# Patient Record
Sex: MALE | Race: WHITE | NOT HISPANIC OR LATINO | Employment: FULL TIME | URBAN - METROPOLITAN AREA
[De-identification: names, ages, dates, MRNs, and addresses within clinical notes are randomized per-mention and may not be internally consistent; named-entity substitution may affect disease eponyms.]

---

## 2017-03-30 ENCOUNTER — HOSPITAL ENCOUNTER (OUTPATIENT)
Dept: RADIOLOGY | Facility: HOSPITAL | Age: 67
Discharge: HOME | End: 2017-03-30
Attending: UROLOGY
Payer: MEDICARE

## 2017-03-30 ENCOUNTER — HOSPITAL ENCOUNTER (OUTPATIENT)
Dept: CARDIOLOGY | Facility: HOSPITAL | Age: 67
Discharge: HOME | End: 2017-03-30
Attending: UROLOGY
Payer: MEDICARE

## 2017-03-30 ENCOUNTER — OFFICE VISIT (OUTPATIENT)
Dept: UROLOGY | Facility: CLINIC | Age: 67
End: 2017-03-30
Payer: MEDICARE

## 2017-03-30 VITALS
HEIGHT: 75 IN | WEIGHT: 174 LBS | SYSTOLIC BLOOD PRESSURE: 131 MMHG | DIASTOLIC BLOOD PRESSURE: 86 MMHG | HEART RATE: 88 BPM | BODY MASS INDEX: 21.64 KG/M2

## 2017-03-30 DIAGNOSIS — Z85.51 HISTORY OF BLADDER CANCER: Primary | ICD-10-CM

## 2017-03-30 DIAGNOSIS — Z85.51 HISTORY OF BLADDER CANCER: ICD-10-CM

## 2017-03-30 DIAGNOSIS — N18.3 CKD (CHRONIC KIDNEY DISEASE), STAGE 3 (MODERATE): ICD-10-CM

## 2017-03-30 DIAGNOSIS — R35.1 NOCTURIA: ICD-10-CM

## 2017-03-30 DIAGNOSIS — R97.20 ELEVATED PSA: Primary | ICD-10-CM

## 2017-03-30 LAB
BILIRUB UR QL STRIP: NEGATIVE
CLARITY UR REFRACT.AUTO: CLEAR
COLOR UR AUTO: NORMAL
GLUCOSE UR QL STRIP: NEGATIVE
HGB UR QL STRIP: NEGATIVE
KETONES UR QL STRIP: NEGATIVE
LEUKOCYTE ESTERASE UR QL STRIP: NEGATIVE
MICROSCOPIC COMMENT: NORMAL
NITRITE UR QL STRIP: NEGATIVE
PH UR STRIP: 5 [PH] (ref 5–8)
PROT UR QL STRIP: NEGATIVE
SP GR UR STRIP: 1 (ref 1–1.03)
URN SPEC COLLECT METH UR: NORMAL
UROBILINOGEN UR STRIP-ACNC: NEGATIVE EU/DL

## 2017-03-30 PROCEDURE — 1159F MED LIST DOCD IN RCRD: CPT | Mod: S$GLB,,, | Performed by: UROLOGY

## 2017-03-30 PROCEDURE — 99204 OFFICE O/P NEW MOD 45 MIN: CPT | Mod: 25,S$GLB,, | Performed by: UROLOGY

## 2017-03-30 PROCEDURE — 1126F AMNT PAIN NOTED NONE PRSNT: CPT | Mod: S$GLB,,, | Performed by: UROLOGY

## 2017-03-30 PROCEDURE — 88112 CYTOPATH CELL ENHANCE TECH: CPT | Performed by: PATHOLOGY

## 2017-03-30 PROCEDURE — 93010 ELECTROCARDIOGRAM REPORT: CPT | Mod: ,,, | Performed by: INTERNAL MEDICINE

## 2017-03-30 PROCEDURE — 1157F ADVNC CARE PLAN IN RCRD: CPT | Mod: S$GLB,,, | Performed by: UROLOGY

## 2017-03-30 PROCEDURE — 81001 URINALYSIS AUTO W/SCOPE: CPT

## 2017-03-30 PROCEDURE — 81001 URINALYSIS AUTO W/SCOPE: CPT | Mod: S$GLB,,, | Performed by: UROLOGY

## 2017-03-30 PROCEDURE — 88112 CYTOPATH CELL ENHANCE TECH: CPT | Mod: 26,,, | Performed by: PATHOLOGY

## 2017-03-30 PROCEDURE — 93005 ELECTROCARDIOGRAM TRACING: CPT

## 2017-03-30 PROCEDURE — 99999 PR PBB SHADOW E&M-EST. PATIENT-LVL IV: CPT | Mod: PBBFAC,,, | Performed by: UROLOGY

## 2017-03-30 PROCEDURE — 71020 XR CHEST PA AND LATERAL: CPT | Mod: 26,,, | Performed by: RADIOLOGY

## 2017-03-30 PROCEDURE — 1160F RVW MEDS BY RX/DR IN RCRD: CPT | Mod: S$GLB,,, | Performed by: UROLOGY

## 2017-03-30 PROCEDURE — 51798 US URINE CAPACITY MEASURE: CPT | Mod: S$GLB,,, | Performed by: UROLOGY

## 2017-03-30 PROCEDURE — 71020 XR CHEST PA AND LATERAL: CPT | Mod: TC

## 2017-03-30 RX ORDER — SULFAMETHOXAZOLE AND TRIMETHOPRIM 800; 160 MG/1; MG/1
1 TABLET ORAL 2 TIMES DAILY
Refills: 0 | Status: ON HOLD | COMMUNITY
Start: 2017-03-20 | End: 2017-04-05 | Stop reason: HOSPADM

## 2017-03-30 RX ORDER — PANTOPRAZOLE SODIUM 40 MG/1
TABLET, DELAYED RELEASE ORAL
Refills: 1 | COMMUNITY
Start: 2017-03-21 | End: 2017-05-05

## 2017-03-30 RX ORDER — CLOBETASOL PROPIONATE 0.46 MG/ML
1 SOLUTION TOPICAL DAILY
Refills: 3 | Status: ON HOLD | COMMUNITY
Start: 2017-03-13 | End: 2017-04-05 | Stop reason: CLARIF

## 2017-03-30 RX ORDER — KETOCONAZOLE 20 MG/ML
SHAMPOO, SUSPENSION TOPICAL
Refills: 3 | COMMUNITY
Start: 2017-03-13 | End: 2017-07-12

## 2017-03-30 NOTE — MR AVS SNAPSHOT
[facility]  [address] [place] [address]  Phone: [phone]                  Juan Manuel Koehler   3/30/2017 7:15 AM   Office Visit    Description:  Male : 1950   Provider:  James Boucher MD   Department:  [place] [facility]           Diagnoses this Visit        Comments    History of bladder cancer    -  Primary     CKD (chronic kidney disease), stage 3 (moderate)         Nocturia                To Do List           Future Appointments        Provider Department Dept Phone    4/3/2017 9:45 AM [facility] CT1 LIMIT 400 LBS [facility] [phone]      Your Future Surgeries/Procedures     2017   Surgery with James Boucher MD   [facility] ([facility])    [address] [place] [address] [phone]              Goals (5 Years of Data)     None      Follow-Up and Disposition     Return in about 6 days (around 2017) for surgery.      [facility]     [facility] Nurse Care Line -  Assistance  Unless otherwise directed by your provider, please contact [facility], our nurse care line that is available for  assistance.     Registered nurses in the [facility] provide: appointment scheduling, clinical advisement, health education, and other advisory services.  Call: [phone] (toll free)               Medications           Message regarding Medications     Verify the changes and/or additions to your medication regime listed below are the same as discussed with your clinician today.  If any of these changes or additions are incorrect, please notify your healthcare provider.             Verify that the below list of medications is an accurate representation of the medications you are currently taking.  If none reported, the list may be blank. If incorrect, please contact your healthcare provider. Carry this list with you in case of emergency.           Current Medications      "clobetasol (TEMOVATE) 0.05 % external solution 1 application once daily. Apply to affected area    esomeprazole (NEXIUM) 20 MG capsule Take 20 mg by mouth before breakfast.    ketoconazole (NIZORAL) 2 % shampoo USE TO WASH AFFECTED AREA ONCE DAILY    pantoprazole (PROTONIX) 40 MG tablet TAKE 1 BY MOUTH ONCE A DAY FOR 60 DAYS    sulfamethoxazole-trimethoprim 800-160mg (BACTRIM DS) 800-160 mg Tab Take 1 tablet by mouth 2 (two) times daily.           Clinical Reference Information           Your Vitals Were     BP Pulse Height Weight BMI    131/86 88 6' 3" (1.905 m) 78.9 kg (174 lb) 21.75 kg/m2      Blood Pressure          Most Recent Value    BP  131/86      Allergies as of 3/30/2017     No Known Allergies      Immunizations Administered on Date of Encounter - 3/30/2017     None      Orders Placed During Today's Visit      Normal Orders This Visit    Bladder scan     Case Request Operating Room: CYSTOSCOPY WITH RETROGRADE PYELOGRAM, EXCISION-BLADDER TUMOR-TRANSURETHRAL (TURBT), BIOPSY-URETER     Cytology, urine     POCT urinalysis, dipstick or tablet reag     Urinalysis Microscopic     Urinalysis     Future Labs/Procedures Expected by Expires    CT Renal Stone Study ABD Pelvis WO  3/30/2017 3/30/2018    Prostate Specific Antigen, Diagnostic  3/30/2017 3/30/2018    X-Ray Chest PA And Lateral  3/30/2017 3/30/2018    SCHEDULED EKG 12-LEAD (to Muse)  As directed 3/30/2018      [facility] Sign-Up     Activating your [facility] account is as easy as 1-2-3!     1) Visit [email or web address], select Sign Up Now, enter this activation code and your date of birth, then select Next.  [email or web address]  Expires: 5/14/2017 12:48 PM      2) Create a username and password to use when you visit [facility] in the future and select a security question in case you lose your password and select Next.    3) Enter your e-mail address and click Sign Up!    Additional Information  If you have questions, please e-mail [email or web address] or call " [phone] to talk to our [facility] staff. Remember, [facility] is NOT to be used for urgent needs. For medical emergencies, dial 911.         Language Assistance Services     ATTENTION: Language assistance services are available, free of charge. Please call [phone].      ATENCIÓN: Si habla rahel, tiene a felton disposición servicios gratuitos de asistencia lingüística. Llame al [phone].     CHÚ Ý: N?u b?n nói Ti?ng Vi?t, có các d?ch v? h? tr? ngôn ng? mi?n phí dành cho b?n. G?i s? [phone].         [facility] complies with applicable Federal civil rights laws and does not discriminate on the basis of race, color, national origin, age, disability, or sex.

## 2017-03-30 NOTE — PROGRESS NOTES
Subjective:       Patient ID: Juan Manuel Koehler is a 67 y.o. male.    Chief Complaint: Bladder cancer.    HPI   Patient is a 67 y.o. male who is new to our clinic and referred by their PCP, Dr. Sweeney for evaluation of bladder cancer and CKD.    The patient reports some bladder pain with bladder filling there is been ongoing for the past month.  He has a history of bladder cancer diagnosed in 1990s.  He had 5 recurrences between 1990 and 1997.  These were treated at an outside institution.  His last cystoscopy was approximately 3-4 years ago.  He reports a good urinary stream.  He feels he empties his bladder completely.  He has nocturia 2 times per night.  He denies significant daytime urinary frequency or urgency.  His AUA symptom score today is 11.  He did not fill out a bother score.  He denies dysuria or gross hematuria.  No bone pain or unexpected weight loss.  He also reports an abnormality of his lab.  On review and request of his quest labs, his creatinine is 1.95 with an estimated GFR of 35.  He also has an abnormal parathyroid hormone level at 66 which is barely above the reference range normal which was 64.  A urine culture was done that showed no growth.  CBC revealed white blood cell count 6.9, hemoglobin of 10.1, platelets of 239,000.      Urine dipstick today is reviewed and reveals pH of 5, nitrite negative, leukocytes negative, 1+ protein, 1+ blood.    A post void residual bladder scan was performed immediately after voiding. The patient's PVR was noted to be 34mL.         Review of Systems    All other systems reviewed and negative except pertinent positives noted in HPI.    Objective:      Physical Exam   Nursing note and vitals reviewed.  Constitutional: He is oriented to person, place, and time. Vital signs are normal. He appears well-developed and well-nourished. He is cooperative.   HENT:   Head: Normocephalic.   Neck: No tracheal deviation present.   Cardiovascular: Normal rate and intact distal  pulses.    Pulmonary/Chest: Effort normal. No accessory muscle usage. No tachypnea. No respiratory distress.   Abdominal: Soft. Normal appearance. He exhibits no distension, no fluid wave, no ascites and no mass. There is no tenderness. There is no rebound and no CVA tenderness. No hernia. Hernia confirmed negative in the right inguinal area and confirmed negative in the left inguinal area.   Liver/spleen non-palpable.   Genitourinary: Prostate normal, testes normal and penis normal. Rectal exam shows no external hemorrhoid, no mass, no tenderness and anal tone normal. Prostate is not tender. Right testis shows no mass and no tenderness. Right testis is descended. Left testis shows no mass and no tenderness. Left testis is descended. Circumcised.   Genitourinary Comments: Scrotum showed no rashes or lesions.  Anus/perineum without lesion.  Epididymis showed no masses or tenderness. No meatal stenosis, meatus in normal location. No penile discharge/plaques. Prostate smooth, no nodules, 40 grams.  Seminal vesicles non-palpable.  Normal sphincter tone.        Musculoskeletal: Normal range of motion.   Lymphadenopathy: No inguinal adenopathy noted on the right or left side.        Right: No inguinal adenopathy present.        Left: No inguinal adenopathy present.   Neurological: He is alert and oriented to person, place, and time. He has normal strength.   Skin: No bruising and no rash noted.     Psychiatric: He has a normal mood and affect. His speech is normal and behavior is normal. Thought content normal.       Assessment:       1. History of bladder cancer    2. CKD (chronic kidney disease), stage 3 (moderate)    3. Nocturia        Plan:     1. Bladder CA:  - patient is due for a cystoscopy for surveillance of his bladder cancer.  We do not have the original pathology results at this time.  -Because of his elevation of his serum creatinine, we will get upper tract imaging by way of a CT scan without contrast of the  abdomen and pelvis.  He will need a cystoscopy which we will do in the operating room due to his anxiety as well as the need for bilateral retrograde pyelograms again because his renal function will not allow for intravenous contrast administration for a CT urogram.  -I have explained the indication, risks, benefits, and alternatives of the procedure in detail.  The patient voices understanding and all questions have been answered.  The patient agrees to proceed as planned with cystoscopy, bilateral retrograde pyelograms, possible bladder biopsy or transurethral resection of a bladder tumor.  Consent obtained today.  All questions answered.  -Urine cytology sent    2. CKD:  -No medical comorbidities to explain his chronic kidney disease.  We will obtain a CT scan to evaluate for any obstructive uropathy.    3. Nocturia:  -Limit fluids 2 hours before bedtime.  Elevated legs 2 hours before bedtime.  No caffeine, cokes, teas after 3 pm in the afternoon.

## 2017-03-31 NOTE — PLAN OF CARE
Scheduled for surgery on 4/5/17.  Attempted to call patient on both phone numbers on file to give preop instructions- no answer.

## 2017-04-03 ENCOUNTER — HOSPITAL ENCOUNTER (OUTPATIENT)
Dept: RADIOLOGY | Facility: HOSPITAL | Age: 67
Discharge: HOME | DRG: 669 | End: 2017-04-03
Attending: UROLOGY
Payer: MEDICARE

## 2017-04-03 ENCOUNTER — TELEPHONE (OUTPATIENT)
Dept: UROLOGY | Facility: CLINIC | Age: 67
End: 2017-04-03

## 2017-04-03 DIAGNOSIS — Z85.51 HISTORY OF BLADDER CANCER: ICD-10-CM

## 2017-04-03 PROCEDURE — 74176 CT ABD & PELVIS W/O CONTRAST: CPT | Mod: 26,,, | Performed by: RADIOLOGY

## 2017-04-03 NOTE — TELEPHONE ENCOUNTER
----- Message from Alina Prater sent at 4/3/2017  2:39 PM CDT -----  Contact: Self/[phone]  Patient would like results from CTscan and bloodwork today, if possible.  Please advise.

## 2017-04-04 NOTE — TELEPHONE ENCOUNTER
Called patient and notified of psa result and plan for repeat in 3 months.    Also discussed finding of bilateral hydronephrosis and thickening of the bladder wall.  Will need to investigate further in the OR.  Discussed likely need for ureteral stents if possible.  Also discussed urinary cytology result.

## 2017-04-05 ENCOUNTER — ANESTHESIA EVENT (OUTPATIENT)
Dept: SURGERY | Facility: HOSPITAL | Age: 67
DRG: 669 | End: 2017-04-05
Payer: MEDICARE

## 2017-04-05 ENCOUNTER — ANESTHESIA (OUTPATIENT)
Dept: SURGERY | Facility: HOSPITAL | Age: 67
DRG: 669 | End: 2017-04-05
Payer: MEDICARE

## 2017-04-05 ENCOUNTER — HOSPITAL ENCOUNTER (INPATIENT)
Facility: HOSPITAL | Age: 67
LOS: 2 days | Discharge: HOME | DRG: 669 | End: 2017-04-08
Attending: UROLOGY | Admitting: UROLOGY
Payer: MEDICARE

## 2017-04-05 DIAGNOSIS — E87.5 HYPERKALEMIA, DIMINISHED RENAL EXCRETION: ICD-10-CM

## 2017-04-05 DIAGNOSIS — D63.1 ANEMIA OF CHRONIC RENAL FAILURE, STAGE 3 (MODERATE): ICD-10-CM

## 2017-04-05 DIAGNOSIS — E87.5 HYPERKALEMIA: ICD-10-CM

## 2017-04-05 DIAGNOSIS — N18.9 CKD (CHRONIC KIDNEY DISEASE), UNSPECIFIED STAGE: ICD-10-CM

## 2017-04-05 DIAGNOSIS — N17.9 ACUTE RENAL FAILURE, UNSPECIFIED ACUTE RENAL FAILURE TYPE: ICD-10-CM

## 2017-04-05 DIAGNOSIS — N18.30 ANEMIA OF CHRONIC RENAL FAILURE, STAGE 3 (MODERATE): ICD-10-CM

## 2017-04-05 DIAGNOSIS — D49.4 BLADDER TUMOR: Primary | ICD-10-CM

## 2017-04-05 DIAGNOSIS — N17.9 AKI (ACUTE KIDNEY INJURY): ICD-10-CM

## 2017-04-05 LAB
ANION GAP SERPL CALC-SCNC: 8 MMOL/L
ANION GAP SERPL CALC-SCNC: 9 MMOL/L
BASOPHILS # BLD AUTO: 0.04 K/UL
BASOPHILS NFR BLD: 0.5 %
BUN SERPL-MCNC: 25 MG/DL
BUN SERPL-MCNC: 26 MG/DL
CALCIUM SERPL-MCNC: 8.7 MG/DL
CALCIUM SERPL-MCNC: 9.4 MG/DL
CHLORIDE SERPL-SCNC: 111 MMOL/L
CHLORIDE SERPL-SCNC: 111 MMOL/L
CO2 SERPL-SCNC: 20 MMOL/L
CO2 SERPL-SCNC: 21 MMOL/L
CREAT SERPL-MCNC: 2.2 MG/DL
CREAT SERPL-MCNC: 2.3 MG/DL
DIFFERENTIAL METHOD: ABNORMAL
EOSINOPHIL # BLD AUTO: 0.3 K/UL
EOSINOPHIL NFR BLD: 3.5 %
ERYTHROCYTE [DISTWIDTH] IN BLOOD BY AUTOMATED COUNT: 13.7 %
EST. GFR  (AFRICAN AMERICAN): 33 ML/MIN/1.73 M^2
EST. GFR  (AFRICAN AMERICAN): 34 ML/MIN/1.73 M^2
EST. GFR  (NON AFRICAN AMERICAN): 28 ML/MIN/1.73 M^2
EST. GFR  (NON AFRICAN AMERICAN): 30 ML/MIN/1.73 M^2
GLUCOSE SERPL-MCNC: 152 MG/DL
GLUCOSE SERPL-MCNC: 95 MG/DL
HCT VFR BLD AUTO: 29.4 %
HGB BLD-MCNC: 9.9 G/DL
LYMPHOCYTES # BLD AUTO: 1.4 K/UL
LYMPHOCYTES NFR BLD: 18.9 %
MCH RBC QN AUTO: 29.6 PG
MCHC RBC AUTO-ENTMCNC: 33.7 %
MCV RBC AUTO: 88 FL
MONOCYTES # BLD AUTO: 0.5 K/UL
MONOCYTES NFR BLD: 7.1 %
NEUTROPHILS # BLD AUTO: 5.1 K/UL
NEUTROPHILS NFR BLD: 69.7 %
PLATELET # BLD AUTO: 241 K/UL
PMV BLD AUTO: 9.1 FL
POTASSIUM SERPL-SCNC: 4.6 MMOL/L
POTASSIUM SERPL-SCNC: 5.1 MMOL/L
RBC # BLD AUTO: 3.35 M/UL
SODIUM SERPL-SCNC: 139 MMOL/L
SODIUM SERPL-SCNC: 141 MMOL/L
WBC # BLD AUTO: 7.35 K/UL

## 2017-04-05 PROCEDURE — 88112 CYTOPATH CELL ENHANCE TECH: CPT | Mod: 26,,, | Performed by: PATHOLOGY

## 2017-04-05 PROCEDURE — 88307 TISSUE EXAM BY PATHOLOGIST: CPT | Performed by: PATHOLOGY

## 2017-04-05 PROCEDURE — 27201423 OPTIME MED/SURG SUP & DEVICES STERILE SUPPLY: Performed by: UROLOGY

## 2017-04-05 PROCEDURE — 25000003 PHARM REV CODE 250: Performed by: NURSE ANESTHETIST, CERTIFIED REGISTERED

## 2017-04-05 PROCEDURE — 0TBB8ZZ EXCISION OF BLADDER, VIA NATURAL OR ARTIFICIAL OPENING ENDOSCOPIC: ICD-10-PCS | Performed by: UROLOGY

## 2017-04-05 PROCEDURE — 88112 CYTOPATH CELL ENHANCE TECH: CPT | Performed by: PATHOLOGY

## 2017-04-05 PROCEDURE — 63600175 PHARM REV CODE 636 W HCPCS: Performed by: ANESTHESIOLOGY

## 2017-04-05 PROCEDURE — 36415 COLL VENOUS BLD VENIPUNCTURE: CPT

## 2017-04-05 PROCEDURE — 88307 TISSUE EXAM BY PATHOLOGIST: CPT | Mod: 26,,, | Performed by: PATHOLOGY

## 2017-04-05 PROCEDURE — 36000706: Performed by: UROLOGY

## 2017-04-05 PROCEDURE — 37000009 HC ANESTHESIA EA ADD 15 MINS: Performed by: UROLOGY

## 2017-04-05 PROCEDURE — 71000039 HC RECOVERY, EACH ADD'L HOUR: Performed by: UROLOGY

## 2017-04-05 PROCEDURE — 52240 CYSTOSCOPY AND TREATMENT: CPT | Mod: ,,, | Performed by: UROLOGY

## 2017-04-05 PROCEDURE — 25000003 PHARM REV CODE 250: Performed by: UROLOGY

## 2017-04-05 PROCEDURE — 80048 BASIC METABOLIC PNL TOTAL CA: CPT | Mod: 91

## 2017-04-05 PROCEDURE — 80048 BASIC METABOLIC PNL TOTAL CA: CPT

## 2017-04-05 PROCEDURE — 63600175 PHARM REV CODE 636 W HCPCS

## 2017-04-05 PROCEDURE — 25500020 PHARM REV CODE 255: Performed by: UROLOGY

## 2017-04-05 PROCEDURE — 63600175 PHARM REV CODE 636 W HCPCS: Performed by: UROLOGY

## 2017-04-05 PROCEDURE — 71000033 HC RECOVERY, INTIAL HOUR: Performed by: UROLOGY

## 2017-04-05 PROCEDURE — 63600175 PHARM REV CODE 636 W HCPCS: Performed by: NURSE ANESTHETIST, CERTIFIED REGISTERED

## 2017-04-05 PROCEDURE — 37000008 HC ANESTHESIA 1ST 15 MINUTES: Performed by: UROLOGY

## 2017-04-05 PROCEDURE — 36000707: Performed by: UROLOGY

## 2017-04-05 PROCEDURE — 85025 COMPLETE CBC W/AUTO DIFF WBC: CPT

## 2017-04-05 PROCEDURE — C1758 CATHETER, URETERAL: HCPCS | Performed by: UROLOGY

## 2017-04-05 RX ORDER — SODIUM CHLORIDE 9 MG/ML
INJECTION, SOLUTION INTRAVENOUS CONTINUOUS
Status: DISCONTINUED | OUTPATIENT
Start: 2017-04-05 | End: 2017-04-08

## 2017-04-05 RX ORDER — PANTOPRAZOLE SODIUM 40 MG/1
40 TABLET, DELAYED RELEASE ORAL DAILY
Status: DISCONTINUED | OUTPATIENT
Start: 2017-04-06 | End: 2017-04-08 | Stop reason: HOSPADM

## 2017-04-05 RX ORDER — OXYCODONE AND ACETAMINOPHEN 5; 325 MG/1; MG/1
1 TABLET ORAL EVERY 4 HOURS PRN
Qty: 30 TABLET | Refills: 0 | Status: SHIPPED | OUTPATIENT
Start: 2017-04-05 | End: 2017-04-11

## 2017-04-05 RX ORDER — CEFAZOLIN SODIUM 1 G/3ML
INJECTION, POWDER, FOR SOLUTION INTRAMUSCULAR; INTRAVENOUS
Status: DISCONTINUED | OUTPATIENT
Start: 2017-04-05 | End: 2017-04-05

## 2017-04-05 RX ORDER — CIPROFLOXACIN 2 MG/ML
400 INJECTION, SOLUTION INTRAVENOUS
Status: DISCONTINUED | OUTPATIENT
Start: 2017-04-05 | End: 2017-04-05

## 2017-04-05 RX ORDER — SODIUM CHLORIDE 0.9 % (FLUSH) 0.9 %
3 SYRINGE (ML) INJECTION
Status: DISCONTINUED | OUTPATIENT
Start: 2017-04-05 | End: 2017-04-05

## 2017-04-05 RX ORDER — OXYCODONE AND ACETAMINOPHEN 5; 325 MG/1; MG/1
1 TABLET ORAL EVERY 4 HOURS PRN
Status: DISCONTINUED | OUTPATIENT
Start: 2017-04-05 | End: 2017-04-08 | Stop reason: HOSPADM

## 2017-04-05 RX ORDER — LORAZEPAM 2 MG/ML
2 INJECTION INTRAMUSCULAR ONCE
Status: COMPLETED | OUTPATIENT
Start: 2017-04-05 | End: 2017-04-05

## 2017-04-05 RX ORDER — OXYBUTYNIN CHLORIDE 5 MG/1
5 TABLET ORAL
Status: DISCONTINUED | OUTPATIENT
Start: 2017-04-05 | End: 2017-04-08 | Stop reason: HOSPADM

## 2017-04-05 RX ORDER — OXYCODONE AND ACETAMINOPHEN 10; 325 MG/1; MG/1
1 TABLET ORAL EVERY 4 HOURS PRN
Status: DISCONTINUED | OUTPATIENT
Start: 2017-04-05 | End: 2017-04-08 | Stop reason: HOSPADM

## 2017-04-05 RX ORDER — HYDROCODONE BITARTRATE AND ACETAMINOPHEN 5; 325 MG/1; MG/1
1 TABLET ORAL EVERY 4 HOURS PRN
Status: DISCONTINUED | OUTPATIENT
Start: 2017-04-05 | End: 2017-04-08 | Stop reason: HOSPADM

## 2017-04-05 RX ORDER — MIDAZOLAM HYDROCHLORIDE 1 MG/ML
INJECTION INTRAMUSCULAR; INTRAVENOUS
Status: DISCONTINUED | OUTPATIENT
Start: 2017-04-05 | End: 2017-04-05

## 2017-04-05 RX ORDER — LORAZEPAM 2 MG/ML
INJECTION INTRAMUSCULAR
Status: COMPLETED
Start: 2017-04-05 | End: 2017-04-05

## 2017-04-05 RX ORDER — DIPHENHYDRAMINE HCL 25 MG
25 CAPSULE ORAL EVERY 12 HOURS PRN
Status: DISCONTINUED | OUTPATIENT
Start: 2017-04-05 | End: 2017-04-08 | Stop reason: HOSPADM

## 2017-04-05 RX ORDER — HYDROMORPHONE HYDROCHLORIDE 2 MG/ML
0.5 INJECTION, SOLUTION INTRAMUSCULAR; INTRAVENOUS; SUBCUTANEOUS EVERY 5 MIN PRN
Status: DISCONTINUED | OUTPATIENT
Start: 2017-04-05 | End: 2017-04-05

## 2017-04-05 RX ORDER — ROCURONIUM BROMIDE 10 MG/ML
INJECTION, SOLUTION INTRAVENOUS
Status: DISCONTINUED | OUTPATIENT
Start: 2017-04-05 | End: 2017-04-05

## 2017-04-05 RX ORDER — ONDANSETRON HYDROCHLORIDE 2 MG/ML
INJECTION, SOLUTION INTRAMUSCULAR; INTRAVENOUS
Status: DISCONTINUED | OUTPATIENT
Start: 2017-04-05 | End: 2017-04-05

## 2017-04-05 RX ORDER — SODIUM CHLORIDE, SODIUM LACTATE, POTASSIUM CHLORIDE, CALCIUM CHLORIDE 600; 310; 30; 20 MG/100ML; MG/100ML; MG/100ML; MG/100ML
INJECTION, SOLUTION INTRAVENOUS CONTINUOUS PRN
Status: DISCONTINUED | OUTPATIENT
Start: 2017-04-05 | End: 2017-04-05

## 2017-04-05 RX ORDER — SODIUM CHLORIDE 0.9 % (FLUSH) 0.9 %
3 SYRINGE (ML) INJECTION EVERY 8 HOURS
Status: DISCONTINUED | OUTPATIENT
Start: 2017-04-05 | End: 2017-04-08 | Stop reason: HOSPADM

## 2017-04-05 RX ORDER — ATROPA BELLADONNA AND OPIUM 16.2; 3 MG/1; MG/1
30 SUPPOSITORY RECTAL EVERY 8 HOURS PRN
Status: DISCONTINUED | OUTPATIENT
Start: 2017-04-05 | End: 2017-04-08 | Stop reason: HOSPADM

## 2017-04-05 RX ORDER — PROPOFOL 10 MG/ML
VIAL (ML) INTRAVENOUS
Status: DISCONTINUED | OUTPATIENT
Start: 2017-04-05 | End: 2017-04-05

## 2017-04-05 RX ORDER — OXYBUTYNIN CHLORIDE 5 MG/1
5 TABLET ORAL 3 TIMES DAILY
Qty: 90 TABLET | Refills: 0 | Status: SHIPPED | OUTPATIENT
Start: 2017-04-05 | End: 2017-04-20 | Stop reason: HOSPADM

## 2017-04-05 RX ORDER — ONDANSETRON 2 MG/ML
4 INJECTION INTRAMUSCULAR; INTRAVENOUS EVERY 8 HOURS PRN
Status: DISCONTINUED | OUTPATIENT
Start: 2017-04-05 | End: 2017-04-08 | Stop reason: HOSPADM

## 2017-04-05 RX ORDER — HYDROMORPHONE HYDROCHLORIDE 1 MG/ML
0.5 INJECTION, SOLUTION INTRAMUSCULAR; INTRAVENOUS; SUBCUTANEOUS
Status: DISCONTINUED | OUTPATIENT
Start: 2017-04-05 | End: 2017-04-08 | Stop reason: HOSPADM

## 2017-04-05 RX ORDER — MIDAZOLAM HYDROCHLORIDE 1 MG/ML
INJECTION INTRAMUSCULAR; INTRAVENOUS
Status: DISPENSED
Start: 2017-04-05 | End: 2017-04-06

## 2017-04-05 RX ORDER — SODIUM CHLORIDE 9 MG/ML
INJECTION, SOLUTION INTRAVENOUS CONTINUOUS
Status: DISCONTINUED | OUTPATIENT
Start: 2017-04-05 | End: 2017-04-05

## 2017-04-05 RX ORDER — HYDROMORPHONE HYDROCHLORIDE 2 MG/ML
INJECTION, SOLUTION INTRAMUSCULAR; INTRAVENOUS; SUBCUTANEOUS
Status: COMPLETED
Start: 2017-04-05 | End: 2017-04-05

## 2017-04-05 RX ORDER — LIDOCAINE HCL/PF 100 MG/5ML
SYRINGE (ML) INTRAVENOUS
Status: DISCONTINUED | OUTPATIENT
Start: 2017-04-05 | End: 2017-04-05

## 2017-04-05 RX ORDER — SUCCINYLCHOLINE CHLORIDE 20 MG/ML
INJECTION INTRAMUSCULAR; INTRAVENOUS
Status: DISCONTINUED | OUTPATIENT
Start: 2017-04-05 | End: 2017-04-05

## 2017-04-05 RX ORDER — ONDANSETRON 2 MG/ML
4 INJECTION INTRAMUSCULAR; INTRAVENOUS ONCE AS NEEDED
Status: DISCONTINUED | OUTPATIENT
Start: 2017-04-05 | End: 2017-04-05

## 2017-04-05 RX ORDER — LABETALOL HYDROCHLORIDE 5 MG/ML
INJECTION, SOLUTION INTRAVENOUS
Status: DISCONTINUED | OUTPATIENT
Start: 2017-04-05 | End: 2017-04-05

## 2017-04-05 RX ORDER — FENTANYL CITRATE 50 UG/ML
INJECTION, SOLUTION INTRAMUSCULAR; INTRAVENOUS
Status: DISCONTINUED | OUTPATIENT
Start: 2017-04-05 | End: 2017-04-05

## 2017-04-05 RX ADMIN — MIDAZOLAM HYDROCHLORIDE 2 MG: 1 INJECTION, SOLUTION INTRAMUSCULAR; INTRAVENOUS at 12:04

## 2017-04-05 RX ADMIN — LORAZEPAM 2 MG: 2 INJECTION INTRAMUSCULAR at 01:04

## 2017-04-05 RX ADMIN — ONDANSETRON 4 MG: 2 INJECTION, SOLUTION INTRAMUSCULAR; INTRAVENOUS at 12:04

## 2017-04-05 RX ADMIN — FENTANYL CITRATE 100 MCG: 50 INJECTION, SOLUTION INTRAMUSCULAR; INTRAVENOUS at 11:04

## 2017-04-05 RX ADMIN — FENTANYL CITRATE 50 MCG: 50 INJECTION, SOLUTION INTRAMUSCULAR; INTRAVENOUS at 12:04

## 2017-04-05 RX ADMIN — HYDROMORPHONE HYDROCHLORIDE 0.5 MG: 2 INJECTION INTRAMUSCULAR; INTRAVENOUS; SUBCUTANEOUS at 03:04

## 2017-04-05 RX ADMIN — SODIUM CHLORIDE, SODIUM LACTATE, POTASSIUM CHLORIDE, AND CALCIUM CHLORIDE: .6; .31; .03; .02 INJECTION, SOLUTION INTRAVENOUS at 11:04

## 2017-04-05 RX ADMIN — LORAZEPAM 2 MG: 2 INJECTION, SOLUTION INTRAMUSCULAR; INTRAVENOUS at 01:04

## 2017-04-05 RX ADMIN — HYDROMORPHONE HYDROCHLORIDE 0.5 MG: 2 INJECTION INTRAMUSCULAR; INTRAVENOUS; SUBCUTANEOUS at 01:04

## 2017-04-05 RX ADMIN — LIDOCAINE HYDROCHLORIDE 50 MG: 20 INJECTION, SOLUTION INTRAVENOUS at 11:04

## 2017-04-05 RX ADMIN — SODIUM CHLORIDE 10 ML/HR: 0.9 INJECTION, SOLUTION INTRAVENOUS at 10:04

## 2017-04-05 RX ADMIN — SODIUM CHLORIDE: 0.9 INJECTION, SOLUTION INTRAVENOUS at 05:04

## 2017-04-05 RX ADMIN — HYDROMORPHONE HYDROCHLORIDE 0.5 MG: 2 INJECTION, SOLUTION INTRAMUSCULAR; INTRAVENOUS; SUBCUTANEOUS at 01:04

## 2017-04-05 RX ADMIN — ROCURONIUM BROMIDE 5 MG: 10 INJECTION, SOLUTION INTRAVENOUS at 11:04

## 2017-04-05 RX ADMIN — ONDANSETRON 4 MG: 2 INJECTION INTRAMUSCULAR; INTRAVENOUS at 07:04

## 2017-04-05 RX ADMIN — PROPOFOL 120 MG: 10 INJECTION, EMULSION INTRAVENOUS at 11:04

## 2017-04-05 RX ADMIN — ATROPA BELLADONNA AND OPIUM 30 MG: 16.2; 3 SUPPOSITORY RECTAL at 01:04

## 2017-04-05 RX ADMIN — PROPOFOL 80 MG: 10 INJECTION, EMULSION INTRAVENOUS at 11:04

## 2017-04-05 RX ADMIN — HYDROCODONE BITARTRATE AND ACETAMINOPHEN 1 TABLET: 5; 325 TABLET ORAL at 07:04

## 2017-04-05 RX ADMIN — CEFAZOLIN 2 G: 330 INJECTION, POWDER, FOR SOLUTION INTRAMUSCULAR; INTRAVENOUS at 12:04

## 2017-04-05 RX ADMIN — SUCCINYLCHOLINE CHLORIDE 100 MG: 20 INJECTION, SOLUTION INTRAMUSCULAR; INTRAVENOUS at 11:04

## 2017-04-05 RX ADMIN — MIDAZOLAM HYDROCHLORIDE 2 MG: 1 INJECTION, SOLUTION INTRAMUSCULAR; INTRAVENOUS at 11:04

## 2017-04-05 NOTE — PLAN OF CARE
Dr. Boucher at bedside to flush bolanos catheter.  Instructed that the pt will be admitted.   notified.

## 2017-04-05 NOTE — IP AVS SNAPSHOT
[facility]  [address]  [place] [address]  Phone: [phone]           Patient Discharge Instructions   Our goal is to set you up for success. This packet includes information on your condition, medications, and your home care.  It will help you care for yourself to prevent having to return to the hospital.     Please ask your nurse if you have any questions.      There are many details to remember when preparing to leave the hospital. Here is what you will need to do:    1. Take your medicine. If you are prescribed medications, review your Medication List on the following pages. You may have new medications to  at the pharmacy and others that you'll need to stop taking. Review the instructions for how and when to take your medications. Talk with your doctor or nurses if you are unsure of what to do.     2. Go to your follow-up appointments. Specific follow-up information is listed in the following pages. Your may be contacted by a nurse or clinical provider about future appointments. Be sure we have all of the phone numbers to reach you. Please contact your provider's office if you are unable to make an appointment.     3. Watch for warning signs. Your doctor or nurse will give you detailed warning signs to watch for and when to call for assistance. These instructions may also include educational information about your condition. If you experience any of warning signs to your health, call your doctor.               ** Verify the list of medication(s) below is accurate and up to date. Carry this with you in case of emergency. If your medications have changed, please notify your healthcare provider.             Medication List      START taking these medications        Additional Info                      docusate sodium 100 MG capsule   Commonly known as:  COLACE   Refills:  0   Dose:  100 mg    Last time this was given:  100 mg on 4/8/2017 10:01 AM   Instructions:  Take 1 capsule  (100 mg total) by mouth 2 (two) times daily.     Begin Date    AM    Noon    PM    Bedtime       ergocalciferol 50,000 unit Cap   Commonly known as:  ERGOCALCIFEROL   Quantity:  7 capsule   Refills:  0   Dose:  43806 Units    Last time this was given:  50,000 Units on 4/8/2017 10:02 AM   Instructions:  Take 1 capsule (50,000 Units total) by mouth every 7 days.     Begin Date    AM    Noon    PM    Bedtime       * oxybutynin 5 MG Tab   Commonly known as:  DITROPAN   Quantity:  90 tablet   Refills:  0   Dose:  5 mg    Last time this was given:  5 mg on 4/6/2017  3:30 AM   Instructions:  Take 1 tablet (5 mg total) by mouth 3 (three) times daily.     Begin Date    AM    Noon    PM    Bedtime       * oxybutynin 5 MG Tab   Commonly known as:  DITROPAN   Quantity:  90 tablet   Refills:  1   Dose:  5 mg    Last time this was given:  5 mg on 4/6/2017  3:30 AM   Instructions:  Take 1 tablet (5 mg total) by mouth after meals as needed (bladder spasm).     Begin Date    AM    Noon    PM    Bedtime       * oxycodone-acetaminophen 5-325 mg per tablet   Commonly known as:  PERCOCET   Quantity:  30 tablet   Refills:  0   Dose:  1 tablet    Instructions:  Take 1 tablet by mouth every 4 (four) hours as needed for Pain.     Begin Date    AM    Noon    PM    Bedtime       * oxycodone-acetaminophen 5-325 mg per tablet   Commonly known as:  PERCOCET   Quantity:  40 tablet   Refills:  0   Dose:  1 tablet    Instructions:  Take 1 tablet by mouth every 4 (four) hours as needed.     Begin Date    AM    Noon    PM    Bedtime       * Notice:  This list has 4 medication(s) that are the same as other medications prescribed for you. Read the directions carefully, and ask your doctor or other care provider to review them with you.      CONTINUE taking these medications        Additional Info                      ketoconazole 2 % shampoo   Commonly known as:  NIZORAL   Refills:  3    Instructions:  USE TO WASH AFFECTED AREA ONCE DAILY     Begin  Date    AM    Noon    PM    Bedtime       pantoprazole 40 MG tablet   Commonly known as:  PROTONIX   Refills:  1    Last time this was given:  40 mg on 4/8/2017 10:01 AM   Instructions:  TAKE 1 BY MOUTH ONCE A DAY FOR 60 DAYS     Begin Date    AM    Noon    PM    Bedtime         STOP taking these medications     sulfamethoxazole-trimethoprim 800-160mg 800-160 mg Tab   Commonly known as:  BACTRIM DS            Where to Get Your Medications      These medications were sent to [facility]/pharmacy #[id number] - [place] - [address]  [address] [place] [address]     Phone:  [phone]     oxybutynin 5 MG Tab    oxycodone-acetaminophen 5-325 mg per tablet         These medications were sent to [facility] [place] - [address]  [address], [place] [address]     Phone:  [phone]     ergocalciferol 50,000 unit Cap    oxybutynin 5 MG Tab    oxycodone-acetaminophen 5-325 mg per tablet         You can get these medications from any pharmacy     You don't need a prescription for these medications     docusate sodium 100 MG capsule                  Please bring to all follow up appointments:    1. A copy of your discharge instructions.  2. All medicines you are currently taking in their original bottles.  3. Identification and insurance card.    Please arrive 15 minutes ahead of scheduled appointment time.    Please call 24 hours in advance if you must reschedule your appointment and/or time.        Your Scheduled Appointments     Apr 10, 2017  9:45 AM CDT   Injection with [facility] ([facility])    [address]  [place] [address] [phone]              Follow-up Information     Follow up with James Boucher MD On 4/11/2017.    Specialty:  Urology    Why:  bolanos removal    Contact information:    [address]  [address]  [place] [id number] [phone]           Discharge Instructions     Future Orders    Activity as tolerated     Call MD for:  persistent nausea and vomiting or diarrhea     Call MD for:  persistent nausea and vomiting     Call MD for:  severe uncontrolled pain     Call MD for:  severe uncontrolled pain     Call MD for:  temperature >100.4     Call MD for:  temperature >100.4     Diet general     Questions:    Total calories:      Fat restriction, if any:      Protein restriction, if any:      Na restriction, if any:      Fluid restriction:      Additional restrictions:      Diet general     Questions:    Total calories:      Fat restriction, if any:      Protein restriction, if any:      Na restriction, if any:      Fluid restriction:      Additional restrictions:      Lifting restrictions     Scheduling Instructions:    No lifting >20lbs, no strenuous activyt    No dressing needed     Other restrictions (specify):     Comments:    Bilateral nephrostomy tubes to gravity and bolanos to gravity.      Discharge References/Attachments     ANEMIA (ENGLISH)    CHRONIC KIDNEY DISEASE (CKD) (ENGLISH)    KIDNEY DISEASE, DISCHARGE INSTRUCTIONS FOR CHRONIC  (ENGLISH)    KIDNEY DISEASE: EATING A SAFE AMOUNT OF POTASSIUM (ENGLISH)    KIDNEY DISEASE: CHOOSING THE RIGHT PROTEIN FOR YOUR BODY (ENGLISH)    KIDNEY DISEASE: EATING LESS SODIUM (ENGLISH)        Primary Diagnosis     Your primary diagnosis was:  Bladder Tumor      Admission Information     Date & Time Provider Department CSN    4/5/2017  8:56 AM James Boucher MD [facility] [id number]      Care Providers     Provider Role Specialty Primary office phone    James Boucher MD Attending Provider Urology [phone]    James Boucher MD Surgeon  Urology [phone]    Lenard Gaviria MD Consulting Physician  Radiology [phone]    Neva Chavez MD Consulting Physician  Nephrology [phone]    Erika Duron MD Consulting Physician  Nephrology  [phone]    [facility]  "Diagnostic Provider Surgeon  -- Number not on file      Your Vitals Were     BP Pulse Temp Resp Height Weight    173/84 83 97.8 °F (36.6 °C) (Oral) 19 6' 3" (1.905 m) 78.9 kg (174 lb)    SpO2 BMI             98% 21.75 kg/m2         Recent Lab Values     No lab values to display.      Pending Labs     Order Current Status    Cytology, urine In process    Specimen to Pathology - Surgery In process      Allergies as of 4/8/2017        Reactions    Pneumococcal 23-margarito Ps Vaccine       [facility]     [facility] Care [facility] - 24/7 Assistance  Unless otherwise directed by your provider, please contact [facility], our nurse care line that is available for 24/7 assistance.     Registered nurses in the [facility] provide clinical advisement, health education, appointment booking, and other advisory services.  Call for this free service at [phone].        Advance Directives     An advance directive is a document which, in the event you are no longer able to make decisions for yourself, tells your healthcare team what kind of treatment you do or do not want to receive, or who you would like to make those decisions for you.  If you do not currently have an advance directive, [facility] encourages you to create one.  For more information call:  [phone]), [phone] ([phone]),  or log on to [email or web address].        Language Assistance Services     ATTENTION: Language assistance services are available, free of charge. Please call [phone].      ATENCIÓN: Si habla español, tiene a felton disposición servicios gratuitos de asistencia lingüística. Llame al [phone].     [facility] Ý: N?u b?n nói Ti?ng Vi?t, có các d?ch v? h? tr? ngôn ng? mi?n phí dành cho b?n. G?i s? [phone].        Chronic Kindey Disease Education             [facility] Sign-Up     Activating your [facility] account is as easy as 1-2-3!     1) Visit [email or web address], select Sign Up Now, enter " this activation code and your date of birth, then select Next.  [email or web address]  Expires: 5/14/2017 12:48 PM      2) Create a username and password to use when you visit [facility] in the future and select a security question in case you lose your password and select Next.    3) Enter your e-mail address and click Sign Up!    Additional Information  If you have questions, please e-mail [email or web address] or call [phone] to talk to our [facility] staff. Remember, [facility] is NOT to be used for urgent needs. For medical emergencies, dial 911.          [facility] complies with applicable Federal civil rights laws and does not discriminate on the basis of race, color, national origin, age, disability, or sex.

## 2017-04-05 NOTE — INTERVAL H&P NOTE
The patient has been examined and the H&P has been reviewed:    I concur with the findings and changes have been noted since the H&P was written: CT scan reviewed with patient.     Anesthesia/Surgery risks, benefits and alternative options discussed and understood by patient/family.          Active Hospital Problems    Diagnosis  POA    Bladder tumor [D49.4]  Yes      Resolved Hospital Problems    Diagnosis Date Resolved POA   No resolved problems to display.

## 2017-04-05 NOTE — NURSING
Pt arrived to unit from recover. VSS. Pt sleepy and tired but arousable. Wife at bedside. Fall precautions initiated. Call light within reach, bed in lowest position, wheels locked, bed alarm set, side rails up x's 2. Patient aware. Nurse instructed patient to call if needs assistance. Patient verbalized complete understanding.    3-way bolanos catheter in place with bright red blood noted. Dr. Boucher aware. Pt on 2L NC. NS infusing at 75 mL/hr. Pt to be NPO after midnight for nephrostomy tube placement. Will continue to monitor and continue plan of care.

## 2017-04-05 NOTE — TRANSFER OF CARE
"Anesthesia Transfer of Care Note    Patient: Juan Manuel Koehler    Procedure(s) Performed: Procedure(s) (LRB):  CYSTOSCOPY WITH RETROGRADE PYELOGRAM (Bilateral)  EXCISION-BLADDER TUMOR-TRANSURETHRAL (TURBT) (N/A)  BIOPSY-URETER (Bilateral)    Patient location: PACU    Anesthesia Type: general    Transport from OR: Transported from OR on 6-10 L/min O2 by face mask with adequate spontaneous ventilation    Post pain: adequate analgesia    Post assessment: no apparent anesthetic complications    Post vital signs: stable    Level of consciousness: sedated    Nausea/Vomiting: no nausea/vomiting    Complications: none          Last vitals:   Visit Vitals    BP (!) 140/71    Pulse 82    Temp 36.6 °C (97.9 °F) (Oral)    Resp 20    Ht 6' 3" (1.905 m)    Wt 78.9 kg (174 lb)    SpO2 98%    BMI 21.75 kg/m2     "

## 2017-04-05 NOTE — OP NOTE
[facility]  Operative Note    Date: 04/05/2017    Pre-Op Diagnosis: bladder tumor    Post-Op Diagnosis: same    Procedure(s) Performed:   1.  TURBT of >5cm sized bladder tumor      Specimen(s): bladder tumor    Staff Surgeon: James Boucher MD    Assistant Surgeon: None    Anesthesia: General endotracheal anesthesia    Indications: Juan Manuel Koehler is a 67 y.o. male with a bladder tumor.  He presents today for surgical resection.      Findings:   1.  Tumor involving entire trigone with no ability to discern ureteral orifices. Also small amount of tumor along right lateral wall.  2.  MARICEL post-TURBT showed induration of the posterior bladder neck beyond the prostate.  Prostate normal feeling.    Estimated Blood Loss: min    Drains: 24 Fr 3-way bolanos catheter    Procedure in detail:  After the risks, benefits and possible complications of the procedure were explained, consents were obtained. The patient was taken to the operating room and placed under anesthesia. Pre-operative antibiotics were administered 30 minutes prior to expected start time. The patient was placed in the dorsal lithotomy position and prepped and draped in the normal and sterile fashion. Time out was performed.      A rigid cystoscope in a 22 Fr sheath was introduced into the bladder per urethra. This passed easily.  The entire urethra was visualized which showed no masses or strictures.  The right and left ureteral orifices were identified in the normal anatomic position and were seen effluxing clear urine.  Formal cystoscopy was performed which revealed essentially complete envelopment of the trigone with tumor.        The resectoscope was then assembled with the visual obturator. This was placed into the bladder via the urethra and the visual obturator was exchanged for the resecting mechanism.  The tumor was then resected, superficially until the base was identified.  Specimens were then removed and passed off the field for  pathologic analysis.      Neither ureteral orifices were visualized and were thus likely resected with the tumor.     The bladder was drained and hemostasis was achieved.  The resectoscope was removed.  A 24 Fr 3-way bolanos catheter was placed with 30 cc in the balloon.  The bladder was then irrigated.    Post-resection bimanual exam revealed induration in the midline proximal to the prostate consistent with induration of the posterior bladder/bladder neck.      The patient tolerated the procedure well and was transferred to recovery in stable condition.    Disposition:  The patient will follow up with me in 2 weeks for pathology review.  He will be monitored for urine output in the recovery room.  If this is normal, he will have labs drawn tomorrow to ensure his serum Cr is not significantly increased.  He may ultimately require nephrostomy tube placement.  Prescriptions were given for percocet.      James Boucher MD

## 2017-04-05 NOTE — ANESTHESIA POSTPROCEDURE EVALUATION
"Anesthesia Post Evaluation    Patient: Juan Manuel Koehler    Procedure(s) Performed: Procedure(s) (LRB):  CYSTOSCOPY WITH RETROGRADE PYELOGRAM (Bilateral)  EXCISION-BLADDER TUMOR-TRANSURETHRAL (TURBT) (N/A)  BIOPSY-URETER (Bilateral)    Final Anesthesia Type: general  Patient location during evaluation: PACU  Patient participation: Yes- Able to Participate  Level of consciousness: awake and alert  Post-procedure vital signs: reviewed and stable  Pain management: adequate  Airway patency: patent  PONV status at discharge: No PONV  Anesthetic complications: no      Cardiovascular status: hemodynamically stable  Respiratory status: unassisted  Hydration status: euvolemic  Follow-up not needed.        Visit Vitals    BP (!) 172/80    Pulse 84    Temp 36.6 °C (97.9 °F) (Oral)    Resp (!) 63    Ht 6' 3" (1.905 m)    Wt 78.9 kg (174 lb)    SpO2 98%    BMI 21.75 kg/m2       Pain/Deepika Score: Pain Assessment Performed: Yes (4/5/2017  9:24 AM)  Presence of Pain: complains of pain/discomfort (4/5/2017  9:24 AM)  Pain Rating Prior to Med Admin: 4 (4/5/2017  1:50 PM)      "

## 2017-04-05 NOTE — ANESTHESIA PREPROCEDURE EVALUATION
04/05/2017  Juan Manuel Koehler is a 67 y.o., male.    [facility] Anesthesia Evaluation      I have reviewed the Medications.     Review of Systems  Anesthesia Hx:  No problems with previous Anesthesia Denies Hx of Anesthetic complications History of prior surgery of interest to airway management or planning: Previous anesthesia: General Denies Family Hx of Anesthesia complications.   Denies Personal Hx of Anesthesia complications.   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 Also had bladder CA     EENT/Dental:EENT/Dental Normal   Cardiovascular:  Cardiovascular Normal Exercise tolerance: good     Pulmonary:  Pulmonary Normal    Renal/:   renal calculi    Musculoskeletal:  Musculoskeletal Normal    Neurological:  Neurology Normal    Endocrine:  Endocrine Normal    Dermatological:  Skin Normal    Psych:  Psychiatric Normal           Physical Exam  General:  Well nourished    Airway/Jaw/Neck:  Airway Findings: Mouth Opening: Normal Tongue: Normal  General Airway Assessment: Adult  Mallampati: II      Dental:  Dental Findings: In tact   Chest/Lungs:  Chest/Lungs Findings: Clear to auscultation, Normal Respiratory Rate     Heart/Vascular:  Heart Findings: Rate: Normal  Rhythm: Regular Rhythm        Mental Status:  Mental Status Findings:  Cooperative, Alert and Oriented         Anesthesia Plan  Type of Anesthesia, risks & benefits discussed:  Anesthesia Type:  general  Patient's Preference: general  Intra-op Monitoring Plan:   Intra-op Monitoring Plan Comments:   Post Op Pain Control Plan:   Post Op Pain Control Plan Comments:   Induction:   IV  Beta Blocker:         Informed Consent: Patient understands risks and agrees with Anesthesia plan.  Questions answered.  Anesthesia consent signed with patient.  ASA Score: 2     Day of Surgery Review of History & Physical:            Ready For Surgery From Anesthesia Perspective.

## 2017-04-06 ENCOUNTER — ANESTHESIA (OUTPATIENT)
Dept: SURGERY | Facility: HOSPITAL | Age: 67
DRG: 669 | End: 2017-04-06
Payer: MEDICARE

## 2017-04-06 PROBLEM — T88.4XXA DIFFICULT INTUBATION: Status: ACTIVE | Noted: 2017-04-06

## 2017-04-06 LAB
ALBUMIN SERPL BCP-MCNC: 3.6 G/DL
ALBUMIN SERPL BCP-MCNC: 3.8 G/DL
ALP SERPL-CCNC: 49 U/L
ALP SERPL-CCNC: 53 U/L
ALT SERPL W/O P-5'-P-CCNC: 12 U/L
ALT SERPL W/O P-5'-P-CCNC: 9 U/L
ANION GAP SERPL CALC-SCNC: 7 MMOL/L
ANION GAP SERPL CALC-SCNC: 7 MMOL/L
ANION GAP SERPL CALC-SCNC: 8 MMOL/L
APTT BLDCRRT: 26.8 SEC
AST SERPL-CCNC: 21 U/L
AST SERPL-CCNC: 22 U/L
BASOPHILS # BLD AUTO: 0.01 K/UL
BASOPHILS NFR BLD: 0.1 %
BILIRUB SERPL-MCNC: 0.5 MG/DL
BILIRUB SERPL-MCNC: 0.7 MG/DL
BUN SERPL-MCNC: 40 MG/DL
BUN SERPL-MCNC: 40 MG/DL
BUN SERPL-MCNC: 42 MG/DL
CALCIUM SERPL-MCNC: 8.5 MG/DL
CALCIUM SERPL-MCNC: 8.8 MG/DL
CALCIUM SERPL-MCNC: 9 MG/DL
CHLORIDE SERPL-SCNC: 111 MMOL/L
CHLORIDE SERPL-SCNC: 112 MMOL/L
CHLORIDE SERPL-SCNC: 113 MMOL/L
CO2 SERPL-SCNC: 19 MMOL/L
CO2 SERPL-SCNC: 19 MMOL/L
CO2 SERPL-SCNC: 20 MMOL/L
CO2 SERPL-SCNC: 20 MMOL/L
CO2 SERPL-SCNC: 22 MMOL/L
CREAT SERPL-MCNC: 4 MG/DL
CREAT SERPL-MCNC: 4.3 MG/DL
CREAT SERPL-MCNC: 4.6 MG/DL
CREAT SERPL-MCNC: 4.7 MG/DL
CREAT SERPL-MCNC: 4.7 MG/DL
DELSYS: ABNORMAL
DIFFERENTIAL METHOD: ABNORMAL
EOSINOPHIL # BLD AUTO: 0 K/UL
EOSINOPHIL NFR BLD: 0 %
ERYTHROCYTE [DISTWIDTH] IN BLOOD BY AUTOMATED COUNT: 13.7 %
EST. GFR  (AFRICAN AMERICAN): 14 ML/MIN/1.73 M^2
EST. GFR  (AFRICAN AMERICAN): 15 ML/MIN/1.73 M^2
EST. GFR  (AFRICAN AMERICAN): 17 ML/MIN/1.73 M^2
EST. GFR  (NON AFRICAN AMERICAN): 12 ML/MIN/1.73 M^2
EST. GFR  (NON AFRICAN AMERICAN): 13 ML/MIN/1.73 M^2
EST. GFR  (NON AFRICAN AMERICAN): 14 ML/MIN/1.73 M^2
GLUCOSE SERPL-MCNC: 118 MG/DL
GLUCOSE SERPL-MCNC: 123 MG/DL
GLUCOSE SERPL-MCNC: 129 MG/DL (ref 70–110)
GLUCOSE SERPL-MCNC: 138 MG/DL
GLUCOSE SERPL-MCNC: 144 MG/DL
GLUCOSE SERPL-MCNC: 144 MG/DL
HCO3 UR-SCNC: 18.3 MMOL/L (ref 24–28)
HCO3 UR-SCNC: 19.8 MMOL/L (ref 24–28)
HCT VFR BLD AUTO: 27.8 %
HCT VFR BLD CALC: 26 %PCV (ref 36–54)
HCT VFR BLD CALC: 31 %PCV (ref 36–54)
HGB BLD-MCNC: 11 G/DL
HGB BLD-MCNC: 9 G/DL
HGB BLD-MCNC: 9.4 G/DL
INR PPP: 1
INR PPP: 1
LYMPHOCYTES # BLD AUTO: 0.8 K/UL
LYMPHOCYTES NFR BLD: 4.6 %
MCH RBC QN AUTO: 29.7 PG
MCHC RBC AUTO-ENTMCNC: 33.8 %
MCV RBC AUTO: 88 FL
MONOCYTES # BLD AUTO: 1.1 K/UL
MONOCYTES NFR BLD: 6 %
NEUTROPHILS # BLD AUTO: 15.5 K/UL
NEUTROPHILS NFR BLD: 89 %
PCO2 BLDA: 39.7 MMHG (ref 35–45)
PCO2 BLDA: 40.2 MMHG (ref 35–45)
PH SMN: 7.27 [PH] (ref 7.35–7.45)
PH SMN: 7.3 [PH] (ref 7.35–7.45)
PLATELET # BLD AUTO: 223 K/UL
PMV BLD AUTO: 9.1 FL
PO2 BLDA: 27 MMHG (ref 40–60)
PO2 BLDA: 82 MMHG (ref 80–100)
POC BE: -7 MMOL/L
POC BE: -9 MMOL/L
POC IONIZED CALCIUM: 1.14 MMOL/L (ref 1.06–1.42)
POC IONIZED CALCIUM: 1.18 MMOL/L (ref 1.06–1.42)
POC SATURATED O2: 43 % (ref 95–100)
POC SATURATED O2: 95 % (ref 95–100)
POC TCO2: 19 MMOL/L (ref 24–29)
POC TCO2: 21 MMOL/L (ref 23–27)
POTASSIUM BLD-SCNC: 4.6 MMOL/L (ref 3.5–5.1)
POTASSIUM BLD-SCNC: 4.9 MMOL/L (ref 3.5–5.1)
POTASSIUM SERPL-SCNC: 4.6 MMOL/L
POTASSIUM SERPL-SCNC: 5.6 MMOL/L
POTASSIUM SERPL-SCNC: 5.7 MMOL/L
POTASSIUM SERPL-SCNC: 6.4 MMOL/L
POTASSIUM SERPL-SCNC: 6.7 MMOL/L
POTASSIUM SERPL-SCNC: 6.7 MMOL/L
PROT SERPL-MCNC: 6.5 G/DL
PROT SERPL-MCNC: 6.6 G/DL
PROTHROMBIN TIME: 10.5 SEC
PROTHROMBIN TIME: 10.6 SEC
RBC # BLD AUTO: 3.16 M/UL
SAMPLE: ABNORMAL
SAMPLE: ABNORMAL
SITE: ABNORMAL
SODIUM BLD-SCNC: 136 MMOL/L (ref 136–145)
SODIUM BLD-SCNC: 141 MMOL/L (ref 136–145)
SODIUM SERPL-SCNC: 138 MMOL/L
SODIUM SERPL-SCNC: 138 MMOL/L
SODIUM SERPL-SCNC: 139 MMOL/L
SODIUM SERPL-SCNC: 141 MMOL/L
SODIUM SERPL-SCNC: 142 MMOL/L
WBC # BLD AUTO: 17.37 K/UL

## 2017-04-06 PROCEDURE — 0T9330Z DRAINAGE OF RIGHT KIDNEY PELVIS WITH DRAINAGE DEVICE, PERCUTANEOUS APPROACH: ICD-10-PCS | Performed by: RADIOLOGY

## 2017-04-06 PROCEDURE — 37000008 HC ANESTHESIA 1ST 15 MINUTES

## 2017-04-06 PROCEDURE — 27000221 HC OXYGEN, UP TO 24 HOURS

## 2017-04-06 PROCEDURE — BT14YZZ FLUOROSCOPY OF KIDNEYS, URETERS AND BLADDER USING OTHER CONTRAST: ICD-10-PCS | Performed by: RADIOLOGY

## 2017-04-06 PROCEDURE — 99233 SBSQ HOSP IP/OBS HIGH 50: CPT | Mod: ,,, | Performed by: UROLOGY

## 2017-04-06 PROCEDURE — 11000001 HC ACUTE MED/SURG PRIVATE ROOM

## 2017-04-06 PROCEDURE — 25000003 PHARM REV CODE 250: Performed by: NURSE ANESTHETIST, CERTIFIED REGISTERED

## 2017-04-06 PROCEDURE — 71000033 HC RECOVERY, INTIAL HOUR

## 2017-04-06 PROCEDURE — 63600175 PHARM REV CODE 636 W HCPCS: Performed by: INTERNAL MEDICINE

## 2017-04-06 PROCEDURE — 85025 COMPLETE CBC W/AUTO DIFF WBC: CPT

## 2017-04-06 PROCEDURE — 71000039 HC RECOVERY, EACH ADD'L HOUR

## 2017-04-06 PROCEDURE — 36600 WITHDRAWAL OF ARTERIAL BLOOD: CPT

## 2017-04-06 PROCEDURE — 25000003 PHARM REV CODE 250: Performed by: RADIOLOGY

## 2017-04-06 PROCEDURE — 25000242 PHARM REV CODE 250 ALT 637 W/ HCPCS: Performed by: INTERNAL MEDICINE

## 2017-04-06 PROCEDURE — 85610 PROTHROMBIN TIME: CPT | Mod: 91

## 2017-04-06 PROCEDURE — 25000003 PHARM REV CODE 250: Performed by: ANESTHESIOLOGY

## 2017-04-06 PROCEDURE — 25000003 PHARM REV CODE 250: Performed by: INTERNAL MEDICINE

## 2017-04-06 PROCEDURE — 94761 N-INVAS EAR/PLS OXIMETRY MLT: CPT

## 2017-04-06 PROCEDURE — 36415 COLL VENOUS BLD VENIPUNCTURE: CPT

## 2017-04-06 PROCEDURE — 82803 BLOOD GASES ANY COMBINATION: CPT

## 2017-04-06 PROCEDURE — 80048 BASIC METABOLIC PNL TOTAL CA: CPT

## 2017-04-06 PROCEDURE — 25000003 PHARM REV CODE 250: Performed by: UROLOGY

## 2017-04-06 PROCEDURE — 84132 ASSAY OF SERUM POTASSIUM: CPT

## 2017-04-06 PROCEDURE — 63600175 PHARM REV CODE 636 W HCPCS: Performed by: NURSE ANESTHETIST, CERTIFIED REGISTERED

## 2017-04-06 PROCEDURE — 85610 PROTHROMBIN TIME: CPT

## 2017-04-06 PROCEDURE — 93005 ELECTROCARDIOGRAM TRACING: CPT

## 2017-04-06 PROCEDURE — 63600175 PHARM REV CODE 636 W HCPCS: Performed by: ANESTHESIOLOGY

## 2017-04-06 PROCEDURE — 80053 COMPREHEN METABOLIC PANEL: CPT

## 2017-04-06 PROCEDURE — 37000009 HC ANESTHESIA EA ADD 15 MINS

## 2017-04-06 PROCEDURE — 94640 AIRWAY INHALATION TREATMENT: CPT

## 2017-04-06 PROCEDURE — 85730 THROMBOPLASTIN TIME PARTIAL: CPT

## 2017-04-06 PROCEDURE — 0T783DZ DILATION OF BILATERAL URETERS WITH INTRALUMINAL DEVICE, PERCUTANEOUS APPROACH: ICD-10-PCS | Performed by: RADIOLOGY

## 2017-04-06 PROCEDURE — 63600175 PHARM REV CODE 636 W HCPCS: Performed by: UROLOGY

## 2017-04-06 RX ORDER — FENTANYL CITRATE 50 UG/ML
INJECTION, SOLUTION INTRAMUSCULAR; INTRAVENOUS
Status: DISCONTINUED | OUTPATIENT
Start: 2017-04-06 | End: 2017-04-06

## 2017-04-06 RX ORDER — PHENYLEPHRINE HYDROCHLORIDE 10 MG/ML
INJECTION INTRAVENOUS
Status: DISCONTINUED | OUTPATIENT
Start: 2017-04-06 | End: 2017-04-06

## 2017-04-06 RX ORDER — SODIUM BICARBONATE 1 MEQ/ML
50 SYRINGE (ML) INTRAVENOUS ONCE
Status: COMPLETED | OUTPATIENT
Start: 2017-04-06 | End: 2017-04-06

## 2017-04-06 RX ORDER — ONDANSETRON 2 MG/ML
4 INJECTION INTRAMUSCULAR; INTRAVENOUS DAILY PRN
Status: DISCONTINUED | OUTPATIENT
Start: 2017-04-06 | End: 2017-04-08 | Stop reason: HOSPADM

## 2017-04-06 RX ORDER — ALBUTEROL SULFATE 0.83 MG/ML
10 SOLUTION RESPIRATORY (INHALATION) ONCE
Status: COMPLETED | OUTPATIENT
Start: 2017-04-06 | End: 2017-04-06

## 2017-04-06 RX ORDER — ROCURONIUM BROMIDE 10 MG/ML
INJECTION, SOLUTION INTRAVENOUS
Status: DISCONTINUED | OUTPATIENT
Start: 2017-04-06 | End: 2017-04-06

## 2017-04-06 RX ORDER — MIDAZOLAM HYDROCHLORIDE 1 MG/ML
INJECTION, SOLUTION INTRAMUSCULAR; INTRAVENOUS
Status: DISCONTINUED | OUTPATIENT
Start: 2017-04-06 | End: 2017-04-06

## 2017-04-06 RX ORDER — SODIUM CHLORIDE, SODIUM LACTATE, POTASSIUM CHLORIDE, CALCIUM CHLORIDE 600; 310; 30; 20 MG/100ML; MG/100ML; MG/100ML; MG/100ML
125 INJECTION, SOLUTION INTRAVENOUS CONTINUOUS
Status: DISCONTINUED | OUTPATIENT
Start: 2017-04-06 | End: 2017-04-06

## 2017-04-06 RX ORDER — CIPROFLOXACIN 2 MG/ML
400 INJECTION, SOLUTION INTRAVENOUS ONCE
Status: DISCONTINUED | OUTPATIENT
Start: 2017-04-06 | End: 2017-04-06 | Stop reason: SDUPTHER

## 2017-04-06 RX ORDER — HYDROMORPHONE HYDROCHLORIDE 2 MG/ML
0.4 INJECTION, SOLUTION INTRAMUSCULAR; INTRAVENOUS; SUBCUTANEOUS EVERY 5 MIN PRN
Status: DISCONTINUED | OUTPATIENT
Start: 2017-04-06 | End: 2017-04-08 | Stop reason: HOSPADM

## 2017-04-06 RX ORDER — NEOSTIGMINE METHYLSULFATE 1 MG/ML
INJECTION, SOLUTION INTRAVENOUS
Status: DISCONTINUED | OUTPATIENT
Start: 2017-04-06 | End: 2017-04-06

## 2017-04-06 RX ORDER — HYDROMORPHONE HYDROCHLORIDE 2 MG/ML
0.2 INJECTION, SOLUTION INTRAMUSCULAR; INTRAVENOUS; SUBCUTANEOUS EVERY 5 MIN PRN
Status: DISCONTINUED | OUTPATIENT
Start: 2017-04-06 | End: 2017-04-08 | Stop reason: HOSPADM

## 2017-04-06 RX ORDER — SODIUM CHLORIDE 9 MG/ML
INJECTION, SOLUTION INTRAVENOUS CONTINUOUS
Status: DISCONTINUED | OUTPATIENT
Start: 2017-04-06 | End: 2017-04-07

## 2017-04-06 RX ORDER — METRONIDAZOLE 500 MG/100ML
500 INJECTION, SOLUTION INTRAVENOUS ONCE
Status: DISCONTINUED | OUTPATIENT
Start: 2017-04-06 | End: 2017-04-07

## 2017-04-06 RX ORDER — PROPOFOL 10 MG/ML
VIAL (ML) INTRAVENOUS
Status: DISCONTINUED | OUTPATIENT
Start: 2017-04-06 | End: 2017-04-06

## 2017-04-06 RX ORDER — CIPROFLOXACIN 2 MG/ML
INJECTION, SOLUTION INTRAVENOUS
Status: DISCONTINUED | OUTPATIENT
Start: 2017-04-06 | End: 2017-04-06

## 2017-04-06 RX ORDER — SODIUM BICARBONATE 650 MG/1
650 TABLET ORAL 2 TIMES DAILY
Status: DISCONTINUED | OUTPATIENT
Start: 2017-04-06 | End: 2017-04-08 | Stop reason: HOSPADM

## 2017-04-06 RX ORDER — SODIUM CHLORIDE 0.9 % (FLUSH) 0.9 %
3 SYRINGE (ML) INJECTION
Status: DISCONTINUED | OUTPATIENT
Start: 2017-04-06 | End: 2017-04-08 | Stop reason: HOSPADM

## 2017-04-06 RX ORDER — LIDOCAINE HCL/PF 100 MG/5ML
SYRINGE (ML) INTRAVENOUS
Status: DISCONTINUED | OUTPATIENT
Start: 2017-04-06 | End: 2017-04-06

## 2017-04-06 RX ORDER — GLYCOPYRROLATE 0.2 MG/ML
INJECTION INTRAMUSCULAR; INTRAVENOUS
Status: DISCONTINUED | OUTPATIENT
Start: 2017-04-06 | End: 2017-04-06

## 2017-04-06 RX ADMIN — OXYBUTYNIN CHLORIDE 5 MG: 5 TABLET ORAL at 03:04

## 2017-04-06 RX ADMIN — PROMETHAZINE HYDROCHLORIDE 6.25 MG: 25 INJECTION INTRAMUSCULAR; INTRAVENOUS at 01:04

## 2017-04-06 RX ADMIN — MIDAZOLAM 2 MG: 1 INJECTION INTRAMUSCULAR; INTRAVENOUS at 10:04

## 2017-04-06 RX ADMIN — PROPOFOL 120 MG: 10 INJECTION, EMULSION INTRAVENOUS at 10:04

## 2017-04-06 RX ADMIN — SODIUM CHLORIDE: 0.9 INJECTION, SOLUTION INTRAVENOUS at 03:04

## 2017-04-06 RX ADMIN — HYDROMORPHONE HYDROCHLORIDE 0.4 MG: 2 INJECTION INTRAMUSCULAR; INTRAVENOUS; SUBCUTANEOUS at 01:04

## 2017-04-06 RX ADMIN — ONDANSETRON 4 MG: 2 INJECTION INTRAMUSCULAR; INTRAVENOUS at 07:04

## 2017-04-06 RX ADMIN — DEXTROSE MONOHYDRATE 25 G: 500 INJECTION PARENTERAL at 08:04

## 2017-04-06 RX ADMIN — SODIUM CHLORIDE, PRESERVATIVE FREE 3 ML: 5 INJECTION INTRAVENOUS at 02:04

## 2017-04-06 RX ADMIN — SODIUM BICARBONATE 50 MEQ: 84 INJECTION, SOLUTION INTRAVENOUS at 05:04

## 2017-04-06 RX ADMIN — ALBUTEROL SULFATE 10 MG: 2.5 SOLUTION RESPIRATORY (INHALATION) at 08:04

## 2017-04-06 RX ADMIN — GLYCOPYRROLATE 0.4 MG: 0.2 INJECTION, SOLUTION INTRAMUSCULAR; INTRAVENOUS at 12:04

## 2017-04-06 RX ADMIN — HYDROCODONE BITARTRATE AND ACETAMINOPHEN 1 TABLET: 5; 325 TABLET ORAL at 08:04

## 2017-04-06 RX ADMIN — ALBUTEROL SULFATE 10 MG: 2.5 SOLUTION RESPIRATORY (INHALATION) at 06:04

## 2017-04-06 RX ADMIN — NEOSTIGMINE METHYLSULFATE 3 MG: 1 INJECTION INTRAVENOUS at 12:04

## 2017-04-06 RX ADMIN — DIPHENHYDRAMINE HYDROCHLORIDE 25 MG: 25 CAPSULE ORAL at 03:04

## 2017-04-06 RX ADMIN — HYDROCODONE BITARTRATE AND ACETAMINOPHEN 1 TABLET: 5; 325 TABLET ORAL at 03:04

## 2017-04-06 RX ADMIN — PHENYLEPHRINE HYDROCHLORIDE 200 MCG: 10 INJECTION INTRAVENOUS at 11:04

## 2017-04-06 RX ADMIN — FENTANYL CITRATE 150 MCG: 50 INJECTION, SOLUTION INTRAMUSCULAR; INTRAVENOUS at 10:04

## 2017-04-06 RX ADMIN — SODIUM CHLORIDE, PRESERVATIVE FREE 3 ML: 5 INJECTION INTRAVENOUS at 07:04

## 2017-04-06 RX ADMIN — SODIUM CHLORIDE: 0.9 INJECTION, SOLUTION INTRAVENOUS at 12:04

## 2017-04-06 RX ADMIN — SODIUM BICARBONATE 650 MG TABLET 650 MG: at 09:04

## 2017-04-06 RX ADMIN — ONDANSETRON 4 MG: 2 INJECTION INTRAMUSCULAR; INTRAVENOUS at 12:04

## 2017-04-06 RX ADMIN — LIDOCAINE HYDROCHLORIDE 100 MG: 20 INJECTION, SOLUTION INTRAVENOUS at 10:04

## 2017-04-06 RX ADMIN — FENTANYL CITRATE 100 MCG: 50 INJECTION, SOLUTION INTRAMUSCULAR; INTRAVENOUS at 11:04

## 2017-04-06 RX ADMIN — SODIUM POLYSTYRENE SULFONATE 45 G: 15 SUSPENSION ORAL; RECTAL at 05:04

## 2017-04-06 RX ADMIN — FENTANYL CITRATE 100 MCG: 50 INJECTION, SOLUTION INTRAMUSCULAR; INTRAVENOUS at 10:04

## 2017-04-06 RX ADMIN — CIPROFLOXACIN 400 MG: 2 INJECTION, SOLUTION INTRAVENOUS at 11:04

## 2017-04-06 RX ADMIN — INSULIN HUMAN 10 UNITS: 100 INJECTION, SOLUTION PARENTERAL at 08:04

## 2017-04-06 RX ADMIN — ROCURONIUM BROMIDE 30 MG: 10 INJECTION, SOLUTION INTRAVENOUS at 10:04

## 2017-04-06 NOTE — ANESTHESIA RELEASE NOTE
"Anesthesia Release from PACU Note    Patient: Juan Manuel Koehler    Procedure(s) Performed: Procedure(s) (LRB):  [id number] TUBE (N/A)    Anesthesia type: general    Post pain: Adequate analgesia    Post assessment: no apparent anesthetic complications, tolerated procedure well and no evidence of recall    Last Vitals:   Visit Vitals    /62    Pulse 98    Temp 36.6 °C (97.8 °F) (Oral)    Resp (!) 97    Ht 6' 3" (1.905 m)    Wt 78.9 kg (174 lb)    SpO2 (!) 92%    BMI 21.75 kg/m2       Post vital signs: stable    Level of consciousness: awake and oriented    Nausea/Vomiting: no nausea/no vomiting    Complications: none    Airway Patency: patent    Respiratory: unassisted, spontaneous ventilation, room air    Cardiovascular: stable    Hydration: euvolemic  "

## 2017-04-06 NOTE — CONSULTS
Nephrology Consult    DATE OF ADMISSION:  4/5/2017  DATE OF CONSULT: 4/6/2017  REFERRING PHYSICIAN:  James Boucher MD  REASON FOR CONSULTATION:  Hyperkalemia    CC: admitted for TURBT    HPI:  67 y.o. with PMH bladder ca diagnosed in 1990s and has not seen nephrology in the past presented to Dr Boucher with elevated Cr in clinic and was noted to have bilateral hydronephrosis on imaging. He was admitted and labs drawn revealed Cr further elevated to 4.6 higher than Cr yesterday 2.2.  He was medically managed this morning for hyperkalemia 6.4, K improved to wnl, and then he underwent TURBT of >5cm bladder mass and bolanos catheter placement.  After his procedure, UOP has significantly increased with UOP documented 775cc.  He has gross hematuria in bolanos bag.  Noted that Cr was stable post-procedure, but K had worsened to 6.7.    He states that nausea and vomiting began last night, vomited once, bilious.  Nausea persists today but a little better post-procedure, no vomiting since surgery.  He had very poor appetite for the last 2 days, this remains unchanged.  No metallic taste but has hiccups and belching that began last night, intermittently, slightly improved after surgery.  He denies any shortness of breath or edema.  He denies known history of kidney disease and has not been following nephrology.    PMH:   Past Medical History:   Diagnosis Date    Cancer     bladder and throat    Urinary tract infection        Allergies:   Review of patient's allergies indicates:   Allergen Reactions    Pneumococcal 23-margarito ps vaccine        Home Meds:   No current facility-administered medications on file prior to encounter.      Current Outpatient Prescriptions on File Prior to Encounter   Medication Sig Dispense Refill    pantoprazole (PROTONIX) 40 MG tablet TAKE 1 BY MOUTH ONCE A DAY FOR 60 DAYS  1    ketoconazole (NIZORAL) 2 % shampoo USE TO WASH AFFECTED AREA ONCE DAILY  3       Inpatient Meds: Scheduled Meds:    albuterol sulfate  10 mg Nebulization Once    metronidazole  500 mg Intravenous Once    pantoprazole  40 mg Oral Daily    sodium bicarbonate  50 mEq Intravenous Once    sodium chloride 0.9%  3 mL Intravenous Q8H    sodium polystyrene  30 g Oral Q6H    sodium polystyrene  45 g Oral Once     Continuous Infusions:   sodium chloride 0.9% 75 mL/hr at 04/05/17 1740    sodium chloride 0.9% 75 mL/hr at 04/06/17 1548     PRN Meds:.belladonna alkaloids-opium 16.2-30 mg, diphenhydrAMINE, hydrocodone-acetaminophen 5-325mg, HYDROmorphone, HYDROmorphone, HYDROmorphone, ondansetron, ondansetron, oxybutynin, oxycodone-acetaminophen, oxycodone-acetaminophen, promethazine (PHENERGAN) IVPB, sodium chloride 0.9%    FH:   Family History   Problem Relation Age of Onset    Prostate cancer Neg Hx     Kidney disease Neg Hx        SH:   Social History     Social History    Marital status:      Spouse name: N/A    Number of children: N/A    Years of education: N/A     Occupational History    Not on file.     Social History Main Topics    Smoking status: Never Smoker    Smokeless tobacco: Never Used    Alcohol use No    Drug use: No    Sexual activity: Yes     Partners: Female     Birth control/ protection: None     Other Topics Concern    Not on file     Social History Narrative       PSxHx:   Past Surgical History:   Procedure Laterality Date    BLADDER SURGERY      HERNIA REPAIR      THROAT SURGERY         ROS:   Constitutional - no fever, chills  HEENT - no vision changes, metallic taste  CVS - no chest pain, edema, palpitations  Resp - no shortness of breath  GI - see hpi    - see hpi  Neuro - no headaches   Skin - no pruritus or rash  Vascular - no edema  Heme - no chills or fever  10 point review of systems negative except as above.    Physical Exam:   Temp:  [96.6 °F (35.9 °C)-98.5 °F (36.9 °C)] 98.1 °F (36.7 °C)  Pulse:  [] 101  Resp:  [16-97] 18  SpO2:  [92 %-100 %] 100 %  BP: (115-190)/(55-89)  131/60  I/O last 3 completed shifts:  In: 1798.8 [I.V.:1798.8]  Out: -   I/O this shift:  In: 700 [I.V.:700]  Out: 775 [Urine:775]    Intake/Output Summary (Last 24 hours) at 04/06/17 1728  Last data filed at 04/06/17 1720   Gross per 24 hour   Intake          1698.75 ml   Output              775 ml   Net           923.75 ml     Wt Readings from Last 1 Encounters:   04/05/17 1000 78.9 kg (174 lb)   Lines - bolanos with gross hematuria  Vital signs reviewed.  Gen - no acute distress, alert and oriented  HENT - normocephalic, atraumatic  Eyes - extraocular motions intact  CVS - tachycardic, no murmurs or rubs  Resp - non-labored, clear to auscultation, no wheezes, rales, or rhonchi  Abd - soft, non-tender, non-distended, no rebound or guarding  Ext/Vascular - no cyanosis, no dependent or BLE edema  Skin - no rash or lesions  Neuro - alert and oriented, normal speech, moves all four extremities, no asterixis  Psych - normal mood and affect, normal thought process    Labs:   Recent Results (from the past 24 hour(s))   CBC auto differential    Collection Time: 04/06/17  7:02 AM   Result Value Ref Range    WBC 17.37 (H) 3.90 - 12.70 K/uL    RBC 3.16 (L) 4.60 - 6.20 M/uL    Hemoglobin 9.4 (L) 14.0 - 18.0 g/dL    Hematocrit 27.8 (L) 40.0 - 54.0 %    MCV 88 82 - 98 fL    MCH 29.7 27.0 - 31.0 pg    MCHC 33.8 32.0 - 36.0 %    RDW 13.7 11.5 - 14.5 %    Platelets 223 150 - 350 K/uL    MPV 9.1 (L) 9.2 - 12.9 fL    Gran # 15.5 (H) 1.8 - 7.7 K/uL    Lymph # 0.8 (L) 1.0 - 4.8 K/uL    Mono # 1.1 (H) 0.3 - 1.0 K/uL    Eos # 0.0 0.0 - 0.5 K/uL    Baso # 0.01 0.00 - 0.20 K/uL    Gran% 89.0 (H) 38.0 - 73.0 %    Lymph% 4.6 (L) 18.0 - 48.0 %    Mono% 6.0 4.0 - 15.0 %    Eosinophil% 0.0 0.0 - 8.0 %    Basophil% 0.1 0.0 - 1.9 %    Differential Method Automated    Protime-INR    Collection Time: 04/06/17  7:02 AM   Result Value Ref Range    Prothrombin Time 10.5 9.0 - 12.5 sec    INR 1.0 0.8 - 1.2   Comprehensive metabolic panel    Collection  Time: 04/06/17  7:02 AM   Result Value Ref Range    Sodium 141 136 - 145 mmol/L    Potassium 6.4 (HH) 3.5 - 5.1 mmol/L    Chloride 113 (H) 95 - 110 mmol/L    CO2 20 (L) 23 - 29 mmol/L    Glucose 118 (H) 70 - 110 mg/dL    BUN, Bld 40 (H) 8 - 23 mg/dL    Creatinine 4.6 (H) 0.5 - 1.4 mg/dL    Calcium 9.0 8.7 - 10.5 mg/dL    Total Protein 6.6 6.0 - 8.4 g/dL    Albumin 3.8 3.5 - 5.2 g/dL    Total Bilirubin 0.7 0.1 - 1.0 mg/dL    Alkaline Phosphatase 53 (L) 55 - 135 U/L    AST 22 10 - 40 U/L    ALT 12 10 - 44 U/L    Anion Gap 8 8 - 16 mmol/L    eGFR if African American 14 (A) >60 mL/min/1.73 m^2    eGFR if non African American 12 (A) >60 mL/min/1.73 m^2   Potassium    Collection Time: 04/06/17  9:46 AM   Result Value Ref Range    Potassium 4.6 3.5 - 5.1 mmol/L   ISTAT PROCEDURE    Collection Time: 04/06/17  9:59 AM   Result Value Ref Range    POC PH 7.271 (L) 7.35 - 7.45    POC PCO2 39.7 35 - 45 mmHg    POC PO2 27 (LL) 40 - 60 mmHg    POC HCO3 18.3 (L) 24 - 28 mmol/L    POC BE -9 -2 to 2 mmol/L    POC SATURATED O2 43 (L) 95 - 100 %    POC Glucose 129 (H) 70 - 110 mg/dL    POC Sodium 141 136 - 145 mmol/L    POC Potassium 4.6 3.5 - 5.1 mmol/L    POC TCO2 19 (L) 24 - 29 mmol/L    POC Ionized Calcium 1.18 1.06 - 1.42 mmol/L    POC Hematocrit 26 (L) 36 - 54 %PCV    POC HEMOGLOBIN 9 g/dL    Sample YADIRA    Protime-INR    Collection Time: 04/06/17  4:14 PM   Result Value Ref Range    Prothrombin Time 10.6 9.0 - 12.5 sec    INR 1.0 0.8 - 1.2   APTT    Collection Time: 04/06/17  4:14 PM   Result Value Ref Range    aPTT 26.8 21.0 - 32.0 sec   Basic metabolic panel    Collection Time: 04/06/17  4:14 PM   Result Value Ref Range    Sodium 138 136 - 145 mmol/L    Potassium 6.7 (HH) 3.5 - 5.1 mmol/L    Chloride 112 (H) 95 - 110 mmol/L    CO2 19 (L) 23 - 29 mmol/L    Glucose 144 (H) 70 - 110 mg/dL    BUN, Bld 42 (H) 8 - 23 mg/dL    Creatinine 4.7 (H) 0.5 - 1.4 mg/dL    Calcium 8.5 (L) 8.7 - 10.5 mg/dL    Anion Gap 7 (L) 8 - 16 mmol/L     eGFR if African American 14 (A) >60 mL/min/1.73 m^2    eGFR if non African American 12 (A) >60 mL/min/1.73 m^2   Comprehensive metabolic panel    Collection Time: 04/06/17  4:14 PM   Result Value Ref Range    Sodium 138 136 - 145 mmol/L    Potassium 6.7 (HH) 3.5 - 5.1 mmol/L    Chloride 112 (H) 95 - 110 mmol/L    CO2 19 (L) 23 - 29 mmol/L    Glucose 144 (H) 70 - 110 mg/dL    BUN, Bld 42 (H) 8 - 23 mg/dL    Creatinine 4.7 (H) 0.5 - 1.4 mg/dL    Calcium 8.5 (L) 8.7 - 10.5 mg/dL    Total Protein 6.5 6.0 - 8.4 g/dL    Albumin 3.6 3.5 - 5.2 g/dL    Total Bilirubin 0.5 0.1 - 1.0 mg/dL    Alkaline Phosphatase 49 (L) 55 - 135 U/L    AST 21 10 - 40 U/L    ALT 9 (L) 10 - 44 U/L    Anion Gap 7 (L) 8 - 16 mmol/L    eGFR if African American 14 (A) >60 mL/min/1.73 m^2    eGFR if non African American 12 (A) >60 mL/min/1.73 m^2     Imaging Results         IR Ureteral Stent Placement with Nephrostomy Cath_New Access (In process) Result time:  04/06/17 12:42:35    Procedure changed from IR Nephrostomy Tube Placement            IR Nephrostomy Tube Placement (In process)         IR Antegrade Pyelogram (In process)         IR Ultrasound Guidance (In process)         IR Nephrostogram through New Access (In process)             A/P:   ARF on CKD3  - no baseline Cr in our system, but Dr Boucher documented Cr 1.95, GFR 35 on [facility] labs previously.  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 bladder tumor with bilateral hydronephrosis  - he underwent TURBT of bladder tumor 4/6/17 and bolanos placement  - post-procedure Cr is not showing much improvement and persistent hyperkalemia, UOP has increased significantly  - will medically manage electrolyte derangements tonight and monitor serial BMP   - continue low rate of IVF, monitor for signs of volume overload, recommend serial lung exams and continuous pulse ox while on IVF  - uremic symptoms are improving, will hold dialysis for now and anticipate some improvement in renal fxn will  develop post-procedure  - no urgent indication for dialysis, reassess daily for dialysis needs   - monitor daily weights, strict I&Os.  Check UA  - avoid nephrotoxic agents including NSAIDs, renally dose medications    Hyperkalemia  - s/p medical mgmt this morning with some improvement pre-procedure, now recurrence post-procedure  - will continue to medically manage tonight with potential improvement in renal fxn overnight, consideration for dialysis if electrolytes are refractory to medical mgmt  - serial BMP overnight  - recommend avoidance of LR     Anemia of chronic renal failure  - also likely a component of AOCD also now with gross hematuria causing acute blood loss anemia  - check Iron studies, consider Epo if iron stores are adequate  - transfuse if Hgb <7    Acidosis, due to hyperchloremia and renal failure  - start PO bicarb and will consider conversion of IVF to 1/2NS with 75mEq bicarb if there is persistence on labs overnight    MBD/Secondary Hyperparathyroidism  - check PTH and Vitamin D    Bilateral hydronephrosis, thickened bladder wall, enlarged prostate.  S/p TURBT of >5cm bladder tumor on 4/6/17  Bladder ca dx 1990s    Thank you for allowing me to participate in the care of your patient.  Please call with any questions.    Erika Duron MD   Nephrology  [facility]  Office [phone]    Cross covering for:  [facility]  QAMAR Rausch MD, SAGAR MALAGON MD,   MD AIYANA Espino, NP  [address]  [place], [address] [phone]  After hours answering service: [phone]

## 2017-04-06 NOTE — SUBJECTIVE & OBJECTIVE
Interval History: Patient with low urine output overnight approximately 100 cc over 13 hours.  Was not notified of any low urine output.  Also with hematuria which is expected.  The patient complains of nausea and dry heaving overnight.  He also has soreness in his lower abdomen which he had and complained of preoperatively.  He also complains of some left flank pain.    Denies any chest pain or shortness of breath.    Review of Systems   Constitutional: Negative for fever.   Gastrointestinal: Positive for abdominal pain and nausea.   Genitourinary: Positive for decreased urine volume and hematuria.   All other systems reviewed and are negative.     As above otherwise negative  Objective:     Temp:  [96.6 °F (35.9 °C)-98.1 °F (36.7 °C)] 97.8 °F (36.6 °C)  Pulse:  [] 114  Resp:  [14-76] 16  SpO2:  [95 %-100 %] 96 %  BP: (116-193)/(60-96) 133/62     Body mass index is 21.75 kg/(m^2).            Drains     Drain                 Nephrostomy -- days         Urethral Catheter 04/05/17 Triple-lumen 1 day                Physical Exam   Nursing note and vitals reviewed.  Constitutional: He is oriented to person, place, and time. Vital signs are normal. He appears well-developed and well-nourished. He is cooperative.   HENT:   Head: Normocephalic.   Neck: No tracheal deviation present.   Cardiovascular: Normal rate and intact distal pulses.    Pulmonary/Chest: Effort normal. No accessory muscle usage. No tachypnea. No respiratory distress.   Abdominal: Soft. Normal appearance. He exhibits no distension, no fluid wave, no ascites and no mass. There is no tenderness. There is CVA tenderness (mild on the left). There is no rebound. No hernia. Hernia confirmed negative in the right inguinal area and confirmed negative in the left inguinal area.   Liver/spleen non-palpable.   Genitourinary: Prostate normal, testes normal and penis normal. Rectal exam shows no external hemorrhoid, no mass, no tenderness and anal tone normal.  Prostate is not tender. Right testis shows no mass and no tenderness. Right testis is descended. Left testis shows no mass and no tenderness. Left testis is descended. Circumcised.   Genitourinary Comments: Scrotum showed no rashes or lesions.  Anus/perineum without lesion.  Epididymis showed no masses or tenderness. No meatal stenosis, meatus in normal location. No penile discharge/plaques, Henry catheter in place with red urine.  Irrigated without significant clot retention and irrigates to clear very easily.  MARICEL deferred       Musculoskeletal: Normal range of motion.   Lymphadenopathy: No inguinal adenopathy noted on the right or left side.        Right: No inguinal adenopathy present.        Left: No inguinal adenopathy present.   Neurological: He is alert and oriented to person, place, and time. He has normal strength.   Skin: No bruising and no rash noted.     Psychiatric: He has a normal mood and affect. His speech is normal and behavior is normal. Thought content normal.       Significant Labs:    BMP:    Recent Labs  Lab 04/05/17  1012 04/05/17  1356 04/06/17  0702 04/06/17  0946    139 141  --    K 5.1 4.6 6.4* 4.6   * 111* 113*  --    CO2 21* 20* 20*  --    BUN 26* 25* 40*  --    CREATININE 2.2* 2.3* 4.6*  --    CALCIUM 9.4 8.7 9.0  --        CBC:     Recent Labs  Lab 04/05/17  1012 04/06/17  0702 04/06/17  0959   WBC 7.35 17.37*  --    HGB 9.9* 9.4*  --    HCT 29.4* 27.8* 26*    223  --        Urine Culture: No results for input(s): LABURIN in the last 168 hours.  Urine Studies: No results for input(s): COLORU, APPEARANCEUA, PHUR, SPECGRAV, PROTEINUA, GLUCUA, KETONESU, BILIRUBINUA, OCCULTUA, NITRITE, UROBILINOGEN, LEUKOCYTESUR, RBCUA, WBCUA, BACTERIA, SQUAMEPITHEL, HYALINECASTS in the last 168 hours.    Invalid input(s): WRIGHTSUR    Significant Imaging:  CT: I have reviewed all results within the past 24 hours and my personal findings are:  No new CT scan  All pertinent imaging  results/findings from the past 24 hours have been reviewed.

## 2017-04-06 NOTE — PLAN OF CARE
Problem: Patient Care Overview  Goal: Plan of Care Review  Outcome: Ongoing (interventions implemented as appropriate)  Pt is unavailable. RT will return to perform EKG, ABG, and aerosol tx.

## 2017-04-06 NOTE — ANESTHESIA PREPROCEDURE EVALUATION
04/05/2017  Juan Manuel Koehler is a 67 y.o., male w hydronephrosis  & hyperkalemia for emergent bilateral nephrostomy tubes.    PRIOR ANES  2017-04-06 Cysto_Bx GA   [mid 2, fent 100, prop 200, sux 100; VSS] [sevo, fent 100; VSS ] NAAC      [Easy mask vent; ETT 7.5 to 22cm, Mac#4, Grde IV 2 attempts, atraumatic]    ANES-RELATED MED/SURG  Bladder CA w obstruction   - severe hyperkalemia  Difficult Intubation (Mac#4: Grade IV, 2 attempts 2017-04-05)     Past Surgical History:   Procedure Laterality Date    BLADDER SURGERY      HERNIA REPAIR      THROAT SURGERY       Review of patient's allergies indicates:   Allergen Reactions    Pneumococcal 23-margarito ps vaccine      ANES-RELATED MAR  Sodium polystyrene-PO  Insulin  Dextrose 50%    [facility] Pre-op Assessment      I have reviewed the Medications.     Review of Systems  Anesthesia Hx:  No problems with previous Anesthesia Denies Hx of Anesthetic complications  History of prior surgery of interest to airway management or planning: Previous anesthesia: General Denies Family Hx of Anesthesia complications.   Denies Personal Hx of Anesthesia complications.   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 Also had bladder CA     EENT/Dental:EENT/Dental Normal   Cardiovascular:  Cardiovascular Normal Exercise tolerance: good     Pulmonary:  Pulmonary Normal    Renal/:   renal calculi    Musculoskeletal:  Musculoskeletal Normal    Neurological:  Neurology Normal    Endocrine:  Endocrine Normal    Dermatological:  Skin Normal    Psych:  Psychiatric Normal         Wt Readings from Last 3 Encounters:   04/05/17 78.9 kg (174 lb)   03/30/17 78.9 kg (174 lb)   03/21/16 79.4 kg (175 lb)     Temp Readings from Last 3 Encounters:    04/06/17 36.6 °C (97.8 °F) (Oral)   02/25/16 36.5 °C (97.7 °F) (Oral)     BP Readings from Last 3 Encounters:   04/06/17 116/67   03/30/17 131/86   03/21/16 (!) 142/75     Pulse Readings from Last 3 Encounters:   04/06/17 75   03/30/17 88   03/21/16 64       Physical Exam  General:  Well nourished    Airway/Jaw/Neck:  Airway Findings: Mouth Opening: Normal Tongue: Normal  General Airway Assessment: Adult  Mallampati: II      Dental:  Dental Findings: In tact   Chest/Lungs:  Chest/Lungs Findings: Clear to auscultation, Normal Respiratory Rate     Heart/Vascular:  Heart Findings: Rate: Normal  Rhythm: Regular Rhythm        Mental Status:  Mental Status Findings:  Cooperative, Alert and Oriented       Lab Results   Component Value Date    WBC 7.35 04/05/2017    HGB 9.9 (L) 04/05/2017    HCT 29.4 (L) 04/05/2017    MCV 88 04/05/2017     04/05/2017       Chemistry        Component Value Date/Time     04/06/2017 0702    K 6.4 (HH) 04/06/2017 0702     (H) 04/06/2017 0702    CO2 20 (L) 04/06/2017 0702    BUN 40 (H) 04/06/2017 0702    CREATININE 4.6 (H) 04/06/2017 0702     (H) 04/06/2017 0702        Component Value Date/Time    CALCIUM 9.0 04/06/2017 0702    ALKPHOS 53 (L) 04/06/2017 0702    AST 22 04/06/2017 0702    ALT 12 04/06/2017 0702    BILITOT 0.7 04/06/2017 0702        Lab Results   Component Value Date    ALBUMIN 3.8 04/06/2017     CXR 2017-03-30  No acute abnormality seen.    EKG 2017-03-30  Normal sinus rhythm  Minimal voltage criteria for LVH, may be normal variant  Borderline Abnormal ECG  No previous ECGs available  Confirmed by Brice BUSTILLOS    Anesthesia Plan  Type of Anesthesia, risks & benefits discussed:  Anesthesia Type:  general  Patient's Preference: general  Intra-op Monitoring Plan:   Intra-op Monitoring Plan Comments:   Post Op Pain Control Plan:   Post Op Pain Control Plan Comments:   Induction:   IV  Beta Blocker:         Informed Consent: Patient understands risks and agrees  with Anesthesia plan.  Questions answered. Anesthesia consent signed with patient.  ASA Score: 2     Day of Surgery Review of History & Physical:            Ready For Surgery From Anesthesia Perspective.

## 2017-04-06 NOTE — PROGRESS NOTES
Patient arrived to IR pre/post for hyperkalemia treatment. Connected to EKG monitoring to monitor for arrhythmia's.

## 2017-04-06 NOTE — PLAN OF CARE
Problem: Patient Care Overview  Goal: Plan of Care Review  Outcome: Ongoing (interventions implemented as appropriate)  Pt. In bed, call light within reach, fall precaution plan in place. Upon initial assessment/frequent checks patient's complaint of bladder spasm assessed and treated. 3 way foly cath draining bright red blood, Dr. Boucher aware.  Plan of care reviewed with patient.  Will continue to monitor and report to oncoming nurse.

## 2017-04-06 NOTE — NURSING
(9921) Nurse notified by lab that pt's K+ level is 6.4.  (9393)Dr. Boucher notified of pt's high K+ of 6.4. Dr. Boucher will contact surgery and medicine team. Awaiting further orders.  (0732) Surgery nurses at bedside taking pt to surgery. Pt being transported with O2 on stretcher.

## 2017-04-06 NOTE — TRANSFER OF CARE
"Anesthesia Transfer of Care Note    Patient: Juan Manuel Koehler    Procedure(s) Performed: Procedure(s) (LRB):  [id number] TUBE (N/A)    Patient location: PACU    Anesthesia Type: general    Transport from OR: Transported from OR on room air with adequate spontaneous ventilation    Post pain: adequate analgesia    Post assessment: no apparent anesthetic complications    Post vital signs: stable    Level of consciousness: awake, alert and oriented    Nausea/Vomiting: no nausea/vomiting    Complications: none          Last vitals:   Visit Vitals    /62    Pulse (!) 114    Temp 36.6 °C (97.8 °F) (Oral)    Resp 16    Ht 6' 3" (1.905 m)    Wt 78.9 kg (174 lb)    SpO2 96%    BMI 21.75 kg/m2     "

## 2017-04-06 NOTE — PLAN OF CARE
Problem: Patient Care Overview  Goal: Plan of Care Review  Outcome: Ongoing (interventions implemented as appropriate)  Plan of care reviewed with patient. Wife at bedside. Call light within reach, fall precautions maintained, bed alarm set. Patient aware. Nurse instructed patient to call if needs assistance. Patient verbalized complete understanding.     Pt has bilateral nephrostomy tubes draining. 3 way catheter remains in place with bright red blood. NS infusing at 75 mL/hr. Pt remains on 2L NC. Prn pain med administered for pain control. NAD noted. Will continue to monitor and continue plan of care.

## 2017-04-06 NOTE — NURSING
Pt back from surgery. VSS. Bilateral nephrostomy tubes in place with mild drainage. See flowsheet. Wife at bedside. NAD noted. Will continue to monitor.

## 2017-04-06 NOTE — ANESTHESIA POSTPROCEDURE EVALUATION
"Anesthesia Post Evaluation    Patient: Juan Manuel Koehler    Procedure(s) Performed: Procedure(s) (LRB):  [id number] TUBE (N/A)    Final Anesthesia Type: general  Patient location during evaluation: PACU  Patient participation: Yes- Able to Participate  Level of consciousness: awake and alert  Post-procedure vital signs: reviewed and stable  Pain management: adequate  Airway patency: patent  PONV status at discharge: No PONV  Anesthetic complications: no      Cardiovascular status: hemodynamically stable  Respiratory status: unassisted  Hydration status: euvolemic  Follow-up not needed.  Comments: d/w PACU RN        Visit Vitals    /62    Pulse 98    Temp 36.6 °C (97.8 °F) (Oral)    Resp (!) 97    Ht 6' 3" (1.905 m)    Wt 78.9 kg (174 lb)    SpO2 (!) 92%    BMI 21.75 kg/m2       Pain/Deepika Score: Pain Assessment Performed: Yes (4/6/2017 12:55 PM)  Presence of Pain: denies (4/6/2017  2:15 PM)  Pain Rating Prior to Med Admin: 10 (4/6/2017  1:53 PM)  Pain Rating Post Med Admin: 3 (4/5/2017  4:00 PM)  Deepika Score: 8 (4/6/2017  2:15 PM)      "

## 2017-04-06 NOTE — NURSING
"Order parameters for I/O not in orders.  If upon assessment of drainage bag indicated to empty/I would have emptied and recorded output.  Patient had no complaints of nausea during shift.  Patient noted to have hiccups of which upon assessment patient stated"they went away".  Patient did have complaint of pain/fullness to bladder region ,patient was given oxybutin.  Patient was also admin benadryl for sleep. This was verbalized to  upon his questioning of reasoning of no urinary output recorded during the night and his statement towards me that patient was compalining of nausea the whole night.  "

## 2017-04-06 NOTE — PROCEDURES
Radiology Post-Procedure Note    Pre Op Diagnosis: Obstructive hydronephrosis    Post Op Diagnosis: Same    Procedure: Bilateral neph tubes and antegrade left JJ stent.    Procedure performed by: Lenard Gaviria MD    Written Informed Consent Obtained: Yes  Specimen Removed: NO  Estimated Blood Loss: less than 100     Findings:   Using US guidance the right collecting system was accessed. After a series of exchanges and dilations an 8 Fr x 26 cm JJ stent and 8 Fr neph tube were placed. On the left, the renal pelvis was accessed and an upper pole calyx was accessed using Fluoroscopy. Distal ureteral obstruction could not be traversed and therefore only an 8 Fr neph tube was placed. Severe bilateral hydroureteronephrosis was noted on nephrostograms. See dictated report. No complications. Discussed with Dr. Boucher.    Patient tolerated procedure well.    Lenard Gaviria M.D.  Diagnostic and Interventional Radiologist  Department of Radiology  Pager: [phone]

## 2017-04-06 NOTE — PLAN OF CARE
Future Appointments  Date Time Provider Department Center   4/10/2017 9:45 AM RYANPAULINA [facility]        04/06/17 1517   Discharge Assessment   Assessment Type Discharge Planning Assessment   Confirmed/corrected address and phone number on facesheet? Yes   Assessment information obtained from? Caregiver   If Healthcare Directive is received, is it scanned into Epic? no (comment)   Prior to hospitilization cognitive status: Alert/Oriented   Prior to hospitalization functional status: Independent   Current cognitive status: Alert/Oriented   Current Functional Status: Independent   Arrived From admitted as an inpatient   Lives With spouse   Able to Return to Prior Arrangements yes   Is patient able to care for self after discharge? No   Patient's perception of discharge disposition home or selfcare   Readmission Within The Last 30 Days no previous admission in last 30 days   Patient currently being followed by outpatient case management? No   Patient currently receives home health services? No   Does the patient currently use HME? No   Patient currently receives private duty nursing? No   Equipment Currently Used at Home none   Do you have any problems affording any of your prescribed medications? No   Is the patient taking medications as prescribed? no   Do you have any financial concerns preventing you from receiving the healthcare you need? No   Does the patient have transportation to healthcare appointments? Yes   Transportation Available family or friend will provide   On Dialysis? No   Does the patient receive services at the [facility]mad[facility] Clinic? No   Discharge Plan A Home;Home with family   Discharge Plan B Home;Home with family;Home Health   Patient/Family In Agreement With Plan yes     Melanie Wilson RN, CCM, CMSRN  RN Transition Navigator  [phone]

## 2017-04-07 PROBLEM — N17.9 AKI (ACUTE KIDNEY INJURY): Status: ACTIVE | Noted: 2017-04-07

## 2017-04-07 PROBLEM — E87.5 HYPERKALEMIA: Status: ACTIVE | Noted: 2017-04-07

## 2017-04-07 LAB
25(OH)D3+25(OH)D2 SERPL-MCNC: 28 NG/ML
ALBUMIN SERPL BCP-MCNC: 3.3 G/DL
ALBUMIN SERPL BCP-MCNC: 3.5 G/DL
ALP SERPL-CCNC: 50 U/L
ALT SERPL W/O P-5'-P-CCNC: 8 U/L
ANION GAP SERPL CALC-SCNC: 7 MMOL/L
ANION GAP SERPL CALC-SCNC: 8 MMOL/L
AST SERPL-CCNC: 19 U/L
BACTERIA #/AREA URNS HPF: ABNORMAL /HPF
BILIRUB SERPL-MCNC: 0.5 MG/DL
BILIRUB UR QL STRIP: NEGATIVE
BUN SERPL-MCNC: 30 MG/DL
BUN SERPL-MCNC: 30 MG/DL
BUN SERPL-MCNC: 35 MG/DL
BUN SERPL-MCNC: 37 MG/DL
CALCIUM SERPL-MCNC: 8.5 MG/DL
CALCIUM SERPL-MCNC: 8.5 MG/DL
CALCIUM SERPL-MCNC: 8.7 MG/DL
CALCIUM SERPL-MCNC: 8.8 MG/DL
CHLORIDE SERPL-SCNC: 111 MMOL/L
CHLORIDE SERPL-SCNC: 112 MMOL/L
CLARITY UR: ABNORMAL
CO2 SERPL-SCNC: 19 MMOL/L
CO2 SERPL-SCNC: 21 MMOL/L
CO2 SERPL-SCNC: 23 MMOL/L
CO2 SERPL-SCNC: 23 MMOL/L
COLOR UR: YELLOW
CREAT SERPL-MCNC: 2.3 MG/DL
CREAT SERPL-MCNC: 2.3 MG/DL
CREAT SERPL-MCNC: 3.1 MG/DL
CREAT SERPL-MCNC: 3.4 MG/DL
EST. GFR  (AFRICAN AMERICAN): 20 ML/MIN/1.73 M^2
EST. GFR  (AFRICAN AMERICAN): 23 ML/MIN/1.73 M^2
EST. GFR  (AFRICAN AMERICAN): 33 ML/MIN/1.73 M^2
EST. GFR  (AFRICAN AMERICAN): 33 ML/MIN/1.73 M^2
EST. GFR  (NON AFRICAN AMERICAN): 18 ML/MIN/1.73 M^2
EST. GFR  (NON AFRICAN AMERICAN): 20 ML/MIN/1.73 M^2
EST. GFR  (NON AFRICAN AMERICAN): 28 ML/MIN/1.73 M^2
EST. GFR  (NON AFRICAN AMERICAN): 28 ML/MIN/1.73 M^2
FERRITIN SERPL-MCNC: 891 NG/ML
GLUCOSE SERPL-MCNC: 101 MG/DL
GLUCOSE SERPL-MCNC: 106 MG/DL
GLUCOSE SERPL-MCNC: 107 MG/DL
GLUCOSE SERPL-MCNC: 107 MG/DL
GLUCOSE UR QL STRIP: NEGATIVE
HGB UR QL STRIP: ABNORMAL
HYALINE CASTS #/AREA URNS LPF: 0 /LPF
IRON SERPL-MCNC: 24 UG/DL
KETONES UR QL STRIP: NEGATIVE
LEUKOCYTE ESTERASE UR QL STRIP: ABNORMAL
MICROSCOPIC COMMENT: ABNORMAL
NITRITE UR QL STRIP: NEGATIVE
PH UR STRIP: 6 [PH] (ref 5–8)
PHOSPHATE SERPL-MCNC: 3.5 MG/DL
POTASSIUM SERPL-SCNC: 4.1 MMOL/L
POTASSIUM SERPL-SCNC: 4.1 MMOL/L
POTASSIUM SERPL-SCNC: 4.9 MMOL/L
POTASSIUM SERPL-SCNC: 5.4 MMOL/L
PROT SERPL-MCNC: 6.5 G/DL
PROT UR QL STRIP: ABNORMAL
PTH-INTACT SERPL-MCNC: 144 PG/ML
RBC #/AREA URNS HPF: 40 /HPF (ref 0–4)
SATURATED IRON: 11 %
SODIUM SERPL-SCNC: 138 MMOL/L
SODIUM SERPL-SCNC: 139 MMOL/L
SODIUM SERPL-SCNC: 141 MMOL/L
SODIUM SERPL-SCNC: 142 MMOL/L
SP GR UR STRIP: 1.01 (ref 1–1.03)
SQUAMOUS #/AREA URNS HPF: 1 /HPF
TOTAL IRON BINDING CAPACITY: 228 UG/DL
TRANSFERRIN SERPL-MCNC: 154 MG/DL
URN SPEC COLLECT METH UR: ABNORMAL
UROBILINOGEN UR STRIP-ACNC: NEGATIVE EU/DL
WBC #/AREA URNS HPF: 2 /HPF (ref 0–5)

## 2017-04-07 PROCEDURE — 36415 COLL VENOUS BLD VENIPUNCTURE: CPT

## 2017-04-07 PROCEDURE — 83970 ASSAY OF PARATHORMONE: CPT

## 2017-04-07 PROCEDURE — 94644 CONT INHLJ TX 1ST HOUR: CPT

## 2017-04-07 PROCEDURE — 25000003 PHARM REV CODE 250: Performed by: INTERNAL MEDICINE

## 2017-04-07 PROCEDURE — 25000003 PHARM REV CODE 250: Performed by: UROLOGY

## 2017-04-07 PROCEDURE — 25000242 PHARM REV CODE 250 ALT 637 W/ HCPCS: Performed by: INTERNAL MEDICINE

## 2017-04-07 PROCEDURE — 63600175 PHARM REV CODE 636 W HCPCS: Performed by: UROLOGY

## 2017-04-07 PROCEDURE — 82728 ASSAY OF FERRITIN: CPT

## 2017-04-07 PROCEDURE — 11000001 HC ACUTE MED/SURG PRIVATE ROOM

## 2017-04-07 PROCEDURE — 80048 BASIC METABOLIC PNL TOTAL CA: CPT | Mod: 91

## 2017-04-07 PROCEDURE — 81000 URINALYSIS NONAUTO W/SCOPE: CPT

## 2017-04-07 PROCEDURE — 99232 SBSQ HOSP IP/OBS MODERATE 35: CPT | Mod: ,,, | Performed by: UROLOGY

## 2017-04-07 PROCEDURE — 82306 VITAMIN D 25 HYDROXY: CPT

## 2017-04-07 PROCEDURE — 80069 RENAL FUNCTION PANEL: CPT

## 2017-04-07 PROCEDURE — 27000221 HC OXYGEN, UP TO 24 HOURS

## 2017-04-07 PROCEDURE — 80053 COMPREHEN METABOLIC PANEL: CPT

## 2017-04-07 PROCEDURE — 94761 N-INVAS EAR/PLS OXIMETRY MLT: CPT

## 2017-04-07 PROCEDURE — 63600175 PHARM REV CODE 636 W HCPCS: Performed by: INTERNAL MEDICINE

## 2017-04-07 PROCEDURE — 83540 ASSAY OF IRON: CPT

## 2017-04-07 RX ORDER — ERGOCALCIFEROL 1.25 MG/1
50000 CAPSULE ORAL
Status: DISCONTINUED | OUTPATIENT
Start: 2017-04-08 | End: 2017-04-08 | Stop reason: HOSPADM

## 2017-04-07 RX ORDER — DOCUSATE SODIUM 100 MG/1
100 CAPSULE, LIQUID FILLED ORAL 2 TIMES DAILY
Status: DISCONTINUED | OUTPATIENT
Start: 2017-04-07 | End: 2017-04-08 | Stop reason: HOSPADM

## 2017-04-07 RX ORDER — ALBUTEROL SULFATE 0.83 MG/ML
2.5 SOLUTION RESPIRATORY (INHALATION) EVERY 4 HOURS PRN
Status: DISCONTINUED | OUTPATIENT
Start: 2017-04-07 | End: 2017-04-07

## 2017-04-07 RX ORDER — POLYETHYLENE GLYCOL 3350 17 G/17G
17 POWDER, FOR SOLUTION ORAL DAILY
Status: DISCONTINUED | OUTPATIENT
Start: 2017-04-07 | End: 2017-04-08 | Stop reason: HOSPADM

## 2017-04-07 RX ORDER — ALBUTEROL SULFATE 0.83 MG/ML
10 SOLUTION RESPIRATORY (INHALATION) ONCE
Status: COMPLETED | OUTPATIENT
Start: 2017-04-07 | End: 2017-04-07

## 2017-04-07 RX ORDER — BISACODYL 10 MG
10 SUPPOSITORY, RECTAL RECTAL DAILY PRN
Status: DISCONTINUED | OUTPATIENT
Start: 2017-04-07 | End: 2017-04-08 | Stop reason: HOSPADM

## 2017-04-07 RX ADMIN — SODIUM BICARBONATE 650 MG TABLET 650 MG: at 08:04

## 2017-04-07 RX ADMIN — HYDROCODONE BITARTRATE AND ACETAMINOPHEN 1 TABLET: 5; 325 TABLET ORAL at 07:04

## 2017-04-07 RX ADMIN — HYDROCODONE BITARTRATE AND ACETAMINOPHEN 1 TABLET: 5; 325 TABLET ORAL at 12:04

## 2017-04-07 RX ADMIN — HYDROCODONE BITARTRATE AND ACETAMINOPHEN 1 TABLET: 5; 325 TABLET ORAL at 05:04

## 2017-04-07 RX ADMIN — DEXTROSE MONOHYDRATE 25 G: 25 INJECTION, SOLUTION INTRAVENOUS at 12:04

## 2017-04-07 RX ADMIN — SODIUM POLYSTYRENE SULFONATE 45 G: 15 SUSPENSION ORAL; RECTAL at 12:04

## 2017-04-07 RX ADMIN — DIPHENHYDRAMINE HYDROCHLORIDE 25 MG: 25 CAPSULE ORAL at 08:04

## 2017-04-07 RX ADMIN — INSULIN HUMAN 10 UNITS: 100 INJECTION, SOLUTION PARENTERAL at 12:04

## 2017-04-07 RX ADMIN — DOCUSATE SODIUM 100 MG: 100 CAPSULE, LIQUID FILLED ORAL at 09:04

## 2017-04-07 RX ADMIN — ALBUTEROL SULFATE 10 MG: 2.5 SOLUTION RESPIRATORY (INHALATION) at 12:04

## 2017-04-07 RX ADMIN — ONDANSETRON 4 MG: 2 INJECTION INTRAMUSCULAR; INTRAVENOUS at 01:04

## 2017-04-07 RX ADMIN — SODIUM BICARBONATE 650 MG TABLET 650 MG: at 09:04

## 2017-04-07 RX ADMIN — PANTOPRAZOLE SODIUM 40 MG: 40 TABLET, DELAYED RELEASE ORAL at 09:04

## 2017-04-07 RX ADMIN — POLYETHYLENE GLYCOL 3350 17 G: 17 POWDER, FOR SOLUTION ORAL at 09:04

## 2017-04-07 NOTE — PLAN OF CARE
Problem: Patient Care Overview  Goal: Plan of Care Review  Outcome: Ongoing (interventions implemented as appropriate)  Plan of care discussed with patient. Safety measures maintained. Call bell in reach. Bilateral nephrostomy tube dressing c/d/i with good urinary output. Right nephrostomy tube urine now clear yellow. Left nephrostomy tube urine remains pink tinged. Henry catheter in place with small amount of blood output.  Normal saline infusing at 75cc/hr. Medicated with prn hydrocodone for pain. Dr. Calderon notified overnight of potassium 5.6. Patient given kayexelate, albuterol treatment, and insulin per physician orders.

## 2017-04-07 NOTE — PROGRESS NOTES
Nephrology Progress Note    DATE OF ADMISSION:  4/5/2017  DATE OF CONSULT: 4/6/2017  REFERRING PHYSICIAN:  James Boucher MD  REASON FOR CONSULTATION:  Hyperkalemia    He still has persistence of hematuria, UOP has picked up, nausea improving but persists, no vomiting, hiccups have resolved, continues to have poor appetite.  Had multiple rounds of medical mgmt for hyperkalemia overnight.    Physical Exam:  Temp:  [97.9 °F (36.6 °C)-98.7 °F (37.1 °C)] 98.7 °F (37.1 °C)  Pulse:  [] 94  Resp:  [18-19] 19  SpO2:  [94 %-99 %] 97 %  BP: (119-149)/(58-73) 149/73  Vitals:    04/07/17 0740 04/07/17 0853 04/07/17 0900 04/07/17 1247   BP:  136/63  (!) 149/73   BP Location:       Patient Position:       BP Method:       Pulse:  103  94   Resp:  19  19   Temp:  98.5 °F (36.9 °C)  98.7 °F (37.1 °C)   TempSrc:  Oral  Oral   SpO2: 99%  97%    Weight:       Height:       I/O last 3 completed shifts:  In: 3133.8 [P.O.:500; I.V.:2633.8]  Out: 2100 [Urine:2100]  I/O this shift:  In: -   Out: 500 [Urine:500]    Intake/Output Summary (Last 24 hours) at 04/07/17 1638  Last data filed at 04/07/17 0900   Gross per 24 hour   Intake             1510 ml   Output             2150 ml   Net             -640 ml     Wt Readings from Last 1 Encounters:   04/05/17 1000 78.9 kg (174 lb)   Lines - bolanos with gross hematuria  Vital signs reviewed.  Gen - no acute distress, alert and oriented  HENT - normocephalic, atraumatic  Eyes - extraocular motions intact  CVS - tachycardic, no murmurs or rubs  Resp - non-labored, clear to auscultation, no wheezes, rales, or rhonchi  Abd - soft, non-tender, non-distended, no rebound or guarding  Ext/Vascular - no cyanosis, no dependent or BLE edema  Skin - no rash or lesions  Neuro - alert and oriented, normal speech, moves all four extremities, no asterixis  Psych - normal mood and affect, normal thought process    Meds - Scheduled Meds:   docusate sodium  100 mg Oral BID    pantoprazole  40 mg Oral  Daily    polyethylene glycol  17 g Oral Daily    sodium bicarbonate  650 mg Oral BID    sodium chloride 0.9%  3 mL Intravenous Q8H     Continuous Infusions:   sodium chloride 0.9% 50 mL/hr at 04/07/17 0709     PRN Meds:.belladonna alkaloids-opium 16.2-30 mg, bisacodyl, dextrose 50%, diphenhydrAMINE, hydrocodone-acetaminophen 5-325mg, HYDROmorphone, HYDROmorphone, HYDROmorphone, ondansetron, ondansetron, oxybutynin, oxycodone-acetaminophen, oxycodone-acetaminophen, promethazine (PHENERGAN) IVPB, sodium chloride 0.9%    Labs -   Recent Labs  Lab 04/05/17  1012 04/06/17  0702 04/06/17  0959 04/06/17  1846   WBC 7.35 17.37*  --   --    HGB 9.9* 9.4*  --   --    HCT 29.4* 27.8* 26* 31*    223  --   --    MONO 7.1  0.5 6.0  1.1*  --   --        Recent Labs  Lab 04/07/17  0504 04/07/17  0812 04/07/17  1605    141 138   K 5.4* 4.9 4.1   * 111* 111*   CO2 23 23 19*   BUN 37* 35* 30*   CREATININE 3.4* 3.1* 2.3*   CALCIUM 8.8 8.7 8.5*       A/P   ARF on CKD3  - no baseline Cr in our system, but Dr Boucher documented Cr 1.95, GFR 35 on [facility] labs previously. 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 bladder tumor with bilateral hydronephrosis  - he underwent TURBT of bladder tumor 4/6/17 and bolanos placement  - Cr trending down, adequate UOP, electrolytes stabilizing s/p medical mgmt  - continue low rate of IVF, monitor for signs of volume overload, serial lung exams and continuous pulse ox while on IVF  - uremic symptoms are improving, hold dialysis for now and anticipate continued improvement   - no urgent indication for dialysis, reassess daily for dialysis needs   - monitor daily weights, strict I&Os. Check UA  - avoid nephrotoxic agents including NSAIDs, renally dose medications for GFR <10     Hyperkalemia  - s/p multiple rounds of medical mgmt, K currently wnl  - will continue to medically manage tonight as needed, consideration for dialysis if electrolytes are refractory to medical  mgmt  - serial BMP overnight  - recommend avoidance of LR      Anemia of chronic renal failure  - also a component of AOCD and gross hematuria causing acute blood loss anemia  - hold Epo in the setting of malignancy  - transfuse if Hgb <7     Acidosis, due to hyperchloremia and renal failure  - continue PO bicarb     MBD/Secondary Hyperparathyroidism  -  and Vitamin D 28, start Ergo protocol     Bilateral hydronephrosis, thickened bladder wall, enlarged prostate. S/p TURBT of >5cm bladder tumor on 4/6/17  Bladder ca dx 1990s     Thank you for allowing me to participate in the care of your patient. Please call with any questions.     Erika Duron MD   Nephrology  [facility]  Office [phone]     Cross covering for:  [facility]  QAMAR Rausch MD, MANAS,   SAGAR Nation MD,   MD AIYANA Espino, NP  [address]  [place], [address]  [phone]  After hours answering service: [phone]

## 2017-04-07 NOTE — PLAN OF CARE
Assessment complete. Patient with bilateral nephrostomy tubes patent with pink tinged urine in both collection bags. Bilateral nephrostomy tube dressing c/d/i.  Henry catheter in placed without any urine output in collection bag.  Normal saline infusing at 75cc/hr without difficulty. Patient states that he is only in pain when he moves.

## 2017-04-07 NOTE — SUBJECTIVE & OBJECTIVE
Interval History: patient underwent bilateral nephrostomy tube placement yesterday.  Improved urine output.  Multiple episodes of hyperkalemia yesterday managed with medications by nephrology.  Patient with soreness and constipation.  No fevers.  Persistent hematuria but improved after nephrostomy tubes placed.  No nausea/vomiting.     No chest pain.  Has ambulated minimally.     Review of Systems   All other systems reviewed and negative except pertinent positives noted in HPI.    Objective:     Temp:  [97.8 °F (36.6 °C)-98.5 °F (36.9 °C)] 98.5 °F (36.9 °C)  Pulse:  [] 102  Resp:  [16-97] 18  SpO2:  [92 %-100 %] 95 %  BP: (115-190)/(55-89) 146/67     Body mass index is 21.75 kg/(m^2).            Drains     Drain                 Urethral Catheter 04/05/17 Triple-lumen 2 days         Nephrostomy 04/06/17 1400 Left less than 1 day         Nephrostomy 04/06/17 1400 Right less than 1 day                Physical Exam   Nursing note and vitals reviewed.  Constitutional: He is oriented to person, place, and time. Vital signs are normal. He appears well-developed and well-nourished. He is cooperative.   HENT:   Head: Normocephalic.   Neck: No tracheal deviation present.   Cardiovascular: Normal rate and intact distal pulses.    Pulmonary/Chest: Effort normal. No accessory muscle usage. No tachypnea. No respiratory distress.   Abdominal: Soft. Normal appearance. He exhibits no distension, no fluid wave, no ascites and no mass. There is tenderness in the suprapubic area. There is no rebound and no CVA tenderness. No hernia. Hernia confirmed negative in the right inguinal area and confirmed negative in the left inguinal area.   Liver/spleen non-palpable.   Genitourinary: Prostate normal, testes normal and penis normal. Rectal exam shows no external hemorrhoid, no mass, no tenderness and anal tone normal. Prostate is not tender. Right testis shows no mass and no tenderness. Right testis is descended. Left testis shows no  mass and no tenderness. Left testis is descended. Circumcised.   Genitourinary Comments: Scrotum showed no rashes or lesions.  Anus/perineum without lesion.  Epididymis showed no masses or tenderness. No meatal stenosis, meatus in normal location. No penile discharge/plaques. Henry with scant bloody urine in bag. will deferred.       Musculoskeletal: Normal range of motion.   Lymphadenopathy: No inguinal adenopathy noted on the right or left side.        Right: No inguinal adenopathy present.        Left: No inguinal adenopathy present.   Neurological: He is alert and oriented to person, place, and time. He has normal strength.   Skin: No bruising and no rash noted.          Psychiatric: He has a normal mood and affect. His speech is normal and behavior is normal. Thought content normal.       Significant Labs:    BMP:    Recent Labs  Lab 04/06/17  1614 04/06/17  1910 04/06/17  2326     138 139 142   K 6.7*  6.7* 5.7* 5.6*   *  112* 111* 112*   CO2 19*  19* 20* 22*   BUN 42*  42* 42* 40*   CREATININE 4.7*  4.7* 4.3* 4.0*   CALCIUM 8.5*  8.5* 8.5* 8.8       CBC:     Recent Labs  Lab 04/05/17  1012 04/06/17  0702 04/06/17  0959 04/06/17  1846   WBC 7.35 17.37*  --   --    HGB 9.9* 9.4*  --   --    HCT 29.4* 27.8* 26* 31*    223  --   --        Urine Culture: No results for input(s): LABURIN in the last 168 hours.  Urine Studies:   Recent Labs  Lab 04/07/17  0500   COLORU Yellow   APPEARANCEUA Cloudy*   PHUR 6.0   SPECGRAV 1.015   PROTEINUA 2+*   GLUCUA Negative   KETONESU Negative   BILIRUBINUA Negative   OCCULTUA 3+*   NITRITE Negative   UROBILINOGEN Negative   LEUKOCYTESUR Trace*   RBCUA 40*   WBCUA 2   BACTERIA Rare   SQUAMEPITHEL 1   HYALINECASTS 0       Significant Imaging:  All pertinent imaging results/findings from the past 24 hours have been reviewed.

## 2017-04-07 NOTE — ASSESSMENT & PLAN NOTE
-critical lab on am labs  -nephrology consulted  -orders placed for lowering K  -plan for bilateral nephrostomy tubes with IR

## 2017-04-07 NOTE — PROGRESS NOTES
[facility]  Urology  Progress Note    Patient Name: Juan Manuel Koehler  MRN: [id number]  Admission Date: 4/5/2017  Hospital Length of Stay: 1 days  Code Status: Full Code   Attending Provider: James Boucher MD   Primary Care Physician: Hemant Sweeney MD    Subjective:     HPI:       Interval History: patient underwent bilateral nephrostomy tube placement yesterday.  Improved urine output.  Multiple episodes of hyperkalemia yesterday managed with medications by nephrology.  Patient with soreness and constipation.  No fevers.  Persistent hematuria but improved after nephrostomy tubes placed.  No nausea/vomiting.     No chest pain.  Has ambulated minimally.     Review of Systems   All other systems reviewed and negative except pertinent positives noted in HPI.    Objective:     Temp:  [97.8 °F (36.6 °C)-98.5 °F (36.9 °C)] 98.5 °F (36.9 °C)  Pulse:  [] 102  Resp:  [16-97] 18  SpO2:  [92 %-100 %] 95 %  BP: (115-190)/(55-89) 146/67     Body mass index is 21.75 kg/(m^2).            Drains     Drain                 Urethral Catheter 04/05/17 Triple-lumen 2 days         Nephrostomy 04/06/17 1400 Left less than 1 day         Nephrostomy 04/06/17 1400 Right less than 1 day                Physical Exam   Nursing note and vitals reviewed.  Constitutional: He is oriented to person, place, and time. Vital signs are normal. He appears well-developed and well-nourished. He is cooperative.   HENT:   Head: Normocephalic.   Neck: No tracheal deviation present.   Cardiovascular: Normal rate and intact distal pulses.    Pulmonary/Chest: Effort normal. No accessory muscle usage. No tachypnea. No respiratory distress.   Abdominal: Soft. Normal appearance. He exhibits no distension, no fluid wave, no ascites and no mass. There is tenderness in the suprapubic area. There is no rebound and no CVA tenderness. No hernia. Hernia confirmed negative in the right inguinal area and confirmed negative in the left inguinal  area.   Liver/spleen non-palpable.   Genitourinary: Prostate normal, testes normal and penis normal. Rectal exam shows no external hemorrhoid, no mass, no tenderness and anal tone normal. Prostate is not tender. Right testis shows no mass and no tenderness. Right testis is descended. Left testis shows no mass and no tenderness. Left testis is descended. Circumcised.   Genitourinary Comments: Scrotum showed no rashes or lesions.  Anus/perineum without lesion.  Epididymis showed no masses or tenderness. No meatal stenosis, meatus in normal location. No penile discharge/plaques. Henry with scant bloody urine in bag. will deferred.       Musculoskeletal: Normal range of motion.   Lymphadenopathy: No inguinal adenopathy noted on the right or left side.        Right: No inguinal adenopathy present.        Left: No inguinal adenopathy present.   Neurological: He is alert and oriented to person, place, and time. He has normal strength.   Skin: No bruising and no rash noted.          Psychiatric: He has a normal mood and affect. His speech is normal and behavior is normal. Thought content normal.       Significant Labs:    BMP:    Recent Labs  Lab 04/06/17  1614 04/06/17  1910 04/06/17  2326     138 139 142   K 6.7*  6.7* 5.7* 5.6*   *  112* 111* 112*   CO2 19*  19* 20* 22*   BUN 42*  42* 42* 40*   CREATININE 4.7*  4.7* 4.3* 4.0*   CALCIUM 8.5*  8.5* 8.5* 8.8       CBC:     Recent Labs  Lab 04/05/17  1012 04/06/17  0702 04/06/17  0959 04/06/17  1846   WBC 7.35 17.37*  --   --    HGB 9.9* 9.4*  --   --    HCT 29.4* 27.8* 26* 31*    223  --   --        Urine Culture: No results for input(s): LABURIN in the last 168 hours.  Urine Studies:   Recent Labs  Lab 04/07/17  0500   COLORU Yellow   APPEARANCEUA Cloudy*   PHUR 6.0   SPECGRAV 1.015   PROTEINUA 2+*   GLUCUA Negative   KETONESU Negative   BILIRUBINUA Negative   OCCULTUA 3+*   NITRITE Negative   UROBILINOGEN Negative   LEUKOCYTESUR Trace*   RBCUA 40*    WBCUA 2   BACTERIA Rare   SQUAMEPITHEL 1   HYALINECASTS 0       Significant Imaging:  All pertinent imaging results/findings from the past 24 hours have been reviewed.                  Assessment/Plan:     * Bladder tumor  -plan pending pathology  -pt to f/u in 2 weeks for review after acute issues stabilize    CKD (chronic kidney disease)  -unknown baseline  -continue trend cr to holli after nephrostomy tubes  -nephrology following    Hyperkalemia  -am labs pending  -overnight serum Cr decreased from 4.7 to 4  -K wnl        VTE Risk Mitigation         Ordered     Medium Risk of VTE  Once      04/05/17 6407          James Boucher MD  Urology  [facility]

## 2017-04-07 NOTE — PROGRESS NOTES
.Pharmacy New Medication Education    Patient accepted medication education.    Pharmacy educated patient on the following medications, using the teach-back method.   [place]  Bisacodyl  Benadryl  Docusate  Norco  Dilaudid  Flagyl  Zofran    Learners of pharmacy medication education included:  patient    Patient +/- learner response:  verbalize understanding

## 2017-04-07 NOTE — PROGRESS NOTES
[facility]  Urology  Progress Note    Patient Name: Juan Manuel Koehler  MRN: [id number]  Admission Date: 4/5/2017  Hospital Length of Stay: 1 days  Code Status: Full Code   Attending Provider: James Boucher MD   Primary Care Physician: Hemant Sweeney MD    Subjective:     HPI:       Interval History: Patient with low urine output overnight approximately 100 cc over 13 hours.  Was not notified of any low urine output.  Also with hematuria which is expected.  The patient complains of nausea and dry heaving overnight.  He also has soreness in his lower abdomen which he had and complained of preoperatively.  He also complains of some left flank pain.    Denies any chest pain or shortness of breath.    Review of Systems   Constitutional: Negative for fever.   Gastrointestinal: Positive for abdominal pain and nausea.   Genitourinary: Positive for decreased urine volume and hematuria.   All other systems reviewed and are negative.     As above otherwise negative  Objective:     Temp:  [96.6 °F (35.9 °C)-98.1 °F (36.7 °C)] 97.8 °F (36.6 °C)  Pulse:  [] 114  Resp:  [14-76] 16  SpO2:  [95 %-100 %] 96 %  BP: (116-193)/(60-96) 133/62     Body mass index is 21.75 kg/(m^2).            Drains     Drain                 Nephrostomy -- days         Urethral Catheter 04/05/17 Triple-lumen 1 day                Physical Exam   Nursing note and vitals reviewed.  Constitutional: He is oriented to person, place, and time. Vital signs are normal. He appears well-developed and well-nourished. He is cooperative.   HENT:   Head: Normocephalic.   Neck: No tracheal deviation present.   Cardiovascular: Normal rate and intact distal pulses.    Pulmonary/Chest: Effort normal. No accessory muscle usage. No tachypnea. No respiratory distress.   Abdominal: Soft. Normal appearance. He exhibits no distension, no fluid wave, no ascites and no mass. There is no tenderness. There is CVA tenderness (mild on the left). There is no  rebound. No hernia. Hernia confirmed negative in the right inguinal area and confirmed negative in the left inguinal area.   Liver/spleen non-palpable.   Genitourinary: Prostate normal, testes normal and penis normal. Rectal exam shows no external hemorrhoid, no mass, no tenderness and anal tone normal. Prostate is not tender. Right testis shows no mass and no tenderness. Right testis is descended. Left testis shows no mass and no tenderness. Left testis is descended. Circumcised.   Genitourinary Comments: Scrotum showed no rashes or lesions.  Anus/perineum without lesion.  Epididymis showed no masses or tenderness. No meatal stenosis, meatus in normal location. No penile discharge/plaques, Henry catheter in place with red urine.  Irrigated without significant clot retention and irrigates to clear very easily.  MARICEL deferred       Musculoskeletal: Normal range of motion.   Lymphadenopathy: No inguinal adenopathy noted on the right or left side.        Right: No inguinal adenopathy present.        Left: No inguinal adenopathy present.   Neurological: He is alert and oriented to person, place, and time. He has normal strength.   Skin: No bruising and no rash noted.     Psychiatric: He has a normal mood and affect. His speech is normal and behavior is normal. Thought content normal.       Significant Labs:    BMP:    Recent Labs  Lab 04/05/17  1012 04/05/17  1356 04/06/17  0702 04/06/17  0946    139 141  --    K 5.1 4.6 6.4* 4.6   * 111* 113*  --    CO2 21* 20* 20*  --    BUN 26* 25* 40*  --    CREATININE 2.2* 2.3* 4.6*  --    CALCIUM 9.4 8.7 9.0  --        CBC:     Recent Labs  Lab 04/05/17  1012 04/06/17  0702 04/06/17  0959   WBC 7.35 17.37*  --    HGB 9.9* 9.4*  --    HCT 29.4* 27.8* 26*    223  --        Urine Culture: No results for input(s): LABURIN in the last 168 hours.  Urine Studies: No results for input(s): COLORU, APPEARANCEUA, PHUR, SPECGRAV, PROTEINUA, GLUCUA, KETONESU, BILIRUBINUA,  OCCULTUA, NITRITE, UROBILINOGEN, LEUKOCYTESUR, RBCUA, WBCUA, BACTERIA, SQUAMEPITHEL, HYALINECASTS in the last 168 hours.    Invalid input(s): MELISSA    Significant Imaging:  CT: I have reviewed all results within the past 24 hours and my personal findings are:  No new CT scan  All pertinent imaging results/findings from the past 24 hours have been reviewed.                  Assessment/Plan:     * Bladder tumor  -plan pending pathology  -pt to f/u in 2 weeks for review after acute issues stabilize    CKD (chronic kidney disease)  -unknown baseline  -trend cr to holli after nephrostomy tubes    ELDER (acute kidney injury)  -unknown baseline, creatinine worse overnight  -To IR for bilateral nephrostomy tube placement    Hyperkalemia  -critical lab on am labs  -nephrology consulted  -orders placed for lowering K  -plan for bilateral nephrostomy tubes with IR      VTE Risk Mitigation         Ordered     Medium Risk of VTE  Once      04/05/17 5575          James Boucher MD  Urology  [facility]

## 2017-04-07 NOTE — PLAN OF CARE
Dr. Chavez was notified of potassium level 5.6. Orders received for regular insulin, kayexelate, and albuterol breathing treatment now and to repeat potassium level at 4am.

## 2017-04-07 NOTE — PT/OT/SLP PROGRESS
Physical Therapy      Juan Manuel Koehler  MRN: [id number]  1515  Patient not seen today secondary to nurse reports that pt walked a little, was in the chair and then just got back into bed; now visiting with family. Will follow-up tomorrow.    Hyacinth Tian, PT   4/7/2017

## 2017-04-07 NOTE — PLAN OF CARE
Dr. Boucher at bedside to assess nephrostomy tubes and bloody output from urethral catheter. No new orders received at this time.

## 2017-04-08 VITALS
BODY MASS INDEX: 21.64 KG/M2 | RESPIRATION RATE: 19 BRPM | TEMPERATURE: 98 F | WEIGHT: 174 LBS | SYSTOLIC BLOOD PRESSURE: 173 MMHG | HEIGHT: 75 IN | OXYGEN SATURATION: 98 % | HEART RATE: 76 BPM | DIASTOLIC BLOOD PRESSURE: 84 MMHG

## 2017-04-08 LAB
ANION GAP SERPL CALC-SCNC: 8 MMOL/L
BASOPHILS # BLD AUTO: 0.03 K/UL
BASOPHILS NFR BLD: 0.3 %
BUN SERPL-MCNC: 25 MG/DL
CALCIUM SERPL-MCNC: 8.6 MG/DL
CHLORIDE SERPL-SCNC: 109 MMOL/L
CO2 SERPL-SCNC: 23 MMOL/L
CREAT SERPL-MCNC: 1.8 MG/DL
DIFFERENTIAL METHOD: ABNORMAL
EOSINOPHIL # BLD AUTO: 0.3 K/UL
EOSINOPHIL NFR BLD: 2.7 %
ERYTHROCYTE [DISTWIDTH] IN BLOOD BY AUTOMATED COUNT: 13.8 %
EST. GFR  (AFRICAN AMERICAN): 44 ML/MIN/1.73 M^2
EST. GFR  (NON AFRICAN AMERICAN): 38 ML/MIN/1.73 M^2
GLUCOSE SERPL-MCNC: 95 MG/DL
HCT VFR BLD AUTO: 23.5 %
HCT VFR BLD AUTO: 25.8 %
HGB BLD-MCNC: 8.1 G/DL
HGB BLD-MCNC: 8.8 G/DL
LYMPHOCYTES # BLD AUTO: 1 K/UL
LYMPHOCYTES NFR BLD: 9.9 %
MAGNESIUM SERPL-MCNC: 1.5 MG/DL
MCH RBC QN AUTO: 29.8 PG
MCHC RBC AUTO-ENTMCNC: 34.5 %
MCV RBC AUTO: 86 FL
MONOCYTES # BLD AUTO: 0.8 K/UL
MONOCYTES NFR BLD: 8 %
NEUTROPHILS # BLD AUTO: 7.6 K/UL
NEUTROPHILS NFR BLD: 79 %
PHOSPHATE SERPL-MCNC: 3.7 MG/DL
PLATELET # BLD AUTO: 198 K/UL
PMV BLD AUTO: 9 FL
POTASSIUM SERPL-SCNC: 4.1 MMOL/L
RBC # BLD AUTO: 2.72 M/UL
SODIUM SERPL-SCNC: 140 MMOL/L
WBC # BLD AUTO: 9.57 K/UL

## 2017-04-08 PROCEDURE — 25000003 PHARM REV CODE 250: Performed by: UROLOGY

## 2017-04-08 PROCEDURE — G8979 MOBILITY GOAL STATUS: HCPCS | Mod: CK

## 2017-04-08 PROCEDURE — 85018 HEMOGLOBIN: CPT

## 2017-04-08 PROCEDURE — 97116 GAIT TRAINING THERAPY: CPT

## 2017-04-08 PROCEDURE — 80048 BASIC METABOLIC PNL TOTAL CA: CPT

## 2017-04-08 PROCEDURE — 85014 HEMATOCRIT: CPT

## 2017-04-08 PROCEDURE — 63600175 PHARM REV CODE 636 W HCPCS: Performed by: INTERNAL MEDICINE

## 2017-04-08 PROCEDURE — 36415 COLL VENOUS BLD VENIPUNCTURE: CPT

## 2017-04-08 PROCEDURE — 25000003 PHARM REV CODE 250: Performed by: INTERNAL MEDICINE

## 2017-04-08 PROCEDURE — 99232 SBSQ HOSP IP/OBS MODERATE 35: CPT | Mod: ,,, | Performed by: UROLOGY

## 2017-04-08 PROCEDURE — 83735 ASSAY OF MAGNESIUM: CPT

## 2017-04-08 PROCEDURE — 94760 N-INVAS EAR/PLS OXIMETRY 1: CPT

## 2017-04-08 PROCEDURE — G8978 MOBILITY CURRENT STATUS: HCPCS | Mod: CK

## 2017-04-08 PROCEDURE — 97161 PT EVAL LOW COMPLEX 20 MIN: CPT

## 2017-04-08 PROCEDURE — 84100 ASSAY OF PHOSPHORUS: CPT

## 2017-04-08 PROCEDURE — 85025 COMPLETE CBC W/AUTO DIFF WBC: CPT

## 2017-04-08 RX ORDER — DOCUSATE SODIUM 100 MG/1
100 CAPSULE, LIQUID FILLED ORAL 2 TIMES DAILY
Refills: 0 | Status: ON HOLD | COMMUNITY
Start: 2017-04-08 | End: 2017-05-05

## 2017-04-08 RX ORDER — SODIUM CHLORIDE 9 MG/ML
INJECTION, SOLUTION INTRAVENOUS CONTINUOUS
Status: DISCONTINUED | OUTPATIENT
Start: 2017-04-08 | End: 2017-04-08 | Stop reason: HOSPADM

## 2017-04-08 RX ORDER — ERGOCALCIFEROL 1.25 MG/1
50000 CAPSULE ORAL
Qty: 7 CAPSULE | Refills: 0 | Status: ON HOLD | OUTPATIENT
Start: 2017-04-08 | End: 2017-05-05

## 2017-04-08 RX ORDER — OXYCODONE AND ACETAMINOPHEN 5; 325 MG/1; MG/1
1 TABLET ORAL EVERY 4 HOURS PRN
Qty: 40 TABLET | Refills: 0 | Status: SHIPPED | OUTPATIENT
Start: 2017-04-08 | End: 2017-05-05

## 2017-04-08 RX ORDER — OXYBUTYNIN CHLORIDE 5 MG/1
5 TABLET ORAL
Qty: 90 TABLET | Refills: 1 | Status: SHIPPED | OUTPATIENT
Start: 2017-04-08 | End: 2017-04-11

## 2017-04-08 RX ADMIN — ERGOCALCIFEROL 50000 UNITS: 1.25 CAPSULE ORAL at 10:04

## 2017-04-08 RX ADMIN — SODIUM BICARBONATE 650 MG TABLET 650 MG: at 10:04

## 2017-04-08 RX ADMIN — PANTOPRAZOLE SODIUM 40 MG: 40 TABLET, DELAYED RELEASE ORAL at 10:04

## 2017-04-08 RX ADMIN — HYDROCODONE BITARTRATE AND ACETAMINOPHEN 1 TABLET: 5; 325 TABLET ORAL at 10:04

## 2017-04-08 RX ADMIN — SODIUM CHLORIDE: 0.9 INJECTION, SOLUTION INTRAVENOUS at 06:04

## 2017-04-08 RX ADMIN — HYDROCODONE BITARTRATE AND ACETAMINOPHEN 1 TABLET: 5; 325 TABLET ORAL at 04:04

## 2017-04-08 RX ADMIN — MAGNESIUM SULFATE HEPTAHYDRATE 1 G: 500 INJECTION, SOLUTION INTRAMUSCULAR; INTRAVENOUS at 12:04

## 2017-04-08 RX ADMIN — DOCUSATE SODIUM 100 MG: 100 CAPSULE, LIQUID FILLED ORAL at 10:04

## 2017-04-08 RX ADMIN — POLYETHYLENE GLYCOL 3350 17 G: 17 POWDER, FOR SOLUTION ORAL at 10:04

## 2017-04-08 NOTE — PLAN OF CARE
Problem: Physical Therapy Goal  Goal: Physical Therapy Goal  Goals to be met by: 4/30/17     Patient will increase functional independence with mobility by performing:    Sup<>sit mod Ind  Sit <> stand mod Ind  Ambulate in halls mod Ind  Perform LE standing toe / heel raises, Seated LE Te x 15 reps  Ascend / descend 1 flight steps with 1 HR support SPVN  Outcome: Ongoing (interventions implemented as appropriate)  Initial evaluation completed. Anticipate patient to meet goals quickly based on prior level of funciton.

## 2017-04-08 NOTE — SUBJECTIVE & OBJECTIVE
Interval History: still somewhat sore.  Otherwise no complaints.     Review of Systems  Objective:     Temp:  [97.8 °F (36.6 °C)-98.7 °F (37.1 °C)] 98.2 °F (36.8 °C)  Pulse:  [] 82  Resp:  [19-20] 20  SpO2:  [94 %-99 %] 94 %  BP: (136-174)/(63-80) 174/80     Body mass index is 21.75 kg/(m^2).            Drains     Drain                 Urethral Catheter 04/05/17 Triple-lumen 3 days         Nephrostomy 04/06/17 1400 Left 1 day         Nephrostomy 04/06/17 1400 Right 1 day                Physical Exam   Nursing note and vitals reviewed.  Constitutional: He is oriented to person, place, and time. Vital signs are normal. He appears well-developed and well-nourished. He is cooperative.   HENT:   Head: Normocephalic.   Neck: No tracheal deviation present.   Cardiovascular: Normal rate and intact distal pulses.    Pulmonary/Chest: Effort normal. No accessory muscle usage. No tachypnea. No respiratory distress.   Abdominal: Soft. Normal appearance. He exhibits no distension, no fluid wave, no ascites and no mass. There is tenderness in the suprapubic area. There is no rebound and no CVA tenderness. No hernia. Hernia confirmed negative in the right inguinal area and confirmed negative in the left inguinal area.   Liver/spleen non-palpable.   Genitourinary: Prostate normal, testes normal and penis normal. Rectal exam shows no external hemorrhoid, no mass, no tenderness and anal tone normal. Prostate is not tender. Right testis shows no mass and no tenderness. Right testis is descended. Left testis shows no mass and no tenderness. Left testis is descended. Circumcised.   Genitourinary Comments: Scrotum showed no rashes or lesions.  Anus/perineum without lesion.  Epididymis showed no masses or tenderness. No meatal stenosis, meatus in normal location. No penile discharge/plaques. Henry with scant bloody urine in bag. will deferred.       Musculoskeletal: Normal range of motion.   Lymphadenopathy: No inguinal adenopathy  noted on the right or left side.        Right: No inguinal adenopathy present.        Left: No inguinal adenopathy present.   Neurological: He is alert and oriented to person, place, and time. He has normal strength.   Skin: No bruising and no rash noted.          Psychiatric: He has a normal mood and affect. His speech is normal and behavior is normal. Thought content normal.       Significant Labs:    BMP:    Recent Labs  Lab 04/07/17  0504 04/07/17  0812 04/07/17  1605    141 139  138   K 5.4* 4.9 4.1  4.1   * 111* 111*  111*   CO2 23 23 21*  19*   BUN 37* 35* 30*  30*   CREATININE 3.4* 3.1* 2.3*  2.3*   CALCIUM 8.8 8.7 8.5*  8.5*       CBC:     Recent Labs  Lab 04/05/17  1012 04/06/17  0702 04/06/17  0959 04/06/17  1846   WBC 7.35 17.37*  --   --    HGB 9.9* 9.4*  --   --    HCT 29.4* 27.8* 26* 31*    223  --   --        Urine Culture: No results for input(s): LABURIN in the last 168 hours.  Urine Studies:   Recent Labs  Lab 04/07/17  0500   COLORU Yellow   APPEARANCEUA Cloudy*   PHUR 6.0   SPECGRAV 1.015   PROTEINUA 2+*   GLUCUA Negative   KETONESU Negative   BILIRUBINUA Negative   OCCULTUA 3+*   NITRITE Negative   UROBILINOGEN Negative   LEUKOCYTESUR Trace*   RBCUA 40*   WBCUA 2   BACTERIA Rare   SQUAMEPITHEL 1   HYALINECASTS 0       Significant Imaging:  All pertinent imaging results/findings from the past 24 hours have been reviewed.

## 2017-04-08 NOTE — PLAN OF CARE
Problem: Renal Failure/Kidney Injury, Acute (Adult)  Goal: Signs and Symptoms of Listed Potential Problems Will be Absent, Minimized or Managed (Renal Failure/Kidney Injury, Acute)  Signs and symptoms of listed potential problems will be absent, minimized or managed by discharge/transition of care (reference Renal Failure/Kidney Injury, Acute (Adult) CPG).  Outcome: Ongoing (interventions implemented as appropriate)  Bedside shift report completed with day shift Rn. Nurse assisted pt to ambulate from bathroom to the chair. Pt found comfortable, sitting up in chair. Pt reports feeling moderate relief from prn pain medications, now down to a 4/10. Pt Bilateral drain dressings remain c/d/i, with serosanguinous drainage free of clots. Pt bolanos draining sanguinous drainage. Pt continues to receive IVF @ 50. Nurse relayed message to day shift RN, from primary team to ensure pt ambulates in the smith today. Pt expresses desire to ambulate in smith today. Plan of care reviewed with patient. Patient verbalized complete understanding. Fall precautions maintained. Bed in lowest position, locked, call light within reach, and bed alarm on. Side rails up x's 2 with slip resistant socks on. Nurse instructed patient to notify staff for any assistance and pt verbalized complete understanding.      Tele Recap:  NSR - 80's - low 100's,  No ectopy noted      Nurse handed off care of the patient.

## 2017-04-08 NOTE — PROGRESS NOTES
[facility]  Urology  Progress Note    Patient Name: Juan Manuel Koehler  MRN: [id number]  Admission Date: 4/5/2017  Hospital Length of Stay: 2 days  Code Status: Full Code   Attending Provider: James Boucher MD  Primary Care Physician: Heamnt Sweeney MD    Subjective:     HPI:       Interval History: still somewhat sore.  Otherwise no complaints.     Review of Systems  Objective:     Temp:  [97.8 °F (36.6 °C)-98.7 °F (37.1 °C)] 98.2 °F (36.8 °C)  Pulse:  [] 82  Resp:  [19-20] 20  SpO2:  [94 %-99 %] 94 %  BP: (136-174)/(63-80) 174/80     Body mass index is 21.75 kg/(m^2).            Drains     Drain                 Urethral Catheter 04/05/17 Triple-lumen 3 days         Nephrostomy 04/06/17 1400 Left 1 day         Nephrostomy 04/06/17 1400 Right 1 day                Physical Exam   Nursing note and vitals reviewed.  Constitutional: He is oriented to person, place, and time. Vital signs are normal. He appears well-developed and well-nourished. He is cooperative.   HENT:   Head: Normocephalic.   Neck: No tracheal deviation present.   Cardiovascular: Normal rate and intact distal pulses.    Pulmonary/Chest: Effort normal. No accessory muscle usage. No tachypnea. No respiratory distress.   Abdominal: Soft. Normal appearance. He exhibits no distension, no fluid wave, no ascites and no mass. There is tenderness in the suprapubic area. There is no rebound and no CVA tenderness. No hernia. Hernia confirmed negative in the right inguinal area and confirmed negative in the left inguinal area.   Liver/spleen non-palpable.   Genitourinary: Prostate normal, testes normal and penis normal. Rectal exam shows no external hemorrhoid, no mass, no tenderness and anal tone normal. Prostate is not tender. Right testis shows no mass and no tenderness. Right testis is descended. Left testis shows no mass and no tenderness. Left testis is descended. Circumcised.   Genitourinary Comments: Scrotum showed no rashes or  lesions.  Anus/perineum without lesion.  Epididymis showed no masses or tenderness. No meatal stenosis, meatus in normal location. No penile discharge/plaques. Henry with scant bloody urine in bag. will deferred.       Musculoskeletal: Normal range of motion.   Lymphadenopathy: No inguinal adenopathy noted on the right or left side.        Right: No inguinal adenopathy present.        Left: No inguinal adenopathy present.   Neurological: He is alert and oriented to person, place, and time. He has normal strength.   Skin: No bruising and no rash noted.          Psychiatric: He has a normal mood and affect. His speech is normal and behavior is normal. Thought content normal.       Significant Labs:    BMP:    Recent Labs  Lab 04/07/17  0504 04/07/17  0812 04/07/17  1605    141 139  138   K 5.4* 4.9 4.1  4.1   * 111* 111*  111*   CO2 23 23 21*  19*   BUN 37* 35* 30*  30*   CREATININE 3.4* 3.1* 2.3*  2.3*   CALCIUM 8.8 8.7 8.5*  8.5*       CBC:     Recent Labs  Lab 04/05/17  1012 04/06/17  0702 04/06/17  0959 04/06/17  1846   WBC 7.35 17.37*  --   --    HGB 9.9* 9.4*  --   --    HCT 29.4* 27.8* 26* 31*    223  --   --        Urine Culture: No results for input(s): LABURIN in the last 168 hours.  Urine Studies:   Recent Labs  Lab 04/07/17  0500   COLORU Yellow   APPEARANCEUA Cloudy*   PHUR 6.0   SPECGRAV 1.015   PROTEINUA 2+*   GLUCUA Negative   KETONESU Negative   BILIRUBINUA Negative   OCCULTUA 3+*   NITRITE Negative   UROBILINOGEN Negative   LEUKOCYTESUR Trace*   RBCUA 40*   WBCUA 2   BACTERIA Rare   SQUAMEPITHEL 1   HYALINECASTS 0       Significant Imaging:  All pertinent imaging results/findings from the past 24 hours have been reviewed.                  Assessment/Plan:     * Bladder tumor  -plan pending pathology  -pt to f/u in 2 weeks for review after acute issues stabilize    ELDER (acute kidney injury)  -improving significantly  -check today--if continues to normalize, likely d/c to home  today  -plan for close outpatient lab f/u  -f/u in office on tuesday      VTE Risk Mitigation         Ordered     Medium Risk of VTE  Once      04/05/17 0210          James Boucher MD  Urology  [facility]

## 2017-04-08 NOTE — PLAN OF CARE
Problem: Patient Care Overview  Goal: Plan of Care Review  Outcome: Ongoing (interventions implemented as appropriate)  Pt on RA with sats of 943. Will continue to monitor.

## 2017-04-08 NOTE — PLAN OF CARE
Discharge orders noted, no HH or HME ordered.    Future Appointments  Date Time Provider Department Center   4/10/2017 9:45 AM RYAN, PAULINAJOSE ANTONIO [facility]CHELL [facility]          04/08/17 1326   Final Note   Assessment Type Final Discharge Note   Discharge Disposition Home   What phone number can be called within the next 1-3 days to see how you are doing after discharge? [phone]   Hospital Follow Up  Appt(s) scheduled? No  (offices closed, [place] to follow up )   Offered [facility] -- Bedside Delivery? n/a   Discharge/Hospital Encounter Summary to (non[facility]) PCP Yes   Referral to Outpatient Case Management complete? n/a   Referral to / orders for Home Health Complete? n/a   30 day supply of medicines given at discharge, if documented non-compliance / non-adherence? n/a   Any social issues identified prior to discharge? n/a   Did you assess the readiness or willingness of the family or caregiver to support self management of care? n/a   Right [facility] Referral Info   Post Acute Recommendation No Care     Renetta Blanco RN Transitional Navigator  [phone]

## 2017-04-08 NOTE — ASSESSMENT & PLAN NOTE
-improving significantly  -check today--if continues to normalize, likely d/c to home today  -plan for close outpatient lab f/u  -f/u in office on tuesday

## 2017-04-08 NOTE — PT/OT/SLP EVAL
"Physical Therapy  Evaluation    Juan Manuel Koehler   MRN: [id number]   Admitting Diagnosis: Bladder tumor    PT Received On: 17  PT Start Time: 955     PT Stop Time: 1    PT Total Time (min): 26 min       Billable Minutes:  Evaluation 13 and Therapeutic Exercise 13    Diagnosis: Bladder tumor  S/p placement of percutaneous nephrostomy tube    Past Medical History:   Diagnosis Date    Cancer     bladder and throat    Urinary tract infection       Past Surgical History:   Procedure Laterality Date    BLADDER SURGERY      HERNIA REPAIR      THROAT SURGERY         Referring physician: Sander   Date referred to PT: 17    General Precautions: Standard, fall  Orthopedic Precautions: N/A   Braces: N/A       Do you have any cultural, spiritual, Latter day conflicts, given your current situation?: none    Patient History:  Living Environment Comment: lives with wife in a 2 story home, rails on steps. Bed and bath on 2nd floor, has tub/shower, typically stands to shower at home. Has his wife's DME from her [employer]. Has RW, straight cane, shower chair.  Ind and active prior to admit, drives 4-6 hours a day for work.  Equipment Currently Used at Home: none    Previous Level of Function:  Ambulation Skills: independent  Transfer Skills: independent  ADL Skills: independent  Work/Leisure Activity: independent    Subjective:  Communicated with RN prior to session.  Pt reports history of throat CA makes swallowing difficulty, reports he feels that one of his pills was still stuck - gave patient applesauce and cues patient to try and clear throat. Pt reports that his trembling is from anxiety "Kidney problems are serious" Pt indicates that he drives 4-6 hours / day for his work, but stops frequently along the way. Indicates that his wife will need to be educated on how to deal with the drains  Chief Complaint: pain in his penis   Patient goals: home, decreased pain    Pain Ratin/10         Location: penis  Pain Addressed: " Cessation of Activity, Nurse notified, Reposition (nurse provided medication just prior to ambulation trail)  Pain Rating Post-Intervention: 5/10    Objective:   Patient found with:  (multiple drains  / telemetry, periph IV)     Cognitive Exam:  Oriented to: Person, Place, Time and Situation    Follows Commands/attention: Follows multistep  commands  Communication: clear/fluent  Safety awareness/insight to disability: intact    Physical Exam:  Postural examination/scapula alignment: Rounded shoulder and Head forward    Skin integrity: multiple drains, surgical incisions  Edema: R hand from IV's yesterday      Sensation:   Intact    Upper Extremity Range of Motion:  Right Upper Extremity: WFL  Left Upper Extremity: WFL    Upper Extremity Strength:  Right Upper Extremity: WFL  Left Upper Extremity: WFL    Lower Extremity Range of Motion:  Right Lower Extremity: WFL  Left Lower Extremity: WFL    Lower Extremity Strength:  Right Lower Extremity: WFL  Left Lower Extremity: WFL     Fine motor coordination:  Intact    Gross motor coordination: WFL    Functional Mobility:  Bed Mobility:  Rolling/Turning to Left:  (pt already up in chair per nursing)    Transfers:  Sit <> Stand Assistance: Supervision  Sit <> Stand Assistive Device: No Assistive Device    Gait:   Gait Distance: ambulated approx 400x with wide base of support, slight staggering - pt with drains coming from penis and anxious about pulling. Pt trembling and reporting that he is nervous  Assistance 1: Stand by Assistance, Contact Guard Assistance  Gait Assistive Device: No device        Balance:   Static Sit: GOOD+: Takes MAXIMAL challenges from all directions.    Dynamic Sit: GOOD+: Maintains balance through MAXIMAL excursions of active trunk motion  Static Stand: GOOD-: Takes MODERATE challenges from all directions inconsistently  Dynamic stand: GOOD-: Needs SUPERVISION only during gait and able to self right with moderate     Therapeutic Activities and  Exercises:  Pt stood at window terrence during rest breaks during walk, performed 10 reps of heel / toe raises supporting self at sill. Attempted hip flexion but reported that movement of legs against drains caused discomfort    AM-PAC 6 CLICK MOBILITY  How much help from another person does this patient currently need?   1 = Unable, Total/Dependent Assistance  2 = A lot, Maximum/Moderate Assistance  3 = A little, Minimum/Contact Guard/Supervision  4 = None, Modified [place]/Independent    Turning over in bed (including adjusting bedclothes, sheets and blankets)?: 3  Sitting down on and standing up from a chair with arms (e.g., wheelchair, bedside commode, etc.): 3  Moving from lying on back to sitting on the side of the bed?: 3  Moving to and from a bed to a chair (including a wheelchair)?: 3  Need to walk in hospital room?: 3  Climbing 3-5 steps with a railing?: 3  Total Score: 18     AM-PAC Raw Score CMS G-Code Modifier Level of Impairment Assistance   6 % Total / Unable   7 - 9 CM 80 - 100% Maximal Assist   10 - 14 CL 60 - 80% Moderate Assist   15 - 19 CK 40 - 60% Moderate Assist   20 - 22 CJ 20 - 40% Minimal Assist   23 CI 1-20% SBA / CGA   24 CH 0% Independent/ Mod I     Patient left on toilet with call string in reach. Nurse notified with all lines intact and call button in reach.    Assessment:   Juan Manuel Koehler is a 67 y.o. male with a medical diagnosis of Bladder tumor  S/p placement of percutaneous nephrostomy tube with expected pain and discomfort. Pt with Independent prior level of function, now with need for supervision required for functional mobility due to addition of multiple drains.  Pt walks with wide base of support, trembles due to anxiety.  Pt very thin, reports has issues with swallowing due to hx of throat cancer    Rehab identified problem list/impairments: Rehab identified problem list/impairments: weakness, impaired self care skills, gait instability, impaired functional mobilty,  pain    Rehab potential is excellent.    Activity tolerance: Good    Discharge recommendations: Discharge Facility/Level Of Care Needs: home     Barriers to discharge: Barriers to Discharge: None    Equipment recommendations: Equipment Needed After Discharge: none     GOALS:   Physical Therapy Goals        Problem: Physical Therapy Goal    Goal Priority Disciplines Outcome Goal Variances Interventions   Physical Therapy Goal     PT/OT, PT Ongoing (interventions implemented as appropriate)     Description:  Goals to be met by: 4/30/17     Patient will increase functional independence with mobility by performing:    Sup<>sit mod Ind  Sit <> stand mod Ind  Ambulate in halls mod Ind  Perform LE standing toe / heel raises, Seated LE Te x 15 reps  Ascend / descend 1 flight steps with 1 HR support SPVN                PLAN:    Patient to be seen 5 x/week to address the above listed problems via gait training, therapeutic activities, therapeutic exercises  Plan of Care expires: 05/08/17  Plan of Care reviewed with: patient    Functional Assessment Tool Used: ampac  Score: 18  Functional Limitation: Mobility: Walking and moving around  Mobility: Walking and Moving Around Current Status (): JESUS  Mobility: Walking and Moving Around Goal Status (): JESUS Suarez, PT  04/08/2017

## 2017-04-08 NOTE — PLAN OF CARE
Patient d/c home d/c instructions given to the patient and and his wife, both verbalized understanding. Education given, refer to clinical reference for education. IV removed tip intact. Telemetry d/c. Waiting for transport

## 2017-04-10 ENCOUNTER — TELEPHONE (OUTPATIENT)
Dept: UROLOGY | Facility: CLINIC | Age: 67
End: 2017-04-10

## 2017-04-10 NOTE — TELEPHONE ENCOUNTER
----- Message from Sharyn Lopez sent at 4/10/2017  1:31 PM CDT -----  Contact: [phone]/[facility]   Pharmacy called stating rx oxybutynin (DITROPAN)  Its not covered by pt's insurance bu tge extrended release is covered . Please advise

## 2017-04-10 NOTE — TELEPHONE ENCOUNTER
----- Message from Misty Chance sent at 4/10/2017  8:19 AM CDT -----  Contact: [phone]/ self   Patient would like to know if it is necessary for him to fill oxybutynin (DITROPAN) 5 MG Tab. Please advise.

## 2017-04-11 ENCOUNTER — TELEPHONE (OUTPATIENT)
Dept: UROLOGY | Facility: CLINIC | Age: 67
End: 2017-04-11

## 2017-04-11 ENCOUNTER — LAB VISIT (OUTPATIENT)
Dept: LAB | Facility: HOSPITAL | Age: 67
End: 2017-04-11
Attending: UROLOGY
Payer: MEDICARE

## 2017-04-11 ENCOUNTER — OFFICE VISIT (OUTPATIENT)
Dept: UROLOGY | Facility: CLINIC | Age: 67
End: 2017-04-11
Payer: MEDICARE

## 2017-04-11 VITALS
WEIGHT: 174 LBS | DIASTOLIC BLOOD PRESSURE: 77 MMHG | HEART RATE: 94 BPM | BODY MASS INDEX: 21.64 KG/M2 | SYSTOLIC BLOOD PRESSURE: 178 MMHG | HEIGHT: 75 IN

## 2017-04-11 DIAGNOSIS — D49.4 BLADDER TUMOR: ICD-10-CM

## 2017-04-11 DIAGNOSIS — N18.3 CKD (CHRONIC KIDNEY DISEASE), STAGE 3 (MODERATE): ICD-10-CM

## 2017-04-11 DIAGNOSIS — N17.9 AKI (ACUTE KIDNEY INJURY): ICD-10-CM

## 2017-04-11 DIAGNOSIS — N17.9 AKI (ACUTE KIDNEY INJURY): Primary | ICD-10-CM

## 2017-04-11 DIAGNOSIS — R97.20 ELEVATED PSA: ICD-10-CM

## 2017-04-11 LAB
ANION GAP SERPL CALC-SCNC: 8 MMOL/L
BUN SERPL-MCNC: 25 MG/DL
CALCIUM SERPL-MCNC: 9.3 MG/DL
CHLORIDE SERPL-SCNC: 109 MMOL/L
CO2 SERPL-SCNC: 26 MMOL/L
COMPLEXED PSA SERPL-MCNC: 12.2 NG/ML
CREAT SERPL-MCNC: 1.6 MG/DL
EST. GFR  (AFRICAN AMERICAN): 50 ML/MIN/1.73 M^2
EST. GFR  (NON AFRICAN AMERICAN): 44 ML/MIN/1.73 M^2
GLUCOSE SERPL-MCNC: 104 MG/DL
POTASSIUM SERPL-SCNC: 4.6 MMOL/L
SODIUM SERPL-SCNC: 143 MMOL/L

## 2017-04-11 PROCEDURE — 84153 ASSAY OF PSA TOTAL: CPT

## 2017-04-11 PROCEDURE — 1160F RVW MEDS BY RX/DR IN RCRD: CPT | Mod: S$GLB,,, | Performed by: UROLOGY

## 2017-04-11 PROCEDURE — 99999 PR PBB SHADOW E&M-EST. PATIENT-LVL III: CPT | Mod: PBBFAC,,, | Performed by: UROLOGY

## 2017-04-11 PROCEDURE — 99214 OFFICE O/P EST MOD 30 MIN: CPT | Mod: S$GLB,,, | Performed by: UROLOGY

## 2017-04-11 PROCEDURE — 1157F ADVNC CARE PLAN IN RCRD: CPT | Mod: S$GLB,,, | Performed by: UROLOGY

## 2017-04-11 PROCEDURE — 1125F AMNT PAIN NOTED PAIN PRSNT: CPT | Mod: S$GLB,,, | Performed by: UROLOGY

## 2017-04-11 PROCEDURE — 36415 COLL VENOUS BLD VENIPUNCTURE: CPT

## 2017-04-11 PROCEDURE — 80048 BASIC METABOLIC PNL TOTAL CA: CPT

## 2017-04-11 PROCEDURE — 1159F MED LIST DOCD IN RCRD: CPT | Mod: S$GLB,,, | Performed by: UROLOGY

## 2017-04-11 RX ORDER — OXYBUTYNIN CHLORIDE 5 MG/1
TABLET, EXTENDED RELEASE ORAL
COMMUNITY
Start: 2017-04-10 | End: 2017-05-05

## 2017-04-11 NOTE — PROGRESS NOTES
Subjective:       Patient ID: Juan Manuel Koehler is a 67 y.o. male.    Chief Complaint: Results    HPI   Patient underwent transurethral resection of bladder tumor on April 5, 2017.  His postoperative course was eventful for acute kidney injury.  He has bilateral hydronephrosis presumably from trigonal infiltration of his cancer.    He underwent bilateral nephrostomy tube placement with a right internal double-J stent as well.  He was discharged from the hospital on Saturday.  He is doing quite well.  His hematuria is improving.  He continues to have soreness in his lower abdomen.    Review of Systems    All other systems reviewed and negative except pertinent positives noted in HPI.    Objective:      Physical Exam   Constitutional: He is oriented to person, place, and time. He appears well-developed and well-nourished. No distress.   HENT:   Head: Normocephalic and atraumatic.   Eyes: No scleral icterus.   Neck: No tracheal deviation present.   Pulmonary/Chest: Effort normal. No respiratory distress.   Abdominal: There is tenderness in the suprapubic area.   Genitourinary:   Genitourinary Comments: Henry catheter in place with clear balloon deflated and Henry catheter removed.   Neurological: He is alert and oriented to person, place, and time.   Skin:          Psychiatric: He has a normal mood and affect. His behavior is normal. Judgment and thought content normal.       Assessment:       1. ELDER (acute kidney injury)    2. Bladder tumor    3. CKD (chronic kidney disease), stage 3 (moderate)        Plan:       -BMP today reveals a serum creatinine of 1.6  -Pathology pending, follow-up next Thursday to review  -At the patient's request, discussed hypothetical situations regarding future management of his bladder cancer.  We discussed this depends on pathologic grade and stage of his bladder cancer.  We did discuss that given his finding of bilateral hydronephrosis, this portends a worse prognosis for bladder  preservation.  Discuss possible referral in the near future to Dr. Robles if muscle invasive cancer is confirmed on pathology.  -Interventional radiology to attempt internalization of the left nephrostomy tube next week.  They will arrange for this.  Discussed with Dr. Gaviria  -Continue to follow BMP.  Plan will be to check a BMP on the day of interventional radiology procedure.  Per their request, he will His right nephrostomy tube 48 hours prior to his next procedure.

## 2017-04-11 NOTE — TELEPHONE ENCOUNTER
----- Message from Arun Wyatt sent at 4/11/2017 10:41 AM CDT -----  Contact: [facility] [phone]  Pharmacy would like to speak with you about prescription oxybutynin (DITROPAN) 5 MG Tab  Please advise

## 2017-04-11 NOTE — TELEPHONE ENCOUNTER
----- Message from James Boucher MD sent at 4/11/2017 11:51 AM CDT -----  Needs an appointment next week for attempt at left internalization of double-J.    The patient will expect a phone call from interventional radiology.  Thank you  James

## 2017-04-12 ENCOUNTER — TELEPHONE (OUTPATIENT)
Dept: UROLOGY | Facility: CLINIC | Age: 67
End: 2017-04-12

## 2017-04-12 ENCOUNTER — HOSPITAL ENCOUNTER (OUTPATIENT)
Facility: HOSPITAL | Age: 67
Discharge: HOME | End: 2017-04-12
Attending: RADIOLOGY | Admitting: RADIOLOGY
Payer: MEDICARE

## 2017-04-12 VITALS
RESPIRATION RATE: 16 BRPM | HEART RATE: 78 BPM | TEMPERATURE: 98 F | SYSTOLIC BLOOD PRESSURE: 171 MMHG | DIASTOLIC BLOOD PRESSURE: 91 MMHG

## 2017-04-12 DIAGNOSIS — N18.3 CKD (CHRONIC KIDNEY DISEASE), STAGE 3 (MODERATE): ICD-10-CM

## 2017-04-12 DIAGNOSIS — D49.4 BLADDER TUMOR: ICD-10-CM

## 2017-04-12 DIAGNOSIS — N17.9 AKI (ACUTE KIDNEY INJURY): ICD-10-CM

## 2017-04-12 DIAGNOSIS — N13.30 HYDRONEPHROSIS, UNSPECIFIED HYDRONEPHROSIS TYPE: Primary | ICD-10-CM

## 2017-04-12 NOTE — PHYSICIAN QUERY
PT Name: Juan Manuel Koehler  MR #: [id number]    Physician Query Form - Pathology Findings Clarification     CDS/: Mayi Arguello               Contact information:  This form is a permanent document in the medical record.     Query Date: April 12, 2017      By submitting this query, we are merely seeking further clarification of documentation.  Please utilize your independent clinical judgment when addressing the question(s) below.      The medical record contains the following:     Findings Supporting Clinical Information Location in Medical Record   Labeled bladder tumor, transurethral resection of bladder tumor:  -High-grade invasive urothelial carcinoma, nested variant  -Muscularis propria involved  Note: Reviewed Ca bladder        Hematuria and HX of bladder CA Pathology report        HP, progress notes     Please document the clinical significance of the Pathologists findings of _______________________.          [ x ] I agree with the Pathology Findings        [  ] I do not agree with the Pathology Findings        [  ] Clinically Insignificant        [  ] Clinically Undetermined        [  ] Other/Clarification of Findings: ______________________________________________    Please document in your progress notes daily for the duration of treatment until resolved and include in your discharge summary.

## 2017-04-12 NOTE — TELEPHONE ENCOUNTER
----- Message from Misty Chance sent at 4/11/2017  4:02 PM CDT -----  Contact: [phone]  Patient would like a call back with his test results. Please advise.

## 2017-04-12 NOTE — PROCEDURES
Radiology Post-Procedure Note    Pre Op Diagnosis: Dislodged Neph tubes    Post Op Diagnosis: Tubes in good position    Procedure: Antegrade nephrostogram.    Procedure performed by: Lenard Gaviria MD    Written Informed Consent Obtained: Yes  Specimen Removed: NO  Estimated Blood Loss: Minimal    Findings:   Bilateral antegrade nephrostogram shows tubes within collecting system and improved hydronephrosis. There is drainage to the bladder on the right. No complications.    Patient tolerated procedure well.    Lenard Gaviria M.D.  Diagnostic and Interventional Radiologist  Department of Radiology  Pager: [phone]

## 2017-04-12 NOTE — DISCHARGE SUMMARY
Radiology Discharge Summary      Hospital Course: No complications    Admit Date: 4/12/2017  Discharge Date: 04/12/2017     Instructions Given to Patient: Yes  Diet: Resume prior diet  Activity: activity as tolerated    Description of Condition on Discharge: Stable  Vital Signs (Most Recent): Temp: 98.2 °F (36.8 °C) (04/12/17 1227)  Pulse: 78 (04/12/17 1227)  Resp: 16 (04/12/17 1227)  BP: (!) 171/91 (04/12/17 1227)    Discharge Disposition: Home    Discharge Diagnosis: Bladder cancer.    Lenard Gaviria M.D.  Diagnostic and Interventional Radiologist  Department of Radiology  Pager: [phone]

## 2017-04-12 NOTE — H&P
Radiology History & Physical      SUBJECTIVE:     Chief Complaint: Dislodged Neph tubes    History of Present Illness:  Juan Manuel Koehler is a 67 y.o. male who presents for antegrade nephrostograms  Past Medical History:   Diagnosis Date    Cancer     bladder and throat    Urinary tract infection      Past Surgical History:   Procedure Laterality Date    BLADDER SURGERY      HERNIA REPAIR      THROAT SURGERY         Home Meds:   Prior to Admission medications    Medication Sig Start Date End Date Taking? Authorizing Provider   docusate sodium (COLACE) 100 MG capsule Take 1 capsule (100 mg total) by mouth 2 (two) times daily. 4/8/17   James Boucher MD   ergocalciferol (ERGOCALCIFEROL) 50,000 unit Cap Take 1 capsule (50,000 Units total) by mouth every 7 days. 4/8/17   James Boucher MD   ketoconazole (NIZORAL) 2 % shampoo USE TO WASH AFFECTED AREA ONCE DAILY 3/13/17   Historical Provider, MD   oxybutynin (DITROPAN) 5 MG Tab Take 1 tablet (5 mg total) by mouth 3 (three) times daily. 4/5/17 5/5/17  James Boucher MD   oxybutynin (DITROPAN-XL) 5 MG TR24  4/10/17   Historical Provider, MD   oxycodone-acetaminophen (PERCOCET) 5-325 mg per tablet Take 1 tablet by mouth every 4 (four) hours as needed. 4/8/17   James Boucher MD   pantoprazole (PROTONIX) 40 MG tablet TAKE 1 BY MOUTH ONCE A DAY FOR 60 DAYS 3/21/17   Historical Provider, MD     Anticoagulants/Antiplatelets: no anticoagulation    Allergies:   Review of patient's allergies indicates:   Allergen Reactions    Pneumococcal 23-margarito ps vaccine      Sedation History:  no adverse reactions    Review of Systems:   Hematological: no known coagulopathies  Respiratory: no shortness of breath  Cardiovascular: no chest pain  Gastrointestinal: no abdominal pain  Genito-Urinary: no dysuria  Musculoskeletal: negative  Neurological: no TIA or stroke symptoms         OBJECTIVE:     Vital Signs (Most Recent)  Temp: 98.2 °F (36.8 °C) (04/12/17 1227)  Pulse: 78  (04/12/17 1227)  Resp: 16 (04/12/17 1227)  BP: (!) 171/91 (04/12/17 1227)    Physical Exam:  ASA: 2  Mallampati: 2    General: no acute distress  Mental Status: alert and oriented to person, place and time  HEENT: normocephalic, atraumatic  Chest: unlabored breathing  Heart: regular heart rate  Abdomen: nondistended  Extremity: moves all extremities    Laboratory  Lab Results   Component Value Date    INR 1.0 04/06/2017       Lab Results   Component Value Date    WBC 9.57 04/08/2017    HGB 8.8 (L) 04/08/2017    HCT 25.8 (L) 04/08/2017    MCV 86 04/08/2017     04/08/2017      Lab Results   Component Value Date     04/11/2017     04/11/2017    K 4.6 04/11/2017     04/11/2017    CO2 26 04/11/2017    BUN 25 (H) 04/11/2017    CREATININE 1.6 (H) 04/11/2017    CALCIUM 9.3 04/11/2017    MG 1.5 (L) 04/08/2017    ALT 8 (L) 04/07/2017    AST 19 04/07/2017    ALBUMIN 3.3 (L) 04/07/2017    BILITOT 0.5 04/07/2017       ASSESSMENT/PLAN:     Sedation Plan: None  Patient will undergo antegrade nephrostograms.    Lenard Gaviria M.D.  Diagnostic and Interventional Radiologist  Department of Radiology  Pager: [phone]

## 2017-04-12 NOTE — TELEPHONE ENCOUNTER
----- Message from Jeana August sent at 4/12/2017  9:26 AM CDT -----  Contact: pt#[phone]  Pt is returning call. Please call back

## 2017-04-18 ENCOUNTER — TELEPHONE (OUTPATIENT)
Dept: UROLOGY | Facility: CLINIC | Age: 67
End: 2017-04-18

## 2017-04-18 NOTE — TELEPHONE ENCOUNTER
----- Message from Misty Chance sent at 4/18/2017  1:30 PM CDT -----  Contact: [phone]/ self   Patient wants to confirm that one of the valves in his back should be shut off before his procedure. Please advise.

## 2017-04-20 ENCOUNTER — HOSPITAL ENCOUNTER (OUTPATIENT)
Facility: HOSPITAL | Age: 67
Discharge: HOME | End: 2017-04-20
Attending: RADIOLOGY | Admitting: RADIOLOGY
Payer: MEDICARE

## 2017-04-20 ENCOUNTER — OFFICE VISIT (OUTPATIENT)
Dept: UROLOGY | Facility: CLINIC | Age: 67
End: 2017-04-20
Payer: MEDICARE

## 2017-04-20 VITALS
DIASTOLIC BLOOD PRESSURE: 66 MMHG | HEART RATE: 111 BPM | BODY MASS INDEX: 21.17 KG/M2 | SYSTOLIC BLOOD PRESSURE: 137 MMHG | TEMPERATURE: 99 F | WEIGHT: 165 LBS | OXYGEN SATURATION: 96 % | HEIGHT: 74 IN | RESPIRATION RATE: 16 BRPM

## 2017-04-20 VITALS
WEIGHT: 174 LBS | HEIGHT: 75 IN | HEART RATE: 114 BPM | SYSTOLIC BLOOD PRESSURE: 157 MMHG | DIASTOLIC BLOOD PRESSURE: 84 MMHG | BODY MASS INDEX: 21.64 KG/M2

## 2017-04-20 DIAGNOSIS — N18.3 CKD (CHRONIC KIDNEY DISEASE), STAGE 3 (MODERATE): ICD-10-CM

## 2017-04-20 DIAGNOSIS — N13.30 HYDRONEPHROSIS, UNSPECIFIED HYDRONEPHROSIS TYPE: ICD-10-CM

## 2017-04-20 DIAGNOSIS — D49.4 BLADDER TUMOR: Primary | ICD-10-CM

## 2017-04-20 LAB
ALBUMIN SERPL BCP-MCNC: 3.9 G/DL
ALP SERPL-CCNC: 58 U/L
ALT SERPL W/O P-5'-P-CCNC: 11 U/L
ANION GAP SERPL CALC-SCNC: 8 MMOL/L
AST SERPL-CCNC: 11 U/L
BILIRUB SERPL-MCNC: 1.2 MG/DL
BUN SERPL-MCNC: 24 MG/DL
CALCIUM SERPL-MCNC: 9.7 MG/DL
CHLORIDE SERPL-SCNC: 106 MMOL/L
CO2 SERPL-SCNC: 25 MMOL/L
CREAT SERPL-MCNC: 1.6 MG/DL
EST. GFR  (AFRICAN AMERICAN): 50 ML/MIN/1.73 M^2
EST. GFR  (NON AFRICAN AMERICAN): 44 ML/MIN/1.73 M^2
GLUCOSE SERPL-MCNC: 129 MG/DL
POTASSIUM SERPL-SCNC: 4.9 MMOL/L
PROT SERPL-MCNC: 7.6 G/DL
SODIUM SERPL-SCNC: 139 MMOL/L

## 2017-04-20 PROCEDURE — 1160F RVW MEDS BY RX/DR IN RCRD: CPT | Mod: S$GLB,,, | Performed by: UROLOGY

## 2017-04-20 PROCEDURE — 1159F MED LIST DOCD IN RCRD: CPT | Mod: S$GLB,,, | Performed by: UROLOGY

## 2017-04-20 PROCEDURE — 25000003 PHARM REV CODE 250: Performed by: RADIOLOGY

## 2017-04-20 PROCEDURE — 36415 COLL VENOUS BLD VENIPUNCTURE: CPT

## 2017-04-20 PROCEDURE — 25500020 PHARM REV CODE 255

## 2017-04-20 PROCEDURE — 63600175 PHARM REV CODE 636 W HCPCS: Performed by: RADIOLOGY

## 2017-04-20 PROCEDURE — 99999 PR PBB SHADOW E&M-EST. PATIENT-LVL III: CPT | Mod: PBBFAC,,, | Performed by: UROLOGY

## 2017-04-20 PROCEDURE — 80053 COMPREHEN METABOLIC PANEL: CPT

## 2017-04-20 PROCEDURE — 99214 OFFICE O/P EST MOD 30 MIN: CPT | Mod: S$GLB,,, | Performed by: UROLOGY

## 2017-04-20 PROCEDURE — 1125F AMNT PAIN NOTED PAIN PRSNT: CPT | Mod: S$GLB,,, | Performed by: UROLOGY

## 2017-04-20 RX ORDER — HYDROCODONE BITARTRATE AND ACETAMINOPHEN 5; 325 MG/1; MG/1
1 TABLET ORAL EVERY 4 HOURS PRN
Status: DISCONTINUED | OUTPATIENT
Start: 2017-04-20 | End: 2017-04-20 | Stop reason: HOSPADM

## 2017-04-20 RX ORDER — MIDAZOLAM HYDROCHLORIDE 1 MG/ML
INJECTION, SOLUTION INTRAMUSCULAR; INTRAVENOUS CODE/TRAUMA/SEDATION MEDICATION
Status: COMPLETED | OUTPATIENT
Start: 2017-04-20 | End: 2017-04-20

## 2017-04-20 RX ORDER — HYDROMORPHONE HYDROCHLORIDE 1 MG/ML
INJECTION, SOLUTION INTRAMUSCULAR; INTRAVENOUS; SUBCUTANEOUS CODE/TRAUMA/SEDATION MEDICATION
Status: COMPLETED | OUTPATIENT
Start: 2017-04-20 | End: 2017-04-20

## 2017-04-20 RX ORDER — CIPROFLOXACIN 2 MG/ML
400 INJECTION, SOLUTION INTRAVENOUS ONCE
Status: COMPLETED | OUTPATIENT
Start: 2017-04-20 | End: 2017-04-20

## 2017-04-20 RX ORDER — ONDANSETRON 2 MG/ML
4 INJECTION INTRAMUSCULAR; INTRAVENOUS ONCE
Status: COMPLETED | OUTPATIENT
Start: 2017-04-20 | End: 2017-04-20

## 2017-04-20 RX ORDER — ACETAMINOPHEN 500 MG
1000 TABLET ORAL EVERY 6 HOURS PRN
Status: ON HOLD | COMMUNITY
End: 2017-07-06 | Stop reason: CLARIF

## 2017-04-20 RX ORDER — OXYCODONE HYDROCHLORIDE 5 MG/1
5 CAPSULE ORAL EVERY 4 HOURS PRN
COMMUNITY
End: 2017-05-25 | Stop reason: SDUPTHER

## 2017-04-20 RX ORDER — SODIUM CHLORIDE 9 MG/ML
INJECTION, SOLUTION INTRAVENOUS CONTINUOUS
Status: DISCONTINUED | OUTPATIENT
Start: 2017-04-20 | End: 2017-04-20 | Stop reason: HOSPADM

## 2017-04-20 RX ORDER — LIDOCAINE HYDROCHLORIDE 10 MG/ML
INJECTION INFILTRATION; PERINEURAL CODE/TRAUMA/SEDATION MEDICATION
Status: COMPLETED | OUTPATIENT
Start: 2017-04-20 | End: 2017-04-20

## 2017-04-20 RX ORDER — FENTANYL CITRATE 50 UG/ML
INJECTION, SOLUTION INTRAMUSCULAR; INTRAVENOUS CODE/TRAUMA/SEDATION MEDICATION
Status: COMPLETED | OUTPATIENT
Start: 2017-04-20 | End: 2017-04-20

## 2017-04-20 RX ADMIN — ONDANSETRON HYDROCHLORIDE 4 MG: 2 SOLUTION INTRAMUSCULAR; INTRAVENOUS at 01:04

## 2017-04-20 RX ADMIN — LIDOCAINE HYDROCHLORIDE 5 ML: 10 INJECTION, SOLUTION INFILTRATION; PERINEURAL at 10:04

## 2017-04-20 RX ADMIN — FENTANYL CITRATE 50 MCG: 50 INJECTION, SOLUTION INTRAMUSCULAR; INTRAVENOUS at 10:04

## 2017-04-20 RX ADMIN — SODIUM CHLORIDE: 0.9 INJECTION, SOLUTION INTRAVENOUS at 09:04

## 2017-04-20 RX ADMIN — MIDAZOLAM 1 MG: 1 INJECTION INTRAMUSCULAR; INTRAVENOUS at 10:04

## 2017-04-20 RX ADMIN — CIPROFLOXACIN 400 MG: 2 INJECTION, SOLUTION INTRAVENOUS at 10:04

## 2017-04-20 RX ADMIN — Medication 0.5 MG: at 10:04

## 2017-04-20 NOTE — IP AVS SNAPSHOT
[facility]  [address]  [place] [address]  Phone: [phone]           Patient Discharge Instructions   Our goal is to set you up for success. This packet includes information on your condition, medications, and your home care.  It will help you care for yourself to prevent having to return to the hospital.     Please ask your nurse if you have any questions.      There are many details to remember when preparing to leave the hospital. Here is what you will need to do:    1. Take your medicine. If you are prescribed medications, review your Medication List on the following pages. You may have new medications to  at the pharmacy and others that you'll need to stop taking. Review the instructions for how and when to take your medications. Talk with your doctor or nurses if you are unsure of what to do.     2. Go to your follow-up appointments. Specific follow-up information is listed in the following pages. Your may be contacted by a nurse or clinical provider about future appointments. Be sure we have all of the phone numbers to reach you. Please contact your provider's office if you are unable to make an appointment.     3. Watch for warning signs. Your doctor or nurse will give you detailed warning signs to watch for and when to call for assistance. These instructions may also include educational information about your condition. If you experience any of warning signs to your health, call your doctor.           [facility]  Unless otherwise directed by your provider, please   contact [facility], our nurse care line   that is available for 24/7 assistance.     [phone] (toll-free)     Registered nurses in the [facility]   provide: appointment scheduling, clinical advisement, health education, and other advisory services.                  ** Verify the list of medication(s) below is accurate and up to date. Carry this with you in case of emergency. If your  medications have changed, please notify your healthcare provider.             Medication List      ASK your doctor about these medications        Additional Info                      acetaminophen 500 MG tablet   Commonly known as:  TYLENOL   Refills:  0   Dose:  1000 mg   Indications:  Pain    Instructions:  Take 1,000 mg by mouth every 6 (six) hours as needed for Pain.     Begin Date    AM    Noon    PM    Bedtime       docusate sodium 100 MG capsule   Commonly known as:  COLACE   Refills:  0   Dose:  100 mg    Instructions:  Take 1 capsule (100 mg total) by mouth 2 (two) times daily.     Begin Date    AM    Noon    PM    Bedtime       ergocalciferol 50,000 unit Cap   Commonly known as:  ERGOCALCIFEROL   Quantity:  7 capsule   Refills:  0   Dose:  51845 Units    Instructions:  Take 1 capsule (50,000 Units total) by mouth every 7 days.     Begin Date    AM    Noon    PM    Bedtime       ketoconazole 2 % shampoo   Commonly known as:  NIZORAL   Refills:  3    Instructions:  USE TO WASH AFFECTED AREA ONCE DAILY     Begin Date    AM    Noon    PM    Bedtime       oxybutynin 5 MG Tr24   Commonly known as:  DITROPAN-XL   Refills:  0      Begin Date    AM    Noon    PM    Bedtime       oxycodone 5 mg Cap   Commonly known as:  OXY-IR   Refills:  0   Dose:  5 mg    Instructions:  Take 5 mg by mouth every 4 (four) hours as needed for Pain.     Begin Date    AM    Noon    PM    Bedtime       oxycodone-acetaminophen 5-325 mg per tablet   Commonly known as:  PERCOCET   Quantity:  40 tablet   Refills:  0   Dose:  1 tablet    Instructions:  Take 1 tablet by mouth every 4 (four) hours as needed.     Begin Date    AM    Noon    PM    Bedtime       pantoprazole 40 MG tablet   Commonly known as:  PROTONIX   Refills:  1    Instructions:  TAKE 1 BY MOUTH ONCE A DAY FOR 60 DAYS     Begin Date    AM    Noon    PM    Bedtime                  Please bring to all follow up appointments:    1. A copy of your discharge instructions.  2. All  "medicines you are currently taking in their original bottles.  3. Identification and insurance card.    Please arrive 15 minutes ahead of scheduled appointment time.    Please call 24 hours in advance if you must reschedule your appointment and/or time.        Your Scheduled Appointments     May 01, 2017  9:15 AM CDT   Established Patient Visit with MD Damon Garcia [facility] ([facility] )    [address]  [facility] [address] [phone]                Discharge References/Attachments     NEPHROSTOMY, PERCUTANEOUS, DISCHARGE INSTRUCTIONS (ENGLISH)        Admission Information     Date & Time Provider Department CSN    4/20/2017  8:16 AM Lenard Gaviria MD [facility] [id number]      Care Providers     Provider Role Specialty Primary office phone    Lenard Gaviria MD Attending Provider Radiology [phone]      Your Vitals Were     BP Pulse Temp Resp Height Weight    149/77 (BP Location: Left arm, Patient Position: Lying, BP Method: Automatic) 117 98.3 °F (36.8 °C) (Oral) 16 6' 2" (1.88 m) 74.8 kg (165 lb)    SpO2 BMI             95% 21.18 kg/m2         Recent Lab Values     No lab values to display.      Allergies as of 4/20/2017        Reactions    Pneumococcal 23-margarito Ps Vaccine       Advance Directives     An advance directive is a document which, in the event you are no longer able to make decisions for yourself, tells your healthcare team what kind of treatment you do or do not want to receive, or who you would like to make those decisions for you.  If you do not currently have an advance directive, [facility] encourages you to create one.  For more information call:  [phone]), [phone] ([phone]),  or log on to [email or web address].        Language Assistance Services     ATTENTION: Language assistance services are available, free of charge. Please call [phone].      ATENCIÓN: Si shraddha boyer " disposición servicios gratuitos de asistencia lingüística. Peña al [phone].     [facility] Ý: N?u b?n nói Ti?ng Vi?t, có các d?ch v? h? tr? ngôn ng? mi?n phí dành cho b?n. G?i s? [phone].        Chronic Kindey Disease Education             [facility] Sign[facility]     Activating your [facility] account is as easy as 1-2-3!     1) Visit [email or web address], select Sign Up Now, enter this activation code and your date of birth, then select Next.  [email or web address]  Expires: 5/14/2017 12:48 PM      2) Create a username and password to use when you visit [facility] in the future and select a security question in case you lose your password and select Next.    3) Enter your e-mail address and click Sign Up!    Additional Information  If you have questions, please e-mail [email or web address] or call [phone] to talk to our [facility] staff. Remember, [facility] is NOT to be used for urgent needs. For medical emergencies, dial 911.          [facility] complies with applicable Federal civil rights laws and does not discriminate on the basis of race, color, national origin, age, disability, or sex.

## 2017-04-20 NOTE — PROGRESS NOTES
Subjective:       Patient ID: Juan Manuel Koehler is a 67 y.o. male.    Chief Complaint: Post-op Evaluation (discuss removing tubes) and Results (path)    HPI   Patient is a 67 y.o. male who is established to our clinic and was initially referred by their PCP, Dr. Sweeney for evaluation of hydronephrosis and hematuria.    The patient had a long history of bladder cancer managed outside of [place].  He recently was found to have a large recurrence of his bladder cancer involving the majority of his trigone including bilateral ureteral orifices.  Preoperative CT scan showed bilateral hydronephrosis.  He also had renal insufficiency likely as a result of this.    He underwent transurethral resection of bladder tumor on April 5, 2017.  He was admitted due to concern for possible obstruction of his kidneys do the need for resection of his ureteral orifices.  He did in fact have acute kidney injury and significant hyperkalemia requiring nephrology involvement and management.  He underwent urgent bilateral nephrostomy tube placements and had internalization of a double-J stent on the right side.  Double-J stent placement on the left side was unsuccessful.    He returns today to discuss pathology results.  He is doing well.  Voiding clear urine.  He capped his right nephrostomy tube as instructed 2 days ago.  His left nephrostomy tube is open and draining clear urine.  He reports soreness at bilateral nephrostomy tube sites.  In addition he has suprapubic and penile soreness.    He denies fevers or chills.  No nausea or vomiting.  He is very anxious about the results.    Pathology reviewed today and shared with the patient.  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    Review of Systems    All other systems reviewed and negative except pertinent positives noted in HPI.    Objective:      Physical Exam   Constitutional: He is oriented to person, place, and time. He appears well-developed  and well-nourished. No distress.   HENT:   Head: Normocephalic and atraumatic.   Eyes: No scleral icterus.   Neck: No tracheal deviation present.   Pulmonary/Chest: Effort normal. No respiratory distress.   Neurological: He is alert and oriented to person, place, and time.   Psychiatric: He has a normal mood and affect. His behavior is normal. Judgment and thought content normal.       Assessment:       1. Bladder tumor    2. CKD (chronic kidney disease), stage 3 (moderate)        Plan:       -CMP today--we will monitor his serum creatinine is us may have bearings on the type of chemotherapy may get as well as the ability to give IV contrast for staging CT scans.  -Plan to go to IR today for attempt at internalization of the left nephrostomy tube.  Possible removal of right nephrostomy tube after antegrade nephrostogram.  -CT scan was ordered for staging purposes.  Patient should be able to get contrast given his significant improvement in renal function.  -We discussed the grade and stage of his bladder cancer and the likely need for more aggressive treatment including chemotherapy and possible radical cystectomy.  -We discussed briefly options for urinary diversion.  -Patient will follow-up with Dr. Robles and likely Dr. Calero to discuss both chemotherapy and surgery moving forward

## 2017-04-20 NOTE — MR AVS SNAPSHOT
[facility]  [address] [place] [address]  Phone: [phone]                  Juan Manuel Koehler   2017 8:00 AM   Office Visit    Description:  Male : 1950   Provider:  James Boucher MD   Department:  [facility]           Reason for Visit     Post-op Evaluation     Results           Diagnoses this Visit        Comments    Bladder tumor    -  Primary     CKD (chronic kidney disease), stage 3 (moderate)                To Do List           Future Appointments        Provider Department Dept Phone    2017 9:00 AM [facility] IR1 [facility] [phone]    2017 9:15 AM Villa Robles MD [facility] [place] [phone]      Your Future Surgeries/Procedures     2017   Surgery with Lenard Gaviria MD   [facility] ([facility])    [address] [place] [address] [phone]              Goals (5 Years of Data)     None      Follow-Up and Disposition     Return if symptoms worsen or fail to improve.      [facility]     [facility] Nurse Care Line -  Assistance  Unless otherwise directed by your provider, please contact [facility], our nurse care line that is available for  assistance.     Registered nurses in the [facility] provide: appointment scheduling, clinical advisement, health education, and other advisory services.  Call: [phone] (toll free)               Medications           Message regarding Medications     Verify the changes and/or additions to your medication regime listed below are the same as discussed with your clinician today.  If any of these changes or additions are incorrect, please notify your healthcare provider.        STOP taking these medications     oxybutynin (DITROPAN) 5 MG Tab Take 1 tablet (5 mg total) by mouth 3 (three) times daily.           Verify that the below list of medications is an accurate representation of the  "medications you are currently taking.  If none reported, the list may be blank. If incorrect, please contact your healthcare provider. Carry this list with you in case of emergency.           Current Medications     docusate sodium (COLACE) 100 MG capsule Take 1 capsule (100 mg total) by mouth 2 (two) times daily.    ergocalciferol (ERGOCALCIFEROL) 50,000 unit Cap Take 1 capsule (50,000 Units total) by mouth every 7 days.    ketoconazole (NIZORAL) 2 % shampoo USE TO WASH AFFECTED AREA ONCE DAILY    oxybutynin (DITROPAN-XL) 5 MG TR24     oxycodone (OXY-IR) 5 mg Cap Take 5 mg by mouth every 4 (four) hours as needed for Pain.    oxycodone-acetaminophen (PERCOCET) 5-325 mg per tablet Take 1 tablet by mouth every 4 (four) hours as needed.    pantoprazole (PROTONIX) 40 MG tablet TAKE 1 BY MOUTH ONCE A DAY FOR 60 DAYS           Clinical Reference Information           Your Vitals Were     BP Pulse Height Weight BMI    157/84 114 6' 3" (1.905 m) 78.9 kg (174 lb) 21.75 kg/m2      Blood Pressure          Most Recent Value    BP  (!)  157/84      Allergies as of 4/20/2017     Pneumococcal 23-margarito Ps Vaccine      Immunizations Administered on Date of Encounter - 4/20/2017     None      Orders Placed During Today's Visit     Future Labs/Procedures Expected by Expires    Comprehensive metabolic panel  4/20/2017 4/20/2018    CT Chest With Contrast  4/20/2017 4/20/2018    CT Urogram Abd Pelvis W WO  4/20/2017 4/20/2018      [facility] Sign-Up     Activating your [facility] account is as easy as 1-2-3!     1) Visit [email or web address], select Sign Up Now, enter this activation code and your date of birth, then select Next.  [email or web address]  Expires: 5/14/2017 12:48 PM      2) Create a username and password to use when you visit [facility] in the future and select a security question in case you lose your password and select Next.    3) Enter your e-mail address and click Sign Up!    Additional Information  If you have questions, please " e-mail [email or web address] or call [phone] to talk to our [facility] staff. Remember, [facility] is NOT to be used for urgent needs. For medical emergencies, dial 911.         Language Assistance Services     ATTENTION: Language assistance services are available, free of charge. Please call [phone].      ATENCIÓN: Si habla español, tiene a felton disposición servicios gratuitos de asistencia lingüística. Llame al [phone].     CHÚ Ý: N?u b?n nói Ti?ng Vi?t, có các d?ch v? h? tr? ngôn ng? mi?n phí dành cho b?n. G?i s? [phone].         [facility] complies with applicable Federal civil rights laws and does not discriminate on the basis of race, color, national origin, age, disability, or sex.

## 2017-04-20 NOTE — PROCEDURES
Radiology Post-Procedure Note    Pre Op Diagnosis: Bladder cancer and hydronephrosis 2/2 obstruction.    Post Op Diagnosis: Same    Procedure: Bilateral antegrade nephrostograms, exchange of right nephrostomy for same size catheter, placement of 8 Fr x 26 cm double J stent placement, exchange of left nephrostomy for same size catheter.    Procedure performed by: Lenard Gaviria MD    Written Informed Consent Obtained: Yes  Specimen Removed: NO  Estimated Blood Loss: Minimal    Findings:   Right antegrade demonstrates no drainage to the bladder and severe hydronephrosis. Case d/w Dr. Boucher and decided to leave neph tube in. However, due to its peripheral position within a calyx it was decided to exchange. A new 8 Fr tube was placed over the wire. Final nephrostogram shows tube in good position still without drainage to the bladder.    Left antegrade nephrostogram shows persistent distal left ureteric obstruction but improved hydronephrosis. Catheter and wire were used to gain access to the bladder and an 8 Fr x 26 cm double J stent was placed with loops in good position. A new 8 Fr. Nephrostomy was placed as well. There were filling defects in the collecting system likely related to bleeding. No drainage to bladder. Pt to leave catheters open to bag.     See final report for details. No complications. D/W Dr. Boucher.    Patient tolerated procedure well.    Lenard Gaviria M.D.  Diagnostic and Interventional Radiologist  Department of Radiology  Pager: [phone]

## 2017-04-20 NOTE — H&P
Radiology History & Physical      SUBJECTIVE:     Chief Complaint: Bladder cancer with distal ureteral obstruction.    History of Present Illness:  Juan Manuel Koehler is a 67 y.o. male who presents for internalization on the left and possible removal of right sided nephrostomy tube.  Past Medical History:   Diagnosis Date    Cancer     bladder and throat    Urinary tract infection      Past Surgical History:   Procedure Laterality Date    BLADDER SURGERY      HERNIA REPAIR      THROAT SURGERY         Home Meds:   Prior to Admission medications    Medication Sig Start Date End Date Taking? Authorizing Provider   acetaminophen (TYLENOL) 500 MG tablet Take 1,000 mg by mouth every 6 (six) hours as needed for Pain.   Yes Historical Provider, MD   docusate sodium (COLACE) 100 MG capsule Take 1 capsule (100 mg total) by mouth 2 (two) times daily. 4/8/17  Yes James Boucher MD   ergocalciferol (ERGOCALCIFEROL) 50,000 unit Cap Take 1 capsule (50,000 Units total) by mouth every 7 days. 4/8/17  Yes James Boucher MD   ketoconazole (NIZORAL) 2 % shampoo USE TO WASH AFFECTED AREA ONCE DAILY 3/13/17  Yes Historical Provider, MD   oxybutynin (DITROPAN-XL) 5 MG TR24  4/10/17  Yes Historical Provider, MD   oxycodone (OXY-IR) 5 mg Cap Take 5 mg by mouth every 4 (four) hours as needed for Pain.   Yes Historical Provider, MD   oxycodone-acetaminophen (PERCOCET) 5-325 mg per tablet Take 1 tablet by mouth every 4 (four) hours as needed. 4/8/17  Yes James Boucher MD   pantoprazole (PROTONIX) 40 MG tablet TAKE 1 BY MOUTH ONCE A DAY FOR 60 DAYS 3/21/17  Yes Historical Provider, MD   oxybutynin (DITROPAN) 5 MG Tab Take 1 tablet (5 mg total) by mouth 3 (three) times daily. 4/5/17 4/20/17  James Boucher MD     Anticoagulants/Antiplatelets: no anticoagulation    Allergies:   Review of patient's allergies indicates:   Allergen Reactions    Pneumococcal 23-margarito ps vaccine      Sedation History:  no adverse reactions    Review of  Systems:   Hematological: no known coagulopathies  Respiratory: no shortness of breath  Cardiovascular: no chest pain  Gastrointestinal: no abdominal pain  Genito-Urinary: no dysuria  Musculoskeletal: negative  Neurological: no TIA or stroke symptoms         OBJECTIVE:     Vital Signs (Most Recent)  Temp: 99.5 °F (37.5 °C) (04/20/17 0851)  Pulse: 101 (04/20/17 0851)  Resp: 16 (04/20/17 0851)  BP: (!) 151/74 (04/20/17 0851)  SpO2: 98 % (04/20/17 0851)    Physical Exam:  ASA: 2  Mallampati: 2    General: no acute distress  Mental Status: alert and oriented to person, place and time  HEENT: normocephalic, atraumatic  Chest: unlabored breathing  Heart: regular heart rate  Abdomen: nondistended  Extremity: moves all extremities    Laboratory  Lab Results   Component Value Date    INR 1.0 04/06/2017       Lab Results   Component Value Date    WBC 9.57 04/08/2017    HGB 8.8 (L) 04/08/2017    HCT 25.8 (L) 04/08/2017    MCV 86 04/08/2017     04/08/2017      Lab Results   Component Value Date     04/11/2017     04/11/2017    K 4.6 04/11/2017     04/11/2017    CO2 26 04/11/2017    BUN 25 (H) 04/11/2017    CREATININE 1.6 (H) 04/11/2017    CALCIUM 9.3 04/11/2017    MG 1.5 (L) 04/08/2017    ALT 8 (L) 04/07/2017    AST 19 04/07/2017    ALBUMIN 3.3 (L) 04/07/2017    BILITOT 0.5 04/07/2017       ASSESSMENT/PLAN:     Sedation Plan: Moderate.  Patient will undergo possible internalization on the left and possible removal of neph tube on the right.    Lenard Gaviria M.D.  Diagnostic and Interventional Radiologist  Department of Radiology  Pager: [phone]

## 2017-04-20 NOTE — NURSING
Pt. Tolerated procedure well. Bilateral nephrostomy tube draining well. Pt. Stable. Pt. Afebrile. D/c instructions given to the pt and wife. Pt. Verb. Understanding. Pt. Transported to private vehicle w/c by tech.

## 2017-04-20 NOTE — DISCHARGE SUMMARY
Radiology Discharge Summary      Hospital Course: No complications    Admit Date: 4/20/2017  Discharge Date: 04/20/2017     Instructions Given to Patient: Yes  Diet: Resume prior diet  Activity: activity as tolerated and no driving for today    Description of Condition on Discharge: Stable  Vital Signs (Most Recent): Temp: 99.2 °F (37.3 °C) (04/20/17 1102)  Pulse: (!) 111 (04/20/17 1102)  Resp: 18 (04/20/17 1102)  BP: (!) 147/86 (04/20/17 1102)  SpO2: 99 % (04/20/17 1102)    Discharge Disposition: Home    Discharge Diagnosis: Bladder cancer    Lenard Gaviria M.D.  Diagnostic and Interventional Radiologist  Department of Radiology  Pager: [phone]

## 2017-05-01 ENCOUNTER — OFFICE VISIT (OUTPATIENT)
Dept: UROLOGY | Facility: CLINIC | Age: 67
End: 2017-05-01
Payer: MEDICARE

## 2017-05-01 ENCOUNTER — TELEPHONE (OUTPATIENT)
Dept: HEMATOLOGY/ONCOLOGY | Facility: CLINIC | Age: 67
End: 2017-05-01

## 2017-05-01 VITALS
DIASTOLIC BLOOD PRESSURE: 78 MMHG | HEIGHT: 74 IN | RESPIRATION RATE: 15 BRPM | SYSTOLIC BLOOD PRESSURE: 148 MMHG | BODY MASS INDEX: 18.61 KG/M2 | WEIGHT: 145 LBS

## 2017-05-01 DIAGNOSIS — N13.5 BILATERAL URETERAL OBSTRUCTION: ICD-10-CM

## 2017-05-01 DIAGNOSIS — N17.9 AKI (ACUTE KIDNEY INJURY): Primary | ICD-10-CM

## 2017-05-01 DIAGNOSIS — C67.0 MALIGNANT NEOPLASM OF TRIGONE OF URINARY BLADDER: ICD-10-CM

## 2017-05-01 DIAGNOSIS — N18.3 CKD (CHRONIC KIDNEY DISEASE), STAGE 3 (MODERATE): Primary | ICD-10-CM

## 2017-05-01 PROCEDURE — 99215 OFFICE O/P EST HI 40 MIN: CPT | Mod: S$GLB,,, | Performed by: UROLOGY

## 2017-05-01 PROCEDURE — 1160F RVW MEDS BY RX/DR IN RCRD: CPT | Mod: S$GLB,,, | Performed by: UROLOGY

## 2017-05-01 PROCEDURE — 1125F AMNT PAIN NOTED PAIN PRSNT: CPT | Mod: S$GLB,,, | Performed by: UROLOGY

## 2017-05-01 PROCEDURE — 99999 PR PBB SHADOW E&M-EST. PATIENT-LVL III: CPT | Mod: PBBFAC,,, | Performed by: UROLOGY

## 2017-05-01 PROCEDURE — 1159F MED LIST DOCD IN RCRD: CPT | Mod: S$GLB,,, | Performed by: UROLOGY

## 2017-05-01 NOTE — PROGRESS NOTES
Subjective:       Patient ID: Juan Manuel Koehler is a 67 y.o. male.    Chief Complaint: Bladder Cancer (ref per dr Boucher)      HPI: Juan Manuel Koehler is a 67 y.o. White male who presents today for evaluation and management of invasive bladder cancer.    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    He recently presented to Dr. Boucher for hematuria and was found to have a large bladder tumor on his trigone. The tumor was also causing bilateral ureteral obstruction with resulting ELDER.    The patient underwent tumor resection and attempted JJ stent placement on 4/5/17 which demonstrated cT2 urothelial carcinoma (nested variant). Bilateral nephrostomy tubes were placed with improvement in the patient's renal function. IR was able to internalize the patient's left nephrostomy tube. The patient still has a right nephrostomy tube in place draining well.    The patient has not had any cross-sectional imaging save a non-contrast CT scan.    He presents today with his wife to discuss management of his muscle invasive bladder cancer.    Review of patient's allergies indicates:   Allergen Reactions    Pneumococcal 23-margarito ps vaccine        Current Outpatient Prescriptions   Medication Sig Dispense Refill    acetaminophen (TYLENOL) 500 MG tablet Take 1,000 mg by mouth every 6 (six) hours as needed for Pain.      ketoconazole (NIZORAL) 2 % shampoo USE TO WASH AFFECTED AREA ONCE DAILY  3    oxycodone-acetaminophen (PERCOCET) 5-325 mg per tablet Take 1 tablet by mouth every 4 (four) hours as needed. 40 tablet 0    docusate sodium (COLACE) 100 MG capsule Take 1 capsule (100 mg total) by mouth 2 (two) times daily.  0    ergocalciferol (ERGOCALCIFEROL) 50,000 unit Cap Take 1 capsule (50,000 Units total) by mouth every 7 days. 7 capsule 0    oxybutynin (DITROPAN-XL) 5 MG TR24       oxycodone (OXY-IR) 5 mg Cap Take 5 mg by mouth every 4 (four) hours as needed for Pain.       pantoprazole (PROTONIX) 40 MG tablet TAKE 1 BY MOUTH ONCE A DAY FOR 60 DAYS  1     No current facility-administered medications for this visit.        Past Medical History:   Diagnosis Date    Cancer     bladder and throat    Urinary tract infection        Past Surgical History:   Procedure Laterality Date    BLADDER SURGERY      HERNIA REPAIR      THROAT SURGERY         Family History   Problem Relation Age of Onset    No Known Problems Father     Kidney disease Mother     Prostate cancer Neg Hx        Review of Systems    Review of Systems   Constitutional: Negative for fever, chills, activity change, appetite change and unexpected weight change.   HENT: Negative for congestion, nosebleeds, sneezing, sore throat and trouble swallowing.    Eyes: Negative for pain, discharge, redness and visual disturbance.   Respiratory: Negative for cough, choking, chest tightness and shortness of breath.    Cardiovascular: Negative for chest pain, palpitations and leg swelling.   Gastrointestinal: Negative for nausea, vomiting, abdominal pain, diarrhea, blood in stool, abdominal distention and anal bleeding.  Genitourinary: As documented per HPI   Endocrine: Negative for cold intolerance, heat intolerance, polydipsia, polyphagia and polyuria.   Musculoskeletal: Negative for myalgias, gait problem, neck pain and neck stiffness.   Skin: Negative for color change, pallor, rash and wound.   Allergic/Immunologic: Negative for immunocompromised state.   Neurological: Negative for seizures, facial asymmetry, speech difficulty, weakness and light-headedness.   Hematological: Negative for adenopathy. Does not bruise/bleed easily.   Psychiatric/Behavioral: Negative for hallucinations, behavioral problems, self-injury and agitation. The patient is not hyperactive.    All other systems were reviewed and were negative.      Objective:     Vitals:    05/01/17 0914   BP: (!) 148/78   Resp: 15     Physical Exam   Vitals  reviewed.  Constitutional: He is oriented to person, place, and time. He appears well-developed and well-nourished. No distress.   HENT:   Head: Normocephalic and atraumatic.   Right Ear: External ear normal.   Left Ear: External ear normal.   Nose: Nose normal.   Eyes: EOM are normal. Pupils are equal, round, and reactive to light. Right eye exhibits no discharge. Left eye exhibits no discharge.   Neck: Normal range of motion. Neck supple. No tracheal deviation present. No thyroid enlargement or tenderness.   Cardiovascular: Regular rhythm and intact distal pulses. No signs of peripheral edema.    Pulmonary/Chest: Effort normal and breath sounds normal. No stridor. No respiratory distress. He has no wheezes.   Abdominal: Soft. Bowel sounds are normal. He exhibits no distension. There is no tenderness. Hernia confirmed negative in the right inguinal area and confirmed negative in the left inguinal area. No hepatic or splenic enlargement or tenderness. Right nephrostomy tube draining well.  Genitourinary: Penis normal. Right testis shows no mass, no swelling and no tenderness. Right testis is descended. Left testis shows no mass, no swelling and no tenderness. Left testis is descended. Circumcised. No phimosis or hypospadias.   MARICEL: Deferred  Musculoskeletal: Normal range of motion. He exhibits no edema.   Neurological: He is alert and oriented to person, place, and time. He exhibits normal muscle tone. Coordination normal.   Skin: Skin is warm. No rash noted.   Lymphatic: No palpable lymph nodes.  Psychiatric: He has a normal mood and affect. His behavior is normal. Judgment and thought content normal.     No results found for: PSA  Lab Results   Component Value Date    CREATININE 1.6 (H) 04/20/2017     Lab Results   Component Value Date    EGFRNONAA 44 (A) 04/20/2017     Lab Results   Component Value Date    ESTGFRAFRICA 50 (A) 04/20/2017     I personally reviewed all the patient's imaging studies.    CT scan  non-contrast abdomen/pelvis (4/03/17): Thickened posterior bladder wall with marked bilateral hydroureteronephrosis to the level of the UVJ.  No metastatic disease identified, noting limited evaluation without IV contrast.    Assessment:       1. CKD (chronic kidney disease), stage 3 (moderate)    2. Malignant neoplasm of trigone of urinary bladder    3. Bilateral ureteral obstruction        Plan:     Juan Manuel was seen today for bladder cancer.    Diagnoses and all orders for this visit:    CKD (chronic kidney disease), stage 3 (moderate)    Malignant neoplasm of trigone of urinary bladder  -     Ambulatory referral to Hematology / Oncology    Bilateral ureteral obstruction    The patient has cT2-cT3 bladder cancer.    I had a lengthy discussion with the patient regarding implications of a diagnosis of urothelial carcinoma of the bladder, i.e, bladder cancer.    We talked about the various therapeutic options including endoscopic management, a combined chemotherapy and radiotherapy regimen (the Tia protocol), primary chemotherapy alone and radical surgery. Regarding endoscopic management, the patient is aware that although the tumor may have been fully resected, microscopic disease can still persist.  Regarding chemoradiation protocols, the patient is aware that a minority of patients will preserve their bladder long-term and the function of a radiated bladder may result in significant morbidity.  The patient was offered to speak to both the medical oncology and the radiation oncology services for a consultation regarding bladder sparing techniques.    With regard to surgery, I explained that there are 3 basic categories of modern urinary diversions: (1) incontinent bowel conduit (e.g. ileal conduit) (2) continent cutaneous stomal reservoire (e.g. Indiana pouch) (3) continent orthotopic urethral diversion (ileal neopbladder).      I explained that an ileal conduit is the simplest, most time-tested urinary diversion  that requires the least operative time and arguably is associated with the fewest complications. I described that a short piece of ileum is anastamosed to the ureters and brought onto the skin.  A life-long urostomy appliance to collect urine is required.   Although time-tested, short-term and long-term issues with ileal conduit diversions are not uncommon.  I quoted the best data available, which in my view is the 2003 [facility] series that included long-term bladder cancer survivors who had at least 5 years of follow-up.  In this series, issues that were seen with ileal conduits were as follows: ureteral stricture/obstruction (~15%), recurrent pyelonephritis (~ 25%), urinary calculi (this complication often occurred beyond 5 to 10 years after surgery, ~10% ), impaired kidney function due to obstruction or ureteral reflux (~27%, only 2% required dialysis and all of whom had compromised kidney function prior to surgery), and stomal complications (~25%) such as parastomal hernia, stensosis, and bleeding/skin irritation.  Furthermore up to 25% of patients had bowel complications, which included small bowel obstruction, fistula formation, and diarrhea.  I explained that although many issues that arise can be handled conservatively, some do require re-operation.      Then we discussed the ileal neobladder.  I explained that the neobladder is constructed from detubularized bowel, which is then sewn into a spherical reservoir for urine storage.  The neobladder is anastamosed to the ureters and to the urethra, to mimic functions of the native bladder. The goal is for the patient to void spontaneously using the Valsalva menuver.  Choice of proceeding with the neobladder vs. ileal conduit must be balanced against additional issues and risks.  Along with risks listed for ileal conduit diversion, I explained the additional potential problems that can arise with the ileal neobladder.   I described risks of urinary  leaks from a number of suture lines and explained that in general these can be managed conservatively.  I stressed that a life-long risk of pouch perforation exists.  We discussed risks of incontinence and hypercontinence.  I explained that it is critical to integrate these risks into the decision to proceed with the neobladder.  I quoted the risks of day-time incontinence to be on the order of 10%, while the risk of nighttime incontinence to be approximately 20%.  With regard to hypercontinence and poor pouch emptying, I explained that approximately 5% of male patients will require life-long intermittent catheterization.  Such risks can reach 30% in female patients with neobladders.  Furthemore, neobladder-vaginal fistula formation is a great concern when a neobladder is constructed in women (~5%).  I explained that continent urinary diversions are contra-indicated in patients with renal insufficiency, but did explain that metabolic disturbances are possible even in patients with intact renal function.    With regard to Indiana pouch, we discussed that this diversion consists of a pouch constructed from detubularized right colon.  The pouch is catheterized through the native ileocecal valve via a segment of the ileum that is brought onto the skin.  I explained that all risks that pertain to ileal conduits and neobladders generally apply to patients who undergo a cutaneous continent diversion.  These include risks of stomal stenosis, pouch stomal formation, incontinence, and metabolic disturbances. Furthermore, because, the ileocecal valve is resected, some post-operative diarrhea can be expected.  Such symptoms usually can be controlled with over-the-counter medications.  We reviewed the life-long catheterization and irrigation regimen that is required with this diversion.  I explained that patients who chose to undergo continent diversions  such as an Indiana pouch or neobladder, need to be prepared to commit to  the following in the post-operative period:   Keep drains as long as needed (for neobladder for example, the catheter and suprapubic tube stay on the order of 2-3 weeks, but possibly longer)   Return for appointments as often as needed.   Empty neobladder/Indiana pouch every 2 hours for 1 month   Empty neobladder/Indiana pouch every 3 hours for 1 month   Empty neobladder/Indiana pouch every 4 hour for the rest of my life    Risks of sexual dysfunction:  We discussed that in males rates of erectile dysfunction are substantial, since removal of the prostate - an integral part of radical cystectomy - results in compromise of the neurovascular bundles that innervate the penile corpora.  I explained that I perform nerve-sparing cystoprostatectomy whenever possible.    Other risks of the surgery were discussed in detail including medical and anesthetic complications (e.g. heart attack, stroke, deep vein thrombosis, pulmonary embolism, infection (pneumonia, etc), bleeding with possible need for transfusion, adjacent organ involvement or injury (including possible need for permanent or temporary colostomy), wound complications, reaction to medications).  We reviewed what to expect with regard to incisions, post-operative pain, and risks of subsequent hernias.     Neoadjuvant chemotherapy:  We discussed advantages of neoadjuvant chemotherapy.  I explained that such strategy is only indicated for patients with muscle-invasive disease.  Advantages of neoadjuvant chemotherapy include potential treatment of microscopic metastases, downstaging of the primary tumor, and improved patient tolerance when compared to adjuvant chemotherapy.  The SWOG 8710 trial randomized 307 patients to immediate cystectomy vs cystectomy preceded by chemotherapy (MVAC = methotrexate, vinblastine, adriamycin (aka doxorubicin), and cisplatin).  Patients in the chemotherapy arm demonstrated improvement in both disease-specific and overall survival).   Patients with pathologic T3 and T4 disease demonstrated the greatest benefit, in contrast to those with T2 disease.  I explained that in large metanalyses of over 3,000 patients from 11 randomized trials advantages of neoadjuvant chemotherapy are on the order of 5% in overall 5-year survival.  I invited the patients to discussed risks and benefits of neoadjuvant therapy with our medical oncology team.    At the conclusion of our discussion, the patient requires extent of disease evaluation. After that, he will be seen by Dr. Calero to discuss/begin neoadjuvant chemotherapy followed by consolidative surgery.    I answered all the patient's and his wife's questions.     I encouraged him or any of his family members to call or email me with questions and/or concerns.    I spent 50 minutes with the patient of which more than half was spent in coordinating the patient's care as well as in direct consultation with the patient in regards to our treatment and plan.

## 2017-05-01 NOTE — PATIENT INSTRUCTIONS
Understanding Bladder Cancer    The urinary system includes the kidneys, ureters, bladder, and urethra. It makes, stores, and gets rid of liquid waste called urine. The two kidneys filter blood to collect waste and make urine. The ureters are small tubes that carry urine from each kidney to the bladder. The bladder is where urine is stored before it leaves the body. The urethra is the tube urine goes through to leave the body.   Bladder cancer means that certain cells in the bladder have changed in ways that aren't normal.  When bladder cancer forms  Cancer is a disease that causes cells to change and multiply out of control. The multiplying cells may form a lump of tissue (tumor). With time, the cancer cells destroy healthy tissue. They may spread to other parts of the body. Why some cells become cancerous is not always clear. But bladder cancer is strongly linked to cigarette smoking. The longer a person smokes and the more a person smokes, the greater that persons chances of developing bladder cancer.  Types of cancer that may form  Bladder cancer may grow in different ways:  · Papillary tumors stick out from the bladder lining on a stalk. They tend to grow into the bladder cavity, away from the bladder wall, instead of deeper into the layers of the bladder wall.  · Flat tumors do not stick out from the bladder lining. These tumors are much more likely than papillary tumors to grow deeper into the layers of the bladder wall.  · Carcinoma in situ (CIS) is a cancerous patch of cells that is only in the inner layer of the bladder lining and has not spread to deeper tissue. The patch may look almost normal or may look inflamed.    Each type of tumor can be present in one or more areas of the bladder, and more than one type can be present at the same time.  Date Last Reviewed: 2/17/2016  © 0251-6684 [employer]. [address] [place] [address], [place] [address]. All rights reserved. This information is not  intended as a substitute for professional medical care. Always follow your healthcare professional's instructions.

## 2017-05-01 NOTE — TELEPHONE ENCOUNTER
----- Message from John Coppola MA sent at 5/1/2017 10:05 AM CDT -----  Radha Rodriguez can you please schedule the pt Mr. Koehler with Dr. Calero for Dr. Robles please.  Thanks

## 2017-05-02 ENCOUNTER — OFFICE VISIT (OUTPATIENT)
Dept: UROLOGY | Facility: CLINIC | Age: 67
End: 2017-05-02
Payer: MEDICARE

## 2017-05-02 ENCOUNTER — LAB VISIT (OUTPATIENT)
Dept: LAB | Facility: HOSPITAL | Age: 67
End: 2017-05-02
Attending: UROLOGY
Payer: MEDICARE

## 2017-05-02 ENCOUNTER — TELEPHONE (OUTPATIENT)
Dept: UROLOGY | Facility: CLINIC | Age: 67
End: 2017-05-02

## 2017-05-02 DIAGNOSIS — N18.3 CKD (CHRONIC KIDNEY DISEASE), STAGE 3 (MODERATE): ICD-10-CM

## 2017-05-02 DIAGNOSIS — E87.5 HYPERKALEMIA: ICD-10-CM

## 2017-05-02 DIAGNOSIS — D49.4 BLADDER TUMOR: Primary | ICD-10-CM

## 2017-05-02 DIAGNOSIS — N17.9 AKI (ACUTE KIDNEY INJURY): ICD-10-CM

## 2017-05-02 LAB
ANION GAP SERPL CALC-SCNC: 10 MMOL/L
BUN SERPL-MCNC: 26 MG/DL
CALCIUM SERPL-MCNC: 9.8 MG/DL
CHLORIDE SERPL-SCNC: 107 MMOL/L
CO2 SERPL-SCNC: 25 MMOL/L
CREAT SERPL-MCNC: 1.7 MG/DL
EST. GFR  (AFRICAN AMERICAN): 47 ML/MIN/1.73 M^2
EST. GFR  (NON AFRICAN AMERICAN): 41 ML/MIN/1.73 M^2
GLUCOSE SERPL-MCNC: 100 MG/DL
POTASSIUM SERPL-SCNC: 5.6 MMOL/L
SODIUM SERPL-SCNC: 142 MMOL/L

## 2017-05-02 PROCEDURE — 1160F RVW MEDS BY RX/DR IN RCRD: CPT | Mod: S$GLB,,, | Performed by: UROLOGY

## 2017-05-02 PROCEDURE — 36415 COLL VENOUS BLD VENIPUNCTURE: CPT

## 2017-05-02 PROCEDURE — 1159F MED LIST DOCD IN RCRD: CPT | Mod: S$GLB,,, | Performed by: UROLOGY

## 2017-05-02 PROCEDURE — 80048 BASIC METABOLIC PNL TOTAL CA: CPT

## 2017-05-02 PROCEDURE — 99999 PR PBB SHADOW E&M-EST. PATIENT-LVL I: CPT | Mod: PBBFAC,,, | Performed by: UROLOGY

## 2017-05-02 PROCEDURE — 99214 OFFICE O/P EST MOD 30 MIN: CPT | Mod: S$GLB,,, | Performed by: UROLOGY

## 2017-05-02 NOTE — MR AVS SNAPSHOT
[facility]  [address] [place] [address]  Phone: [phone]                  Juan Manuel Koehler   2017 3:30 PM   Office Visit    Description:  Male : 1950   Provider:  James Boucher MD   Department:  [facility]           Diagnoses this Visit        Comments    Bladder tumor    -  Primary     CKD (chronic kidney disease), stage 3 (moderate)                To Do List           Future Appointments        Provider Department Dept Phone    2017 3:30 PM James Boucher MD [facility] [phone]    2017 10:45 AM [facility] CT2 LIMIT [address] [facility] [phone]    2017 12:30 PM [facility] CT2 LIMIT [address] [facility] [phone]    2017 3:00 PM Mingo Calero MD [facility] Oncology [phone]      Goals (5 Years of Data)     None      Follow-Up and Disposition     Return if symptoms worsen or fail to improve.      [facility]     [facility] Care Line -  Assistance  Unless otherwise directed by your provider, please contact [facility], our nurse care line that is available for  assistance.     Registered nurses in the [facility] provide: appointment scheduling, clinical advisement, health education, and other advisory services.  Call: [phone] (toll free)               Medications           Message regarding Medications     Verify the changes and/or additions to your medication regime listed below are the same as discussed with your clinician today.  If any of these changes or additions are incorrect, please notify your healthcare provider.             Verify that the below list of medications is an accurate representation of the medications you are currently taking.  If none reported, the list may be blank. If incorrect, please contact your healthcare provider. Carry this list with you in case of emergency.           Current Medications     acetaminophen (TYLENOL) 500  MG tablet Take 1,000 mg by mouth every 6 (six) hours as needed for Pain.    docusate sodium (COLACE) 100 MG capsule Take 1 capsule (100 mg total) by mouth 2 (two) times daily.    ergocalciferol (ERGOCALCIFEROL) 50,000 unit Cap Take 1 capsule (50,000 Units total) by mouth every 7 days.    ketoconazole (NIZORAL) 2 % shampoo USE TO WASH AFFECTED AREA ONCE DAILY    oxybutynin (DITROPAN-XL) 5 MG TR24     oxycodone (OXY-IR) 5 mg Cap Take 5 mg by mouth every 4 (four) hours as needed for Pain.    oxycodone-acetaminophen (PERCOCET) 5-325 mg per tablet Take 1 tablet by mouth every 4 (four) hours as needed.    pantoprazole (PROTONIX) 40 MG tablet TAKE 1 BY MOUTH ONCE A DAY FOR 60 DAYS           Clinical Reference Information           Allergies as of 5/2/2017     Pneumococcal 23-margarito Ps Vaccine      Immunizations Administered on Date of Encounter - 5/2/2017     None      [facility] Sign-Up     Activating your [facility] account is as easy as 1-2-3!     1) Visit [email or web address], select Sign Up Now, enter this activation code and your date of birth, then select Next.  [email or web address]  Expires: 5/14/2017 12:48 PM      2) Create a username and password to use when you visit [facility] in the future and select a security question in case you lose your password and select Next.    3) Enter your e-mail address and click Sign Up!    Additional Information  If you have questions, please e-mail [email or web address] or call [phone] to talk to our [facility] staff. Remember, [facility] is NOT to be used for urgent needs. For medical emergencies, dial 911.         Language Assistance Services     ATTENTION: Language assistance services are available, free of charge. Please call [phone].      ATENCIÓN: Si habla español, tiene a felton disposición servicios gratuitos de asistencia lingüística. Llame al [phone].     CHÚ Ý: N?u b?n nói Ti?ng Vi?t, có các d?ch v? h? tr? ngôn ng? mi?n phí dành cho b?n. G?i s? [phone].          [facility] complies with applicable Federal civil rights laws and does not discriminate on the basis of race, color, national origin, age, disability, or sex.

## 2017-05-02 NOTE — TELEPHONE ENCOUNTER
----- Message from James Boucher MD sent at 5/1/2017  4:50 PM CDT -----  Patient pulled nephrostomy tube out.  Needs a bmp in the am.  Will be NPO after MN tonight for possible IR replacement if abnormal.  Please make appt for 9am.  Mmm

## 2017-05-02 NOTE — PROGRESS NOTES
Subjective:       Patient ID: Juan Manuel Koehler is a 67 y.o. male.    Chief Complaint:  nephrostomy tube fell out      History of Present Illness  HPI   Patient is a 67-year-old male established our clinic and was recently seen by Dr. Robles yesterday.  He has muscle invasive bladder cancer and is being set up for chemotherapy followed by radical cystectomy.  However, the patient was at home yesterday and has bilateral nephrostomy tubes.  His right-sided nephrostomy tube got stuck on a drawer and came completely out.  He was evaluated by interventional radiology yesterday afternoon showing up without an appointment.    He is here to today to review results of a BMP done this morning.  He has voided somewhat since his nephrostomy tube came out yesterday.  He states he has dysuria with voiding.  No fevers or chills.  He has lost some weight.      Review of Systems  Review of Systems  All other systems reviewed and negative except pertinent positives noted in HPI.       Objective:     Physical Exam   Constitutional: He is oriented to person, place, and time. He appears well-developed and well-nourished. No distress.   HENT:   Head: Normocephalic and atraumatic.   Eyes: No scleral icterus.   Neck: No tracheal deviation present.   Pulmonary/Chest: Effort normal. No respiratory distress.   Musculoskeletal:        Arms:  Neurological: He is alert and oriented to person, place, and time.   Psychiatric: He has a normal mood and affect. His behavior is normal. Judgment and thought content normal.       Lab Review  Lab Results   Component Value Date    COLORU Yellow 04/07/2017    SPECGRAV 1.015 04/07/2017    PHUR 6.0 04/07/2017    NITRITE Negative 04/07/2017    KETONESU Negative 04/07/2017    UROBILINOGEN Negative 04/07/2017         Assessment:        1. Bladder tumor    2. CKD (chronic kidney disease), stage 3 (moderate)    3. Hyperkalemia          Plan:     Bladder tumor  -Plan for chemotherapy followed by radical  cystectomy  CKD (chronic kidney disease), stage 3 (moderate)  -discussed with nephrology (Dr. Chavez) re: Cr of 1.7  -Of note, his potassium was 5.7 on the BMP today.  After discussion with nephrology, our plan will be to prescribe Kayexalate 30 g twice a day  by 6 hours between doses.  We will recheck a BMP tomorrow morning.  If the potassium worsens with her renal function worsens, the patient will likely be admitted for an urgent interventional radiology procedure.  -I would recommend nephrostomy tube replacement regardless of BMP results in preparation for potentially nephrotoxic chemotherapy prior to cystectomy.  Dr. Gaviria is aware of this recommendation and this will be done either electively or urgently depending on lab results tomorrow.    -I spent 25 minutes with the patient of which more than half was spent in direct consultation with the patient in regards to our treatment and plan.

## 2017-05-03 ENCOUNTER — LAB VISIT (OUTPATIENT)
Dept: LAB | Facility: HOSPITAL | Age: 67
End: 2017-05-03
Attending: UROLOGY
Payer: MEDICARE

## 2017-05-03 DIAGNOSIS — E87.5 HYPERKALEMIA: ICD-10-CM

## 2017-05-03 LAB
ANION GAP SERPL CALC-SCNC: 10 MMOL/L
BUN SERPL-MCNC: 22 MG/DL
CALCIUM SERPL-MCNC: 9.4 MG/DL
CHLORIDE SERPL-SCNC: 105 MMOL/L
CO2 SERPL-SCNC: 26 MMOL/L
CREAT SERPL-MCNC: 1.6 MG/DL
EST. GFR  (AFRICAN AMERICAN): 50 ML/MIN/1.73 M^2
EST. GFR  (NON AFRICAN AMERICAN): 44 ML/MIN/1.73 M^2
GLUCOSE SERPL-MCNC: 113 MG/DL
POTASSIUM SERPL-SCNC: 4.9 MMOL/L
SODIUM SERPL-SCNC: 141 MMOL/L

## 2017-05-03 PROCEDURE — 36415 COLL VENOUS BLD VENIPUNCTURE: CPT

## 2017-05-03 PROCEDURE — 80048 BASIC METABOLIC PNL TOTAL CA: CPT

## 2017-05-05 ENCOUNTER — ANESTHESIA EVENT (OUTPATIENT)
Dept: SURGERY | Facility: HOSPITAL | Age: 67
DRG: 683 | End: 2017-05-05
Payer: MEDICARE

## 2017-05-05 ENCOUNTER — LAB VISIT (OUTPATIENT)
Dept: LAB | Facility: HOSPITAL | Age: 67
End: 2017-05-05
Attending: UROLOGY
Payer: MEDICARE

## 2017-05-05 ENCOUNTER — HOSPITAL ENCOUNTER (OUTPATIENT)
Dept: RADIOLOGY | Facility: HOSPITAL | Age: 67
Discharge: HOME | DRG: 683 | End: 2017-05-05
Attending: UROLOGY
Payer: MEDICARE

## 2017-05-05 ENCOUNTER — HOSPITAL ENCOUNTER (INPATIENT)
Facility: HOSPITAL | Age: 67
LOS: 2 days | Discharge: HOME | DRG: 683 | End: 2017-05-09
Attending: EMERGENCY MEDICINE | Admitting: INTERNAL MEDICINE
Payer: MEDICARE

## 2017-05-05 ENCOUNTER — ANESTHESIA (OUTPATIENT)
Dept: SURGERY | Facility: HOSPITAL | Age: 67
DRG: 683 | End: 2017-05-05
Payer: MEDICARE

## 2017-05-05 DIAGNOSIS — R53.81 PHYSICAL DECONDITIONING: ICD-10-CM

## 2017-05-05 DIAGNOSIS — N13.30 HYDRONEPHROSIS, UNSPECIFIED HYDRONEPHROSIS TYPE: ICD-10-CM

## 2017-05-05 DIAGNOSIS — D49.4 BLADDER TUMOR: ICD-10-CM

## 2017-05-05 DIAGNOSIS — E87.5 HYPERKALEMIA: ICD-10-CM

## 2017-05-05 DIAGNOSIS — C67.9 MALIGNANT NEOPLASM OF URINARY BLADDER, UNSPECIFIED SITE: ICD-10-CM

## 2017-05-05 DIAGNOSIS — N18.3 CKD (CHRONIC KIDNEY DISEASE), STAGE 3 (MODERATE): ICD-10-CM

## 2017-05-05 DIAGNOSIS — D72.829 LEUKOCYTOSIS, UNSPECIFIED TYPE: ICD-10-CM

## 2017-05-05 DIAGNOSIS — N17.9 AKI (ACUTE KIDNEY INJURY): Primary | ICD-10-CM

## 2017-05-05 LAB
ANION GAP SERPL CALC-SCNC: 10 MMOL/L
ANION GAP SERPL CALC-SCNC: 12 MMOL/L
BACTERIA #/AREA URNS HPF: ABNORMAL /HPF
BASOPHILS # BLD AUTO: 0.02 K/UL
BASOPHILS NFR BLD: 0.1 %
BILIRUB UR QL STRIP: NEGATIVE
BUN SERPL-MCNC: 38 MG/DL
BUN SERPL-MCNC: 38 MG/DL
CALCIUM SERPL-MCNC: 8.6 MG/DL
CALCIUM SERPL-MCNC: 9.5 MG/DL
CHLORIDE SERPL-SCNC: 98 MMOL/L
CHLORIDE SERPL-SCNC: 98 MMOL/L
CLARITY UR: ABNORMAL
CO2 SERPL-SCNC: 27 MMOL/L
CO2 SERPL-SCNC: 29 MMOL/L
COLOR UR: YELLOW
CREAT SERPL-MCNC: 2.2 MG/DL
CREAT SERPL-MCNC: 2.3 MG/DL
CREAT UR-MCNC: 63.8 MG/DL
DIFFERENTIAL METHOD: ABNORMAL
EOSINOPHIL # BLD AUTO: 0 K/UL
EOSINOPHIL NFR BLD: 0 %
ERYTHROCYTE [DISTWIDTH] IN BLOOD BY AUTOMATED COUNT: 14.4 %
EST. GFR  (AFRICAN AMERICAN): 32.5 ML/MIN/1.73 M^2
EST. GFR  (AFRICAN AMERICAN): 34 ML/MIN/1.73 M^2
EST. GFR  (NON AFRICAN AMERICAN): 28.1 ML/MIN/1.73 M^2
EST. GFR  (NON AFRICAN AMERICAN): 30 ML/MIN/1.73 M^2
GLUCOSE SERPL-MCNC: 120 MG/DL
GLUCOSE SERPL-MCNC: 127 MG/DL
GLUCOSE UR QL STRIP: NEGATIVE
HCT VFR BLD AUTO: 23.9 %
HGB BLD-MCNC: 8.1 G/DL
HGB UR QL STRIP: ABNORMAL
HYALINE CASTS #/AREA URNS LPF: 0 /LPF
KETONES UR QL STRIP: NEGATIVE
LACTATE SERPL-SCNC: 0.6 MMOL/L
LEUKOCYTE ESTERASE UR QL STRIP: ABNORMAL
LYMPHOCYTES # BLD AUTO: 1.1 K/UL
LYMPHOCYTES NFR BLD: 3.9 %
MCH RBC QN AUTO: 29.1 PG
MCHC RBC AUTO-ENTMCNC: 33.9 %
MCV RBC AUTO: 86 FL
MICROSCOPIC COMMENT: ABNORMAL
MONOCYTES # BLD AUTO: 1 K/UL
MONOCYTES NFR BLD: 3.6 %
NEUTROPHILS # BLD AUTO: 26.4 K/UL
NEUTROPHILS NFR BLD: 92.4 %
NITRITE UR QL STRIP: POSITIVE
PH UR STRIP: 6 [PH] (ref 5–8)
PHOSPHATE SERPL-MCNC: 4 MG/DL
PLATELET # BLD AUTO: 373 K/UL
PMV BLD AUTO: 8.7 FL
POCT GLUCOSE: 149 MG/DL (ref 70–110)
POTASSIUM SERPL-SCNC: 3.2 MMOL/L
POTASSIUM SERPL-SCNC: 3.9 MMOL/L
PROT UR QL STRIP: ABNORMAL
RBC # BLD AUTO: 2.78 M/UL
RBC #/AREA URNS HPF: 3 /HPF (ref 0–4)
SODIUM SERPL-SCNC: 135 MMOL/L
SODIUM SERPL-SCNC: 139 MMOL/L
SODIUM UR-SCNC: 37 MMOL/L
SP GR UR STRIP: <=1.005 (ref 1–1.03)
SQUAMOUS #/AREA URNS HPF: 2 /HPF
URN SPEC COLLECT METH UR: ABNORMAL
UROBILINOGEN UR STRIP-ACNC: NEGATIVE EU/DL
WBC # BLD AUTO: 28.72 K/UL
WBC #/AREA URNS HPF: 62 /HPF (ref 0–5)
WBC CLUMPS URNS QL MICRO: ABNORMAL

## 2017-05-05 PROCEDURE — 37000008 HC ANESTHESIA 1ST 15 MINUTES: Performed by: RADIOLOGY

## 2017-05-05 PROCEDURE — 81000 URINALYSIS NONAUTO W/SCOPE: CPT

## 2017-05-05 PROCEDURE — 0T25X0Z CHANGE DRAINAGE DEVICE IN KIDNEY, EXTERNAL APPROACH: ICD-10-PCS | Performed by: RADIOLOGY

## 2017-05-05 PROCEDURE — 74178 CT ABD&PLV WO CNTR FLWD CNTR: CPT | Mod: 26,,, | Performed by: RADIOLOGY

## 2017-05-05 PROCEDURE — 25000003 PHARM REV CODE 250: Performed by: NURSE ANESTHETIST, CERTIFIED REGISTERED

## 2017-05-05 PROCEDURE — 87186 SC STD MICRODIL/AGAR DIL: CPT

## 2017-05-05 PROCEDURE — 87088 URINE BACTERIA CULTURE: CPT

## 2017-05-05 PROCEDURE — 84100 ASSAY OF PHOSPHORUS: CPT

## 2017-05-05 PROCEDURE — 25000003 PHARM REV CODE 250: Performed by: HOSPITALIST

## 2017-05-05 PROCEDURE — 84300 ASSAY OF URINE SODIUM: CPT

## 2017-05-05 PROCEDURE — 71000039 HC RECOVERY, EACH ADD'L HOUR: Performed by: RADIOLOGY

## 2017-05-05 PROCEDURE — 63600175 PHARM REV CODE 636 W HCPCS: Performed by: ANESTHESIOLOGY

## 2017-05-05 PROCEDURE — 63600175 PHARM REV CODE 636 W HCPCS: Performed by: STUDENT IN AN ORGANIZED HEALTH CARE EDUCATION/TRAINING PROGRAM

## 2017-05-05 PROCEDURE — 25000003 PHARM REV CODE 250: Performed by: STUDENT IN AN ORGANIZED HEALTH CARE EDUCATION/TRAINING PROGRAM

## 2017-05-05 PROCEDURE — 71260 CT THORAX DX C+: CPT | Mod: 26,,, | Performed by: RADIOLOGY

## 2017-05-05 PROCEDURE — G0378 HOSPITAL OBSERVATION PER HR: HCPCS

## 2017-05-05 PROCEDURE — 80048 BASIC METABOLIC PNL TOTAL CA: CPT | Mod: 91

## 2017-05-05 PROCEDURE — 36415 COLL VENOUS BLD VENIPUNCTURE: CPT

## 2017-05-05 PROCEDURE — 87086 URINE CULTURE/COLONY COUNT: CPT

## 2017-05-05 PROCEDURE — 82570 ASSAY OF URINE CREATININE: CPT

## 2017-05-05 PROCEDURE — 25000003 PHARM REV CODE 250: Performed by: INTERNAL MEDICINE

## 2017-05-05 PROCEDURE — 80048 BASIC METABOLIC PNL TOTAL CA: CPT

## 2017-05-05 PROCEDURE — 85025 COMPLETE CBC W/AUTO DIFF WBC: CPT

## 2017-05-05 PROCEDURE — 25000003 PHARM REV CODE 250: Performed by: RADIOLOGY

## 2017-05-05 PROCEDURE — 71000033 HC RECOVERY, INTIAL HOUR: Performed by: RADIOLOGY

## 2017-05-05 PROCEDURE — 37000009 HC ANESTHESIA EA ADD 15 MINS: Performed by: RADIOLOGY

## 2017-05-05 PROCEDURE — 63600175 PHARM REV CODE 636 W HCPCS: Performed by: NURSE ANESTHETIST, CERTIFIED REGISTERED

## 2017-05-05 PROCEDURE — 87077 CULTURE AEROBIC IDENTIFY: CPT

## 2017-05-05 PROCEDURE — 63600175 PHARM REV CODE 636 W HCPCS: Performed by: HOSPITALIST

## 2017-05-05 PROCEDURE — 0T9330Z DRAINAGE OF RIGHT KIDNEY PELVIS WITH DRAINAGE DEVICE, PERCUTANEOUS APPROACH: ICD-10-PCS | Performed by: RADIOLOGY

## 2017-05-05 PROCEDURE — 83605 ASSAY OF LACTIC ACID: CPT

## 2017-05-05 PROCEDURE — 99285 EMERGENCY DEPT VISIT HI MDM: CPT

## 2017-05-05 RX ORDER — RAMELTEON 8 MG/1
8 TABLET ORAL NIGHTLY PRN
Status: DISCONTINUED | OUTPATIENT
Start: 2017-05-05 | End: 2017-05-09 | Stop reason: HOSPADM

## 2017-05-05 RX ORDER — HYDROMORPHONE HYDROCHLORIDE 2 MG/ML
0.5 INJECTION, SOLUTION INTRAMUSCULAR; INTRAVENOUS; SUBCUTANEOUS EVERY 5 MIN PRN
Status: DISCONTINUED | OUTPATIENT
Start: 2017-05-05 | End: 2017-05-05

## 2017-05-05 RX ORDER — METRONIDAZOLE 500 MG/100ML
INJECTION, SOLUTION INTRAVENOUS
Status: DISCONTINUED | OUTPATIENT
Start: 2017-05-05 | End: 2017-05-05

## 2017-05-05 RX ORDER — GLUCAGON 1 MG
1 KIT INJECTION
Status: DISCONTINUED | OUTPATIENT
Start: 2017-05-05 | End: 2017-05-09 | Stop reason: HOSPADM

## 2017-05-05 RX ORDER — HYDROCODONE BITARTRATE AND ACETAMINOPHEN 5; 325 MG/1; MG/1
1 TABLET ORAL EVERY 4 HOURS PRN
Status: DISCONTINUED | OUTPATIENT
Start: 2017-05-05 | End: 2017-05-05

## 2017-05-05 RX ORDER — MIDAZOLAM HYDROCHLORIDE 1 MG/ML
INJECTION INTRAMUSCULAR; INTRAVENOUS
Status: DISCONTINUED | OUTPATIENT
Start: 2017-05-05 | End: 2017-05-05

## 2017-05-05 RX ORDER — LIDOCAINE HYDROCHLORIDE 10 MG/ML
INJECTION INFILTRATION; PERINEURAL CODE/TRAUMA/SEDATION MEDICATION
Status: COMPLETED | OUTPATIENT
Start: 2017-05-05 | End: 2017-05-05

## 2017-05-05 RX ORDER — PROPOFOL 10 MG/ML
VIAL (ML) INTRAVENOUS
Status: DISCONTINUED | OUTPATIENT
Start: 2017-05-05 | End: 2017-05-05

## 2017-05-05 RX ORDER — IBUPROFEN 200 MG
24 TABLET ORAL
Status: DISCONTINUED | OUTPATIENT
Start: 2017-05-05 | End: 2017-05-09 | Stop reason: HOSPADM

## 2017-05-05 RX ORDER — SODIUM CHLORIDE 9 MG/ML
INJECTION, SOLUTION INTRAVENOUS CONTINUOUS
Status: ACTIVE | OUTPATIENT
Start: 2017-05-05 | End: 2017-05-05

## 2017-05-05 RX ORDER — CIPROFLOXACIN 2 MG/ML
INJECTION, SOLUTION INTRAVENOUS
Status: DISCONTINUED | OUTPATIENT
Start: 2017-05-05 | End: 2017-05-05

## 2017-05-05 RX ORDER — IBUPROFEN 200 MG
16 TABLET ORAL
Status: DISCONTINUED | OUTPATIENT
Start: 2017-05-05 | End: 2017-05-09 | Stop reason: HOSPADM

## 2017-05-05 RX ORDER — OXYCODONE HYDROCHLORIDE 5 MG/1
5 TABLET ORAL EVERY 4 HOURS PRN
Status: DISCONTINUED | OUTPATIENT
Start: 2017-05-05 | End: 2017-05-09 | Stop reason: HOSPADM

## 2017-05-05 RX ORDER — ONDANSETRON HYDROCHLORIDE 2 MG/ML
INJECTION, SOLUTION INTRAMUSCULAR; INTRAVENOUS
Status: DISCONTINUED | OUTPATIENT
Start: 2017-05-05 | End: 2017-05-05

## 2017-05-05 RX ORDER — SODIUM CHLORIDE 0.9 % (FLUSH) 0.9 %
3 SYRINGE (ML) INJECTION
Status: DISCONTINUED | OUTPATIENT
Start: 2017-05-05 | End: 2017-05-09 | Stop reason: HOSPADM

## 2017-05-05 RX ORDER — ONDANSETRON 2 MG/ML
4 INJECTION INTRAMUSCULAR; INTRAVENOUS EVERY 4 HOURS PRN
Status: DISCONTINUED | OUTPATIENT
Start: 2017-05-05 | End: 2017-05-08

## 2017-05-05 RX ORDER — FENTANYL CITRATE 50 UG/ML
INJECTION, SOLUTION INTRAMUSCULAR; INTRAVENOUS
Status: DISCONTINUED | OUTPATIENT
Start: 2017-05-05 | End: 2017-05-05

## 2017-05-05 RX ORDER — MORPHINE SULFATE 2 MG/ML
2 INJECTION, SOLUTION INTRAMUSCULAR; INTRAVENOUS EVERY 4 HOURS PRN
Status: DISCONTINUED | OUTPATIENT
Start: 2017-05-05 | End: 2017-05-09 | Stop reason: HOSPADM

## 2017-05-05 RX ORDER — SUCCINYLCHOLINE CHLORIDE 20 MG/ML
INJECTION INTRAMUSCULAR; INTRAVENOUS
Status: DISCONTINUED | OUTPATIENT
Start: 2017-05-05 | End: 2017-05-05

## 2017-05-05 RX ORDER — ONDANSETRON 2 MG/ML
4 INJECTION INTRAMUSCULAR; INTRAVENOUS ONCE AS NEEDED
Status: DISCONTINUED | OUTPATIENT
Start: 2017-05-05 | End: 2017-05-05

## 2017-05-05 RX ORDER — ROCURONIUM BROMIDE 10 MG/ML
INJECTION, SOLUTION INTRAVENOUS
Status: DISCONTINUED | OUTPATIENT
Start: 2017-05-05 | End: 2017-05-05

## 2017-05-05 RX ORDER — SODIUM CHLORIDE 9 MG/ML
INJECTION, SOLUTION INTRAVENOUS CONTINUOUS
Status: DISCONTINUED | OUTPATIENT
Start: 2017-05-05 | End: 2017-05-05

## 2017-05-05 RX ORDER — PHENYLEPHRINE HYDROCHLORIDE 10 MG/ML
INJECTION INTRAVENOUS
Status: DISCONTINUED | OUTPATIENT
Start: 2017-05-05 | End: 2017-05-05

## 2017-05-05 RX ORDER — POTASSIUM CHLORIDE 20 MEQ/1
20 TABLET, EXTENDED RELEASE ORAL ONCE
Status: COMPLETED | OUTPATIENT
Start: 2017-05-05 | End: 2017-05-05

## 2017-05-05 RX ORDER — SODIUM CHLORIDE 0.9 % (FLUSH) 0.9 %
3 SYRINGE (ML) INJECTION EVERY 8 HOURS
Status: DISCONTINUED | OUTPATIENT
Start: 2017-05-05 | End: 2017-05-09 | Stop reason: HOSPADM

## 2017-05-05 RX ADMIN — OXYCODONE HYDROCHLORIDE 5 MG: 5 TABLET ORAL at 06:05

## 2017-05-05 RX ADMIN — CIPROFLOXACIN 400 MG: 2 INJECTION, SOLUTION INTRAVENOUS at 02:05

## 2017-05-05 RX ADMIN — ONDANSETRON 8 MG: 2 INJECTION, SOLUTION INTRAMUSCULAR; INTRAVENOUS at 02:05

## 2017-05-05 RX ADMIN — CEFTRIAXONE 1 G: 1 INJECTION, SOLUTION INTRAVENOUS at 06:05

## 2017-05-05 RX ADMIN — ROCURONIUM BROMIDE 5 MG: 10 INJECTION, SOLUTION INTRAVENOUS at 01:05

## 2017-05-05 RX ADMIN — SODIUM CHLORIDE: 0.9 INJECTION, SOLUTION INTRAVENOUS at 06:05

## 2017-05-05 RX ADMIN — SUCCINYLCHOLINE CHLORIDE 100 MG: 20 INJECTION, SOLUTION INTRAMUSCULAR; INTRAVENOUS at 01:05

## 2017-05-05 RX ADMIN — LIDOCAINE HYDROCHLORIDE 5 ML: 10 INJECTION, SOLUTION INFILTRATION; PERINEURAL at 02:05

## 2017-05-05 RX ADMIN — HYDROMORPHONE HYDROCHLORIDE 0.5 MG: 2 INJECTION INTRAMUSCULAR; INTRAVENOUS; SUBCUTANEOUS at 03:05

## 2017-05-05 RX ADMIN — PHENYLEPHRINE HYDROCHLORIDE 200 MCG: 10 INJECTION INTRAVENOUS at 02:05

## 2017-05-05 RX ADMIN — PROPOFOL 100 MG: 10 INJECTION, EMULSION INTRAVENOUS at 01:05

## 2017-05-05 RX ADMIN — SODIUM CHLORIDE: 0.9 INJECTION, SOLUTION INTRAVENOUS at 01:05

## 2017-05-05 RX ADMIN — HYDROMORPHONE HYDROCHLORIDE 0.5 MG: 2 INJECTION INTRAMUSCULAR; INTRAVENOUS; SUBCUTANEOUS at 04:05

## 2017-05-05 RX ADMIN — IOHEXOL 125 ML: 350 INJECTION, SOLUTION INTRAVENOUS at 10:05

## 2017-05-05 RX ADMIN — MIDAZOLAM HYDROCHLORIDE 2 MG: 1 INJECTION, SOLUTION INTRAMUSCULAR; INTRAVENOUS at 01:05

## 2017-05-05 RX ADMIN — METRONIDAZOLE 500 MG: 500 SOLUTION INTRAVENOUS at 02:05

## 2017-05-05 RX ADMIN — MORPHINE SULFATE 2 MG: 2 INJECTION, SOLUTION INTRAMUSCULAR; INTRAVENOUS at 09:05

## 2017-05-05 RX ADMIN — EPHEDRINE SULFATE 10 MG: 50 INJECTION, SOLUTION INTRAMUSCULAR; INTRAVENOUS; SUBCUTANEOUS at 02:05

## 2017-05-05 RX ADMIN — RAMELTEON 8 MG: 8 TABLET, FILM COATED ORAL at 10:05

## 2017-05-05 RX ADMIN — FENTANYL CITRATE 100 MCG: 50 INJECTION, SOLUTION INTRAMUSCULAR; INTRAVENOUS at 01:05

## 2017-05-05 RX ADMIN — POTASSIUM CHLORIDE 20 MEQ: 1500 TABLET, EXTENDED RELEASE ORAL at 11:05

## 2017-05-05 NOTE — PROCEDURES
Radiology Post-Procedure Note    Pre Op Diagnosis: Worsening renal failure    Post Op Diagnosis: Same    Procedure: Right neph tube placement and left neph exchange    Procedure performed by: Lenard Gaviria MD    Written Informed Consent Obtained: Yes  Specimen Removed: NO  Estimated Blood Loss: Minimal    Findings:   Under fluoro guidance a right neph tube (8 Fr) was placed. No complications. Good decompression. Left neph tube almost pulled so exchanged for ne 8 Fr. neph tube. No complications. Keep open to bag.    Patient tolerated procedure well.    Lenard Gaviria M.D.  Diagnostic and Interventional Radiologist  Department of Radiology  Pager: [phone]

## 2017-05-05 NOTE — H&P
"[facility] Internal Medicine History and Physical - Resident Note    Admitting Team: U IM Team B  Attending Physician: Megan  Resident: Debbie  Interns: Atwi and Guccione    Date of Admit: 5/5/2017    Chief Complaint     Bladder Pain (states sent by Dr. Meadows and Dr. Boucher for possible admission; states having kidney problems and need "tube placed in right side"; pain to left flank area and bladder; denies fever)   for 1 week.    Subjective:      History of Present Illness:  This is a 67 year old male with bladder cancer (original dx 1995, recurrence 3/2017) and throat cancer (2005) s/p radiation and surgery presenting for doctor recommended return and kidney function worsening.    His recurrence was diagnosed late 3/2017, bolanos and nephrostomy tubes were placed early April 2017, and bolanos was removed 4/11.  Since then most of his urine output goes into his urostomy bags, not through his penis.  The patient was in his usual state of health (all ADLs, some restrictions lifting) until 5/1 when his R sided urostomy tube got caught on some furniture and pulled out.  He said the event was painful, and he notified his urologist Dr. Boucher.  He was seen on 5/2, they ordered labs, and gave him kayexelate for K 5.7.  At that time his Cr was 1.6.  They began following serial BMPs 2/2 the hyperkalemia, and on the morning of admission 5/5 they noted an improvement in potassium but a Cr bump to 2.3.    At this point they consulted his nephrologist Dr. Chavez and instructed him to present to the ED for emergent nephrostomy tube re-insertion.    The patient himself states that his R flank has been much less painful since accidentally pulling the nephrostomy tube.  He complains of L flank pain, especially at the nephrostomy tube site.  He complains of suprapubic pain intermittently without radiation.    Patient denies fever, endorses chills. 1 episode of nausea/vomiting on Wednesday.  Denies night sweats.  Endorses poor " appetite since ~1 month and 20 lb weight loss.    Past Medical History:  Past Medical History:   Diagnosis Date    Cancer     bladder and throat    Urinary tract infection        Past Surgical History:  Past Surgical History:   Procedure Laterality Date    BLADDER SURGERY      HERNIA REPAIR      THROAT SURGERY     Bladder surgery 1997  Hernia repair 2006  Throat surgery and radiation 2005  Kidney stent 4/62017    Allergies:  Review of patient's allergies indicates:  No Known Allergies    Home Medications:  Prior to Admission medications    Medication Sig Start Date End Date Taking? Authorizing Provider   acetaminophen (TYLENOL) 500 MG tablet Take 1,000 mg by mouth every 6 (six) hours as needed for Pain.    Historical Provider, MD   docusate sodium (COLACE) 100 MG capsule Take 1 capsule (100 mg total) by mouth 2 (two) times daily. 4/8/17   James Boucher MD   ergocalciferol (ERGOCALCIFEROL) 50,000 unit Cap Take 1 capsule (50,000 Units total) by mouth every 7 days. 4/8/17   James Boucher MD   ketoconazole (NIZORAL) 2 % shampoo USE TO WASH AFFECTED AREA ONCE DAILY 3/13/17   Historical Provider, MD   oxycodone (OXY-IR) 5 mg Cap Take 5 mg by mouth every 4 (four) hours as needed for Pain.    Historical Provider, MD   sodium polystyrene (KAYEXALATE) 15 gram/60 mL Susp Take 120 mLs (30 g total) by mouth 2 (two) times daily. 5/2/17   James Boucher MD   oxybutynin (DITROPAN-XL) 5 MG TR24  4/10/17 5/5/17  Historical Provider, MD   oxycodone-acetaminophen (PERCOCET) 5-325 mg per tablet Take 1 tablet by mouth every 4 (four) hours as needed. 4/8/17 5/5/17  James Boucher MD   pantoprazole (PROTONIX) 40 MG tablet TAKE 1 BY MOUTH ONCE A DAY FOR 60 DAYS 3/21/17 5/5/17  Historical Provider, MD       Family History:  Family History   Problem Relation Age of Onset    No Known Problems Father     Kidney disease Mother     Prostate cancer Neg Hx        Social History:  Social History   Substance Use Topics     "Smoking status: Never Smoker    Smokeless tobacco: Never Used    Alcohol use No       Review of Systems:  Pertinent items are noted in HPI. All other systems are reviewed and are negative.    Health Maintaince :   Primary Care Physician: Patel  Immunizations:   TDap is not up to date.  Influenza is not up to date.  Pneumovax is not up to date, patient refuses all vaccines.  Cancer Screening:  Colonoscopy: is up to date 2017.     Objective:   Last 24 Hour Vital Signs:  BP  Min: 121/58  Max: 121/58  Temp  Av.8 °F (36.6 °C)  Min: 97.8 °F (36.6 °C)  Max: 97.8 °F (36.6 °C)  Pulse  Av  Min: 100  Max: 100  Resp  Av  Min: 18  Max: 18  SpO2  Av %  Min: 97 %  Max: 97 %  Height  Av' 2" (188 cm)  Min: 6' 2" (188 cm)  Max: 6' 2" (188 cm)  Weight  Av.5 kg (140 lb)  Min: 63.5 kg (140 lb)  Max: 63.5 kg (140 lb)  Body mass index is 17.97 kg/(m^2).       Physical Examination:  Gen: NAD  Head: Normocephalic, atraumatic, no temporal wasting  Eyes: EOMI, PERRLA  Mouth: MMM, fair dentition, no erythema or exudates  Neck: no lymphadenopathy, JVP ~6cm  CV: RRR, no murmurs, rubs or additional heart sounds  Pulm: CTAB, no wheezes or crackles.  Abd: normoactive bowel sounds, nondistended, nontender.  L flank pain especially around L nephrostomy site. L nephrostomy site intact with nephrostomy tube protruding c/d/i with clean dressing, R flank with former nephrostomy site c/d/i with clean dressing.  Extremities: no c/c/e. 2+ pulses in all extremities  Skin: no rash or lesion  Neuro: moving all extremities well, nonfocal  Psych: AAOx4, calm, cooperative, conversational        Laboratory:  Most Recent Data:  CBC: Lab Results   Component Value Date    WBC 28.72 (H) 2017    HGB 8.1 (L) 2017    HCT 23.9 (L) 2017     (H) 2017    MCV 86 2017    RDW 14.4 2017     Lab Results   Component Value Date    GRAN 26.4 (H) 2017    GRAN 92.4 (H) 2017    LYMPH 1.1 " 05/05/2017    LYMPH 3.9 (L) 05/05/2017    MONO 1.0 05/05/2017    MONO 3.6 (L) 05/05/2017    EOS 0.0 05/05/2017    BASO 0.02 05/05/2017    EOSINOPHIL 0.0 05/05/2017    BASOPHIL 0.1 05/05/2017     BMP: Lab Results   Component Value Date     05/05/2017    K 3.9 05/05/2017    CL 98 05/05/2017    CO2 29 05/05/2017    BUN 38 (H) 05/05/2017    CREATININE 2.3 (H) 05/05/2017     (H) 05/05/2017    CALCIUM 9.5 05/05/2017    MG 1.5 (L) 04/08/2017    PHOS 3.7 04/08/2017     LFTs: Lab Results   Component Value Date    PROT 7.6 04/20/2017    ALBUMIN 3.9 04/20/2017    BILITOT 1.2 (H) 04/20/2017    AST 11 04/20/2017    ALKPHOS 58 04/20/2017    ALT 11 04/20/2017     Coags:   Lab Results   Component Value Date    INR 1.0 04/06/2017     FLP: No results found for: CHOL, HDL, LDLCALC, TRIG, CHOLHDL  DM: Lab Results   Component Value Date    CREATININE 2.3 (H) 05/05/2017     Thyroid: No results found for: TSH, FREET4, W3LVVOS, F2EPRZH, THYROIDAB  Anemia: Lab Results   Component Value Date    IRON 24 (L) 04/07/2017    TIBC 228 (L) 04/07/2017    FERRITIN 891 (H) 04/07/2017     Cardiac: No results found for: TROPONINI, CKTOTAL, CKMB, BNP  Urinalysis: Lab Results   Component Value Date    COLORU Yellow 04/07/2017    SPECGRAV 1.015 04/07/2017    NITRITE Negative 04/07/2017    KETONESU Negative 04/07/2017    UROBILINOGEN Negative 04/07/2017    WBCUA 2 04/07/2017       Trended Lab Data:    Recent Labs  Lab 05/02/17  0840 05/03/17  0757 05/05/17  0903 05/05/17  1137   WBC  --   --   --  28.72*   HGB  --   --   --  8.1*   HCT  --   --   --  23.9*   PLT  --   --   --  373*   MCV  --   --   --  86   RDW  --   --   --  14.4    141 139  --    K 5.6* 4.9 3.9  --     105 98  --    CO2 25 26 29  --    BUN 26* 22 38*  --    CREATININE 1.7* 1.6* 2.3*  --     113* 120*  --        Trended Cardiac Data:  No results for input(s): TROPONINI, CKTOTAL, CKMB, BNP in the last 168 hours.    Microbiology Data:    Other Laboratory  Data:    Other Results:  EKG (my interpretation): none.    Radiology (independent review by provider):  Imaging Results     None      5/5 CT urogram showing nondistneded bladder, improvement in hydronephrosis.  5/5 CT chest pending    Assessment:     Juan Manuel Koehler is a 67 y.o. male with:  Patient Active Problem List    Diagnosis Date Noted    Hydronephrosis 04/20/2017    ELDER (acute kidney injury) 04/07/2017    Hyperkalemia 04/07/2017    Difficult intubation 04/06/2017    Bladder tumor 04/05/2017    CKD (chronic kidney disease) 04/05/2017        Plan:     Bladder cancer with urinary outlet obstruction  - CT urogram shows resolved hydronephrosis on left and improved on right  - Consulted IR for emergent nephrostomy placement  - No bolanos in place, removed recently.   - Consider inpatient urology consultation     ELDER on CKD  Baseline Cr 1.6-1.7, on admit 2.3. K WNL.  - NS infusion @ 75ml/hr x 10 hours  - UA  - FENa   - UCx  - Consulted nephrology  - Trend BMP q8H     Leukocytosis  WBC 28.7 from normal baseline. 92% PMNs. Afebrile, no systemic signs of infection.  - UA  - CXR  - Lactate  - Prolactin  - No clinical suspicion for sepsis, no empiric abx at this time, follow above studies and clinical course     Inflammatory Anemia  Hb 8.1, c/w baseline. MCV 86. Recent iron studies show ferritin 891, low Fe, Tf, TIBC.  - Monitor CBC     Thrombocytosis  Platelets 373 from nml baseline. Likely reactive.  - Monitor     Fluids: NS 75ml/hr  Diet: NPO except meds for nephrostomy placement  Code status: Full  Dispo: admit to observation for IR procedure and trending kidney function    Neo Bueno MD HO-I  U Internal Medicine Team B    [facility] Medicine Hospitalist Pager numbers:   [facility] Hospitalist Medicine Team A (Filemon/Vicente): [phone]  [facility] Hospitalist Medicine Team B (Rick/Megan):  [phone]

## 2017-05-05 NOTE — ADDENDUM NOTE
Addendum  created 05/05/17 1600 by Kait Lopez CRNA    Anesthesia Event edited, Sign clinical note

## 2017-05-05 NOTE — IP AVS SNAPSHOT
[facility]  [address]  [place] [address]  Phone: [phone]           Patient Discharge Instructions   Our goal is to set you up for success. This packet includes information on your condition, medications, and your home care.  It will help you care for yourself to prevent having to return to the hospital.     Please ask your nurse if you have any questions.      There are many details to remember when preparing to leave the hospital. Here is what you will need to do:    1. Take your medicine. If you are prescribed medications, review your Medication List on the following pages. You may have new medications to  at the pharmacy and others that you'll need to stop taking. Review the instructions for how and when to take your medications. Talk with your doctor or nurses if you are unsure of what to do.     2. Go to your follow-up appointments. Specific follow-up information is listed in the following pages. Your may be contacted by a nurse or clinical provider about future appointments. Be sure we have all of the phone numbers to reach you. Please contact your provider's office if you are unable to make an appointment.     3. Watch for warning signs. Your doctor or nurse will give you detailed warning signs to watch for and when to call for assistance. These instructions may also include educational information about your condition. If you experience any of warning signs to your health, call your doctor.               ** Verify the list of medication(s) below is accurate and up to date. Carry this with you in case of emergency. If your medications have changed, please notify your healthcare provider.             Medication List      START taking these medications        Additional Info                      cholecalciferol (vitamin D3) 400 unit Cap   Quantity:  90 capsule   Refills:  0   Dose:  1200 Units    Last time this was given:  1,200 Units on 5/9/2017  9:53 AM   Instructions:   Take 3 capsules (1,200 Units total) by mouth once daily.     Begin Date    AM    Noon    PM    Bedtime       ciprofloxacin HCl 500 MG tablet   Commonly known as:  CIPRO   Quantity:  10 tablet   Refills:  0   Dose:  500 mg   Indications:  Urinary Tract Infection    Last time this was given:  500 mg on 5/9/2017  9:53 AM   Instructions:  Take 1 tablet (500 mg total) by mouth every 24 hours as needed.     Begin Date    AM    Noon    PM    Bedtime       docusate sodium 100 MG capsule   Commonly known as:  COLACE   Refills:  0   Dose:  100 mg    Last time this was given:  100 mg on 5/8/2017 10:07 AM   Instructions:  Take 1 capsule (100 mg total) by mouth 2 (two) times daily.     Begin Date    AM    Noon    PM    Bedtime       ondansetron 4 MG Tbdl   Commonly known as:  ZOFRAN-ODT   Quantity:  20 tablet   Refills:  1   Dose:  4 mg    Last time this was given:  4 mg on 5/9/2017  8:03 AM   Instructions:  Take 1 tablet (4 mg total) by mouth every 8 (eight) hours as needed.     Begin Date    AM    Noon    PM    Bedtime       polyethylene glycol 17 gram Pwpk   Commonly known as:  GLYCOLAX   Quantity:  10 packet   Refills:  2   Dose:  17 g    Last time this was given:  17 g on 5/8/2017 10:07 AM   Instructions:  Take 17 g by mouth daily as needed (for constipation).     Begin Date    AM    Noon    PM    Bedtime         CONTINUE taking these medications        Additional Info                      acetaminophen 500 MG tablet   Commonly known as:  TYLENOL   Refills:  0   Dose:  1000 mg   Indications:  Pain    Instructions:  Take 1,000 mg by mouth every 6 (six) hours as needed for Pain.     Begin Date    AM    Noon    PM    Bedtime       ketoconazole 2 % shampoo   Commonly known as:  NIZORAL   Refills:  3    Instructions:  USE TO WASH AFFECTED AREA ONCE DAILY     Begin Date    AM    Noon    PM    Bedtime       oxycodone 5 mg Cap   Commonly known as:  OXY-IR   Refills:  0   Dose:  5 mg    Instructions:  Take 5 mg by mouth every 4  (four) hours as needed for Pain.     Begin Date    AM    Noon    PM    Bedtime         STOP taking these medications     oxycodone-acetaminophen 5-325 mg per tablet   Commonly known as:  PERCOCET       pantoprazole 40 MG tablet   Commonly known as:  PROTONIX            Where to Get Your Medications      You can get these medications from any pharmacy     Bring a paper prescription for each of these medications     ciprofloxacin HCl 500 MG tablet    ondansetron 4 MG Tbdl    polyethylene glycol 17 gram Pwpk       You don't need a prescription for these medications     cholecalciferol (vitamin D3) 400 unit Cap    docusate sodium 100 MG capsule                  Please bring to all follow up appointments:    1. A copy of your discharge instructions.  2. All medicines you are currently taking in their original bottles.  3. Identification and insurance card.    Please arrive 15 minutes ahead of scheduled appointment time.    Please call 24 hours in advance if you must reschedule your appointment and/or time.        Your Scheduled Appointments     May 09, 2017  3:00 PM CDT   Consult with Mingo Calero MD   [place] [facility] H[facility]ology Oncology ([facility])    [address] [place] [id number]   [phone]            May 11, 2017  3:30 PM CDT   Established Patient Visit with MD [place] [facility] - Urology ([facility])    [address] [facility]  [place] [address] [phone]              Follow-up Information     Follow up with James Boucher MD On 5/11/2017.    Specialty:  Urology    Why:  at 3:30 pm    Contact information:    [address]  [address]  [place] [address]  [phone]          Follow up with Neva Chavez MD On 5/15/2017.    Specialty:  Nephrology    Why:  9:30 am     Contact information:    [address]  [address]  [place] [address]  [phone]          Follow up with LABS.    Why:  will call to notify of date    Contact information:    [address]  [address]  [place] [address]  1ST FLOOR OF MEDICAL OFFICE BUILDING        Discharge Instructions     Future Orders    Activity as tolerated     BATH/SHOWER CHAIR FOR HOME USE     Questions:    Height:  6' 2" (1.88 m)    Weight:  63.5 kg (140 lb)    Does patient have medical equipment at home?:  none    Length of need (1-99 months):  99    Type:  With back    Diet renal         Discharge Instructions       BLADDER CANCER, UNDERSTANDING (ENGLISH) View Edit Remove   KIDNEY INJURY, ACUTE, DISCHARGE INSTRUCTIONS FOR (ENGLISH) View Edit Remove   NEPHROSTOMY, PERCUTANEOUS, DISCHARGE INSTRUCTIONS (ENGLISH) View Edit Remove   BLOOD TRANSFUSION (ADULT), WHEN YOU NEED A (ENGLISH) View Edit Remove   CIPROFLOXACIN TABLETS (ENGLISH) View Edit Remove   ONDANSETRON TABLETS (ENGLISH) View Edit Remove         Discharge References/Attachments     BLADDER CANCER, UNDERSTANDING (ENGLISH)    KIDNEY INJURY, ACUTE, DISCHARGE INSTRUCTIONS FOR (ENGLISH)    NEPHROSTOMY, PERCUTANEOUS, DISCHARGE INSTRUCTIONS (ENGLISH)    BLOOD TRANSFUSION (ADULT), WHEN YOU NEED A (ENGLISH)    CIPROFLOXACIN TABLETS (ENGLISH)    ONDANSETRON TABLETS (ENGLISH)        Primary Diagnosis     Your primary diagnosis was:  Acute Nontraumatic Kidney Injury      Admission Information     Date & Time Provider Department [facility]    5/5/2017 11:19 AM Cody Guzman MD [facility] [phone]      Care Providers     Provider Role Specialty Primary office phone    Cody Guzman MD Attending Provider Internal Medicine [phone]    Lenard Gaviria MD Consulting Physician  Radiology [phone]    Tanner Rausch MD Consulting Physician  Nephrology [phone]    Lenard Gaviria MD Surgeon  Radiology [phone]      Your Vitals Were     BP Pulse Temp Resp Height Weight    144/70 (BP Location: Right arm, Patient Position: Lying, BP Method: Automatic) 85 98.2 °F (36.8 °C) (Oral) 18 6' 2" (1.88 m) 63.5 kg (140 lb)    SpO2 BMI             96% " 17.97 kg/m2         Recent Lab Values     No lab values to display.      Pending Labs     Order Current Status    Prepare RBC 1 Unit Preliminary result      Allergies as of 5/9/2017     No Known Allergies      [facility]     [facility] Nurse Care Line - 24/7 Assistance  Unless otherwise directed by your provider, please contact [facility], our nurse care line that is available for 24/7 assistance.     Registered nurses in the [facility] provide clinical advisement, health education, appointment booking, and other advisory services.  Call for this free service at [phone].        Advance Directives     An advance directive is a document which, in the event you are no longer able to make decisions for yourself, tells your healthcare team what kind of treatment you do or do not want to receive, or who you would like to make those decisions for you.  If you do not currently have an advance directive, [facility] encourages you to create one.  For more information call:  [phone]), [phone] ([phone]),  or log on to [email or web address].        Language Assistance Services     ATTENTION: Language assistance services are available, free of charge. Please call [phone].      ATENCIÓN: Si habla español, tiene a felton disposición servicios gratuitos de asistencia lingüística. Llame al [phone].     CHÚ Ý: N?u b?n nói Ti?ng Vi?t, có các d?ch v? h? tr? ngôn ng? mi?n phí dành cho b?n. G?i s? [phone].        Blood Transfusion Reaction Signs and Symptoms     The blood you have received has been matched for you as carefully as possible. Most patients who receive a blood transfusion do not experience problems. However, there can be a delayed reaction that happens a few weeks after your blood transfusion. Contact your physician immediately if you experience any NEW SYMPTOMS listed below:     Fever greater than 100.4 degrees    Chills   Yellow color to your  skin or eyes(Jaundice)   Back pain, chest pain, or pain at the infusion site   Weakness (more than usual)   Discomfort or uneasiness more than usual (Malaise)   Nausea or vomiting   Shortness of breath, wheezing, or coughing   Higher or lower blood pressure than normal   Skin rash, itching, skin redness, or localized swelling (example: hands or feet)   Urinating less than normal   Urine appears reddish or orange and is darker than normal      Remember that some these signs may already exist for you--such as having chronic back pain or high blood pressure. You only need to look for and report to your doctor any new occurrences since your blood transfusion that are of concern.        Chronic Kindey Disease Education             [facility] Sign-Up     Activating your [facility] account is as easy as 1-2-3!     1) Visit [email or web address], select Sign Up Now, enter this activation code and your date of birth, then select Next.  [email or web address]  Expires: 5/14/2017 12:48 PM      2) Create a username and password to use when you visit [facility] in the future and select a security question in case you lose your password and select Next.    3) Enter your e-mail address and click Sign Up!    Additional Information  If you have questions, please e-mail [email or web address] or call [phone] to talk to our [facility] staff. Remember, [facility] is NOT to be used for urgent needs. For medical emergencies, dial 911.          [facility] complies with applicable Federal civil rights laws and does not discriminate on the basis of race, color, national origin, age, disability, or sex.

## 2017-05-05 NOTE — ED PROVIDER NOTES
"Encounter Date: 5/5/2017       History     Chief Complaint   Patient presents with    Bladder Pain     states sent by Dr. Meadows and Dr. Shields for possible admission; states having kidney problems and need "tube placed in right side"; pain to left flank area and bladder; denies fever     Review of patient's allergies indicates:  No Known Allergies  HPI Comments: 66 Y/O  WM WITH PMHX OF INVASIVE BLADDER CANCER PRESENTS TO ED FOR ADMISSION FOR EMERGENT NEPHROSTOMY PLACEMENT.  THE PATIENT IS BEING FOLLOWED BY DR. SHIELDS AND DR. MEADOWS FOR THIS ISSUE. PT HAD BILATERAL NEPHROSTOMY TUBES PLACED.  HE WAS SEEN BY BOTH THIS WEEK BECAUSE HE ACCIDENTALLY PULLED OUT HIS RIGHT NEPHROSTOMY TUBE AT HOME.  THE PATIENT HAS BEEN VOIDING SINCE THE TUBE CAME OUT.  + YELLOW URINE OUTPUT VIA THE LEFT NEPHROSTOMY AND SMALL AMOUNT OF YELLOW URINE VOIDED.  NO HEMATURIA NOTED.  NO FEVER/CHILLS.  PT DOES REPORTS LEFT LATERAL BACK PAIN IN THE REGION OF HIS LEFT SIDED NEPHROSTOMY TUBE AND STATES THAT HE PICKED UP SOMETHING TOO HEAVY YESTERDAY CAUSING HIS PAIN.  PT HAD LABS DONE TODAY AND WAS NOTED TO HAVE AN ACUTE CHANGE OF HIS BUN/CR COMPARED TO 2 DAYS AGO.  HE WAS SENT TO ED FOR ADMISSION.        The history is provided by the patient and the spouse. No  was used.     Past Medical History:   Diagnosis Date    Cancer     bladder and throat    Urinary tract infection      Past Surgical History:   Procedure Laterality Date    BLADDER SURGERY      HERNIA REPAIR      THROAT SURGERY       Family History   Problem Relation Age of Onset    No Known Problems Father     Kidney disease Mother     Prostate cancer Neg Hx      Social History   Substance Use Topics    Smoking status: Never Smoker    Smokeless tobacco: Never Used    Alcohol use No     Review of Systems   Constitutional: Negative for chills and fever.   HENT: Negative.    Eyes: Negative.    Respiratory: Negative for cough and shortness of breath.  "   Cardiovascular: Negative for chest pain and palpitations.   Gastrointestinal: Negative for abdominal pain, diarrhea and vomiting.   Endocrine: Negative for polydipsia and polyphagia.   Genitourinary: Positive for dysuria. Negative for decreased urine volume.   Musculoskeletal: Positive for back pain. Negative for neck pain.   Skin: Negative for pallor and rash.   Allergic/Immunologic: Negative for immunocompromised state.   Neurological: Negative for dizziness and headaches.   Hematological: Negative.    Psychiatric/Behavioral: Negative for agitation and confusion.   All other systems reviewed and are negative.      Physical Exam   Initial Vitals   BP Pulse Resp Temp SpO2   05/05/17 1113 05/05/17 1113 05/05/17 1113 05/05/17 1113 05/05/17 1113   121/58 100 18 97.8 °F (36.6 °C) 97 %     Physical Exam    Nursing note and vitals reviewed.  Constitutional: He appears well-developed and well-nourished. He is not diaphoretic. No distress.   HENT:   Head: Normocephalic and atraumatic.   Eyes: Conjunctivae are normal. No scleral icterus.   Neck: Normal range of motion. Neck supple.   Cardiovascular: Normal rate, regular rhythm and normal heart sounds. Exam reveals no gallop and no friction rub.    No murmur heard.  Pulmonary/Chest: Breath sounds normal. No respiratory distress. He has no wheezes. He has no rhonchi. He has no rales. He exhibits no tenderness.   Abdominal: Soft. Bowel sounds are normal. He exhibits no distension. There is no tenderness.   Musculoskeletal: Normal range of motion.        Thoracic back: He exhibits tenderness and pain. He exhibits normal range of motion, no bony tenderness, no swelling and no spasm.        Back:    Neurological: He is alert and oriented to person, place, and time.   Skin: Skin is warm and dry.   Psychiatric: He has a normal mood and affect. His behavior is normal. Judgment and thought content normal.         ED Course   Procedures  Labs Reviewed   CBC W/ AUTO DIFFERENTIAL    COMPREHENSIVE METABOLIC PANEL   URINALYSIS             Medical Decision Making:   History:   Old Medical Records: I decided to obtain old medical records.  Old Records Summarized: other records.       <> Summary of Records: BUN/CR:  5/3/17:  22/1.6  5/5/17:  38/2.2  Initial Assessment:   66 Y/O MALE WITH PMHX OF BLADDER CANCER AND RECENT DX OF ELDER.    Differential Diagnosis:   DDX: ELDER, BLADDER CANCER  ED Management:  I HAVE SPOKEN WITH DR. SHIELDS WHO REPORTS THAT DR. ALLISON IS ON CALL FOR UROLOGY AT [facility] BECAUSE HE IS AT MAIN CAMP[facility] TODAY.  HE REPORTS THAT [facility] WILL NOT ADMIT SO MEDICINE IS CONSULTED FOR ADMIT.  DR. CAMARA AGREES TO ADMIT THE PATIENT.  PT IS AGREEABLE WITH PLAN.   Other:   I have discussed this case with another health care provider.       <> Summary of the Discussion: DR. SHIELDS AND DR. CAMARA                   ED Course     Clinical Impression:   The primary encounter diagnosis was ELDER (acute kidney injury). A diagnosis of Malignant neoplasm of urinary bladder, unspecified site was also pertinent to this visit.    Disposition:   Disposition: Admitted  Condition: Stable       Lindsey Gutiérrez MD  05/05/17 1206       Lindsey Gutiérrez MD  05/05/17 1206

## 2017-05-05 NOTE — ED NOTES
"Pt report he had labwork done and  MD called him and told him that he needs to have "tube replaced in rt side", pt awake alert in no acute distress, denies chest pain or sob, family at bedside, pt denies dysuria pt reports only small amount of urine output from penis due to nephrostomy  "

## 2017-05-05 NOTE — PLAN OF CARE
kreceived from IR per stretcher Monitors nd O2 pplied. C/o 5/10 pain. See flow sheets and MAr. Family notified of pt arrival and status.

## 2017-05-05 NOTE — ANESTHESIA POSTPROCEDURE EVALUATION
"Anesthesia Post Evaluation    Patient: Juan Manuel Koehler    Procedure(s) Performed: Procedure(s) (LRB):  [id number] TUBE (Right)    Final Anesthesia Type: general  Patient location during evaluation: PACU  Patient participation: Yes- Able to Participate  Level of consciousness: awake and alert  Post-procedure vital signs: reviewed and stable  Pain management: adequate  Airway patency: patent  PONV status at discharge: No PONV  Anesthetic complications: no      Cardiovascular status: hemodynamically stable  Respiratory status: unassisted  Hydration status: euvolemic  Follow-up not needed.        Visit Vitals    /63    Pulse 104    Temp 36.6 °C (97.9 °F)    Resp (!) 53    Ht 6' 2" (1.88 m)    Wt 63.5 kg (140 lb)    SpO2 99%    BMI 17.97 kg/m2       Pain/Deepika Score: Pain Rating Prior to Med Admin: 5 (5/5/2017  3:45 PM)      "

## 2017-05-05 NOTE — TRANSFER OF CARE
"Anesthesia Transfer of Care Note    Patient: Juan Manuel Koehler    Procedure(s) Performed: Procedure(s) (LRB):  [id number] TUBE (Right)    Patient location: PACU    Anesthesia Type: general    Transport from OR: Transported from OR on 6-10 L/min O2 by face mask with adequate spontaneous ventilation    Post pain: adequate analgesia    Post assessment: no apparent anesthetic complications    Post vital signs: stable    Level of consciousness: sedated and responds to stimulation    Nausea/Vomiting: no nausea/vomiting    Complications: none    Transfer of care protocol was followed      Last vitals:   Visit Vitals    BP (!) 117/58    Pulse 104    Temp 36.6 °C (97.9 °F)    Resp (!) 83    Ht 6' 2" (1.88 m)    Wt 63.5 kg (140 lb)    SpO2 95%    BMI 17.97 kg/m2     "

## 2017-05-05 NOTE — ANESTHESIA PREPROCEDURE EVALUATION
05/05/2017  Juan Manuel Koehler is a 67 y.o., male  for emergent right nephrostomy tube.     PRIOR ANES  Intubation: Placement Date: 04/06/17; Placement Time: 1034; Method of Intubation: Direct laryngoscopy; Inserted by: CRNA; Airway Device: Endotracheal Tube; Mask Ventilation: Easy; Intubated: Postinduction; Blade: Hui #2; Airway Device Size: 7.0; Style: Cuffed; Cuff Inflation: Minimal occlusive pressure; Placement Verified By: Auscultation, Capnometry; Grade: Grade I; Complicating Factors: None    ANES-RELATED MED/SURG  Bladder CA w obstruction   - severe hyperkalemia  Difficult Intubation (Mac#4: Grade IV, 2 attempts 2017-04-05)     Past Surgical History:   Procedure Laterality Date    BLADDER SURGERY      HERNIA REPAIR      THROAT SURGERY       Review of patient's allergies indicates:   Allergen Reactions    Pneumococcal 23-margarito ps vaccine      Past Medical History:   Diagnosis Date    Cancer     bladder and throat    Urinary tract infection          Anesthesia Evaluation      I have reviewed the Medications.     Review of Systems  Anesthesia Hx:  No problems with previous Anesthesia Denies Hx of Anesthetic complications History of prior surgery of interest to airway management or planning: Previous anesthesia: General Denies Family Hx of Anesthesia complications.   Denies Personal Hx of Anesthesia complications.   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 Also had bladder CA     EENT/Dental:EENT/Dental Normal   Cardiovascular:  Cardiovascular Normal Exercise tolerance: good     Pulmonary:  Pulmonary Normal    Renal/:   renal calculi    Musculoskeletal:  Musculoskeletal Normal    Neurological:  Neurology Normal    Endocrine:  Endocrine Normal     Dermatological:  Skin Normal    Psych:  Psychiatric Normal         Wt Readings from Last 3 Encounters:   05/05/17 63.5 kg (140 lb)   05/01/17 65.8 kg (145 lb)   04/20/17 74.8 kg (165 lb)     Temp Readings from Last 3 Encounters:   05/05/17 36.6 °C (97.8 °F) (Oral)   04/20/17 37 °C (98.6 °F) (Oral)   04/12/17 36.8 °C (98.2 °F) (Oral)     BP Readings from Last 3 Encounters:   05/05/17 (!) 121/58   05/01/17 (!) 148/78   04/20/17 137/66     Pulse Readings from Last 3 Encounters:   05/05/17 100   04/20/17 (!) 111   04/20/17 (!) 114       Physical Exam  General:  Well nourished    Airway/Jaw/Neck:  Airway Findings: Mouth Opening: Normal Tongue: Normal  General Airway Assessment: Adult  Mallampati: II      Dental:  Dental Findings: In tact   Chest/Lungs:  Chest/Lungs Findings: Clear to auscultation, Normal Respiratory Rate     Heart/Vascular:  Heart Findings: Rate: Normal  Rhythm: Regular Rhythm        Mental Status:  Mental Status Findings:  Cooperative, Alert and Oriented       Lab Results   Component Value Date    WBC 28.72 (H) 05/05/2017    HGB 8.1 (L) 05/05/2017    HCT 23.9 (L) 05/05/2017    MCV 86 05/05/2017     (H) 05/05/2017       Chemistry        Component Value Date/Time     05/05/2017 0903    K 3.9 05/05/2017 0903    CL 98 05/05/2017 0903    CO2 29 05/05/2017 0903    BUN 38 (H) 05/05/2017 0903    CREATININE 2.3 (H) 05/05/2017 0903     (H) 05/05/2017 0903        Component Value Date/Time    CALCIUM 9.5 05/05/2017 0903    ALKPHOS 58 04/20/2017 0900    AST 11 04/20/2017 0900    ALT 11 04/20/2017 0900    BILITOT 1.2 (H) 04/20/2017 0900        Lab Results   Component Value Date    ALBUMIN 3.9 04/20/2017     CXR 2017-03-30  No acute abnormality seen.    EKG 2017-05-05  Normal sinus rhythm  Minimal voltage criteria for LVH, may be normal variant  Borderline Abnormal ECG  When compared with ECG of 30-MAR-2017 07:38,  Questionable change in The axis  Confirmed by Estiven BUSTILLOS,  [place]    Anesthesia Plan  Type of Anesthesia, risks & benefits discussed:  Anesthesia Type:  general, MAC  Patient's Preference: general  Intra-op Monitoring Plan:   Intra-op Monitoring Plan Comments:   Post Op Pain Control Plan:   Post Op Pain Control Plan Comments:   Induction:   IV  Beta Blocker:         Informed Consent: Patient understands risks and agrees with Anesthesia plan.  Questions answered. Anesthesia consent signed with patient.  ASA Score: 2     Day of Surgery Review of History & Physical:            Ready For Surgery From Anesthesia Perspective.

## 2017-05-05 NOTE — CONSULTS
Inpatient Radiology Pre-procedure Note    History of Present Illness:  Juan Manuel Koehler is a 67 y.o. male who presents for right nephrostomy replacement due to worsening renal function.  Admission H&P reviewed.  Past Medical History:   Diagnosis Date    Cancer     bladder and throat    Urinary tract infection      Past Surgical History:   Procedure Laterality Date    BLADDER SURGERY      HERNIA REPAIR      THROAT SURGERY         Review of Systems:   As documented in primary team H&P    Home Meds:   Prior to Admission medications    Medication Sig Start Date End Date Taking? Authorizing Provider   acetaminophen (TYLENOL) 500 MG tablet Take 1,000 mg by mouth every 6 (six) hours as needed for Pain.    Historical Provider, MD   docusate sodium (COLACE) 100 MG capsule Take 1 capsule (100 mg total) by mouth 2 (two) times daily. 4/8/17   James Boucher MD   ergocalciferol (ERGOCALCIFEROL) 50,000 unit Cap Take 1 capsule (50,000 Units total) by mouth every 7 days. 4/8/17   James Boucher MD   ketoconazole (NIZORAL) 2 % shampoo USE TO WASH AFFECTED AREA ONCE DAILY 3/13/17   Historical Provider, MD   oxycodone (OXY-IR) 5 mg Cap Take 5 mg by mouth every 4 (four) hours as needed for Pain.    Historical Provider, MD   sodium polystyrene (KAYEXALATE) 15 gram/60 mL Susp Take 120 mLs (30 g total) by mouth 2 (two) times daily. 5/2/17   James Boucher MD   oxybutynin (DITROPAN-XL) 5 MG TR24  4/10/17 5/5/17  Historical Provider, MD   oxycodone-acetaminophen (PERCOCET) 5-325 mg per tablet Take 1 tablet by mouth every 4 (four) hours as needed. 4/8/17 5/5/17  James Boucher MD   pantoprazole (PROTONIX) 40 MG tablet TAKE 1 BY MOUTH ONCE A DAY FOR 60 DAYS 3/21/17 5/5/17  Historical Provider, MD     Scheduled Meds:    Continuous Infusions:    sodium chloride 0.9%       PRN Meds:dextrose 50%, dextrose 50%, glucagon (human recombinant), glucose, glucose, oxycodone  Anticoagulants/Antiplatelets: no anticoagulation    Allergies:  Review of patient's allergies indicates:  No Known Allergies  Sedation Hx: have not been any systemic reactions    Labs:  No results for input(s): INR in the last 168 hours.    Invalid input(s):  PT,  PTT    Recent Labs  Lab 05/05/17  1137   WBC 28.72*   HGB 8.1*   HCT 23.9*   MCV 86   *      Recent Labs  Lab 05/05/17  0903   *      K 3.9   CL 98   CO2 29   BUN 38*   CREATININE 2.3*   CALCIUM 9.5         Vitals:  Temp: 97.8 °F (36.6 °C) (05/05/17 1113)  Pulse: 100 (05/05/17 1113)  Resp: 18 (05/05/17 1113)  BP: (!) 121/58 (05/05/17 1113)  SpO2: 97 % (05/05/17 1113)     Physical Exam:  ASA: Per anesthesia  Mallampati: Per anesthesia    General: no acute distress  Mental Status: alert and oriented to person, place and time  HEENT: normocephalic, atraumatic  Chest: unlabored breathing  Heart: regular heart rate  Abdomen: nondistended  Extremity: moves all extremities    Plan: Fluoro guided right nephrostomy tube placement. Pt is decompressed on most recent imaging which will make neph tube placement harder. Will target Double J stent to access renal pelvis. Dr. Chavez and Dr. Boucher discussed the case and feel this is an emergency, therefore procedure needs to happen ASAP. Will d/w Anesthesia to aid with sedation. Pt agrees and understands.  Sedation Plan: Per anesthesia.    Lenard Gaviria M.D.  Diagnostic and Interventional Radiologist  Department of Radiology  Pager: [phone]

## 2017-05-06 PROBLEM — R11.0 NAUSEA: Status: ACTIVE | Noted: 2017-05-06

## 2017-05-06 PROBLEM — C67.9 MALIGNANT NEOPLASM OF URINARY BLADDER: Status: ACTIVE | Noted: 2017-04-05

## 2017-05-06 LAB
ABO + RH BLD: NORMAL
ALBUMIN SERPL BCP-MCNC: 2.5 G/DL
ALP SERPL-CCNC: 59 U/L
ALT SERPL W/O P-5'-P-CCNC: 14 U/L
ANION GAP SERPL CALC-SCNC: 10 MMOL/L
ANION GAP SERPL CALC-SCNC: 11 MMOL/L
ANION GAP SERPL CALC-SCNC: 12 MMOL/L
ANISOCYTOSIS BLD QL SMEAR: SLIGHT
AST SERPL-CCNC: 8 U/L
BASOPHILS # BLD AUTO: 0.01 K/UL
BASOPHILS # BLD AUTO: 0.02 K/UL
BASOPHILS NFR BLD: 0 %
BASOPHILS NFR BLD: 0.1 %
BILIRUB SERPL-MCNC: 0.2 MG/DL
BLD GP AB SCN CELLS X3 SERPL QL: NORMAL
BLD PROD TYP BPU: NORMAL
BLOOD UNIT EXPIRATION DATE: NORMAL
BLOOD UNIT TYPE CODE: 7300
BLOOD UNIT TYPE: NORMAL
BUN SERPL-MCNC: 37 MG/DL
BUN SERPL-MCNC: 38 MG/DL
BUN SERPL-MCNC: 39 MG/DL
CALCIUM SERPL-MCNC: 8.6 MG/DL
CALCIUM SERPL-MCNC: 8.7 MG/DL
CALCIUM SERPL-MCNC: 8.9 MG/DL
CHLORIDE SERPL-SCNC: 97 MMOL/L
CHLORIDE SERPL-SCNC: 97 MMOL/L
CHLORIDE SERPL-SCNC: 98 MMOL/L
CO2 SERPL-SCNC: 26 MMOL/L
CO2 SERPL-SCNC: 26 MMOL/L
CO2 SERPL-SCNC: 27 MMOL/L
CODING SYSTEM: NORMAL
CREAT SERPL-MCNC: 2.3 MG/DL
CREAT SERPL-MCNC: 2.4 MG/DL
CREAT SERPL-MCNC: 2.6 MG/DL
DIFFERENTIAL METHOD: ABNORMAL
DIFFERENTIAL METHOD: ABNORMAL
DISPENSE STATUS: NORMAL
EOSINOPHIL # BLD AUTO: 0.1 K/UL
EOSINOPHIL # BLD AUTO: 0.1 K/UL
EOSINOPHIL NFR BLD: 0.3 %
EOSINOPHIL NFR BLD: 0.3 %
ERYTHROCYTE [DISTWIDTH] IN BLOOD BY AUTOMATED COUNT: 14.2 %
ERYTHROCYTE [DISTWIDTH] IN BLOOD BY AUTOMATED COUNT: 14.6 %
EST. GFR  (AFRICAN AMERICAN): 28 ML/MIN/1.73 M^2
EST. GFR  (AFRICAN AMERICAN): 31 ML/MIN/1.73 M^2
EST. GFR  (AFRICAN AMERICAN): 33 ML/MIN/1.73 M^2
EST. GFR  (NON AFRICAN AMERICAN): 24 ML/MIN/1.73 M^2
EST. GFR  (NON AFRICAN AMERICAN): 27 ML/MIN/1.73 M^2
EST. GFR  (NON AFRICAN AMERICAN): 28 ML/MIN/1.73 M^2
GLUCOSE SERPL-MCNC: 138 MG/DL
GLUCOSE SERPL-MCNC: 146 MG/DL
GLUCOSE SERPL-MCNC: 177 MG/DL
HCT VFR BLD AUTO: 20.5 %
HCT VFR BLD AUTO: 24.5 %
HGB BLD-MCNC: 6.9 G/DL
HGB BLD-MCNC: 8.3 G/DL
LYMPHOCYTES # BLD AUTO: 0.8 K/UL
LYMPHOCYTES # BLD AUTO: 1 K/UL
LYMPHOCYTES NFR BLD: 4 %
LYMPHOCYTES NFR BLD: 4.3 %
MAGNESIUM SERPL-MCNC: 1.8 MG/DL
MCH RBC QN AUTO: 29.1 PG
MCH RBC QN AUTO: 29.2 PG
MCHC RBC AUTO-ENTMCNC: 33.7 %
MCHC RBC AUTO-ENTMCNC: 33.9 %
MCV RBC AUTO: 86 FL
MCV RBC AUTO: 87 FL
MONOCYTES # BLD AUTO: 0.9 K/UL
MONOCYTES # BLD AUTO: 1.1 K/UL
MONOCYTES NFR BLD: 3.9 %
MONOCYTES NFR BLD: 5.6 %
NEUTROPHILS # BLD AUTO: 18 K/UL
NEUTROPHILS # BLD AUTO: 20.3 K/UL
NEUTROPHILS NFR BLD: 89.7 %
NEUTROPHILS NFR BLD: 91.5 %
PHOSPHATE SERPL-MCNC: 4.9 MG/DL
PLATELET # BLD AUTO: 328 K/UL
PLATELET # BLD AUTO: 334 K/UL
PLATELET BLD QL SMEAR: ABNORMAL
PMV BLD AUTO: 8.7 FL
PMV BLD AUTO: 8.9 FL
POCT GLUCOSE: 136 MG/DL (ref 70–110)
POCT GLUCOSE: 142 MG/DL (ref 70–110)
POTASSIUM SERPL-SCNC: 3.7 MMOL/L
POTASSIUM SERPL-SCNC: 3.9 MMOL/L
POTASSIUM SERPL-SCNC: 4.2 MMOL/L
PROT SERPL-MCNC: 6.2 G/DL
RBC # BLD AUTO: 2.36 M/UL
RBC # BLD AUTO: 2.85 M/UL
SODIUM SERPL-SCNC: 134 MMOL/L
SODIUM SERPL-SCNC: 135 MMOL/L
SODIUM SERPL-SCNC: 135 MMOL/L
TRANS ERYTHROCYTES VOL PATIENT: NORMAL ML
WBC # BLD AUTO: 19.99 K/UL
WBC # BLD AUTO: 22.29 K/UL

## 2017-05-06 PROCEDURE — 86920 COMPATIBILITY TEST SPIN: CPT

## 2017-05-06 PROCEDURE — 99223 1ST HOSP IP/OBS HIGH 75: CPT | Mod: ,,, | Performed by: UROLOGY

## 2017-05-06 PROCEDURE — 80048 BASIC METABOLIC PNL TOTAL CA: CPT

## 2017-05-06 PROCEDURE — 94761 N-INVAS EAR/PLS OXIMETRY MLT: CPT

## 2017-05-06 PROCEDURE — 25000003 PHARM REV CODE 250: Performed by: HOSPITALIST

## 2017-05-06 PROCEDURE — 36430 TRANSFUSION BLD/BLD COMPNT: CPT

## 2017-05-06 PROCEDURE — 36415 COLL VENOUS BLD VENIPUNCTURE: CPT

## 2017-05-06 PROCEDURE — 84100 ASSAY OF PHOSPHORUS: CPT

## 2017-05-06 PROCEDURE — 25000003 PHARM REV CODE 250: Performed by: INTERNAL MEDICINE

## 2017-05-06 PROCEDURE — 83735 ASSAY OF MAGNESIUM: CPT

## 2017-05-06 PROCEDURE — P9021 RED BLOOD CELLS UNIT: HCPCS

## 2017-05-06 PROCEDURE — 86850 RBC ANTIBODY SCREEN: CPT

## 2017-05-06 PROCEDURE — G0378 HOSPITAL OBSERVATION PER HR: HCPCS

## 2017-05-06 PROCEDURE — 85025 COMPLETE CBC W/AUTO DIFF WBC: CPT

## 2017-05-06 PROCEDURE — 80053 COMPREHEN METABOLIC PANEL: CPT

## 2017-05-06 PROCEDURE — 25000003 PHARM REV CODE 250: Performed by: STUDENT IN AN ORGANIZED HEALTH CARE EDUCATION/TRAINING PROGRAM

## 2017-05-06 PROCEDURE — 63600175 PHARM REV CODE 636 W HCPCS: Performed by: STUDENT IN AN ORGANIZED HEALTH CARE EDUCATION/TRAINING PROGRAM

## 2017-05-06 PROCEDURE — 25000003 PHARM REV CODE 250: Performed by: ANESTHESIOLOGY

## 2017-05-06 PROCEDURE — 86900 BLOOD TYPING SEROLOGIC ABO: CPT

## 2017-05-06 PROCEDURE — 63600175 PHARM REV CODE 636 W HCPCS: Performed by: HOSPITALIST

## 2017-05-06 RX ORDER — SODIUM CHLORIDE, SODIUM LACTATE, POTASSIUM CHLORIDE, CALCIUM CHLORIDE 600; 310; 30; 20 MG/100ML; MG/100ML; MG/100ML; MG/100ML
INJECTION, SOLUTION INTRAVENOUS CONTINUOUS
Status: DISCONTINUED | OUTPATIENT
Start: 2017-05-06 | End: 2017-05-06

## 2017-05-06 RX ORDER — HYDROCODONE BITARTRATE AND ACETAMINOPHEN 500; 5 MG/1; MG/1
TABLET ORAL
Status: DISCONTINUED | OUTPATIENT
Start: 2017-05-06 | End: 2017-05-09 | Stop reason: HOSPADM

## 2017-05-06 RX ORDER — CHOLECALCIFEROL (VITAMIN D3) 10 MCG
1200 TABLET ORAL DAILY
Status: DISCONTINUED | OUTPATIENT
Start: 2017-05-06 | End: 2017-05-09 | Stop reason: HOSPADM

## 2017-05-06 RX ADMIN — CHOLECALCIFEROL TAB 10 MCG (400 UNIT) 1200 UNITS: 10 TAB at 01:05

## 2017-05-06 RX ADMIN — OXYCODONE HYDROCHLORIDE 5 MG: 5 TABLET ORAL at 08:05

## 2017-05-06 RX ADMIN — SODIUM CHLORIDE, PRESERVATIVE FREE 3 ML: 5 INJECTION INTRAVENOUS at 09:05

## 2017-05-06 RX ADMIN — OXYCODONE HYDROCHLORIDE 5 MG: 5 TABLET ORAL at 03:05

## 2017-05-06 RX ADMIN — OXYCODONE HYDROCHLORIDE 5 MG: 5 TABLET ORAL at 10:05

## 2017-05-06 RX ADMIN — CEFTRIAXONE 1 G: 1 INJECTION, SOLUTION INTRAVENOUS at 04:05

## 2017-05-06 RX ADMIN — ONDANSETRON 4 MG: 2 INJECTION INTRAMUSCULAR; INTRAVENOUS at 10:05

## 2017-05-06 RX ADMIN — TRAZODONE HYDROCHLORIDE 25 MG: 50 TABLET ORAL at 09:05

## 2017-05-06 RX ADMIN — SODIUM CHLORIDE, SODIUM LACTATE, POTASSIUM CHLORIDE, AND CALCIUM CHLORIDE: .6; .31; .03; .02 INJECTION, SOLUTION INTRAVENOUS at 03:05

## 2017-05-06 RX ADMIN — ONDANSETRON 4 MG: 2 INJECTION INTRAMUSCULAR; INTRAVENOUS at 11:05

## 2017-05-06 RX ADMIN — OXYCODONE HYDROCHLORIDE 5 MG: 5 TABLET ORAL at 06:05

## 2017-05-06 NOTE — PLAN OF CARE
Hx of bladder cancer (original dx 1995, recurrence 3/2017) and throat cancer (2005) s/p radiation and surgery presenting for doctor recommended return and kidney function worsening.     His recurrence was diagnosed late 3/2017, bolanos and nephrostomy tubes were placed early April 2017, and bolanos was removed 4/11. Since then most of his urine output goes into his urostomy bags, not through his penis. The patient was in his usual state of health (all ADLs, some restrictions lifting) until 5/1 when his R sided urostomy tube got caught on some furniture and pulled out. He said the event was painful, and he notified his urologist Dr. Boucher. He was seen on 5/2, they ordered labs, and gave him kayexelate for K 5.7. At that time his Cr was 1.6. They began following serial BMPs 2/2 the hyperkalemia, and on the morning of admission 5/5 they noted an improvement in potassium but a Cr bump to 2.3.     At this point they consulted his nephrologist Dr. Chavez and instructed him to present to the ED for emergent nephrostomy tube re-insertion.     The patient himself states that his R flank has been much less painful since accidentally pulling the nephrostomy tube. He complains of L flank pain, especially at the nephrostomy tube site. He complains of suprapubic pain intermittently without radiation.        05/06/17 1820   Discharge Assessment   Assessment Type Discharge Planning Assessment   Confirmed/corrected address and phone number on facesheet? Yes   Assessment information obtained from? Patient   Expected Length of Stay (days) 2   Communicated expected length of stay with patient/caregiver yes   Prior to hospitilization cognitive status: Alert/Oriented   Prior to hospitalization functional status: Independent   Current cognitive status: Alert/Oriented   Current Functional Status: Needs Assistance   Arrived From home or self-care   Lives With spouse   Able to Return to Prior Arrangements yes   Is patient able to care  for self after discharge? Yes   How many people do you have in your home that can help with your care after discharge? 1   Who are your caregiver(s) and their phone number(s)? wife- Darlene Koehler [phone]   Patient's perception of discharge disposition home or selfcare   Readmission Within The Last 30 Days previous discharge plan unsuccessful  (both admit for Hydronephrosis)   Patient currently being followed by outpatient case management? No   Patient currently receives home health services? No   Does the patient currently use HME? No   Patient currently receives private duty nursing? No   Patient currently receives any other outside agency services? No   Equipment Currently Used at Home none   Do you have any problems affording any of your prescribed medications? No   Is the patient taking medications as prescribed? yes   Do you have any financial concerns preventing you from receiving the healthcare you need? No   Does the patient have transportation to healthcare appointments? Yes   Transportation Available family or friend will provide;car   On Dialysis? No   Does the patient receive services at the Coumadin Clinic? No   Are there any open cases? No   Discharge Plan A Home   Discharge Plan B Home with family   Patient/Family In Agreement With Plan yes     Renetta Blanco RN Transitional Navigator  [phone]

## 2017-05-06 NOTE — PLAN OF CARE
Problem: Patient Care Overview  Goal: Plan of Care Review  Outcome: Ongoing (interventions implemented as appropriate)  Pt on room air with SpO2  93%. No respiratory distress noted. Will continue to monitor.

## 2017-05-06 NOTE — CONSULTS
[facility]  Adult Nutrition  Consult Note    SUMMARY     Recommendations    Recommendation/Intervention:   1. If phos continues to trend high- add Renal restriction to diet order   2. Rec boost plus tid   3. Monitor weight weekly   4. Will f/u with interview and education needs    Goals: Patient to meet 85% EEN  Nutrition Goal Status: new  Communication of RD Recs: other (comment) (sticky note)    Continuum of Care Plan    Referral to Outpatient Services:  (D/C planning: Reg diet with supplements)    Reason for Assessment    Reason for Assessment: nurse/nurse practitioner consult  Diagnosis:  (ELDER due to obstruction)  Relevent Medical History: bladder Ca   Interdisciplinary Rounds: did not attend     General Information Comments: Patient with decreased appetite/po intake PTA. Reports weight loss. Attempted to speak with patient but was unable at this time. Will f/u with interview.       Nutrition Prescription Ordered    Current Diet Order: Reg     Evaluation of Received Nutrients/Fluid Intake        % Intake of Estimated Needs: UMER at this time   % Intake of Meals: UMER at this time    I/O: 997/550       Nutrition Risk Screen     Nutrition Risk Screen: no indicators present    Nutrition/Diet History     Food Preferences: UMER  Factors Affecting Nutritional Intake: decreased appetite     Labs/Tests/Procedures/Meds     Pertinent Labs Reviewed: reviewed  Pertinent Medications Reviewed: reviewed       Physical Findings    Overall Physical Appearance: underweight  Oral/Mouth Cavity: WDL  Skin: intact (jabari 19)    Anthropometrics     Height (inches): 74.02 in  Weight Method: Bed Scale  Weight (kg): 63.5 kg  Ideal Body Weight (IBW), Male: 190.12 lb     % Ideal Body Weight, Male (lb): 73.63 lb     BMI (kg/m2): 17.97  BMI Grade: 17 - 18.4 protein-energy malnutrition grade I      Weight Loss: unintentional     Estimated/Assessed Needs    Weight Used For Calorie Calculations: 63.5 kg (139 lb 15.9 oz)    Height (cm): 188 cm     Energy Need Method: Kcal/kg (7318-0727 kcal (>for weight gain))     RMR ([place]-St. Jeor Equation): 1479.8      Weight Used For Protein Calculations: 63.5 kg (139 lb 15.9 oz)  Protein Requirements: 76-90 g    Fluid Need Method: RDA Method         Assessment and Plan    Nutrition Diagnosis    Problem: Inadequate Energy Intake  Etiology: Decreased appetite  As Evidenced by: weight loss PTA  Nutrition Diagnosis Status: New      Monitor and Evaluation    Food and Nutrient Intake: energy intake, food and beverage intake  Food and Nutrient Adminstration: diet order  Anthropometric Measurements: weight, weight change  Biochemical Data, Medical Tests and Procedures: electrolyte and renal panel, glucose/endocrine profile  Nutrition-Focused Physical Findings: overall appearance    Nutrition Risk    Level of Risk:  (2 x week)    Nutrition Follow-Up    RD Follow-up?: Yes

## 2017-05-06 NOTE — PLAN OF CARE
Patient with noted decrease in urinary output from left nephrostomy tube throughout shift. Order to flush nephrostomy tube daily. Left nephrostomy tube flushed at this time to ensure no blockage of tube. 10ml sterile saline flushed through left nephrostomy tube without difficutly, no resistence met and patient did not have any discomfort. Left nephrostomy tube opened back up to drain with no increase in urinary output.  Lactated ringers initiated per order.    0335. Dr. Koehler notified of decrease in left nephrostomy urinary output through throughout shift and no increase in output after flushing tube. Physician stated he will review patient's note and output trends.

## 2017-05-06 NOTE — PLAN OF CARE
Patient resting quietly in bed. Bilateral nephrostomy tubes in placed with dressings c/d/i. No drainage or swelling noted to nephrostomy sites.  Bilateral nephrostomy bags with clear yellow urine. Patient with complaints of tenderness to nephrostomy sites, prn oxycodone given. Call bell in reach and instructed to call for assistance.

## 2017-05-06 NOTE — CONSULTS
"Nephrology Consult    DATE OF ADMISSION:  5/5/2017  DATE OF CONSULT: 5/6/2017  REFERRING PHYSICIAN:  Cody Guzman MD  REASON FOR CONSULTATION: ELDER       CC:   Chief Complaint   Patient presents with    Bladder Pain     states sent by Dr. Meadows and Dr. Boucher for possible admission; states having kidney problems and need "tube placed in right side"; pain to left flank area and bladder; denies fever     HPI:  67 y.o. with PMH bladder ca diagnosed in 1990s, recurrent bladder ca with obstructive nephropathy s/p nephrostomy tubes previously presents with worsening renal function and decreased UOP from L tube.  He underwent nephrostomy tube exchange yesterday. He noticed excellent flow from L tube last night after exchange, then slowed overnight, then improved this morning, but sluggish again.  He c/o poor appetite and decreased PO intake. +nausea, no vomiting, relieved by zofran.  No CP, SOB, diarrhea, hematuria, hematochezia, melana.    He has appt on Monday with Dr Chavez and appt Tuesday to begin chemotherapy, otherwise has not started any medical treatments for cancer yet.      PMH:   Past Medical History:   Diagnosis Date    Cancer     bladder and throat    Urinary tract infection        Allergies: Review of patient's allergies indicates:  No Known Allergies    Home Meds:   No current facility-administered medications on file prior to encounter.      Current Outpatient Prescriptions on File Prior to Encounter   Medication Sig Dispense Refill    acetaminophen (TYLENOL) 500 MG tablet Take 1,000 mg by mouth every 6 (six) hours as needed for Pain.      ketoconazole (NIZORAL) 2 % shampoo USE TO WASH AFFECTED AREA ONCE DAILY  3    oxycodone (OXY-IR) 5 mg Cap Take 5 mg by mouth every 4 (four) hours as needed for Pain.         Inpatient Meds: Scheduled Meds:   cefTRIAXone (ROCEPHIN) IVPB  1 g Intravenous Q24H    sodium chloride 0.9%  3 mL Intravenous Q8H     Continuous Infusions:   PRN Meds:.sodium " chloride, dextrose 50%, dextrose 50%, glucagon (human recombinant), glucose, glucose, morphine, ondansetron, oxycodone, pneumoc 13-margarito conj-dip cr(PF), promethazine (PHENERGAN) IVPB, ramelteon, sodium chloride 0.9%    FH:   Family History   Problem Relation Age of Onset    No Known Problems Father     Kidney disease Mother     Prostate cancer Neg Hx        SH:   Social History     Social History    Marital status:      Spouse name: N/A    Number of children: N/A    Years of education: N/A     Occupational History    Not on file.     Social History Main Topics    Smoking status: Never Smoker    Smokeless tobacco: Never Used    Alcohol use No    Drug use: No    Sexual activity: Yes     Partners: Female     Birth control/ protection: None     Other Topics Concern    Not on file     Social History Narrative       PSxHx:   Past Surgical History:   Procedure Laterality Date    BLADDER SURGERY      HERNIA REPAIR      THROAT SURGERY         ROS:   Constitutional - no fever, chills  HEENT - no vision changes, no metallic taste  CVS - no chest pain, edema  Resp - no cough, shortness of breath  GI - see hpi   - see hpi  Neuro - no headaches  Skin - no rash  Vascular - no edema  Heme - no chills or fever  10 point review of systems negative except as above.    Physical Exam:   Temp:  [97.3 °F (36.3 °C)-98.4 °F (36.9 °C)] 98.4 °F (36.9 °C)  Pulse:  [] 90  Resp:  [16-94] 17  SpO2:  [92 %-100 %] 94 %  BP: ()/(51-66) 114/66  I/O last 3 completed shifts:  In: 400 [I.V.:400]  Out: 940 [Urine:940]  I/O this shift:  In: 697.9 [I.V.:697.9]  Out: 300 [Urine:300]    Intake/Output Summary (Last 24 hours) at 05/06/17 1154  Last data filed at 05/06/17 1100   Gross per 24 hour   Intake          1097.92 ml   Output             1240 ml   Net          -142.08 ml     Wt Readings from Last 1 Encounters:   05/05/17 1700 63.5 kg (140 lb)   05/05/17 1113 63.5 kg (140 lb)     Lines - bilateral nephrostomy tubes  with clear yellow urine in the bags  Vital signs reviewed.  Gen - no acute distress, alert and oriented  HENT - normocephalic, atraumatic  Eyes - extraocular motions intact  CVS - RRR, no murmurs or rubs  Resp - non-labored, clear to auscultation, no wheezes, rales, or rhonchi  Abd - soft, non-tender, non-distended, no rebound or guarding  Ext/Vascular - no cyanosis or edema   Skin - no rash or lesions  Neuro - alert and oriented, normal speech, moves all four extremities, no asterixis  Psych - normal mood and affect, normal thought process    Labs:   Recent Results (from the past 24 hour(s))   Urinalysis    Collection Time: 05/05/17  1:40 PM   Result Value Ref Range    Specimen UA Urine, Clean Catch     Color, UA Yellow Yellow, Straw, Zohreh    Appearance, UA Hazy (A) Clear    pH, UA 6.0 5.0 - 8.0    Specific Gravity, UA <=1.005 (A) 1.005 - 1.030    Protein, UA 2+ (A) Negative    Glucose, UA Negative Negative    Ketones, UA Negative Negative    Bilirubin (UA) Negative Negative    Occult Blood UA 3+ (A) Negative    Nitrite, UA Positive (A) Negative    Urobilinogen, UA Negative <2.0 EU/dL    Leukocytes, UA 2+ (A) Negative   Creatinine, urine, random    Collection Time: 05/05/17  1:40 PM   Result Value Ref Range    Creatinine, Random Ur 63.8 23.0 - 375.0 mg/dL   Sodium, urine, random    Collection Time: 05/05/17  1:40 PM   Result Value Ref Range    Sodium Urine Random 37 20 - 250 mmol/L   Urinalysis Microscopic    Collection Time: 05/05/17  1:40 PM   Result Value Ref Range    RBC, UA 3 0 - 4 /hpf    WBC, UA 62 (H) 0 - 5 /hpf    WBC Clumps, UA Moderate (A) None-Rare    Bacteria, UA Moderate (A) None-Occ /hpf    Squam Epithel, UA 2 /hpf    Hyaline Casts, UA 0 0-1/lpf /lpf    Microscopic Comment SEE COMMENT    Phosphorus    Collection Time: 05/05/17  6:03 PM   Result Value Ref Range    Phosphorus 4.0 2.7 - 4.5 mg/dL   Basic metabolic panel    Collection Time: 05/05/17  6:03 PM   Result Value Ref Range    Sodium 135 (L)  136 - 145 mmol/L    Potassium 3.2 (L) 3.5 - 5.1 mmol/L    Chloride 98 95 - 110 mmol/L    CO2 27 23 - 29 mmol/L    Glucose 127 (H) 70 - 110 mg/dL    BUN, Bld 38 (H) 8 - 23 mg/dL    Creatinine 2.2 (H) 0.5 - 1.4 mg/dL    Calcium 8.6 (L) 8.7 - 10.5 mg/dL    Anion Gap 10 8 - 16 mmol/L    eGFR if African American 34 (A) >60 mL/min/1.73 m^2    eGFR if non African American 30 (A) >60 mL/min/1.73 m^2   Lactic acid, plasma    Collection Time: 05/05/17  6:03 PM   Result Value Ref Range    Lactate (Lactic Acid) 0.6 0.5 - 2.2 mmol/L   POCT glucose    Collection Time: 05/05/17  9:32 PM   Result Value Ref Range    POCT Glucose 149 (H) 70 - 110 mg/dL   Basic metabolic panel    Collection Time: 05/05/17 11:43 PM   Result Value Ref Range    Sodium 135 (L) 136 - 145 mmol/L    Potassium 3.7 3.5 - 5.1 mmol/L    Chloride 98 95 - 110 mmol/L    CO2 26 23 - 29 mmol/L    Glucose 177 (H) 70 - 110 mg/dL    BUN, Bld 38 (H) 8 - 23 mg/dL    Creatinine 2.3 (H) 0.5 - 1.4 mg/dL    Calcium 8.6 (L) 8.7 - 10.5 mg/dL    Anion Gap 11 8 - 16 mmol/L    eGFR if African American 33 (A) >60 mL/min/1.73 m^2    eGFR if non African American 28 (A) >60 mL/min/1.73 m^2   Magnesium    Collection Time: 05/06/17  4:01 AM   Result Value Ref Range    Magnesium 1.8 1.6 - 2.6 mg/dL   CBC with Automated Differential    Collection Time: 05/06/17  4:02 AM   Result Value Ref Range    WBC 19.99 (H) 3.90 - 12.70 K/uL    RBC 2.36 (L) 4.60 - 6.20 M/uL    Hemoglobin 6.9 (L) 14.0 - 18.0 g/dL    Hematocrit 20.5 (L) 40.0 - 54.0 %    MCV 87 82 - 98 fL    MCH 29.2 27.0 - 31.0 pg    MCHC 33.7 32.0 - 36.0 %    RDW 14.6 (H) 11.5 - 14.5 %    Platelets 328 150 - 350 K/uL    MPV 8.9 (L) 9.2 - 12.9 fL    Gran # 18.0 (H) 1.8 - 7.7 K/uL    Lymph # 0.8 (L) 1.0 - 4.8 K/uL    Mono # 1.1 (H) 0.3 - 1.0 K/uL    Eos # 0.1 0.0 - 0.5 K/uL    Baso # 0.02 0.00 - 0.20 K/uL    Gran% 89.7 (H) 38.0 - 73.0 %    Lymph% 4.0 (L) 18.0 - 48.0 %    Mono% 5.6 4.0 - 15.0 %    Eosinophil% 0.3 0.0 - 8.0 %    Basophil%  0.1 0.0 - 1.9 %    Differential Method Automated    Comprehensive Metabolic Panel (CMP)    Collection Time: 05/06/17  4:02 AM   Result Value Ref Range    Sodium 135 (L) 136 - 145 mmol/L    Potassium 4.2 3.5 - 5.1 mmol/L    Chloride 97 95 - 110 mmol/L    CO2 26 23 - 29 mmol/L    Glucose 138 (H) 70 - 110 mg/dL    BUN, Bld 39 (H) 8 - 23 mg/dL    Creatinine 2.4 (H) 0.5 - 1.4 mg/dL    Calcium 8.7 8.7 - 10.5 mg/dL    Total Protein 6.2 6.0 - 8.4 g/dL    Albumin 2.5 (L) 3.5 - 5.2 g/dL    Total Bilirubin 0.2 0.1 - 1.0 mg/dL    Alkaline Phosphatase 59 55 - 135 U/L    AST 8 (L) 10 - 40 U/L    ALT 14 10 - 44 U/L    Anion Gap 12 8 - 16 mmol/L    eGFR if African American 31 (A) >60 mL/min/1.73 m^2    eGFR if non African American 27 (A) >60 mL/min/1.73 m^2   Phosphorus    Collection Time: 05/06/17  4:02 AM   Result Value Ref Range    Phosphorus 4.9 (H) 2.7 - 4.5 mg/dL   POCT glucose    Collection Time: 05/06/17  6:08 AM   Result Value Ref Range    POCT Glucose 142 (H) 70 - 110 mg/dL   Prepare RBC 1 Unit    Collection Time: 05/06/17  6:24 AM   Result Value Ref Range    UNIT NUMBER [id number]     PRODUCT CODE [id number]     DISPENSE STATUS ISSUED     CODING SYSTEM WXRO905     Unit Blood Type Code 7300     Unit Blood Type B POS     Unit Expiration [id number]    Type & Screen    Collection Time: 05/06/17  6:48 AM   Result Value Ref Range    Group & Rh B POS     Indirect Nikolas NEG      Imaging Results         US Kidney Only (Final result) Result time:  05/06/17 10:09:21    Final result by Kash Cortez DO (05/06/17 10:09:21)    Impression:        1.  Mild fullness of the left renal collecting system with a minimal amount of perinephric free fluid on the left..      Electronically signed by: KASH CORTEZ D.O.  Date:     05/06/17  Time:    10:09     Narrative:    Comparison: CT from 05/05/2017.    Findings: Renal ultrasound performed.  The kidneys are normal in length measuring 11.0 cm on the right and 12.5 cm on the left.   There is normal cortical medullary differentiation and cortical thickness.  There is mild fullness of the renal collecting system on the left.  A small amount of perinephric fluid is seen on the left..  .            X-Ray Chest 1 View (Final result) Result time:  05/06/17 07:47:57    Final result by Kash Cortez DO (05/06/17 07:47:57)    Impression:     As above      Electronically signed by: KASH CORTEZ D.O.  Date:     05/06/17  Time:    07:47     Narrative:    Single view of the chest    Comparison: 03/30/2017    Findings: There is some mild elevation of the right hemidiaphragm with some subsegmental atelectatic changes suspected within either lung base.  There is some minimal stranding change in the retrocardiac region which is also likely representative of atelectasis although minimal consolidation in the retrocardiac region cannot be excluded.  No pleural effusion suggested.            IR Nephrostomy Tube Placement (Final result) Result time:  05/05/17 18:11:17    Final result by Suhail Rajan MD (05/05/17 18:11:17)    Impression:        1.  Fluoroscopic-guided placement of right nephrostomy catheter.  2.  Fluoroscopically guided exchange of left nephrostomy catheter.  3.  Bilateral antegrade nephrostograms demonstrating persistent dilation of the collecting system with good flow of urine into the urinary bladder via ureters and stents.    Plan and followup: Keep catheter was opened to drainage bag.  Flush catheters with 10 cc normal saline daily.  If no surgical intervention is planned, consider replacement of catheters in 3 months.      Electronically signed by: SUHAIL RAJAN MD  Date:     05/05/17  Time:    18:11     Narrative:    History: 15-year-old male with worsening renal function after right nephrostomy accidental removal presents for right nephrostomy replacement.    Comparison: 4/20/17.    : Dr. Rajan, interventional radiologist.    Sedation: The anesthesia with 1 percent  lidocaine and general endotracheal anesthesia care provided by the anesthesia team.  Duration of procedure: 30 minutes.    Fluoroscopy time: 7 minutes.    Procedures:  1.  Fluoroscopic guided access of right collecting system followed by antegrade nephrostogram placement of 8 Bulgarian nephrostomy catheter.  2.  Exchange of left collecting system nephrostomy catheter under fluoroscopy.    Procedure in detail: Risks, benefits and alternatives were explained.  Informed consent obtained.  Patient brought to angiography suite and placed prone on the table after general endotracheal anesthesia was induced and maintained throughout.  A radiology nurse and the anesthesia team monitored the patient.  A  image was obtained which demonstrate bilateral double-J stents and a partially pulled left nephrostomy.  As the nephrostogram the right.  There is persistent contrast within the collecting system, worse on the right, likely from recent CT scan.  The bilateral flank regions and exiting catheter were prepped and draped in usual sterile fashion.    Local anesthesia provided 1 percent lidocaine.  A Chiba needle was advanced to a right collecting system renal pelvis.  There was return of urine.  Contrast was injected and antegrade nephrostogram obtained which demonstrates evidence for hydronephrosis.  There is contrast opacifying the ureter and flow into aorta bladder which is distended with urine.  A spot of air was injected and used to identify a posterior calyx in the midpole.  Then, after local anesthesia was administered, an AccuStick needle was advanced into a posterior calyx.  A guidewire was manipulated to the renal pelvis.  After a series of dilators and exchanges, an 8 Bulgarian nephrostomy catheter was placed and secured to the skin.    Contrast was injected through the existing left nephrostomy catheter which demonstrates the catheter to an upper pole calyx.  Urine is present there is position, with study to exchange  the catheter.  Guidewire was advanced and coiled within the renal pelvis.  The existing catheter was removed.  A new 8 Austrian nephrostomy catheter was then advanced and the loop formed within the pelvis.  The catheter was secured to the skin.    Final bilateral antegrade nephrostograms were obtained which demonstrate adequate opacification of the collecting systems and flow contrast into her bladder via the ureters and stents.            IR Nephrostomy Tube Change (In process)         IR Nephrostogram through Existing access (In process)         IR Nephrostogram through New Access (In process)         IR Antegrade Pyelogram (In process)         IR Ultrasound Guidance (No Result on File)           A/P:   ARF on CKD3  - Baseline Cr 1.6   - ARF is due to obstructive nephropathy 2/2 bladder tumor h/o bilateral hydronephrosis s/p bilateral nephrostomy tubes - neph tubes exchanged 5/5/17 due to poor L output and worsening renal fxn  - he underwent TURBT of bladder tumor 4/6/17   - Cr is stable, increased UOP overall but still sluggish UOP through L tube despite exchange  - continue IVF tonight at a slow rate, monitor closely for signs of volume overload and discontinue IVF if any signs develop.    - repeat BMP in AM and monitor for evidence of polyuria overnight.  If renal fxn is stable and no significant post-obstructive diuresis, then he can be discharged tomorrow if ok with Urology with f/u in Dr Chavez's office Monday, will need labs repeated again on Monday as well as an outpatient.  - no urgent indication for dialysis   - monitor daily weights, strict I&Os.   - avoid nephrotoxic agents including NSAIDs, renally dose medications for GFR <30  - BP marginal overnight, on no anti-hypertensive agents     Anemia of chronic disease  - transfuse today  - transfuse if Hgb <7     MBD/Secondary Hyperparathyroidism  -  and Vitamin D 28.  Start Cholecalciferol     Bilateral hydronephrosis, thickened bladder wall,  enlarged prostate. S/p TURBT of >5cm bladder tumor on 4/6/17  Bladder ca dx 1990s    Thank you for allowing me to participate in the care of your patient.  Please call with any questions.    Erika Duron MD   Nephrology  [facility]  Office [phone]    Cross covering for:  [facility]  QAMAR Rausch MD, SAGAR MALAGON MD,   MD AIYANA Espino, NP  [address]  [place], [address] [phone]  After hours answering service: [phone]

## 2017-05-06 NOTE — PLAN OF CARE
Dr. Koehler notified of H/H 6.9/20.5 and that urine output from left nephrostomy is cloudy now in comparison to earlier in the shift. Dr. Koehler stated that ultrasound of kindeys is ordered.

## 2017-05-06 NOTE — PLAN OF CARE
Patient with complaints of nausea. Episode of emesis x1. zofran ordered however patient stated that episode of nausea passed quickly and did not require any nausea medication

## 2017-05-06 NOTE — PLAN OF CARE
Recommendations     Recommendation/Intervention:   1. If phos continues to trend high- add Renal restriction to diet order   2. Rec boost plus tid   3. Monitor weight weekly   4. Will f/u with interview and education needs     Goals: Patient to meet 85% EEN  Nutrition Goal Status: new  Communication of RD Recs: other (comment) (sticky note)     Continuum of Care Plan     Referral to Outpatient Services: (D/C planning: Reg diet with supplements)

## 2017-05-06 NOTE — PLAN OF CARE
Problem: Patient Care Overview  Goal: Plan of Care Review  Outcome: Ongoing (interventions implemented as appropriate)  Plan of care discussed with patient. Safety measures maintained with call bell in reach.  Bilateral nephrostomy tubes sites c/d/i with dressing in place.  Bilateral nephrostomy tubes draining clear yellow urine with right greater than left in regards to output.  VSS.

## 2017-05-06 NOTE — PLAN OF CARE
Problem: Patient Care Overview  Goal: Plan of Care Review  Outcome: Ongoing (interventions implemented as appropriate)  Plan of care reviewed with pt. Pt verbalized understanding. Pt received 1 unit of PRBCs today and tolerated well. Consult to urology called in today. Nephrostomy bags CDI, Left sided decreased output compared to right side, left side also with some sedimentation. Daily irrigation order to bilateral tubes completed during shift. Pt receiving IV antibiotics daily. PRN pain medication and nausea medication given per order. Safety measures maintained. Bed is in the lowest position and locked. Call bell is within reach. Instructed pt to call for assistance. Pt verbalized understanding, Will continue to monitor.

## 2017-05-06 NOTE — PROGRESS NOTES
"[facility] IM Resident [facility] Progress Note    Subjective:      Juan Manuel Koehler is a 67 y.o. male who is being followed by the [facility] IM service at [facility] for Urinary obstruction.    This morning, patient complains of difficulty sleeping.  Denies chest pain, fever.  Endorses some bilateral flank pain.     Objective:   Last 24 Hour Vital Signs:  BP  Min: 96/51  Max: 138/62  Temp  Av.7 °F (36.5 °C)  Min: 97.3 °F (36.3 °C)  Max: 98.3 °F (36.8 °C)  Pulse  Av.7  Min: 65  Max: 104  Resp  Av.2  Min: 16  Max: 94  SpO2  Av.1 %  Min: 92 %  Max: 100 %  Height  Av' 2" (188 cm)  Min: 6' 2" (188 cm)  Max: 6' 2" (188 cm)  Weight  Av.5 kg (140 lb)  Min: 63.5 kg (140 lb)  Max: 63.5 kg (140 lb)  I/O last 3 completed shifts:  In: 400 [I.V.:400]  Out: 300 [Urine:300]    Physical Examination:  Vitals:    17 0400   BP: 120/64   Pulse: 92   Resp: 18   Temp: 97.3 °F (36.3 °C)     Gen: NAD  HENT: NCAT, EOMI, MMM  CV: RRR, no murmurs, rubs or additional heart sounds  Pulm: CTAB, no wheezes or crackles.  Abd: L flank with continued pain around new nephrostomy site. Site c/d/i.  L nephrostomy with less output than R sided.  R side with interval placement of nephrostomy tube c/d/i.  Extremities: no c/c/e. 2+ pulses in all extremities  Skin: no rash or lesion  Neuro: moving all extremities well, nonfocal  Psych: AAOx4, calm, cooperative, conversational    Laboratory:  Laboratory Data Reviewed: yes    Recent Labs  Lab 17  1137 17  1803 17  2343 17  0402   WBC 28.72*  --   --  19.99*   HGB 8.1*  --   --  6.9*   HCT 23.9*  --   --  20.5*   *  --   --  328   MCV 86  --   --  87   RDW 14.4  --   --  14.6*   NA  --  135* 135* 135*   K  --  3.2* 3.7 4.2   CL  --  98 98 97   CO2  --  27 26 26   BUN  --  38* 38* 39*   CREATININE  --  2.2* 2.3* 2.4*   GLU  --  127* 177* 138*   PROT  --   --   --  6.2   ALBUMIN  --   --   --  2.5*   BILITOT  --   --   --  0.2   AST  --   --   --  8* "   ALKPHOS  --   --   --  59   ALT  --   --   --  14     Microbiology Data Reviewed: yes  Pertinent Findings:  Urine Cx 5/5 pending    Other Results:  EKG (my interpretation): none    Radiology Data Reviewed: yes  Pertinent Findings:  None new.  Renal US pending    Current Medications:     Infusions:   lactated ringers 125 mL/hr at 05/06/17 0325        Scheduled:   cefTRIAXone (ROCEPHIN) IVPB  1 g Intravenous Q24H    sodium chloride 0.9%  3 mL Intravenous Q8H        PRN:  sodium chloride, dextrose 50%, dextrose 50%, glucagon (human recombinant), glucose, glucose, morphine, ondansetron, oxycodone, pneumoc 13-margarito conj-dip cr(PF), promethazine (PHENERGAN) IVPB, ramelteon, sodium chloride 0.9%    Antibiotics and Day Number of Therapy:  Rocephin 5/5    Lines and Day Number of Therapy:  PIV  L Nephrostomy  R Nephrostomy    Assessment:     Juan Manuel Koehler is a 67 y.o.male with  Patient Active Problem List    Diagnosis Date Noted    Hydronephrosis 04/20/2017    ELDER (acute kidney injury) 04/07/2017    Hyperkalemia 04/07/2017    Difficult intubation 04/06/2017    Bladder tumor 04/05/2017    CKD (chronic kidney disease) 04/05/2017        Plan:     This is a 67 year old patient presenting with:    Bladder cancer with urinary outlet obstruction  - CT urogram shows resolved hydronephrosis on left and improved on right  - Consulted IR for emergent nephrostomy placement  - No bolanos in place, removed recently.   - Consider inpatient urology consultation  - Nephrostomy R tube placed, L tube exchanged. Now L with decreased output  - pending renal US      ELDER on CKD  Baseline Cr 1.6-1.7, on admit 2.3. K WNL.  - NS infusion @ 75ml/hr x 10 hours  - UA showing WBC 62, moderate bacteria  - FENa 1% c/w prerenal  - UCx pending  - Consulted nephrology  - Trending BMP q8H  - Cr this AM 2.4  - UA suspicious for infection, started rocephin      Leukocytosis  WBC 28.7 from normal baseline. 92% PMNs. Afebrile, no systemic signs of  infection.  - UA  - CXR  - Lactate  - Prolactin  - No clinical suspicion for sepsis, no empiric abx at this time, follow above studies and clinical course      Inflammatory Anemia  Hb 8.1, c/w baseline. MCV 86. Recent iron studies show ferritin 891, low Fe, Tf, TIBC.  - monitoring CBC  - this AM Hb 6.9, likely post-procedural and dilutional  - transfusing 1 unit pRBCs      Thrombocytosis  Platelets 373 from nml baseline. Likely reactive.  - Monitoring      Fluids: NS 75ml/hr  Diet: NPO except meds for nephrostomy placement  Code status: Full  Dispo: admit to observation for IR procedure and trending kidney function    Neo Bueno MD HO-1  U Internal Medicine Service Team B    [facility] Medicine Hospitalist Pager numbers:   [facility] Hospitalist Medicine Team A (Filemon/Vicente): [phone]  [facility] Hospitalist Medicine Team B (Rick/Megan):  [phone]

## 2017-05-07 LAB
ANION GAP SERPL CALC-SCNC: 12 MMOL/L
ANION GAP SERPL CALC-SCNC: 9 MMOL/L
BASOPHILS # BLD AUTO: 0.02 K/UL
BASOPHILS NFR BLD: 0.1 %
BUN SERPL-MCNC: 36 MG/DL
BUN SERPL-MCNC: 38 MG/DL
CALCIUM SERPL-MCNC: 8.7 MG/DL
CALCIUM SERPL-MCNC: 9.3 MG/DL
CHLORIDE SERPL-SCNC: 95 MMOL/L
CHLORIDE SERPL-SCNC: 97 MMOL/L
CO2 SERPL-SCNC: 27 MMOL/L
CO2 SERPL-SCNC: 28 MMOL/L
CREAT SERPL-MCNC: 2.7 MG/DL
CREAT SERPL-MCNC: 2.9 MG/DL
DIFFERENTIAL METHOD: ABNORMAL
EOSINOPHIL # BLD AUTO: 0.1 K/UL
EOSINOPHIL NFR BLD: 0.8 %
ERYTHROCYTE [DISTWIDTH] IN BLOOD BY AUTOMATED COUNT: 14.1 %
EST. GFR  (AFRICAN AMERICAN): 25 ML/MIN/1.73 M^2
EST. GFR  (AFRICAN AMERICAN): 27 ML/MIN/1.73 M^2
EST. GFR  (NON AFRICAN AMERICAN): 21 ML/MIN/1.73 M^2
EST. GFR  (NON AFRICAN AMERICAN): 23 ML/MIN/1.73 M^2
GLUCOSE SERPL-MCNC: 106 MG/DL
GLUCOSE SERPL-MCNC: 166 MG/DL
HCT VFR BLD AUTO: 25.4 %
HGB BLD-MCNC: 8.6 G/DL
LYMPHOCYTES # BLD AUTO: 0.9 K/UL
LYMPHOCYTES NFR BLD: 5.6 %
MAGNESIUM SERPL-MCNC: 1.8 MG/DL
MCH RBC QN AUTO: 29.2 PG
MCHC RBC AUTO-ENTMCNC: 33.9 %
MCV RBC AUTO: 86 FL
MONOCYTES # BLD AUTO: 0.6 K/UL
MONOCYTES NFR BLD: 3.9 %
NEUTROPHILS # BLD AUTO: 13.7 K/UL
NEUTROPHILS NFR BLD: 89.2 %
PHOSPHATE SERPL-MCNC: 3.7 MG/DL
PLATELET # BLD AUTO: 409 K/UL
PMV BLD AUTO: 8.8 FL
POCT GLUCOSE: 148 MG/DL (ref 70–110)
POCT GLUCOSE: 155 MG/DL (ref 70–110)
POTASSIUM SERPL-SCNC: 3.5 MMOL/L
POTASSIUM SERPL-SCNC: 3.9 MMOL/L
RBC # BLD AUTO: 2.95 M/UL
SODIUM SERPL-SCNC: 134 MMOL/L
SODIUM SERPL-SCNC: 134 MMOL/L
WBC # BLD AUTO: 15.35 K/UL

## 2017-05-07 PROCEDURE — 85025 COMPLETE CBC W/AUTO DIFF WBC: CPT

## 2017-05-07 PROCEDURE — 25000003 PHARM REV CODE 250: Performed by: ANESTHESIOLOGY

## 2017-05-07 PROCEDURE — 25000003 PHARM REV CODE 250: Performed by: STUDENT IN AN ORGANIZED HEALTH CARE EDUCATION/TRAINING PROGRAM

## 2017-05-07 PROCEDURE — 84100 ASSAY OF PHOSPHORUS: CPT

## 2017-05-07 PROCEDURE — 25000003 PHARM REV CODE 250: Performed by: INTERNAL MEDICINE

## 2017-05-07 PROCEDURE — 25000003 PHARM REV CODE 250: Performed by: HOSPITALIST

## 2017-05-07 PROCEDURE — 94761 N-INVAS EAR/PLS OXIMETRY MLT: CPT

## 2017-05-07 PROCEDURE — 36415 COLL VENOUS BLD VENIPUNCTURE: CPT

## 2017-05-07 PROCEDURE — 80048 BASIC METABOLIC PNL TOTAL CA: CPT | Mod: 91

## 2017-05-07 PROCEDURE — 80048 BASIC METABOLIC PNL TOTAL CA: CPT

## 2017-05-07 PROCEDURE — 83735 ASSAY OF MAGNESIUM: CPT

## 2017-05-07 PROCEDURE — 11000001 HC ACUTE MED/SURG PRIVATE ROOM

## 2017-05-07 RX ORDER — CIPROFLOXACIN 500 MG/1
500 TABLET ORAL EVERY 12 HOURS
Status: DISCONTINUED | OUTPATIENT
Start: 2017-05-07 | End: 2017-05-08

## 2017-05-07 RX ADMIN — SODIUM CHLORIDE, PRESERVATIVE FREE 3 ML: 5 INJECTION INTRAVENOUS at 10:05

## 2017-05-07 RX ADMIN — RAMELTEON 8 MG: 8 TABLET, FILM COATED ORAL at 10:05

## 2017-05-07 RX ADMIN — CIPROFLOXACIN 500 MG: 500 TABLET, FILM COATED ORAL at 09:05

## 2017-05-07 RX ADMIN — SODIUM CHLORIDE, PRESERVATIVE FREE 3 ML: 5 INJECTION INTRAVENOUS at 05:05

## 2017-05-07 RX ADMIN — CHOLECALCIFEROL TAB 10 MCG (400 UNIT) 1200 UNITS: 10 TAB at 08:05

## 2017-05-07 RX ADMIN — SODIUM CHLORIDE 1000 ML: 0.9 INJECTION, SOLUTION INTRAVENOUS at 01:05

## 2017-05-07 RX ADMIN — SODIUM CHLORIDE, PRESERVATIVE FREE 3 ML: 5 INJECTION INTRAVENOUS at 01:05

## 2017-05-07 RX ADMIN — OXYCODONE HYDROCHLORIDE 5 MG: 5 TABLET ORAL at 07:05

## 2017-05-07 RX ADMIN — OXYCODONE HYDROCHLORIDE 5 MG: 5 TABLET ORAL at 01:05

## 2017-05-07 RX ADMIN — OXYCODONE HYDROCHLORIDE 5 MG: 5 TABLET ORAL at 08:05

## 2017-05-07 NOTE — CONSULTS
[facility]  Urology  Consult Note    Patient Name: Juan Manuel Koehler  MRN: [id number]  Admission Date: 5/5/2017  Hospital Length of Stay: 0   Code Status: Full Code   Attending Provider: Cody Guzman MD   Consulting Provider: Chelsy Navas MD  Primary Care Physician: Hemant Sweeney MD  Principal Problem:ELDER (acute kidney injury)    Inpatient consult to Urology  Consult performed by: CHELSY NAVAS  Consult ordered by: JENNY CAIN          Subjective:     HPI:  Urology consulted for decreased urine out put left N-tube, s/p x-change yest. Also replacement rt n-tube s/p accidental removal by pt. Also bilat JJ stents in place. N-grams yest show good flow to bladder bilat.  Sono this am mild left fullness left col. System. Slight rise of cr from 2.4 to 2.6 today. Pt reports some sediment & occ clot from left n-tube since the x-change yest. N-tubes being irrig daily with saline. Currently all urine output via N-tubes; pt not voiding    Pt s/p TURBT for invasive bl ca with bilat obst, prompting bilat n-tubes & JJ stents. Followed by Neph for CRF; due to start neoadj chemo next week    Pt nathaly PO, tho has come nausea/ no vomiting.      Past Medical History:   Diagnosis Date    Cancer     bladder and throat    Urinary tract infection        Past Surgical History:   Procedure Laterality Date    BLADDER SURGERY      HERNIA REPAIR      THROAT SURGERY         Review of patient's allergies indicates:  No Known Allergies    Family History     Problem Relation (Age of Onset)    Kidney disease Mother    No Known Problems Father          Social History Main Topics    Smoking status: Never Smoker    Smokeless tobacco: Never Used    Alcohol use No    Drug use: No    Sexual activity: Yes     Partners: Female     Birth control/ protection: None       Review of Systems   Constitutional: Positive for appetite change. Negative for chills.   HENT: Negative.    Eyes: Negative.     Respiratory: Negative.    Cardiovascular: Negative.    Gastrointestinal: Negative for abdominal distention and abdominal pain.   Genitourinary:        Per hpi   Musculoskeletal: Negative.    Neurological: Negative.    Psychiatric/Behavioral: Negative.        Objective:     Temp:  [97.3 °F (36.3 °C)-98.7 °F (37.1 °C)] 97.8 °F (36.6 °C)  Pulse:  [81-93] 81  Resp:  [16-18] 18  SpO2:  [93 %-97 %] 94 %  BP: (114-134)/(57-67) 129/63     Body mass index is 17.97 kg/(m^2).      Date 05/06/17 0700 - 05/07/17 0659   Shift 9911-0008 3956-7617 3582-3674 24 Hour Total   I  N  T  A  K  E   P.O.  180  180    I.V.  (mL/kg) 697.9  (11)   697.9  (11)    Blood 250   250    IV Piggyback  50  50    Shift Total  (mL/kg) 947.9  (14.9) 230  (3.6)  1177.9  (18.5)   O  U  T  P  U  T   Urine  (mL/kg/hr) 300  (0.6) 380  680    Shift Total  (mL/kg) 300  (4.7) 380  (6)  680  (10.7)   Weight (kg) 63.5 63.5 63.5 63.5          Drains     Drain                 Nephrostomy 05/05/17 1503 Right 8 Fr. 1 day         Nephrostomy 05/05/17 1504 Left 8 Fr. 1 day                Physical Exam   Nursing note and vitals reviewed.  Constitutional: He is oriented to person, place, and time. No distress.   Chronically ill appearing male   HENT:   Head: Normocephalic and atraumatic.   Eyes: Conjunctivae are normal. Pupils are equal, round, and reactive to light.   Neck: Normal range of motion. Neck supple.   Cardiovascular: Normal rate and regular rhythm.    Pulmonary/Chest: Effort normal. He has no wheezes.   Abdominal: Soft. He exhibits no distension.   Genitourinary: Testes normal and penis normal.       Circumcised. No phimosis.   Musculoskeletal: Normal range of motion.   Neurological: He is alert and oriented to person, place, and time.   Skin: Skin is warm and dry.     Psychiatric: He has a normal mood and affect.       Significant Labs:    BMP:    Recent Labs  Lab 05/05/17  2343 05/06/17  0402 05/06/17  1605   * 135* 134*   K 3.7 4.2 3.9   CL 98 97  97   CO2 26 26 27   BUN 38* 39* 37*   CREATININE 2.3* 2.4* 2.6*   CALCIUM 8.6* 8.7 8.9       CBC:    Recent Labs  Lab 05/05/17  1137 05/06/17  0402 05/06/17  1611   WBC 28.72* 19.99* 22.29*   HGB 8.1* 6.9* 8.3*   HCT 23.9* 20.5* 24.5*   * 328 334       All pertinent labs results from the past 24 hours have been reviewed.    Significant Imaging:  All pertinent imaging results/findings from the past 24 hours have been reviewed.sono from today reviewed; agree w mild l fullness                    Assessment and Plan:     * ELDER (acute kidney injury)  Output from left N-tube from last 2 hrs is OK at 110cc . Decreased output most likely mechanical d/t recent x-change. These factors now resolving. No intervention needed other than continued daily irrigation. Continue to monitor RFT's. Nephrology continues to follow      VTE Risk Mitigation         Ordered     Medium Risk of VTE  Once      05/05/17 1210     Place sequential compression device  Until discontinued      05/05/17 1210          Thank you for your consult. I will sign off. Please contact us if you have any additional questions.    Michael Navas MD  Urology  [facility]

## 2017-05-07 NOTE — PROGRESS NOTES
[facility] IM Resident [facility] Progress Note    Subjective:      Juan Manuel Koehler is a 67 y.o. male who is being followed by the [facility] IM service at [facility] for Urinary obstruction.    Had some difficulty sleeping overnight and was given trazadone which helped him considerably. Continues to have good urine output from his nephrostomy tube. States he is having some pain from his left nephrostomy tube insertion site but it is tolerable (3/10). Otherwise he has been afebrile and denies any fevers/chills.     Objective:   Last 24 Hour Vital Signs:  BP  Min: 114/66  Max: 138/68  Temp  Av.1 °F (36.7 °C)  Min: 97.3 °F (36.3 °C)  Max: 98.7 °F (37.1 °C)  Pulse  Av.9  Min: 81  Max: 93  Resp  Av.3  Min: 16  Max: 20  SpO2  Av.2 %  Min: 93 %  Max: 94 %  I/O last 3 completed shifts:  In: 1427.9 [P.O.:430; I.V.:697.9; Blood:250; IV Piggyback:50]  Out: 1970 [Urine:1940; Emesis/NG output:30]    Physical Examination:  Vitals:    17 0440   BP: 138/68   Pulse: 82   Resp: 20   Temp: 98.1 °F (36.7 °C)     Gen: NAD  HENT: NCAT, EOMI, MMM  CV: RRR, no murmurs, rubs or additional heart sounds  Pulm: CTAB, no wheezes or crackles.  Abd: L flank with continued pain around new nephrostomy site. Site c/d/i.  L nephrostomy with less output than R sided.  R side with interval placement of nephrostomy tube c/d/i.  Extremities: no c/c/e. 2+ pulses in all extremities  Skin: no rash or lesion  Neuro: moving all extremities well, nonfocal  Psych: AAOx4, calm, cooperative, conversational    Laboratory:  Laboratory Data Reviewed: yes    Recent Labs  Lab 17  1137  17  0402 17  1605 17  1611 17  0400   WBC 28.72*  --  19.99*  --  22.29*  --    HGB 8.1*  --  6.9*  --  8.3*  --    HCT 23.9*  --  20.5*  --  24.5*  --    *  --  328  --  334  --    MCV 86  --  87  --  86  --    RDW 14.4  --  14.6*  --  14.2  --    NA  --   < > 135* 134*  --  134*   K  --   < > 4.2 3.9  --  3.5   CL  --   < > 97  97  --  97   CO2  --   < > 26 27  --  28   BUN  --   < > 39* 37*  --  36*   CREATININE  --   < > 2.4* 2.6*  --  2.7*   GLU  --   < > 138* 146*  --  106   PROT  --   --  6.2  --   --   --    ALBUMIN  --   --  2.5*  --   --   --    BILITOT  --   --  0.2  --   --   --    AST  --   --  8*  --   --   --    ALKPHOS  --   --  59  --   --   --    ALT  --   --  14  --   --   --    < > = values in this interval not displayed.  Microbiology Data Reviewed: yes  Pertinent Findings:  Urine Cx 5/5 w/ enterococcus, sensitivities pending    Other Results:  EKG (my interpretation): none    Radiology Data Reviewed: yes  Pertinent Findings:  Renal US w/ minimal amount of perinephric free fluid on the left    Current Medications:     Infusions:        Scheduled:   cefTRIAXone (ROCEPHIN) IVPB  1 g Intravenous Q24H    cholecalciferol (vitamin D3)  1,200 Units Oral Daily    sodium chloride 0.9%  3 mL Intravenous Q8H        PRN:  sodium chloride, dextrose 50%, dextrose 50%, glucagon (human recombinant), glucose, glucose, morphine, ondansetron, oxycodone, pneumoc 13-margarito conj-dip cr(PF), promethazine (PHENERGAN) IVPB, ramelteon, sodium chloride 0.9%    Antibiotics and Day Number of Therapy:  Rocephin 5/5    Lines and Day Number of Therapy:  PIV  L Nephrostomy  R Nephrostomy    Assessment:     Juan Manuel Koehler is a 67 y.o.male with  Patient Active Problem List    Diagnosis Date Noted    Nausea 05/06/2017    Leukocytosis     Hydronephrosis 04/20/2017    ELDER (acute kidney injury) 04/07/2017    Hyperkalemia 04/07/2017    Difficult intubation 04/06/2017    Malignant neoplasm of urinary bladder 04/05/2017    CKD (chronic kidney disease) 04/05/2017        Plan:     This is a 67 year old patient presenting with:    Bladder cancer with urinary outlet obstruction  - CT urogram shows resolved hydronephrosis on left and improved on right  - Consulted IR for emergent nephrostomy placement  - No bolanos in place, removed recently.   - Urology  consultation  - Nephrostomy R tube placed, L tube exchanged. Now L with good output  - Renal U/S w/ minimal amount of perinephric free fluid on the left  - Urology consulted and rec continued daily irrigation      ELDER on CKD  Baseline Cr 1.6-1.7, on admit 2.3. K WNL.  - NS infusion @ 75ml/hr x 10 hours  - UA showing WBC 62, moderate bacteria  - FENa 1% c/w prerenal  - UCx pending  - Consulted nephrology  - Trending BMP q8H  - Cr this AM 2.4  - UA suspicious for infection, started rocephin      UTI  WBC 28.7 from normal baseline. 92% PMNs. Afebrile, no systemic signs of infection.  - UCx w/ enterococcus: sensitivities pending  - time, follow above studies and clinical course      Inflammatory Anemia  Hb 8.1, c/w baseline. MCV 86. Recent iron studies show ferritin 891, low Fe, Tf, TIBC.  - monitoring CBC  - 5/6 Hb 6.9, likely post-procedural and dilutional  - S/p 1 unit pRBCs  - Hgb pending this AM      Thrombocytosis  Platelets 373 from nml baseline. Likely reactive.  - Monitoring      Diet: Regular  Code status: Full  Dispo: Waiting on UCx sensitivities; home today vs. Tomorrow    Cecil Lemus MD HO-1  U Internal Medicine Service Team B    [facility] Medicine Hospitalist Pager numbers:   [facility] Hospitalist Medicine Team A (Filemon/Vicente): [phone]  [facility] Hospitalist Medicine Team B (Rick/Megan):  [phone]

## 2017-05-07 NOTE — PLAN OF CARE
Problem: Patient Care Overview  Goal: Plan of Care Review  Outcome: Ongoing (interventions implemented as appropriate)  Plan of care reviewed with patient. Patient verbalized understanding. Bilateral nephrostomy dressing clean, dry and intact. Good output from both drains; see intake/output flow sheet for data. Patient c/o intermittent pain and nausea. Both well controlled with prn oxycodone and zofran. Bed is low and locked, call light is within reach. Patient has been instructed to call if in need of assistance. Verbalized understanding.

## 2017-05-07 NOTE — PLAN OF CARE
Problem: Patient Care Overview  Goal: Plan of Care Review  Outcome: Ongoing (interventions implemented as appropriate)  Plan of care reviewed with pt. Pt verbalized understanding. Nephrostomy bags CDI, Left sided decreased output compared to right side. Daily irrigation order to bilateral tubes completed during shift. Pt receiving IV antibiotics daily. PRN pain medication and nausea medication given per order. Pt received 1000 cc of NS at 250cc/hr. Safety measures maintained. Bed is in the lowest position and locked. Call bell is within reach. Instructed pt to call for assistance. Pt verbalized understanding, Will continue to monitor. Noted orders for PT and OT during shift.

## 2017-05-07 NOTE — SUBJECTIVE & OBJECTIVE
Past Medical History:   Diagnosis Date    Cancer     bladder and throat    Urinary tract infection        Past Surgical History:   Procedure Laterality Date    BLADDER SURGERY      HERNIA REPAIR      THROAT SURGERY         Review of patient's allergies indicates:  No Known Allergies    Family History     Problem Relation (Age of Onset)    Kidney disease Mother    No Known Problems Father          Social History Main Topics    Smoking status: Never Smoker    Smokeless tobacco: Never Used    Alcohol use No    Drug use: No    Sexual activity: Yes     Partners: Female     Birth control/ protection: None       Review of Systems   Constitutional: Positive for appetite change. Negative for chills.   HENT: Negative.    Eyes: Negative.    Respiratory: Negative.    Cardiovascular: Negative.    Gastrointestinal: Negative for abdominal distention and abdominal pain.   Genitourinary:        Per hpi   Musculoskeletal: Negative.    Neurological: Negative.    Psychiatric/Behavioral: Negative.        Objective:     Temp:  [97.3 °F (36.3 °C)-98.7 °F (37.1 °C)] 97.8 °F (36.6 °C)  Pulse:  [81-93] 81  Resp:  [16-18] 18  SpO2:  [93 %-97 %] 94 %  BP: (114-134)/(57-67) 129/63     Body mass index is 17.97 kg/(m^2).      Date 05/06/17 0700 - 05/07/17 0659   Shift 9680-2663 6624-9036 8345-0763 24 Hour Total   I  N  T  A  K  E   P.O.  180  180    I.V.  (mL/kg) 697.9  (11)   697.9  (11)    Blood 250   250    IV Piggyback  50  50    Shift Total  (mL/kg) 947.9  (14.9) 230  (3.6)  1177.9  (18.5)   O  U  T  P  U  T   Urine  (mL/kg/hr) 300  (0.6) 380  680    Shift Total  (mL/kg) 300  (4.7) 380  (6)  680  (10.7)   Weight (kg) 63.5 63.5 63.5 63.5          Drains     Drain                 Nephrostomy 05/05/17 1503 Right 8 Fr. 1 day         Nephrostomy 05/05/17 1504 Left 8 Fr. 1 day                Physical Exam   Nursing note and vitals reviewed.  Constitutional: He is oriented to person, place, and time. No distress.   Chronically ill  appearing male   HENT:   Head: Normocephalic and atraumatic.   Eyes: Conjunctivae are normal. Pupils are equal, round, and reactive to light.   Neck: Normal range of motion. Neck supple.   Cardiovascular: Normal rate and regular rhythm.    Pulmonary/Chest: Effort normal. He has no wheezes.   Abdominal: Soft. He exhibits no distension.   Genitourinary: Testes normal and penis normal.       Circumcised. No phimosis.   Musculoskeletal: Normal range of motion.   Neurological: He is alert and oriented to person, place, and time.   Skin: Skin is warm and dry.     Psychiatric: He has a normal mood and affect.       Significant Labs:    BMP:    Recent Labs  Lab 05/05/17  2343 05/06/17  0402 05/06/17  1605   * 135* 134*   K 3.7 4.2 3.9   CL 98 97 97   CO2 26 26 27   BUN 38* 39* 37*   CREATININE 2.3* 2.4* 2.6*   CALCIUM 8.6* 8.7 8.9       CBC:    Recent Labs  Lab 05/05/17  1137 05/06/17  0402 05/06/17  1611   WBC 28.72* 19.99* 22.29*   HGB 8.1* 6.9* 8.3*   HCT 23.9* 20.5* 24.5*   * 328 334       All pertinent labs results from the past 24 hours have been reviewed.    Significant Imaging:  All pertinent imaging results/findings from the past 24 hours have been reviewed.sono from today reviewed; agree w mild l fullness

## 2017-05-07 NOTE — ASSESSMENT & PLAN NOTE
Output from left N-tube from last 2 hrs is OK at 110cc . Decreased output most likely mechanical d/t recent x-change. These factors now resolving. No intervention needed other than continued daily irrigation. Continue to monitor RFT's. Nephrology continues to follow

## 2017-05-07 NOTE — PROGRESS NOTES
Nephrology Progress Note    DATE OF ADMISSION:  5/5/2017  DATE OF CONSULT: 5/6/2017  REFERRING PHYSICIAN:  Cody Guzman MD  REASON FOR CONSULTATION: ELDER       He is feeling better, no nausea, vomiting, diarrhea, SOB, CP.  UOP has picked up.  No fever.  Appetite improved    Physical Exam:  Temp:  [97.7 °F (36.5 °C)-98.7 °F (37.1 °C)] 97.7 °F (36.5 °C)  Pulse:  [81-92] 84  Resp:  [16-20] 18  SpO2:  [93 %-99 %] 99 %  BP: (118-145)/(57-72) 145/72  Vitals:    05/07/17 0429 05/07/17 0440 05/07/17 0817 05/07/17 0832   BP:  138/68 (!) 145/72    BP Location:  Right arm Right arm    Patient Position:  Lying Lying    BP Method:  Automatic Automatic    Pulse:  82 84    Resp:  20 18    Temp:  98.1 °F (36.7 °C) 97.7 °F (36.5 °C)    TempSrc:  Oral Oral    SpO2: (!) 93%   99%   Weight:       Height:         Intake/Output Summary (Last 24 hours) at 05/07/17 1123  Last data filed at 05/07/17 0826   Gross per 24 hour   Intake              730 ml   Output             1530 ml   Net             -800 ml     Wt Readings from Last 1 Encounters:   05/05/17 1700 63.5 kg (140 lb)   05/05/17 1113 63.5 kg (140 lb)     Lines - bilateral nephrostomy tubes with clear yellow urine in the bags  Vital signs reviewed.  Gen - no acute distress, alert and oriented  HENT - normocephalic, atraumatic  Eyes - extraocular motions intact  CVS - RRR, no murmurs or rubs  Resp - non-labored, clear to auscultation, no wheezes, rales, or rhonchi  Abd - soft, non-tender, non-distended, no rebound or guarding  Ext/Vascular - no cyanosis or edema   Skin - no rash or lesions  Neuro - alert and oriented, normal speech, moves all four extremities, no asterixis  Psych - normal mood and affect, normal thought process    Meds - Scheduled Meds:   cefTRIAXone (ROCEPHIN) IVPB  1 g Intravenous Q24H    cholecalciferol (vitamin D3)  1,200 Units Oral Daily    sodium chloride 0.9%  3 mL Intravenous Q8H     Continuous Infusions:   PRN Meds:.sodium chloride, dextrose 50%,  dextrose 50%, glucagon (human recombinant), glucose, glucose, morphine, ondansetron, oxycodone, pneumoc 13-margarito conj-dip cr(PF), promethazine (PHENERGAN) IVPB, ramelteon, sodium chloride 0.9%    Labs -   Recent Labs  Lab 05/06/17  0402 05/06/17  1611 05/07/17  0823   WBC 19.99* 22.29* 15.35*   HGB 6.9* 8.3* 8.6*   HCT 20.5* 24.5* 25.4*    334 409*   MONO 5.6  1.1* 3.9*  0.9 3.9*  0.6       Recent Labs  Lab 05/05/17  1803  05/06/17  0401 05/06/17  0402 05/06/17  1605 05/07/17  0400 05/07/17  0822   *  < >  --  135* 134* 134* 134*   K 3.2*  < >  --  4.2 3.9 3.5 3.9   CL 98  < >  --  97 97 97 95   CO2 27  < >  --  26 27 28 27   BUN 38*  < >  --  39* 37* 36* 38*   CREATININE 2.2*  < >  --  2.4* 2.6* 2.7* 2.9*   CALCIUM 8.6*  < >  --  8.7 8.9 8.7 9.3   PHOS 4.0  --   --  4.9*  --  3.7  --    MG  --   --  1.8  --   --  1.8  --    < > = values in this interval not displayed.    Imaging: Renal U/S 5/6/17: Renal ultrasound performed.  The kidneys are normal in length measuring 11.0 cm on the right and 12.5 cm on the left.  There is normal cortical medullary differentiation and cortical thickness.  There is mild fullness of the renal collecting system on the left.  A small amount of perinephric fluid is seen on the left    A/P   ARF on CKD3  - Baseline Cr 1.6   - ARF is likely multifactorial: currently with UTI and potentially infectious GN/AIN in addition to obstructive nephropathy with poor flow from L neph tube.  2/2 bladder tumor h/o bilateral hydronephrosis s/p bilateral nephrostomy tubes - neph tubes exchanged 5/5/17 due to poor L output and worsening renal fxn.   - he underwent TURBT of bladder tumor 4/6/17    - Cr is worsening, due to worsening renal fxn despite neph tube exchange and improved UOP from L tube - this course is more consistent with infectious GN/AIN which typically has a more prolonged course of recovery.  Continue IVF today and abx coverage.    - monitor closely for signs of volume  overload and discontinue IVF if any signs develop.   - repeat BMP this PM. Recommend to keep him admitted until Cr plateaus or improving.  If renal fxn is stable this afternoon, then he can be discharged with f/u in Dr Chavez's office Monday, will need labs repeated again on Monday as well as an outpatient.  - no urgent indication for dialysis   - monitor daily weights, strict I&Os.   - avoid nephrotoxic agents including NSAIDs, renally dose medications for GFR <30  - BP marginal overnight, on no anti-hypertensive agents      Anemia of chronic disease  - transfuse if Hgb <7  - s/p transfusion 5/6/17      MBD/Secondary Hyperparathyroidism  -  and Vitamin D 28. Continue Cholecalciferol      Bilateral hydronephrosis, thickened bladder wall, enlarged prostate. S/p TURBT of >5cm bladder tumor on 4/6/17  Bladder ca dx 1990s     Thank you for allowing me to participate in the care of your patient. Please call with any questions.     Erika Duron MD   Nephrology  [facility]  Office [phone]     Cross covering for:  [facility]  QAMAR Rausch MD, SAGAR MALAGON MD,   MD AIYANA Espino, NP  [address]  [place], [address] [phone]  After hours answering service: [phone]

## 2017-05-08 LAB
ANION GAP SERPL CALC-SCNC: 11 MMOL/L
BACTERIA UR CULT: NORMAL
BASOPHILS # BLD AUTO: 0.02 K/UL
BASOPHILS NFR BLD: 0.1 %
BUN SERPL-MCNC: 36 MG/DL
CALCIUM SERPL-MCNC: 8.9 MG/DL
CHLORIDE SERPL-SCNC: 100 MMOL/L
CO2 SERPL-SCNC: 24 MMOL/L
CREAT SERPL-MCNC: 2.8 MG/DL
DIFFERENTIAL METHOD: ABNORMAL
EOSINOPHIL # BLD AUTO: 0.1 K/UL
EOSINOPHIL NFR BLD: 0.7 %
ERYTHROCYTE [DISTWIDTH] IN BLOOD BY AUTOMATED COUNT: 14.1 %
EST. GFR  (AFRICAN AMERICAN): 26 ML/MIN/1.73 M^2
EST. GFR  (NON AFRICAN AMERICAN): 22 ML/MIN/1.73 M^2
GLUCOSE SERPL-MCNC: 120 MG/DL
HCT VFR BLD AUTO: 24.4 %
HGB BLD-MCNC: 8.3 G/DL
LYMPHOCYTES # BLD AUTO: 0.6 K/UL
LYMPHOCYTES NFR BLD: 3.4 %
MAGNESIUM SERPL-MCNC: 1.7 MG/DL
MCH RBC QN AUTO: 29 PG
MCHC RBC AUTO-ENTMCNC: 34 %
MCV RBC AUTO: 85 FL
MONOCYTES # BLD AUTO: 1.4 K/UL
MONOCYTES NFR BLD: 8.2 %
NEUTROPHILS # BLD AUTO: 14.3 K/UL
NEUTROPHILS NFR BLD: 86.8 %
PHOSPHATE SERPL-MCNC: 2.9 MG/DL
PLATELET # BLD AUTO: 383 K/UL
PMV BLD AUTO: 8.4 FL
POTASSIUM SERPL-SCNC: 4.7 MMOL/L
RBC # BLD AUTO: 2.86 M/UL
SODIUM SERPL-SCNC: 135 MMOL/L
WBC # BLD AUTO: 16.53 K/UL

## 2017-05-08 PROCEDURE — 11000001 HC ACUTE MED/SURG PRIVATE ROOM

## 2017-05-08 PROCEDURE — G8988 SELF CARE GOAL STATUS: HCPCS | Mod: CI

## 2017-05-08 PROCEDURE — 84100 ASSAY OF PHOSPHORUS: CPT

## 2017-05-08 PROCEDURE — 25000003 PHARM REV CODE 250: Performed by: ANESTHESIOLOGY

## 2017-05-08 PROCEDURE — 25000003 PHARM REV CODE 250: Performed by: STUDENT IN AN ORGANIZED HEALTH CARE EDUCATION/TRAINING PROGRAM

## 2017-05-08 PROCEDURE — 63600175 PHARM REV CODE 636 W HCPCS: Performed by: STUDENT IN AN ORGANIZED HEALTH CARE EDUCATION/TRAINING PROGRAM

## 2017-05-08 PROCEDURE — 97803 MED NUTRITION INDIV SUBSEQ: CPT

## 2017-05-08 PROCEDURE — 81000 URINALYSIS NONAUTO W/SCOPE: CPT

## 2017-05-08 PROCEDURE — 94761 N-INVAS EAR/PLS OXIMETRY MLT: CPT

## 2017-05-08 PROCEDURE — 97530 THERAPEUTIC ACTIVITIES: CPT

## 2017-05-08 PROCEDURE — 80048 BASIC METABOLIC PNL TOTAL CA: CPT

## 2017-05-08 PROCEDURE — 97165 OT EVAL LOW COMPLEX 30 MIN: CPT

## 2017-05-08 PROCEDURE — G8987 SELF CARE CURRENT STATUS: HCPCS | Mod: CI

## 2017-05-08 PROCEDURE — 25000003 PHARM REV CODE 250: Performed by: INTERNAL MEDICINE

## 2017-05-08 PROCEDURE — 36415 COLL VENOUS BLD VENIPUNCTURE: CPT

## 2017-05-08 PROCEDURE — 85025 COMPLETE CBC W/AUTO DIFF WBC: CPT

## 2017-05-08 PROCEDURE — 25000003 PHARM REV CODE 250: Performed by: HOSPITALIST

## 2017-05-08 PROCEDURE — G8989 SELF CARE D/C STATUS: HCPCS | Mod: CI

## 2017-05-08 PROCEDURE — 97161 PT EVAL LOW COMPLEX 20 MIN: CPT

## 2017-05-08 PROCEDURE — 83735 ASSAY OF MAGNESIUM: CPT

## 2017-05-08 PROCEDURE — 97535 SELF CARE MNGMENT TRAINING: CPT

## 2017-05-08 RX ORDER — POLYETHYLENE GLYCOL 3350 17 G/17G
17 POWDER, FOR SOLUTION ORAL DAILY PRN
Qty: 10 PACKET | Refills: 2 | Status: ON HOLD | OUTPATIENT
Start: 2017-05-08 | End: 2017-06-26

## 2017-05-08 RX ORDER — CIPROFLOXACIN 500 MG/1
500 TABLET ORAL EVERY 24 HOURS
Status: DISCONTINUED | OUTPATIENT
Start: 2017-05-09 | End: 2017-05-09 | Stop reason: HOSPADM

## 2017-05-08 RX ORDER — POLYETHYLENE GLYCOL 3350 17 G/17G
17 POWDER, FOR SOLUTION ORAL DAILY PRN
Qty: 10 PACKET | Refills: 2 | Status: SHIPPED | OUTPATIENT
Start: 2017-05-08 | End: 2017-05-08

## 2017-05-08 RX ORDER — DOCUSATE SODIUM 100 MG/1
100 CAPSULE, LIQUID FILLED ORAL 2 TIMES DAILY
Refills: 0 | Status: ON HOLD | COMMUNITY
Start: 2017-05-08 | End: 2017-07-06 | Stop reason: CLARIF

## 2017-05-08 RX ORDER — ONDANSETRON 4 MG/1
4 TABLET, ORALLY DISINTEGRATING ORAL EVERY 8 HOURS PRN
Qty: 20 TABLET | Refills: 1 | Status: ON HOLD | OUTPATIENT
Start: 2017-05-08 | End: 2017-07-06 | Stop reason: HOSPADM

## 2017-05-08 RX ORDER — DOCUSATE SODIUM 100 MG/1
100 CAPSULE, LIQUID FILLED ORAL DAILY
Status: DISCONTINUED | OUTPATIENT
Start: 2017-05-08 | End: 2017-05-09 | Stop reason: HOSPADM

## 2017-05-08 RX ORDER — POLYETHYLENE GLYCOL 3350 17 G/17G
17 POWDER, FOR SOLUTION ORAL DAILY
Status: DISCONTINUED | OUTPATIENT
Start: 2017-05-08 | End: 2017-05-09 | Stop reason: HOSPADM

## 2017-05-08 RX ORDER — ONDANSETRON 4 MG/1
4 TABLET, ORALLY DISINTEGRATING ORAL EVERY 6 HOURS PRN
Status: DISCONTINUED | OUTPATIENT
Start: 2017-05-08 | End: 2017-05-09 | Stop reason: HOSPADM

## 2017-05-08 RX ORDER — CIPROFLOXACIN 500 MG/1
500 TABLET ORAL
Qty: 10 TABLET | Refills: 0 | Status: SHIPPED | OUTPATIENT
Start: 2017-05-08 | End: 2017-05-19

## 2017-05-08 RX ORDER — POLYETHYLENE GLYCOL 3350, SODIUM SULFATE ANHYDROUS, SODIUM BICARBONATE, SODIUM CHLORIDE, POTASSIUM CHLORIDE 236; 22.74; 6.74; 5.86; 2.97 G/4L; G/4L; G/4L; G/4L; G/4L
500 POWDER, FOR SOLUTION ORAL ONCE
Status: COMPLETED | OUTPATIENT
Start: 2017-05-08 | End: 2017-05-08

## 2017-05-08 RX ORDER — CIPROFLOXACIN 500 MG/1
500 TABLET ORAL EVERY 12 HOURS
Qty: 22 TABLET | Refills: 0 | Status: SHIPPED | OUTPATIENT
Start: 2017-05-08 | End: 2017-05-08

## 2017-05-08 RX ORDER — CALCIUM CARB/VITAMIN D3/VIT K1 500-500-40
1200 TABLET,CHEWABLE ORAL DAILY
Qty: 90 CAPSULE | Refills: 0 | COMMUNITY
Start: 2017-05-08 | End: 2017-07-12

## 2017-05-08 RX ADMIN — ONDANSETRON 4 MG: 2 INJECTION INTRAMUSCULAR; INTRAVENOUS at 04:05

## 2017-05-08 RX ADMIN — DOCUSATE SODIUM 100 MG: 100 CAPSULE, LIQUID FILLED ORAL at 10:05

## 2017-05-08 RX ADMIN — ONDANSETRON 4 MG: 4 TABLET, ORALLY DISINTEGRATING ORAL at 11:05

## 2017-05-08 RX ADMIN — POLYETHYLENE GLYCOL 3350 17 G: 17 POWDER, FOR SOLUTION ORAL at 10:05

## 2017-05-08 RX ADMIN — CHOLECALCIFEROL TAB 10 MCG (400 UNIT) 1200 UNITS: 10 TAB at 08:05

## 2017-05-08 RX ADMIN — SODIUM CHLORIDE, PRESERVATIVE FREE 3 ML: 5 INJECTION INTRAVENOUS at 05:05

## 2017-05-08 RX ADMIN — POLYETHYLENE GLYCOL 3350, SODIUM SULFATE ANHYDROUS, SODIUM BICARBONATE, SODIUM CHLORIDE, POTASSIUM CHLORIDE 500 ML: 236; 22.74; 6.74; 5.86; 2.97 POWDER, FOR SOLUTION ORAL at 06:05

## 2017-05-08 RX ADMIN — CIPROFLOXACIN 500 MG: 500 TABLET, FILM COATED ORAL at 08:05

## 2017-05-08 RX ADMIN — ONDANSETRON 4 MG: 2 INJECTION INTRAMUSCULAR; INTRAVENOUS at 10:05

## 2017-05-08 RX ADMIN — OXYCODONE HYDROCHLORIDE 5 MG: 5 TABLET ORAL at 10:05

## 2017-05-08 RX ADMIN — RAMELTEON 8 MG: 8 TABLET, FILM COATED ORAL at 11:05

## 2017-05-08 NOTE — PT/OT/SLP EVAL
Occupational Therapy  Evaluation/Treatment and D/c Summary     Juan Manuel Koehler   MRN: [id number]   Admitting Diagnosis: ELDER (acute kidney injury)    OT Date of Treatment: 17   OT Start Time: 1126  OT Stop Time: 1156  OT Total Time (min): 30 min    Billable Minutes:  Evaluation 10  Self Care/Home Management 20    Diagnosis: ELDER (acute kidney injury)   The primary encounter diagnosis was ELDER (acute kidney injury). Diagnoses of Malignant neoplasm of urinary bladder, unspecified site, Leukocytosis, unspecified type, and Hydronephrosis, unspecified hydronephrosis type were also pertinent to this visit.      Past Medical History:   Diagnosis Date    Cancer     bladder and throat    Urinary tract infection       Past Surgical History:   Procedure Laterality Date    BLADDER SURGERY      HERNIA REPAIR      THROAT SURGERY           General Precautions: Standard, fall  Orthopedic Precautions: N/A  Braces:            Patient History:  Living Environment  Lives With: spouse  Living Arrangements: house  Home Accessibility: stairs within home  Home Layout: Bathroom on 2nd floor, Bedroom on 2nd floor  Living Environment Comment: Lives with wife in 2 story, no steps to enter, 1/2 bath downstairs, tub/shower combo and bedroom upstairs. Reports mod I as PLOF, drives short distances, does not use DME for ambulation and/or ADLs. Has access to SPC and RW   Equipment Currently Used at Home: none    Prior level of function:   Bed Mobility/Transfers: independent  Grooming: independent  Bathing: independent  Upper Body Dressing: independent  Lower Body Dressing: independent  Toileting: independent  Home Management Skills: needs assist  Driving License: Yes  Mode of Transportation: Car     Dominant hand: right    Subjective:  Communicated with nsg prior to session.  Chief Complaint: nausea, pain   Patient/Family stated goals: none stated     Pain Ratin/10        Location:  (tube placements )  Pain Addressed: Cessation of Activity,  Nurse notified, Distraction       Objective:       Cognitive Exam:  Oriented to: Person, Place, Time and Situation  Follows Commands/attention: Follows multistep  commands  Communication: clear/fluent  Memory:  No Deficits noted  Safety awareness/insight to disability: intact  Coping skills/emotional control: Appropriate to situation    Visual/perceptual:  Intact    Physical Exam:  Postural examination/scapula alignment: Rounded shoulder  Skin integrity: Visible skin intact; multiple drains in place   Edema: None noted     Sensation:   Intact    Upper Extremity Range of Motion:  Right Upper Extremity: WFL  Left Upper Extremity: WFL    Upper Extremity Strength:  Right Upper Extremity: appears WFL based on function; did not formally assess 2/2 pain   Left Upper Extremity: appears WFL based on function; did not formally assess 2/2 pain    Strength: good, equal bilaterally     Fine motor coordination:   Intact    Gross motor coordination: WFL    Functional Mobility:  Bed Mobility:  Scooting/Bridging: Modified Independent  Supine to Sit: Modified Independent  Sit to Supine: Modified Independent    Transfers:  Sit <> Stand Assistance: Modified Independent  Sit <> Stand Assistive Device: No Assistive Device  Toilet Transfer Technique: Stand Pivot  Toilet Transfer Assistance: Modified Independent  Toilet Transfer Assistive Device: No Assistive Device    Functional Ambulation: mod I without AD     Activities of Daily Living:     UE Dressing Level of Assistance: Modified independent    LE Dressing Level of Assistance: Modified independent     Toileting Where Assessed: Toilet  Toileting Level of Assistance: Modified independent    Balance:   Static Sit: NORMAL: No deviations seen in posture held statically  Dynamic Sit: NORMAL: No deviations seen in posture held dynamically  Static Stand: GOOD+: Takes MAXIMAL challenges from all directions  Dynamic stand: GOOD+: Independent gait (with or without assistive  "device)    Therapeutic Activities and Exercises:  Pt and spouse educated on endurance trng,home safety, back precautions to decrease pain and increase independence, impt of nutrition for increasing endurance as well as strength    AM-PAC 6 CLICK ADL  How much help from another person does this patient currently need?  1 = Unable, Total/Dependent Assistance  2 = A lot, Maximum/Moderate Assistance  3 = A little, Minimum/Contact Guard/Supervision  4 = None, Modified [place]/Independent    Putting on and taking off regular lower body clothing? : 4  Bathing (including washing, rinsing, drying)?: 3  Toileting, which includes using toilet, bedpan, or urinal? : 4  Putting on and taking off regular upper body clothing?: 4  Taking care of personal grooming such as brushing teeth?: 4  Eating meals?: 4  Total Score: 23    AM-PAC Raw Score CMS "G-Code Modifier Level of Impairment Assistance   6 % Total / Unable   7 - 9 CM 80 - 100% Maximal Assist   10 - 14 CL 60 - 80% Moderate Assist   15 - 19 CK 40 - 60% Moderate Assist   20 - 22 CJ 20 - 40% Minimal Assist   23 CI 1-20% SBA / CGA   24 CH 0% Independent/ Mod I       Patient left seated EOB with PT with all lines intact, call button in reach and nsg notified    Assessment:  Juan Manuel Keohler is a 67 y.o. male with a medical diagnosis of ELDER (acute kidney injury) and presents with decreased endurance. Pt performing ADLs and functional mobility mod I at this time. Pt reports concerns over fatigue and decreased endurance. Pt and spouse educated on recommendations for shower for bathing, increasing nutrition intact, home safety, and exercises/activities to increase stamina. Pt with no further OT needs at this time. D/c OT     Rehab identified problem list/impairments: Rehab identified problem list/impairments: impaired endurance, pain    Rehab potential is good.    Activity tolerance: Good    Discharge recommendations: Discharge Facility/Level Of Care Needs: home     Barriers " to discharge: Barriers to Discharge: None    Equipment recommendations: shower chair     GOALS:   Occupational Therapy Goals     Not on file      Multidisciplinary Problems (Resolved)        Problem: Occupational Therapy Goal    Goal Priority Disciplines Outcome Interventions   Occupational Therapy Goal   (Resolved)     OT, PT/OT Outcome(s) achieved              PLAN:  Patient to be seen  (D/c OT) to address the above listed problems via    Plan of Care expires: 05/08/17  Plan of Care reviewed with: patient    OT G-codes  Functional Assessment Tool Used: am pac   Score: 23  Functional Limitation: Self care  Self Care Current Status (): CI  Self Care Goal Status (): CI  Self Care Discharge Status (): OSCAR Pantoja OT  05/08/2017

## 2017-05-08 NOTE — PLAN OF CARE
Problem: Patient Care Overview  Goal: Plan of Care Review  Outcome: Ongoing (interventions implemented as appropriate)  Patient on RA, no respiratory distress noted. Will continue to monitor.\

## 2017-05-08 NOTE — NURSING
Verbalized with Dr. Chavez during shift that if pt has not had a BM by 5:00 to order 1/2 gallon of Golytely. Attempted to call Dr. Chavez cell phone, no answer so notified MD team about plan and that verbal order would be placed.

## 2017-05-08 NOTE — PLAN OF CARE
Problem: Occupational Therapy Goal  Goal: Occupational Therapy Goal  Outcome: Outcome(s) achieved Date Met:  05/08/17  Pt performing ADLs and functional mobility mod I at this time. Pt reports concerns over fatigue and decreased endurance. Pt and spouse educated on recommendations for shower for bathing, increasing nutrition intact, home safety, and exercises/activities to increase stamina. Pt with no further OT needs at this time. D/c OT

## 2017-05-08 NOTE — PROGRESS NOTES
V/O Dr Lemus Adjust dose Ciprofloxacin to 500mg po q24h based on renal function(CrCl 22.7 ml/min) per dosing guidelines

## 2017-05-08 NOTE — PROGRESS NOTES
[facility] IM Resident [facility] Progress Note    Subjective:      Juan Manuel Koehler is a 67 y.o. male who is being followed by the [facility] IM service at [facility] for Urinary obstruction.    Slept much better with trazodone.  Good UOP from nephrostomies.  Continues to complain of pain at nephrostomy sites and states that he thinks the valves are pressing into him. Denies fever, chills, chest pain, wants to go home. Denies BM.     Objective:   Last 24 Hour Vital Signs:  BP  Min: 132/66  Max: 159/76  Temp  Av.3 °F (36.8 °C)  Min: 97.7 °F (36.5 °C)  Max: 98.8 °F (37.1 °C)  Pulse  Av  Min: 84  Max: 92  Resp  Av  Min: 18  Max: 20  SpO2  Av.3 %  Min: 95 %  Max: 99 %  I/O last 3 completed shifts:  In: 250 [P.O.:250]  Out: 3455 [Urine:3425; Emesis/NG output:30]    Physical Examination:  Vitals:    17 05   BP: (!) 159/76   Pulse: 85   Resp: 20   Temp: 98.8 °F (37.1 °C)     Gen: NAD  HENT: NCAT, EOMI, MMM  CV: RRR, no murmurs, rubs or additional heart sounds  Pulm: CTAB, no wheezes or crackles.  Abd: L flank with continued pain around new nephrostomy site. Site c/d/i.  Good UOP from both nephrostomy tubes.  R side with interval placement of nephrostomy tube c/d/i.  Extremities: no c/c/e. 2+ pulses in all extremities  Skin: no rash or lesion  Neuro: moving all extremities well, nonfocal  Psych: AAOx4, calm, cooperative, conversational    Laboratory:  Laboratory Data Reviewed: yes    Recent Labs  Lab 17  0402  17  1611 17  0400 17  0822 17  0823 17  0419   WBC 19.99*  --  22.29*  --   --  15.35* 16.53*   HGB 6.9*  --  8.3*  --   --  8.6* 8.3*   HCT 20.5*  --  24.5*  --   --  25.4* 24.4*     --  334  --   --  409* 383*   MCV 87  --  86  --   --  86 85   RDW 14.6*  --  14.2  --   --  14.1 14.1   *  < >  --  134* 134*  --  135*   K 4.2  < >  --  3.5 3.9  --  4.7   CL 97  < >  --  97 95  --  100   CO2 26  < >  --  28 27  --  24   BUN 39*  < >  --  36* 38*   --  36*   CREATININE 2.4*  < >  --  2.7* 2.9*  --  2.8*   *  < >  --  106 166*  --  120*   PROT 6.2  --   --   --   --   --   --    ALBUMIN 2.5*  --   --   --   --   --   --    BILITOT 0.2  --   --   --   --   --   --    AST 8*  --   --   --   --   --   --    ALKPHOS 59  --   --   --   --   --   --    ALT 14  --   --   --   --   --   --    < > = values in this interval not displayed.  Microbiology Data Reviewed: yes  Pertinent Findings:  Urine Cx 5/5 w/ enterococcus, sensitivities pending    Other Results:  EKG (my interpretation): none    Radiology Data Reviewed: yes  Pertinent Findings:  Renal US w/ minimal amount of perinephric free fluid on the left    Current Medications:     Infusions:        Scheduled:   cholecalciferol (vitamin D3)  1,200 Units Oral Daily    ciprofloxacin HCl  500 mg Oral Q12H    sodium chloride 0.9%  3 mL Intravenous Q8H        PRN:  sodium chloride, dextrose 50%, dextrose 50%, glucagon (human recombinant), glucose, glucose, morphine, ondansetron, oxycodone, pneumoc 13-margarito conj-dip cr(PF), promethazine (PHENERGAN) IVPB, ramelteon, sodium chloride 0.9%    Antibiotics and Day Number of Therapy:  Rocephin 5/5    Lines and Day Number of Therapy:  PIV  L Nephrostomy  R Nephrostomy    Assessment:     Juan Manuel Koehler is a 67 y.o.male with  Patient Active Problem List    Diagnosis Date Noted    Nausea 05/06/2017    Leukocytosis     Hydronephrosis 04/20/2017    ELDER (acute kidney injury) 04/07/2017    Hyperkalemia 04/07/2017    Difficult intubation 04/06/2017    Malignant neoplasm of urinary bladder 04/05/2017    CKD (chronic kidney disease) 04/05/2017        Plan:     This is a 67 year old patient presenting with:    Bladder cancer with urinary outlet obstruction  - CT urogram shows resolved hydronephrosis on left and improved on right  - Consulted IR for emergent nephrostomy placement  - No bolanos in place, removed recently.   - Urology consultation  - Nephrostomy R tube placed, L tube  exchanged. Now L with good output  - Renal U/S w/ minimal amount of perinephric free fluid on the left  - Urology consulted and rec continued daily irrigation      ELDER on CKD  Baseline Cr 1.6-1.7, on admit 2.3. K WNL.  - NS infusion @ 75ml/hr x 10 hours  - UA showing WBC 62, moderate bacteria  - FENa 1% c/w prerenal  - UCx pending  - Consulted nephrology  - Trending BMP q8H  - Cr this AM 2.4  - UA suspicious for infection, started rocephin      UTI  WBC 28.7 from normal baseline. 92% PMNs. Afebrile, no systemic signs of infection.  - UCx w/ enterococcus: sensitivities pending  - time, follow above studies and clinical course      Inflammatory Anemia  Hb 8.1, c/w baseline. MCV 86. Recent iron studies show ferritin 891, low Fe, Tf, TIBC.  - monitoring CBC  - 5/6 Hb 6.9, likely post-procedural and dilutional  - S/p 1 unit pRBCs  - Hgb pending this AM      Thrombocytosis  Platelets 373 from nml baseline. Likely reactive.  - Monitoring      Diet: Regular  Code status: Full  Dispo: Waiting on UCx sensitivities; home today vs. Tomorrow    Neo Bueno MD HO-1  [facility] Internal Medicine Service Team B    [facility] Medicine Hospitalist Pager numbers:   [facility] Hospitalist Medicine Team A (Filemon/Vicente): [phone]  [facility] Hospitalist Medicine Team B (Rick/Megan):  [phone]

## 2017-05-08 NOTE — PLAN OF CARE
TN met with patient and wife.   Patient off floor to test.  Patient's wife stated patient will go home on discharge, and she will provide transport.    Patient with no current needs at this time, will continue to assess.    -Refused priority care clinic follow-up.     05/08/17 1227   Discharge Reassessment   Assessment Type Discharge Planning Reassessment   Can the patient answer the patient profile reliably? Yes, cognitively intact   How does the patient rate their overall health at the present time? Good   Describe the patient's ability to walk at the present time. No restrictions   How often would a person be available to care for the patient? Whenever needed   Discharge plan remains the same: Yes   Provided patient/caregiver education on the expected discharge date and the discharge plan Yes   Discharge Plan A Home with family   Discharge Plan B Home with family;Home Health   Involved the patient/caregiver in establishing a new discharge plan: Yes     Angelica Raymond RN  Transition Navigator  [phone]

## 2017-05-08 NOTE — NURSING
Plan of care reviewed with patient. Pt agreed to wear SCD tonight however is still refusing bed alarm. Safety measures maintained with call bell in reach, bed in lowest position, wheels locked. Bilateral nephrostomy tube dressings c/d/i,regarding output both tubes draining clear yellow urine, right > left.  Pt has rested quickly all night w/ no noise complaints about neighboring room.

## 2017-05-08 NOTE — PLAN OF CARE
Problem: Physical Therapy Goal  Goal: Physical Therapy Goal  Outcome: Outcome(s) achieved Date Met:  05/08/17  PT dai .  Mod I for all functional mobility with S for extended distance.  Safe to go home with wife.  Discussed home activities and HEP to increase endurance, back care and proper posture, use of SPC for community amb, proper nutrition and safety.  Pt and wife verbalize understanding  REC:  Home-use SPC for community amb.    DME:  SC per OT

## 2017-05-08 NOTE — CONSULTS
[facility]  Adult Nutrition  Consult Note    SUMMARY     Recommendations    Recommendation/Intervention:   1. Boost plus chocolate tid   2. Honor patient preferences as able   3. Monitor weight weekly   4. RD to monitor    Goals: Patient to meet 85% EEN  Nutrition Goal Status: progressing towards goal  Communication of RD Recs: other (comment) (sticky note)    Continuum of Care Plan    Referral to Outpatient Services:  (D/C planning: Reg diet with supplements)    Reason for Assessment    Reason for Assessment: physician consult, RD follow-up  Diagnosis:  (ELDER due to obstruction)  Relevent Medical History: bladder Ca   Interdisciplinary Rounds: did not attend     General Information Comments: Patient reports continued decreased po intake due to n/v. Would like boost plus chocolate. Encouraged patient to request food preferences (yogurt and fresh fruits with meals).       Nutrition Prescription Ordered    Current Diet Order: Reg     Evaluation of Received Nutrients/Fluid Intake     % Intake of Estimated Needs: 25-50%   % Intake of meals: 50%       Nutrition Risk Screen     Nutrition Risk Screen: no indicators present    Nutrition/Diet History     Food Preferences: Denies cultural, Buddhist, ethnic food preferences  Factors Affecting Nutritional Intake: decreased appetite        Labs/Tests/Procedures/Meds    Pertinent Labs Reviewed: reviewed  Pertinent Medications Reviewed: reviewed       Physical Findings    Overall Physical Appearance: underweight  Oral/Mouth Cavity: WDL  Skin: intact (jabari 19)    Anthropometrics     Height (inches): 74.02 in  Weight Method: Bed Scale  Weight (kg): 63.5 kg  Ideal Body Weight (IBW), Male: 190.12 lb     % Ideal Body Weight, Male (lb): 73.63 lb     BMI (kg/m2): 17.97  BMI Grade: 17 - 18.4 protein-energy malnutrition grade I           Weight Loss: unintentional     Estimated/Assessed Needs    Weight Used For Calorie Calculations: 63.5 kg (139 lb 15.9 oz)    Height (cm): 188 cm     Energy Need Method: Kcal/kg (2321-8582 kcal (>for weight gain))     RMR (Screven-St. Jeor Equation): 1479.8      Weight Used For Protein Calculations: 63.5 kg (139 lb 15.9 oz)  Protein Requirements: 76-90 g    Fluid Need Method: RDA Method     Assessment and Plan    Nutrition Diagnosis    Problem: Inadequate Energy Intake  Etiology: N/V  As Evidenced by: 50% intake with meals.      Monitor and Evaluation    Food and Nutrient Intake: energy intake, food and beverage intake  Food and Nutrient Adminstration: diet order  Anthropometric Measurements: weight, weight change  Biochemical Data, Medical Tests and Procedures: electrolyte and renal panel, glucose/endocrine profile  Nutrition-Focused Physical Findings: overall appearance      Nutrition Risk    Level of Risk:  (1 x week)    Nutrition Follow-Up    RD Follow-up?: Yes

## 2017-05-08 NOTE — PROGRESS NOTES
05/08/17 1224   Readmission Questionnaire   At the time of your discharge, did someone talk to you about what your health problems were? Yes   At the time of discharge, did someone talk to you about what to watch out for regarding worsening of your health problem? Yes   At the time of discharge, did someone talk to you about what to do if you experienced worsening of your health problem? Yes   At the time of discharge, did someone talk to you about which medication to take when you left the hospital and which ones to stop taking? Yes   At the time of discharge, did someone talk to you about when and where to follow up with a doctor after you left the hospital? Yes   What do you believe caused you to be sick enough to be re-admitted? nephrostomy tube came out (on accident)   How often do you need to have someone help you when you read instructions, pamphlets, or other written material from your doctor or pharmacy? Often   Do you have problems taking your medications as prescribed? No   Do you have any problems affording any of  your prescribed medications? No   Do you have problems obtaining/receiving your medications? No   Does the patient have transportation to healthcare appointments? Yes   Lives With spouse   Living Arrangements house   Does the patient have family/friends to help with healtcare needs after discharge? yes   Who are your caregiver(s) and their phone number(s)? Darlene Koehler Spouse [phone]    Does your caregiver provide all the help you need? Yes   In the last 7 days, my sleep quality was: good     Angelica Raymond RN  Transition Navigator  [phone]

## 2017-05-08 NOTE — PT/OT/SLP EVAL
Physical Therapy  Evaluation/ treatment/dc summary    Juan Manuel Koehler   MRN: [id number]   Admitting Diagnosis: ELDER (acute kidney injury)    PT Received On: 17  PT Start Time: 1126     PT Stop Time: 1211    PT Total Time (min): 45 min     Partial co -tx with OT   Billable Minutes:  Evaluation 15 and Therapeutic Activity 15    Diagnosis: ELDER (acute kidney injury)      Past Medical History:   Diagnosis Date    Cancer     bladder and throat    Urinary tract infection       Past Surgical History:   Procedure Laterality Date    BLADDER SURGERY      HERNIA REPAIR      THROAT SURGERY         Referring physician: Dr. Santiago  Date referred to PT: 17    General Precautions: Standard, fall  Orthopedic Precautions: N/A   Braces:         Do you have any cultural, spiritual, Mandaeism conflicts, given your current situation?: none    Patient History:  Lives With: spouse  Living Arrangements: house  Home Accessibility: stairs within home  Home Layout: Bathroom on 2nd floor, Bedroom on 2nd floor  Number of Stairs Within Home: 18  Stair Railings at Home: inside, present on left side  Transportation Available: car, family or friend will provide  Living Environment Comment: Lives with wife in 2 story, no steps to enter, 1/2 bath downstairs, tub/shower combo and bedroom upstairs. Reports mod I as PLOF, drives short distances, does not use DME for ambulation and/or ADLs. Has access to SPC and RW .  pt and wife have [employer] business and wife currently driving  and pt going with her on trips.   Equipment Currently Used at Home: none  DME owned (not currently used): rolling walker and single point cane    Previous Level of Function:  Ambulation Skills: independent  Transfer Skills: independent    Subjective:  Communicated with nursing prior to session.    Chief Complaint: back and abdominal pain  Patient goals: go home, decreased stamina    Pain Ratin/10         Location:  (low back around tubes and lower abd-has not  had BM in 3 days )  Pain Addressed: Reposition, Distraction, Nurse notified  Pain Rating Post-Intervention: 2/10    Objective:   Patient found with: peripheral IV (B nephrostomy tubes)     Cognitive Exam:  Oriented to: Person, Place, Time and Situation    Follows Commands/attention: Follows multistep  commands  Communication: clear/fluent  Safety awareness/insight to disability: intact    Physical Exam:  Postural examination/scapula alignment: Rounded shoulder and Head forward    Skin integrity: B nephrostomy tubes with leg bags  Edema: None noted     Sensation:   Intact  light/touch BLE    Upper Extremity - see OT eval    Lower Extremity Range of Motion:  Right Lower Extremity: WFL  Left Lower Extremity: WFL    Lower Extremity Strength:  Right Lower Extremity: WFL  Left Lower Extremity: WFL    Gross motor coordination: WFL    Functional Mobility:  Bed Mobility:  Supine to Sit: Modified Independent  Sit to Supine: Modified Independent    Transfers:  Sit <> Stand Assistance: Modified Independent  Sit <> Stand Assistive Device: No Assistive Device    Gait:   Gait Distance: 5' in room with mod I to BR, S for amb in hallway 500'  Assistance 1: Modified Independent, Supervision  Gait Assistive Device: No device  Gait Pattern: reciprocal  Gait Deviation(s): decreased pedro pablo, decreased stride length    Stairs:  Not assessed    Balance:   Static Sit: NORMAL: No deviations seen in posture held statically  Dynamic Sit: NORMAL: No deviations seen in posture held dynamically  Static Stand: GOOD+: Takes MAXIMAL challenges from all directions  Dynamic stand: GOOD: Needs SUPERVISION only during gait and able to self right with moderate  for extended distance, G+ for in room amb    Therapeutic Activities and Exercises:  PT eval .  Mod I for all functional mobility with S for extended distance.    Discussed home activities and HEP to increase endurance, back care and proper posture, use of SPC for community amb, proper nutrition  and safety.  Pt and wife verbalize understanding      AM-PAC 6 CLICK MOBILITY  How much help from another person does this patient currently need?   1 = Unable, Total/Dependent Assistance  2 = A lot, Maximum/Moderate Assistance  3 = A little, Minimum/Contact Guard/Supervision  4 = None, Modified [place]/Independent    Turning over in bed (including adjusting bedclothes, sheets and blankets)?: 4  Sitting down on and standing up from a chair with arms (e.g., wheelchair, bedside commode, etc.): 4  Moving from lying on back to sitting on the side of the bed?: 4  Moving to and from a bed to a chair (including a wheelchair)?: 4  Need to walk in hospital room?: 4  Climbing 3-5 steps with a railing?: 3  Total Score: 23     AM-PAC Raw Score CMS G-Code Modifier Level of Impairment Assistance   6 % Total / Unable   7 - 9 CM 80 - 100% Maximal Assist   10 - 14 CL 60 - 80% Moderate Assist   15 - 19 CK 40 - 60% Moderate Assist   20 - 22 CJ 20 - 40% Minimal Assist   23 CI 1-20% SBA / CGA   24 CH 0% Independent/ Mod I     Patient left sitting EOB to eat lunch with all lines intact, call button in reach, nurse notified and wife present.    Assessment:   Juan Manuel Koehler is a 67 y.o. male with a medical diagnosis of ELDER (acute kidney injury) and presents with Rehab identified problem list/impairments: Rehab identified problem list/impairments: impaired endurance, pain.  Safe to go home with wife.instructed pt in Home activities and posture to address impairments.  If pt doesn't go home today could amb with mobility team.      Rehab potential is good.    Activity tolerance: Good    Discharge recommendations: Discharge Facility/Level Of Care Needs: home , use SPC for community amb    Barriers to discharge: Barriers to Discharge: Inaccessible home environment    Equipment recommendations: Equipment Needed After Discharge: shower chair (per OT)     GOALS: no goals set as is being dc by PT   Physical Therapy Goals     Not on file       Multidisciplinary Problems (Resolved)        Problem: Physical Therapy Goal    Goal Priority Disciplines Outcome Goal Variances Interventions   Physical Therapy Goal   (Resolved)     PT/OT, PT Outcome(s) achieved               PLAN:  To be dc by PT-if doesn't go home can amb in hallways with mobility team   Patient to be seen  (1x only) to address the above listed problems via therapeutic activities  Plan of Care expires: 06/07/17  Plan of Care reviewed with: patient, spouse          Tiffanie GARCIA Maurisio, PT  05/08/2017

## 2017-05-08 NOTE — MEDICAL/APP STUDENT
[facility] Third Year Medical Student Progress Note   [facility] Internal Medicine Team B    Subjective:    Mr. Koehler is a 67 year old man being followed by [facility] for urinary obstruction and ELDER on CKD.     He had some sleeping difficulties overnight but is overall feeling better. He also reports the pain associated with his left nephrostomy tube is still present but not worsening. He reports that both his nephrostomy tubes have been having good output. He denies any chest pain, shortness of breath, nausea, vomiting.    Objective:    Vitals:  Tmax 98.8  HR 84-92  RR 18-20  -159/66-76  95-99% on RA    I: 0  O: 2675    Physical Exam:  Gen: NAD, resting comfortably in chair   HENT: NCAT, EOMI, PERRLA, MMM  CV: RRR, no murmurs, rubs, gallops  Pulm: CTAB, no wheezes or increased work of breathing  Abd: soft, NTND, + bowel sounds. Some TTP over the left nephrostomy tube site but otherwise c/d/i. Output from both tubes noted and approximately the same amount of light yellow in both bags.  Extremities: no cyanosis, clubbing, edema, 2+ pulses  Skin: no rash or lesion   Neuro: grossly intact  Psych: alert and oriented to person, place, time    Labs/Micro/Imaging:    WBC 16.53 (previosuly 15.35), H/H 8.3/24.4 (stable), Platelets 383    Na 135, K 4.7, Cl 100, CO2 24, BUN 36 (stable), Cr 2.8 (stable), Glucose 120, Ca 8.9    Mg 1.7  Phos 2.9    Urine culture: Enterococcus fecalis   Sensitivities: Ampicillin, Ciprofloxacin, Nitrofurantoin, Vancomycin   Resistant: Tetracycline     Medications:    Scheduled Meds:   cholecalciferol (vitamin D3)  1,200 Units Oral Daily    ciprofloxacin HCl  500 mg Oral Q12H    sodium chloride 0.9%  3 mL Intravenous Q8H     PRN Meds:.sodium chloride, dextrose 50%, dextrose 50%, glucagon (human recombinant), glucose, glucose, morphine, ondansetron, oxycodone, pneumoc 13-margarito conj-dip cr(PF), promethazine (PHENERGAN) IVPB, ramelteon, sodium chloride 0.9%    Assessment:    Mr. Koehler is a 67 year  old man with bladder cancer with urinary outlet obstruction, ELDER on CKD, UTI, inflammatory anemia, and thrombocytosis.     Plan:    Bladder cancer with urinary outlet obstruction:  - CT urogram shows resolved hydronephrosis on left and improved on the right   - IR was consulted for emergent nephrostomy placement   - Urology consulted  - Right nephrostomy tube was placed and the left was exchanged  - Left nephrostomy tube now with good output   - Repeat renal US showed minimal amount of perinephric fluid on the left   - Urology recommends continued daily irrigation   - Patient will follow up with nephrology upon discharge     ELDER on CKD:  - baseline Cr 1.6 - 1.7  - Cr 2.3 on admit, potassium normal  - UA with WBC 62 and moderate bacteria   - FENa 1% which is most likely prerenal   - Nephrology consulted   - Cultures back (see UTI section)  - Cr 2.8 this morning but stable overall   - will need to follow up with nephrology for further treatment of ELDER    UTI:  - WBC 28.7 on admit, afebrile with no systemic signs  - WBC now 16.53 which is stable  - Day 5 of Rocephin  - Ucx grew Enterococcus fecalis: sensitive to Ampicillin, Ciprofloxacin, Nitrofurantoin, Vancomycin. Resistant: Tetracycline  - Will discharge on oral Nitrofurantoin 100 mg PO BID for 7 days     Inflammatory anemia:  - Hgb 8.1 which is consistent with baseline  - MCV 86 with iron studies showing ferritin 891 with low Fe, transferrin, and TIBC  - continue to monitor CBC  - H/H stable     Thrombocytosis:  - Platelets 373 which is increased from baseline   - most likely reactive to infection and surgery   - stable at 383    Diet: Regular  Code status: Full  Dispo: Discharge today on oral antibiotics for UTI    Edson Seals  [facility] Third Year Medical Student  [employer] Internal Medicine Team B

## 2017-05-09 ENCOUNTER — INITIAL CONSULT (OUTPATIENT)
Dept: HEMATOLOGY/ONCOLOGY | Facility: CLINIC | Age: 67
End: 2017-05-09
Payer: MEDICARE

## 2017-05-09 VITALS
RESPIRATION RATE: 18 BRPM | DIASTOLIC BLOOD PRESSURE: 56 MMHG | WEIGHT: 140 LBS | HEART RATE: 81 BPM | BODY MASS INDEX: 17.97 KG/M2 | SYSTOLIC BLOOD PRESSURE: 114 MMHG | OXYGEN SATURATION: 96 % | TEMPERATURE: 98 F | HEIGHT: 74 IN

## 2017-05-09 VITALS
DIASTOLIC BLOOD PRESSURE: 72 MMHG | SYSTOLIC BLOOD PRESSURE: 136 MMHG | BODY MASS INDEX: 19.24 KG/M2 | HEART RATE: 90 BPM | OXYGEN SATURATION: 96 % | HEIGHT: 74 IN | RESPIRATION RATE: 14 BRPM | WEIGHT: 149.94 LBS | TEMPERATURE: 98 F

## 2017-05-09 DIAGNOSIS — E87.5 HYPERKALEMIA: ICD-10-CM

## 2017-05-09 DIAGNOSIS — N13.30 HYDRONEPHROSIS, UNSPECIFIED HYDRONEPHROSIS TYPE: ICD-10-CM

## 2017-05-09 DIAGNOSIS — C67.0 MALIGNANT NEOPLASM OF TRIGONE OF URINARY BLADDER: Primary | ICD-10-CM

## 2017-05-09 DIAGNOSIS — N18.3 CKD (CHRONIC KIDNEY DISEASE), STAGE 3 (MODERATE): ICD-10-CM

## 2017-05-09 LAB
ANION GAP SERPL CALC-SCNC: 13 MMOL/L
BACTERIA #/AREA URNS HPF: NORMAL /HPF
BACTERIA #/AREA URNS HPF: NORMAL /HPF
BASOPHILS # BLD AUTO: 0.04 K/UL
BASOPHILS NFR BLD: 0.2 %
BILIRUB UR QL STRIP: NEGATIVE
BILIRUB UR QL STRIP: NEGATIVE
BUN SERPL-MCNC: 38 MG/DL
CALCIUM SERPL-MCNC: 9.2 MG/DL
CHLORIDE SERPL-SCNC: 100 MMOL/L
CLARITY UR: CLEAR
CLARITY UR: CLEAR
CO2 SERPL-SCNC: 27 MMOL/L
COLOR UR: YELLOW
COLOR UR: YELLOW
CREAT SERPL-MCNC: 2.6 MG/DL
DIFFERENTIAL METHOD: ABNORMAL
EOSINOPHIL # BLD AUTO: 0.2 K/UL
EOSINOPHIL NFR BLD: 0.9 %
ERYTHROCYTE [DISTWIDTH] IN BLOOD BY AUTOMATED COUNT: 14.1 %
EST. GFR  (AFRICAN AMERICAN): 28 ML/MIN/1.73 M^2
EST. GFR  (NON AFRICAN AMERICAN): 24 ML/MIN/1.73 M^2
GLUCOSE SERPL-MCNC: 107 MG/DL
GLUCOSE UR QL STRIP: NEGATIVE
GLUCOSE UR QL STRIP: NEGATIVE
HCT VFR BLD AUTO: 24.8 %
HGB BLD-MCNC: 8.4 G/DL
HGB UR QL STRIP: ABNORMAL
HGB UR QL STRIP: ABNORMAL
HYALINE CASTS #/AREA URNS LPF: 0 /LPF
HYALINE CASTS #/AREA URNS LPF: 1 /LPF
KETONES UR QL STRIP: NEGATIVE
KETONES UR QL STRIP: NEGATIVE
LEUKOCYTE ESTERASE UR QL STRIP: ABNORMAL
LEUKOCYTE ESTERASE UR QL STRIP: ABNORMAL
LYMPHOCYTES # BLD AUTO: 0.9 K/UL
LYMPHOCYTES NFR BLD: 5.5 %
MAGNESIUM SERPL-MCNC: 1.7 MG/DL
MCH RBC QN AUTO: 29 PG
MCHC RBC AUTO-ENTMCNC: 33.9 %
MCV RBC AUTO: 86 FL
MICROSCOPIC COMMENT: NORMAL
MICROSCOPIC COMMENT: NORMAL
MONOCYTES # BLD AUTO: 1.7 K/UL
MONOCYTES NFR BLD: 10 %
NEUTROPHILS # BLD AUTO: 13.8 K/UL
NEUTROPHILS NFR BLD: 82 %
NITRITE UR QL STRIP: NEGATIVE
NITRITE UR QL STRIP: NEGATIVE
PH UR STRIP: 7 [PH] (ref 5–8)
PH UR STRIP: 7 [PH] (ref 5–8)
PHOSPHATE SERPL-MCNC: 3.5 MG/DL
PLATELET # BLD AUTO: 435 K/UL
PMV BLD AUTO: 8.5 FL
POTASSIUM SERPL-SCNC: 4.4 MMOL/L
PROT UR QL STRIP: ABNORMAL
PROT UR QL STRIP: ABNORMAL
RBC # BLD AUTO: 2.9 M/UL
RBC #/AREA URNS HPF: 1 /HPF (ref 0–4)
RBC #/AREA URNS HPF: 4 /HPF (ref 0–4)
SODIUM SERPL-SCNC: 140 MMOL/L
SP GR UR STRIP: <=1.005 (ref 1–1.03)
SP GR UR STRIP: <=1.005 (ref 1–1.03)
URN SPEC COLLECT METH UR: ABNORMAL
URN SPEC COLLECT METH UR: ABNORMAL
UROBILINOGEN UR STRIP-ACNC: NEGATIVE EU/DL
UROBILINOGEN UR STRIP-ACNC: NEGATIVE EU/DL
WBC # BLD AUTO: 16.89 K/UL
WBC #/AREA URNS HPF: 3 /HPF (ref 0–5)
WBC #/AREA URNS HPF: 4 /HPF (ref 0–5)

## 2017-05-09 PROCEDURE — 84100 ASSAY OF PHOSPHORUS: CPT

## 2017-05-09 PROCEDURE — 85025 COMPLETE CBC W/AUTO DIFF WBC: CPT

## 2017-05-09 PROCEDURE — 81000 URINALYSIS NONAUTO W/SCOPE: CPT

## 2017-05-09 PROCEDURE — 1159F MED LIST DOCD IN RCRD: CPT | Mod: S$GLB,,, | Performed by: INTERNAL MEDICINE

## 2017-05-09 PROCEDURE — 25000003 PHARM REV CODE 250: Performed by: STUDENT IN AN ORGANIZED HEALTH CARE EDUCATION/TRAINING PROGRAM

## 2017-05-09 PROCEDURE — 83735 ASSAY OF MAGNESIUM: CPT

## 2017-05-09 PROCEDURE — 80048 BASIC METABOLIC PNL TOTAL CA: CPT

## 2017-05-09 PROCEDURE — 94761 N-INVAS EAR/PLS OXIMETRY MLT: CPT

## 2017-05-09 PROCEDURE — 1160F RVW MEDS BY RX/DR IN RCRD: CPT | Mod: S$GLB,,, | Performed by: INTERNAL MEDICINE

## 2017-05-09 PROCEDURE — 99205 OFFICE O/P NEW HI 60 MIN: CPT | Mod: S$GLB,,, | Performed by: INTERNAL MEDICINE

## 2017-05-09 PROCEDURE — 25000003 PHARM REV CODE 250: Performed by: INTERNAL MEDICINE

## 2017-05-09 PROCEDURE — 99999 PR PBB SHADOW E&M-EST. PATIENT-LVL III: CPT | Mod: PBBFAC,,, | Performed by: INTERNAL MEDICINE

## 2017-05-09 PROCEDURE — 36415 COLL VENOUS BLD VENIPUNCTURE: CPT

## 2017-05-09 PROCEDURE — 1125F AMNT PAIN NOTED PAIN PRSNT: CPT | Mod: S$GLB,,, | Performed by: INTERNAL MEDICINE

## 2017-05-09 RX ORDER — SODIUM POLYSTYRENE SULFONATE 4.1 MEQ/G
POWDER, FOR SUSPENSION ORAL; RECTAL
Status: ON HOLD | COMMUNITY
Start: 2017-05-02 | End: 2017-07-06 | Stop reason: CLARIF

## 2017-05-09 RX ADMIN — CIPROFLOXACIN 500 MG: 500 TABLET, FILM COATED ORAL at 09:05

## 2017-05-09 RX ADMIN — CHOLECALCIFEROL TAB 10 MCG (400 UNIT) 1200 UNITS: 10 TAB at 09:05

## 2017-05-09 RX ADMIN — ONDANSETRON 4 MG: 4 TABLET, ORALLY DISINTEGRATING ORAL at 08:05

## 2017-05-09 NOTE — DISCHARGE INSTRUCTIONS
BLADDER CANCER, UNDERSTANDING (ENGLISH) View Edit Remove   KIDNEY INJURY, ACUTE, DISCHARGE INSTRUCTIONS FOR (ENGLISH) View Edit Remove   NEPHROSTOMY, PERCUTANEOUS, DISCHARGE INSTRUCTIONS (ENGLISH) View Edit Remove   BLOOD TRANSFUSION (ADULT), WHEN YOU NEED A (ENGLISH) View Edit Remove   CIPROFLOXACIN TABLETS (ENGLISH) View Edit Remove   ONDANSETRON TABLETS (ENGLISH) View Edit Remove

## 2017-05-09 NOTE — MEDICAL/APP STUDENT
[facility] Third Year Medical Student Progress Note   [facility] Internal Medicine Team B    Subjective:    Mr. Koehler is a 67 year old man being followed by [facility] for urinary obstruction and ELDER on CKD.     He reports he is feeling much better this morning. He drank Golytely last night and had multiple bowel movements through the night and into the morning. The pain around his left nephrostomy tube is improving. He denies any nausea, vomiting, fevers, chills, chest pain, shortness of breath.     Objective:    Vitals:  Tmax 99.4  HR 78-99  RR 18-19  -142/66-70  % on RA    I: 680  O: 3225    Physical Exam:  Gen: NAD, resting comfortably in chair   HENT: NCAT, EOMI, PERRLA, MMM  CV: RRR, no murmurs, rubs, gallops  Pulm: CTAB, no wheezes or increased work of breathing  Abd: soft, NTND, + bowel sounds. Some TTP over the left nephrostomy tube site but otherwise c/d/i. Output from both tubes noted and approximately the same amount of light yellow in both bags.  Extremities: no cyanosis, clubbing, edema, 2+ pulses  Skin: no rash or lesion   Neuro: grossly intact  Psych: alert and oriented to person, place, time    Labs/Micro/Imaging:    WBC 16.89 (previosuly 16.53), H/H 8.4/24.8 (stable), Platelets 435    Na 140, K 4.4, Cl 100, CO2 27, BUN 38 (stable), Cr 2.6 (stable), Glucose 107, Ca 9.2    Mg 1.7  Phos 3.5    Urine culture: Enterococcus fecalis   Sensitivities: Ampicillin, Ciprofloxacin, Nitrofurantoin, Vancomycin   Resistant: Tetracycline     Medications:    Scheduled Meds:   cholecalciferol (vitamin D3)  1,200 Units Oral Daily    ciprofloxacin HCl  500 mg Oral Daily    docusate sodium  100 mg Oral Daily    polyethylene glycol  17 g Oral Daily    sodium chloride 0.9%  3 mL Intravenous Q8H      PRN Meds:.sodium chloride, dextrose 50%, dextrose 50%, glucagon (human recombinant), glucose, glucose, morphine, ondansetron, oxycodone, pneumoc 13-margarito conj-dip cr(PF), promethazine (PHENERGAN) IVPB, ramelteon,  sodium chloride 0.9%    Assessment:    Mr. Koehler is a 67 year old man with bladder cancer with urinary outlet obstruction, ELDER on CKD, UTI, inflammatory anemia, and thrombocytosis.     Plan:    Bladder cancer with urinary outlet obstruction:  - CT urogram shows resolved hydronephrosis on left and improved on the right   - IR was consulted for emergent nephrostomy placement   - Urology consulted  - Right nephrostomy tube was placed and the left was exchanged  - Left nephrostomy tube now with good output   - Renal US after surgery showed minimal amount of perinephric fluid on the left   - Repeat Renal US on 5/8 showed no significant changes when compared to previous scan  - Urology recommends continued daily irrigation   - Patient will follow up with nephrology upon discharge  - Patient has follow up appointment with Oncologist today     ELDER on CKD:  - baseline Cr 1.6 - 1.7  - Cr 2.3 on admit, potassium normal  - UA with WBC 62 and moderate bacteria   - FENa 1% which is most likely prerenal   - Nephrology consulted   - Cultures back (see UTI section)  - Cr 2.6 this morning but stable overall   - will need to follow up with nephrology for further treatment of ELDER    UTI:  - WBC 28.7 on admit, afebrile with no systemic signs  - WBC now 16.53 which is stable  - Day 6 of Rocephin  - Ucx grew Enterococcus fecalis: sensitive to Ampicillin, Ciprofloxacin, Nitrofurantoin, Vancomycin. Resistant: Tetracycline  - Will discharge on oral Cipro 500 mg PO QD for a total of 14 days     Inflammatory anemia:  - Hgb 8.1 which is consistent with baseline  - MCV 86 with iron studies showing ferritin 891 with low Fe, transferrin, and TIBC  - continue to monitor CBC  - H/H stable     Thrombocytosis:  - Platelets 373 which is increased from baseline   - most likely reactive to infection and surgery   - stable at 435    Diet: Regular  Code status: Full  Dispo: Discharge today on oral antibiotics for UTI with continued follow up with  nephrology and oncology     Edson Seals  [facility] Third Year Medical Student  [facility] Team B

## 2017-05-09 NOTE — CONSULTS
Consult received for low potassium diet education/supplement recs. Educated pt & wife on low potassium diet. Provided them with handouts. Also discussed low potassium supplements. Pt /wife voiced understanding.

## 2017-05-09 NOTE — PROGRESS NOTES
"Subjective:       Patient ID: Juan Manuel Koehler is a 67 y.o. male.    Chief Complaint: Bladder Cancer    HPI   Juan Manuel Koehler is a 67 y.o. White male, referred by Dr. Robles, who presents today for evaluation and management of invasive bladder cancer. Patient has previously been seen by Dr. 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 He presented to Dr. Boucher with pain and hematuria and was found to have a large bladder tumor on his trigone. The tumor was also causing bilateral ureteral obstruction with resulting ELDER.The patient underwent tumor resection and attempted JJ stent placement on 4/5/17 which demonstrated cT2 urothelial carcinoma (nested variant). Bilateral nephrostomy tubes were placed with improvement in the patient's renal function. Patient discharged today from [facility] for ELDER from 5/5-5/9. During hospitalization, right nephrostomy tube was placed and the left was exchanged. Pt reports fatigue, mild nausea, and > 20 lb weight loss in last 6 weeks.      5/5/17 CT CAP reveals " Bilateral double-J ureteral stents and left nephrostomy tube are present.  There has been interval resolution of left-sided hydronephrosis and significant improvement in right-sided hydronephrosis which now appears mild. Bladder is nondistended and not well evaluated.  There is no CT evidence of distant metastatic disease referable to provide history of bladder neoplasm."     Review of Systems   Constitutional: Positive for activity change, appetite change, fever and unexpected weight change. Negative for fatigue.   HENT: Negative for mouth sores, nosebleeds and trouble swallowing.    Eyes: Negative for discharge and visual disturbance.   Respiratory: Negative for cough, shortness of breath and wheezing.    Cardiovascular: Negative for chest pain and leg swelling. "   Gastrointestinal: Positive for constipation and nausea. Negative for abdominal distention, abdominal pain, blood in stool, diarrhea and vomiting.   Genitourinary: Positive for difficulty urinating. Negative for decreased urine volume, frequency and hematuria.        +bilateral nephrostomy tubes   Musculoskeletal: Negative for back pain.   Skin: Negative for color change and rash.   Neurological: Negative for weakness and headaches.   Hematological: Negative for adenopathy.   Psychiatric/Behavioral: Negative for sleep disturbance. The patient is not nervous/anxious.    All other systems reviewed and are negative.      Allergies:  Review of patient's allergies indicates:  No Known Allergies    Medications:  Current Outpatient Prescriptions   Medication Sig Dispense Refill    acetaminophen (TYLENOL) 500 MG tablet Take 1,000 mg by mouth every 6 (six) hours as needed for Pain.      cholecalciferol, vitamin D3, 400 unit Cap Take 3 capsules (1,200 Units total) by mouth once daily. 90 capsule 0    ciprofloxacin HCl (CIPRO) 500 MG tablet Take 1 tablet (500 mg total) by mouth every 24 hours as needed. 10 tablet 0    docusate sodium (COLACE) 100 MG capsule Take 1 capsule (100 mg total) by mouth 2 (two) times daily.  0    ketoconazole (NIZORAL) 2 % shampoo USE TO WASH AFFECTED AREA ONCE DAILY  3    ondansetron (ZOFRAN-ODT) 4 MG TbDL Take 1 tablet (4 mg total) by mouth every 8 (eight) hours as needed. 20 tablet 1    oxycodone (OXY-IR) 5 mg Cap Take 5 mg by mouth every 4 (four) hours as needed for Pain.      polyethylene glycol (GLYCOLAX) 17 gram PwPk Take 17 g by mouth daily as needed (for constipation). 10 packet 2    sodium polystyrene (KAYEXALATE) powder        No current facility-administered medications for this visit.        PMH:  Past Medical History:   Diagnosis Date    Cancer     bladder and throat    Urinary tract infection        PSH:  Past Surgical History:   Procedure Laterality Date    BLADDER SURGERY       HERNIA REPAIR      THROAT SURGERY         FamHx:  Family History   Problem Relation Age of Onset    No Known Problems Father     Kidney disease Mother     Prostate cancer Neg Hx        SocHx:  Social History     Social History    Marital status:      Spouse name: N/A    Number of children: N/A    Years of education: N/A     Occupational History    Not on file.     Social History Main Topics    Smoking status: Never Smoker    Smokeless tobacco: Never Used    Alcohol use No    Drug use: No    Sexual activity: Yes     Partners: Female     Birth control/ protection: None     Other Topics Concern    Not on file     Social History Narrative       Objective:      Physical Exam   Constitutional: He is oriented to person, place, and time. He appears well-developed.   Appears fatigued and thin   HENT:   Head: Normocephalic and atraumatic.   Right Ear: External ear normal.   Left Ear: External ear normal.   Eyes: Conjunctivae and EOM are normal. Pupils are equal, round, and reactive to light. Right eye exhibits no discharge. Left eye exhibits no discharge. No scleral icterus.   Neck: Normal range of motion. Neck supple.   Pulmonary/Chest: Effort normal.   Musculoskeletal: Normal range of motion.   Bilateral nephrostomy tubes draining clear, yellow urine.   Neurological: He is alert and oriented to person, place, and time.   Skin: Skin is warm and dry. No erythema.   Nursing note and vitals reviewed.      LABS:  WBC   Date Value Ref Range Status   05/09/2017 16.89 (H) 3.90 - 12.70 K/uL Final     Hemoglobin   Date Value Ref Range Status   05/09/2017 8.4 (L) 14.0 - 18.0 g/dL Final     POC Hematocrit   Date Value Ref Range Status   04/06/2017 31 (L) 36 - 54 %PCV Final     Hematocrit   Date Value Ref Range Status   05/09/2017 24.8 (L) 40.0 - 54.0 % Final     Platelets   Date Value Ref Range Status   05/09/2017 435 (H) 150 - 350 K/uL Final       Chemistry        Component Value Date/Time      05/09/2017 0416    K 4.4 05/09/2017 0416     05/09/2017 0416    CO2 27 05/09/2017 0416    BUN 38 (H) 05/09/2017 0416    CREATININE 2.6 (H) 05/09/2017 0416     05/09/2017 0416        Component Value Date/Time    CALCIUM 9.2 05/09/2017 0416    ALKPHOS 59 05/06/2017 0402    AST 8 (L) 05/06/2017 0402    ALT 14 05/06/2017 0402    BILITOT 0.2 05/06/2017 0402          Assessment:       1. Malignant neoplasm of trigone of urinary bladder    2. CKD (chronic kidney disease), stage 3 (moderate)    3. Hydronephrosis, unspecified hydronephrosis type    4. Hyperkalemia        Plan:       1,2,3- Juan Manuel Koehler is a 67 y.o. White male, referred by Dr. Robles, who presents today for evaluation and management of invasive bladder cancer. Patient has previously been seen by Dr. 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 He presented to Dr. Boucher with pain and hematuria and was found to have a large bladder tumor on his trigone. Staging CTCAP on 5/5/17 shows no evidence of distant metastasis.    Dr. Calero and I discussed with patient that upon viewing his labs from today, he would not be considered a candidate for neoadjuvant chemotherapy due to his creatinine of 2.6. We would ideally like his creatinine to be below 1.8. Explained that we typically use cisplatin and gemcitabine chemotherapy, with the cisplatin having the potential to be nephrotoxic. In his situation, it would not be safe to administer it. His creatinine does appear to be slightly improving since nephrostomy exchange and if we can get his renal function in an acceptable level, we can proceed with neoadjuvant chemotherapy as discussed. Patient is scheduled to see Dr. Chavez, his nephrologist, as well. Should the renal function not improve, will discuss using carboplatin in the place of cisplatin  or discussing with Dr. Robles to forgo neoadjuvant chemotherapy and proceed with consolidative surgery. Will have patient RTC in 1 week with labs and to see Dr. Calero to evaluate renal function.    4- Discussed diet low in potassium and handout provided.     Patient was also seen and examined by Dr. Calero. Patient is in agreement with the proposed treatment plan. All questions were answered to the patient's satisfaction. Pt knows to call clinic if anything is needed before the next clinic visit.    CALIXTO Smith  Hematology and Medical Oncology        I have reviewed the notes, assessments, and/or procedures performed by the nurse practitioner, as above.  I have personally interviewed the patient at the beside, and rounded with the nurse practitioner. I formulated the plan of care.  I concur with her documentation of Juan Manuel Koehler.  More than 60 mins were spent during this encounter, greater than 50% was spent in direct counseling and/or coordination of care.     Mingo Calero M.D., M.S., F.A.C.P.  Hematology/Oncology Attending  [facility]

## 2017-05-09 NOTE — PATIENT INSTRUCTIONS
Discharge Instructions: Eating a Low-Potassium Diet  Your health care provider has prescribed a low-potassium diet for you. This kind of diet is advised for people who have certain kidney problems. Potassium is needed for muscle function. But too much potassium is a health risk. Potassium is found in many foods. Read below to find out how to change your diet.  Foods to limit  Some foods are high in potassium. Limit your daily intake of the foods in the list below.  · Fruits: apricots (canned and fresh), bananas, cantaloupe, honeydew melon, kiwi, nectarines, pomegranates, oranges, orange juice, pears, dried fruits (apricots, dates, figs, prunes), and prune juice  · Vegetables: asparagus, avocado, artichoke, bamboo shoots, beets, brussels sprouts, cabbage, celery, chard, okra, potatoes (white and sweet), pumpkin, rutabaga, spinach (cooked), squash, tomato, tomato sauce, tomato juice, and vegetable juice cocktail  · Legumes: black-eyed peas, chickpeas, lentils, lima beans, navy beans, red kidney beans, soybeans, and split peas  · Nuts and seeds: almonds, Brazil nuts, cashews, peanuts, peanut butter, pecans, pumpkin seeds, sunflower seeds, and walnuts  · Breads and cereals: bran and whole-grain products  · Dairy foods: milk, cheese, ice cream, yogurt  · Animal protein: all forms of animal protein  · Other: chocolate, cocoa, coconut milk, and molasses  Tips  · Ask your health care provider how much potassium you are allowed each day. This will help you figure out serving sizes for your needs.  · Check labels for potassium. It may be listed as potassium chloride.  · Do not use salt substitutes. These often have potassium in them.  · Cook frozen fruits and vegetables in water. Rinse and drain them well before eating.  · Drain liquid from all canned fruits and vegetables. Rinse them before eating.  · Reduce the potassium in potatoes. Peel them, slice thinly, and soak in water for at least 4 hours.  · Reduce the potassium  in green leafy vegetables. Soak them in water for at least 4 hours.  · Eat white rice and refined white flour products. These include white bread, pasta, and grits.  Follow-up  Make a follow-up appointment as advised by our staff.  When to call your health care provider  Call your health care provider right away if you have any of the following:  · Fatigue  · Shortness of breath  · Chest pain  · Slow, irregular heartbeat  · Fainting  · Dizziness  · Lightheadedness  · Confusion   Date Last Reviewed: 6/21/2015  © 7353-6808 [employer]. [address] [place] [address]. All rights reserved. This information is not intended as a substitute for professional medical care. Always follow your healthcare professional's instructions.

## 2017-05-09 NOTE — PLAN OF CARE
Problem: Patient Care Overview  Goal: Plan of Care Review  Outcome: Ongoing (interventions implemented as appropriate)  RA SAT 96%; no distress noted.

## 2017-05-09 NOTE — NURSING
Pt advised that he had a small BM.  Nurse did not visualize BM.  Pt requested that we call the Dr Baer to report BM.  Pt further advised that he did not want to be D/C tonight because Dr Gross was coming up to see pt in the morning, May 9th, 2017.

## 2017-05-09 NOTE — PHYSICIAN QUERY
PT Name: Juan Manuel Koehler  MR #: [id number]    Physician Query Form - Nutrition Clarification     CDS/: Gela Almeida               Contact information: [phone]    This form is a permanent document in the medical record.     Query Date: May 9, 2017    By submitting this query, we are merely seeking further clarification of documentation.. Please utilize your independent clinical judgment when addressing the question(s) below.    The Medical record contains the following:   Indicators  Supporting Clinical Findings Location in Medical Record    % of Estimated Energy Intake over a time frame from p.o., TF, or TPN     X Weight Status over a time frame Weight loss: unintentional  l  Endorses poor appetite since ~ 1 month and 20 lb weight loss  RD note 5/8    H&P    Subcutaneous Fat and/or Muscle Loss      Fluid Accumulation or Edema      Reduced  Strength     X Wt / BMI / Usual Body Weight BMI 17.97 RD note 5/8    Delayed Wound Healing / Failure to Thrive     X Acute or Chronic Illness ELDER due to obstruction, History: Bladder Ca RD note 5/8    Medication      Treatment     X Other underweight RD note 5/8     AND / ASPEN Clinical Characteristics (October 2011)  A minimum of two characteristics is recommended for diagnosing either moderate or severe malnutrition   Mild Malnutrition Moderate Malnutrition Severe Malnutrition   Energy Intake from p.o., TF or TPN. < 75% intake of estimated energy needs for less than 7 days < 75% intake of estimated energy needs for greater than 7 days < 50% intake of estimated energy needs for > 5 days   Weight Loss 1-2% in 1 month  5% in 3 months  7.5% in 6 months  10% in 1 year 1-2 % in 1 week  5% in 1 month  7.5% in 3 months  10% in 6 months  20% in 1 year > 2% in 1 week  > 5% in 1 month  > 7.5% in 3 months  > 10% in 6 months  > 20% in 1 year   Physical Findings     None *Mild subcutaneous fat and/or muscle loss  *Mild fluid accumulation  *Stage II decubitus  *Surgical wound or non-healing  wound *Mod/severe subcutaneous fat and/or muscle loss  *Mod/severe fluid accumulation  *Stage III or IV decubitus  *Non-healing surgical wound     Provider, please specify diagnosis or diagnoses associated with above clinical findings.    [X] Mild Protein-Calorie Malnutrition  [ ] Moderate Protein-Calorie Malnutrition  [ ] Abnormal Weight Loss  [ ] Underweight  [ ] Other Nutritional Diagnosis (please specify): ____________________________________  [ ] Other: ________________________________  [ ] Clinically Undetermined    Please document in your progress notes daily for the duration of treatment until resolved and include in your discharge summary.

## 2017-05-09 NOTE — NURSING
Offered pt his scheduled 0900 medications and pt stated that he would like to pass on those medications at the moment because he is trying to rest. Will attempt later in the morning.

## 2017-05-09 NOTE — PLAN OF CARE
TN met with patient and wife.  Patient discharged to home with shower/bath chair ordered.  TN notified patient she spoke with [employer] and it is not covered by insurance. Patient notified of cost and refused, stated he will get it on his own.  Specialty Follow-up appointments made and patient/wife are aware.    Patient refused TN to make follow-up with his PCP or priority care clinic.    Patient's preferred number [phone]     Future Appointments  Date Time Provider Department Center   5/9/2017 3:00 PM Mingo Calero MD [facility] HEM ONC [facility]   5/11/2017 3:30 PM James Boucher MD [facility] UROLOGY [place] [facility]       Follow-up With  Details  Why  Contact Info   James Boucher  On 5/11/2017  at 3:30 pm  [address]  [address]  [place] [address]  [phone]     Neva Chavez  On 5/15/2017  9:30 am   [address]  [address]  [place] [address]  [phone]     LABS    will call to notify of date  [address]  [address]  [place] [address]  1ST FLOOR OF MEDICAL OFFICE BUILDING             05/09/17 1154   Final Note   Assessment Type Final Discharge Note   Discharge Disposition Home   Discharge planning education complete? Yes   Hospital Follow Up  Appt(s) scheduled? Yes   Discharge plans and expectations educations in teach back method with documentation complete? Yes   Offered [facility] Bedside Delivery? Yes   Discharge/Hospital Encounter Summary to (non-[facility]) PCP Yes   Referral to Outpatient Case Management complete? n/a   Referral to / orders for Home Health Complete? n/a   Did you assess the readiness or willingness of the family or caregiver to support self management of care? Yes   Right Care Referral Info   Post Acute Recommendation No Care     Angelica Raymond RN  Transition Navigator  [phone]

## 2017-05-09 NOTE — NURSING
Patient discharge instructions given and reviewed. Med rec reviewed with Patient. Patient verbalized understanding. Education provided on new medication and diagnosis. Awaiting transportation home.

## 2017-05-09 NOTE — MR AVS SNAPSHOT
[facility]  [address]  [facility] [address]  Phone: [phone]                  Juan Manuel Koehler   2017 3:00 PM   Initial consult    Description:  Male : 1950   Provider:  Mingo Calero MD   Department:  [facility]           Reason for Visit     Bladder Cancer                To Do List           Future Appointments        Provider Department Dept Phone    2017 3:30 PM James Boucher MD [facility] Urology [phone]      Goals (5 Years of Data)     None      [facility]     [facility] On Call Nurse Care Line -  Assistance  Unless otherwise directed by your provider, please contact [facility], our nurse care line that is available for  assistance.     Registered nurses in the [facility] On Call Center provide: appointment scheduling, clinical advisement, health education, and other advisory services.  Call: [phone] (toll free)               Medications           Message regarding Medications     Verify the changes and/or additions to your medication regime listed below are the same as discussed with your clinician today.  If any of these changes or additions are incorrect, please notify your healthcare provider.             Verify that the below list of medications is an accurate representation of the medications you are currently taking.  If none reported, the list may be blank. If incorrect, please contact your healthcare provider. Carry this list with you in case of emergency.           Current Medications     acetaminophen (TYLENOL) 500 MG tablet Take 1,000 mg by mouth every 6 (six) hours as needed for Pain.    cholecalciferol, vitamin D3, 400 unit Cap Take 3 capsules (1,200 Units total) by mouth once daily.    ciprofloxacin HCl (CIPRO) 500 MG tablet Take 1 tablet (500 mg total) by mouth every 24 hours as needed.    docusate sodium (COLACE) 100 MG capsule Take 1 capsule (100 mg total) by mouth 2 (two) times daily.     "ketoconazole (NIZORAL) 2 % shampoo USE TO WASH AFFECTED AREA ONCE DAILY    ondansetron (ZOFRAN-ODT) 4 MG TbDL Take 1 tablet (4 mg total) by mouth every 8 (eight) hours as needed.    oxycodone (OXY-IR) 5 mg Cap Take 5 mg by mouth every 4 (four) hours as needed for Pain.    polyethylene glycol (GLYCOLAX) 17 gram PwPk Take 17 g by mouth daily as needed (for constipation).    sodium polystyrene (KAYEXALATE) powder            Clinical Reference Information           Your Vitals Were     BP Pulse Temp Resp Height Weight    136/72 (BP Location: Left arm, Patient Position: Sitting, BP Method: Automatic) 90 98.4 °F (36.9 °C) (Oral) 14 6' 2" (1.88 m) 68 kg (149 lb 14.6 oz)    SpO2 BMI             96% 19.25 kg/m2         Blood Pressure          Most Recent Value    BP  136/72      Allergies as of 5/9/2017     No Known Allergies      Immunizations Administered on Date of Encounter - 5/9/2017     None      [facility] Sign-Up     Activating your [facility] account is as easy as 1-2-3!     1) Visit [email or web address], select Sign Up Now, enter this activation code and your date of birth, then select Next.  [email or web address]  Expires: 5/14/2017 12:48 PM      2) Create a username and password to use when you visit [facility] in the future and select a security question in case you lose your password and select Next.    3) Enter your e-mail address and click Sign Up!    Additional Information  If you have questions, please e-mail [email or web address] or call [phone] to talk to our [facility] staff. Remember, [facility] is NOT to be used for urgent needs. For medical emergencies, dial 911.         Instructions      Discharge Instructions: Eating a Low-Potassium Diet  Your health care provider has prescribed a low-potassium diet for you. This kind of diet is advised for people who have certain kidney problems. Potassium is needed for muscle function. But too much potassium is a health risk. Potassium is found in many foods. Read below to " find out how to change your diet.  Foods to limit  Some foods are high in potassium. Limit your daily intake of the foods in the list below.  · Fruits: apricots (canned and fresh), bananas, cantaloupe, honeydew melon, kiwi, nectarines, pomegranates, oranges, orange juice, pears, dried fruits (apricots, dates, figs, prunes), and prune juice  · Vegetables: asparagus, avocado, artichoke, bamboo shoots, beets, brussels sprouts, cabbage, celery, chard, okra, potatoes (white and sweet), pumpkin, rutabaga, spinach (cooked), squash, tomato, tomato sauce, tomato juice, and vegetable juice cocktail  · Legumes: black-eyed peas, chickpeas, lentils, lima beans, navy beans, red kidney beans, soybeans, and split peas  · Nuts and seeds: almonds, Brazil nuts, cashews, peanuts, peanut butter, pecans, pumpkin seeds, sunflower seeds, and walnuts  · Breads and cereals: bran and whole-grain products  · Dairy foods: milk, cheese, ice cream, yogurt  · Animal protein: all forms of animal protein  · Other: chocolate, cocoa, coconut milk, and molasses  Tips  · Ask your health care provider how much potassium you are allowed each day. This will help you figure out serving sizes for your needs.  · Check labels for potassium. It may be listed as potassium chloride.  · Do not use salt substitutes. These often have potassium in them.  · Cook frozen fruits and vegetables in water. Rinse and drain them well before eating.  · Drain liquid from all canned fruits and vegetables. Rinse them before eating.  · Reduce the potassium in potatoes. Peel them, slice thinly, and soak in water for at least 4 hours.  · Reduce the potassium in green leafy vegetables. Soak them in water for at least 4 hours.  · Eat white rice and refined white flour products. These include white bread, pasta, and grits.  Follow-up  Make a follow-up appointment as advised by our staff.  When to call your health care provider  Call your health care provider right away if you have any  of the following:  · Fatigue  · Shortness of breath  · Chest pain  · Slow, irregular heartbeat  · Fainting  · Dizziness  · Lightheadedness  · Confusion   Date Last Reviewed: 6/21/2015 © 2000-2016 [employer]. [address] [place] [address], [place] [address]. All rights reserved. This information is not intended as a substitute for professional medical care. Always follow your healthcare professional's instructions.             Language Assistance Services     ATTENTION: Language assistance services are available, free of charge. Please call [phone].      ATENCIÓN: Si habla español, tiene a felton disposición servicios gratuitos de asistencia lingüística. Llame al [phone].     CHÚ Ý: N?u b?n nói Ti?ng Vi?t, có các d?ch v? h? tr? ngôn ng? mi?n phí dành cho b?n. G?i s? [phone].         [facility] complies with applicable Federal civil rights laws and does not discriminate on the basis of race, color, national origin, age, disability, or sex.

## 2017-05-09 NOTE — PROGRESS NOTES
Progress Note  Nephrology      Consult Requested By: Cody Guzman MD  Reason for Consult: ELDER    SUBJECTIVE:     Review of Systems   Constitutional: Negative for chills and fever.   Respiratory: Negative for cough and shortness of breath.    Cardiovascular: Negative for chest pain and leg swelling.   Gastrointestinal: Positive for constipation. Negative for nausea.   Musculoskeletal:        Tenderness around nephrostomies     Patient Active Problem List   Diagnosis    Malignant neoplasm of urinary bladder    CKD (chronic kidney disease)    Difficult intubation    ELDER (acute kidney injury)    Hyperkalemia    Hydronephrosis    Leukocytosis    Nausea       OBJECTIVE:     Medications:   cholecalciferol (vitamin D3)  1,200 Units Oral Daily    [START ON 5/9/2017] ciprofloxacin HCl  500 mg Oral Daily    docusate sodium  100 mg Oral Daily    polyethylene glycol  17 g Oral Daily    sodium chloride 0.9%  3 mL Intravenous Q8H        Vitals:    05/08/17 2000   BP: 119/66   Pulse: 99   Resp: 18   Temp: 98.1 °F (36.7 °C)     I/O last 3 completed shifts:  In: 680 [P.O.:680]  Out: 4400 [Urine:4400]  Physical Exam   Constitutional: He is oriented to person, place, and time. He appears well-developed and well-nourished. No distress.   malnorished   HENT:   Head: Normocephalic and atraumatic.   Eyes: EOM are normal. No scleral icterus.   Cardiovascular: Normal rate, regular rhythm, normal heart sounds and intact distal pulses.  Exam reveals no gallop and no friction rub.    No murmur heard.  Pulmonary/Chest: Effort normal and breath sounds normal. He has no wheezes. He has no rales.   Abdominal: Soft. Bowel sounds are normal. He exhibits no distension. There is no tenderness.   Musculoskeletal: Normal range of motion. He exhibits no edema.   Lymphadenopathy:     He has no cervical adenopathy.   Neurological: He is alert and oriented to person, place, and time.   Skin: Skin is warm and dry. No rash noted. He is not  diaphoretic.   Psychiatric: Thought content normal.     Laboratory:    Recent Labs  Lab 05/06/17  1611 05/07/17  0823 05/08/17  0419   WBC 22.29* 15.35* 16.53*   HGB 8.3* 8.6* 8.3*   HCT 24.5* 25.4* 24.4*    409* 383*   MONO 3.9*  0.9 3.9*  0.6 8.2  1.4*       Recent Labs  Lab 05/06/17  0402  05/07/17  0400 05/07/17  0822 05/08/17  0419   *  < > 134* 134* 135*   K 4.2  < > 3.5 3.9 4.7   CL 97  < > 97 95 100   CO2 26  < > 28 27 24   BUN 39*  < > 36* 38* 36*   CREATININE 2.4*  < > 2.7* 2.9* 2.8*   CALCIUM 8.7  < > 8.7 9.3 8.9   PHOS 4.9*  --  3.7  --  2.9   < > = values in this interval not displayed.  Labs reviewed  Diagnostic Results:  X-Ray: Reviewed  US: Reviewed  Echo: Reviewed      ASSESSMENT/PLAN:   1. ELDER on CKd 3 - obstructive uropathy --> SP B nephrostomy-->  ?? Why cr is not improving?? --> pt is net negative 4.1L (however nothing documented as intake in the last 24 hours - pt was on IVF). Please weight pt daily    Repeat UA from both nephrotomies  Renal US unchanged  Monitor 1 more day --> if tomorrow stable or improving OK to D/c  Pt has appointment with his oncologist tomorrow at 3 pm-->hopefully can d/c in time to keep this appointment  2.  Hyponatremia - was due to IVF, now improving  3. Hyperkalemia - at home was on Kayexelate. NOw off. Consult nutritionist to educate on low K diet  4. Malnutrition - reports > 20 lb weight loss in last 6 weeks, Albumin 2.5 --> nepro+boost with meals  7. Anemia of CKD - SP transfusion 5/6/17. No epo pruducts in the settings of malignancy until discussed with onco  8. MBD -  and Vitamin D 28. Continue Cholecalciferol  9. Constipation- already received miralax and colace - can be contributing to hydronephrosis - if no BM by 5 pm today give 1/2galon of golytely      Thank you for allowing me to participate in the care of your patients  With any question please call [phone]  Neva Chavez    [facility]  QAMAR Rausch MD, FACP,   M.  Chapis BUSTILLOS,   O. MD AIYANA Chavez, NP  [address]  [place], [address]  [phone]

## 2017-05-09 NOTE — PLAN OF CARE
Spoke with lab regarding results of urinalysis not specifying which specimen came from which nephrostomy. Lab verified that the urinalysis @ 2300 is from the LEFT nephrostomy, and the urinalysis @ 0028 is from RIGHT nephrostomy. Verified by Ama in lab.

## 2017-05-09 NOTE — PROGRESS NOTES
[facility] IM Resident NASRA Progress Note    Subjective:      Juan Manuel Koehler is a 67 y.o. male who is being followed by the [facility] IM service at [facility] for Urinary obstruction.    Had a good BM last night.  No complaints this AM.  Denies fever, chills, chest pain, nausea, vomiting. Continued good UOP from nephrostomies.     Objective:   Last 24 Hour Vital Signs:  BP  Min: 119/66  Max: 142/66  Temp  Av.4 °F (36.9 °C)  Min: 97.9 °F (36.6 °C)  Max: 99.4 °F (37.4 °C)  Pulse  Av.5  Min: 78  Max: 99  Resp  Av.5  Min: 18  Max: 19  SpO2  Av %  Min: 94 %  Max: 100 %  I/O last 3 completed shifts:  In: 680 [P.O.:680]  Out: 4950 [Urine:4950]    Physical Examination:  Vitals:    17 0400   BP: (!) 142/66   Pulse: 90   Resp: 18   Temp: 98.3 °F (36.8 °C)     Gen: NAD  HENT: NCAT, EOMI, MMM  CV: RRR, no murmurs, rubs or additional heart sounds  Pulm: CTAB, no wheezes or crackles.  Abd: L flank with continued pain around new nephrostomy site. Site c/d/i.  Good UOP from both nephrostomy tubes.  R side nephrostomy tube c/d/i.  Extremities: no c/c/e. 2+ pulses in all extremities  Skin: no rash or lesion  Neuro: moving all extremities well, nonfocal  Psych: AAOx4, calm, cooperative, conversational    Laboratory:  Laboratory Data Reviewed: yes  Cr 2.8 -> 2.6    Microbiology Data Reviewed: yes  Pertinent Findings:  Urine Cx 5/5 w/ enterococcus, Cipro sensitive    Other Results:  EKG (my interpretation): none    Radiology Data Reviewed: yes  Pertinent Findings:  Renal US w/ minimal amount of perinephric free fluid on the left    Current Medications:     Infusions:        Scheduled:   cholecalciferol (vitamin D3)  1,200 Units Oral Daily    ciprofloxacin HCl  500 mg Oral Daily    docusate sodium  100 mg Oral Daily    polyethylene glycol  17 g Oral Daily    sodium chloride 0.9%  3 mL Intravenous Q8H        PRN:  sodium chloride, dextrose 50%, dextrose 50%, glucagon (human recombinant), glucose, glucose,  morphine, ondansetron, oxycodone, pneumoc 13-margarito conj-dip cr(PF), promethazine (PHENERGAN) IVPB, ramelteon, sodium chloride 0.9%    Antibiotics and Day Number of Therapy:  Rocephin 5/5    Lines and Day Number of Therapy:  PIV  L Nephrostomy  R Nephrostomy    Assessment:     Juan Manuel Koehler is a 67 y.o.male with  Patient Active Problem List    Diagnosis Date Noted    Nausea 05/06/2017    Leukocytosis     Hydronephrosis 04/20/2017    ELDER (acute kidney injury) 04/07/2017    Hyperkalemia 04/07/2017    Difficult intubation 04/06/2017    Malignant neoplasm of urinary bladder 04/05/2017    CKD (chronic kidney disease) 04/05/2017        Plan:     This is a 67 year old patient presenting with:    Bladder cancer with urinary outlet obstruction  - CT urogram shows resolved hydronephrosis on left and improved on right  - Consulted IR for emergent nephrostomy placement  - No bolanos in place, removed recently.   - Nephrostomy R tube placed, L tube exchanged. Now L with good output  - Renal U/S w/ minimal amount of perinephric free fluid on the left  - Urology consulted and rec continued daily irrigation      ELDER on CKD  Baseline Cr 1.6-1.7, on admit 2.3. K WNL.  - NS infusion @ 75ml/hr x 10 hours  - UA showing WBC 62, moderate bacteria  - FENa 1% c/w prerenal  - UCx showing E Faecalis cipro sensitive, started on rocephin now cipro  - Consulted nephrology, appreciate recs, per last note can discharge today  - Cr this AM 2.6      UTI  WBC 28.7 from normal baseline. 92% PMNs. Afebrile, no systemic signs of infection.  - UCx w/ enterococcus: sensitivities pending  - time, follow above studies and clinical course      Inflammatory Anemia  Hb 8.1, c/w baseline. MCV 86. Recent iron studies show ferritin 891, low Fe, Tf, TIBC.  - monitoring CBC  - 5/6 Hb 6.9, likely post-procedural and dilutional  - S/p 1 unit pRBCs  - Hgb pending this AM      Thrombocytosis  Platelets 373 from nml baseline. Likely reactive.  - Monitoring      Diet:  Regular  Code status: Full  Dispo: Pending final renal recs, likely today    Neo Bueno MD HO-1  [facility] Internal Medicine Service Team B    [facility] Medicine Hospitalist Pager numbers:   [facility] Hospitalist Medicine Team A (Filemon/Vicente): [phone]  [facility] Hospitalist Medicine Team B (Rick/Megan):  [phone]

## 2017-05-09 NOTE — LETTER
May 9, 2017      Villa Robles MD  [address]  [facility] [address]           [facility] Hematology Oncology  [address]  [facility] [address]  Phone: [phone]          Patient: Juan Manuel Koehler   MR Number: [id number]   YOB: 1950   Date of Visit: 5/9/2017       Dear Dr. Villa Robles:    Thank you for referring Juan Manuel Koehler to me for evaluation. Attached you will find relevant portions of my assessment and plan of care.    If you have questions, please do not hesitate to call me. I look forward to following Juan Manuel Koehler along with you.    Sincerely,    Mingo Calero MD    Enclosure  CC:  No Recipients    If you would like to receive this communication electronically, please contact [email or web address] or [phone] to request more information on [employer] Link access.    For providers and/or their staff who would like to refer a patient to [facility], please contact us through our one-stop-shop provider referral line, [facility] , at [phone].    If you feel you have received this communication in error or would no longer like to receive these types of communications, please e-mail [email or web address]

## 2017-05-09 NOTE — NURSING
Plan of care reviewed: pt may be D/c home after BM.  Pt refused D/C. He did have 3 small Bm throughout the night. IV site to R A/C was not patient: received orders to D/C iv,  start PO for Zofran for NV, & Josué Urine samples sent to lab..  Pt still refusing bed alarm. Safety measures maintained with call bell in reach, bed in lowest position, wheels locked. Bilateral nephrostomy tube dressings c/d/i.  Regarding output both tubes draining clear yellow urine, right > left. Pt has rested quickly all night.

## 2017-05-09 NOTE — PROGRESS NOTES
Progress Note  Nephrology      Consult Requested By: No att. providers found  Reason for Consult: ELDER    SUBJECTIVE:     Review of Systems   Constitutional: Negative for chills and fever.   Respiratory: Negative for cough and shortness of breath.    Cardiovascular: Negative for chest pain and leg swelling.   Gastrointestinal: Negative for constipation (had lactulose and 5-6 BM since 3 am) and nausea.   Musculoskeletal:        Tenderness around nephrostomies     Patient Active Problem List   Diagnosis    Malignant neoplasm of urinary bladder    CKD (chronic kidney disease)    Difficult intubation    ELDER (acute kidney injury)    Hyperkalemia    Hydronephrosis    Leukocytosis    Nausea       OBJECTIVE:     Medications:   cholecalciferol (vitamin D3)  1,200 Units Oral Daily    ciprofloxacin HCl  500 mg Oral Daily    docusate sodium  100 mg Oral Daily    polyethylene glycol  17 g Oral Daily    sodium chloride 0.9%  3 mL Intravenous Q8H        Vitals:    05/09/17 1218   BP: (!) 114/56   Pulse: 81   Resp: 18   Temp: 97.7 °F (36.5 °C)     I/O last 3 completed shifts:  In: 680 [P.O.:680]  Out: 4950 [Urine:4950]  Physical Exam   Constitutional: He is oriented to person, place, and time. He appears well-developed and well-nourished. No distress.   malnorished   HENT:   Head: Normocephalic and atraumatic.   Eyes: EOM are normal. No scleral icterus.   Cardiovascular: Normal rate, regular rhythm, normal heart sounds and intact distal pulses.  Exam reveals no gallop and no friction rub.    No murmur heard.  Pulmonary/Chest: Effort normal and breath sounds normal. He has no wheezes. He has no rales.   Abdominal: Soft. Bowel sounds are normal. He exhibits no distension. There is no tenderness.   Musculoskeletal: Normal range of motion. He exhibits no edema.   Lymphadenopathy:     He has no cervical adenopathy.   Neurological: He is alert and oriented to person, place, and time.   Skin: Skin is warm and dry. No rash  noted. He is not diaphoretic.   Psychiatric: Thought content normal.     Laboratory:    Recent Labs  Lab 05/07/17  0823 05/08/17  0419 05/09/17  0416   WBC 15.35* 16.53* 16.89*   HGB 8.6* 8.3* 8.4*   HCT 25.4* 24.4* 24.8*   * 383* 435*   MONO 3.9*  0.6 8.2  1.4* 10.0  1.7*       Recent Labs  Lab 05/07/17  0400 05/07/17  0822 05/08/17 0419 05/09/17 0416   * 134* 135* 140   K 3.5 3.9 4.7 4.4   CL 97 95 100 100   CO2 28 27 24 27   BUN 36* 38* 36* 38*   CREATININE 2.7* 2.9* 2.8* 2.6*   CALCIUM 8.7 9.3 8.9 9.2   PHOS 3.7  --  2.9 3.5     Labs reviewed  Diagnostic Results:  X-Ray: Reviewed  US: Reviewed  Echo: Reviewed      ASSESSMENT/PLAN:   1. ELDER on CKd 3 - obstructive uropathy --> SP B nephrostomy-->  Cr started to improve OK to d/c--> follow up with me next Tuesday    2.  Hyponatremia - was due to IVF, now resolved  3. Hyperkalemia - at home was on Kayexelate. Now off. Consult nutritionist to educate on low K diet  4. Malnutrition - reports > 20 lb weight loss in last 6 weeks, Albumin 2.5 --> nepro+boost with meals  7. Anemia of CKD - SP transfusion 5/6/17. No epo pruducts in the settings of malignancy until discussed with onco  8. MBD -  and Vitamin D 28. Continue Cholecalciferol  9. Constipation- resolved with golytely      Thank you for allowing me to participate in the care of your patients  With any question please call [phone]  Neva Chavez    [facility]  QAMAR Rausch MD, FACPSAGAR MD,   MD AIYANA Espino, NP  [address]  [place], [address] [phone]

## 2017-05-10 ENCOUNTER — PATIENT OUTREACH (OUTPATIENT)
Dept: ADMINISTRATIVE | Facility: CLINIC | Age: 67
End: 2017-05-10
Payer: MEDICARE

## 2017-05-10 NOTE — PROGRESS NOTES
C3 nurse attempted to contact patient. The following occurred:   C3 nurse attempted to contact Juan Manuel Koehler for a TCC post hospital discharge follow up call. The patient is unable to conduct the call @ this time. The patient requested a callback.    The patient does not have a scheduled HOSFU appointment within 7-14 days post hospital discharge date 5/9/17.

## 2017-05-10 NOTE — PROGRESS NOTES
C3 nurse attempted to contact patient. The following occurred:   C3 nurse attempted to contact Juan Manuel Koehler for a TCC post hospital discharge follow up call. The patient is unable to conduct the call @ this time. The patient requested a callback.    The patient does not have a scheduled HOSFU appointment within 7-14 days post hospital discharge date 5/9/17. Message sent to Physician staff to assist with HOSFU appointment scheduling.

## 2017-05-11 ENCOUNTER — LAB VISIT (OUTPATIENT)
Dept: LAB | Facility: HOSPITAL | Age: 67
End: 2017-05-11
Attending: UROLOGY
Payer: MEDICARE

## 2017-05-11 DIAGNOSIS — C67.9 MALIGNANT TUMOR OF URINARY BLADDER: ICD-10-CM

## 2017-05-11 DIAGNOSIS — D49.4 BLADDER TUMOR: ICD-10-CM

## 2017-05-11 DIAGNOSIS — N13.30 HYDRONEPHROSIS: ICD-10-CM

## 2017-05-11 DIAGNOSIS — I10 ESSENTIAL (PRIMARY) HYPERTENSION: ICD-10-CM

## 2017-05-11 DIAGNOSIS — N18.30 CKD (CHRONIC KIDNEY DISEASE), STAGE III: Primary | ICD-10-CM

## 2017-05-11 LAB
ALBUMIN SERPL BCP-MCNC: 2.9 G/DL
ALP SERPL-CCNC: 75 U/L
ALT SERPL W/O P-5'-P-CCNC: 36 U/L
ANION GAP SERPL CALC-SCNC: 12 MMOL/L
AST SERPL-CCNC: 17 U/L
BILIRUB SERPL-MCNC: 0.5 MG/DL
BUN SERPL-MCNC: 36 MG/DL
CALCIUM SERPL-MCNC: 9.5 MG/DL
CHLORIDE SERPL-SCNC: 103 MMOL/L
CO2 SERPL-SCNC: 26 MMOL/L
CREAT SERPL-MCNC: 2.5 MG/DL
EST. GFR  (AFRICAN AMERICAN): 29 ML/MIN/1.73 M^2
EST. GFR  (NON AFRICAN AMERICAN): 25 ML/MIN/1.73 M^2
GLUCOSE SERPL-MCNC: 96 MG/DL
POTASSIUM SERPL-SCNC: 4.4 MMOL/L
PROT SERPL-MCNC: 7.5 G/DL
SODIUM SERPL-SCNC: 141 MMOL/L

## 2017-05-11 PROCEDURE — 36415 COLL VENOUS BLD VENIPUNCTURE: CPT

## 2017-05-11 PROCEDURE — 80053 COMPREHEN METABOLIC PANEL: CPT

## 2017-05-11 NOTE — DISCHARGE SUMMARY
U IM Discharge Summary    Primary Team: [facility]  Attending Physician: No att. providers found  Resident: Debbie  Intern: Myles    Date of Admit: 5/5/2017  Date of Discharge: 5/09/2017    Discharge to: Home  Condition: Good    Discharge Diagnoses     Patient Active Problem List   Diagnosis    Malignant neoplasm of urinary bladder    CKD (chronic kidney disease)    Difficult intubation    ELDER (acute kidney injury)    Hyperkalemia    Hydronephrosis    Leukocytosis    Nausea       Consultants and Procedures     Consultants:  Nephrology  IR  Urology    Procedures:   Right nephrostomy replacement  Right nephrostomy adjustment    Brief History of Present Illness      Juan Manuel Koehler is a 67 year old male with bladder cancer (original dx 1995, recurrence 3/2017) and throat cancer (2005) s/p radiation and surgery presenting for doctor recommended return and kidney function worsening.     His recurrence was diagnosed late 3/2017, bolanos and nephrostomy tubes were placed early April 2017, and bolanos was removed 4/11. Since then most of his urine output goes into his urostomy bags, not through his penis. The patient was in his usual state of health (all ADLs, some restrictions lifting) until 5/1 when his R sided urostomy tube got caught on some furniture and pulled out. He said the event was painful, and he notified his urologist Dr. Boucher. He was seen on 5/2, they ordered labs, and gave him kayexelate for K 5.7. At that time his Cr was 1.6. They began following serial BMPs 2/2 the hyperkalemia, and on the morning of admission 5/5 they noted an improvement in potassium but a Cr bump to 2.3.     At this point they consulted his nephrologist Dr. Chavez and instructed him to present to the ED for emergent nephrostomy tube re-insertion.     The patient himself states that his R flank has been much less painful since accidentally pulling the nephrostomy tube. He complains of L flank pain, especially at the nephrostomy  tube site. He complains of suprapubic pain intermittently without radiation.     Patient denies fever, endorses chills. 1 episode of nausea/vomiting on Wednesday. Denies night sweats. Endorses poor appetite since ~1 month and 20 lb weight loss.    Hospital Course By Problem with Pertinent Findings     Bladder cancer with urinary outlet obstruction  CT urogram shows resolved hydronephrosis on left and improved on right, however demonstrated acute worsening of renal function concerning for post-obstructive nephropathy. Consulted IR for emergent nephrostomy placement. No bolanos in place, removed recently. Nephrostomy R tube placed, L tube exchanged. Renal U/S w/ borderline left hydronephrosis, none on right. Urology consulted and rec continued daily irrigation of nephrostomies.      ELDER on CKD  Baseline Cr 1.6-1.7, on admit 2.3. K WNL. UA showing WBC 62, moderate bacteria. FENa 1% c/w prerenal. UCx showing E Faecalis cipro sensitive, started on CTX then changed to cipro. Dr. Chavez, nephrology, consulted. Cr increased to 2.9 then stabilized around 2.6.      Complicated Pyelonephritis  WBC 28.7 from normal baseline. 92% PMNs. Afebrile, no systemic signs of infection. UCx w/ enterococcus faecalis, tetracycline resistant, otherwise pan-sensitive. Gave Rx of cipro to complete 14 days total abx therapy.      Inflammatory Anemia  Hb 8.1, c/w baseline. MCV 86. Recent iron studies show ferritin 891, low Fe, Tf, TIBC. On 5/6 Hb 6.9, likely post-procedural and dilutional. S/p 1 unit pRBCs. Hb remained stable.      Thrombocytosis  Platelets 373 from nml baseline. Likely reactive.    Discharge Medications        Medication List      START taking these medications          cholecalciferol (vitamin D3) 400 unit Cap   Take 3 capsules (1,200 Units total) by mouth once daily.       ciprofloxacin HCl 500 MG tablet   Commonly known as:  CIPRO   Take 1 tablet (500 mg total) by mouth every 24 hours as needed.       docusate sodium 100 MG  capsule   Commonly known as:  COLACE   Take 1 capsule (100 mg total) by mouth 2 (two) times daily.       ondansetron 4 MG Tbdl   Commonly known as:  ZOFRAN-ODT   Take 1 tablet (4 mg total) by mouth every 8 (eight) hours as needed.       polyethylene glycol 17 gram Pwpk   Commonly known as:  GLYCOLAX   Take 17 g by mouth daily as needed (for constipation).         CONTINUE taking these medications          acetaminophen 500 MG tablet   Commonly known as:  TYLENOL       ketoconazole 2 % shampoo   Commonly known as:  NIZORAL       oxycodone 5 mg Cap   Commonly known as:  OXY-IR         STOP taking these medications          oxycodone-acetaminophen 5-325 mg per tablet   Commonly known as:  PERCOCET       pantoprazole 40 MG tablet   Commonly known as:  PROTONIX            Where to Get Your Medications      You can get these medications from any pharmacy     Bring a paper prescription for each of these medications     ciprofloxacin HCl 500 MG tablet    ondansetron 4 MG Tbdl    polyethylene glycol 17 gram Pwpk       You don't need a prescription for these medications     cholecalciferol (vitamin D3) 400 unit Cap    docusate sodium 100 MG capsule             Discharge Information:   Diet:  Regular    Physical Activity:  As tolerated    Instructions:  1. Take all medications as prescribed  2. Keep all follow-up appointments  3. Return to the hospital or call your primary care physicians if any worsening symptoms such as decreased output from drains, flank/abd pain, dysuria, hematuria, fever/chills occur.    Follow-Up Appointments:  Future Appointments  Date Time Provider Department Center   5/11/2017 3:30 PM James Boucher MD [facility] UROLOGY Maria Luisa Diaz   5/16/2017 11:50 AM LAB, HEMONC CANCER DG [facility] LAB HO Michael Zaragoza   5/16/2017 1:00 PM Mingo Calero MD [facility] HEM ONC Michael Lewis M.D.  PGY3 [employer] EM/IM  5/10/2017 10:01 PM

## 2017-05-15 ENCOUNTER — LAB VISIT (OUTPATIENT)
Dept: LAB | Facility: HOSPITAL | Age: 67
End: 2017-05-15
Attending: INTERNAL MEDICINE
Payer: MEDICARE

## 2017-05-15 DIAGNOSIS — I10 ESSENTIAL HYPERTENSION, BENIGN: ICD-10-CM

## 2017-05-15 DIAGNOSIS — C67.9 MALIGNANT TUMOR OF URINARY BLADDER: ICD-10-CM

## 2017-05-15 DIAGNOSIS — N13.30 HYDRONEPHROSIS: ICD-10-CM

## 2017-05-15 DIAGNOSIS — N18.30 CHRONIC KIDNEY DISEASE, STAGE III (MODERATE): Primary | ICD-10-CM

## 2017-05-15 LAB
BACTERIA #/AREA URNS HPF: ABNORMAL /HPF
BILIRUB UR QL STRIP: NEGATIVE
CLARITY UR: CLEAR
COLOR UR: YELLOW
GLUCOSE UR QL STRIP: NEGATIVE
HGB UR QL STRIP: ABNORMAL
HYALINE CASTS #/AREA URNS LPF: 0 /LPF
KETONES UR QL STRIP: NEGATIVE
LEUKOCYTE ESTERASE UR QL STRIP: ABNORMAL
MICROSCOPIC COMMENT: ABNORMAL
NITRITE UR QL STRIP: NEGATIVE
PH UR STRIP: 7 [PH] (ref 5–8)
PROT UR QL STRIP: ABNORMAL
RBC #/AREA URNS HPF: 4 /HPF (ref 0–4)
SP GR UR STRIP: 1.01 (ref 1–1.03)
URN SPEC COLLECT METH UR: ABNORMAL
UROBILINOGEN UR STRIP-ACNC: NEGATIVE EU/DL
WBC #/AREA URNS HPF: 3 /HPF (ref 0–5)

## 2017-05-15 PROCEDURE — 81000 URINALYSIS NONAUTO W/SCOPE: CPT

## 2017-05-16 ENCOUNTER — LAB VISIT (OUTPATIENT)
Dept: LAB | Facility: HOSPITAL | Age: 67
End: 2017-05-16
Payer: MEDICARE

## 2017-05-16 ENCOUNTER — OFFICE VISIT (OUTPATIENT)
Dept: UROLOGY | Facility: CLINIC | Age: 67
End: 2017-05-16
Payer: MEDICARE

## 2017-05-16 ENCOUNTER — TELEPHONE (OUTPATIENT)
Dept: ADMINISTRATIVE | Facility: OTHER | Age: 67
End: 2017-05-16

## 2017-05-16 ENCOUNTER — OFFICE VISIT (OUTPATIENT)
Dept: HEMATOLOGY/ONCOLOGY | Facility: CLINIC | Age: 67
End: 2017-05-16
Payer: MEDICARE

## 2017-05-16 VITALS
HEART RATE: 85 BPM | DIASTOLIC BLOOD PRESSURE: 68 MMHG | HEIGHT: 74 IN | WEIGHT: 141.31 LBS | SYSTOLIC BLOOD PRESSURE: 132 MMHG | TEMPERATURE: 98 F | OXYGEN SATURATION: 97 % | RESPIRATION RATE: 14 BRPM | BODY MASS INDEX: 18.14 KG/M2

## 2017-05-16 VITALS
HEIGHT: 74 IN | DIASTOLIC BLOOD PRESSURE: 73 MMHG | HEART RATE: 96 BPM | BODY MASS INDEX: 19.12 KG/M2 | SYSTOLIC BLOOD PRESSURE: 121 MMHG | WEIGHT: 149 LBS

## 2017-05-16 DIAGNOSIS — N18.3 CKD (CHRONIC KIDNEY DISEASE), STAGE 3 (MODERATE): ICD-10-CM

## 2017-05-16 DIAGNOSIS — N18.9 CKD (CHRONIC KIDNEY DISEASE), UNSPECIFIED STAGE: Primary | ICD-10-CM

## 2017-05-16 DIAGNOSIS — R63.0 ANOREXIA: ICD-10-CM

## 2017-05-16 DIAGNOSIS — N13.30 HYDRONEPHROSIS, UNSPECIFIED HYDRONEPHROSIS TYPE: ICD-10-CM

## 2017-05-16 DIAGNOSIS — C67.9 MALIGNANT NEOPLASM OF URINARY BLADDER, UNSPECIFIED SITE: Primary | ICD-10-CM

## 2017-05-16 DIAGNOSIS — C67.9 MALIGNANT NEOPLASM OF URINARY BLADDER, UNSPECIFIED SITE: ICD-10-CM

## 2017-05-16 DIAGNOSIS — C67.0 MALIGNANT NEOPLASM OF TRIGONE OF URINARY BLADDER: ICD-10-CM

## 2017-05-16 DIAGNOSIS — C67.8 MALIGNANT NEOPLASM OF OVERLAPPING SITES OF BLADDER: Primary | ICD-10-CM

## 2017-05-16 LAB
ALBUMIN SERPL BCP-MCNC: 3.3 G/DL
ALBUMIN SERPL BCP-MCNC: 3.3 G/DL
ALP SERPL-CCNC: 78 U/L
ALP SERPL-CCNC: 79 U/L
ALT SERPL W/O P-5'-P-CCNC: 20 U/L
ALT SERPL W/O P-5'-P-CCNC: 22 U/L
ANION GAP SERPL CALC-SCNC: 6 MMOL/L
ANION GAP SERPL CALC-SCNC: 9 MMOL/L
AST SERPL-CCNC: 14 U/L
AST SERPL-CCNC: 14 U/L
BILIRUB SERPL-MCNC: 0.5 MG/DL
BILIRUB SERPL-MCNC: 0.5 MG/DL
BUN SERPL-MCNC: 35 MG/DL
BUN SERPL-MCNC: 37 MG/DL
CALCIUM SERPL-MCNC: 9.6 MG/DL
CALCIUM SERPL-MCNC: 9.9 MG/DL
CHLORIDE SERPL-SCNC: 104 MMOL/L
CHLORIDE SERPL-SCNC: 106 MMOL/L
CO2 SERPL-SCNC: 25 MMOL/L
CO2 SERPL-SCNC: 26 MMOL/L
CREAT SERPL-MCNC: 2.2 MG/DL
CREAT SERPL-MCNC: 2.4 MG/DL
ERYTHROCYTE [DISTWIDTH] IN BLOOD BY AUTOMATED COUNT: 14.5 %
EST. GFR  (AFRICAN AMERICAN): 30.9 ML/MIN/1.73 M^2
EST. GFR  (AFRICAN AMERICAN): 34.3 ML/MIN/1.73 M^2
EST. GFR  (NON AFRICAN AMERICAN): 26.7 ML/MIN/1.73 M^2
EST. GFR  (NON AFRICAN AMERICAN): 29.7 ML/MIN/1.73 M^2
GLUCOSE SERPL-MCNC: 161 MG/DL
GLUCOSE SERPL-MCNC: 84 MG/DL
HCT VFR BLD AUTO: 29.3 %
HGB BLD-MCNC: 9.2 G/DL
MAGNESIUM SERPL-MCNC: 2.2 MG/DL
MCH RBC QN AUTO: 28.3 PG
MCHC RBC AUTO-ENTMCNC: 31.4 %
MCV RBC AUTO: 90 FL
NEUTROPHILS # BLD AUTO: 8.3 K/UL
PLATELET # BLD AUTO: 491 K/UL
PMV BLD AUTO: 8.8 FL
POTASSIUM SERPL-SCNC: 4.9 MMOL/L
POTASSIUM SERPL-SCNC: 5.8 MMOL/L
PROT SERPL-MCNC: 8 G/DL
PROT SERPL-MCNC: 8 G/DL
RBC # BLD AUTO: 3.25 M/UL
SODIUM SERPL-SCNC: 138 MMOL/L
SODIUM SERPL-SCNC: 138 MMOL/L
WBC # BLD AUTO: 10.83 K/UL

## 2017-05-16 PROCEDURE — 1160F RVW MEDS BY RX/DR IN RCRD: CPT | Mod: S$GLB,,, | Performed by: INTERNAL MEDICINE

## 2017-05-16 PROCEDURE — 80053 COMPREHEN METABOLIC PANEL: CPT | Mod: 91

## 2017-05-16 PROCEDURE — 3078F DIAST BP <80 MM HG: CPT | Mod: S$GLB,,, | Performed by: INTERNAL MEDICINE

## 2017-05-16 PROCEDURE — 3075F SYST BP GE 130 - 139MM HG: CPT | Mod: S$GLB,,, | Performed by: INTERNAL MEDICINE

## 2017-05-16 PROCEDURE — 1125F AMNT PAIN NOTED PAIN PRSNT: CPT | Mod: S$GLB,,, | Performed by: UROLOGY

## 2017-05-16 PROCEDURE — 99999 PR PBB SHADOW E&M-EST. PATIENT-LVL III: CPT | Mod: PBBFAC,,, | Performed by: UROLOGY

## 2017-05-16 PROCEDURE — 1160F RVW MEDS BY RX/DR IN RCRD: CPT | Mod: S$GLB,,, | Performed by: UROLOGY

## 2017-05-16 PROCEDURE — 3074F SYST BP LT 130 MM HG: CPT | Mod: S$GLB,,, | Performed by: UROLOGY

## 2017-05-16 PROCEDURE — 3078F DIAST BP <80 MM HG: CPT | Mod: S$GLB,,, | Performed by: UROLOGY

## 2017-05-16 PROCEDURE — 99999 PR PBB SHADOW E&M-EST. PATIENT-LVL III: CPT | Mod: PBBFAC,,, | Performed by: INTERNAL MEDICINE

## 2017-05-16 PROCEDURE — 83735 ASSAY OF MAGNESIUM: CPT

## 2017-05-16 PROCEDURE — 85027 COMPLETE CBC AUTOMATED: CPT

## 2017-05-16 PROCEDURE — 1159F MED LIST DOCD IN RCRD: CPT | Mod: S$GLB,,, | Performed by: INTERNAL MEDICINE

## 2017-05-16 PROCEDURE — 1125F AMNT PAIN NOTED PAIN PRSNT: CPT | Mod: S$GLB,,, | Performed by: INTERNAL MEDICINE

## 2017-05-16 PROCEDURE — 80053 COMPREHEN METABOLIC PANEL: CPT

## 2017-05-16 PROCEDURE — 1159F MED LIST DOCD IN RCRD: CPT | Mod: S$GLB,,, | Performed by: UROLOGY

## 2017-05-16 PROCEDURE — 36415 COLL VENOUS BLD VENIPUNCTURE: CPT

## 2017-05-16 PROCEDURE — 99213 OFFICE O/P EST LOW 20 MIN: CPT | Mod: S$GLB,,, | Performed by: UROLOGY

## 2017-05-16 PROCEDURE — 99215 OFFICE O/P EST HI 40 MIN: CPT | Mod: S$GLB,,, | Performed by: INTERNAL MEDICINE

## 2017-05-16 RX ORDER — MEGESTROL ACETATE 40 MG/ML
400 SUSPENSION ORAL DAILY
Qty: 240 ML | Refills: 12 | Status: SHIPPED | OUTPATIENT
Start: 2017-05-16 | End: 2017-07-12

## 2017-05-16 RX ORDER — CYPROHEPTADINE HYDROCHLORIDE 4 MG/1
4 TABLET ORAL 3 TIMES DAILY PRN
Qty: 90 TABLET | Refills: 0 | Status: ON HOLD | OUTPATIENT
Start: 2017-05-16 | End: 2017-07-06 | Stop reason: CLARIF

## 2017-05-16 RX ORDER — POLYETHYLENE GLYCOL 3350 17 G/17G
17 POWDER, FOR SOLUTION ORAL 2 TIMES DAILY PRN
Qty: 72 EACH | Refills: 0 | Status: SHIPPED | OUTPATIENT
Start: 2017-05-16 | End: 2017-06-12 | Stop reason: SDUPTHER

## 2017-05-16 RX ORDER — MEGESTROL ACETATE 40 MG/1
40 TABLET ORAL 4 TIMES DAILY
Qty: 120 TABLET | Refills: 2 | Status: CANCELLED | OUTPATIENT
Start: 2017-05-16 | End: 2018-05-16

## 2017-05-16 NOTE — PROGRESS NOTES
"Subjective:       Patient ID: Juan Manuel Koehler is a 67 y.o. male.    Chief Complaint: Bladder Cancer    HPI     Juan Manuel Koehler is a 67 y.o. White male, referred by Dr. Robles, who presents today for evaluation and management of invasive bladder cancer. Patient has previously been seen by Dr. 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 He presented to Dr. Boucher with pain and hematuria and was found to have a large bladder tumor on his trigone. The tumor was also causing bilateral ureteral obstruction with resulting ELDER.The patient underwent tumor resection and attempted JJ stent placement on 4/5/17 which demonstrated cT2 urothelial carcinoma (nested variant). Bilateral nephrostomy tubes were placed with improvement in the patient's renal function. Patient discharged today from [facility] for ELDER from 5/5-5/9. During hospitalization, right nephrostomy tube was placed and the left was exchanged. Pt reports fatigue, mild nausea, and > 20 lb weight loss in last 6 weeks.      5/5/17 CT CAP reveals " Bilateral double-J ureteral stents and left nephrostomy tube are present.  There has been interval resolution of left-sided hydronephrosis and significant improvement in right-sided hydronephrosis which now appears mild. Bladder is nondistended and not well evaluated.  There is no CT evidence of distant metastatic disease referable to provide history of bladder neoplasm."     Review of Systems   Constitutional: Positive for activity change, appetite change, fever and unexpected weight change. Negative for fatigue.   HENT: Negative for mouth sores, nosebleeds and trouble swallowing.    Eyes: Negative for discharge and visual disturbance.   Respiratory: Negative for cough, shortness of breath and wheezing.    Cardiovascular: Negative for chest pain and leg swelling. "   Gastrointestinal: Positive for constipation and nausea. Negative for abdominal distention, abdominal pain, blood in stool, diarrhea and vomiting.   Genitourinary: Positive for difficulty urinating. Negative for decreased urine volume, frequency and hematuria.        +bilateral nephrostomy tubes   Musculoskeletal: Negative for back pain.   Skin: Negative for color change and rash.   Neurological: Negative for weakness and headaches.   Hematological: Negative for adenopathy.   Psychiatric/Behavioral: Negative for sleep disturbance. The patient is not nervous/anxious.    All other systems reviewed and are negative.      Allergies:  Review of patient's allergies indicates:  No Known Allergies    Medications:  Current Outpatient Prescriptions   Medication Sig Dispense Refill    acetaminophen (TYLENOL) 500 MG tablet Take 1,000 mg by mouth every 6 (six) hours as needed for Pain.      cholecalciferol, vitamin D3, 400 unit Cap Take 3 capsules (1,200 Units total) by mouth once daily. 90 capsule 0    ciprofloxacin HCl (CIPRO) 500 MG tablet Take 1 tablet (500 mg total) by mouth every 24 hours as needed. 10 tablet 0    cyproheptadine (PERIACTIN) 4 mg tablet Take 1 tablet (4 mg total) by mouth 3 (three) times daily as needed. 90 tablet 0    docusate sodium (COLACE) 100 MG capsule Take 1 capsule (100 mg total) by mouth 2 (two) times daily.  0    ketoconazole (NIZORAL) 2 % shampoo USE TO WASH AFFECTED AREA ONCE DAILY  3    megestrol (MEGACE) 400 mg/10 mL (40 mg/mL) Susp Take 10 mLs (400 mg total) by mouth once daily. 240 mL 12    ondansetron (ZOFRAN-ODT) 4 MG TbDL Take 1 tablet (4 mg total) by mouth every 8 (eight) hours as needed. 20 tablet 1    oxycodone (OXY-IR) 5 mg Cap Take 5 mg by mouth every 4 (four) hours as needed for Pain.      polyethylene glycol (GLYCOLAX) 17 gram PwPk Take 17 g by mouth daily as needed (for constipation). 10 packet 2    polyethylene glycol (GLYCOLAX) 17 gram PwPk Take 17 g by mouth 2 (two)  times daily as needed. 72 each 0    sodium polystyrene (KAYEXALATE) powder        No current facility-administered medications for this visit.        PMH:  Past Medical History:   Diagnosis Date    Cancer     bladder and throat    Urinary tract infection        PSH:  Past Surgical History:   Procedure Laterality Date    BLADDER SURGERY      HERNIA REPAIR      THROAT SURGERY         FamHx:  Family History   Problem Relation Age of Onset    No Known Problems Father     Kidney disease Mother     Prostate cancer Neg Hx        SocHx:  Social History     Social History    Marital status:      Spouse name: N/A    Number of children: N/A    Years of education: N/A     Occupational History    Not on file.     Social History Main Topics    Smoking status: Never Smoker    Smokeless tobacco: Never Used    Alcohol use No    Drug use: No    Sexual activity: Yes     Partners: Female     Birth control/ protection: None     Other Topics Concern    Not on file     Social History Narrative       Objective:      Physical Exam   Constitutional: He is oriented to person, place, and time. He appears well-developed.   Appears fatigued and thin   HENT:   Head: Normocephalic and atraumatic.   Right Ear: External ear normal.   Left Ear: External ear normal.   Eyes: Conjunctivae and EOM are normal. Pupils are equal, round, and reactive to light. Right eye exhibits no discharge. Left eye exhibits no discharge. No scleral icterus.   Neck: Normal range of motion. Neck supple.   Pulmonary/Chest: Effort normal.   Musculoskeletal: Normal range of motion.   Bilateral nephrostomy tubes draining clear, yellow urine.   Neurological: He is alert and oriented to person, place, and time.   Skin: Skin is warm and dry. No erythema.   Nursing note and vitals reviewed.      LABS:  WBC   Date Value Ref Range Status   05/16/2017 10.83 3.90 - 12.70 K/uL Final     Hemoglobin   Date Value Ref Range Status   05/16/2017 9.2 (L) 14.0 - 18.0  g/dL Final     POC Hematocrit   Date Value Ref Range Status   04/06/2017 31 (L) 36 - 54 %PCV Final     Hematocrit   Date Value Ref Range Status   05/16/2017 29.3 (L) 40.0 - 54.0 % Final     Platelets   Date Value Ref Range Status   05/16/2017 491 (H) 150 - 350 K/uL Final       Chemistry        Component Value Date/Time     05/16/2017 1126    K 5.8 (H) 05/16/2017 1126     05/16/2017 1126    CO2 26 05/16/2017 1126    BUN 37 (H) 05/16/2017 1126    CREATININE 2.4 (H) 05/16/2017 1126     (H) 05/16/2017 1126        Component Value Date/Time    CALCIUM 9.9 05/16/2017 1126    ALKPHOS 78 05/16/2017 1126    AST 14 05/16/2017 1126    ALT 22 05/16/2017 1126    BILITOT 0.5 05/16/2017 1126          Assessment:       1. Malignant neoplasm of urinary bladder, unspecified site    2. Anorexia        Plan:       1. Juan Manuel Koehler is a 67 y.o. White male, referred by Dr. Robles, who presents today for evaluation and management of invasive bladder cancer. Patient has previously been seen by Dr. 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 He presented to Dr. Boucher with pain and hematuria and was found to have a large bladder tumor on his trigone. Staging CTCAP on 5/5/17 shows no evidence of distant metastasis.    Given his continued increased creatinine, he would not be considered a candidate for neoadjuvant chemotherapy at this itme. We would ideally like his creatinine to be below 1.8. Explained that we typically use cisplatin and gemcitabine chemotherapy, with the cisplatin having the potential to be nephrotoxic. In his situation, it would not be safe to administer it. His creatinine does appear to be slightly improving since nephrostomy exchange and if we can get his renal function in an acceptable level, we can proceed with neoadjuvant chemotherapy as  discussed.  Will have patient RTC in 1-2 week with labs and to see Dr. Calero to re-evaluate renal function.      Patient is in agreement with the proposed treatment plan. All questions were answered to the patient's satisfaction. Pt knows to call clinic if anything is needed before the next clinic visit.    More than 45 mins were spent during this encounter, greater than 50% was spent in direct counseling and/or coordination of care.     Mingo Calero M.D., M.S., F.A.C.P.  Hematology/Oncology Attending  [facility]

## 2017-05-16 NOTE — PROGRESS NOTES
Subjective:       Patient ID: Juan Manuel Koehler is a 67 y.o. male.    Chief Complaint:  Other (infected neph tube)      History of Present Illness  HPI  Patient is a 67 y.o. male who is established to our clinic and was initially referred by their PCP, Dr. Sweeney for evaluation of hematuria.  Has bilateral nephrostomy tubes and bilateral ureteral stents in place.  Previously underwent transurethral resection of bladder tumor showing muscle invasive bladder cancer.  He is clinical T3 disease.  He is seen Dr. Calero and Dr. Robles who plan to perform neoadjuvant chemotherapy followed by radical cystectomy.  However, his renal function has declined despite maximal drainage of his kidneys.  He sees  for this.  She was concerned about a possible infection of the left nephrostomy tube.  He also lost the dressing provided by IR for this tube.      Review of Systems  Review of Systems  All other systems reviewed and negative except pertinent positives noted in HPI.       Objective:     Physical Exam   Constitutional: He is oriented to person, place, and time. He appears well-developed and well-nourished. No distress.   HENT:   Head: Normocephalic and atraumatic.   Eyes: No scleral icterus.   Neck: No tracheal deviation present.   Pulmonary/Chest: Effort normal. No respiratory distress.   Neurological: He is alert and oriented to person, place, and time.   Skin:          Psychiatric: He has a normal mood and affect. His behavior is normal. Judgment and thought content normal.       Lab Review  Lab Results   Component Value Date    COLORU Yellow 05/15/2017    SPECGRAV 1.010 05/15/2017    PHUR 7.0 05/15/2017    NITRITE Negative 05/15/2017    KETONESU Negative 05/15/2017    UROBILINOGEN Negative 05/15/2017         Assessment:        1. Malignant neoplasm of overlapping sites of bladder          Plan:     Malignant neoplasm of overlapping sites of bladder    -Patient to go to IR today to get a repeat dressing for his  left nephrostomy tube.  I do not feel that this is infected.  -Continue to follow with nephrology for chronic kidney disease and acute kidney injury.  -Patient is to see  today.  Ideally his renal function would improve prior to chemotherapy.  If this does not, he may proceed to a radical cystectomy without chemotherapy prior.  I will defer this management to  and Dr. Calero.   -Follow-up with me as needed.  -I spent 15 minutes with the patient of which more than half was spent in direct consultation with the patient in regards to our treatment and plan.

## 2017-05-16 NOTE — TELEPHONE ENCOUNTER
called patient to schedule follow up appointment on 5/19 with labs.  Patient stated he wants Dr. Calero to call him.  Patient has questions regarding lab appointment.

## 2017-05-16 NOTE — TELEPHONE ENCOUNTER
----- Message from Mingo Calero MD sent at 5/16/2017  1:48 PM CDT -----  RTC 5/19 to see me with labs.

## 2017-05-22 DIAGNOSIS — C67.8 MALIGNANT NEOPLASM OF OVERLAPPING SITES OF BLADDER: Primary | ICD-10-CM

## 2017-05-24 ENCOUNTER — OFFICE VISIT (OUTPATIENT)
Dept: UROLOGY | Facility: CLINIC | Age: 67
End: 2017-05-24
Payer: MEDICARE

## 2017-05-24 ENCOUNTER — LAB VISIT (OUTPATIENT)
Dept: LAB | Facility: HOSPITAL | Age: 67
End: 2017-05-24
Attending: UROLOGY
Payer: MEDICARE

## 2017-05-24 VITALS
DIASTOLIC BLOOD PRESSURE: 72 MMHG | WEIGHT: 138 LBS | BODY MASS INDEX: 17.71 KG/M2 | SYSTOLIC BLOOD PRESSURE: 150 MMHG | HEART RATE: 88 BPM | HEIGHT: 74 IN

## 2017-05-24 DIAGNOSIS — N18.3 CKD (CHRONIC KIDNEY DISEASE), STAGE 3 (MODERATE): ICD-10-CM

## 2017-05-24 DIAGNOSIS — C67.8 MALIGNANT NEOPLASM OF OVERLAPPING SITES OF BLADDER: Primary | ICD-10-CM

## 2017-05-24 DIAGNOSIS — C67.8 MALIGNANT NEOPLASM OF OVERLAPPING SITES OF BLADDER: ICD-10-CM

## 2017-05-24 DIAGNOSIS — N13.5 BILATERAL URETERAL OBSTRUCTION: ICD-10-CM

## 2017-05-24 LAB
ALBUMIN SERPL BCP-MCNC: 3.5 G/DL
ALBUMIN SERPL BCP-MCNC: 3.5 G/DL
ALP SERPL-CCNC: 84 U/L
ALT SERPL W/O P-5'-P-CCNC: 19 U/L
ANION GAP SERPL CALC-SCNC: 8 MMOL/L
AST SERPL-CCNC: 14 U/L
BASOPHILS # BLD AUTO: 0.06 K/UL
BASOPHILS NFR BLD: 0.8 %
BILIRUB SERPL-MCNC: 0.6 MG/DL
BUN SERPL-MCNC: 28 MG/DL
CALCIUM SERPL-MCNC: 9.5 MG/DL
CHLORIDE SERPL-SCNC: 103 MMOL/L
CO2 SERPL-SCNC: 25 MMOL/L
CREAT SERPL-MCNC: 1.7 MG/DL
DIFFERENTIAL METHOD: ABNORMAL
EOSINOPHIL # BLD AUTO: 0.2 K/UL
EOSINOPHIL NFR BLD: 3.4 %
ERYTHROCYTE [DISTWIDTH] IN BLOOD BY AUTOMATED COUNT: 14.7 %
EST. GFR  (AFRICAN AMERICAN): 46.9 ML/MIN/1.73 M^2
EST. GFR  (NON AFRICAN AMERICAN): 40.5 ML/MIN/1.73 M^2
GLUCOSE SERPL-MCNC: 101 MG/DL
HCT VFR BLD AUTO: 26.7 %
HGB BLD-MCNC: 8.9 G/DL
LYMPHOCYTES # BLD AUTO: 1.1 K/UL
LYMPHOCYTES NFR BLD: 15.8 %
MCH RBC QN AUTO: 29.2 PG
MCHC RBC AUTO-ENTMCNC: 33.3 %
MCV RBC AUTO: 88 FL
MONOCYTES # BLD AUTO: 0.6 K/UL
MONOCYTES NFR BLD: 8.8 %
NEUTROPHILS # BLD AUTO: 5.1 K/UL
NEUTROPHILS NFR BLD: 70.8 %
PLATELET # BLD AUTO: 386 K/UL
PMV BLD AUTO: 9 FL
POTASSIUM SERPL-SCNC: 5.2 MMOL/L
PROT SERPL-MCNC: 7.6 G/DL
RBC # BLD AUTO: 3.05 M/UL
SODIUM SERPL-SCNC: 136 MMOL/L
WBC # BLD AUTO: 7.15 K/UL

## 2017-05-24 PROCEDURE — 1160F RVW MEDS BY RX/DR IN RCRD: CPT | Mod: S$GLB,,, | Performed by: UROLOGY

## 2017-05-24 PROCEDURE — 99999 PR PBB SHADOW E&M-EST. PATIENT-LVL III: CPT | Mod: PBBFAC,,, | Performed by: UROLOGY

## 2017-05-24 PROCEDURE — 3077F SYST BP >= 140 MM HG: CPT | Mod: S$GLB,,, | Performed by: UROLOGY

## 2017-05-24 PROCEDURE — 1125F AMNT PAIN NOTED PAIN PRSNT: CPT | Mod: S$GLB,,, | Performed by: UROLOGY

## 2017-05-24 PROCEDURE — 3078F DIAST BP <80 MM HG: CPT | Mod: S$GLB,,, | Performed by: UROLOGY

## 2017-05-24 PROCEDURE — 99215 OFFICE O/P EST HI 40 MIN: CPT | Mod: S$GLB,,, | Performed by: UROLOGY

## 2017-05-24 PROCEDURE — 1159F MED LIST DOCD IN RCRD: CPT | Mod: S$GLB,,, | Performed by: UROLOGY

## 2017-05-24 NOTE — Clinical Note
He's willing to proceed with chemotherapy. He knows that we will have a low threshold for moving directly to surgery. Thanks, Tucker.

## 2017-05-24 NOTE — PROGRESS NOTES
Subjective:       Patient ID: Juan Manuel Koehler is a 67 y.o. male.    Chief Complaint: No chief complaint on file.      HPI: Juan Manuel Koehler is a 67 y.o. White male who returns today in follow-up for invasive bladder cancer.    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    He recently presented to Dr. Boucher for hematuria and was found to have a large bladder tumor on his trigone. The tumor was also causing bilateral ureteral obstruction with resulting ELDER.    The patient underwent tumor resection and attempted JJ stent placement on 4/5/17 which demonstrated cT2 urothelial carcinoma (nested variant). Bilateral nephrostomy tubes were placed with improvement in the patient's renal function. IR was able to internalize the patient's left nephrostomy tube, however after internalization, the patient's sCr evelio. He now has bilateral nephrostomy tubes in place, draining well.    The patient's eGFR has fluctuated and has been low, and there is concern that he could not receive cisplatin-based chemotherapy.    He presents today with his wife for repeat blood work and to discuss how best to proceed depending on his repeat renal function panel.    Overall, he is doing well, but he is predictably anxious about getting started with therapy.    Review of patient's allergies indicates:   Allergen Reactions    Pneumococcal 23-margarito ps vaccine        Current Outpatient Prescriptions   Medication Sig Dispense Refill    acetaminophen (TYLENOL) 500 MG tablet Take 1,000 mg by mouth every 6 (six) hours as needed for Pain.      cholecalciferol, vitamin D3, 400 unit Cap Take 3 capsules (1,200 Units total) by mouth once daily. 90 capsule 0    cyproheptadine (PERIACTIN) 4 mg tablet Take 1 tablet (4 mg total) by mouth 3 (three) times daily as needed. 90 tablet 0    ketoconazole (NIZORAL) 2 % shampoo USE TO WASH AFFECTED AREA ONCE DAILY  3    megestrol (MEGACE) 400 mg/10 mL (40  mg/mL) Susp Take 10 mLs (400 mg total) by mouth once daily. 240 mL 12    ondansetron (ZOFRAN-ODT) 4 MG TbDL Take 1 tablet (4 mg total) by mouth every 8 (eight) hours as needed. 20 tablet 1    oxycodone (OXY-IR) 5 mg Cap Take 5 mg by mouth every 4 (four) hours as needed for Pain.      polyethylene glycol (GLYCOLAX) 17 gram PwPk Take 17 g by mouth daily as needed (for constipation). 10 packet 2    polyethylene glycol (GLYCOLAX) 17 gram PwPk Take 17 g by mouth 2 (two) times daily as needed. 72 each 0    docusate sodium (COLACE) 100 MG capsule Take 1 capsule (100 mg total) by mouth 2 (two) times daily.  0    sodium polystyrene (KAYEXALATE) powder        No current facility-administered medications for this visit.        Past Medical History:   Diagnosis Date    Cancer     bladder and throat    Urinary tract infection        Past Surgical History:   Procedure Laterality Date    BLADDER SURGERY      HERNIA REPAIR      THROAT SURGERY         Family History   Problem Relation Age of Onset    No Known Problems Father     Kidney disease Mother     Prostate cancer Neg Hx        Review of Systems    Review of Systems   Constitutional: Negative for fever, chills, activity change, appetite change and unexpected weight change.   HENT: Negative for congestion, nosebleeds, sneezing, sore throat and trouble swallowing.    Eyes: Negative for pain, discharge, redness and visual disturbance.   Respiratory: Negative for cough, choking, chest tightness and shortness of breath.    Cardiovascular: Negative for chest pain, palpitations and leg swelling.   Gastrointestinal: Negative for nausea, vomiting, abdominal pain, diarrhea, blood in stool, abdominal distention and anal bleeding.  Genitourinary: As documented per HPI   Endocrine: Negative for cold intolerance, heat intolerance, polydipsia, polyphagia and polyuria.   Musculoskeletal: Negative for myalgias, gait problem, neck pain and neck stiffness.   Skin: Negative for  color change, pallor, rash and wound.   Allergic/Immunologic: Negative for immunocompromised state.   Neurological: Negative for seizures, facial asymmetry, speech difficulty, weakness and light-headedness.   Hematological: Negative for adenopathy. Does not bruise/bleed easily.   Psychiatric/Behavioral: Negative for hallucinations, behavioral problems, self-injury and agitation. The patient is not hyperactive.    All other systems were reviewed and were negative.      Objective:     Vitals:    05/24/17 1446   BP: (!) 150/72   Pulse: 88     Physical Exam   Vitals reviewed.  Constitutional: He is oriented to person, place, and time. He appears well-developed and well-nourished. No distress.   HENT:   Head: Normocephalic and atraumatic.   Right Ear: External ear normal.   Left Ear: External ear normal.   Nose: Nose normal.   Eyes: EOM are normal. Pupils are equal, round, and reactive to light. Right eye exhibits no discharge. Left eye exhibits no discharge.   Neck: Normal range of motion. Neck supple. No tracheal deviation present. No thyroid enlargement or tenderness.   Cardiovascular: Regular rhythm and intact distal pulses. No signs of peripheral edema.    Pulmonary/Chest: Effort normal and breath sounds normal. No stridor. No respiratory distress. He has no wheezes.   Abdominal: Soft. Bowel sounds are normal. He exhibits no distension. There is no tenderness. Hernia confirmed negative in the right inguinal area and confirmed negative in the left inguinal area. No hepatic or splenic enlargement or tenderness. Bilateral nephrostomy tubes draining well.  Genitourinary: Penis normal. Right testis shows no mass, no swelling and no tenderness. Right testis is descended. Left testis shows no mass, no swelling and no tenderness. Left testis is descended. Circumcised. No phimosis or hypospadias.   MARICEL: Deferred  Musculoskeletal: Normal range of motion. He exhibits no edema.   Neurological: He is alert and oriented to  person, place, and time. He exhibits normal muscle tone. Coordination normal.   Skin: Skin is warm. No rash noted.   Lymphatic: No palpable lymph nodes.  Psychiatric: He has a normal mood and affect. His behavior is normal. Judgment and thought content normal.     No results found for: PSA  Lab Results   Component Value Date    CREATININE 1.7 (H) 05/24/2017     Lab Results   Component Value Date    EGFRNONAA 40.5 (A) 05/24/2017     Lab Results   Component Value Date    ESTGFRAFRICA 46.9 (A) 05/24/2017     I personally reviewed all the patient's imaging studies.    CT scan non-contrast abdomen/pelvis (4/03/17): Thickened posterior bladder wall with marked bilateral hydroureteronephrosis to the level of the UVJ.  No metastatic disease identified, noting limited evaluation without IV contrast.    CT chest/CT urogram (5/5/17): Bilateral double-J ureteral stents and left nephrostomy tube are present.  There has been interval resolution of left-sided hydronephrosis and significant improvement in right-sided hydronephrosis which now appears mild. Bladder is nondistended and not well evaluated.  There is no CT evidence of distant metastatic disease referable to provide history of bladder neoplasm.    Assessment:       1. Malignant neoplasm of overlapping sites of bladder    2. CKD (chronic kidney disease), stage 3 (moderate)    3. Bilateral ureteral obstruction        Plan:     Diagnoses and all orders for this visit:    Malignant neoplasm of overlapping sites of bladder    CKD (chronic kidney disease), stage 3 (moderate)    Bilateral ureteral obstruction    The patient has cT2-cT3 bladder cancer with bilateral nephrostomy tubes in place. His eGFR today is greater than 40 ml/min.    I had a lengthy discussion with the patient regarding implications of a diagnosis of urothelial carcinoma of the bladder, i.e, bladder cancer.    We talked about the various therapeutic options including endoscopic management, a combined  chemotherapy and radiotherapy regimen (the Tia protocol), primary chemotherapy alone and radical surgery. Regarding endoscopic management, the patient is aware that although the tumor may have been fully resected, microscopic disease can still persist.  Regarding chemoradiation protocols, the patient is aware that a minority of patients will preserve their bladder long-term and the function of a radiated bladder may result in significant morbidity.  The patient was offered to speak to both the medical oncology and the radiation oncology services for a consultation regarding bladder sparing techniques.    With regard to surgery, I explained that there are 3 basic categories of modern urinary diversions: (1) incontinent bowel conduit (e.g. ileal conduit) (2) continent cutaneous stomal reservoire (e.g. [place] pouch) (3) continent orthotopic urethral diversion (ileal neopbladder).      I explained that an ileal conduit is the simplest, most time-tested urinary diversion that requires the least operative time and arguably is associated with the fewest complications. I described that a short piece of ileum is anastamosed to the ureters and brought onto the skin.  A life-long urostomy appliance to collect urine is required.   Although time-tested, short-term and long-term issues with ileal conduit diversions are not uncommon.  I quoted the best data available, which in my view is the 2003 [facility] series that included long-term bladder cancer survivors who had at least 5 years of follow-up.  In this series, issues that were seen with ileal conduits were as follows: ureteral stricture/obstruction (~15%), recurrent pyelonephritis (~ 25%), urinary calculi (this complication often occurred beyond 5 to 10 years after surgery, ~10% ), impaired kidney function due to obstruction or ureteral reflux (~27%, only 2% required dialysis and all of whom had compromised kidney function prior to surgery), and stomal  complications (~25%) such as parastomal hernia, stensosis, and bleeding/skin irritation.  Furthermore up to 25% of patients had bowel complications, which included small bowel obstruction, fistula formation, and diarrhea.  I explained that although many issues that arise can be handled conservatively, some do require re-operation.      Then we discussed the ileal neobladder.  I explained that the neobladder is constructed from detubularized bowel, which is then sewn into a spherical reservoir for urine storage.  The neobladder is anastamosed to the ureters and to the urethra, to mimic functions of the native bladder. The goal is for the patient to void spontaneously using the Valsalva menuver.  Choice of proceeding with the neobladder vs. ileal conduit must be balanced against additional issues and risks.  Along with risks listed for ileal conduit diversion, I explained the additional potential problems that can arise with the ileal neobladder.   I described risks of urinary leaks from a number of suture lines and explained that in general these can be managed conservatively.  I stressed that a life-long risk of pouch perforation exists.  We discussed risks of incontinence and hypercontinence.  I explained that it is critical to integrate these risks into the decision to proceed with the neobladder.  I quoted the risks of day-time incontinence to be on the order of 10%, while the risk of nighttime incontinence to be approximately 20%.  With regard to hypercontinence and poor pouch emptying, I explained that approximately 5% of male patients will require life-long intermittent catheterization.  Such risks can reach 30% in female patients with neobladders.  Furthemore, neobladder-vaginal fistula formation is a great concern when a neobladder is constructed in women (~5%).  I explained that continent urinary diversions are contra-indicated in patients with renal insufficiency, but did explain that metabolic  disturbances are possible even in patients with intact renal function.    With regard to [place] pouch, we discussed that this diversion consists of a pouch constructed from detubularized right colon.  The pouch is catheterized through the native ileocecal valve via a segment of the ileum that is brought onto the skin.  I explained that all risks that pertain to ileal conduits and neobladders generally apply to patients who undergo a cutaneous continent diversion.  These include risks of stomal stenosis, pouch stomal formation, incontinence, and metabolic disturbances. Furthermore, because, the ileocecal valve is resected, some post-operative diarrhea can be expected.  Such symptoms usually can be controlled with over-the-counter medications.  We reviewed the life-long catheterization and irrigation regimen that is required with this diversion.  I explained that patients who chose to undergo continent diversions  such as an [place] pouch or neobladder, need to be prepared to commit to the following in the post-operative period:   Keep drains as long as needed (for neobladder for example, the catheter and suprapubic tube stay on the order of 2-3 weeks, but possibly longer)   Return for appointments as often as needed.   Empty neobladder/[place] pouch every 2 hours for 1 month   Empty neobladder/[place] pouch every 3 hours for 1 month   Empty neobladder/[place] pouch every 4 hour for the rest of my life    Risks of sexual dysfunction:  We discussed that in males rates of erectile dysfunction are substantial, since removal of the prostate - an integral part of radical cystectomy - results in compromise of the neurovascular bundles that innervate the penile corpora.  I explained that I perform nerve-sparing cystoprostatectomy whenever possible.    Other risks of the surgery were discussed in detail including medical and anesthetic complications (e.g. heart attack, stroke, deep vein thrombosis, pulmonary embolism,  infection (pneumonia, etc), bleeding with possible need for transfusion, adjacent organ involvement or injury (including possible need for permanent or temporary colostomy), wound complications, reaction to medications).  We reviewed what to expect with regard to incisions, post-operative pain, and risks of subsequent hernias.     Neoadjuvant chemotherapy:  We discussed advantages of neoadjuvant chemotherapy.  I explained that such strategy is only indicated for patients with muscle-invasive disease.  Advantages of neoadjuvant chemotherapy include potential treatment of microscopic metastases, downstaging of the primary tumor, and improved patient tolerance when compared to adjuvant chemotherapy.  The SWOG 8710 trial randomized 307 patients to immediate cystectomy vs cystectomy preceded by chemotherapy (MVAC = methotrexate, vinblastine, adriamycin (aka doxorubicin), and cisplatin).  Patients in the chemotherapy arm demonstrated improvement in both disease-specific and overall survival).  Patients with pathologic T3 and T4 disease demonstrated the greatest benefit, in contrast to those with T2 disease.  I explained that in large metanalyses of over 3,000 patients from 11 randomized trials advantages of neoadjuvant chemotherapy are on the order of 5% in overall 5-year survival.  I invited the patients to discussed risks and benefits of neoadjuvant therapy with our medical oncology team.    I discussed this patient with Dr. Calero today, and he is willing to try and initiate neoadjuvant chemotherapy with the patient. If the patient does not tolerate chemotherapy or starts having delays due to his eGFR, we will have a low threshold for switching to radical cystectomy.    I discussed this strategy with the patient and his wife who were agreeable to proceeding in this manner.    I answered all the patient's and his wife's questions.     I encouraged him or any of his family members to call or email me with questions  and/or concerns.    I spent 40 minutes with the patient of which more than half was spent in coordinating the patient's care as well as in direct consultation with the patient in regards to our treatment and plan.

## 2017-05-24 NOTE — PATIENT INSTRUCTIONS
Understanding Bladder Cancer    The urinary system includes the kidneys, ureters, bladder, and urethra. It makes, stores, and gets rid of liquid waste called urine. The two kidneys filter blood to collect waste and make urine. The ureters are small tubes that carry urine from each kidney to the bladder. The bladder is where urine is stored before it leaves the body. The urethra is the tube urine goes through to leave the body.   Bladder cancer means that certain cells in the bladder have changed in ways that aren't normal.  When bladder cancer forms  Cancer is a disease that causes cells to change and multiply out of control. The multiplying cells may form a lump of tissue (tumor). With time, the cancer cells destroy healthy tissue. They may spread to other parts of the body. Why some cells become cancerous is not always clear. But bladder cancer is strongly linked to cigarette smoking. The longer a person smokes and the more a person smokes, the greater that persons chances of developing bladder cancer.  Types of cancer that may form  Bladder cancer may grow in different ways:  · Papillary tumors stick out from the bladder lining on a stalk. They tend to grow into the bladder cavity, away from the bladder wall, instead of deeper into the layers of the bladder wall.  · Flat tumors do not stick out from the bladder lining. These tumors are much more likely than papillary tumors to grow deeper into the layers of the bladder wall.  · Carcinoma in situ (CIS) is a cancerous patch of cells that is only in the inner layer of the bladder lining and has not spread to deeper tissue. The patch may look almost normal or may look inflamed.    Each type of tumor can be present in one or more areas of the bladder, and more than one type can be present at the same time.  Date Last Reviewed: 2/17/2016  © 0142-8165 [employer]. [address] [place] [address], [place] [address]. All rights reserved. This information is not  intended as a substitute for professional medical care. Always follow your healthcare professional's instructions.

## 2017-05-25 ENCOUNTER — TELEPHONE (OUTPATIENT)
Dept: UROLOGY | Facility: CLINIC | Age: 67
End: 2017-05-25

## 2017-05-25 DIAGNOSIS — C67.0 MALIGNANT NEOPLASM OF TRIGONE OF URINARY BLADDER: Primary | ICD-10-CM

## 2017-05-25 RX ORDER — OXYCODONE HYDROCHLORIDE 5 MG/1
5 CAPSULE ORAL EVERY 4 HOURS PRN
Qty: 40 CAPSULE | Refills: 0 | Status: SHIPPED | OUTPATIENT
Start: 2017-05-25 | End: 2017-06-09 | Stop reason: SDUPTHER

## 2017-05-25 NOTE — TELEPHONE ENCOUNTER
----- Message from Kimberly Palmer sent at 5/25/2017  3:01 PM CDT -----  Contact: [phone]  Patient would like to know the status of his rx for pain meds. That he requested

## 2017-05-29 ENCOUNTER — LAB VISIT (OUTPATIENT)
Dept: LAB | Facility: HOSPITAL | Age: 67
End: 2017-05-29
Attending: INTERNAL MEDICINE
Payer: MEDICARE

## 2017-05-29 ENCOUNTER — OFFICE VISIT (OUTPATIENT)
Dept: HEMATOLOGY/ONCOLOGY | Facility: CLINIC | Age: 67
End: 2017-05-29
Payer: MEDICARE

## 2017-05-29 VITALS
HEIGHT: 74 IN | SYSTOLIC BLOOD PRESSURE: 125 MMHG | WEIGHT: 141.56 LBS | BODY MASS INDEX: 18.17 KG/M2 | HEART RATE: 80 BPM | DIASTOLIC BLOOD PRESSURE: 69 MMHG | RESPIRATION RATE: 16 BRPM | OXYGEN SATURATION: 99 % | TEMPERATURE: 98 F

## 2017-05-29 DIAGNOSIS — C67.8 MALIGNANT NEOPLASM OF OVERLAPPING SITES OF BLADDER: Primary | ICD-10-CM

## 2017-05-29 DIAGNOSIS — N18.9 CKD (CHRONIC KIDNEY DISEASE), UNSPECIFIED STAGE: ICD-10-CM

## 2017-05-29 DIAGNOSIS — R63.0 ANOREXIA: ICD-10-CM

## 2017-05-29 DIAGNOSIS — C67.9 MALIGNANT NEOPLASM OF URINARY BLADDER, UNSPECIFIED SITE: ICD-10-CM

## 2017-05-29 LAB
ALBUMIN SERPL BCP-MCNC: 3.4 G/DL
ALBUMIN SERPL BCP-MCNC: 3.4 G/DL
ALP SERPL-CCNC: 79 U/L
ALT SERPL W/O P-5'-P-CCNC: 16 U/L
ANION GAP SERPL CALC-SCNC: 10 MMOL/L
ANION GAP SERPL CALC-SCNC: 11 MMOL/L
AST SERPL-CCNC: 15 U/L
BASOPHILS # BLD AUTO: 0.03 K/UL
BASOPHILS NFR BLD: 0.4 %
BILIRUB SERPL-MCNC: 0.5 MG/DL
BUN SERPL-MCNC: 32 MG/DL
BUN SERPL-MCNC: 33 MG/DL
CALCIUM SERPL-MCNC: 9 MG/DL
CALCIUM SERPL-MCNC: 9.5 MG/DL
CHLORIDE SERPL-SCNC: 100 MMOL/L
CHLORIDE SERPL-SCNC: 101 MMOL/L
CO2 SERPL-SCNC: 23 MMOL/L
CO2 SERPL-SCNC: 23 MMOL/L
CREAT SERPL-MCNC: 1.6 MG/DL
CREAT SERPL-MCNC: 1.7 MG/DL
DIFFERENTIAL METHOD: ABNORMAL
EOSINOPHIL # BLD AUTO: 0.3 K/UL
EOSINOPHIL NFR BLD: 3.5 %
ERYTHROCYTE [DISTWIDTH] IN BLOOD BY AUTOMATED COUNT: 14.7 %
EST. GFR  (AFRICAN AMERICAN): 46.9 ML/MIN/1.73 M^2
EST. GFR  (AFRICAN AMERICAN): 50.4 ML/MIN/1.73 M^2
EST. GFR  (NON AFRICAN AMERICAN): 40.5 ML/MIN/1.73 M^2
EST. GFR  (NON AFRICAN AMERICAN): 43.6 ML/MIN/1.73 M^2
GLUCOSE SERPL-MCNC: 110 MG/DL
GLUCOSE SERPL-MCNC: 110 MG/DL
HCT VFR BLD AUTO: 26.8 %
HGB BLD-MCNC: 9 G/DL
LYMPHOCYTES # BLD AUTO: 1.4 K/UL
LYMPHOCYTES NFR BLD: 17.1 %
MCH RBC QN AUTO: 29.1 PG
MCHC RBC AUTO-ENTMCNC: 33.6 %
MCV RBC AUTO: 87 FL
MONOCYTES # BLD AUTO: 0.6 K/UL
MONOCYTES NFR BLD: 7.8 %
NEUTROPHILS # BLD AUTO: 5.7 K/UL
NEUTROPHILS NFR BLD: 70.7 %
PHOSPHATE SERPL-MCNC: 3.7 MG/DL
PLATELET # BLD AUTO: 288 K/UL
PMV BLD AUTO: 9 FL
POTASSIUM SERPL-SCNC: 4.3 MMOL/L
POTASSIUM SERPL-SCNC: 4.9 MMOL/L
PROT SERPL-MCNC: 7.5 G/DL
RBC # BLD AUTO: 3.09 M/UL
SODIUM SERPL-SCNC: 134 MMOL/L
SODIUM SERPL-SCNC: 134 MMOL/L
WBC # BLD AUTO: 8.03 K/UL

## 2017-05-29 PROCEDURE — 36415 COLL VENOUS BLD VENIPUNCTURE: CPT

## 2017-05-29 PROCEDURE — 99999 PR PBB SHADOW E&M-EST. PATIENT-LVL III: CPT | Mod: PBBFAC,,, | Performed by: INTERNAL MEDICINE

## 2017-05-29 PROCEDURE — 1159F MED LIST DOCD IN RCRD: CPT | Mod: S$GLB,,, | Performed by: INTERNAL MEDICINE

## 2017-05-29 PROCEDURE — 80053 COMPREHEN METABOLIC PANEL: CPT

## 2017-05-29 PROCEDURE — 84100 ASSAY OF PHOSPHORUS: CPT

## 2017-05-29 PROCEDURE — 99215 OFFICE O/P EST HI 40 MIN: CPT | Mod: S$GLB,,, | Performed by: INTERNAL MEDICINE

## 2017-05-29 PROCEDURE — 1126F AMNT PAIN NOTED NONE PRSNT: CPT | Mod: S$GLB,,, | Performed by: INTERNAL MEDICINE

## 2017-05-29 PROCEDURE — 85025 COMPLETE CBC W/AUTO DIFF WBC: CPT

## 2017-05-29 NOTE — Clinical Note
RTC later this week to begin C#1 of cis-gem (no need to see me or new labs for this one) with IVFs on D#2, RTC 1 wk after C#1 with labs, cis-gem, and to see me.  Will also need IVFs the following day after that visit as well.

## 2017-05-29 NOTE — Clinical Note
RTC later this week to begin C#1 of cis-gem (no need to see me or new labs for this one), RTC 1 wk after C#1 with labs, cis-gem, and to see me.

## 2017-05-29 NOTE — PROGRESS NOTES
"Subjective:       Patient ID: JuanM anuel Koehler is a 67 y.o. male.    Chief Complaint: Bladder Cancer    HPI     Juan Manuel Koehler is a 67 y.o. White male, initially referred by Dr. Robles, who presents today for evaluation and management of invasive bladder cancer. Patient has previously been seen by Dr. 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 He presented to Dr. Boucher with pain and hematuria and was found to have a large bladder tumor on his trigone. The tumor was also causing bilateral ureteral obstruction with resulting ELDER.The patient underwent tumor resection and attempted JJ stent placement on 4/5/17 which demonstrated cT2 urothelial carcinoma (nested variant). Bilateral nephrostomy tubes were placed with improvement in the patient's renal function. Patient discharged today from [facility] for ELDER from 5/5-5/9. During hospitalization, right nephrostomy tube was placed and the left was exchanged.      5/5/17 CT CAP reveals " Bilateral double-J ureteral stents and left nephrostomy tube are present.  There has been interval resolution of left-sided hydronephrosis and significant improvement in right-sided hydronephrosis which now appears mild. Bladder is nondistended and not well evaluated.  There is no CT evidence of distant metastatic disease referable to provide history of bladder neoplasm."     Review of Systems   Constitutional: Negative for activity change, appetite change, fatigue, fever and unexpected weight change.   HENT: Negative for mouth sores, nosebleeds and trouble swallowing.    Eyes: Negative for discharge and visual disturbance.   Respiratory: Negative for cough, shortness of breath and wheezing.    Cardiovascular: Negative for chest pain and leg swelling.   Gastrointestinal: Negative for abdominal distention, abdominal pain, blood in " stool, constipation, diarrhea, nausea and vomiting.   Genitourinary: Negative for decreased urine volume, difficulty urinating, frequency and hematuria.        +bilateral nephrostomy tubes   Musculoskeletal: Negative for back pain.   Skin: Negative for color change and rash.   Neurological: Negative for weakness and headaches.   Hematological: Negative for adenopathy.   Psychiatric/Behavioral: Negative for sleep disturbance. The patient is not nervous/anxious.    All other systems reviewed and are negative.      Allergies:  Review of patient's allergies indicates:  No Known Allergies    Medications:  Current Outpatient Prescriptions   Medication Sig Dispense Refill    acetaminophen (TYLENOL) 500 MG tablet Take 1,000 mg by mouth every 6 (six) hours as needed for Pain.      cholecalciferol, vitamin D3, 400 unit Cap Take 3 capsules (1,200 Units total) by mouth once daily. 90 capsule 0    cyproheptadine (PERIACTIN) 4 mg tablet Take 1 tablet (4 mg total) by mouth 3 (three) times daily as needed. 90 tablet 0    docusate sodium (COLACE) 100 MG capsule Take 1 capsule (100 mg total) by mouth 2 (two) times daily.  0    ketoconazole (NIZORAL) 2 % shampoo USE TO WASH AFFECTED AREA ONCE DAILY  3    megestrol (MEGACE) 400 mg/10 mL (40 mg/mL) Susp Take 10 mLs (400 mg total) by mouth once daily. 240 mL 12    ondansetron (ZOFRAN-ODT) 4 MG TbDL Take 1 tablet (4 mg total) by mouth every 8 (eight) hours as needed. 20 tablet 1    oxycodone (OXY-IR) 5 mg Cap Take 1 capsule (5 mg total) by mouth every 4 (four) hours as needed for Pain. 40 capsule 0    polyethylene glycol (GLYCOLAX) 17 gram PwPk Take 17 g by mouth daily as needed (for constipation). 10 packet 2    polyethylene glycol (GLYCOLAX) 17 gram PwPk Take 17 g by mouth 2 (two) times daily as needed. 72 each 0    sodium polystyrene (KAYEXALATE) powder        No current facility-administered medications for this visit.        PMH:  Past Medical History:   Diagnosis Date     Cancer     bladder and throat    Urinary tract infection        PSH:  Past Surgical History:   Procedure Laterality Date    BLADDER SURGERY      HERNIA REPAIR      THROAT SURGERY         FamHx:  Family History   Problem Relation Age of Onset    No Known Problems Father     Kidney disease Mother     Prostate cancer Neg Hx        SocHx:  Social History     Social History    Marital status:      Spouse name: N/A    Number of children: N/A    Years of education: N/A     Occupational History    Not on file.     Social History Main Topics    Smoking status: Never Smoker    Smokeless tobacco: Never Used    Alcohol use No    Drug use: No    Sexual activity: Yes     Partners: Female     Birth control/ protection: None     Other Topics Concern    Not on file     Social History Narrative    No narrative on file       Objective:      Physical Exam   Constitutional: He is oriented to person, place, and time. He appears well-developed.   Appears fatigued and thin   HENT:   Head: Normocephalic and atraumatic.   Right Ear: External ear normal.   Left Ear: External ear normal.   Eyes: Conjunctivae and EOM are normal. Pupils are equal, round, and reactive to light. Right eye exhibits no discharge. Left eye exhibits no discharge. No scleral icterus.   Neck: Normal range of motion. Neck supple.   Pulmonary/Chest: Effort normal.   Musculoskeletal: Normal range of motion.   Bilateral nephrostomy tubes draining clear, yellow urine.   Neurological: He is alert and oriented to person, place, and time.   Skin: Skin is warm and dry. No erythema.   Nursing note and vitals reviewed.      LABS:  WBC   Date Value Ref Range Status   05/29/2017 8.03 3.90 - 12.70 K/uL Final     Hemoglobin   Date Value Ref Range Status   05/29/2017 9.0 (L) 14.0 - 18.0 g/dL Final     POC Hematocrit   Date Value Ref Range Status   04/06/2017 31 (L) 36 - 54 %PCV Final     Hematocrit   Date Value Ref Range Status   05/29/2017 26.8 (L) 40.0 - 54.0  % Final     Platelets   Date Value Ref Range Status   05/29/2017 288 150 - 350 K/uL Final       Chemistry        Component Value Date/Time     (L) 05/29/2017 1213     (L) 05/29/2017 1213    K 4.3 05/29/2017 1213    K 4.9 05/29/2017 1213     05/29/2017 1213     05/29/2017 1213    CO2 23 05/29/2017 1213    CO2 23 05/29/2017 1213    BUN 32 (H) 05/29/2017 1213    BUN 33 (H) 05/29/2017 1213    CREATININE 1.6 (H) 05/29/2017 1213    CREATININE 1.7 (H) 05/29/2017 1213     05/29/2017 1213     05/29/2017 1213        Component Value Date/Time    CALCIUM 9.0 05/29/2017 1213    CALCIUM 9.5 05/29/2017 1213    ALKPHOS 79 05/29/2017 1213    AST 15 05/29/2017 1213    ALT 16 05/29/2017 1213    BILITOT 0.5 05/29/2017 1213          Assessment:       1. Malignant neoplasm of overlapping sites of bladder        Plan:       1. Juan Manuel Koehler is a 67 y.o. White male, referred by Dr. Robles, who presents today for evaluation and management of invasive bladder cancer. Patient has previously been seen by Dr. 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 He presented to Dr. Boucher with pain and hematuria and was found to have a large bladder tumor on his trigone. Staging CTCAP on 5/5/17 shows no evidence of distant metastasis.    Given his continued increased creatinine, it was unclear if he would be considered a candidate for neoadjuvant chemotherapy This has improved and the patient want to move forward with chemo, he understands the risks, particularly to his kidneys. Will plan to split the dose of cisplatin between D1+D8.    Patient was consented for chemotherapy today 5/29/2017 .   An extensive discussion was had which included a thorough discussion of the risk and benefits of treatment and alternatives.  Risks, including but not limited to,  possible hair loss, bone marrow damage (anemia, thrombocytopenia, immune suppression, neutropenia), damage to body organs (brain, heart, liver, kidney, lungs, nervous system, skin, and others), allergic reactions, sterility, nausea/vomiting, constipation/diarrhea, sores in the mouth, secondary cancers, local damage at possible injection sites, and rarely death were all discussed.  The patient agrees with the plan, and all questions have been answered to their satisfaction.  Consent was signed the patient, provider, and a third party witness.      Will plan to begin C1D1 this week with IVFs D2.      Patient is in agreement with the proposed treatment plan. All questions were answered to the patient's satisfaction. Pt knows to call clinic if anything is needed before the next clinic visit.    More than 45 mins were spent during this encounter, greater than 50% was spent in direct counseling and/or coordination of care.     Mingo Calero M.D., M.S., F.A.C.P.  Hematology/Oncology Attending  [facility]

## 2017-05-31 ENCOUNTER — TELEPHONE (OUTPATIENT)
Dept: ADMINISTRATIVE | Facility: OTHER | Age: 67
End: 2017-05-31

## 2017-05-31 NOTE — TELEPHONE ENCOUNTER
----- Message from Mingo Calero MD sent at 5/31/2017 10:31 AM CDT -----  Yes, let's get it scheduled ASAP. He can see Jessa. Thanks!     ----- Message -----  From: Radha Hassan, RN  Sent: 5/31/2017   9:52 AM  To: Mingo Hare MD, #    Can we schedule this today as he is approved, thank you  ----- Message -----  From: Mingo Calero MD  Sent: 5/29/2017   7:50 PM  To: Abdias Aguila Staff    RTC later this week to begin C#1 of cis-gem (no need to see me or new labs for this one) with IVFs on D#2, RTC 1 wk after C#1 with labs, cis-gem, and to see me.  Will also need IVFs the following day after that visit as well.

## 2017-06-05 ENCOUNTER — INFUSION (OUTPATIENT)
Dept: INFUSION THERAPY | Facility: HOSPITAL | Age: 67
End: 2017-06-05
Attending: INTERNAL MEDICINE
Payer: MEDICARE

## 2017-06-05 VITALS
SYSTOLIC BLOOD PRESSURE: 128 MMHG | DIASTOLIC BLOOD PRESSURE: 63 MMHG | HEART RATE: 87 BPM | BODY MASS INDEX: 17.97 KG/M2 | TEMPERATURE: 98 F | RESPIRATION RATE: 16 BRPM | WEIGHT: 140 LBS | HEIGHT: 74 IN

## 2017-06-05 DIAGNOSIS — C67.8 MALIGNANT NEOPLASM OF OVERLAPPING SITES OF BLADDER: Primary | ICD-10-CM

## 2017-06-05 PROCEDURE — 63600175 PHARM REV CODE 636 W HCPCS: Performed by: INTERNAL MEDICINE

## 2017-06-05 PROCEDURE — 25000003 PHARM REV CODE 250: Performed by: INTERNAL MEDICINE

## 2017-06-05 PROCEDURE — 96367 TX/PROPH/DG ADDL SEQ IV INF: CPT

## 2017-06-05 PROCEDURE — 96413 CHEMO IV INFUSION 1 HR: CPT

## 2017-06-05 PROCEDURE — 96361 HYDRATE IV INFUSION ADD-ON: CPT

## 2017-06-05 PROCEDURE — 96417 CHEMO IV INFUS EACH ADDL SEQ: CPT

## 2017-06-05 RX ORDER — SODIUM CHLORIDE 0.9 % (FLUSH) 0.9 %
10 SYRINGE (ML) INJECTION
Status: DISCONTINUED | OUTPATIENT
Start: 2017-06-05 | End: 2017-06-05 | Stop reason: HOSPADM

## 2017-06-05 RX ORDER — DEXTROSE MONOHYDRATE, SODIUM CHLORIDE, AND POTASSIUM CHLORIDE 50; 1.49; 4.5 G/1000ML; G/1000ML; G/1000ML
INJECTION, SOLUTION INTRAVENOUS CONTINUOUS
Status: DISCONTINUED | OUTPATIENT
Start: 2017-06-05 | End: 2017-06-05 | Stop reason: HOSPADM

## 2017-06-05 RX ORDER — SODIUM CHLORIDE 0.9 % (FLUSH) 0.9 %
10 SYRINGE (ML) INJECTION
Status: CANCELLED | OUTPATIENT
Start: 2017-06-05

## 2017-06-05 RX ORDER — HEPARIN 100 UNIT/ML
500 SYRINGE INTRAVENOUS
Status: CANCELLED | OUTPATIENT
Start: 2017-06-05

## 2017-06-05 RX ADMIN — DEXTROSE MONOHYDRATE, SODIUM CHLORIDE, AND POTASSIUM CHLORIDE: 50; 4.5; 1.49 INJECTION, SOLUTION INTRAVENOUS at 10:06

## 2017-06-05 RX ADMIN — CISPLATIN 55 MG: 100 INJECTION, SOLUTION INTRAVENOUS at 01:06

## 2017-06-05 RX ADMIN — SODIUM CHLORIDE 150 MG: 9 INJECTION, SOLUTION INTRAVENOUS at 12:06

## 2017-06-05 RX ADMIN — GEMCITABINE 1830 MG: 1 INJECTION, POWDER, LYOPHILIZED, FOR SOLUTION INTRAVENOUS at 01:06

## 2017-06-05 RX ADMIN — DEXAMETHASONE SODIUM PHOSPHATE: 4 INJECTION, SOLUTION INTRAMUSCULAR; INTRAVENOUS at 12:06

## 2017-06-05 NOTE — NURSING
Notified Dr Bah of pt on strict K restriction due to ANJALI & orders for IVF with K ordered for tomorrow's hydration. DC D5 with 20 mEq K & hydrate with plain NS.

## 2017-06-05 NOTE — PLAN OF CARE
Problem: Patient Care Overview  Goal: Plan of Care Review  Outcome: Ongoing (interventions implemented as appropriate)  Pt tolerated first cisplatin/gemzar without adverse effects. VSS. Maintained PIV to LFA per pt request for fluids on 6/6. Provided AVS & verbalized understanding of RTC date. DC with family ambulating independently.

## 2017-06-05 NOTE — PLAN OF CARE
Problem: Chemotherapy Effects (Adult)  Goal: Signs and Symptoms of Listed Potential Problems Will be Absent, Minimized or Managed (Chemotherapy Effects)  Signs and symptoms of listed potential problems will be absent, minimized or managed by discharge/transition of care (reference Chemotherapy Effects (Adult) CPG).  Outcome: Ongoing (interventions implemented as appropriate)  Patient arrived, assisted to chair, for cycle one day one of gemzar/cisplatin. Oriented patient to unit, reviewed unit regulations with patient. Provided patient with chemo binder, including side effect managements, dietary guidelines, chemocare.37coins print out on each chemotherapy to be given. Reviewed specific side effects of chemotherapy agents, when to seek medical attention, assured patient has digital thermometer. Access obtained via PIV, flushed with good blood return, currently infusing. Plan of care reviewed with patient, questions encouraged. Patient verbalized understanding.

## 2017-06-06 ENCOUNTER — INFUSION (OUTPATIENT)
Dept: INFUSION THERAPY | Facility: HOSPITAL | Age: 67
End: 2017-06-06
Attending: INTERNAL MEDICINE
Payer: MEDICARE

## 2017-06-06 VITALS
RESPIRATION RATE: 16 BRPM | DIASTOLIC BLOOD PRESSURE: 60 MMHG | HEART RATE: 72 BPM | TEMPERATURE: 98 F | SYSTOLIC BLOOD PRESSURE: 129 MMHG

## 2017-06-06 DIAGNOSIS — R06.6 INTRACTABLE HICCUPS: Primary | ICD-10-CM

## 2017-06-06 DIAGNOSIS — C67.8 MALIGNANT NEOPLASM OF OVERLAPPING SITES OF BLADDER: Primary | ICD-10-CM

## 2017-06-06 PROCEDURE — 25000003 PHARM REV CODE 250: Performed by: INTERNAL MEDICINE

## 2017-06-06 PROCEDURE — 96361 HYDRATE IV INFUSION ADD-ON: CPT

## 2017-06-06 PROCEDURE — 96360 HYDRATION IV INFUSION INIT: CPT

## 2017-06-06 RX ORDER — BACLOFEN 10 MG/1
10 TABLET ORAL 3 TIMES DAILY
Qty: 90 TABLET | Refills: 1 | Status: ON HOLD | OUTPATIENT
Start: 2017-06-06 | End: 2017-07-06 | Stop reason: CLARIF

## 2017-06-06 RX ADMIN — SODIUM CHLORIDE 1000 ML: 0.9 INJECTION, SOLUTION INTRAVENOUS at 01:06

## 2017-06-06 NOTE — PLAN OF CARE
Problem: Patient Care Overview  Goal: Interdisciplinary Rounds/Family Conf  Outcome: Ongoing (interventions implemented as appropriate)  Pt received 1L NS with no complications. Pt instructed to call MD with any problems. AVS given to patient and patient discharged home.

## 2017-06-07 ENCOUNTER — TELEPHONE (OUTPATIENT)
Dept: UROLOGY | Facility: CLINIC | Age: 67
End: 2017-06-07

## 2017-06-07 NOTE — TELEPHONE ENCOUNTER
----- Message from Romana Lowry sent at 6/7/2017  8:59 AM CDT -----  Contact: Self/[phone]  Patient is requesting a refill on his oxycodone (OXY-IR) 5 mg Cap sent to Pharmacy on file. Prescription # [id number]. Please advise

## 2017-06-08 NOTE — TELEPHONE ENCOUNTER
He had a refill on 5/25/17--he has seen Dr. Robles and Dr. Calero more recently.  I would prefer that these requests go through to the providers who have plans to treat his bladder cancer.  Please call the patient and notify that he should request pain medications with his active providers please.  If this is an indication of a clinical problem, they should be aware of it.  Thank you.

## 2017-06-09 ENCOUNTER — TELEPHONE (OUTPATIENT)
Dept: UROLOGY | Facility: CLINIC | Age: 67
End: 2017-06-09

## 2017-06-09 DIAGNOSIS — C80.1 CANCER: Primary | ICD-10-CM

## 2017-06-09 RX ORDER — OXYCODONE HYDROCHLORIDE 5 MG/1
5 CAPSULE ORAL EVERY 4 HOURS PRN
Qty: 40 CAPSULE | Refills: 0 | Status: ON HOLD | OUTPATIENT
Start: 2017-06-09 | End: 2017-07-06 | Stop reason: CLARIF

## 2017-06-09 NOTE — TELEPHONE ENCOUNTER
----- Message from Mayi Villasenor MA sent at 6/8/2017  3:53 PM CDT -----  Contact: self  [phone]  States he needs oxycodone 5 mg caps called to [facility] in [place] [facility] at [phone].  States he needs this today.    Rx#  [id number]    Call when done.

## 2017-06-09 NOTE — TELEPHONE ENCOUNTER
Spoke with Mr Rodo who stated that he would call Dr Calero's office for his medication.  All questions answered.  Pt verbalized understanding to all.

## 2017-06-09 NOTE — TELEPHONE ENCOUNTER
----- Message from Romana Lowry sent at 6/9/2017 10:32 AM CDT -----  Contact: Self/[phone]  Patient said one of his kidney tubes on the right hand side needs to be redressed. Please advise

## 2017-06-09 NOTE — TELEPHONE ENCOUNTER
----- Message from Clara Mallory sent at 6/9/2017  8:50 AM CDT -----  Contact: SElf   Pt needs oxycodone (OXY-IR) 5 mg Cap filled

## 2017-06-12 ENCOUNTER — OFFICE VISIT (OUTPATIENT)
Dept: UROLOGY | Facility: CLINIC | Age: 67
End: 2017-06-12
Payer: MEDICARE

## 2017-06-12 ENCOUNTER — OFFICE VISIT (OUTPATIENT)
Dept: HEMATOLOGY/ONCOLOGY | Facility: CLINIC | Age: 67
End: 2017-06-12
Payer: MEDICARE

## 2017-06-12 ENCOUNTER — INFUSION (OUTPATIENT)
Dept: INFUSION THERAPY | Facility: HOSPITAL | Age: 67
End: 2017-06-12
Attending: INTERNAL MEDICINE
Payer: MEDICARE

## 2017-06-12 VITALS
BODY MASS INDEX: 17.99 KG/M2 | HEIGHT: 74 IN | DIASTOLIC BLOOD PRESSURE: 65 MMHG | HEART RATE: 87 BPM | WEIGHT: 140.19 LBS | SYSTOLIC BLOOD PRESSURE: 139 MMHG

## 2017-06-12 VITALS
HEIGHT: 74 IN | SYSTOLIC BLOOD PRESSURE: 132 MMHG | RESPIRATION RATE: 18 BRPM | BODY MASS INDEX: 17.91 KG/M2 | HEART RATE: 95 BPM | TEMPERATURE: 98 F | DIASTOLIC BLOOD PRESSURE: 60 MMHG | WEIGHT: 139.56 LBS

## 2017-06-12 VITALS — HEART RATE: 79 BPM | RESPIRATION RATE: 18 BRPM | SYSTOLIC BLOOD PRESSURE: 122 MMHG | DIASTOLIC BLOOD PRESSURE: 59 MMHG

## 2017-06-12 DIAGNOSIS — C67.8 MALIGNANT NEOPLASM OF OVERLAPPING SITES OF BLADDER: Primary | ICD-10-CM

## 2017-06-12 DIAGNOSIS — D63.8 ANEMIA OF CHRONIC DISEASE: ICD-10-CM

## 2017-06-12 DIAGNOSIS — N18.3 CKD (CHRONIC KIDNEY DISEASE), STAGE 3 (MODERATE): ICD-10-CM

## 2017-06-12 DIAGNOSIS — N13.5 BILATERAL URETERAL OBSTRUCTION: ICD-10-CM

## 2017-06-12 DIAGNOSIS — C67.9 BLADDER CANCER: Primary | ICD-10-CM

## 2017-06-12 DIAGNOSIS — C67.0 MALIGNANT NEOPLASM OF TRIGONE OF URINARY BLADDER: Primary | ICD-10-CM

## 2017-06-12 DIAGNOSIS — C67.8 MALIGNANT NEOPLASM OF OVERLAPPING SITES OF BLADDER: ICD-10-CM

## 2017-06-12 PROCEDURE — 1159F MED LIST DOCD IN RCRD: CPT | Mod: S$GLB,,, | Performed by: UROLOGY

## 2017-06-12 PROCEDURE — 96361 HYDRATE IV INFUSION ADD-ON: CPT

## 2017-06-12 PROCEDURE — 63600175 PHARM REV CODE 636 W HCPCS: Performed by: INTERNAL MEDICINE

## 2017-06-12 PROCEDURE — 99215 OFFICE O/P EST HI 40 MIN: CPT | Mod: S$GLB,,, | Performed by: INTERNAL MEDICINE

## 2017-06-12 PROCEDURE — 99999 PR PBB SHADOW E&M-EST. PATIENT-LVL III: CPT | Mod: PBBFAC,,, | Performed by: INTERNAL MEDICINE

## 2017-06-12 PROCEDURE — 99999 PR PBB SHADOW E&M-EST. PATIENT-LVL II: CPT | Mod: PBBFAC,,,

## 2017-06-12 PROCEDURE — 1126F AMNT PAIN NOTED NONE PRSNT: CPT | Mod: S$GLB,,, | Performed by: INTERNAL MEDICINE

## 2017-06-12 PROCEDURE — 1159F MED LIST DOCD IN RCRD: CPT | Mod: S$GLB,,, | Performed by: INTERNAL MEDICINE

## 2017-06-12 PROCEDURE — 25000003 PHARM REV CODE 250: Performed by: INTERNAL MEDICINE

## 2017-06-12 PROCEDURE — 99215 OFFICE O/P EST HI 40 MIN: CPT | Mod: S$GLB,,, | Performed by: UROLOGY

## 2017-06-12 PROCEDURE — 1126F AMNT PAIN NOTED NONE PRSNT: CPT | Mod: S$GLB,,, | Performed by: UROLOGY

## 2017-06-12 PROCEDURE — 99999 PR PBB SHADOW E&M-EST. PATIENT-LVL V: CPT | Mod: PBBFAC,,, | Performed by: UROLOGY

## 2017-06-12 PROCEDURE — 99499 UNLISTED E&M SERVICE: CPT | Mod: S$GLB,,, | Performed by: STUDENT IN AN ORGANIZED HEALTH CARE EDUCATION/TRAINING PROGRAM

## 2017-06-12 PROCEDURE — 96365 THER/PROPH/DIAG IV INF INIT: CPT

## 2017-06-12 RX ORDER — ALVIMOPAN 12 MG/1
12 CAPSULE ORAL
Status: CANCELLED | OUTPATIENT
Start: 2017-06-12 | End: 2017-06-13

## 2017-06-12 RX ORDER — ENOXAPARIN SODIUM 100 MG/ML
40 INJECTION SUBCUTANEOUS
Status: CANCELLED | OUTPATIENT
Start: 2017-06-12

## 2017-06-12 RX ORDER — SODIUM CHLORIDE 9 MG/ML
INJECTION, SOLUTION INTRAVENOUS
Status: COMPLETED | OUTPATIENT
Start: 2017-06-12 | End: 2017-06-12

## 2017-06-12 RX ORDER — HEPARIN SODIUM 5000 [USP'U]/ML
5000 INJECTION, SOLUTION INTRAVENOUS; SUBCUTANEOUS EVERY 8 HOURS
Status: CANCELLED | OUTPATIENT
Start: 2017-06-12

## 2017-06-12 RX ORDER — SODIUM CHLORIDE 9 MG/ML
INJECTION, SOLUTION INTRAVENOUS
Status: CANCELLED
Start: 2017-06-12 | End: 2017-06-12

## 2017-06-12 RX ADMIN — ONDANSETRON 4 MG: 2 INJECTION, SOLUTION INTRAMUSCULAR; INTRAVENOUS at 09:06

## 2017-06-12 RX ADMIN — SODIUM CHLORIDE: 0.9 INJECTION, SOLUTION INTRAVENOUS at 09:06

## 2017-06-12 NOTE — PROGRESS NOTES
"Subjective:       Patient ID: Juan Manuel Koehler is a 67 y.o. male.    Chief Complaint: Bladder Cancer     Nausea    Associated symptoms include nausea. Pertinent negatives include no abdominal pain, chest pain, coughing, fatigue, fever, headaches, rash, vomiting or weakness.    Constipation    Associated symptoms include nausea. Pertinent negatives include no abdominal pain, back pain, diarrhea, difficulty urinating, fever or vomiting.        Juan Manuel Koehler is a 67 y.o. White male, initially referred by Dr. Robles, who presents today for evaluation and management of invasive bladder cancer. Patient has previously been seen by Dr. 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 He presented to Dr. Boucher with pain and hematuria and was found to have a large bladder tumor on his trigone. The tumor was also causing bilateral ureteral obstruction with resulting ELDER.The patient underwent tumor resection and attempted JJ stent placement on 4/5/17 which demonstrated cT2 urothelial carcinoma (nested variant). Bilateral nephrostomy tubes were placed with improvement in the patient's renal function. Patient discharged today from [facility] for ELDER from 5/5-5/9. During hospitalization, right nephrostomy tube was placed and the left was exchanged.     Here following C1D1 gem-cis with split dose cisplatin.       5/5/17 CT CAP reveals " Bilateral double-J ureteral stents and left nephrostomy tube are present.  There has been interval resolution of left-sided hydronephrosis and significant improvement in right-sided hydronephrosis which now appears mild. Bladder is nondistended and not well evaluated.  There is no CT evidence of distant metastatic disease referable to provide history of bladder neoplasm."     Review of Systems   Constitutional: Negative for activity change, " appetite change, fatigue, fever and unexpected weight change.   HENT: Negative for mouth sores, nosebleeds and trouble swallowing.    Eyes: Negative for discharge and visual disturbance.   Respiratory: Negative for cough, shortness of breath and wheezing.    Cardiovascular: Negative for chest pain and leg swelling.   Gastrointestinal: Positive for constipation and nausea. Negative for abdominal distention, abdominal pain, blood in stool, diarrhea and vomiting.   Genitourinary: Negative for decreased urine volume, difficulty urinating, frequency and hematuria.        +bilateral nephrostomy tubes   Musculoskeletal: Negative for back pain.   Skin: Negative for color change and rash.   Neurological: Negative for weakness and headaches.   Hematological: Negative for adenopathy.   Psychiatric/Behavioral: Negative for sleep disturbance. The patient is not nervous/anxious.    All other systems reviewed and are negative.      Allergies:  Review of patient's allergies indicates:  No Known Allergies    Medications:  Current Outpatient Prescriptions   Medication Sig Dispense Refill    acetaminophen (TYLENOL) 500 MG tablet Take 1,000 mg by mouth every 6 (six) hours as needed for Pain.      cholecalciferol, vitamin D3, 400 unit Cap Take 3 capsules (1,200 Units total) by mouth once daily. 90 capsule 0    ketoconazole (NIZORAL) 2 % shampoo USE TO WASH AFFECTED AREA ONCE DAILY  3    ondansetron (ZOFRAN-ODT) 4 MG TbDL Take 1 tablet (4 mg total) by mouth every 8 (eight) hours as needed. 20 tablet 1    oxycodone (OXY-IR) 5 mg Cap Take 1 capsule (5 mg total) by mouth every 4 (four) hours as needed for Pain. 40 capsule 0    polyethylene glycol (GLYCOLAX) 17 gram PwPk Take 17 g by mouth daily as needed (for constipation). 10 packet 2    baclofen (LIORESAL) 10 MG tablet Take 1 tablet (10 mg total) by mouth 3 (three) times daily. 90 tablet 1    cyproheptadine (PERIACTIN) 4 mg tablet Take 1 tablet (4 mg total) by mouth 3 (three)  times daily as needed. 90 tablet 0    docusate sodium (COLACE) 100 MG capsule Take 1 capsule (100 mg total) by mouth 2 (two) times daily.  0    megestrol (MEGACE) 400 mg/10 mL (40 mg/mL) Susp Take 10 mLs (400 mg total) by mouth once daily. 240 mL 12    sodium polystyrene (KAYEXALATE) powder        No current facility-administered medications for this visit.        PMH:  Past Medical History:   Diagnosis Date    Cancer     bladder and throat    Urinary tract infection        PSH:  Past Surgical History:   Procedure Laterality Date    BLADDER SURGERY      HERNIA REPAIR      THROAT SURGERY         FamHx:  Family History   Problem Relation Age of Onset    No Known Problems Father     Kidney disease Mother     Prostate cancer Neg Hx        SocHx:  Social History     Social History    Marital status:      Spouse name: N/A    Number of children: N/A    Years of education: N/A     Occupational History    Not on file.     Social History Main Topics    Smoking status: Never Smoker    Smokeless tobacco: Never Used    Alcohol use No    Drug use: No    Sexual activity: Yes     Partners: Female     Birth control/ protection: None     Other Topics Concern    Not on file     Social History Narrative    No narrative on file       Objective:      Physical Exam   Constitutional: He is oriented to person, place, and time. He appears well-developed.   Appears fatigued and thin   HENT:   Head: Normocephalic and atraumatic.   Right Ear: External ear normal.   Left Ear: External ear normal.   Eyes: Conjunctivae and EOM are normal. Pupils are equal, round, and reactive to light. Right eye exhibits no discharge. Left eye exhibits no discharge. No scleral icterus.   Neck: Normal range of motion. Neck supple.   Pulmonary/Chest: Effort normal.   Musculoskeletal: Normal range of motion.   Bilateral nephrostomy tubes draining clear, yellow urine.   Neurological: He is alert and oriented to person, place, and time.    Skin: Skin is warm and dry. No erythema.   Nursing note and vitals reviewed.      LABS:  WBC   Date Value Ref Range Status   06/05/2017 13.15 (H) 3.90 - 12.70 K/uL Final     Hemoglobin   Date Value Ref Range Status   06/05/2017 9.2 (L) 14.0 - 18.0 g/dL Final     POC Hematocrit   Date Value Ref Range Status   04/06/2017 31 (L) 36 - 54 %PCV Final     Hematocrit   Date Value Ref Range Status   06/05/2017 28.1 (L) 40.0 - 54.0 % Final     Platelets   Date Value Ref Range Status   06/05/2017 370 (H) 150 - 350 K/uL Final       Chemistry        Component Value Date/Time     (L) 06/05/2017 0910    K 4.8 06/05/2017 0910     06/05/2017 0910    CO2 23 06/05/2017 0910    BUN 23 06/05/2017 0910    CREATININE 1.6 (H) 06/05/2017 0910     06/05/2017 0910        Component Value Date/Time    CALCIUM 9.8 06/05/2017 0910    ALKPHOS 83 06/05/2017 0910    AST 16 06/05/2017 0910    ALT 20 06/05/2017 0910    BILITOT 0.7 06/05/2017 0910          Assessment:       1. Malignant neoplasm of overlapping sites of bladder        Plan:       1. Juan Manuel Koehler is a 67 y.o. White male, referred by Dr. Robles, who presents today for evaluation and management of invasive bladder cancer. Patient has previously been seen by Dr. 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 He presented to Dr. Boucher with pain and hematuria and was found to have a large bladder tumor on his trigone. Staging CTCAP on 5/5/17 shows no evidence of distant metastasis.    Given his continued increased creatinine, it was unclear if he would be considered a candidate for neoadjuvant chemotherapy This has improved and the patient wanted to move forward with chemo, he understood the risks, particularly to his kidneys. We split the dose of cisplatin between D1+D8 and gave IVFs.  Yet still his creatinine  increased significantly.  We had a lengthy conversation about the risk of recurrence without chemo and real risk of permanent and irreversible kidney damage.   Also discussed with Dr. Robles.  At this point we will forgo further chemotherapy in favor of moving forward with surgery.      Will plan to see patient back after surgery.       Patient is in agreement with the proposed treatment plan. All questions were answered to the patient's satisfaction. Pt knows to call clinic if anything is needed before the next clinic visit.    More than 45 mins were spent during this encounter, greater than 50% was spent in direct counseling and/or coordination of care.     Mingo Calero M.D., M.S., F.A.C.P.  Hematology/Oncology Attending  [facility]

## 2017-06-12 NOTE — PLAN OF CARE
Problem: Patient Care Overview  Goal: Individualization & Mutuality  1. PIV  2. K restricted diet  3. Warm blankets   Outcome: Ongoing (interventions implemented as appropriate)  0930-Labs , hx, and medications reviewed, patient seen by MD prior to arrival, plan changed to iv fluids and nausea medicine today. Assessment completed. Discussed plan of care with patient. Patient in agreement. Chair reclined and warm blanket and snack offered.

## 2017-06-12 NOTE — PROGRESS NOTES
Urology ([facility]) H&P for upcoming procedure    Is this patient in a research study?  No    CC: bladder cancer    HPI:Juan Manuel Koehler is a 67 y.o. male  With [id number] urothelial cell carcinoma of the bladder.      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     He recently presented to Dr. Boucher for hematuria and was found to have a large bladder tumor on his trigone. The tumor was also causing bilateral ureteral obstruction with resulting ELDER.     The patient underwent tumor resection and attempted JJ stent placement on 4/5/17 which demonstrated cT2 urothelial carcinoma (nested variant). Bilateral nephrostomy tubes were placed with improvement in the patient's renal function. IR was able to internalize the patient's left nephrostomy tube, however after internalization, the patient's sCr evelio.      He has received one dose of neoadjuvant chemotherapy however his creatinine has continued to rise and is now 2.1. He currently has bilateral nephrostomy tubes and bilateral stents in place.    TURBT (4/5/17) - T2, nested variant    ROS:  No weight loss, fevers, chills  No back pain, bone pain  No bleeding disorders  No SOB, No CP  Otherwise neg except per HPI    Past Medical History:   Diagnosis Date    Cancer     bladder and throat    Urinary tract infection        Past Surgical History:   Procedure Laterality Date    BLADDER SURGERY      HERNIA REPAIR      THROAT SURGERY         Soc Hx:  Tob: No  Alc: No  Illicit drug use:No      Review of patient's allergies indicates:  No Known Allergies    Current Outpatient Prescriptions on File Prior to Visit   Medication Sig Dispense Refill    acetaminophen (TYLENOL) 500 MG tablet Take 1,000 mg by mouth every 6 (six) hours as needed for Pain.      baclofen (LIORESAL) 10 MG tablet Take 1 tablet (10 mg total) by mouth 3 (three) times daily. 90 tablet 1    cholecalciferol, vitamin D3, 400 unit  Cap Take 3 capsules (1,200 Units total) by mouth once daily. 90 capsule 0    cyproheptadine (PERIACTIN) 4 mg tablet Take 1 tablet (4 mg total) by mouth 3 (three) times daily as needed. 90 tablet 0    docusate sodium (COLACE) 100 MG capsule Take 1 capsule (100 mg total) by mouth 2 (two) times daily.  0    ketoconazole (NIZORAL) 2 % shampoo USE TO WASH AFFECTED AREA ONCE DAILY  3    megestrol (MEGACE) 400 mg/10 mL (40 mg/mL) Susp Take 10 mLs (400 mg total) by mouth once daily. 240 mL 12    ondansetron (ZOFRAN-ODT) 4 MG TbDL Take 1 tablet (4 mg total) by mouth every 8 (eight) hours as needed. 20 tablet 1    oxycodone (OXY-IR) 5 mg Cap Take 1 capsule (5 mg total) by mouth every 4 (four) hours as needed for Pain. 40 capsule 0    polyethylene glycol (GLYCOLAX) 17 gram PwPk Take 17 g by mouth daily as needed (for constipation). 10 packet 2    sodium polystyrene (KAYEXALATE) powder       [DISCONTINUED] polyethylene glycol (GLYCOLAX) 17 gram PwPk Take 17 g by mouth 2 (two) times daily as needed. 72 each 0     Current Facility-Administered Medications on File Prior to Visit   Medication Dose Route Frequency Provider Last Rate Last Dose    [COMPLETED] 0.9%  NaCl infusion   Intravenous 1 time in Clinic/HOD Mingo Calero MD   Stopped at 06/12/17 1105    [COMPLETED] ondansetron (ZOFRAN) 4 mg in sodium chloride 0.9% 50 mL IVPB  4 mg Intravenous 1 time in Clinic/HOD Mingo Calero MD   Stopped at 06/12/17 0946       Anticoagulation:  No    Physical Exam:  weight 140 lb/63.6 kg  BMI 18    AAOx4, NAD, WDWN  NC/AT, EOMI, PER, sclerae anicteric, speech normal, tongue midline  Nl effort, CTAB  RRR  Soft, non-tender, non-distended,   Bilateral neph tubes draining clear yellow urine  Scars: none      Labs:     BMP  Lab Results   Component Value Date     (L) 06/12/2017    K 5.4 (H) 06/12/2017     06/12/2017    CO2 22 (L) 06/12/2017    BUN 38 (H) 06/12/2017    CREATININE 2.1 (H) 06/12/2017    CALCIUM 9.7 06/12/2017     ANIONGAP 11 06/12/2017    ESTGFRAFRICA 36.3 (A) 06/12/2017    EGFRNONAA 31.4 (A) 06/12/2017       Lab Results   Component Value Date    WBC 7.03 06/12/2017    HGB 8.3 (L) 06/12/2017    HCT 25.6 (L) 06/12/2017    MCV 85 06/12/2017     06/12/2017       Lab Results   Component Value Date    ALT 15 06/12/2017    AST 11 06/12/2017    ALKPHOS 79 06/12/2017    BILITOT 0.5 06/12/2017       Imaging:  CT Urogram/Chest 5/5/17:  There are no calculi detected.  The kidneys concentrate and excrete contrast appropriately.  Bilateral double-J ureteral stents are present and there is a left-sided nephrostomy tube.  Hydronephrosis on the left has resolved.  There is mild persistent right hydronephrosis which is significantly improved from previous exam.  Ureters are not well evaluated due to the presence of stents.  Bilateral renal hypodensities are present consistent with cysts.  The bladder is nondistended and not well evaluated. No evidence of metastatic disease.     Assessment: 67 y.o.  yo M with [id number] urothelial cell carcinoma of the bladder    Plan:  1. To OR on 6/21/17 for open radical cystectomy with ileal conduit formation  2. Consents signed   3. I have explained the risk, benefits, and alternatives of the procedure in detail. The patient voices understanding and all questions have been answered. The patient agrees to proceed as planned.   4. Urine from nephrostomy tube sent for culture     Kiley Hwang MD

## 2017-06-12 NOTE — PROGRESS NOTES
Subjective:       Patient ID: Juan Manuel Koehler is a 67 y.o. male.    Chief Complaint: No chief complaint on file.      HPI: Juan Manuel Koehler is a 67 y.o. White male who returns today in follow-up for invasive bladder cancer.    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    He recently presented to Dr. Boucher for hematuria and was found to have a large bladder tumor on his trigone. The tumor was also causing bilateral ureteral obstruction with resulting ELDER.    The patient underwent tumor resection and attempted JJ stent placement on 4/5/17 which demonstrated cT2 urothelial carcinoma (nested variant). Bilateral nephrostomy tubes were placed with improvement in the patient's renal function. IR was able to internalize the patient's left nephrostomy tube, however after internalization, the patient's sCr evelio. He now has bilateral nephrostomy tubes in place, draining well.    The patient's eGFR has fluctuated and has been low, and there is concern that he could not receive cisplatin-based chemotherapy. After much debate, we decided to start neoadjuvant chemotherapy with the plan to quickly course correct if his renal function declined. The patient received his first infusion last week, and, unfortunately, his renal function today is now significantly worse.     After discussion with Dr. Calero, the patient presents today with his wife to discuss proceeding directly to surgery.    Overall, he has no urologic complaints, but he is predictably anxious about moving forward with definitive therapy.    Review of patient's allergies indicates:   Allergen Reactions    Pneumococcal 23-margarito ps vaccine        Current Outpatient Prescriptions   Medication Sig Dispense Refill    acetaminophen (TYLENOL) 500 MG tablet Take 1,000 mg by mouth every 6 (six) hours as needed for Pain.      baclofen (LIORESAL) 10 MG tablet Take 1 tablet (10 mg total) by mouth 3 (three) times  daily. 90 tablet 1    cholecalciferol, vitamin D3, 400 unit Cap Take 3 capsules (1,200 Units total) by mouth once daily. 90 capsule 0    cyproheptadine (PERIACTIN) 4 mg tablet Take 1 tablet (4 mg total) by mouth 3 (three) times daily as needed. 90 tablet 0    docusate sodium (COLACE) 100 MG capsule Take 1 capsule (100 mg total) by mouth 2 (two) times daily.  0    ketoconazole (NIZORAL) 2 % shampoo USE TO WASH AFFECTED AREA ONCE DAILY  3    megestrol (MEGACE) 400 mg/10 mL (40 mg/mL) Susp Take 10 mLs (400 mg total) by mouth once daily. 240 mL 12    ondansetron (ZOFRAN-ODT) 4 MG TbDL Take 1 tablet (4 mg total) by mouth every 8 (eight) hours as needed. 20 tablet 1    oxycodone (OXY-IR) 5 mg Cap Take 1 capsule (5 mg total) by mouth every 4 (four) hours as needed for Pain. 40 capsule 0    polyethylene glycol (GLYCOLAX) 17 gram PwPk Take 17 g by mouth daily as needed (for constipation). 10 packet 2    sodium polystyrene (KAYEXALATE) powder        No current facility-administered medications for this visit.        Past Medical History:   Diagnosis Date    Cancer     bladder and throat    Urinary tract infection        Past Surgical History:   Procedure Laterality Date    BLADDER SURGERY      HERNIA REPAIR      THROAT SURGERY         Family History   Problem Relation Age of Onset    No Known Problems Father     Kidney disease Mother     Prostate cancer Neg Hx        Review of Systems    Review of Systems   Constitutional: Negative for fever, chills, activity change, appetite change and unexpected weight change.   HENT: Negative for congestion, nosebleeds, sneezing, sore throat and trouble swallowing.    Eyes: Negative for pain, discharge, redness and visual disturbance.   Respiratory: Negative for cough, choking, chest tightness and shortness of breath.    Cardiovascular: Negative for chest pain, palpitations and leg swelling.   Gastrointestinal: Negative for nausea, vomiting, abdominal pain, diarrhea, blood  in stool, abdominal distention and anal bleeding.  Genitourinary: As documented per HPI   Endocrine: Negative for cold intolerance, heat intolerance, polydipsia, polyphagia and polyuria.   Musculoskeletal: Negative for myalgias, gait problem, neck pain and neck stiffness.   Skin: Negative for color change, pallor, rash and wound.   Allergic/Immunologic: Negative for immunocompromised state.   Neurological: Negative for seizures, facial asymmetry, speech difficulty, weakness and light-headedness.   Hematological: Negative for adenopathy. Does not bruise/bleed easily.   Psychiatric/Behavioral: Negative for hallucinations, behavioral problems, self-injury and agitation. The patient is not hyperactive.    All other systems were reviewed and were negative.      Objective:     Vitals:    06/12/17 1305   BP: 139/65   Pulse: 87     Physical Exam   Vitals reviewed.  Constitutional: He is oriented to person, place, and time. He appears well-developed and well-nourished. No distress.   HENT:   Head: Normocephalic and atraumatic.   Right Ear: External ear normal.   Left Ear: External ear normal.   Nose: Nose normal.   Eyes: EOM are normal. Pupils are equal, round, and reactive to light. Right eye exhibits no discharge. Left eye exhibits no discharge.   Neck: Normal range of motion. Neck supple. No tracheal deviation present. No thyroid enlargement or tenderness.   Cardiovascular: Regular rhythm and intact distal pulses. No signs of peripheral edema.    Pulmonary/Chest: Effort normal and breath sounds normal. No stridor. No respiratory distress. He has no wheezes.   Abdominal: Soft. Bowel sounds are normal. He exhibits no distension. There is no tenderness. Hernia confirmed negative in the right inguinal area and confirmed negative in the left inguinal area. No hepatic or splenic enlargement or tenderness. Bilateral nephrostomy tubes draining well. No abdominal scars.  Genitourinary: Penis normal. Right testis shows no mass, no  swelling and no tenderness. Right testis is descended. Left testis shows no mass, no swelling and no tenderness. Left testis is descended. Circumcised. No phimosis or hypospadias.   MARICEL: Gr. 2-3 prostate, smooth, no fixation or firmness.  Musculoskeletal: Normal range of motion. He exhibits no edema.   Neurological: He is alert and oriented to person, place, and time. He exhibits normal muscle tone. Coordination normal.   Skin: Skin is warm. No rash noted.   Lymphatic: No palpable lymph nodes.  Psychiatric: He has a normal mood and affect. His behavior is normal. Judgment and thought content normal.     No results found for: PSA  Lab Results   Component Value Date    CREATININE 2.1 (H) 06/12/2017     Lab Results   Component Value Date    EGFRNONAA 31.4 (A) 06/12/2017     Lab Results   Component Value Date    ESTGFRAFRICA 36.3 (A) 06/12/2017     I personally reviewed all the patient's imaging studies.    CT scan non-contrast abdomen/pelvis (4/03/17): Thickened posterior bladder wall with marked bilateral hydroureteronephrosis to the level of the UVJ.  No metastatic disease identified, noting limited evaluation without IV contrast.    CT chest/CT urogram (5/5/17): Bilateral double-J ureteral stents and left nephrostomy tube are present.  There has been interval resolution of left-sided hydronephrosis and significant improvement in right-sided hydronephrosis which now appears mild. Bladder is nondistended and not well evaluated.  There is no CT evidence of distant metastatic disease referable to provide history of bladder neoplasm.    Assessment:       1. Malignant neoplasm of trigone of urinary bladder    2. CKD (chronic kidney disease), stage 3 (moderate)    3. Bilateral ureteral obstruction    4. Anemia of chronic disease        Plan:     Diagnoses and all orders for this visit:    Malignant neoplasm of trigone of urinary bladder  -     Diet NPO; Standing  -     Case Request Operating Room: CYSTECTOMY-RADICAL/OPEN  WITH BILATERAL PELVIC LYMPH NODE DISSECTION AND CREATION OF ILEAL CONDUIT  -     Admit to Inpatient; Standing    CKD (chronic kidney disease), stage 3 (moderate)  -     Diet NPO; Standing  -     Case Request Operating Room: CYSTECTOMY-RADICAL/OPEN WITH BILATERAL PELVIC LYMPH NODE DISSECTION AND CREATION OF ILEAL CONDUIT  -     Admit to Inpatient; Standing    Bilateral ureteral obstruction  -     Diet NPO; Standing  -     Case Request Operating Room: CYSTECTOMY-RADICAL/OPEN WITH BILATERAL PELVIC LYMPH NODE DISSECTION AND CREATION OF ILEAL CONDUIT  -     Admit to Inpatient; Standing    Anemia of chronic disease  -     Diet NPO; Standing  -     Case Request Operating Room: CYSTECTOMY-RADICAL/OPEN WITH BILATERAL PELVIC LYMPH NODE DISSECTION AND CREATION OF ILEAL CONDUIT  -     Admit to Inpatient; Standing    Other orders  -     High Risk of VTE; Standing  -     enoxaparin injection 40 mg; Inject 0.4 mLs (40 mg total) into the skin every 24 hours.    The patient has cT2-cT3 bladder cancer with bilateral nephrostomy tubes in place. He is not able to tolerate neoadjuvant chemotherapy, so we will need proceed directly to surgery.    With regard to surgery, I explained that there are 3 basic categories of modern urinary diversions: (1) incontinent bowel conduit (e.g. ileal conduit) (2) continent cutaneous stomal reservoire (e.g. [place] pouch) (3) continent orthotopic urethral diversion (ileal neopbladder).      I explained that an ileal conduit is the simplest, most time-tested urinary diversion that requires the least operative time and arguably is associated with the fewest complications. I described that a short piece of ileum is anastamosed to the ureters and brought onto the skin.  A life-long urostomy appliance to collect urine is required.   Although time-tested, short-term and long-term issues with ileal conduit diversions are not uncommon.  I quoted the best data available, which in my view is the [address] [place] [address]  of [place] series that included long-term bladder cancer survivors who had at least 5 years of follow-up.  In this series, issues that were seen with ileal conduits were as follows: ureteral stricture/obstruction (~15%), recurrent pyelonephritis (~ 25%), urinary calculi (this complication often occurred beyond 5 to 10 years after surgery, ~10% ), impaired kidney function due to obstruction or ureteral reflux (~27%, only 2% required dialysis and all of whom had compromised kidney function prior to surgery), and stomal complications (~25%) such as parastomal hernia, stensosis, and bleeding/skin irritation.  Furthermore up to 25% of patients had bowel complications, which included small bowel obstruction, fistula formation, and diarrhea.  I explained that although many issues that arise can be handled conservatively, some do require re-operation.      Then we discussed the ileal neobladder.  I explained that the neobladder is constructed from detubularized bowel, which is then sewn into a spherical reservoir for urine storage.  The neobladder is anastamosed to the ureters and to the urethra, to mimic functions of the native bladder. The goal is for the patient to void spontaneously using the Valsalva menuver.  Choice of proceeding with the neobladder vs. ileal conduit must be balanced against additional issues and risks.  Along with risks listed for ileal conduit diversion, I explained the additional potential problems that can arise with the ileal neobladder.   I described risks of urinary leaks from a number of suture lines and explained that in general these can be managed conservatively.  I stressed that a life-long risk of pouch perforation exists.  We discussed risks of incontinence and hypercontinence.  I explained that it is critical to integrate these risks into the decision to proceed with the neobladder.  I quoted the risks of day-time incontinence to be on the order of 10%, while the risk of nighttime  incontinence to be approximately 20%.  With regard to hypercontinence and poor pouch emptying, I explained that approximately 5% of male patients will require life-long intermittent catheterization.  Such risks can reach 30% in female patients with neobladders.  Furthemore, neobladder-vaginal fistula formation is a great concern when a neobladder is constructed in women (~5%).  I explained that continent urinary diversions are contra-indicated in patients with renal insufficiency, but did explain that metabolic disturbances are possible even in patients with intact renal function.    With regard to [place] pouch, we discussed that this diversion consists of a pouch constructed from detubularized right colon.  The pouch is catheterized through the native ileocecal valve via a segment of the ileum that is brought onto the skin.  I explained that all risks that pertain to ileal conduits and neobladders generally apply to patients who undergo a cutaneous continent diversion.  These include risks of stomal stenosis, pouch stomal formation, incontinence, and metabolic disturbances. Furthermore, because, the ileocecal valve is resected, some post-operative diarrhea can be expected.  Such symptoms usually can be controlled with over-the-counter medications.  We reviewed the life-long catheterization and irrigation regimen that is required with this diversion.  I explained that patients who chose to undergo continent diversions  such as an [place] pouch or neobladder, need to be prepared to commit to the following in the post-operative period:   Keep drains as long as needed (for neobladder for example, the catheter and suprapubic tube stay on the order of 2-3 weeks, but possibly longer)   Return for appointments as often as needed.   Empty neobladder/[place] pouch every 2 hours for 1 month   Empty neobladder/[place] pouch every 3 hours for 1 month   Empty neobladder/[place] pouch every 4 hour for the rest of my  life    Risks of sexual dysfunction:  We discussed that in males rates of erectile dysfunction are substantial, since removal of the prostate - an integral part of radical cystectomy - results in compromise of the neurovascular bundles that innervate the penile corpora.  I explained that I perform nerve-sparing cystoprostatectomy whenever possible.    Other risks of the surgery were discussed in detail including medical and anesthetic complications (e.g. heart attack, stroke, deep vein thrombosis, pulmonary embolism, infection (pneumonia, etc), bleeding with possible need for transfusion, adjacent organ involvement or injury (including possible need for permanent or temporary colostomy), wound complications, reaction to medications).  We reviewed what to expect with regard to incisions, post-operative pain, and risks of subsequent hernias.     The patient will undergo an open radical cystectomy with creation of an ileal conduit. I described the surgery, its associated hospital stay, and overall recovery.    I answered all the patient's and his wife's questions.    They agreed to proceed as outlined above.    I encouraged him or any of his family members to call or email me with questions and/or concerns.    I spent 50 minutes with the patient of which more than half was spent in coordinating the patient's care as well as in direct consultation with the patient in regards to our treatment and plan.

## 2017-06-12 NOTE — PLAN OF CARE
Problem: Patient Care Overview  Goal: Plan of Care Review  1110-Patient tolerated treatment well, reports feeling less nauseated after receiving zofran. Discharged without complaints or S/S of adverse event. AVS given.  Instructed to call provider for any questions or concerns.

## 2017-06-12 NOTE — PATIENT INSTRUCTIONS
Cystectomy  Cystectomy is surgery to remove the urinary bladder. It may be done in certain cases of bladder cancer. Your doctor can discuss the risks and benefits of the surgery with you.     In men, the bladder, lymph nodes, prostate, and often the urethra are removed.         In women, the bladder, uterus, cervix, lymph nodes, urethra, and sometimes part of the vagina are removed.      Preparing for surgery  Youll be told how to prepare for surgery. These instructions may include:  · Donating your own blood a few days before the surgery. This is in case you need a transfusion during the surgery.  · Taking antibiotics before surgery. This is to help prevent infection.  · Not eating or drinking anything a certain amount of time before surgery.  · Clearing your intestine. You may do this by drinking a special liquid at home. Or you may be admitted to the hospital the night before surgery and given medication and enemas to empty the intestine.  Removing the bladder  The surgery is done in a hospital. It takes about 4 to 6 hours. Just before surgery, youll be given medication called general anesthesia to prevent pain. This puts you into a state like deep sleep during the surgery. An incision is then made near your bellybutton. This exposes the bladder. The area around your bladder is examined to see if the cancer has spread. If it has, the surgery may not be continued. If the cancer is only in the bladder, the bladder is removed. Other organs near the bladder may be removed as well. This is in case cancer cells may have spread to those organs.  Creating a new path for urine  After the bladder is removed, you will need a new way to store and release urine. There are 3 different options to accomplish this task. Talk to your health care provider about which option would be best for you. The options include:  · Incontinent diversion: A short piece of intestine is removed and connected to the ureters while the other end  is connected to the skin on the front of the abdomen forming an opening called a stoma. This procedure is called a urostomy. A small bag is placed over the stoma to collect the urine.   · Continent diversion: A valve is created in a pouch made from a piece of intestine. You can empty the pouch several times a day by inserting a drainage tube (catheter) into the stoma through the valve. This method does not require a collection bag.   · Neobladder: A new bladder is created from a piece of intestine and urine is routed back into the urethra, restoring urination. Over time most people are able return to normal urination during the day but many may still have incontinence at night.   Risks and possible complications  · Infection  · Bleeding that requires a transfusion  · Blockage of intestine  · Inability to have an erection  · Blood clot in the veins because of physical inactivity   Date Last Reviewed: 9/22/2014  © 4424-6472 [employer], [employer]. [address] [place] [address], [place] [address]. All rights reserved. This information is not intended as a substitute for professional medical care. Always follow your healthcare professional's instructions.

## 2017-06-13 ENCOUNTER — ANESTHESIA EVENT (OUTPATIENT)
Dept: SURGERY | Facility: HOSPITAL | Age: 67
DRG: 654 | End: 2017-06-13
Payer: MEDICARE

## 2017-06-13 DIAGNOSIS — Z01.818 PREOP TESTING: Primary | ICD-10-CM

## 2017-06-13 NOTE — PRE ADMISSION SCREENING
Anesthesia Assessment: Preoperative EQUATION    Planned Procedure: Procedure(s) (LRB):  CYSTECTOMY-RADICAL/OPEN WITH BILATERAL PELVIC LYMPH NODE DISSECTION AND CREATION OF ILEAL CONDUIT (N/A)  Requested Anesthesia Type:General  Surgeon: Villa Robles MD  Service: Urology  Known or anticipated Date of Surgery:6/21/2017    Surgeon notes: reviewed    Electronic QUestionnaire Assessment completed via nurse interview with patient.      NO AQ    Triage considerations:     The patient has no apparent active cardiac condition (No unstable coronary Syndrome such as severe unstable angina or recent [<1 month] myocardial infarction, decompensated CHF, severe valvular   disease or significant arrhythmia)    Previous anesthesia records:GETA    05/05/17; Placement Time: 1351; Method of Intubation: Direct laryngoscopy; Inserted by: CRNA; Airway Device: Endotracheal Tube; Mask Ventilation: Easy; Intubated: Postinduction; Blade: [place] #3; Airway Device Size: 7.5; Style: Cuffed; Cuff Inflation: Minimal occlusive pressure; Placement Verified By: Auscultation, Capnometry, ETT Condensation; Grade: Grade II; Complicating Factors: None; Intubation Findings: Positive EtCO2, Bilateral breath sounds, Atraumatic/Condition of teeth unchanged;  Depth of Insertion: 23; Securment: Lips; Complications: None; Breath Sounds: Equal Bilateral; Insertion Attempts: 1; Removal Date: 05/05/17   Airway/Jaw/Neck:  Airway Findings: Mouth Opening: Normal Tongue: Normal  General Airway Assessment: Adult  Mallampati: II      Dental:  Dental Findings: In tact       Last PCP note: 6-12 months ago , within [facility]   Subspecialty notes: Hematology/Oncology  6/12    Other important co-morbidities: Anemia, Acute Kidney Injury, CKD-3, S/P Chemotherapy, S/P Throat Cancer     Tests already available:  Available tests,  within 3 months , within [facility] .  6/2017 CBC, CMP  5/2017 Renal Function Panel, US Retroperitoneal   4/2017 EKG            Instructions given.  (See in Nurse's note)    Optimization:  Anesthesia Preop Clinic Assessment  Indicated-Not required due to recent anesthesia record    Medical Opinion Indicated-Indicated to see PCP        Plan:    Testing:  BMP, T&S and T&C        Consultation:Patient's PCP for re-evaluation     Patient  has previously scheduled Medical Appointment:  6/16 Consult Izabela Adames    Navigation: Tests Scheduled. BMP, T&S, T&C 2 Units             Consults scheduled.  6/14/17 PCP             Results will be tracked by Preop Clinic.  6/15/17 Medical records received from outside PCP. Patient medically cleared/optimized for surgery. All records scanned into [employer]  6/19 Lab results reviewed. Potassium 5.2 Ok to proceed with surgery per Dr Drake Casper, [facility]

## 2017-06-15 LAB — BACTERIA UR CULT: NORMAL

## 2017-06-15 RX ORDER — AMPICILLIN 500 MG/1
500 CAPSULE ORAL 4 TIMES DAILY
Qty: 20 CAPSULE | Refills: 0 | Status: ON HOLD | OUTPATIENT
Start: 2017-06-15 | End: 2017-06-23 | Stop reason: HOSPADM

## 2017-06-15 NOTE — PROGRESS NOTES
Spoke with patient's wife regarding positive urine culture.   Prescription sent to pharmacy for Ampicillin. Instructed that patient should start taking today in preparation for cystectomy on 6/21.  She expressed understanding and stated that she will pick it up today.      Kiley Hwang MD  Urology, PGY-2  Pager# [phone]

## 2017-06-16 ENCOUNTER — LAB VISIT (OUTPATIENT)
Dept: LAB | Facility: HOSPITAL | Age: 67
End: 2017-06-16
Attending: ANESTHESIOLOGY
Payer: MEDICARE

## 2017-06-16 DIAGNOSIS — Z01.818 PREOP TESTING: ICD-10-CM

## 2017-06-16 LAB
ABO + RH BLD: NORMAL
ANION GAP SERPL CALC-SCNC: 10 MMOL/L
BLD GP AB SCN CELLS X3 SERPL QL: NORMAL
BUN SERPL-MCNC: 34 MG/DL
CALCIUM SERPL-MCNC: 9.1 MG/DL
CHLORIDE SERPL-SCNC: 104 MMOL/L
CO2 SERPL-SCNC: 21 MMOL/L
CREAT SERPL-MCNC: 2.1 MG/DL
EST. GFR  (AFRICAN AMERICAN): 36.3 ML/MIN/1.73 M^2
EST. GFR  (NON AFRICAN AMERICAN): 31.4 ML/MIN/1.73 M^2
GLUCOSE SERPL-MCNC: 148 MG/DL
POTASSIUM SERPL-SCNC: 5.2 MMOL/L
SODIUM SERPL-SCNC: 135 MMOL/L

## 2017-06-16 PROCEDURE — 86850 RBC ANTIBODY SCREEN: CPT

## 2017-06-16 PROCEDURE — 86900 BLOOD TYPING SEROLOGIC ABO: CPT

## 2017-06-16 PROCEDURE — 36415 COLL VENOUS BLD VENIPUNCTURE: CPT

## 2017-06-16 PROCEDURE — 80048 BASIC METABOLIC PNL TOTAL CA: CPT

## 2017-06-19 ENCOUNTER — INITIAL CONSULT (OUTPATIENT)
Dept: WOUND CARE | Facility: CLINIC | Age: 67
DRG: 654 | End: 2017-06-19
Payer: MEDICARE

## 2017-06-19 DIAGNOSIS — Z71.89 ENCOUNTER FOR OSTOMY CARE EDUCATION: ICD-10-CM

## 2017-06-19 DIAGNOSIS — Z43.6 ATTENTION TO UROSTOMY: Primary | ICD-10-CM

## 2017-06-19 PROCEDURE — 99202 OFFICE O/P NEW SF 15 MIN: CPT | Mod: S$GLB,,, | Performed by: CLINICAL NURSE SPECIALIST

## 2017-06-19 PROCEDURE — 99999 PR PBB SHADOW E&M-EST. PATIENT-LVL I: CPT | Mod: PBBFAC,,, | Performed by: CLINICAL NURSE SPECIALIST

## 2017-06-19 PROCEDURE — 1159F MED LIST DOCD IN RCRD: CPT | Mod: S$GLB,,, | Performed by: CLINICAL NURSE SPECIALIST

## 2017-06-19 NOTE — ANESTHESIA PREPROCEDURE EVALUATION
Anesthesia Assessment: Preoperative EQUATION     Planned Procedure: Procedure(s) (LRB):  CYSTECTOMY-RADICAL/OPEN WITH BILATERAL PELVIC LYMPH NODE DISSECTION AND CREATION OF ILEAL CONDUIT (N/A)  Requested Anesthesia Type:General  Surgeon: Villa Robles MD  Service: Urology  Known or anticipated Date of Surgery:6/21/2017     Surgeon notes: reviewed     Electronic QUestionnaire Assessment completed via nurse interview with patient.     NO AQ     Triage considerations:      The patient has no apparent active cardiac condition (No unstable coronary Syndrome such as severe unstable angina or recent [<1 month] myocardial infarction, decompensated CHF, severe valvular   disease or significant arrhythmia)     Previous anesthesia records:GETA    05/05/17; Placement Time: 1351; Method of Intubation: Direct laryngoscopy; Inserted by: CRNA; Airway Device: Endotracheal Tube; Mask Ventilation: Easy; Intubated: Postinduction; Blade: [place] #3; Airway Device Size: 7.5; Style: Cuffed; Cuff Inflation: Minimal occlusive pressure; Placement Verified By: Auscultation, Capnometry, ETT Condensation; Grade: Grade II; Complicating Factors: None; Intubation Findings: Positive EtCO2, Bilateral breath sounds, Atraumatic/Condition of teeth unchanged;  Depth of Insertion: 23; Securment: Lips; Complications: None; Breath Sounds: Equal Bilateral; Insertion Attempts: 1; Removal Date: 05/05/17   Airway/Jaw/Neck:  Airway Findings: Mouth Opening: Normal Tongue: Normal  General Airway Assessment: Adult  Mallampati: II      Dental:  Dental Findings: In tact         Last PCP note: 6-12 months ago , within [facility]   Subspecialty notes: Hematology/Oncology  6/12     Other important co-morbidities: Anemia, Acute Kidney Injury, CKD-3, S/P Chemotherapy, S/P Throat Cancer     Tests already available:  Available tests,  within 3 months , within [facility] .  6/2017 CBC, CMP  5/2017 Renal Function Panel, US Retroperitoneal   4/2017 EKG     Instructions given.  (See in Nurse's note)     Optimization:  Anesthesia Preop Clinic Assessment  Indicated-Not required due to recent anesthesia record    Medical Opinion Indicated-Indicated to see PCP     Plan:              Testing:  BMP, T&S and T&C                                                  Consultation:Patient's PCP for re-evaluation                                               Patient  has previously scheduled Medical Appointment:  6/19 Consult Izabela Adames     Navigation: Tests Scheduled. BMP, T&S, T&C 2 Units                                  Consults scheduled.  6/14/17 PCP                                  Results will be tracked by Preop Clinic.  6/15/17 Medical records received from outside PCP. Patient medically cleared/optimized for surgery. All records scanned into [employer]  6/19 Lab results reviewed  Potassium 5.2 Ok to proceed with surgery per Dr Drake Casper, RN                                                                                                                    06/19/2017  Juan Manuel Koehler is a 67 y.o., male.    Pre-op Assessment         Review of Systems  Hematology/Oncology:         Bladder s/p chemotherapy Chemotherapy: within last 3 months  Current/Recent Cancer.   Renal/:   Chronic Renal Disease

## 2017-06-19 NOTE — PROGRESS NOTES
Subjective:       Patient ID: Juan Manuel Koehler is a 67 y.o. male.    Chief Complaint: Pre-op Marking and Urostomy    This is new pt to enterostomal therapy dept and comes with wife today, he is here for preop marking for urostomy on 6/21 with Dr Robles, he is very nervous, has lost weight , he also has 2 neph tubes that are very painful for him,      Review of Systems   Constitutional: Negative for activity change, appetite change and fever.   Respiratory: Negative for cough and shortness of breath.    Cardiovascular: Negative for chest pain and leg swelling.   Gastrointestinal: Negative for abdominal distention, nausea and vomiting.   Genitourinary: Negative for hematuria.        Nephrotomy tubes bilateral    Musculoskeletal: Negative.    Skin: Positive for wound. Negative for color change and rash.   Neurological: Negative for weakness and headaches.   Psychiatric/Behavioral: Negative.        Objective:      Physical Exam   Constitutional: He is oriented to person, place, and time. He appears well-developed and well-nourished.   Cardiovascular: Normal rate and regular rhythm.    Pulmonary/Chest: Effort normal. No respiratory distress. He has no wheezes.   Abdominal: Soft. Bowel sounds are normal. He exhibits no distension.   Musculoskeletal: Normal range of motion. He exhibits no edema.   Neurological: He is alert and oriented to person, place, and time.   Skin: Skin is warm and dry. No erythema.   Redressed both stents, the right  Side is  red and inflamed, by the tube,      Pre op Ostomy marking:    This patient was seen today per request  in preparation for upcoming ostomy surgery on 6/21  Stoma marking/siting was performed per protocol and patient marked with a permanent marker and skin staining with silver nitrate.     The proposed surgery was discussed and patient educated on expected postoperative course and expectations. The patient was allowed to ask questions and was also given pre-op information kit  from  the [employer] for review at home prior to surgery.  He and wife had a lot of questions,   Assessment:       1. Attention to urostomy    2. Encounter for ostomy care education        Plan:       Pre op siting for stoma,   preop teaching with ACS kit to supplement   Redressed both neph tubes   I have reviewed the plan of care with the patient and/wife and they express understanding. I spent over 50% of this 20 minute visit in face to face counseling.

## 2017-06-19 NOTE — LETTER
June 19, 2017      Villa Robles MD  [address]  [facility] [address]           [facility]  [address]  [facility] [address]  Phone: [phone]          Patient: Juan Manuel Koehler   MR Number: [id number]   YOB: 1950   Date of Visit: 6/19/2017       Dear Dr. Villa Robles:    Thank you for referring Juan Manuel Koehler to me for evaluation. Attached you will find relevant portions of my assessment and plan of care.    If you have questions, please do not hesitate to call me. I look forward to following Juan Manuel Koehler along with you.    Sincerely,    Izabela Adames, CNS    Enclosure  CC:  No Recipients    If you would like to receive this communication electronically, please contact [email or web address] or [phone] to request more information on [employer] access.    For providers and/or their staff who would like to refer a patient to [facility], please contact us through our one-stop-shop provider referral line, [facility], at [phone].    If you feel you have received this communication in error or would no longer like to receive these types of communications, please e-mail [email or web address]

## 2017-06-21 ENCOUNTER — HOSPITAL ENCOUNTER (INPATIENT)
Facility: HOSPITAL | Age: 67
LOS: 5 days | Discharge: HOME | DRG: 654 | End: 2017-06-26
Attending: UROLOGY | Admitting: UROLOGY
Payer: MEDICARE

## 2017-06-21 ENCOUNTER — ANESTHESIA (OUTPATIENT)
Dept: SURGERY | Facility: HOSPITAL | Age: 67
DRG: 654 | End: 2017-06-21
Payer: MEDICARE

## 2017-06-21 DIAGNOSIS — C67.0 MALIGNANT NEOPLASM OF TRIGONE OF URINARY BLADDER: ICD-10-CM

## 2017-06-21 DIAGNOSIS — C67.9 BLADDER CANCER: ICD-10-CM

## 2017-06-21 DIAGNOSIS — N13.5 BILATERAL URETERAL OBSTRUCTION: ICD-10-CM

## 2017-06-21 DIAGNOSIS — N18.3 CKD (CHRONIC KIDNEY DISEASE), STAGE 3 (MODERATE): ICD-10-CM

## 2017-06-21 DIAGNOSIS — C67.9 MALIGNANT NEOPLASM OF URINARY BLADDER, UNSPECIFIED SITE: Primary | ICD-10-CM

## 2017-06-21 DIAGNOSIS — D63.8 ANEMIA OF CHRONIC DISEASE: ICD-10-CM

## 2017-06-21 DIAGNOSIS — C67.8 MALIGNANT NEOPLASM OF OVERLAPPING SITES OF BLADDER: ICD-10-CM

## 2017-06-21 LAB
ABO + RH BLD: NORMAL
ANION GAP SERPL CALC-SCNC: 9 MMOL/L
BASOPHILS # BLD AUTO: 0.02 K/UL
BASOPHILS NFR BLD: 0.2 %
BLD GP AB SCN CELLS X3 SERPL QL: NORMAL
BUN SERPL-MCNC: 27 MG/DL
CALCIUM SERPL-MCNC: 8.4 MG/DL
CHLORIDE SERPL-SCNC: 105 MMOL/L
CO2 SERPL-SCNC: 23 MMOL/L
CREAT SERPL-MCNC: 2.7 MG/DL
DIFFERENTIAL METHOD: ABNORMAL
EOSINOPHIL # BLD AUTO: 0 K/UL
EOSINOPHIL NFR BLD: 0.2 %
ERYTHROCYTE [DISTWIDTH] IN BLOOD BY AUTOMATED COUNT: 14.5 %
EST. GFR  (AFRICAN AMERICAN): 26.8 ML/MIN/1.73 M^2
EST. GFR  (NON AFRICAN AMERICAN): 23.2 ML/MIN/1.73 M^2
GLUCOSE SERPL-MCNC: 140 MG/DL (ref 70–110)
GLUCOSE SERPL-MCNC: 172 MG/DL
HCO3 UR-SCNC: 19.9 MMOL/L (ref 24–28)
HCT VFR BLD AUTO: 26.6 %
HCT VFR BLD CALC: 17 %PCV (ref 36–54)
HGB BLD-MCNC: 8.5 G/DL
LYMPHOCYTES # BLD AUTO: 1.3 K/UL
LYMPHOCYTES NFR BLD: 12.7 %
MAGNESIUM SERPL-MCNC: 1.7 MG/DL
MCH RBC QN AUTO: 28 PG
MCHC RBC AUTO-ENTMCNC: 32 %
MCV RBC AUTO: 88 FL
MONOCYTES # BLD AUTO: 0.7 K/UL
MONOCYTES NFR BLD: 6.4 %
NEUTROPHILS # BLD AUTO: 8.5 K/UL
NEUTROPHILS NFR BLD: 80.2 %
PCO2 BLDA: 32.9 MMHG (ref 35–45)
PH SMN: 7.39 [PH] (ref 7.35–7.45)
PHOSPHATE SERPL-MCNC: 4.2 MG/DL
PLATELET # BLD AUTO: 644 K/UL
PMV BLD AUTO: 8.3 FL
PO2 BLDA: 206 MMHG (ref 80–100)
POC BE: -5 MMOL/L
POC IONIZED CALCIUM: 1.08 MMOL/L (ref 1.06–1.42)
POC SATURATED O2: 100 % (ref 95–100)
POC TCO2: 21 MMOL/L (ref 23–27)
POTASSIUM BLD-SCNC: 4.9 MMOL/L (ref 3.5–5.1)
POTASSIUM SERPL-SCNC: 5.2 MMOL/L
RBC # BLD AUTO: 3.04 M/UL
SAMPLE: ABNORMAL
SODIUM BLD-SCNC: 137 MMOL/L (ref 136–145)
SODIUM SERPL-SCNC: 137 MMOL/L
WBC # BLD AUTO: 10.53 K/UL

## 2017-06-21 PROCEDURE — 63600175 PHARM REV CODE 636 W HCPCS: Performed by: UROLOGY

## 2017-06-21 PROCEDURE — 71000039 HC RECOVERY, EACH ADD'L HOUR: Performed by: UROLOGY

## 2017-06-21 PROCEDURE — 07BC0ZZ EXCISION OF PELVIS LYMPHATIC, OPEN APPROACH: ICD-10-PCS | Performed by: UROLOGY

## 2017-06-21 PROCEDURE — D9220A PRA ANESTHESIA: Mod: ,,, | Performed by: ANESTHESIOLOGY

## 2017-06-21 PROCEDURE — 99900035 HC TECH TIME PER 15 MIN (STAT)

## 2017-06-21 PROCEDURE — 37000009 HC ANESTHESIA EA ADD 15 MINS: Performed by: UROLOGY

## 2017-06-21 PROCEDURE — 88309 TISSUE EXAM BY PATHOLOGIST: CPT | Mod: 26,,, | Performed by: PATHOLOGY

## 2017-06-21 PROCEDURE — 88307 TISSUE EXAM BY PATHOLOGIST: CPT | Performed by: PATHOLOGY

## 2017-06-21 PROCEDURE — P9021 RED BLOOD CELLS UNIT: HCPCS

## 2017-06-21 PROCEDURE — 11000001 HC ACUTE MED/SURG PRIVATE ROOM

## 2017-06-21 PROCEDURE — 85025 COMPLETE CBC W/AUTO DIFF WBC: CPT

## 2017-06-21 PROCEDURE — 63600175 PHARM REV CODE 636 W HCPCS: Performed by: STUDENT IN AN ORGANIZED HEALTH CARE EDUCATION/TRAINING PROGRAM

## 2017-06-21 PROCEDURE — 0VT00ZZ RESECTION OF PROSTATE, OPEN APPROACH: ICD-10-PCS | Performed by: UROLOGY

## 2017-06-21 PROCEDURE — 88305 TISSUE EXAM BY PATHOLOGIST: CPT | Mod: 26,,, | Performed by: PATHOLOGY

## 2017-06-21 PROCEDURE — 83735 ASSAY OF MAGNESIUM: CPT

## 2017-06-21 PROCEDURE — C1751 CATH, INF, PER/CENT/MIDLINE: HCPCS | Performed by: STUDENT IN AN ORGANIZED HEALTH CARE EDUCATION/TRAINING PROGRAM

## 2017-06-21 PROCEDURE — 25000003 PHARM REV CODE 250: Performed by: UROLOGY

## 2017-06-21 PROCEDURE — 88305 TISSUE EXAM BY PATHOLOGIST: CPT | Performed by: PATHOLOGY

## 2017-06-21 PROCEDURE — 63600175 PHARM REV CODE 636 W HCPCS: Performed by: ANESTHESIOLOGY

## 2017-06-21 PROCEDURE — C2617 STENT, NON-COR, TEM W/O DEL: HCPCS | Performed by: UROLOGY

## 2017-06-21 PROCEDURE — 80048 BASIC METABOLIC PNL TOTAL CA: CPT

## 2017-06-21 PROCEDURE — 25000003 PHARM REV CODE 250: Performed by: STUDENT IN AN ORGANIZED HEALTH CARE EDUCATION/TRAINING PROGRAM

## 2017-06-21 PROCEDURE — 27100025 HC TUBING, SET FLUID WARMER: Performed by: STUDENT IN AN ORGANIZED HEALTH CARE EDUCATION/TRAINING PROGRAM

## 2017-06-21 PROCEDURE — 0TTB0ZZ RESECTION OF BLADDER, OPEN APPROACH: ICD-10-PCS | Performed by: UROLOGY

## 2017-06-21 PROCEDURE — 86901 BLOOD TYPING SEROLOGIC RH(D): CPT

## 2017-06-21 PROCEDURE — 37000008 HC ANESTHESIA 1ST 15 MINUTES: Performed by: UROLOGY

## 2017-06-21 PROCEDURE — 71000033 HC RECOVERY, INTIAL HOUR: Performed by: UROLOGY

## 2017-06-21 PROCEDURE — 0T180ZC BYPASS BILATERAL URETERS TO ILEOCUTANEOUS, OPEN APPROACH: ICD-10-PCS | Performed by: UROLOGY

## 2017-06-21 PROCEDURE — 86900 BLOOD TYPING SEROLOGIC ABO: CPT

## 2017-06-21 PROCEDURE — C9113 INJ PANTOPRAZOLE SODIUM, VIA: HCPCS | Performed by: UROLOGY

## 2017-06-21 PROCEDURE — 84100 ASSAY OF PHOSPHORUS: CPT

## 2017-06-21 PROCEDURE — 86920 COMPATIBILITY TEST SPIN: CPT

## 2017-06-21 PROCEDURE — 36000709 HC OR TIME LEV III EA ADD 15 MIN: Performed by: UROLOGY

## 2017-06-21 PROCEDURE — 88307 TISSUE EXAM BY PATHOLOGIST: CPT | Mod: 26,,, | Performed by: PATHOLOGY

## 2017-06-21 PROCEDURE — 36000708 HC OR TIME LEV III 1ST 15 MIN: Performed by: UROLOGY

## 2017-06-21 PROCEDURE — 27201037 HC PRESSURE MONITORING SET UP

## 2017-06-21 PROCEDURE — 36620 INSERTION CATHETER ARTERY: CPT | Mod: 59,,, | Performed by: ANESTHESIOLOGY

## 2017-06-21 PROCEDURE — 51595 REMOVE BLADDER/REVISE TRACT: CPT | Mod: ,,, | Performed by: UROLOGY

## 2017-06-21 DEVICE — STENT URETERAL VANDER 6FRX75CM: Type: IMPLANTABLE DEVICE | Site: URETER | Status: FUNCTIONAL

## 2017-06-21 RX ORDER — DIPHENHYDRAMINE HCL 25 MG
25 CAPSULE ORAL EVERY 6 HOURS PRN
Status: DISCONTINUED | OUTPATIENT
Start: 2017-06-21 | End: 2017-06-26 | Stop reason: HOSPADM

## 2017-06-21 RX ORDER — PHENYLEPHRINE HYDROCHLORIDE 10 MG/ML
INJECTION INTRAVENOUS
Status: DISCONTINUED | OUTPATIENT
Start: 2017-06-21 | End: 2017-06-21

## 2017-06-21 RX ORDER — SODIUM CHLORIDE 9 MG/ML
INJECTION, SOLUTION INTRAVENOUS CONTINUOUS
Status: DISCONTINUED | OUTPATIENT
Start: 2017-06-21 | End: 2017-06-25

## 2017-06-21 RX ORDER — ACETAMINOPHEN 10 MG/ML
INJECTION, SOLUTION INTRAVENOUS
Status: DISCONTINUED | OUTPATIENT
Start: 2017-06-21 | End: 2017-06-21

## 2017-06-21 RX ORDER — PANTOPRAZOLE SODIUM 40 MG/10ML
40 INJECTION, POWDER, LYOPHILIZED, FOR SOLUTION INTRAVENOUS DAILY
Status: DISCONTINUED | OUTPATIENT
Start: 2017-06-21 | End: 2017-06-26 | Stop reason: HOSPADM

## 2017-06-21 RX ORDER — ONDANSETRON 2 MG/ML
4 INJECTION INTRAMUSCULAR; INTRAVENOUS EVERY 8 HOURS PRN
Status: DISCONTINUED | OUTPATIENT
Start: 2017-06-21 | End: 2017-06-22

## 2017-06-21 RX ORDER — HYDROMORPHONE HYDROCHLORIDE 1 MG/ML
0.2 INJECTION, SOLUTION INTRAMUSCULAR; INTRAVENOUS; SUBCUTANEOUS EVERY 5 MIN PRN
Status: DISCONTINUED | OUTPATIENT
Start: 2017-06-21 | End: 2017-06-21 | Stop reason: HOSPADM

## 2017-06-21 RX ORDER — FENTANYL CITRATE 50 UG/ML
INJECTION, SOLUTION INTRAMUSCULAR; INTRAVENOUS
Status: DISCONTINUED | OUTPATIENT
Start: 2017-06-21 | End: 2017-06-21

## 2017-06-21 RX ORDER — KETAMINE HYDROCHLORIDE 100 MG/ML
INJECTION, SOLUTION INTRAMUSCULAR; INTRAVENOUS
Status: DISCONTINUED | OUTPATIENT
Start: 2017-06-21 | End: 2017-06-21

## 2017-06-21 RX ORDER — ROCURONIUM BROMIDE 10 MG/ML
INJECTION, SOLUTION INTRAVENOUS
Status: DISCONTINUED | OUTPATIENT
Start: 2017-06-21 | End: 2017-06-21

## 2017-06-21 RX ORDER — HEPARIN SODIUM 5000 [USP'U]/ML
5000 INJECTION, SOLUTION INTRAVENOUS; SUBCUTANEOUS EVERY 8 HOURS
Status: DISCONTINUED | OUTPATIENT
Start: 2017-06-21 | End: 2017-06-25

## 2017-06-21 RX ORDER — HYDROMORPHONE HYDROCHLORIDE 2 MG/ML
INJECTION, SOLUTION INTRAMUSCULAR; INTRAVENOUS; SUBCUTANEOUS
Status: DISCONTINUED | OUTPATIENT
Start: 2017-06-21 | End: 2017-06-21

## 2017-06-21 RX ORDER — OXYCODONE HYDROCHLORIDE 5 MG/1
5 TABLET ORAL EVERY 4 HOURS PRN
Status: DISCONTINUED | OUTPATIENT
Start: 2017-06-21 | End: 2017-06-26 | Stop reason: HOSPADM

## 2017-06-21 RX ORDER — ONDANSETRON 2 MG/ML
4 INJECTION INTRAMUSCULAR; INTRAVENOUS DAILY PRN
Status: DISCONTINUED | OUTPATIENT
Start: 2017-06-21 | End: 2017-06-21 | Stop reason: HOSPADM

## 2017-06-21 RX ORDER — ALVIMOPAN 12 MG/1
12 CAPSULE ORAL 2 TIMES DAILY
Status: DISCONTINUED | OUTPATIENT
Start: 2017-06-21 | End: 2017-06-25

## 2017-06-21 RX ORDER — SODIUM CHLORIDE 9 MG/ML
INJECTION, SOLUTION INTRAVENOUS CONTINUOUS
Status: DISCONTINUED | OUTPATIENT
Start: 2017-06-21 | End: 2017-06-21

## 2017-06-21 RX ORDER — ONDANSETRON 2 MG/ML
INJECTION INTRAMUSCULAR; INTRAVENOUS
Status: DISCONTINUED | OUTPATIENT
Start: 2017-06-21 | End: 2017-06-21

## 2017-06-21 RX ORDER — LIDOCAINE HCL/PF 100 MG/5ML
SYRINGE (ML) INTRAVENOUS
Status: DISCONTINUED | OUTPATIENT
Start: 2017-06-21 | End: 2017-06-21

## 2017-06-21 RX ORDER — SUCCINYLCHOLINE CHLORIDE 20 MG/ML
INJECTION INTRAMUSCULAR; INTRAVENOUS
Status: DISCONTINUED | OUTPATIENT
Start: 2017-06-21 | End: 2017-06-21

## 2017-06-21 RX ORDER — GLYCOPYRROLATE 0.2 MG/ML
INJECTION INTRAMUSCULAR; INTRAVENOUS
Status: DISCONTINUED | OUTPATIENT
Start: 2017-06-21 | End: 2017-06-21

## 2017-06-21 RX ORDER — DEXTROSE MONOHYDRATE, SODIUM CHLORIDE, AND POTASSIUM CHLORIDE 50; 1.49; 4.5 G/1000ML; G/1000ML; G/1000ML
INJECTION, SOLUTION INTRAVENOUS CONTINUOUS
Status: DISCONTINUED | OUTPATIENT
Start: 2017-06-21 | End: 2017-06-21

## 2017-06-21 RX ORDER — MIDAZOLAM HYDROCHLORIDE 1 MG/ML
INJECTION, SOLUTION INTRAMUSCULAR; INTRAVENOUS
Status: DISCONTINUED | OUTPATIENT
Start: 2017-06-21 | End: 2017-06-21

## 2017-06-21 RX ORDER — PROPOFOL 10 MG/ML
VIAL (ML) INTRAVENOUS
Status: DISCONTINUED | OUTPATIENT
Start: 2017-06-21 | End: 2017-06-21

## 2017-06-21 RX ORDER — ACETAMINOPHEN 10 MG/ML
1000 INJECTION, SOLUTION INTRAVENOUS EVERY 8 HOURS
Status: COMPLETED | OUTPATIENT
Start: 2017-06-21 | End: 2017-06-22

## 2017-06-21 RX ORDER — HEPARIN SODIUM 5000 [USP'U]/ML
5000 INJECTION, SOLUTION INTRAVENOUS; SUBCUTANEOUS EVERY 8 HOURS
Status: DISCONTINUED | OUTPATIENT
Start: 2017-06-21 | End: 2017-06-21

## 2017-06-21 RX ORDER — NEOSTIGMINE METHYLSULFATE 1 MG/ML
INJECTION, SOLUTION INTRAVENOUS
Status: DISCONTINUED | OUTPATIENT
Start: 2017-06-21 | End: 2017-06-21

## 2017-06-21 RX ORDER — KETOROLAC TROMETHAMINE 30 MG/ML
INJECTION, SOLUTION INTRAMUSCULAR; INTRAVENOUS
Status: DISCONTINUED | OUTPATIENT
Start: 2017-06-21 | End: 2017-06-21

## 2017-06-21 RX ORDER — ALVIMOPAN 12 MG/1
12 CAPSULE ORAL
Status: COMPLETED | OUTPATIENT
Start: 2017-06-21 | End: 2017-06-21

## 2017-06-21 RX ORDER — HYDROMORPHONE HYDROCHLORIDE 1 MG/ML
1 INJECTION, SOLUTION INTRAMUSCULAR; INTRAVENOUS; SUBCUTANEOUS
Status: DISCONTINUED | OUTPATIENT
Start: 2017-06-21 | End: 2017-06-26 | Stop reason: HOSPADM

## 2017-06-21 RX ORDER — CEFOXITIN SODIUM 2 G/50ML
2 INJECTION, SOLUTION INTRAVENOUS
Status: COMPLETED | OUTPATIENT
Start: 2017-06-21 | End: 2017-06-21

## 2017-06-21 RX ORDER — LIDOCAINE HYDROCHLORIDE 10 MG/ML
1 INJECTION, SOLUTION EPIDURAL; INFILTRATION; INTRACAUDAL; PERINEURAL ONCE
Status: COMPLETED | OUTPATIENT
Start: 2017-06-21 | End: 2017-06-21

## 2017-06-21 RX ORDER — SODIUM CHLORIDE 0.9 % (FLUSH) 0.9 %
3 SYRINGE (ML) INJECTION
Status: DISCONTINUED | OUTPATIENT
Start: 2017-06-21 | End: 2017-06-21 | Stop reason: HOSPADM

## 2017-06-21 RX ORDER — OXYCODONE HYDROCHLORIDE 5 MG/1
10 TABLET ORAL EVERY 4 HOURS PRN
Status: DISCONTINUED | OUTPATIENT
Start: 2017-06-21 | End: 2017-06-26 | Stop reason: HOSPADM

## 2017-06-21 RX ORDER — DEXAMETHASONE SODIUM PHOSPHATE 4 MG/ML
INJECTION, SOLUTION INTRA-ARTICULAR; INTRALESIONAL; INTRAMUSCULAR; INTRAVENOUS; SOFT TISSUE
Status: DISCONTINUED | OUTPATIENT
Start: 2017-06-21 | End: 2017-06-21

## 2017-06-21 RX ORDER — BISACODYL 10 MG
10 SUPPOSITORY, RECTAL RECTAL 2 TIMES DAILY
Status: DISCONTINUED | OUTPATIENT
Start: 2017-06-22 | End: 2017-06-26 | Stop reason: HOSPADM

## 2017-06-21 RX ADMIN — HYDROMORPHONE HYDROCHLORIDE 0.2 MG: 1 INJECTION, SOLUTION INTRAMUSCULAR; INTRAVENOUS; SUBCUTANEOUS at 03:06

## 2017-06-21 RX ADMIN — LIDOCAINE HYDROCHLORIDE 60 MG: 20 INJECTION, SOLUTION INTRAVENOUS at 08:06

## 2017-06-21 RX ADMIN — OXYCODONE HYDROCHLORIDE 10 MG: 5 TABLET ORAL at 02:06

## 2017-06-21 RX ADMIN — MIDAZOLAM HYDROCHLORIDE 2 MG: 1 INJECTION, SOLUTION INTRAMUSCULAR; INTRAVENOUS at 07:06

## 2017-06-21 RX ADMIN — HEPARIN SODIUM 5000 UNITS: 5000 INJECTION, SOLUTION INTRAVENOUS; SUBCUTANEOUS at 09:06

## 2017-06-21 RX ADMIN — PHENYLEPHRINE HYDROCHLORIDE 200 MCG: 10 INJECTION INTRAVENOUS at 08:06

## 2017-06-21 RX ADMIN — HYDROMORPHONE HYDROCHLORIDE 0.4 MG: 2 INJECTION, SOLUTION INTRAMUSCULAR; INTRAVENOUS; SUBCUTANEOUS at 12:06

## 2017-06-21 RX ADMIN — PHENYLEPHRINE HYDROCHLORIDE 100 MCG: 10 INJECTION INTRAVENOUS at 11:06

## 2017-06-21 RX ADMIN — FENTANYL CITRATE 100 MCG: 50 INJECTION, SOLUTION INTRAMUSCULAR; INTRAVENOUS at 09:06

## 2017-06-21 RX ADMIN — ACETAMINOPHEN 1000 MG: 10 INJECTION, SOLUTION INTRAVENOUS at 08:06

## 2017-06-21 RX ADMIN — SODIUM CHLORIDE: 0.9 INJECTION, SOLUTION INTRAVENOUS at 02:06

## 2017-06-21 RX ADMIN — ACETAMINOPHEN 1000 MG: 10 INJECTION, SOLUTION INTRAVENOUS at 09:06

## 2017-06-21 RX ADMIN — ONDANSETRON 4 MG: 2 INJECTION INTRAMUSCULAR; INTRAVENOUS at 07:06

## 2017-06-21 RX ADMIN — ROCURONIUM BROMIDE 10 MG: 10 INJECTION, SOLUTION INTRAVENOUS at 11:06

## 2017-06-21 RX ADMIN — DEXAMETHASONE SODIUM PHOSPHATE 8 MG: 4 INJECTION, SOLUTION INTRAMUSCULAR; INTRAVENOUS at 09:06

## 2017-06-21 RX ADMIN — ALVIMOPAN 12 MG: 12 CAPSULE ORAL at 09:06

## 2017-06-21 RX ADMIN — LIDOCAINE HYDROCHLORIDE 10 MG: 10 INJECTION, SOLUTION EPIDURAL; INFILTRATION; INTRACAUDAL; PERINEURAL at 07:06

## 2017-06-21 RX ADMIN — SODIUM CHLORIDE: 0.9 INJECTION, SOLUTION INTRAVENOUS at 07:06

## 2017-06-21 RX ADMIN — DEXAMETHASONE SODIUM PHOSPHATE 8 MG: 4 INJECTION, SOLUTION INTRAMUSCULAR; INTRAVENOUS at 08:06

## 2017-06-21 RX ADMIN — ROCURONIUM BROMIDE 20 MG: 10 INJECTION, SOLUTION INTRAVENOUS at 09:06

## 2017-06-21 RX ADMIN — HYDROMORPHONE HYDROCHLORIDE 0.2 MG: 1 INJECTION, SOLUTION INTRAMUSCULAR; INTRAVENOUS; SUBCUTANEOUS at 04:06

## 2017-06-21 RX ADMIN — CEFOXITIN SODIUM 2 G: 2 INJECTION, SOLUTION INTRAVENOUS at 08:06

## 2017-06-21 RX ADMIN — PROPOFOL 150 MG: 10 INJECTION, EMULSION INTRAVENOUS at 08:06

## 2017-06-21 RX ADMIN — DEXMEDETOMIDINE HYDROCHLORIDE 0.25 MCG/KG/HR: 100 INJECTION, SOLUTION, CONCENTRATE INTRAVENOUS at 09:06

## 2017-06-21 RX ADMIN — HEPARIN SODIUM 5000 UNITS: 5000 INJECTION, SOLUTION INTRAVENOUS; SUBCUTANEOUS at 07:06

## 2017-06-21 RX ADMIN — HEPARIN SODIUM 5000 UNITS: 5000 INJECTION, SOLUTION INTRAVENOUS; SUBCUTANEOUS at 02:06

## 2017-06-21 RX ADMIN — ONDANSETRON 4 MG: 2 INJECTION INTRAMUSCULAR; INTRAVENOUS at 12:06

## 2017-06-21 RX ADMIN — PHENYLEPHRINE HYDROCHLORIDE 100 MCG: 10 INJECTION INTRAVENOUS at 10:06

## 2017-06-21 RX ADMIN — HYDROMORPHONE HYDROCHLORIDE 1 MG: 1 INJECTION, SOLUTION INTRAMUSCULAR; INTRAVENOUS; SUBCUTANEOUS at 07:06

## 2017-06-21 RX ADMIN — PHENYLEPHRINE HYDROCHLORIDE 200 MCG: 10 INJECTION INTRAVENOUS at 11:06

## 2017-06-21 RX ADMIN — SODIUM CHLORIDE 1000 ML: 0.9 INJECTION, SOLUTION INTRAVENOUS at 03:06

## 2017-06-21 RX ADMIN — ROCURONIUM BROMIDE 40 MG: 10 INJECTION, SOLUTION INTRAVENOUS at 08:06

## 2017-06-21 RX ADMIN — FENTANYL CITRATE 50 MCG: 50 INJECTION, SOLUTION INTRAMUSCULAR; INTRAVENOUS at 09:06

## 2017-06-21 RX ADMIN — SODIUM CHLORIDE, SODIUM GLUCONATE, SODIUM ACETATE, POTASSIUM CHLORIDE, MAGNESIUM CHLORIDE, SODIUM PHOSPHATE, DIBASIC, AND POTASSIUM PHOSPHATE: .53; .5; .37; .037; .03; .012; .00082 INJECTION, SOLUTION INTRAVENOUS at 07:06

## 2017-06-21 RX ADMIN — FENTANYL CITRATE 25 MCG: 50 INJECTION, SOLUTION INTRAMUSCULAR; INTRAVENOUS at 12:06

## 2017-06-21 RX ADMIN — GLYCOPYRROLATE 0.6 MG: 0.2 INJECTION, SOLUTION INTRAMUSCULAR; INTRAVENOUS at 12:06

## 2017-06-21 RX ADMIN — ALVIMOPAN 12 MG: 12 CAPSULE ORAL at 07:06

## 2017-06-21 RX ADMIN — FENTANYL CITRATE 50 MCG: 50 INJECTION, SOLUTION INTRAMUSCULAR; INTRAVENOUS at 08:06

## 2017-06-21 RX ADMIN — HYDROMORPHONE HYDROCHLORIDE 0.2 MG: 1 INJECTION, SOLUTION INTRAMUSCULAR; INTRAVENOUS; SUBCUTANEOUS at 02:06

## 2017-06-21 RX ADMIN — CEFOXITIN SODIUM 2 G: 2 INJECTION, SOLUTION INTRAVENOUS at 09:06

## 2017-06-21 RX ADMIN — KETOROLAC TROMETHAMINE 15 MG: 30 INJECTION, SOLUTION INTRAMUSCULAR; INTRAVENOUS at 08:06

## 2017-06-21 RX ADMIN — NEOSTIGMINE METHYLSULFATE 5 MG: 1 INJECTION INTRAVENOUS at 12:06

## 2017-06-21 RX ADMIN — HYDROMORPHONE HYDROCHLORIDE 0.2 MG: 2 INJECTION, SOLUTION INTRAMUSCULAR; INTRAVENOUS; SUBCUTANEOUS at 12:06

## 2017-06-21 RX ADMIN — ACETAMINOPHEN 1000 MG: 10 INJECTION, SOLUTION INTRAVENOUS at 02:06

## 2017-06-21 RX ADMIN — OXYCODONE HYDROCHLORIDE 10 MG: 5 TABLET ORAL at 06:06

## 2017-06-21 RX ADMIN — KETAMINE HYDROCHLORIDE 30 MG: 100 INJECTION, SOLUTION, CONCENTRATE INTRAMUSCULAR; INTRAVENOUS at 08:06

## 2017-06-21 RX ADMIN — PROPOFOL 40 MG: 10 INJECTION, EMULSION INTRAVENOUS at 08:06

## 2017-06-21 RX ADMIN — PANTOPRAZOLE SODIUM 40 MG: 40 INJECTION, POWDER, FOR SOLUTION INTRAVENOUS at 02:06

## 2017-06-21 RX ADMIN — ROCURONIUM BROMIDE 30 MG: 10 INJECTION, SOLUTION INTRAVENOUS at 10:06

## 2017-06-21 RX ADMIN — FENTANYL CITRATE 25 MCG: 50 INJECTION, SOLUTION INTRAMUSCULAR; INTRAVENOUS at 11:06

## 2017-06-21 RX ADMIN — SUCCINYLCHOLINE CHLORIDE 100 MG: 20 INJECTION, SOLUTION INTRAMUSCULAR; INTRAVENOUS at 08:06

## 2017-06-21 RX ADMIN — HYDROMORPHONE HYDROCHLORIDE 0.6 MG: 2 INJECTION, SOLUTION INTRAMUSCULAR; INTRAVENOUS; SUBCUTANEOUS at 12:06

## 2017-06-21 NOTE — ANESTHESIA RELEASE NOTE
"Anesthesia Release from PACU Note    Patient: Juan Manuel Koehler    Procedure(s) Performed: Procedure(s) (LRB):  CYSTECTOMY-RADICAL/OPEN WITH BILATERAL PELVIC LYMPH NODE DISSECTION AND CREATION OF ILEAL CONDUIT (N/A)    Anesthesia type: general    Post pain: Adequate analgesia    Post assessment: no apparent anesthetic complications, tolerated procedure well and no evidence of recall    Last Vitals:   Visit Vitals  BP (!) 118/56   Pulse 72   Temp 36.4 °C (97.6 °F) (Oral)   Resp 10   Ht 6' 2" (1.88 m)   Wt 64.4 kg (142 lb)   SpO2 100%   BMI 18.23 kg/m²       Post vital signs: stable    Level of consciousness: awake, alert  and oriented    Nausea/Vomiting: no nausea/no vomiting    Complications: none    Airway Patency: patent    Respiratory: unassisted    Cardiovascular: stable and blood pressure at baseline    Hydration: euvolemic  "

## 2017-06-21 NOTE — ANESTHESIA PREPROCEDURE EVALUATION
06/20/2017  Juan Manuel Koehler is a 67 y.o., male  CKD III, prior throat cancer s/p radiation, bladder cancer presenting for the following:      Pre-operative evaluation for Procedure(s) (LRB):  CYSTECTOMY-RADICAL/OPEN WITH BILATERAL PELVIC LYMPH NODE DISSECTION AND CREATION OF ILEAL CONDUIT (N/A)      Last Airway:  Placement Date: 05/05/17; Placement Time: 1351; Method of Intubation: Direct laryngoscopy; Inserted by: CRNA; Airway Device: Endotracheal Tube; Mask Ventilation: Easy; Intubated: Postinduction; Blade: [place] #3; Airway Device Size: 7.5; Style: Cuffed; Cuff Inflation: Minimal occlusive pressure; Placement Verified By: Auscultation, Capnometry, ETT Condensation; Grade: Grade II; Complicating Factors: None; Intubation Findings: Positive EtCO2, Bilateral breath sounds, Atraumatic/Condition of teeth unchanged;  Depth of Insertion: 23; Securment: Lips; Complications: None; Breath Sounds: Equal Bilateral; Insertion Attempts: 1;    Patient Active Problem List   Diagnosis    Malignant neoplasm of urinary bladder    CKD (chronic kidney disease)    Difficult intubation    ELDER (acute kidney injury)    Hyperkalemia    Hydronephrosis    Leukocytosis    Nausea    Anemia of chronic disease       Review of patient's allergies indicates:  No Known Allergies    No current facility-administered medications on file prior to encounter.      Current Outpatient Prescriptions on File Prior to Encounter   Medication Sig Dispense Refill    acetaminophen (TYLENOL) 500 MG tablet Take 1,000 mg by mouth every 6 (six) hours as needed for Pain.      cholecalciferol, vitamin D3, 400 unit Cap Take 3 capsules (1,200 Units total) by mouth once daily. 90 capsule 0    docusate sodium (COLACE) 100 MG capsule Take 1 capsule (100 mg total) by mouth 2 (two) times daily.  0    ketoconazole (NIZORAL) 2 % shampoo USE TO WASH AFFECTED  AREA ONCE DAILY  3    ondansetron (ZOFRAN-ODT) 4 MG TbDL Take 1 tablet (4 mg total) by mouth every 8 (eight) hours as needed. 20 tablet 1    oxycodone (OXY-IR) 5 mg Cap Take 1 capsule (5 mg total) by mouth every 4 (four) hours as needed for Pain. 40 capsule 0    polyethylene glycol (GLYCOLAX) 17 gram PwPk Take 17 g by mouth daily as needed (for constipation). 10 packet 2    baclofen (LIORESAL) 10 MG tablet Take 1 tablet (10 mg total) by mouth 3 (three) times daily. 90 tablet 1    cyproheptadine (PERIACTIN) 4 mg tablet Take 1 tablet (4 mg total) by mouth 3 (three) times daily as needed. 90 tablet 0    megestrol (MEGACE) 400 mg/10 mL (40 mg/mL) Susp Take 10 mLs (400 mg total) by mouth once daily. 240 mL 12    sodium polystyrene (KAYEXALATE) powder          Past Surgical History:   Procedure Laterality Date    BLADDER SURGERY      HERNIA REPAIR      THROAT SURGERY         Social History     Social History    Marital status:      Spouse name: N/A    Number of children: N/A    Years of education: N/A     Occupational History    Not on file.     Social History Main Topics    Smoking status: Never Smoker    Smokeless tobacco: Never Used    Alcohol use No    Drug use: No    Sexual activity: Yes     Partners: Female     Birth control/ protection: None     Other Topics Concern    Not on file     Social History Narrative    No narrative on file         Vital Signs Range (Last 24H):         CBC: No results for input(s): WBC, RBC, HGB, HCT, PLT, MCV, MCH, MCHC in the last 72 hours.    CMP: No results for input(s): NA, K, CL, CO2, BUN, CREATININE, GLU, MG, PHOS, CALCIUM, ALBUMIN, PROT, ALKPHOS, ALT, AST, BILITOT in the last 72 hours.    INR  No results for input(s): INR, PROTIME, APTT in the last 72 hours.    Invalid input(s): PT    EKG:  Vent. Rate : 096 BPM     Atrial Rate : 096 BPM     P-R Int : 166 ms          QRS Dur : 084 ms      QT Int : 344 ms       P-R-T Axes : 053 021 043 degrees     QTc Int  : 434 ms    Normal sinus rhythm  Minimal voltage criteria for LVH, may be normal variant      Anesthesia Evaluation    I have reviewed the Patient Summary Reports.    I have reviewed the Nursing Notes.   I have reviewed the Medications.     Review of Systems  Anesthesia Hx:  History of prior surgery of interest to airway management or planning: (Prior throat radiation) Denies Family Hx of Anesthesia complications.   Denies Personal Hx of Anesthesia complications.   Social:  Non-Smoker    Cardiovascular:   Denies MI.  Denies CAD.    Denies CABG/stent.  Denies Dysrhythmias.   Denies Angina.    Pulmonary:   Denies Pneumonia Denies COPD.  Denies Asthma.  Denies Shortness of breath.    Renal/:   Chronic Renal Disease (CKD III)    Hepatic/GI:   Denies Liver Disease.    Neurological:   Denies CVA. Denies Seizures.        Physical Exam  General:  Well nourished    Airway/Jaw/Neck:  Airway Findings: Mouth Opening: Normal Tongue: Normal  Mallampati: I        Eyes/Ears/Nose:  EYES/EARS/NOSE FINDINGS: Normal   Dental:  DENTAL FINDINGS: Normal   Chest/Lungs:  Chest/Lungs Findings: Clear to auscultation, Normal Respiratory Rate     Heart/Vascular:  Heart Findings: Rate: Normal  Rhythm: Regular Rhythm  Sounds: Normal     Abdomen:  Abdomen Findings: Normal    Musculoskeletal:  Musculoskeletal Findings: Normal   Skin:  Skin Findings: Normal    Mental Status:  Mental Status Findings: Normal        Anesthesia Plan  Type of Anesthesia, risks & benefits discussed:  Anesthesia Type:  general  Patient's Preference:   Intra-op Monitoring Plan: standard ASA monitors and arterial line  Intra-op Monitoring Plan Comments:   Post Op Pain Control Plan: multimodal analgesia, per primary service following discharge from PACU and IV/PO Opioids PRN  Post Op Pain Control Plan Comments:   Induction:   IV  Beta Blocker:  Patient is not currently on a Beta-Blocker (No further documentation required).       Informed Consent: Patient understands risks  and agrees with Anesthesia plan.  Questions answered. Anesthesia consent signed with patient.  ASA Score: 2     Day of Surgery Review of History & Physical: I have interviewed and examined the patient. I have reviewed the patient's H&P dated:  There are no significant changes.   H&P completed by Anesthesiologist.       Ready For Surgery From Anesthesia Perspective.

## 2017-06-21 NOTE — NURSING
PT up in chair, attempted to walk but unsteady at this time. Pt desires to try again later tonight.

## 2017-06-21 NOTE — PROGRESS NOTES
Pt settled in room, no immediate needs, call light explained and given to patient, waiting on SCDs intact, I. S at bedside. BITE pain therapy menu, TV guide, pain control pamphlet, diet menu given, explained, and offered to patient

## 2017-06-21 NOTE — NURSING TRANSFER
Nursing Transfer Note      6/21/2017     Transfer To: [address]    Transfer via stretcher    Transported by PCT    Medicines sent: IVF    Chart send with patient: Yes    Notified: spouse    Patient reassessed at: 6/21/17, 1800

## 2017-06-21 NOTE — INTERVAL H&P NOTE
The patient has been examined and the H&P has been reviewed:    I concur with the findings and no changes have occurred since H&P was written.    Anesthesia/Surgery risks, benefits and alternative options discussed and understood by patient/family.          Active Hospital Problems    Diagnosis  POA    Bladder cancer [C67.9]  Yes    Malignant neoplasm of trigone of urinary bladder [C67.0]  Yes      Resolved Hospital Problems    Diagnosis Date Resolved POA   No resolved problems to display.

## 2017-06-21 NOTE — ANESTHESIA POSTPROCEDURE EVALUATION
"Anesthesia Post Evaluation    Patient: Juan Manuel Koehler    Procedure(s) Performed: Procedure(s) (LRB):  CYSTECTOMY-RADICAL/OPEN WITH BILATERAL PELVIC LYMPH NODE DISSECTION AND CREATION OF ILEAL CONDUIT (N/A)    Final Anesthesia Type: general  Patient location during evaluation: PACU  Patient participation: Yes- Able to Participate  Level of consciousness: awake and alert  Post-procedure vital signs: reviewed and stable  Pain management: adequate  Airway patency: patent  PONV status at discharge: No PONV  Anesthetic complications: no      Cardiovascular status: hemodynamically stable  Respiratory status: unassisted, spontaneous ventilation and room air  Hydration status: euvolemic  Follow-up not needed.        Visit Vitals  BP (!) 118/56   Pulse 72   Temp 36.4 °C (97.6 °F) (Oral)   Resp 10   Ht 6' 2" (1.88 m)   Wt 64.4 kg (142 lb)   SpO2 100%   BMI 18.23 kg/m²       Pain/Deepika Score: Pain Assessment Performed: Yes (6/21/2017  2:00 PM)  Presence of Pain: denies (6/21/2017  2:00 PM)  Pain Rating Prior to Med Admin: 6 (6/21/2017  3:18 PM)  Deepika Score: 10 (6/21/2017  2:00 PM)      "

## 2017-06-21 NOTE — OP NOTE
[facility]   Operative Note     Date: 06/21/2017     Pre-Op Diagnosis:   1. [id number] urothelial cell carcinoma of the bladder  2. Bilateral hydroureteronephrosis  3. CKD, stage 3    Patient Active Problem List   Diagnosis    Malignant neoplasm of urinary bladder    CKD (chronic kidney disease)    Difficult intubation    ELDER (acute kidney injury)    Hyperkalemia    Hydronephrosis    Leukocytosis    Nausea    Anemia of chronic disease    Bladder cancer    Malignant neoplasm of trigone of urinary bladder     Post-Op Diagnosis: same     Procedure(s) Performed:   1.  Bilateral pelvic lymph node dissection  2.  Open Radical cystoprostatectomy   3.  Creation of ileal conduit urinary diversion  4.  Bilateral ureterotomy for open ureteral J stent placement    Specimen(s):   1.  Bladder prostate seminal vesicles  2.  Right common iliac and internal iliac lymph nodes  3.  Right external iliac lymph nodes  4. Right obturator lymph nodes  5. Left common iliac lymph nodes  6. Left external iliac lymph nodes  7. Left obturator lymph nodes  8. Left internal iliac lymph nodes    Staff Surgeon: Villa Robles MD     Assistant Residents:   1. Rakan Wilhelm MD  2. Jay Gomez MD    Anesthesia: General endotracheal anesthesia     Indications: Mr. Koehler is a 67 year old male with a history of [id number] urothelial cell carcinoma of the bladder, s/p bilateral nephrostomy tube and JJ ureteral stent placement for bilateral hydronephrosis, who presents today for open radical cystectomy.    Findings:   - bladder and prostate removed without complication  - extended bilateral pelvic lymph node dissection up to bilateral common iliac arteries  - elisabeth style uretero-enteric anastomoses bilaterally, watertight  - right single J ureteral stent (red) and left single J ureteral stent (blue) protruding from stoma and secured to skin with 3-0 chromic     Estimated Blood Loss: 300 mL    Drains:   1.  19 mm Leoncio drain to  LLQ  2.  L J ureteral stent via conduit  3.  R J ureteral stent via conduit  4.  20 F Pelvic bolanos drain    Procedure in Detail:  After risks and benefits of the procedure were discussed with the patient, informed consent was obtained.  The patient received heparin preoperatively, as well as completed his antibiotic bowel prep and Hibiclens shower.  He was brought to the operating suite and placed in the supine position.  General anesthesia was administered.  The stoma site previously marked by wound care was etched into the patient's skin for identification during stoma creation.  The bed was flexed for pelvic exposure. The patient was then prepped and draped in the usual sterile fashion.  A 16 Fr bolanos catheter was placed in the standard fashion on the field.      A midline incision was marked from umbilicus to pubis.  This was incised sharply using a 15 blade.  Bovie electrocautery was used to dissect down through the subcutaneous tissues until the anterior rectus sheath was cleared.  The fascia was opened in the lower midline using the Bovie.  The rectus muscle was split in the midline and the retropubic space was entered.  The fascial incision was extended along the length of our skin incision.  The space of retzius was entered and developed, mobilizing the bladder from the anterior abdominal wall bluntly.  Bilateral pelvic and retroperitoneal tunnels were created bluntly to further mobilize the bladder.     The urachus was divided using the Bovie and the remaining lateral peritoneal attachments were freed.      The left ureter was then isolated with a vessel loop.  It was mobilized proximally and distally.  Distally, it was clipped at the entry into the bladder and transected. The proximal end was tagged with a 4-0 chromic suture. The same was performed on the right side. The lateral bladder pedicle was ligated using the ligasure as they were encountered.  The endopelvic fascia on the left was then entered.   The exact same steps were then taken on the contralateral (right) side.    The bookwalter was assembled and retractors were placed.  The posterior peritoneum of the bladder was incised sharply, with care to avoid rectal injury. The plane between the rectum and the prostate was then opened carefully all the way to the distal urethra. The bladder pedicles were then further ligated using the ligasure with care to avoid rectal injury.     The dorsal venous complex was then cauterized and transected using the ligasure. This was also oversewn with 0 vicryl.    The urethra was fully mobilized, isolated and divided anteriorly.  The bolanos catheter was delivered and clamped with a Elizabeth.  The posterior urethra was then divided sharply carefully avoiding rectal injury.  The rectourethralis was divided and the specimen was removed.     A tunnel was bluntly created behind the sigmoid colon just anterior to the bifurcation of the iliac vessels. The left ureter was then passed through this tunnel to the right for creation of our conduit.      The pelvis was irrigated with bacitracin and excellent hemostasis was achieved. The rectum was identified and uninjured.  A 20 F bolanos catheter was inserted into the urethra, and the balloon was inflated with 30 mL of sterile water, to serve as a pelvic drain.    The left pelvic lymph node dissection was then performed by skeletonizing the common iliac, internal, and external iliac arteries as well as the external iliac vein using the genitofemoral nerve as the extent of our lateral dissection.  Lymphatics were controlled with metal clips.  The same was performed on the right.    The terminal ileum was identified and examined. The mesentery was then examined by transillumination and the arterial arcades were identified. The distal segment was selected 15 cm from the ileocecal valve. The ligasure device was used to divide the mesenteric vessels. A PRAVEEN 75 stapler was then used to divide the  distal end of the bowel anastomosis.     The site was selected for the proximal incision. The length of conduit was made to be 15-20 cm. Again the PRAVEEN 75 stapler was used to come across the bowel at this point.  The conduit and its mesentery were positioned below the bowel anastomosis.    Attention was placed to the two cut ends of the ileum. The antimesenteric ends were cut and a staple technique using the 75 PRAVEEN stapler was used to reestablish bowel continuity. 3-0 silk sutures were placed at the junction to take the tension off of this area. After placing intestinal Allis clamps, taking care not to overlap the previous staples lines, the PRAVEEN stapler was used to close the top of the anastomosis.     We next performed the ileal ureteral anastomosis beginning with the left ureter. A segment of ileum was incised with tenotomy scissors until the mucosal layer could be seen. The mucosa was then incised. The distal ureter was cut with scissors leaving a small segment as a handle. The ureter was then spatulated with tenotomy scissors to ensure an adequate size for the anastomosis.  3 4-0 vicryls were used to anchor the apex of the spatulation to the enterotomy. After 3 anchoring sutures, 4-0 vicryl was then used in a running fashion to allow mucosa-to-mucosa apposition of the ureter and conduit.  Prior to closing the anastomosis, a blue J stent was placed in the standard fashion. The anastomosis was then completed. The stomal end was irrigated with a bulb syringe with no evidence of leak.     This procedure was then repeated with the right ureter, using a red single J stent on that side.     Construction of the stoma then began with the excision of a circular plug of skin around the stoma site on the patient's right. Dissection was carried down to the anterior rectus fascia and an incision was made in this layer. 2 fingers were able to be placed in this incision.    A Sarah was then passed through the rectus muscle.  The stents followed by conduit were then grasped and brought through the stoma site. Two 2-0 vicryl sutures were used to secure the conduit to the skin in the 12 and 6 o'clock position to create a rosebud stoma. The mucosal edges of the ileum were secured to the skin with interrupted 3-0 vicryl sutures.     A 19 mm elise drain was then placed in the left lower quadrant into the pelvis.  Hemostasis was visualized. The intestines were placed in their normal anatomic position.     The fascia was then closed using 0 looped PDS. The subcutaneous tissues were closed in two layers using 3-0 vicryl and the overlying skin was closed using 4-0 Monocryl in a running subcuticular fashion.     The drain was secured with a 2-0 nylon suture.  A urostomy appliance was placed over the stoma.     Disposition:  The patient will be admitted to the Urology service for close observation and post operative recovery.    As the attending surgeon, Dr. Robles was present for the entire procedure and performed all key aspects of the operation.

## 2017-06-21 NOTE — ANESTHESIA PROCEDURE NOTES
Arterial    Diagnosis: bladder ca    Patient location during procedure: done in OR  Procedure start time: 6/21/2017 8:30 AM  Timeout: 6/21/2017 8:30 AM  Procedure end time: 6/21/2017 8:35 AM  Staffing  Anesthesiologist: KRISTIN ALEXANDRE  Resident/CRNA: CHONG DOVE  Performed: resident/CRNA   Anesthesiologist was present at the time of the procedure.  Preanesthetic Checklist  Completed: patient identified, site marked, surgical consent, pre-op evaluation, timeout performed, IV checked, risks and benefits discussed, monitors and equipment checked and anesthesia consent givenArterial  Skin Prep: chlorhexidine gluconate  Orientation: left  Location: radial  Catheter Size: 20 G  Catheter placement by Anatomical landmarks. Heme positive aspiration all ports.Insertion Attempts: 1  Assessment  Dressing: secured with tape and tegaderm  Patient: Tolerated well

## 2017-06-22 LAB
ANION GAP SERPL CALC-SCNC: 14 MMOL/L
ANION GAP SERPL CALC-SCNC: 9 MMOL/L
BASOPHILS # BLD AUTO: 0 K/UL
BASOPHILS NFR BLD: 0 %
BLD PROD TYP BPU: NORMAL
BLD PROD TYP BPU: NORMAL
BLOOD UNIT EXPIRATION DATE: NORMAL
BLOOD UNIT EXPIRATION DATE: NORMAL
BLOOD UNIT TYPE CODE: 7300
BLOOD UNIT TYPE CODE: 7300
BLOOD UNIT TYPE: NORMAL
BLOOD UNIT TYPE: NORMAL
BUN SERPL-MCNC: 29 MG/DL
BUN SERPL-MCNC: 29 MG/DL
CALCIUM SERPL-MCNC: 8.7 MG/DL
CALCIUM SERPL-MCNC: 8.9 MG/DL
CHLORIDE SERPL-SCNC: 108 MMOL/L
CHLORIDE SERPL-SCNC: 110 MMOL/L
CO2 SERPL-SCNC: 16 MMOL/L
CO2 SERPL-SCNC: 21 MMOL/L
CODING SYSTEM: NORMAL
CODING SYSTEM: NORMAL
CREAT SERPL-MCNC: 1.8 MG/DL
CREAT SERPL-MCNC: 1.9 MG/DL
DIFFERENTIAL METHOD: ABNORMAL
DISPENSE STATUS: NORMAL
DISPENSE STATUS: NORMAL
EOSINOPHIL # BLD AUTO: 0 K/UL
EOSINOPHIL NFR BLD: 0 %
ERYTHROCYTE [DISTWIDTH] IN BLOOD BY AUTOMATED COUNT: 14.6 %
EST. GFR  (AFRICAN AMERICAN): 41 ML/MIN/1.73 M^2
EST. GFR  (AFRICAN AMERICAN): 43.7 ML/MIN/1.73 M^2
EST. GFR  (NON AFRICAN AMERICAN): 35.4 ML/MIN/1.73 M^2
EST. GFR  (NON AFRICAN AMERICAN): 37.8 ML/MIN/1.73 M^2
GLUCOSE SERPL-MCNC: 119 MG/DL
GLUCOSE SERPL-MCNC: 150 MG/DL
HCT VFR BLD AUTO: 24.2 %
HGB BLD-MCNC: 7.7 G/DL
LYMPHOCYTES # BLD AUTO: 1.1 K/UL
LYMPHOCYTES NFR BLD: 12.8 %
MAGNESIUM SERPL-MCNC: 1.7 MG/DL
MCH RBC QN AUTO: 27.8 PG
MCHC RBC AUTO-ENTMCNC: 31.8 %
MCV RBC AUTO: 87 FL
MONOCYTES # BLD AUTO: 1 K/UL
MONOCYTES NFR BLD: 11.9 %
NEUTROPHILS # BLD AUTO: 6.3 K/UL
NEUTROPHILS NFR BLD: 75.1 %
PHOSPHATE SERPL-MCNC: 4.8 MG/DL
PLATELET # BLD AUTO: 558 K/UL
PMV BLD AUTO: 8.3 FL
POTASSIUM SERPL-SCNC: 4.9 MMOL/L
POTASSIUM SERPL-SCNC: 6.2 MMOL/L
RBC # BLD AUTO: 2.77 M/UL
SODIUM SERPL-SCNC: 138 MMOL/L
SODIUM SERPL-SCNC: 140 MMOL/L
TRANS ERYTHROCYTES VOL PATIENT: NORMAL ML
TRANS ERYTHROCYTES VOL PATIENT: NORMAL ML
WBC # BLD AUTO: 8.37 K/UL

## 2017-06-22 PROCEDURE — 63600175 PHARM REV CODE 636 W HCPCS: Performed by: STUDENT IN AN ORGANIZED HEALTH CARE EDUCATION/TRAINING PROGRAM

## 2017-06-22 PROCEDURE — 80048 BASIC METABOLIC PNL TOTAL CA: CPT | Mod: 91

## 2017-06-22 PROCEDURE — 11000001 HC ACUTE MED/SURG PRIVATE ROOM

## 2017-06-22 PROCEDURE — 25000003 PHARM REV CODE 250: Performed by: STUDENT IN AN ORGANIZED HEALTH CARE EDUCATION/TRAINING PROGRAM

## 2017-06-22 PROCEDURE — 84100 ASSAY OF PHOSPHORUS: CPT

## 2017-06-22 PROCEDURE — G8978 MOBILITY CURRENT STATUS: HCPCS | Mod: CJ

## 2017-06-22 PROCEDURE — 63600175 PHARM REV CODE 636 W HCPCS: Performed by: UROLOGY

## 2017-06-22 PROCEDURE — 36415 COLL VENOUS BLD VENIPUNCTURE: CPT

## 2017-06-22 PROCEDURE — C9113 INJ PANTOPRAZOLE SODIUM, VIA: HCPCS | Performed by: UROLOGY

## 2017-06-22 PROCEDURE — 83735 ASSAY OF MAGNESIUM: CPT

## 2017-06-22 PROCEDURE — 93010 ELECTROCARDIOGRAM REPORT: CPT | Mod: S$GLB,,, | Performed by: INTERNAL MEDICINE

## 2017-06-22 PROCEDURE — 25000003 PHARM REV CODE 250: Performed by: UROLOGY

## 2017-06-22 PROCEDURE — P9021 RED BLOOD CELLS UNIT: HCPCS

## 2017-06-22 PROCEDURE — 97161 PT EVAL LOW COMPLEX 20 MIN: CPT

## 2017-06-22 PROCEDURE — 80048 BASIC METABOLIC PNL TOTAL CA: CPT

## 2017-06-22 PROCEDURE — 97165 OT EVAL LOW COMPLEX 30 MIN: CPT

## 2017-06-22 PROCEDURE — 93005 ELECTROCARDIOGRAM TRACING: CPT

## 2017-06-22 PROCEDURE — 85025 COMPLETE CBC W/AUTO DIFF WBC: CPT

## 2017-06-22 PROCEDURE — G8979 MOBILITY GOAL STATUS: HCPCS | Mod: CI

## 2017-06-22 RX ORDER — FUROSEMIDE 10 MG/ML
10 INJECTION INTRAMUSCULAR; INTRAVENOUS ONCE
Status: COMPLETED | OUTPATIENT
Start: 2017-06-22 | End: 2017-06-22

## 2017-06-22 RX ORDER — HYDROCODONE BITARTRATE AND ACETAMINOPHEN 500; 5 MG/1; MG/1
TABLET ORAL
Status: DISCONTINUED | OUTPATIENT
Start: 2017-06-22 | End: 2017-06-26 | Stop reason: HOSPADM

## 2017-06-22 RX ORDER — BACLOFEN 10 MG/1
10 TABLET ORAL 3 TIMES DAILY
Status: DISCONTINUED | OUTPATIENT
Start: 2017-06-22 | End: 2017-06-26 | Stop reason: HOSPADM

## 2017-06-22 RX ORDER — ONDANSETRON 2 MG/ML
8 INJECTION INTRAMUSCULAR; INTRAVENOUS EVERY 6 HOURS PRN
Status: DISCONTINUED | OUTPATIENT
Start: 2017-06-22 | End: 2017-06-26 | Stop reason: HOSPADM

## 2017-06-22 RX ORDER — ONDANSETRON 2 MG/ML
8 INJECTION INTRAMUSCULAR; INTRAVENOUS EVERY 6 HOURS PRN
Status: DISCONTINUED | OUTPATIENT
Start: 2017-06-22 | End: 2017-06-22

## 2017-06-22 RX ORDER — LANOLIN ALCOHOL/MO/W.PET/CERES
400 CREAM (GRAM) TOPICAL ONCE
Status: COMPLETED | OUTPATIENT
Start: 2017-06-22 | End: 2017-06-22

## 2017-06-22 RX ORDER — ACETAMINOPHEN 10 MG/ML
1000 INJECTION, SOLUTION INTRAVENOUS EVERY 8 HOURS
Status: DISPENSED | OUTPATIENT
Start: 2017-06-22 | End: 2017-06-23

## 2017-06-22 RX ADMIN — ONDANSETRON 4 MG: 2 INJECTION INTRAMUSCULAR; INTRAVENOUS at 04:06

## 2017-06-22 RX ADMIN — HEPARIN SODIUM 5000 UNITS: 5000 INJECTION, SOLUTION INTRAVENOUS; SUBCUTANEOUS at 10:06

## 2017-06-22 RX ADMIN — FUROSEMIDE 10 MG: 10 INJECTION, SOLUTION INTRAVENOUS at 07:06

## 2017-06-22 RX ADMIN — BACLOFEN 10 MG: 10 TABLET ORAL at 10:06

## 2017-06-22 RX ADMIN — HEPARIN SODIUM 5000 UNITS: 5000 INJECTION, SOLUTION INTRAVENOUS; SUBCUTANEOUS at 05:06

## 2017-06-22 RX ADMIN — BISACODYL 10 MG: 10 SUPPOSITORY RECTAL at 08:06

## 2017-06-22 RX ADMIN — OXYCODONE HYDROCHLORIDE 10 MG: 5 TABLET ORAL at 10:06

## 2017-06-22 RX ADMIN — ONDANSETRON 8 MG: 2 INJECTION INTRAMUSCULAR; INTRAVENOUS at 10:06

## 2017-06-22 RX ADMIN — OXYCODONE HYDROCHLORIDE 10 MG: 5 TABLET ORAL at 03:06

## 2017-06-22 RX ADMIN — ACETAMINOPHEN 1000 MG: 10 INJECTION, SOLUTION INTRAVENOUS at 05:06

## 2017-06-22 RX ADMIN — OXYCODONE HYDROCHLORIDE 10 MG: 5 TABLET ORAL at 08:06

## 2017-06-22 RX ADMIN — ACETAMINOPHEN 1000 MG: 10 INJECTION, SOLUTION INTRAVENOUS at 10:06

## 2017-06-22 RX ADMIN — HYDROMORPHONE HYDROCHLORIDE 1 MG: 1 INJECTION, SOLUTION INTRAMUSCULAR; INTRAVENOUS; SUBCUTANEOUS at 08:06

## 2017-06-22 RX ADMIN — MAGNESIUM OXIDE TAB 400 MG (241.3 MG ELEMENTAL MG) 400 MG: 400 (241.3 MG) TAB at 07:06

## 2017-06-22 RX ADMIN — ACETAMINOPHEN 1000 MG: 10 INJECTION, SOLUTION INTRAVENOUS at 02:06

## 2017-06-22 RX ADMIN — SODIUM POLYSTYRENE SULFONATE 30 G: 15 SUSPENSION ORAL; RECTAL at 07:06

## 2017-06-22 RX ADMIN — ALVIMOPAN 12 MG: 12 CAPSULE ORAL at 11:06

## 2017-06-22 RX ADMIN — HEPARIN SODIUM 5000 UNITS: 5000 INJECTION, SOLUTION INTRAVENOUS; SUBCUTANEOUS at 02:06

## 2017-06-22 RX ADMIN — BACLOFEN 10 MG: 10 TABLET ORAL at 03:06

## 2017-06-22 RX ADMIN — ALVIMOPAN 12 MG: 12 CAPSULE ORAL at 10:06

## 2017-06-22 RX ADMIN — OXYCODONE HYDROCHLORIDE 10 MG: 5 TABLET ORAL at 04:06

## 2017-06-22 RX ADMIN — PANTOPRAZOLE SODIUM 40 MG: 40 INJECTION, POWDER, FOR SOLUTION INTRAVENOUS at 08:06

## 2017-06-22 RX ADMIN — HYDROMORPHONE HYDROCHLORIDE 1 MG: 1 INJECTION, SOLUTION INTRAMUSCULAR; INTRAVENOUS; SUBCUTANEOUS at 10:06

## 2017-06-22 RX ADMIN — HYDROMORPHONE HYDROCHLORIDE 1 MG: 1 INJECTION, SOLUTION INTRAMUSCULAR; INTRAVENOUS; SUBCUTANEOUS at 02:06

## 2017-06-22 NOTE — PROGRESS NOTES
Asked to see for new ileal conduit secondary to bladder cancer.     06/22/17 1200       Urostomy 06/21/17 ileal conduit RLQ   Date of First Assessment: 06/21/17   Present Prior to Hospital Arrival?: No  Inserted by: MD  Urostomy Type: ileal conduit  Location: RLQ   Stoma Appearance round;pink;rosebud appearance;stents   Stomal Appliance 2 piece;Dry;Intact;No Leakage   Stoma Function yellow urine     Pt sitting up in the chair with family at bedside. Urostomy appliance draining into bedside drainage bag.   Pt observed stoma and stents and requested wife do so as well. Demonstrated pouch and discussed steps to pouch change . Plan to change pouch tomorrow as wife wishes to be present . Have placed supplies and literature at bedside. Discussed routine care of urostomy as well as bedside drainage bag.  Venkatesh Mathur RN,CWON  [id number]

## 2017-06-22 NOTE — PLAN OF CARE
S/P Radical cystectomy with ileal conduit creation on 6/21/17. CM spoke with pt and wife concerning LOS and dc plan. CM informed them plan to dc home with home health, IVF, and lovenox. CM unable to give them HH list due to insurance. Pt gets his prescriptions filled at [facility] and [facility]. CM informed them will lovenox prescription for price. Ostomy nurse has come to see pt today. Pt will need picc line or midline prior to dc. Pt and wife agree with plan. Will cont to follow.        06/22/17 1520   Discharge Assessment   Assessment Type Discharge Planning Assessment   Confirmed/corrected address and phone number on facesheet? Yes   Assessment information obtained from? Patient;Caregiver   Expected Length of Stay (days) 5   Communicated expected length of stay with patient/caregiver yes   Prior to hospitilization cognitive status: Alert/Oriented   Prior to hospitalization functional status: Independent   Current cognitive status: Alert/Oriented   Current Functional Status: Independent   Arrived From admitted as an inpatient   Lives With spouse   Able to Return to Prior Arrangements yes   Is patient able to care for self after discharge? Yes   How many people do you have in your home that can help with your care after discharge? 1   Who are your caregiver(s) and their phone number(s)? Darlene/wife [phone]   Patient's perception of discharge disposition admitted as an inpatient   Readmission Within The Last 30 Days no previous admission in last 30 days   Patient currently being followed by outpatient case management? No   Patient currently receives home health services? No   Does the patient currently use HME? No   Patient currently receives private duty nursing? No   Patient currently receives any other outside agency services? No   Equipment Currently Used at Home walker, standard   Do you have any problems affording any of your prescribed medications? No   Is the patient taking medications as  prescribed? yes   Do you have any financial concerns preventing you from receiving the healthcare you need? No   Does the patient have transportation to healthcare appointments? Yes   Transportation Available family or friend will provide   On Dialysis? No   Does the patient receive services at the Coumadin Clinic? No   Are there any open cases? No   Discharge Plan A Home with family;Home Health   Discharge Plan B Home with family   Patient/Family In Agreement With Plan yes

## 2017-06-22 NOTE — ASSESSMENT & PLAN NOTE
- IV tylenol, PO oxycodone for pain control,  - CLD   - Miralax, dulcolax TWICE DAILY  - continue entereg  - IVF at 83  - CBC, BMP, Mg, Phos daily  - Ambulate pm of surgery and qid, PT/OT consult  - Perioperative mefoxin x24 hours  - Wound care ostomy consult  - Social work consult re: discharge planning  - Heparin TID  - Drains: maintain pelvic drain, maintain JAC until <70cc/shift remove prior to discharge, maintain bilateral ureteral stents  - restarted home meds  - repeat BMP at noon, stat EKG for hyperkalemia  - Prophylaxis: IS, SCDs, GI ppx

## 2017-06-22 NOTE — PLAN OF CARE
Problem: Physical Therapy Goal  Goal: Physical Therapy Goal  Goals to be met by: 2017     Patient will increase functional independence with mobility by performin. Pt will be stand by assist with bed mobility.   2. Pt will be stand by assist with sit to stand  3. Pt will be stand by assist with bed to chair transfer  4. Pt will be stand by assist with gait x 200 feet with no assistive device  5. Pt will be stand by assist to ascend/descend 1 flight of stairs in order to access living environment     Outcome: Ongoing (interventions implemented as appropriate)  Initial eval completed. Results, POC and goals discussed with patient.

## 2017-06-22 NOTE — PT/OT/SLP EVAL
Occupational Therapy  Evaluation    Juan Manuel Koehler   MRN: [id number]   Admitting Diagnosis:  s/p radical cystectomy with ileal conduit formation  OT Date of Treatment: 06/22/17   OT Start Time: 0952  OT Stop Time: 1025  OT Total Time (min): 33 min    Billable Minutes:  Evaluation 33    Diagnosis: s/p radical cystectomy with ileal conduit formation      Past Medical History:   Diagnosis Date    Cancer     bladder and throat    CKD (chronic kidney disease) stage 3, GFR 30-59 ml/min     Urinary tract infection       Past Surgical History:   Procedure Laterality Date    BLADDER SURGERY      HERNIA REPAIR      THROAT SURGERY         Referring physician: Rakan Wilhelm MD  Date referred to OT: 6/21/17    General Precautions: Standard, fall  Orthopedic Precautions: N/A  Braces: N/A    Do you have any cultural, spiritual, Latter day conflicts, given your current situation?: none stated      Patient History:  Living Environment  Lives With: spouse  Living Arrangements: house  Home Accessibility: stairs to enter home  Home Layout: Bathroom on 2nd floor, Bedroom on 2nd floor  Number of Stairs to Enter Home: 5  Stair Railings at Home: inside, present on left side  Transportation Available: family or friend will provide  Living Environment Comment: Pt lives with wife in [address] [facility] with 5 [address] in the front and 1 [address] through the garage. Pt's bedroom/ bathroom are both upstairs with a 1/2 bath resent downstairs. Pt reports he stopped driving abotu 2 months ago ans has not been working. Pt had a tub shower with a shower chair but does not currently use it. Pt was (I) with all ADLs and does not use any DME PTA.    Equipment Currently Used at Home: none    Prior level of function:   Bed Mobility/Transfers: independent  Grooming: independent  Bathing: independent  Upper Body Dressing: independent  Lower Body Dressing: independent  Toileting: independent  Driving License: Yes  Mode of Transportation: Family, Car     Dominant hand:  "right    Subjective:  Communicated with RN prior to session.  " The doctor told me I need to walk 4 times a day"   Chief Complaint: pain, nausea  Patient/Family stated goals: to get back to PLOF    Pain/Comfort  Pain Rating 1: 6/10  Location - Orientation 1: lower  Location 1: abdomen  Pain Addressed 1: Nurse notified, Distraction, Reposition  Pain Rating Post-Intervention 1: 6/10    Objective:  Patient found with: peripheral IV, JAC drain, bolanos catheter (Urostomy )   Pt found supine in bed with RN present. Pt agreed to participate in therapy session.    Cognitive Exam:  Oriented to: Person, Place, Time and Situation  Follows Commands/attention: Follows multistep  commands  Communication: clear/fluent  Memory:  No Deficits noted  Safety awareness/insight to disability: intact  Coping skills/emotional control: Appropriate to situation    Visual/perceptual:  Intact    Physical Exam:  Postural examination/scapula alignment: Rounded shoulder and Head forward  Skin integrity: Visible skin intact  Edema: None noted     Sensation:   Intact  light/touch B UEs    Upper Extremity Range of Motion:  Right Upper Extremity: WFL  Left Upper Extremity: WFL    Upper Extremity Strength:  Right Upper Extremity: WFL  Left Upper Extremity: WFL   Strength: Grossly 5/5 B hands    Fine motor coordination:   Intact  Left hand thumb/finger opposition skills and Right hand thumb/finger opposition skills    Gross motor coordination: WFL    Functional Mobility:  Bed Mobility:  Rolling/Turning Right: Stand by assistance  Supine to Sit: Stand by Assistance, WIth side rail (HOB elevated )    Transfers:  Sit <> Stand Assistance: Contact Guard Assistance, Minimum Assistance  Sit <> Stand Assistive Device: Rolling Walker  Bed <> Chair Technique: Stand Pivot  Bed <> Chair Transfer Assistance: Contact Guard Assistance  Toilet Transfer Assistance: Activity Did not Occur    Functional Ambulation: Pt ambulated ~112 ft with SBA using RW.    Activities " "of Daily Living:     UE Dressing Level of Assistance: Minimum assistance (To don gown like jacket sitting EOB. Assist to bring gown over shoulders. Limited by pain )    LE Dressing Level of Assistance: Stand by assistance (sitting in bedside chair don/doff non reese socks in figure 4 postion  )     Toileting Level of Assistance: Activity did not occur      Balance:   Static Sit: GOOD: Takes MODERATE challenges from all directions  Dynamic Sit: GOOD: Maintains balance through MODERATE excursions of active trunk movement  Static Stand: GOOD-: Takes MODERATE challenges from all directions inconsistently  Dynamic stand: FAIR+: Needs CLOSE SUPERVISION during gait and is able to right self with minor LOB    Therapeutic Activities and Exercises:  Pt educated on:   - Role of OT, POC  -Safety  -Importance of OOB ax's and sitting OOB during the day  - Whiteboard updated    AM-PAC 6 CLICK ADL  How much help from another person does this patient currently need?  1 = Unable, Total/Dependent Assistance  2 = A lot, Maximum/Moderate Assistance  3 = A little, Minimum/Contact Guard/Supervision  4 = None, Modified [place]/Independent    Putting on and taking off regular lower body clothing? : 3  Bathing (including washing, rinsing, drying)?: 3  Toileting, which includes using toilet, bedpan, or urinal? : 3  Putting on and taking off regular upper body clothing?: 3  Taking care of personal grooming such as brushing teeth?: 4  Eating meals?: 4  Total Score: 20    AM-PAC Raw Score CMS "G-Code Modifier Level of Impairment Assistance   6 % Total / Unable   7 - 9 CM 80 - 100% Maximal Assist   10-14 CL 60 - 80% Moderate Assist   15 - 19 CK 40 - 60% Moderate Assist   20 - 22 CJ 20 - 40% Minimal Assist   23 CI 1-20% SBA / CGA   24 CH 0% Independent/ Mod I       Patient left up in chair with all lines intact, call button in reach and RN notified    Assessment:  Juan Manuel Koehler is a 67 y.o. male with a medical diagnosis of s/p " radical cystectomy with ileal conduit formation. Pt tolerated session well but was hesitant and nervous to ambulate and perform OOB ax's. Pt reported feeling weak at times during session and was hesitant to perform OOB ax's due to feeling nauseous and weak. Pt performed functional mobility with SBA <>CGA during session. Pt presents with good potential to return to the home environment and would continue to benefit from skilled OT to maximize (I) with functional mobility, and ADLs.     Rehab identified problem list/impairments: Rehab identified problem list/impairments: weakness, gait instability, impaired functional mobilty, impaired balance, pain    Rehab potential is good.    Activity tolerance: Good    Discharge recommendations: Discharge Facility/Level Of Care Needs: home     Barriers to discharge: Barriers to Discharge: None    Equipment recommendations: none     GOALS:    Occupational Therapy Goals        Problem: Occupational Therapy Goal    Goal Priority Disciplines Outcome Interventions   Occupational Therapy Goal     OT, PT/OT Ongoing (interventions implemented as appropriate)    Description:  Goals to be met by: within the next 5 sessions    Patient will increase functional independence with ADLs by performing:    Grooming while standing at sink with Modified [place].  Toileting from toilet with Supervision for hygiene and clothing management.   Stand pivot transfers with [place].  Toilet transfer to toilet with Supervision.                      PLAN:  Patient to be seen 3 x/week to address the above listed problems via self-care/home management, community/work re-entry, therapeutic activities, therapeutic exercises  Plan of Care expires: 07/22/17  Plan of Care reviewed with: patient         Christin TAVERAS Hammad, OT  06/22/2017

## 2017-06-22 NOTE — PLAN OF CARE
Problem: Patient Care Overview  Goal: Plan of Care Review  Outcome: Ongoing (interventions implemented as appropriate)  No acute events throughout night, VS and assessment per flow sheet, patient progressing towards goals as tolerated, plan of care reviewed with Juan Manuel Koehler, all concerns addressed, will continue to monitor.

## 2017-06-22 NOTE — PHYSICIAN QUERY
PT Name: Juan Manuel Koehler  MR #: [id number]     Physician Query Form - Documentation Clarification      CDS/: Lana Harrington               Contact information: [phone]    This form is a permanent document in the medical record.     Query Date: June 22, 2017    By submitting this query, we are merely seeking further clarification of documentation. Please utilize your independent clinical judgment when addressing the question(s) below.    The Medical record reflects the following:    Supporting Clinical Findings Location in Medical Record      weight 140 lb/63.6 kg  BMI 18        H&P 6/12/2017      megestrol (MEGACE) 400 mg/10 mL (40 mg/mL) Susp            H&P 6/12/2017                                                                            Doctor, Please specify diagnosis or diagnoses associated with above clinical findings.    Provider Use Only       1. (  x   ) Underweight   2. (     ) Other, Please specify ________________________                                                                                                                          [  ] Clinically undetermined

## 2017-06-22 NOTE — PHYSICIAN QUERY
"PT Name: Juan Manuel Koehler  MR #: [id number]    Physician Query Form - Hematology Clarification      CDS/: Lana Harrington               Contact information:[phone]    This form is a permanent document in the medical record.      Query Date: June 22, 2017    By submitting this query, we are merely seeking further clarification of documentation. Please utilize your independent clinical judgment when addressing the question(s) below.    The Medical record contains the following:   Indicators  Supporting Clinical Findings Location in Medical Record    x "Anemia" documented  Anemia of chronic anemia Anesthesia Info 6/21/2017    x H & H =  8.3 & 25.6---> 8.5 & 26.6--->7.7 & 24.2  Lab results 6/12,6/21 & 6/22/2017    x BP =                     HR= BP= (105-187) (51-90)  HR=69-93  Vital Sign Flow Sheet 6/21 & 6/22/2017    "GI bleeding" documented      Acute bleeding (Non GI site)      x Transfusion(s)  Transfuse RBC 1 unit  MD Orders 6/22/2017    x Treatment:  CBC with Auto Differential  MD Orders 6/21/2017    x Other:   1.  Bilateral pelvic lymph node dissection  2.  Open Radical cystoprostatectomy   3.  Creation of ileal conduit urinary diversion  4.  Bilateral ureterotomy for open ureteral J stent placement     EBL 300mL  Op Note 6/21/2017     Provider, please specify diagnosis or diagnoses associated with above clinical findings.    [  ] Acute blood loss anemia expected post-operatively  [  ] Acute blood loss anemia  [  ] Chronic blood loss anemia  [ x ] Anemia of chronic disease ( Specify chronic disease)      [ x ] CKD (specify stage) ______CKD stage III_____________________     [ x  ] Neoplastic disease      [ x ] Due to Chemotherapy      [  ] Other (Specify) _______________________________     [  ] Clinically Undetermined     [  ] Other Hematological Diagnosis (please specify): _________________________________    [  ] Clinically Undetermined       Please document in your progress notes daily for the " duration of treatment, until resolved, and include in your discharge summary.

## 2017-06-22 NOTE — PLAN OF CARE
Problem: Occupational Therapy Goal  Goal: Occupational Therapy Goal  Goals to be met by: within the next 5 sessions    Patient will increase functional independence with ADLs by performing:    Grooming while standing at sink with Modified [place].  Toileting from toilet with Supervision for hygiene and clothing management.   Stand pivot transfers with [place].  Toilet transfer to toilet with Supervision.    Outcome: Ongoing (interventions implemented as appropriate)  Initial evaluation completed  POC implemented, goals set

## 2017-06-22 NOTE — PT/OT/SLP EVAL
"Physical Therapy  Evaluation    Juan Manuel Koehler   MRN: [id number]   Admitting Diagnosis: s/p radical cystectomy with ileal conduit formation     PT Received On: 06/22/17  PT Start Time: 1245     PT Stop Time: 1310    PT Total Time (min): 25 min       Billable Minutes:  Evaluation 20    Diagnosis: s/p radical cystectomy with ileal conduit formation    Clinical Presentation: stable and/or uncomplicated    Level of Complexity:   Low Complexity  · No personal factors or comorbidities that impact the plan of care  · Examination addressing 1-2 body structures and functions, activity limitations, and/or participation restrictions  · Clinical presentation with stable or uncomplicated characteristics    Past Medical History:   Diagnosis Date    Cancer     bladder and throat    CKD (chronic kidney disease) stage 3, GFR 30-59 ml/min     Urinary tract infection       Past Surgical History:   Procedure Laterality Date    BLADDER SURGERY      HERNIA REPAIR      THROAT SURGERY         Referring physician: Rakan Wilhelm MD  Date referred to PT: 6/21/2017    General Precautions: Standard,      Patient History:  Living Environment Comment: Patient lives with his wife in a 2 story house with bedroom/bathroom upstairs, 1/2 bath downstairs.  Pt reports being independent, but unable to drive x2 weeks 2* pain from port site. Owns RW from wife.   Equipment Currently Used at Home: none    Subjective:  Communicated with RN prior to session.  "I just get nervous moving around. I want to use the walker today."  Chief Complaint: abdominal pain/soreness  Patient goals: return to PLOF    Pain/Comfort  Pain Rating 1: 6/10  Pain Addressed 1: Pre-medicate for activity, Reposition  Pain Rating Post-Intervention 1: 6/10      Objective:   Patient found with: bolanos catheter (nephrostomy tubes, peripheral IV, drain)   Patient found sitting up in chair with wife present.  He agreed to therapy examination.  Physician arrived to remove bolanos " catheter during therapy evaluation.     Cognitive Exam:  Oriented to: Person, Place, Time and Situation    Follows Commands/attention: Follows multistep  commands  Communication: clear/fluent  Safety awareness/insight to disability: intact    Physical Exam:  Postural examination/scapula alignment: Rounded shoulder and Head forward    Skin integrity: Visible skin intact  Edema: None noted TEDs in place    Sensation:   Intact    Lower Extremity Range of Motion:  Right Lower Extremity: WFL  Left Lower Extremity: WFL    Lower Extremity Strength:  Right Lower Extremity: WFL  Left Lower Extremity: WFL     Fine motor coordination:  Intact    Gross motor coordination: WFL    Functional Mobility:  Bed Mobility:   NT. Pt found up in chair    Transfers:  Sit <> Stand Assistance: Stand By Assistance  Bed <> Chair Assistance: Stand By Assistance    Gait:   Gait Distance: Pt ambulated 200 feet SBA with RW and forward flexed posture.  He demonstrated decreased gait speed and trembling which patient attributed to nervousness and pain.      Balance:   Static Sit: GOOD: Takes MODERATE challenges from all directions  Dynamic Sit: GOOD: Maintains balance through MODERATE excursions of active trunk movement  Static Stand: FAIR+: Takes MINIMAL challenges from all directions  Dynamic stand: FAIR+: Needs CLOSE SUPERVISION during gait and is able to right self with minor LOB    Therapeutic Activities and Exercises:  Therapist educated patient and wife on the following:   · Role of PT, POC  · Ambulation program 3-4x/day   · OOBTC  · D/c planning       AM-PAC 6 CLICK MOBILITY  How much help from another person does this patient currently need?   1 = Unable, Total/Dependent Assistance  2 = A lot, Maximum/Moderate Assistance  3 = A little, Minimum/Contact Guard/Supervision  4 = None, Modified [place]/Independent    Turning over in bed (including adjusting bedclothes, sheets and blankets)?: 3  Sitting down on and standing up from a chair  with arms (e.g., wheelchair, bedside commode, etc.): 4  Moving from lying on back to sitting on the side of the bed?: 3  Moving to and from a bed to a chair (including a wheelchair)?: 4  Need to walk in hospital room?: 4  Climbing 3-5 steps with a railing?: 3  Total Score: 21     AM-PAC Raw Score CMS G-Code Modifier Level of Impairment Assistance   6 % Total / Unable   7 - 9 CM 80 - 100% Maximal Assist   10 - 14 CL 60 - 80% Moderate Assist   15 - 19 CK 40 - 60% Moderate Assist   20 - 22 CJ 20 - 40% Minimal Assist   23 CI 1-20% SBA / CGA   24 CH 0% Independent/ Mod I     Patient left up in chair with all lines intact, call button in reach and wife present.    Assessment:   Juan Manuel Koehler is a 67 y.o. male with a medical diagnosis of s/p radical cystectomy and ileal conduit formation and presents with decreased functional mobility.  Patient was independent prior to admit with bedroom upstairs.  He now requires the use of a RW post-op and is not able to attempt stairs 2* c/o nausea. Patient ambulated 200 feet slowly with Rw.  He would benefit from skilled PT services to wean from RW, perform stair training, and return to PLOF.    Rehab identified problem list/impairments: Rehab identified problem list/impairments: impaired functional mobilty, pain, impaired skin    Rehab potential is good.    Activity tolerance: Good    Discharge recommendations: Discharge Facility/Level Of Care Needs: home health PT (potential to progress to no needs)     Barriers to discharge: Barriers to Discharge: None    Equipment recommendations: Equipment Needed After Discharge:  (TBD)     GOALS:    Physical Therapy Goals        Problem: Physical Therapy Goal    Goal Priority Disciplines Outcome Goal Variances Interventions   Physical Therapy Goal     PT/OT, PT Ongoing (interventions implemented as appropriate)     Description:  Goals to be met by: 7/6/2017     Patient will increase functional independence with mobility by  performin. Pt will be stand by assist with bed mobility.   2. Pt will be stand by assist with sit to stand  3. Pt will be stand by assist with bed to chair transfer  4. Pt will be stand by assist with gait x 200 feet with no assistive device  5. Pt will be stand by assist to ascend/descend 1 flight of stairs in order to access living environment                       PLAN:    Patient to be seen 4 x/week to address the above listed problems via gait training, therapeutic activities, therapeutic exercises  Plan of Care expires: 17  Plan of Care reviewed with: patient, spouse          Candy Vargas, PT  2017

## 2017-06-22 NOTE — PROGRESS NOTES
[facility]  Urology  Progress Note    Patient Name: Juan Manuel Koehler  MRN: [id number]  Admission Date: 6/21/2017  Hospital Length of Stay: 1 days  Code Status: Prior   Attending Provider: Villa Robles MD   Primary Care Physician: Hemant Sweeney MD    Subjective:     HPI:  Juan Manuel Koehler is a 67 y.o. male POD 1 s/p radical cystectomy with ileal conduit creation    Interval History:   No acute events overnight  Pain controlled  No nausea  Ambulating     Review of Systems  Objective:     Temp:  [97.2 °F (36.2 °C)-98.6 °F (37 °C)] 98.6 °F (37 °C)  Pulse:  [69-93] 74  Resp:  [10-19] 16  SpO2:  [95 %-100 %] 96 %  BP: (105-187)/(51-90) 124/60     Body mass index is 18.23 kg/m².            Drains     Drain                 Ureteral Drain/Stent 59363 days         Nephrostomy 05/05/17 1503 Right 8 Fr. 47 days         Nephrostomy 05/05/17 1504 Left 8 Fr. 47 days         Urostomy 06/21/17 ileal conduit RLQ 1 day         Closed/Suction Drain 06/21/17 1220 Other (Comment) Other (Comment) Other (Comment) 20 Fr. less than 1 day         Closed/Suction Drain 06/21/17 1221 Abdomen Bulb 19 Fr. less than 1 day         Ureteral Drain/Stent 06/21/17 1053 Left ureter 6 Fr. less than 1 day         Ureteral Drain/Stent 06/21/17 1054 Right ureter 6 Fr. less than 1 day         Urethral Catheter 06/21/17 0915 Non-latex 16 Fr. less than 1 day                Physical Exam   Constitutional: No distress.   HENT:   Head: Normocephalic and atraumatic.   Eyes: Conjunctivae are normal. No scleral icterus.   Neck: Normal range of motion.   Cardiovascular: Normal rate.    Pulmonary/Chest: Effort normal. No respiratory distress.   Abdominal: Soft. He exhibits no distension. There is tenderness (appropriate). There is no rebound and no guarding.   Urostomy p/p/p draining light pink urine  Ureteral stents in place  JAC draining SS  Pelvic drain with scant output  Bilateral neph tubes in place   Musculoskeletal: Normal range  of motion.   SCDs in place   Neurological: He is alert.   Skin: Skin is warm and dry. He is not diaphoretic.     Psychiatric: He has a normal mood and affect.       Significant Labs:    BMP:    Recent Labs  Lab 06/16/17  1025 06/21/17  1258 06/22/17  0406   * 137 140   K 5.2* 5.2* 6.2*    105 110   CO2 21* 23 21*   BUN 34* 27* 29*   CREATININE 2.1* 2.7* 1.9*   CALCIUM 9.1 8.4* 8.7       CBC:     Recent Labs  Lab 06/21/17  1158 06/21/17  1258 06/22/17  0406   WBC  --  10.53 8.37   HGB  --  8.5* 7.7*   HCT 17* 26.6* 24.2*   PLT  --  644* 558*       All pertinent labs results from the past 24 hours have been reviewed.    Significant Imaging:  All pertinent imaging results/findings from the past 24 hours have been reviewed.                  Assessment/Plan:     Bladder cancer    - IV tylenol, PO oxycodone for pain control,  - CLD   - Miralax, dulcolax TWICE DAILY  - continue entereg  - IVF at 84  - CBC, BMP, Mg, Phos daily  - Ambulate pm of surgery and qid, PT/OT consult  - Perioperative mefoxin x24 hours  - Wound care ostomy consult  - Social work consult re: discharge planning  - Heparin TID  - Drains: maintain pelvic drain, maintain JAC until <70cc/shift remove prior to discharge, maintain bilateral ureteral stents  - restarted home meds  - repeat BMP at noon, stat EKG for hyperkalemia  - Prophylaxis: IS, SCDs, GI ppx                VTE Risk Mitigation         Ordered     heparin (porcine) injection 5,000 Units  Every 8 hours     Route:  Subcutaneous        06/21/17 1258     Medium Risk of VTE  Once      06/21/17 1258     Place sequential compression device  Until discontinued      06/21/17 1258          Kiley Hwang MD  Urology  [facility]

## 2017-06-22 NOTE — SUBJECTIVE & OBJECTIVE
Interval History:   No acute events overnight  Pain controlled  No nausea  Ambulating     Review of Systems  Objective:     Temp:  [97.2 °F (36.2 °C)-98.6 °F (37 °C)] 98.6 °F (37 °C)  Pulse:  [69-93] 74  Resp:  [10-19] 16  SpO2:  [95 %-100 %] 96 %  BP: (105-187)/(51-90) 124/60     Body mass index is 18.23 kg/m².            Drains     Drain                 Ureteral Drain/Stent 40198 days         Nephrostomy 05/05/17 1503 Right 8 Fr. 47 days         Nephrostomy 05/05/17 1504 Left 8 Fr. 47 days         Urostomy 06/21/17 ileal conduit RLQ 1 day         Closed/Suction Drain 06/21/17 1220 Other (Comment) Other (Comment) Other (Comment) 20 Fr. less than 1 day         Closed/Suction Drain 06/21/17 1221 Abdomen Bulb 19 Fr. less than 1 day         Ureteral Drain/Stent 06/21/17 1053 Left ureter 6 Fr. less than 1 day         Ureteral Drain/Stent 06/21/17 1054 Right ureter 6 Fr. less than 1 day         Urethral Catheter 06/21/17 0915 Non-latex 16 Fr. less than 1 day                Physical Exam   Constitutional: No distress.   HENT:   Head: Normocephalic and atraumatic.   Eyes: Conjunctivae are normal. No scleral icterus.   Neck: Normal range of motion.   Cardiovascular: Normal rate.    Pulmonary/Chest: Effort normal. No respiratory distress.   Abdominal: Soft. He exhibits no distension. There is tenderness (appropriate). There is no rebound and no guarding.   Urostomy p/p/p draining light pink urine  Ureteral stents in place  JAC draining SS  Pelvic drain with scant output  Bilateral neph tubes in place   Musculoskeletal: Normal range of motion.   SCDs in place   Neurological: He is alert.   Skin: Skin is warm and dry. He is not diaphoretic.     Psychiatric: He has a normal mood and affect.       Significant Labs:    BMP:    Recent Labs  Lab 06/16/17  1025 06/21/17  1258 06/22/17  0406   * 137 140   K 5.2* 5.2* 6.2*    105 110   CO2 21* 23 21*   BUN 34* 27* 29*   CREATININE 2.1* 2.7* 1.9*   CALCIUM 9.1 8.4* 8.7        CBC:     Recent Labs  Lab 06/21/17  1158 06/21/17  1258 06/22/17  0406   WBC  --  10.53 8.37   HGB  --  8.5* 7.7*   HCT 17* 26.6* 24.2*   PLT  --  644* 558*       All pertinent labs results from the past 24 hours have been reviewed.    Significant Imaging:  All pertinent imaging results/findings from the past 24 hours have been reviewed.

## 2017-06-23 LAB
ANION GAP SERPL CALC-SCNC: 7 MMOL/L
BASOPHILS # BLD AUTO: 0.01 K/UL
BASOPHILS NFR BLD: 0.1 %
BUN SERPL-MCNC: 22 MG/DL
CALCIUM SERPL-MCNC: 8.9 MG/DL
CHLORIDE SERPL-SCNC: 105 MMOL/L
CO2 SERPL-SCNC: 24 MMOL/L
CREAT SERPL-MCNC: 1.4 MG/DL
DIFFERENTIAL METHOD: ABNORMAL
EOSINOPHIL # BLD AUTO: 0 K/UL
EOSINOPHIL NFR BLD: 0.4 %
ERYTHROCYTE [DISTWIDTH] IN BLOOD BY AUTOMATED COUNT: 14.5 %
EST. GFR  (AFRICAN AMERICAN): 59.3 ML/MIN/1.73 M^2
EST. GFR  (NON AFRICAN AMERICAN): 51.3 ML/MIN/1.73 M^2
GLUCOSE SERPL-MCNC: 108 MG/DL
HCT VFR BLD AUTO: 26.3 %
HGB BLD-MCNC: 8.7 G/DL
LYMPHOCYTES # BLD AUTO: 0.7 K/UL
LYMPHOCYTES NFR BLD: 9.3 %
MAGNESIUM SERPL-MCNC: 1.6 MG/DL
MCH RBC QN AUTO: 28.4 PG
MCHC RBC AUTO-ENTMCNC: 33.1 %
MCV RBC AUTO: 86 FL
MONOCYTES # BLD AUTO: 0.7 K/UL
MONOCYTES NFR BLD: 9.4 %
NEUTROPHILS # BLD AUTO: 6 K/UL
NEUTROPHILS NFR BLD: 80.4 %
PHOSPHATE SERPL-MCNC: 3.3 MG/DL
PLATELET # BLD AUTO: 607 K/UL
PMV BLD AUTO: 8.5 FL
POTASSIUM SERPL-SCNC: 5.7 MMOL/L
RBC # BLD AUTO: 3.06 M/UL
SODIUM SERPL-SCNC: 136 MMOL/L
WBC # BLD AUTO: 7.52 K/UL

## 2017-06-23 PROCEDURE — 97116 GAIT TRAINING THERAPY: CPT

## 2017-06-23 PROCEDURE — 63600175 PHARM REV CODE 636 W HCPCS: Performed by: STUDENT IN AN ORGANIZED HEALTH CARE EDUCATION/TRAINING PROGRAM

## 2017-06-23 PROCEDURE — 25000003 PHARM REV CODE 250: Performed by: STUDENT IN AN ORGANIZED HEALTH CARE EDUCATION/TRAINING PROGRAM

## 2017-06-23 PROCEDURE — 80048 BASIC METABOLIC PNL TOTAL CA: CPT

## 2017-06-23 PROCEDURE — 25000003 PHARM REV CODE 250: Performed by: UROLOGY

## 2017-06-23 PROCEDURE — 36415 COLL VENOUS BLD VENIPUNCTURE: CPT

## 2017-06-23 PROCEDURE — 11000001 HC ACUTE MED/SURG PRIVATE ROOM

## 2017-06-23 PROCEDURE — 63600175 PHARM REV CODE 636 W HCPCS: Performed by: UROLOGY

## 2017-06-23 PROCEDURE — C9113 INJ PANTOPRAZOLE SODIUM, VIA: HCPCS | Performed by: UROLOGY

## 2017-06-23 PROCEDURE — 84100 ASSAY OF PHOSPHORUS: CPT

## 2017-06-23 PROCEDURE — 97110 THERAPEUTIC EXERCISES: CPT

## 2017-06-23 PROCEDURE — 83735 ASSAY OF MAGNESIUM: CPT

## 2017-06-23 PROCEDURE — 85025 COMPLETE CBC W/AUTO DIFF WBC: CPT

## 2017-06-23 RX ORDER — SODIUM CHLORIDE 9 MG/ML
1000 INJECTION, SOLUTION INTRAVENOUS
Qty: 6000 ML | Refills: 0 | Status: SHIPPED | OUTPATIENT
Start: 2017-06-23 | End: 2017-07-06

## 2017-06-23 RX ORDER — FUROSEMIDE 10 MG/ML
10 INJECTION INTRAMUSCULAR; INTRAVENOUS ONCE
Status: COMPLETED | OUTPATIENT
Start: 2017-06-23 | End: 2017-06-23

## 2017-06-23 RX ORDER — ENOXAPARIN SODIUM 100 MG/ML
40 INJECTION SUBCUTANEOUS DAILY
Qty: 12 ML | Refills: 0 | Status: SHIPPED | OUTPATIENT
Start: 2017-06-23 | End: 2017-07-12

## 2017-06-23 RX ORDER — ACETAMINOPHEN 10 MG/ML
1000 INJECTION, SOLUTION INTRAVENOUS EVERY 8 HOURS
Status: COMPLETED | OUTPATIENT
Start: 2017-06-23 | End: 2017-06-24

## 2017-06-23 RX ORDER — LANOLIN ALCOHOL/MO/W.PET/CERES
400 CREAM (GRAM) TOPICAL DAILY
Status: DISCONTINUED | OUTPATIENT
Start: 2017-06-23 | End: 2017-06-24

## 2017-06-23 RX ADMIN — ONDANSETRON 8 MG: 2 INJECTION INTRAMUSCULAR; INTRAVENOUS at 12:06

## 2017-06-23 RX ADMIN — HEPARIN SODIUM 5000 UNITS: 5000 INJECTION, SOLUTION INTRAVENOUS; SUBCUTANEOUS at 02:06

## 2017-06-23 RX ADMIN — OXYCODONE HYDROCHLORIDE 10 MG: 5 TABLET ORAL at 03:06

## 2017-06-23 RX ADMIN — ACETAMINOPHEN 1000 MG: 10 INJECTION, SOLUTION INTRAVENOUS at 10:06

## 2017-06-23 RX ADMIN — OXYCODONE HYDROCHLORIDE 10 MG: 5 TABLET ORAL at 08:06

## 2017-06-23 RX ADMIN — SODIUM POLYSTYRENE SULFONATE 30 G: 15 SUSPENSION ORAL; RECTAL at 06:06

## 2017-06-23 RX ADMIN — MAGNESIUM OXIDE TAB 400 MG (241.3 MG ELEMENTAL MG) 400 MG: 400 (241.3 MG) TAB at 08:06

## 2017-06-23 RX ADMIN — ONDANSETRON 8 MG: 2 INJECTION INTRAMUSCULAR; INTRAVENOUS at 04:06

## 2017-06-23 RX ADMIN — BACLOFEN 10 MG: 10 TABLET ORAL at 10:06

## 2017-06-23 RX ADMIN — BACLOFEN 10 MG: 10 TABLET ORAL at 06:06

## 2017-06-23 RX ADMIN — BISACODYL 10 MG: 10 SUPPOSITORY RECTAL at 08:06

## 2017-06-23 RX ADMIN — ACETAMINOPHEN 1000 MG: 10 INJECTION, SOLUTION INTRAVENOUS at 02:06

## 2017-06-23 RX ADMIN — ALVIMOPAN 12 MG: 12 CAPSULE ORAL at 08:06

## 2017-06-23 RX ADMIN — FUROSEMIDE 10 MG: 10 INJECTION, SOLUTION INTRAVENOUS at 08:06

## 2017-06-23 RX ADMIN — HEPARIN SODIUM 5000 UNITS: 5000 INJECTION, SOLUTION INTRAVENOUS; SUBCUTANEOUS at 06:06

## 2017-06-23 RX ADMIN — PANTOPRAZOLE SODIUM 40 MG: 40 INJECTION, POWDER, FOR SOLUTION INTRAVENOUS at 08:06

## 2017-06-23 RX ADMIN — BACLOFEN 10 MG: 10 TABLET ORAL at 02:06

## 2017-06-23 RX ADMIN — OXYCODONE HYDROCHLORIDE 10 MG: 5 TABLET ORAL at 04:06

## 2017-06-23 RX ADMIN — ONDANSETRON 8 MG: 2 INJECTION INTRAMUSCULAR; INTRAVENOUS at 08:06

## 2017-06-23 NOTE — PROGRESS NOTES
[facility]  Urology  Progress Note    Patient Name: Juan Manuel Koehler  MRN: [id number]  Admission Date: 6/21/2017  Hospital Length of Stay: 2 days  Code Status: Prior   Attending Provider: Villa Robles MD   Primary Care Physician: Hemant Sweeney MD    Subjective:     HPI:  Juan Manuel Koehler is a 67 y.o. male POD 2 s/p radical cystectomy with ileal conduit creation    Interval History:   No acute events overnight  Complaining of nausea, no vomiting  Ambulating well   Pain controlled    Review of Systems  Objective:     Temp:  [96 °F (35.6 °C)-98.4 °F (36.9 °C)] 96.8 °F (36 °C)  Pulse:  [73-93] 93  Resp:  [16-18] 16  SpO2:  [95 %-100 %] 97 %  BP: (113-168)/(56-74) 168/74     Body mass index is 18.23 kg/m².            Drains     Drain                 Ureteral Drain/Stent 97734 days         Nephrostomy 05/05/17 1503 Right 8 Fr. 48 days         Nephrostomy 05/05/17 1504 Left 8 Fr. 48 days         Urostomy 06/21/17 ileal conduit RLQ 2 days         Closed/Suction Drain 06/21/17 1221 Abdomen Bulb 19 Fr. 1 day         Ureteral Drain/Stent 06/21/17 1053 Left ureter 6 Fr. 1 day         Ureteral Drain/Stent 06/21/17 1054 Right ureter 6 Fr. 1 day                Physical Exam   Constitutional: No distress.   HENT:   Head: Normocephalic and atraumatic.   Eyes: Conjunctivae are normal. No scleral icterus.   Neck: Normal range of motion.   Cardiovascular: Normal rate.    Pulmonary/Chest: Effort normal. No respiratory distress.   Abdominal: Soft. He exhibits no distension. There is tenderness (appropriate). There is no rebound and no guarding.   Urostomy p/p/p draining clear yellow urine  Ureteral stents in place  JAC draining SS  Bilateral neph tubes in place   Musculoskeletal: Normal range of motion.   SCDs in place   Neurological: He is alert.   Skin: Skin is warm and dry. He is not diaphoretic.     Psychiatric: He has a normal mood and affect.       Significant Labs:    BMP:    Recent Labs  Lab  06/21/17  1258 06/22/17  0406 06/22/17  1212    140 138   K 5.2* 6.2* 4.9    110 108   CO2 23 21* 16*   BUN 27* 29* 29*   CREATININE 2.7* 1.9* 1.8*   CALCIUM 8.4* 8.7 8.9       CBC:     Recent Labs  Lab 06/21/17  1158 06/21/17  1258 06/22/17  0406   WBC  --  10.53 8.37   HGB  --  8.5* 7.7*   HCT 17* 26.6* 24.2*   PLT  --  644* 558*       All pertinent labs results from the past 24 hours have been reviewed.    Significant Imaging:  KUB: dilated loops of bowel. ileus vs SBO            Assessment/Plan:     Bladder cancer    - IV tylenol, PO oxycodone for pain control,  - NPO  - Miralax, dulcolax TWICE DAILY  - continue entereg  - IVF at 125  - CBC, BMP, Mg, Phos daily  - Ambulate pm of surgery and qid, PT/OT consult  - Perioperative mefoxin x24 hours  - Wound care ostomy consult  - Social work consult re: discharge planning  - Heparin TID  - Drains: maintain JAC until <70cc/shift remove prior to discharge, maintain bilateral ureteral stents  - restarted home meds  - Prophylaxis: IS, SCDs, GI ppx                VTE Risk Mitigation         Ordered     heparin (porcine) injection 5,000 Units  Every 8 hours     Route:  Subcutaneous        06/21/17 1258     Medium Risk of VTE  Once      06/21/17 1258     Place sequential compression device  Until discontinued      06/21/17 1258          Kiley Hwang MD  Urology  [facility]

## 2017-06-23 NOTE — PLAN OF CARE
[facility]    HOME HEALTH ORDERS  FACE TO FACE ENCOUNTER    Patient Name: Juan Manuel Koehler  YOB: 1950    PCP: Hemant Sweeney MD   PCP Address: [address] / [place] [address]  PCP Phone Number: [phone]  PCP Fax: [phone]    Encounter Date: 06/23/2017    Admit to Home Health    Diagnoses:  Active Hospital Problems    Diagnosis  POA    *Bladder cancer [C67.9]  Yes    Malignant neoplasm of trigone of urinary bladder [C67.0]  Yes      Resolved Hospital Problems    Diagnosis Date Resolved POA   No resolved problems to display.       Future Appointments  Date Time Provider Department Center   7/12/2017 8:00 AM Mingo Calero MD [facility] HEM ONC Michael Zaragoza           I have seen and examined this patient face to face today. My clinical findings that support the need for the home health skilled services and home bound status are the following:  Weakness/numbness causing balance and gait disturbance due to Malignancy/Cancer and Surgery making it taxing to leave home.    Allergies:Review of patient's allergies indicates:  No Known Allergies    Diet: regular diet    Activities: ambulate in house, no driving while on analgesics and no heavy lifting for 6 weeks    Nursing:   SN to complete comprehensive assessment including routine vital signs. Instruct on disease process and s/s of complications to report to MD. Review/verify medication list sent home with the patient at time of discharge  and instruct patient/caregiver as needed. Frequency may be adjusted depending on start of care date.    Notify MD if SBP > 160 or < 90; DBP > 90 or < 50; HR > 120 or < 50; Temp > 101; Other:         CONSULTS:    Physical Therapy to evaluate and treat. Evaluate for home safety and equipment needs; Establish/upgrade home exercise program. Perform / instruct on therapeutic exercises, gait training, transfer training, and Range of Motion.  Occupational Therapy to evaluate and  treat. Evaluate home environment for safety and equipment needs. Perform/Instruct on transfers, ADL training, ROM, and therapeutic exercises.  Aide to provide assistance with personal care, ADLs, and vital signs.    MISCELLANEOUS CARE:  Colostomy Care:  Instruct patient/caregiver to empty bag when full and PRN., Change and clean site every 48 hours and Monitor skin integrity.     IV fluid administration:  Please administer 1000 mL of normal saline every Mon, Wed, Fri at a rate of 500 mL/hr  Start: day after discharge  End: 2 weeks after discharge    PICC line care per home health protocol.     Lovenox: inject 40 mg subcutaneously daily for 30 days    WOUND CARE ORDERS  n/a      Medications: Review discharge medications with patient and family and provide education.      Current Discharge Medication List      START taking these medications    Details   0.9 % SODIUM CHLORIDE (SODIUM CHLORIDE 0.9%) 0.9 % solution Inject 1,000 mLs into the vein every Mon, Wed, Fri.  Qty: 6000 mL, Refills: 0      enoxaparin (LOVENOX) 40 mg/0.4 mL Syrg Inject 0.4 mLs (40 mg total) into the skin once daily at 6am.  Qty: 12 mL, Refills: 0         CONTINUE these medications which have NOT CHANGED    Details   acetaminophen (TYLENOL) 500 MG tablet Take 1,000 mg by mouth every 6 (six) hours as needed for Pain.      cholecalciferol, vitamin D3, 400 unit Cap Take 3 capsules (1,200 Units total) by mouth once daily.  Qty: 90 capsule, Refills: 0      docusate sodium (COLACE) 100 MG capsule Take 1 capsule (100 mg total) by mouth 2 (two) times daily.  Refills: 0      ketoconazole (NIZORAL) 2 % shampoo USE TO WASH AFFECTED AREA ONCE DAILY  Refills: 3      ondansetron (ZOFRAN-ODT) 4 MG TbDL Take 1 tablet (4 mg total) by mouth every 8 (eight) hours as needed.  Qty: 20 tablet, Refills: 1      oxycodone (OXY-IR) 5 mg Cap Take 1 capsule (5 mg total) by mouth every 4 (four) hours as needed for Pain.  Qty: 40 capsule, Refills: 0    Associated Diagnoses:  Cancer      polyethylene glycol (GLYCOLAX) 17 gram PwPk Take 17 g by mouth daily as needed (for constipation).  Qty: 10 packet, Refills: 2      baclofen (LIORESAL) 10 MG tablet Take 1 tablet (10 mg total) by mouth 3 (three) times daily.  Qty: 90 tablet, Refills: 1    Associated Diagnoses: Intractable hiccups      cyproheptadine (PERIACTIN) 4 mg tablet Take 1 tablet (4 mg total) by mouth 3 (three) times daily as needed.  Qty: 90 tablet, Refills: 0    Associated Diagnoses: Malignant neoplasm of urinary bladder, unspecified site      megestrol (MEGACE) 400 mg/10 mL (40 mg/mL) Susp Take 10 mLs (400 mg total) by mouth once daily.  Qty: 240 mL, Refills: 12    Associated Diagnoses: Anorexia      sodium polystyrene (KAYEXALATE) powder          STOP taking these medications       ampicillin (PRINCIPEN) 500 MG capsule Comments:   Reason for Stopping:               I certify that this patient is confined to his home and needs intermittent skilled nursing care, physical therapy and occupational therapy.

## 2017-06-23 NOTE — ASSESSMENT & PLAN NOTE
- IV tylenol, PO oxycodone for pain control,  - CLD   - Miralax, dulcolax TWICE DAILY  - continue entereg  - IVF at 84  - CBC, BMP, Mg, Phos daily  - Ambulate pm of surgery and qid, PT/OT consult  - Perioperative mefoxin x24 hours  - Wound care ostomy consult  - Social work consult re: discharge planning  - Heparin TID  - Drains: maintain JAC until <70cc/shift remove prior to discharge, maintain bilateral ureteral stents  - restarted home meds  - Prophylaxis: IS, SCDs, GI ppx

## 2017-06-23 NOTE — PT/OT/SLP PROGRESS
"Physical Therapy  Treatment    Juan Manuel Koehler   MRN: [id number]   Admitting Diagnosis: Bladder cancer s/p radical cystectomy     PT Received On: 06/23/17  PT Start Time: 1340     PT Stop Time: 1403    PT Total Time (min): 23 min       Billable Minutes:  Gait [id number] and Therapeutic Exercise 8    Treatment Type: Treatment  PT/PTA: PT             General Precautions: Standard, fall  Orthopedic Precautions:     Braces:           Subjective:  Communicated with RN prior to session. RN disconnected IV prior to gait  "I still feel weak."    Pain/Comfort  Pain Rating 1:  (did not rate)  Pain Rating Post-Intervention 1:  (did not rate)    Objective:   Patient found with: peripheral IV (RN disconnected for gait, bolanos catheter, JAC drain L abdomen)  Patient found sitting up in chair.  He reported completing 2 walks already today using RW.  Patient performed LE exercises in sitting to prepare for gait.  SIt to stand CGA with patient c/o not feeling right. /74 no orthostatic hypotension.  Patient nervous but agreed to attempt gait with HHA.  Gait 200 feet min assist (hand held).  Therapist educated patient and wife on progressing to gait without assistive device.     Functional Mobility:  Bed Mobility:    NT    Transfers:   Sit to stand CGA 2* c/o light headedness    Gait:    200 feet min assist (hand held assist) with no LOB. Pt demonstrated significantly improved posture with HHA as compared to RW yesterday.  He continues with decreased step length and decreased gait speed 2* post-op pain and soreness.  No trembling observed during gait today.     Therapeutic Activities and Exercises:  Sitting LE exercises to progress mobility and prepare for gait trial.   · Ankle pumps  · LAQ  · Marching  · Resisted Hip ABD  · Hip ADD    AM-PAC 6 CLICK MOBILITY  How much help from another person does this patient currently need?   1 = Unable, Total/Dependent Assistance  2 = A lot, Maximum/Moderate Assistance  3 = A little, " Minimum/Contact Guard/Supervision  4 = None, Modified [place]/Independent    Turning over in bed (including adjusting bedclothes, sheets and blankets)?: 3  Sitting down on and standing up from a chair with arms (e.g., wheelchair, bedside commode, etc.): 3  Moving from lying on back to sitting on the side of the bed?: 3  Moving to and from a bed to a chair (including a wheelchair)?: 3  Need to walk in hospital room?: 3  Climbing 3-5 steps with a railing?: 3  Total Score: 18    AM-PAC Raw Score CMS G-Code Modifier Level of Impairment Assistance   6 % Total / Unable   7 - 9 CM 80 - 100% Maximal Assist   10 - 14 CL 60 - 80% Moderate Assist   15 - 19 CK 40 - 60% Moderate Assist   20 - 22 CJ 20 - 40% Minimal Assist   23 CI 1-20% SBA / CGA   24 CH 0% Independent/ Mod I     Patient left up in chair with all lines intact, call button in reach and wife present.    Assessment:  Juan Manuel oKehler is a 67 y.o. male with a medical diagnosis of Bladder cancer and presents with decreased functional mobility and below deficits.  He is participating well in therapy and in walking program with RW and assist.  During therapy today, he demonstrated improved erect posture when ambulating without RW.  Patient is safe to ambulate without RW using hand held assist from nursing or family (not wife 2* mobility difficulties).  Pt is safe to ambulate with wife using RW.  He continue to benefit from skilled PT services to progress to PLOF.     Rehab identified problem list/impairments: Rehab identified problem list/impairments: impaired functional mobilty, impaired skin, pain, gait instability    Rehab potential is excellent.    Activity tolerance: Good    Discharge recommendations: Discharge Facility/Level Of Care Needs: home health PT     Barriers to discharge: Barriers to Discharge: None    Equipment recommendations: Equipment Needed After Discharge: none     GOALS:    Physical Therapy Goals        Problem: Physical Therapy Goal     Goal Priority Disciplines Outcome Goal Variances Interventions   Physical Therapy Goal     PT/OT, PT Ongoing (interventions implemented as appropriate)     Description:  Goals to be met by: 2017     Patient will increase functional independence with mobility by performin. Pt will be stand by assist with bed mobility.   2. Pt will be stand by assist with sit to stand  3. Pt will be stand by assist with bed to chair transfer  4. Pt will be stand by assist with gait x 200 feet with no assistive device  5. Pt will be stand by assist to ascend/descend 1 flight of stairs in order to access living environment                       PLAN:    Patient to be seen 4 x/week  to address the above listed problems via gait training, therapeutic activities, therapeutic exercises  Plan of Care expires: 17  Plan of Care reviewed with: patient, spouse         Candy Vargas, PT  2017

## 2017-06-23 NOTE — SUBJECTIVE & OBJECTIVE
Interval History:   No acute events overnight  Complaining of nausea, no vomiting  Ambulating well   Pain controlled    Review of Systems  Objective:     Temp:  [96 °F (35.6 °C)-98.4 °F (36.9 °C)] 96.8 °F (36 °C)  Pulse:  [73-93] 93  Resp:  [16-18] 16  SpO2:  [95 %-100 %] 97 %  BP: (113-168)/(56-74) 168/74     Body mass index is 18.23 kg/m².            Drains     Drain                 Ureteral Drain/Stent 19436 days         Nephrostomy 05/05/17 1503 Right 8 Fr. 48 days         Nephrostomy 05/05/17 1504 Left 8 Fr. 48 days         Urostomy 06/21/17 ileal conduit RLQ 2 days         Closed/Suction Drain 06/21/17 1221 Abdomen Bulb 19 Fr. 1 day         Ureteral Drain/Stent 06/21/17 1053 Left ureter 6 Fr. 1 day         Ureteral Drain/Stent 06/21/17 1054 Right ureter 6 Fr. 1 day                Physical Exam   Constitutional: No distress.   HENT:   Head: Normocephalic and atraumatic.   Eyes: Conjunctivae are normal. No scleral icterus.   Neck: Normal range of motion.   Cardiovascular: Normal rate.    Pulmonary/Chest: Effort normal. No respiratory distress.   Abdominal: Soft. He exhibits no distension. There is tenderness (appropriate). There is no rebound and no guarding.   Urostomy p/p/p draining clear yellow urine  Ureteral stents in place  JAC draining SS  Bilateral neph tubes in place   Musculoskeletal: Normal range of motion.   SCDs in place   Neurological: He is alert.   Skin: Skin is warm and dry. He is not diaphoretic.     Psychiatric: He has a normal mood and affect.       Significant Labs:    BMP:    Recent Labs  Lab 06/21/17  1258 06/22/17  0406 06/22/17  1212    140 138   K 5.2* 6.2* 4.9    110 108   CO2 23 21* 16*   BUN 27* 29* 29*   CREATININE 2.7* 1.9* 1.8*   CALCIUM 8.4* 8.7 8.9       CBC:     Recent Labs  Lab 06/21/17  1158 06/21/17  1258 06/22/17  0406   WBC  --  10.53 8.37   HGB  --  8.5* 7.7*   HCT 17* 26.6* 24.2*   PLT  --  644* 558*       All pertinent labs results from the past 24 hours have  been reviewed.    Significant Imaging:  KUB: dilated loops of bowel. ileus vs SBO

## 2017-06-23 NOTE — PROGRESS NOTES
Urostomy lesson with patient and his wife   Reviewed steps to appliance change and taught how to carefully remove pouch.   Pt observed stoma, stents but refused to cut out appliance.  Wife agreed to cut out pouch and participated in pouch change.demonstated how to apply and incorporate stents without getting urine on the adhesive.   If patient remains inpatient, will have another lesson on Monday.   Venkatesh Mathur RN,CWON  [phone]

## 2017-06-23 NOTE — PLAN OF CARE
KRISTEL spoke with the CM. The pt will dc early next week with HH and iv infusion.  KRISTEL sent the referral to N through [facility].  KRISTEL will f/u as needed.    Suzanne Johnson, [phone]

## 2017-06-23 NOTE — PLAN OF CARE
Problem: Physical Therapy Goal  Goal: Physical Therapy Goal  Goals to be met by: 2017     Patient will increase functional independence with mobility by performin. Pt will be stand by assist with bed mobility.   2. Pt will be stand by assist with sit to stand  3. Pt will be stand by assist with bed to chair transfer  4. Pt will be stand by assist with gait x 200 feet with no assistive device  5. Pt will be stand by assist to ascend/descend 1 flight of stairs in order to access living environment      Outcome: Ongoing (interventions implemented as appropriate)  Pt progressing toward goals. POC and goals remain appropriate.

## 2017-06-24 LAB
ANION GAP SERPL CALC-SCNC: 6 MMOL/L
BASOPHILS # BLD AUTO: 0.02 K/UL
BASOPHILS NFR BLD: 0.3 %
BUN SERPL-MCNC: 19 MG/DL
CALCIUM SERPL-MCNC: 8.7 MG/DL
CHLORIDE SERPL-SCNC: 107 MMOL/L
CO2 SERPL-SCNC: 24 MMOL/L
CREAT SERPL-MCNC: 1.3 MG/DL
DIFFERENTIAL METHOD: ABNORMAL
EOSINOPHIL # BLD AUTO: 0.1 K/UL
EOSINOPHIL NFR BLD: 1.6 %
ERYTHROCYTE [DISTWIDTH] IN BLOOD BY AUTOMATED COUNT: 14.3 %
EST. GFR  (AFRICAN AMERICAN): >60 ML/MIN/1.73 M^2
EST. GFR  (NON AFRICAN AMERICAN): 56.1 ML/MIN/1.73 M^2
GLUCOSE SERPL-MCNC: 95 MG/DL
HCT VFR BLD AUTO: 26.9 %
HGB BLD-MCNC: 8.9 G/DL
LYMPHOCYTES # BLD AUTO: 0.7 K/UL
LYMPHOCYTES NFR BLD: 10.8 %
MAGNESIUM SERPL-MCNC: 1.4 MG/DL
MCH RBC QN AUTO: 28.3 PG
MCHC RBC AUTO-ENTMCNC: 33.1 %
MCV RBC AUTO: 86 FL
MONOCYTES # BLD AUTO: 0.7 K/UL
MONOCYTES NFR BLD: 10.4 %
NEUTROPHILS # BLD AUTO: 5.1 K/UL
NEUTROPHILS NFR BLD: 75.7 %
PHOSPHATE SERPL-MCNC: 3.3 MG/DL
PLATELET # BLD AUTO: 530 K/UL
PMV BLD AUTO: 7.9 FL
POTASSIUM SERPL-SCNC: 4.5 MMOL/L
RBC # BLD AUTO: 3.14 M/UL
SODIUM SERPL-SCNC: 137 MMOL/L
WBC # BLD AUTO: 6.75 K/UL

## 2017-06-24 PROCEDURE — 25000003 PHARM REV CODE 250: Performed by: STUDENT IN AN ORGANIZED HEALTH CARE EDUCATION/TRAINING PROGRAM

## 2017-06-24 PROCEDURE — 25000003 PHARM REV CODE 250: Performed by: UROLOGY

## 2017-06-24 PROCEDURE — 63600175 PHARM REV CODE 636 W HCPCS: Performed by: UROLOGY

## 2017-06-24 PROCEDURE — 83735 ASSAY OF MAGNESIUM: CPT

## 2017-06-24 PROCEDURE — C9113 INJ PANTOPRAZOLE SODIUM, VIA: HCPCS | Performed by: UROLOGY

## 2017-06-24 PROCEDURE — 36415 COLL VENOUS BLD VENIPUNCTURE: CPT

## 2017-06-24 PROCEDURE — 85025 COMPLETE CBC W/AUTO DIFF WBC: CPT

## 2017-06-24 PROCEDURE — 84100 ASSAY OF PHOSPHORUS: CPT

## 2017-06-24 PROCEDURE — 63600175 PHARM REV CODE 636 W HCPCS: Performed by: STUDENT IN AN ORGANIZED HEALTH CARE EDUCATION/TRAINING PROGRAM

## 2017-06-24 PROCEDURE — 11000001 HC ACUTE MED/SURG PRIVATE ROOM

## 2017-06-24 PROCEDURE — 80048 BASIC METABOLIC PNL TOTAL CA: CPT

## 2017-06-24 RX ORDER — LANOLIN ALCOHOL/MO/W.PET/CERES
800 CREAM (GRAM) TOPICAL DAILY
Status: DISCONTINUED | OUTPATIENT
Start: 2017-06-24 | End: 2017-06-26 | Stop reason: HOSPADM

## 2017-06-24 RX ADMIN — MAGNESIUM SULFATE HEPTAHYDRATE 1 G: 500 INJECTION, SOLUTION INTRAMUSCULAR; INTRAVENOUS at 03:06

## 2017-06-24 RX ADMIN — HEPARIN SODIUM 5000 UNITS: 5000 INJECTION, SOLUTION INTRAVENOUS; SUBCUTANEOUS at 10:06

## 2017-06-24 RX ADMIN — MAGNESIUM OXIDE TAB 400 MG (241.3 MG ELEMENTAL MG) 400 MG: 400 (241.3 MG) TAB at 08:06

## 2017-06-24 RX ADMIN — MAGNESIUM OXIDE TAB 400 MG (241.3 MG ELEMENTAL MG) 800 MG: 400 (241.3 MG) TAB at 02:06

## 2017-06-24 RX ADMIN — OXYCODONE HYDROCHLORIDE 10 MG: 5 TABLET ORAL at 07:06

## 2017-06-24 RX ADMIN — BISACODYL 10 MG: 10 SUPPOSITORY RECTAL at 08:06

## 2017-06-24 RX ADMIN — OXYCODONE HYDROCHLORIDE 10 MG: 5 TABLET ORAL at 05:06

## 2017-06-24 RX ADMIN — OXYCODONE HYDROCHLORIDE 10 MG: 5 TABLET ORAL at 10:06

## 2017-06-24 RX ADMIN — BACLOFEN 10 MG: 10 TABLET ORAL at 07:06

## 2017-06-24 RX ADMIN — HEPARIN SODIUM 5000 UNITS: 5000 INJECTION, SOLUTION INTRAVENOUS; SUBCUTANEOUS at 05:06

## 2017-06-24 RX ADMIN — ACETAMINOPHEN 1000 MG: 10 INJECTION, SOLUTION INTRAVENOUS at 05:06

## 2017-06-24 RX ADMIN — OXYCODONE HYDROCHLORIDE 10 MG: 5 TABLET ORAL at 11:06

## 2017-06-24 RX ADMIN — ALVIMOPAN 12 MG: 12 CAPSULE ORAL at 08:06

## 2017-06-24 RX ADMIN — BACLOFEN 10 MG: 10 TABLET ORAL at 01:06

## 2017-06-24 RX ADMIN — ONDANSETRON 8 MG: 2 INJECTION INTRAMUSCULAR; INTRAVENOUS at 08:06

## 2017-06-24 RX ADMIN — PANTOPRAZOLE SODIUM 40 MG: 40 INJECTION, POWDER, FOR SOLUTION INTRAVENOUS at 08:06

## 2017-06-24 RX ADMIN — BACLOFEN 10 MG: 10 TABLET ORAL at 10:06

## 2017-06-24 RX ADMIN — HEPARIN SODIUM 5000 UNITS: 5000 INJECTION, SOLUTION INTRAVENOUS; SUBCUTANEOUS at 01:06

## 2017-06-24 NOTE — PROGRESS NOTES
[facility]  Urology  Progress Note    Patient Name: Juan Manuel Koehler  MRN: [id number]  Admission Date: 6/21/2017  Hospital Length of Stay: 3 days  Code Status: Prior   Attending Provider: Villa Robles MD   Primary Care Physician: Hemant Sweeney MD    Subjective:     HPI:  Juan Manuel Koehler is a 67 y.o. male POD 2 s/p radical cystectomy with ileal conduit creation    Interval History:   No acute events overnight  Nausea improved, no vomiting  Ambulating well   Pain controlled  No flatus or BM    Review of Systems    Objective:     Temp:  [96.3 °F (35.7 °C)-98.6 °F (37 °C)] 97.7 °F (36.5 °C)  Pulse:  [71-86] 71  Resp:  [16-18] 16  SpO2:  [95 %-98 %] 98 %  BP: (140-156)/(70-82) 149/70     Body mass index is 18.23 kg/m².      Date 06/24/17 0700 - 06/25/17 0659   Shift 5967-6727 3427-0160 1293-9659 24 Hour Total   I  N  T  A  K  E   P.O. 120   120    I.V.  (mL/kg) 508.2  (7.9)   508.2  (7.9)    Shift Total  (mL/kg) 628.2  (9.8)   628.2  (9.8)   O  U  T  P  U  T   Urine  (mL/kg/hr) 250   250    Drains 170   170    Stool 2   2    Shift Total  (mL/kg) 422  (6.6)   422  (6.6)   Weight (kg) 64.4 64.4 64.4 64.4          Drains     Drain                 Ureteral Drain/Stent 18579 days         Nephrostomy 05/05/17 1503 Right 8 Fr. 48 days         Nephrostomy 05/05/17 1504 Left 8 Fr. 48 days         Urostomy 06/21/17 ileal conduit RLQ 2 days         Closed/Suction Drain 06/21/17 1221 Abdomen Bulb 19 Fr. 1 day         Ureteral Drain/Stent 06/21/17 1053 Left ureter 6 Fr. 1 day         Ureteral Drain/Stent 06/21/17 1054 Right ureter 6 Fr. 1 day                Physical Exam   Constitutional: No distress.   HENT:   Head: Normocephalic and atraumatic.   Eyes: Conjunctivae are normal. No scleral icterus.   Neck: Normal range of motion.   Cardiovascular: Normal rate.    Pulmonary/Chest: Effort normal. No respiratory distress.   Abdominal: Soft. He exhibits no distension. There is tenderness (appropriate).  There is no rebound and no guarding.   Urostomy p/p/p draining clear yellow urine  Ureteral stents in place  JAC draining SS  Bilateral neph tubes in place   Musculoskeletal: Normal range of motion.   SCDs in place   Neurological: He is alert.   Skin: Skin is warm and dry. He is not diaphoretic.     Psychiatric: He has a normal mood and affect.       Significant Labs:    BMP:    Recent Labs  Lab 06/22/17  1212 06/23/17  0553 06/24/17  0553    136 137   K 4.9 5.7* 4.5    105 107   CO2 16* 24 24   BUN 29* 22 19   CREATININE 1.8* 1.4 1.3   CALCIUM 8.9 8.9 8.7       CBC:     Recent Labs  Lab 06/22/17  0406 06/23/17  0553 06/24/17  0553   WBC 8.37 7.52 6.75   HGB 7.7* 8.7* 8.9*   HCT 24.2* 26.3* 26.9*   * 607* 530*       All pertinent labs results from the past 24 hours have been reviewed.    Significant Imaging:  KUB: dilated loops of bowel. ileus vs SBO            Assessment/Plan:     * Bladder cancer    - IV tylenol, PO oxycodone for pain control  - CLD   - Miralax, dulcolax TWICE DAILY  - continue entereg  - IVF at 84  - CBC, BMP, Mg, Phos daily-hypomagnesemia replace  - Ambulate pm of surgery and qid, PT/OT consult  - Perioperative mefoxin x24 hours  - Wound care ostomy consult  - Social work consult re: discharge planning  - Heparin TID  - Drains: maintain JAC until <70cc/shift remove prior to discharge, maintain bilateral ureteral stents  - restarted home meds  - Prophylaxis: IS, SCDs, GI ppx                VTE Risk Mitigation         Ordered     heparin (porcine) injection 5,000 Units  Every 8 hours     Route:  Subcutaneous        06/21/17 1258     Medium Risk of VTE  Once      06/21/17 1258     Place sequential compression device  Until discontinued      06/21/17 1258          Lizette Belcher MD  Urology  [facility]

## 2017-06-24 NOTE — PLAN OF CARE
Problem: Patient Care Overview  Goal: Plan of Care Review  Outcome: Ongoing (interventions implemented as appropriate)  Pt AAOX4, VSS. Surgical site CDI. JAC to bulb suction (moderate output overnight). Urostomy to bolanos bag, neph tubes intact. Pt c/o minimal pain and ambulates with assist X1. Pain managed with PRN meds. IS education complete. SCD's on and functioning. Fall precautions maintained. Will resume care.

## 2017-06-24 NOTE — ASSESSMENT & PLAN NOTE
- IV tylenol, PO oxycodone for pain control  - CLD   - Miralax, dulcolax TWICE DAILY  - continue entereg  - IVF at 84  - CBC, BMP, Mg, Phos daily-hypomagnesemia replace  - Ambulate pm of surgery and qid, PT/OT consult  - Perioperative mefoxin x24 hours  - Wound care ostomy consult  - Social work consult re: discharge planning  - Heparin TID  - Drains: maintain JAC until <70cc/shift remove prior to discharge, maintain bilateral ureteral stents  - restarted home meds  - Prophylaxis: IS, SCDs, GI ppx

## 2017-06-24 NOTE — PROGRESS NOTES
Patient ambulated in the halls with assistance from PCT.  NO acute distress noted at this time.  Will continue to monitor.

## 2017-06-24 NOTE — SUBJECTIVE & OBJECTIVE
Interval History:   No acute events overnight  Nausea improved, no vomiting  Ambulating well   Pain controlled  No flatus or BM    Review of Systems    Objective:     Temp:  [96.3 °F (35.7 °C)-98.6 °F (37 °C)] 97.7 °F (36.5 °C)  Pulse:  [71-86] 71  Resp:  [16-18] 16  SpO2:  [95 %-98 %] 98 %  BP: (140-156)/(70-82) 149/70     Body mass index is 18.23 kg/m².      Date 06/24/17 0700 - 06/25/17 0659   Shift 5271-2161 0488-3320 7748-7876 24 Hour Total   I  N  T  A  K  E   P.O. 120   120    I.V.  (mL/kg) 508.2  (7.9)   508.2  (7.9)    Shift Total  (mL/kg) 628.2  (9.8)   628.2  (9.8)   O  U  T  P  U  T   Urine  (mL/kg/hr) 250   250    Drains 170   170    Stool 2   2    Shift Total  (mL/kg) 422  (6.6)   422  (6.6)   Weight (kg) 64.4 64.4 64.4 64.4          Drains     Drain                 Ureteral Drain/Stent 81020 days         Nephrostomy 05/05/17 1503 Right 8 Fr. 48 days         Nephrostomy 05/05/17 1504 Left 8 Fr. 48 days         Urostomy 06/21/17 ileal conduit RLQ 2 days         Closed/Suction Drain 06/21/17 1221 Abdomen Bulb 19 Fr. 1 day         Ureteral Drain/Stent 06/21/17 1053 Left ureter 6 Fr. 1 day         Ureteral Drain/Stent 06/21/17 1054 Right ureter 6 Fr. 1 day                Physical Exam   Constitutional: No distress.   HENT:   Head: Normocephalic and atraumatic.   Eyes: Conjunctivae are normal. No scleral icterus.   Neck: Normal range of motion.   Cardiovascular: Normal rate.    Pulmonary/Chest: Effort normal. No respiratory distress.   Abdominal: Soft. He exhibits no distension. There is tenderness (appropriate). There is no rebound and no guarding.   Urostomy p/p/p draining clear yellow urine  Ureteral stents in place  JAC draining SS  Bilateral neph tubes in place   Musculoskeletal: Normal range of motion.   SCDs in place   Neurological: He is alert.   Skin: Skin is warm and dry. He is not diaphoretic.     Psychiatric: He has a normal mood and affect.       Significant Labs:    BMP:    Recent Labs  Lab  06/22/17  1212 06/23/17  0553 06/24/17  0553    136 137   K 4.9 5.7* 4.5    105 107   CO2 16* 24 24   BUN 29* 22 19   CREATININE 1.8* 1.4 1.3   CALCIUM 8.9 8.9 8.7       CBC:     Recent Labs  Lab 06/22/17  0406 06/23/17  0553 06/24/17  0553   WBC 8.37 7.52 6.75   HGB 7.7* 8.7* 8.9*   HCT 24.2* 26.3* 26.9*   * 607* 530*       All pertinent labs results from the past 24 hours have been reviewed.    Significant Imaging:  KUB: dilated loops of bowel. ileus vs SBO

## 2017-06-24 NOTE — PLAN OF CARE
Problem: Patient Care Overview  Goal: Plan of Care Review  Patient post op urostomy, UO good today, stoma pink and budded.  NS remains at 84cc/hr.  Patient ambulating in the halls.  2 BM noted today.  Midline noted with dermabond no ss of infection.  Pain controlled per po pain medication.

## 2017-06-25 LAB
ANION GAP SERPL CALC-SCNC: 10 MMOL/L
BASOPHILS # BLD AUTO: 0.04 K/UL
BASOPHILS NFR BLD: 0.6 %
BODY FLUID SOURCE, CREATININE: NORMAL
BUN SERPL-MCNC: 16 MG/DL
CALCIUM SERPL-MCNC: 8.8 MG/DL
CHLORIDE SERPL-SCNC: 105 MMOL/L
CO2 SERPL-SCNC: 24 MMOL/L
CREAT FLD-MCNC: 0.9 MG/DL
CREAT SERPL-MCNC: 1.1 MG/DL
DIFFERENTIAL METHOD: ABNORMAL
EOSINOPHIL # BLD AUTO: 0.1 K/UL
EOSINOPHIL NFR BLD: 1.1 %
ERYTHROCYTE [DISTWIDTH] IN BLOOD BY AUTOMATED COUNT: 14.1 %
EST. GFR  (AFRICAN AMERICAN): >60 ML/MIN/1.73 M^2
EST. GFR  (NON AFRICAN AMERICAN): >60 ML/MIN/1.73 M^2
GLUCOSE SERPL-MCNC: 92 MG/DL
HCT VFR BLD AUTO: 26.2 %
HGB BLD-MCNC: 8.8 G/DL
LYMPHOCYTES # BLD AUTO: 0.7 K/UL
LYMPHOCYTES NFR BLD: 10.1 %
MAGNESIUM SERPL-MCNC: 1.5 MG/DL
MCH RBC QN AUTO: 28.6 PG
MCHC RBC AUTO-ENTMCNC: 33.6 %
MCV RBC AUTO: 85 FL
MONOCYTES # BLD AUTO: 0.5 K/UL
MONOCYTES NFR BLD: 6.9 %
NEUTROPHILS # BLD AUTO: 5.6 K/UL
NEUTROPHILS NFR BLD: 79.5 %
PHOSPHATE SERPL-MCNC: 3.3 MG/DL
PLATELET # BLD AUTO: 545 K/UL
PMV BLD AUTO: 8.4 FL
POTASSIUM SERPL-SCNC: 4.2 MMOL/L
RBC # BLD AUTO: 3.08 M/UL
SODIUM SERPL-SCNC: 139 MMOL/L
WBC # BLD AUTO: 7.1 K/UL

## 2017-06-25 PROCEDURE — 25000003 PHARM REV CODE 250: Performed by: UROLOGY

## 2017-06-25 PROCEDURE — 63600175 PHARM REV CODE 636 W HCPCS: Performed by: UROLOGY

## 2017-06-25 PROCEDURE — 11000001 HC ACUTE MED/SURG PRIVATE ROOM

## 2017-06-25 PROCEDURE — 36415 COLL VENOUS BLD VENIPUNCTURE: CPT

## 2017-06-25 PROCEDURE — 83735 ASSAY OF MAGNESIUM: CPT

## 2017-06-25 PROCEDURE — 97530 THERAPEUTIC ACTIVITIES: CPT

## 2017-06-25 PROCEDURE — C9113 INJ PANTOPRAZOLE SODIUM, VIA: HCPCS | Performed by: UROLOGY

## 2017-06-25 PROCEDURE — 63600175 PHARM REV CODE 636 W HCPCS: Performed by: STUDENT IN AN ORGANIZED HEALTH CARE EDUCATION/TRAINING PROGRAM

## 2017-06-25 PROCEDURE — 80048 BASIC METABOLIC PNL TOTAL CA: CPT

## 2017-06-25 PROCEDURE — 85025 COMPLETE CBC W/AUTO DIFF WBC: CPT

## 2017-06-25 PROCEDURE — 97116 GAIT TRAINING THERAPY: CPT

## 2017-06-25 PROCEDURE — 25000003 PHARM REV CODE 250: Performed by: STUDENT IN AN ORGANIZED HEALTH CARE EDUCATION/TRAINING PROGRAM

## 2017-06-25 PROCEDURE — 84100 ASSAY OF PHOSPHORUS: CPT

## 2017-06-25 PROCEDURE — 82570 ASSAY OF URINE CREATININE: CPT

## 2017-06-25 RX ORDER — ENOXAPARIN SODIUM 100 MG/ML
40 INJECTION SUBCUTANEOUS EVERY 24 HOURS
Status: DISCONTINUED | OUTPATIENT
Start: 2017-06-25 | End: 2017-06-26 | Stop reason: HOSPADM

## 2017-06-25 RX ADMIN — OXYCODONE HYDROCHLORIDE 10 MG: 5 TABLET ORAL at 02:06

## 2017-06-25 RX ADMIN — BACLOFEN 10 MG: 10 TABLET ORAL at 09:06

## 2017-06-25 RX ADMIN — HEPARIN SODIUM 5000 UNITS: 5000 INJECTION, SOLUTION INTRAVENOUS; SUBCUTANEOUS at 05:06

## 2017-06-25 RX ADMIN — BACLOFEN 10 MG: 10 TABLET ORAL at 02:06

## 2017-06-25 RX ADMIN — ONDANSETRON 8 MG: 2 INJECTION INTRAMUSCULAR; INTRAVENOUS at 02:06

## 2017-06-25 RX ADMIN — BACLOFEN 10 MG: 10 TABLET ORAL at 05:06

## 2017-06-25 RX ADMIN — PANTOPRAZOLE SODIUM 40 MG: 40 INJECTION, POWDER, FOR SOLUTION INTRAVENOUS at 09:06

## 2017-06-25 RX ADMIN — OXYCODONE HYDROCHLORIDE 10 MG: 5 TABLET ORAL at 06:06

## 2017-06-25 NOTE — PLAN OF CARE
Problem: Physical Therapy Goal  Goal: Physical Therapy Goal  Goals to be met by: 2017     Patient will increase functional independence with mobility by performin. Pt will be stand by assist with bed mobility.   2. Pt will be stand by assist with sit to stand  3. Pt will be stand by assist with bed to chair transfer  4. Pt will be stand by assist with gait x 200 feet with no assistive device  5. Pt will be stand by assist to ascend/descend 1 flight of stairs in order to access living environment      Outcome: Ongoing (interventions implemented as appropriate)  Pt is progressing towards goals. All goals remain appropriate at this time.     Tyesha José DPT, PT  2017

## 2017-06-25 NOTE — SUBJECTIVE & OBJECTIVE
Interval History:   NAEON  Pain well controlled  Tolerating CLD  BM x 2 yesterday  Ambulated in halls x 2 yesterday    Review of Systems  Objective:     Temp:  [97.7 °F (36.5 °C)-99 °F (37.2 °C)] 99 °F (37.2 °C)  Pulse:  [71-95] 82  Resp:  [16-18] 16  SpO2:  [94 %-99 %] 95 %  BP: (145-156)/(67-82) 153/81     Body mass index is 18.23 kg/m².            Drains     Drain                 Ureteral Drain/Stent 33321 days         Nephrostomy 05/05/17 1503 Right 8 Fr. 50 days         Nephrostomy 05/05/17 1504 Left 8 Fr. 50 days         Urostomy 06/21/17 ileal conduit RLQ 4 days         Closed/Suction Drain 06/21/17 1221 Abdomen Bulb 19 Fr. 3 days         Ureteral Drain/Stent 06/21/17 1053 Left ureter 6 Fr. 3 days         Ureteral Drain/Stent 06/21/17 1054 Right ureter 6 Fr. 3 days                Physical Exam   Constitutional: No distress.   HENT:   Head: Normocephalic and atraumatic.   Eyes: Conjunctivae are normal. No scleral icterus.   Neck: Normal range of motion.   Cardiovascular: Normal rate.    Pulmonary/Chest: Effort normal. No respiratory distress.   Abdominal: Soft. He exhibits no distension. There is tenderness (appropriate). There is no rebound and no guarding.   Urostomy p/p/p draining clear yellow urine  Ureteral stents in place  JAC draining SS  Bilateral neph tubes in place   Musculoskeletal: Normal range of motion.   SCDs in place   Neurological: He is alert.   Skin: Skin is warm and dry. He is not diaphoretic.     Psychiatric: He has a normal mood and affect.       Significant Labs:    BMP:    Recent Labs  Lab 06/23/17  0553 06/24/17  0553 06/25/17  0358    137 139   K 5.7* 4.5 4.2    107 105   CO2 24 24 24   BUN 22 19 16   CREATININE 1.4 1.3 1.1   CALCIUM 8.9 8.7 8.8       CBC:     Recent Labs  Lab 06/23/17  0553 06/24/17  0553 06/25/17  0358   WBC 7.52 6.75 7.10   HGB 8.7* 8.9* 8.8*   HCT 26.3* 26.9* 26.2*   * 530* 545*       All pertinent labs results from the past 24 hours have been  reviewed.    Significant Imaging:  All pertinent imaging results/findings from the past 24 hours have been reviewed.

## 2017-06-25 NOTE — PLAN OF CARE
Problem: Patient Care Overview  Goal: Plan of Care Review  Outcome: Ongoing (interventions implemented as appropriate)  Pt alert and oriented to name, , place and situation. Pt bed in lowest position with call light within reach. Safety precautions maintained. No falls observed or reported, at this time. Pt pain assessed and managed. Pt nausea and vomiting assessed and managed. Pt assisted with ambulation in the smith x 2. Activity encouraged.  SCDs in place. Pt urostomy intact and draining without difficulty.  Will continue to monitor

## 2017-06-25 NOTE — PT/OT/SLP PROGRESS
Physical Therapy  Treatment    Juan Manuel Koehler   MRN: [id number]   Admitting Diagnosis: Bladder cancer    PT Received On: 06/25/17  PT Start Time: 1412     PT Stop Time: 1440    PT Total Time (min): 28 min       Billable Minutes:  Gait Training 15 minutes and Therapeutic Activity 13 minutes    Treatment Type: Treatment        PTA Visit Number: 0       General Precautions: Standard, fall  Orthopedic Precautions: N/A   Braces: N/A    Subjective:  Communicated with OWEN Winn prior to session.  Pt agreeable to PT session although experiencing increase pain and nausea. RN present to provide medication.     Pain/Comfort  Pain Rating 1: 7/10  Location - Side 1: Bilateral  Location - Orientation 1: lower  Location 1: abdomen  Pain Addressed 1: Reposition, Distraction, Pre-medicate for activity, Nurse notified  Pain Rating Post-Intervention 1: 7/10  Pain Rating 2: 7/10  Location - Side 2: Bilateral  Location - Orientation 2: posterior  Location 2: back  Pain Addressed 2: Reposition, Distraction  Pain Rating Post-Intervention 2: 7/10    Objective:   Patient found with: peripheral IV, bolanos catheter (IV not connected to monitor; JAC drain )    Functional Mobility:  Bed Mobility:   Rolling/Turning Right: Contact guard assistance, With side rail  Scooting/Bridging: Stand by Assistance (anterior scoot towards EOB)  Supine to Sit: Contact Guard Assistance (from R SL position.)  Sit to Supine:  (Activity did not occur; Pt UIC)    Transfers:  Sit <> Stand Assistance: Stand By Assistance  Sit <> Stand Assistive Device: No Assistive Device  Bed <> Chair Technique: Stand Pivot  Bed <> Chair Assistance: Stand By Assistance  Bed <> Chair Assistive Device: No Assistive Device    Gait:   Gait Distance: 400' total. Pt ambulated 200' with CGA and no AD + 200' with SBA and no AD. Pt experienced no SOB while on room air Pt experienced 1 minor LOB which required CGA to recover. Verbal cues for upright posture.   Assistance 1: Stand by  "Assistance, Contact Guard Assistance  Gait Assistive Device: No device  Gait Pattern: reciprocal  Gait Deviation(s): decreased step length, decreased stride length, increased time in double stance, forward lean    Stairs:Pt refused stairs on this date stating "I can't today. I'm in too much pain. Maybe tomorrow"    Balance:   Static Sit: GOOD: Takes MODERATE challenges from all directions  Dynamic Sit: GOOD: Maintains balance through MODERATE excursions of active trunk movement  Static Stand: GOOD-: Takes MODERATE challenges from all directions inconsistently  Dynamic stand: FAIR+: Needs CLOSE SUPERVISION during gait and is able to right self with minor LOB     Therapeutic Activities and Exercises:  Therapeutic activities aimed to increase pt's independence, safety, and efficiency with bed mobility and functional transfers. See above for assistance levels.   · Static sitting balance x 5 minutes with supervision secondary to patient experiencing N/V. Verbal cues for upright posture. Pt able to don on gown with set up assistance. Stand balance x 1 minute with CGA while don on undergarments.      Gait training performed to increase pt's endurance, gait stability, safety, and independence.     AM-PAC 6 CLICK MOBILITY  How much help from another person does this patient currently need?   1 = Unable, Total/Dependent Assistance  2 = A lot, Maximum/Moderate Assistance  3 = A little, Minimum/Contact Guard/Supervision  4 = None, Modified [place]/Independent    Turning over in bed (including adjusting bedclothes, sheets and blankets)?: 3  Sitting down on and standing up from a chair with arms (e.g., wheelchair, bedside commode, etc.): 3  Moving from lying on back to sitting on the side of the bed?: 3  Moving to and from a bed to a chair (including a wheelchair)?: 3  Need to walk in hospital room?: 3  Climbing 3-5 steps with a railing?: 3  Total Score: 18    AM-PAC Raw Score CMS G-Code Modifier Level of Impairment " Assistance   6 % Total / Unable   7 - 9 CM 80 - 100% Maximal Assist   10 - 14 CL 60 - 80% Moderate Assist   15 - 19 CK 40 - 60% Moderate Assist   20 - 22 CJ 20 - 40% Minimal Assist   23 CI 1-20% SBA / CGA   24 CH 0% Independent/ Mod I     Patient left up in chair with all lines intact, call button in reach and wife present.    Assessment:  Juan Manuel Koehler is a 67 y.o. male with a medical diagnosis of Bladder cancer and presents with problems listed below. Pt progressing towards goals, but not at PLOF. Pt tolerated session well but required motivation for full participation secondary to increase pain and nausea.  Pt is improving with therapy evidenced by ambulating 400' total with SBA/CGA using no AD. Pt would benefit from continued PT services to improve overall functional mobility. Recommend d/c to Home to maximize functional independence.    Rehab identified problem list/impairments: Rehab identified problem list/impairments: impaired functional mobilty, gait instability, pain, impaired skin    Rehab potential is good.    Activity tolerance: Good    Discharge recommendations: Discharge Facility/Level Of Care Needs: home health PT     Barriers to discharge: Barriers to Discharge: None    Equipment recommendations: Equipment Needed After Discharge: none     GOALS:    Physical Therapy Goals        Problem: Physical Therapy Goal    Goal Priority Disciplines Outcome Goal Variances Interventions   Physical Therapy Goal     PT/OT, PT Ongoing (interventions implemented as appropriate)     Description:  Goals to be met by: 2017     Patient will increase functional independence with mobility by performin. Pt will be stand by assist with bed mobility.   2. Pt will be stand by assist with sit to stand  3. Pt will be stand by assist with bed to chair transfer  4. Pt will be stand by assist with gait x 200 feet with no assistive device  5. Pt will be stand by assist to ascend/descend 1 flight of stairs in  order to access living environment                       PLAN:    Patient to be seen 4 x/week  to address the above listed problems via gait training, therapeutic activities, therapeutic exercises  Plan of Care expires: 07/22/17  Plan of Care reviewed with: patient      Tyesha José, PT  06/25/2017

## 2017-06-25 NOTE — ASSESSMENT & PLAN NOTE
- IV tylenol, PO oxycodone for pain control  - Advance to regular diet  - D/c entereg  - Miralax, dulcolax TWICE DAILY  - SLIV  - Consult PICC team for PICC/midline for home IVF  - B NT removal tomorrow  - JAC Cr today - will remove prior to d/c if normal  - CBC, BMP, Mg, Phos daily-hypomagnesemia replace  - Ambulate qid, PT/OT consult  - Wound care ostomy consult  - Social work consult re: discharge planning  - lovenox  - Drains: maintain JAC until <70cc/shift remove prior to discharge, maintain bilateral ureteral stents  - restarted home meds  - Prophylaxis: IS, SCDs, GI ppx  - likely discharge home tomorrow 6/26

## 2017-06-25 NOTE — PROGRESS NOTES
[facility]  Urology  Progress Note    Patient Name: Juan Manuel Koehler  MRN: [id number]  Admission Date: 6/21/2017  Hospital Length of Stay: 4 days  Code Status: Prior   Attending Provider: Villa Robles MD   Primary Care Physician: Hemant Sweeney MD    Subjective:     HPI:  Juan Manuel Koehler is a 67 y.o. male POD 2 s/p radical cystectomy with ileal conduit creation    Interval History:   NAEON  Pain well controlled  Tolerating CLD  BM x 2 yesterday  Ambulated in halls x 2 yesterday    Review of Systems  Objective:     Temp:  [97.7 °F (36.5 °C)-99 °F (37.2 °C)] 99 °F (37.2 °C)  Pulse:  [71-95] 82  Resp:  [16-18] 16  SpO2:  [94 %-99 %] 95 %  BP: (145-156)/(67-82) 153/81     Body mass index is 18.23 kg/m².            Drains     Drain                 Ureteral Drain/Stent 32418 days         Nephrostomy 05/05/17 1503 Right 8 Fr. 50 days         Nephrostomy 05/05/17 1504 Left 8 Fr. 50 days         Urostomy 06/21/17 ileal conduit RLQ 4 days         Closed/Suction Drain 06/21/17 1221 Abdomen Bulb 19 Fr. 3 days         Ureteral Drain/Stent 06/21/17 1053 Left ureter 6 Fr. 3 days         Ureteral Drain/Stent 06/21/17 1054 Right ureter 6 Fr. 3 days                Physical Exam   Constitutional: No distress.   HENT:   Head: Normocephalic and atraumatic.   Eyes: Conjunctivae are normal. No scleral icterus.   Neck: Normal range of motion.   Cardiovascular: Normal rate.    Pulmonary/Chest: Effort normal. No respiratory distress.   Abdominal: Soft. He exhibits no distension. There is tenderness (appropriate). There is no rebound and no guarding.   Urostomy p/p/p draining clear yellow urine  Ureteral stents in place  JAC draining SS  Bilateral neph tubes in place   Musculoskeletal: Normal range of motion.   SCDs in place   Neurological: He is alert.   Skin: Skin is warm and dry. He is not diaphoretic.     Psychiatric: He has a normal mood and affect.       Significant Labs:    BMP:    Recent Labs  Lab  06/23/17  0553 06/24/17  0553 06/25/17  0358    137 139   K 5.7* 4.5 4.2    107 105   CO2 24 24 24   BUN 22 19 16   CREATININE 1.4 1.3 1.1   CALCIUM 8.9 8.7 8.8       CBC:     Recent Labs  Lab 06/23/17  0553 06/24/17  0553 06/25/17  0358   WBC 7.52 6.75 7.10   HGB 8.7* 8.9* 8.8*   HCT 26.3* 26.9* 26.2*   * 530* 545*       All pertinent labs results from the past 24 hours have been reviewed.    Significant Imaging:  All pertinent imaging results/findings from the past 24 hours have been reviewed.                  Assessment/Plan:     * Bladder cancer    - IV tylenol, PO oxycodone for pain control  - Advance to regular diet  - D/c entereg  - Miralax, dulcolax TWICE DAILY  - SLIV  - Consult PICC team for PICC/midline for home IVF  - B NT removal tomorrow  - JAC Cr today - will remove prior to d/c if normal  - CBC, BMP, Mg, Phos daily-hypomagnesemia replace  - Ambulate qid, PT/OT consult  - Wound care ostomy consult  - Social work consult re: discharge planning  - lovenox  - Drains: maintain JAC until <70cc/shift remove prior to discharge, maintain bilateral ureteral stents  - restarted home meds  - Prophylaxis: IS, SCDs, GI ppx  - likely discharge home tomorrow 6/26                VTE Risk Mitigation         Ordered     enoxaparin injection 40 mg  Daily     Route:  Subcutaneous        06/25/17 0936     Medium Risk of VTE  Once      06/21/17 1258     Place sequential compression device  Until discontinued      06/21/17 1258          Rakan Wilhelm MD  Urology  [facility]

## 2017-06-26 VITALS
TEMPERATURE: 98 F | SYSTOLIC BLOOD PRESSURE: 151 MMHG | HEART RATE: 85 BPM | DIASTOLIC BLOOD PRESSURE: 74 MMHG | WEIGHT: 142 LBS | RESPIRATION RATE: 17 BRPM | HEIGHT: 74 IN | BODY MASS INDEX: 18.22 KG/M2 | OXYGEN SATURATION: 98 %

## 2017-06-26 DIAGNOSIS — C67.8 MALIGNANT NEOPLASM OF OVERLAPPING SITES OF BLADDER: Primary | ICD-10-CM

## 2017-06-26 LAB
ANION GAP SERPL CALC-SCNC: 10 MMOL/L
ANISOCYTOSIS BLD QL SMEAR: SLIGHT
BASOPHILS NFR BLD: 0 %
BUN SERPL-MCNC: 17 MG/DL
CALCIUM SERPL-MCNC: 8.7 MG/DL
CHLORIDE SERPL-SCNC: 104 MMOL/L
CO2 SERPL-SCNC: 24 MMOL/L
CREAT SERPL-MCNC: 1.1 MG/DL
DIFFERENTIAL METHOD: ABNORMAL
EOSINOPHIL NFR BLD: 2 %
ERYTHROCYTE [DISTWIDTH] IN BLOOD BY AUTOMATED COUNT: 14.1 %
EST. GFR  (AFRICAN AMERICAN): >60 ML/MIN/1.73 M^2
EST. GFR  (NON AFRICAN AMERICAN): >60 ML/MIN/1.73 M^2
GLUCOSE SERPL-MCNC: 91 MG/DL
HCT VFR BLD AUTO: 25.7 %
HGB BLD-MCNC: 8.7 G/DL
HYPOCHROMIA BLD QL SMEAR: ABNORMAL
LYMPHOCYTES NFR BLD: 7 %
MAGNESIUM SERPL-MCNC: 1.4 MG/DL
MCH RBC QN AUTO: 28.5 PG
MCHC RBC AUTO-ENTMCNC: 33.9 %
MCV RBC AUTO: 84 FL
MONOCYTES NFR BLD: 9 %
MYELOCYTES NFR BLD MANUAL: 1 %
NEUTROPHILS NFR BLD: 81 %
OVALOCYTES BLD QL SMEAR: ABNORMAL
PHOSPHATE SERPL-MCNC: 3.3 MG/DL
PLATELET # BLD AUTO: 521 K/UL
PLATELET BLD QL SMEAR: ABNORMAL
PMV BLD AUTO: 8.5 FL
POIKILOCYTOSIS BLD QL SMEAR: SLIGHT
POLYCHROMASIA BLD QL SMEAR: ABNORMAL
POTASSIUM SERPL-SCNC: 3.4 MMOL/L
RBC # BLD AUTO: 3.05 M/UL
SODIUM SERPL-SCNC: 138 MMOL/L
WBC # BLD AUTO: 6.92 K/UL

## 2017-06-26 PROCEDURE — 85027 COMPLETE CBC AUTOMATED: CPT

## 2017-06-26 PROCEDURE — 80048 BASIC METABOLIC PNL TOTAL CA: CPT

## 2017-06-26 PROCEDURE — 85007 BL SMEAR W/DIFF WBC COUNT: CPT

## 2017-06-26 PROCEDURE — 83735 ASSAY OF MAGNESIUM: CPT

## 2017-06-26 PROCEDURE — 97116 GAIT TRAINING THERAPY: CPT

## 2017-06-26 PROCEDURE — 25000003 PHARM REV CODE 250: Performed by: UROLOGY

## 2017-06-26 PROCEDURE — 84100 ASSAY OF PHOSPHORUS: CPT

## 2017-06-26 PROCEDURE — 76937 US GUIDE VASCULAR ACCESS: CPT

## 2017-06-26 PROCEDURE — 36415 COLL VENOUS BLD VENIPUNCTURE: CPT

## 2017-06-26 PROCEDURE — 36569 INSJ PICC 5 YR+ W/O IMAGING: CPT

## 2017-06-26 PROCEDURE — 63600175 PHARM REV CODE 636 W HCPCS: Performed by: STUDENT IN AN ORGANIZED HEALTH CARE EDUCATION/TRAINING PROGRAM

## 2017-06-26 PROCEDURE — C1751 CATH, INF, PER/CENT/MIDLINE: HCPCS

## 2017-06-26 PROCEDURE — 25000003 PHARM REV CODE 250: Performed by: STUDENT IN AN ORGANIZED HEALTH CARE EDUCATION/TRAINING PROGRAM

## 2017-06-26 RX ORDER — POLYETHYLENE GLYCOL 3350 17 G/17G
17 POWDER, FOR SOLUTION ORAL DAILY PRN
Qty: 10 PACKET | Refills: 2 | Status: SHIPPED | OUTPATIENT
Start: 2017-06-26 | End: 2017-07-13

## 2017-06-26 RX ORDER — OXYCODONE AND ACETAMINOPHEN 5; 325 MG/1; MG/1
1 TABLET ORAL EVERY 4 HOURS PRN
Qty: 51 TABLET | Refills: 0 | Status: ON HOLD | OUTPATIENT
Start: 2017-06-26 | End: 2017-07-06

## 2017-06-26 RX ORDER — POLYETHYLENE GLYCOL 3350 17 G/17G
17 POWDER, FOR SOLUTION ORAL DAILY
Qty: 30 PACKET | Refills: 0 | Status: ON HOLD | OUTPATIENT
Start: 2017-06-26 | End: 2017-08-23 | Stop reason: HOSPADM

## 2017-06-26 RX ADMIN — OXYCODONE HYDROCHLORIDE 10 MG: 5 TABLET ORAL at 09:06

## 2017-06-26 RX ADMIN — MAGNESIUM OXIDE TAB 400 MG (241.3 MG ELEMENTAL MG) 800 MG: 400 (241.3 MG) TAB at 09:06

## 2017-06-26 RX ADMIN — OXYCODONE HYDROCHLORIDE 10 MG: 5 TABLET ORAL at 01:06

## 2017-06-26 RX ADMIN — BACLOFEN 10 MG: 10 TABLET ORAL at 01:06

## 2017-06-26 RX ADMIN — ONDANSETRON 8 MG: 2 INJECTION INTRAMUSCULAR; INTRAVENOUS at 04:06

## 2017-06-26 RX ADMIN — BACLOFEN 10 MG: 10 TABLET ORAL at 05:06

## 2017-06-26 NOTE — PLAN OF CARE
Problem: Patient Care Overview  Goal: Individualization & Mutuality  Patient demonstrated a urostomy pouch change successfully. Needs assistance manipulating around the urinary stents.

## 2017-06-26 NOTE — PLAN OF CARE
KRISTEL spoke with PHN.  They have set the pt up with [facility] ([phone]) who will see him tomorrow.  Becky will teach shortly and deliver the fluids to the house.    Suzanne Johnson, [phone]

## 2017-06-26 NOTE — ASSESSMENT & PLAN NOTE
- IV tylenol, PO oxycodone for pain control  - Regular diet  - D/c entereg  - Miralax, dulcolax TWICE DAILY  - SLIV  - Consult PICC team for PICC/midline for home IVF  - B NT removal as outpatient  - JAC Cr normal, will remove today  - CBC, BMP, Mg, Phos daily  - Ambulate qid, PT/OT consult  - Wound care ostomy consult  - Social work consult re: discharge planning  - lovenox  - restarted home meds  - Prophylaxis: IS, SCDs, GI ppx    DC home today

## 2017-06-26 NOTE — PROGRESS NOTES
[facility]  Urology  Progress Note    Patient Name: Juan Manuel Koehler  MRN: [id number]  Admission Date: 6/21/2017  Hospital Length of Stay: 5 days  Code Status: Prior   Attending Provider: Villa Robles MD   Primary Care Physician: Hemant Sweeney MD    Subjective:     HPI:  Juan Manuel Koehler is a 67 y.o. male POD 5 s/p radical cystectomy with ileal conduit creation    Interval History:   No acute events overnight  Pain controlled  Denies nausea  Ambulating well  Tolerating regular diet    Review of Systems  Objective:     Temp:  [98 °F (36.7 °C)-98.6 °F (37 °C)] 98.6 °F (37 °C)  Pulse:  [72-79] 73  Resp:  [16-18] 16  SpO2:  [94 %-98 %] 97 %  BP: (143-163)/(71-83) 143/83     Body mass index is 18.23 kg/m².            Drains     Drain                 Ureteral Drain/Stent 33666 days         Nephrostomy 05/05/17 1503 Right 8 Fr. 51 days         Nephrostomy 05/05/17 1504 Left 8 Fr. 51 days         Urostomy 06/21/17 ileal conduit RLQ 5 days         Closed/Suction Drain 06/21/17 1221 Abdomen Bulb 19 Fr. 4 days         Ureteral Drain/Stent 06/21/17 1053 Left ureter 6 Fr. 4 days         Ureteral Drain/Stent 06/21/17 1054 Right ureter 6 Fr. 4 days                Physical Exam   Constitutional: No distress.   HENT:   Head: Normocephalic and atraumatic.   Eyes: Conjunctivae are normal. No scleral icterus.   Neck: Normal range of motion.   Cardiovascular: Normal rate.    Pulmonary/Chest: Effort normal. No respiratory distress.   Abdominal: Soft. He exhibits no distension. There is tenderness (appropriate). There is no rebound and no guarding.   Urostomy p/p/p draining clear yellow urine  Ureteral stents in place  JAC draining SS  Bilateral neph tubes in place   Musculoskeletal: Normal range of motion.   SCDs in place   Neurological: He is alert.   Skin: Skin is warm and dry. He is not diaphoretic.     Psychiatric: He has a normal mood and affect.       Significant Labs:    BMP:    Recent Labs  Lab  06/24/17  0553 06/25/17  0358 06/26/17  0419    139 138   K 4.5 4.2 3.4*    105 104   CO2 24 24 24   BUN 19 16 17   CREATININE 1.3 1.1 1.1   CALCIUM 8.7 8.8 8.7       CBC:     Recent Labs  Lab 06/24/17  0553 06/25/17  0358 06/26/17  0419   WBC 6.75 7.10 6.92   HGB 8.9* 8.8* 8.7*   HCT 26.9* 26.2* 25.7*   * 545* 521*       All pertinent labs results from the past 24 hours have been reviewed.    Significant Imaging:  All pertinent imaging results/findings from the past 24 hours have been reviewed.                  Assessment/Plan:     * Bladder cancer    - IV tylenol, PO oxycodone for pain control  - Regular diet  - D/c entereg  - Miralax, dulcolax TWICE DAILY  - SLIV  - Consult PICC team for PICC/midline for home IVF  - B NT removal as outpatient  - JAC Cr normal, will remove today  - CBC, BMP, Mg, Phos daily  - Ambulate qid, PT/OT consult  - Wound care ostomy consult  - Social work consult re: discharge planning  - lovenox  - restarted home meds  - Prophylaxis: IS, SCDs, GI ppx    DC home today                VTE Risk Mitigation         Ordered     enoxaparin injection 40 mg  Daily     Route:  Subcutaneous        06/25/17 0936     Medium Risk of VTE  Once      06/21/17 1258     Place sequential compression device  Until discontinued      06/21/17 1258          Kiley Hwang MD  Urology  [facility]

## 2017-06-26 NOTE — PLAN OF CARE
SYDNIE called [facility] at [address], [facility] [place]. Store # [phone] and spoke with Stephy. SYDNIE called in lovenox prescription. Cost of lovenox is $10. CM informed them to refill. SYDNIE called patient's wife Darlene/wife [phone] and informed her. She is agreeable to pay the $10. SYDNIE informed her only called that medication in. Awaiting to hear back from [facility] for IVF provider and HH. Sydnie informed her once SW knows she will place in AVS.

## 2017-06-26 NOTE — CONSULTS
Midline placed in right brachial vein,[id number] catheter length. Lot #[id number]. Used real time ultrasound. Image recorded and saved.

## 2017-06-26 NOTE — NURSING
Pt ready for discharge. Discharge instructions and prescriptions given and explained to patient. Patient verbalizes understanding. PIV removed. Midline in place. No further questions from pt. Pt to be discharged via wheelchair. Will cont to monitor.

## 2017-06-26 NOTE — SUBJECTIVE & OBJECTIVE
Interval History:   No acute events overnight  Pain controlled  Denies nausea  Ambulating well  Tolerating regular diet    Review of Systems  Objective:     Temp:  [98 °F (36.7 °C)-98.6 °F (37 °C)] 98.6 °F (37 °C)  Pulse:  [72-79] 73  Resp:  [16-18] 16  SpO2:  [94 %-98 %] 97 %  BP: (143-163)/(71-83) 143/83     Body mass index is 18.23 kg/m².            Drains     Drain                 Ureteral Drain/Stent 70325 days         Nephrostomy 05/05/17 1503 Right 8 Fr. 51 days         Nephrostomy 05/05/17 1504 Left 8 Fr. 51 days         Urostomy 06/21/17 ileal conduit RLQ 5 days         Closed/Suction Drain 06/21/17 1221 Abdomen Bulb 19 Fr. 4 days         Ureteral Drain/Stent 06/21/17 1053 Left ureter 6 Fr. 4 days         Ureteral Drain/Stent 06/21/17 1054 Right ureter 6 Fr. 4 days                Physical Exam   Constitutional: No distress.   HENT:   Head: Normocephalic and atraumatic.   Eyes: Conjunctivae are normal. No scleral icterus.   Neck: Normal range of motion.   Cardiovascular: Normal rate.    Pulmonary/Chest: Effort normal. No respiratory distress.   Abdominal: Soft. He exhibits no distension. There is tenderness (appropriate). There is no rebound and no guarding.   Urostomy p/p/p draining clear yellow urine  Ureteral stents in place  JAC draining SS  Bilateral neph tubes in place   Musculoskeletal: Normal range of motion.   SCDs in place   Neurological: He is alert.   Skin: Skin is warm and dry. He is not diaphoretic.     Psychiatric: He has a normal mood and affect.       Significant Labs:    BMP:    Recent Labs  Lab 06/24/17  0553 06/25/17  0358 06/26/17  0419    139 138   K 4.5 4.2 3.4*    105 104   CO2 24 24 24   BUN 19 16 17   CREATININE 1.3 1.1 1.1   CALCIUM 8.7 8.8 8.7       CBC:     Recent Labs  Lab 06/24/17  0553 06/25/17  0358 06/26/17  0419   WBC 6.75 7.10 6.92   HGB 8.9* 8.8* 8.7*   HCT 26.9* 26.2* 25.7*   * 545* 521*       All pertinent labs results from the past 24 hours have been  reviewed.    Significant Imaging:  All pertinent imaging results/findings from the past 24 hours have been reviewed.

## 2017-06-26 NOTE — PLAN OF CARE
Problem: Physical Therapy Goal  Goal: Physical Therapy Goal  Goals to be met by: 2017     Patient will increase functional independence with mobility by performin. Pt will be stand by assist with bed mobility. - MET  2. Pt will be stand by assist with sit to stand- MET  3. Pt will be stand by assist with bed to chair transfer  4. Pt will be stand by assist with gait x 200 feet with no assistive device- met with RW  5. Pt will be stand by assist to ascend/descend 1 flight of stairs in order to access living environment      Outcome: Unable to achieve outcome(s) by discharge  D/c planned today. Pt has partially met goals. He is safe to d/c home with family assist.

## 2017-06-26 NOTE — PT/OT/SLP PROGRESS
"Physical Therapy  Treatment and Discharge    Juan Manuel Koehler   MRN: [id number]   Admitting Diagnosis: Bladder cancer s/p radical cystectomy     PT Received On: 06/26/17  PT Start Time: 1510     PT Stop Time: 1525    PT Total Time (min): 15 min       Billable Minutes:  Gait Training 10 and Therapeutic Activity 5     Treatment Type: Treatment  PT/PTA: PT     PTA Visit Number: 0       General Precautions: Standard, fall  Orthopedic Precautions: N/A   Braces:           Subjective:  Pt being discharged this afternoon.   "I feel like I am getting diarrhea. I want to go home but I may need to take care of this first."    Pain/Comfort  Pain Rating 1: 3/10  Pain Addressed 1: Distraction  Pain Rating Post-Intervention 1: 3/10    Objective:   Patient found with:  (urostomy drain)  Patient found sitting up in chair.  He agreed to participate in therapy prior to discharge.  Patient stood to katie gown with SBA.  Standing balance good, but patient demonstrated minimal unsteadiness with gait unassisted.  Gait performed with RW with improved stability.  Therapist assisted patient to transfer onto comm[facility] the [place] with CGA to manage clothing.  Therapist instructed patient in use of call bell in bathroom and contacted RN to notify her.     Functional Mobility:  Bed Mobility:    NT    Transfers:  Sit <> Stand Assistance: Stand By Assistance  Bed <> Chair Assistance: Contact Guard Assistance without RW, SBA with RW  Toilet Transfer Assistance: Contact Guard Assistance    Gait:   Gait Distance: Ambulation attempted with no AD and no external support.  Pt ambulated 25 feet but demonstrated decreased gait speed, high guard and unsteadiness.  Pt reported he has a RW at home. Pt ambulated 200 feet SBA with RW and increased stability.     Therapeutic Activities and Exercises:  Therapist instructed patient's wife in the appropriate walker height and how to adjust home walker if needed.      AM-PAC 6 CLICK MOBILITY  How much help from another person " does this patient currently need?   1 = Unable, Total/Dependent Assistance  2 = A lot, Maximum/Moderate Assistance  3 = A little, Minimum/Contact Guard/Supervision  4 = None, Modified [place]/Independent    Turning over in bed (including adjusting bedclothes, sheets and blankets)?: 4  Sitting down on and standing up from a chair with arms (e.g., wheelchair, bedside commode, etc.): 4  Moving from lying on back to sitting on the side of the bed?: 4  Moving to and from a bed to a chair (including a wheelchair)?: 3  Need to walk in hospital room?: 3  Climbing 3-5 steps with a railing?: 3  Total Score: 21    AM-PAC Raw Score CMS G-Code Modifier Level of Impairment Assistance   6 % Total / Unable   7 - 9 CM 80 - 100% Maximal Assist   10 - 14 CL 60 - 80% Moderate Assist   15 - 19 CK 40 - 60% Moderate Assist   20 - 22 CJ 20 - 40% Minimal Assist   23 CI 1-20% SBA / CGA   24 CH 0% Independent/ Mod I     Patient left on commode with all lines intact, call button in reach, RN notified and wife present.    Assessment:  Juan Manuel Koehler is a 67 y.o. male with a medical diagnosis of Bladder cancer and presents with continued deficits with gait.  Patient is slow to progress gait.  He is ambulating 200+ feet but demonstrates instability when not using RW.  Gait stability improves with RW.  Patient is safe to d/c home with RW and wife to assist.  Recommend HH PT to progress to PLOF.     Rehab identified problem list/impairments: Rehab identified problem list/impairments: gait instability, impaired functional mobilty, pain, impaired skin    Rehab potential is good.    Activity tolerance: Good    Discharge recommendations: Discharge Facility/Level Of Care Needs: home health PT     Barriers to discharge: Barriers to Discharge: None    Equipment recommendations: Equipment Needed After Discharge:  (RW, patient states he owns)     GOALS:    Physical Therapy Goals        Problem: Physical Therapy Goal    Goal Priority  Disciplines Outcome Goal Variances Interventions   Physical Therapy Goal     PT/OT, PT Unable to achieve outcome(s) by discharge     Description:  Goals to be met by: 2017     Patient will increase functional independence with mobility by performin. Pt will be stand by assist with bed mobility. - MET  2. Pt will be stand by assist with sit to stand- MET  3. Pt will be stand by assist with bed to chair transfer  4. Pt will be stand by assist with gait x 200 feet with no assistive device- met with RW  5. Pt will be stand by assist to ascend/descend 1 flight of stairs in order to access living environment                        PLAN:    Patient to be seen 4 x/week  to address the above listed problems via gait training, therapeutic activities, therapeutic exercises  Plan of Care expires: 17  Plan of Care reviewed with: patient, spouse         Candy Vargas, PT  2017

## 2017-06-26 NOTE — PLAN OF CARE
PHN has assigned IVF to [place].  They are still working on the HH.  The pt might dc today.  Cielo at [facility] will come to the hospital this afternoon to teach the pt.  SW will f/u this afternoon.    Suzanne Johnson, KEVIN x [phone]

## 2017-06-27 ENCOUNTER — TELEPHONE (OUTPATIENT)
Dept: HEMATOLOGY/ONCOLOGY | Facility: CLINIC | Age: 67
End: 2017-06-27

## 2017-06-27 ENCOUNTER — TELEPHONE (OUTPATIENT)
Dept: UROLOGY | Facility: CLINIC | Age: 67
End: 2017-06-27

## 2017-06-27 DIAGNOSIS — N13.5 BILATERAL URETERAL OBSTRUCTION: Primary | ICD-10-CM

## 2017-06-27 NOTE — TELEPHONE ENCOUNTER
Returned call to patient's HH nurse, jack, who is calling to state patient was DC'd from hospital to skilled nursing last night, and they need someone to follow his care and are requesting dr lopez to do so. Basically, all HH orders will come to dr lopez for signature.    Message routed to dr lopez (are you OK with this?)

## 2017-06-27 NOTE — TELEPHONE ENCOUNTER
----- Message from Dafne Frankel sent at 6/27/2017  2:41 PM CDT -----  Contact: Fe Cruz home health nurse [phone]  Fe she has questions about pt's PIC line. Fe is at the pt's home. Please call Fe

## 2017-06-27 NOTE — TELEPHONE ENCOUNTER
Advised SNF to have SNF MD to follow patient's HH orders, until patient is DC'd home---only then will dr lopez follow patient.  Nurse voiced understanding.

## 2017-06-27 NOTE — TELEPHONE ENCOUNTER
----- Message from Tala Marie sent at 6/27/2017  9:11 AM CDT -----  Dang with Home health would like the nurse to give her a call back. Says they received home health orders from sherrie bagley last night for the patient. Also says she needs a 6 week progress note on the patient. She can be reached at [phone].

## 2017-06-27 NOTE — PT/OT/SLP DISCHARGE
Occupational Therapy Discharge Summary    Juan Manuel Koehler  MRN: [id number]   Bladder cancer   Patient Discharged from acute Occupational Therapy on 6/26/17.  Please refer to prior OT note dated on 6/22/17 for functional status.     Assessment:   Patient appropriate for care in another setting.  GOALS:    Occupational Therapy Goals     Not on file          Multidisciplinary Problems (Resolved)        Problem: Occupational Therapy Goal    Goal Priority Disciplines Outcome Interventions   Occupational Therapy Goal   (Resolved)     OT, PT/OT Outcome(s) achieved    Description:  Goals to be met by: within the next 5 sessions    Patient will increase functional independence with ADLs by performing:    Grooming while standing at sink with Modified [place].  Toileting from toilet with Supervision for hygiene and clothing management.   Stand pivot transfers with [place].  Toilet transfer to toilet with Supervision.                    Reasons for Discontinuation of Therapy Services  Transfer to alternate level of care.      Plan:  Patient Discharged to: Home with Home Health Service.

## 2017-06-28 ENCOUNTER — TELEPHONE (OUTPATIENT)
Dept: HEMATOLOGY/ONCOLOGY | Facility: CLINIC | Age: 67
End: 2017-06-28

## 2017-06-28 NOTE — TELEPHONE ENCOUNTER
----- Message from Sammy Knowles sent at 6/28/2017  1:48 PM CDT -----  Contact: [facility]   Patient needs PA for medication (megestrol (MEGACE) 400 mg/10 mL (40 mg/mL) Susp)      [phone] (fax)

## 2017-06-29 ENCOUNTER — TELEPHONE (OUTPATIENT)
Dept: HEMATOLOGY/ONCOLOGY | Facility: CLINIC | Age: 67
End: 2017-06-29

## 2017-06-29 NOTE — TELEPHONE ENCOUNTER
Spoke with [facility].  Informed gonzález megace is a new medication for patient and the benefits outweigh the risks.  González voiced understanding.

## 2017-06-29 NOTE — TELEPHONE ENCOUNTER
----- Message from Lenard Gracia sent at 6/29/2017  8:36 AM CDT -----  [facility]   Taisha states she need additional info on prior authorization that was sent on behalf of pt     Can be reached at [phone]

## 2017-06-30 NOTE — DISCHARGE SUMMARY
[facility]  Urology  Discharge Summary      Patient Name: Juan Manuel Koehler  MRN: [id number]  Admission Date: 6/21/2017  Hospital Length of Stay: 5 days  Discharge Date and Time: 5/26/2017  Attending Physician: Dr. Villa Robles  Discharging Provider: Lizette Belcher MD  Primary Care Physician: Hemant Sweeney MD    HPI: Juan Manuel Koehler is a 67 y.o. male  With [id number] urothelial cell carcinoma of the bladder.       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     He recently presented to Dr. Boucher for hematuria and was found to have a large bladder tumor on his trigone. The tumor was also causing bilateral ureteral obstruction with resulting ELDER.     The patient underwent tumor resection and attempted JJ stent placement on 4/5/17 which demonstrated cT2 urothelial carcinoma (nested variant). Bilateral nephrostomy tubes were placed with improvement in the patient's renal function. IR was able to internalize the patient's left nephrostomy tube, however after internalization, the patient's sCr evelio.      He has received one dose of neoadjuvant chemotherapy however his creatinine has continued to rise and is now 2.1. He currently has bilateral nephrostomy tubes and bilateral stents in place.     TURBT (4/5/17) - T2, nested variant    Procedure(s) (LRB):  CYSTECTOMY-RADICAL/OPEN WITH BILATERAL PELVIC LYMPH NODE DISSECTION AND CREATION OF ILEAL CONDUIT (N/A)     Indwelling Lines/Drains at time of discharge:   Lines/Drains/Airways     Drain                 Ureteral Drain/Stent 43149 days         Nephrostomy 05/05/17 1503 Right 8 Fr. 55 days         Nephrostomy 05/05/17 1504 Left 8 Fr. 55 days         Closed/Suction Drain 06/21/17 1221 Abdomen Bulb 19 Fr. 9 days         Ureteral Drain/Stent 06/21/17 1053 Left ureter 6 Fr. 9 days         Ureteral Drain/Stent 06/21/17 1054 Right ureter 6 Fr. 9 days         Urostomy 06/21/17 ileal  conduit RLQ 9 days                Hospital Course (synopsis of major diagnoses, care, treatment, and services provided during the course of the hospital stay): Patient was brought to the hospital for the above procedure.  The patient tolerated it well.  Post op, the patient's diet was advanced, their pain was controlled, and the patient was ambulating without problem.  Their drain was removed.  His nephrostomy tubes were left in place.  The patient will follow up on 7/6 with Dr. Robles for antegrade nephrostogram and removal.  The patient was given prescriptions for percocet/miralax.      Consults:   Consults         Status Ordering Provider     Inpatient consult to PICC team (NIAS)  Once     Provider:  (Not yet assigned)    Completed MAMADOU GUTIERREZ          Significant Diagnostic Studies: none    Pending Diagnostic Studies:     None          Final Active Diagnoses:    Diagnosis Date Noted POA    PRINCIPAL PROBLEM:  Bladder cancer [C67.9] 06/21/2017 Yes    Malignant neoplasm of trigone of urinary bladder [C67.0] 06/21/2017 Yes    Nausea [R11.0] 05/06/2017 Yes    Leukocytosis [D72.829]  Yes    Hydronephrosis [N13.30] 04/20/2017 Yes    ELDER (acute kidney injury) [N17.9] 04/07/2017 Yes    Hyperkalemia [E87.5] 04/07/2017 Yes    Difficult intubation [T88.4XXA] 04/06/2017 Yes    Malignant neoplasm of urinary bladder [C67.9] 04/05/2017 Yes    CKD (chronic kidney disease) [N18.9] 04/05/2017 Yes      Problems Resolved During this Admission:    Diagnosis Date Noted Date Resolved POA       Discharged Condition: good    Disposition: Home or Self Care    Follow Up:  Follow-up Information     [facility].    Specialty:  Home Health Services  Why:  [facility] - [phone], The home health nurse will see you tomorrow.           [facility].    Specialties:  Pharmacist, DME Provider, IV Infusion  Why:  Infusion - Please call them when you get home and they will deliver the IV fluids to the  house.  Contact information:  [address] [place]  [address]  [facility] [address] [phone]             Villa Robles MD On 7/6/2017.    Specialty:  Urology  Why:  nephrostomy tube removal  Contact information:  [address] FLORES  [facility] [address] [phone]                 Patient Instructions:     Diet general     Other restrictions (specify):   Scheduling Instructions: No lifting more than 10 pounds for 6 weeks.  No driving while taking pain medications.     Call MD for:  temperature >100.4     Call MD for:  persistent nausea and vomiting or diarrhea     Call MD for:  severe uncontrolled pain       Medications:  Reconciled Home Medications:   Discharge Medication List as of 6/26/2017  4:07 PM      START taking these medications    Details   0.9 % SODIUM CHLORIDE (SODIUM CHLORIDE 0.9%) 0.9 % solution Inject 1,000 mLs into the vein every Mon, Wed, Fri., Starting Fri 6/23/2017, Until Thu 7/6/2017, Print      enoxaparin (LOVENOX) 40 mg/0.4 mL Syrg Inject 0.4 mLs (40 mg total) into the skin once daily at 6am., Starting Fri 6/23/2017, Until Sun 7/23/2017, Print      oxycodone-acetaminophen (PERCOCET) 5-325 mg per tablet Take 1 tablet by mouth every 4 (four) hours as needed for Pain., Starting Mon 6/26/2017, Print         CONTINUE these medications which have CHANGED    Details   !! polyethylene glycol (GLYCOLAX) 17 gram PwPk Take 17 g by mouth daily as needed (for constipation)., Starting Mon 6/26/2017, Print      !! polyethylene glycol (GLYCOLAX) 17 gram PwPk Take 17 g by mouth once daily., Starting Mon 6/26/2017, Print       !! - Potential duplicate medications found. Please discuss with provider.      CONTINUE these medications which have NOT CHANGED    Details   acetaminophen (TYLENOL) 500 MG tablet Take 1,000 mg by mouth every 6 (six) hours as needed for Pain., Until Discontinued, Historical Med      baclofen (LIORESAL) 10 MG tablet Take 1 tablet (10 mg total) by mouth 3 (three) times daily., Starting  Tue 6/6/2017, Until Wed 6/6/2018, Normal      cholecalciferol, vitamin D3, 400 unit Cap Take 3 capsules (1,200 Units total) by mouth once daily., Starting 5/8/2017, Until Sun 8/6/17, OTC      cyproheptadine (PERIACTIN) 4 mg tablet Take 1 tablet (4 mg total) by mouth 3 (three) times daily as needed., Starting 5/16/2017, Until Discontinued, Normal      docusate sodium (COLACE) 100 MG capsule Take 1 capsule (100 mg total) by mouth 2 (two) times daily., Starting 5/8/2017, Until Discontinued, OTC      ketoconazole (NIZORAL) 2 % shampoo USE TO WASH AFFECTED AREA ONCE DAILY, Historical Med      megestrol (MEGACE) 400 mg/10 mL (40 mg/mL) Susp Take 10 mLs (400 mg total) by mouth once daily., Starting 5/16/2017, Until u 6/15/17, Normal      ondansetron (ZOFRAN-ODT) 4 MG TbDL Take 1 tablet (4 mg total) by mouth every 8 (eight) hours as needed., Starting 5/8/2017, Until Discontinued, Print      oxycodone (OXY-IR) 5 mg Cap Take 1 capsule (5 mg total) by mouth every 4 (four) hours as needed for Pain., Starting Fri 6/9/2017, Normal      sodium polystyrene (KAYEXALATE) powder Starting 5/2/2017, Until Discontinued, Historical Med         STOP taking these medications       ampicillin (PRINCIPEN) 500 MG capsule Comments:   Reason for Stopping:               Time spent on the discharge of patient: 20 minutes    Lizette Belcher MD  Urology  [facility]

## 2017-07-06 ENCOUNTER — SURGERY (OUTPATIENT)
Age: 67
End: 2017-07-06

## 2017-07-06 ENCOUNTER — HOSPITAL ENCOUNTER (OUTPATIENT)
Facility: HOSPITAL | Age: 67
Discharge: HOME | End: 2017-07-06
Attending: UROLOGY | Admitting: UROLOGY
Payer: MEDICARE

## 2017-07-06 VITALS
BODY MASS INDEX: 17.97 KG/M2 | WEIGHT: 140 LBS | RESPIRATION RATE: 20 BRPM | SYSTOLIC BLOOD PRESSURE: 129 MMHG | HEIGHT: 74 IN | HEART RATE: 84 BPM | TEMPERATURE: 99 F | DIASTOLIC BLOOD PRESSURE: 69 MMHG | OXYGEN SATURATION: 99 %

## 2017-07-06 DIAGNOSIS — C67.9 BLADDER CANCER: ICD-10-CM

## 2017-07-06 PROCEDURE — 25500020 PHARM REV CODE 255: Performed by: UROLOGY

## 2017-07-06 PROCEDURE — 50389 REMOVE RENAL TUBE W/FLUORO: CPT | Mod: 50,58,, | Performed by: UROLOGY

## 2017-07-06 PROCEDURE — 36000704 HC OR TIME LEV I 1ST 15 MIN: Performed by: UROLOGY

## 2017-07-06 PROCEDURE — 50431 NJX PX NFROSGRM &/URTRGRM: CPT | Mod: 50,59,58, | Performed by: UROLOGY

## 2017-07-06 RX ORDER — OXYCODONE AND ACETAMINOPHEN 5; 325 MG/1; MG/1
1 TABLET ORAL EVERY 4 HOURS PRN
Qty: 40 TABLET | Refills: 0 | Status: ON HOLD | OUTPATIENT
Start: 2017-07-06 | End: 2017-08-23 | Stop reason: HOSPADM

## 2017-07-06 RX ORDER — ONDANSETRON 8 MG/1
8 TABLET, ORALLY DISINTEGRATING ORAL EVERY 6 HOURS PRN
Qty: 30 TABLET | Refills: 0 | Status: SHIPPED | OUTPATIENT
Start: 2017-07-06 | End: 2017-07-12

## 2017-07-06 RX ORDER — OXYCODONE AND ACETAMINOPHEN 5; 325 MG/1; MG/1
1 TABLET ORAL EVERY 4 HOURS PRN
Qty: 21 TABLET | Refills: 0 | Status: ON HOLD | OUTPATIENT
Start: 2017-07-06 | End: 2017-08-23 | Stop reason: HOSPADM

## 2017-07-06 RX ORDER — ONDANSETRON 8 MG/1
8 TABLET, ORALLY DISINTEGRATING ORAL EVERY 6 HOURS PRN
Qty: 30 TABLET | Refills: 1 | Status: SHIPPED | OUTPATIENT
Start: 2017-07-06 | End: 2018-05-10

## 2017-07-06 RX ADMIN — IOHEXOL 50 ML: 300 INJECTION, SOLUTION INTRAVENOUS at 11:07

## 2017-07-06 NOTE — DISCHARGE SUMMARY
[facility]  Discharge Note  Short Stay    Admit Date: 7/6/2017    Discharge Date and Time: 07/06/2017    Attending Physician: Villa Robles MD     Discharge Provider: Kiley Hwang    Diagnoses:  Active Hospital Problems    Diagnosis  POA    *Bladder cancer [C67.9]  Yes    Nausea [R11.0]  Yes    Hydronephrosis [N13.30]  Yes      Resolved Hospital Problems    Diagnosis Date Resolved POA   No resolved problems to display.       Discharged Condition: good    Hospital Course: Patient was admitted for an outpatient procedure and tolerated the procedure well with no complications.    Final Diagnoses: Same as principal problem.    Disposition: Home or Self Care    Follow up/Patient Instructions:    Medications:  Reconciled Home Medications:   Current Discharge Medication List      CONTINUE these medications which have CHANGED    Details   !! ondansetron (ZOFRAN-ODT) 8 MG TbDL Take 1 tablet (8 mg total) by mouth every 6 (six) hours as needed (nausea).  Qty: 30 tablet, Refills: 1      !! ondansetron (ZOFRAN-ODT) 8 MG TbDL Take 1 tablet (8 mg total) by mouth every 6 (six) hours as needed.  Qty: 30 tablet, Refills: 0      !! oxycodone-acetaminophen (PERCOCET) 5-325 mg per tablet Take 1 tablet by mouth every 4 (four) hours as needed for Pain.  Qty: 40 tablet, Refills: 0      !! oxycodone-acetaminophen (PERCOCET) 5-325 mg per tablet Take 1 tablet by mouth every 4 (four) hours as needed for Pain.  Qty: 21 tablet, Refills: 0       !! - Potential duplicate medications found. Please discuss with provider.      CONTINUE these medications which have NOT CHANGED    Details   0.9 % SODIUM CHLORIDE (SODIUM CHLORIDE 0.9%) 0.9 % solution Inject 1,000 mLs into the vein every Mon, Wed, Fri.  Qty: 6000 mL, Refills: 0      cholecalciferol, vitamin D3, 400 unit Cap Take 3 capsules (1,200 Units total) by mouth once daily.  Qty: 90 capsule, Refills: 0      enoxaparin (LOVENOX) 40 mg/0.4 mL Syrg Inject 0.4 mLs (40 mg total)  into the skin once daily at 6am.  Qty: 12 mL, Refills: 0      ketoconazole (NIZORAL) 2 % shampoo USE TO WASH AFFECTED AREA ONCE DAILY  Refills: 3      megestrol (MEGACE) 400 mg/10 mL (40 mg/mL) Susp Take 10 mLs (400 mg total) by mouth once daily.  Qty: 240 mL, Refills: 12    Associated Diagnoses: Anorexia      !! polyethylene glycol (GLYCOLAX) 17 gram PwPk Take 17 g by mouth daily as needed (for constipation).  Qty: 10 packet, Refills: 2      !! polyethylene glycol (GLYCOLAX) 17 gram PwPk Take 17 g by mouth once daily.  Qty: 30 packet, Refills: 0       !! - Potential duplicate medications found. Please discuss with provider.          Discharge Procedure Orders  Activity as tolerated     Remove dressing in 48 hours       Follow-up Information     Villa Robles MD On 7/12/2017.    Specialty:  Urology  Why:  path results  Contact information:  [facility] [address]  [phone]                   Kiley Hwang MD  Urology, PGY-3  Pager# [phone]

## 2017-07-06 NOTE — H&P (VIEW-ONLY)
[facility]  Urology  Progress Note    Patient Name: Juan Manuel Koehler  MRN: [id number]  Admission Date: 6/21/2017  Hospital Length of Stay: 5 days  Code Status: Prior   Attending Provider: Villa Robles MD   Primary Care Physician: Hemant Sweeney MD    Subjective:     HPI:  Juan Manuel Koehler is a 67 y.o. male POD 5 s/p radical cystectomy with ileal conduit creation    Interval History:   No acute events overnight  Pain controlled  Denies nausea  Ambulating well  Tolerating regular diet    Review of Systems  Objective:     Temp:  [98 °F (36.7 °C)-98.6 °F (37 °C)] 98.6 °F (37 °C)  Pulse:  [72-79] 73  Resp:  [16-18] 16  SpO2:  [94 %-98 %] 97 %  BP: (143-163)/(71-83) 143/83     Body mass index is 18.23 kg/m².            Drains     Drain                 Ureteral Drain/Stent 95141 days         Nephrostomy 05/05/17 1503 Right 8 Fr. 51 days         Nephrostomy 05/05/17 1504 Left 8 Fr. 51 days         Urostomy 06/21/17 ileal conduit RLQ 5 days         Closed/Suction Drain 06/21/17 1221 Abdomen Bulb 19 Fr. 4 days         Ureteral Drain/Stent 06/21/17 1053 Left ureter 6 Fr. 4 days         Ureteral Drain/Stent 06/21/17 1054 Right ureter 6 Fr. 4 days                Physical Exam   Constitutional: No distress.   HENT:   Head: Normocephalic and atraumatic.   Eyes: Conjunctivae are normal. No scleral icterus.   Neck: Normal range of motion.   Cardiovascular: Normal rate.    Pulmonary/Chest: Effort normal. No respiratory distress.   Abdominal: Soft. He exhibits no distension. There is tenderness (appropriate). There is no rebound and no guarding.   Urostomy p/p/p draining clear yellow urine  Ureteral stents in place  JAC draining SS  Bilateral neph tubes in place   Musculoskeletal: Normal range of motion.   SCDs in place   Neurological: He is alert.   Skin: Skin is warm and dry. He is not diaphoretic.     Psychiatric: He has a normal mood and affect.       Significant Labs:    BMP:    Recent Labs  Lab  06/24/17  0553 06/25/17  0358 06/26/17  0419    139 138   K 4.5 4.2 3.4*    105 104   CO2 24 24 24   BUN 19 16 17   CREATININE 1.3 1.1 1.1   CALCIUM 8.7 8.8 8.7       CBC:     Recent Labs  Lab 06/24/17  0553 06/25/17  0358 06/26/17  0419   WBC 6.75 7.10 6.92   HGB 8.9* 8.8* 8.7*   HCT 26.9* 26.2* 25.7*   * 545* 521*       All pertinent labs results from the past 24 hours have been reviewed.    Significant Imaging:  All pertinent imaging results/findings from the past 24 hours have been reviewed.                  Assessment/Plan:     * Bladder cancer    - IV tylenol, PO oxycodone for pain control  - Regular diet  - D/c entereg  - Miralax, dulcolax TWICE DAILY  - SLIV  - Consult PICC team for PICC/midline for home IVF  - B NT removal as outpatient  - JAC Cr normal, will remove today  - CBC, BMP, Mg, Phos daily  - Ambulate qid, PT/OT consult  - Wound care ostomy consult  - Social work consult re: discharge planning  - lovenox  - restarted home meds  - Prophylaxis: IS, SCDs, GI ppx    DC home today                VTE Risk Mitigation         Ordered     enoxaparin injection 40 mg  Daily     Route:  Subcutaneous        06/25/17 0936     Medium Risk of VTE  Once      06/21/17 1258     Place sequential compression device  Until discontinued      06/21/17 1258          Kiley Hwang MD  Urology  [facility]

## 2017-07-06 NOTE — INTERVAL H&P NOTE
The patient has been examined and the H&P has been reviewed:    I concur with the findings and no changes have occurred since H&P was written.    Anesthesia/Surgery risks, benefits and alternative options discussed and understood by patient/family.    Plan for bilateral neph tube removal today      There are no hospital problems to display for this patient.

## 2017-07-06 NOTE — OP NOTE
[facility]  Operative Note    Date: 07/06/2017    Pre-Op Diagnosis:    1. Invasive bladder cancer s/p radical cystoprostatectomy and creation of ileal conduit  2. History of bilateral ureteral obstruction with bilateral nephrostomy tubes in place    Patient Active Problem List   Diagnosis    Malignant neoplasm of urinary bladder    CKD (chronic kidney disease)    Difficult intubation    ELDER (acute kidney injury)    Hyperkalemia    Hydronephrosis    Leukocytosis    Nausea    Anemia of chronic disease    Bladder cancer    Malignant neoplasm of trigone of urinary bladder       Post-Op Diagnosis: same    Procedure(s) Performed:   1.  Antegrade nephrostogram  2.  Bilateral nephrostomy tube removal  3.  Fluoroscopy less than 1 hour    Specimen(s): none    Staff Surgeon: Villa Robles MD    Assistant Surgeon: Kiley Hwang MD    Anesthesia:  None    Indications: Juan Manuel Koehler is a 67 y.o. male with bladder cancer s/p cystectomy presenting for nephrostomy tube removal.        Findings:    1.  No signs of extravasation on antegrade nephrostogram  2.  Bilateral neph tubes removed without issues  3.  Stents remained in good position    Estimated Blood Loss: none    Drains:   1.  none    Procedure in detail:  After informed consent was obtained and all questions were answered, the patient was brought to the cysto suite and placed in the supine position. Time out was performed.    The patient was aligned under fluoro. A right antegrade nephrostogram was performed with no signs of extravasation. The same findings were true on the left.     We then removed bilateral nephrostomy tubes under continuous fluoroscopy ensuring that the single J stents remained in place.     The patient tolerated the procedure well.    Disposition:  The patient was discharged home and will follow up in 1 week with Dr. Robles. He was given a refill on his percocet and zofran.    Kiley Hwang MD    As the  attending surgeon, Dr. Robles was present for the entire procedure and performed all key aspects of the operation.

## 2017-07-11 PROBLEM — C67.0 MALIGNANT NEOPLASM OF TRIGONE OF URINARY BLADDER: Status: RESOLVED | Noted: 2017-06-21 | Resolved: 2017-07-11

## 2017-07-11 PROBLEM — C67.9 BLADDER CANCER: Status: RESOLVED | Noted: 2017-06-21 | Resolved: 2017-07-11

## 2017-07-11 NOTE — PROGRESS NOTES
Subjective:       Patient ID: Juan Manuel Koehler is a 67 y.o. male.    Chief Complaint: Bladder Cancer    Nausea   Associated symptoms include abdominal pain and nausea. Pertinent negatives include no chest pain, coughing, fatigue, fever, headaches, rash, vomiting or weakness.   Constipation   Associated symptoms include abdominal pain and nausea. Pertinent negatives include no back pain, diarrhea, difficulty urinating, fever or vomiting.      Juan Manuel Koehler is a 67 y.o. White male, initially referred by Dr. Robles, who presents today for follow-up after surgery for invasive bladder cancer performed on 6/21/17. Dr. Robles performed an open Radical cystoprostatectomy, Bilateral pelvic lymph node dissection, Creation of ileal conduit urinary diversion and Bilateral ureterotomy for open ureteral J stent placement. Additionally, patient had bilateral nephrostomy tubes removed on 7/6/17.    Patient reports pain continues to decrease daily since surgery and notes some drainage from his surgical incision. Recently started Megace and attempting to gain weight now.    He denies any mouth sores, nausea, vomiting, diarrhea, constipation, chest pain, shortness of breath, leg swelling, fatigue, headache, dizziness, or mood changes.    His ECOG PS is 2 and he is accompanied by his wife to clinic.    Oncologic History:  Patient has previously been seen by Dr. Roblse and has discussed neoadjuvant chemotherapy before consolidative surgery.The patient has a history of NMIBC for which he was treated in the 1990's, however he did not follow-up for a number of years. He presented to Dr. Boucher with pain and hematuria and was found to have a large bladder tumor on his trigone. The tumor was also causing bilateral ureteral obstruction with resulting ELDER.The patient underwent tumor resection and attempted JJ stent placement on 4/5/17 which demonstrated cT2 urothelial carcinoma (nested variant). Bilateral nephrostomy tubes were placed with  "improvement in the patient's renal function. Patient discharged today from [facility] for ELDER from 5/5-5/9. During hospitalization, right nephrostomy tube was placed and the left was exchanged.      5/5/17 CT CAP reveals " Bilateral double-J ureteral stents and left nephrostomy tube are present.  There has been interval resolution of left-sided hydronephrosis and significant improvement in right-sided hydronephrosis which now appears mild. Bladder is nondistended and not well evaluated.  There is no CT evidence of distant metastatic disease referable to provide history of bladder neoplasm."    6/21/17-Dr. Robles performed an open Radical cystoprostatectomy, Bilateral pelvic lymph node dissection, Creation of ileal conduit urinary diversion and Bilateral ureterotomy for open ureteral J stent placement.   7/6/17- patient had bilateral nephrostomy tubes removed     Review of Systems   Constitutional: Positive for appetite change and unexpected weight change. Negative for activity change, fatigue and fever.   HENT: Negative for mouth sores, nosebleeds and trouble swallowing.    Eyes: Negative for discharge and visual disturbance.   Respiratory: Negative for cough, shortness of breath and wheezing.    Cardiovascular: Negative for chest pain and leg swelling.   Gastrointestinal: Positive for abdominal pain and nausea. Negative for abdominal distention, blood in stool, constipation, diarrhea and vomiting.   Genitourinary: Negative for decreased urine volume, difficulty urinating, frequency and hematuria.   Musculoskeletal: Negative for back pain.   Skin: Negative for color change and rash.        +surgical incision with drainage   Neurological: Negative for weakness and headaches.   Hematological: Negative for adenopathy.   Psychiatric/Behavioral: Negative for sleep disturbance. The patient is not nervous/anxious.    All other systems reviewed and are negative.      Allergies:  Review of patient's allergies " indicates:  No Known Allergies    Medications:  Current Outpatient Prescriptions   Medication Sig Dispense Refill    cholecalciferol, vitamin D3, 400 unit Cap Take 3 capsules (1,200 Units total) by mouth once daily. 90 capsule 0    enoxaparin (LOVENOX) 40 mg/0.4 mL Syrg Inject 0.4 mLs (40 mg total) into the skin once daily at 6am. 12 mL 0    ketoconazole (NIZORAL) 2 % shampoo USE TO WASH AFFECTED AREA ONCE DAILY  3    megestrol (MEGACE) 400 mg/10 mL (40 mg/mL) Susp Take 10 mLs (400 mg total) by mouth once daily. 240 mL 12    ondansetron (ZOFRAN-ODT) 8 MG TbDL Take 1 tablet (8 mg total) by mouth every 6 (six) hours as needed (nausea). 30 tablet 1    ondansetron (ZOFRAN-ODT) 8 MG TbDL Take 1 tablet (8 mg total) by mouth every 6 (six) hours as needed. 30 tablet 0    oxycodone-acetaminophen (PERCOCET) 5-325 mg per tablet Take 1 tablet by mouth every 4 (four) hours as needed for Pain. 40 tablet 0    oxycodone-acetaminophen (PERCOCET) 5-325 mg per tablet Take 1 tablet by mouth every 4 (four) hours as needed for Pain. 21 tablet 0    polyethylene glycol (GLYCOLAX) 17 gram PwPk Take 17 g by mouth daily as needed (for constipation). 10 packet 2    polyethylene glycol (GLYCOLAX) 17 gram PwPk Take 17 g by mouth once daily. 30 packet 0     No current facility-administered medications for this visit.        PMH:  Past Medical History:   Diagnosis Date    Cancer     bladder and throat    CKD (chronic kidney disease) stage 3, GFR 30-59 ml/min     Urinary tract infection        PSH:  Past Surgical History:   Procedure Laterality Date    BLADDER SURGERY      CYSTOSCOPY      HERNIA REPAIR      Ileal Conduit      THROAT SURGERY         FamHx:  Family History   Problem Relation Age of Onset    No Known Problems Father     Kidney disease Mother     Prostate cancer Neg Hx        SocHx:  Social History     Social History    Marital status:      Spouse name: N/A    Number of children: N/A    Years of  education: N/A     Occupational History    Not on file.     Social History Main Topics    Smoking status: Never Smoker    Smokeless tobacco: Never Used    Alcohol use No    Drug use: No    Sexual activity: Yes     Partners: Female     Birth control/ protection: None     Other Topics Concern    Not on file     Social History Narrative    No narrative on file       Objective:      Physical Exam   Constitutional: He is oriented to person, place, and time. He appears well-developed.   Appears fatigued and thin   HENT:   Head: Normocephalic and atraumatic.   Right Ear: External ear normal.   Left Ear: External ear normal.   Eyes: Conjunctivae and EOM are normal. Pupils are equal, round, and reactive to light. Right eye exhibits no discharge. Left eye exhibits no discharge. No scleral icterus.   Neck: Normal range of motion. Neck supple.   Pulmonary/Chest: Effort normal.   Musculoskeletal: Normal range of motion.   Bilateral nephrostomy tubes draining clear, yellow urine.   Neurological: He is alert and oriented to person, place, and time.   Skin: Skin is warm and dry. No erythema.   Nursing note and vitals reviewed.      LABS:  WBC   Date Value Ref Range Status   07/12/2017 15.44 (H) 3.90 - 12.70 K/uL Final     Hemoglobin   Date Value Ref Range Status   07/12/2017 9.6 (L) 14.0 - 18.0 g/dL Final     POC Hematocrit   Date Value Ref Range Status   06/21/2017 17 (LL) 36 - 54 %PCV Final     Hematocrit   Date Value Ref Range Status   07/12/2017 28.7 (L) 40.0 - 54.0 % Final     Platelets   Date Value Ref Range Status   07/12/2017 480 (H) 150 - 350 K/uL Final       Chemistry        Component Value Date/Time     06/26/2017 0419    K 3.4 (L) 06/26/2017 0419     06/26/2017 0419    CO2 24 06/26/2017 0419    BUN 17 06/26/2017 0419    CREATININE 1.1 06/26/2017 0419    GLU 91 06/26/2017 0419        Component Value Date/Time    CALCIUM 8.7 06/26/2017 0419    ALKPHOS 79 06/12/2017 0748    AST 11 06/12/2017 0748    ALT  15 06/12/2017 0748    BILITOT 0.5 06/12/2017 0748          Assessment:       1. Malignant neoplasm of trigone of urinary bladder    2. Chemotherapy follow-up examination    3. Hydronephrosis, unspecified hydronephrosis type    4. ELDER (acute kidney injury)    5. Anorexia        Plan:       1,2- Juan Manuel Koehler is a 67 y.o. White male, referred by Dr. Robles, who presents today for evaluation and management of invasive bladder cancer. Patient has previously been seen by Dr. 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 He presented to Dr. Boucher with pain and hematuria and was found to have a large bladder tumor on his trigone. Staging CTCAP on 5/5/17 shows no evidence of distant metastasis.    Given his continued increased creatinine, it was unclear if he would be considered a candidate for neoadjuvant chemotherapy This has improved and the patient wanted to move forward with chemo, he understood the risks, particularly to his kidneys. We split the dose of cisplatin between D1+D8 and gave IVFs.  Yet still his creatinine increased significantly.  We had a lengthy conversation about the risk of recurrence without chemo and real risk of permanent and irreversible kidney damage.   Also discussed with Dr. Robles.  At this point, we will forgo further chemotherapy in favor of moving forward with surgery.      S/p surgery with negative margins and negative lymph node involvement. Favorable pathology report reviewed with patient and his accompanying wife. Data is controversial in the benefit of adjuvant chemotherapy. Decision made by Dr. Calero and Dr. Robles to defer resuming chemotherapy at this time. Will get restaging scans post surgery in 6 weeks and set patient up for 3 month surveillance with CT scans and labs for the first year after.    3,4- Bilateral  nephrostomy tubes removed. Will repeat labs today.    5- Refer to nutrition. Discussed calorie dense foods. Continue Megace.    Patient was also seen and examined by Dr. Calero. Patient is in agreement with the proposed treatment plan. All questions were answered to the patient's satisfaction. Pt knows to call clinic if anything is needed before the next clinic visit.    TWYLA Smith-C  Hematology and Medical Oncology      I have reviewed the notes, assessments, and/or procedures performed by the housestaff, as above.  I have personally interviewed and examined the patient at the beside, and rounded with the housestaff. I concur with her/his assessment and plan and the documentation of Juan Manuel Koehler.      Discussed case extensively with Dr. Robles, will hold further chemo for now.  Plan for restaging in 6 wks.      More than 45 mins were spent during this encounter, greater than 50% was spent in direct counseling and/or coordination of care.     Mingo Calero M.D., M.S., F.A.C.P.  Hematology/Oncology Attending  [facility]

## 2017-07-12 ENCOUNTER — OFFICE VISIT (OUTPATIENT)
Dept: UROLOGY | Facility: CLINIC | Age: 67
End: 2017-07-12
Payer: MEDICARE

## 2017-07-12 ENCOUNTER — OFFICE VISIT (OUTPATIENT)
Dept: HEMATOLOGY/ONCOLOGY | Facility: CLINIC | Age: 67
End: 2017-07-12
Payer: MEDICARE

## 2017-07-12 ENCOUNTER — TELEPHONE (OUTPATIENT)
Dept: UROLOGY | Facility: CLINIC | Age: 67
End: 2017-07-12

## 2017-07-12 ENCOUNTER — TELEPHONE (OUTPATIENT)
Dept: HEMATOLOGY/ONCOLOGY | Facility: CLINIC | Age: 67
End: 2017-07-12

## 2017-07-12 ENCOUNTER — LAB VISIT (OUTPATIENT)
Dept: LAB | Facility: HOSPITAL | Age: 67
End: 2017-07-12
Attending: INTERNAL MEDICINE
Payer: MEDICARE

## 2017-07-12 VITALS
HEART RATE: 109 BPM | DIASTOLIC BLOOD PRESSURE: 73 MMHG | HEIGHT: 74 IN | WEIGHT: 125 LBS | OXYGEN SATURATION: 98 % | RESPIRATION RATE: 16 BRPM | SYSTOLIC BLOOD PRESSURE: 138 MMHG | WEIGHT: 127.44 LBS | DIASTOLIC BLOOD PRESSURE: 66 MMHG | RESPIRATION RATE: 16 BRPM | HEIGHT: 74 IN | BODY MASS INDEX: 16.36 KG/M2 | SYSTOLIC BLOOD PRESSURE: 121 MMHG | HEART RATE: 108 BPM | BODY MASS INDEX: 16.04 KG/M2 | TEMPERATURE: 99 F

## 2017-07-12 DIAGNOSIS — N17.9 AKI (ACUTE KIDNEY INJURY): ICD-10-CM

## 2017-07-12 DIAGNOSIS — C67.0 MALIGNANT NEOPLASM OF TRIGONE OF URINARY BLADDER: Primary | ICD-10-CM

## 2017-07-12 DIAGNOSIS — R63.0 ANOREXIA: ICD-10-CM

## 2017-07-12 DIAGNOSIS — Z09 CHEMOTHERAPY FOLLOW-UP EXAMINATION: ICD-10-CM

## 2017-07-12 DIAGNOSIS — N13.5 BILATERAL URETERAL OBSTRUCTION: Primary | ICD-10-CM

## 2017-07-12 DIAGNOSIS — C67.0 MALIGNANT NEOPLASM OF TRIGONE OF URINARY BLADDER: ICD-10-CM

## 2017-07-12 DIAGNOSIS — N13.30 HYDRONEPHROSIS, UNSPECIFIED HYDRONEPHROSIS TYPE: ICD-10-CM

## 2017-07-12 DIAGNOSIS — Z98.890 POST-OPERATIVE STATE: ICD-10-CM

## 2017-07-12 LAB
ALBUMIN SERPL BCP-MCNC: 3.2 G/DL
ALP SERPL-CCNC: 88 U/L
ALT SERPL W/O P-5'-P-CCNC: 16 U/L
ANION GAP SERPL CALC-SCNC: 7 MMOL/L
AST SERPL-CCNC: 15 U/L
BILIRUB SERPL-MCNC: 0.8 MG/DL
BUN SERPL-MCNC: 27 MG/DL
CALCIUM SERPL-MCNC: 10.4 MG/DL
CHLORIDE SERPL-SCNC: 107 MMOL/L
CO2 SERPL-SCNC: 22 MMOL/L
CREAT SERPL-MCNC: 1.5 MG/DL
ERYTHROCYTE [DISTWIDTH] IN BLOOD BY AUTOMATED COUNT: 15.4 %
EST. GFR  (AFRICAN AMERICAN): 54.5 ML/MIN/1.73 M^2
EST. GFR  (NON AFRICAN AMERICAN): 47.2 ML/MIN/1.73 M^2
GLUCOSE SERPL-MCNC: 129 MG/DL
HCT VFR BLD AUTO: 28.7 %
HGB BLD-MCNC: 9.6 G/DL
MAGNESIUM SERPL-MCNC: 1.8 MG/DL
MCH RBC QN AUTO: 28.7 PG
MCHC RBC AUTO-ENTMCNC: 33.4 %
MCV RBC AUTO: 86 FL
NEUTROPHILS # BLD AUTO: 12.6 K/UL
PLATELET # BLD AUTO: 480 K/UL
PMV BLD AUTO: 8.6 FL
POTASSIUM SERPL-SCNC: 6.1 MMOL/L
PROT SERPL-MCNC: 7.8 G/DL
RBC # BLD AUTO: 3.35 M/UL
SODIUM SERPL-SCNC: 136 MMOL/L
WBC # BLD AUTO: 15.44 K/UL

## 2017-07-12 PROCEDURE — 1159F MED LIST DOCD IN RCRD: CPT | Mod: S$GLB,,, | Performed by: INTERNAL MEDICINE

## 2017-07-12 PROCEDURE — 99999 PR PBB SHADOW E&M-EST. PATIENT-LVL III: CPT | Mod: PBBFAC,,, | Performed by: UROLOGY

## 2017-07-12 PROCEDURE — 83735 ASSAY OF MAGNESIUM: CPT

## 2017-07-12 PROCEDURE — 36415 COLL VENOUS BLD VENIPUNCTURE: CPT

## 2017-07-12 PROCEDURE — 85027 COMPLETE CBC AUTOMATED: CPT

## 2017-07-12 PROCEDURE — 99215 OFFICE O/P EST HI 40 MIN: CPT | Mod: S$GLB,,, | Performed by: INTERNAL MEDICINE

## 2017-07-12 PROCEDURE — 80053 COMPREHEN METABOLIC PANEL: CPT | Mod: 91

## 2017-07-12 PROCEDURE — 99024 POSTOP FOLLOW-UP VISIT: CPT | Mod: S$GLB,,, | Performed by: UROLOGY

## 2017-07-12 PROCEDURE — 1125F AMNT PAIN NOTED PAIN PRSNT: CPT | Mod: S$GLB,,, | Performed by: INTERNAL MEDICINE

## 2017-07-12 PROCEDURE — 99999 PR PBB SHADOW E&M-EST. PATIENT-LVL IV: CPT | Mod: PBBFAC,,, | Performed by: INTERNAL MEDICINE

## 2017-07-12 RX ORDER — BACLOFEN 10 MG/1
10 TABLET ORAL 3 TIMES DAILY
Status: ON HOLD | COMMUNITY
End: 2017-08-23 | Stop reason: HOSPADM

## 2017-07-12 RX ORDER — MEGESTROL ACETATE 40 MG/ML
SUSPENSION ORAL
Status: ON HOLD | COMMUNITY
End: 2017-08-23 | Stop reason: HOSPADM

## 2017-07-12 NOTE — TELEPHONE ENCOUNTER
Called pt and explained that Becky would call them with the details of removing his IV access and their inabillty to provide the extra tubing.  Pt verbalized  Understanding to all.  All questions answered.

## 2017-07-12 NOTE — TELEPHONE ENCOUNTER
----- Message from Dafne Frankel sent at 7/12/2017  3:36 PM CDT -----  Contact: pt [phone]  Pt is asking to speak with Leny regarding needing [employer] for his Iv. Please call pt.

## 2017-07-12 NOTE — TELEPHONE ENCOUNTER
----- Message from Darvin Hinkle sent at 7/12/2017  1:03 PM CDT -----  Contact: Becky Arambula:[phone]  Becky Arambula called and states she would like to speak with Nurse Michele in regards to the pt IV order.

## 2017-07-12 NOTE — TELEPHONE ENCOUNTER
called pt to informed him of scheduled appointment to see dietitian on tomorrow, but no answer , a detailed message was left on v/m to contact offe if any questions.

## 2017-07-12 NOTE — PATIENT INSTRUCTIONS
Cystectomy  Cystectomy is surgery to remove the urinary bladder. It may be done in certain cases of bladder cancer. Your doctor can discuss the risks and benefits of the surgery with you.     In men, the bladder, lymph nodes, prostate, and often the urethra are removed.         In women, the bladder, uterus, cervix, lymph nodes, urethra, and sometimes part of the vagina are removed.      Preparing for surgery  Youll be told how to prepare for surgery. These instructions may include:  · Donating your own blood a few days before the surgery. This is in case you need a transfusion during the surgery.  · Taking antibiotics before surgery. This is to help prevent infection.  · Not eating or drinking anything a certain amount of time before surgery.  · Clearing your intestine. You may do this by drinking a special liquid at home. Or you may be admitted to the hospital the night before surgery and given medication and enemas to empty the intestine.  Removing the bladder  The surgery is done in a hospital. It takes about 4 to 6 hours. Just before surgery, youll be given medication called general anesthesia to prevent pain. This puts you into a state like deep sleep during the surgery. An incision is then made near your bellybutton. This exposes the bladder. The area around your bladder is examined to see if the cancer has spread. If it has, the surgery may not be continued. If the cancer is only in the bladder, the bladder is removed. Other organs near the bladder may be removed as well. This is in case cancer cells may have spread to those organs.  Creating a new path for urine  After the bladder is removed, you will need a new way to store and release urine. There are 3 different options to accomplish this task. Talk to your health care provider about which option would be best for you. The options include:  · Incontinent diversion: A short piece of intestine is removed and connected to the ureters while the other end  is connected to the skin on the front of the abdomen forming an opening called a stoma. This procedure is called a urostomy. A small bag is placed over the stoma to collect the urine.   · Continent diversion: A valve is created in a pouch made from a piece of intestine. You can empty the pouch several times a day by inserting a drainage tube (catheter) into the stoma through the valve. This method does not require a collection bag.   · Neobladder: A new bladder is created from a piece of intestine and urine is routed back into the urethra, restoring urination. Over time most people are able return to normal urination during the day but many may still have incontinence at night.   Risks and possible complications  · Infection  · Bleeding that requires a transfusion  · Blockage of intestine  · Inability to have an erection  · Blood clot in the veins because of physical inactivity   Date Last Reviewed: 9/22/2014  © 7876-9511 [employer]. [address] [place] [address], [place] [address]. All rights reserved. This information is not intended as a substitute for professional medical care. Always follow your healthcare professional's instructions.

## 2017-07-12 NOTE — TELEPHONE ENCOUNTER
Called patient per Jessa Penny NP request to inform him the K+ level was 5.1 normal and she was please. Patient verbalized understanding.    ---Feli Méndez

## 2017-07-12 NOTE — PROGRESS NOTES
Subjective:       Patient ID: Juan Manuel Koehler is a 67 y.o. male.    Chief Complaint: malignant neoplasm of trigone of urinary bladder (post op sx conversation)      HPI: Juan Manuel Koehler is a 67 y.o. White male who returns today in follow-up for invasive bladder cancer s/p open radical cystoprostatectomy, bilateral pelvic lymph node dissection, and creation of an ileal conduit on 6/21/17.    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 He represented to Dr. Boucher for hematuria and was found to have a large bladder tumor on his trigone. The tumor was also causing bilateral ureteral obstruction with resulting ELDER.    The patient underwent tumor resection and attempted JJ stent placement on 4/5/17 which demonstrated cT2 urothelial carcinoma (nested variant). Bilateral nephrostomy tubes were placed with improvement in the patient's renal function. IR was able to internalize the patient's left nephrostomy tube, however after internalization, the patient's sCr evelio. He now has bilateral nephrostomy tubes in place, draining well.    The patient's eGFR has fluctuated and has been low, and there is concern that he could not receive cisplatin-based chemotherapy. After much debate, we decided to start neoadjuvant chemotherapy with the plan to quickly course correct if his renal function declined. The patient received his first infusion last week, and, unfortunately, his renal function significantly worsened.     The patient underwent surgery on 6/21/17 and has done very well.    His pathology revealed urothelial carcinoma with nested variant into the perivesical fat. His lymph nodes and margins are negative. His final pathologic stage is [id number].    He returns today for his routine post-operative and is doing very well with the expected complaints.    Review of patient's allergies indicates:   Allergen Reactions    Pneumococcal 23-margarito ps vaccine         Current Outpatient Prescriptions   Medication Sig Dispense Refill    baclofen (LIORESAL) 10 MG tablet Take 10 mg by mouth 3 (three) times daily.      megestrol (MEGACE) 400 mg/10 mL (40 mg/mL) Susp Take by mouth.      oxycodone-acetaminophen (PERCOCET) 5-325 mg per tablet Take 1 tablet by mouth every 4 (four) hours as needed for Pain. 40 tablet 0    oxycodone-acetaminophen (PERCOCET) 5-325 mg per tablet Take 1 tablet by mouth every 4 (four) hours as needed for Pain. 21 tablet 0    polyethylene glycol (GLYCOLAX) 17 gram PwPk Take 17 g by mouth daily as needed (for constipation). 10 packet 2    polyethylene glycol (GLYCOLAX) 17 gram PwPk Take 17 g by mouth once daily. 30 packet 0    ondansetron (ZOFRAN-ODT) 8 MG TbDL Take 1 tablet (8 mg total) by mouth every 6 (six) hours as needed (nausea). 30 tablet 1     No current facility-administered medications for this visit.        Past Medical History:   Diagnosis Date    Cancer     bladder and throat    CKD (chronic kidney disease) stage 3, GFR 30-59 ml/min     Urinary tract infection        Past Surgical History:   Procedure Laterality Date    BLADDER SURGERY      CYSTOSCOPY      HERNIA REPAIR      Ileal Conduit      THROAT SURGERY         Family History   Problem Relation Age of Onset    No Known Problems Father     Kidney disease Mother     Prostate cancer Neg Hx        Review of Systems    Review of Systems   Constitutional: Negative for fever, chills, activity change, appetite change and unexpected weight change.   HENT: Negative for congestion, nosebleeds, sneezing, sore throat and trouble swallowing.    Eyes: Negative for pain, discharge, redness and visual disturbance.   Respiratory: Negative for cough, choking, chest tightness and shortness of breath.    Cardiovascular: Negative for chest pain, palpitations and leg swelling.   Gastrointestinal: Negative for nausea, vomiting, abdominal pain, diarrhea, blood in stool, abdominal distention and  anal bleeding.  Genitourinary: As documented per HPI   Endocrine: Negative for cold intolerance, heat intolerance, polydipsia, polyphagia and polyuria.   Musculoskeletal: Negative for myalgias, gait problem, neck pain and neck stiffness.   Skin: Negative for color change, pallor, rash and wound.   Allergic/Immunologic: Negative for immunocompromised state.   Neurological: Negative for seizures, facial asymmetry, speech difficulty, weakness and light-headedness.   Hematological: Negative for adenopathy. Does not bruise/bleed easily.   Psychiatric/Behavioral: Negative for hallucinations, behavioral problems, self-injury and agitation. The patient is not hyperactive.    All other systems were reviewed and were negative.      Objective:     Vitals:    07/12/17 0851   BP: 138/66   Pulse: 108   Resp: 16     Physical Exam   Vitals reviewed.  Constitutional: He is oriented to person, place, and time. He appears well-developed and well-nourished. No distress.   HENT:   Head: Normocephalic and atraumatic.   Right Ear: External ear normal.   Left Ear: External ear normal.   Nose: Nose normal.   Eyes: EOM are normal. Pupils are equal, round, and reactive to light. Right eye exhibits no discharge. Left eye exhibits no discharge.   Neck: Normal range of motion. Neck supple. No tracheal deviation present. No thyroid enlargement or tenderness.   Cardiovascular: Regular rhythm and intact distal pulses. No signs of peripheral edema.    Pulmonary/Chest: Effort normal and breath sounds normal. No stridor. No respiratory distress. He has no wheezes.   Abdominal: Soft. Bowel sounds are normal. He exhibits no distension. There is no tenderness. Hernia confirmed negative in the right inguinal area and confirmed negative in the left inguinal area. No hepatic or splenic enlargement or tenderness. Well-healing lower midline incision. Protuberant, pink stoma, draining clear urine.  Genitourinary: Penis normal. Right testis shows no mass, no  swelling and no tenderness. Right testis is descended. Left testis shows no mass, no swelling and no tenderness. Left testis is descended. Circumcised. No phimosis or hypospadias.   MARICEL: Deferred.  Musculoskeletal: Normal range of motion. He exhibits no edema.   Neurological: He is alert and oriented to person, place, and time. He exhibits normal muscle tone. Coordination normal.   Skin: Skin is warm. No rash noted.   Lymphatic: No palpable lymph nodes.  Psychiatric: He has a normal mood and affect. His behavior is normal. Judgment and thought content normal.     No results found for: PSA  Lab Results   Component Value Date    CREATININE 1.1 06/26/2017     Lab Results   Component Value Date    EGFRNONAA >60.0 06/26/2017     Lab Results   Component Value Date    ESTGFRAFRICA >60.0 06/26/2017     I personally reviewed all the patient's imaging studies.    CT scan non-contrast abdomen/pelvis (4/03/17): Thickened posterior bladder wall with marked bilateral hydroureteronephrosis to the level of the UVJ.  No metastatic disease identified, noting limited evaluation without IV contrast.    CT chest/CT urogram (5/5/17): Bilateral double-J ureteral stents and left nephrostomy tube are present.  There has been interval resolution of left-sided hydronephrosis and significant improvement in right-sided hydronephrosis which now appears mild. Bladder is nondistended and not well evaluated.  There is no CT evidence of distant metastatic disease referable to provide history of bladder neoplasm.    Assessment:       1. Bilateral ureteral obstruction    2. Malignant neoplasm of trigone of urinary bladder    3. Post-operative state        Plan:     Juan Manuel was seen today for malignant neoplasm of trigone of urinary bladder.    Diagnoses and all orders for this visit:    Bilateral ureteral obstruction    Malignant neoplasm of trigone of urinary bladder    Post-operative state    The patient is doing very well since surgery.    His  incisions are healing well.    We reviewed his pathology and discussed its implications. The patient also saw Dr. Calero today. We have decided to hold on adjuvant chemotherapy for now, and we will watch him very closely.    I will see the patient back as needed; his follow-up surveillance will be handled by Dr. Calero for now.    I answered all the patient's and his wife's questions.    I encouraged him or any of his family members to call or email me with questions and/or concerns.    I spent 20 minutes with the patient of which more than half was spent in coordinating the patient's care as well as in direct consultation with the patient in regards to our treatment and plan.

## 2017-07-13 ENCOUNTER — OFFICE VISIT (OUTPATIENT)
Dept: WOUND CARE | Facility: CLINIC | Age: 67
End: 2017-07-13
Payer: MEDICARE

## 2017-07-13 ENCOUNTER — TELEPHONE (OUTPATIENT)
Dept: HEMATOLOGY/ONCOLOGY | Facility: CLINIC | Age: 67
End: 2017-07-13

## 2017-07-13 DIAGNOSIS — Z71.89 ENCOUNTER FOR OSTOMY CARE EDUCATION: ICD-10-CM

## 2017-07-13 DIAGNOSIS — Z43.6 ATTENTION TO UROSTOMY: Primary | ICD-10-CM

## 2017-07-13 PROCEDURE — 1159F MED LIST DOCD IN RCRD: CPT | Mod: S$GLB,,, | Performed by: CLINICAL NURSE SPECIALIST

## 2017-07-13 PROCEDURE — 99999 PR PBB SHADOW E&M-EST. PATIENT-LVL I: CPT | Mod: PBBFAC,,, | Performed by: CLINICAL NURSE SPECIALIST

## 2017-07-13 PROCEDURE — 99213 OFFICE O/P EST LOW 20 MIN: CPT | Mod: S$GLB,,, | Performed by: CLINICAL NURSE SPECIALIST

## 2017-07-13 NOTE — TELEPHONE ENCOUNTER
----- Message from Clara Mallory sent at 7/13/2017 10:15 AM CDT -----  Contact: Self   Pt needs to discuss his blood work, and also needs to discuss dietary issues that he says is important.   Call [phone]

## 2017-07-13 NOTE — PROGRESS NOTES
Subjective:       Patient ID: Juan Manuel Koehler is a 67 y.o. male.    Chief Complaint: Urostomy    This is a return  to enterostomal therapy dept and comes with wife today, he is post urostomy on 6/21/17 with Dr Robles, he did well, home with  for one more visit for fluids, his wife is doing care of ostomy so far, still not gaining weight but eating better each day      Review of Systems   Constitutional: Negative for activity change, appetite change and fever.   Respiratory: Negative for cough and shortness of breath.    Cardiovascular: Negative for chest pain and leg swelling.   Gastrointestinal: Negative for abdominal distention, constipation and diarrhea.   Genitourinary: Negative for hematuria.   Musculoskeletal: Negative.    Skin: Negative for color change, rash and wound.   Neurological: Negative for weakness and headaches.   Psychiatric/Behavioral: Negative.        Objective:      Physical Exam   Constitutional: He is oriented to person, place, and time. He appears well-developed and well-nourished.   Cardiovascular: Normal rate and regular rhythm.    Pulmonary/Chest: Effort normal. No respiratory distress. He has no wheezes.   Abdominal: Soft. Bowel sounds are normal. He exhibits no distension.   Musculoskeletal: Normal range of motion. He exhibits no edema.   Neurological: He is alert and oriented to person, place, and time.   Skin:         Removed the stents per protocol and stoma is nice budded end  1 1/4 or 33 mm today , skin clear   Midline is healed   Discussed pouch options and will try Coloplast 56972 today and see if pt likes this   Samples to be requested from [facility]    He and wife had a lot of questions,   Assessment:       1. Attention to urostomy    2. Encounter for ostomy care education        Plan:       Post op teaching   Discussed ostomy pouches and options to use  Removed stents per routine  Reviewed supplier and how to order   I have reviewed the plan of care with the patient and/wife  and they express understanding. I spent over 50% of this 20 minute visit in face to face counseling.

## 2017-07-13 NOTE — TELEPHONE ENCOUNTER
Returned phone call to patient and spoke with him and his wife. Labs reviewed from yesterday. Discussed that his K is at the upper level of normal. Advised high K foods in moderation. Additionally noted that creatinine was up to 1.5 since nephrostomy tubes removed. Discussed that this may be his new baseline creatinine post chemotherapy. We will need to see a trend on follow up labs that are already scheduled for 8/18. Will reschedule nutritionist appt for next week per patient request.

## 2017-07-13 NOTE — TELEPHONE ENCOUNTER
Spoke with patient regarding concerns of his dietary issues and lab work results. Patient was not aware of dietary consult that was schedule for today at 10am attempted to R/S appointment and patient wanted his questions to be answer prior to appointment. Patient stated that he saw Izabela Adames today and was told that his K+ level according to Ms. Adames the level was borderline as well as his creatinine was borderline. Patient is concern now with those new concerns. Patient would like to discuss the labs and how can he correct his dietary concerns of low K+ diet and to also gain weight. I advised patient that I will forward this information to Dr. Calero and Jessa Penny NP to discuss.    ---Feli Méndez

## 2017-07-20 ENCOUNTER — TELEPHONE (OUTPATIENT)
Dept: HEMATOLOGY/ONCOLOGY | Facility: CLINIC | Age: 67
End: 2017-07-20

## 2017-07-26 ENCOUNTER — DOCUMENTATION ONLY (OUTPATIENT)
Dept: HEMATOLOGY/ONCOLOGY | Facility: CLINIC | Age: 67
End: 2017-07-26
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
## 2017-07-31 ENCOUNTER — TELEPHONE (OUTPATIENT)
Dept: HEMATOLOGY/ONCOLOGY | Facility: CLINIC | Age: 67
End: 2017-07-31

## 2017-07-31 NOTE — TELEPHONE ENCOUNTER
"Returned call to pt.   Pt stated he had surgery in June and is doing well from surgery.   However, pt stated that he is having "circulatory problems" that in the hospital he had leg hose and injections in stomach (KAMARI and Lovenox).   Pt stated he stamina is decreased and he has lost weight.   Pt stated that he is having tingling in right leg and bottom of left foot and vein is enlarged in right leg.   Pt denies any calf pain, leg swelling, redness, or heat to extremities.   Informed pt would fwd message to Dr. Calero and his NP and see recs and get back with.   Pt verbalized understanding.     Message routed to Jessa Penny.       ----- Message from [facility] sent at 7/31/2017  9:20 AM CDT -----  Contact: Pt  Pt missed a call from the Dr's Office (Brendon or Feli) and would like a return call from the nurse.    Pt can be reached at [phone].    Thanks      "

## 2017-07-31 NOTE — TELEPHONE ENCOUNTER
Called pt and informed him of following from Jessa Penny:   Jessa Penny, SAQIB Almaguer   Caller: Unspecified (Today,  9:40 AM)             We have attempted to get him in with our dietician but I don't think he has seen her yet. I would say see his PCP about his legs. He may need to see cardiology eventually but start with PCP.      Pt stated he will reach out to PCP for appt and verbalized understanding.
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
## 2017-08-02 ENCOUNTER — HOSPITAL ENCOUNTER (INPATIENT)
Facility: HOSPITAL | Age: 67
LOS: 2 days | Discharge: ANOTHER HOSPITAL | DRG: 871 | End: 2017-08-04
Attending: EMERGENCY MEDICINE | Admitting: INTERNAL MEDICINE
Payer: MEDICARE

## 2017-08-02 ENCOUNTER — NURSE TRIAGE (OUTPATIENT)
Dept: ADMINISTRATIVE | Facility: CLINIC | Age: 67
End: 2017-08-02

## 2017-08-02 DIAGNOSIS — N39.0 URINARY TRACT INFECTION WITH HEMATURIA, SITE UNSPECIFIED: Primary | ICD-10-CM

## 2017-08-02 DIAGNOSIS — I21.4 NSTEMI (NON-ST ELEVATED MYOCARDIAL INFARCTION): ICD-10-CM

## 2017-08-02 DIAGNOSIS — A41.9 SEPSIS DUE TO URINARY TRACT INFECTION: ICD-10-CM

## 2017-08-02 DIAGNOSIS — R78.81 BACTEREMIA DUE TO ENTEROCOCCUS: ICD-10-CM

## 2017-08-02 DIAGNOSIS — I33.0 ACUTE BACTERIAL ENDOCARDITIS: ICD-10-CM

## 2017-08-02 DIAGNOSIS — R79.89 ELEVATED TROPONIN: ICD-10-CM

## 2017-08-02 DIAGNOSIS — R62.7 FAILURE TO THRIVE IN ADULT: ICD-10-CM

## 2017-08-02 DIAGNOSIS — D63.8 ANEMIA OF CHRONIC DISEASE: ICD-10-CM

## 2017-08-02 DIAGNOSIS — R55 POSTURAL DIZZINESS WITH PRESYNCOPE: ICD-10-CM

## 2017-08-02 DIAGNOSIS — R31.9 URINARY TRACT INFECTION WITH HEMATURIA, SITE UNSPECIFIED: Primary | ICD-10-CM

## 2017-08-02 DIAGNOSIS — B95.2 BACTEREMIA DUE TO ENTEROCOCCUS: ICD-10-CM

## 2017-08-02 DIAGNOSIS — D64.9 ANEMIA, UNSPECIFIED TYPE: ICD-10-CM

## 2017-08-02 DIAGNOSIS — A41.9 SEPSIS SECONDARY TO UTI: ICD-10-CM

## 2017-08-02 DIAGNOSIS — C67.9 UROTHELIAL CARCINOMA OF BLADDER: ICD-10-CM

## 2017-08-02 DIAGNOSIS — N39.0 SEPSIS SECONDARY TO UTI: ICD-10-CM

## 2017-08-02 DIAGNOSIS — N39.0 SEPSIS DUE TO URINARY TRACT INFECTION: ICD-10-CM

## 2017-08-02 DIAGNOSIS — R11.10 VOMITING: ICD-10-CM

## 2017-08-02 DIAGNOSIS — R42 POSTURAL DIZZINESS WITH PRESYNCOPE: ICD-10-CM

## 2017-08-02 DIAGNOSIS — N18.30 STAGE 3 CHRONIC KIDNEY DISEASE: ICD-10-CM

## 2017-08-02 PROBLEM — N13.30 HYDRONEPHROSIS: Status: RESOLVED | Noted: 2017-04-20 | Resolved: 2017-08-02

## 2017-08-02 PROBLEM — E87.5 HYPERKALEMIA: Status: RESOLVED | Noted: 2017-04-07 | Resolved: 2017-08-02

## 2017-08-02 PROBLEM — R11.0 NAUSEA: Status: RESOLVED | Noted: 2017-05-06 | Resolved: 2017-08-02

## 2017-08-02 PROBLEM — N17.9 AKI (ACUTE KIDNEY INJURY): Status: RESOLVED | Noted: 2017-04-07 | Resolved: 2017-08-02

## 2017-08-02 LAB
ABO + RH BLD: NORMAL
ALBUMIN SERPL BCP-MCNC: 2.8 G/DL
ALP SERPL-CCNC: 73 U/L
ALT SERPL W/O P-5'-P-CCNC: 16 U/L
ANION GAP SERPL CALC-SCNC: 9 MMOL/L
ANISOCYTOSIS BLD QL SMEAR: SLIGHT
AST SERPL-CCNC: 15 U/L
BACTERIA #/AREA URNS HPF: ABNORMAL /HPF
BASOPHILS # BLD AUTO: ABNORMAL K/UL
BASOPHILS NFR BLD: 0 %
BILIRUB SERPL-MCNC: 0.5 MG/DL
BILIRUB UR QL STRIP: NEGATIVE
BLD GP AB SCN CELLS X3 SERPL QL: NORMAL
BUN SERPL-MCNC: 31 MG/DL
CALCIUM SERPL-MCNC: 9.4 MG/DL
CHLORIDE SERPL-SCNC: 110 MMOL/L
CLARITY UR: ABNORMAL
CO2 SERPL-SCNC: 18 MMOL/L
COLOR UR: YELLOW
CREAT SERPL-MCNC: 1.3 MG/DL
DIFFERENTIAL METHOD: ABNORMAL
EOSINOPHIL # BLD AUTO: ABNORMAL K/UL
EOSINOPHIL NFR BLD: 0 %
ERYTHROCYTE [DISTWIDTH] IN BLOOD BY AUTOMATED COUNT: 16.2 %
EST. GFR  (AFRICAN AMERICAN): >60 ML/MIN/1.73 M^2
EST. GFR  (NON AFRICAN AMERICAN): 56 ML/MIN/1.73 M^2
FERRITIN SERPL-MCNC: 2700 NG/ML
FOLATE SERPL-MCNC: 10.9 NG/ML
GLUCOSE SERPL-MCNC: 187 MG/DL
GLUCOSE UR QL STRIP: NEGATIVE
HCT VFR BLD AUTO: 21.4 %
HGB BLD-MCNC: 7.2 G/DL
HGB UR QL STRIP: ABNORMAL
HYALINE CASTS #/AREA URNS LPF: 0 /LPF
IRON SERPL-MCNC: 13 UG/DL
KETONES UR QL STRIP: NEGATIVE
LACTATE SERPL-SCNC: 1.5 MMOL/L
LEUKOCYTE ESTERASE UR QL STRIP: ABNORMAL
LYMPHOCYTES # BLD AUTO: ABNORMAL K/UL
LYMPHOCYTES NFR BLD: 5 %
MCH RBC QN AUTO: 28.9 PG
MCHC RBC AUTO-ENTMCNC: 33.6 G/DL
MCV RBC AUTO: 86 FL
METAMYELOCYTES NFR BLD MANUAL: 3 %
MICROSCOPIC COMMENT: ABNORMAL
MONOCYTES # BLD AUTO: ABNORMAL K/UL
MONOCYTES NFR BLD: 0 %
NEUTROPHILS NFR BLD: 88 %
NEUTS BAND NFR BLD MANUAL: 4 %
NITRITE UR QL STRIP: NEGATIVE
PH UR STRIP: >8 [PH] (ref 5–8)
PLATELET # BLD AUTO: 433 K/UL
PLATELET BLD QL SMEAR: ABNORMAL
PMV BLD AUTO: 8.3 FL
POTASSIUM SERPL-SCNC: 4 MMOL/L
PROT SERPL-MCNC: 7.8 G/DL
PROT UR QL STRIP: ABNORMAL
RBC # BLD AUTO: 2.49 M/UL
RBC #/AREA URNS HPF: 10 /HPF (ref 0–4)
RETICS/RBC NFR AUTO: 1.8 %
SATURATED IRON: 7 %
SODIUM SERPL-SCNC: 137 MMOL/L
SP GR UR STRIP: <=1.005 (ref 1–1.03)
TOTAL IRON BINDING CAPACITY: 179 UG/DL
TRANSFERRIN SERPL-MCNC: 121 MG/DL
TRI-PHOS CRY URNS QL MICRO: ABNORMAL
TROPONIN I SERPL DL<=0.01 NG/ML-MCNC: 1.49 NG/ML
TSH SERPL DL<=0.005 MIU/L-ACNC: 1.25 UIU/ML
URN SPEC COLLECT METH UR: ABNORMAL
UROBILINOGEN UR STRIP-ACNC: NEGATIVE EU/DL
VIT B12 SERPL-MCNC: 428 PG/ML
WBC # BLD AUTO: 20.9 K/UL
WBC #/AREA URNS HPF: 20 /HPF (ref 0–5)

## 2017-08-02 PROCEDURE — 99285 EMERGENCY DEPT VISIT HI MDM: CPT | Mod: 25

## 2017-08-02 PROCEDURE — 63600175 PHARM REV CODE 636 W HCPCS: Performed by: EMERGENCY MEDICINE

## 2017-08-02 PROCEDURE — 96361 HYDRATE IV INFUSION ADD-ON: CPT

## 2017-08-02 PROCEDURE — 36430 TRANSFUSION BLD/BLD COMPNT: CPT

## 2017-08-02 PROCEDURE — 87077 CULTURE AEROBIC IDENTIFY: CPT

## 2017-08-02 PROCEDURE — 83540 ASSAY OF IRON: CPT

## 2017-08-02 PROCEDURE — 86920 COMPATIBILITY TEST SPIN: CPT

## 2017-08-02 PROCEDURE — 83605 ASSAY OF LACTIC ACID: CPT

## 2017-08-02 PROCEDURE — 25000003 PHARM REV CODE 250: Performed by: EMERGENCY MEDICINE

## 2017-08-02 PROCEDURE — 82746 ASSAY OF FOLIC ACID SERUM: CPT

## 2017-08-02 PROCEDURE — 84443 ASSAY THYROID STIM HORMONE: CPT

## 2017-08-02 PROCEDURE — 84484 ASSAY OF TROPONIN QUANT: CPT

## 2017-08-02 PROCEDURE — 86900 BLOOD TYPING SEROLOGIC ABO: CPT

## 2017-08-02 PROCEDURE — P9021 RED BLOOD CELLS UNIT: HCPCS

## 2017-08-02 PROCEDURE — 85045 AUTOMATED RETICULOCYTE COUNT: CPT

## 2017-08-02 PROCEDURE — 86850 RBC ANTIBODY SCREEN: CPT

## 2017-08-02 PROCEDURE — 82607 VITAMIN B-12: CPT

## 2017-08-02 PROCEDURE — 87186 SC STD MICRODIL/AGAR DIL: CPT

## 2017-08-02 PROCEDURE — 82728 ASSAY OF FERRITIN: CPT

## 2017-08-02 PROCEDURE — 96365 THER/PROPH/DIAG IV INF INIT: CPT

## 2017-08-02 PROCEDURE — 25500020 PHARM REV CODE 255: Performed by: EMERGENCY MEDICINE

## 2017-08-02 PROCEDURE — 11000001 HC ACUTE MED/SURG PRIVATE ROOM

## 2017-08-02 PROCEDURE — 96368 THER/DIAG CONCURRENT INF: CPT

## 2017-08-02 PROCEDURE — 87088 URINE BACTERIA CULTURE: CPT

## 2017-08-02 PROCEDURE — 81000 URINALYSIS NONAUTO W/SCOPE: CPT

## 2017-08-02 PROCEDURE — 25000003 PHARM REV CODE 250: Performed by: STUDENT IN AN ORGANIZED HEALTH CARE EDUCATION/TRAINING PROGRAM

## 2017-08-02 PROCEDURE — 96375 TX/PRO/DX INJ NEW DRUG ADDON: CPT

## 2017-08-02 PROCEDURE — 87040 BLOOD CULTURE FOR BACTERIA: CPT | Mod: 59

## 2017-08-02 PROCEDURE — 87086 URINE CULTURE/COLONY COUNT: CPT

## 2017-08-02 PROCEDURE — 85007 BL SMEAR W/DIFF WBC COUNT: CPT

## 2017-08-02 PROCEDURE — 94761 N-INVAS EAR/PLS OXIMETRY MLT: CPT

## 2017-08-02 PROCEDURE — 85027 COMPLETE CBC AUTOMATED: CPT

## 2017-08-02 PROCEDURE — 63600175 PHARM REV CODE 636 W HCPCS: Performed by: STUDENT IN AN ORGANIZED HEALTH CARE EDUCATION/TRAINING PROGRAM

## 2017-08-02 PROCEDURE — 93005 ELECTROCARDIOGRAM TRACING: CPT

## 2017-08-02 PROCEDURE — 80053 COMPREHEN METABOLIC PANEL: CPT

## 2017-08-02 RX ORDER — ENOXAPARIN SODIUM 100 MG/ML
1 INJECTION SUBCUTANEOUS EVERY 12 HOURS
Status: DISCONTINUED | OUTPATIENT
Start: 2017-08-03 | End: 2017-08-03

## 2017-08-02 RX ORDER — MEGESTROL ACETATE 40 MG/ML
200 SUSPENSION ORAL
Status: DISCONTINUED | OUTPATIENT
Start: 2017-08-03 | End: 2017-08-04 | Stop reason: HOSPADM

## 2017-08-02 RX ORDER — BACLOFEN 10 MG/1
10 TABLET ORAL 3 TIMES DAILY
Status: DISCONTINUED | OUTPATIENT
Start: 2017-08-03 | End: 2017-08-04 | Stop reason: HOSPADM

## 2017-08-02 RX ORDER — PANTOPRAZOLE SODIUM 40 MG/10ML
40 INJECTION, POWDER, LYOPHILIZED, FOR SOLUTION INTRAVENOUS DAILY
Status: DISCONTINUED | OUTPATIENT
Start: 2017-08-02 | End: 2017-08-03

## 2017-08-02 RX ORDER — ONDANSETRON 2 MG/ML
8 INJECTION INTRAMUSCULAR; INTRAVENOUS
Status: COMPLETED | OUTPATIENT
Start: 2017-08-02 | End: 2017-08-02

## 2017-08-02 RX ORDER — HYDROCODONE BITARTRATE AND ACETAMINOPHEN 500; 5 MG/1; MG/1
TABLET ORAL
Status: DISCONTINUED | OUTPATIENT
Start: 2017-08-02 | End: 2017-08-04

## 2017-08-02 RX ORDER — ENOXAPARIN SODIUM 100 MG/ML
40 INJECTION SUBCUTANEOUS EVERY 24 HOURS
Status: DISCONTINUED | OUTPATIENT
Start: 2017-08-02 | End: 2017-08-02

## 2017-08-02 RX ORDER — POLYETHYLENE GLYCOL 3350 17 G/17G
17 POWDER, FOR SOLUTION ORAL DAILY
Status: DISCONTINUED | OUTPATIENT
Start: 2017-08-03 | End: 2017-08-04 | Stop reason: HOSPADM

## 2017-08-02 RX ORDER — CIPROFLOXACIN 2 MG/ML
400 INJECTION, SOLUTION INTRAVENOUS
Status: COMPLETED | OUTPATIENT
Start: 2017-08-02 | End: 2017-08-02

## 2017-08-02 RX ORDER — SODIUM CHLORIDE 9 MG/ML
1000 INJECTION, SOLUTION INTRAVENOUS
Status: COMPLETED | OUTPATIENT
Start: 2017-08-02 | End: 2017-08-02

## 2017-08-02 RX ORDER — PROMETHAZINE HYDROCHLORIDE 12.5 MG/1
12.5 TABLET ORAL EVERY 6 HOURS PRN
Status: DISCONTINUED | OUTPATIENT
Start: 2017-08-02 | End: 2017-08-04 | Stop reason: HOSPADM

## 2017-08-02 RX ORDER — ONDANSETRON 2 MG/ML
4 INJECTION INTRAMUSCULAR; INTRAVENOUS EVERY 6 HOURS PRN
Status: DISCONTINUED | OUTPATIENT
Start: 2017-08-02 | End: 2017-08-04 | Stop reason: HOSPADM

## 2017-08-02 RX ORDER — OXYCODONE AND ACETAMINOPHEN 5; 325 MG/1; MG/1
1 TABLET ORAL EVERY 4 HOURS PRN
Status: DISCONTINUED | OUTPATIENT
Start: 2017-08-02 | End: 2017-08-04 | Stop reason: HOSPADM

## 2017-08-02 RX ADMIN — IOHEXOL 75 ML: 350 INJECTION, SOLUTION INTRAVENOUS at 06:08

## 2017-08-02 RX ADMIN — CIPROFLOXACIN 400 MG: 2 INJECTION, SOLUTION INTRAVENOUS at 06:08

## 2017-08-02 RX ADMIN — PROMETHAZINE HYDROCHLORIDE 25 MG: 25 INJECTION INTRAMUSCULAR; INTRAVENOUS at 06:08

## 2017-08-02 RX ADMIN — PIPERACILLIN AND TAZOBACTAM 4.5 G: 4; .5 INJECTION, POWDER, FOR SOLUTION INTRAVENOUS at 11:08

## 2017-08-02 RX ADMIN — SODIUM CHLORIDE 1000 ML: 0.9 INJECTION, SOLUTION INTRAVENOUS at 04:08

## 2017-08-02 RX ADMIN — ONDANSETRON 8 MG: 2 INJECTION INTRAMUSCULAR; INTRAVENOUS at 04:08

## 2017-08-02 RX ADMIN — SODIUM CHLORIDE 1000 ML: 0.9 INJECTION, SOLUTION INTRAVENOUS at 06:08

## 2017-08-02 NOTE — TELEPHONE ENCOUNTER
Reason for Disposition   SEVERE dizziness (e.g., unable to stand, requires support to walk, feels like passing out now)    Protocols used: ST DIZZINESS-A-OH    Wife called- states that patient was walking down the stairs and got really dizzy and slid down them. Denies falling. Pt is still extremely dizzy and doesn't feel like he can stand up. Also reports sweating. Advised that patient needs to go to ER for further eval. Explained that if patient was unable to get up safely then family needed to hang up and call 911

## 2017-08-02 NOTE — ED PROVIDER NOTES
Encounter Date: 8/2/2017       History     Chief Complaint   Patient presents with    Nausea     with vomiting and dizziness, just prior to arrival.  Cholecystectomy about a week ago.        This is a 66 yo M with history of invasive bladder cancer s/p open cystoprostatectomy, ileal conduit and lymph node dissection on 6/21 who presents with progressively worsening weakness, poor po tolerance, and weight loss over the past several months.  Today patient was walking down carpeted steps and slid down several steps on his back/buttocks without complaints of head trauma or specific injury.   Patient because very weak and diaphoretic at the time of the fall and was unable to get up, his wife called 911 and patient was brought to the hospital.  No fever or chills.  Urostomy has been draining without problems, wife cleans for patient at least twice daily. Patient had bilateral ureteral obstruction with ELDER and has been monitored with some return of kidney function but not to baseline. Poor po intake in general and little fluids over the past few days.  Patient remains with nausea, vomiting when arrived in the ED but denies chest or abdominal pain, denies injury from fall.   Reports increased watery stool without bloody or melanotic stool.        The history is provided by the patient and the spouse.     Review of patient's allergies indicates:  No Known Allergies  Past Medical History:   Diagnosis Date    Cancer     bladder and throat    CKD (chronic kidney disease) stage 3, GFR 30-59 ml/min     Urinary tract infection      Past Surgical History:   Procedure Laterality Date    BLADDER SURGERY      CYSTOSCOPY      HERNIA REPAIR      Ileal Conduit      THROAT SURGERY       Family History   Problem Relation Age of Onset    No Known Problems Father     Kidney disease Mother     Prostate cancer Neg Hx      Social History   Substance Use Topics    Smoking status: Never Smoker    Smokeless tobacco: Never Used     Alcohol use No     Review of Systems   Constitutional: Positive for activity change, appetite change, fatigue and unexpected weight change. Negative for fever.   HENT: Negative for sore throat.    Respiratory: Negative for shortness of breath.    Cardiovascular: Negative for chest pain.   Gastrointestinal: Positive for diarrhea, nausea and vomiting.   Musculoskeletal: Negative for back pain.   Skin: Negative for rash.   Neurological: Positive for weakness.   Hematological: Does not bruise/bleed easily.   All other systems reviewed and are negative.      Physical Exam     Initial Vitals [08/02/17 1605]   BP Pulse Resp Temp SpO2   138/64 92 16 97.3 °F (36.3 °C) 100 %      MAP       88.67         Physical Exam    Nursing note and vitals reviewed.  Constitutional: He appears well-developed.   Thin, frail appearing    HENT:   Head: Normocephalic and atraumatic.   Eyes: Conjunctivae and EOM are normal. Pupils are equal, round, and reactive to light.   Neck: Normal range of motion. Neck supple.   Cardiovascular: Normal rate, regular rhythm, normal heart sounds and intact distal pulses. Exam reveals no gallop and no friction rub.    No murmur heard.  Pulmonary/Chest: Breath sounds normal. No stridor. No respiratory distress. He has no wheezes. He has no rhonchi. He has no rales. He exhibits no tenderness.   Abdominal: Soft. Bowel sounds are normal. He exhibits no distension. There is no tenderness. There is no rebound and no guarding.   Urostomy noted with clear yellow urine in bag with some white particulate matter noted. No surrounding erythema or skin breakdown    Musculoskeletal: Normal range of motion. He exhibits no edema or tenderness.   Neurological: He is alert and oriented to person, place, and time. He has normal strength. No cranial nerve deficit.   Skin: Skin is warm and dry. Capillary refill takes less than 2 seconds.   Psychiatric: He has a normal mood and affect.         ED Course   Procedures  Labs Reviewed    CBC W/ AUTO DIFFERENTIAL - Abnormal; Notable for the following:        Result Value    WBC 20.90 (*)     RBC 2.49 (*)     Hemoglobin 7.2 (*)     Hematocrit 21.4 (*)     RDW 16.2 (*)     Platelets 433 (*)     MPV 8.3 (*)     Gran% 88.0 (*)     Lymph% 5.0 (*)     Mono% 0.0 (*)     Platelet Estimate Increased (*)     All other components within normal limits   COMPREHENSIVE METABOLIC PANEL - Abnormal; Notable for the following:     CO2 18 (*)     Glucose 187 (*)     BUN, Bld 31 (*)     Albumin 2.8 (*)     eGFR if non  56 (*)     All other components within normal limits   URINALYSIS - Abnormal; Notable for the following:     Appearance, UA Hazy (*)     pH, UA >8.0 (*)     Specific Gravity, UA <=1.005 (*)     Protein, UA 2+ (*)     Occult Blood UA 2+ (*)     Leukocytes, UA 2+ (*)     All other components within normal limits   URINALYSIS MICROSCOPIC - Abnormal; Notable for the following:     RBC, UA 10 (*)     WBC, UA 20 (*)     Bacteria, UA Moderate (*)     All other components within normal limits   FERRITIN - Abnormal; Notable for the following:     Ferritin 2,700 (*)     All other components within normal limits   CULTURE, URINE   CULTURE, BLOOD   CULTURE, BLOOD   LACTIC ACID, PLASMA   RETICULOCYTES   FOLATE   VITAMIN B12   IRON AND TIBC   TYPE & SCREEN   PREPARE RBC SOFT        Imaging Results          X-Ray Chest 1 View (Final result)  Result time 08/02/17 19:28:02    Final result by Panhco Olivas MD (08/02/17 19:28:02)                 Impression:       No acute process.              Electronically signed by: PANCHO OLIVAS MD  Date:     08/02/17  Time:    19:28              Narrative:    Exam: [id number]  08/02/17  19:11:00 [id number] ([facility]) : XR CHEST 1 VIEW    Technique:    Single frontal chest x-ray    Comparison:    05/05/2017    Findings:      Monitoring EKG leads are present.  The trachea is unremarkable.  The cardiomediastinal silhouette is within normal limits.  There are calcified lymph nodes in  the hilum.  The hemidiaphragms are unremarkable.  There is no evidence of free air beneath the hemidiaphragms.  There are no pleural effusions.  There is no evidence of a pneumothorax.  There is no evidence of pneumomediastinum.  No airspace opacities are present.  There are stable chronic pulmonary interstitial changes. There are degenerative changes in the osseous structures.                             CT Abdomen Pelvis With Contrast (Final result)     Abnormal  Result time 08/02/17 19:00:37    Final result by Pancho Olivas MD (08/02/17 19:00:37)                 Impression:       Postoperative changes of interval cystectomy with urostomy placement in the right upper abdominal quadrant.  Nonspecific distention of the ureter into the urostomy changes with peripheral enhancement .  Some component of infection would be difficult to exclude.    Interval development of multiple low attenuation lesions in the spleen.  These remain nonspecific with splenic abscesses not excluded.  Suggest short-term followup.    Small amount of free fluid in the pelvis.  This may be reactive in nature.  Short term follow up is suggested.    Additional findings as above.    Report has been flagged in the [facility] medical record system.        Electronically signed by: PANCHO OLIVAS MD  Date:     08/02/17  Time:    19:00              Narrative:    Exam: [id number]  08/02/17  17:48:53 [id number] ([facility]) : CT ABDOMEN PELVIS WITH CONTRAST    Technique:    Axial CT Scan of the abdomen and pelvis was performed from the lung base to the public symphysis after the intravenous administration of  75 cc of Omni 350. Coronal and Sagittal reformats were obtained. Delayed images were also obtained    Comparison:    CT scan of the abdomen dated 5/5/17.    Findings:      There is a stable granoluma the right lung base.  There are dependent changes in the lung bases.  The heart is normal in appearance.  No pericardial effusions are present.  There are scattered  atherosclerotic changes in the abdominal aorta and its branch vessels.  There are changes of extensive pelvic lymph node dissection.  There is no evidence of lymphadenopathy in the abdomen or pelvis.    The esophagus, stomach, and duodenum are within normal limits.  The small bowel   The appendix is not consistently identified.  No secondary findings of acute appendicitis.  There are postoperative changes involving the small bowel loops in the pelvis.  There is a right-sided urostomy in place.  There is mild distention of the ureter extending into the urostomy.  The large bowel is grossly unremarkable.    The liver is enlarged.  There is stable distention of the gallbladder.  The biliary tree is within normal limits.  There are new areas of low attenuation within the spleen.  The spleen is enlarged.  There also calcifications within the spleen.  The pancreas and adrenal glands are within normal limits.    There has been interval removal of previous bilateral nephrostomy tubes and ureteral stents.  There are subcentimeter low attenuation lesions in the kidneys are too small to completely characterize.  There is bilateral hydroureteronephrosis.   The urinary bladder is poorly visualized and is most likely surgically absent.      Small amount of free fluid is present in the pelvis.  There is no evidence of reactive there is no evidence of pneumatosis.  The psoas margins are unremarkable.    There are stable degenerative changes in the osseous structures.  There is no evidence of fracture.  There are postoperative changes in the right anterior abdominal wall.                                 Medical Decision Making:   Initial Assessment:   Patient with nausea, vomiting and weakness with slip and fall down 3 carpeted stairs and no injury noted.  Weak and frail with poor po intake and progressive failure to thrive and weight loss surrounding treatment for bladder cancer.  Will w/u with labs and UA, r/o worsening kidney  failure or other cause of weakness including infection, anemia and consider hospitalization if no clinical improvement or acute findings on workup   Differential Diagnosis:   Dehydration, near syncope, arrhythmia, kidney failure, electrolyte abnormality, anemia, infection  Clinical Tests:   Lab Tests: Ordered and Reviewed  Radiological Study: Ordered and Reviewed                   ED Course     Clinical Impression:   The primary encounter diagnosis was Urinary tract infection with hematuria, site unspecified. Diagnoses of Vomiting and Anemia, unspecified type were also pertinent to this visit.    Disposition:   Disposition: Admitted  Condition: Stable                        Renetta Harris MD  08/02/17 [id number]

## 2017-08-02 NOTE — ED NOTES
Pt hx bladder cancer, pt had prostate removed 6/21/17, pt reports nausea with emesis and dizziness with headache that started PTA, pt awake alert oriented to self situation, pt denies chest pain or sob, pt placed on cardiac monitor with bp cuff and pulse ox, family at bedside

## 2017-08-03 ENCOUNTER — TELEPHONE (OUTPATIENT)
Dept: UROLOGY | Facility: CLINIC | Age: 67
End: 2017-08-03

## 2017-08-03 PROBLEM — I21.4 NSTEMI (NON-ST ELEVATED MYOCARDIAL INFARCTION): Status: ACTIVE | Noted: 2017-08-03

## 2017-08-03 PROBLEM — I33.0 ACUTE BACTERIAL ENDOCARDITIS: Status: ACTIVE | Noted: 2017-08-03

## 2017-08-03 PROBLEM — N39.0 URINARY TRACT INFECTION WITH HEMATURIA: Status: ACTIVE | Noted: 2017-08-03

## 2017-08-03 PROBLEM — R31.9 URINARY TRACT INFECTION WITH HEMATURIA: Status: ACTIVE | Noted: 2017-08-03

## 2017-08-03 PROBLEM — R79.89 ELEVATED TROPONIN: Status: ACTIVE | Noted: 2017-08-03

## 2017-08-03 LAB
ALBUMIN SERPL BCP-MCNC: 2.8 G/DL
ALP SERPL-CCNC: 67 U/L
ALT SERPL W/O P-5'-P-CCNC: 16 U/L
ANION GAP SERPL CALC-SCNC: 12 MMOL/L
ANISOCYTOSIS BLD QL SMEAR: SLIGHT
AORTIC VALVE REGURGITATION: NORMAL
AST SERPL-CCNC: 15 U/L
BASOPHILS # BLD AUTO: 0.02 K/UL
BASOPHILS NFR BLD: 0.1 %
BILIRUB SERPL-MCNC: 2.5 MG/DL
BLD PROD TYP BPU: NORMAL
BLD PROD TYP BPU: NORMAL
BLOOD UNIT EXPIRATION DATE: NORMAL
BLOOD UNIT EXPIRATION DATE: NORMAL
BLOOD UNIT TYPE CODE: 1700
BLOOD UNIT TYPE CODE: 7300
BLOOD UNIT TYPE: NORMAL
BLOOD UNIT TYPE: NORMAL
BUN SERPL-MCNC: 26 MG/DL
CALCIUM SERPL-MCNC: 9.1 MG/DL
CHLORIDE SERPL-SCNC: 111 MMOL/L
CK SERPL-CCNC: 24 U/L
CO2 SERPL-SCNC: 16 MMOL/L
CODING SYSTEM: NORMAL
CODING SYSTEM: NORMAL
CREAT SERPL-MCNC: 1.2 MG/DL
DIASTOLIC DYSFUNCTION: NO
DIFFERENTIAL METHOD: ABNORMAL
DISPENSE STATUS: NORMAL
DISPENSE STATUS: NORMAL
EOSINOPHIL # BLD AUTO: 0 K/UL
EOSINOPHIL NFR BLD: 0 %
ERYTHROCYTE [DISTWIDTH] IN BLOOD BY AUTOMATED COUNT: 16.2 %
EST. GFR  (AFRICAN AMERICAN): >60 ML/MIN/1.73 M^2
EST. GFR  (NON AFRICAN AMERICAN): >60 ML/MIN/1.73 M^2
ESTIMATED PA SYSTOLIC PRESSURE: 38.23
GLUCOSE SERPL-MCNC: 136 MG/DL
HCT VFR BLD AUTO: 26.4 %
HGB BLD-MCNC: 8.8 G/DL
LYMPHOCYTES # BLD AUTO: 1.1 K/UL
LYMPHOCYTES NFR BLD: 4.4 %
MCH RBC QN AUTO: 28.5 PG
MCHC RBC AUTO-ENTMCNC: 33.3 G/DL
MCV RBC AUTO: 85 FL
MONOCYTES # BLD AUTO: 0.6 K/UL
MONOCYTES NFR BLD: 2.5 %
NEUTROPHILS # BLD AUTO: 21.8 K/UL
NEUTROPHILS NFR BLD: 93 %
NUM UNITS TRANS PACKED RBC: NORMAL
PLATELET # BLD AUTO: 358 K/UL
PLATELET BLD QL SMEAR: ABNORMAL
PMV BLD AUTO: 8.5 FL
POTASSIUM SERPL-SCNC: 5.1 MMOL/L
PROT SERPL-MCNC: 6.9 G/DL
RBC # BLD AUTO: 3.09 M/UL
RETIRED EF AND QEF - SEE NOTES: 55 (ref 55–65)
SODIUM SERPL-SCNC: 139 MMOL/L
TRANS ERYTHROCYTES VOL PATIENT: NORMAL ML
TROPONIN I SERPL DL<=0.01 NG/ML-MCNC: 1.17 NG/ML
TROPONIN I SERPL DL<=0.01 NG/ML-MCNC: 1.23 NG/ML
TROPONIN I SERPL DL<=0.01 NG/ML-MCNC: 1.28 NG/ML
VANCOMYCIN SERPL-MCNC: 14.3 UG/ML
VANCOMYCIN TROUGH SERPL-MCNC: 8.9 UG/ML
WBC # BLD AUTO: 23.8 K/UL

## 2017-08-03 PROCEDURE — 63600175 PHARM REV CODE 636 W HCPCS: Performed by: STUDENT IN AN ORGANIZED HEALTH CARE EDUCATION/TRAINING PROGRAM

## 2017-08-03 PROCEDURE — 80053 COMPREHEN METABOLIC PANEL: CPT

## 2017-08-03 PROCEDURE — C9113 INJ PANTOPRAZOLE SODIUM, VIA: HCPCS | Performed by: STUDENT IN AN ORGANIZED HEALTH CARE EDUCATION/TRAINING PROGRAM

## 2017-08-03 PROCEDURE — 36415 COLL VENOUS BLD VENIPUNCTURE: CPT

## 2017-08-03 PROCEDURE — 82550 ASSAY OF CK (CPK): CPT

## 2017-08-03 PROCEDURE — 11000001 HC ACUTE MED/SURG PRIVATE ROOM

## 2017-08-03 PROCEDURE — 85025 COMPLETE CBC W/AUTO DIFF WBC: CPT

## 2017-08-03 PROCEDURE — 93306 TTE W/DOPPLER COMPLETE: CPT

## 2017-08-03 PROCEDURE — 84484 ASSAY OF TROPONIN QUANT: CPT | Mod: 91

## 2017-08-03 PROCEDURE — 99900038 HC OT GENERIC THERAPY SCREENING (STAT)

## 2017-08-03 PROCEDURE — 99024 POSTOP FOLLOW-UP VISIT: CPT | Mod: ,,, | Performed by: UROLOGY

## 2017-08-03 PROCEDURE — 25000003 PHARM REV CODE 250: Performed by: STUDENT IN AN ORGANIZED HEALTH CARE EDUCATION/TRAINING PROGRAM

## 2017-08-03 PROCEDURE — P9016 RBC LEUKOCYTES REDUCED: HCPCS

## 2017-08-03 PROCEDURE — 80202 ASSAY OF VANCOMYCIN: CPT

## 2017-08-03 PROCEDURE — 63600175 PHARM REV CODE 636 W HCPCS: Performed by: INTERNAL MEDICINE

## 2017-08-03 PROCEDURE — 94761 N-INVAS EAR/PLS OXIMETRY MLT: CPT

## 2017-08-03 PROCEDURE — 25000003 PHARM REV CODE 250: Performed by: INTERNAL MEDICINE

## 2017-08-03 PROCEDURE — 80202 ASSAY OF VANCOMYCIN: CPT | Mod: 91

## 2017-08-03 PROCEDURE — 36430 TRANSFUSION BLD/BLD COMPNT: CPT

## 2017-08-03 PROCEDURE — 99900037 HC PT THERAPY SCREENING (STAT)

## 2017-08-03 PROCEDURE — 93005 ELECTROCARDIOGRAM TRACING: CPT

## 2017-08-03 PROCEDURE — 86580 TB INTRADERMAL TEST: CPT | Performed by: STUDENT IN AN ORGANIZED HEALTH CARE EDUCATION/TRAINING PROGRAM

## 2017-08-03 RX ORDER — METOPROLOL TARTRATE 25 MG/1
25 TABLET, FILM COATED ORAL 2 TIMES DAILY
Status: DISCONTINUED | OUTPATIENT
Start: 2017-08-03 | End: 2017-08-04 | Stop reason: HOSPADM

## 2017-08-03 RX ORDER — PANTOPRAZOLE SODIUM 40 MG/1
40 TABLET, DELAYED RELEASE ORAL DAILY
Status: DISCONTINUED | OUTPATIENT
Start: 2017-08-03 | End: 2017-08-04 | Stop reason: HOSPADM

## 2017-08-03 RX ORDER — ATORVASTATIN CALCIUM 40 MG/1
40 TABLET, FILM COATED ORAL NIGHTLY
Status: DISCONTINUED | OUTPATIENT
Start: 2017-08-03 | End: 2017-08-04 | Stop reason: HOSPADM

## 2017-08-03 RX ORDER — ATORVASTATIN CALCIUM 40 MG/1
40 TABLET, FILM COATED ORAL DAILY
Status: DISCONTINUED | OUTPATIENT
Start: 2017-08-03 | End: 2017-08-03

## 2017-08-03 RX ORDER — NAPROXEN SODIUM 220 MG/1
81 TABLET, FILM COATED ORAL DAILY
Status: DISCONTINUED | OUTPATIENT
Start: 2017-08-03 | End: 2017-08-04 | Stop reason: HOSPADM

## 2017-08-03 RX ORDER — HEPARIN SODIUM 5000 [USP'U]/ML
5000 INJECTION, SOLUTION INTRAVENOUS; SUBCUTANEOUS EVERY 8 HOURS
Status: DISCONTINUED | OUTPATIENT
Start: 2017-08-03 | End: 2017-08-04 | Stop reason: HOSPADM

## 2017-08-03 RX ADMIN — VANCOMYCIN HYDROCHLORIDE 250 MG: 100 INJECTION, POWDER, LYOPHILIZED, FOR SOLUTION INTRAVENOUS at 03:08

## 2017-08-03 RX ADMIN — BACLOFEN 10 MG: 10 TABLET ORAL at 01:08

## 2017-08-03 RX ADMIN — SODIUM BICARBONATE: 84 INJECTION, SOLUTION INTRAVENOUS at 06:08

## 2017-08-03 RX ADMIN — PIPERACILLIN AND TAZOBACTAM 4.5 G: 4; .5 INJECTION, POWDER, FOR SOLUTION INTRAVENOUS at 03:08

## 2017-08-03 RX ADMIN — MEGESTROL ACETATE 200 MG: 40 SUSPENSION ORAL at 06:08

## 2017-08-03 RX ADMIN — METOPROLOL TARTRATE 25 MG: 25 TABLET ORAL at 11:08

## 2017-08-03 RX ADMIN — CEFTRIAXONE 2 G: 2 INJECTION, SOLUTION INTRAVENOUS at 03:08

## 2017-08-03 RX ADMIN — METOPROLOL TARTRATE 25 MG: 25 TABLET ORAL at 10:08

## 2017-08-03 RX ADMIN — BACLOFEN 10 MG: 10 TABLET ORAL at 06:08

## 2017-08-03 RX ADMIN — PANTOPRAZOLE SODIUM 40 MG: 40 INJECTION, POWDER, FOR SOLUTION INTRAVENOUS at 08:08

## 2017-08-03 RX ADMIN — ASPIRIN 81 MG 81 MG: 81 TABLET ORAL at 11:08

## 2017-08-03 RX ADMIN — BACLOFEN 10 MG: 10 TABLET ORAL at 10:08

## 2017-08-03 RX ADMIN — HEPARIN SODIUM 5000 UNITS: 5000 INJECTION, SOLUTION INTRAVENOUS; SUBCUTANEOUS at 01:08

## 2017-08-03 RX ADMIN — TUBERCULIN PURIFIED PROTEIN DERIVATIVE 5 UNITS: 5 INJECTION INTRADERMAL at 01:08

## 2017-08-03 RX ADMIN — PROMETHAZINE HYDROCHLORIDE 12.5 MG: 12.5 TABLET ORAL at 06:08

## 2017-08-03 RX ADMIN — ONDANSETRON 4 MG: 2 INJECTION INTRAMUSCULAR; INTRAVENOUS at 01:08

## 2017-08-03 RX ADMIN — SODIUM BICARBONATE: 84 INJECTION, SOLUTION INTRAVENOUS at 07:08

## 2017-08-03 RX ADMIN — OXYCODONE AND ACETAMINOPHEN 1 TABLET: 5; 325 TABLET ORAL at 11:08

## 2017-08-03 RX ADMIN — ENOXAPARIN SODIUM 60 MG: 100 INJECTION SUBCUTANEOUS at 12:08

## 2017-08-03 RX ADMIN — PANTOPRAZOLE SODIUM 40 MG: 40 INJECTION, POWDER, FOR SOLUTION INTRAVENOUS at 12:08

## 2017-08-03 RX ADMIN — PANTOPRAZOLE SODIUM 40 MG: 40 TABLET, DELAYED RELEASE ORAL at 11:08

## 2017-08-03 RX ADMIN — HEPARIN SODIUM 5000 UNITS: 5000 INJECTION, SOLUTION INTRAVENOUS; SUBCUTANEOUS at 10:08

## 2017-08-03 RX ADMIN — VANCOMYCIN HYDROCHLORIDE 1000 MG: 1 INJECTION, POWDER, LYOPHILIZED, FOR SOLUTION INTRAVENOUS at 12:08

## 2017-08-03 RX ADMIN — PIPERACILLIN AND TAZOBACTAM 4.5 G: 4; .5 INJECTION, POWDER, FOR SOLUTION INTRAVENOUS at 08:08

## 2017-08-03 RX ADMIN — ONDANSETRON 4 MG: 2 INJECTION INTRAMUSCULAR; INTRAVENOUS at 03:08

## 2017-08-03 RX ADMIN — ATORVASTATIN CALCIUM 40 MG: 40 TABLET, FILM COATED ORAL at 10:08

## 2017-08-03 RX ADMIN — MEGESTROL ACETATE 200 MG: 40 SUSPENSION ORAL at 11:08

## 2017-08-03 RX ADMIN — OXYCODONE AND ACETAMINOPHEN 1 TABLET: 5; 325 TABLET ORAL at 07:08

## 2017-08-03 NOTE — MEDICAL/APP STUDENT
[facility] [place] - L3 infectious disease consult note    Subjective  Information obtained from patient, patient's wife at bedside, and medical record    Chief complaint: nausea, vomiting, weakness    HPI: Juan Manuel Koehler is a 68 yo man with a PMH of throat and bladder cancer, CKD stage 3, and recurrent UTI. He was brought to the ED by EMS yesterday (8/2/17) after acute onset nausea and vomiting. On the afternoon of 8/2/17 he was walking down the stairs in his home when he began sweating and became weak. He fell and began vomiting. He was too weak to stand. He denies loss of consciousness, hitting his head, or any other injury during the fall. He denied chest pain, palpitations, and shortness of breath. He was not nauseous before he became weak and fell. He vomited continuously until he arrived at the Ed. The vomit did not contain blood. Initial vitals in the ED were /64, HR 92, RR 16, T 97.3, and SpO2 100 on room air. WBC 20.9, Hb 7.2, Hct 21.4, platelets 433, total prot 7.8, albumin 2.8, lactate 1.5, troponin 1.488. He received 2L of IV fluids in the ED and was started on 1g vancomycin, 4.5g zosyn, and 400mg ciprofloxacin following a urine culture with presumptive Proteus sp, a blood gram stain that showed gram + cocci in chains resembling strep, and an Echo that showed a vegetation on the atrial surface of the mitral valve.     Cultures  5/5/17 - urine culture - enterococcus  6/12/17 - urine culture - enterococcus  8/2/17 - urine culture - presumptive proteus  8/2/17 - bcx gram stain - gram + cocci in chains resembling strep    Abx  8/2/17 - single dose ciprofloxacin 400 mg IV (discontinued)  8/2/17-8/3/17 - zosyn 4.5g IV (discontinued)  8/2/17 - vancomycin 1000mg IV every 24h  8/3/17 - ceftriaxone 2g IV every 12h    PMH  -Bladder cancer - he was first diagnosed with bladder cancer in 1995 and was treated with surgery. He was more recently diagnosed with bladder cancer after his PCP (Dr. Sweeney) noticed a decrease in  renal function on annual exam at the beginning of 2017. Since diagnosis he has lost 40lbs. He was unable to tolerate chemo due to his decreased renal function. He recently had surgery (17) to remove his bladder, prostate, surrounding nodes, and create a urostomy and ileal conduit.  -Throat cancer -   - CKD stage 3 - diagnosed 2017    PCP - Dr. Sweeney  Up to date on all vaccines including Tdap, Flu, pneumonia    Past surgical hx  Hernia repair  Cystoscopy  Throat surgery -   Bladder surgery -   Colonoscopy and endoscopy 2/10/17 - no polyps or abnormal findings  Nephrostomy tube placement - 17  Nephrostomy tube replaced - 17  Cystectomy with bilateral LN dissection, prostate removal, and creation of ileal consuit - 17    Family hx  Father -  - unknown problems  Mother -  - kidney disease    Social hx  No smoking, no alcohol, no drug use    Home medications  Megestrol   zofran 8mg PRN  Glycolax PRN  Tylenol PRN     Allergies  none    Review of systems   General: 40 lb weight loss since 2017, weakness, chills about once per day, weakness increased since recent surgery  Skin: no skin changes or itching  Head: no headaches  Eyes: no change in vision, wears contacts  Ears: no hearing loss  Nose: no congestion or rhinorrhea   Mouth: no mouth pain or difficulty swallowing  Cardiac: no chest pain or palpitations  Resp: no cough or difficulty breathing  Gi: some constipation with hydrocodone that he had been taking, but currently having normal bowel movements. Nausea and vomiting.   Urinary: 2 recent urinary infections since surgery. No foul smelling urine or change in urine color  Genital: testicular pain worse in back of scrotum  Vascular: no edema currently, but states that here was some swelling last week after the surgery  Musculoskeletal: Pain and tingling in the right knee and on the front of the right thigh that comes and goes. Pain and swelling below the right medial  malleolus   Neurologic: no headaches, loss of sensation, or paralysis  Hematologic: no history of anemia or easy bruising  Psychiatric: no memory problems or other psychiatric disorders    Objective  Vitals   /72 (124-158/60-76)  HR 62 (62-96)  RR 19 (16-19)  SpO2 100 () on room air  T 98.2 ()    Intake/Output Summary (Last 24 hours) at 08/03/17 1633  Last data filed at 08/03/17 0645   Gross per 24 hour   Intake            672.5 ml   Output              350 ml   Net            322.5 ml     Physical exam  General: alert and oriented. Cachetic. Some green vomit on blanket   HENT: no signs of trauma. No eye findings. Oropharynx clear  Cv: regular rate and rhythm. Faint murmur heard at left sternal border that is heard on inspiration  Resp: no increased work of breathing. Lung sounds clear bilaterally  Abd: no abdominal tenderness or distension. Urostomy in place on right side with no signs of redness or swelling. Some point tenderness in right suprapubic region.   Extrem: some redness and swelling below right medial malleolus. No splinter hemorrhages. No edema. Pulses equal bilaterally  Neuro: alert and oriented with appropriate mood and affect.    Medications   aspirin  81 mg Oral Daily    atorvastatin  40 mg Oral QHS    baclofen  10 mg Oral TID    cefTRIAXone (ROCEPHIN) IVPB  2 g Intravenous Q12H    heparin (porcine)  5,000 Units Subcutaneous Q8H    megestrol  200 mg Oral TID WM    metoprolol tartrate  25 mg Oral BID    pantoprazole  40 mg Oral Daily    polyethylene glycol  17 g Oral Daily    vancomycin (VANCOCIN) IVPB  1,000 mg Intravenous Q24H   PRN meds: sodium chloride, ondansetron, oxycodone-acetaminophen, promethazine     Labs:  Lab Results   Component Value Date    WBC 23.80 (H) 08/03/2017    HGB 8.8 (L) 08/03/2017    HCT 26.4 (L) 08/03/2017    MCV 85 08/03/2017     (H) 08/03/2017     BMP  Lab Results   Component Value Date     08/03/2017    K 5.1 08/03/2017      (H) 08/03/2017    CO2 16 (L) 08/03/2017    BUN 26 (H) 08/03/2017    CREATININE 1.2 08/03/2017    CALCIUM 9.1 08/03/2017    ANIONGAP 12 08/03/2017    ESTGFRAFRICA >60 08/03/2017    EGFRNONAA >60 08/03/2017     Lab Results   Component Value Date    ALT 16 08/03/2017    AST 15 08/03/2017    ALKPHOS 67 08/03/2017    BILITOT 2.5 (H) 08/03/2017       Recent Labs  Lab 08/03/17  1327   TROPONINI 1.172*     Lab Results   Component Value Date    ALBUMIN 2.8 (L) 08/03/2017     Microbiology  Microbiology Results (last 7 days)     Procedure Component Value Units Date/Time    Urine culture **CANNOT BE ORDERED STAT** [[id number]] Collected:  08/02/17 1653    Order Status:  Completed Specimen:  Urine from Urine, Clean Catch Updated:  08/03/17 1217     Urine Culture, Routine --     PRESUMPTIVE PROTEUS SPECIES  >100,000 cfu/ml  Identification and susceptibility pending  No other significant isolate      Blood Culture #1 **CANNOT BE ORDERED STAT** [[id number]] Collected:  08/02/17 1802    Order Status:  Completed Specimen:  Blood from Peripheral, Antecubital, Right Updated:  08/03/17 1043     Blood Culture, Routine Gram stain ani bottle: Gram positive cocci in chains resembling Strep      Blood Culture, Routine Results called to and read back by: Jeana Barrera RN 08/03/2017  10:42    Blood Culture #2 **CANNOT BE ORDERED STAT** [[id number]] Collected:  08/02/17 1807    Order Status:  Completed Specimen:  Blood from Peripheral, Hand, Right Updated:  08/03/17 1043     Blood Culture, Routine Gram stain ani bottle: Gram positive cocci in chains resembling Strep      Blood Culture, Routine Results called to and read back by: Jeana Barrera RN 08/03/2017  10:42        Imaging  Imaging Results          US Scrotum And Testicles (Final result)  Result time 08/03/17 14:17:48    Final result by Uday Recio MD (08/03/17 14:17:48)                 Impression:        1.  Right extratesticular cysts as discussed above representing either epididymal  body and tail cysts or spermatoceles.  In the differential diagnoses to be vaginitis cysts may be considered.  2.  Rest of examination unremarkable.      Electronically signed by: ARPITA PORTILLO MD  Date:     08/03/17  Time:    14:17              Narrative:    History: Bilateral testicular pain.    Procedure: Testicular ultrasound with Doppler studies.    Findings:    These homogeneous right testicle that measures 3.1 x 2 mm.  Normal vascular flow to the right testicle without definite signs for torsion.  The head of the epididymis measures approximately 0.7 cm.  There are at least 2 echolucent cysts posterior and lateral to the testicle measuring approximately 2.3 x 1.7 x 1.7 cm and 1.3 x 0.9 x 0.9 cm.  These appear to have a benign appearance.  This could represent either epididymal cysts in the body and tail of the epididymis or could represent spermatoceles.  Less likely possibility of tunica vaginalis cysts may be considered.  No varicocele was identified.    Left testicle measures 2.7 x 1.7 x 1.8 cm with normal vascular flow.  No definite sign for torsion.  There is a normal epididymis measuring approximately 0.7 cm.  No hydrocele or testicular masses noted on the left.                             X-Ray Chest 1 View (Final result)  Result time 08/02/17 19:28:02    Final result by Pancho Olivas MD (08/02/17 19:28:02)                 Impression:       No acute process.              Electronically signed by: PANCHO OLIVAS MD  Date:     08/02/17  Time:    19:28              Narrative:    Exam: [id number]  08/02/17  19:11:00 [id number] ([facility]) : XR CHEST 1 VIEW    Technique:    Single frontal chest x-ray    Comparison:    05/05/2017    Findings:      Monitoring EKG leads are present.  The trachea is unremarkable.  The cardiomediastinal silhouette is within normal limits.  There are calcified lymph nodes in the hilum.  The hemidiaphragms are unremarkable.  There is no evidence of free air beneath the hemidiaphragms.  There are no  pleural effusions.  There is no evidence of a pneumothorax.  There is no evidence of pneumomediastinum.  No airspace opacities are present.  There are stable chronic pulmonary interstitial changes. There are degenerative changes in the osseous structures.                             CT Abdomen Pelvis With Contrast (Final result)     Abnormal  Result time 08/02/17 19:00:37    Final result by Pancho Olivas MD (08/02/17 19:00:37)                 Impression:       Postoperative changes of interval cystectomy with urostomy placement in the right upper abdominal quadrant.  Nonspecific distention of the ureter into the urostomy changes with peripheral enhancement .  Some component of infection would be difficult to exclude.    Interval development of multiple low attenuation lesions in the spleen.  These remain nonspecific with splenic abscesses not excluded.  Suggest short-term followup.    Small amount of free fluid in the pelvis.  This may be reactive in nature.  Short term follow up is suggested.    Additional findings as above.    Report has been flagged in the EPIC medical record system.        Electronically signed by: PANCHO OLIVAS MD  Date:     08/02/17  Time:    19:00              Narrative:    Exam: [id number]  08/02/17  17:48:53 NSY040 ([facility]) : CT ABDOMEN PELVIS WITH CONTRAST    Technique:    Axial CT Scan of the abdomen and pelvis was performed from the lung base to the public symphysis after the intravenous administration of  75 cc of Omni 350. Coronal and Sagittal reformats were obtained. Delayed images were also obtained    Comparison:    CT scan of the abdomen dated 5/5/17.    Findings:      There is a stable granoluma the right lung base.  There are dependent changes in the lung bases.  The heart is normal in appearance.  No pericardial effusions are present.  There are scattered atherosclerotic changes in the abdominal aorta and its branch vessels.  There are changes of extensive pelvic lymph node dissection.   There is no evidence of lymphadenopathy in the abdomen or pelvis.    The esophagus, stomach, and duodenum are within normal limits.  The small bowel   The appendix is not consistently identified.  No secondary findings of acute appendicitis.  There are postoperative changes involving the small bowel loops in the pelvis.  There is a right-sided urostomy in place.  There is mild distention of the ureter extending into the urostomy.  The large bowel is grossly unremarkable.    The liver is enlarged.  There is stable distention of the gallbladder.  The biliary tree is within normal limits.  There are new areas of low attenuation within the spleen.  The spleen is enlarged.  There also calcifications within the spleen.  The pancreas and adrenal glands are within normal limits.    There has been interval removal of previous bilateral nephrostomy tubes and ureteral stents.  There are subcentimeter low attenuation lesions in the kidneys are too small to completely characterize.  There is bilateral hydroureteronephrosis.   The urinary bladder is poorly visualized and is most likely surgically absent.      Small amount of free fluid is present in the pelvis.  There is no evidence of reactive there is no evidence of pneumatosis.  The psoas margins are unremarkable.    There are stable degenerative changes in the osseous structures.  There is no evidence of fracture.  There are postoperative changes in the right anterior abdominal wall.                            Assessment and Plan    Juan Manuel Koehler is a 68 yo man with a PMH of throat and bladder cancer, CKD stage 3, and recurrent UTI. He was admitted for weakness, nausea, and vomiting, and is currently being seen for suspected endocarditis and bacteremia.     Endocarditis  -positive blood gram stain - gram + cocci resembling strep  -faint murmur auscultated  -echo showed atrial surface vegetation of mitral valve  -possible septic microemboli seen in spleen on CT  -awaiting  culture data   -continue vancomycin and ceftriaxone  -monitor vanc troughs    Urostomy   -created after surgery on 6/12/17  -continue to monitor ostomy and urine output    CKD Stage 3  -monitor renal function  -keep renal function in mind when making abx choices

## 2017-08-03 NOTE — HPI
Patient is a 67-year-old male known to me for bladder cancer.  He was referred to  with oncology and Dr. Robles with urology at [facility] for further management of his bladder cancer.  He received a short course of neoadjuvant chemotherapy but did not tolerate this due to renal dysfunction.  He subsequently progressed to a radical cystectomy with ileal loop urinary diversion by Dr. Robles.  This was done on June 21, 2017.  He had done well.  He is not currently receiving chemotherapy.  His renal function has improved significantly.  His most recent serum creatinine was 1.2.  However, he was in his usual state of health when yesterday he had a syncopal episode followed by diaphoresis and a fall at the landing of their stairs.  Prior to this he felt well.  He denies any fevers.  He does feel weak.  He is on an appetite stimulant itches helps somewhat but not significantly.  He is not gaining weight currently.    CT scan is reviewed and reveals mild right-sided hydronephrosis.  Bladder is absent.  Small free fluid in the pelvis.    Per medicine team, echo was performed revealing a vegetation consistent with endocarditis.  Infectious disease has been consulted.    Urinalysis 2+ leukocytes nitrite negative.  Difficult to interpret in the setting of an ileal conduit.

## 2017-08-03 NOTE — H&P
U Internal Medicine H&P    Admitting Team: [facility] Medicine Team A  Attending Physician: Dr. Colbert  Resident: Jelani  Intern: Jett     Date of Admit: 8/2/2017    Chief Complaint     Dizziness and weakness for 1 day    Subjective:      History of Present Illness:  Juan Manuel Koehler is a 67 y.o. male with invasive bladder cancer s/p open radical cystoproctostomy, bilateral pelvic lymph node dissection and ileal conduit urinary diversion (6/21/17, path with high grade urothelial carcinoma, LN negative for malignancy), CKD III and deconditioning and poor PO intake secondary to cancer was in his usual state of health (lives at home with wife, able to ambulate, performs some adls) until day of admission when patient was coming downstairs when he all of a sudden felt dizzy. He slipped and fell; he did not hit his head. He fell on his back and was unable to get up secondary to generalized weakness. He denies focal weakness. He denies any loss of consciousness. He did not have chest pain, palpitations or shortness of breath prior to this event. While he was on the stairs he started to vomit multiple times. He was diaphoretic and vomited what he just drank (red poweraide). He denies any coffee ground emesis or bright red hematemesis. He denies melena. His wife called EMS and en route to the hospital he continued to vomit. He denies fevers or chills. He denies malodorous urine. He has noticed increased urinary frequency. He denies belly pain or drainage around urostomy site.       Past Medical History:  Past Medical History:   Diagnosis Date    Cancer     bladder and throat    CKD (chronic kidney disease) stage 3, GFR 30-59 ml/min     Urinary tract infection        Past Surgical History:  Past Surgical History:   Procedure Laterality Date    BLADDER SURGERY      CYSTOSCOPY      HERNIA REPAIR      Ileal Conduit      THROAT SURGERY         Allergies:  Review of patient's allergies indicates:  No Known  "Allergies    Home Medications:  Prior to Admission medications    Medication Sig Start Date End Date Taking? Authorizing Provider   baclofen (LIORESAL) 10 MG tablet Take 10 mg by mouth 3 (three) times daily.    Historical Provider, MD   megestrol (MEGACE) 400 mg/10 mL (40 mg/mL) Susp Take by mouth.    Historical Provider, MD   ondansetron (ZOFRAN-ODT) 8 MG TbDL Take 1 tablet (8 mg total) by mouth every 6 (six) hours as needed (nausea). 17   Fabian Merida MD   oxycodone-acetaminophen (PERCOCET) 5-325 mg per tablet Take 1 tablet by mouth every 4 (four) hours as needed for Pain. 17   Fabian Merida MD   oxycodone-acetaminophen (PERCOCET) 5-325 mg per tablet Take 1 tablet by mouth every 4 (four) hours as needed for Pain. 17   Kiley Hwang MD   polyethylene glycol (GLYCOLAX) 17 gram PwPk Take 17 g by mouth once daily. 17   Kiley Hwang MD       Family History:  Family History   Problem Relation Age of Onset    No Known Problems Father     Kidney disease Mother     Prostate cancer Neg Hx        Social History:  Social History   Substance Use Topics    Smoking status: Never Smoker    Smokeless tobacco: Never Used    Alcohol use No       Review of Systems:  A comprehensive review of systems was negative. All other systems are reviewed and are negative.    Health Maintaince :   Primary Care Physician: Dr. Sweeney    Immunizations:   TDap UTD  Flu UTD  Pna UTD    Cancer Screening:  Colonoscopy UTD     Objective:   Last 24 Hour Vital Signs:  BP  Min: 124/76  Max: 151/68  Temp  Av.8 °F (36.6 °C)  Min: 97.3 °F (36.3 °C)  Max: 98 °F (36.7 °C)  Pulse  Av.8  Min: 80  Max: 96  Resp  Av.5  Min: 16  Max: 18  SpO2  Av %  Min: 100 %  Max: 100 %  Height  Av' 2" (188 cm)  Min: 6' 2" (188 cm)  Max: 6' 2" (188 cm)  Weight  Av.7 kg (125 lb)  Min: 56.7 kg (125 lb)  Max: 56.7 kg (125 lb)  Body mass index is 16.05 kg/m².  No intake/output data recorded.    Physical " Examination:  Gen: frail, thin, appears older than stated age, oriented to person, place, time and situation  HEENT: NCAT, EOMI, PERRL, dry mucous membranes, JVP 3cm  CV: tachycardic, regular rhythm, no murmurs auscultated  Resp: CTA B, no wheezes, no crackles  Abd: soft, ostomy site clean, no erythema, no purulent drainage  : testicles tender to palpation, no erythema, no swelling, no penile drainage  Rectal: soft light brown stool in vault, no blood, no melena present  Ext compartments soft, NT, no swelling  Skin: no rashes noted, no bruising    Laboratory:  Most Recent Data:  CBC: Lab Results   Component Value Date    WBC 20.90 (H) 08/02/2017    HGB 7.2 (L) 08/02/2017    HCT 21.4 (L) 08/02/2017     (H) 08/02/2017    MCV 86 08/02/2017    RDW 16.2 (H) 08/02/2017     BMP: Lab Results   Component Value Date     08/02/2017    K 4.0 08/02/2017     08/02/2017    CO2 18 (L) 08/02/2017    BUN 31 (H) 08/02/2017    CREATININE 1.3 08/02/2017     (H) 08/02/2017    CALCIUM 9.4 08/02/2017    MG 1.8 07/12/2017    PHOS 3.3 06/26/2017     LFTs: Lab Results   Component Value Date    PROT 7.8 08/02/2017    ALBUMIN 2.8 (L) 08/02/2017    BILITOT 0.5 08/02/2017    AST 15 08/02/2017    ALKPHOS 73 08/02/2017    ALT 16 08/02/2017     Coags:   Lab Results   Component Value Date    INR 1.0 04/06/2017     Anemia: Lab Results   Component Value Date    IRON 13 (L) 08/02/2017    TIBC 179 (L) 08/02/2017    FERRITIN 2,700 (H) 08/02/2017     Microbiology Data:  Blood cx - pending  Urine cx - pending    Other Results:  EKG (my interpretation): ordered, pending    Radiology:  CXR - no acute abnormalities    CT abd/pelvis - small amt of free fluid in pelvis, multiple lower attenuation lesions in spleen, nonspecific distention of ureter into the urostomy changes with peripheral enhancement  Assessment:     Juan Manuel Koehler is a 67 y.o. male with   Plan:   Sepsis secondary to UTI  - WBC 20K. Urine with 20 WBC and moderate  bacteria. Lactic acid 1.2.   - Previous urine cx with enterococcus, so will empirically cover with Vancomycin and Zosyn.  - Blood cultures x2 and urine culture ordered. De-escalate pending culture results    Acute on chronic anemia of chronic inflammation  - Hgb 7.2, baseline Hgb 8-9. No overt bleeding on exam. Recent EGD and c-scope without abnormalities per patient  - Transfusing two units pRBC. Repeat CBC in AM. Iron studies c/w AoCI  - Occult stool ordered give acute drop in blood count. IV PPI daily. Will continue to closely monitor.     Pre-syncope  - Likely secondary to volume depletion given decreased PO intake. Not orthostatic on exam, however patient received 2L of IVF prior to exam  - Cont IVF resucitation and blood transfusion as above.  - Troponin and EKG ordered to eval for arrhythmia. Will place on telemetry and order ECHO.     Invasive bladder cancer s/p open radical cystoproctostomy  - Follows with Dr. Robles, Dr. Boucher and Dr. Rush  - Currently deferring resuming chemotherapy given favorable pathology results. Restaging scans post surgery in 6 weeks.     CKDIII  - Cr 1.3, baseline 1.1-1.5  - Renally dose all medications and avoid nephrotoxic medications    Chronic Testicular Pain  - Testicular pain for over one month with no swelling or erythema  - Will order US of testes for further evaluation.     Progressive Failure to Thrive  - PT/OT ordered, will likely need placement for improvement in strength  - Nutrition consulted ordered. Will continue megace.     Chronic Thrombocytosis  - Likely secondary to malignancy. Will continue to monitor     DVT ppx: Lovenox  Diet: Regular diet  Dispo: admit to inpatient, will likely need placement to snf pending pt/ot recommendations    Code Status:     full    Charlene Palomares MD  [facility] Internal Medicine HO3    [facility] Medicine Hospitalist Pager numbers:   [facility] Hospitalist Medicine Team A (Filemon/Vicente): [phone]  [facility] Hospitalist Medicine Team B  (Rick/Megan):  [phone]

## 2017-08-03 NOTE — PT/OT/SLP PROGRESS
Occupational Therapy      Juan Manuel Koehler  MRN: [id number]    Patient and his wife found in his room post US.  Wife provided history:  Patient lives in a two story home, bed and bath on the second level; stairs have a rail on Left, only a half bath downstairs. Regular tub and shower upstairs. DME inlcudes: RW, and SPC.  Patient was totally independent with all ADLs PTA. Patient not seen today secondary to c/o fatigue; patient request time to rest this am.  Patient able to complete scooting up in bed with min A and left resting with HOB elevated and wife still present. Will follow-up at later time for a full evaluation.    Eusebia Peterson, OT  8/3/2017

## 2017-08-03 NOTE — ASSESSMENT & PLAN NOTE
Blood cultures growing gram positive cocci in chains, either strep or enterococcus. Suspecting enterococcus given patient has a history of infection with this bug and recently had surgery on the UG tract. More specific information on the culture should result tomorrow.  - Continue Vanc per pharmacy, ordered vanc trough before next (3rd) dose  - Zosyn isn't needed in this setting, will switch to high-dose ceftriaxone (2g Q12HR), which will also cover UTI as above  - Will follow up culture data and report new recommendations as needed

## 2017-08-03 NOTE — HPI
Mr. Koehler is a 67yoM who is s/p open radical cystoproctostomy, b/l pelvic lymph node dissection, and ileal conduit urinary diversion for high grade urothelial carcinoma. He states that he was at home and coming down the stairs when he suddenly felt light-headed. He reports falling, but denies injury or LOC. States that he fell onto his back, but was unable to get up 2/2 generalized weakness.    Patient's surgery took place on 6/21/17. He states that he had had enterococcus UTI prior to surgery. Since surgery, he's been weak, with intermittent right knee pain. He denies any redness or swelling of the right knee. Denies other focal joint aches. Has had subjective fevers, with daily episodes of chills. He denies any CP, SOB, palpitations, cough, or rash. He also notes remarkable testicular pain with mild swelling since his surgery, but denies any erythema or increased warmth. He has had intermittent nausea with occasional vomiting since the surgery. Denies abdominal pain. He has had a significant weight-loss of about 40 pounds in the past few months.    He denies any current diarrhea or constipation, with most recent BM being yesterday. He feels his weight loss is due to food not tasting as good and feeling nauseous often.     Patient does not have any cardiac PMH.

## 2017-08-03 NOTE — SUBJECTIVE & OBJECTIVE
Past Medical History:   Diagnosis Date    Cancer     bladder and throat    CKD (chronic kidney disease) stage 3, GFR 30-59 ml/min     Urinary tract infection        Past Surgical History:   Procedure Laterality Date    BLADDER SURGERY      CYSTOSCOPY      HERNIA REPAIR      Ileal Conduit      THROAT SURGERY         Review of patient's allergies indicates:  No Known Allergies    Medications:  Prescriptions Prior to Admission   Medication Sig    baclofen (LIORESAL) 10 MG tablet Take 10 mg by mouth 3 (three) times daily.    megestrol (MEGACE) 400 mg/10 mL (40 mg/mL) Susp Take by mouth.    ondansetron (ZOFRAN-ODT) 8 MG TbDL Take 1 tablet (8 mg total) by mouth every 6 (six) hours as needed (nausea).    oxycodone-acetaminophen (PERCOCET) 5-325 mg per tablet Take 1 tablet by mouth every 4 (four) hours as needed for Pain.    oxycodone-acetaminophen (PERCOCET) 5-325 mg per tablet Take 1 tablet by mouth every 4 (four) hours as needed for Pain.    polyethylene glycol (GLYCOLAX) 17 gram PwPk Take 17 g by mouth once daily.     Antibiotics     Start     Stop Route Frequency Ordered    08/03/17 1630  cefTRIAXone (ROCEPHIN) 2 g in dextrose 5 % 50 mL IVPB      -- IV Every 12 hours (non-standard times) 08/03/17 1523    08/03/17 1600  vancomycin (VANCOCIN) 250 mg in dextrose 5 % 100 mL IVPB      -- IV Once 08/03/17 1453    08/03/17 0000  vancomycin 1 g in dextrose 5 % 250 mL IVPB (ready to mix system)  (Vancomycin IVPB with levels panel)      -- IV Every 24 hours (non-standard times) 08/02/17 2336        Antifungals     None        Antivirals     None           Immunization History   Administered Date(s) Administered    PPD Test 08/03/2017       Family History     Problem Relation (Age of Onset)    Kidney disease Mother    No Known Problems Father        Social History     Social History    Marital status:      Spouse name: N/A    Number of children: N/A    Years of education: N/A     Social History Main  Topics    Smoking status: Never Smoker    Smokeless tobacco: Never Used    Alcohol use No    Drug use: No    Sexual activity: Yes     Partners: Female     Birth control/ protection: None     Other Topics Concern    None     Social History Narrative    None     Review of Systems   Constitutional: Positive for chills, diaphoresis, fatigue and fever.   HENT: Negative for rhinorrhea, sore throat and trouble swallowing.    Eyes: Negative for visual disturbance.   Respiratory: Negative.    Cardiovascular: Negative.    Gastrointestinal:        Per HPI   Genitourinary: Positive for frequency. Negative for dysuria and hematuria.   Musculoskeletal: Positive for arthralgias. Negative for joint swelling.   Skin: Negative for rash.   Neurological: Positive for light-headedness. Negative for dizziness and syncope.   Hematological: Does not bruise/bleed easily.     Objective:     Vital Signs (Most Recent):  Temp: 98.2 °F (36.8 °C) (08/03/17 1219)  Pulse: 62 (08/03/17 1219)  Resp: 19 (08/03/17 1219)  BP: (!) 148/72 (08/03/17 1219)  SpO2: 99 % (08/03/17 1554) Vital Signs (24h Range):  Temp:  [97.2 °F (36.2 °C)-98.7 °F (37.1 °C)] 98.2 °F (36.8 °C)  Pulse:  [62-96] 62  Resp:  [16-19] 19  SpO2:  [98 %-100 %] 99 %  BP: (124-158)/(60-76) 148/72     Weight: 56.7 kg (125 lb)  Body mass index is 16.05 kg/m².    Estimated Creatinine Clearance: 47.3 mL/min (based on Cr of 1.2).    Physical Exam   Constitutional: He appears well-developed. He appears ill. No distress.   HENT:   Head: Normocephalic and atraumatic.   Eyes: Conjunctivae, EOM and lids are normal. Pupils are equal, round, and reactive to light.   Neck: Full passive range of motion without pain. No JVD present.   Cardiovascular: Normal rate, regular rhythm, S1 normal, S2 normal, intact distal pulses and normal pulses.    Murmur heard.   Systolic murmur is present with a grade of 2/6    Diastolic murmur is present with a grade of 2/6   Systolic and diastolic are ejection  murmurs   Pulmonary/Chest: Effort normal and breath sounds normal. He has no wheezes. He has no rales.   Abdominal: Soft. Bowel sounds are normal. He exhibits no distension. There is tenderness in the suprapubic area.   Ileostomy in lateral RUQ without surrounding erythema or induration. Ventral Incision scar well-healed, without erythema or induration.   Genitourinary: Penis normal. Right testis shows tenderness. Right testis shows no swelling. Left testis shows tenderness. Left testis shows no swelling. No penile erythema. No discharge found.   Musculoskeletal: He exhibits no edema, tenderness or deformity.   Neurological: He is alert.   Skin: Skin is warm and dry. Capillary refill takes less than 2 seconds. No rash noted. He is not diaphoretic. No erythema. There is pallor.   Nursing note and vitals reviewed.      Significant Labs:   CBC:   Recent Labs  Lab 08/02/17  1638 08/03/17  0740   WBC 20.90* 23.80*   HGB 7.2* 8.8*   HCT 21.4* 26.4*   * 358*     Microbiology Results (last 7 days)     Procedure Component Value Units Date/Time    Urine culture **CANNOT BE ORDERED STAT** [[id number]] Collected:  08/02/17 1653    Order Status:  Completed Specimen:  Urine from Urine, Clean Catch Updated:  08/03/17 1217     Urine Culture, Routine --     PRESUMPTIVE PROTEUS SPECIES  >100,000 cfu/ml  Identification and susceptibility pending  No other significant isolate      Blood Culture #1 **CANNOT BE ORDERED STAT** [[id number]] Collected:  08/02/17 1802    Order Status:  Completed Specimen:  Blood from Peripheral, Antecubital, Right Updated:  08/03/17 1043     Blood Culture, Routine Gram stain ani bottle: Gram positive cocci in chains resembling Strep      Blood Culture, Routine Results called to and read back by: Jeana Barrera RN 08/03/2017  10:42    Blood Culture #2 **CANNOT BE ORDERED STAT** [[id number]] Collected:  08/02/17 1807    Order Status:  Completed Specimen:  Blood from Peripheral, Hand, Right Updated:   08/03/17 1043     Blood Culture, Routine Gram stain ani bottle: Gram positive cocci in chains resembling Strep      Blood Culture, Routine Results called to and read back by: Jeana Barrera RN 08/03/2017  10:42          Significant Imaging: CT: I have reviewed all pertinent results/findings within the past 24 hours:  possible fluid collections in spleen  Echo: I have reviewed all pertinent results/findings within the past 24 hours:  vegetation visualized on mitral valve, mild tricuspid regurgitation, increase right atrial pressure  EKG: I have reviewed all pertinent results/findings within the past 24 hours:  Official read pending, non-specific ST changes

## 2017-08-03 NOTE — PT/OT/SLP PROGRESS
Physical Therapy      Juan Manuel Eamon Koehler  MRN: [id number]  1020  Patient not seen at this time secondary to pt having US test. Will follow-up .    Hyacinth Tian, PT   8/3/2017

## 2017-08-03 NOTE — CONSULTS
[facility]  Infectious Disease  Consult Note    Patient Name: Juan Manuel Koehler  MRN: [id number]  Admission Date: 8/2/2017  Hospital Length of Stay: 1 days  Attending Physician: Arnol Colbert MD  Primary Care Provider: Hemant Sweeney MD     Isolation Status: No active isolations    Patient information was obtained from patient, spouse/SO, past medical records and ER records.      Consults  Assessment/Plan:     Acute bacterial endocarditis    Blood cultures growing gram positive cocci in chains, either strep or enterococcus. Suspecting enterococcus given patient has a history of infection with this bug and recently had surgery on the UG tract. More specific information on the culture should result tomorrow.  - Continue Vanc per pharmacy, ordered vanc trough before next (3rd) dose  - Zosyn isn't needed in this setting, will switch to high-dose ceftriaxone (2g Q12HR), which will also cover UTI as above  - Will follow up culture data and report new recommendations as needed        Urinary tract infection with hematuria    Urine cultures showing likely proteus spp, which will be covered by Ceftriaxone. Will follow cultures and adjust recommendations as appropriate. Scrotal tenderness likely related to surgery in June, but will continue to monitor symptoms and for changes. Any bacterial infection will be adequately covered with current regimen.            Thank you for your consult. I will follow-up with patient. Please contact us if you have any additional questions.    Shreya Calderon MD  [employer] [facility], [facility]    Subjective:     Principal Problem: Sepsis secondary to UTI    HPI: Mr. Koehler is a 67yoM who is s/p open radical cystoproctostomy, b/l pelvic lymph node dissection, and ileal conduit urinary diversion for high grade urothelial carcinoma. He states that he was at home and coming down the stairs when he suddenly felt  light-headed. He reports falling, but denies injury or LOC. States that he fell onto his back, but was unable to get up 2/2 generalized weakness.    Patient's surgery took place on 6/21/17. He states that he had had enterococcus UTI prior to surgery. Since surgery, he's been weak, with intermittent right knee pain. He denies any redness or swelling of the right knee. Denies other focal joint aches. Has had subjective fevers, with daily episodes of chills. He denies any CP, SOB, palpitations, cough, or rash. He also notes remarkable testicular pain with mild swelling since his surgery, but denies any erythema or increased warmth. He has had intermittent nausea with occasional vomiting since the surgery. Denies abdominal pain. He has had a significant weight-loss of about 40 pounds in the past few months.    He denies any current diarrhea or constipation, with most recent BM being yesterday. He feels his weight loss is due to food not tasting as good and feeling nauseous often.     Patient does not have any cardiac PMH.      Past Medical History:   Diagnosis Date    Cancer     bladder and throat    CKD (chronic kidney disease) stage 3, GFR 30-59 ml/min     Urinary tract infection        Past Surgical History:   Procedure Laterality Date    BLADDER SURGERY      CYSTOSCOPY      HERNIA REPAIR      Ileal Conduit      THROAT SURGERY         Review of patient's allergies indicates:  No Known Allergies    Medications:  Prescriptions Prior to Admission   Medication Sig    baclofen (LIORESAL) 10 MG tablet Take 10 mg by mouth 3 (three) times daily.    megestrol (MEGACE) 400 mg/10 mL (40 mg/mL) Susp Take by mouth.    ondansetron (ZOFRAN-ODT) 8 MG TbDL Take 1 tablet (8 mg total) by mouth every 6 (six) hours as needed (nausea).    oxycodone-acetaminophen (PERCOCET) 5-325 mg per tablet Take 1 tablet by mouth every 4 (four) hours as needed for Pain.    oxycodone-acetaminophen (PERCOCET) 5-325 mg per tablet Take 1 tablet  by mouth every 4 (four) hours as needed for Pain.    polyethylene glycol (GLYCOLAX) 17 gram PwPk Take 17 g by mouth once daily.     Antibiotics     Start     Stop Route Frequency Ordered    08/03/17 1630  cefTRIAXone (ROCEPHIN) 2 g in dextrose 5 % 50 mL IVPB      -- IV Every 12 hours (non-standard times) 08/03/17 1523    08/03/17 1600  vancomycin (VANCOCIN) 250 mg in dextrose 5 % 100 mL IVPB      -- IV Once 08/03/17 1453    08/03/17 0000  vancomycin 1 g in dextrose 5 % 250 mL IVPB (ready to mix system)  (Vancomycin IVPB with levels panel)      -- IV Every 24 hours (non-standard times) 08/02/17 2336        Antifungals     None        Antivirals     None           Immunization History   Administered Date(s) Administered    PPD Test 08/03/2017       Family History     Problem Relation (Age of Onset)    Kidney disease Mother    No Known Problems Father        Social History     Social History    Marital status:      Spouse name: N/A    Number of children: N/A    Years of education: N/A     Social History Main Topics    Smoking status: Never Smoker    Smokeless tobacco: Never Used    Alcohol use No    Drug use: No    Sexual activity: Yes     Partners: Female     Birth control/ protection: None     Other Topics Concern    None     Social History Narrative    None     Review of Systems   Constitutional: Positive for chills, diaphoresis, fatigue and fever.   HENT: Negative for rhinorrhea, sore throat and trouble swallowing.    Eyes: Negative for visual disturbance.   Respiratory: Negative.    Cardiovascular: Negative.    Gastrointestinal:        Per HPI   Genitourinary: Positive for frequency. Negative for dysuria and hematuria.   Musculoskeletal: Positive for arthralgias. Negative for joint swelling.   Skin: Negative for rash.   Neurological: Positive for light-headedness. Negative for dizziness and syncope.   Hematological: Does not bruise/bleed easily.     Objective:     Vital Signs (Most  Recent):  Temp: 98.2 °F (36.8 °C) (08/03/17 1219)  Pulse: 62 (08/03/17 1219)  Resp: 19 (08/03/17 1219)  BP: (!) 148/72 (08/03/17 1219)  SpO2: 99 % (08/03/17 1554) Vital Signs (24h Range):  Temp:  [97.2 °F (36.2 °C)-98.7 °F (37.1 °C)] 98.2 °F (36.8 °C)  Pulse:  [62-96] 62  Resp:  [16-19] 19  SpO2:  [98 %-100 %] 99 %  BP: (124-158)/(60-76) 148/72     Weight: 56.7 kg (125 lb)  Body mass index is 16.05 kg/m².    Estimated Creatinine Clearance: 47.3 mL/min (based on Cr of 1.2).    Physical Exam   Constitutional: He appears well-developed. He appears ill. No distress.   HENT:   Head: Normocephalic and atraumatic.   Eyes: Conjunctivae, EOM and lids are normal. Pupils are equal, round, and reactive to light.   Neck: Full passive range of motion without pain. No JVD present.   Cardiovascular: Normal rate, regular rhythm, S1 normal, S2 normal, intact distal pulses and normal pulses.    Murmur heard.   Systolic murmur is present with a grade of 2/6    Diastolic murmur is present with a grade of 2/6   Systolic and diastolic are ejection murmurs   Pulmonary/Chest: Effort normal and breath sounds normal. He has no wheezes. He has no rales.   Abdominal: Soft. Bowel sounds are normal. He exhibits no distension. There is tenderness in the suprapubic area.   Ileostomy in lateral RUQ without surrounding erythema or induration. Ventral Incision scar well-healed, without erythema or induration.   Genitourinary: Penis normal. Right testis shows tenderness. Right testis shows no swelling. Left testis shows tenderness. Left testis shows no swelling. No penile erythema. No discharge found.   Musculoskeletal: He exhibits no edema, tenderness or deformity.   Neurological: He is alert.   Skin: Skin is warm and dry. Capillary refill takes less than 2 seconds. No rash noted. He is not diaphoretic. No erythema. There is pallor.   Nursing note and vitals reviewed.      Significant Labs:   CBC:   Recent Labs  Lab 08/02/17  1638 08/03/17  0740   WBC  20.90* 23.80*   HGB 7.2* 8.8*   HCT 21.4* 26.4*   * 358*     Microbiology Results (last 7 days)     Procedure Component Value Units Date/Time    Urine culture **CANNOT BE ORDERED STAT** [[id number]] Collected:  08/02/17 1653    Order Status:  Completed Specimen:  Urine from Urine, Clean Catch Updated:  08/03/17 1217     Urine Culture, Routine --     PRESUMPTIVE PROTEUS SPECIES  >100,000 cfu/ml  Identification and susceptibility pending  No other significant isolate      Blood Culture #1 **CANNOT BE ORDERED STAT** [[id number]] Collected:  08/02/17 1802    Order Status:  Completed Specimen:  Blood from Peripheral, Antecubital, Right Updated:  08/03/17 1043     Blood Culture, Routine Gram stain ani bottle: Gram positive cocci in chains resembling Strep      Blood Culture, Routine Results called to and read back by: Jeana Barrera RN 08/03/2017  10:42    Blood Culture #2 **CANNOT BE ORDERED STAT** [[id number]] Collected:  08/02/17 1807    Order Status:  Completed Specimen:  Blood from Peripheral, Hand, Right Updated:  08/03/17 1043     Blood Culture, Routine Gram stain ani bottle: Gram positive cocci in chains resembling Strep      Blood Culture, Routine Results called to and read back by: Jeana Barrera RN 08/03/2017  10:42          Significant Imaging: CT: I have reviewed all pertinent results/findings within the past 24 hours:  possible fluid collections in spleen  Echo: I have reviewed all pertinent results/findings within the past 24 hours:  vegetation visualized on mitral valve, mild tricuspid regurgitation, increase right atrial pressure  EKG: I have reviewed all pertinent results/findings within the past 24 hours:  Official read pending, non-specific ST changes

## 2017-08-03 NOTE — MEDICAL/APP STUDENT
[employer] Medical Student L4 Progress Note    Subjective:     Pt reporting nausea, weakness and HA this morning. Pt has not vomited since admit but reporting dry heaving overnight. No CP, sob, fever, chills, sweating. He reports no burning at his ostomy site. No changes in bowel movements and last one was yesterday. Pt received two units of pRBCs overnight, no abnormal response associated with transfusion.     Objective:    Vital Signs Last 24 Hrs:   (Min-Max)    BP: 124-156/72-76  Tmax: 98.7  Pulse: 70-96  RR: 16-18  SpO2:     I/O:  In:367.5  Out: 350    PE:  Gen: awake, cooperative, eyes remained closed throughout exam but pt opened when asked   HEENT: PERRL, MMM, clear oropharynx   Resp:CLAB, no wheezing, no crackles,   Abd: soft, positive BS, no pain on palpation, non-distended, midline scar, ostomy site clean, no signs of infection  CV:  Tachycardic RR, no murmurs, no clicks, no rubs,   Ext: no cyanosis, no edema, no janudice,   UA: urine in ostomy bag was clear yellow with out blood or debris       Labs:  H/H: 7.2/21.4  Plt: 433  WBC: 20.9  Na: 139  K: 5.1  Bicarb: 16  Cl: 111  BUN: 26  Cr: 1.2  Glucose: 136  Ca: 9.1  Albumin: 2.8  Tbili: 2.5    Lactate: 1.5  Troponin: 1.234  Iron:13  TIBC:179  Transferrin: 121  Ferritin:2700  Folate:10  B12: 428    Micro:  Blood Clx: NGTD  Urine Clx: pending    UA:   Bacteria: Mod, WBC: 20, RBC: 10, No nitrites, 2+ LE, 2+ blood, 2+ protein, no glucose, no ketones    Imaging:    CT abd/pelvis - small amt of free fluid in pelvis, multiple lower attenuation lesions in spleen, nonspecific distention of ureter into the urostomy changes with peripheral enhancement    CXR: no acute abnormalities      Assessment:     68 yo M with hx of bladder cancer s/p radical cystoproctoscopy, CKD stage 3 here for sepsis secondary to UTI, acute on chronic anemia of chronic disease, pre-syncope, NSTEMI secondary to demand ischemia, deconditioning, and chronic thrombocytosis.     Plan:    Sepsis  secondary to UTI:  -WBC 20.90 on admit  -No fever, but positive for sweating, n/v   -No changes in urinary habits or burning at ostomy prior to admit  -UA: Bacteria: Mod, WBC: 20, RBC: 10, No nitrites, 2+ LE, 2+ blood, 2+ protein, no glucose, no ketones  -Previous enterococcus infection, cover with Vanc and Zosyn  -Follow up blood culture NGTD and urine culture pending    NSTEMI secondary to demand ischemia:  - Troponin elevated 1.488 on admit, 1.234 today  - EKG showed ST elevation in V4/V5 with early repolarization, sinus tach  - Elevated troponin likely due to demand ischemia, no symptoms  -Trend troponins, additional EKGs.   -Follow up Echo  -Continue consult with Cardio     Presyncope:  -Not orthostatic on arrival, likely secondary to volume depletion  -2 units pRBCs overnight  -Continue IVF and monitoring for signs of improvement.     Acute on chronic anemia of chronic disease:   - Hgb 7.2, baseline Hgb 8-9. No overt bleeding on exam. Recent EGD and c-scope without abnormalities per patient  - Transfusing two units pRBC. Repeat CBC in AM. Iron studies confirm anemia of chronic disease  -Follow up FOBT and recent scopes negative     Bladder cancer s/p radical cystoproctoscopy:  - Follow up with nephrology  - Hold chemo, will restage with scans in 6 weeks.      CKD 3:  - Cr 1.2 today, baseline 1.1-1.5  - Renally dose all medications and avoid nephrotoxic medications     Deconditioning/Weakness:  - PT/OT ordered  - Nutrition consulted ordered, continue megace.     Chronic Thrombocytosis:  - Secondary to malignancy  -Monitor for changes         James LEMON

## 2017-08-03 NOTE — PLAN OF CARE
"Met with patient's wife.  Patient did NOT work with PT/OT today due to weakness/lethargy.  Per wife, patient was at home Independent with ADL"s- and had home IV abx in the recent past with [facility].    Gave her PHN SNF list - if patient should warrant SNF- will need PT/OT recs. Per MD team, patient will need 4- 6 weeks IV abx.       08/03/17 2436   Discharge Reassessment   Assessment Type Discharge Planning Reassessment   Can the patient answer the patient profile reliably? Yes, cognitively intact   During the past month, has the patient often been bothered by feeling down, depressed or hopeless? No   During the past month, has the patient often been bothered by little interest or pleasure in doing things? No   Discharge plan remains the same: No   Provided patient/caregiver education on the expected discharge date and the discharge plan No   Discharge Plan A Home;Home with family;Home Health   Discharge Plan B Skilled Nursing Facility   Explained to the the patient/caregiver why the discharge planned changed: Yes   Involved the patient/caregiver in establishing a new discharge plan: Yes     Melanie Wilson, RN, [facility], CMSRN  RN Transition Navigator  [phone]      "

## 2017-08-03 NOTE — PLAN OF CARE
Problem: Patient Care Overview  Goal: Plan of Care Review  Outcome: Ongoing (interventions implemented as appropriate)  Medicated with zofran for nausea with adequate results.  Transfused ii units of PRBC's.  Urostomy patent.  Bed alarm on for safety.  Will continute to monitor.  Pt remains NSR on telemetry with HR in the 70's.

## 2017-08-03 NOTE — PROGRESS NOTES
"U Internal Medicine Progress Note    Subjective:    Continues to feel weak. Has nausea, denies vomiting. Denies chest pain or shortness of breath.      Objective:   Last 24 Hour Vital Signs:  BP  Min: 124/76  Max: 156/72  Temp  Av.9 °F (36.6 °C)  Min: 97.2 °F (36.2 °C)  Max: 98.7 °F (37.1 °C)  Pulse  Av.9  Min: 70  Max: 96  Resp  Av.8  Min: 16  Max: 18  SpO2  Av.8 %  Min: 98 %  Max: 100 %  Height  Av' 2" (188 cm)  Min: 6' 2" (188 cm)  Max: 6' 2" (188 cm)  Weight  Av.7 kg (125 lb)  Min: 56.7 kg (125 lb)  Max: 56.7 kg (125 lb)  Body mass index is 16.05 kg/m².  I/O last 3 completed shifts:  In: 367.5 [Blood:367.5]  Out: 350 [Urine:350]    Physical Examination:  Gen: frail, thin, appears older than stated age, oriented to person, place, time and situation  HEENT: NCAT, EOMI, PERRL, dry mucous membranes, JVP 3cm  CV: tachycardic, regular rhythm, no murmurs auscultated  Resp: CTA B, no wheezes, no crackles  Abd: soft, ostomy site clean, no erythema, no purulent drainage  : testicles tender to palpation, no erythema, no swelling, no penile drainage  Ext compartments soft, NT, no swelling  Skin: no rashes noted, no bruising    Laboratory:  Most Recent Data:  CBC:   Lab Results   Component Value Date    WBC 23.80 (H) 2017    HGB 8.8 (L) 2017    HCT 26.4 (L) 2017     (H) 2017    MCV 85 2017    RDW 16.2 (H) 2017     BMP:   Lab Results   Component Value Date     2017    K 5.1 2017     (H) 2017    CO2 16 (L) 2017    BUN 26 (H) 2017    CREATININE 1.2 2017     (H) 2017    CALCIUM 9.1 2017    MG 1.8 2017    PHOS 3.3 2017     LFTs:   Lab Results   Component Value Date    PROT 6.9 2017    ALBUMIN 2.8 (L) 2017    BILITOT 2.5 (H) 2017    AST 15 2017    ALKPHOS 67 2017    ALT 16 2017     Coags:   Lab Results   Component Value Date    INR 1.0 " 04/06/2017     Anemia:   Lab Results   Component Value Date    IRON 13 (L) 08/02/2017    TIBC 179 (L) 08/02/2017    FERRITIN 2,700 (H) 08/02/2017    TALFNQFW85 428 08/02/2017    FOLATE 10.9 08/02/2017     Microbiology Data:  Blood cx - NGTD  Urine cx - pending    Other Results:  EKG (my interpretation): sinus rhythm, st elevation in v4/v5, likely repol    Radiology:  CXR - no acute abnormalities    CT abd/pelvis - small amt of free fluid in pelvis, multiple lower attenuation lesions in spleen, nonspecific distention of ureter into the urostomy changes with peripheral enhancement  Assessment:     Juan Manuel Koehler is a 67 y.o. male with   Plan:   Sepsis secondary to UTI  - WBC 20K. Urine with 20 WBC and moderate bacteria. Lactic acid 1.2.   - Previous urine cx with enterococcus, so will empirically cover with Vancomycin and Zosyn.  - Blood cultures x2 and urine culture ordered. De-escalate pending culture results    NSTEMI  - Troponin elevated to 1.488, EKG with ST elevation in V4/V5, likely repolarization  - May be secondary to demand ischemia given acute illness. No chest pain.   - Trend troponins and EKGs. ECHO ordered. Discussed with Cardiology, likely secondary to demand    Acute on chronic anemia of chronic inflammation  - Hgb 7.2, baseline Hgb 8-9. No overt bleeding on exam. Recent EGD and c-scope without abnormalities per patient  - Transfusing two units pRBC. Repeat CBC in AM. Iron studies c/w AoCI  - Occult stool ordered give acute drop in blood count. IV PPI daily. Will continue to closely monitor.     Pre-syncope  - Likely secondary to volume depletion given decreased PO intake. Not orthostatic on exam, however patient received 2L of IVF prior to exam  - Cont IVF resucitation and blood transfusion as above.  - Troponin elevated as above, trending. Will place on telemetry and order ECHO.     Invasive bladder cancer s/p open radical cystoproctostomy  - Follows with Dr. Robles, Dr. Boucher and Dr. Rush  -  Currently deferring resuming chemotherapy given favorable pathology results. Restaging scans post surgery in 6 weeks.     CKDIII  - Cr 1.3, baseline 1.1-1.5  - Renally dose all medications and avoid nephrotoxic medications    Chronic Testicular Pain  - Testicular pain for over one month with no swelling or erythema  - Will order US of testes for further evaluation.     Progressive Failure to Thrive  - PT/OT ordered, will likely need placement for improvement in strength  - Nutrition consulted ordered. Will continue megace.     Chronic Thrombocytosis  - Likely secondary to malignancy. Will continue to monitor     DVT ppx: Lovenox  Diet: Regular diet  Dispo: awaiting blood cultures, likely need placement to snf pending pt/ot recommendations    Code Status:     full    Charlene Palomares MD  hospitals Internal Medicine HO3    hospitals Medicine Hospitalist Pager numbers:   hospitals Hospitalist Medicine Team A (Filemon/Vicente): [phone]  hospitals Hospitalist Medicine Team B (Rick/Megan):  [phone]

## 2017-08-03 NOTE — ASSESSMENT & PLAN NOTE
Urine cultures showing likely proteus spp, which will be covered by Ceftriaxone. Will follow cultures and adjust recommendations as appropriate. Scrotal tenderness likely related to surgery in June, but will continue to monitor symptoms and for changes. Any bacterial infection will be adequately covered with current regimen.

## 2017-08-03 NOTE — CONSULTS
[facility]  Urology  Consult Note    Patient Name: Juan Manuel Koehler  MRN: [id number]  Admission Date: 8/2/2017  Hospital Length of Stay: 1   Code Status: Full Code   Attending Provider: Arnol Colbert MD   Consulting Provider: James Shields MD  Primary Care Physician: Hemant Sweeney MD  Principal Problem:Sepsis secondary to UTI    Inpatient consult to Urology  Consult performed by: JAMES SHIELDS  Consult ordered by: EFRA JOHNSON  Reason for consult: uti  Assessment/Recommendations: -Antibiotics per primary team  -Agree with infectious disease consult given endocarditis  -Follow-up with urology as an outpatient with Dr. Robles and with oncology, Dr. Calero           Subjective:     HPI:  Patient is a 67-year-old male known to me for bladder cancer.  He was referred to  with oncology and Dr. Robles with urology at [facility] for further management of his bladder cancer.  He received a short course of neoadjuvant chemotherapy but did not tolerate this due to renal dysfunction.  He subsequently progressed to a radical cystectomy with ileal loop urinary diversion by Dr. Robles.  This was done on June 21, 2017.  He had done well.  He is not currently receiving chemotherapy.  His renal function has improved significantly.  His most recent serum creatinine was 1.2.  However, he was in his usual state of health when yesterday he had a syncopal episode followed by diaphoresis and a fall at the landing of their stairs.  Prior to this he felt well.  He denies any fevers.  He does feel weak.  He is on an appetite stimulant itches helps somewhat but not significantly.  He is not gaining weight currently.    CT scan is reviewed and reveals mild right-sided hydronephrosis.  Bladder is absent.  Small free fluid in the pelvis.    Per medicine team, echo was performed revealing a vegetation consistent with endocarditis.  Infectious disease has been consulted.    Urinalysis 2+  leukocytes nitrite negative.  Difficult to interpret in the setting of an ileal conduit.    Past Medical History:   Diagnosis Date    Cancer     bladder and throat    CKD (chronic kidney disease) stage 3, GFR 30-59 ml/min     Urinary tract infection        Past Surgical History:   Procedure Laterality Date    BLADDER SURGERY      CYSTOSCOPY      HERNIA REPAIR      Ileal Conduit      THROAT SURGERY         Review of patient's allergies indicates:  No Known Allergies    Family History     Problem Relation (Age of Onset)    Kidney disease Mother    No Known Problems Father          Social History Main Topics    Smoking status: Never Smoker    Smokeless tobacco: Never Used    Alcohol use No    Drug use: No    Sexual activity: Yes     Partners: Female     Birth control/ protection: None       Review of Systems   Constitutional: Positive for activity change, appetite change and diaphoresis.   Genitourinary: Positive for testicular pain.   Neurological: Positive for dizziness, weakness and light-headedness.   All other systems reviewed and are negative.      Objective:     Temp:  [97.2 °F (36.2 °C)-98.7 °F (37.1 °C)] 98.2 °F (36.8 °C)  Pulse:  [62-96] 62  Resp:  [16-19] 19  SpO2:  [98 %-100 %] 100 %  BP: (124-158)/(60-76) 148/72     Body mass index is 16.05 kg/m².            Drains     Drain                 Ureteral Drain/Stent 21931 days         Nephrostomy 05/05/17 1503 Right 8 Fr. 89 days         Nephrostomy 05/05/17 1504 Left 8 Fr. 89 days         Closed/Suction Drain 06/21/17 1221 Abdomen Bulb 19 Fr. 43 days         Ureteral Drain/Stent 06/21/17 1053 Left ureter 6 Fr. 43 days         Ureteral Drain/Stent 06/21/17 1054 Right ureter 6 Fr. 43 days         Urostomy 06/21/17 ileal conduit RLQ 43 days         Urostomy 08/02/17 2345 ileal conduit RUQ less than 1 day                Physical Exam   Constitutional: He is oriented to person, place, and time. No distress.   Thin, ill-appearing   HENT:   Head:  Normocephalic and atraumatic.   Eyes: No scleral icterus.   Neck: No tracheal deviation present.   Pulmonary/Chest: No respiratory distress.   Abdominal: Soft. He exhibits no distension. There is no tenderness. There is no rebound and no guarding.   Ostomy pink and patent with clear urine in the bag   Neurological: He is alert and oriented to person, place, and time.   Psychiatric: He has a normal mood and affect. His behavior is normal. Judgment and thought content normal.       Significant Labs:    BMP:    Recent Labs  Lab 08/02/17  1638 08/03/17  0438    139   K 4.0 5.1    111*   CO2 18* 16*   BUN 31* 26*   CREATININE 1.3 1.2   CALCIUM 9.4 9.1       CBC:    Recent Labs  Lab 08/02/17  1638 08/03/17  0740   WBC 20.90* 23.80*   HGB 7.2* 8.8*   HCT 21.4* 26.4*   * 358*       All pertinent labs results from the past 24 hours have been reviewed.    Significant Imaging:  All pertinent imaging results/findings from the past 24 hours have been reviewed.                    Assessment and Plan:     Urothelial carcinoma of bladder    -T 3B bladder cancer status post cystectomy  -Will follow-up with Dr. Robles and         Stage 3 chronic kidney disease    -Creatinine improved significantly.  -Fullness of collecting system as expected after ileal conduit.  Patient is not symptomatic of obstruction of the kidney nor does his improved renal function suggest this.  This is likely a normal postoperative finding.        * Sepsis secondary to UTI    -Can follow-up urine culture.  However, this is difficult to interpret from urine likely obtained from the ostomy appliance bag.  -Antibiotics per primary team.  Infectious disease consult it due to endocarditis.            VTE Risk Mitigation         Ordered     heparin (porcine) injection 5,000 Units  Every 8 hours     Route:  Subcutaneous        08/03/17 0805     Medium Risk of VTE  Once      08/02/17 7282          Thank you for your consult. I will sign  off. Please contact us if you have any additional questions.    James Boucher MD  Urology  [facility]

## 2017-08-03 NOTE — PROGRESS NOTES
Vancomycin dosage adjusted from 750 mg IV q12h to 1000 mg IV q24h per hospital dosing protocol based on the following parameters:  68 YO male, 56.7 kg, SCr 1.3 mg/dL, est. CrCl 43.6 ml/min, indication: UTI. Vancomycin trough level will be checked prior to the 4th dose. Pharmacy will monitor.

## 2017-08-03 NOTE — ASSESSMENT & PLAN NOTE
-Creatinine improved significantly.  -Fullness of collecting system as expected after ileal conduit.  Patient is not symptomatic of obstruction of the kidney nor does his improved renal function suggest this.  This is likely a normal postoperative finding.

## 2017-08-03 NOTE — TELEPHONE ENCOUNTER
----- Message from Francisca Figueroa sent at 8/3/2017 11:50 AM CDT -----  CONSULTS  Patient:  Juan Manuel Koehler  Facility:  [facility]  Room /bed number:  Room 481  Referring MD:  Dr. Palomares  Diagnosis:  Patient is known to Dr. Boucher.  Splenic findings on CT.  Person calling & phone number:  Claudette   No. [phone]

## 2017-08-03 NOTE — PLAN OF CARE
Per H&P- Pt with invasive bladder cancer s/p open radical cystoproctostomy, bilateral pelvic lymph node dissection and ileal conduit urinary diversion (6/21/17, path with high grade urothelial carcinoma, LN negative for malignancy), CKD III and deconditioning and poor PO intake secondary to cancer was in his usual state of health (lives at home with wife, able to ambulate, performs some adls) until day of admission when patient was coming downstairs when he all of a sudden felt dizzy. He slipped and fell.    TN met with pt and wife who informed TN pt is current with [facility] as assigned by PHN. PT/OT evaluations pending. TN Melanie Wilson informed of case and will follow.       08/03/17 1120   Discharge Assessment   Assessment Type Discharge Planning Assessment   Confirmed/corrected address and phone number on facesheet? Yes   Assessment information obtained from? Patient;Caregiver   Expected Length of Stay (days) 2   Communicated expected length of stay with patient/caregiver yes   Prior to hospitilization cognitive status: Alert/Oriented   Prior to hospitalization functional status: Independent   Current cognitive status: Alert/Oriented   Current Functional Status: Needs Assistance   Arrived From home health   Lives With spouse   Able to Return to Prior Arrangements yes   How many people do you have in your home that can help with your care after discharge? 1   Who are your caregiver(s) and their phone number(s)? Bean (spouse) [phone]   Patient's perception of discharge disposition home health   Readmission Within The Last 30 Days no previous admission in last 30 days   Patient currently receives home health services? Yes   If yes, name of home health provider: [facility] as assigned per PHN; pt has used [facility] agency in past   Does the patient currently use HME? No   Patient currently receives private duty nursing? No   Patient currently receives any other  outside agency services? No   Equipment Currently Used at Home walker, standard   Do you have any problems affording any of your prescribed medications? No   Is the patient taking medications as prescribed? yes   Does the patient have transportation to healthcare appointments? Yes   Transportation Available family or friend will provide   On Dialysis? No   Does the patient receive services at the [facility]? No   Are there any open cases? No   Discharge Plan A Home Health;Home   Discharge Plan B Skilled Nursing Facility   Patient/Family In Agreement With Plan yes

## 2017-08-03 NOTE — SUBJECTIVE & OBJECTIVE
Past Medical History:   Diagnosis Date    Cancer     bladder and throat    CKD (chronic kidney disease) stage 3, GFR 30-59 ml/min     Urinary tract infection        Past Surgical History:   Procedure Laterality Date    BLADDER SURGERY      CYSTOSCOPY      HERNIA REPAIR      Ileal Conduit      THROAT SURGERY         Review of patient's allergies indicates:  No Known Allergies    Family History     Problem Relation (Age of Onset)    Kidney disease Mother    No Known Problems Father          Social History Main Topics    Smoking status: Never Smoker    Smokeless tobacco: Never Used    Alcohol use No    Drug use: No    Sexual activity: Yes     Partners: Female     Birth control/ protection: None       Review of Systems   Constitutional: Positive for activity change, appetite change and diaphoresis.   Genitourinary: Positive for testicular pain.   Neurological: Positive for dizziness, weakness and light-headedness.   All other systems reviewed and are negative.      Objective:     Temp:  [97.2 °F (36.2 °C)-98.7 °F (37.1 °C)] 98.2 °F (36.8 °C)  Pulse:  [62-96] 62  Resp:  [16-19] 19  SpO2:  [98 %-100 %] 100 %  BP: (124-158)/(60-76) 148/72     Body mass index is 16.05 kg/m².            Drains     Drain                 Ureteral Drain/Stent 57444 days         Nephrostomy 05/05/17 1503 Right 8 Fr. 89 days         Nephrostomy 05/05/17 1504 Left 8 Fr. 89 days         Closed/Suction Drain 06/21/17 1221 Abdomen Bulb 19 Fr. 43 days         Ureteral Drain/Stent 06/21/17 1053 Left ureter 6 Fr. 43 days         Ureteral Drain/Stent 06/21/17 1054 Right ureter 6 Fr. 43 days         Urostomy 06/21/17 ileal conduit RLQ 43 days         Urostomy 08/02/17 2345 ileal conduit RUQ less than 1 day                Physical Exam   Constitutional: He is oriented to person, place, and time. No distress.   Thin, ill-appearing   HENT:   Head: Normocephalic and atraumatic.   Eyes: No scleral icterus.   Neck: No tracheal deviation present.    Pulmonary/Chest: No respiratory distress.   Abdominal: Soft. He exhibits no distension. There is no tenderness. There is no rebound and no guarding.   Ostomy pink and patent with clear urine in the bag   Neurological: He is alert and oriented to person, place, and time.   Psychiatric: He has a normal mood and affect. His behavior is normal. Judgment and thought content normal.       Significant Labs:    BMP:    Recent Labs  Lab 08/02/17  1638 08/03/17  0438    139   K 4.0 5.1    111*   CO2 18* 16*   BUN 31* 26*   CREATININE 1.3 1.2   CALCIUM 9.4 9.1       CBC:    Recent Labs  Lab 08/02/17  1638 08/03/17  0740   WBC 20.90* 23.80*   HGB 7.2* 8.8*   HCT 21.4* 26.4*   * 358*       All pertinent labs results from the past 24 hours have been reviewed.    Significant Imaging:  All pertinent imaging results/findings from the past 24 hours have been reviewed.

## 2017-08-03 NOTE — PROGRESS NOTES
Physical Therapy   Screen    Juan Manuel Koehler   MRN: [id number]   [phone] 11 minutes  Received Eval and treat orders for PT; pt presently with [facility]-nurse present to give pain meds; pt reports he is not feeling up to participating -weak; wife interviewed for pt's PLOF and living environment; wife reports that the pt was (I)PLOF with ADLs, transfers, and amb without AD; pt does not drive; wife reports he has access to RW and SPC; he lives with his wife in a 2  with bed and full bath upstairs, half bath downstairs; there is a rail L with 1 flight of stairs to second level and a ramp to enter the home that is raised; will complete eval as able.    Hyacinth Tian, PT  8/3/2017

## 2017-08-03 NOTE — ASSESSMENT & PLAN NOTE
-Can follow-up urine culture.  However, this is difficult to interpret from urine likely obtained from the ostomy appliance bag.  -Antibiotics per primary team.  Infectious disease consult it due to endocarditis.

## 2017-08-04 ENCOUNTER — HOSPITAL ENCOUNTER (INPATIENT)
Facility: HOSPITAL | Age: 67
LOS: 11 days | Discharge: NURSING, REHAB OR LONG-TERM CARE | DRG: 064 | End: 2017-08-15
Attending: PSYCHIATRY & NEUROLOGY | Admitting: PSYCHIATRY & NEUROLOGY
Payer: MEDICARE

## 2017-08-04 VITALS
WEIGHT: 125 LBS | TEMPERATURE: 98 F | HEIGHT: 74 IN | SYSTOLIC BLOOD PRESSURE: 161 MMHG | HEART RATE: 70 BPM | BODY MASS INDEX: 16.04 KG/M2 | RESPIRATION RATE: 18 BRPM | DIASTOLIC BLOOD PRESSURE: 77 MMHG | OXYGEN SATURATION: 99 %

## 2017-08-04 DIAGNOSIS — R31.9 URINARY TRACT INFECTION WITH HEMATURIA, SITE UNSPECIFIED: ICD-10-CM

## 2017-08-04 DIAGNOSIS — R78.81 BACTEREMIA DUE TO ENTEROCOCCUS: ICD-10-CM

## 2017-08-04 DIAGNOSIS — I63.441 EMBOLIC STROKE INVOLVING RIGHT CEREBELLAR ARTERY: ICD-10-CM

## 2017-08-04 DIAGNOSIS — I10 HTN (HYPERTENSION): ICD-10-CM

## 2017-08-04 DIAGNOSIS — G93.6 CYTOTOXIC CEREBRAL EDEMA: ICD-10-CM

## 2017-08-04 DIAGNOSIS — I38 ENDOCARDITIS: ICD-10-CM

## 2017-08-04 DIAGNOSIS — I63.9 CEREBELLAR STROKE: ICD-10-CM

## 2017-08-04 DIAGNOSIS — N39.0 URINARY TRACT INFECTION WITH HEMATURIA, SITE UNSPECIFIED: ICD-10-CM

## 2017-08-04 DIAGNOSIS — B95.2 BACTEREMIA DUE TO ENTEROCOCCUS: ICD-10-CM

## 2017-08-04 DIAGNOSIS — A41.81 SEPSIS DUE TO ENTEROCOCCUS: ICD-10-CM

## 2017-08-04 DIAGNOSIS — I33.0 ACUTE BACTERIAL ENDOCARDITIS: ICD-10-CM

## 2017-08-04 DIAGNOSIS — I21.4 NSTEMI (NON-ST ELEVATED MYOCARDIAL INFARCTION): ICD-10-CM

## 2017-08-04 PROBLEM — A41.9 SEPSIS SECONDARY TO UTI: Status: RESOLVED | Noted: 2017-08-02 | Resolved: 2017-08-04

## 2017-08-04 PROBLEM — E43 SEVERE MALNUTRITION: Status: ACTIVE | Noted: 2017-08-04

## 2017-08-04 PROBLEM — A41.89 SEPSIS, GRAM POSITIVE: Status: ACTIVE | Noted: 2017-08-04

## 2017-08-04 LAB
ABO + RH BLD: NORMAL
ALBUMIN SERPL BCP-MCNC: 2.4 G/DL
ALBUMIN SERPL BCP-MCNC: 2.5 G/DL
ALP SERPL-CCNC: 60 U/L
ALP SERPL-CCNC: 63 U/L
ALT SERPL W/O P-5'-P-CCNC: 11 U/L
ALT SERPL W/O P-5'-P-CCNC: 12 U/L
ANION GAP SERPL CALC-SCNC: 10 MMOL/L
ANION GAP SERPL CALC-SCNC: 10 MMOL/L
APTT BLDCRRT: 22.7 SEC
AST SERPL-CCNC: 13 U/L
AST SERPL-CCNC: 13 U/L
BACTERIA UR CULT: NORMAL
BASOPHILS # BLD AUTO: 0.01 K/UL
BASOPHILS # BLD AUTO: 0.01 K/UL
BASOPHILS NFR BLD: 0.1 %
BASOPHILS NFR BLD: 0.1 %
BILIRUB SERPL-MCNC: 0.5 MG/DL
BILIRUB SERPL-MCNC: 0.6 MG/DL
BLD GP AB SCN CELLS X3 SERPL QL: NORMAL
BUN SERPL-MCNC: 22 MG/DL
BUN SERPL-MCNC: 22 MG/DL
CALCIUM SERPL-MCNC: 8.8 MG/DL
CALCIUM SERPL-MCNC: 9.1 MG/DL
CHLORIDE SERPL-SCNC: 101 MMOL/L
CHLORIDE SERPL-SCNC: 101 MMOL/L
CHOLEST/HDLC SERPL: 4.3 {RATIO}
CHOLEST/HDLC SERPL: 5.5 {RATIO}
CO2 SERPL-SCNC: 26 MMOL/L
CO2 SERPL-SCNC: 27 MMOL/L
CREAT SERPL-MCNC: 1.1 MG/DL
CREAT SERPL-MCNC: 1.1 MG/DL
DIFFERENTIAL METHOD: ABNORMAL
DIFFERENTIAL METHOD: ABNORMAL
EOSINOPHIL # BLD AUTO: 0 K/UL
EOSINOPHIL # BLD AUTO: 0 K/UL
EOSINOPHIL NFR BLD: 0.1 %
EOSINOPHIL NFR BLD: 0.1 %
ERYTHROCYTE [DISTWIDTH] IN BLOOD BY AUTOMATED COUNT: 15.9 %
ERYTHROCYTE [DISTWIDTH] IN BLOOD BY AUTOMATED COUNT: 16.1 %
EST. GFR  (AFRICAN AMERICAN): >60 ML/MIN/1.73 M^2
EST. GFR  (AFRICAN AMERICAN): >60 ML/MIN/1.73 M^2
EST. GFR  (NON AFRICAN AMERICAN): >60 ML/MIN/1.73 M^2
EST. GFR  (NON AFRICAN AMERICAN): >60 ML/MIN/1.73 M^2
ESTIMATED AVG GLUCOSE: 105 MG/DL
ESTIMATED AVG GLUCOSE: 108 MG/DL
GLUCOSE SERPL-MCNC: 115 MG/DL
GLUCOSE SERPL-MCNC: 134 MG/DL
HBA1C MFR BLD HPLC: 5.3 %
HBA1C MFR BLD HPLC: 5.4 %
HCT VFR BLD AUTO: 24.1 %
HCT VFR BLD AUTO: 25.6 %
HDL/CHOLESTEROL RATIO: 18.3 %
HDL/CHOLESTEROL RATIO: 23.5 %
HDLC SERPL-MCNC: 115 MG/DL
HDLC SERPL-MCNC: 132 MG/DL
HDLC SERPL-MCNC: 21 MG/DL
HDLC SERPL-MCNC: 31 MG/DL
HGB BLD-MCNC: 8.1 G/DL
HGB BLD-MCNC: 8.6 G/DL
INR PPP: 1
LACTATE SERPL-SCNC: 1.3 MMOL/L
LDLC SERPL CALC-MCNC: 70.2 MG/DL
LDLC SERPL CALC-MCNC: 76.4 MG/DL
LYMPHOCYTES # BLD AUTO: 0.9 K/UL
LYMPHOCYTES # BLD AUTO: 1.3 K/UL
LYMPHOCYTES NFR BLD: 10 %
LYMPHOCYTES NFR BLD: 5.9 %
MCH RBC QN AUTO: 28.4 PG
MCH RBC QN AUTO: 28.6 PG
MCHC RBC AUTO-ENTMCNC: 33.6 G/DL
MCHC RBC AUTO-ENTMCNC: 33.6 G/DL
MCV RBC AUTO: 85 FL
MCV RBC AUTO: 85 FL
MONOCYTES # BLD AUTO: 0.6 K/UL
MONOCYTES # BLD AUTO: 0.9 K/UL
MONOCYTES NFR BLD: 4.8 %
MONOCYTES NFR BLD: 5.7 %
NEUTROPHILS # BLD AUTO: 11.3 K/UL
NEUTROPHILS # BLD AUTO: 13.5 K/UL
NEUTROPHILS NFR BLD: 84 %
NEUTROPHILS NFR BLD: 87.4 %
NONHDLC SERPL-MCNC: 101 MG/DL
NONHDLC SERPL-MCNC: 94 MG/DL
PLATELET # BLD AUTO: 389 K/UL
PLATELET # BLD AUTO: 407 K/UL
PMV BLD AUTO: 8.3 FL
PMV BLD AUTO: 8.7 FL
POCT GLUCOSE: 134 MG/DL (ref 70–110)
POCT GLUCOSE: 98 MG/DL (ref 70–110)
POTASSIUM SERPL-SCNC: 3.7 MMOL/L
POTASSIUM SERPL-SCNC: 3.9 MMOL/L
PROT SERPL-MCNC: 6.5 G/DL
PROT SERPL-MCNC: 6.8 G/DL
PROTHROMBIN TIME: 10.5 SEC
RBC # BLD AUTO: 2.85 M/UL
RBC # BLD AUTO: 3.01 M/UL
SODIUM SERPL-SCNC: 137 MMOL/L
SODIUM SERPL-SCNC: 138 MMOL/L
TRIGL SERPL-MCNC: 119 MG/DL
TRIGL SERPL-MCNC: 123 MG/DL
TROPONIN I SERPL DL<=0.01 NG/ML-MCNC: 0.98 NG/ML
TSH SERPL DL<=0.005 MIU/L-ACNC: 1.82 UIU/ML
TSH SERPL DL<=0.005 MIU/L-ACNC: 1.82 UIU/ML
WBC # BLD AUTO: 13.4 K/UL
WBC # BLD AUTO: 15.41 K/UL

## 2017-08-04 PROCEDURE — 83036 HEMOGLOBIN GLYCOSYLATED A1C: CPT | Mod: 91

## 2017-08-04 PROCEDURE — 63600175 PHARM REV CODE 636 W HCPCS: Performed by: STUDENT IN AN ORGANIZED HEALTH CARE EDUCATION/TRAINING PROGRAM

## 2017-08-04 PROCEDURE — 63600175 PHARM REV CODE 636 W HCPCS: Performed by: NURSE PRACTITIONER

## 2017-08-04 PROCEDURE — 94761 N-INVAS EAR/PLS OXIMETRY MLT: CPT

## 2017-08-04 PROCEDURE — 80053 COMPREHEN METABOLIC PANEL: CPT | Mod: 91

## 2017-08-04 PROCEDURE — 83605 ASSAY OF LACTIC ACID: CPT

## 2017-08-04 PROCEDURE — 80061 LIPID PANEL: CPT

## 2017-08-04 PROCEDURE — 84295 ASSAY OF SERUM SODIUM: CPT

## 2017-08-04 PROCEDURE — 80053 COMPREHEN METABOLIC PANEL: CPT

## 2017-08-04 PROCEDURE — 99223 1ST HOSP IP/OBS HIGH 75: CPT | Mod: ,,, | Performed by: PSYCHIATRY & NEUROLOGY

## 2017-08-04 PROCEDURE — 99291 CRITICAL CARE FIRST HOUR: CPT | Mod: 25,,, | Performed by: NURSE PRACTITIONER

## 2017-08-04 PROCEDURE — 36620 INSERTION CATHETER ARTERY: CPT | Mod: ,,, | Performed by: NURSE PRACTITIONER

## 2017-08-04 PROCEDURE — 20000000 HC ICU ROOM

## 2017-08-04 PROCEDURE — 84443 ASSAY THYROID STIM HORMONE: CPT

## 2017-08-04 PROCEDURE — A4216 STERILE WATER/SALINE, 10 ML: HCPCS | Performed by: NURSE PRACTITIONER

## 2017-08-04 PROCEDURE — 25000003 PHARM REV CODE 250: Performed by: NURSE PRACTITIONER

## 2017-08-04 PROCEDURE — 25000003 PHARM REV CODE 250: Performed by: STUDENT IN AN ORGANIZED HEALTH CARE EDUCATION/TRAINING PROGRAM

## 2017-08-04 PROCEDURE — 80061 LIPID PANEL: CPT | Mod: 91

## 2017-08-04 PROCEDURE — 86900 BLOOD TYPING SEROLOGIC ABO: CPT

## 2017-08-04 PROCEDURE — 85025 COMPLETE CBC W/AUTO DIFF WBC: CPT

## 2017-08-04 PROCEDURE — 63600175 PHARM REV CODE 636 W HCPCS: Performed by: INTERNAL MEDICINE

## 2017-08-04 PROCEDURE — 25000003 PHARM REV CODE 250

## 2017-08-04 PROCEDURE — 36415 COLL VENOUS BLD VENIPUNCTURE: CPT

## 2017-08-04 PROCEDURE — 93005 ELECTROCARDIOGRAM TRACING: CPT

## 2017-08-04 PROCEDURE — G0508 CRIT CARE TELEHEA CONSULT 60: HCPCS | Mod: GT,,, | Performed by: PSYCHIATRY & NEUROLOGY

## 2017-08-04 PROCEDURE — 25000003 PHARM REV CODE 250: Performed by: INTERNAL MEDICINE

## 2017-08-04 PROCEDURE — 85730 THROMBOPLASTIN TIME PARTIAL: CPT

## 2017-08-04 PROCEDURE — 84484 ASSAY OF TROPONIN QUANT: CPT

## 2017-08-04 PROCEDURE — 83036 HEMOGLOBIN GLYCOSYLATED A1C: CPT

## 2017-08-04 PROCEDURE — 97802 MEDICAL NUTRITION INDIV IN: CPT

## 2017-08-04 PROCEDURE — 86901 BLOOD TYPING SEROLOGIC RH(D): CPT

## 2017-08-04 PROCEDURE — 85610 PROTHROMBIN TIME: CPT

## 2017-08-04 PROCEDURE — 87040 BLOOD CULTURE FOR BACTERIA: CPT | Mod: 59

## 2017-08-04 RX ORDER — MAGNESIUM SULFATE HEPTAHYDRATE 40 MG/ML
4 INJECTION, SOLUTION INTRAVENOUS
Status: DISCONTINUED | OUTPATIENT
Start: 2017-08-04 | End: 2017-08-05

## 2017-08-04 RX ORDER — SODIUM CHLORIDE 0.9 % (FLUSH) 0.9 %
3 SYRINGE (ML) INJECTION EVERY 8 HOURS
Status: DISCONTINUED | OUTPATIENT
Start: 2017-08-04 | End: 2017-08-15 | Stop reason: HOSPADM

## 2017-08-04 RX ORDER — POTASSIUM CHLORIDE 7.45 MG/ML
80 INJECTION INTRAVENOUS
Status: DISCONTINUED | OUTPATIENT
Start: 2017-08-04 | End: 2017-08-05 | Stop reason: SDUPTHER

## 2017-08-04 RX ORDER — METOPROLOL TARTRATE 25 MG/1
12.5 TABLET ORAL 2 TIMES DAILY
Status: DISCONTINUED | OUTPATIENT
Start: 2017-08-04 | End: 2017-08-05

## 2017-08-04 RX ORDER — GLUCAGON 1 MG
1 KIT INJECTION
Status: DISCONTINUED | OUTPATIENT
Start: 2017-08-04 | End: 2017-08-15 | Stop reason: HOSPADM

## 2017-08-04 RX ORDER — MANNITOL 20 G/100ML
36 INJECTION, SOLUTION INTRAVENOUS ONCE
Status: DISCONTINUED | OUTPATIENT
Start: 2017-08-04 | End: 2017-08-04 | Stop reason: HOSPADM

## 2017-08-04 RX ORDER — POTASSIUM CHLORIDE 7.45 MG/ML
40 INJECTION INTRAVENOUS
Status: DISCONTINUED | OUTPATIENT
Start: 2017-08-04 | End: 2017-08-05 | Stop reason: SDUPTHER

## 2017-08-04 RX ORDER — ACETAMINOPHEN 325 MG/1
650 TABLET ORAL EVERY 6 HOURS PRN
Status: DISCONTINUED | OUTPATIENT
Start: 2017-08-04 | End: 2017-08-04 | Stop reason: HOSPADM

## 2017-08-04 RX ORDER — NICARDIPINE HYDROCHLORIDE 0.2 MG/ML
2.5 INJECTION INTRAVENOUS CONTINUOUS
Status: DISCONTINUED | OUTPATIENT
Start: 2017-08-04 | End: 2017-08-07

## 2017-08-04 RX ORDER — ACETAMINOPHEN 325 MG/1
650 TABLET ORAL EVERY 6 HOURS PRN
Status: DISCONTINUED | OUTPATIENT
Start: 2017-08-04 | End: 2017-08-15 | Stop reason: HOSPADM

## 2017-08-04 RX ORDER — SODIUM CHLORIDE 9 MG/ML
INJECTION, SOLUTION INTRAVENOUS CONTINUOUS
Status: DISCONTINUED | OUTPATIENT
Start: 2017-08-04 | End: 2017-08-05

## 2017-08-04 RX ORDER — POTASSIUM CHLORIDE 7.45 MG/ML
60 INJECTION INTRAVENOUS
Status: DISCONTINUED | OUTPATIENT
Start: 2017-08-04 | End: 2017-08-05 | Stop reason: SDUPTHER

## 2017-08-04 RX ORDER — PANTOPRAZOLE SODIUM 40 MG/10ML
40 INJECTION, POWDER, LYOPHILIZED, FOR SOLUTION INTRAVENOUS DAILY
Status: DISCONTINUED | OUTPATIENT
Start: 2017-08-05 | End: 2017-08-07

## 2017-08-04 RX ORDER — HYDRALAZINE HYDROCHLORIDE 20 MG/ML
10 INJECTION INTRAMUSCULAR; INTRAVENOUS EVERY 4 HOURS PRN
Status: DISCONTINUED | OUTPATIENT
Start: 2017-08-04 | End: 2017-08-11

## 2017-08-04 RX ORDER — AMOXICILLIN 250 MG
1 CAPSULE ORAL 2 TIMES DAILY
Status: DISCONTINUED | OUTPATIENT
Start: 2017-08-04 | End: 2017-08-15 | Stop reason: HOSPADM

## 2017-08-04 RX ORDER — HYDROCODONE BITARTRATE AND ACETAMINOPHEN 500; 5 MG/1; MG/1
TABLET ORAL
Status: DISCONTINUED | OUTPATIENT
Start: 2017-08-04 | End: 2017-08-04 | Stop reason: HOSPADM

## 2017-08-04 RX ORDER — LABETALOL HYDROCHLORIDE 5 MG/ML
10 INJECTION, SOLUTION INTRAVENOUS EVERY 6 HOURS PRN
Status: DISCONTINUED | OUTPATIENT
Start: 2017-08-04 | End: 2017-08-11

## 2017-08-04 RX ORDER — LIDOCAINE HYDROCHLORIDE 10 MG/ML
INJECTION INFILTRATION; PERINEURAL
Status: COMPLETED
Start: 2017-08-04 | End: 2017-08-04

## 2017-08-04 RX ORDER — POLYETHYLENE GLYCOL 3350 17 G/17G
17 POWDER, FOR SOLUTION ORAL DAILY
Status: DISCONTINUED | OUTPATIENT
Start: 2017-08-05 | End: 2017-08-15 | Stop reason: HOSPADM

## 2017-08-04 RX ORDER — ONDANSETRON 2 MG/ML
4 INJECTION INTRAMUSCULAR; INTRAVENOUS EVERY 6 HOURS PRN
Status: DISCONTINUED | OUTPATIENT
Start: 2017-08-04 | End: 2017-08-15 | Stop reason: HOSPADM

## 2017-08-04 RX ORDER — LIDOCAINE HYDROCHLORIDE 10 MG/ML
5 INJECTION INFILTRATION; PERINEURAL ONCE
Status: COMPLETED | OUTPATIENT
Start: 2017-08-04 | End: 2017-08-04

## 2017-08-04 RX ORDER — MAGNESIUM SULFATE HEPTAHYDRATE 40 MG/ML
2 INJECTION, SOLUTION INTRAVENOUS
Status: DISCONTINUED | OUTPATIENT
Start: 2017-08-04 | End: 2017-08-05

## 2017-08-04 RX ADMIN — HEPARIN SODIUM 5000 UNITS: 5000 INJECTION, SOLUTION INTRAVENOUS; SUBCUTANEOUS at 05:08

## 2017-08-04 RX ADMIN — PANTOPRAZOLE SODIUM 40 MG: 40 TABLET, DELAYED RELEASE ORAL at 08:08

## 2017-08-04 RX ADMIN — LIDOCAINE HYDROCHLORIDE 5 ML: 10 INJECTION INFILTRATION; PERINEURAL at 08:08

## 2017-08-04 RX ADMIN — POLYETHYLENE GLYCOL 3350 17 G: 17 POWDER, FOR SOLUTION ORAL at 08:08

## 2017-08-04 RX ADMIN — ASPIRIN 81 MG 81 MG: 81 TABLET ORAL at 08:08

## 2017-08-04 RX ADMIN — MANNITOL 20 G: 20 INJECTION, SOLUTION INTRAVENOUS at 04:08

## 2017-08-04 RX ADMIN — Medication 3 ML: at 09:08

## 2017-08-04 RX ADMIN — HYDRALAZINE HYDROCHLORIDE 10 MG: 20 INJECTION INTRAMUSCULAR; INTRAVENOUS at 08:08

## 2017-08-04 RX ADMIN — LABETALOL HYDROCHLORIDE 10 MG: 5 INJECTION, SOLUTION INTRAVENOUS at 09:08

## 2017-08-04 RX ADMIN — BACLOFEN 10 MG: 10 TABLET ORAL at 02:08

## 2017-08-04 RX ADMIN — NICARDIPINE HYDROCHLORIDE 2.5 MG/HR: 0.2 INJECTION, SOLUTION INTRAVENOUS at 10:08

## 2017-08-04 RX ADMIN — MEGESTROL ACETATE 200 MG: 40 SUSPENSION ORAL at 08:08

## 2017-08-04 RX ADMIN — STANDARDIZED SENNA CONCENTRATE AND DOCUSATE SODIUM 1 TABLET: 8.6; 5 TABLET, FILM COATED ORAL at 09:08

## 2017-08-04 RX ADMIN — MEGESTROL ACETATE 200 MG: 40 SUSPENSION ORAL at 11:08

## 2017-08-04 RX ADMIN — LIDOCAINE HYDROCHLORIDE 5 ML: 10 INJECTION, SOLUTION INFILTRATION; PERINEURAL at 08:08

## 2017-08-04 RX ADMIN — HEPARIN SODIUM 5000 UNITS: 5000 INJECTION, SOLUTION INTRAVENOUS; SUBCUTANEOUS at 02:08

## 2017-08-04 RX ADMIN — CEFTRIAXONE 1 G: 1 INJECTION, SOLUTION INTRAVENOUS at 08:08

## 2017-08-04 RX ADMIN — VANCOMYCIN HYDROCHLORIDE 1000 MG: 1 INJECTION, POWDER, LYOPHILIZED, FOR SOLUTION INTRAVENOUS at 12:08

## 2017-08-04 RX ADMIN — SODIUM BICARBONATE: 84 INJECTION, SOLUTION INTRAVENOUS at 05:08

## 2017-08-04 RX ADMIN — SODIUM CHLORIDE: 0.9 INJECTION, SOLUTION INTRAVENOUS at 06:08

## 2017-08-04 RX ADMIN — BACLOFEN 10 MG: 10 TABLET ORAL at 05:08

## 2017-08-04 RX ADMIN — METOPROLOL TARTRATE 25 MG: 25 TABLET ORAL at 08:08

## 2017-08-04 RX ADMIN — CEFTRIAXONE 2 G: 2 INJECTION, SOLUTION INTRAVENOUS at 04:08

## 2017-08-04 RX ADMIN — VANCOMYCIN HYDROCHLORIDE 750 MG: 750 INJECTION, POWDER, LYOPHILIZED, FOR SOLUTION INTRAVENOUS at 02:08

## 2017-08-04 RX ADMIN — ACETAMINOPHEN 650 MG: 325 TABLET ORAL at 06:08

## 2017-08-04 RX ADMIN — OXYCODONE AND ACETAMINOPHEN 1 TABLET: 5; 325 TABLET ORAL at 08:08

## 2017-08-04 RX ADMIN — METOPROLOL TARTRATE 12.5 MG: 25 TABLET ORAL at 09:08

## 2017-08-04 NOTE — ASSESSMENT & PLAN NOTE
-Will follow H/H   -Iron panel drawn outside hospital, unsure if before PRBC transfused  -HDS at present  -On PPI

## 2017-08-04 NOTE — HPI
Juan Manuel Koehler is a 67 year old male with invasive bladder cancer s/p open radical cystoproctostomy, bilateral pelvic lymph node dissection and ileal conduit urinary diversion (6/21/17, path with high grade urothelial carcinoma, LN negative for malignancy), CKD III  With deconditioning and poor PO intake secondary to cancer. He was in his usual state of health (lives at home with wife, able to ambulate, performs some adls) until day of admission (8/2) when patient was coming downstairs and all of a sudden felt dizzy and fell. He fell on his back and was unable to get up secondary to generalized weakness. He denies focal weakness. He denies any loss of consciousness.  He was admitted to OSH at that time and diagnosed with urosepsis, endocarditis, and acute anemia.     While at OSH, he developed aphasia 8/4 and telestroke consult performed. CTH shows large right cerebellar hypodensity. Patient transferred to [facility] for management/NSGY eval.

## 2017-08-04 NOTE — HPI
Mr. Koehler is a 68yo M with history of urothelial bladder carcinoma (s/p bladder resection in June 2017), CKD; he has been transferred to [facility] for a large R cerebellar infarct with edema and mass effect.    Mr. Koehler had a fall down stairs on Wednesday 8/2/17, then had vomiting so he was admitted to [facility]. He was diagnosed with complicated proteus UTI and endocarditis. Friday 8/4/17, patient developed an acute mental status change where he became confused and was unable to answer questions appropriately as well as a new R facial droop. Telestroke was performed with Dr. Carbajal. Patient found to have R cerebellar hemisphere edema w/ midline shift of the posterior fossa with mass effect on the 4th ventricle and mild enlargement of the temporal horns on CTH. Patient was transferred to [facility] for further care.

## 2017-08-04 NOTE — PLAN OF CARE
Problem: Patient Care Overview  Goal: Plan of Care Review  Outcome: Ongoing (interventions implemented as appropriate)  No c/o nausea.  Pt with poor appetite.  IVF and  antibiotics infused.  Urostomy patent.  NSR on telemetry with HR 70's to 80's.  Bed alarm on for safety.  Will continue to monitor.

## 2017-08-04 NOTE — PLAN OF CARE
Problem: Nutrition, Imbalanced: Inadequate Oral Intake (Adult)  Goal: Improved Oral Intake  Patient will demonstrate the desired outcomes by discharge/transition of care.  Outcome: Ongoing (interventions implemented as appropriate)  Recommendation/Intervention:   1. Consider nutritional support: TF: Isosource 1.5 at 10ml/hr and advance as tolerated to goal rate of 60ml/hr to provide 2160 kcal, 98g protein, & 1100ml free water  OR   PPN: Clinimix 4.25/10 at 100ml/hr to provide 1224 kcal, 102g protein, & 2400ml fluid with GIR of 2.9. If central line placed, then recommend Clinimix 5/15 at 100ml/hr with daily IVFE.   2. Encourage intake of meals & Boost Plus.     Goals:   Pt will consume at least 50% intake at meals.  Nutrition Goal Status: new  Communication of RD Recs:  (Jeaneth-reports pt not eating)

## 2017-08-04 NOTE — SIGNIFICANT EVENT
Called to bedside at 1425 for change in mental status.   Patient awake and alert, but appeared more lethargic and was not answering question appropriately. This was a change from a few hours prior when we talked to the patient.   On physical exam patient with no strength deficits. He did have agraphesthesia and right nasolabial fold flattening. Also had some vision neglect on right side.   Vitals were stable and tele showed normal sinus rhythm. POCT glucose WNL.  STAT CT head w/o contrast ordered and code stroke called given new mental status change.   EKG, cbc, cmp, lactate, cxr ordered.    Spoke with Dr. Carbajal, vascular neurologist on call, while patient in CT scanner. He reviewed the images and noted the patient had large area in the posterior fossa with severe mass effect. He recommended STAT transfer for [facility] for emergent neurosurgery evaluation.   1530.    Spoke with wife and updated her on the findings. Also spoke with Mr. Koehler's son via telephone. All questions answered.     Patient sent to ICU after CT scan.   1545.     Spoke with Neuro ICU MD. 56 grams Mannitol 20% in NS ordered per neurology/neuro ICU recommendations.     Transportation arrived and patient sent to [facility].   1630.      Edson Alvarado  U IM HO3

## 2017-08-04 NOTE — PROGRESS NOTES
Escorted patient's wife to ICU waiting room as patient being transferred to ICU. ICU nurses notified. Awaiting bed assignment to transport patient to [facility] critical care. Called OWEN Jackson with PHN to notify her that patient will be transferring to [facility] and to obtain ambulance authorization. PHN will notify transfer center with ambulance authorization.    Melanie Wilson RN, [facility], CMSRN  RN Transition Navigator  [phone]

## 2017-08-04 NOTE — ASSESSMENT & PLAN NOTE
Vascular Surgery Consult  NeuroSurgery Consult, suboccipital crani/hydrocephalus watch  Q1hr neuro Checks, SBP <160  Hypertonic bolus, 300ml of 2% given q6hr PRN for Serum Na <145  MRI/MRA head/neck in AM  PT/OT/SLP

## 2017-08-04 NOTE — PT/OT/SLP PROGRESS
Occupational Therapy  Discharge Note    Juan Manuel Koehler  MRN: [id number]    OT order discontinued.  Pt t/f'ed to ICU and will tf to main campus.    Wes Sutherland, OT  8/4/2017

## 2017-08-04 NOTE — ASSESSMENT & PLAN NOTE
Related to (etiology):   Cancer/decreased appetite    Signs and Symptoms (as evidenced by):   Pt underweight/poor po intake    Interventions/Recommendations (treatment strategy):  Pt would benefit from TF or PPN    Nutrition Diagnosis Status:   New

## 2017-08-04 NOTE — SUBJECTIVE & OBJECTIVE
Interval History: Patient reports sleeping well overnight. Has not had much of an appetite. He has also been very cold. Wife states that the nurse tried to turn up the thermostat the whole way, but it has not improved the temperature of the room at all. With some brief abdominal discomfort earlier, but had resolved by time of interview and exam. He feels very tired still.    Review of Systems   Constitutional: Positive for chills and fatigue. Negative for appetite change and fever.   HENT: Negative for rhinorrhea and sore throat.    Eyes: Negative for visual disturbance.   Respiratory: Negative for chest tightness and shortness of breath.    Cardiovascular: Negative for chest pain, palpitations and leg swelling.   Gastrointestinal: Negative for abdominal distention and abdominal pain.   Genitourinary: Positive for testicular pain. Negative for hematuria.   Musculoskeletal: Negative for arthralgias and back pain.   Skin: Negative for color change and rash.   Neurological: Negative for dizziness and light-headedness.     Objective:     Vital Signs (Most Recent):  Temp: 99.5 °F (37.5 °C) (08/04/17 0737)  Pulse: 71 (08/04/17 0737)  Resp: 18 (08/04/17 0737)  BP: (!) 152/70 (08/04/17 0737)  SpO2: 97 % (08/04/17 1200) Vital Signs (24h Range):  Temp:  [98.6 °F (37 °C)-99.5 °F (37.5 °C)] 99.5 °F (37.5 °C)  Pulse:  [50-75] 71  Resp:  [18] 18  SpO2:  [97 %-99 %] 97 %  BP: (132-153)/(61-74) 152/70     Weight: 56.7 kg (125 lb)  Body mass index is 16.05 kg/m².    Estimated Creatinine Clearance: 51.5 mL/min (based on Cr of 1.1).    Physical Exam   Constitutional: He appears well-developed. No distress.   HENT:   Head: Normocephalic and atraumatic.   Eyes: Conjunctivae and EOM are normal. Right eye exhibits no discharge. Left eye exhibits no discharge.   Cardiovascular: Normal rate, regular rhythm, S1 normal, S2 normal and intact distal pulses.    Murmur heard.   Systolic murmur is present with a grade of 2/6    Diastolic murmur is  present with a grade of 2/6   Pulmonary/Chest: Effort normal and breath sounds normal. No respiratory distress. He has no rales.   Abdominal: Soft. Bowel sounds are normal. He exhibits no distension. There is tenderness.   Tender to palpation along right inguinal area   Musculoskeletal: He exhibits no edema or deformity.   Skin: Skin is warm and dry. No rash noted. He is not diaphoretic. There is pallor.       Significant Labs:   BMP:   Recent Labs  Lab 08/04/17  1505   *      K 3.9      CO2 27   BUN 22   CREATININE 1.1   CALCIUM 8.8     CBC:   Recent Labs  Lab 08/03/17  0740 08/04/17  0515 08/04/17  1505   WBC 23.80* 15.41* 13.40*   HGB 8.8* 8.1* 8.6*   HCT 26.4* 24.1* 25.6*   * 407* 389*     Microbiology Results (last 7 days)     Procedure Component Value Units Date/Time    Blood culture [[id number]] Collected:  08/04/17 0508    Order Status:  Completed Specimen:  Blood Updated:  08/04/17 1515     Blood Culture, Routine No Growth to date    Blood culture [[id number]] Collected:  08/04/17 0508    Order Status:  Completed Specimen:  Blood Updated:  08/04/17 1515     Blood Culture, Routine No Growth to date    Blood Culture #1 **CANNOT BE ORDERED STAT** [[id number]] Collected:  08/02/17 1802    Order Status:  Completed Specimen:  Blood from Peripheral, Antecubital, Right Updated:  08/04/17 1403     Blood Culture, Routine Gram stain ani bottle: Gram positive cocci in chains resembling Strep      Blood Culture, Routine Results called to and read back by: Jeana Barrera RN 08/03/2017  10:42     Blood Culture, Routine Gram stain aer bottle: Gram positive cocci in chains resembling Strep     Blood Culture, Routine --     ENTEROCOCCUS FAECALIS  For susceptibility see order #[id number]      Blood Culture #2 **CANNOT BE ORDERED STAT** [[id number]] Collected:  08/02/17 1807    Order Status:  Completed Specimen:  Blood from Peripheral, Hand, Right Updated:  08/04/17 1402     Blood Culture, Routine Gram  stain ani bottle: Gram positive cocci in chains resembling Strep      Blood Culture, Routine Results called to and read back by: Jeana Barrera RN 08/03/2017  10:42     Blood Culture, Routine Gram stain aer bottle: Gram positive cocci in chains resembling Strep     Blood Culture, Routine --     ENTEROCOCCUS FAECALIS  Susceptibility pending      Urine culture **CANNOT BE ORDERED STAT** [[id number]]  (Susceptibility) Collected:  08/02/17 1653    Order Status:  Completed Specimen:  Urine from Urine, Clean Catch Updated:  08/04/17 1004     Urine Culture, Routine --     PROTEUS MIRABILIS  >100,000 cfu/ml  No other significant isolate            Significant Imaging: U/S: I have reviewed all pertinent results/findings within the past 24 hours:  Scrotum/testes U/S showed cysts in the right testes that are extratesticular

## 2017-08-04 NOTE — PROGRESS NOTES
[facility]  Infectious Disease  Progress Note    Patient Name: Juan Manuel Koehler  MRN: [id number]  Admission Date: 8/2/2017  Length of Stay: 2 days  Attending Physician: Arnol Colbert MD  Primary Care Provider: Hemant Sweeney MD    Isolation Status: No active isolations  Assessment/Plan:      Acute Bacterial Endocarditis  Blood cultures growing Enterococcus spp, sensitivities still pending. Will continue vanc and ceftriaxone until sensitivities result. Repeat blood cultures obtained this morning. Treatment duration will depend on clearance in culture.    Urinary Tract Infection  Urine cultures showing proteus spp (pan sensitive), and will be adequately covered by Ceftriaxone.     Scrotal Tenderness   Related to cysts. Consider urology consult for recommendations on definitive care. No evidence that cysts are infected on physical exam, but cannot be entirely excluded. Current antibiotic regimen sufficient coverage at this time.    Anticipated Disposition: Per Primary Team    Thank you for your consult. I will follow-up with patient. Please contact us if you have any additional questions.    Shreya Calderon MD  U Internal Medicine/Emergency Medicine, [facility]    Subjective:     Principal Problem:Acute bacterial endocarditis    Interval History: Patient reports sleeping well overnight. Has not had much of an appetite. He has also been very cold. Wife states that the nurse tried to turn up the thermostat the whole way, but it has not improved the temperature of the room at all. With some brief abdominal discomfort earlier, but had resolved by time of interview and exam. He feels very tired still.      Review of Systems   Constitutional: Positive for chills and fatigue. Negative for appetite change and fever.   HENT: Negative for rhinorrhea and sore throat.    Eyes: Negative for visual disturbance.   Respiratory: Negative for chest tightness and shortness of  breath.    Cardiovascular: Negative for chest pain, palpitations and leg swelling.   Gastrointestinal: Negative for abdominal distention and abdominal pain.   Genitourinary: Positive for testicular pain. Negative for hematuria.   Musculoskeletal: Negative for arthralgias and back pain.   Skin: Negative for color change and rash.   Neurological: Negative for dizziness and light-headedness.   Objective:     Vital Signs (Most Recent):  Temp: 99.5 °F (37.5 °C) (08/04/17 0737)  Pulse: 71 (08/04/17 0737)  Resp: 18 (08/04/17 0737)  BP: (!) 152/70 (08/04/17 0737)  SpO2: 97 % (08/04/17 1200) Vital Signs (24h Range):  Temp:  [98.6 °F (37 °C)-99.5 °F (37.5 °C)] 99.5 °F (37.5 °C)  Pulse:  [50-75] 71  Resp:  [18] 18  SpO2:  [97 %-99 %] 97 %  BP: (132-153)/(61-74) 152/70     Weight: 56.7 kg (125 lb)  Body mass index is 16.05 kg/m².    Estimated Creatinine Clearance: 51.5 mL/min (based on Cr of 1.1).    Physical Exam  Constitutional: He appears well-developed. No distress.   HENT:   Head: Normocephalic and atraumatic.   Eyes: Conjunctivae and EOM are normal. Right eye exhibits no discharge. Left eye exhibits no discharge.   Cardiovascular: Normal rate, regular rhythm, S1 normal, S2 normal and intact distal pulses.    Murmur heard.   Systolic murmur is present with a grade of 2/6    Diastolic murmur is present with a grade of 2/6   Pulmonary/Chest: Effort normal and breath sounds normal. No respiratory distress. He has no rales.   Abdominal: Soft. Bowel sounds are normal. He exhibits no distension. There is tenderness.   Tender to palpation along right inguinal area   Musculoskeletal: He exhibits no edema or deformity.   Skin: Skin is warm and dry. No rash noted. He is not diaphoretic. There is pallor.     Significant Labs:   BMP:   Recent Labs  Lab 08/04/17  1505   *      K 3.9      CO2 27   BUN 22   CREATININE 1.1   CALCIUM 8.8     CBC:   Recent Labs  Lab 08/03/17  0740 08/04/17  0515 08/04/17  1505   WBC  23.80* 15.41* 13.40*   HGB 8.8* 8.1* 8.6*   HCT 26.4* 24.1* 25.6*   * 407* 389*     Microbiology Results (last 7 days)     Procedure Component Value Units Date/Time    Blood culture [[id number]] Collected:  08/04/17 0508    Order Status:  Completed Specimen:  Blood Updated:  08/04/17 1515     Blood Culture, Routine No Growth to date    Blood culture [[id number]] Collected:  08/04/17 0508    Order Status:  Completed Specimen:  Blood Updated:  08/04/17 1515     Blood Culture, Routine No Growth to date    Blood Culture #1 **CANNOT BE ORDERED STAT** [[id number]] Collected:  08/02/17 1802    Order Status:  Completed Specimen:  Blood from Peripheral, Antecubital, Right Updated:  08/04/17 1403     Blood Culture, Routine Gram stain ani bottle: Gram positive cocci in chains resembling Strep      Blood Culture, Routine Results called to and read back by: Jeana Barrrea RN 08/03/2017  10:42     Blood Culture, Routine Gram stain aer bottle: Gram positive cocci in chains resembling Strep     Blood Culture, Routine --     ENTEROCOCCUS FAECALIS  For susceptibility see order #[id number]      Blood Culture #2 **CANNOT BE ORDERED STAT** [[id number]] Collected:  08/02/17 1807    Order Status:  Completed Specimen:  Blood from Peripheral, Hand, Right Updated:  08/04/17 1402     Blood Culture, Routine Gram stain ani bottle: Gram positive cocci in chains resembling Strep      Blood Culture, Routine Results called to and read back by: Jeana Barrera RN 08/03/2017  10:42     Blood Culture, Routine Gram stain aer bottle: Gram positive cocci in chains resembling Strep     Blood Culture, Routine --     ENTEROCOCCUS FAECALIS  Susceptibility pending      Urine culture **CANNOT BE ORDERED STAT** [[id number]]  (Susceptibility) Collected:  08/02/17 1653    Order Status:  Completed Specimen:  Urine from Urine, Clean Catch Updated:  08/04/17 1004     Urine Culture, Routine --     PROTEUS MIRABILIS  >100,000 cfu/ml  No other significant isolate             Significant Imaging: U/S: I have reviewed all pertinent results/findings within the past 24 hours:  U/S: I have reviewed all pertinent results/findings within the past 24 hours:  Scrotum/testes U/S showed cysts in the right testes that are extratesticular

## 2017-08-04 NOTE — PT/OT/SLP PROGRESS
Occupational Therapy      Juan Manuel Koehler  MRN: [id number]    Patient not seen today secondary to nursing hold upon attempt at 1459 for OT eval; code stroke called for this patient on this day per nursing.   Will follow-up later.    Amaya Frost, OT  8/4/2017

## 2017-08-04 NOTE — SUBJECTIVE & OBJECTIVE
Past Medical History:   Diagnosis Date    Cancer     bladder and throat    CKD (chronic kidney disease) stage 3, GFR 30-59 ml/min     Urinary tract infection      Past Surgical History:   Procedure Laterality Date    BLADDER SURGERY      CYSTOSCOPY      HERNIA REPAIR      Ileal Conduit      THROAT SURGERY        Current Facility-Administered Medications on File Prior to Encounter   Medication Dose Route Frequency Provider Last Rate Last Dose    [COMPLETED] mannitol 20% 20 g in sodium chloride 0.9% 100 mL infusion   Intravenous Once Deshaun Alvarado  mL/hr at 08/04/17 1633 20 g at 08/04/17 1633    [DISCONTINUED] 0.9%  NaCl infusion (for blood administration)   Intravenous Q24H PRN Renetta Harris MD        [DISCONTINUED] 0.9%  NaCl infusion (for blood administration)   Intravenous Q24H PRN Charlene Palomares MD        [DISCONTINUED] acetaminophen tablet 650 mg  650 mg Oral Q6H PRN Charlene Palomares MD        [DISCONTINUED] Amino acid 4.25% - dextrose 10% (CLINIMIX-E) solution with additives (1L provides 42.5 gm AA, 100 gm CHO (340 kcal/L dextrose), Na 35, K 30, Mg 5, Ca 4.5, Acetate 70, Cl 39, Phos 15)   Intravenous Continuous Treva Frausto MD        [DISCONTINUED] Amino acid 4.25% - dextrose 10% (CLINIMIX-E) solution with additives (1L provides 42.5 gm AA, 100 gm CHO (340 kcal/L dextrose), Na 35, K 30, Mg 5, Ca 4.5, Acetate 70, Cl 39, Phos 15)   Intravenous Continuous Arnol Colbert MD        [DISCONTINUED] aspirin chewable tablet 81 mg  81 mg Oral Daily Charlene Palomares MD   81 mg at 08/04/17 0844    [DISCONTINUED] atorvastatin tablet 40 mg  40 mg Oral QHS Charlene Palomares MD   40 mg at 08/03/17 2210    [DISCONTINUED] baclofen tablet 10 mg  10 mg Oral TID Charlene Palomares MD   10 mg at 08/04/17 1420    [DISCONTINUED] cefTRIAXone (ROCEPHIN) 2 g in dextrose 5 % 50 mL IVPB  2 g Intravenous Q12H Shreya Calderon MD   2 g at 08/04/17 0442    [DISCONTINUED]  dextrose 5 % 1,000 mL with sodium bicarbonate 1 mEq/mL (8.4 %) 150 mEq infusion   Intravenous Continuous Charlene Palomares MD   Stopped at 08/04/17 0700    [DISCONTINUED] heparin (porcine) injection 5,000 Units  5,000 Units Subcutaneous Q8H Charlene Palomares MD   5,000 Units at 08/04/17 1420    [DISCONTINUED] mannitol 20% infusion 36 g  36 g Intravenous Once Deshaun Alvarado MD        [DISCONTINUED] megestrol 400 mg/10 mL (10 mL) suspension 200 mg  200 mg Oral TID WM Charlene Palomares MD   200 mg at 08/04/17 1116    [DISCONTINUED] metoprolol tartrate (LOPRESSOR) tablet 25 mg  25 mg Oral BID Charlene Palomares MD   25 mg at 08/04/17 0849    [DISCONTINUED] ondansetron injection 4 mg  4 mg Intravenous Q6H PRN Charlene Palomares MD   4 mg at 08/03/17 1332    [DISCONTINUED] oxycodone-acetaminophen 5-325 mg per tablet 1 tablet  1 tablet Oral Q4H PRN Charlene Palomares MD   1 tablet at 08/04/17 0843    [DISCONTINUED] pantoprazole EC tablet 40 mg  40 mg Oral Daily Charlene Palomares MD   40 mg at 08/04/17 0844    [DISCONTINUED] polyethylene glycol packet 17 g  17 g Oral Daily Charlene Palomares MD   17 g at 08/04/17 0844    [DISCONTINUED] promethazine tablet 12.5 mg  12.5 mg Oral Q6H PRN Charlene Palomares MD   12.5 mg at 08/03/17 0634    [DISCONTINUED] vancomycin 1 g in dextrose 5 % 250 mL IVPB (ready to mix system)  1,000 mg Intravenous Q24H Charlene Palomares .7 mL/hr at 08/04/17 0016 1,000 mg at 08/04/17 0016    [DISCONTINUED] vancomycin 750 mg in dextrose 5 % 250 mL IVPB (ready to mix system)  750 mg Intravenous Q12H Arnol Colbert .7 mL/hr at 08/04/17 1429 750 mg at 08/04/17 1429     Current Outpatient Prescriptions on File Prior to Encounter   Medication Sig Dispense Refill    baclofen (LIORESAL) 10 MG tablet Take 10 mg by mouth 3 (three) times daily.      megestrol (MEGACE) 400 mg/10 mL (40 mg/mL) Susp Take by mouth.      ondansetron (ZOFRAN-ODT) 8 MG TbDL Take 1  tablet (8 mg total) by mouth every 6 (six) hours as needed (nausea). 30 tablet 1    oxycodone-acetaminophen (PERCOCET) 5-325 mg per tablet Take 1 tablet by mouth every 4 (four) hours as needed for Pain. 40 tablet 0    oxycodone-acetaminophen (PERCOCET) 5-325 mg per tablet Take 1 tablet by mouth every 4 (four) hours as needed for Pain. 21 tablet 0    polyethylene glycol (GLYCOLAX) 17 gram PwPk Take 17 g by mouth once daily. 30 packet 0      Allergies: Review of patient's allergies indicates no known allergies.    Family History   Problem Relation Age of Onset    No Known Problems Father     Kidney disease Mother     Prostate cancer Neg Hx      Social History   Substance Use Topics    Smoking status: Never Smoker    Smokeless tobacco: Never Used    Alcohol use No     Review of Systems   Respiratory: Negative for apnea, cough, choking, chest tightness, shortness of breath, wheezing and stridor.    Cardiovascular: Negative for chest pain, palpitations and leg swelling.   Gastrointestinal: Negative for abdominal distention, nausea and vomiting.   Psychiatric/Behavioral: Negative for agitation, behavioral problems and confusion.     Objective:     Vitals:  Temp: 99.4 °F (37.4 °C) (08/04/17 1726)  Pulse: 74 (08/04/17 1800)  Resp: 20 (08/04/17 1800)  BP: (!) 175/82 (08/04/17 1800)  SpO2: 99 % (08/04/17 1800)    Temp:  [97.8 °F (36.6 °C)-99.5 °F (37.5 °C)] 99.4 °F (37.4 °C)  Pulse:  [50-86] 74  Resp:  [18-22] 20  SpO2:  [97 %-100 %] 99 %  BP: (152-175)/(68-82) 175/82              No intake/output data recorded.    Physical Exam   HENT:   Head: Normocephalic and atraumatic.   Cardiovascular: Normal rate, regular rhythm, normal heart sounds and intact distal pulses.    Pulmonary/Chest: Effort normal and breath sounds normal.   Abdominal: Soft. Bowel sounds are normal.   Neurological:   E 4 V5 M6  AAO person, month, location  6th nerve palsy, vertical gaze palsy  Right facial weakness  Minimal right sided weakness,    Dysmetria Right worse than Left     Today I personally reviewed pertinent medications, lines/drains/airways, imaging, cardiology, lab results, microbiology results,

## 2017-08-04 NOTE — NURSING
Patient arrived to [facility] from [facility] by Hailey    Type of Stroke: Ischemic

## 2017-08-04 NOTE — HOSPITAL COURSE
8/2 admitted OSH for urosepsis, endocarditis, and acute anemia   8/4  episode of aphasia at OSH; CTH with large cerebellar hypodensity/concern for edema/hydrocephalus-->transfer to [facility]  8/5 HTS infusion initiated, hypertonic boluses discontinued    8/10 increased carvedilol, PICC for long term abx, dc 2%, watch one more day and TTF tomorrow  8/11 TTF vascular neurology

## 2017-08-04 NOTE — CONSULTS
[facility]  Adult Nutrition  Consult Note    SUMMARY     Recommendations    Recommendation/Intervention:   1. Consider nutritional support: TF: Isosource 1.5 at 10ml/hr and advance as tolerated to goal rate of 60ml/hr to provide 2160 kcal, 98g protein, & 1100ml free water  OR   PPN: Clinimix 4.25/10 at 100ml/hr to provide 1224 kcal, 102g protein, & 2400ml fluid with GIR of 2.9. If central line placed, then recommend Clinimix 5/15 at 100ml/hr with daily IVFE.   2. Encourage intake of meals & Boost Plus.     Goals:   Pt will consume at least 50% intake at meals.  Nutrition Goal Status: new  Communication of RD Recs:  (Jeaneth-reports pt not eating)    Continuum of Care Plan  Referral to Outpatient Services:  (pt to d/c on regular diet)    Reason for Assessment  Reason for Assessment: physician consult (severe deconditioning)  Diagnosis: infection/sepsis  Relevent Medical History: UTI, bladder/throat CA, CKD, hernia repair      General Information Comments: Pt on Regular diet with Boost Plus. Pt with poor intake at meals. Onyl consumed apple juice for breakfast. Denies N/V. Discussed with wife.      Nutrition Prescription Ordered  Current Diet Order: Regular  Oral Nutrition Supplement: Boost Plus     Evaluation of Received Nutrients/Fluid Intake  Other Calories (kcal): 408  % Intake of Estimated Energy Needs: 0 - 25 %  % Meal Intake: 0%     Nutrition Risk Screen   Nutrition Risk Screen: no indicators present    Nutrition/Diet History  Food Preferences: no cultural or Baptism food prefs identified  Factors Affecting Nutritional Intake: nausea/vomiting     Labs/Tests/Procedures/Meds  Pertinent Labs Reviewed: reviewed  Pertinent Labs Comments: BUN 26H, Glu 136H, Alb 2.8L  Pertinent Medications Reviewed: reviewed  Pertinent Medications Comments: 5% dex at 100, rocephin, Megace    Physical Findings  Overall Physical Appearance: underweight  Tubes:  (none)  Skin:  Luiz  "18-intact)    Anthropometrics  Temp: 99.5 °F (37.5 °C)  Height: 6' 2" (188 cm)  Weight Method: Stated  Weight: 56.7 kg (125 lb)  Ideal Body Weight (IBW), Male: 190 lb  % Ideal Body Weight, Male (lb): 65.79 lb  BMI (Calculated): 16.1  BMI Grade: 16 - 16.9 protein-energy malnutrition grade II     Estimated/Assessed Needs  Weight Used For Calorie Calculations: 86.3 kg (190 lb 4.1 oz) (IBW)   Energy Need Method: Kcal/kg (2589 (30 kcal/kg))  RMR ([place]-St. Jeor Equation): 1702.75  Weight Used For Protein Calculations: 86.3 kg (190 lb 4.1 oz)  Protein Requirements: 86-104g (1.0-1.2 g/kg)  Fluid Requirements (mL):  (1ml/kcal)  Fluid Need Method: RDA Method     Assessment and Plan    Severe malnutrition    Related to (etiology):   Cancer/decreased appetite    Signs and Symptoms (as evidenced by):   Pt underweight/poor po intake    Interventions/Recommendations (treatment strategy):  Pt would benefit from TF or PPN    Nutrition Diagnosis Status:   New            Monitor and Evaluation  Food and Nutrient Intake: food and beverage intake  Food and Nutrient Adminstration: diet order, enteral and parenteral nutrition administration  Physical Activity and Function: nutrition-related ADLs and IADLs  Anthropometric Measurements: weight  Biochemical Data, Medical Tests and Procedures: electrolyte and renal panel  Nutrition-Focused Physical Findings: overall appearance    Nutrition Risk  Level of Risk:  (2xweekly)    Nutrition Follow-Up  RD Follow-up?: Yes    "

## 2017-08-04 NOTE — MEDICAL/APP STUDENT
[facility] [place]     Subjective  Information obtained from patient, patient's wife at bedside, and medical record     Chief complaint: nausea, vomiting, weakness     HPI: Juan Manuel Koehler is a 68 yo man with a PMH of throat and bladder cancer, CKD stage 3, and recurrent UTI. He was brought to the ED by EMS yesterday (8/2/17) after acute onset nausea and vomiting. On the afternoon of 8/2/17 he was walking down the stairs in his home when he began sweating and became weak. He fell and began vomiting. He was too weak to stand. He denies loss of consciousness, hitting his head, or any other injury during the fall. He denied chest pain, palpitations, and shortness of breath. He was not nauseous before he became weak and fell. He vomited continuously until he arrived at the Ed. The vomit did not contain blood. Initial vitals in the ED were /64, HR 92, RR 16, T 97.3, and SpO2 100 on room air. WBC 20.9, Hb 7.2, Hct 21.4, platelets 433, total prot 7.8, albumin 2.8, lactate 1.5, troponin 1.488. He received 2L of IV fluids in the ED and was started on 1g vancomycin, 4.5g zosyn, and 400mg ciprofloxacin following a urine culture with presumptive Proteus sp, a blood gram stain that showed gram + cocci in chains resembling strep, and an Echo that showed a vegetation on the atrial surface of the mitral valve.     He was first diagnosed with bladder cancer in 1995 and was treated with surgery. He was more recently diagnosed with bladder cancer after his PCP (Dr. Sweeney) noticed a decrease in renal function on annual exam at the beginning of 4/2017. Since diagnosis he has lost 40lbs. He was unable to tolerate chemo due to his decreased renal function. He recently had surgery (6/21/17) to remove his bladder, prostate, surrounding nodes, and create a urostomy and ileal conduit.    He slept well last night. Still complaining of nausea, but denies vomiting. He did not eat this morning, because he was too  nauseous, but his nurse says that he was able to drink some apple juice.   No fever or chills  No abdominal pain   No difficulty breathing or chest pain   No extremity pain      Cultures  5/5/17 - urine culture - enterococcus  6/12/17 - urine culture - enterococcus  8/2/17 - urine culture - presumptive proteus  8/2/17 - bcx gram stain - gram + cocci in chains resembling strep - enterococcus   8/4/17 - bcx - pending     Abx  8/2/17 - single dose ciprofloxacin 400 mg IV (discontinued)  8/2/17-8/3/17 - zosyn 4.5g IV (discontinued)  8/2/17 - vancomycin 1000mg IV every 24h  8/3/17 - ceftriaxone 2g IV every 12h    Objective  Vitals  /70 (132-158/61-74)  HR 71 (50-75)  RR 18 (17-19)  SpO2 98 () on room air  T 99.5 (98.1-99.5)    Physical exam  General: alert and oriented but sleepy. Cachetic.   HENT: no signs of trauma. No eye findings. Oropharynx clear  Cv: regular rate and rhythm. Faint murmur heard at left sternal border that is heard on inspiration  Resp: no increased work of breathing. Lung sounds clear bilaterally  Abd: no abdominal tenderness or distension. Urostomy in place on right side with no signs of redness or swelling.   Extrem:. No splinter hemorrhages. No edema. Pulses equal bilaterally  Neuro: alert and oriented with appropriate mood and affect    Medications   aspirin  81 mg Oral Daily    atorvastatin  40 mg Oral QHS    baclofen  10 mg Oral TID    cefTRIAXone (ROCEPHIN) IVPB  2 g Intravenous Q12H    heparin (porcine)  5,000 Units Subcutaneous Q8H    megestrol  200 mg Oral TID WM    metoprolol tartrate  25 mg Oral BID    pantoprazole  40 mg Oral Daily    polyethylene glycol  17 g Oral Daily    vancomycin (VANCOCIN) IVPB  1,000 mg Intravenous Q24H   PRN meds: sodium chloride, ondansetron, oxycodone-acetaminophen, promethazine     Labs  Lab Results   Component Value Date    WBC 15.41 (H) 08/04/2017    HGB 8.1 (L) 08/04/2017    HCT 24.1 (L) 08/04/2017    MCV 85 08/04/2017     (H)  08/04/2017     Lab Results   Component Value Date     08/03/2017    K 5.1 08/03/2017     (H) 08/03/2017    CO2 16 (L) 08/03/2017    BUN 26 (H) 08/03/2017    CREATININE 1.2 08/03/2017    CALCIUM 9.1 08/03/2017    ANIONGAP 12 08/03/2017    ESTGFRAFRICA >60 08/03/2017    EGFRNONAA >60 08/03/2017     Lab Results   Component Value Date    ALT 16 08/03/2017    AST 15 08/03/2017    ALKPHOS 67 08/03/2017    BILITOT 2.5 (H) 08/03/2017       Recent Labs  Lab 08/03/17  1934   TROPONINI 1.282*     Microbiology  No new micro data  Blood cultures in process    Microbiology Results (last 7 days)     Procedure Component Value Units Date/Time    Blood culture [[id number]] Collected:  08/04/17 0508    Order Status:  Sent Specimen:  Blood Updated:  08/04/17 0711    Blood culture [[id number]] Collected:  08/04/17 0508    Order Status:  Sent Specimen:  Blood Updated:  08/04/17 0711    Blood Culture #1 **CANNOT BE ORDERED STAT** [[id number]] Collected:  08/02/17 1802    Order Status:  Completed Specimen:  Blood from Peripheral, Antecubital, Right Updated:  08/03/17 1800     Blood Culture, Routine Gram stain ani bottle: Gram positive cocci in chains resembling Strep      Blood Culture, Routine Results called to and read back by: Jeana Barrera RN 08/03/2017  10:42     Blood Culture, Routine Gram stain aer bottle: Gram positive cocci in chains resembling Strep    Blood Culture #2 **CANNOT BE ORDERED STAT** [[id number]] Collected:  08/02/17 1807    Order Status:  Completed Specimen:  Blood from Peripheral, Hand, Right Updated:  08/03/17 1731     Blood Culture, Routine Gram stain nai bottle: Gram positive cocci in chains resembling Strep      Blood Culture, Routine Results called to and read back by: Jeana Barrera RN 08/03/2017  10:42     Blood Culture, Routine Gram stain aer bottle: Gram positive cocci in chains resembling Strep    Urine culture **CANNOT BE ORDERED STAT** [[id number]] Collected:  08/02/17 1653    Order Status:   Completed Specimen:  Urine from Urine, Clean Catch Updated:  08/03/17 1217     Urine Culture, Routine --     PRESUMPTIVE PROTEUS SPECIES  >100,000 cfu/ml  Identification and susceptibility pending  No other significant isolate          Imaging  No new imaging since 8/2/17    Assessment and Plan    Juan Manuel Koehler is a 66 yo man with a PMH of throat and bladder cancer, CKD stage 3, and recurrent UTI. He was admitted for weakness, nausea, and vomiting, and is currently being seen for suspected endocarditis and bacteremia.      Endocarditis  -WBC decreased from yesterday  -positive blood cx - enterococcus - awaiting susceptibilities   -second set of bcx pending   -faint murmur auscultated  -echo showed atrial surface vegetation of mitral valve  -possible septic microemboli seen in spleen on CT  -continue vancomycin and ceftriaxone  -monitor vanc troughs     Urostomy   -created after surgery on 6/12/17  -continue to monitor ostomy and urine output     CKD Stage 3  -monitor renal function  -keep renal function in mind when making abx choices

## 2017-08-04 NOTE — CONSULTS
[facility]  Neurosurgery  Consult Note    Consults  Subjective:     Chief Complaint/Reason for Admission: Right cerebellar stroke    History of Present Illness: 67yoM with history of bladder cancer s/p bladder resection 6/21/17 presents to neurosurgery in the neuro critical care unit with right cerebellar hem/stroke with vasogenic edema and mass effect, urosepsis and endocarditis with vegetation. Family reports pt had decreased appetite since bladder resection and recent vomiting. Pt presented to [place] 8/2 after presyncope episode and weak on the ground. He had mental status change today at approx 1400 prompting CT head and was transferred to [facility] [place].     Neurosurgery consulted for possible EVD placement and suboccipital crani watch.      Prescriptions Prior to Admission   Medication Sig Dispense Refill Last Dose    baclofen (LIORESAL) 10 MG tablet Take 10 mg by mouth 3 (three) times daily.   Taking    megestrol (MEGACE) 400 mg/10 mL (40 mg/mL) Susp Take by mouth.   Taking    ondansetron (ZOFRAN-ODT) 8 MG TbDL Take 1 tablet (8 mg total) by mouth every 6 (six) hours as needed (nausea). 30 tablet 1 Taking    oxycodone-acetaminophen (PERCOCET) 5-325 mg per tablet Take 1 tablet by mouth every 4 (four) hours as needed for Pain. 40 tablet 0 Taking    oxycodone-acetaminophen (PERCOCET) 5-325 mg per tablet Take 1 tablet by mouth every 4 (four) hours as needed for Pain. 21 tablet 0 Taking    polyethylene glycol (GLYCOLAX) 17 gram PwPk Take 17 g by mouth once daily. 30 packet 0 Taking       Review of patient's allergies indicates:  No Known Allergies    Past Medical History:   Diagnosis Date    Cancer     bladder and throat    CKD (chronic kidney disease) stage 3, GFR 30-59 ml/min     Urinary tract infection      Past Surgical History:   Procedure Laterality Date    BLADDER SURGERY      CYSTOSCOPY      HERNIA REPAIR      Ileal Conduit      THROAT SURGERY       Family History     Problem Relation  (Age of Onset)    Kidney disease Mother    No Known Problems Father        Social History Main Topics    Smoking status: Never Smoker    Smokeless tobacco: Never Used    Alcohol use No    Drug use: No    Sexual activity: Yes     Partners: Female     Birth control/ protection: None     Review of Systems  Objective:     Weight: 57.5 kg (126 lb 12.2 oz)  Body mass index is 16.28 kg/m².  Vital Signs (Most Recent):  Temp: 99.4 °F (37.4 °C) (08/04/17 1726)  Pulse: 74 (08/04/17 1800)  Resp: 20 (08/04/17 1800)  BP: (!) 175/82 (08/04/17 1800)  SpO2: 99 % (08/04/17 1800) Vital Signs (24h Range):  Temp:  [97.8 °F (36.6 °C)-99.5 °F (37.5 °C)] 99.4 °F (37.4 °C)  Pulse:  [50-86] 74  Resp:  [18-22] 20  SpO2:  [97 %-100 %] 99 %  BP: (152-175)/(68-82) 175/82       Date 08/04/17 0700 - 08/05/17 0659   Shift 5245-1848 9099-8602 8890-1179 24 Hour Total   I  N  T  A  K  E   Shift Total  (mL/kg)       O  U  T  P  U  T   Urine  150  150    Shift Total  (mL/kg)  150  (2.6)  150  (2.6)   Weight (kg)  57.5 57.5 57.5                        Urostomy 08/02/17 2345 ileal conduit RUQ (Active)   Stoma Appearance round 8/4/2017  7:55 AM   Stomal Appliance 1 piece 8/3/2017  7:35 PM   Accessories/Skin Care appliance belt 8/4/2017  7:55 AM   Stoma Function yellow urine 8/4/2017  7:55 AM   Output (mL) 150 mL 8/4/2017  6:00 PM       Physical Exam:    Constitutional: He appears well-developed and well-nourished. No distress.     Eyes: Pupils are equal, round, and reactive to light. Conjunctivae and EOM are normal.     Cardiovascular: Normal rate and regular rhythm.     Psych/Behavior: He is alert. He is oriented to person, place, and time. He has a normal mood and affect.     Musculoskeletal:        Neck: Muscle strength is 5/5.        Back: Muscle strength is 5/5.        Right Upper Extremities: Muscle strength is 5/5. Tone is normal.        Left Upper Extremities: Muscle strength is 5/5. Tone is normal.       Right Lower Extremities: Tone is  normal.        Left Lower Extremities: Tone is normal.     Neurological:        Sensory: There is no sensory deficit in the trunk. There is no sensory deficit in the extremities.        Cranial nerves: Cranial nerve(s) II, III, IV, V, VI, VII, VIII, IX, X, XI and XII are intact.   Right dysmetria       Significant Labs:    Recent Labs  Lab 08/03/17  0438 08/04/17  0515 08/04/17  1505   * 134* 115*    137 138   K 5.1 3.7 3.9   * 101 101   CO2 16* 26 27   BUN 26* 22 22   CREATININE 1.2 1.1 1.1   CALCIUM 9.1 9.1 8.8       Recent Labs  Lab 08/03/17  0740 08/04/17  0515 08/04/17  1505   WBC 23.80* 15.41* 13.40*   HGB 8.8* 8.1* 8.6*   HCT 26.4* 24.1* 25.6*   * 407* 389*     No results for input(s): LABPT, INR, APTT in the last 48 hours.  Microbiology Results (last 7 days)     Procedure Component Value Units Date/Time    Culture, Respiratory with Gram Stain [[id number]]     Order Status:  No result Specimen:  Respiratory         All pertinent labs from the last 24 hours have been reviewed.    Significant Diagnostics:  CT: Ct Head Without Contrast    Addendum Date: 8/4/2017    Appearance is suspicious for a large cerebellar infarction with cytotoxic edema.  MRI is advised. Electronically signed by: XAVIER AYERS MD Date:     08/04/17 Time:    15:42     Result Date: 8/4/2017   Large area of vasogenic edema centered within the right cerebellar hemisphere resulting in severe mass effect with compression of the 4th ventricle, mild hydrocephalus and leftward deviation of the cerebral vermis.  Recommend emergent neurosurgical consultation. Findings observed at 3:27 PM and discussed with Dr. Alvarado at 3:35 PM. Electronically signed by: XAVIER AYERS MD Date:     08/04/17 Time:    15:35     Assessment/Plan:     Cerebellar stroke    67M p/w cerebellar stroke/hemorrhage  -vasogenic edema and mass effect on head CT  -No acute neurosurgical intervention indicated at this time  -No EVD placement requried at this  time  -Rec HOB elevated to 30  -Rec Na goal 145-155  -SBP goal <150  -Consider head CT for any acute changes in neuro exam  -Please call neurosurgery for any declining neuro status  -Thank you for this consult. We will continue to follow this patient.             Thank you for your consult. I will follow-up with patient. Please contact us if you have any additional questions.    Jeramy hammond MD  Neurosurgery  [facility]

## 2017-08-04 NOTE — PLAN OF CARE
1450: Nurse at bedside administering medications. Case management, Melanie at bedside. Upon asking questions to pt, pt unable to answer questions accurately. Pt staring blankly into space.     1455: Dr. Alvarado notified and at bedside. Neuro assestment done; No physical deficits noted. Charge nurse at bedside. /95. HR 78. 96% on room air. Blood glucose 135.     1500: Code stroke called.    1505: Lab at bedside. ICU nurses at bedside. MD at bedside. Pt transported to CT for CT head.     Pt transferred to ICU.

## 2017-08-04 NOTE — PLAN OF CARE
1539 Patient admitted to ICU room 256 from med surg floor following MET. Report given to myself at bedside by Jeaneth Corcoran RN. Safety precautions initiated. Continuous monitoring initiated. Reassessment performed now. Patient familiarized with room features including call button. Family updated of arrival. MD notified of patients arrival to unit. Continue to monitor and carry out orders as they are dictated.    1630 Report called to OWEN Overton at [facility]. She agrees to assume care upon transfer. Pt transported via EMS in stretcher with manitol drip in process. Wife is in tow.

## 2017-08-04 NOTE — ASSESSMENT & PLAN NOTE
NSTEMI outside hospital  Will get additional tropon to trend down  EKG pending  Patient denies CP Metoprolol resumed

## 2017-08-04 NOTE — H&P
[facility]  Neurocritical Care  History & Physical    Admit Date: 8/4/2017  Service Date: 08/04/2017  Length of Stay: 0    Subjective:     Chief Complaint: <principal problem not specified>    History of Present Illness: Juan Manuel Koehler is a 67 y.o. male with invasive bladder cancer s/p open radical cystoproctostomy, bilateral pelvic lymph node dissection and ileal conduit urinary diversion (6/21/17, path with high grade urothelial carcinoma, LN negative for malignancy), CKD III  With deconditioning and poor PO intake secondary to cancer. He was in his usual state of health (lives at home with wife, able to ambulate, performs some adls) until day of admission (8/2) when patient was coming downstairs and all of a sudden felt dizzy. He slipped and fell;  Denies he did not hit his head. He fell on his back and was unable to get up secondary to generalized weakness. He denies focal weakness. He denies any loss of consciousness. He did not have chest pain, palpitations or shortness of breath prior to this event. While he was on the stairs he started to vomit multiple times. He was diaphoretic and vomited what he just drank (red poweraide). He denies any coffee ground emesis or bright red hematemesis. He denies melena. His wife called EMS and en route to the hospital he continued to vomit. He denies fevers or chills. He denies malodorous urine. He has noticed increased urinary frequency. He denies belly pain or drainage around urostomy site.     While at OSH, he developed Aphasia 8/4 and telestroke consult performed. CTH shows large right cerebellar hypodensity. Patient transferred to [facility] for management/NSGY eval.        Past Medical History:   Diagnosis Date    Cancer     bladder and throat    CKD (chronic kidney disease) stage 3, GFR 30-59 ml/min     Urinary tract infection      Past Surgical History:   Procedure Laterality Date    BLADDER SURGERY      CYSTOSCOPY      HERNIA REPAIR       Ileal Conduit      THROAT SURGERY        Current Facility-Administered Medications on File Prior to Encounter   Medication Dose Route Frequency Provider Last Rate Last Dose    [COMPLETED] mannitol 20% 20 g in sodium chloride 0.9% 100 mL infusion   Intravenous Once Deshaun Alvarado  mL/hr at 08/04/17 1633 20 g at 08/04/17 1633    [DISCONTINUED] 0.9%  NaCl infusion (for blood administration)   Intravenous Q24H PRN Renetta Harris MD        [DISCONTINUED] 0.9%  NaCl infusion (for blood administration)   Intravenous Q24H PRN Charlene Palomares MD        [DISCONTINUED] acetaminophen tablet 650 mg  650 mg Oral Q6H PRN Charlene Palomares MD        [DISCONTINUED] Amino acid 4.25% - dextrose 10% (CLINIMIX-E) solution with additives (1L provides 42.5 gm AA, 100 gm CHO (340 kcal/L dextrose), Na 35, K 30, Mg 5, Ca 4.5, Acetate 70, Cl 39, Phos 15)   Intravenous Continuous Treva Frausto MD        [DISCONTINUED] Amino acid 4.25% - dextrose 10% (CLINIMIX-E) solution with additives (1L provides 42.5 gm AA, 100 gm CHO (340 kcal/L dextrose), Na 35, K 30, Mg 5, Ca 4.5, Acetate 70, Cl 39, Phos 15)   Intravenous Continuous Arnol Colbert MD        [DISCONTINUED] aspirin chewable tablet 81 mg  81 mg Oral Daily Charlene Palomares MD   81 mg at 08/04/17 0844    [DISCONTINUED] atorvastatin tablet 40 mg  40 mg Oral QHS Charlene Palomares MD   40 mg at 08/03/17 2210    [DISCONTINUED] baclofen tablet 10 mg  10 mg Oral TID Charlene Palomares MD   10 mg at 08/04/17 1420    [DISCONTINUED] cefTRIAXone (ROCEPHIN) 2 g in dextrose 5 % 50 mL IVPB  2 g Intravenous Q12H Shreya Calderon MD   2 g at 08/04/17 0442    [DISCONTINUED] dextrose 5 % 1,000 mL with sodium bicarbonate 1 mEq/mL (8.4 %) 150 mEq infusion   Intravenous Continuous Charlene Palomares MD   Stopped at 08/04/17 0700    [DISCONTINUED] heparin (porcine) injection 5,000 Units  5,000 Units Subcutaneous Q8H Charlene Palomares MD   5,000 Units  at 08/04/17 1420    [DISCONTINUED] mannitol 20% infusion 36 g  36 g Intravenous Once Deshaun Alvarado MD        [DISCONTINUED] megestrol 400 mg/10 mL (10 mL) suspension 200 mg  200 mg Oral TID WM Charlene Palomares MD   200 mg at 08/04/17 1116    [DISCONTINUED] metoprolol tartrate (LOPRESSOR) tablet 25 mg  25 mg Oral BID Charlene Palomares MD   25 mg at 08/04/17 0849    [DISCONTINUED] ondansetron injection 4 mg  4 mg Intravenous Q6H PRN Charlene Palomares MD   4 mg at 08/03/17 1332    [DISCONTINUED] oxycodone-acetaminophen 5-325 mg per tablet 1 tablet  1 tablet Oral Q4H PRN Charlene Palomares MD   1 tablet at 08/04/17 0843    [DISCONTINUED] pantoprazole EC tablet 40 mg  40 mg Oral Daily Charlene Palomares MD   40 mg at 08/04/17 0844    [DISCONTINUED] polyethylene glycol packet 17 g  17 g Oral Daily Charlene Palomares MD   17 g at 08/04/17 0844    [DISCONTINUED] promethazine tablet 12.5 mg  12.5 mg Oral Q6H PRN Charlene Palomares MD   12.5 mg at 08/03/17 0634    [DISCONTINUED] vancomycin 1 g in dextrose 5 % 250 mL IVPB (ready to mix system)  1,000 mg Intravenous Q24H Charlene Palomares .7 mL/hr at 08/04/17 0016 1,000 mg at 08/04/17 0016    [DISCONTINUED] vancomycin 750 mg in dextrose 5 % 250 mL IVPB (ready to mix system)  750 mg Intravenous Q12H Arnol Colbert .7 mL/hr at 08/04/17 1429 750 mg at 08/04/17 1429     Current Outpatient Prescriptions on File Prior to Encounter   Medication Sig Dispense Refill    baclofen (LIORESAL) 10 MG tablet Take 10 mg by mouth 3 (three) times daily.      megestrol (MEGACE) 400 mg/10 mL (40 mg/mL) Susp Take by mouth.      ondansetron (ZOFRAN-ODT) 8 MG TbDL Take 1 tablet (8 mg total) by mouth every 6 (six) hours as needed (nausea). 30 tablet 1    oxycodone-acetaminophen (PERCOCET) 5-325 mg per tablet Take 1 tablet by mouth every 4 (four) hours as needed for Pain. 40 tablet 0    oxycodone-acetaminophen (PERCOCET) 5-325 mg per tablet Take 1  tablet by mouth every 4 (four) hours as needed for Pain. 21 tablet 0    polyethylene glycol (GLYCOLAX) 17 gram PwPk Take 17 g by mouth once daily. 30 packet 0      Allergies: Review of patient's allergies indicates no known allergies.    Family History   Problem Relation Age of Onset    No Known Problems Father     Kidney disease Mother     Prostate cancer Neg Hx      Social History   Substance Use Topics    Smoking status: Never Smoker    Smokeless tobacco: Never Used    Alcohol use No     Review of Systems   Respiratory: Negative for apnea, cough, choking, chest tightness, shortness of breath, wheezing and stridor.    Cardiovascular: Negative for chest pain, palpitations and leg swelling.   Gastrointestinal: Negative for abdominal distention, nausea and vomiting.   Psychiatric/Behavioral: Negative for agitation, behavioral problems and confusion.     Objective:     Vitals:  Temp: 99.4 °F (37.4 °C) (08/04/17 1726)  Pulse: 74 (08/04/17 1800)  Resp: 20 (08/04/17 1800)  BP: (!) 175/82 (08/04/17 1800)  SpO2: 99 % (08/04/17 1800)    Temp:  [97.8 °F (36.6 °C)-99.5 °F (37.5 °C)] 99.4 °F (37.4 °C)  Pulse:  [50-86] 74  Resp:  [18-22] 20  SpO2:  [97 %-100 %] 99 %  BP: (152-175)/(68-82) 175/82              No intake/output data recorded.    Physical Exam   HENT:   Head: Normocephalic and atraumatic.   Cardiovascular: Normal rate, regular rhythm, normal heart sounds and intact distal pulses.    Pulmonary/Chest: Effort normal and breath sounds normal.   Abdominal: Soft. Bowel sounds are normal.   Neurological:   E 4 V5 M6  AAO person, month, location  6th nerve palsy, vertical gaze palsy  Right facial weakness  Minimal right sided weakness,   Dysmetria Right worse than Left     Today I personally reviewed pertinent medications, lines/drains/airways, imaging, cardiology, lab results, microbiology results,     Assessment/Plan:     Neuro   Cerebellar stroke    Vascular Surgery Consult  NeuroSurgery Consult, suboccipital  crani/hydrocephalus watch  Q1hr neuro Checks, SBP <160  Hypertonic bolus, 300ml of 2% given q6hr PRN for Serum Na <145  MRI/MRA head/neck in AM  PT/OT/SLP        Cardiac/Vascular   Acute bacterial endocarditis    ID Consulted  Cont Abx per reqs  Continue daily BC  Repeat echo        NSTEMI (non-ST elevated myocardial infarction)    NSTEMI outside hospital  Will get additional tropon to trend down  EKG pending  Patient denies CP Metoprolol resumed          Renal/   Urinary tract infection with hematuria    ON rocephin        Oncology   Anemia of chronic disease    -Will follow H/H   -Iron panel drawn outside hospital, unsure if before PRBC transfused  -HDS at present  -On PPI            Prophylaxis:  Venous Thromboembolism: mechanical  Stress Ulcer: PPI  Ventilator Pneumonia: not applicable     Activity Orders          None        Full Code   Critical Care: 36 min    Wilfrid Rock NP  Neurocritical Care  [facility]

## 2017-08-04 NOTE — PHYSICIAN QUERY
"PT Name: Juan Manuel Koehler  MR #: [id number]    Physician Query Form - Hematology Clarification      CDS/: Gela Almeida               Contact information: [phone]      This form is a permanent document in the medical record.      Query Date: August 4, 2017    By submitting this query, we are merely seeking further clarification of documentation. Please utilize your independent clinical judgment when addressing the question(s) below.    The Medical record contains the following:   Indicators  Supporting Clinical Findings Location in Medical Record   X "Anemia" documented anemia of chronic disease PN 8/4   X H & H = H= 7.2 - 8.8  H= 21.4 - 26.4 LABS 8/2 - 8/3    BP =                     HR=      "GI bleeding" documented      Acute bleeding (Non GI site)     X Transfusion(s) received 2uRBC yesterday  PN 8/4    Treatment:     X Other:  PMH of throat and bladder cancer, CKD stage 3 PN 8/4     Provider, please specify diagnosis or diagnoses associated with above clinical findings.      [x  ] Anemia of chronic disease ( Specify chronic disease)      [  ] CKD (specify stage) ___________________________     [x  ] Neoplastic disease      [  ] Other (Specify) _______________________________     [  ] Clinically Undetermined     [  ] Other Hematological Diagnosis (please specify): _________________________________    [  ] Clinically Undetermined       Please document in your progress notes daily for the duration of treatment, until resolved, and include in your discharge summary.                                                                                                      "

## 2017-08-04 NOTE — MEDICAL/APP STUDENT
[employer] Medical Student L4 Progress Note     Subjective:      Pt complaining of a HA this morning. Headache is similar in presentation to yesterday AM- frontal, 5/10 intensity, no changes in vision/hearing, no dizziness.   Pt feels as though his weakness is improving but still not at his baseline. He is willing to participate in PT/OT today. No n/v, cp, sob, fever, chills or sweating. No changes in bowel movements. No burning at ostomy site. No abdominal pain but decreased appetite. Pt reports he has not eaten since admit and is worried about his lack of appetite.     Objective:     Vital Signs Last 24 Hrs:   (Min-Max)     BP: 132-158/61-68  Tmax: 99.5  Pulse: 50-75  RR: 17-19  SpO2:      I/O:  In:--  Out: 400     PE:  Gen: awake, cooperative, more alert compared to previous exams  HEENT: PERRL, MMM, clear oropharynx   Resp:CLAB, no wheezing, no crackles,   Abd: soft, positive BS, no pain on palpation, non-distended, midline scar, ostomy site clean, no signs of infection  CV: RRR, faint systolic murmur over mitral area, no clicks, no rubs, no gallops  Ext: no cyanosis, no edema, no janudice,   UA: urine in ostomy bag was clear yellow with out blood or debris      Labs:  H/H: 8.1/24.1  Plt: 407  WBC: 15.41    Na: 137  K: 3.7  Bicarb: 101  Cl: 101  BUN: 22  Cr: 1.1  Glucose: 134   Ca: 9.1  Albumin: 2.4   Tbili: 2.4    Corrected Ca: 10.38     8/3  Vanc Trough: 8.9    8/2     Iron:13  TIBC:179  Transferrin: 121  Ferritin:2700  Folate:10  B12: 428     Micro:  Blood Clx 8/2 x2: Gram positive cocci in chains resembling Strep   Urine Clx: Presumptive proteus species, >100,000 cfu/ml     Repeat Blood Clx sent 8/4     UA:   Bacteria: Mod, WBC: 20, RBC: 10, No nitrites, 2+ LE, 2+ blood, 2+ protein, no glucose, no ketones     Imaging:    Testicular US:  Right extratesticular cysts as discussed above representing either epididymal body and tail cysts or spermatoceles.  In the differential diagnoses to be vaginitis cysts may be  considered.    Echo:   -Concentric hypertrophy. No wall motion abnormalities. Normal left ventricular systolic function (EF 55-60%). Normal left ventricular diastolic function. Normal right ventricular systolic function . The estimated PA systolic pressure is 38 mmHg. Trivial aortic regurgitation. Evidence of mitral vegetation . Increased central venous pressure.     CT abd/pelvis: small amt of free fluid in pelvis, multiple lower attenuation lesions in spleen, nonspecific distention of ureter into the urostomy changes with peripheral enhancement     CXR: no acute abnormalities       Assessment:      66 yo M with hx of bladder cancer s/p radical cystoproctoscopy, CKD stage 3 here for strep bacteremia secondary to infective endocarditis, acute on chronic anemia of chronic inflammation, pre-syncope, NSTEMI secondary to demand ischemia, deconditioning, and chronic thrombocytosis.      Plan:    Sepsis Secondary to Gram Pos. Cocci Bacteremia from Infective Endocarditis:  -Elevated WBC and Tachycardic on arrival  -Admit WBC 20.90 --> 23.8 --> 15.41 today  -Currently asymptomatic   -Blood Cultures 8/2 x2: Gram positive cocci in chains resembling Strep   -Repeat blood cultures x2 sent  -Echo: Concentric hypertrophy.Evidence of mitral vegetation .No signs of reduced EF.   -Started on Vanc/Zosyn on admit  -Stopped Zosyn and started Rocephin per ID recs, continue Vanc  -Monitor Vanc trough-8.9 on 8/3    Proteus UTI:  -Admit WBC 20.90 --> 23.8 --> 15.41 today  -Currently asymptomatic   -No changes in urinary habits or burning at ostomy prior to admit  -UA: Bacteria: Mod, WBC: 20, RBC: 10, No nitrites, 2+ LE, 2+ blood, 2+ protein, no glucose, no ketones  -Urine culture: Presumptive proteus species, >100,000 cfu/ml   -Continue rocephin      NSTEMI secondary to demand ischemia:  -Troponin elevated 1.488 on admit, 1.234 8/3 and now trending down  -EKG showed ST elevation in V4/V5 with early repolarization, sinus tach  -Discussed  with cardio elevated troponin likely due to demand ischemia, asymptomatic   -Echo: Concentric hypertrophy.Evidence of mitral vegetation .No signs of reduced EF.   -Continue maximal medical management with ASA 81 mg po qd, Metoprolol 25 mg po BID with goal pulse 55-60, Atorvastatin 40 mg qpm     Presyncope:  -Not orthostatic on arrival, likely secondary to volume depletion.  -2 units pRBCs night of admit 8/2.   -Repleted with IVF and monitoring for signs of improvement.      Acute on chronic anemia of chronic disease:   - Hgb 7.2 on admit, baseline Hgb 8-9.   -No overt bleeding on exam. Recent EGD and colonoscopy showed no abnormalities   -Transfused two units pRBC. H/H 8.1/24.1 today. Iron studies confirm anemia of chronic disease.   -F/u FOBT.   -No indication for additional transfusions at this time, threshold 7     Bladder cancer s/p radical cystoproctoscopy:  -Follow up with Urology, Dr. Robles and Dr. Calero   -Hold chemo, will restage with scans in 6 weeks.     CKD 3:  - Cr 1.1 today, baseline 1.1-1.5  - Renally dose all medications and avoid nephrotoxic medications     Extratesticular Cyst:  -Likely epididymal body and tail cysts or spermatoceles.   -Currently asymptomatic, follow up with urology after DC     Deconditioning/Weakness:  -PT/OT ordered, pt denied PT yesterday due to weakness and HA  -Continue PT/OT and encourage pt to participate   -F/u Nutrition consult, continue megace.      Chronic Thrombocytosis:  -Secondary to malignancy  -Monitor for changes, admit 433, today 407         James LEMON

## 2017-08-04 NOTE — HPI
67yoM with history of bladder cancer s/p bladder resection 6/21/17 presents to neurosurgery in the neuro critical care unit with right cerebellar hem/stroke with vasogenic edema and mass effect, urosepsis and endocarditis with vegetation. Family reports pt had decreased appetite since bladder resection and recent vomiting. Pt presented to [facility] 8/2 after presyncope episode and weak on the ground. He had mental status change today at approx 1400 prompting CT head and was transferred to [facility].     Neurosurgery consulted for possible EVD placement and suboccipital crani watch.

## 2017-08-04 NOTE — PT/OT/SLP PROGRESS
Physical Therapy      Juan Manuel Koehler  MRN: [id number]    Patient not seen today attempted to see pt at 0955, pt was asleep and very drowsy, repositioned the pt in bed and followed up in afternoon. Upon afternoon follow up Patient ill (Comment) (Physician, case managment and RN in room. pt with mental status changes. ). Will follow-up 8/5/2017.    Diaz Muhammad, PT

## 2017-08-04 NOTE — SUBJECTIVE & OBJECTIVE
Past Medical History:   Diagnosis Date    Cancer     bladder and throat    CKD (chronic kidney disease) stage 3, GFR 30-59 ml/min     Urinary tract infection      Past Surgical History:   Procedure Laterality Date    BLADDER SURGERY      CYSTOSCOPY      HERNIA REPAIR      Ileal Conduit      THROAT SURGERY       Family History   Problem Relation Age of Onset    No Known Problems Father     Kidney disease Mother     Prostate cancer Neg Hx      Social History   Substance Use Topics    Smoking status: Never Smoker    Smokeless tobacco: Never Used    Alcohol use No     Review of patient's allergies indicates:  No Known Allergies  Medications: I have reviewed the current medication administration record.    Prescriptions Prior to Admission   Medication Sig Dispense Refill Last Dose    baclofen (LIORESAL) 10 MG tablet Take 10 mg by mouth 3 (three) times daily.   Taking    megestrol (MEGACE) 400 mg/10 mL (40 mg/mL) Susp Take by mouth.   Taking    ondansetron (ZOFRAN-ODT) 8 MG TbDL Take 1 tablet (8 mg total) by mouth every 6 (six) hours as needed (nausea). 30 tablet 1 Taking    oxycodone-acetaminophen (PERCOCET) 5-325 mg per tablet Take 1 tablet by mouth every 4 (four) hours as needed for Pain. 40 tablet 0 Taking    oxycodone-acetaminophen (PERCOCET) 5-325 mg per tablet Take 1 tablet by mouth every 4 (four) hours as needed for Pain. 21 tablet 0 Taking    polyethylene glycol (GLYCOLAX) 17 gram PwPk Take 17 g by mouth once daily. 30 packet 0 Taking       Review of Systems   Constitutional: Positive for activity change, appetite change and fatigue.   HENT: Negative for voice change.    Eyes: Negative for photophobia and visual disturbance.   Respiratory: Negative for shortness of breath.    Cardiovascular: Negative for chest pain.   Gastrointestinal: Negative for nausea and vomiting.   Endocrine: Negative for polydipsia and polyphagia.   Genitourinary: Positive for dysuria.   Musculoskeletal: Negative  for gait problem.   Skin: Negative for rash.   Allergic/Immunologic: Negative for immunocompromised state.   Neurological: Positive for headaches.   Hematological: Does not bruise/bleed easily.   Psychiatric/Behavioral: Positive for confusion. Negative for agitation and behavioral problems.     Objective:     Vital Signs (Most Recent):  Temp: 99.4 °F (37.4 °C) (08/04/17 1726)  Pulse: 74 (08/04/17 1800)  Resp: 20 (08/04/17 1800)  BP: (!) 175/82 (08/04/17 1800)  SpO2: 99 % (08/04/17 1800)    Vital Signs Range (Last 24H):  Temp:  [97.8 °F (36.6 °C)-99.5 °F (37.5 °C)]   Pulse:  [50-86]   Resp:  [18-22]   BP: (152-175)/(68-82)   SpO2:  [97 %-100 %]     Physical Exam   Constitutional: He appears well-developed. No distress.   HENT:   Head: Normocephalic and atraumatic.   Eyes: Pupils are equal, round, and reactive to light.   Mild convergence of gaze   Neck: Normal range of motion. Neck supple.   Cardiovascular: Normal rate.    Pulmonary/Chest: Effort normal.   Abdominal: Soft.   Musculoskeletal: Normal range of motion.   Neurological: He is alert. GCS eye subscore is 4 - spontaneous. GCS verbal subscore is 4 - confused. GCS motor subscore is 6 - obeys commands.   Oriented to person and time   Skin: Skin is warm and dry. He is not diaphoretic.       Neurological Exam:   LOC: alert and follows requests  Language: No aphasia  Speech: No dysarthria  Orientation: Person, Time  Memory: Recent memory intact, Remote memory intact, Age correct, Month correct  Visual Fields (CN II): full  EOM (CN III, IV, VI): slight convergence of gaze  Oculocephalics: normal  Pupils (CN III, IV, VI): PERRL  Facial Sensation (CN V): Symmetric  Facial Movement (CN VII): lower weakness right lower  Hearing (CN VIII): intact bilaterally  Gag Reflex (CN IX, X): normal/symmetric  Shoulder/Neck (CN XI): SCM-Left: Normal ; SCM-Right: Normal ; Shoulder Shrug: Normal/Symetric  Tongue (CN XII): to midline  Reflexes: 2+ throughout  Motor*: Arm Left:  Normal  (5/5), Leg Left:   Normal (5/5), Arm Right:   Normal (5/5), Leg Right:   Normal (5/5)  Cerebellar*: Finger/Nose Abnormal - right upper, patient confused over HTS instructions, no obvious ataxia noted  Sensation: intact to light touch, temperature and vibration  Tone: Arm-Left: normal; Leg-Left: normal; Arm-Right: normal; Leg-Right: normal    NIH Stroke Scale:  Interval: baseline (upon arrival/admit)  Level of Consciousness: 0 - alert  LOC Questions: 0 - answers both correctly  LOC Commands: 0 - performs both correctly  Best Gaze: 0 - normal  Visual: 0 - no visual loss  Facial Palsy: 1 - minor  Motor Left Arm: 0 - no drift  Motor Right Arm: 0 - no drift  Motor Left Le - no drift  Motor Right Le - no drift  Limb Ataxia: 1 - present in one limb  Sensory: 0 - normal  Best Language: 0 - no aphasia  Dysarthria: 0 - normal articulation  Extinction and Inattention: 0 - no neglect  NIH Stroke Scale Total: 2  [place] Coma Scale:  Best Eye Response: 4 - spontaneous  Best Motor Response: 6 - obeys commands  Best Verbal Response: 4 - confused  [place] Coma Scale Total: 14  Modified Jewell Scale:   Timeline: Prior to symptoms onset  Modified Jewell Score: 1 - no significant disability        Laboratory:  CMP:   Recent Labs  Lab 17  1505   CALCIUM 8.8   ALBUMIN 2.5*   PROT 6.5      K 3.9   CO2 27      BUN 22   CREATININE 1.1   ALKPHOS 63   ALT 12   AST 13   BILITOT 0.5     BMP:   Recent Labs  Lab 17  1505      K 3.9      CO2 27   BUN 22   CREATININE 1.1   CALCIUM 8.8     CBC:   Recent Labs  Lab 17  1505   WBC 13.40*   RBC 3.01*   HGB 8.6*   HCT 25.6*   *   MCV 85   MCH 28.6   MCHC 33.6     Lipid Panel:   Recent Labs  Lab 17  0515   CHOL 115*   LDLCALC 70.2   HDL 21*   TRIG 119     Coagulation: No results for input(s): INR, APTT in the last 168 hours.    Invalid input(s): PT  Platelet Aggregation Study: No results for input(s): PLTAGG, PLTAGINTERP, PLTAGREGLACO,  ADPPLTAGGREG in the last 168 hours.  Hgb A1C:   Recent Labs  Lab 08/04/17  0515   HGBA1C 5.3     TSH:   Recent Labs  Lab 08/02/17  1802   TSH 1.248       Diagnostic Results:  Brain Imaging: CT Head. Date: 8/4/17  Acute Pathology: Infarct  Location: Cerebellum:  right  Old Vascular Pathology: None  Location: N/A       Large area of vasogenic edema centered within the right cerebellar hemisphere resulting in severe mass effect with compression of the 4th ventricle, mild hydrocephalus and leftward deviation of the cerebral vermis.  Recommend emergent neurosurgical consultation.    Cerebrovascular Imaging: none    Cervical Vascular Imaging: none    Cardiac Evaluation: Trans-thoracic. Date: 8/3/17  CONCLUSIONS     1 - Concentric hypertrophy.     2 - No wall motion abnormalities.     3 - Normal left ventricular systolic function (EF 55-60%).     4 - Normal left ventricular diastolic function.     5 - Normal right ventricular systolic function .     6 - The estimated PA systolic pressure is 38 mmHg.     7 - Trivial aortic regurgitation.     8 - Evidence of mitral vegetation .     9 - Increased central venous pressure    EKG/Telemetry: Sinus rhythm

## 2017-08-04 NOTE — ASSESSMENT & PLAN NOTE
67M p/w cerebellar stroke/hemorrhage  -vasogenic edema and mass effect on head CT  -No acute neurosurgical intervention indicated at this time  -No EVD placement requried at this time  -Rec HOB elevated to 30  -Rec Na goal 145-155  -SBP goal <150  -Consider head CT for any acute changes in neuro exam  -Please call neurosurgery for any declining neuro status  -Thank you for this consult. We will continue to follow this patient.

## 2017-08-04 NOTE — SUBJECTIVE & OBJECTIVE
Prescriptions Prior to Admission   Medication Sig Dispense Refill Last Dose    baclofen (LIORESAL) 10 MG tablet Take 10 mg by mouth 3 (three) times daily.   Taking    megestrol (MEGACE) 400 mg/10 mL (40 mg/mL) Susp Take by mouth.   Taking    ondansetron (ZOFRAN-ODT) 8 MG TbDL Take 1 tablet (8 mg total) by mouth every 6 (six) hours as needed (nausea). 30 tablet 1 Taking    oxycodone-acetaminophen (PERCOCET) 5-325 mg per tablet Take 1 tablet by mouth every 4 (four) hours as needed for Pain. 40 tablet 0 Taking    oxycodone-acetaminophen (PERCOCET) 5-325 mg per tablet Take 1 tablet by mouth every 4 (four) hours as needed for Pain. 21 tablet 0 Taking    polyethylene glycol (GLYCOLAX) 17 gram PwPk Take 17 g by mouth once daily. 30 packet 0 Taking       Review of patient's allergies indicates:  No Known Allergies    Past Medical History:   Diagnosis Date    Cancer     bladder and throat    CKD (chronic kidney disease) stage 3, GFR 30-59 ml/min     Urinary tract infection      Past Surgical History:   Procedure Laterality Date    BLADDER SURGERY      CYSTOSCOPY      HERNIA REPAIR      Ileal Conduit      THROAT SURGERY       Family History     Problem Relation (Age of Onset)    Kidney disease Mother    No Known Problems Father        Social History Main Topics    Smoking status: Never Smoker    Smokeless tobacco: Never Used    Alcohol use No    Drug use: No    Sexual activity: Yes     Partners: Female     Birth control/ protection: None     Review of Systems  Objective:     Weight: 57.5 kg (126 lb 12.2 oz)  Body mass index is 16.28 kg/m².  Vital Signs (Most Recent):  Temp: 99.4 °F (37.4 °C) (08/04/17 1726)  Pulse: 74 (08/04/17 1800)  Resp: 20 (08/04/17 1800)  BP: (!) 175/82 (08/04/17 1800)  SpO2: 99 % (08/04/17 1800) Vital Signs (24h Range):  Temp:  [97.8 °F (36.6 °C)-99.5 °F (37.5 °C)] 99.4 °F (37.4 °C)  Pulse:  [50-86] 74  Resp:  [18-22] 20  SpO2:  [97 %-100 %] 99 %  BP: (152-175)/(68-82) 175/82        Date 08/04/17 0700 - 08/05/17 0659   Shift [id number] 2887-0659 24 Hour Total   I  N  T  A  K  E   Shift Total  (mL/kg)       O  U  T  P  U  T   Urine  150  150    Shift Total  (mL/kg)  150  (2.6)  150  (2.6)   Weight (kg)  57.5 57.5 57.5                        Urostomy 08/02/17 2345 ileal conduit RUQ (Active)   Stoma Appearance round 8/4/2017  7:55 AM   Stomal Appliance 1 piece 8/3/2017  7:35 PM   Accessories/Skin Care appliance belt 8/4/2017  7:55 AM   Stoma Function yellow urine 8/4/2017  7:55 AM   Output (mL) 150 mL 8/4/2017  6:00 PM       Physical Exam:    Constitutional: He appears well-developed and well-nourished. No distress.     Eyes: Pupils are equal, round, and reactive to light. Conjunctivae and EOM are normal.     Cardiovascular: Normal rate and regular rhythm.     Psych/Behavior: He is alert. He is oriented to person, place, and time. He has a normal mood and affect.     Musculoskeletal:        Neck: Muscle strength is 5/5.        Back: Muscle strength is 5/5.        Right Upper Extremities: Muscle strength is 5/5. Tone is normal.        Left Upper Extremities: Muscle strength is 5/5. Tone is normal.       Right Lower Extremities: Tone is normal.        Left Lower Extremities: Tone is normal.     Neurological:        Sensory: There is no sensory deficit in the trunk. There is no sensory deficit in the extremities.        Cranial nerves: Cranial nerve(s) II, III, IV, V, VI, VII, VIII, IX, X, XI and XII are intact.   Right dysmetria       Significant Labs:    Recent Labs  Lab 08/03/17  0438 08/04/17  0515 08/04/17  1505   * 134* 115*    137 138   K 5.1 3.7 3.9   * 101 101   CO2 16* 26 27   BUN 26* 22 22   CREATININE 1.2 1.1 1.1   CALCIUM 9.1 9.1 8.8       Recent Labs  Lab 08/03/17  0740 08/04/17  0515 08/04/17  1505   WBC 23.80* 15.41* 13.40*   HGB 8.8* 8.1* 8.6*   HCT 26.4* 24.1* 25.6*   * 407* 389*     No results for input(s): LABPT, INR, APTT in the last 48  hours.  Microbiology Results (last 7 days)     Procedure Component Value Units Date/Time    Culture, Respiratory with Gram Stain [[id number]]     Order Status:  No result Specimen:  Respiratory         All pertinent labs from the last 24 hours have been reviewed.    Significant Diagnostics:  CT: Ct Head Without Contrast    Addendum Date: 8/4/2017    Appearance is suspicious for a large cerebellar infarction with cytotoxic edema.  MRI is advised. Electronically signed by: XAVIER AYERS MD Date:     08/04/17 Time:    15:42     Result Date: 8/4/2017   Large area of vasogenic edema centered within the right cerebellar hemisphere resulting in severe mass effect with compression of the 4th ventricle, mild hydrocephalus and leftward deviation of the cerebral vermis.  Recommend emergent neurosurgical consultation. Findings observed at 3:27 PM and discussed with Dr. Alvarado at 3:35 PM. Electronically signed by: XAVIER AYERS MD Date:     08/04/17 Time:    15:35

## 2017-08-04 NOTE — CONSULTS
[facility]  Tele-Consultation Note    Consultation started: 8/4/2017 at 3:18 PM   Consulting Provider:  Filemon  The patient location is [facility]  Spoke hospital nurse at bedside with patient assisting consultant.     Subjective:   Subjective   History of Present Illness:  Juan Manuel Koehler is a 67 y.o. male who presents with altered mental status x 30 minutes.    Presented on 8/2 w/ weakness and fatigue, + bacterial endocarditis on mitral valve, treating with Abx currently. Doing well today until sudden change in mental status, not answering questions, not oriented, right facial droop. He was OK this morning and recently has devevloped these changes.  These symptoms have never happened before. There are no identified triggers or modifying factors. There have been no recurrent events. There are no other associated symptoms.    Wake up Stroke?: no  Last known normal:  1500  Recent bleeding noted: no  Does the patient take any Blood Thinners? no         H&P was obtained from patient, spouse/SO and MD.   Past Medical History: DARRON, NSTEMI, ACID    Social History: no smoking, no drinking, no drugs    Medications:   Current Facility-Administered Medications:     0.9%  NaCl infusion (for blood administration), , Intravenous, Q24H PRN, Charlene Palomares MD    acetaminophen tablet 650 mg, 650 mg, Oral, Q6H PRN, Charlene Palomares MD    Amino acid 4.25% - dextrose 10% (CLINIMIX-E) solution with additives (1L provides 42.5 gm AA, 100 gm CHO (340 kcal/L dextrose), Na 35, K 30, Mg 5, Ca 4.5, Acetate 70, Cl 39, Phos 15), , Intravenous, Continuous, Arnol Colbert MD    aspirin chewable tablet 81 mg, 81 mg, Oral, Daily, Charlene Palomares MD, 81 mg at 08/04/17 0844    atorvastatin tablet 40 mg, 40 mg, Oral, QHS, Charlene Palomares MD, 40 mg at 08/03/17 2210    baclofen tablet 10 mg, 10 mg, Oral, TID, Charlene Palomares MD, 10 mg at 08/04/17  1420    cefTRIAXone (ROCEPHIN) 2 g in dextrose 5 % 50 mL IVPB, 2 g, Intravenous, Q12H, Shreya Calderon MD, 2 g at 08/04/17 0442    heparin (porcine) injection 5,000 Units, 5,000 Units, Subcutaneous, Q8H, Charlene Palomares MD, 5,000 Units at 08/04/17 1420    megestrol 400 mg/10 mL (10 mL) suspension 200 mg, 200 mg, Oral, TID WM, Charlene Palomares MD, 200 mg at 08/04/17 1116    metoprolol tartrate (LOPRESSOR) tablet 25 mg, 25 mg, Oral, BID, Charlene Palomares MD, 25 mg at 08/04/17 0849    ondansetron injection 4 mg, 4 mg, Intravenous, Q6H PRN, Charlene Palomares MD, 4 mg at 08/03/17 1332    oxycodone-acetaminophen 5-325 mg per tablet 1 tablet, 1 tablet, Oral, Q4H PRN, Charlene Palomares MD, 1 tablet at 08/04/17 0843    pantoprazole EC tablet 40 mg, 40 mg, Oral, Daily, Charlene Palomares MD, 40 mg at 08/04/17 0844    polyethylene glycol packet 17 g, 17 g, Oral, Daily, Charlene Palomares MD, 17 g at 08/04/17 0844    promethazine tablet 12.5 mg, 12.5 mg, Oral, Q6H PRN, Charlene Palomares MD, 12.5 mg at 08/03/17 0634    vancomycin 750 mg in dextrose 5 % 250 mL IVPB (ready to mix system), 750 mg, Intravenous, Q12H, Arnol Colbert MD, Last Rate: 166.7 mL/hr at 08/04/17 1429, 750 mg at 08/04/17 1429    Allergies: No known drug allergies    Review Of Systems:     Review of Systems   Constitutional: Negative for appetite change, chills and fever.   Eyes: Negative for discharge.   Respiratory: Negative for shortness of breath.    Cardiovascular: Negative for chest pain.   Gastrointestinal: Negative for abdominal pain and anal bleeding.   Genitourinary: Negative for dysuria and hematuria.   Musculoskeletal: Negative for joint swelling and myalgias.   Skin: Negative for color change and rash.   Neurological: Negative for tremors and seizures.   Psychiatric/Behavioral: Negative for hallucinations.       Objective:     Vitals:   Vitals:    08/04/17 0737   BP: (!) 152/70   Pulse: 71   Resp: 18    Temp: 99.5 °F (37.5 °C)       Objective   Physical Exam:  Physical Exam   Constitutional: He is oriented to person, place, and time. No distress.   HENT:   Head: Atraumatic.   Right Ear: External ear normal.   Left Ear: External ear normal.   Eyes: Conjunctivae are normal. No scleral icterus.   Neck: Normal range of motion.   Cardiovascular: Normal rate and regular rhythm.    Pulmonary/Chest: Effort normal.   Musculoskeletal: Normal range of motion.   Neurological: He is alert and oriented to person, place, and time. No cranial nerve deficit.   Skin: Skin is warm and dry.   Psychiatric: He has a normal mood and affect.          Imaging Notes: Abnormal CT R cerebellar hemisphere edema w/ midline shift of the posterior fossa with mass effect on the 4th ventricle and mild enlargement of the temporal horns. Impression performed at: 1540    NIH Stroke Scale:  Interval: baseline (upon arrival/admit)  Level of Consciousness: 1 - drowsy  LOC Questions: 1 - answers one correctly  LOC Commands: 0 - performs both correctly  Best Gaze: 1 - partial gaze palsy  Visual: 0 - no visual loss  Facial Palsy: 1 - minor  Motor Left Arm: 0 - no drift  Motor Right Arm: 1 - drift  Motor Left Le - no drift  Motor Right Le - no drift  Limb Ataxia: 0 - absent  Sensory: 0 - normal  Best Language: 1 - mild to moderate aphasia  Dysarthria: 1 - mild to moderate dysarthria  Extinction and Inattention: 0 - no neglect  NIH Stroke Scale Total: 7        Assessment - Diagnosis - Goals:     Plan     Diagnosis/Impression: subacute septic embolus w/ cerebellar infarction now with significant edema w/ mass effect; can't rule out new hyperacute infarct in left MCA territory w/ new R UE/face weakness and mild aphasia (vs. worsening of clinical status d/t cerebellar mass efffect) - mild drowsiness - giveing 75g manitol 20% now, arranging stat transfer to [facility] for NCC mgmt plus NSG evaluation for decompression watch - currently alert enough to  move all extremties to command    Alteplase Recommendation: Altaplase not recommended due to CT findings (ICH, SAH, major infact sign), Infective endocarditis and      Recommendation: NPO until after pass dysphagia screen. Diagnostic studies: CTA Head to assess vasculature , CTA Neck/Arch to assess vasculature  Consults: Rehab Consult; Occupational Therapy, Rehab Consult; Physical Therapy and Rehab Consult; Speech Therapy  B/P Parameters; SBP <-220, DBP <-120, MAP  - .    Disposition Recommendation:  transfer to [facility] by  ground  stat       Possible Interventional Revascularization Candidate? unlikely -even if new MCA infarct, symtoms related to this are mild, in addition his cerebellar mass effect is to be addrress emergently as priortity    Recommended the emergency room physician to have a brief discussion with the patient and/or family if available regarding the risks and benefits of treatment, and to briefly document the occurrence of that discussion in his clinical encounter note.     The attending portion of this evaluation, treatment, and documentation was performed per Oscar Carbajal via audiovisual.      Billing code:  (time dependent stroke, complex case, unstable patient, hemorrhages, any intervention, some mimics)    · This patient has a very critical neurological condition/illness, with very high morbidity and mortality.  · There is a very high probability for acute neurological change leading to clinical and possibly life-threatening deterioration requiring highest level of physician preparedness for urgent intervention.  · There is possibility that this condition will require treatment with high risk medications as quickly as possible.  · There is also a possibility that the patient may benefit from further, more advance complex therapies (e.g. endovascular therapy) that will require prompt diagnosis and care.  · Care was coordinated with other physicians involved in the patient's  care.  · Radiologic studies and laboratory data were reviewed and interpreted, and plan of care was re-assessed based on the results.  · Diagnosis, treatment options and prognosis may have been discussed with the patient and/or family members or caregiver.  · Further advanced medical management and further evaluation is warranted for his care.        Consultation ended: 8/4/2017 at 1550    Oscar Carbajal MD  Vascular Neurology

## 2017-08-04 NOTE — CHAPLAIN
Patient just arrived in ICU and will be transferred to [facility] for a high level of medical care. Provided listening support to wife and urged her to seek out the chaplains at [facility].

## 2017-08-04 NOTE — PROGRESS NOTES
Physical Therapy   D/C  PT orders    Juan Manuel Koehler   MRN: [id number]   Patient had a decline in medical status and will be transferred to main [place]; pt had only been screened and not evaluated while at [facility]; PT orders d/c'kalia Tian, PT  8/4/2017

## 2017-08-04 NOTE — PROGRESS NOTES
[facility] IM Resident HO1 Progress Note    Subjective:      Juan Manuel Koehler is a 66 yo man with a PMH of throat and bladder cancer, CKD stage 3, and recurrent UTI. He was admitted for weakness, nausea, and vomiting, and is currently being seen for suspected endocarditis and bacteremia.     No events overnight on telemetry. Denies fever,chills, night sweats, CP, SOB, vomiting, penile discharge. Endorses nausea for 4 days with decreased PO intake and scrotal pain for weeks but feels overall better.     Objective:   Last 24 Hour Vital Signs:  BP  Min: 132/61  Max: 158/68  Temp  Av.8 °F (37.1 °C)  Min: 98.1 °F (36.7 °C)  Max: 99.5 °F (37.5 °C)  Pulse  Av.4  Min: 50  Max: 75  Resp  Av  Min: 17  Max: 19  SpO2  Av.2 %  Min: 98 %  Max: 100 %  I/O last 3 completed shifts:  In: 672.5 [Blood:672.5]  Out: 1100 [Urine:1100]    Physical Examination:  Gen: frail, thin, appears older than stated age, oriented  HEENT: NCAT, EOMI, PERRL, dry mucous membranes, conjuctiva palor,   Neck: JVP 3cm  CV: RRR, left sternal murmur   Resp: CTA B, no wheezes, no crackles  Abd: soft, ostomy site clean, no erythema, no purulent drainage, Bs+, Nt, ND  : testicles tender to palpation, no erythema, no swelling, no penile drainage  Ext compartments soft, NT, no swelling  Skin: no rashes noted    Laboratory:    Microbiology Data Reviewed:   Cultures  17 - urine culture - enterococcus  17 - urine culture - enterococcus  17 - urine culture - presumptive proteus  17 - bcx gram stain - gram + cocci in chains resembling strep      Other Results:  EKG (my interpretation): NSR    Radiology Data Reviewed:  CXR - no acute abnormalities   CT abd/pelvis - small amt of free fluid in pelvis, multiple lower attenuation lesions in spleen, nonspecific distention of ureter into the urostomy changes with peripheral enhancement  US scrotum and testes:  Extratesticular cyst    Current Medications:     Infusions:        Scheduled:   aspirin   81 mg Oral Daily    atorvastatin  40 mg Oral QHS    baclofen  10 mg Oral TID    cefTRIAXone (ROCEPHIN) IVPB  2 g Intravenous Q12H    heparin (porcine)  5,000 Units Subcutaneous Q8H    megestrol  200 mg Oral TID WM    metoprolol tartrate  25 mg Oral BID    pantoprazole  40 mg Oral Daily    polyethylene glycol  17 g Oral Daily    vancomycin (VANCOCIN) IVPB  1,000 mg Intravenous Q24H        PRN:  sodium chloride, ondansetron, oxycodone-acetaminophen, promethazine    Antibiotics and Day Number of Therapy:     Abx  8/2/17 - single dose ciprofloxacin 400 mg IV (discontinued)  8/2/17-8/3/17 - zosyn 4.5g IV (discontinued)  8/2/17 - vancomycin 1000mg IV every 24h  8/3/17 - ceftriaxone 2g IV every 12h    Lines and Day Number of Therapy:  PIV, midline catheter, urethral drain, L and R nephrostomy, urostomy    Assessment:     Juan Manuel Koehler is a 67 y.o.male with  Patient Active Problem List    Diagnosis Date Noted    Gram-positive cocci bacteremia 08/04/2017    Sepsis, Gram positive 08/04/2017    NSTEMI (non-ST elevated myocardial infarction) 08/03/2017    Elevated troponin 08/03/2017    Urinary tract infection with hematuria 08/03/2017    Acute bacterial endocarditis 08/03/2017    Sepsis secondary to UTI 08/02/2017    Urothelial carcinoma of bladder 08/02/2017    Failure to thrive in adult 08/02/2017    Postural dizziness with presyncope 08/02/2017    Anemia of chronic disease 06/12/2017    Difficult intubation 04/06/2017    Stage 3 chronic kidney disease 04/05/2017        Plan:     1. Sepsis Secondary to Gram Positive Bacteremia from Infective Endocarditis   - Echo with vegetation on anterior leaflet of mitral valve. ECHO: 55% Ef, EKG: NSR. Troponin peaked 1.5 on 08/02/2017 and now trending down to 1.2. Will stop trending troponins. No further EKGs.   -  ID following and recommends Vancomycin and Rocephin  -  Repeat blood cultures    -  Blood pressure slightly elevated and WBC count trending down.  Lactate WNR. Will continue to monitor for shock    2. Proteus UTI   -ID following and recommends Ceftriaxone     3. NSTEMI   - Continue medical management: ASA, Statin, Metoprolol 25mg BID for a goal of 55 HR   - Discussed with cardiology - likely demand ischemia in the setting of sepsis.      4. Pre-Syncope    -Related to volume depletion  -Continue hydration prn  - PT following       5. Urothelial Invasive bladder cancer s/p open radical cystoproctostomy  - Follows with Dr. Robles, Dr. Boucher and Dr. Rush. Resent resection in April, 2017.   - Currently deferring resuming chemotherapy given favorable pathology results. Restaging scans post surgery in 6 weeks.   - - Renal US 5/2017 shows post op changes per Urology   - Outpatient FU with urology and oncology        6. CKDIII  - Cr 1.2, baseline 1.1-1.5  - Renally dose all medications and avoid nephrotoxic medications      7. Anemia   - CABALLERO consistent with anemia of chronic disease.     - HGB stable 8.1 and received 2uRBC yesterday without appropriate increase in Hemoglobin  - Hematuria    8. Extratesticular cyst  -Repeat imaging per urology outpatient         Dispo - Abx / FU cultures    Treva Frausto  [facility] Internal Medicine HO1  hospitals IM Service Team A    hospitals Medicine Hospitalist Pager numbers:   hospitals Hospitalist Medicine Team A (Filemon/Vicente): [phone]  hospitals Hospitalist Medicine Team B (Rick/Megan):  [phone]

## 2017-08-04 NOTE — NURSING
Two rings removed from patient and given to wife. One is a plain silver band. The other is a silver college ring with black inlay.

## 2017-08-04 NOTE — PROGRESS NOTES
"Vancomycin Dosing and Monitoring by Pharmacy Protocol    Juan Manuel Koehler 67 y.o. male     Height: 6' 2" (1.88 m)   Wt Readings from Last 1 Encounters:   08/04/17 56.7 kg (125 lb)     Ideal body weight: 82.2 kg (181 lb 3.5 oz)    Temp Readings from Last 3 Encounters:   08/04/17 99.5 °F (37.5 °C) (Oral)   07/12/17 98.7 °F (37.1 °C) (Oral)   07/06/17 99.1 °F (37.3 °C) (Oral)      Lab Results   Component Value Date/Time    WBC 15.41 (H) 08/04/2017 05:15 AM    WBC 23.80 (H) 08/03/2017 07:40 AM    WBC 20.90 (H) 08/02/2017 04:38 PM      Lab Results   Component Value Date/Time    CREATININE 1.1 08/04/2017 05:15 AM    CREATININE 1.2 08/03/2017 04:38 AM    CREATININE 1.3 08/02/2017 04:38 PM        CREATININE: 1.1 mg/dL (08/04/17 0515)  Estimated creatinine clearance: 51.5 mL/min    Antibiotics       Start     Stop Route Frequency Ordered    08/04/17 1500  vancomycin 750 mg in dextrose 5 % 250 mL IVPB (ready to mix system)      -- IV Every 12 hours (non-standard times) 08/04/17 1417    08/03/17 1630  cefTRIAXone (ROCEPHIN) 2 g in dextrose 5 % 50 mL IVPB      -- IV Every 12 hours (non-standard times) 08/03/17 1523          Antifungals       None            Microbiology Results (last 7 days)       Procedure Component Value Units Date/Time    Blood Culture #1 **CANNOT BE ORDERED STAT** [[id number]] Collected:  08/02/17 1802    Order Status:  Completed Specimen:  Blood from Peripheral, Antecubital, Right Updated:  08/04/17 1403     Blood Culture, Routine Gram stain ani bottle: Gram positive cocci in chains resembling Strep      Blood Culture, Routine Results called to and read back by: Jeana Barrera RN 08/03/2017  10:42     Blood Culture, Routine Gram stain aer bottle: Gram positive cocci in chains resembling Strep     Blood Culture, Routine --     ENTEROCOCCUS FAECALIS  For susceptibility see order #[id number]      Blood Culture #2 **CANNOT BE ORDERED STAT** [[id number]] Collected:  08/02/17 1807    Order Status:  Completed Specimen:  " Blood from Peripheral, Hand, Right Updated:  08/04/17 1402     Blood Culture, Routine Gram stain ani bottle: Gram positive cocci in chains resembling Strep      Blood Culture, Routine Results called to and read back by: Jeana Barrera RN 08/03/2017  10:42     Blood Culture, Routine Gram stain aer bottle: Gram positive cocci in chains resembling Strep     Blood Culture, Routine --     ENTEROCOCCUS FAECALIS  Susceptibility pending      Urine culture **CANNOT BE ORDERED STAT** [[id number]]  (Susceptibility) Collected:  08/02/17 1653    Order Status:  Completed Specimen:  Urine from Urine, Clean Catch Updated:  08/04/17 1004     Urine Culture, Routine --     PROTEUS MIRABILIS  >100,000 cfu/ml  No other significant isolate      Blood culture [[id number]] Collected:  08/04/17 0508    Order Status:  Sent Specimen:  Blood Updated:  08/04/17 0711    Blood culture [[id number]] Collected:  08/04/17 0508    Order Status:  Sent Specimen:  Blood Updated:  08/04/17 0711            Last Vancomycin dose 1000mg on 8/4 @ 0016  Vancomycin 8.9 mcg/ml given after one dose    Target Level: 15-20  Indication: Endocarditis  Hemodialysis: No on N/A    Dosing Weight: ABW--actual body weight  If ABW is greater than or equal to 30% over Ideal Body Weight, AdjBW will be used to dose vancomycin    Per Protocol Adjustments:  1. Dose Adjustment: INCREASE Vancomycin dose to 750mg q12hr  2. Next Vancomycin Trough Level due 8/6 @ 0200 date/time      Pharmacy will continue to monitor.  [phone]

## 2017-08-05 PROBLEM — G93.5 COMPRESSION OF BRAINSTEM: Status: ACTIVE | Noted: 2017-08-05

## 2017-08-05 PROBLEM — I63.441 EMBOLIC STROKE INVOLVING RIGHT CEREBELLAR ARTERY: Status: ACTIVE | Noted: 2017-08-04

## 2017-08-05 PROBLEM — G93.6 CYTOTOXIC CEREBRAL EDEMA: Status: ACTIVE | Noted: 2017-08-05

## 2017-08-05 LAB
ALBUMIN SERPL BCP-MCNC: 2.6 G/DL
ALP SERPL-CCNC: 74 U/L
ALT SERPL W/O P-5'-P-CCNC: 14 U/L
ANION GAP SERPL CALC-SCNC: 12 MMOL/L
AST SERPL-CCNC: 14 U/L
BACTERIA BLD CULT: NORMAL
BASOPHILS # BLD AUTO: 0.01 K/UL
BASOPHILS NFR BLD: 0.1 %
BILIRUB SERPL-MCNC: 0.6 MG/DL
BLD PROD TYP BPU: NORMAL
BLD PROD TYP BPU: NORMAL
BLOOD UNIT EXPIRATION DATE: NORMAL
BLOOD UNIT EXPIRATION DATE: NORMAL
BLOOD UNIT TYPE CODE: 7300
BLOOD UNIT TYPE CODE: 7300
BLOOD UNIT TYPE: NORMAL
BLOOD UNIT TYPE: NORMAL
BUN SERPL-MCNC: 18 MG/DL
CALCIUM SERPL-MCNC: 9.4 MG/DL
CHLORIDE SERPL-SCNC: 103 MMOL/L
CO2 SERPL-SCNC: 23 MMOL/L
CODING SYSTEM: NORMAL
CODING SYSTEM: NORMAL
CREAT SERPL-MCNC: 1 MG/DL
DIASTOLIC DYSFUNCTION: NO
DIFFERENTIAL METHOD: ABNORMAL
DISPENSE STATUS: NORMAL
DISPENSE STATUS: NORMAL
EOSINOPHIL # BLD AUTO: 0 K/UL
EOSINOPHIL NFR BLD: 0.2 %
ERYTHROCYTE [DISTWIDTH] IN BLOOD BY AUTOMATED COUNT: 15.7 %
EST. GFR  (AFRICAN AMERICAN): >60 ML/MIN/1.73 M^2
EST. GFR  (NON AFRICAN AMERICAN): >60 ML/MIN/1.73 M^2
ESTIMATED PA SYSTOLIC PRESSURE: 27.4
GLUCOSE SERPL-MCNC: 111 MG/DL
HCT VFR BLD AUTO: 28.6 %
HGB BLD-MCNC: 9.8 G/DL
LYMPHOCYTES # BLD AUTO: 0.9 K/UL
LYMPHOCYTES NFR BLD: 7.2 %
MAGNESIUM SERPL-MCNC: 1.6 MG/DL
MAGNESIUM SERPL-MCNC: 1.9 MG/DL
MCH RBC QN AUTO: 28.7 PG
MCHC RBC AUTO-ENTMCNC: 34.3 G/DL
MCV RBC AUTO: 84 FL
MITRAL VALVE MOBILITY: NORMAL
MONOCYTES # BLD AUTO: 0.5 K/UL
MONOCYTES NFR BLD: 3.9 %
NEUTROPHILS # BLD AUTO: 11.5 K/UL
NEUTROPHILS NFR BLD: 87.5 %
NUM UNITS TRANS PACKED RBC: NORMAL
NUM UNITS TRANS PACKED RBC: NORMAL
PHOSPHATE SERPL-MCNC: 3.2 MG/DL
PLATELET # BLD AUTO: 410 K/UL
PMV BLD AUTO: 8.2 FL
POCT GLUCOSE: 108 MG/DL (ref 70–110)
POCT GLUCOSE: 138 MG/DL (ref 70–110)
POTASSIUM SERPL-SCNC: 3.3 MMOL/L
POTASSIUM SERPL-SCNC: 4.4 MMOL/L
PROT SERPL-MCNC: 7.5 G/DL
RBC # BLD AUTO: 3.41 M/UL
RETIRED EF AND QEF - SEE NOTES: 60 (ref 55–65)
SODIUM SERPL-SCNC: 138 MMOL/L
SODIUM SERPL-SCNC: 140 MMOL/L
SODIUM SERPL-SCNC: 143 MMOL/L
TRICUSPID VALVE REGURGITATION: NORMAL
TROPONIN I SERPL DL<=0.01 NG/ML-MCNC: 0.75 NG/ML
WBC # BLD AUTO: 13.11 K/UL

## 2017-08-05 PROCEDURE — 84295 ASSAY OF SERUM SODIUM: CPT | Mod: 91

## 2017-08-05 PROCEDURE — 20000000 HC ICU ROOM

## 2017-08-05 PROCEDURE — C9113 INJ PANTOPRAZOLE SODIUM, VIA: HCPCS | Performed by: NURSE PRACTITIONER

## 2017-08-05 PROCEDURE — 85025 COMPLETE CBC W/AUTO DIFF WBC: CPT

## 2017-08-05 PROCEDURE — 93306 TTE W/DOPPLER COMPLETE: CPT | Mod: 26,,, | Performed by: INTERNAL MEDICINE

## 2017-08-05 PROCEDURE — 97165 OT EVAL LOW COMPLEX 30 MIN: CPT

## 2017-08-05 PROCEDURE — G8987 SELF CARE CURRENT STATUS: HCPCS | Mod: CN

## 2017-08-05 PROCEDURE — 99233 SBSQ HOSP IP/OBS HIGH 50: CPT | Mod: ,,, | Performed by: PSYCHIATRY & NEUROLOGY

## 2017-08-05 PROCEDURE — 25000003 PHARM REV CODE 250: Performed by: NURSE PRACTITIONER

## 2017-08-05 PROCEDURE — G8996 SWALLOW CURRENT STATUS: HCPCS | Mod: CI

## 2017-08-05 PROCEDURE — 63600175 PHARM REV CODE 636 W HCPCS: Performed by: STUDENT IN AN ORGANIZED HEALTH CARE EDUCATION/TRAINING PROGRAM

## 2017-08-05 PROCEDURE — 92610 EVALUATE SWALLOWING FUNCTION: CPT

## 2017-08-05 PROCEDURE — 84132 ASSAY OF SERUM POTASSIUM: CPT

## 2017-08-05 PROCEDURE — 97802 MEDICAL NUTRITION INDIV IN: CPT

## 2017-08-05 PROCEDURE — 97530 THERAPEUTIC ACTIVITIES: CPT

## 2017-08-05 PROCEDURE — 83735 ASSAY OF MAGNESIUM: CPT

## 2017-08-05 PROCEDURE — 84100 ASSAY OF PHOSPHORUS: CPT

## 2017-08-05 PROCEDURE — 25000003 PHARM REV CODE 250: Performed by: STUDENT IN AN ORGANIZED HEALTH CARE EDUCATION/TRAINING PROGRAM

## 2017-08-05 PROCEDURE — 63600175 PHARM REV CODE 636 W HCPCS: Performed by: NURSE PRACTITIONER

## 2017-08-05 PROCEDURE — 80053 COMPREHEN METABOLIC PANEL: CPT

## 2017-08-05 PROCEDURE — 93306 TTE W/DOPPLER COMPLETE: CPT

## 2017-08-05 PROCEDURE — G8997 SWALLOW GOAL STATUS: HCPCS | Mod: CH

## 2017-08-05 PROCEDURE — G8988 SELF CARE GOAL STATUS: HCPCS | Mod: CL

## 2017-08-05 PROCEDURE — 84484 ASSAY OF TROPONIN QUANT: CPT

## 2017-08-05 PROCEDURE — 25000003 PHARM REV CODE 250: Performed by: INTERNAL MEDICINE

## 2017-08-05 PROCEDURE — 25500020 PHARM REV CODE 255: Performed by: PSYCHIATRY & NEUROLOGY

## 2017-08-05 PROCEDURE — 02HV33Z INSERTION OF INFUSION DEVICE INTO SUPERIOR VENA CAVA, PERCUTANEOUS APPROACH: ICD-10-PCS | Performed by: PSYCHIATRY & NEUROLOGY

## 2017-08-05 PROCEDURE — 83735 ASSAY OF MAGNESIUM: CPT | Mod: 91

## 2017-08-05 PROCEDURE — 99223 1ST HOSP IP/OBS HIGH 75: CPT | Mod: ,,, | Performed by: INTERNAL MEDICINE

## 2017-08-05 PROCEDURE — 99291 CRITICAL CARE FIRST HOUR: CPT | Mod: ,,, | Performed by: NURSE PRACTITIONER

## 2017-08-05 PROCEDURE — A9585 GADOBUTROL INJECTION: HCPCS | Performed by: PSYCHIATRY & NEUROLOGY

## 2017-08-05 PROCEDURE — A4216 STERILE WATER/SALINE, 10 ML: HCPCS | Performed by: NURSE PRACTITIONER

## 2017-08-05 PROCEDURE — 87040 BLOOD CULTURE FOR BACTERIA: CPT

## 2017-08-05 PROCEDURE — 97162 PT EVAL MOD COMPLEX 30 MIN: CPT

## 2017-08-05 PROCEDURE — 63600175 PHARM REV CODE 636 W HCPCS: Performed by: INTERNAL MEDICINE

## 2017-08-05 RX ORDER — POTASSIUM CHLORIDE 29.8 MG/ML
80 INJECTION INTRAVENOUS
Status: DISCONTINUED | OUTPATIENT
Start: 2017-08-05 | End: 2017-08-13

## 2017-08-05 RX ORDER — METOPROLOL TARTRATE 25 MG/1
25 TABLET, FILM COATED ORAL 2 TIMES DAILY
Status: DISCONTINUED | OUTPATIENT
Start: 2017-08-05 | End: 2017-08-07

## 2017-08-05 RX ORDER — GADOBUTROL 604.72 MG/ML
6 INJECTION INTRAVENOUS
Status: COMPLETED | OUTPATIENT
Start: 2017-08-05 | End: 2017-08-05

## 2017-08-05 RX ORDER — MAGNESIUM SULFATE HEPTAHYDRATE 40 MG/ML
4 INJECTION, SOLUTION INTRAVENOUS
Status: DISCONTINUED | OUTPATIENT
Start: 2017-08-05 | End: 2017-08-11

## 2017-08-05 RX ORDER — POTASSIUM CHLORIDE 14.9 MG/ML
60 INJECTION INTRAVENOUS
Status: DISCONTINUED | OUTPATIENT
Start: 2017-08-05 | End: 2017-08-11

## 2017-08-05 RX ORDER — ATORVASTATIN CALCIUM 20 MG/1
40 TABLET, FILM COATED ORAL DAILY
Status: DISCONTINUED | OUTPATIENT
Start: 2017-08-05 | End: 2017-08-14

## 2017-08-05 RX ORDER — LISINOPRIL 10 MG/1
10 TABLET ORAL DAILY
Status: DISCONTINUED | OUTPATIENT
Start: 2017-08-06 | End: 2017-08-06

## 2017-08-05 RX ORDER — MAGNESIUM SULFATE HEPTAHYDRATE 40 MG/ML
2 INJECTION, SOLUTION INTRAVENOUS
Status: DISCONTINUED | OUTPATIENT
Start: 2017-08-05 | End: 2017-08-11

## 2017-08-05 RX ORDER — POTASSIUM CHLORIDE 29.8 MG/ML
40 INJECTION INTRAVENOUS
Status: DISCONTINUED | OUTPATIENT
Start: 2017-08-05 | End: 2017-08-11

## 2017-08-05 RX ORDER — POTASSIUM CHLORIDE 29.8 MG/ML
40 INJECTION INTRAVENOUS ONCE
Status: DISCONTINUED | OUTPATIENT
Start: 2017-08-05 | End: 2017-08-05

## 2017-08-05 RX ADMIN — Medication 3 ML: at 01:08

## 2017-08-05 RX ADMIN — STANDARDIZED SENNA CONCENTRATE AND DOCUSATE SODIUM 1 TABLET: 8.6; 5 TABLET, FILM COATED ORAL at 09:08

## 2017-08-05 RX ADMIN — ATORVASTATIN CALCIUM 40 MG: 20 TABLET, FILM COATED ORAL at 10:08

## 2017-08-05 RX ADMIN — HYDRALAZINE HYDROCHLORIDE 10 MG: 20 INJECTION INTRAMUSCULAR; INTRAVENOUS at 12:08

## 2017-08-05 RX ADMIN — NICARDIPINE HYDROCHLORIDE 2.5 MG/HR: 0.2 INJECTION, SOLUTION INTRAVENOUS at 02:08

## 2017-08-05 RX ADMIN — SODIUM CHLORIDE: 234 INJECTION INTRAMUSCULAR; INTRAVENOUS; SUBCUTANEOUS at 04:08

## 2017-08-05 RX ADMIN — SODIUM CHLORIDE: 234 INJECTION INTRAMUSCULAR; INTRAVENOUS; SUBCUTANEOUS at 06:08

## 2017-08-05 RX ADMIN — AMPICILLIN SODIUM 2 G: 2 INJECTION, POWDER, FOR SOLUTION INTRAMUSCULAR; INTRAVENOUS at 05:08

## 2017-08-05 RX ADMIN — POTASSIUM CHLORIDE 60 MEQ: 200 INJECTION, SOLUTION INTRAVENOUS at 06:08

## 2017-08-05 RX ADMIN — LABETALOL HYDROCHLORIDE 10 MG: 5 INJECTION, SOLUTION INTRAVENOUS at 10:08

## 2017-08-05 RX ADMIN — PANTOPRAZOLE SODIUM 40 MG: 40 INJECTION, POWDER, FOR SOLUTION INTRAVENOUS at 09:08

## 2017-08-05 RX ADMIN — NICARDIPINE HYDROCHLORIDE 2.5 MG/HR: 0.2 INJECTION, SOLUTION INTRAVENOUS at 01:08

## 2017-08-05 RX ADMIN — NICARDIPINE HYDROCHLORIDE 5 MG/HR: 0.2 INJECTION, SOLUTION INTRAVENOUS at 06:08

## 2017-08-05 RX ADMIN — VANCOMYCIN HYDROCHLORIDE 1000 MG: 1 INJECTION, POWDER, LYOPHILIZED, FOR SOLUTION INTRAVENOUS at 04:08

## 2017-08-05 RX ADMIN — VANCOMYCIN HYDROCHLORIDE 1000 MG: 1 INJECTION, POWDER, LYOPHILIZED, FOR SOLUTION INTRAVENOUS at 01:08

## 2017-08-05 RX ADMIN — POLYETHYLENE GLYCOL 3350 17 G: 17 POWDER, FOR SOLUTION ORAL at 09:08

## 2017-08-05 RX ADMIN — CEFTRIAXONE 2 G: 2 INJECTION, SOLUTION INTRAVENOUS at 05:08

## 2017-08-05 RX ADMIN — GADOBUTROL 6 ML: 604.72 INJECTION INTRAVENOUS at 04:08

## 2017-08-05 RX ADMIN — METOPROLOL TARTRATE 12.5 MG: 25 TABLET ORAL at 09:08

## 2017-08-05 RX ADMIN — MAGNESIUM SULFATE IN WATER 2 G: 40 INJECTION, SOLUTION INTRAVENOUS at 07:08

## 2017-08-05 RX ADMIN — AMPICILLIN SODIUM 2 G: 2 INJECTION, POWDER, FOR SOLUTION INTRAMUSCULAR; INTRAVENOUS at 08:08

## 2017-08-05 RX ADMIN — Medication 3 ML: at 09:08

## 2017-08-05 RX ADMIN — METOPROLOL TARTRATE 25 MG: 25 TABLET ORAL at 09:08

## 2017-08-05 RX ADMIN — Medication 3 ML: at 06:08

## 2017-08-05 NOTE — ASSESSMENT & PLAN NOTE
67M p/w cerebellar stroke/hemorrhage  -vasogenic edema and mass effect on head CT  -No acute neurosurgical intervention indicated at this time  -No EVD placement requried at this time  -Rec HOB elevated to 30  -Rec Na goal 140-150  -SBP goal <150  -Consider head CT for any acute changes in neuro exam  -Please call neurosurgery for any declining neuro status

## 2017-08-05 NOTE — ASSESSMENT & PLAN NOTE
NSTEMI outside hospital  tropon is trending down  EKG shows no SHAVONNE  Patient denies CP   Metoprolol resumed

## 2017-08-05 NOTE — PLAN OF CARE
"Problem: Patient Care Overview  Goal: Plan of Care Review  Outcome: Ongoing (interventions implemented as appropriate)  POC reviewed with Juan Manuel Koehler (Jim) at 0500. Pt verbalized understanding, but needs reinforcement. Wife verbalized understanding before leaving last night. Questions and concerns addressed. No acute events overnight. Pt progressing toward goals. Will continue to monitor. See flowsheets for full assessment and VS info. A-line and L SC TLC inserted - SC okayed to use and nx communication order in, sees notes for details. MRI completed, see notes for details. 2% @ 300ml/hr q6 for Na <145 started; Na 138, MD Norma notified. Cardene gtt started overnight. One dose of both PRN hydralazine and labetalol given without any relief before starting cardene. SBP <160. All consents signed and witnessed and placed in pt chart. See all notes from overnight for more details.        "

## 2017-08-05 NOTE — PT/OT/SLP EVAL
"Physical Therapy  Evaluation/Treatment    Juan Manuel Koehler   MRN: [id number]   Admitting Diagnosis: Embolic stroke involving right cerebellar artery    PT Received On: 08/05/17  PT Start Time: 1352     PT Stop Time: 1416    PT Total Time (min): 24 min       Billable Minutes:  Evaluation 10 and Therapeutic Activity 14    Diagnosis: Embolic stroke involving right cerebellar artery    Past Medical History:   Diagnosis Date    Cancer     bladder and throat    CKD (chronic kidney disease) stage 3, GFR 30-59 ml/min     Embolic stroke involving right cerebellar artery 8/4/2017    Urinary tract infection       Past Surgical History:   Procedure Laterality Date    BLADDER SURGERY      CYSTOSCOPY      HERNIA REPAIR      Ileal Conduit      THROAT SURGERY       Referring physician: Nicolas  Date referred to PT: 8/4/2017    General Precautions: Standard, aspiration, fall, seizure, NPO    Do you have any cultural, spiritual, Methodist conflicts, given your current situation?: None noted    Patient History:  Lives With: spouse  Living Arrangements: house  Living Environment Comment: Pt lives in a 2SH with 0STE and bedroom/bathroom on second floor. PTA pt was (I) with mobility, ADLs, driving and working as a jeweler. Pt owns RW. No falls reported in the home.  Equipment Currently Used at Home: walker, rolling  DME owned (not currently used): rolling walker    Previous Level of Function:  Ambulation Skills: independent  Transfer Skills: independent  ADL Skills: independent  Work/Leisure Activity: independent    Subjective:  Communicated with RN (Nasir) prior to session.    Chief Complaint: "I am just tired and cold."  Patient goals: None stated    Pain/Comfort  Pain Rating 1:  (pt unsure of pain; unable to report)  Pain Rating Post-Intervention 1: 0/10 (NAD; resting quietly)      Objective:   Patient found with: arterial line, blood pressure cuff, telemetry, SCD, pulse ox (continuous), peripheral IV, central line " "(nephrostomy bag; condom cath)     Cognitive Exam:  Oriented to: Person; pt req frequent reorientation to location and date; stating "Saturday" to questions about location.    Follows Commands/attention: Follows one-step commands  Communication: clear/fluent; delayed responses  Safety awareness/insight to disability: impaired    Physical Exam:  Postural examination/scapula alignment: Rounded shoulder and Head forward; pt appears cachectic    Skin integrity: Visible skin intact  Edema: None noted    Sensation:   Intact - upon evaluation and per pt report, no changes in sensation to B LE.    Lower Extremity Range of Motion:  Right Lower Extremity: WNL  Left Lower Extremity: WNL    Lower Extremity Strength: No focal weakness noted  Right Lower Extremity: WFL  Left Lower Extremity: WFL    Gross motor coordination: WFL    Functional Mobility:  Bed Mobility:  Rolling/Turning to Left: Contact guard assistance  Rolling/Turning Right: Contact guard assistance  Scooting/Bridging: Maximum Assistance, With assist of 2  Supine to Sit:  (Not appropriate this date; pt unable to maintain eyes open/reporting nausea)    Balance: UMER this date    Therapeutic Activities and Exercises:  PT arrived to pt's room to find pt resting quietly; spouse at bedside. PT instructed pt on rolling technique to avoid nausea with assist from RN to remove drawsheet and replace pads for decreased frequency of rolling for pt. Pt participate in bed-level evaluation with good effort. PT reviewed B LE exercises for pt to perform between PT sessions; heel slides, hip abd, and ankle pumps x10-15 reps to B LE. Pt's spouse present and indicated understanding. PT reviewed recommendations and progression with therapy services. Questions/concerns addressed within PT scope of practice. RN's aware of pt's mobility needs and limitations.    AM-PAC 6 CLICK MOBILITY  How much help from another person does this patient currently need?   1 = Unable, Total/Dependent " Assistance  2 = A lot, Maximum/Moderate Assistance  3 = A little, Minimum/Contact Guard/Supervision  4 = None, Modified [place]/Independent    Turning over in bed (including adjusting bedclothes, sheets and blankets)?: 3  Sitting down on and standing up from a chair with arms (e.g., wheelchair, bedside commode, etc.): 3  Moving from lying on back to sitting on the side of the bed?: 3  Moving to and from a bed to a chair (including a wheelchair)?: 3  Need to walk in hospital room?: 2  Climbing 3-5 steps with a railing?: 2  Total Score: 16     AM-PAC Raw Score CMS G-Code Modifier Level of Impairment Assistance   6 % Total / Unable   7 - 9 CM 80 - 100% Maximal Assist   10 - 14 CL 60 - 80% Moderate Assist   15 - 19 CK 40 - 60% Moderate Assist   20 - 22 CJ 20 - 40% Minimal Assist   23 CI 1-20% SBA / CGA   24 CH 0% Independent/ Mod I     Patient left HOB elevated with all lines intact, call button in reach, RN notified and spouse present.    Assessment:   Juan Manuel Koehler is a 67 y.o. male with a medical diagnosis of Embolic stroke involving right cerebellar artery. PTA pt was (I) with mobility and ADLs; including driving. Upon evaluation, pt presents with generalized weakness, fatigue, impaired cognition, impaired functional mobility, and decreased activity tolerance. Pt remained inappropriate for EOB activity this PM 2/2 inability to maintain wakefulness/arousal and eyes open throughout evaluation. Will progress as tolerated. Patient will benefit from continued PT services to address the above and below impairments.    Rehab identified problem list/impairments: Rehab identified problem list/impairments: weakness, impaired endurance, gait instability, impaired functional mobilty, impaired self care skills, visual deficits, impaired balance, impaired cognition, decreased safety awareness, decreased coordination    Rehab potential is fair.    Activity tolerance: Fair    Discharge recommendations: Discharge  Facility/Level Of Care Needs:  (TBD pending EOB/OOB)     Barriers to discharge: Barriers to Discharge: Inaccessible home environment, Decreased caregiver support    Equipment recommendations: Equipment Needed After Discharge:  (TBD pending EOB/OOB activity)     GOALS:    Physical Therapy Goals        Problem: Physical Therapy Goal    Goal Priority Disciplines Outcome Goal Variances Interventions   Physical Therapy Goal     PT/OT, PT Ongoing (interventions implemented as appropriate)     Description:  Goals to be met by: 8/15/2017     Patient will increase functional independence with mobility by performin. Supine to sit with Contact Guard Assistance  2. Sit to supine with Contact Guard Assistance  3. Sit to stand transfer with Contact Guard Assistance using AD or No AD  4. Bed to chair transfer with Minimal Assistance using AD or No AD  5. Gait x25 feet with Minimal Assistance using AD or No AD  6. Ascend/descend 1 flight of stairs with bilateral Handrails with Min A  7. Sitting at edge of bed x10 minutes with Contact Guard Assistance while maintaining midline and forward gaze  8. Stand for x5 minutes with Minimal Assistance using AD or No AD  9. Lower extremity exercise program x15 reps with supervision to maintain B LE coordination and strength                 PLAN:    Patient to be seen 6 x/week to address the above listed problems via gait training, therapeutic activities, therapeutic exercises, neuromuscular re-education  Plan of Care expires: 17  Plan of Care reviewed with: patient, spouse     Kiara Uriostegui, PT, DPT  [phone]  2017

## 2017-08-05 NOTE — PLAN OF CARE
Problem: Patient Care Overview  Goal: Plan of Care Review  Outcome: Ongoing (interventions implemented as appropriate)  1. If able to advance diet, recommend regular, texture per SLP. Add Boost Plus ONS.   2. If unable to advance diet or pt is intubated, initiate enteral nutrition. Isosource 1.5 @ 50 mL/hr would meet pt's estimated needs.   = 1800 kcals, 82 grams of protein, 917 mL fluid.   3. RD to monitor & follow-up.

## 2017-08-05 NOTE — PROGRESS NOTES
RNx3 attempted blood cultures, Phlebotomy called and will come when available.  Will continue to monitor.

## 2017-08-05 NOTE — CONSULTS
[facility]  Infectious Disease  Consult Note    Patient Name: Juan Manuel Koehler  MRN: [id number]  Admission Date: 8/4/2017  Hospital Length of Stay: 1 days  Attending Physician: Caleb Valenzuela MD  Primary Care Provider: Hemant Sweeney MD     Isolation Status: No active isolations    Patient information was obtained from patient, relative(s) and ER records.      Inpatient consult to Infectious Diseases  Consult performed by: DARYL COSTA  Consult ordered by: BINH VANG  Reason for consult: Infective Bacterial Endocarditis w/ Enterococcus Faecalis         Assessment/Plan:     Acute bacterial endocarditis    Mr. Koehler is a 68 yo M with history of invasive urothelial bladder carcinoma (s/p open radical cystoprostatectomy, b/l pelvic lymph node resection and ileal conduit placement) and CKD III fount to have bacterial endocarditis due to Enterococcus Fecalis on this admission. Pt w/ evidence of possible septic emboli to spleen on CT and w/ acute R cerebellar stroke also likely 2/2 septic embolus. Pt also has a complicated UTI w/ isolated Proteus sensitive to ceftriaxone.    - 2D ECHO on 08/02 at OSH--> atrial surface vegetation on mitral valve  - Bcx from 08/04  W/ preliminary results --> Gram stain aer bottle: Gram positive cocci in chains resembling Strep  - Sensitivities for Enterococcus sp from previous Bcx are not yet available; if ampicillin-susceptible, recommend to switch current vancomycin to ampicillin 2 g Q4 hrs + increased dose of Rocephin 2mg Q12  - Will continue Tx for 6 weeks after the Bacteremia has cleared on BCx          Urinary tract infection with hematuria    - Continue current treatment with rocephin 1 g Q24 for 7 days in the setting of complicated UTI            Thank you for your consult. I will follow-up with patient. Please contact us if you have any additional questions.    Daryl Costa MD  Infectious Disease  [facility]    Subjective:      Principal Problem: Embolic stroke involving right cerebellar artery    HPI: Mr. Koehler is a 68 yo M with history of invasive urothelial bladder carcinoma (s/p open radical cystoprostatectomy, b/l pelvic lymph node resection and ileal conduit placement) and CKD III was transferred from OSH after a newly diagnosed R cerebella stroke likely 2/2 septic embolus.   Pt initially presented to the OSH after experiencing a fall; pt reports being dizzy and weak as he was descending down some stairs and ended up falling several steps as a result. Per patient and wife, he has experienced multiple episodes of weakness as a result of his decreased PO intake, decreased appetite stemming from his cancer Dx.   At the OSH, pt was found to have a complicated UTI w/ isolated Proteus species  ( sensitive to ceftriaxone) as well as multiple + Bcx which grew Enterococcus faecalis. Upon further investigation pt's ECHO revealed vegetation on atrial surface of mitral valve consistent with IB endocarditis.  Of note pt has had multiple UTIs w/ Enterococcus being the causative species .  ID was consulted for IB endocarditis.   Denies any recent fevers or chills; no recent dental work.      Past Medical History:   Diagnosis Date    Cancer     bladder and throat    CKD (chronic kidney disease) stage 3, GFR 30-59 ml/min     Embolic stroke involving right cerebellar artery 8/4/2017    Urinary tract infection        Past Surgical History:   Procedure Laterality Date    BLADDER SURGERY      CYSTOSCOPY      HERNIA REPAIR      Ileal Conduit      THROAT SURGERY         Review of patient's allergies indicates:  No Known Allergies    Medications:  Prescriptions Prior to Admission   Medication Sig    baclofen (LIORESAL) 10 MG tablet Take 10 mg by mouth 3 (three) times daily.    megestrol (MEGACE) 400 mg/10 mL (40 mg/mL) Susp Take by mouth.    ondansetron (ZOFRAN-ODT) 8 MG TbDL Take 1 tablet (8 mg total) by mouth every 6 (six) hours as needed  (nausea).    oxycodone-acetaminophen (PERCOCET) 5-325 mg per tablet Take 1 tablet by mouth every 4 (four) hours as needed for Pain.    oxycodone-acetaminophen (PERCOCET) 5-325 mg per tablet Take 1 tablet by mouth every 4 (four) hours as needed for Pain.    polyethylene glycol (GLYCOLAX) 17 gram PwPk Take 17 g by mouth once daily.     Antibiotics     Start     Stop Route Frequency Ordered    08/05/17 0100  vancomycin 1 g in dextrose 5 % 250 mL IVPB (ready to mix system)      08/13 0059 IV Every 12 hours (non-standard times) 08/04/17 1815    08/04/17 1815  cefTRIAXone (ROCEPHIN) 1 g in dextrose 5 % 50 mL IVPB      -- IV Every 24 hours (non-standard times) 08/04/17 1816        Antifungals     None        Antivirals     None           Immunization History   Administered Date(s) Administered    PPD Test 08/03/2017       Family History     Problem Relation (Age of Onset)    Kidney disease Mother    No Known Problems Father        Social History     Social History    Marital status:      Spouse name: N/A    Number of children: N/A    Years of education: N/A     Social History Main Topics    Smoking status: Never Smoker    Smokeless tobacco: Never Used    Alcohol use No    Drug use: No    Sexual activity: Yes     Partners: Female     Birth control/ protection: None     Other Topics Concern    None     Social History Narrative    None     Review of Systems   Constitutional: Positive for chills. Negative for activity change, appetite change, diaphoresis, fatigue and fever.   HENT: Negative.    Eyes: Negative.    Respiratory: Negative for cough, choking, chest tightness, shortness of breath, wheezing and stridor.    Cardiovascular: Negative for chest pain.   Gastrointestinal: Positive for nausea and vomiting. Negative for diarrhea.   Endocrine: Negative.    Genitourinary: Negative.    Musculoskeletal: Negative.    Skin: Negative.    Neurological: Positive for dizziness, weakness and light-headedness.    Hematological: Negative.    Psychiatric/Behavioral: Negative.      Objective:     Vital Signs (Most Recent):  Temp: 98.3 °F (36.8 °C) (08/05/17 1100)  Pulse: 80 (08/05/17 1200)  Resp: 18 (08/05/17 1200)  BP: (!) 163/78 (08/05/17 1235)  SpO2: 98 % (08/05/17 1200) Vital Signs (24h Range):  Temp:  [97.8 °F (36.6 °C)-99.4 °F (37.4 °C)] 98.3 °F (36.8 °C)  Pulse:  [70-96] 80  Resp:  [11-51] 18  SpO2:  [96 %-100 %] 98 %  BP: (122-175)/(63-83) 163/78  Arterial Line BP: (128-177)/(51-76) 147/65     Weight: 58.6 kg (129 lb 3 oz)  Body mass index is 16.59 kg/m².    Estimated Creatinine Clearance: 58.6 mL/min (based on Cr of 1).    Physical Exam   Constitutional: He is oriented to person, place, and time.   Chronically ill-appearing, thin   HENT:   Head: Normocephalic and atraumatic.   Neck: Normal range of motion.   Cardiovascular: Normal rate and regular rhythm.    Murmur (2/6 murmur audible over MV) heard.  Pulmonary/Chest: Effort normal and breath sounds normal.   Abdominal: Soft.   Neurological: He is oriented to person, place, and time.   Skin: Skin is warm and dry.   No evidence of Janeway lesions or Osler's nodes on examination  No splinter hemorrhages appreciated on exam        Significant Labs:   Blood Culture:   Recent Labs  Lab 08/02/17  1802 08/02/17  1807 08/04/17  0508   LABBLOO Gram stain ani bottle: Gram positive cocci in chains resembling Strep   Results called to and read back by: Jeana Barrera RN 08/03/2017  10:42  Gram stain aer bottle: Gram positive cocci in chains resembling Strep  ENTEROCOCCUS FAECALISFor susceptibility see order #[id number] Gram stain ani bottle: Gram positive cocci in chains resembling Strep   Results called to and read back by: Jeana Barrera RN 08/03/2017  10:42  Gram stain aer bottle: Gram positive cocci in chains resembling Strep  ENTEROCOCCUS FAECALIS Gram stain aer bottle: Gram positive cocci in chains resembling Strep   Results called to and read back by:Kendra Montez  RN 08/05/2017  11:15  No Growth to date  No Growth to date     BMP:   Recent Labs  Lab 08/05/17  0218  08/05/17  1204   *  --   --      138  < > 140   K 3.3*  --  4.4     --   --    CO2 23  --   --    BUN 18  --   --    CREATININE 1.0  --   --    CALCIUM 9.4  --   --    MG 1.6  --  1.9   < > = values in this interval not displayed.  CBC:   Recent Labs  Lab 08/04/17 0515 08/04/17  1505 08/05/17  0218   WBC 15.41* 13.40* 13.11*   HGB 8.1* 8.6* 9.8*   HCT 24.1* 25.6* 28.6*   * 389* 410*     CMP:   Recent Labs  Lab 08/04/17 0515 08/04/17  1505  08/05/17  0218 08/05/17  0601 08/05/17  1204    138  < > 138  138 138 140   K 3.7 3.9  --  3.3*  --  4.4    101  --  103  --   --    CO2 26 27  --  23  --   --    * 115*  --  111*  --   --    BUN 22 22  --  18  --   --    CREATININE 1.1 1.1  --  1.0  --   --    CALCIUM 9.1 8.8  --  9.4  --   --    PROT 6.8 6.5  --  7.5  --   --    ALBUMIN 2.4* 2.5*  --  2.6*  --   --    BILITOT 0.6 0.5  --  0.6  --   --    ALKPHOS 60 63  --  74  --   --    AST 13 13  --  14  --   --    ALT 11 12  --  14  --   --    ANIONGAP 10 10  --  12  --   --    EGFRNONAA >60 >60  --  >60.0  --   --    < > = values in this interval not displayed.  Lactic Acid:   Recent Labs  Lab 08/04/17  1505   LACTATE 1.3     Procalcitonin: No results for input(s): PROCAL in the last 48 hours.  Respiratory Culture: No results for input(s): GSRESP, RESPIRATORYC in the last 4320 hours.  Urine Culture:   Recent Labs  Lab 05/05/17  1535 06/12/17  1516 08/02/17  1653   LABURIN ENTEROCOCCUS FAECALIS>100,000 cfu/ml ENTEROCOCCUS FAECALIS>100,000 cfu/mlNo other significant isolate PROTEUS MIRABILIS>100,000 cfu/mlNo other significant isolate     Urine Studies:   Recent Labs  Lab 05/05/17  1340  08/02/17  1652   COLORU Yellow  < > Yellow   APPEARANCEUA Hazy*  < > Hazy*   PHUR 6.0  < > >8.0*   SPECGRAV <=1.005*  < > <=1.005*   PROTEINUA 2+*  < > 2+*   GLUCUA Negative  < > Negative    KETONESU Negative  < > Negative   BILIRUBINUA Negative  < > Negative   OCCULTUA 3+*  < > 2+*   NITRITE Positive*  < > Negative   UROBILINOGEN Negative  < > Negative   LEUKOCYTESUR 2+*  < > 2+*   RBCUA 3  < > 10*   WBCUA 62*  < > 20*   BACTERIA Moderate*  < > Moderate*   SQUAMEPITHEL 2  --   --    HYALINECASTS 0  < > 0   < > = values in this interval not displayed.  All pertinent labs within the past 24 hours have been reviewed.    Significant Imaging: I have reviewed all pertinent imaging results/findings within the past 24 hours.

## 2017-08-05 NOTE — PLAN OF CARE
Problem: Occupational Therapy Goal  Goal: Occupational Therapy Goal  Goals set 8/5 to be addressed for 7 days with expiration date, 8/12:  Patient will increase functional independence with ADLs by performing:    Patient will demonstrate rolling to the right with modified independence.  Not met   Patient will demonstrate rolling to the left with modified independence.   Not met  Patient will demonstrate supine -sit with modified independence.   Not met  Patient will demonstrate stand pivot transfers with min assist.   Not met  Patient will demonstrate grooming while standing with moderate assist.   Not met  Patient will demonstrate upper body dressing with moderate assist while seated EOB.   Not met  Patient will demonstrate lower body dressing with moderate assist while seated EOB.   Not met  Patient will demonstrate toileting with moderate assist.   Not met  Patient's family / caregiver will demonstrate independence and safety with assisting patient with self-care skills and functional mobility.     Not met  Patient and/or patient's family will verbalize understanding of stroke prevention guidelines, personal risk factors and stroke warning signs via teachback method.  Not met         OT evaluation initiated.  Limited 2* nausea.  PREETHI Rich  8/5/2017

## 2017-08-05 NOTE — HPI
Mr. Koehler is a 66 yo M with history of invasive urothelial bladder carcinoma (s/p open radical cystoprostatectomy, b/l pelvic lymph node resection and ileal conduit placement) and CKD III was transferred from OSH after a newly diagnosed R cerebella stroke likely 2/2 septic embolus.   Pt initially presented to the OSH after experiencing a fall; pt reports being dizzy and weak as he was descending down some stairs and ended up falling several steps as a result. Per patient and wife, he has experienced multiple episodes of weakness as a result of his decreased PO intake, decreased appetite stemming from his cancer Dx.   At the OSH, pt was found to have a complicated UTI w/ isolated Proteus species  ( sensitive to ceftriaxone) as well as multiple + Bcx which grew Enterococcus faecalis. Upon further investigation pt's ECHO revealed vegetation on atrial surface of mitral valve consistent with IB endocarditis.  Of note pt has had multiple UTIs w/ Enterococcus being the causative species .  ID was consulted for IB endocarditis.   Denies any recent fevers or chills; no recent dental work.

## 2017-08-05 NOTE — SUBJECTIVE & OBJECTIVE
Past Medical History:   Diagnosis Date    Cancer     bladder and throat    CKD (chronic kidney disease) stage 3, GFR 30-59 ml/min     Embolic stroke involving right cerebellar artery 8/4/2017    Urinary tract infection        Past Surgical History:   Procedure Laterality Date    BLADDER SURGERY      CYSTOSCOPY      HERNIA REPAIR      Ileal Conduit      THROAT SURGERY         Review of patient's allergies indicates:  No Known Allergies    Medications:  Prescriptions Prior to Admission   Medication Sig    baclofen (LIORESAL) 10 MG tablet Take 10 mg by mouth 3 (three) times daily.    megestrol (MEGACE) 400 mg/10 mL (40 mg/mL) Susp Take by mouth.    ondansetron (ZOFRAN-ODT) 8 MG TbDL Take 1 tablet (8 mg total) by mouth every 6 (six) hours as needed (nausea).    oxycodone-acetaminophen (PERCOCET) 5-325 mg per tablet Take 1 tablet by mouth every 4 (four) hours as needed for Pain.    oxycodone-acetaminophen (PERCOCET) 5-325 mg per tablet Take 1 tablet by mouth every 4 (four) hours as needed for Pain.    polyethylene glycol (GLYCOLAX) 17 gram PwPk Take 17 g by mouth once daily.     Antibiotics     Start     Stop Route Frequency Ordered    08/05/17 0100  vancomycin 1 g in dextrose 5 % 250 mL IVPB (ready to mix system)      08/13 0059 IV Every 12 hours (non-standard times) 08/04/17 1815    08/04/17 1815  cefTRIAXone (ROCEPHIN) 1 g in dextrose 5 % 50 mL IVPB      -- IV Every 24 hours (non-standard times) 08/04/17 1816        Antifungals     None        Antivirals     None           Immunization History   Administered Date(s) Administered    PPD Test 08/03/2017       Family History     Problem Relation (Age of Onset)    Kidney disease Mother    No Known Problems Father        Social History     Social History    Marital status:      Spouse name: N/A    Number of children: N/A    Years of education: N/A     Social History Main Topics    Smoking status: Never Smoker    Smokeless tobacco: Never Used     Alcohol use No    Drug use: No    Sexual activity: Yes     Partners: Female     Birth control/ protection: None     Other Topics Concern    None     Social History Narrative    None     Review of Systems   Constitutional: Positive for chills. Negative for activity change, appetite change, diaphoresis, fatigue and fever.   HENT: Negative.    Eyes: Negative.    Respiratory: Negative for cough, choking, chest tightness, shortness of breath, wheezing and stridor.    Cardiovascular: Negative for chest pain.   Gastrointestinal: Positive for nausea and vomiting. Negative for diarrhea.   Endocrine: Negative.    Genitourinary: Negative.    Musculoskeletal: Negative.    Skin: Negative.    Neurological: Positive for dizziness, weakness and light-headedness.   Hematological: Negative.    Psychiatric/Behavioral: Negative.      Objective:     Vital Signs (Most Recent):  Temp: 98.3 °F (36.8 °C) (08/05/17 1100)  Pulse: 80 (08/05/17 1200)  Resp: 18 (08/05/17 1200)  BP: (!) 163/78 (08/05/17 1235)  SpO2: 98 % (08/05/17 1200) Vital Signs (24h Range):  Temp:  [97.8 °F (36.6 °C)-99.4 °F (37.4 °C)] 98.3 °F (36.8 °C)  Pulse:  [70-96] 80  Resp:  [11-51] 18  SpO2:  [96 %-100 %] 98 %  BP: (122-175)/(63-83) 163/78  Arterial Line BP: (128-177)/(51-76) 147/65     Weight: 58.6 kg (129 lb 3 oz)  Body mass index is 16.59 kg/m².    Estimated Creatinine Clearance: 58.6 mL/min (based on Cr of 1).    Physical Exam   Constitutional: He is oriented to person, place, and time.   Chronically ill-appearing, thin   HENT:   Head: Normocephalic and atraumatic.   Neck: Normal range of motion.   Cardiovascular: Normal rate and regular rhythm.    Murmur (2/6 murmur audible over MV) heard.  Pulmonary/Chest: Effort normal and breath sounds normal.   Abdominal: Soft.   Neurological: He is oriented to person, place, and time.   Skin: Skin is warm and dry.   No evidence of Janeway lesions or Osler's nodes on examination  No splinter hemorrhages appreciated on exam         Significant Labs:   Blood Culture:   Recent Labs  Lab 08/02/17  1802 08/02/17  1807 08/04/17  0508   LABBLOO Gram stain ani bottle: Gram positive cocci in chains resembling Strep   Results called to and read back by: Jeana Barrera RN 08/03/2017  10:42  Gram stain aer bottle: Gram positive cocci in chains resembling Strep  ENTEROCOCCUS FAECALISFor susceptibility see order #[id number] Gram stain ani bottle: Gram positive cocci in chains resembling Strep   Results called to and read back by: Jeana Barrera RN 08/03/2017  10:42  Gram stain aer bottle: Gram positive cocci in chains resembling Strep  ENTEROCOCCUS FAECALIS Gram stain aer bottle: Gram positive cocci in chains resembling Strep   Results called to and read back by:Kendra Montez RN 08/05/2017  11:15  No Growth to date  No Growth to date     BMP:   Recent Labs  Lab 08/05/17 0218 08/05/17  1204   *  --   --      138  < > 140   K 3.3*  --  4.4     --   --    CO2 23  --   --    BUN 18  --   --    CREATININE 1.0  --   --    CALCIUM 9.4  --   --    MG 1.6  --  1.9   < > = values in this interval not displayed.  CBC:   Recent Labs  Lab 08/04/17 0515 08/04/17  1505 08/05/17  0218   WBC 15.41* 13.40* 13.11*   HGB 8.1* 8.6* 9.8*   HCT 24.1* 25.6* 28.6*   * 389* 410*     CMP:   Recent Labs  Lab 08/04/17 0515 08/04/17  1505  08/05/17 0218 08/05/17  0601 08/05/17  1204    138  < > 138  138 138 140   K 3.7 3.9  --  3.3*  --  4.4    101  --  103  --   --    CO2 26 27  --  23  --   --    * 115*  --  111*  --   --    BUN 22 22  --  18  --   --    CREATININE 1.1 1.1  --  1.0  --   --    CALCIUM 9.1 8.8  --  9.4  --   --    PROT 6.8 6.5  --  7.5  --   --    ALBUMIN 2.4* 2.5*  --  2.6*  --   --    BILITOT 0.6 0.5  --  0.6  --   --    ALKPHOS 60 63  --  74  --   --    AST 13 13  --  14  --   --    ALT 11 12  --  14  --   --    ANIONGAP 10 10  --  12  --   --    EGFRNONAA >60 >60  --  >60.0  --   --    < > = values  in this interval not displayed.  Lactic Acid:   Recent Labs  Lab 08/04/17  1505   LACTATE 1.3     Procalcitonin: No results for input(s): PROCAL in the last 48 hours.  Respiratory Culture: No results for input(s): GSRESP, RESPIRATORYC in the last 4320 hours.  Urine Culture:   Recent Labs  Lab 05/05/17  1535 06/12/17  1516 08/02/17  1653   LABURIN ENTEROCOCCUS FAECALIS>100,000 cfu/ml ENTEROCOCCUS FAECALIS>100,000 cfu/mlNo other significant isolate PROTEUS MIRABILIS>100,000 cfu/mlNo other significant isolate     Urine Studies:   Recent Labs  Lab 05/05/17  1340  08/02/17  1652   COLORU Yellow  < > Yellow   APPEARANCEUA Hazy*  < > Hazy*   PHUR 6.0  < > >8.0*   SPECGRAV <=1.005*  < > <=1.005*   PROTEINUA 2+*  < > 2+*   GLUCUA Negative  < > Negative   KETONESU Negative  < > Negative   BILIRUBINUA Negative  < > Negative   OCCULTUA 3+*  < > 2+*   NITRITE Positive*  < > Negative   UROBILINOGEN Negative  < > Negative   LEUKOCYTESUR 2+*  < > 2+*   RBCUA 3  < > 10*   WBCUA 62*  < > 20*   BACTERIA Moderate*  < > Moderate*   SQUAMEPITHEL 2  --   --    HYALINECASTS 0  < > 0   < > = values in this interval not displayed.  All pertinent labs within the past 24 hours have been reviewed.    Significant Imaging: I have reviewed all pertinent imaging results/findings within the past 24 hours.

## 2017-08-05 NOTE — PLAN OF CARE
Problem: SLP Goal  Goal: SLP Goal  Speech Language Pathology Goals  Goals expected to be met by 8/12  1. Pt will tolerate regular diet and thin liquids without s/s of airway compromise.   2. Pt will participate in speech language cognitive eval to determine need for intervention.             Swallow evaluation completed with initiated POC.    Jannette Luna M.A. CCC-SLP  Speech Language Pathologist  [phone]  8/5/2017

## 2017-08-05 NOTE — PROGRESS NOTES
[facility]  Vascular Neurology  Comprehensive Stroke Center  Progress Note    Assessment/Plan:     8/5/17 - doing well, no events overnight, neuro surg and NCC monitoring for swelling/suboccipital crani watch     Cytotoxic cerebral edema    Due to stroke   Evident on imaging        Acute bacterial endocarditis    Mitral valve vegetation seen on 8/3/17 ECHO  Blood cultures NGTD        Urinary tract infection with hematuria    + proteus in urine culture        NSTEMI (non-ST elevated myocardial infarction)    Per NCC, trending troponins        * Embolic stroke involving right cerebellar artery    Mr. Koehler is a 66yo M with endocarditis and an acute R cerebellar infarct with edema and midline shift.     -Antithrombotics for secondary stroke prevention: Antiplatelets:  Aspirin: 325 mg oral now and daily  -Statins for secondary stroke prevention and hyperlipidemia, if present: None: Reason: consider if LDL>130; patient's likely etiology of stroke was septic emboli from endocarditis. LDL 76  -Aggressive risk factor modification: endocarditis, +proteus miribilis in urine, Bcx from yesterday with gram + cocci resembling strep - will follow final   -Rehab Efforts: Physical Therapy, Occupational Therapy and Speech and Language Pathology  -Diagnostics: echo   -VTE Prophylaxis: Heparin 5000 units SQ every 8 hours  -Endocarditis treatment per NCC  - Neurosurgery hemicrani watch            Neurologic Chief Complaint: R cerebellar stroke     Subjective:     Interval History: Patient is seen for follow-up neurological assessment and treatment recommendations: no events overnight, on ericka crani watch for swelling but doing well overall with stable neuro exam     HPI, Past Medical, Family, and Social History remains the same as documented in the initial encounter.     Review of Systems   Constitutional: Negative for fever.   Eyes: Negative for redness.   Musculoskeletal: Negative for joint swelling.    Neurological: Negative for headaches.     Scheduled Meds:   atorvastatin  40 mg Oral Daily    cefTRIAXone (ROCEPHIN) IVPB  1 g Intravenous Q24H    metoprolol tartrate  25 mg Oral BID    pantoprazole  40 mg Intravenous Daily    polyethylene glycol  17 g Oral Daily    senna-docusate 8.6-50 mg  1 tablet Oral BID    sodium chloride 0.9%  3 mL Intravenous Q8H    vancomycin (VANCOCIN) IVPB  1,000 mg Intravenous Q12H     Continuous Infusions:   niCARdipine Stopped (08/05/17 0701)    buffered 3% sodium acetate 130meq, sodium chloride 130meq, sterile water for inj IV soln 60 mL/hr at 08/05/17 1100     PRN Meds:acetaminophen, calcium gluconate IVPB, calcium gluconate IVPB, calcium gluconate IVPB, dextrose 50%, glucagon (human recombinant), hydrALAZINE, labetalol, magnesium sulfate IVPB, magnesium sulfate IVPB, ondansetron, potassium chloride **AND** potassium chloride **AND** potassium chloride, sodium phosphate IVPB, sodium phosphate IVPB, sodium phosphate IVPB    Objective:     Vital Signs (Most Recent):  Temp: 98.8 °F (37.1 °C) (08/05/17 0701)  Pulse: 70 (08/05/17 1100)  Resp: (!) 32 (08/05/17 1100)  BP: (!) 150/77 (08/05/17 1100)  SpO2: 98 % (08/05/17 1100)  BP Location: Right arm    Vital Signs Range (Last 24H):  Temp:  [97.8 °F (36.6 °C)-99.4 °F (37.4 °C)]   Pulse:  [70-96]   Resp:  [11-51]   BP: (122-175)/(63-83)   SpO2:  [96 %-100 %]   Arterial Line BP: (128-177)/(51-76)   BP Location: Right arm    Physical Exam   Constitutional: He appears well-developed and well-nourished.   HENT:   Head: Normocephalic.   Eyes:   6th nerve and vertical palsy    Cardiovascular: Normal rate.    Pulmonary/Chest: Effort normal.   Neurological: He is alert.   Skin: Skin is warm and dry.   Nursing note and vitals reviewed.      Neurological Exam:   Alert, oriented to person and time  Equal sensation x 4   Full strength throughout  Patient is better with gaze to left but can with much encouragement get somewhat to right.    Right facial droop  B UE ataxia      NIH Stroke Scale:    Level of Consciousness: 0 - alert  LOC Questions: 0 - answers both correctly  LOC Commands: 0 - performs both correctly  Best Gaze: 1 - partial gaze palsy  Visual: 0 - no visual loss  Facial Palsy: 1 - minor  Motor Left Arm: 0 - no drift  Motor Right Arm: 0 - no drift  Motor Left Le - no drift  Motor Right Le - no drift  Limb Ataxia: 2 - present in two limbs  Sensory: 0 - normal  Best Language: 0 - no aphasia  Dysarthria: 0 - normal articulation  Extinction and Inattention: 0 - no neglect  NIH Stroke Scale Total: 4      Laboratory:  CMP:   Recent Labs  Lab 17  0218 17  0601   CALCIUM 9.4  --    ALBUMIN 2.6*  --    PROT 7.5  --      138 138   K 3.3*  --    CO2 23  --      --    BUN 18  --    CREATININE 1.0  --    ALKPHOS 74  --    ALT 14  --    AST 14  --    BILITOT 0.6  --      CBC:   Recent Labs  Lab 17   WBC 13.11*   RBC 3.41*   HGB 9.8*   HCT 28.6*   *   MCV 84   MCH 28.7   MCHC 34.3     Lipid Panel:   Recent Labs  Lab 17  1830   CHOL 132   LDLCALC 76.4   HDL 31*   TRIG 123     Coagulation:   Recent Labs  Lab 17  1830   INR 1.0   APTT 22.7     Platelet Aggregation Study: No results for input(s): PLTAGG, PLTAGINTERP, PLTAGREGLACO, ADPPLTAGGREG in the last 168 hours.  Hgb A1C:   Recent Labs  Lab 17  1830   HGBA1C 5.4     TSH:   Recent Labs  Lab 17  1830   TSH 1.817  1.817       Diagnostic Results:  I have personally reviewed:   MRI 17  1. Large region of abnormal restricted diffusion within the right cerebellar hemisphere compatible with acute infarct. There is associated hemorrhagic conversion present. There is localized mass effect on the abril as well as effacement of the fourth ventricle. Ventricular system remains mildly prominent, unchanged from prior CT examination.    2. Additional focal regions of mild diffusion hyperintensity with associated serpiginous non-masslike  cortical enhancement within the left parietal and occipital lobes suggestive of subacute infarcts. Additional region of volume loss with associated intrinsic T1 hyperintensity and serpiginous enhancement is present within the left cerebellar hemisphere suggestive of a late subacute infarct with laminar necrosis. Small remote lacunar type infarcts are also present in the right corona radiata. Overall findings are suggestive of possible thromboembolic phenomena. Clinical correlation is advised.    3. No evidence of high-grade stenosis of the visualized intracranial vasculature. Note is made that the mid and distal portions of the right AICA and PICA appear somewhat irregular and are not well-visualized, although a component of this may relate to noncontrast MRA time-of-flight technique.        8/4/17 - CT head    Large area of vasogenic edema centered within the right cerebellar hemisphere resulting in severe mass effect with compression of the 4th ventricle, mild hydrocephalus and leftward deviation of the cerebral vermis.  Recommend emergent neurosurgical consultation.      DEJA Guan  Comprehensive Stroke Center  Department of Vascular Neurology   [facility]

## 2017-08-05 NOTE — ASSESSMENT & PLAN NOTE
Mr. Koehler is a 68 yo M with history of invasive urothelial bladder carcinoma (s/p open radical cystoprostatectomy, b/l pelvic lymph node resection and ileal conduit placement) and CKD III fount to have bacterial endocarditis due to Enterococcus Fecalis on this admission. Pt w/ evidence of possible septic emboli to spleen on CT and w/ acute R cerebellar stroke also likely 2/2 septic embolus. Pt also has a complicated UTI w/ isolated Proteus sensitive to ceftriaxone.    - 2D ECHO on 08/02 at OSH--> atrial surface vegetation on mitral valve  - Bcx from 08/04  W/ preliminary results --> Gram stain aer bottle: Gram positive cocci in chains resembling Strep  - Sensitivities for Enterococcus sp from previous Bcx are not yet available; if ampicillin-susceptible, recommend to switch current vancomycin to ampicillin 2 g Q4 hrs + increased dose of Rocephin 2mg Q12  - Will continue Tx for 6 weeks after the Bacteremia has cleared on BCx

## 2017-08-05 NOTE — PROCEDURES
"Juan Manuel Koehler is a 67 y.o. male patient.    Temp: 97.9 °F (36.6 °C) (08/04/17 2302)  Pulse: 83 (08/05/17 0202)  Resp: (!) 25 (08/05/17 0202)  BP: (!) 154/69 (08/05/17 0202)  SpO2: 98 % (08/05/17 0202)  Weight: 57.5 kg (126 lb 12.2 oz) (08/04/17 1732)  Height: 6' 2" (188 cm) (08/04/17 1736)       Central Line  Date/Time: 8/5/2017 2:34 AM  Location procedure was performed: [facility] NEURO CRITICAL CARE  Performed by: ROGELIO GREEN  Consent Done: Yes  Time out: Immediately prior to procedure a "time out" was called to verify the correct patient, procedure, equipment, support staff and site/side marked as required.  Indications: med administration and vascular access  Anesthesia: local infiltration    Anesthesia:  Local Anesthetic: lidocaine 1% without epinephrine  Preparation: skin prepped with ChloraPrep  Skin prep agent dried: skin prep agent completely dried prior to procedure  Sterile barriers: all five maximum sterile barriers used - cap, mask, sterile gown, sterile gloves, and large sterile sheet  Hand hygiene: hand hygiene performed prior to central venous catheter insertion  Location details: left subclavian  Catheter type: triple lumen  Catheter size: 7 Fr  Ultrasound guidance: yes  Vessel Caliber: large, patent, compressibility normal  Needle advanced into vessel with real time Ultrasound guidance.  Guidewire confirmed in vessel.  Sterile sheath used.  Manometry: Yes  Number of attempts: 1  Assessment: placement verified by x-ray  Complications: none  Post-procedure: line sutured,  chlorhexidine patch,  sterile dressing applied and blood return through all ports          Rogelio Green  8/5/2017  "

## 2017-08-05 NOTE — ASSESSMENT & PLAN NOTE
Mr. Koehler is a 68yo M with endocarditis and an acute R cerebellar infarct with edema and midline shift.     -Antithrombotics for secondary stroke prevention: Antiplatelets:  Aspirin: 325 mg oral now and daily  -Statins for secondary stroke prevention and hyperlipidemia, if present: None: Reason: consider if LDL>130; patient's likely etiology of stroke was septic emboli from endocarditis   -Aggressive risk factor modification: endocarditis, +proteus miribilis in urine, Bcx NGTD   -Rehab Efforts: Physical Therapy, Occupational Therapy and Speech and Language Pathology  -Diagnostics: Ordered/Pending: serial CT scan of head without contrast to asses brain parenchyma - monitoring for hemicrani watch, CTA Head to assess vasculature , CTA Neck/Arch to assess vasculature, HgbA1C to assess blood glucose levels, Lipid Profile to assess cholesterol levels, TSH to assess thyroid function  -VTE Prophylaxis: Heparin 5000 units SQ every 8 hours  -Endocarditis treatment per NCC  - Neurosurgery hemicrani watch

## 2017-08-05 NOTE — ASSESSMENT & PLAN NOTE
Mr. Koehler is a 66yo M with endocarditis and an acute R cerebellar infarct with edema and midline shift.     -Antithrombotics for secondary stroke prevention: Antiplatelets:  Aspirin: 325 mg oral now and daily  -Statins for secondary stroke prevention and hyperlipidemia, if present: None: Reason: consider if LDL>130; patient's likely etiology of stroke was septic emboli from endocarditis. 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

## 2017-08-05 NOTE — SUBJECTIVE & OBJECTIVE
Past Medical History:   Diagnosis Date    Cancer     bladder and throat    CKD (chronic kidney disease) stage 3, GFR 30-59 ml/min     Embolic stroke involving right cerebellar artery 8/4/2017    Urinary tract infection      Past Surgical History:   Procedure Laterality Date    BLADDER SURGERY      CYSTOSCOPY      HERNIA REPAIR      Ileal Conduit      THROAT SURGERY        Current Facility-Administered Medications on File Prior to Encounter   Medication Dose Route Frequency Provider Last Rate Last Dose    [COMPLETED] mannitol 20% 20 g in sodium chloride 0.9% 100 mL infusion   Intravenous Once Deshaun Alvarado  mL/hr at 08/04/17 1633 20 g at 08/04/17 1633    [DISCONTINUED] 0.9%  NaCl infusion (for blood administration)   Intravenous Q24H PRN Charlene Palomares MD        [DISCONTINUED] acetaminophen tablet 650 mg  650 mg Oral Q6H PRN Charlene Palomares MD        [DISCONTINUED] Amino acid 4.25% - dextrose 10% (CLINIMIX-E) solution with additives (1L provides 42.5 gm AA, 100 gm CHO (340 kcal/L dextrose), Na 35, K 30, Mg 5, Ca 4.5, Acetate 70, Cl 39, Phos 15)   Intravenous Continuous Treva Frausto MD        [DISCONTINUED] Amino acid 4.25% - dextrose 10% (CLINIMIX-E) solution with additives (1L provides 42.5 gm AA, 100 gm CHO (340 kcal/L dextrose), Na 35, K 30, Mg 5, Ca 4.5, Acetate 70, Cl 39, Phos 15)   Intravenous Continuous Arnol Colbert MD        [DISCONTINUED] aspirin chewable tablet 81 mg  81 mg Oral Daily Charlene Palomares MD   81 mg at 08/04/17 0844    [DISCONTINUED] atorvastatin tablet 40 mg  40 mg Oral QHS Charlene Palomares MD   40 mg at 08/03/17 2210    [DISCONTINUED] baclofen tablet 10 mg  10 mg Oral TID Charlene Palomares MD   10 mg at 08/04/17 1420    [DISCONTINUED] cefTRIAXone (ROCEPHIN) 2 g in dextrose 5 % 50 mL IVPB  2 g Intravenous Q12H Shreya Calderon MD   2 g at 08/04/17 0442    [DISCONTINUED] heparin (porcine) injection 5,000 Units  5,000  Units Subcutaneous Q8H Charlene Palomares MD   5,000 Units at 08/04/17 1420    [DISCONTINUED] mannitol 20% infusion 36 g  36 g Intravenous Once Deshaun Alvarado MD        [DISCONTINUED] megestrol 400 mg/10 mL (10 mL) suspension 200 mg  200 mg Oral TID WM Charlene Palomares MD   200 mg at 08/04/17 1116    [DISCONTINUED] metoprolol tartrate (LOPRESSOR) tablet 25 mg  25 mg Oral BID Charlene Palomares MD   25 mg at 08/04/17 0849    [DISCONTINUED] ondansetron injection 4 mg  4 mg Intravenous Q6H PRN Charlene Palomares MD   4 mg at 08/03/17 1332    [DISCONTINUED] oxycodone-acetaminophen 5-325 mg per tablet 1 tablet  1 tablet Oral Q4H PRN Charlene Palomares MD   1 tablet at 08/04/17 0843    [DISCONTINUED] pantoprazole EC tablet 40 mg  40 mg Oral Daily Charlene Palomares MD   40 mg at 08/04/17 0844    [DISCONTINUED] polyethylene glycol packet 17 g  17 g Oral Daily Charlene Palomares MD   17 g at 08/04/17 0844    [DISCONTINUED] promethazine tablet 12.5 mg  12.5 mg Oral Q6H PRN Charlene Palomares MD   12.5 mg at 08/03/17 0634    [DISCONTINUED] vancomycin 1 g in dextrose 5 % 250 mL IVPB (ready to mix system)  1,000 mg Intravenous Q24H Charlene Palomares .7 mL/hr at 08/04/17 0016 1,000 mg at 08/04/17 0016    [DISCONTINUED] vancomycin 750 mg in dextrose 5 % 250 mL IVPB (ready to mix system)  750 mg Intravenous Q12H Arnol Colbert .7 mL/hr at 08/04/17 1429 750 mg at 08/04/17 1429     Current Outpatient Prescriptions on File Prior to Encounter   Medication Sig Dispense Refill    baclofen (LIORESAL) 10 MG tablet Take 10 mg by mouth 3 (three) times daily.      megestrol (MEGACE) 400 mg/10 mL (40 mg/mL) Susp Take by mouth.      ondansetron (ZOFRAN-ODT) 8 MG TbDL Take 1 tablet (8 mg total) by mouth every 6 (six) hours as needed (nausea). 30 tablet 1    oxycodone-acetaminophen (PERCOCET) 5-325 mg per tablet Take 1 tablet by mouth every 4 (four) hours as needed for Pain. 40 tablet 0     oxycodone-acetaminophen (PERCOCET) 5-325 mg per tablet Take 1 tablet by mouth every 4 (four) hours as needed for Pain. 21 tablet 0    polyethylene glycol (GLYCOLAX) 17 gram PwPk Take 17 g by mouth once daily. 30 packet 0      Allergies: Review of patient's allergies indicates no known allergies.    Family History   Problem Relation Age of Onset    No Known Problems Father     Kidney disease Mother     Prostate cancer Neg Hx      Social History   Substance Use Topics    Smoking status: Never Smoker    Smokeless tobacco: Never Used    Alcohol use No     Review of Systems   Respiratory: Negative for apnea, cough, choking, chest tightness, shortness of breath, wheezing and stridor.    Cardiovascular: Negative for chest pain, palpitations and leg swelling.   Gastrointestinal: Negative for abdominal distention, nausea and vomiting.   Psychiatric/Behavioral: Negative for agitation, behavioral problems and confusion.     Objective:     Vitals:  Temp: 98.3 °F (36.8 °C) (08/05/17 1100)  Pulse: 81 (08/05/17 1300)  Resp: (!) 31 (08/05/17 1300)  BP: (!) 151/75 (08/05/17 1300)  SpO2: 98 % (08/05/17 1300)    Temp:  [97.8 °F (36.6 °C)-99.4 °F (37.4 °C)] 98.3 °F (36.8 °C)  Pulse:  [70-96] 81  Resp:  [11-51] 31  SpO2:  [96 %-100 %] 98 %  BP: (122-175)/(63-83) 151/75  Arterial Line BP: (128-177)/(51-76) 154/61              08/04 0701 - 08/05 0700  In: 951.8 [I.V.:951.8]  Out: 2175 [Urine:2175]    Physical Exam   HENT:   Head: Normocephalic and atraumatic.   Cardiovascular: Normal rate, regular rhythm, normal heart sounds and intact distal pulses.    Pulmonary/Chest: Effort normal and breath sounds normal.   Abdominal: Soft. Bowel sounds are normal.   Neurological:   E 4 V5 M6  AAO person, month, location  6th nerve palsy, vertical gaze palsy  Right facial weakness  Minimal right sided weakness,   Dysmetria bilateral upper extremities     Today I personally reviewed pertinent medications, lines/drains/airways, imaging,  cardiology, lab results, microbiology results,

## 2017-08-05 NOTE — CONSULTS
"  [facility]  Adult Nutrition  Consult Note    SUMMARY     Recommendations    1. If able to advance diet, recommend regular, texture per SLP. Add Boost Plus ONS.   2. If unable to advance diet or pt is intubated, initiate enteral nutrition. Isosource 1.5 @ 50 mL/hr would meet pt's estimated needs.    = 1800 kcals, 82 grams of protein, 917 mL fluid.   3. RD to monitor & follow-up.    Goals: Meet % EEN, EPN  Nutrition Goal Status: new  Communication of RD Recs: reviewed with RN     Reason for Assessment    Reason for Assessment: nurse/nurse practitioner consult  Diagnosis: stroke/CVA  Relevent Medical History: Ca   Interdisciplinary Rounds: did not attend     General Information Comments: Pt on intubation watch, remains NPO. Presents from [place]isa. Per notes, pt with poor appetite PTA. Pt on Megace at home.  Nutrition Discharge Planning: Unable to determine    Nutrition Prescription Ordered    Current Diet Order: NPO    Evaluation of Received Nutrients/Fluid Intake    IV Fluid (mL): 2400    Nutrition/Diet History    Patient Reported Diet/Restrictions/Preferences: other (see comments) (UMER; pt disoriented)     Factors Affecting Nutritional Intake: NPO     Labs/Tests/Procedures/Meds    Pertinent Labs Reviewed: reviewed, pertinent  Pertinent Labs Comments: Gluc 111  Pertinent Medications Reviewed: reviewed, pertinent  Pertinent Medications Comments: IVF, Nicardipine    Physical Findings    Overall Physical Appearance: underweight, loss of muscle mass, loss of subcutaneous fat  Oral/Mouth Cavity: WDL  Skin: intact    Anthropometrics    Height: 6' 2" (188 cm)  Weight Method: Bed Scale  Weight: 58.6 kg (129 lb 3 oz)    Ideal Body Weight (IBW), Male: 190 lb  % Ideal Body Weight, Male (lb): 67.99 lb     BMI (Calculated): 16.6  BMI Grade: 16 - 16.9 protein-energy malnutrition grade II    Estimated/Assessed Needs    Weight Used For Calorie Calculations: 58.6 kg (129 lb 3 oz)      Energy Calorie " Requirements (kcal): 2017-8441 kcal/d  Energy Need Method: [place]-St Jeor     Weight Used For Protein Calculations: 58.6 kg (129 lb 3 oz)  Protein Requirements: 70-88 g/d     Fluid Need Method: RDA Method, other (see comments) (Per MD or 1 mL/kcal)    Assessment and Plan    Inadequate energy intake r/t inability to consume sufficient energy AEB NPO with no alternate means of nutrition.  Status: New    Monitor and Evaluation    Food and Nutrient Intake: energy intake, food and beverage intake, enteral nutrition intake  Food and Nutrient Adminstration: diet order, enteral and parenteral nutrition administration     Physical Activity and Function: nutrition-related ADLs and IADLs  Anthropometric Measurements: weight change, weight, body mass index  Biochemical Data, Medical Tests and Procedures: electrolyte and renal panel, gastrointestinal profile, glucose/endocrine profile, inflammatory profile, lipid profile  Nutrition-Focused Physical Findings: overall appearance    Nutrition Risk    Level of Risk: other (see comments) (2x/week)    Nutrition Follow-Up    RD Follow-up?: Yes

## 2017-08-05 NOTE — PT/OT/SLP EVAL
Speech Language Pathology  Evaluation    Juan Manuel Koehler   MRN: [id number]   Admitting Diagnosis: Embolic stroke involving right cerebellar artery    When medically appropriate, consider initiation of following diet.  Diet recommendations: Solid Diet Level: Regular  Liquid Diet Level: Thin   Small bites/sips  Strict aspiration precautions    SLP Treatment Date: 08/05/17  Speech Start Time: 0933     Speech Stop Time: 0941     Speech Total (min): 8 min       TREATMENT BILLABLE MINUTES:  Eval Swallow and Oral Function 8    Diagnosis: Embolic stroke involving right cerebellar artery      Past Medical History:   Diagnosis Date    Cancer     bladder and throat    CKD (chronic kidney disease) stage 3, GFR 30-59 ml/min     Embolic stroke involving right cerebellar artery 8/4/2017    Urinary tract infection      Past Surgical History:   Procedure Laterality Date    BLADDER SURGERY      CYSTOSCOPY      HERNIA REPAIR      Ileal Conduit      THROAT SURGERY         Has the patient been evaluated by SLP for swallowing? : Yes  Keep patient NPO?: No   General Precautions: Standard,            Prior diet: No reported restrictions; no SLP notes in Epic.    Subjective:  Pt awake; eyes shut for majority of session  Patient goals: did not state    Pain/Comfort  Pain Rating 1: 0/10  Pain Rating Post-Intervention 1: 0/10    Objective:        Oral Musculature Evaluation  Oral Musculature: WFL  Dentition: present and adequate  Mucosal Quality: good  Mandibular Strength and Mobility: WFL  Oral Labial Strength and Mobility: WFL  Lingual Strength and Mobility: WFL  Buccal Strength and Mobility: WFL  Volitional Swallow: able to demonstrate  Voice Prior to PO Intake: clear     Per RN, recent emesis  Bedside Swallow Eval:  Consistencies Assessed: Thin liquids 4 oz via cup, ice, straw, and spoon, Puree 3 full spoonfuls and Solids 2 bites ebenezer cracker  Oral Phase: WFL  Pharyngeal Phase: no overt clinical  signs/symptoms of  aspiration    Considering size of stroke, caution should be used with PO. Skilled education provided on aspiration precautions and diet recommendations. Whiteboard updated with diet recommendations/aspiration precautions. No further questions.  Recs and concerns darin Yuen, SAQIB and RN.                                  Assessment:  Juan Manuel Koehler is a 67 y.o. male with a medical diagnosis of Embolic stroke involving right cerebellar artery and presents with swallow WFL; however is at risk of oropharyngeal dysphagia considering medical complexity of pt..    Do you have any cultural, spiritual, [employer] conflicts, given your current situation?: no     Discharge recommendations: Discharge Facility/Level Of Care Needs:  (pending PT OT recs)     Goals:    SLP Goals        Problem: SLP Goal    Goal Priority Disciplines Outcome   SLP Goal     SLP    Description:  Speech Language Pathology Goals  Goals expected to be met by 8/12  1. Pt will tolerate regular diet and thin liquids without s/s of airway compromise.   2. Pt will participate in speech language cognitive eval to determine need for intervention.                              Plan:   Patient to be seen Therapy Frequency: 5 x/week   Plan of Care expires: 09/03/17  Plan of Care reviewed with: patient  SLP Follow-up?: Yes  SLP - Next Visit Date: 08/07/17          Jannette Luna M.A. CCC-SLP  Speech Language Pathologist  [phone]  8/5/2017

## 2017-08-05 NOTE — PLAN OF CARE
Problem: Physical Therapy Goal  Goal: Physical Therapy Goal  Goals to be met by: 8/15/2017     Patient will increase functional independence with mobility by performin. Supine to sit with Contact Guard Assistance  2. Sit to supine with Contact Guard Assistance  3. Sit to stand transfer with Contact Guard Assistance using AD or No AD  4. Bed to chair transfer with Minimal Assistance using AD or No AD  5. Gait x25 feet with Minimal Assistance using AD or No AD  6. Ascend/descend 1 flight of stairs with bilateral Handrails with Min A  7. Sitting at edge of bed x10 minutes with Contact Guard Assistance while maintaining midline and forward gaze  8. Stand for x5 minutes with Minimal Assistance using AD or No AD  9. Lower extremity exercise program x15 reps with supervision to maintain B LE coordination and strength    Outcome: Ongoing (interventions implemented as appropriate)    PT Evaluation complete. Recommendations pending EOB/OOB activity.    Kiara Uriostegui, PT, DPT  [phone]  2017

## 2017-08-05 NOTE — ASSESSMENT & PLAN NOTE
Vascular Surgery Consult  NeuroSurgery Consult, suboccipital crani/hydrocephalus watch  Q1hr neuro Checks, SBP <160  Hypertonic gtt and q6hr Serum Na, goal 145-150  MRI/MRA head/neck 8/5  PT/OT/SLP

## 2017-08-05 NOTE — PROCEDURES
"Juan Manuel Koehler is a 67 y.o. male patient.    Temp: 98.1 °F (36.7 °C) (17)  Pulse: 78 (17)  Resp: 16 (17)  BP: (!) 155/77 (17)  SpO2: 100 % (17)  Weight: 57.5 kg (126 lb 12.2 oz) (17)  Height: 6' 2" (188 cm) (17)       Arterial Line  Date/Time: 2017 8:01 PM  Performed by: BINH ROCK  Authorized by: BINH ROCK   Consent Done: Yes  Consent: Verbal consent obtained. Written consent obtained.  Consent given by: spouse  Patient understanding: patient states understanding of the procedure being performed  Patient consent: the patient's understanding of the procedure matches consent given  Procedure consent: procedure consent matches procedure scheduled  Patient identity confirmed: , name, MRN and verbally with patient  Preparation: Patient was prepped and draped in the usual sterile fashion.  Indications: hemodynamic monitoring  Location: left radial  Anesthesia: local infiltration    Anesthesia:  Local Anesthetic: lidocaine 1% without epinephrine  Malachi's test normal: yes  Needle gauge: 20  Seldinger technique: Seldinger technique used  Number of attempts: 1  Complications: No  Specimens: No  Implants: No  Post-procedure CMS: normal and unchanged  Patient tolerance: Patient tolerated the procedure well with no immediate complications          Binh Rock  2017  "

## 2017-08-05 NOTE — HOSPITAL COURSE
8/5/17: Patient continues to progress appropriately for pathology; continue to medically optimize and work with therapy  8/5/17: Patient continues to progress; no acute changes in status or management  8/6: CTH stable  8/8: getting CTA to assess posterior circulation   8/9: CTA negative, signing off

## 2017-08-05 NOTE — PROGRESS NOTES
SAQIB Yuen notified of pt Loan SBP >160.  Hydralazine and Labetalol given, started back on Cardene, currently at 7.5 mg/hr.  Will continue titrating up as needed until reaching max dose.  Wilfrid will come to bedside.  Will continue to monitor.

## 2017-08-05 NOTE — SUBJECTIVE & OBJECTIVE
Prescriptions Prior to Admission   Medication Sig Dispense Refill Last Dose    baclofen (LIORESAL) 10 MG tablet Take 10 mg by mouth 3 (three) times daily.   Taking    megestrol (MEGACE) 400 mg/10 mL (40 mg/mL) Susp Take by mouth.   Taking    ondansetron (ZOFRAN-ODT) 8 MG TbDL Take 1 tablet (8 mg total) by mouth every 6 (six) hours as needed (nausea). 30 tablet 1 Taking    oxycodone-acetaminophen (PERCOCET) 5-325 mg per tablet Take 1 tablet by mouth every 4 (four) hours as needed for Pain. 40 tablet 0 Taking    oxycodone-acetaminophen (PERCOCET) 5-325 mg per tablet Take 1 tablet by mouth every 4 (four) hours as needed for Pain. 21 tablet 0 Taking    polyethylene glycol (GLYCOLAX) 17 gram PwPk Take 17 g by mouth once daily. 30 packet 0 Taking       Review of patient's allergies indicates:  No Known Allergies    Past Medical History:   Diagnosis Date    Cancer     bladder and throat    CKD (chronic kidney disease) stage 3, GFR 30-59 ml/min     Embolic stroke involving right cerebellar artery 8/4/2017    Urinary tract infection      Past Surgical History:   Procedure Laterality Date    BLADDER SURGERY      CYSTOSCOPY      HERNIA REPAIR      Ileal Conduit      THROAT SURGERY       Family History     Problem Relation (Age of Onset)    Kidney disease Mother    No Known Problems Father        Social History Main Topics    Smoking status: Never Smoker    Smokeless tobacco: Never Used    Alcohol use No    Drug use: No    Sexual activity: Yes     Partners: Female     Birth control/ protection: None     Review of Systems    Objective:     Weight: 58.6 kg (129 lb 3 oz)  Body mass index is 16.59 kg/m².  Vital Signs (Most Recent):  Temp: 98.3 °F (36.8 °C) (08/05/17 1100)  Pulse: 80 (08/05/17 1200)  Resp: 18 (08/05/17 1200)  BP: (!) 146/77 (08/05/17 1200)  SpO2: 98 % (08/05/17 1200) Vital Signs (24h Range):  Temp:  [97.8 °F (36.6 °C)-99.4 °F (37.4 °C)] 98.3 °F (36.8 °C)  Pulse:  [70-96] 80  Resp:  [11-51]  18  SpO2:  [96 %-100 %] 98 %  BP: (122-175)/(63-83) 146/77  Arterial Line BP: (128-177)/(51-76) 147/65       Date 08/05/17 0700 - 08/06/17 0659   Shift 9085-7541 1372-9009 6005-8228 24 Hour Total   I  N  T  A  K  E   I.V.  (mL/kg) 936.2  (16)   936.2  (16)    Shift Total  (mL/kg) 936.2  (16)   936.2  (16)   O  U  T  P  U  T   Urine  (mL/kg/hr) 830   830    Shift Total  (mL/kg) 830  (14.2)   830  (14.2)   Weight (kg) 58.6 58.6 58.6 58.6                        Urostomy 08/02/17 2345 ileal conduit RUQ (Active)   Stoma Appearance round 8/4/2017  7:55 AM   Stomal Appliance 1 piece 8/3/2017  7:35 PM   Accessories/Skin Care appliance belt 8/4/2017  7:55 AM   Stoma Function yellow urine 8/4/2017  7:55 AM   Output (mL) 150 mL 8/4/2017  6:00 PM       Neurosurgery Physical Exam   AOx3; GCS15  CNII-XII: Intact with the exception of conjugate vertical gaze palsy with saccade; PERRL  Extremities: Motor exam grossly normal with 5/5 strength in all distributions, tone normal, sensation intact bilaterally, coordination exhibits dysmetria and past-pointing. DTRs intact and 2+ throughout.  Pronator is negative.    Significant Labs:    Recent Labs  Lab 08/04/17  0515 08/04/17  1505 08/04/17  1830 08/05/17  0218 08/05/17  0601   * 115*  --  111*  --     138 138  138 138  138 138   K 3.7 3.9  --  3.3*  --     101  --  103  --    CO2 26 27  --  23  --    BUN 22 22  --  18  --    CREATININE 1.1 1.1  --  1.0  --    CALCIUM 9.1 8.8  --  9.4  --    MG  --   --   --  1.6  --        Recent Labs  Lab 08/04/17  0515 08/04/17  1505 08/05/17  0218   WBC 15.41* 13.40* 13.11*   HGB 8.1* 8.6* 9.8*   HCT 24.1* 25.6* 28.6*   * 389* 410*       Recent Labs  Lab 08/04/17  1830   INR 1.0   APTT 22.7     Microbiology Results (last 7 days)     Procedure Component Value Units Date/Time    Blood culture [[id number]]     Order Status:  No result Specimen:  Blood     Culture, Respiratory with Gram Stain [[id number]]     Order Status:  No  result Specimen:  Respiratory         All pertinent labs from the last 24 hours have been reviewed.    Significant Diagnostics:  MRI 8/5/17: Demonstrates a large region of abnormal restricted diffusion within the right cerebellar hemisphere compatible with acute infarct. There is associated hemorrhagic conversion present.    MRA 8/5/17: No evidence of high-grade stenosis of the visualized intracranial vasculature. Note is made that the mid and distal portions of the right AICA and PICA appear somewhat irregular and are not well-visualized,    CXR 8/5/17: Interval placement LEFT subclavian catheter with tip overlying the SVC. No pneumothorax. Heart and lungs unchanged when allowing for differences in technique and positioning.

## 2017-08-05 NOTE — NURSING TRANSFER
Nursing Transfer Note      8/5/2017     Transfer To: MRI @ 0300    Back to floor @ 0410    Transfer via bed    Transfer with cardiac monitoring    Transported by Adele RN and Nithin RN     Patient reassessed at: 8/5/17 @ 041    Upon arrival to floor: cardiac monitor applied, patient oriented to room, call bell in reach and bed in lowest position    Pt tolerated procedure and transport well

## 2017-08-05 NOTE — PT/OT/SLP EVAL
"Occupational Therapy  Evaluation    Juan Manuel Koehler   MRN: [id number]   Admitting Diagnosis: Embolic stroke involving right cerebellar artery    OT Date of Treatment: 08/05/17   OT Start Time: 0906  OT Stop Time: 0920  OT Total Time (min): 14 min    Billable Minutes:  Evaluation 14    Diagnosis: Embolic stroke involving right cerebellar artery     Past Medical History:   Diagnosis Date    Cancer     bladder and throat    CKD (chronic kidney disease) stage 3, GFR 30-59 ml/min     Embolic stroke involving right cerebellar artery 8/4/2017    Urinary tract infection       Past Surgical History:   Procedure Laterality Date    BLADDER SURGERY      CYSTOSCOPY      HERNIA REPAIR      Ileal Conduit      THROAT SURGERY         Referring physician: Pranav  Date referred to OT: 8/4  General Precautions: Standard, aspiration, fall, NPO  Orthopedic Precautions: N/A  Braces: N/A    Do you have any cultural, spiritual, Amish conflicts, given your current situation?: Presybeterian     Patient History:  Prior level of function:   Patient resides in [place] with wife in 2 story home with bedroom on the 2nd floor.  PTA patient independent with ADLs including driving.  Currently owns no DME.  Patient is right handed.  Works: [employer].  Hobbies:  [employer].  Roles/Responsibilities:  , father, grandfather.     Subjective:  Communicated with nurse prior to session.  Patient:  "I am pretty sick.  I can't do much."  Nurse reported that the patient is on intubation watch.  Pain/Comfort  Pain Rating 1: 0/10  Pain Rating Post-Intervention 1: 0/10    Objective:  Patient found with: arterial line, blood pressure cuff, central line, pressure relief boots, pulse ox (continuous), SCD, telemetry, peripheral IV (nephrostomy drain)  Family not present.    Cognitive Exam:  Oriented to: Person and Place  Follows Commands/attention: Follows one-step commands  Communication: clear/fluent  Memory:  To be assessed    Physical Exam:  Postural " examination/scapula alignment: Rounded shoulder  Skin integrity: fragile  Edema: None noted     Sensation:   Light touch intact    Upper Extremity Range of Motion:  Right Upper Extremity: WNL  Left Upper Extremity: WNL    Upper Extremity Strength:  Right Upper Extremity: WNL  Left Upper Extremity: WNL    Functional Mobility:  Bed Mobility:  Rolling/Turning to Left: Contact guard assistance  Rolling/Turning Right: Contact guard assistance  Supine to Sit:  (Deferred 2* nausea/vomiting)    Transfers:  Sit <> Stand Assistance:  (deferred 2* nausea/vomiting)    Activities of Daily Living:  Feeding Level of Assistance:  (NPO)  Grooming Position:  (supine)  Grooming Level of Assistance: Total assistance  (limited 2* nausea/vomiting)     Additional Treatment:   Patient education provided on role of OT and need for continued OT assessment.  Patient verbalizing understanding via teach back method.   Continued education, patient/ family training recommended.  Patient alert and oriented x person and place.  Able to follow 3/3 one step commands.  Patient attentive and interactive throughout the session.  Patient able to identify 3/3 body parts.   Patient's functional status and disposition recommendation discussed with patient and nurse.  White board updated in patient's room.  OT asked if there were any other questions; patient/ family had no further questions.    AM-PAC 6 CLICK ADL  How much help from another person does this patient currently need?  1 = Unable, Total/Dependent Assistance  2 = A lot, Maximum/Moderate Assistance  3 = A little, Minimum/Contact Guard/Supervision  4 = None, Modified [place]/Independent    Putting on and taking off regular lower body clothing? : 1  Bathing (including washing, rinsing, drying)?: 1  Toileting, which includes using toilet, bedpan, or urinal? : 1  Putting on and taking off regular upper body clothing?: 1  Taking care of personal grooming such as brushing teeth?: 1  Eating meals?:  "1  Total Score: 6    AM-PAC Raw Score CMS "G-Code Modifier Level of Impairment Assistance   6 % Total / Unable   7 - 9 CM 80 - 100% Maximal Assist   10-14 CL 60 - 80% Moderate Assist   15 - 19 CK 40 - 60% Moderate Assist   20 - 22 CJ 20 - 40% Minimal Assist   23 CI 1-20% SBA / CGA   24 CH 0% Independent/ Mod I       Patient left supine with all lines intact and call button in reach    Assessment:  Juan Manuel Koehler is a 67 y.o. male with a medical diagnosis of Embolic stroke involving right cerebellar artery and presents with performance deficits of physical skills including impaired balance, mobility, and endurance.  These performance deficits have resulted in activity limitations including but not limited to:   bed mobility, transfers, ascending/ descending stairs, walking short and long distances, walking around obstacles, transitional movement patterns (kneeling, bending); eating, upper body dressing, lower body dressing, brushing teeth, toileting, bathing, and carrying objects.   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 Patient will benefit from skilled OT services to maximize level of independence with self-care skills and functional mobility.  Will benefit from further assessment.     Pt evaluation falls under low complexity for evaluation coding due to performance deficits noted in 1-3 areas as stated above and 0 co-morbities affecting current functional status. Data obtained from problem focused assessments. No modifications or assistance was required for completion of evaluation. Only brief occupational profile and history review completed.    Rehab identified problem list/impairments: Rehab identified problem list/impairments: weakness, impaired endurance, impaired self care skills, impaired functional mobilty, gait instability, impaired balance, impaired cognition, decreased coordination    Rehab potential is good.    Activity " tolerance: Good    Discharge recommendations: Discharge Facility/Level Of Care Needs:  (TBD once OOB assessment tolerated)     Barriers to discharge: Barriers to Discharge: Decreased caregiver support    Equipment recommendations:  (TBD once OOB assessment tolerated)     GOALS:    Occupational Therapy Goals        Problem: Occupational Therapy Goal    Goal Priority Disciplines Outcome Interventions   Occupational Therapy Goal     OT, PT/OT     Description:  Goals set 8/5 to be addressed for 7 days with expiration date, 8/12:  Patient will increase functional independence with ADLs by performing:    Patient will demonstrate rolling to the right with modified independence.  Not met   Patient will demonstrate rolling to the left with modified independence.   Not met  Patient will demonstrate supine -sit with modified independence.   Not met  Patient will demonstrate stand pivot transfers with min assist.   Not met  Patient will demonstrate grooming while standing with moderate assist.   Not met  Patient will demonstrate upper body dressing with moderate assist while seated EOB.   Not met  Patient will demonstrate lower body dressing with moderate assist while seated EOB.   Not met  Patient will demonstrate toileting with moderate assist.   Not met  Patient's family / caregiver will demonstrate independence and safety with assisting patient with self-care skills and functional mobility.     Not met  Patient and/or patient's family will verbalize understanding of stroke prevention guidelines, personal risk factors and stroke warning signs via teachback method.  Not met                           PLAN:  Patient to be seen 6 x/week to address the above listed problems via self-care/home management, therapeutic exercises, therapeutic activities, neuromuscular re-education, cognitive retraining, sensory integration  Plan of Care expires: 09/02/17  Plan of Care reviewed with: patient    OT G-codes  Functional Assessment Tool  Used: FIM  Score: 1  Functional Limitation: Self care  Self Care Current Status (): CN  Self Care Goal Status (): PREETHI Rowan  08/05/2017

## 2017-08-05 NOTE — PROGRESS NOTES
Pt's /65 (a-line) / 159/81 (cuff). Has PRN cardene gtt, labetalol 10 mg, and hydralazine 10 mg ordered. Notified MD Norma to see which one he wanted to give before doing a central line placement procedure. Ordered to give the hydralazine 10 mg. Will continue to monitor very closely.

## 2017-08-05 NOTE — PLAN OF CARE
Problem: Patient Care Overview  Goal: Plan of Care Review  Outcome: Ongoing (interventions implemented as appropriate)  POC reviewed with pt and spouse at 1400. Pt and spouse verbalized understanding. Questions and concerns addressed. No acute events today. Pt progressing toward goals. Will continue to monitor. See flowsheets for full assessment and VS info.

## 2017-08-05 NOTE — ASSESSMENT & PLAN NOTE
ID recs, cont vanc until suseceptability return,   then consider switch to ampicillin 2 g Q4 hrs + increased dose of Rocephin 2mg Q12  - Will continue Tx for 6 weeks after the Bacteremia has cleared on BCx

## 2017-08-05 NOTE — PROGRESS NOTES
[facility]  Neurosurgery  Progress Note    Subjective:     History of Present Illness: 67yoM with history of bladder cancer s/p bladder resection 6/21/17 presents to neurosurgery in the neuro critical care unit with right cerebellar hem/stroke with vasogenic edema and mass effect, urosepsis and endocarditis with vegetation. Family reports pt had decreased appetite since bladder resection and recent vomiting. Pt presented to [facility] 8/2 after presyncope episode and weak on the ground. He had mental status change today at approx 1400 prompting CT head and was transferred to [facility].     Neurosurgery consulted for possible EVD placement and suboccipital crani watch.      Post-Op Info:  * No surgery found *         Prescriptions Prior to Admission   Medication Sig Dispense Refill Last Dose    baclofen (LIORESAL) 10 MG tablet Take 10 mg by mouth 3 (three) times daily.   Taking    megestrol (MEGACE) 400 mg/10 mL (40 mg/mL) Susp Take by mouth.   Taking    ondansetron (ZOFRAN-ODT) 8 MG TbDL Take 1 tablet (8 mg total) by mouth every 6 (six) hours as needed (nausea). 30 tablet 1 Taking    oxycodone-acetaminophen (PERCOCET) 5-325 mg per tablet Take 1 tablet by mouth every 4 (four) hours as needed for Pain. 40 tablet 0 Taking    oxycodone-acetaminophen (PERCOCET) 5-325 mg per tablet Take 1 tablet by mouth every 4 (four) hours as needed for Pain. 21 tablet 0 Taking    polyethylene glycol (GLYCOLAX) 17 gram PwPk Take 17 g by mouth once daily. 30 packet 0 Taking       Review of patient's allergies indicates:  No Known Allergies    Past Medical History:   Diagnosis Date    Cancer     bladder and throat    CKD (chronic kidney disease) stage 3, GFR 30-59 ml/min     Embolic stroke involving right cerebellar artery 8/4/2017    Urinary tract infection      Past Surgical History:   Procedure Laterality Date    BLADDER SURGERY      CYSTOSCOPY      HERNIA REPAIR      Ileal Conduit      THROAT SURGERY        Family History     Problem Relation (Age of Onset)    Kidney disease Mother    No Known Problems Father        Social History Main Topics    Smoking status: Never Smoker    Smokeless tobacco: Never Used    Alcohol use No    Drug use: No    Sexual activity: Yes     Partners: Female     Birth control/ protection: None     Review of Systems    Objective:     Weight: 58.6 kg (129 lb 3 oz)  Body mass index is 16.59 kg/m².  Vital Signs (Most Recent):  Temp: 98.3 °F (36.8 °C) (08/05/17 1100)  Pulse: 80 (08/05/17 1200)  Resp: 18 (08/05/17 1200)  BP: (!) 146/77 (08/05/17 1200)  SpO2: 98 % (08/05/17 1200) Vital Signs (24h Range):  Temp:  [97.8 °F (36.6 °C)-99.4 °F (37.4 °C)] 98.3 °F (36.8 °C)  Pulse:  [70-96] 80  Resp:  [11-51] 18  SpO2:  [96 %-100 %] 98 %  BP: (122-175)/(63-83) 146/77  Arterial Line BP: (128-177)/(51-76) 147/65       Date 08/05/17 0700 - 08/06/17 0659   Shift 8553-2595 2651-8281 5590-5125 24 Hour Total   I  N  T  A  K  E   I.V.  (mL/kg) 936.2  (16)   936.2  (16)    Shift Total  (mL/kg) 936.2  (16)   936.2  (16)   O  U  T  P  U  T   Urine  (mL/kg/hr) 830   830    Shift Total  (mL/kg) 830  (14.2)   830  (14.2)   Weight (kg) 58.6 58.6 58.6 58.6                        Urostomy 08/02/17 2345 ileal conduit RUQ (Active)   Stoma Appearance round 8/4/2017  7:55 AM   Stomal Appliance 1 piece 8/3/2017  7:35 PM   Accessories/Skin Care appliance belt 8/4/2017  7:55 AM   Stoma Function yellow urine 8/4/2017  7:55 AM   Output (mL) 150 mL 8/4/2017  6:00 PM       Neurosurgery Physical Exam   AOx3; GCS15  CNII-XII: Intact with the exception of conjugate vertical gaze palsy with saccade; PERRL  Extremities: Motor exam grossly normal with 5/5 strength in all distributions, tone normal, sensation intact bilaterally, coordination exhibits dysmetria and past-pointing. DTRs intact and 2+ throughout.  Pronator is negative.    Significant Labs:    Recent Labs  Lab 08/04/17  0515 08/04/17  1505 08/04/17  1830 08/05/17  0218  08/05/17  0601   * 115*  --  111*  --     138 138  138 138  138 138   K 3.7 3.9  --  3.3*  --     101  --  103  --    CO2 26 27  --  23  --    BUN 22 22  --  18  --    CREATININE 1.1 1.1  --  1.0  --    CALCIUM 9.1 8.8  --  9.4  --    MG  --   --   --  1.6  --        Recent Labs  Lab 08/04/17  0515 08/04/17  1505 08/05/17  0218   WBC 15.41* 13.40* 13.11*   HGB 8.1* 8.6* 9.8*   HCT 24.1* 25.6* 28.6*   * 389* 410*       Recent Labs  Lab 08/04/17  1830   INR 1.0   APTT 22.7     Microbiology Results (last 7 days)     Procedure Component Value Units Date/Time    Blood culture [[id number]]     Order Status:  No result Specimen:  Blood     Culture, Respiratory with Gram Stain [[id number]]     Order Status:  No result Specimen:  Respiratory         All pertinent labs from the last 24 hours have been reviewed.    Significant Diagnostics:  MRI 8/5/17: Demonstrates a large region of abnormal restricted diffusion within the right cerebellar hemisphere compatible with acute infarct. There is associated hemorrhagic conversion present.    MRA 8/5/17: No evidence of high-grade stenosis of the visualized intracranial vasculature. Note is made that the mid and distal portions of the right AICA and PICA appear somewhat irregular and are not well-visualized,    CXR 8/5/17: Interval placement LEFT subclavian catheter with tip overlying the SVC. No pneumothorax. Heart and lungs unchanged when allowing for differences in technique and positioning.    Assessment/Plan:     * Embolic stroke involving right cerebellar artery    67M p/w cerebellar stroke/hemorrhage  -vasogenic edema and mass effect on head CT  -No acute neurosurgical intervention indicated at this time  -No EVD placement requried at this time  -Rec HOB elevated to 30  -Rec Na goal 140-150  -SBP goal <150  -Consider head CT for any acute changes in neuro exam  -Please call neurosurgery for any declining neuro status            Kenny Perea,  MD  Neurosurgery  [facility]

## 2017-08-05 NOTE — CONSULTS
[facility]  Vascular Neurology  Comprehensive Stroke Center  Consult Note    Inpatient consult to Vascular (Stroke) Neurology  Consult performed by: ALIDA VAUGHAN  Consult ordered by: BINH VANG  Reason for consult: cerebellar infarct        Assessment/Plan:     Patient is a 67 y.o. year old male with:    Cerebellar stroke    Mr. Koehler is a 68yo M with endocarditis and an acute R cerebellar infarct with edema and midline shift.     -Antithrombotics for secondary stroke prevention: Antiplatelets:  Aspirin: 325 mg oral now and daily  -Statins for secondary stroke prevention and hyperlipidemia, if present: None: Reason: consider if LDL>130; patient's likely etiology of stroke was septic emboli from endocarditis   -Aggressive risk factor modification: endocarditis, +proteus miribilis in urine, Bcx NGTD   -Rehab Efforts: Physical Therapy, Occupational Therapy and Speech and Language Pathology  -Diagnostics: Ordered/Pending: serial CT scan of head without contrast to asses brain parenchyma - monitoring for hemicrani watch, CTA Head to assess vasculature , CTA Neck/Arch to assess vasculature, HgbA1C to assess blood glucose levels, Lipid Profile to assess cholesterol levels, TSH to assess thyroid function  -VTE Prophylaxis: Heparin 5000 units SQ every 8 hours  -Endocarditis treatment per NCC  - Neurosurgery hemicrani watch        Acute bacterial endocarditis    Mitral valve vegetation seen on 8/3/17 ECHO  Blood cultures NGTD        Urinary tract infection with hematuria    + proteus in urine culture        NSTEMI (non-ST elevated myocardial infarction)    Per NCC, trending troponins            Thrombolysis Candidate? No  1. Contraindications: Infective endocarditis  2. Warnings: as above    Interventional Revascularization Candidate?  Large core infarct    Research Candidate? No    Subjective:     History of Present Illness:  Mr. Koehler is a 68yo M with history of urothelial bladder carcinoma  (s/p bladder resection in June 2017), CKD; he has been transferred to [facility] for a large R cerebellar infarct with edema and mass effect.    Mr. Koehler had a fall down stairs on Wednesday 8/2/17, then had vomiting so he was admitted to [facility]. He was diagnosed with complicated proteus UTI and endocarditis. Friday 8/4/17, patient developed an acute mental status change where he became confused and was unable to answer questions appropriately as well as a new R facial droop. Telestroke was performed with Dr. Carbajal. Patient found to have R cerebellar hemisphere edema w/ midline shift of the posterior fossa with mass effect on the 4th ventricle and mild enlargement of the temporal horns on CTH. Patient was transferred to [facility] for further care.          Past Medical History:   Diagnosis Date    Cancer     bladder and throat    CKD (chronic kidney disease) stage 3, GFR 30-59 ml/min     Urinary tract infection      Past Surgical History:   Procedure Laterality Date    BLADDER SURGERY      CYSTOSCOPY      HERNIA REPAIR      Ileal Conduit      THROAT SURGERY       Family History   Problem Relation Age of Onset    No Known Problems Father     Kidney disease Mother     Prostate cancer Neg Hx      Social History   Substance Use Topics    Smoking status: Never Smoker    Smokeless tobacco: Never Used    Alcohol use No     Review of patient's allergies indicates:  No Known Allergies  Medications: I have reviewed the current medication administration record.    Prescriptions Prior to Admission   Medication Sig Dispense Refill Last Dose    baclofen (LIORESAL) 10 MG tablet Take 10 mg by mouth 3 (three) times daily.   Taking    megestrol (MEGACE) 400 mg/10 mL (40 mg/mL) Susp Take by mouth.   Taking    ondansetron (ZOFRAN-ODT) 8 MG TbDL Take 1 tablet (8 mg total) by mouth every 6 (six) hours as needed (nausea). 30 tablet 1 Taking    oxycodone-acetaminophen (PERCOCET) 5-325 mg per tablet Take 1 tablet by mouth  every 4 (four) hours as needed for Pain. 40 tablet 0 Taking    oxycodone-acetaminophen (PERCOCET) 5-325 mg per tablet Take 1 tablet by mouth every 4 (four) hours as needed for Pain. 21 tablet 0 Taking    polyethylene glycol (GLYCOLAX) 17 gram PwPk Take 17 g by mouth once daily. 30 packet 0 Taking       Review of Systems   Constitutional: Positive for activity change, appetite change and fatigue.   HENT: Negative for voice change.    Eyes: Negative for photophobia and visual disturbance.   Respiratory: Negative for shortness of breath.    Cardiovascular: Negative for chest pain.   Gastrointestinal: Negative for nausea and vomiting.   Endocrine: Negative for polydipsia and polyphagia.   Genitourinary: Positive for dysuria.   Musculoskeletal: Negative for gait problem.   Skin: Negative for rash.   Allergic/Immunologic: Negative for immunocompromised state.   Neurological: Positive for headaches.   Hematological: Does not bruise/bleed easily.   Psychiatric/Behavioral: Positive for confusion. Negative for agitation and behavioral problems.     Objective:     Vital Signs (Most Recent):  Temp: 99.4 °F (37.4 °C) (08/04/17 1726)  Pulse: 74 (08/04/17 1800)  Resp: 20 (08/04/17 1800)  BP: (!) 175/82 (08/04/17 1800)  SpO2: 99 % (08/04/17 1800)    Vital Signs Range (Last 24H):  Temp:  [97.8 °F (36.6 °C)-99.5 °F (37.5 °C)]   Pulse:  [50-86]   Resp:  [18-22]   BP: (152-175)/(68-82)   SpO2:  [97 %-100 %]     Physical Exam   Constitutional: He appears well-developed. No distress.   HENT:   Head: Normocephalic and atraumatic.   Eyes: Pupils are equal, round, and reactive to light.   Mild convergence of gaze   Neck: Normal range of motion. Neck supple.   Cardiovascular: Normal rate.    Pulmonary/Chest: Effort normal.   Abdominal: Soft.   Musculoskeletal: Normal range of motion.   Neurological: He is alert. GCS eye subscore is 4 - spontaneous. GCS verbal subscore is 4 - confused. GCS motor subscore is 6 - obeys commands.   Oriented to  person and time   Skin: Skin is warm and dry. He is not diaphoretic.       Neurological Exam:   LOC: alert and follows requests  Language: No aphasia  Speech: No dysarthria  Orientation: Person, Time  Memory: Recent memory intact, Remote memory intact, Age correct, Month correct  Visual Fields (CN II): full  EOM (CN III, IV, VI): slight convergence of gaze  Oculocephalics: normal  Pupils (CN III, IV, VI): PERRL  Facial Sensation (CN V): Symmetric  Facial Movement (CN VII): lower weakness right lower  Hearing (CN VIII): intact bilaterally  Gag Reflex (CN IX, X): normal/symmetric  Shoulder/Neck (CN XI): SCM-Left: Normal ; SCM-Right: Normal ; Shoulder Shrug: Normal/Symetric  Tongue (CN XII): to midline  Reflexes: 2+ throughout  Motor*: Arm Left:  Normal (5/5), Leg Left:   Normal (5/5), Arm Right:   Normal (5/5), Leg Right:   Normal (5/5)  Cerebellar*: Finger/Nose Abnormal - right upper, patient confused over HTS instructions, no obvious ataxia noted  Sensation: intact to light touch, temperature and vibration  Tone: Arm-Left: normal; Leg-Left: normal; Arm-Right: normal; Leg-Right: normal    NIH Stroke Scale:  Interval: baseline (upon arrival/admit)  Level of Consciousness: 0 - alert  LOC Questions: 0 - answers both correctly  LOC Commands: 0 - performs both correctly  Best Gaze: 0 - normal  Visual: 0 - no visual loss  Facial Palsy: 1 - minor  Motor Left Arm: 0 - no drift  Motor Right Arm: 0 - no drift  Motor Left Le - no drift  Motor Right Le - no drift  Limb Ataxia: 1 - present in one limb  Sensory: 0 - normal  Best Language: 0 - no aphasia  Dysarthria: 0 - normal articulation  Extinction and Inattention: 0 - no neglect  NIH Stroke Scale Total: 2  [place] Coma Scale:  Best Eye Response: 4 - spontaneous  Best Motor Response: 6 - obeys commands  Best Verbal Response: 4 - confused  [place] Coma Scale Total: 14  Modified [place] Scale:   Timeline: Prior to symptoms onset  Modified [place] Score: 1 - no significant  disability        Laboratory:  CMP:   Recent Labs  Lab 08/04/17  1505   CALCIUM 8.8   ALBUMIN 2.5*   PROT 6.5      K 3.9   CO2 27      BUN 22   CREATININE 1.1   ALKPHOS 63   ALT 12   AST 13   BILITOT 0.5     BMP:   Recent Labs  Lab 08/04/17  1505      K 3.9      CO2 27   BUN 22   CREATININE 1.1   CALCIUM 8.8     CBC:   Recent Labs  Lab 08/04/17  1505   WBC 13.40*   RBC 3.01*   HGB 8.6*   HCT 25.6*   *   MCV 85   MCH 28.6   MCHC 33.6     Lipid Panel:   Recent Labs  Lab 08/04/17  0515   CHOL 115*   LDLCALC 70.2   HDL 21*   TRIG 119     Coagulation: No results for input(s): INR, APTT in the last 168 hours.    Invalid input(s): PT  Platelet Aggregation Study: No results for input(s): PLTAGG, PLTAGINTERP, PLTAGREGLACO, ADPPLTAGGREG in the last 168 hours.  Hgb A1C:   Recent Labs  Lab 08/04/17  0515   HGBA1C 5.3     TSH:   Recent Labs  Lab 08/02/17  1802   TSH 1.248       Diagnostic Results:  Brain Imaging: CT Head. Date: 8/4/17  Acute Pathology: Infarct  Location: Cerebellum:  right  Old Vascular Pathology: None  Location: N/A       Large area of vasogenic edema centered within the right cerebellar hemisphere resulting in severe mass effect with compression of the 4th ventricle, mild hydrocephalus and leftward deviation of the cerebral vermis.  Recommend emergent neurosurgical consultation.    Cerebrovascular Imaging: none    Cervical Vascular Imaging: none    Cardiac Evaluation: Trans-thoracic. Date: 8/3/17  CONCLUSIONS     1 - Concentric hypertrophy.     2 - No wall motion abnormalities.     3 - Normal left ventricular systolic function (EF 55-60%).     4 - Normal left ventricular diastolic function.     5 - Normal right ventricular systolic function .     6 - The estimated PA systolic pressure is 38 mmHg.     7 - Trivial aortic regurgitation.     8 - Evidence of mitral vegetation .     9 - Increased central venous pressure    EKG/Telemetry: Sinus rhythm      Darlene Reyes,  MARQUITA  Comprehensive Stroke Center  Department of Vascular Neurology   [facility]

## 2017-08-05 NOTE — PROGRESS NOTES
YFN at bedside. Ordered to monitor if pt becomes anymore restless or lethargic and report to MD. Also told that pt's baseline orientation for place is [place], [place] per YFN and wife. Will continue to monitor very closely.

## 2017-08-05 NOTE — SUBJECTIVE & OBJECTIVE
Neurologic Chief Complaint: R cerebellar stroke     Subjective:     Interval History: Patient is seen for follow-up neurological assessment and treatment recommendations: no events overnight, on ericka crani watch for swelling but doing well overall with stable neuro exam     HPI, Past Medical, Family, and Social History remains the same as documented in the initial encounter.     Review of Systems   Constitutional: Negative for fever.   Eyes: Negative for redness.   Musculoskeletal: Negative for joint swelling.   Neurological: Negative for headaches.     Scheduled Meds:   atorvastatin  40 mg Oral Daily    cefTRIAXone (ROCEPHIN) IVPB  1 g Intravenous Q24H    metoprolol tartrate  25 mg Oral BID    pantoprazole  40 mg Intravenous Daily    polyethylene glycol  17 g Oral Daily    senna-docusate 8.6-50 mg  1 tablet Oral BID    sodium chloride 0.9%  3 mL Intravenous Q8H    vancomycin (VANCOCIN) IVPB  1,000 mg Intravenous Q12H     Continuous Infusions:   niCARdipine Stopped (08/05/17 0701)    buffered 3% sodium acetate 130meq, sodium chloride 130meq, sterile water for inj IV soln 60 mL/hr at 08/05/17 1100     PRN Meds:acetaminophen, calcium gluconate IVPB, calcium gluconate IVPB, calcium gluconate IVPB, dextrose 50%, glucagon (human recombinant), hydrALAZINE, labetalol, magnesium sulfate IVPB, magnesium sulfate IVPB, ondansetron, potassium chloride **AND** potassium chloride **AND** potassium chloride, sodium phosphate IVPB, sodium phosphate IVPB, sodium phosphate IVPB    Objective:     Vital Signs (Most Recent):  Temp: 98.8 °F (37.1 °C) (08/05/17 0701)  Pulse: 70 (08/05/17 1100)  Resp: (!) 32 (08/05/17 1100)  BP: (!) 150/77 (08/05/17 1100)  SpO2: 98 % (08/05/17 1100)  BP Location: Right arm    Vital Signs Range (Last 24H):  Temp:  [97.8 °F (36.6 °C)-99.4 °F (37.4 °C)]   Pulse:  [70-96]   Resp:  [11-51]   BP: (122-175)/(63-83)   SpO2:  [96 %-100 %]   Arterial Line BP: (128-177)/(51-76)   BP Location: Right  arm    Physical Exam   Constitutional: He appears well-developed and well-nourished.   HENT:   Head: Normocephalic.   Eyes:   6th nerve and vertical palsy    Cardiovascular: Normal rate.    Pulmonary/Chest: Effort normal.   Neurological: He is alert.   Skin: Skin is warm and dry.   Nursing note and vitals reviewed.      Neurological Exam:   Alert, oriented to person and time  Equal sensation x 4   Full strength throughout  Patient is better with gaze to left but can with much encouragement get somewhat to right.   Right facial droop  B UE ataxia      NIH Stroke Scale:    Level of Consciousness: 0 - alert  LOC Questions: 0 - answers both correctly  LOC Commands: 0 - performs both correctly  Best Gaze: 1 - partial gaze palsy  Visual: 0 - no visual loss  Facial Palsy: 1 - minor  Motor Left Arm: 0 - no drift  Motor Right Arm: 0 - no drift  Motor Left Le - no drift  Motor Right Le - no drift  Limb Ataxia: 2 - present in two limbs  Sensory: 0 - normal  Best Language: 0 - no aphasia  Dysarthria: 0 - normal articulation  Extinction and Inattention: 0 - no neglect  NIH Stroke Scale Total: 4      Laboratory:  CMP:   Recent Labs  Lab 17  0218 17  0601   CALCIUM 9.4  --    ALBUMIN 2.6*  --    PROT 7.5  --      138 138   K 3.3*  --    CO2 23  --      --    BUN 18  --    CREATININE 1.0  --    ALKPHOS 74  --    ALT 14  --    AST 14  --    BILITOT 0.6  --      CBC:   Recent Labs  Lab 17   WBC 13.11*   RBC 3.41*   HGB 9.8*   HCT 28.6*   *   MCV 84   MCH 28.7   MCHC 34.3     Lipid Panel:   Recent Labs  Lab 17  1830   CHOL 132   LDLCALC 76.4   HDL 31*   TRIG 123     Coagulation:   Recent Labs  Lab 17  183   INR 1.0   APTT 22.7     Platelet Aggregation Study: No results for input(s): PLTAGG, PLTAGINTERP, PLTAGREGLACO, ADPPLTAGGREG in the last 168 hours.  Hgb A1C:   Recent Labs  Lab 17  183   HGBA1C 5.4     TSH:   Recent Labs  Lab 17  183   TSH 1.817  1.817        Diagnostic Results:  I have personally reviewed:   MRI 8/5/17  1. Large region of abnormal restricted diffusion within the right cerebellar hemisphere compatible with acute infarct. There is associated hemorrhagic conversion present. There is localized mass effect on the abril as well as effacement of the fourth ventricle. Ventricular system remains mildly prominent, unchanged from prior CT examination.    2. Additional focal regions of mild diffusion hyperintensity with associated serpiginous non-masslike cortical enhancement within the left parietal and occipital lobes suggestive of subacute infarcts. Additional region of volume loss with associated intrinsic T1 hyperintensity and serpiginous enhancement is present within the left cerebellar hemisphere suggestive of a late subacute infarct with laminar necrosis. Small remote lacunar type infarcts are also present in the right corona radiata. Overall findings are suggestive of possible thromboembolic phenomena. Clinical correlation is advised.    3. No evidence of high-grade stenosis of the visualized intracranial vasculature. Note is made that the mid and distal portions of the right AICA and PICA appear somewhat irregular and are not well-visualized, although a component of this may relate to noncontrast MRA time-of-flight technique.        8/4/17 - CT head    Large area of vasogenic edema centered within the right cerebellar hemisphere resulting in severe mass effect with compression of the 4th ventricle, mild hydrocephalus and leftward deviation of the cerebral vermis.  Recommend emergent neurosurgical consultation.

## 2017-08-05 NOTE — PROGRESS NOTES
[facility]  Neurocritical Care  Progress Note    Admit Date: 8/4/2017  Service Date: 08/05/2017  Length of Stay: 1    Subjective:     Chief Complaint: Embolic stroke involving right cerebellar artery    History of Present Illness: Juan Manuel Koehler is a 67 y.o. male with invasive bladder cancer s/p open radical cystoproctostomy, bilateral pelvic lymph node dissection and ileal conduit urinary diversion (6/21/17, path with high grade urothelial carcinoma, LN negative for malignancy), CKD III  With deconditioning and poor PO intake secondary to cancer. He was in his usual state of health (lives at home with wife, able to ambulate, performs some adls) until day of admission (8/2) when patient was coming downstairs and all of a sudden felt dizzy. He slipped and fell;  Denies he did not hit his head. He fell on his back and was unable to get up secondary to generalized weakness. He denies focal weakness. He denies any loss of consciousness. He did not have chest pain, palpitations or shortness of breath prior to this event. While he was on the stairs he started to vomit multiple times. He was diaphoretic and vomited what he just drank (red poweraide). He denies any coffee ground emesis or bright red hematemesis. He denies melena. His wife called EMS and en route to the hospital he continued to vomit. He denies fevers or chills. He denies malodorous urine. He has noticed increased urinary frequency. He denies belly pain or drainage around urostomy site.     While at OSH, he developed Aphasia 8/4 and telestroke consult performed. CTH shows large right cerebellar hypodensity. Patient transferred to [facility] for management/NSGY eval.        Hospital Course:  8/2 admitted OSH for UTI/Sepsis, acute anemia and endocarditis.  8/4 transferred to [facility] following episode of aphasia. Hypertonic started for large cerebellar hypodensity/concern for edema/hydrocephalus  8/5 Started on hypertonic gtt, exam stable.     Past  Medical History:   Diagnosis Date    Cancer     bladder and throat    CKD (chronic kidney disease) stage 3, GFR 30-59 ml/min     Embolic stroke involving right cerebellar artery 8/4/2017    Urinary tract infection      Past Surgical History:   Procedure Laterality Date    BLADDER SURGERY      CYSTOSCOPY      HERNIA REPAIR      Ileal Conduit      THROAT SURGERY        Current Facility-Administered Medications on File Prior to Encounter   Medication Dose Route Frequency Provider Last Rate Last Dose    [COMPLETED] mannitol 20% 20 g in sodium chloride 0.9% 100 mL infusion   Intravenous Once Deshaun Alvarado  mL/hr at 08/04/17 1633 20 g at 08/04/17 1633    [DISCONTINUED] 0.9%  NaCl infusion (for blood administration)   Intravenous Q24H PRN Charlene Palomares MD        [DISCONTINUED] acetaminophen tablet 650 mg  650 mg Oral Q6H PRN Charlene Palomares MD        [DISCONTINUED] Amino acid 4.25% - dextrose 10% (CLINIMIX-E) solution with additives (1L provides 42.5 gm AA, 100 gm CHO (340 kcal/L dextrose), Na 35, K 30, Mg 5, Ca 4.5, Acetate 70, Cl 39, Phos 15)   Intravenous Continuous Treva Frausto MD        [DISCONTINUED] Amino acid 4.25% - dextrose 10% (CLINIMIX-E) solution with additives (1L provides 42.5 gm AA, 100 gm CHO (340 kcal/L dextrose), Na 35, K 30, Mg 5, Ca 4.5, Acetate 70, Cl 39, Phos 15)   Intravenous Continuous Arnol Colbert MD        [DISCONTINUED] aspirin chewable tablet 81 mg  81 mg Oral Daily Charlene Palomares MD   81 mg at 08/04/17 0844    [DISCONTINUED] atorvastatin tablet 40 mg  40 mg Oral QHS Charlene Palomares MD   40 mg at 08/03/17 2210    [DISCONTINUED] baclofen tablet 10 mg  10 mg Oral TID Charlene Palomares MD   10 mg at 08/04/17 1420    [DISCONTINUED] cefTRIAXone (ROCEPHIN) 2 g in dextrose 5 % 50 mL IVPB  2 g Intravenous Q12H Shreya Calderon MD   2 g at 08/04/17 0442    [DISCONTINUED] heparin (porcine) injection 5,000 Units  5,000 Units  Subcutaneous Q8H Charlene Palomares MD   5,000 Units at 08/04/17 1420    [DISCONTINUED] mannitol 20% infusion 36 g  36 g Intravenous Once Deshaun Alvarado MD        [DISCONTINUED] megestrol 400 mg/10 mL (10 mL) suspension 200 mg  200 mg Oral TID WM Charlene Palomares MD   200 mg at 08/04/17 1116    [DISCONTINUED] metoprolol tartrate (LOPRESSOR) tablet 25 mg  25 mg Oral BID Charlene Palomares MD   25 mg at 08/04/17 0849    [DISCONTINUED] ondansetron injection 4 mg  4 mg Intravenous Q6H PRN Charlene Palomares MD   4 mg at 08/03/17 1332    [DISCONTINUED] oxycodone-acetaminophen 5-325 mg per tablet 1 tablet  1 tablet Oral Q4H PRN Charlene Palomares MD   1 tablet at 08/04/17 0843    [DISCONTINUED] pantoprazole EC tablet 40 mg  40 mg Oral Daily Charlene Palomares MD   40 mg at 08/04/17 0844    [DISCONTINUED] polyethylene glycol packet 17 g  17 g Oral Daily Charlene Palomares MD   17 g at 08/04/17 0844    [DISCONTINUED] promethazine tablet 12.5 mg  12.5 mg Oral Q6H PRN Charlene Palomares MD   12.5 mg at 08/03/17 0634    [DISCONTINUED] vancomycin 1 g in dextrose 5 % 250 mL IVPB (ready to mix system)  1,000 mg Intravenous Q24H Charlene Palomares .7 mL/hr at 08/04/17 0016 1,000 mg at 08/04/17 0016    [DISCONTINUED] vancomycin 750 mg in dextrose 5 % 250 mL IVPB (ready to mix system)  750 mg Intravenous Q12H Arnol Colbert .7 mL/hr at 08/04/17 1429 750 mg at 08/04/17 1429     Current Outpatient Prescriptions on File Prior to Encounter   Medication Sig Dispense Refill    baclofen (LIORESAL) 10 MG tablet Take 10 mg by mouth 3 (three) times daily.      megestrol (MEGACE) 400 mg/10 mL (40 mg/mL) Susp Take by mouth.      ondansetron (ZOFRAN-ODT) 8 MG TbDL Take 1 tablet (8 mg total) by mouth every 6 (six) hours as needed (nausea). 30 tablet 1    oxycodone-acetaminophen (PERCOCET) 5-325 mg per tablet Take 1 tablet by mouth every 4 (four) hours as needed for Pain. 40 tablet 0     oxycodone-acetaminophen (PERCOCET) 5-325 mg per tablet Take 1 tablet by mouth every 4 (four) hours as needed for Pain. 21 tablet 0    polyethylene glycol (GLYCOLAX) 17 gram PwPk Take 17 g by mouth once daily. 30 packet 0      Allergies: Review of patient's allergies indicates no known allergies.    Family History   Problem Relation Age of Onset    No Known Problems Father     Kidney disease Mother     Prostate cancer Neg Hx      Social History   Substance Use Topics    Smoking status: Never Smoker    Smokeless tobacco: Never Used    Alcohol use No     Review of Systems   Respiratory: Negative for apnea, cough, choking, chest tightness, shortness of breath, wheezing and stridor.    Cardiovascular: Negative for chest pain, palpitations and leg swelling.   Gastrointestinal: Negative for abdominal distention, nausea and vomiting.   Psychiatric/Behavioral: Negative for agitation, behavioral problems and confusion.     Objective:     Vitals:  Temp: 98.3 °F (36.8 °C) (08/05/17 1100)  Pulse: 81 (08/05/17 1300)  Resp: (!) 31 (08/05/17 1300)  BP: (!) 151/75 (08/05/17 1300)  SpO2: 98 % (08/05/17 1300)    Temp:  [97.8 °F (36.6 °C)-99.4 °F (37.4 °C)] 98.3 °F (36.8 °C)  Pulse:  [70-96] 81  Resp:  [11-51] 31  SpO2:  [96 %-100 %] 98 %  BP: (122-175)/(63-83) 151/75  Arterial Line BP: (128-177)/(51-76) 154/61              08/04 0701 - 08/05 0700  In: 951.8 [I.V.:951.8]  Out: 2175 [Urine:2175]    Physical Exam   HENT:   Head: Normocephalic and atraumatic.   Cardiovascular: Normal rate, regular rhythm, normal heart sounds and intact distal pulses.    Pulmonary/Chest: Effort normal and breath sounds normal.   Abdominal: Soft. Bowel sounds are normal.   Neurological:   E 4 V5 M6  AAO person, month, location  6th nerve palsy, vertical gaze palsy  Right facial weakness  Minimal right sided weakness,   Dysmetria bilateral upper extremities     Today I personally reviewed pertinent medications, lines/drains/airways, imaging,  cardiology, lab results, microbiology results,     Assessment/Plan:     Neuro   * Embolic stroke involving right cerebellar artery    Vascular Surgery Consult  NeuroSurgery Consult, suboccipital crani/hydrocephalus watch  Q1hr neuro Checks, SBP <160  Hypertonic gtt and q6hr Serum Na, goal 145-150  MRI/MRA head/neck 8/5  PT/OT/SLP        Cardiac/Vascular   NSTEMI (non-ST elevated myocardial infarction)    NSTEMI outside hospital  tropon is trending down  EKG shows no SHAVONNE  Patient denies CP   Metoprolol resumed          Renal/   Urinary tract infection with hematuria    ON rocephin, ID recs are for 7 days, should end 8/9        ID   Bacteremia due to Enterococcus    ID recs, cont vanc until suseceptability return,   then consider switch to ampicillin 2 g Q4 hrs + increased dose of Rocephin 2mg Q12  - Will continue Tx for 6 weeks after the Bacteremia has cleared on BCx           Oncology   Anemia of chronic disease    -Will follow H/H   -Iron panel drawn outside hospital, unsure if before PRBC transfused  -HDS at present  -On PPI            Prophylaxis:  Venous Thromboembolism: mechanical  Stress Ulcer: PPI  Ventilator Pneumonia: not applicable     Activity Orders          None        Full Code   Critical Care: 36 min    Wilfrid Rock NP  Neurocritical Care  [facility]

## 2017-08-06 PROBLEM — I10 HYPERTENSION: Status: ACTIVE | Noted: 2017-08-06

## 2017-08-06 LAB
ALBUMIN SERPL BCP-MCNC: 2.5 G/DL
ALP SERPL-CCNC: 64 U/L
ALT SERPL W/O P-5'-P-CCNC: 15 U/L
ANION GAP SERPL CALC-SCNC: 9 MMOL/L
AST SERPL-CCNC: 16 U/L
BASOPHILS # BLD AUTO: 0.01 K/UL
BASOPHILS NFR BLD: 0.1 %
BILIRUB SERPL-MCNC: 0.6 MG/DL
BUN SERPL-MCNC: 17 MG/DL
CALCIUM SERPL-MCNC: 9.1 MG/DL
CHLORIDE SERPL-SCNC: 111 MMOL/L
CO2 SERPL-SCNC: 27 MMOL/L
CREAT SERPL-MCNC: 1 MG/DL
DIFFERENTIAL METHOD: ABNORMAL
EOSINOPHIL # BLD AUTO: 0 K/UL
EOSINOPHIL NFR BLD: 0.1 %
ERYTHROCYTE [DISTWIDTH] IN BLOOD BY AUTOMATED COUNT: 15.7 %
EST. GFR  (AFRICAN AMERICAN): >60 ML/MIN/1.73 M^2
EST. GFR  (NON AFRICAN AMERICAN): >60 ML/MIN/1.73 M^2
GLUCOSE SERPL-MCNC: 113 MG/DL
HCT VFR BLD AUTO: 28.2 %
HGB BLD-MCNC: 9.5 G/DL
LYMPHOCYTES # BLD AUTO: 0.9 K/UL
LYMPHOCYTES NFR BLD: 9.5 %
MAGNESIUM SERPL-MCNC: 2 MG/DL
MCH RBC QN AUTO: 29 PG
MCHC RBC AUTO-ENTMCNC: 33.7 G/DL
MCV RBC AUTO: 86 FL
MONOCYTES # BLD AUTO: 0.5 K/UL
MONOCYTES NFR BLD: 5 %
NEUTROPHILS # BLD AUTO: 8.2 K/UL
NEUTROPHILS NFR BLD: 84.6 %
PHOSPHATE SERPL-MCNC: 3.8 MG/DL
PLATELET # BLD AUTO: 392 K/UL
PMV BLD AUTO: 7.9 FL
POCT GLUCOSE: 114 MG/DL (ref 70–110)
POCT GLUCOSE: 117 MG/DL (ref 70–110)
POCT GLUCOSE: 119 MG/DL (ref 70–110)
POCT GLUCOSE: 127 MG/DL (ref 70–110)
POCT GLUCOSE: 127 MG/DL (ref 70–110)
POCT GLUCOSE: 181 MG/DL (ref 70–110)
POTASSIUM SERPL-SCNC: 4 MMOL/L
PROT SERPL-MCNC: 7.1 G/DL
RBC # BLD AUTO: 3.28 M/UL
SODIUM SERPL-SCNC: 145 MMOL/L
SODIUM SERPL-SCNC: 147 MMOL/L
SODIUM SERPL-SCNC: 148 MMOL/L
SODIUM SERPL-SCNC: 149 MMOL/L
SODIUM SERPL-SCNC: 153 MMOL/L
VANCOMYCIN TROUGH SERPL-MCNC: 18.7 UG/ML
WBC # BLD AUTO: 9.75 K/UL

## 2017-08-06 PROCEDURE — 99232 SBSQ HOSP IP/OBS MODERATE 35: CPT | Mod: GC,,, | Performed by: NEUROLOGICAL SURGERY

## 2017-08-06 PROCEDURE — 63600175 PHARM REV CODE 636 W HCPCS: Performed by: INTERNAL MEDICINE

## 2017-08-06 PROCEDURE — 25000003 PHARM REV CODE 250: Performed by: INTERNAL MEDICINE

## 2017-08-06 PROCEDURE — 85025 COMPLETE CBC W/AUTO DIFF WBC: CPT

## 2017-08-06 PROCEDURE — 84295 ASSAY OF SERUM SODIUM: CPT

## 2017-08-06 PROCEDURE — 99233 SBSQ HOSP IP/OBS HIGH 50: CPT | Mod: ,,, | Performed by: INTERNAL MEDICINE

## 2017-08-06 PROCEDURE — 84100 ASSAY OF PHOSPHORUS: CPT

## 2017-08-06 PROCEDURE — 99233 SBSQ HOSP IP/OBS HIGH 50: CPT | Mod: ,,, | Performed by: PSYCHIATRY & NEUROLOGY

## 2017-08-06 PROCEDURE — 84295 ASSAY OF SERUM SODIUM: CPT | Mod: 91

## 2017-08-06 PROCEDURE — 80053 COMPREHEN METABOLIC PANEL: CPT

## 2017-08-06 PROCEDURE — 80202 ASSAY OF VANCOMYCIN: CPT

## 2017-08-06 PROCEDURE — 63600175 PHARM REV CODE 636 W HCPCS: Performed by: NURSE PRACTITIONER

## 2017-08-06 PROCEDURE — 20000000 HC ICU ROOM

## 2017-08-06 PROCEDURE — A4216 STERILE WATER/SALINE, 10 ML: HCPCS | Performed by: NURSE PRACTITIONER

## 2017-08-06 PROCEDURE — 25000003 PHARM REV CODE 250: Performed by: NURSE PRACTITIONER

## 2017-08-06 PROCEDURE — 99291 CRITICAL CARE FIRST HOUR: CPT | Mod: ,,, | Performed by: NURSE PRACTITIONER

## 2017-08-06 PROCEDURE — C9113 INJ PANTOPRAZOLE SODIUM, VIA: HCPCS | Performed by: NURSE PRACTITIONER

## 2017-08-06 PROCEDURE — 83735 ASSAY OF MAGNESIUM: CPT

## 2017-08-06 RX ORDER — LISINOPRIL 20 MG/1
20 TABLET ORAL DAILY
Status: DISCONTINUED | OUTPATIENT
Start: 2017-08-07 | End: 2017-08-15 | Stop reason: HOSPADM

## 2017-08-06 RX ORDER — LISINOPRIL 10 MG/1
10 TABLET ORAL ONCE
Status: COMPLETED | OUTPATIENT
Start: 2017-08-06 | End: 2017-08-06

## 2017-08-06 RX ADMIN — Medication 3 ML: at 01:08

## 2017-08-06 RX ADMIN — STANDARDIZED SENNA CONCENTRATE AND DOCUSATE SODIUM 1 TABLET: 8.6; 5 TABLET, FILM COATED ORAL at 08:08

## 2017-08-06 RX ADMIN — AMPICILLIN SODIUM 2 G: 2 INJECTION, POWDER, FOR SOLUTION INTRAMUSCULAR; INTRAVENOUS at 04:08

## 2017-08-06 RX ADMIN — Medication 3 ML: at 10:08

## 2017-08-06 RX ADMIN — CEFTRIAXONE 2 G: 2 INJECTION, SOLUTION INTRAVENOUS at 04:08

## 2017-08-06 RX ADMIN — LABETALOL HYDROCHLORIDE 10 MG: 5 INJECTION, SOLUTION INTRAVENOUS at 01:08

## 2017-08-06 RX ADMIN — AMPICILLIN SODIUM 2 G: 2 INJECTION, POWDER, FOR SOLUTION INTRAMUSCULAR; INTRAVENOUS at 11:08

## 2017-08-06 RX ADMIN — ATORVASTATIN CALCIUM 40 MG: 20 TABLET, FILM COATED ORAL at 08:08

## 2017-08-06 RX ADMIN — PANTOPRAZOLE SODIUM 40 MG: 40 INJECTION, POWDER, FOR SOLUTION INTRAVENOUS at 08:08

## 2017-08-06 RX ADMIN — METOPROLOL TARTRATE 25 MG: 25 TABLET ORAL at 08:08

## 2017-08-06 RX ADMIN — SODIUM CHLORIDE: 234 INJECTION INTRAMUSCULAR; INTRAVENOUS; SUBCUTANEOUS at 12:08

## 2017-08-06 RX ADMIN — SODIUM CHLORIDE: 234 INJECTION INTRAMUSCULAR; INTRAVENOUS; SUBCUTANEOUS at 07:08

## 2017-08-06 RX ADMIN — AMPICILLIN SODIUM 2 G: 2 INJECTION, POWDER, FOR SOLUTION INTRAMUSCULAR; INTRAVENOUS at 07:08

## 2017-08-06 RX ADMIN — AMPICILLIN SODIUM 2 G: 2 INJECTION, POWDER, FOR SOLUTION INTRAMUSCULAR; INTRAVENOUS at 03:08

## 2017-08-06 RX ADMIN — AMPICILLIN SODIUM 2 G: 2 INJECTION, POWDER, FOR SOLUTION INTRAMUSCULAR; INTRAVENOUS at 08:08

## 2017-08-06 RX ADMIN — NICARDIPINE HYDROCHLORIDE 7.5 MG/HR: 0.2 INJECTION, SOLUTION INTRAVENOUS at 07:08

## 2017-08-06 RX ADMIN — LISINOPRIL 10 MG: 10 TABLET ORAL at 09:08

## 2017-08-06 RX ADMIN — LISINOPRIL 10 MG: 10 TABLET ORAL at 01:08

## 2017-08-06 RX ADMIN — STANDARDIZED SENNA CONCENTRATE AND DOCUSATE SODIUM 1 TABLET: 8.6; 5 TABLET, FILM COATED ORAL at 10:08

## 2017-08-06 RX ADMIN — METOPROLOL TARTRATE 25 MG: 25 TABLET ORAL at 10:08

## 2017-08-06 RX ADMIN — ONDANSETRON 4 MG: 2 INJECTION INTRAMUSCULAR; INTRAVENOUS at 12:08

## 2017-08-06 RX ADMIN — AMPICILLIN SODIUM 2 G: 2 INJECTION, POWDER, FOR SOLUTION INTRAMUSCULAR; INTRAVENOUS at 12:08

## 2017-08-06 RX ADMIN — SODIUM CHLORIDE: 234 INJECTION INTRAMUSCULAR; INTRAVENOUS; SUBCUTANEOUS at 08:08

## 2017-08-06 RX ADMIN — NICARDIPINE HYDROCHLORIDE 5 MG/HR: 0.2 INJECTION, SOLUTION INTRAVENOUS at 06:08

## 2017-08-06 RX ADMIN — POLYETHYLENE GLYCOL 3350 17 G: 17 POWDER, FOR SOLUTION ORAL at 08:08

## 2017-08-06 NOTE — SUBJECTIVE & OBJECTIVE
Interval History: Feeling better. Abx changed yesterday. So far, so good.    Review of Systems   Constitutional: Positive for diaphoresis and fatigue. Negative for chills and fever.   All other systems reviewed and are negative.    Objective:     Vital Signs (Most Recent):  Temp: 98.5 °F (36.9 °C) (08/06/17 1100)  Pulse: 75 (08/06/17 1100)  Resp: 15 (08/06/17 1100)  BP: 129/63 (08/06/17 1100)  SpO2: 97 % (08/06/17 1100) Vital Signs (24h Range):  Temp:  [98.1 °F (36.7 °C)-99.1 °F (37.3 °C)] 98.5 °F (36.9 °C)  Pulse:  [69-95] 75  Resp:  [14-54] 15  SpO2:  [96 %-100 %] 97 %  BP: (121-163)/(61-81) 129/63  Arterial Line BP: (120-167)/(52-67) 128/53     Weight: 57.5 kg (126 lb 12.2 oz)  Body mass index is 16.28 kg/m².    Estimated Creatinine Clearance: 57.5 mL/min (based on Cr of 1).    Physical Exam   Constitutional: He is oriented to person, place, and time. He appears well-developed and well-nourished.   HENT:   Head: Normocephalic and atraumatic.   Right Ear: External ear normal.   Left Ear: External ear normal.   Mouth/Throat: Oropharynx is clear and moist.   Eyes: Conjunctivae and EOM are normal. Pupils are equal, round, and reactive to light.   Neck: Normal range of motion. Neck supple. No thyromegaly present.   Cardiovascular: Normal rate and regular rhythm.    Murmur heard.  Pulmonary/Chest: Effort normal and breath sounds normal. He has no wheezes. He has no rales.   Abdominal: Soft. Bowel sounds are normal. He exhibits no mass. There is no tenderness. There is no rebound.   Musculoskeletal: Normal range of motion.   Lymphadenopathy:     He has no cervical adenopathy.   Neurological: He is alert and oriented to person, place, and time.   Skin: Skin is warm and dry.   Psychiatric: He has a normal mood and affect. His behavior is normal. Judgment and thought content normal.   Nursing note and vitals reviewed.      Significant Labs:   Blood Culture:   Recent Labs  Lab 08/02/17  1802 08/02/17  1807 08/04/17  7266  08/05/17  1316   LABBLOO Gram stain ani bottle: Gram positive cocci in chains resembling Strep   Results called to and read back by: Jeana Barrera RN 08/03/2017  10:42  Gram stain aer bottle: Gram positive cocci in chains resembling Strep  ENTEROCOCCUS FAECALISFor susceptibility see order #[id number] Gram stain ani bottle: Gram positive cocci in chains resembling Strep   Results called to and read back by: Jeana Barrera RN 08/03/2017  10:42  Gram stain aer bottle: Gram positive cocci in chains resembling Strep  ENTEROCOCCUS FAECALIS Gram stain aer bottle: Gram positive cocci in chains resembling Strep   Results called to and read back by:Kendra Montez RN 08/05/2017  11:15  ENTEROCOCCUS SPECIESIdentification pendingFor susceptibility see order #[id number]  Gram stain aer bottle: Gram positive cocci in chains resembling Strep   Results called to and read back by: Bess Montez 08/05/2017  17:08  ENTEROCOCCUS SPECIESIdentification pendingFor susceptibility see order #[id number] No Growth to date     BMP:   Recent Labs  Lab 08/06/17  0406 08/06/17  0603   *  --    * 148*   K 4.0  --    *  --    CO2 27  --    BUN 17  --    CREATININE 1.0  --    CALCIUM 9.1  --    MG 2.0  --      CBC:   Recent Labs  Lab 08/04/17  1505 08/05/17  0218 08/06/17  0406   WBC 13.40* 13.11* 9.75   HGB 8.6* 9.8* 9.5*   HCT 25.6* 28.6* 28.2*   * 410* 392*       Significant Imaging: I have reviewed all pertinent imaging results/findings within the past 24 hours.

## 2017-08-06 NOTE — ASSESSMENT & PLAN NOTE
Mr. Koehler is a 68yo M with endocarditis and an acute R cerebellar infarct with edema and midline shift.     -Antithrombotics for secondary stroke prevention: Antiplatelets:  Aspirin: 325 mg oral now and daily  -Statins for secondary stroke prevention and hyperlipidemia, if present: None: Reason: consider if LDL>130; patient's likely etiology of stroke was septic emboli from endocarditis. 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

## 2017-08-06 NOTE — ASSESSMENT & PLAN NOTE
Vascular Surgery Consult  NeuroSurgery Consult, suboccipital crani/hydrocephalus watch  Q1hr neuro Checks, SBP <160  Hypertonic gtt and q6hr Serum Na, goal 145-150  MRI/MRA head/neck, no mass/vesseal abn.   PT/OT/SLP

## 2017-08-06 NOTE — SUBJECTIVE & OBJECTIVE
Prescriptions Prior to Admission   Medication Sig Dispense Refill Last Dose    baclofen (LIORESAL) 10 MG tablet Take 10 mg by mouth 3 (three) times daily.   Taking    megestrol (MEGACE) 400 mg/10 mL (40 mg/mL) Susp Take by mouth.   Taking    ondansetron (ZOFRAN-ODT) 8 MG TbDL Take 1 tablet (8 mg total) by mouth every 6 (six) hours as needed (nausea). 30 tablet 1 Taking    oxycodone-acetaminophen (PERCOCET) 5-325 mg per tablet Take 1 tablet by mouth every 4 (four) hours as needed for Pain. 40 tablet 0 Taking    oxycodone-acetaminophen (PERCOCET) 5-325 mg per tablet Take 1 tablet by mouth every 4 (four) hours as needed for Pain. 21 tablet 0 Taking    polyethylene glycol (GLYCOLAX) 17 gram PwPk Take 17 g by mouth once daily. 30 packet 0 Taking       Review of patient's allergies indicates:  No Known Allergies    Past Medical History:   Diagnosis Date    Cancer     bladder and throat    CKD (chronic kidney disease) stage 3, GFR 30-59 ml/min     Embolic stroke involving right cerebellar artery 8/4/2017    Urinary tract infection      Past Surgical History:   Procedure Laterality Date    BLADDER SURGERY      CYSTOSCOPY      HERNIA REPAIR      Ileal Conduit      THROAT SURGERY       Family History     Problem Relation (Age of Onset)    Kidney disease Mother    No Known Problems Father        Social History Main Topics    Smoking status: Never Smoker    Smokeless tobacco: Never Used    Alcohol use No    Drug use: No    Sexual activity: Yes     Partners: Female     Birth control/ protection: None     Review of Systems    Objective:     Weight: 57.5 kg (126 lb 12.2 oz)  Body mass index is 16.28 kg/m².  Vital Signs (Most Recent):  Temp: 98.1 °F (36.7 °C) (08/06/17 0701)  Pulse: 70 (08/06/17 1000)  Resp: (!) 22 (08/06/17 1000)  BP: 124/64 (08/06/17 1000)  SpO2: 99 % (08/06/17 1000) Vital Signs (24h Range):  Temp:  [98.1 °F (36.7 °C)-99.1 °F (37.3 °C)] 98.1 °F (36.7 °C)  Pulse:  [69-95] 70  Resp:  [14-54]  22  SpO2:  [96 %-100 %] 99 %  BP: (121-163)/(61-81) 124/64  Arterial Line BP: (120-167)/(52-67) 121/54       Date 08/06/17 0700 - 08/07/17 0659   Shift 8864-6324 8601-0535 8154-2270 24 Hour Total   I  N  T  A  K  E   I.V.  (mL/kg) 660.2  (11.5)   660.2  (11.5)    IV Piggyback 400   400    Shift Total  (mL/kg) 1060.2  (18.4)   1060.2  (18.4)   O  U  T  P  U  T   Urine  (mL/kg/hr) 805   805    Shift Total  (mL/kg) 805  (14)   805  (14)   Weight (kg) 57.5 57.5 57.5 57.5                        Urostomy 08/02/17 2345 ileal conduit RUQ (Active)   Stoma Appearance round 8/4/2017  7:55 AM   Stomal Appliance 1 piece 8/3/2017  7:35 PM   Accessories/Skin Care appliance belt 8/4/2017  7:55 AM   Stoma Function yellow urine 8/4/2017  7:55 AM   Output (mL) 150 mL 8/4/2017  6:00 PM       Neurosurgery Physical Exam     AOx3; GCS15  CNII-XII: Intact with the exception of conjugate vertical gaze palsy with saccade; PERRL  Extremities: Motor exam grossly normal with 5/5 strength in all distributions, tone normal, sensation intact bilaterally, coordination exhibits dysmetria and past-pointing. DTRs intact and 2+ throughout.  Pronator is negative.    Significant Labs:    Recent Labs  Lab 08/04/17  1505  08/05/17  0218  08/05/17  1204  08/06/17  0025 08/06/17  0406 08/06/17  0603   *  --  111*  --   --   --   --  113*  --      < > 138  138  < > 140  < > 145 147* 148*   K 3.9  --  3.3*  --  4.4  --   --  4.0  --      --  103  --   --   --   --  111*  --    CO2 27  --  23  --   --   --   --  27  --    BUN 22  --  18  --   --   --   --  17  --    CREATININE 1.1  --  1.0  --   --   --   --  1.0  --    CALCIUM 8.8  --  9.4  --   --   --   --  9.1  --    MG  --   --  1.6  --  1.9  --   --  2.0  --    < > = values in this interval not displayed.    Recent Labs  Lab 08/04/17  1505 08/05/17  0218 08/06/17  0406   WBC 13.40* 13.11* 9.75   HGB 8.6* 9.8* 9.5*   HCT 25.6* 28.6* 28.2*   * 410* 392*       Recent Labs  Lab  08/04/17  1830   INR 1.0   APTT 22.7     Microbiology Results (last 7 days)     Procedure Component Value Units Date/Time    Blood culture [[id number]] Collected:  08/05/17 1316    Order Status:  Completed Specimen:  Blood from Peripheral, Hand, Right Updated:  08/05/17 2115     Blood Culture, Routine No Growth to date    Narrative:       Blood cultures from 2 different sites. 4 bottles total.  Please draw before starting antibiotics.    Culture, Respiratory with Gram Stain [[id number]]     Order Status:  No result Specimen:  Respiratory         All pertinent labs from the last 24 hours have been reviewed.    Significant Diagnostics:  No new diagnostic results

## 2017-08-06 NOTE — SUBJECTIVE & OBJECTIVE
Neurologic Chief Complaint: R cerebellar stroke     Subjective:     Interval History: Patient is seen for follow-up neurological assessment and treatment recommendations: improved alertness today. Mild nausea but overall improving.   Repeat head CT ordered     HPI, Past Medical, Family, and Social History remains the same as documented in the initial encounter.     Review of Systems   Constitutional: Negative for fever.   Eyes: Negative for redness.   Gastrointestinal: Positive for nausea. Negative for vomiting.   Neurological: Negative for headaches.     Scheduled Meds:   ampicillin IVPB  2 g Intravenous Q4H    atorvastatin  40 mg Oral Daily    cefTRIAXone (ROCEPHIN) IVPB  2 g Intravenous Q12H    lisinopril  10 mg Oral Daily    metoprolol tartrate  25 mg Oral BID    pantoprazole  40 mg Intravenous Daily    polyethylene glycol  17 g Oral Daily    senna-docusate 8.6-50 mg  1 tablet Oral BID    sodium chloride 0.9%  3 mL Intravenous Q8H     Continuous Infusions:   niCARdipine 2.5 mg/hr (08/06/17 1100)    buffered 3% sodium acetate 130meq, sodium chloride 130meq, sterile water for inj IV soln 60 mL/hr at 08/06/17 1100     PRN Meds:acetaminophen, calcium gluconate IVPB, calcium gluconate IVPB, calcium gluconate IVPB, dextrose 50%, glucagon (human recombinant), hydrALAZINE, labetalol, magnesium sulfate IVPB, magnesium sulfate IVPB, ondansetron, potassium chloride **AND** potassium chloride **AND** potassium chloride, sodium phosphate IVPB, sodium phosphate IVPB, sodium phosphate IVPB    Objective:     Vital Signs (Most Recent):  Temp: 98.5 °F (36.9 °C) (08/06/17 1100)  Pulse: 75 (08/06/17 1100)  Resp: 15 (08/06/17 1100)  BP: 129/63 (08/06/17 1100)  SpO2: 97 % (08/06/17 1100)  BP Location: Right arm    Vital Signs Range (Last 24H):  Temp:  [98.1 °F (36.7 °C)-99.1 °F (37.3 °C)]   Pulse:  [69-95]   Resp:  [14-54]   BP: (121-163)/(61-81)   SpO2:  [96 %-100 %]   Arterial Line BP: (120-167)/(52-67)   BP Location:  Right arm    Physical Exam   Constitutional: He appears well-developed and well-nourished.   HENT:   Head: Normocephalic.   Eyes:   6th nerve and vertical palsy    Cardiovascular: Normal rate.    Pulmonary/Chest: Effort normal.   Neurological: He is alert.   Skin: Skin is warm and dry.   Nursing note and vitals reviewed.      Neurological Exam:   Alert, oriented to person and time  Equal sensation x 4   Full strength throughout  Gaze forward, left gaze better than right.   Right facial droop  B UE ataxia      NIH Stroke Scale:    Level of Consciousness: 0 - alert  LOC Questions: 0 - answers both correctly  LOC Commands: 0 - performs both correctly  Best Gaze: 1 - partial gaze palsy  Visual: 0 - no visual loss  Facial Palsy: 1 - minor  Motor Left Arm: 0 - no drift  Motor Right Arm: 0 - no drift  Motor Left Le - no drift  Motor Right Le - no drift  Limb Ataxia: 2 - present in two limbs  Sensory: 0 - normal  Best Language: 0 - no aphasia  Dysarthria: 0 - normal articulation  Extinction and Inattention: 0 - no neglect  NIH Stroke Scale Total: 4      Laboratory:  CMP:     Recent Labs  Lab 17  0406 17  0603   CALCIUM 9.1  --    ALBUMIN 2.5*  --    PROT 7.1  --    * 148*   K 4.0  --    CO2 27  --    *  --    BUN 17  --    CREATININE 1.0  --    ALKPHOS 64  --    ALT 15  --    AST 16  --    BILITOT 0.6  --      CBC:     Recent Labs  Lab 17  0406   WBC 9.75   RBC 3.28*   HGB 9.5*   HCT 28.2*   *   MCV 86   MCH 29.0   MCHC 33.7     Lipid Panel:     Recent Labs  Lab 17  1830   CHOL 132   LDLCALC 76.4   HDL 31*   TRIG 123     Coagulation:     Recent Labs  Lab 17  1830   INR 1.0   APTT 22.7     Platelet Aggregation Study: No results for input(s): PLTAGG, PLTAGINTERP, PLTAGREGLACO, ADPPLTAGGREG in the last 168 hours.  Hgb A1C:     Recent Labs  Lab 17  1830   HGBA1C 5.4     TSH:     Recent Labs  Lab 17  1830   TSH 1.817  1.817       Diagnostic Results:  I have  personally reviewed:   MRI 8/5/17  1. Large region of abnormal restricted diffusion within the right cerebellar hemisphere compatible with acute infarct. There is associated hemorrhagic conversion present. There is localized mass effect on the abril as well as effacement of the fourth ventricle. Ventricular system remains mildly prominent, unchanged from prior CT examination.    2. Additional focal regions of mild diffusion hyperintensity with associated serpiginous non-masslike cortical enhancement within the left parietal and occipital lobes suggestive of subacute infarcts. Additional region of volume loss with associated intrinsic T1 hyperintensity and serpiginous enhancement is present within the left cerebellar hemisphere suggestive of a late subacute infarct with laminar necrosis. Small remote lacunar type infarcts are also present in the right corona radiata. Overall findings are suggestive of possible thromboembolic phenomena. Clinical correlation is advised.    3. No evidence of high-grade stenosis of the visualized intracranial vasculature. Note is made that the mid and distal portions of the right AICA and PICA appear somewhat irregular and are not well-visualized, although a component of this may relate to noncontrast MRA time-of-flight technique.        8/4/17 - CT head    Large area of vasogenic edema centered within the right cerebellar hemisphere resulting in severe mass effect with compression of the 4th ventricle, mild hydrocephalus and leftward deviation of the cerebral vermis.  Recommend emergent neurosurgical consultation.    Echo 8/5/17  Normal LA   CONCLUSIONS     1 - Normal left ventricular systolic function (EF 60-65%).     2 - No wall motion abnormalities.     3 - Normal left ventricular diastolic function.     4 - Normal right ventricular systolic function .     5 - The estimated PA systolic pressure is 27 mmHg.

## 2017-08-06 NOTE — PROGRESS NOTES
[facility]  Infectious Disease  Progress Note    Patient Name: Juan Manuel Koehler  MRN: [id number]  Admission Date: 8/4/2017  Length of Stay: 2 days  Attending Physician: Caleb Valenzuela MD  Primary Care Provider: Hemant Sweeney MD    Isolation Status: No active isolations  Assessment/Plan:      Acute bacterial endocarditis    Mr. Koehler is a 68 yo M with history of invasive urothelial bladder carcinoma (s/p open radical cystoprostatectomy, b/l pelvic lymph node resection and ileal conduit placement) and CKD III fount to have bacterial endocarditis due to Enterococcus fecalis on this admission. Pt w/ evidence of possible septic emboli to spleen on CT and w/ acute R cerebellar stroke also likely 2/2 septic embolus. Pt also has a complicated UTI w/ isolated Proteus sensitive to ceftriaxone.    - changed to AMP and high-dose CTX yesterday  - repeat blood culture from 8/5 is NG x 1 day  - anticipating 6 weeks of current abx regimen          . I will follow-up with patient. Please contact us if you have any additional questions.    Hemant Huber MD  Infectious Disease  [facility]    Subjective:     Principal Problem:Embolic stroke involving right cerebellar artery    HPI: Mr. Koehler is a 68 yo M with history of invasive urothelial bladder carcinoma (s/p open radical cystoprostatectomy, b/l pelvic lymph node resection and ileal conduit placement) and CKD III was transferred from OSH after a newly diagnosed R cerebella stroke likely 2/2 septic embolus.   Pt initially presented to the OSH after experiencing a fall; pt reports being dizzy and weak as he was descending down some stairs and ended up falling several steps as a result. Per patient and wife, he has experienced multiple episodes of weakness as a result of his decreased PO intake, decreased appetite stemming from his cancer Dx.   At the OSH, pt was found to have a complicated UTI w/ isolated Proteus species  (  sensitive to ceftriaxone) as well as multiple + Bcx which grew Enterococcus faecalis. Upon further investigation pt's ECHO revealed vegetation on atrial surface of mitral valve consistent with IB endocarditis.  Of note pt has had multiple UTIs w/ Enterococcus being the causative species .  ID was consulted for IB endocarditis.   Denies any recent fevers or chills; no recent dental work.  Interval History: Feeling better. Abx changed yesterday. So far, so good.    Review of Systems   Constitutional: Positive for diaphoresis and fatigue. Negative for chills and fever.   All other systems reviewed and are negative.    Objective:     Vital Signs (Most Recent):  Temp: 98.5 °F (36.9 °C) (08/06/17 1100)  Pulse: 75 (08/06/17 1100)  Resp: 15 (08/06/17 1100)  BP: 129/63 (08/06/17 1100)  SpO2: 97 % (08/06/17 1100) Vital Signs (24h Range):  Temp:  [98.1 °F (36.7 °C)-99.1 °F (37.3 °C)] 98.5 °F (36.9 °C)  Pulse:  [69-95] 75  Resp:  [14-54] 15  SpO2:  [96 %-100 %] 97 %  BP: (121-163)/(61-81) 129/63  Arterial Line BP: (120-167)/(52-67) 128/53     Weight: 57.5 kg (126 lb 12.2 oz)  Body mass index is 16.28 kg/m².    Estimated Creatinine Clearance: 57.5 mL/min (based on Cr of 1).    Physical Exam   Constitutional: He is oriented to person, place, and time. He appears well-developed and well-nourished.   HENT:   Head: Normocephalic and atraumatic.   Right Ear: External ear normal.   Left Ear: External ear normal.   Mouth/Throat: Oropharynx is clear and moist.   Eyes: Conjunctivae and EOM are normal. Pupils are equal, round, and reactive to light.   Neck: Normal range of motion. Neck supple. No thyromegaly present.   Cardiovascular: Normal rate and regular rhythm.    Murmur heard.  Pulmonary/Chest: Effort normal and breath sounds normal. He has no wheezes. He has no rales.   Abdominal: Soft. Bowel sounds are normal. He exhibits no mass. There is no tenderness. There is no rebound.   Musculoskeletal: Normal range of motion.    Lymphadenopathy:     He has no cervical adenopathy.   Neurological: He is alert and oriented to person, place, and time.   Skin: Skin is warm and dry.   Psychiatric: He has a normal mood and affect. His behavior is normal. Judgment and thought content normal.   Nursing note and vitals reviewed.      Significant Labs:   Blood Culture:   Recent Labs  Lab 08/02/17  1802 08/02/17  1807 08/04/17  0508 08/05/17  1316   LABBLOO Gram stain ani bottle: Gram positive cocci in chains resembling Strep   Results called to and read back by: Jeana Barrera RN 08/03/2017  10:42  Gram stain aer bottle: Gram positive cocci in chains resembling Strep  ENTEROCOCCUS FAECALISFor susceptibility see order #[id number] Gram stain ani bottle: Gram positive cocci in chains resembling Strep   Results called to and read back by: Jeana Barrera RN 08/03/2017  10:42  Gram stain aer bottle: Gram positive cocci in chains resembling Strep  ENTEROCOCCUS FAECALIS Gram stain aer bottle: Gram positive cocci in chains resembling Strep   Results called to and read back by:Kendra Montez RN 08/05/2017  11:15  ENTEROCOCCUS SPECIESIdentification pendingFor susceptibility see order #[id number]  Gram stain aer bottle: Gram positive cocci in chains resembling Strep   Results called to and read back by: Bess Montez 08/05/2017  17:08  ENTEROCOCCUS SPECIESIdentification pendingFor susceptibility see order #[id number] No Growth to date     BMP:   Recent Labs  Lab 08/06/17  0406 08/06/17  0603   *  --    * 148*   K 4.0  --    *  --    CO2 27  --    BUN 17  --    CREATININE 1.0  --    CALCIUM 9.1  --    MG 2.0  --      CBC:   Recent Labs  Lab 08/04/17  1505 08/05/17  0218 08/06/17  0406   WBC 13.40* 13.11* 9.75   HGB 8.6* 9.8* 9.5*   HCT 25.6* 28.6* 28.2*   * 410* 392*       Significant Imaging: I have reviewed all pertinent imaging results/findings within the past 24 hours.

## 2017-08-06 NOTE — PROGRESS NOTES
[facility]  Neurosurgery  Progress Note    Subjective:     History of Present Illness: 67yoM with history of bladder cancer s/p bladder resection 6/21/17 presents to neurosurgery in the neuro critical care unit with right cerebellar hem/stroke with vasogenic edema and mass effect, urosepsis and endocarditis with vegetation. Family reports pt had decreased appetite since bladder resection and recent vomiting. Pt presented to [facility] 8/2 after presyncope episode and weak on the ground. He had mental status change today at approx 1400 prompting CT head and was transferred [place] [facility] [place] [facility].     Neurosurgery consulted for possible EVD placement and suboccipital crani watch.    Interval history: No acute events overnight; progressing appropriately.    Post-Op Info:  * No surgery found *         Prescriptions Prior to Admission   Medication Sig Dispense Refill Last Dose    baclofen (LIORESAL) 10 MG tablet Take 10 mg by mouth 3 (three) times daily.   Taking    megestrol (MEGACE) 400 mg/10 mL (40 mg/mL) Susp Take by mouth.   Taking    ondansetron (ZOFRAN-ODT) 8 MG TbDL Take 1 tablet (8 mg total) by mouth every 6 (six) hours as needed (nausea). 30 tablet 1 Taking    oxycodone-acetaminophen (PERCOCET) 5-325 mg per tablet Take 1 tablet by mouth every 4 (four) hours as needed for Pain. 40 tablet 0 Taking    oxycodone-acetaminophen (PERCOCET) 5-325 mg per tablet Take 1 tablet by mouth every 4 (four) hours as needed for Pain. 21 tablet 0 Taking    polyethylene glycol (GLYCOLAX) 17 gram PwPk Take 17 g by mouth once daily. 30 packet 0 Taking       Review of patient's allergies indicates:  No Known Allergies    Past Medical History:   Diagnosis Date    Cancer     bladder and throat    CKD (chronic kidney disease) stage 3, GFR 30-59 ml/min     Embolic stroke involving right cerebellar artery 8/4/2017    Urinary tract infection      Past Surgical History:   Procedure Laterality Date    BLADDER SURGERY       CYSTOSCOPY      HERNIA REPAIR      Ileal Conduit      THROAT SURGERY       Family History     Problem Relation (Age of Onset)    Kidney disease Mother    No Known Problems Father        Social History Main Topics    Smoking status: Never Smoker    Smokeless tobacco: Never Used    Alcohol use No    Drug use: No    Sexual activity: Yes     Partners: Female     Birth control/ protection: None     Review of Systems    Objective:     Weight: 57.5 kg (126 lb 12.2 oz)  Body mass index is 16.28 kg/m².  Vital Signs (Most Recent):  Temp: 98.1 °F (36.7 °C) (08/06/17 0701)  Pulse: 70 (08/06/17 1000)  Resp: (!) 22 (08/06/17 1000)  BP: 124/64 (08/06/17 1000)  SpO2: 99 % (08/06/17 1000) Vital Signs (24h Range):  Temp:  [98.1 °F (36.7 °C)-99.1 °F (37.3 °C)] 98.1 °F (36.7 °C)  Pulse:  [69-95] 70  Resp:  [14-54] 22  SpO2:  [96 %-100 %] 99 %  BP: (121-163)/(61-81) 124/64  Arterial Line BP: (120-167)/(52-67) 121/54       Date 08/06/17 0700 - 08/07/17 0659   Shift 8731-7678 8743-4210 8740-5963 24 Hour Total   I  N  T  A  K  E   I.V.  (mL/kg) 660.2  (11.5)   660.2  (11.5)    IV Piggyback 400   400    Shift Total  (mL/kg) 1060.2  (18.4)   1060.2  (18.4)   O  U  T  P  U  T   Urine  (mL/kg/hr) 805   805    Shift Total  (mL/kg) 805  (14)   805  (14)   Weight (kg) 57.5 57.5 57.5 57.5                        Urostomy 08/02/17 2345 ileal conduit RUQ (Active)   Stoma Appearance round 8/4/2017  7:55 AM   Stomal Appliance 1 piece 8/3/2017  7:35 PM   Accessories/Skin Care appliance belt 8/4/2017  7:55 AM   Stoma Function yellow urine 8/4/2017  7:55 AM   Output (mL) 150 mL 8/4/2017  6:00 PM       Neurosurgery Physical Exam     AOx3; GCS15  CNII-XII: Intact with the exception of conjugate vertical gaze palsy with saccade; PERRL  Extremities: Motor exam grossly normal with 5/5 strength in all distributions, tone normal, sensation intact bilaterally, coordination exhibits dysmetria and past-pointing. DTRs intact and 2+ throughout.  Pronator is  negative.    Significant Labs:    Recent Labs  Lab 08/04/17  1505  08/05/17  0218  08/05/17  1204  08/06/17  0025 08/06/17  0406 08/06/17  0603   *  --  111*  --   --   --   --  113*  --      < > 138  138  < > 140  < > 145 147* 148*   K 3.9  --  3.3*  --  4.4  --   --  4.0  --      --  103  --   --   --   --  111*  --    CO2 27  --  23  --   --   --   --  27  --    BUN 22  --  18  --   --   --   --  17  --    CREATININE 1.1  --  1.0  --   --   --   --  1.0  --    CALCIUM 8.8  --  9.4  --   --   --   --  9.1  --    MG  --   --  1.6  --  1.9  --   --  2.0  --    < > = values in this interval not displayed.    Recent Labs  Lab 08/04/17  1505 08/05/17  0218 08/06/17  0406   WBC 13.40* 13.11* 9.75   HGB 8.6* 9.8* 9.5*   HCT 25.6* 28.6* 28.2*   * 410* 392*       Recent Labs  Lab 08/04/17  1830   INR 1.0   APTT 22.7     Microbiology Results (last 7 days)     Procedure Component Value Units Date/Time    Blood culture [[id number]] Collected:  08/05/17 1316    Order Status:  Completed Specimen:  Blood from Peripheral, Hand, Right Updated:  08/05/17 2115     Blood Culture, Routine No Growth to date    Narrative:       Blood cultures from 2 different sites. 4 bottles total.  Please draw before starting antibiotics.    Culture, Respiratory with Gram Stain [[id number]]     Order Status:  No result Specimen:  Respiratory         All pertinent labs from the last 24 hours have been reviewed.    Significant Diagnostics:  No new diagnostic results    Assessment/Plan:     * Embolic stroke involving right cerebellar artery    67M p/w cerebellar stroke/hemorrhage  -vasogenic edema and mass effect on head CT & MRI  -No acute neurosurgical intervention indicated at this time  -No EVD placement requried at this time  -Rec HOB >30  -Rec Na goal 140-150  -SBP goal <150  -Consider head CT for any acute changes in neuro exam  -Please call neurosurgery for any declining neuro status            Kenny Perea,  MD  Neurosurgery  [facility]

## 2017-08-06 NOTE — ASSESSMENT & PLAN NOTE
Mr. Koehler is a 66 yo M with history of invasive urothelial bladder carcinoma (s/p open radical cystoprostatectomy, b/l pelvic lymph node resection and ileal conduit placement) and CKD III fount to have bacterial endocarditis due to Enterococcus fecalis on this admission. Pt w/ evidence of possible septic emboli to spleen on CT and w/ acute R cerebellar stroke also likely 2/2 septic embolus. Pt also has a complicated UTI w/ isolated Proteus sensitive to ceftriaxone.    - changed to AMP and high-dose CTX yesterday  - repeat blood culture from 8/5 is NG x 1 day  - anticipating 6 weeks of current abx regimen

## 2017-08-06 NOTE — PROGRESS NOTES
[facility]  Vascular Neurology  Comprehensive Stroke Center  Progress Note    Assessment/Plan:     8/5/17 - doing well, no events overnight, neuro surg and NCC monitoring for swelling/suboccipital crani watch     * Embolic stroke involving right cerebellar artery    Mr. Koehler is a 68yo M with endocarditis and an acute R cerebellar infarct with edema and midline shift.     -Antithrombotics for secondary stroke prevention: Antiplatelets:  Aspirin: 325 mg oral now and daily  -Statins for secondary stroke prevention and hyperlipidemia, if present: None: Reason: consider if LDL>130; patient's likely etiology of stroke was septic emboli from endocarditis. LDL 76  -Aggressive risk factor modification: endocarditis, +proteus miribilis in urine, Bcx from yesterday with gram + cocci resembling strep - will follow final   -Rehab Efforts: Physical Therapy, Occupational Therapy and Speech and Language Pathology  -Diagnostics: echo   -VTE Prophylaxis: Heparin 5000 units SQ every 8 hours  -Endocarditis treatment per NCC  - Neurosurgery hemicrani watch        Acute bacterial endocarditis    Mitral valve vegetation seen on 8/3/17 ECHO  Blood cultures NGTD        NSTEMI (non-ST elevated myocardial infarction)    Per NCC, trending troponins  downtrending         Cytotoxic cerebral edema    Due to stroke   Evident on imaging        Urinary tract infection with hematuria    + proteus in urine culture            Neurologic Chief Complaint: R cerebellar stroke     Subjective:     Interval History: Patient is seen for follow-up neurological assessment and treatment recommendations: improved alertness today. Mild nausea but overall improving.   Repeat head CT ordered     HPI, Past Medical, Family, and Social History remains the same as documented in the initial encounter.     Review of Systems   Constitutional: Negative for fever.   Eyes: Negative for redness.   Gastrointestinal: Positive for nausea. Negative for  vomiting.   Neurological: Negative for headaches.     Scheduled Meds:   ampicillin IVPB  2 g Intravenous Q4H    atorvastatin  40 mg Oral Daily    cefTRIAXone (ROCEPHIN) IVPB  2 g Intravenous Q12H    lisinopril  10 mg Oral Daily    metoprolol tartrate  25 mg Oral BID    pantoprazole  40 mg Intravenous Daily    polyethylene glycol  17 g Oral Daily    senna-docusate 8.6-50 mg  1 tablet Oral BID    sodium chloride 0.9%  3 mL Intravenous Q8H     Continuous Infusions:   niCARdipine 2.5 mg/hr (08/06/17 1100)    buffered 3% sodium acetate 130meq, sodium chloride 130meq, sterile water for inj IV soln 60 mL/hr at 08/06/17 1100     PRN Meds:acetaminophen, calcium gluconate IVPB, calcium gluconate IVPB, calcium gluconate IVPB, dextrose 50%, glucagon (human recombinant), hydrALAZINE, labetalol, magnesium sulfate IVPB, magnesium sulfate IVPB, ondansetron, potassium chloride **AND** potassium chloride **AND** potassium chloride, sodium phosphate IVPB, sodium phosphate IVPB, sodium phosphate IVPB    Objective:     Vital Signs (Most Recent):  Temp: 98.5 °F (36.9 °C) (08/06/17 1100)  Pulse: 75 (08/06/17 1100)  Resp: 15 (08/06/17 1100)  BP: 129/63 (08/06/17 1100)  SpO2: 97 % (08/06/17 1100)  BP Location: Right arm    Vital Signs Range (Last 24H):  Temp:  [98.1 °F (36.7 °C)-99.1 °F (37.3 °C)]   Pulse:  [69-95]   Resp:  [14-54]   BP: (121-163)/(61-81)   SpO2:  [96 %-100 %]   Arterial Line BP: (120-167)/(52-67)   BP Location: Right arm    Physical Exam   Constitutional: He appears well-developed and well-nourished.   HENT:   Head: Normocephalic.   Eyes:   6th nerve and vertical palsy    Cardiovascular: Normal rate.    Pulmonary/Chest: Effort normal.   Neurological: He is alert.   Skin: Skin is warm and dry.   Nursing note and vitals reviewed.      Neurological Exam:   Alert, oriented to person and time  Equal sensation x 4   Full strength throughout  Gaze forward, left gaze better than right.   Right facial droop  B UE  ataxia      NIH Stroke Scale:    Level of Consciousness: 0 - alert  LOC Questions: 0 - answers both correctly  LOC Commands: 0 - performs both correctly  Best Gaze: 1 - partial gaze palsy  Visual: 0 - no visual loss  Facial Palsy: 1 - minor  Motor Left Arm: 0 - no drift  Motor Right Arm: 0 - no drift  Motor Left Le - no drift  Motor Right Le - no drift  Limb Ataxia: 2 - present in two limbs  Sensory: 0 - normal  Best Language: 0 - no aphasia  Dysarthria: 0 - normal articulation  Extinction and Inattention: 0 - no neglect  NIH Stroke Scale Total: 4      Laboratory:  CMP:     Recent Labs  Lab 17  0406 17  0603   CALCIUM 9.1  --    ALBUMIN 2.5*  --    PROT 7.1  --    * 148*   K 4.0  --    CO2 27  --    *  --    BUN 17  --    CREATININE 1.0  --    ALKPHOS 64  --    ALT 15  --    AST 16  --    BILITOT 0.6  --      CBC:     Recent Labs  Lab 17  0406   WBC 9.75   RBC 3.28*   HGB 9.5*   HCT 28.2*   *   MCV 86   MCH 29.0   MCHC 33.7     Lipid Panel:     Recent Labs  Lab 17  1830   CHOL 132   LDLCALC 76.4   HDL 31*   TRIG 123     Coagulation:     Recent Labs  Lab 17  1830   INR 1.0   APTT 22.7     Platelet Aggregation Study: No results for input(s): PLTAGG, PLTAGINTERP, PLTAGREGLACO, ADPPLTAGGREG in the last 168 hours.  Hgb A1C:     Recent Labs  Lab 17  1830   HGBA1C 5.4     TSH:     Recent Labs  Lab 17  1830   TSH 1.817  1.817       Diagnostic Results:  I have personally reviewed:   MRI 17  1. Large region of abnormal restricted diffusion within the right cerebellar hemisphere compatible with acute infarct. There is associated hemorrhagic conversion present. There is localized mass effect on the abril as well as effacement of the fourth ventricle. Ventricular system remains mildly prominent, unchanged from prior CT examination.    2. Additional focal regions of mild diffusion hyperintensity with associated serpiginous non-masslike cortical enhancement  within the left parietal and occipital lobes suggestive of subacute infarcts. Additional region of volume loss with associated intrinsic T1 hyperintensity and serpiginous enhancement is present within the left cerebellar hemisphere suggestive of a late subacute infarct with laminar necrosis. Small remote lacunar type infarcts are also present in the right corona radiata. Overall findings are suggestive of possible thromboembolic phenomena. Clinical correlation is advised.    3. No evidence of high-grade stenosis of the visualized intracranial vasculature. Note is made that the mid and distal portions of the right AICA and PICA appear somewhat irregular and are not well-visualized, although a component of this may relate to noncontrast MRA time-of-flight technique.        8/4/17 - CT head    Large area of vasogenic edema centered within the right cerebellar hemisphere resulting in severe mass effect with compression of the 4th ventricle, mild hydrocephalus and leftward deviation of the cerebral vermis.  Recommend emergent neurosurgical consultation.    Echo 8/5/17  Normal LA   CONCLUSIONS     1 - Normal left ventricular systolic function (EF 60-65%).     2 - No wall motion abnormalities.     3 - Normal left ventricular diastolic function.     4 - Normal right ventricular systolic function .     5 - The estimated PA systolic pressure is 27 mmHg.       DEJA Guan  Comprehensive Stroke Center  Department of Vascular Neurology   [facility]

## 2017-08-06 NOTE — SUBJECTIVE & OBJECTIVE
Past Medical History:   Diagnosis Date    Cancer     bladder and throat    CKD (chronic kidney disease) stage 3, GFR 30-59 ml/min     Embolic stroke involving right cerebellar artery 8/4/2017    Urinary tract infection      Past Surgical History:   Procedure Laterality Date    BLADDER SURGERY      CYSTOSCOPY      HERNIA REPAIR      Ileal Conduit      THROAT SURGERY        No current facility-administered medications on file prior to encounter.      Current Outpatient Prescriptions on File Prior to Encounter   Medication Sig Dispense Refill    baclofen (LIORESAL) 10 MG tablet Take 10 mg by mouth 3 (three) times daily.      megestrol (MEGACE) 400 mg/10 mL (40 mg/mL) Susp Take by mouth.      ondansetron (ZOFRAN-ODT) 8 MG TbDL Take 1 tablet (8 mg total) by mouth every 6 (six) hours as needed (nausea). 30 tablet 1    oxycodone-acetaminophen (PERCOCET) 5-325 mg per tablet Take 1 tablet by mouth every 4 (four) hours as needed for Pain. 40 tablet 0    oxycodone-acetaminophen (PERCOCET) 5-325 mg per tablet Take 1 tablet by mouth every 4 (four) hours as needed for Pain. 21 tablet 0    polyethylene glycol (GLYCOLAX) 17 gram PwPk Take 17 g by mouth once daily. 30 packet 0      Allergies: Review of patient's allergies indicates no known allergies.    Family History   Problem Relation Age of Onset    No Known Problems Father     Kidney disease Mother     Prostate cancer Neg Hx      Social History   Substance Use Topics    Smoking status: Never Smoker    Smokeless tobacco: Never Used    Alcohol use No     Review of Systems   Respiratory: Negative for apnea, cough, choking, chest tightness, shortness of breath, wheezing and stridor.    Cardiovascular: Negative for chest pain, palpitations and leg swelling.   Gastrointestinal: Negative for abdominal distention, nausea and vomiting.   Psychiatric/Behavioral: Negative for agitation, behavioral problems and confusion.     Objective:     Vitals:  Temp: 98.5 °F (36.9  °C) (08/06/17 1100)  Pulse: 98 (08/06/17 1400)  Resp: (!) 35 (08/06/17 1400)  BP: (!) 149/69 (08/06/17 1400)  SpO2: 100 % (08/06/17 1400)    Temp:  [98.1 °F (36.7 °C)-99.1 °F (37.3 °C)] 98.5 °F (36.9 °C)  Pulse:  [69-98] 98  Resp:  [14-54] 35  SpO2:  [96 %-100 %] 100 %  BP: (121-149)/(61-81) 149/69  Arterial Line BP: (120-164)/(52-67) 138/62              08/05 0701 - 08/06 0700  In: 2788.7 [I.V.:2138.7]  Out: 4165 [Urine:4165]    Physical Exam   HENT:   Head: Normocephalic and atraumatic.   Cardiovascular: Normal rate, regular rhythm, normal heart sounds and intact distal pulses.    Pulmonary/Chest: Effort normal and breath sounds normal.   Abdominal: Soft. Bowel sounds are normal.   Neurological:   E 4 V5 M6  AAO person, month, location  6th nerve palsy, vertical gaze palsy  Right facial weakness  Minimal right sided weakness,   Dysmetria bilateral upper extremities     Today I personally reviewed pertinent medications, lines/drains/airways, imaging, cardiology, lab results, microbiology results,

## 2017-08-06 NOTE — ASSESSMENT & PLAN NOTE
67M p/w cerebellar stroke/hemorrhage  -vasogenic edema and mass effect on head CT & MRI  -No acute neurosurgical intervention indicated at this time  -No EVD placement requried at this time  -Rec HOB >30  -Rec Na goal 140-150  -SBP goal <150  -Consider head CT for any acute changes in neuro exam  -Please call neurosurgery for any declining neuro status

## 2017-08-06 NOTE — DISCHARGE SUMMARY
[facility] Discharge Summary    Primary Team: [facility] Team A  Attending Physician: Dr. Colbert  Resident: Jelani  Intern: Jett    Date of Admit: 8/2/2017  Date of Discharge: 8/4/17    Transfer to: [facility]  Condition: critical    Discharge Diagnoses     Patient Active Problem List   Diagnosis    Stage 3 chronic kidney disease    Difficult intubation    Anemia of chronic disease    Urothelial carcinoma of bladder    Failure to thrive in adult    Postural dizziness with presyncope    NSTEMI (non-ST elevated myocardial infarction)    Elevated troponin    Urinary tract infection with hematuria    Acute bacterial endocarditis    Bacteremia due to Enterococcus    Sepsis due to Enterococcus    Urine culture positive for Proteus organism    Severe malnutrition    Embolic stroke involving right cerebellar artery    Cytotoxic cerebral edema    Compression of brainstem    Hypertension       Consultants and Procedures     Consultants:  Urology, Cardiology, ID    Procedures:   CT head as below    Brief History of Present Illness      Juan Manuel Koehler is a 67 y.o. male with invasive bladder cancer s/p open radical cystoproctostomy, bilateral pelvic lymph node dissection and ileal conduit urinary diversion (6/21/17, path with high grade urothelial carcinoma, LN negative for malignancy), CKD III and deconditioning and poor PO intake secondary to cancer was in his usual state of health (lives at home with wife, able to ambulate, performs some adls) until day of admission when patient was coming downstairs when he all of a sudden felt dizzy. He slipped and fell; he did not hit his head. He fell on his back and was unable to get up secondary to generalized weakness. He denies focal weakness. He denies any loss of consciousness. He did not have chest pain, palpitations or shortness of breath prior to this event. While he was on the stairs he started to vomit multiple times. He was  diaphoretic and vomited what he just drank (red poweraide). He denies any coffee ground emesis or bright red hematemesis. He denies melena. His wife called EMS and en route to the hospital he continued to vomit. He denies fevers or chills. He denies malodorous urine. He has noticed increased urinary frequency. He denies belly pain or drainage around urostomy site.     For the full HPI please refer to the History & Physical from this admission.    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 ECHO: 55% Ef, EKG: NSR, no evidence of complete block   -  ID following and recommends Vancomycin and Rocephin. Continue antibiotics with repeat blood cultures ordered to eval for clearance     Proteus UTI   - Continue Ceftriaxone for total of 14 days     NSTEMI   - Continue medical management: ASA, Statin, Metoprolol 25mg BID for a goal of 55 HR   - Discussed with cardiology - likely demand ischemia in the setting of sepsis.      Pre-Syncope    -Related to volume depletion, currently symptoms resolved.  - ECHO as above. Cont to monitor on telemetry       Urothelial Invasive bladder cancer s/p open radical cystoproctostomy  - Follows with Dr. Robles, Dr. Boucher and Dr. Rush. Resent resection in April, 2017.   - Currently deferring resuming chemotherapy given favorable pathology results. Restaging scans post surgery in 6 weeks.   - Renal US 5/2017 shows post op changes per Urology. Outpatient FU with urology and oncology      CKDIII  - Cr 1.2, baseline 1.1-1.5  - Renally  dose all medications and avoid nephrotoxic medications    AoCI  - CABALLERO consistent with anemia of chronic disease 2/2 malignancy  - HGB stable 8.1 and received 2uRBC yesterday without appropriate increase in Hemoglobin  - Hematuria on UA. Repeat UA per PCP     Extratesticular cyst  -Repeat imaging per urology outpatient     Discharge Medications        Medication List      ASK your doctor about these medications    baclofen 10 MG tablet  Commonly known as:  LIORESAL     megestrol 400 mg/10 mL (40 mg/mL) Susp  Commonly known as:  MEGACE     ondansetron 8 MG Tbdl  Commonly known as:  ZOFRAN-ODT  Take 1 tablet (8 mg total) by mouth every 6 (six) hours as needed (nausea).     * oxycodone-acetaminophen 5-325 mg per tablet  Commonly known as:  PERCOCET  Take 1 tablet by mouth every 4 (four) hours as needed for Pain.     * oxycodone-acetaminophen 5-325 mg per tablet  Commonly known as:  PERCOCET  Take 1 tablet by mouth every 4 (four) hours as needed for Pain.     polyethylene glycol 17 gram Pwpk  Commonly known as:  GLYCOLAX  Take 17 g by mouth once daily.        * This list has 2 medication(s) that are the same as other medications prescribed for you. Read the directions carefully, and ask your doctor or other care provider to review them with you.                Discharge Information:   Diet:  NPO, secondary to acute change in mental status    Physical Activity:  As tolerated    Instructions:  1. Take all medications as prescribed  2. Keep all follow-up appointments      Charlene Palomares  [facility] Internal Medicine, HO-3

## 2017-08-06 NOTE — PROGRESS NOTES
[facility]  Neurocritical Care  Progress Note    Admit Date: 8/4/2017  Service Date: 08/06/2017  Length of Stay: 2    Subjective:     Chief Complaint: Embolic stroke involving right cerebellar artery    History of Present Illness: Juan Manuel Koehler is a 67 y.o. male with invasive bladder cancer s/p open radical cystoproctostomy, bilateral pelvic lymph node dissection and ileal conduit urinary diversion (6/21/17, path with high grade urothelial carcinoma, LN negative for malignancy), CKD III  With deconditioning and poor PO intake secondary to cancer. He was in his usual state of health (lives at home with wife, able to ambulate, performs some adls) until day of admission (8/2) when patient was coming downstairs and all of a sudden felt dizzy. He slipped and fell;  Denies he did not hit his head. He fell on his back and was unable to get up secondary to generalized weakness. He denies focal weakness. He denies any loss of consciousness. He did not have chest pain, palpitations or shortness of breath prior to this event. While he was on the stairs he started to vomit multiple times. He was diaphoretic and vomited what he just drank (red poweraide). He denies any coffee ground emesis or bright red hematemesis. He denies melena. His wife called EMS and en route to the hospital he continued to vomit. He denies fevers or chills. He denies malodorous urine. He has noticed increased urinary frequency. He denies belly pain or drainage around urostomy site.     While at OSH, he developed Aphasia 8/4 and telestroke consult performed. CTH shows large right cerebellar hypodensity. [place] [facility] [place]        Hospital Course:  8/2 admitted OSH for UTI/Sepsis, acute anemia and endocarditis.  [place]4 [place] [facility] [place] of [place]. Hypertonic started for large cerebellar hypodensity/concern for edema/hydrocephalus  8/5 Started on hypertonic gtt, hypertonic boluses  stopped, exam stable.   8/6 exam stable, start PO diet. Increase PO antihypertensives    Past Medical History:   Diagnosis Date    Cancer     bladder and throat    CKD (chronic kidney disease) stage 3, GFR 30-59 ml/min     Embolic stroke involving right cerebellar artery 8/4/2017    Urinary tract infection      Past Surgical History:   Procedure Laterality Date    BLADDER SURGERY      CYSTOSCOPY      HERNIA REPAIR      Ileal Conduit      THROAT SURGERY        No current facility-administered medications on file prior to encounter.      Current Outpatient Prescriptions on File Prior to Encounter   Medication Sig Dispense Refill    baclofen (LIORESAL) 10 MG tablet Take 10 mg by mouth 3 (three) times daily.      megestrol (MEGACE) 400 mg/10 mL (40 mg/mL) Susp Take by mouth.      ondansetron (ZOFRAN-ODT) 8 MG TbDL Take 1 tablet (8 mg total) by mouth every 6 (six) hours as needed (nausea). 30 tablet 1    oxycodone-acetaminophen (PERCOCET) 5-325 mg per tablet Take 1 tablet by mouth every 4 (four) hours as needed for Pain. 40 tablet 0    oxycodone-acetaminophen (PERCOCET) 5-325 mg per tablet Take 1 tablet by mouth every 4 (four) hours as needed for Pain. 21 tablet 0    polyethylene glycol (GLYCOLAX) 17 gram PwPk Take 17 g by mouth once daily. 30 packet 0      Allergies: Review of patient's allergies indicates no known allergies.    Family History   Problem Relation Age of Onset    No Known Problems Father     Kidney disease Mother     Prostate cancer Neg Hx      Social History   Substance Use Topics    Smoking status: Never Smoker    Smokeless tobacco: Never Used    Alcohol use No     Review of Systems   Respiratory: Negative for apnea, cough, choking, chest tightness, shortness of breath, wheezing and stridor.    Cardiovascular: Negative for chest pain, palpitations and leg swelling.   Gastrointestinal: Negative for abdominal distention, nausea and vomiting.   Psychiatric/Behavioral: Negative for  agitation, behavioral problems and confusion.     Objective:     Vitals:  Temp: 98.5 °F (36.9 °C) (08/06/17 1100)  Pulse: 98 (08/06/17 1400)  Resp: (!) 35 (08/06/17 1400)  BP: (!) 149/69 (08/06/17 1400)  SpO2: 100 % (08/06/17 1400)    Temp:  [98.1 °F (36.7 °C)-99.1 °F (37.3 °C)] 98.5 °F (36.9 °C)  Pulse:  [69-98] 98  Resp:  [14-54] 35  SpO2:  [96 %-100 %] 100 %  BP: (121-149)/(61-81) 149/69  Arterial Line BP: (120-164)/(52-67) 138/62              08/05 0701 - 08/06 0700  In: 2788.7 [I.V.:2138.7]  Out: 4165 [Urine:4165]    Physical Exam   HENT:   Head: Normocephalic and atraumatic.   Cardiovascular: Normal rate, regular rhythm, normal heart sounds and intact distal pulses.    Pulmonary/Chest: Effort normal and breath sounds normal.   Abdominal: Soft. Bowel sounds are normal.   Neurological:   E 4 V5 M6  AAO person, month, location  6th nerve palsy, vertical gaze palsy  Right facial weakness  Minimal right sided weakness,   Dysmetria bilateral upper extremities     Today I personally reviewed pertinent medications, lines/drains/airways, imaging, cardiology, lab results, microbiology results,     Assessment/Plan:     Neuro   * Embolic stroke involving right cerebellar artery    Vascular Surgery Consult  NeuroSurgery Consult, suboccipital crani/hydrocephalus watch  Q1hr neuro Checks, SBP <160  Hypertonic gtt and q6hr Serum Na, goal 145-150  MRI/MRA head/neck, no mass/vesseal abn.   PT/OT/SLP        Cardiac/Vascular   Hypertension    SBP <160  Lisinopril increased        Acute bacterial endocarditis    ID Consulted  Cont Abx per reqs  Continue daily BC  Repeat echo        NSTEMI (non-ST elevated myocardial infarction)    NSTEMI outside hospital  tropon is trending down  EKG shows no SHAVONNE  Patient denies CP   Metoprolol resumed          Renal/   Urinary tract infection with hematuria    ON rocephin, ID recs are for 7 days, should end 8/9        ID   Bacteremia due to Enterococcus    ID recs, cont vanc until  suseceptability return,   then consider switch to ampicillin 2 g Q4 hrs + increased dose of Rocephin 2mg Q12  - Will continue Tx for 6 weeks after the Bacteremia has cleared on BCx           Oncology   Anemia of chronic disease    -Will follow H/H   -Iron panel drawn outside hospital, unsure if before PRBC transfused  -HDS at present  -On PPI            Prophylaxis:  Venous Thromboembolism: mechanical, chemical  Stress Ulcer: PPI  Ventilator Pneumonia: not applicable     Activity Orders          None        Full Code   Critical Care: 36min    Wilfrid Rock NP  Neurocritical Care  [facility]

## 2017-08-07 ENCOUNTER — TELEPHONE (OUTPATIENT)
Dept: HEMATOLOGY/ONCOLOGY | Facility: CLINIC | Age: 67
End: 2017-08-07

## 2017-08-07 LAB
ALBUMIN SERPL BCP-MCNC: 2.3 G/DL
ALP SERPL-CCNC: 64 U/L
ALT SERPL W/O P-5'-P-CCNC: 18 U/L
ANION GAP SERPL CALC-SCNC: 12 MMOL/L
AST SERPL-CCNC: 21 U/L
BACTERIA BLD CULT: NORMAL
BASOPHILS # BLD AUTO: 0.02 K/UL
BASOPHILS NFR BLD: 0.2 %
BILIRUB SERPL-MCNC: 0.4 MG/DL
BUN SERPL-MCNC: 24 MG/DL
CALCIUM SERPL-MCNC: 8.8 MG/DL
CHLORIDE SERPL-SCNC: 112 MMOL/L
CO2 SERPL-SCNC: 25 MMOL/L
CREAT SERPL-MCNC: 1.1 MG/DL
DIFFERENTIAL METHOD: ABNORMAL
EOSINOPHIL # BLD AUTO: 0.1 K/UL
EOSINOPHIL NFR BLD: 0.5 %
ERYTHROCYTE [DISTWIDTH] IN BLOOD BY AUTOMATED COUNT: 16.1 %
EST. GFR  (AFRICAN AMERICAN): >60 ML/MIN/1.73 M^2
EST. GFR  (NON AFRICAN AMERICAN): >60 ML/MIN/1.73 M^2
GLUCOSE SERPL-MCNC: 126 MG/DL
HCT VFR BLD AUTO: 28.2 %
HGB BLD-MCNC: 9 G/DL
LYMPHOCYTES # BLD AUTO: 1.2 K/UL
LYMPHOCYTES NFR BLD: 12.2 %
MAGNESIUM SERPL-MCNC: 2.1 MG/DL
MCH RBC QN AUTO: 28.8 PG
MCHC RBC AUTO-ENTMCNC: 31.9 G/DL
MCV RBC AUTO: 90 FL
MONOCYTES # BLD AUTO: 0.5 K/UL
MONOCYTES NFR BLD: 5.2 %
NEUTROPHILS # BLD AUTO: 7.8 K/UL
NEUTROPHILS NFR BLD: 80.9 %
PHOSPHATE SERPL-MCNC: 2.8 MG/DL
PLATELET # BLD AUTO: 412 K/UL
PMV BLD AUTO: 8.4 FL
POCT GLUCOSE: 121 MG/DL (ref 70–110)
POCT GLUCOSE: 129 MG/DL (ref 70–110)
POTASSIUM SERPL-SCNC: 3.8 MMOL/L
POTASSIUM SERPL-SCNC: 4.2 MMOL/L
PROT SERPL-MCNC: 6.8 G/DL
RBC # BLD AUTO: 3.13 M/UL
SODIUM SERPL-SCNC: 148 MMOL/L
SODIUM SERPL-SCNC: 149 MMOL/L
SODIUM SERPL-SCNC: 152 MMOL/L
VANCOMYCIN TROUGH SERPL-MCNC: 7.2 UG/ML
WBC # BLD AUTO: 9.69 K/UL

## 2017-08-07 PROCEDURE — 25000003 PHARM REV CODE 250: Performed by: INTERNAL MEDICINE

## 2017-08-07 PROCEDURE — 63600175 PHARM REV CODE 636 W HCPCS: Performed by: PHYSICIAN ASSISTANT

## 2017-08-07 PROCEDURE — 25000003 PHARM REV CODE 250: Performed by: PHYSICIAN ASSISTANT

## 2017-08-07 PROCEDURE — 92523 SPEECH SOUND LANG COMPREHEN: CPT

## 2017-08-07 PROCEDURE — 97535 SELF CARE MNGMENT TRAINING: CPT

## 2017-08-07 PROCEDURE — 87040 BLOOD CULTURE FOR BACTERIA: CPT

## 2017-08-07 PROCEDURE — 83735 ASSAY OF MAGNESIUM: CPT

## 2017-08-07 PROCEDURE — 63600175 PHARM REV CODE 636 W HCPCS: Performed by: STUDENT IN AN ORGANIZED HEALTH CARE EDUCATION/TRAINING PROGRAM

## 2017-08-07 PROCEDURE — 99233 SBSQ HOSP IP/OBS HIGH 50: CPT | Mod: ,,, | Performed by: INTERNAL MEDICINE

## 2017-08-07 PROCEDURE — 84295 ASSAY OF SERUM SODIUM: CPT | Mod: 91

## 2017-08-07 PROCEDURE — 84100 ASSAY OF PHOSPHORUS: CPT

## 2017-08-07 PROCEDURE — 97530 THERAPEUTIC ACTIVITIES: CPT

## 2017-08-07 PROCEDURE — 99291 CRITICAL CARE FIRST HOUR: CPT | Mod: ,,, | Performed by: PHYSICIAN ASSISTANT

## 2017-08-07 PROCEDURE — 25000003 PHARM REV CODE 250: Performed by: NURSE PRACTITIONER

## 2017-08-07 PROCEDURE — 63600175 PHARM REV CODE 636 W HCPCS: Performed by: INTERNAL MEDICINE

## 2017-08-07 PROCEDURE — 99233 SBSQ HOSP IP/OBS HIGH 50: CPT | Mod: ,,, | Performed by: PSYCHIATRY & NEUROLOGY

## 2017-08-07 PROCEDURE — 99222 1ST HOSP IP/OBS MODERATE 55: CPT | Mod: ,,, | Performed by: NURSE PRACTITIONER

## 2017-08-07 PROCEDURE — 63600175 PHARM REV CODE 636 W HCPCS: Performed by: NURSE PRACTITIONER

## 2017-08-07 PROCEDURE — 20000000 HC ICU ROOM

## 2017-08-07 PROCEDURE — 85025 COMPLETE CBC W/AUTO DIFF WBC: CPT

## 2017-08-07 PROCEDURE — 80053 COMPREHEN METABOLIC PANEL: CPT

## 2017-08-07 PROCEDURE — C9113 INJ PANTOPRAZOLE SODIUM, VIA: HCPCS | Performed by: NURSE PRACTITIONER

## 2017-08-07 PROCEDURE — A4216 STERILE WATER/SALINE, 10 ML: HCPCS | Performed by: NURSE PRACTITIONER

## 2017-08-07 PROCEDURE — 84132 ASSAY OF SERUM POTASSIUM: CPT

## 2017-08-07 PROCEDURE — 80202 ASSAY OF VANCOMYCIN: CPT

## 2017-08-07 RX ORDER — FAMOTIDINE 20 MG/1
20 TABLET, FILM COATED ORAL 2 TIMES DAILY
Status: DISCONTINUED | OUTPATIENT
Start: 2017-08-08 | End: 2017-08-10

## 2017-08-07 RX ORDER — PANTOPRAZOLE SODIUM 40 MG/1
40 TABLET, DELAYED RELEASE ORAL DAILY
Status: DISCONTINUED | OUTPATIENT
Start: 2017-08-08 | End: 2017-08-07

## 2017-08-07 RX ORDER — METOPROLOL TARTRATE 25 MG/1
25 TABLET, FILM COATED ORAL 2 TIMES DAILY
Status: DISCONTINUED | OUTPATIENT
Start: 2017-08-07 | End: 2017-08-09

## 2017-08-07 RX ORDER — METOPROLOL TARTRATE 50 MG/1
50 TABLET ORAL 2 TIMES DAILY
Status: DISCONTINUED | OUTPATIENT
Start: 2017-08-07 | End: 2017-08-07

## 2017-08-07 RX ADMIN — ONDANSETRON 4 MG: 2 INJECTION INTRAMUSCULAR; INTRAVENOUS at 10:08

## 2017-08-07 RX ADMIN — HYDRALAZINE HYDROCHLORIDE 10 MG: 20 INJECTION INTRAMUSCULAR; INTRAVENOUS at 10:08

## 2017-08-07 RX ADMIN — CEFTRIAXONE 2 G: 2 INJECTION, SOLUTION INTRAVENOUS at 04:08

## 2017-08-07 RX ADMIN — AMPICILLIN SODIUM 2 G: 2 INJECTION, POWDER, FOR SOLUTION INTRAMUSCULAR; INTRAVENOUS at 11:08

## 2017-08-07 RX ADMIN — METOPROLOL TARTRATE 25 MG: 25 TABLET ORAL at 08:08

## 2017-08-07 RX ADMIN — VANCOMYCIN HYDROCHLORIDE 750 MG: 750 INJECTION, POWDER, LYOPHILIZED, FOR SOLUTION INTRAVENOUS at 04:08

## 2017-08-07 RX ADMIN — NICARDIPINE HYDROCHLORIDE 2.5 MG/HR: 0.2 INJECTION, SOLUTION INTRAVENOUS at 08:08

## 2017-08-07 RX ADMIN — ATORVASTATIN CALCIUM 40 MG: 20 TABLET, FILM COATED ORAL at 08:08

## 2017-08-07 RX ADMIN — AMPICILLIN SODIUM 2 G: 2 INJECTION, POWDER, FOR SOLUTION INTRAMUSCULAR; INTRAVENOUS at 08:08

## 2017-08-07 RX ADMIN — POTASSIUM CHLORIDE 40 MEQ: 400 INJECTION, SOLUTION INTRAVENOUS at 06:08

## 2017-08-07 RX ADMIN — PANTOPRAZOLE SODIUM 40 MG: 40 INJECTION, POWDER, FOR SOLUTION INTRAVENOUS at 08:08

## 2017-08-07 RX ADMIN — SODIUM CHLORIDE: 234 INJECTION INTRAMUSCULAR; INTRAVENOUS; SUBCUTANEOUS at 10:08

## 2017-08-07 RX ADMIN — CEFTRIAXONE 2 G: 2 INJECTION, SOLUTION INTRAVENOUS at 03:08

## 2017-08-07 RX ADMIN — LABETALOL HYDROCHLORIDE 10 MG: 5 INJECTION, SOLUTION INTRAVENOUS at 08:08

## 2017-08-07 RX ADMIN — HYDRALAZINE HYDROCHLORIDE 10 MG: 20 INJECTION INTRAMUSCULAR; INTRAVENOUS at 11:08

## 2017-08-07 RX ADMIN — LABETALOL HYDROCHLORIDE 10 MG: 5 INJECTION, SOLUTION INTRAVENOUS at 07:08

## 2017-08-07 RX ADMIN — POLYETHYLENE GLYCOL 3350 17 G: 17 POWDER, FOR SOLUTION ORAL at 08:08

## 2017-08-07 RX ADMIN — AMPICILLIN SODIUM 2 G: 2 INJECTION, POWDER, FOR SOLUTION INTRAMUSCULAR; INTRAVENOUS at 07:08

## 2017-08-07 RX ADMIN — LISINOPRIL 20 MG: 20 TABLET ORAL at 08:08

## 2017-08-07 RX ADMIN — Medication 3 ML: at 06:08

## 2017-08-07 RX ADMIN — STANDARDIZED SENNA CONCENTRATE AND DOCUSATE SODIUM 1 TABLET: 8.6; 5 TABLET, FILM COATED ORAL at 08:08

## 2017-08-07 RX ADMIN — AMPICILLIN SODIUM 2 G: 2 INJECTION, POWDER, FOR SOLUTION INTRAMUSCULAR; INTRAVENOUS at 04:08

## 2017-08-07 RX ADMIN — LABETALOL HYDROCHLORIDE 10 MG: 5 INJECTION, SOLUTION INTRAVENOUS at 05:08

## 2017-08-07 NOTE — TELEPHONE ENCOUNTER
"Returned call to pt wife.   Wife stated that pt is currently in hospital as pt had a stroke, as well as UTI and "bacteria in the blood."   Wife wanted to know if Jessa still wanted him to have labs, CT, and appt with her (8/18 and 8/23).   I informed wife I would check if still desired; however, could not be done until pt out of hospital (insurance issue).   Informed wife I would f/u.   Wife verbalized understanding.     Message routed.       ----- Message from Lauryn Farr sent at 8/7/2017  2:36 PM CDT -----  Contact: Delaney, pts wife  Delaney is calling to discuss pts current condition and to find out if he still needs to do the blood work and the MRI that is scheduled.    Please call her at [phone]    "

## 2017-08-07 NOTE — HPI
Juan Manuel Koehler is a 67-year-old male with PMHx of CKD III and invasive bladder cancer s/p open radical cystoproctostomy, bilateral pelvic lymph node dissection, and ileal conduit urinary diversion on 6/21/17 (pathology with high grade urothelial carcinoma, LN negative for malignancy) with associated deconditioning, poor PO intake, and weight loss.  Patient presented to [facility] on 8/2/17 for worsening weakness and dizziness with subsequent fall.  Denied head trauma and LOC.  On arrival, found to be septic 2/2 complicated proteus UTI.  Blood cultures grew enterococcus faecalis.  Upon further work-up, ECHO showed vegetation on atrial surface of mitral valve consistent with endocarditis.  On 8/4/17, patient found to have AMS, aphasia, and facial droop.  Telemedicine consult completed.  CTH showed R cerebellar hypodensity with mass effect and midline shift.  [place] [facility] [place] further evaluation and management.  MRI/MRA revealed R cerebellar infarct with hemorrhagic conversion.  Evaluated by neurosurgery and no acute surgical intervention necessary.  Started on Cardene and hypertonic infusion.  Repeat blood cultures NGTD.  ID following and recommending ampicillin x 6 weeks for acute bacterial endocarditis.      Functional History: Patient lives in [place] with his wife in a 2 story home without steps to enter.  Bed and bath on 2nd floor.  Prior to admission, he was independent with ADLs, including working and driving, and mobility.  DME: [facility].

## 2017-08-07 NOTE — ASSESSMENT & PLAN NOTE
-ID following  -repeat blood cultures daily until negative   -continue ampicillin and ceftriaxone   -will likely need treatment for 6 weeks after cultures clear

## 2017-08-07 NOTE — ASSESSMENT & PLAN NOTE
Mr. Koehler is a 68 yo M with history of invasive urothelial bladder carcinoma (s/p open radical cystoprostatectomy, b/l pelvic lymph node resection and ileal conduit placement) and CKD III fount to have bacterial endocarditis due to Enterococcus fecalis on this admission. Pt w/ evidence of possible septic emboli to spleen on CT and w/ acute R cerebellar stroke also likely 2/2 septic embolus. Pt also has a complicated UTI w/ isolated Proteus sensitive to ceftriaxone.    - changed to AMP and high-dose CTX yesterday  - repeat blood culture from 8/5 is growing Staph (a contaminant?)  - recommend removing TLC and culturing tip  - stop vancomycin and repeat blood cultures  - anticipating 6 weeks of current abx regimen

## 2017-08-07 NOTE — ASSESSMENT & PLAN NOTE
-  Blood cultures grew enterococcus faecalis  -  ECHO showed vegetation on atrial surface of mitral valve consistent with endocarditis  -  ID following and recommending ampicillin x 6 weeks for acute bacterial endocarditis

## 2017-08-07 NOTE — PROGRESS NOTES
[facility]  Neurosurgery  Progress Note    Subjective:     History of Present Illness: 67yoM with history of bladder cancer s/p bladder resection 6/21/17 presents to neurosurgery in the neuro critical care unit with right cerebellar hem/stroke with vasogenic edema and mass effect, urosepsis and endocarditis with vegetation. Family reports pt had decreased appetite since bladder resection and recent vomiting. Pt presented to [place] 8/2 after presyncope episode and weak on the ground. He had mental status change today at approx 1400 prompting CT head and was transferred [place] [facility].     Neurosurgery consulted for possible EVD placement and suboccipital crani watch.      Post-Op Info:  * No surgery found *         Interval History: No acute events overnight    Medications:  Continuous Infusions:   buffered 3% sodium acetate 130meq, sodium chloride 130meq, sterile water for inj IV soln 50 mL/hr at 08/07/17 1600     Scheduled Meds:   ampicillin IVPB  2 g Intravenous Q4H    atorvastatin  40 mg Oral Daily    cefTRIAXone (ROCEPHIN) IVPB  2 g Intravenous Q12H    [START ON 8/8/2017] famotidine  20 mg Oral BID    lisinopril  20 mg Oral Daily    metoprolol tartrate  25 mg Oral BID    polyethylene glycol  17 g Oral Daily    senna-docusate 8.6-50 mg  1 tablet Oral BID    sodium chloride 0.9%  3 mL Intravenous Q8H     PRN Meds:acetaminophen, calcium gluconate IVPB, calcium gluconate IVPB, calcium gluconate IVPB, dextrose 50%, glucagon (human recombinant), hydrALAZINE, labetalol, magnesium sulfate IVPB, magnesium sulfate IVPB, ondansetron, potassium chloride **AND** potassium chloride **AND** potassium chloride, sodium phosphate IVPB, sodium phosphate IVPB, sodium phosphate IVPB     Review of Systems  Objective:     Weight: 57.5 kg (126 lb 12.2 oz)  Body mass index is 16.28 kg/m².  Vital Signs (Most Recent):  Temp: 98 °F (36.7 °C) (08/07/17 1101)  Pulse: 84 (08/07/17 1600)  Resp: 18 (08/07/17 1600)  BP: (!)  148/87 (08/07/17 1600)  SpO2: 98 % (08/07/17 1600) Vital Signs (24h Range):  Temp:  [97.8 °F (36.6 °C)-98.5 °F (36.9 °C)] 98 °F (36.7 °C)  Pulse:  [73-92] 84  Resp:  [14-45] 18  SpO2:  [97 %-100 %] 98 %  BP: (122-156)/(57-87) 148/87  Arterial Line BP: (121-163)/(57-79) 148/76       Date 08/07/17 0700 - 08/08/17 0659   Shift 4608-4443 9036-7115 9158-8804 24 Hour Total   I  N  T  A  K  E   P.O. 240 100  340    I.V.  (mL/kg) 367.5  (6.4) 100  (1.7)  467.5  (8.1)    IV Piggyback 200   200    Shift Total  (mL/kg) 807.5  (14) 200  (3.5)  1007.5  (17.5)   O  U  T  P  U  T   Urine  (mL/kg/hr) 690  (1.5) 120  810    Shift Total  (mL/kg) 690  (12) 120  (2.1)  810  (14.1)   Weight (kg) 57.5 57.5 57.5 57.5                        Urostomy 08/02/17 2345 ileal conduit RUQ (Active)   Stoma Appearance rosebud appearance;round 8/7/2017  3:01 PM   Stomal Appliance 1 piece;Dry;Intact;No Leakage 8/7/2017  3:01 PM   Accessories/Skin Care appliance belt 8/7/2017  3:01 PM   Stoma Function yellow urine 8/7/2017  3:01 PM   Peristomal Skin unable to assess, covered by appliance 8/7/2017  3:01 PM   Tolerance no signs/symptoms of discomfort 8/7/2017  3:01 PM   Treatment Other (Comment) 8/6/2017 11:00 AM   Output (mL) 70 mL 8/7/2017  4:00 PM       Neurosurgery Physical Exam  Alert and oriented x3  Pupils equal round and reactive to light   Partial right gaze palsy  Follows commands in all extremities  Sensation intact to light touch      Significant Labs:    Recent Labs  Lab 08/06/17  0406  08/07/17  0305 08/07/17  0614 08/07/17  1138   *  --  126*  --   --    *  < > 149* 149* 148*   K 4.0  --  3.8  --  4.2   *  --  112*  --   --    CO2 27  --  25  --   --    BUN 17  --  24*  --   --    CREATININE 1.0  --  1.1  --   --    CALCIUM 9.1  --  8.8  --   --    MG 2.0  --  2.1  --   --    < > = values in this interval not displayed.    Recent Labs  Lab 08/06/17  0406 08/07/17  0305   WBC 9.75 9.69   HGB 9.5* 9.0*   HCT 28.2* 28.2*    * 412*     No results for input(s): LABPT, INR, APTT in the last 48 hours.  Microbiology Results (last 7 days)     Procedure Component Value Units Date/Time    Blood culture [[id number]] Collected:  08/05/17 1316    Order Status:  Completed Specimen:  Blood from Peripheral, Hand, Right Updated:  08/07/17 1346     Blood Culture, Routine Gram stain ani bottle: Gram positive cocci in clusters resembling Staph      Blood Culture, Routine Results called to and read back by: Jimmy Willis RN  08/06/2017  21:11     Blood Culture, Routine Gram stain aer bottle: Gram positive cocci in clusters resembling Staph     Blood Culture, Routine 08/07/2017  13:45    Narrative:       Blood cultures from 2 different sites. 4 bottles total.  Please draw before starting antibiotics.    Blood culture [[id number]] Collected:  08/07/17 1206    Order Status:  Sent Specimen:  Blood from Peripheral, Antecubital, Right Updated:  08/07/17 1325    Blood culture [[id number]] Collected:  08/07/17 1206    Order Status:  Sent Specimen:  Blood from Peripheral, Hand, Right Updated:  08/07/17 1324    Blood culture [[id number]]     Order Status:  Canceled Specimen:  Blood     Blood culture [[id number]]     Order Status:  Canceled Specimen:  Blood     Culture, Respiratory with Gram Stain [[id number]]     Order Status:  No result Specimen:  Respiratory         All pertinent labs from the last 24 hours have been reviewed.    Significant Diagnostics:  I have reviewed all pertinent imaging results/findings within the past 24 hours.    Assessment/Plan:     * Embolic stroke involving right cerebellar artery    67M p/w cerebellar stroke/hemorrhage    -vasogenic edema and mass effect on head CT & MRI  -No acute neurosurgical intervention indicated at this time  -No EVD placement requried at this time  -Rec HOB >30  -Rec Na goal 145-155  -SBP goal <150  -CTA neck to rule out vertebral artery dissection  -Consider head CT for any acute changes in neuro  exam  -Please call neurosurgery for any declining neuro status            Jeramy hammond MD  Neurosurgery  [facility]

## 2017-08-07 NOTE — CONSULTS
Inpatient consult to Physical Medicine Rehab  Consult performed by: ZIGGY CRAFT  Consult ordered by: BINH VANG  Reason for consult: assess rehabilitation needs      Reviewed patient history and current admission.  Rehab team following.  Full consult to follow.    RUMA Arteaga, FNP-C  Physical Medicine & Rehabilitation   08/07/2017  Spectralink: [id number]

## 2017-08-07 NOTE — PT/OT/SLP EVAL
"Speech Language Pathology Evaluation    Juan Manuel Koehler   MRN: [id number]   Admitting Diagnosis: Embolic stroke involving right cerebellar artery    Diet recommendations: Solid Diet Level: Regular  Liquid Diet Level: Thin Feed only when awake/alert, HOB to 90 degrees, Small bites/sips and Meds whole 1 at a time    SLP Treatment Date: 08/07/17  Speech Start Time: 1033     Speech Stop Time: 1050     Speech Total (min): 17 min       TREATMENT BILLABLE MINUTES:  Eval 17     Diagnosis: Embolic stroke involving right cerebellar artery      Past Medical History:   Diagnosis Date    Cancer     bladder and throat    CKD (chronic kidney disease) stage 3, GFR 30-59 ml/min     Embolic stroke involving right cerebellar artery 8/4/2017    Urinary tract infection      Past Surgical History:   Procedure Laterality Date    BLADDER SURGERY      CYSTOSCOPY      HERNIA REPAIR      Ileal Conduit      THROAT SURGERY         Has the patient been evaluated by SLP for swallowing? 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     Subjective:  "I can't see too hot without my lenses" Pt glasses at the bedside     Pain/Comfort  Pain Rating 1: 0/10  Pain Rating Post-Intervention 1: 0/10     Objective:      Oral Musculature Evaluation  Oral Musculature: WFL  Dentition: present and adequate  Mucosal Quality: good  Mandibular Strength and Mobility: WFL  Oral Labial Strength and Mobility: WFL  Lingual Strength and Mobility: WFL  Buccal Strength and Mobility: WFL  Volitional Swallow: able to demonstrate  Voice Prior to PO Intake: clear     Cognitive Status:  Behavioral Observations: appropriate and cooperative-  Memory and Orientation: oriented to self, month, location , not year, delayed recall 0/3 despite SLP max cues   Attention: " appearing WFL  Problem Solving: basic 100% acc independently; ongoing assessment of higher level skills warranted for safety awareness   Pragmatics: flat affect and poor eye contact  Executive Function: insight/awareness, mental flexibility and organization - reduced Pt unable to complete sequencing tasks without max SLP cues, Pt challenged by word fluency task generating avg 7 items per category. Ongoing assessment and assistance warranted in this area     Language:   Auditory Comprehension: able to identify objects and answers yes/no questions, Pt followed single step directions with 100% acc Pt followed 2 step directions with 75% acc with SLP min-mod verbal cues     Verbal Expression: naming, automatic speech, fluency and conversational speech- [facility]     Motor Speech: WFL     Voice: WFL     Reading: unable to assess this date     Writing: Pt attempted to write name and noted to add letters     Visual-Spatial:  Impairments evident, Pt attempted to clock drawing task poor spacing, writing numbers on top of one another     Additional Treatment:  Education provided to spouse re: role of SLP within acute care setting and deficits observed this date in addition to ongoing POC.     FIM:  Social Interaction: 5 Supervision--The patient requires supervision (e.g., monitoring, verbal control, cueing, or coaxing) only under stressful or unfamiliar conditions, but less than 10% of the time.  The patient may require encouragement to initiate participation.   Problem Solvin Modified Noble--In most situations, the patient recognizes a present problem, and with only mild difficulty makes appropriate decisions, initiates and carries out a sequence of steps to solve complex problems, or requires more than a   Comprehension: 5 Standby Prompting--The patient understands directions and conversation about basic daily needs more than 90% of the time.  The patient requires prompting (slowed speech rate, use of repetition,  stressing particular words or phrases, pauses, visual or   Expression: 6 Modified May--In most situations, the patient expresses complex or abstract ideas relatively clearly or with only jomar difficulty.  The patient does not need any prompting, but (s)he may require an augmentative communication device or system.           Assessment:  Juan Manuel Koehler is a 67 y.o. male with a SLP diagnosis of Cognitive-Linguistic Impairment and Visvo-Spatial Impairment.     Do you have any cultural, spiritual, Sabianism conflicts, given your current situation?: no    Discharge recommendations: Discharge Facility/Level Of Care Needs: rehabilitation facility     Goals:    SLP Goals        Problem: SLP Goal    Goal Priority Disciplines Outcome   SLP Goal     SLP    Description:  Speech Language Pathology Goals  Updated Goals expected to be met by 8/14    1. Pt will tolerate regular diet and thin liquids without s/s of airway compromise.   2. Pt will generate 10 items within a category independently to enhance cognition  3. Pt will recall 2/3 items with a delay independently to enhance memory   4. Pt will complete cancellation task with 90% acc independently to enhance visual-spatial skills   5. Pt will complete mental flexibility tasks with 80% independently to enhance cognition   6. Pt will follow 2 step directions with 80% acc with min cues to enhance comprehension skills   7. Pt will participate in ongoing assessment of reading/money/math/time management skills                                 Plan:   Patient to be seen Therapy Frequency: 5 x/week   Plan of Care expires: 09/03/17  Plan of Care reviewed with: patient, spouse  SLP Follow-up?: Yes  SLP - Next Visit Date: 08/07/17           Clara Vergara CCC-SLP  08/07/2017

## 2017-08-07 NOTE — ASSESSMENT & PLAN NOTE
Mr. Koehler is a 68yo M with endocarditis and an acute R cerebellar infarct with edema and midline shift as well as hemorrhagic conversion.     - Antithrombotics for secondary stroke prevention: Antiplatelets:  Aspirin: 325 mg oral now and daily  - Statins for secondary stroke prevention and hyperlipidemia, if present: None: Reason: consider if LDL>130; patient's likely etiology of stroke was septic emboli from endocarditis. LDL 76  - Aggressive risk factor modification: endocarditis, +proteus miribilis in urine, Bcx with gram + cocci resembling strep - will follow final & sensitivities. ID following  - Rehab Efforts: Physical Therapy, Occupational Therapy and Speech and Language Pathology  - Diagnostics: none pending   - VTE Prophylaxis: Heparin 5000 units SQ every 8 hours  - Endocarditis treatment per NCC  - Neurosurgery hemicrani watch; 3% per NCC

## 2017-08-07 NOTE — ASSESSMENT & PLAN NOTE
Mitral valve vegetation seen on 8/3/17 ECHO; repeat ECHO negative for vegetation  Blood cultures 8/4 with gram positive cocci in chains  Currently on ceftriaxone, ampicillin and vancomycin

## 2017-08-07 NOTE — PLAN OF CARE
Problem: Patient Care Overview  Goal: Plan of Care Review  Outcome: Ongoing (interventions implemented as appropriate)  No acute events throughout the day, VS and assessment per flow sheet, patient progressing towards goal as tolerated. Blood cultures obtained. Cardene IV drip D/Darius, use of labetalol and hydralazine IV PRN throughout shift. Plan of care reviewed with Juan Manuel Koehler and family, all concerns addressed. Will continue to monitor.

## 2017-08-07 NOTE — SUBJECTIVE & OBJECTIVE
Interval History: No acute events overnight    Medications:  Continuous Infusions:   buffered 3% sodium acetate 130meq, sodium chloride 130meq, sterile water for inj IV soln 50 mL/hr at 08/07/17 1600     Scheduled Meds:   ampicillin IVPB  2 g Intravenous Q4H    atorvastatin  40 mg Oral Daily    cefTRIAXone (ROCEPHIN) IVPB  2 g Intravenous Q12H    [START ON 8/8/2017] famotidine  20 mg Oral BID    lisinopril  20 mg Oral Daily    metoprolol tartrate  25 mg Oral BID    polyethylene glycol  17 g Oral Daily    senna-docusate 8.6-50 mg  1 tablet Oral BID    sodium chloride 0.9%  3 mL Intravenous Q8H     PRN Meds:acetaminophen, calcium gluconate IVPB, calcium gluconate IVPB, calcium gluconate IVPB, dextrose 50%, glucagon (human recombinant), hydrALAZINE, labetalol, magnesium sulfate IVPB, magnesium sulfate IVPB, ondansetron, potassium chloride **AND** potassium chloride **AND** potassium chloride, sodium phosphate IVPB, sodium phosphate IVPB, sodium phosphate IVPB     Review of Systems  Objective:     Weight: 57.5 kg (126 lb 12.2 oz)  Body mass index is 16.28 kg/m².  Vital Signs (Most Recent):  Temp: 98 °F (36.7 °C) (08/07/17 1101)  Pulse: 84 (08/07/17 1600)  Resp: 18 (08/07/17 1600)  BP: (!) 148/87 (08/07/17 1600)  SpO2: 98 % (08/07/17 1600) Vital Signs (24h Range):  Temp:  [97.8 °F (36.6 °C)-98.5 °F (36.9 °C)] 98 °F (36.7 °C)  Pulse:  [73-92] 84  Resp:  [14-45] 18  SpO2:  [97 %-100 %] 98 %  BP: (122-156)/(57-87) 148/87  Arterial Line BP: (121-163)/(57-79) 148/76       Date 08/07/17 0700 - 08/08/17 0659   Shift 3182-0977 3756-7583 3637-4825 24 Hour Total   I  N  T  A  K  E   P.O. 240 100  340    I.V.  (mL/kg) 367.5  (6.4) 100  (1.7)  467.5  (8.1)    IV Piggyback 200   200    Shift Total  (mL/kg) 807.5  (14) 200  (3.5)  1007.5  (17.5)   O  U  T  P  U  T   Urine  (mL/kg/hr) 690  (1.5) 120  810    Shift Total  (mL/kg) 690  (12) 120  (2.1)  810  (14.1)   Weight (kg) 57.5 57.5 57.5 57.5                        Urostomy  08/02/17 2345 ileal conduit RUQ (Active)   Stoma Appearance rosebud appearance;round 8/7/2017  3:01 PM   Stomal Appliance 1 piece;Dry;Intact;No Leakage 8/7/2017  3:01 PM   Accessories/Skin Care appliance belt 8/7/2017  3:01 PM   Stoma Function yellow urine 8/7/2017  3:01 PM   Peristomal Skin unable to assess, covered by appliance 8/7/2017  3:01 PM   Tolerance no signs/symptoms of discomfort 8/7/2017  3:01 PM   Treatment Other (Comment) 8/6/2017 11:00 AM   Output (mL) 70 mL 8/7/2017  4:00 PM       Neurosurgery Physical Exam  Alert and oriented x3  Pupils equal round and reactive to light   Partial right gaze palsy  Follows commands in all extremities  Sensation intact to light touch      Significant Labs:    Recent Labs  Lab 08/06/17  0406  08/07/17  0305 08/07/17  0614 08/07/17  1138   *  --  126*  --   --    *  < > 149* 149* 148*   K 4.0  --  3.8  --  4.2   *  --  112*  --   --    CO2 27  --  25  --   --    BUN 17  --  24*  --   --    CREATININE 1.0  --  1.1  --   --    CALCIUM 9.1  --  8.8  --   --    MG 2.0  --  2.1  --   --    < > = values in this interval not displayed.    Recent Labs  Lab 08/06/17  0406 08/07/17  0305   WBC 9.75 9.69   HGB 9.5* 9.0*   HCT 28.2* 28.2*   * 412*     No results for input(s): LABPT, INR, APTT in the last 48 hours.  Microbiology Results (last 7 days)     Procedure Component Value Units Date/Time    Blood culture [[id number]] Collected:  08/05/17 1316    Order Status:  Completed Specimen:  Blood from Peripheral, Hand, Right Updated:  08/07/17 1346     Blood Culture, Routine Gram stain ani bottle: Gram positive cocci in clusters resembling Staph      Blood Culture, Routine Results called to and read back by: Jimmy Willis RN  08/06/2017  21:11     Blood Culture, Routine Gram stain aer bottle: Gram positive cocci in clusters resembling Staph     Blood Culture, Routine 08/07/2017  13:45    Narrative:       Blood cultures from 2 different sites. 4 bottles  total.  Please draw before starting antibiotics.    Blood culture [[id number]] Collected:  08/07/17 1206    Order Status:  Sent Specimen:  Blood from Peripheral, Antecubital, Right Updated:  08/07/17 1325    Blood culture [[id number]] Collected:  08/07/17 1206    Order Status:  Sent Specimen:  Blood from Peripheral, Hand, Right Updated:  08/07/17 1324    Blood culture [[id number]]     Order Status:  Canceled Specimen:  Blood     Blood culture [[id number]]     Order Status:  Canceled Specimen:  Blood     Culture, Respiratory with Gram Stain [[id number]]     Order Status:  No result Specimen:  Respiratory         All pertinent labs from the last 24 hours have been reviewed.    Significant Diagnostics:  I have reviewed all pertinent imaging results/findings within the past 24 hours.

## 2017-08-07 NOTE — PROGRESS NOTES
[facility]  Neurocritical Care  Progress Note    Admit Date: 8/4/2017  Service Date: 08/07/2017  Length of Stay: 3    Subjective:     Chief Complaint: Embolic stroke involving right cerebellar artery    History of Present Illness: Juan Manuel Koehler is a 67 year old male with invasive bladder cancer s/p open radical cystoproctostomy, bilateral pelvic lymph node dissection and ileal conduit urinary diversion (6/21/17, path with high grade urothelial carcinoma, LN negative for malignancy), CKD III  With deconditioning and poor PO intake secondary to cancer. He was in his usual state of health (lives at home with wife, able to ambulate, performs some adls) until day of admission (8/2) when patient was coming downstairs and all of a sudden felt dizzy and fell. He fell on his back and was unable to get up secondary to generalized weakness. He denies focal weakness. He denies any loss of consciousness.  He was admitted to OSH at that time and diagnosed with urosepsis, endocarditis, and acute anemia.     While at OSH, he developed aphasia 8/4 and telestroke consult performed. CTH shows large right cerebellar hypodensity. Patient transferred to [facility] for management/NSGY eval.        Hospital Course: 8/2 admitted OSH for urosepsis, endocarditis, and acute anemia   8/4  episode of aphasia at OSH; CTH with large cerebellar hypodensity/concern for edema/hydrocephalus-->transfer to C  8/5 HTS infusion initiated, hypertonic boluses discontinued      Interval History: Increase HTS for goal serum sodium of 145-155.  Repeat blood cultures daily until clear.  Continue current antibiotics.  Hold asa and sqh in setting of hemorrhagic conversion and while patient is on crani watch.         Review of Systems: negative     Vitals:   Temp: 98 °F (36.7 °C) (08/07/17 1101)  Pulse: 81 (08/07/17 1400)  Resp: 16 (08/07/17 1400)  BP: 136/70 (08/07/17 1400)  SpO2: 98 % (08/07/17 1400)    Temp:  [97.8 °F (36.6 °C)-98.5 °F (36.9  °C)] 98 °F (36.7 °C)  Pulse:  [73-92] 81  Resp:  [14-45] 16  SpO2:  [97 %-100 %] 98 %  BP: (122-156)/(57-80) 136/70  Arterial Line BP: (121-163)/(54-79) 142/60              08/06 0701 - 08/07 0700  In: 3587.1 [P.O.:480; I.V.:1707.1]  Out: 3150 [Urine:3150]     Examination:   Constitutional: Well-nourished and -developed. No apparent distress.   Eyes: Conjunctiva clear, anicteric. Lids no lesions.  Head/Ears/Nose/Mouth/Throat/Neck: Moist mucous membranes. External ears, nose atraumatic.   Cardiovascular: Regular rhythm. No murmurs. No leg edema.  Respiratory: Comfortable respirations. Clear to auscultation.  Gastrointestinal: No hernia. Soft, nondistended, nontender. + bowel sounds.    Neurologic:  -GCS [id number]  -Alert. Oriented to person, place, and time. Speech fluent. Follows commands.  -CN 6 palsy, vertical palsy  -Motor 5/5 strength, BUE ataxia   -Sensation intact     Medications:   Continuous  buffered 3% sodium acetate 130meq, sodium chloride 130meq, sterile water for inj IV soln Last Rate: 50 mL/hr at 08/07/17 1400   Scheduled  ampicillin IVPB 2 g Q4H   atorvastatin 40 mg Daily   cefTRIAXone (ROCEPHIN) IVPB 2 g Q12H   [START ON 8/8/2017] famotidine 20 mg BID   lisinopril 20 mg Daily   metoprolol tartrate 25 mg BID   polyethylene glycol 17 g Daily   senna-docusate 8.6-50 mg 1 tablet BID   sodium chloride 0.9% 3 mL Q8H   PRN  acetaminophen 650 mg Q6H PRN   calcium gluconate IVPB 1 g PRN   calcium gluconate IVPB 1 g PRN   calcium gluconate IVPB 1 g PRN   dextrose 50% 12.5 g PRN   glucagon (human recombinant) 1 mg PRN   hydrALAZINE 10 mg Q4H PRN   labetalol 10 mg Q6H PRN   magnesium sulfate IVPB 2 g PRN   magnesium sulfate IVPB 4 g PRN   ondansetron 4 mg Q6H PRN   potassium chloride 40 mEq PRN   And     potassium chloride 60 mEq PRN   And     potassium chloride 80 mEq PRN   sodium phosphate IVPB 15 mmol PRN   sodium phosphate IVPB 20.01 mmol PRN   sodium phosphate IVPB 30 mmol PRN      Today I independently  reviewed pertinent medications, lines/drains/airways, imaging, cardiology, lab results, microbiology results,       Assessment/Plan:     Neuro   * Embolic stroke involving right cerebellar artery    -VN following  -NSGY consulted for sub occipital crani watch, possible hydrocephalus  -hold asa and SQH in that setting   --160  -HTS   -serum Na goal 145-155          Cardiac/Vascular   Hypertension    --160  -metoprolol 25 mg bid  -lisinopril 20 mg daily         Acute bacterial endocarditis    -ID Consulted  -appreciate recs  -repeat echo  -likely embolic source of stroke         NSTEMI (non-ST elevated myocardial infarction)    -NSTEMI outside hospital  -troponin trended down   -patient denied chest pain   -continue BB and ACE-I          ID   Sepsis due to Enterococcus    -ID following  -repeat blood cultures daily until negative   -continue ampicillin and ceftriaxone   -will likely need treatment for 6 weeks after cultures clear            Prophylaxis:  Venous Thromboembolism: mechanical  Stress Ulcer: H2B  Ventilator Pneumonia: not applicable     Activity Orders          Activity as tolerated starting at 08/07 1517        Full Code    Critical care time: 32 minutes     Treva Quijano PA-C  Neurocritical Care  [facility]

## 2017-08-07 NOTE — SUBJECTIVE & OBJECTIVE
Neurologic Chief Complaint: R cerebellar stroke     Subjective:     Interval History: Patient is seen for follow-up neurological assessment and treatment recommendations: improved alertness today. Mild nausea but overall improving.   NAEON. Stable imaging and exam. ID following for complicated UTI and endocarditis.    HPI, Past Medical, Family, and Social History remains the same as documented in the initial encounter.     Review of Systems   Constitutional: Negative for fever.   Eyes: Negative for photophobia and visual disturbance.   Gastrointestinal: Negative for nausea.   Neurological: Negative for headaches.     Scheduled Meds:   ampicillin IVPB  2 g Intravenous Q4H    atorvastatin  40 mg Oral Daily    cefTRIAXone (ROCEPHIN) IVPB  2 g Intravenous Q12H    [START ON 8/8/2017] famotidine  20 mg Oral BID    lisinopril  20 mg Oral Daily    metoprolol tartrate  25 mg Oral BID    polyethylene glycol  17 g Oral Daily    senna-docusate 8.6-50 mg  1 tablet Oral BID    sodium chloride 0.9%  3 mL Intravenous Q8H     Continuous Infusions:   buffered 3% sodium acetate 130meq, sodium chloride 130meq, sterile water for inj IV soln 50 mL/hr at 08/07/17 1200     PRN Meds:acetaminophen, calcium gluconate IVPB, calcium gluconate IVPB, calcium gluconate IVPB, dextrose 50%, glucagon (human recombinant), hydrALAZINE, labetalol, magnesium sulfate IVPB, magnesium sulfate IVPB, ondansetron, potassium chloride **AND** potassium chloride **AND** potassium chloride, sodium phosphate IVPB, sodium phosphate IVPB, sodium phosphate IVPB    Objective:     Vital Signs (Most Recent):  Temp: 98 °F (36.7 °C) (08/07/17 1101)  Pulse: 87 (08/07/17 1200)  Resp: 20 (08/07/17 1200)  BP: (!) 148/71 (08/07/17 1200)  SpO2: 100 % (08/07/17 1200)  BP Location: Right arm    Vital Signs Range (Last 24H):  Temp:  [97.8 °F (36.6 °C)-98.5 °F (36.9 °C)]   Pulse:  [73-98]   Resp:  [14-45]   BP: (122-156)/(57-80)   SpO2:  [97 %-100 %]   Arterial Line BP:  (121-163)/(54-79)   BP Location: Right arm    Physical Exam   Constitutional: He is oriented to person, place, and time. He appears well-developed and well-nourished.   HENT:   Head: Normocephalic.   Eyes: Pupils are equal, round, and reactive to light.   6th nerve and vertical palsy    Neck: Normal range of motion. Neck supple.   Cardiovascular: Normal rate.    Pulmonary/Chest: Effort normal.   Musculoskeletal: Normal range of motion.   Neurological: He is alert and oriented to person, place, and time.   Skin: Skin is warm and dry.   Nursing note and vitals reviewed.      Neurological Exam:   Alert, oriented to person and time  Equal sensation x 4   Full strength throughout  Gaze forward, left gaze better than right.   Right facial droop  B UE ataxia      NIH Stroke Scale:    Level of Consciousness: 0 - alert  LOC Questions: 0 - answers both correctly  LOC Commands: 0 - performs both correctly  Best Gaze: 1 - partial gaze palsy  Visual: 0 - no visual loss  Facial Palsy: 1 - minor  Motor Left Arm: 0 - no drift  Motor Right Arm: 0 - no drift  Motor Left Le - no drift  Motor Right Le - no drift  Limb Ataxia: 2 - present in two limbs  Sensory: 0 - normal  Best Language: 0 - no aphasia  Dysarthria: 0 - normal articulation  Extinction and Inattention: 0 - no neglect  NIH Stroke Scale Total: 4      Laboratory:  CMP:     Recent Labs  Lab 17  0305  17  1138   CALCIUM 8.8  --   --    ALBUMIN 2.3*  --   --    PROT 6.8  --   --    *  < > 148*   K 3.8  --  4.2   CO2 25  --   --    *  --   --    BUN 24*  --   --    CREATININE 1.1  --   --    ALKPHOS 64  --   --    ALT 18  --   --    AST 21  --   --    BILITOT 0.4  --   --    < > = values in this interval not displayed.  CBC:     Recent Labs  Lab 17  0305   WBC 9.69   RBC 3.13*   HGB 9.0*   HCT 28.2*   *   MCV 90   MCH 28.8   MCHC 31.9*     Lipid Panel:     Recent Labs  Lab 17  1830   CHOL 132   LDLCALC 76.4   HDL 31*   TRIG 123      Coagulation:     Recent Labs  Lab 08/04/17  1830   INR 1.0   APTT 22.7     Platelet Aggregation Study: No results for input(s): PLTAGG, PLTAGINTERP, PLTAGREGLACO, ADPPLTAGGREG in the last 168 hours.  Hgb A1C:     Recent Labs  Lab 08/04/17  1830   HGBA1C 5.4     TSH:     Recent Labs  Lab 08/04/17  1830   TSH 1.817  1.817       Diagnostic Results:  I have personally reviewed:   MRI 8/5/17  1. Large region of abnormal restricted diffusion within the right cerebellar hemisphere compatible with acute infarct. There is associated hemorrhagic conversion present. There is localized mass effect on the abril as well as effacement of the fourth ventricle. Ventricular system remains mildly prominent, unchanged from prior CT examination.    2. Additional focal regions of mild diffusion hyperintensity with associated serpiginous non-masslike cortical enhancement within the left parietal and occipital lobes suggestive of subacute infarcts. Additional region of volume loss with associated intrinsic T1 hyperintensity and serpiginous enhancement is present within the left cerebellar hemisphere suggestive of a late subacute infarct with laminar necrosis. Small remote lacunar type infarcts are also present in the right corona radiata. Overall findings are suggestive of possible thromboembolic phenomena. Clinical correlation is advised.    3. No evidence of high-grade stenosis of the visualized intracranial vasculature. Note is made that the mid and distal portions of the right AICA and PICA appear somewhat irregular and are not well-visualized, although a component of this may relate to noncontrast MRA time-of-flight technique.        8/4/17 - CT head    Large area of vasogenic edema centered within the right cerebellar hemisphere resulting in severe mass effect with compression of the 4th ventricle, mild hydrocephalus and leftward deviation of the cerebral vermis.  Recommend emergent neurosurgical consultation.    Echo  8/5/17  Normal LA   CONCLUSIONS     1 - Normal left ventricular systolic function (EF 60-65%).     2 - No wall motion abnormalities.     3 - Normal left ventricular diastolic function.     4 - Normal right ventricular systolic function .     5 - The estimated PA systolic pressure is 27 mmHg.     Repeat CTH 8/6/17:   Findings compatible with evolving recent right cerebellar infarct with associated hemorrhagic conversion. There is continued localized mass effect on the abril and effacement of the fourth ventricle. Ventricular system remains mildly prominent, although stable in size and configuration from prior examination. No evidence to suggest new intracranial hemorrhage or large region of abnormal parenchymal hypoattenuation.

## 2017-08-07 NOTE — SUBJECTIVE & OBJECTIVE
Interval History: Events noted.     Review of Systems   Constitutional: Positive for fatigue. Negative for chills and fever.   All other systems reviewed and are negative.    Objective:     Vital Signs (Most Recent):  Temp: 98.1 °F (36.7 °C) (08/07/17 0701)  Pulse: 84 (08/07/17 1000)  Resp: 17 (08/07/17 1000)  BP: (!) 122/57 (08/07/17 1000)  SpO2: 98 % (08/07/17 1000) Vital Signs (24h Range):  Temp:  [97.8 °F (36.6 °C)-98.5 °F (36.9 °C)] 98.1 °F (36.7 °C)  Pulse:  [73-98] 84  Resp:  [14-45] 17  SpO2:  [97 %-100 %] 98 %  BP: (122-156)/(57-80) 122/57  Arterial Line BP: (121-163)/(53-79) 142/60     Weight: 57.5 kg (126 lb 12.2 oz)  Body mass index is 16.28 kg/m².    Estimated Creatinine Clearance: 52.3 mL/min (based on Cr of 1.1).    Physical Exam   Constitutional: He is oriented to person, place, and time. He appears well-developed.   HENT:   Head: Normocephalic and atraumatic.   Eyes: EOM are normal. Pupils are equal, round, and reactive to light.   Cardiovascular: Normal rate and regular rhythm.    Murmur heard.  Pulmonary/Chest: Effort normal and breath sounds normal. No respiratory distress.   Abdominal: Soft. Bowel sounds are normal. He exhibits no distension. There is no tenderness.   Musculoskeletal: Normal range of motion. He exhibits no edema or tenderness.   Neurological: He is alert and oriented to person, place, and time.   Psychiatric: He has a normal mood and affect. His behavior is normal. Judgment and thought content normal.   Nursing note and vitals reviewed.      Significant Labs:   Blood Culture:   Recent Labs  Lab 08/02/17  1802 08/02/17  1807 08/04/17  0508 08/05/17  1316   LABBLOO Gram stain ani bottle: Gram positive cocci in chains resembling Strep   Results called to and read back by: Jeana Barrera RN 08/03/2017  10:42  Gram stain aer bottle: Gram positive cocci in chains resembling Strep  ENTEROCOCCUS FAECALISFor susceptibility see order #[id number] Gram stain ani bottle: Gram positive  cocci in chains resembling Strep   Results called to and read back by: Jeana Barrera RN 08/03/2017  10:42  Gram stain aer bottle: Gram positive cocci in chains resembling Strep  ENTEROCOCCUS FAECALIS Gram stain aer bottle: Gram positive cocci in chains resembling Strep   Results called to and read back by:Kendra Montez RN 08/05/2017  11:15  ENTEROCOCCUS FAECALISFor susceptibility see order #[id number]  Gram stain aer bottle: Gram positive cocci in chains resembling Strep   Results called to and read back by: Bess Montez 08/05/2017  17:08  ENTEROCOCCUS FAECALISFor susceptibility see order #[id number] Gram stain ani bottle: Gram positive cocci in clusters resembling Staph   Results called to and read back by: Jimmy Willis RN  08/06/2017  21:11     BMP:   Recent Labs  Lab 08/07/17  0305 08/07/17  0614   *  --    * 149*   K 3.8  --    *  --    CO2 25  --    BUN 24*  --    CREATININE 1.1  --    CALCIUM 8.8  --    MG 2.1  --      CBC:   Recent Labs  Lab 08/06/17  0406 08/07/17  0305   WBC 9.75 9.69   HGB 9.5* 9.0*   HCT 28.2* 28.2*   * 412*       Significant Imaging: None

## 2017-08-07 NOTE — PROGRESS NOTES
[facility]  Vascular Neurology  Comprehensive Stroke Center  Progress Note    Assessment/Plan:     8/5/17 - doing well, no events overnight, neuro surg and NCC monitoring for swelling/suboccipital crani watch   8/7/17 NAEON. Remains on 3% and cardene; hemicrani watch.     * Embolic stroke involving right cerebellar artery    Mr. Koehler is a 66yo M with endocarditis and an acute R cerebellar infarct with edema and midline shift as well as hemorrhagic conversion.     - Antithrombotics for secondary stroke prevention: Antiplatelets:  Aspirin: 325 mg oral now and daily  - Statins for secondary stroke prevention and hyperlipidemia, if present: None: Reason: consider if LDL>130; patient's likely etiology of stroke was septic emboli from endocarditis. LDL 76  - Aggressive risk factor modification: endocarditis, +proteus miribilis in urine, Bcx with gram + cocci resembling strep - will follow final & sensitivities. ID following  - Rehab Efforts: Physical Therapy, Occupational Therapy and Speech and Language Pathology  - Diagnostics: none pending   - VTE Prophylaxis: Heparin 5000 units SQ every 8 hours  - Endocarditis treatment per NCC  - Neurosurgery hemicrani watch; 3% per NCC         Cytotoxic cerebral edema    Due to stroke   Evident on imaging        Acute bacterial endocarditis    Mitral valve vegetation seen on 8/3/17 ECHO; repeat ECHO negative for vegetation  Blood cultures 8/4 with gram positive cocci in chains  Currently on ceftriaxone, ampicillin and vancomycin         Urinary tract infection with hematuria    + proteus in urine culture        NSTEMI (non-ST elevated myocardial infarction)    Per NCC, trending troponins  downtrending             Neurologic Chief Complaint: R cerebellar stroke     Subjective:     Interval History: Patient is seen for follow-up neurological assessment and treatment recommendations: improved alertness today. Mild nausea but overall improving.   NAEON. Stable  imaging and exam. ID following for complicated UTI and endocarditis.    HPI, Past Medical, Family, and Social History remains the same as documented in the initial encounter.     Review of Systems   Constitutional: Negative for fever.   Eyes: Negative for photophobia and visual disturbance.   Gastrointestinal: Negative for nausea.   Neurological: Negative for headaches.     Scheduled Meds:   ampicillin IVPB  2 g Intravenous Q4H    atorvastatin  40 mg Oral Daily    cefTRIAXone (ROCEPHIN) IVPB  2 g Intravenous Q12H    [START ON 8/8/2017] famotidine  20 mg Oral BID    lisinopril  20 mg Oral Daily    metoprolol tartrate  25 mg Oral BID    polyethylene glycol  17 g Oral Daily    senna-docusate 8.6-50 mg  1 tablet Oral BID    sodium chloride 0.9%  3 mL Intravenous Q8H     Continuous Infusions:   buffered 3% sodium acetate 130meq, sodium chloride 130meq, sterile water for inj IV soln 50 mL/hr at 08/07/17 1200     PRN Meds:acetaminophen, calcium gluconate IVPB, calcium gluconate IVPB, calcium gluconate IVPB, dextrose 50%, glucagon (human recombinant), hydrALAZINE, labetalol, magnesium sulfate IVPB, magnesium sulfate IVPB, ondansetron, potassium chloride **AND** potassium chloride **AND** potassium chloride, sodium phosphate IVPB, sodium phosphate IVPB, sodium phosphate IVPB    Objective:     Vital Signs (Most Recent):  Temp: 98 °F (36.7 °C) (08/07/17 1101)  Pulse: 87 (08/07/17 1200)  Resp: 20 (08/07/17 1200)  BP: (!) 148/71 (08/07/17 1200)  SpO2: 100 % (08/07/17 1200)  BP Location: Right arm    Vital Signs Range (Last 24H):  Temp:  [97.8 °F (36.6 °C)-98.5 °F (36.9 °C)]   Pulse:  [73-98]   Resp:  [14-45]   BP: (122-156)/(57-80)   SpO2:  [97 %-100 %]   Arterial Line BP: (121-163)/(54-79)   BP Location: Right arm    Physical Exam   Constitutional: He is oriented to person, place, and time. He appears well-developed and well-nourished.   HENT:   Head: Normocephalic.   Eyes: Pupils are equal, round, and reactive to  light.   6th nerve and vertical palsy    Neck: Normal range of motion. Neck supple.   Cardiovascular: Normal rate.    Pulmonary/Chest: Effort normal.   Musculoskeletal: Normal range of motion.   Neurological: He is alert and oriented to person, place, and time.   Skin: Skin is warm and dry.   Nursing note and vitals reviewed.      Neurological Exam:   Alert, oriented to person and time  Equal sensation x 4   Full strength throughout  Gaze forward, left gaze better than right.   Right facial droop  B UE ataxia      NIH Stroke Scale:    Level of Consciousness: 0 - alert  LOC Questions: 0 - answers both correctly  LOC Commands: 0 - performs both correctly  Best Gaze: 1 - partial gaze palsy  Visual: 0 - no visual loss  Facial Palsy: 1 - minor  Motor Left Arm: 0 - no drift  Motor Right Arm: 0 - no drift  Motor Left Le - no drift  Motor Right Le - no drift  Limb Ataxia: 2 - present in two limbs  Sensory: 0 - normal  Best Language: 0 - no aphasia  Dysarthria: 0 - normal articulation  Extinction and Inattention: 0 - no neglect  NIH Stroke Scale Total: 4      Laboratory:  CMP:     Recent Labs  Lab 17  0305  17  1138   CALCIUM 8.8  --   --    ALBUMIN 2.3*  --   --    PROT 6.8  --   --    *  < > 148*   K 3.8  --  4.2   CO2 25  --   --    *  --   --    BUN 24*  --   --    CREATININE 1.1  --   --    ALKPHOS 64  --   --    ALT 18  --   --    AST 21  --   --    BILITOT 0.4  --   --    < > = values in this interval not displayed.  CBC:     Recent Labs  Lab 17  0305   WBC 9.69   RBC 3.13*   HGB 9.0*   HCT 28.2*   *   MCV 90   MCH 28.8   MCHC 31.9*     Lipid Panel:     Recent Labs  Lab 17  1830   CHOL 132   LDLCALC 76.4   HDL 31*   TRIG 123     Coagulation:     Recent Labs  Lab 17  1830   INR 1.0   APTT 22.7     Platelet Aggregation Study: No results for input(s): PLTAGG, PLTAGINTERP, PLTAGREGLACO, ADPPLTAGGREG in the last 168 hours.  Hgb A1C:     Recent Labs  Lab  08/04/17 1830   HGBA1C 5.4     TSH:     Recent Labs  Lab 08/04/17 1830   TSH 1.817  1.817       Diagnostic Results:  I have personally reviewed:   MRI 8/5/17  1. Large region of abnormal restricted diffusion within the right cerebellar hemisphere compatible with acute infarct. There is associated hemorrhagic conversion present. There is localized mass effect on the abril as well as effacement of the fourth ventricle. Ventricular system remains mildly prominent, unchanged from prior CT examination.    2. Additional focal regions of mild diffusion hyperintensity with associated serpiginous non-masslike cortical enhancement within the left parietal and occipital lobes suggestive of subacute infarcts. Additional region of volume loss with associated intrinsic T1 hyperintensity and serpiginous enhancement is present within the left cerebellar hemisphere suggestive of a late subacute infarct with laminar necrosis. Small remote lacunar type infarcts are also present in the right corona radiata. Overall findings are suggestive of possible thromboembolic phenomena. Clinical correlation is advised.    3. No evidence of high-grade stenosis of the visualized intracranial vasculature. Note is made that the mid and distal portions of the right AICA and PICA appear somewhat irregular and are not well-visualized, although a component of this may relate to noncontrast MRA time-of-flight technique.        8/4/17 - CT head    Large area of vasogenic edema centered within the right cerebellar hemisphere resulting in severe mass effect with compression of the 4th ventricle, mild hydrocephalus and leftward deviation of the cerebral vermis.  Recommend emergent neurosurgical consultation.    Echo 8/5/17  Normal LA   CONCLUSIONS     1 - Normal left ventricular systolic function (EF 60-65%).     2 - No wall motion abnormalities.     3 - Normal left ventricular diastolic function.     4 - Normal right ventricular systolic function .     5 -  The estimated PA systolic pressure is 27 mmHg.     Repeat CTH 8/6/17:   Findings compatible with evolving recent right cerebellar infarct with associated hemorrhagic conversion. There is continued localized mass effect on the abril and effacement of the fourth ventricle. Ventricular system remains mildly prominent, although stable in size and configuration from prior examination. No evidence to suggest new intracranial hemorrhage or large region of abnormal parenchymal hypoattenuation.        Darlene Reyes PA-C  Comprehensive Stroke Center  Department of Vascular Neurology   [facility]

## 2017-08-07 NOTE — ASSESSMENT & PLAN NOTE
-  On 8/4/17, patient found to have AMS, aphasia, and facial droop.  -  CTH showed R cerebellar hypodensity with mass effect and midline shift.  -  MRI/MRA revealed R cerebellar infarct with hemorrhagic conversion.    -  Evaluated by neurosurgery and no acute surgical intervention necessary- started on Cardene and hypertonic infusion.    Functional status: see hospital course  Cognitive/Speech/Language status:  See hospital course  Nutrition/Swallow Status:  Passed bedside swallow evaluation.  SLP recommended regular diet and thin liquids.    Recommendations  -  Encourage mobility, OOB in chair at least 3 hours per day, and early ambulation as appropriate   -  PT/OT evaluate and treat  -  SLP speech and cognitive evaluate and treat  -  Monitor sleep disturbances and establish consistent sleep-wake cycle  -  Monitor for bowel and bladder dysfunction  -  Monitor for shoulder pain and subluxation  -  Monitor for spasticity  -  Monitor for and prevent skin breakdown and pressure ulcers  · Early mobility, repositioning/weight shifting every 20-30 minutes when sitting, turn patient every 2 hours, proper mattress/overlay and chair cushioning, pressure relief/heel protector boots  -  DVT prophylaxis:  KAMARI, SCD  -  Reviewed discharge options (IP rehab, SNF, HH therapy, and OP therapy)

## 2017-08-07 NOTE — PLAN OF CARE
SW met with Pt family at bedside. Discussed therapy recs for rehab and gave list of facilities. She will review and let this SW know when ready to make choices.    Elizabeth Matias, JASON  Neurocritical Care   [facility]  [id number]

## 2017-08-07 NOTE — PLAN OF CARE
Problem: Occupational Therapy Goal  Goal: Occupational Therapy Goal  Goals set 8/5 to be addressed for 7 days with expiration date, 8/12:  Patient will increase functional independence with ADLs by performing:    Patient will demonstrate rolling to the right with modified independence.  Not met   Patient will demonstrate rolling to the left with modified independence.   Not met  Patient will demonstrate supine -sit with modified independence.   Not met  Patient will demonstrate stand pivot transfers with min assist.   Not met  Patient will demonstrate grooming while standing with moderate assist.   Not met  Patient will demonstrate upper body dressing with moderate assist while seated EOB.   Not met  Patient will demonstrate lower body dressing with moderate assist while seated EOB.   Not met  Patient will demonstrate toileting with moderate assist.   Not met  Patient's family / caregiver will demonstrate independence and safety with assisting patient with self-care skills and functional mobility.     Not met  Patient and/or patient's family will verbalize understanding of stroke prevention guidelines, personal risk factors and stroke warning signs via teachback method.  Not met          Goals remain appropriate.  PREETHI Rich  8/7/2017

## 2017-08-07 NOTE — PLAN OF CARE
Problem: Patient Care Overview  Goal: Plan of Care Review  Outcome: Ongoing (interventions implemented as appropriate)  POC reviewed with pt at 0400. Pt verbalized understanding. Questions and concerns addressed. No acute events overnight. Pt on and off cardene throughout night and on buffered 3%. Pt progressing toward goals. Will continue to monitor. See flowsheets for full assessment and VS info

## 2017-08-07 NOTE — SUBJECTIVE & OBJECTIVE
Past Medical History:   Diagnosis Date    Cancer     bladder and throat    CKD (chronic kidney disease) stage 3, GFR 30-59 ml/min     Embolic stroke involving right cerebellar artery 8/4/2017    Urinary tract infection      Past Surgical History:   Procedure Laterality Date    BLADDER SURGERY      CYSTOSCOPY      HERNIA REPAIR      Ileal Conduit      THROAT SURGERY       Review of patient's allergies indicates:  No Known Allergies    Scheduled Medications:    ampicillin IVPB  2 g Intravenous Q4H    atorvastatin  40 mg Oral Daily    cefTRIAXone (ROCEPHIN) IVPB  2 g Intravenous Q12H    [START ON 8/8/2017] famotidine  20 mg Oral BID    lisinopril  20 mg Oral Daily    metoprolol tartrate  25 mg Oral BID    polyethylene glycol  17 g Oral Daily    senna-docusate 8.6-50 mg  1 tablet Oral BID    sodium chloride 0.9%  3 mL Intravenous Q8H       PRN Medications: acetaminophen, calcium gluconate IVPB, calcium gluconate IVPB, calcium gluconate IVPB, dextrose 50%, glucagon (human recombinant), hydrALAZINE, labetalol, magnesium sulfate IVPB, magnesium sulfate IVPB, ondansetron, potassium chloride **AND** potassium chloride **AND** potassium chloride, sodium phosphate IVPB, sodium phosphate IVPB, sodium phosphate IVPB    Family History     Problem Relation (Age of Onset)    Kidney disease Mother    No Known Problems Father        Social History Main Topics    Smoking status: Never Smoker    Smokeless tobacco: Never Used    Alcohol use No    Drug use: No    Sexual activity: Yes     Partners: Female     Birth control/ protection: None     Review of Systems   Constitutional: Positive for appetite change (decreased) and fatigue. Negative for chills and fever.   HENT: Negative for drooling, hearing loss, trouble swallowing and voice change.    Eyes: Negative for pain and visual disturbance.   Respiratory: Negative for cough, shortness of breath and wheezing.    Cardiovascular: Negative for chest pain and  palpitations.   Gastrointestinal: Negative for abdominal pain, nausea and vomiting.   Genitourinary: Negative for difficulty urinating and flank pain.   Musculoskeletal: Negative for arthralgias, back pain, myalgias and neck pain.   Skin: Negative for rash and wound.   Neurological: Positive for dizziness, numbness and headaches. Negative for weakness.   Psychiatric/Behavioral: Negative for agitation and hallucinations. The patient is not nervous/anxious.      Objective:     Vital Signs (Most Recent):  Temp: 98 °F (36.7 °C) (17 1101)  Pulse: 80 (17 1300)  Resp: (!) 22 (17 1300)  BP: 132/65 (17 1300)  SpO2: 99 % (17 1300)    Vital Signs (24h Range):  Temp:  [97.8 °F (36.6 °C)-98.5 °F (36.9 °C)] 98 °F (36.7 °C)  Pulse:  [73-98] 80  Resp:  [14-45] 22  SpO2:  [97 %-100 %] 99 %  BP: (122-156)/(57-80) 132/65  Arterial Line BP: (121-163)/(54-79) 142/60     Body mass index is 16.28 kg/m².    Physical Exam   Constitutional: He appears well-developed and well-nourished. No distress.   HENT:   Head: Normocephalic and atraumatic.   Right Ear: External ear normal.   Left Ear: External ear normal.   Nose: Nose normal.   Eyes: Right eye exhibits no discharge. Left eye exhibits no discharge. No scleral icterus.   Neck: Normal range of motion.   Cardiovascular: Normal rate, regular rhythm and intact distal pulses.    Pulmonary/Chest: Effort normal. No respiratory distress. He has no wheezes.   Abdominal: Soft. He exhibits no distension. There is no tenderness.   Musculoskeletal: Normal range of motion. He exhibits no edema or tenderness.   Neurological:   -  Mental Status:  AAOx3.  Follows commands.  Answers correct age and .  Recent and remote memory intact.  -  Speech and language:  no aphasia or dysarthria.    -  Facial movement (CN VII): facial droop  -  Coordination:  Finger to nose exam:  RUE dysmetria, LUE dysmetria.  -  Motor:  No pronator drift. RUE: 5/5.  LUE: .  RLE: .  LLE: .  -   Tone:  Normal.  -  Sensory:  Intact to light touch and pin prick.   Skin: Skin is warm and dry. No rash noted.   Psychiatric: He has a normal mood and affect. His behavior is normal. Thought content normal.   Vitals reviewed.         Diagnostic Results:   Labs: Reviewed  CT: Reviewed  MRI: Reviewed

## 2017-08-07 NOTE — TELEPHONE ENCOUNTER
Appts cancelled.   Wife verbalized understanding.       Jessa Penny, SAQIB [place]   Caller: Unspecified (Today,  2:47 PM)             O no! We will worry about all of that when he gets out of the hospital and see how he is doing.

## 2017-08-07 NOTE — PT/OT/SLP PROGRESS
"Physical Therapy  Treatment    Juan Manuel Koehler   MRN: [id number]   Admitting Diagnosis: Embolic stroke involving right cerebellar artery    PT Received On: 08/07/17  PT Start Time: 1557     PT Stop Time: 1611    PT Total Time (min): 14 min       Billable Minutes:  Therapeutic Activity 14    Treatment Type: Treatment  PT/PTA: PT           General Precautions: Standard, aspiration, fall, seizure    Subjective:  Communicated with RN (Lillian) prior to session. PT attempted x3 to see pt this date. 2pm, 3pm, and 4 pm.    "I did a lot this morning. I don't think I can do much today." pt states "I will try."    Pain/Comfort  Pain Rating 1:  (pt reports only "I just don't feel well")  Pain Rating Post-Intervention 1:  (NAD; resting comfortably)    Objective:   Patient found with: telemetry, arterial line, blood pressure cuff, central line, peripheral IV, pulse ox (continuous), SCD (nephrostomy bag)    Functional Mobility:  Bed Mobility:   Rolling/Turning to Left: Stand by assistance  Rolling/Turning Right: Stand by assistance  Scooting/Bridging: Stand by Assistance  Supine to Sit: Contact Guard Assistance (HOB elevated to 60 degrees to progressively assist coming to upright; per pt tolerance)  Sit to Supine: Stand by Assistance    Sitting Balance at Edge of Bed:   Assistance Level Required: Stand-by Assistance   Time: x6 min   Postural deviations noted: Rounded shoulder, Head forward, Posterior pelvic tilt, Lateral weight shift of hips and Kyphosis   Encouraged: upright posture, midline orientation, decreased B UE support of bed/bedrail    Therapeutic Activities and Exercises:  PT arrived to pt's room to find pt resting quietly; agreeable to PT session. Pt performed mobility as above. Upon sitting EOB x5 min, pt reporting need to return to supine 2/2 fatigue. PT assisted pt to neutral position in bed and reviewed appropriate progression with pt and pt's spouse. Questions/concerns addressed within PT scope of practice.  "     AM-PAC 6 CLICK MOBILITY  How much help from another person does this patient currently need?   1 = Unable, Total/Dependent Assistance  2 = A lot, Maximum/Moderate Assistance  3 = A little, Minimum/Contact Guard/Supervision  4 = None, Modified [place]/Independent    Turning over in bed (including adjusting bedclothes, sheets and blankets)?: 3  Sitting down on and standing up from a chair with arms (e.g., wheelchair, bedside commode, etc.): 3  Moving from lying on back to sitting on the side of the bed?: 3  Moving to and from a bed to a chair (including a wheelchair)?: 3  Need to walk in hospital room?: 2  Climbing 3-5 steps with a railing?: 2  Total Score: 16    AM-PAC Raw Score CMS G-Code Modifier Level of Impairment Assistance   6 % Total / Unable   7 - 9 CM 80 - 100% Maximal Assist   10 - 14 CL 60 - 80% Moderate Assist   15 - 19 CK 40 - 60% Moderate Assist   20 - 22 CJ 20 - 40% Minimal Assist   23 CI 1-20% SBA / CGA   24 CH 0% Independent/ Mod I     Patient left HOB elevated with all lines intact, call button in reach and RN notified. Spouse present.    Assessment:  Juan Manuel Koehler is a 67 y.o. male with a medical diagnosis of Embolic stroke involving right cerebellar artery and presents with fair participation with PT this PM 2/2 feeling ill. Pt tolerated EOB sitting x6 min with SBA; noted truncal ataxia easily corrected with B UE WB on mattress/lap. Will progress as tolerated. Patient will benefit from continued PT services to address:    Rehab identified problem list/impairments: Rehab identified problem list/impairments: weakness, impaired endurance, impaired sensation, gait instability, impaired functional mobilty, impaired self care skills, impaired balance, impaired cognition, decreased upper extremity function, pain, abnormal tone, decreased lower extremity function, decreased coordination, decreased safety awareness, impaired fine motor, impaired coordination, impaired cardiopulmonary  response to activity, impaired skin (failure to thrive)    Rehab potential is fair.    Activity tolerance: Fair    Discharge recommendations: Discharge Facility/Level Of Care Needs: rehabilitation facility     Barriers to discharge: Barriers to Discharge: Inaccessible home environment, Decreased caregiver support    Equipment recommendations: Equipment Needed After Discharge: 3-in-1 commode, bath bench, wheelchair     GOALS:    Physical Therapy Goals        Problem: Physical Therapy Goal    Goal Priority Disciplines Outcome Goal Variances Interventions   Physical Therapy Goal     PT/OT, PT Ongoing (interventions implemented as appropriate)     Description:  Goals to be met by: 8/15/2017     Patient will increase functional independence with mobility by performin. Supine to sit with Contact Guard Assistance  2. Sit to supine with supervision (CGA met )  3. Sit to stand transfer with Contact Guard Assistance using AD or No AD  4. Bed to chair transfer with Minimal Assistance using AD or No AD  5. Gait x25 feet with Minimal Assistance using AD or No AD  6. Ascend/descend 1 flight of stairs with bilateral Handrails with Min A  7. Sitting at edge of bed x10 minutes with Contact Guard Assistance while maintaining midline and forward gaze  8. Stand for x5 minutes with Minimal Assistance using AD or No AD  9. Lower extremity exercise program x15 reps with supervision to maintain B LE coordination and strength                     PLAN:    Patient to be seen 6 x/week  to address the above listed problems via gait training, therapeutic activities, therapeutic exercises, neuromuscular re-education  Plan of Care expires: 17  Plan of Care reviewed with: patient, spouse     Kiara Uriostegui, PT, DPT  [phone]  2017

## 2017-08-07 NOTE — CONSULTS
[facility]  Physical Medicine & Rehab  Consult Note    Patient Name: Juan Manuel Koehler  MRN: [id number]  Admission Date: 8/4/2017  Hospital Length of Stay: 3 days  Attending Physician: Caleb Valenzuela MD   Collaborating Physician: James Randle MD    Inpatient consult to Physical Medicine & Rehabilitation  Consult performed by: Daniella Clifford NP  Consult requested by:  Caleb Valenzuela MD    Reason for Consult:  assess rehabilitation needs    Consults  Subjective:     Principal Problem: Embolic stroke involving right cerebellar artery    HPI: Juan Manuel Koehler is a 67-year-old male with PMHx of CKD III and invasive bladder cancer s/p open radical cystoproctostomy, bilateral pelvic lymph node dissection, and ileal conduit urinary diversion on 6/21/17 (pathology with high grade urothelial carcinoma, LN negative for malignancy) with associated deconditioning, poor PO intake, and weight loss.  Patient presented to [facility] on 8/2/17 for worsening weakness and dizziness with subsequent fall.  Denied head trauma and LOC.  On arrival, found to be septic 2/2 complicated proteus UTI.  Blood cultures grew enterococcus faecalis.  Upon further work-up, ECHO showed vegetation on atrial surface of mitral valve consistent with endocarditis.  On 8/4/17, patient found to have AMS, aphasia, and facial droop.  Telemedicine consult completed.  CTH showed R cerebellar hypodensity with mass effect and midline shift.  He was then transferred to [facility] on 8/4/17 for further evaluation and management.  MRI/MRA revealed R cerebellar infarct with hemorrhagic conversion.  Evaluated by neurosurgery and no acute surgical intervention necessary.  Started on Cardene and hypertonic infusion.  Repeat blood cultures NGTD.  ID following and recommending ampicillin x 6 weeks for acute bacterial endocarditis.      Functional History: Patient lives in [place] with his wife in a 2 story home without steps to enter.  Bed and bath on 2nd  floor.  Prior to admission, he was independent with ADLs, including working and driving, and mobility.  DME: RW.    Hospital Course:   8/5/17:  Evaluated by therapy.  Bed mobility CGA-maxA.  Transfers and ADLs deferred 2/2 N/V.  Passed bedside swallow evaluation.  SLP recommending regular diet and thin liquids.  8/7/17:  Bed mobility SBA-CGA.  Sit to stand Charan and transfers Charan.  UBD Charan and LBD modA.  Evaluated by SLP.  Found to have cognitive-linguistic impairment and visvo-spatial impairment.     AM-PAC 6 CLICK MOBILITY (PT) - Total score: 16 (8/5)  AM-PAC 6 CLICK ADL (OT) - Total score: 6 (8/5), 13 (8/7)    AM-PAC Raw Score Level of Impairment Assistance   6 100% Total / Unable   7 - 8 80 - 100% Maximal Assist   9-13 60 - 80% Moderate Assist   14 - 19 40 - 60% Moderate Assist   20 - 22 20 - 40% Minimal Assist   23 1-20% SBA / CGA   24 0% Independent/ Mod I     Past Medical History:   Diagnosis Date    Cancer     bladder and throat    CKD (chronic kidney disease) stage 3, GFR 30-59 ml/min     Embolic stroke involving right cerebellar artery 8/4/2017    Urinary tract infection      Past Surgical History:   Procedure Laterality Date    BLADDER SURGERY      CYSTOSCOPY      HERNIA REPAIR      Ileal Conduit      THROAT SURGERY       Review of patient's allergies indicates:  No Known Allergies    Scheduled Medications:    ampicillin IVPB  2 g Intravenous Q4H    atorvastatin  40 mg Oral Daily    cefTRIAXone (ROCEPHIN) IVPB  2 g Intravenous Q12H    [START ON 8/8/2017] famotidine  20 mg Oral BID    lisinopril  20 mg Oral Daily    metoprolol tartrate  25 mg Oral BID    polyethylene glycol  17 g Oral Daily    senna-docusate 8.6-50 mg  1 tablet Oral BID    sodium chloride 0.9%  3 mL Intravenous Q8H       PRN Medications: acetaminophen, calcium gluconate IVPB, calcium gluconate IVPB, calcium gluconate IVPB, dextrose 50%, glucagon (human recombinant), hydrALAZINE, labetalol, magnesium sulfate IVPB,  magnesium sulfate IVPB, ondansetron, potassium chloride **AND** potassium chloride **AND** potassium chloride, sodium phosphate IVPB, sodium phosphate IVPB, sodium phosphate IVPB    Family History     Problem Relation (Age of Onset)    Kidney disease Mother    No Known Problems Father        Social History Main Topics    Smoking status: Never Smoker    Smokeless tobacco: Never Used    Alcohol use No    Drug use: No    Sexual activity: Yes     Partners: Female     Birth control/ protection: None     Review of Systems   Constitutional: Positive for appetite change (decreased) and fatigue. Negative for chills and fever.   HENT: Negative for drooling, hearing loss, trouble swallowing and voice change.    Eyes: Negative for pain and visual disturbance.   Respiratory: Negative for cough, shortness of breath and wheezing.    Cardiovascular: Negative for chest pain and palpitations.   Gastrointestinal: Negative for abdominal pain, nausea and vomiting.   Genitourinary: Negative for difficulty urinating and flank pain.   Musculoskeletal: Negative for arthralgias, back pain, myalgias and neck pain.   Skin: Negative for rash and wound.   Neurological: Positive for dizziness, numbness and headaches. Negative for weakness.   Psychiatric/Behavioral: Negative for agitation and hallucinations. The patient is not nervous/anxious.      Objective:     Vital Signs (Most Recent):  Temp: 98 °F (36.7 °C) (08/07/17 1101)  Pulse: 80 (08/07/17 1300)  Resp: (!) 22 (08/07/17 1300)  BP: 132/65 (08/07/17 1300)  SpO2: 99 % (08/07/17 1300)    Vital Signs (24h Range):  Temp:  [97.8 °F (36.6 °C)-98.5 °F (36.9 °C)] 98 °F (36.7 °C)  Pulse:  [73-98] 80  Resp:  [14-45] 22  SpO2:  [97 %-100 %] 99 %  BP: (122-156)/(57-80) 132/65  Arterial Line BP: (121-163)/(54-79) 142/60     Body mass index is 16.28 kg/m².    Physical Exam   Constitutional: He appears well-developed and well-nourished. No distress.   HENT:   Head: Normocephalic and atraumatic.    Right Ear: External ear normal.   Left Ear: External ear normal.   Nose: Nose normal.   Eyes: Right eye exhibits no discharge. Left eye exhibits no discharge. No scleral icterus.   Neck: Normal range of motion.   Cardiovascular: Normal rate, regular rhythm and intact distal pulses.    Pulmonary/Chest: Effort normal. No respiratory distress. He has no wheezes.   Abdominal: Soft. He exhibits no distension. There is no tenderness.   Musculoskeletal: Normal range of motion. He exhibits no edema or tenderness.   Neurological:   -  Mental Status:  AAOx3.  Follows commands.  Answers correct age and .  Recent and remote memory intact.  -  Speech and language:  no aphasia or dysarthria.    -  Facial movement (CN VII): facial droop  -  Coordination:  Finger to nose exam:  RUE dysmetria, LUE dysmetria.  -  Motor:  No pronator drift. RUE: 5/5.  LUE: 5/5.  RLE: 5/5.  LLE: 5/5.  -  Tone:  Normal.  -  Sensory:  Intact to light touch and pin prick.   Skin: Skin is warm and dry. No rash noted.   Psychiatric: He has a normal mood and affect. His behavior is normal. Thought content normal.   Vitals reviewed.    Diagnostic Results:   Labs: Reviewed  CT: Reviewed  MRI: Reviewed    Assessment/Plan:     Bacteremia due to Enterococcus    -  Blood cultures grew enterococcus faecalis  -  ID following and recommending ampicillin x 6 weeks for acute bacterial endocarditis        Acute bacterial endocarditis    -  Blood cultures grew enterococcus faecalis  -  ECHO showed vegetation on atrial surface of mitral valve consistent with endocarditis  -  ID following and recommending ampicillin x 6 weeks for acute bacterial endocarditis        Urinary tract infection with hematuria    -  Urine culture + proteus miribilis  -  Rocephin x 7 days (end 17)        NSTEMI (non-ST elevated myocardial infarction)    -  Elevated troponin on admission at [facility], trending down  -  Asymptomatic   -  2/2 demand ischemia         * Embolic stroke  involving right cerebellar artery    -  On 8/4/17, patient found to have AMS, aphasia, and facial droop.  -  CTH showed R cerebellar hypodensity with mass effect and midline shift.  -  MRI/MRA revealed R cerebellar infarct with hemorrhagic conversion.    -  Evaluated by neurosurgery and no acute surgical intervention necessary- started on Cardene and hypertonic infusion.    Functional status: see hospital course  Cognitive/Speech/Language status:  See hospital course  Nutrition/Swallow Status:  Passed bedside swallow evaluation.  SLP recommended regular diet and thin liquids.    Recommendations  -  Encourage mobility, OOB in chair at least 3 hours per day, and early ambulation as appropriate   -  PT/OT evaluate and treat  -  SLP speech and cognitive evaluate and treat  -  Monitor sleep disturbances and establish consistent sleep-wake cycle  -  Monitor for bowel and bladder dysfunction  -  Monitor for shoulder pain and subluxation  -  Monitor for spasticity  -  Monitor for and prevent skin breakdown and pressure ulcers  · Early mobility, repositioning/weight shifting every 20-30 minutes when sitting, turn patient every 2 hours, proper mattress/overlay and chair cushioning, pressure relief/heel protector boots  -  DVT prophylaxis:  KAMARI, SCD  -  Reviewed discharge options (IP rehab, SNF, HH therapy, and OP therapy)        Good rehab candidate.  Not ready for discharge.  Will follow and discuss with rehab team for further rehab recommendations.    Thank you for your consult.    TWYLA Arteaga  Department of Physical Medicine & Rehab  [facility]

## 2017-08-07 NOTE — ASSESSMENT & PLAN NOTE
67M p/w cerebellar stroke/hemorrhage    -vasogenic edema and mass effect on head CT & MRI  -No acute neurosurgical intervention indicated at this time  -No EVD placement requried at this time  -Rec HOB >30  -Rec Na goal 145-155  -SBP goal <150  -CTA neck to rule out vertebral artery dissection  -Consider head CT for any acute changes in neuro exam  -Please call neurosurgery for any declining neuro status

## 2017-08-07 NOTE — PLAN OF CARE
08/07/17 1623   Discharge Assessment   Assessment Type Discharge Planning Assessment   Confirmed/corrected address and phone number on facesheet? Yes   Assessment information obtained from? Caregiver;Patient   Expected Length of Stay (days) 3   Communicated expected length of stay with patient/caregiver yes   Prior to hospitilization cognitive status: Alert/Oriented   Prior to hospitalization functional status: Independent   Current cognitive status: Alert/Oriented   Current Functional Status: Needs Assistance   Arrived From admitted as an inpatient;home health   Lives With spouse   Able to Return to Prior Arrangements unable to determine at this time (comments)   Is patient able to care for self after discharge? Unable to determine at this time (comments)   How many people do you have in your home that can help with your care after discharge? 1   Who are your caregiver(s) and their phone number(s)? Bean (wife) [phone]   Patient's perception of discharge disposition home health   Readmission Within The Last 30 Days no previous admission in last 30 days   Patient currently being followed by outpatient case management? No   Patient currently receives home health services? Yes   If yes, name of home health provider: [facility] as assigned by N   Does the patient currently use HME? No   Patient currently receives private duty nursing? N/A   Patient currently receives any other outside agency services? No   Equipment Currently Used at Home none   Do you have any problems affording any of your prescribed medications? No   Is the patient taking medications as prescribed? yes   Do you have any financial concerns preventing you from receiving the healthcare you need? No   Does the patient have transportation to healthcare appointments? Yes   Transportation Available family or friend will provide   On Dialysis? No   Does the patient receive services at the [facility]mad[facility] Clinic? No   Are there any open  cases? No   Discharge Plan A Home with family;Home Health   Discharge Plan B Rehab   Patient/Family In Agreement With Plan yes         Discharge/ My Health Packet Folder Given to patient/family:     Yes         PCP:  Hemant Sweeney MD      Pharmacy:    [facility]/pharmacy #[id number] - [place] - [address] AT [facility]  [address]  [place] [address]  Phone: [phone] Fax: [phone]  ,    Emergency Contacts:  Extended Emergency Contact Information  Primary Emergency Contact: Darlene Koehler  Address: [address] [place] [address] [facility]  Home Phone: [phone]  Mobile Phone: [phone]  Relation: Spouse  Secondary Emergency Contact: Michael Lau   [place]  Mobile Phone: [phone]  Relation: Brother      Insurance:  Payor: [employer] / Plan: [employer] CHOICES [employer]ATINUM / Product Type: Medicare Advantage /       Misti Zhang RN, CCRN-K, [facility]  Neuro-Critical Care   [phone]

## 2017-08-07 NOTE — ASSESSMENT & PLAN NOTE
-VN following  -NSGY consulted for sub occipital crani watch, possible hydrocephalus  -hold asa and SQH in that setting   --160  -HTS   -serum Na goal 145-155

## 2017-08-07 NOTE — ASSESSMENT & PLAN NOTE
-  Elevated troponin on admission at [facility], trending down  -  Asymptomatic   -  2/2 demand ischemia

## 2017-08-07 NOTE — PLAN OF CARE
Problem: SLP Goal  Goal: SLP Goal  Speech Language Pathology Goals  Updated Goals expected to be met by 8/14    1. Pt will tolerate regular diet and thin liquids without s/s of airway compromise.   2. Pt will generate 10 items within a category independently to enhance cognition  3. Pt will recall 2/3 items with a delay independently to enhance memory   4. Pt will complete cancellation task with 90% acc independently to enhance visual-spatial skills   5. Pt will complete mental flexibility tasks with 80% independently to enhance cognition   6. Pt will participate in ongoing assessment of reading/money/math/time management skills               Pt participated in speech-language cognitive assessment goals updated accordingly.     Clara Vergara MS, CCC-SLP  Speech Language Pathologist  Pager: [phone]  Date 8/7/2017

## 2017-08-07 NOTE — PHYSICIAN QUERY
"PT Name: Juan Manuel Koehler  MR #: [id number]    Physician Query Form - Nutrition Clarification     CDS/: DARCY Jones RN               Contact information: [phone]    This form is a permanent document in the medical record.     Query Date: August 7, 2017    By submitting this query, we are merely seeking further clarification of documentation.. Please utilize your independent clinical judgment when addressing the question(s) below.    The Medical record contains the following:   Indicators  Supporting Clinical Findings Location in Medical Record    % of Estimated Energy Intake over a time frame from p.o., TF, or TPN      Weight Status over a time frame     X Subcutaneous Fat and/or Muscle Loss Overall Physical Appearance: underweight, loss of muscle mass, loss of subcutaneous fat    Nutrition consult 8/5    Fluid Accumulation or Edema      Reduced  Strength     X Wt / BMI / Usual Body Weight Weight = 57.5  Height = 6' 2"  BMI = 16.6    BMI Grade: 16 - 16.9 protein-energy malnutrition grade II    Nutrition consult 8/5    Delayed Wound Healing / Failure to Thrive     X Acute or Chronic Illness with invasive bladder cancer s/p open radical cystoproctostomy, bilateral pelvic lymph node dissection and ileal conduit  urinary diversion (6/21/17, path with high grade urothelial carcinoma, LN negative for malignancy), CKD III  With deconditioning and poor PO intake secondary to cancer.   Cerebellar stroke  H & P 8/4    Medication     X Treatment 1. If able to advance diet, recommend regular, texture per SLP. Add Boost Plus ONS.   2. If unable to advance diet or pt is intubated, initiate enteral nutrition. Isosource 1.5 @ 50 mL/hr would meet pt's estimated needs.    = 1800 kcals, 82 grams of protein, 917 mL fluid.    Nutrition consult 8/5   X Other Per notes, pt with poor appetite PTA. Pt on Megace at home.    Nutrition consult 8/5     AND / ASPEN Clinical Characteristics (October 2011)  A minimum of two " characteristics is recommended for diagnosing either moderate or severe malnutrition   Mild Malnutrition Moderate Malnutrition Severe Malnutrition   Energy Intake from p.o., TF or TPN. < 75% intake of estimated energy needs for less than 7 days < 75% intake of estimated energy needs for greater than 7 days < 50% intake of estimated energy needs for > 5 days   Weight Loss 1-2% in 1 month  5% in 3 months  7.5% in 6 months  10% in 1 year 1-2 % in 1 week  5% in 1 month  7.5% in 3 months  10% in 6 months  20% in 1 year > 2% in 1 week  > 5% in 1 month  > 7.5% in 3 months  > 10% in 6 months  > 20% in 1 year   Physical Findings     None *Mild subcutaneous fat and/or muscle loss  *Mild fluid accumulation  *Stage II decubitus  *Surgical wound or non-healing wound *Mod/severe subcutaneous fat and/or muscle loss  *Mod/severe fluid accumulation  *Stage III or IV decubitus  *Non-healing surgical wound     Provider, please specify diagnosis or diagnoses associated with above clinical findings.    [ ] Mild Protein-Calorie Malnutrition  [x ] Moderate Protein-Calorie Malnutrition  [ ] Severe Protein-Calorie Malnutrition  [ ] Cachexia  [ ] Anorexia  [ ] Abnormal Weight Loss  [ ] Underweight  [ ] Other Nutritional Diagnosis (please specify): ____________________________________  [ ] Other: ________________________________  [ ] Clinically Undetermined    Please document in your progress notes daily for the duration of treatment until resolved and include in your discharge summary.

## 2017-08-07 NOTE — PROGRESS NOTES
[facility]  Infectious Disease  Progress Note    Patient Name: Juan Manuel Koehler  MRN: [id number]  Admission Date: 8/4/2017  Length of Stay: 3 days  Attending Physician: Caleb Valenzuela MD  Primary Care Provider: Hemant Sweeney MD    Isolation Status: No active isolations  Assessment/Plan:      Acute bacterial endocarditis    Mr. Koehler is a 68 yo M with history of invasive urothelial bladder carcinoma (s/p open radical cystoprostatectomy, b/l pelvic lymph node resection and ileal conduit placement) and CKD III fount to have bacterial endocarditis due to Enterococcus fecalis on this admission. Pt w/ evidence of possible septic emboli to spleen on CT and w/ acute R cerebellar stroke also likely 2/2 septic embolus. Pt also has a complicated UTI w/ isolated Proteus sensitive to ceftriaxone.    - changed to AMP and high-dose CTX yesterday  - repeat blood culture from 8/5 is growing Staph (a contaminant?)  - recommend removing TLC and culturing tip  - stop vancomycin and repeat blood cultures  - anticipating 6 weeks of current abx regimen            Anticipated Disposition: 6 wweks of IV abx    Hemant Huber MD  Infectious Disease  [facility]    Subjective:     Principal Problem:Embolic stroke involving right cerebellar artery    HPI: Mr. Koehler is a 68 yo M with history of invasive urothelial bladder carcinoma (s/p open radical cystoprostatectomy, b/l pelvic lymph node resection and ileal conduit placement) and CKD III was transferred from OSH after a newly diagnosed R cerebella stroke likely 2/2 septic embolus.   Pt initially presented to the OSH after experiencing a fall; pt reports being dizzy and weak as he was descending down some stairs and ended up falling several steps as a result. Per patient and wife, he has experienced multiple episodes of weakness as a result of his decreased PO intake, decreased appetite stemming from his cancer Dx.   At the OSH, pt was  found to have a complicated UTI w/ isolated Proteus species  ( sensitive to ceftriaxone) as well as multiple + Bcx which grew Enterococcus faecalis. Upon further investigation pt's ECHO revealed vegetation on atrial surface of mitral valve consistent with IB endocarditis.  Of note pt has had multiple UTIs w/ Enterococcus being the causative species .  ID was consulted for IB endocarditis.   Denies any recent fevers or chills; no recent dental work.  Interval History: Events noted.     Review of Systems   Constitutional: Positive for fatigue. Negative for chills and fever.   All other systems reviewed and are negative.    Objective:     Vital Signs (Most Recent):  Temp: 98.1 °F (36.7 °C) (08/07/17 0701)  Pulse: 84 (08/07/17 1000)  Resp: 17 (08/07/17 1000)  BP: (!) 122/57 (08/07/17 1000)  SpO2: 98 % (08/07/17 1000) Vital Signs (24h Range):  Temp:  [97.8 °F (36.6 °C)-98.5 °F (36.9 °C)] 98.1 °F (36.7 °C)  Pulse:  [73-98] 84  Resp:  [14-45] 17  SpO2:  [97 %-100 %] 98 %  BP: (122-156)/(57-80) 122/57  Arterial Line BP: (121-163)/(53-79) 142/60     Weight: 57.5 kg (126 lb 12.2 oz)  Body mass index is 16.28 kg/m².    Estimated Creatinine Clearance: 52.3 mL/min (based on Cr of 1.1).    Physical Exam   Constitutional: He is oriented to person, place, and time. He appears well-developed.   HENT:   Head: Normocephalic and atraumatic.   Eyes: EOM are normal. Pupils are equal, round, and reactive to light.   Cardiovascular: Normal rate and regular rhythm.    Murmur heard.  Pulmonary/Chest: Effort normal and breath sounds normal. No respiratory distress.   Abdominal: Soft. Bowel sounds are normal. He exhibits no distension. There is no tenderness.   Musculoskeletal: Normal range of motion. He exhibits no edema or tenderness.   Neurological: He is alert and oriented to person, place, and time.   Psychiatric: He has a normal mood and affect. His behavior is normal. Judgment and thought content normal.   Nursing note and vitals  reviewed.      Significant Labs:   Blood Culture:   Recent Labs  Lab 08/02/17  1802 08/02/17  1807 08/04/17  0508 08/05/17  1316   LABBLOO Gram stain ani bottle: Gram positive cocci in chains resembling Strep   Results called to and read back by: Jeana Barrera RN 08/03/2017  10:42  Gram stain aer bottle: Gram positive cocci in chains resembling Strep  ENTEROCOCCUS FAECALISFor susceptibility see order #[id number] Gram stain ani bottle: Gram positive cocci in chains resembling Strep   Results called to and read back by: Jeana Barrera RN 08/03/2017  10:42  Gram stain aer bottle: Gram positive cocci in chains resembling Strep  ENTEROCOCCUS FAECALIS Gram stain aer bottle: Gram positive cocci in chains resembling Strep   Results called to and read back by:Kendra Montez RN 08/05/2017  11:15  ENTEROCOCCUS FAECALISFor susceptibility see order #[id number]  Gram stain aer bottle: Gram positive cocci in chains resembling Strep   Results called to and read back by: Bess Montez 08/05/2017  17:08  ENTEROCOCCUS FAECALISFor susceptibility see order #[id number] Gram stain ani bottle: Gram positive cocci in clusters resembling Staph   Results called to and read back by: Jimmy Willis RN  08/06/2017  21:11     BMP:   Recent Labs  Lab 08/07/17  0305 08/07/17  0614   *  --    * 149*   K 3.8  --    *  --    CO2 25  --    BUN 24*  --    CREATININE 1.1  --    CALCIUM 8.8  --    MG 2.1  --      CBC:   Recent Labs  Lab 08/06/17  0406 08/07/17  0305   WBC 9.75 9.69   HGB 9.5* 9.0*   HCT 28.2* 28.2*   * 412*       Significant Imaging: None

## 2017-08-07 NOTE — NURSING
Wilfrid Rock, NP with [facility] notified of discrepancy between pt's arterial line BP reading and cuff blood pressure reading. Arterial line reads 180/80s with a peaked waveform. Cuff blood pressure reading from left and right upper arm read 150/70s. Instructed to use cuff pressure to maintain SBP less than 160. Will continue to monitor.

## 2017-08-07 NOTE — PHYSICIAN QUERY
PT Name: Juan Manuel Koehler  MR #: [id number]     Physician Query Form - Documentation Clarification      CDS/: DARCY Jones RN               Contact information: [phone]    This form is a permanent document in the medical record.     Query Date: August 7, 2017    By submitting this query, we are merely seeking further clarification of documentation. Please utilize your independent clinical judgment when addressing the question(s) below.    The Medical record reflects the following:    Supporting Clinical Findings Location in Medical Record   Result Date: 8/4/2017   Large area of vasogenic edema centered within the right cerebellar hemisphere resulting in severe mass effect with compression of the 4th ventricle, mild hydrocephalus and leftward deviation  of the cerebral vermis.      · Q1hr neuro Checks, SBP <160   Neurosurgery consult 8/4                    Neuro CC PN 8/6     sodium acetate 130 mEq, sodium chloride (23.4%) 4 mEq/mL 130 mEq in sterile water 500 mL (buffered sodium 3%) infusion Intravenous, 50 mL/hr, Continuous         MAR 8/5                                                                            Doctor, Please specify diagnosis or diagnoses associated with above clinical findings.    Provider Use Only    [  x   ]   Cerebral Compression    [     ]   Other                                                                                                                           [  ] Clinically undetermined

## 2017-08-07 NOTE — ASSESSMENT & PLAN NOTE
-NSTEMI outside hospital  -troponin trended down   -patient denied chest pain   -continue BB and ACE-I

## 2017-08-07 NOTE — HOSPITAL COURSE
8/5/17:  Evaluated by therapy.  Bed mobility CGA-maxA.  Transfers and ADLs deferred 2/2 N/V.  Passed bedside swallow evaluation.  SLP recommending regular diet and thin liquids.  8/7/17:  Bed mobility SBA-CGA.  Sit to stand Charan and transfers Charan.  UBD Charan and LBD modA.  Evaluated by SLP.  Found to have cognitive-linguistic impairment and visvo-spatial impairment.   8/8/17:  Bed mobility SBA.  Sit to stand Charan and transfers Charan.  Ambulated 4 steps Charan.  UBD Charan and LBD Charan.  8/9/17:  Bed mobility SBA-CGA.  Sit to stand Charan and transfers modA. UBD Charan and LBD modA.  8/10/17:  Bed mobility SBA.  Sit to stand Charan and transfers min-modA.  Ambulated 4 steps min-modA.  8/13/17:  Bed mobility SBA.  Sit to stand CGA-Charan and transfers CGA.  Ambulated 100 ft CGA-Charan with RW.    AM-PAC 6 CLICK MOBILITY (PT) - Total score: 16 (8/5), 16 (8/7), 16 (8/10), 18 (8/13)  AM-PAC 6 CLICK ADL (OT) - Total score: 6 (8/5), 13 (8/7), 12 (8/8), 13 (8/9), 14 (8/10), 19 (8/12)    AM-PAC Raw Score Level of Impairment Assistance   6 100% Total / Unable   7 - 8 80 - 100% Maximal Assist   9-13 60 - 80% Moderate Assist   14 - 19 40 - 60% Moderate Assist   20 - 22 20 - 40% Minimal Assist   23 1-20% SBA / CGA   24 0% Independent/ Mod I

## 2017-08-07 NOTE — PT/OT/SLP PROGRESS
"Occupational Therapy  Treatment/ Goals Revised    Juan Manuel Koehler   MRN: [id number]   Admitting Diagnosis: Embolic stroke involving right cerebellar artery    OT Date of Treatment: 08/07/17   OT Start Time: 0800  OT Stop Time: 0840  OT Total Time (min): 40 min    Billable Minutes:  Self Care/Home Management 30 and Therapeutic Activity 10    General Precautions: Standard, aspiration, fall, seizure  Orthopedic Precautions: N/A  Braces: N/A    Do you have any cultural, spiritual, Jainism conflicts, given your current situation?: Anabaptist    Subjective:  Communicated with nurse prior to session.  Patient:  "I need to go to the toilet."  "I will need your help."    Pain/Comfort  Pain Rating 1: 0/10  Pain Rating Post-Intervention 1: 0/10    Objective:  Patient found with: telemetry, arterial line, central line, pulse ox (continuous), SCD, blood pressure cuff, peripheral IV (nephrostomy drain)  Family not present.     Functional Mobility:  Bed Mobility:  Rolling/Turning to Left: Stand by assistance  Rolling/Turning Right: Stand by assistance  Scooting/Bridging: Contact Guard Assistance  Supine to Sit: Contact Guard Assistance (from the right side )  Sit to Supine: Contact Guard Assistance    Transfers:   Sit <> Stand Assistance: Minimum Assistance  Sit <> Stand Assistive Device: No Assistive Device  Bed <> Chair Technique: Stand Pivot  Bed <> Chair Transfer Assistance: Moderate Assistance    Activities of Daily Living:  Feeding Level of Assistance: Stand by assistance (while seated EOB)  UE Dressing Level of Assistance: Minimum assistance (to guide around back)  LE Dressing Level of Assistance: Moderate assistance (for standing balance)  Grooming Position: Standing  Grooming Level of Assistance: Moderate assistance (for standing balance)  Toileting Where Assessed:  (Recommended to nurse need for 3 in 1 commode at bedside)      Additional Treatment:   Patient education provided on role of OT and need for rehab upon " "discharge.  Patient education provided on dressing, bed mobility, bridging and transfers. Patient verbalizing understanding via teach back method. Patient instructed on need to call for assistance for toileting needs and when getting up.  Continued education, patient/ family training recommended.  Patient alert and oriented x person and place.  Able to follow 4/4 one step commands.  Patient attentive and interactive throughout the session. SBA for postural control while seated EOB during meal.  Patient's functional status and disposition recommendation discussed with patient and nurse.  White board updated in patient's room.  OT asked if there were any other questions; patient/ family had no further questions.    AM-PAC 6 CLICK ADL   How much help from another person does this patient currently need?   1 = Unable, Total/Dependent Assistance  2 = A lot, Maximum/Moderate Assistance  3 = A little, Minimum/Contact Guard/Supervision  4 = None, Modified [place]/Independent    Putting on and taking off regular lower body clothing? : 2  Bathing (including washing, rinsing, drying)?: 2  Toileting, which includes using toilet, bedpan, or urinal? : 2  Putting on and taking off regular upper body clothing?: 2  Taking care of personal grooming such as brushing teeth?: 2  Eating meals?: 3  Total Score: 13     AM-PAC Raw Score CMS "G-Code Modifier Level of Impairment Assistance   6 % Total / Unable   7 - 8 CM 80 - 100% Maximal Assist   9-13 CL 60 - 80% Moderate Assist   14 - 19 CK 40 - 60% Moderate Assist   20 - 22 CJ 20 - 40% Minimal Assist   23 CI 1-20% SBA / CGA   24 CH 0% Independent/ Mod I       Patient left supine with all lines intact, call button in reach and nurse present    Assessment:  Juan Manuel Koehler is a 67 y.o. male with a medical diagnosis of Embolic stroke involving right cerebellar artery and presents with performance deficits of physical skills including impaired balance, mobility, and endurance.  " These performance deficits have resulted in activity limitations including but not limited to:   bed mobility, transfers, ascending/ descending stairs, walking short and long distances, walking around obstacles, transitional movement patterns (kneeling, bending); eating, upper body dressing, lower body dressing, brushing teeth, toileting, bathing, and carrying objects.   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 Patient will benefit from skilled OT services to maximize level of independence with self-care skills and functional mobility.  Will benefit from rehab.    Rehab identified problem list/impairments: Rehab identified problem list/impairments: weakness, impaired endurance, impaired self care skills, impaired functional mobilty, gait instability, impaired balance, decreased coordination, decreased upper extremity function, impaired cognition, decreased safety awareness, impaired coordination    Rehab potential is good.    Activity tolerance: Good    Discharge recommendations: Discharge Facility/Level Of Care Needs: rehabilitation facility     Barriers to discharge: Barriers to Discharge: Decreased caregiver support    Equipment recommendations: 3-in-1 commode, bath bench, wheelchair     GOALS:    Occupational Therapy Goals        Problem: Occupational Therapy Goal    Goal Priority Disciplines Outcome Interventions   Occupational Therapy Goal     OT, PT/OT     Description:  Goals set 8/7 to be addressed for 14 days with expiration date, 8/21:  Patient will increase functional independence with ADLs by performing:    Patient will demonstrate rolling to the right with modified independence.  Not met   Patient will demonstrate rolling to the left with modified independence.   Not met  Patient will demonstrate supine -sit with modified independence.   Not met  Patient will demonstrate stand pivot transfers with CGA.   Not met  Patient will  demonstrate grooming while standing with CGA.   Not met  Patient will demonstrate upper body dressing with SBA while seated EOB.   Not met  Patient will demonstrate lower body dressing with CGA while seated EOB.   Not met  Patient will demonstrate toileting with CGA.   Not met  Patient's family / caregiver will demonstrate independence and safety with assisting patient with self-care skills and functional mobility.     Not met  Patient and/or patient's family will verbalize understanding of stroke prevention guidelines, personal risk factors and stroke warning signs via teachback method.  Not met                            Plan:  Patient to be seen 6 x/week to address the above listed problems via self-care/home management, therapeutic exercises, therapeutic activities, cognitive retraining, neuromuscular re-education, sensory integration  Plan of Care expires: 09/02/17  Plan of Care reviewed with: patient         PREETHI Moses  08/07/2017

## 2017-08-07 NOTE — PLAN OF CARE
Problem: Physical Therapy Goal  Goal: Physical Therapy Goal  Goals to be met by: 8/15/2017     Patient will increase functional independence with mobility by performin. Supine to sit with Contact Guard Assistance  2. Sit to supine with supervision (CGA met )  3. Sit to stand transfer with Contact Guard Assistance using AD or No AD  4. Bed to chair transfer with Minimal Assistance using AD or No AD  5. Gait x25 feet with Minimal Assistance using AD or No AD  6. Ascend/descend 1 flight of stairs with bilateral Handrails with Min A  7. Sitting at edge of bed x10 minutes with Contact Guard Assistance while maintaining midline and forward gaze  8. Stand for x5 minutes with Minimal Assistance using AD or No AD  9. Lower extremity exercise program x15 reps with supervision to maintain B LE coordination and strength     Outcome: Ongoing (interventions implemented as appropriate)    Goals updated and appropriate to reflect pt's current mobility needs.    Kiara Uriostegui, PT, DPT  [phone]  2017

## 2017-08-07 NOTE — ASSESSMENT & PLAN NOTE
-  Blood cultures grew enterococcus faecalis  -  ID following and recommending ampicillin x 6 weeks for acute bacterial endocarditis

## 2017-08-08 LAB
ABO + RH BLD: NORMAL
ALBUMIN SERPL BCP-MCNC: 2.3 G/DL
ALP SERPL-CCNC: 64 U/L
ALT SERPL W/O P-5'-P-CCNC: 22 U/L
ANION GAP SERPL CALC-SCNC: 9 MMOL/L
AST SERPL-CCNC: 18 U/L
BASOPHILS # BLD AUTO: 0.03 K/UL
BASOPHILS NFR BLD: 0.3 %
BILIRUB SERPL-MCNC: 0.3 MG/DL
BLD GP AB SCN CELLS X3 SERPL QL: NORMAL
BUN SERPL-MCNC: 24 MG/DL
CALCIUM SERPL-MCNC: 8.7 MG/DL
CHLORIDE SERPL-SCNC: 117 MMOL/L
CO2 SERPL-SCNC: 27 MMOL/L
CREAT SERPL-MCNC: 1.2 MG/DL
DIFFERENTIAL METHOD: ABNORMAL
EOSINOPHIL # BLD AUTO: 0.1 K/UL
EOSINOPHIL NFR BLD: 1 %
ERYTHROCYTE [DISTWIDTH] IN BLOOD BY AUTOMATED COUNT: 16.2 %
EST. GFR  (AFRICAN AMERICAN): >60 ML/MIN/1.73 M^2
EST. GFR  (NON AFRICAN AMERICAN): >60 ML/MIN/1.73 M^2
GLUCOSE SERPL-MCNC: 104 MG/DL
HCT VFR BLD AUTO: 26.4 %
HGB BLD-MCNC: 8.5 G/DL
LYMPHOCYTES # BLD AUTO: 1.1 K/UL
LYMPHOCYTES NFR BLD: 13.2 %
MAGNESIUM SERPL-MCNC: 1.8 MG/DL
MAGNESIUM SERPL-MCNC: 2.4 MG/DL
MCH RBC QN AUTO: 28.4 PG
MCHC RBC AUTO-ENTMCNC: 32.2 G/DL
MCV RBC AUTO: 88 FL
MONOCYTES # BLD AUTO: 0.6 K/UL
MONOCYTES NFR BLD: 6.5 %
NEUTROPHILS # BLD AUTO: 6.7 K/UL
NEUTROPHILS NFR BLD: 78 %
PHOSPHATE SERPL-MCNC: 3.4 MG/DL
PLATELET # BLD AUTO: 330 K/UL
PMV BLD AUTO: 8.3 FL
POCT GLUCOSE: 180 MG/DL (ref 70–110)
POTASSIUM SERPL-SCNC: 4.2 MMOL/L
PROT SERPL-MCNC: 6.2 G/DL
RBC # BLD AUTO: 2.99 M/UL
SODIUM SERPL-SCNC: 148 MMOL/L
SODIUM SERPL-SCNC: 150 MMOL/L
SODIUM SERPL-SCNC: 151 MMOL/L
SODIUM SERPL-SCNC: 152 MMOL/L
SODIUM SERPL-SCNC: 153 MMOL/L
WBC # BLD AUTO: 8.63 K/UL

## 2017-08-08 PROCEDURE — A4216 STERILE WATER/SALINE, 10 ML: HCPCS | Performed by: NURSE PRACTITIONER

## 2017-08-08 PROCEDURE — 63600175 PHARM REV CODE 636 W HCPCS: Performed by: INTERNAL MEDICINE

## 2017-08-08 PROCEDURE — 99233 SBSQ HOSP IP/OBS HIGH 50: CPT | Mod: ,,, | Performed by: INTERNAL MEDICINE

## 2017-08-08 PROCEDURE — 86900 BLOOD TYPING SEROLOGIC ABO: CPT

## 2017-08-08 PROCEDURE — 99232 SBSQ HOSP IP/OBS MODERATE 35: CPT | Mod: ,,, | Performed by: NURSE PRACTITIONER

## 2017-08-08 PROCEDURE — 25000003 PHARM REV CODE 250: Performed by: INTERNAL MEDICINE

## 2017-08-08 PROCEDURE — 63600175 PHARM REV CODE 636 W HCPCS: Performed by: NURSE PRACTITIONER

## 2017-08-08 PROCEDURE — 99233 SBSQ HOSP IP/OBS HIGH 50: CPT | Mod: ,,, | Performed by: PSYCHIATRY & NEUROLOGY

## 2017-08-08 PROCEDURE — 25000003 PHARM REV CODE 250: Performed by: NURSE PRACTITIONER

## 2017-08-08 PROCEDURE — 63600175 PHARM REV CODE 636 W HCPCS: Performed by: PHYSICIAN ASSISTANT

## 2017-08-08 PROCEDURE — 97535 SELF CARE MNGMENT TRAINING: CPT

## 2017-08-08 PROCEDURE — 80053 COMPREHEN METABOLIC PANEL: CPT

## 2017-08-08 PROCEDURE — 86850 RBC ANTIBODY SCREEN: CPT

## 2017-08-08 PROCEDURE — 63600175 PHARM REV CODE 636 W HCPCS: Performed by: STUDENT IN AN ORGANIZED HEALTH CARE EDUCATION/TRAINING PROGRAM

## 2017-08-08 PROCEDURE — 25000003 PHARM REV CODE 250: Performed by: PHYSICIAN ASSISTANT

## 2017-08-08 PROCEDURE — 87070 CULTURE OTHR SPECIMN AEROBIC: CPT

## 2017-08-08 PROCEDURE — 97530 THERAPEUTIC ACTIVITIES: CPT

## 2017-08-08 PROCEDURE — 85025 COMPLETE CBC W/AUTO DIFF WBC: CPT

## 2017-08-08 PROCEDURE — 84100 ASSAY OF PHOSPHORUS: CPT

## 2017-08-08 PROCEDURE — 83735 ASSAY OF MAGNESIUM: CPT | Mod: 91

## 2017-08-08 PROCEDURE — 20000000 HC ICU ROOM

## 2017-08-08 PROCEDURE — 83735 ASSAY OF MAGNESIUM: CPT

## 2017-08-08 PROCEDURE — 84295 ASSAY OF SERUM SODIUM: CPT | Mod: 91

## 2017-08-08 RX ADMIN — METOPROLOL TARTRATE 25 MG: 25 TABLET ORAL at 08:08

## 2017-08-08 RX ADMIN — AMPICILLIN SODIUM 2 G: 2 INJECTION, POWDER, FOR SOLUTION INTRAMUSCULAR; INTRAVENOUS at 08:08

## 2017-08-08 RX ADMIN — CEFTRIAXONE 2 G: 2 INJECTION, SOLUTION INTRAVENOUS at 03:08

## 2017-08-08 RX ADMIN — STANDARDIZED SENNA CONCENTRATE AND DOCUSATE SODIUM 1 TABLET: 8.6; 5 TABLET, FILM COATED ORAL at 08:08

## 2017-08-08 RX ADMIN — ATORVASTATIN CALCIUM 40 MG: 20 TABLET, FILM COATED ORAL at 08:08

## 2017-08-08 RX ADMIN — FAMOTIDINE 20 MG: 20 TABLET, FILM COATED ORAL at 09:08

## 2017-08-08 RX ADMIN — AMPICILLIN SODIUM 2 G: 2 INJECTION, POWDER, FOR SOLUTION INTRAMUSCULAR; INTRAVENOUS at 11:08

## 2017-08-08 RX ADMIN — CEFTRIAXONE 2 G: 2 INJECTION, SOLUTION INTRAVENOUS at 02:08

## 2017-08-08 RX ADMIN — SODIUM CHLORIDE: 234 INJECTION INTRAMUSCULAR; INTRAVENOUS; SUBCUTANEOUS at 11:08

## 2017-08-08 RX ADMIN — POLYETHYLENE GLYCOL 3350 17 G: 17 POWDER, FOR SOLUTION ORAL at 08:08

## 2017-08-08 RX ADMIN — HYDRALAZINE HYDROCHLORIDE 10 MG: 20 INJECTION INTRAMUSCULAR; INTRAVENOUS at 04:08

## 2017-08-08 RX ADMIN — AMPICILLIN SODIUM 2 G: 2 INJECTION, POWDER, FOR SOLUTION INTRAMUSCULAR; INTRAVENOUS at 10:08

## 2017-08-08 RX ADMIN — AMPICILLIN SODIUM 2 G: 2 INJECTION, POWDER, FOR SOLUTION INTRAMUSCULAR; INTRAVENOUS at 02:08

## 2017-08-08 RX ADMIN — SODIUM CHLORIDE: 234 INJECTION INTRAMUSCULAR; INTRAVENOUS; SUBCUTANEOUS at 09:08

## 2017-08-08 RX ADMIN — LISINOPRIL 20 MG: 20 TABLET ORAL at 08:08

## 2017-08-08 RX ADMIN — Medication 3 ML: at 02:08

## 2017-08-08 RX ADMIN — FAMOTIDINE 20 MG: 20 TABLET, FILM COATED ORAL at 08:08

## 2017-08-08 RX ADMIN — LABETALOL HYDROCHLORIDE 10 MG: 5 INJECTION, SOLUTION INTRAVENOUS at 02:08

## 2017-08-08 RX ADMIN — MAGNESIUM SULFATE IN WATER 2 G: 40 INJECTION, SOLUTION INTRAVENOUS at 04:08

## 2017-08-08 RX ADMIN — AMPICILLIN SODIUM 2 G: 2 INJECTION, POWDER, FOR SOLUTION INTRAMUSCULAR; INTRAVENOUS at 04:08

## 2017-08-08 RX ADMIN — SODIUM CHLORIDE: 234 INJECTION INTRAMUSCULAR; INTRAVENOUS; SUBCUTANEOUS at 10:08

## 2017-08-08 RX ADMIN — AMPICILLIN SODIUM 2 G: 2 INJECTION, POWDER, FOR SOLUTION INTRAMUSCULAR; INTRAVENOUS at 07:08

## 2017-08-08 NOTE — SUBJECTIVE & OBJECTIVE
Interval History: No acute events overnight    Medications:  Continuous Infusions:   buffered 2% sodium acetate 86meq, sodium chloride 86meq, sterile water for inj IV soln       Scheduled Meds:   ampicillin IVPB  2 g Intravenous Q4H    atorvastatin  40 mg Oral Daily    cefTRIAXone (ROCEPHIN) IVPB  2 g Intravenous Q12H    famotidine  20 mg Oral BID    lisinopril  20 mg Oral Daily    metoprolol tartrate  25 mg Oral BID    polyethylene glycol  17 g Oral Daily    senna-docusate 8.6-50 mg  1 tablet Oral BID    sodium chloride 0.9%  3 mL Intravenous Q8H     PRN Meds:acetaminophen, calcium gluconate IVPB, calcium gluconate IVPB, calcium gluconate IVPB, dextrose 50%, glucagon (human recombinant), hydrALAZINE, labetalol, magnesium sulfate IVPB, magnesium sulfate IVPB, ondansetron, potassium chloride **AND** potassium chloride **AND** potassium chloride, sodium phosphate IVPB, sodium phosphate IVPB, sodium phosphate IVPB     Review of Systems    Objective:     Weight: 56.7 kg (125 lb)  Body mass index is 16.05 kg/m².  Vital Signs (Most Recent):  Temp: 98.5 °F (36.9 °C) (08/08/17 0700)  Pulse: 82 (08/08/17 1000)  Resp: 17 (08/08/17 1000)  BP: (!) 140/68 (08/08/17 1000)  SpO2: 99 % (08/08/17 1000) Vital Signs (24h Range):  Temp:  [98 °F (36.7 °C)-99.4 °F (37.4 °C)] 98.5 °F (36.9 °C)  Pulse:  [63-91] 82  Resp:  [14-40] 17  SpO2:  [97 %-100 %] 99 %  BP: (132-174)/(65-87) 140/68  Arterial Line BP: (137-196)/(52-77) 137/52       Date 08/08/17 0700 - 08/09/17 0659   Shift 6857-2777 0682-8029 0414-7748 24 Hour Total   I  N  T  A  K  E   I.V.  (mL/kg) 153.3  (2.7)   153.3  (2.7)    IV Piggyback 100   100    Shift Total  (mL/kg) 253.3  (4.5)   253.3  (4.5)   O  U  T  P  U  T   Urine  (mL/kg/hr) 700   700    Shift Total  (mL/kg) 700  (12.3)   700  (12.3)   Weight (kg) 56.7 56.7 56.7 56.7                        Urostomy 08/02/17 2345 ileal conduit RUQ (Active)   Stoma Appearance rosebud appearance;round 8/7/2017  3:01 PM    Stomal Appliance 1 piece;Dry;Intact;No Leakage 8/7/2017  3:01 PM   Accessories/Skin Care appliance belt 8/7/2017  3:01 PM   Stoma Function yellow urine 8/7/2017  3:01 PM   Peristomal Skin unable to assess, covered by appliance 8/7/2017  3:01 PM   Tolerance no signs/symptoms of discomfort 8/7/2017  3:01 PM   Treatment Other (Comment) 8/6/2017 11:00 AM   Output (mL) 70 mL 8/7/2017  4:00 PM       Neurosurgery Physical Exam    Alert and oriented x3  Pupils equal round and reactive to light   Partial right gaze palsy  Follows commands in all extremities  R dysmetria  Sensation intact to light touch      Significant Labs:    Recent Labs  Lab 08/07/17  0305  08/07/17  1138  08/07/17  2308 08/08/17  0145 08/08/17  0505   *  --   --   --   --  104  --    *  < > 148*  < > 152* 153* 152*   K 3.8  --  4.2  --   --  4.2  --    *  --   --   --   --  117*  --    CO2 25  --   --   --   --  27  --    BUN 24*  --   --   --   --  24*  --    CREATININE 1.1  --   --   --   --  1.2  --    CALCIUM 8.8  --   --   --   --  8.7  --    MG 2.1  --   --   --   --  1.8  --    < > = values in this interval not displayed.    Recent Labs  Lab 08/07/17  0305 08/08/17  0145   WBC 9.69 8.63   HGB 9.0* 8.5*   HCT 28.2* 26.4*   * 330     No results for input(s): LABPT, INR, APTT in the last 48 hours.  Microbiology Results (last 7 days)     Procedure Component Value Units Date/Time    Blood culture [[id number]] Collected:  08/05/17 1316    Order Status:  Completed Specimen:  Blood from Peripheral, Hand, Right Updated:  08/08/17 1023     Blood Culture, Routine Gram stain ani bottle: Gram positive cocci in clusters resembling Staph      Blood Culture, Routine Results called to and read back by: Jimmy Willis RN  08/06/2017  21:11     Blood Culture, Routine Gram stain aer bottle: Gram positive cocci in clusters resembling Staph     Blood Culture, Routine 08/07/2017  13:45     Blood Culture, Routine --     COAGULASE-NEGATIVE  STAPHYLOCOCCUS SPECIES  Organism is a probable contaminant      Narrative:       Blood cultures from 2 different sites. 4 bottles total.  Please draw before starting antibiotics.    Aerobic culture [[id number]]     Order Status:  No result Specimen:  Catheter Tip     Blood culture [[id number]]     Order Status:  Canceled Specimen:  Blood     Blood culture [[id number]] Collected:  08/07/17 1206    Order Status:  Completed Specimen:  Blood from Peripheral, Antecubital, Right Updated:  08/07/17 2115     Blood Culture, Routine No Growth to date    Narrative:       Blood cultures from 2 different sites. 4 bottles total.  Please draw before starting antibiotics.    Blood culture [[id number]] Collected:  08/07/17 1206    Order Status:  Completed Specimen:  Blood from Peripheral, Hand, Right Updated:  08/07/17 2115     Blood Culture, Routine No Growth to date    Narrative:       Blood cultures x 2 different sites. 4 bottles total. Please  draw cultures before administering antibiotics.    Blood culture [[id number]]     Order Status:  Canceled Specimen:  Blood     Blood culture [[id number]]     Order Status:  Canceled Specimen:  Blood     Culture, Respiratory with Gram Stain [[id number]]     Order Status:  No result Specimen:  Respiratory         All pertinent labs from the last 24 hours have been reviewed.    Significant Diagnostics:  I have reviewed all pertinent imaging results/findings within the past 24 hours.

## 2017-08-08 NOTE — PLAN OF CARE
Problem: Occupational Therapy Goal  Goal: Occupational Therapy Goal  Goals set 8/7 to be addressed for 14 days with expiration date, 8/21:  Patient will increase functional independence with ADLs by performing:    Patient will demonstrate rolling to the right with modified independence.  Not met   Patient will demonstrate rolling to the left with modified independence.   Not met  Patient will demonstrate supine -sit with modified independence.   Not met  Patient will demonstrate stand pivot transfers with CGA.   Not met  Patient will demonstrate grooming while standing with CGA.   Not met  Patient will demonstrate upper body dressing with SBA while seated EOB.   Not met  Patient will demonstrate lower body dressing with CGA while seated EOB.   Not met  Patient will demonstrate toileting with CGA.   Not met  Patient's family / caregiver will demonstrate independence and safety with assisting patient with self-care skills and functional mobility.     Not met  Patient and/or patient's family will verbalize understanding of stroke prevention guidelines, personal risk factors and stroke warning signs via teachback method.  Not met           Continue OT POC    Comments: Marques Graham OTR/SERGIO  8/8/2017

## 2017-08-08 NOTE — ASSESSMENT & PLAN NOTE
-ID following  -repeat blood cultures daily until negative   -continue ampicillin and ceftriaxone   -will likely need treatment for 6 weeks after cultures clear  -remove central line and culture tip

## 2017-08-08 NOTE — PROGRESS NOTES
[facility]  Infectious Disease  Progress Note    Patient Name: Juan Manuel Koehler  MRN: [id number]  Admission Date: 8/4/2017  Length of Stay: 4 days  Attending Physician: Caleb Valenzuela MD  Primary Care Provider: Hemant Sweeney MD    Isolation Status: No active isolations  Assessment/Plan:      Acute bacterial endocarditis    Mr. Koehler is a 68 yo M with history of invasive urothelial bladder carcinoma (s/p open radical cystoprostatectomy, b/l pelvic lymph node resection and ileal conduit placement) and CKD III fount to have bacterial endocarditis due to Enterococcus fecalis on this admission. Pt w/ evidence of possible septic emboli to spleen on CT and w/ acute R cerebellar stroke also likely 2/2 septic embolus. Pt also has a complicated UTI w/ isolated Proteus sensitive to ceftriaxone.    - changed to AMP and high-dose CTX on 8/4.  - repeat blood culture from 8/5 is growing Staph (a contaminant?)  - recommend removing TLC and culturing tip  - anticipating 6 weeks of current abx regimen, starting 8/5 (end date: 9/16)           I will follow-up with patient. Please contact us if you have any additional questions.    Hemant Huber MD  Infectious Disease  [facility]    Subjective:     Principal Problem:Embolic stroke involving right cerebellar artery    HPI: Mr. Koehler is a 68 yo M with history of invasive urothelial bladder carcinoma (s/p open radical cystoprostatectomy, b/l pelvic lymph node resection and ileal conduit placement) and CKD III was transferred from OSH after a newly diagnosed R cerebella stroke likely 2/2 septic embolus.   Pt initially presented to the OSH after experiencing a fall; pt reports being dizzy and weak as he was descending down some stairs and ended up falling several steps as a result. Per patient and wife, he has experienced multiple episodes of weakness as a result of his decreased PO intake, decreased appetite stemming from his cancer  Dx.   At the OSH, pt was found to have a complicated UTI w/ isolated Proteus species  ( sensitive to ceftriaxone) as well as multiple + Bcx which grew Enterococcus faecalis. Upon further investigation pt's ECHO revealed vegetation on atrial surface of mitral valve consistent with IB endocarditis.  Of note pt has had multiple UTIs w/ Enterococcus being the causative species .  ID was consulted for IB endocarditis.   Denies any recent fevers or chills; no recent dental work.  Interval History: Feeling better. Sitting in his chair. Occasional chills. Blood cx from 8/5 with Staph growth. Cultures repeated yesterday.    Review of Systems   Constitutional: Positive for chills. Negative for fever.   Psychiatric/Behavioral: The patient is nervous/anxious.    All other systems reviewed and are negative.    Objective:     Vital Signs (Most Recent):  Temp: 98.5 °F (36.9 °C) (08/08/17 0700)  Pulse: 83 (08/08/17 0700)  Resp: (!) 22 (08/08/17 0700)  BP: (!) 164/83 (08/08/17 0852)  SpO2: 98 % (08/08/17 0700) Vital Signs (24h Range):  Temp:  [98 °F (36.7 °C)-99.4 °F (37.4 °C)] 98.5 °F (36.9 °C)  Pulse:  [63-91] 83  Resp:  [14-40] 22  SpO2:  [97 %-100 %] 98 %  BP: (122-174)/(57-87) 164/83  Arterial Line BP: (142-196)/(60-77) 196/77     Weight: 56.7 kg (125 lb)  Body mass index is 16.05 kg/m².    Estimated Creatinine Clearance: 47.3 mL/min (based on Cr of 1.2).    Physical Exam   Constitutional: He is oriented to person, place, and time. He appears well-developed and well-nourished.   HENT:   Head: Normocephalic and atraumatic.   Right Ear: External ear normal.   Left Ear: External ear normal.   Mouth/Throat: Oropharynx is clear and moist.   Eyes: Conjunctivae and EOM are normal. Pupils are equal, round, and reactive to light.   Neck: Normal range of motion. Neck supple. No thyromegaly present.   Cardiovascular: Normal rate and regular rhythm.  Exam reveals no friction rub.    Murmur heard.  Pulmonary/Chest: Effort normal and breath  sounds normal. He has no wheezes. He has no rales.   Abdominal: Soft. Bowel sounds are normal. He exhibits no mass. There is no tenderness. There is no rebound.   Musculoskeletal: Normal range of motion.   Lymphadenopathy:     He has no cervical adenopathy.   Neurological: He is alert and oriented to person, place, and time.   Skin: Skin is warm and dry.   Psychiatric: He has a normal mood and affect. His behavior is normal. Judgment and thought content normal.   Vitals reviewed.      Significant Labs:   Blood Culture:   Recent Labs  Lab 08/02/17  1802 08/02/17  1807 08/04/17  0508 08/05/17  1316 08/07/17  1206   LABBLOO Gram stain ani bottle: Gram positive cocci in chains resembling Strep   Results called to and read back by: Jeana Barrera RN 08/03/2017  10:42  Gram stain aer bottle: Gram positive cocci in chains resembling Strep  ENTEROCOCCUS FAECALISFor susceptibility see order #[id number] Gram stain ani bottle: Gram positive cocci in chains resembling Strep   Results called to and read back by: Jeana Barrera RN 08/03/2017  10:42  Gram stain aer bottle: Gram positive cocci in chains resembling Strep  ENTEROCOCCUS FAECALIS Gram stain aer bottle: Gram positive cocci in chains resembling Strep   Results called to and read back by:Kendra Montez RN 08/05/2017  11:15  ENTEROCOCCUS FAECALISFor susceptibility see order #[id number]  Gram stain aer bottle: Gram positive cocci in chains resembling Strep   Results called to and read back by: Bess Montez 08/05/2017  17:08  ENTEROCOCCUS FAECALISFor susceptibility see order #[id number] Gram stain ani bottle: Gram positive cocci in clusters resembling Staph   Results called to and read back by: Jimmy Willis RN  08/06/2017  21:11  Gram stain aer bottle: Gram positive cocci in clusters resembling Staph  08/07/2017  13:45 No Growth to date  No Growth to date       Significant Imaging: I have reviewed all pertinent imaging results/findings within the past 24  hours.

## 2017-08-08 NOTE — PROGRESS NOTES
[facility] [address]  Neurosurgery  Progress Note    Subjective:     History of Present Illness: 67yoM with history of bladder cancer s/p bladder resection 6/21/17 presents to neurosurgery in the neuro critical care unit with right cerebellar hem/stroke with vasogenic edema and mass effect, urosepsis and endocarditis with vegetation. Family reports pt had decreased appetite since bladder resection and recent vomiting. Pt presented to [place] 8/2 after presyncope episode and weak on the ground. He had mental status change today at approx 1400 prompting CT head and was transferred to [facility].     Neurosurgery consulted for possible EVD placement and suboccipital crani watch.      Post-Op Info:  * No surgery found *         Interval History: No acute events overnight    Medications:  Continuous Infusions:   buffered 2% sodium acetate 86meq, sodium chloride 86meq, sterile water for inj IV soln       Scheduled Meds:   ampicillin IVPB  2 g Intravenous Q4H    atorvastatin  40 mg Oral Daily    cefTRIAXone (ROCEPHIN) IVPB  2 g Intravenous Q12H    famotidine  20 mg Oral BID    lisinopril  20 mg Oral Daily    metoprolol tartrate  25 mg Oral BID    polyethylene glycol  17 g Oral Daily    senna-docusate 8.6-50 mg  1 tablet Oral BID    sodium chloride 0.9%  3 mL Intravenous Q8H     PRN Meds:acetaminophen, calcium gluconate IVPB, calcium gluconate IVPB, calcium gluconate IVPB, dextrose 50%, glucagon (human recombinant), hydrALAZINE, labetalol, magnesium sulfate IVPB, magnesium sulfate IVPB, ondansetron, potassium chloride **AND** potassium chloride **AND** potassium chloride, sodium phosphate IVPB, sodium phosphate IVPB, sodium phosphate IVPB     Review of Systems    Objective:     Weight: 56.7 kg (125 lb)  Body mass index is 16.05 kg/m².  Vital Signs (Most Recent):  Temp: 98.5 °F (36.9 °C) (08/08/17 0700)  Pulse: 82 (08/08/17 1000)  Resp: 17 (08/08/17 1000)  BP: (!) 140/68 (08/08/17 1000)  SpO2: 99 % (08/08/17  1000) Vital Signs (24h Range):  Temp:  [98 °F (36.7 °C)-99.4 °F (37.4 °C)] 98.5 °F (36.9 °C)  Pulse:  [63-91] 82  Resp:  [14-40] 17  SpO2:  [97 %-100 %] 99 %  BP: (132-174)/(65-87) 140/68  Arterial Line BP: (137-196)/(52-77) 137/52       Date 08/08/17 0700 - 08/09/17 0659   Shift 4437-8561 0978-6062 3172-4964 24 Hour Total   I  N  T  A  K  E   I.V.  (mL/kg) 153.3  (2.7)   153.3  (2.7)    IV Piggyback 100   100    Shift Total  (mL/kg) 253.3  (4.5)   253.3  (4.5)   O  U  T  P  U  T   Urine  (mL/kg/hr) 700   700    Shift Total  (mL/kg) 700  (12.3)   700  (12.3)   Weight (kg) 56.7 56.7 56.7 56.7                        Urostomy 08/02/17 2345 ileal conduit RUQ (Active)   Stoma Appearance rosebud appearance;round 8/7/2017  3:01 PM   Stomal Appliance 1 piece;Dry;Intact;No Leakage 8/7/2017  3:01 PM   Accessories/Skin Care appliance belt 8/7/2017  3:01 PM   Stoma Function yellow urine 8/7/2017  3:01 PM   Peristomal Skin unable to assess, covered by appliance 8/7/2017  3:01 PM   Tolerance no signs/symptoms of discomfort 8/7/2017  3:01 PM   Treatment Other (Comment) 8/6/2017 11:00 AM   Output (mL) 70 mL 8/7/2017  4:00 PM       Neurosurgery Physical Exam    Alert and oriented x3  Pupils equal round and reactive to light   Partial right gaze palsy  Follows commands in all extremities  R dysmetria  Sensation intact to light touch      Significant Labs:    Recent Labs  Lab 08/07/17  0305  08/07/17  1138  08/07/17  2308 08/08/17  0145 08/08/17  0505   *  --   --   --   --  104  --    *  < > 148*  < > 152* 153* 152*   K 3.8  --  4.2  --   --  4.2  --    *  --   --   --   --  117*  --    CO2 25  --   --   --   --  27  --    BUN 24*  --   --   --   --  24*  --    CREATININE 1.1  --   --   --   --  1.2  --    CALCIUM 8.8  --   --   --   --  8.7  --    MG 2.1  --   --   --   --  1.8  --    < > = values in this interval not displayed.    Recent Labs  Lab 08/07/17  0305 08/08/17  0145   WBC 9.69 8.63   HGB 9.0* 8.5*    HCT 28.2* 26.4*   * 330     No results for input(s): LABPT, INR, APTT in the last 48 hours.  Microbiology Results (last 7 days)     Procedure Component Value Units Date/Time    Blood culture [[id number]] Collected:  08/05/17 1316    Order Status:  Completed Specimen:  Blood from Peripheral, Hand, Right Updated:  08/08/17 1023     Blood Culture, Routine Gram stain ani bottle: Gram positive cocci in clusters resembling Staph      Blood Culture, Routine Results called to and read back by: Jimmy Willis RN  08/06/2017  21:11     Blood Culture, Routine Gram stain aer bottle: Gram positive cocci in clusters resembling Staph     Blood Culture, Routine 08/07/2017  13:45     Blood Culture, Routine --     COAGULASE-NEGATIVE STAPHYLOCOCCUS SPECIES  Organism is a probable contaminant      Narrative:       Blood cultures from 2 different sites. 4 bottles total.  Please draw before starting antibiotics.    Aerobic culture [[id number]]     Order Status:  No result Specimen:  Catheter Tip     Blood culture [[id number]]     Order Status:  Canceled Specimen:  Blood     Blood culture [[id number]] Collected:  08/07/17 1206    Order Status:  Completed Specimen:  Blood from Peripheral, Antecubital, Right Updated:  08/07/17 2115     Blood Culture, Routine No Growth to date    Narrative:       Blood cultures from 2 different sites. 4 bottles total.  Please draw before starting antibiotics.    Blood culture [[id number]] Collected:  08/07/17 1206    Order Status:  Completed Specimen:  Blood from Peripheral, Hand, Right Updated:  08/07/17 2115     Blood Culture, Routine No Growth to date    Narrative:       Blood cultures x 2 different sites. 4 bottles total. Please  draw cultures before administering antibiotics.    Blood culture [[id number]]     Order Status:  Canceled Specimen:  Blood     Blood culture [[id number]]     Order Status:  Canceled Specimen:  Blood     Culture, Respiratory with Gram Stain [[id number]]     Order Status:  No result  Specimen:  Respiratory         All pertinent labs from the last 24 hours have been reviewed.    Significant Diagnostics:  I have reviewed all pertinent imaging results/findings within the past 24 hours.    Assessment/Plan:     * Embolic stroke involving right cerebellar artery    67M p/w cerebellar stroke/hemorrhage    -vasogenic edema and mass effect on head CT & MRI  -No acute neurosurgical intervention indicated at this time  -No EVD placement requried at this time  -Rec HOB >30  -Rec Na goal 145-155  -SBP goal <150  -CTA neck to rule out vertebral artery dissection  -Please call neurosurgery for any declining neuro status            Ruben Hollins MD  Neurosurgery  [facility]

## 2017-08-08 NOTE — PT/OT/SLP PROGRESS
Physical Therapy  Patient Not Seen    Juan Manuel Koehler  MRN: [id number]     PT attempted to see pt for treatment x4 instances this date; however, pt with OT in AM, rest UIC in later AM, requesting to remain UIC for lunch, and then speaking with dietician in later PM. PT unable to return for 5th attempt. PT will follow-up 8/9 per POC.    Kiara Uriostegui, PT, DPT  [phone]  8/8/2017

## 2017-08-08 NOTE — SUBJECTIVE & OBJECTIVE
Interval History: Feeling better. Sitting in his chair. Occasional chills. Blood cx from 8/5 with Staph growth. Cultures repeated yesterday.    Review of Systems   Constitutional: Positive for chills. Negative for fever.   Psychiatric/Behavioral: The patient is nervous/anxious.    All other systems reviewed and are negative.    Objective:     Vital Signs (Most Recent):  Temp: 98.5 °F (36.9 °C) (08/08/17 0700)  Pulse: 83 (08/08/17 0700)  Resp: (!) 22 (08/08/17 0700)  BP: (!) 164/83 (08/08/17 0852)  SpO2: 98 % (08/08/17 0700) Vital Signs (24h Range):  Temp:  [98 °F (36.7 °C)-99.4 °F (37.4 °C)] 98.5 °F (36.9 °C)  Pulse:  [63-91] 83  Resp:  [14-40] 22  SpO2:  [97 %-100 %] 98 %  BP: (122-174)/(57-87) 164/83  Arterial Line BP: (142-196)/(60-77) 196/77     Weight: 56.7 kg (125 lb)  Body mass index is 16.05 kg/m².    Estimated Creatinine Clearance: 47.3 mL/min (based on Cr of 1.2).    Physical Exam   Constitutional: He is oriented to person, place, and time. He appears well-developed and well-nourished.   HENT:   Head: Normocephalic and atraumatic.   Right Ear: External ear normal.   Left Ear: External ear normal.   Mouth/Throat: Oropharynx is clear and moist.   Eyes: Conjunctivae and EOM are normal. Pupils are equal, round, and reactive to light.   Neck: Normal range of motion. Neck supple. No thyromegaly present.   Cardiovascular: Normal rate and regular rhythm.  Exam reveals no friction rub.    Murmur heard.  Pulmonary/Chest: Effort normal and breath sounds normal. He has no wheezes. He has no rales.   Abdominal: Soft. Bowel sounds are normal. He exhibits no mass. There is no tenderness. There is no rebound.   Musculoskeletal: Normal range of motion.   Lymphadenopathy:     He has no cervical adenopathy.   Neurological: He is alert and oriented to person, place, and time.   Skin: Skin is warm and dry.   Psychiatric: He has a normal mood and affect. His behavior is normal. Judgment and thought content normal.   Vitals  reviewed.      Significant Labs:   Blood Culture:   Recent Labs  Lab 08/02/17  1802 08/02/17  1807 08/04/17  0508 08/05/17  1316 08/07/17  1206   LABBLOO Gram stain ani bottle: Gram positive cocci in chains resembling Strep   Results called to and read back by: Jeana Barrera RN 08/03/2017  10:42  Gram stain aer bottle: Gram positive cocci in chains resembling Strep  ENTEROCOCCUS FAECALISFor susceptibility see order #[id number] Gram stain ani bottle: Gram positive cocci in chains resembling Strep   Results called to and read back by: Jeana Barrera RN 08/03/2017  10:42  Gram stain aer bottle: Gram positive cocci in chains resembling Strep  ENTEROCOCCUS FAECALIS Gram stain aer bottle: Gram positive cocci in chains resembling Strep   Results called to and read back by:Kendra Montez RN 08/05/2017  11:15  ENTEROCOCCUS FAECALISFor susceptibility see order #[id number]  Gram stain aer bottle: Gram positive cocci in chains resembling Strep   Results called to and read back by: Bess Montez 08/05/2017  17:08  ENTEROCOCCUS FAECALISFor susceptibility see order #[id number] Gram stain ani bottle: Gram positive cocci in clusters resembling Staph   Results called to and read back by: Jimmy Willis RN  08/06/2017  21:11  Gram stain aer bottle: Gram positive cocci in clusters resembling Staph  08/07/2017  13:45 No Growth to date  No Growth to date       Significant Imaging: I have reviewed all pertinent imaging results/findings within the past 24 hours.

## 2017-08-08 NOTE — ASSESSMENT & PLAN NOTE
67M p/w cerebellar stroke/hemorrhage    -vasogenic edema and mass effect on head CT & MRI  -No acute neurosurgical intervention indicated at this time  -No EVD placement requried at this time  -Rec HOB >30  -Rec Na goal 145-155  -SBP goal <150  -CTA neck to rule out vertebral artery dissection  -Please call neurosurgery for any declining neuro status

## 2017-08-08 NOTE — PT/OT/SLP PROGRESS
"Speech Language Pathology  Treatment     Juan Manuel Koehler   MRN: [id number]   Admitting Diagnosis: Embolic stroke involving right cerebellar artery    Diet recommendations: Solid Diet Level: Regular  Liquid Diet Level: Thin Feed only when awake/alert, HOB to 90 degrees, Small bites/sips, 1 bite/sip at a time and Meds whole 1 at a time    SLP Treatment Date: 08/08/17  Speech Start Time: 1110     Speech Stop Time: 1134     Speech Total (min): 24 min       TREATMENT BILLABLE MINUTES:  Speech Therapy Individual 24    Has the patient been evaluated by SLP for swallowing? : Yes  Keep patient NPO?: No   General Precautions: Standard,          Subjective:  " I think I'm getting better"    Pain/Comfort  Pain Rating 1: 0/10  Pain Rating Post-Intervention 1: 0/10    Objective:    Pt awake alert and seated upright in chair. Pt attempted to complete visual cancellation task independently however Pt disorganized in nature. With max cueing using visual aide marker and verbal cues Pt completed scanning/cancellation task with 65% acc. Pt reporting following the activity that task was easy in nature indicating Pt with little insight to deficits. Pt completed category naming task by providing 6 items independently and increased to 9 items with SLP semantic and phonemic cueing. Pt recalled 1/3 items with a delay increased to 2/3 items with SLP mod-max verbal cueing and binary choice. ongoing speech services warranted. Discussed with spouse need for continued SLP services.     Assessment:  Juan Manuel Koehler is a 67 y.o. male with a medical diagnosis of Embolic stroke involving right cerebellar artery and presents with visual spatial and cognitive linguistic deficits.    Discharge recommendations: Discharge Facility/Level Of Care Needs: rehabilitation facility     Goals:    SLP Goals        Problem: SLP Goal    Goal Priority Disciplines Outcome   SLP Goal     SLP    Description:  Speech Language Pathology Goals  Updated Goals expected " to be met by 8/14    1. Pt will tolerate regular diet and thin liquids without s/s of airway compromise.   2. Pt will generate 10 items within a category independently to enhance cognition  3. Pt will recall 2/3 items with a delay independently to enhance memory   4. Pt will complete cancellation task with 90% acc independently to enhance visual-spatial skills   5. Pt will complete mental flexibility tasks with 80% independently to enhance cognition   6. Pt will follow 2 step directions with 80% acc with min cues to enhance comprehension skills   7. Pt will participate in ongoing assessment of reading/money/math/time management skills                                 Plan:   Patient to be seen Therapy Frequency: 5 x/week   Plan of Care expires: 09/03/17  Plan of Care reviewed with: patient, spouse  SLP Follow-up?: Yes  SLP - Next Visit Date: 08/07/17           Clara Vergara CCC-SLP  08/08/2017

## 2017-08-08 NOTE — PLAN OF CARE
SW met with Pt wife at bedside. Discussed rehab list given and asked for questions. She reported she does not know anything about the rehabs but will spend more time looking at them online tonight. She will give choices asap.    Elizabeth Matias LMSW  Neurocritical Care   [facility]  [phone]

## 2017-08-08 NOTE — PLAN OF CARE
Problem: Patient Care Overview  Goal: Plan of Care Review  Outcome: Ongoing (interventions implemented as appropriate)  Plan of care reviewed with pt and spouse.All questions and concerns addressed.No acute distress noted

## 2017-08-08 NOTE — PROGRESS NOTES
[facility]  Neurocritical Care  Progress Note    Admit Date: 8/4/2017  Service Date: 08/08/2017  Length of Stay: 4    Subjective:     Chief Complaint: Embolic stroke involving right cerebellar artery    History of Present Illness: Juan Manuel Koehler is a 67 year old male with invasive bladder cancer s/p open radical cystoproctostomy, bilateral pelvic lymph node dissection and ileal conduit urinary diversion (6/21/17, path with high grade urothelial carcinoma, LN negative for malignancy), CKD III  With deconditioning and poor PO intake secondary to cancer. He was in his usual state of health (lives at home with wife, able to ambulate, performs some adls) until day of admission (8/2) when patient was coming downstairs and all of a sudden felt dizzy and fell. He fell on his back and was unable to get up secondary to generalized weakness. He denies focal weakness. He denies any loss of consciousness.  He was admitted to OSH at that time and diagnosed with urosepsis, endocarditis, and acute anemia.     While at OSH, he developed aphasia 8/4 and telestroke consult performed. CTH shows large right cerebellar hypodensity. Patient transferred to [facility] for management/NSGY eval.        Hospital Course: 8/2 admitted OSH for urosepsis, endocarditis, and acute anemia   8/4  episode of aphasia at OSH; CTH with large cerebellar hypodensity/concern for edema/hydrocephalus-->transfer to C  8/5 HTS infusion initiated, hypertonic boluses discontinued        Interval History: Repeat CTH tomorrow.  Transition 3% to 2% as central line is being removed in setting of bacteremia.  Will culture tip.  Discussed plan with neurosurgery.     Review of Systems: negative     Vitals:   Temp: 98.5 °F (36.9 °C) (08/08/17 0700)  Pulse: 82 (08/08/17 1000)  Resp: 17 (08/08/17 1000)  BP: (!) 140/68 (08/08/17 1000)  SpO2: 99 % (08/08/17 1000)    Temp:  [98 °F (36.7 °C)-99.4 °F (37.4 °C)] 98.5 °F (36.9 °C)  Pulse:  [63-91] 82  Resp:   [14-40] 17  SpO2:  [97 %-100 %] 99 %  BP: (132-174)/(65-87) 140/68  Arterial Line BP: (137-196)/(52-77) 137/52              08/07 0701 - 08/08 0700  In: 2361.5 [P.O.:340; I.V.:1321.5]  Out: 2810 [Urine:2810]     Examination:   Constitutional: Well-nourished and -developed. No apparent distress.   Eyes: Conjunctiva clear, anicteric. Lids no lesions.  Head/Ears/Nose/Mouth/Throat/Neck: Moist mucous membranes. External ears, nose atraumatic.   Cardiovascular: Regular rhythm. No murmurs. No leg edema.  Respiratory: Comfortable respirations. Clear to auscultation.  Gastrointestinal: No hernia. Soft, nondistended, nontender. + bowel sounds.     Neurologic:  -GCS [id number]  -Alert. Oriented to person, place, and time. Speech fluent. Follows commands.  -CN 6 palsy, vertical palsy  -Motor 5/5 strength, BUE ataxia   -Sensation intact     Medications:   Continuous  buffered 2% sodium acetate 86meq, sodium chloride 86meq, sterile water for inj IV soln   Scheduled  ampicillin IVPB 2 g Q4H   atorvastatin 40 mg Daily   cefTRIAXone (ROCEPHIN) IVPB 2 g Q12H   famotidine 20 mg BID   lisinopril 20 mg Daily   metoprolol tartrate 25 mg BID   polyethylene glycol 17 g Daily   senna-docusate 8.6-50 mg 1 tablet BID   sodium chloride 0.9% 3 mL Q8H   PRN  acetaminophen 650 mg Q6H PRN   calcium gluconate IVPB 1 g PRN   calcium gluconate IVPB 1 g PRN   calcium gluconate IVPB 1 g PRN   dextrose 50% 12.5 g PRN   glucagon (human recombinant) 1 mg PRN   hydrALAZINE 10 mg Q4H PRN   labetalol 10 mg Q6H PRN   magnesium sulfate IVPB 2 g PRN   magnesium sulfate IVPB 4 g PRN   ondansetron 4 mg Q6H PRN   potassium chloride 40 mEq PRN   And     potassium chloride 60 mEq PRN   And     potassium chloride 80 mEq PRN   sodium phosphate IVPB 15 mmol PRN   sodium phosphate IVPB 20.01 mmol PRN   sodium phosphate IVPB 30 mmol PRN      Today I independently reviewed pertinent medications, lines/drains/airways, imaging, cardiology, lab results, microbiology results,      Assessment/Plan:     Neuro   * Embolic stroke involving right cerebellar artery    -VN following  -NSGY consulted for sub occipital crani watch, possible hydrocephalus  -hold asa and SQH in that setting   --160  -HTS, transitioned from 3% to 2% since central line being removed   -serum Na goal 145-155  -repeat CTH tomorrow           Cardiac/Vascular   Hypertension    --160  -metoprolol 25 mg bid  -lisinopril 20 mg daily         Acute bacterial endocarditis    -ID consulted  -appreciate recs  -no sign of vegetation of TTE  -likely embolic source of stroke         NSTEMI (non-ST elevated myocardial infarction)    -NSTEMI outside hospital  -troponin trended down   -patient denied chest pain   -continue BB and ACE-I          ID   Sepsis due to Enterococcus    -ID following  -repeat blood cultures daily until negative   -continue ampicillin and ceftriaxone   -will likely need treatment for 6 weeks after cultures clear  -remove central line and culture tip             Prophylaxis:  Venous Thromboembolism: mechanical  Stress Ulcer: H2B  Ventilator Pneumonia: not applicable     Activity Orders          Remove central line starting at 08/08 1001    Activity as tolerated starting at 08/07 1517        Full Code    Treva Quijano PA-C  Neurocritical Care  [facility]

## 2017-08-08 NOTE — ASSESSMENT & PLAN NOTE
-VN following  -NSGY consulted for sub occipital crani watch, possible hydrocephalus  -hold asa and SQH in that setting   --160  -HTS, transitioned from 3% to 2% since central line being removed   -serum Na goal 145-155  -repeat CTH tomorrow

## 2017-08-08 NOTE — SUBJECTIVE & OBJECTIVE
Past Medical History:   Diagnosis Date    Cancer     bladder and throat    CKD (chronic kidney disease) stage 3, GFR 30-59 ml/min     Embolic stroke involving right cerebellar artery 8/4/2017    Urinary tract infection      Past Surgical History:   Procedure Laterality Date    BLADDER SURGERY      CYSTOSCOPY      HERNIA REPAIR      Ileal Conduit      THROAT SURGERY       Review of patient's allergies indicates:  No Known Allergies    Scheduled Medications:    ampicillin IVPB  2 g Intravenous Q4H    atorvastatin  40 mg Oral Daily    cefTRIAXone (ROCEPHIN) IVPB  2 g Intravenous Q12H    famotidine  20 mg Oral BID    lisinopril  20 mg Oral Daily    metoprolol tartrate  25 mg Oral BID    polyethylene glycol  17 g Oral Daily    senna-docusate 8.6-50 mg  1 tablet Oral BID    sodium chloride 0.9%  3 mL Intravenous Q8H       PRN Medications: acetaminophen, calcium gluconate IVPB, calcium gluconate IVPB, calcium gluconate IVPB, dextrose 50%, glucagon (human recombinant), hydrALAZINE, labetalol, magnesium sulfate IVPB, magnesium sulfate IVPB, ondansetron, potassium chloride **AND** potassium chloride **AND** potassium chloride, sodium phosphate IVPB, sodium phosphate IVPB, sodium phosphate IVPB    Family History     Problem Relation (Age of Onset)    Kidney disease Mother    No Known Problems Father        Social History Main Topics    Smoking status: Never Smoker    Smokeless tobacco: Never Used    Alcohol use No    Drug use: No    Sexual activity: Yes     Partners: Female     Birth control/ protection: None     Review of Systems   Constitutional: Positive for appetite change (decreased) and fatigue. Negative for chills and fever.   HENT: Negative for drooling, hearing loss, trouble swallowing and voice change.    Eyes: Negative for pain and visual disturbance.   Respiratory: Negative for cough, shortness of breath and wheezing.    Cardiovascular: Negative for chest pain and palpitations.    Gastrointestinal: Negative for abdominal pain, nausea and vomiting.   Genitourinary: Negative for difficulty urinating and flank pain.   Musculoskeletal: Negative for arthralgias, back pain, myalgias and neck pain.   Skin: Negative for rash and wound.   Neurological: Positive for dizziness (improving) and headaches (improving). Negative for weakness.   Psychiatric/Behavioral: Negative for agitation and hallucinations. The patient is not nervous/anxious.      Objective:     Vital Signs (Most Recent):  Temp: 98.6 °F (37 °C) (17 1200)  Pulse: 82 (17 1000)  Resp: 17 (17 1000)  BP: (!) 140/68 (17 1000)  SpO2: 99 % (17 1000)    Vital Signs (24h Range):  Temp:  [98.2 °F (36.8 °C)-99.4 °F (37.4 °C)] 98.6 °F (37 °C)  Pulse:  [63-91] 82  Resp:  [14-40] 17  SpO2:  [97 %-99 %] 99 %  BP: (136-174)/(65-87) 140/68  Arterial Line BP: (137-196)/(52-77) 137/52     Body mass index is 16.05 kg/m².    Physical Exam   Constitutional: He appears well-developed and well-nourished. No distress.   HENT:   Head: Normocephalic and atraumatic.   Right Ear: External ear normal.   Left Ear: External ear normal.   Nose: Nose normal.   Eyes: Right eye exhibits no discharge. Left eye exhibits no discharge. No scleral icterus.   Neck: Normal range of motion.   Cardiovascular: Normal rate, regular rhythm and intact distal pulses.    Pulmonary/Chest: Effort normal. No respiratory distress. He has no wheezes.   Abdominal: Soft. He exhibits no distension. There is no tenderness.   Musculoskeletal: Normal range of motion. He exhibits no edema or tenderness.   Neurological:   -  Mental Status:  AAOx3.  Follows commands.  Answers correct age and .  Recent and remote memory intact.  -  Speech and language:  no aphasia or dysarthria.    -  Facial movement (CN VII): facial droop  -  Coordination:  Finger to nose exam:  RUE dysmetria, LUE dysmetria.  -  Motor:  No pronator drift. RUE: 5/5.  LUE: .  RLE: .  LLE: .  -   Tone:  Normal.  -  Sensory:  Intact to light touch and pin prick.   Skin: Skin is warm and dry. No rash noted.   Psychiatric: He has a normal mood and affect. His behavior is normal. Thought content normal.   Vitals reviewed.         Diagnostic Results:   Labs: Reviewed  CT: Reviewed  MRI: Reviewed

## 2017-08-08 NOTE — PROGRESS NOTES
[facility]  Physical Medicine & Rehab  Progress Note    Patient Name: Juan Manuel Koehler  MRN: [id number]  Admission Date: 8/4/2017  Length of Stay: 4 days  Attending Physician: Caleb Valenzuela MD  Collaborating Physician: James Randle MD    Subjective:     Principal Problem:Embolic stroke involving right cerebellar artery    Interval History (08/08/2017): Patient is seen for follow-up rehab evaluation and recommendations.  No acute events over night.  Feeling better today.  Neurosurgery recommending CTA to rule out vertebral artery dissection.  Participating with therapy.  Barriers for discharge/rehab admission: not ready for discharge     HPI, Past Medical, Surgical, Family, and Social History remains the same as documented in the initial encounter.    Hospital Course:   8/5/17:  Evaluated by therapy.  Bed mobility CGA-maxA.  Transfers and ADLs deferred 2/2 N/V.  Passed bedside swallow evaluation.  SLP recommending regular diet and thin liquids.  8/7/17:  Bed mobility SBA-CGA.  Sit to stand Charan and transfers Charan.  UBD Charan and LBD modA.  Evaluated by SLP.  Found to have cognitive-linguistic impairment and visvo-spatial impairment.     AM-PAC 6 CLICK MOBILITY (PT) - Total score: 16 (8/5), 16 (8/7)  AM-PAC 6 CLICK ADL (OT) - Total score: 6 (8/5), 13 (8/7)    AM-PAC Raw Score Level of Impairment Assistance   6 100% Total / Unable   7 - 8 80 - 100% Maximal Assist   9-13 60 - 80% Moderate Assist   14 - 19 40 - 60% Moderate Assist   20 - 22 20 - 40% Minimal Assist   23 1-20% SBA / CGA   24 0% Independent/ Mod I     Past Medical History:   Diagnosis Date    Cancer     bladder and throat    CKD (chronic kidney disease) stage 3, GFR 30-59 ml/min     Embolic stroke involving right cerebellar artery 8/4/2017    Urinary tract infection      Past Surgical History:   Procedure Laterality Date    BLADDER SURGERY      CYSTOSCOPY      HERNIA REPAIR      Ileal Conduit      THROAT SURGERY       Review  of patient's allergies indicates:  No Known Allergies    Scheduled Medications:    ampicillin IVPB  2 g Intravenous Q4H    atorvastatin  40 mg Oral Daily    cefTRIAXone (ROCEPHIN) IVPB  2 g Intravenous Q12H    famotidine  20 mg Oral BID    lisinopril  20 mg Oral Daily    metoprolol tartrate  25 mg Oral BID    polyethylene glycol  17 g Oral Daily    senna-docusate 8.6-50 mg  1 tablet Oral BID    sodium chloride 0.9%  3 mL Intravenous Q8H       PRN Medications: acetaminophen, calcium gluconate IVPB, calcium gluconate IVPB, calcium gluconate IVPB, dextrose 50%, glucagon (human recombinant), hydrALAZINE, labetalol, magnesium sulfate IVPB, magnesium sulfate IVPB, ondansetron, potassium chloride **AND** potassium chloride **AND** potassium chloride, sodium phosphate IVPB, sodium phosphate IVPB, sodium phosphate IVPB    Family History     Problem Relation (Age of Onset)    Kidney disease Mother    No Known Problems Father        Social History Main Topics    Smoking status: Never Smoker    Smokeless tobacco: Never Used    Alcohol use No    Drug use: No    Sexual activity: Yes     Partners: Female     Birth control/ protection: None     Review of Systems   Constitutional: Positive for appetite change (decreased) and fatigue. Negative for chills and fever.   HENT: Negative for drooling, hearing loss, trouble swallowing and voice change.    Eyes: Negative for pain and visual disturbance.   Respiratory: Negative for cough, shortness of breath and wheezing.    Cardiovascular: Negative for chest pain and palpitations.   Gastrointestinal: Negative for abdominal pain, nausea and vomiting.   Genitourinary: Negative for difficulty urinating and flank pain.   Musculoskeletal: Negative for arthralgias, back pain, myalgias and neck pain.   Skin: Negative for rash and wound.   Neurological: Positive for dizziness (improving) and headaches (improving). Negative for weakness.   Psychiatric/Behavioral: Negative for agitation  and hallucinations. The patient is not nervous/anxious.      Objective:     Vital Signs (Most Recent):  Temp: 98.6 °F (37 °C) (17 1200)  Pulse: 82 (17 1000)  Resp: 17 (17 1000)  BP: (!) 140/68 (17 1000)  SpO2: 99 % (17 1000)    Vital Signs (24h Range):  Temp:  [98.2 °F (36.8 °C)-99.4 °F (37.4 °C)] 98.6 °F (37 °C)  Pulse:  [63-91] 82  Resp:  [14-40] 17  SpO2:  [97 %-99 %] 99 %  BP: (136-174)/(65-87) 140/68  Arterial Line BP: (137-196)/(52-77) 137/52     Body mass index is 16.05 kg/m².    Physical Exam   Constitutional: He appears well-developed and well-nourished. No distress.   HENT:   Head: Normocephalic and atraumatic.   Right Ear: External ear normal.   Left Ear: External ear normal.   Nose: Nose normal.   Eyes: Right eye exhibits no discharge. Left eye exhibits no discharge. No scleral icterus.   Neck: Normal range of motion.   Cardiovascular: Normal rate, regular rhythm and intact distal pulses.    Pulmonary/Chest: Effort normal. No respiratory distress. He has no wheezes.   Abdominal: Soft. He exhibits no distension. There is no tenderness.   Musculoskeletal: Normal range of motion. He exhibits no edema or tenderness.   Neurological:   -  Mental Status:  AAOx3.  Follows commands.  Answers correct age and .  Recent and remote memory intact.  -  Speech and language:  no aphasia or dysarthria.    -  Facial movement (CN VII): facial droop  -  Coordination:  Finger to nose exam:  RUE dysmetria, LUE dysmetria.  -  Motor:  No pronator drift. RUE: 5/5.  LUE: 5/5.  RLE: 5/5.  LLE: 5/5.  -  Tone:  Normal.  -  Sensory:  Intact to light touch and pin prick.   Skin: Skin is warm and dry. No rash noted.   Psychiatric: He has a normal mood and affect. His behavior is normal. Thought content normal.   Vitals reviewed.    Diagnostic Results:   Labs: Reviewed  CT: Reviewed  MRI: Reviewed    Assessment/Plan:      Bacteremia due to Enterococcus    -  Blood cultures grew enterococcus faecalis  -   ID following and recommending ampicillin x 6 weeks for acute bacterial endocarditis        Acute bacterial endocarditis    -  Blood cultures grew enterococcus faecalis  -  ECHO showed vegetation on atrial surface of mitral valve consistent with endocarditis  -  ID following and recommending ampicillin x 6 weeks for acute bacterial endocarditis        Urinary tract infection with hematuria    -  Urine culture + proteus miribilis  -  Rocephin x 7 days (end 8/9/17)        NSTEMI (non-ST elevated myocardial infarction)    -  Elevated troponin on admission at [facility], trending down  -  Asymptomatic   -  2/2 demand ischemia         * Embolic stroke involving right cerebellar artery    -  On 8/4/17, patient found to have AMS, aphasia, and facial droop.  -  CTH showed R cerebellar hypodensity with mass effect and midline shift.  -  MRI/MRA revealed R cerebellar infarct with hemorrhagic conversion.    -  Evaluated by neurosurgery and no acute surgical intervention necessary- started on Cardene and hypertonic infusion.    Functional status: see hospital course  Cognitive/Speech/Language status:  See hospital course  Nutrition/Swallow Status:  Passed bedside swallow evaluation.  SLP recommended regular diet and thin liquids.    Recommendations  -  Encourage mobility, OOB in chair at least 3 hours per day, and early ambulation as appropriate   -  PT/OT evaluate and treat  -  SLP speech and cognitive evaluate and treat  -  Monitor sleep disturbances and establish consistent sleep-wake cycle  -  Monitor for bowel and bladder dysfunction  -  Monitor for shoulder pain and subluxation  -  Monitor for spasticity  -  Monitor for and prevent skin breakdown and pressure ulcers  · Early mobility, repositioning/weight shifting every 20-30 minutes when sitting, turn patient every 2 hours, proper mattress/overlay and chair cushioning, pressure relief/heel protector boots  -  DVT prophylaxis:  KAMARI, SCD  -  Reviewed discharge options  (IP rehab, SNF, HH therapy, and OP therapy)          Good rehab candidate.  Not ready for discharge.  Will continue to follow and discuss with rehab team for further rehab recommendations.    TWYLA Arteaga  Department of Physical Medicine & Rehab   [facility]

## 2017-08-08 NOTE — PT/OT/SLP PROGRESS
Occupational Therapy  Treatment    Juan Manuel Koehler   MRN: [id number]   Admitting Diagnosis: Embolic stroke involving right cerebellar artery    OT Date of Treatment: 08/08/17   OT Start Time: 0845  OT Stop Time: 0923  OT Total Time (min): 38 min    Billable Minutes:  Self Care/Home Management 15 minutes and Therapeutic Activity 23 minutes    General Precautions: Standard, aspiration, fall, seizure  Orthopedic Precautions: N/A  Braces: N/A    Do you have any cultural, spiritual, Temple conflicts, given your current situation?: Alevism    Subjective:  Communicated with nurse prior to session.  Pt agreeable to skilled OT services.    Pain/Comfort  Pain Rating 1: 0/10  Pain Rating Post-Intervention 1: 0/10    Objective:  Patient found with: blood pressure cuff, arterial line, peripheral IV, pulse ox (continuous)  Pt received w/ HOB elevated.     Functional Mobility:  Bed Mobility:  Rolling/Turning Right: Stand by assistance  Scooting/Bridging: Stand by Assistance  Supine to Sit: Stand by Assistance    Transfers:   Sit <> Stand Assistance: Minimum Assistance  Sit <> Stand Assistive Device: No Assistive Device  Bed <> Chair Technique: Stand Pivot  Bed <> Chair Transfer Assistance: Minimum Assistance  Bed <> Chair Assistive Device: No Assistive Device    Functional Ambulation: Pt took ~3-4 steps when t/f from bed to chair at min a w/o AD.    Activities of Daily Living:  Feeding Level of Assistance: Supervision (pt complete motor feeding pattern. Pt able to scoop and stick food w/o spilling; Pt able to grasp cup and drink at supv)  UE Dressing Level of Assistance: Minimum assistance (donned/doffed gown in the front)   LE Dressing Level of Assistance: Minimum assistance (required min a assist for placing sock over toes)     Balance:   Static Sit: GOOD: Takes MODERATE challenges from all directions  Dynamic Sit: GOOD: Maintains balance through MODERATE excursions of active trunk movement  Static Stand: POOR+: Needs MINIMAL  "assist to maintain  Dynamic stand: FAIR: Needs CONTACT GUARD during gait    Therapeutic Activities and Exercises:  Pt performed BUE hand strengthening of gross grasp squeezes for 30 reps w/ resistive ball.  Pt performed sit<>stand for 5 reps from bedside chair at min a w/o AD.  Pt sat EOB ~ 30 mintues.    AM-PAC 6 CLICK ADL   How much help from another person does this patient currently need?   1 = Unable, Total/Dependent Assistance  2 = A lot, Maximum/Moderate Assistance  3 = A little, Minimum/Contact Guard/Supervision  4 = None, Modified [place]/Independent    Putting on and taking off regular lower body clothing? : 2  Bathing (including washing, rinsing, drying)?: 2  Toileting, which includes using toilet, bedpan, or urinal? : 2  Putting on and taking off regular upper body clothing?: 3  Taking care of personal grooming such as brushing teeth?: 3  Eating meals?: 4  Total Score: 16     AM-PAC Raw Score CMS "G-Code Modifier Level of Impairment Assistance   6 % Total / Unable   7 - 8 CM 80 - 100% Maximal Assist   9-13 CL 60 - 80% Moderate Assist   14 - 19 CK 40 - 60% Moderate Assist   20 - 22 CJ 20 - 40% Minimal Assist   23 CI 1-20% SBA / CGA   24 CH 0% Independent/ Mod I       Patient left up in chair with all lines intact, call button in reach and nurse notified    ASSESSMENT:  Juan Manuel Koehler is a 67 y.o. male with a medical diagnosis of Embolic stroke involving right cerebellar artery and presents with impairments listed below. Pt did well to tolerate session. Pt displayed overall  strength and endurance w/ functional activities, which limits his ability to complete ADL's safely and in a timely manner. Pt would benefit from skilled OT services to improve independence and overall occupational functioning.    Rehab identified problem list/impairments: Rehab identified problem list/impairments: weakness, impaired endurance, impaired self care skills, impaired functional mobilty, gait " instability, impaired balance, impaired cognition, decreased upper extremity function, decreased lower extremity function, decreased safety awareness, decreased coordination    Rehab potential is good.    Activity tolerance: Good    Discharge recommendations: Discharge Facility/Level Of Care Needs: rehabilitation facility     Barriers to discharge: Barriers to Discharge: Decreased caregiver support, Inaccessible home environment    Equipment recommendations: 3-in-1 commode, bath bench, wheelchair     GOALS:    Occupational Therapy Goals        Problem: Occupational Therapy Goal    Goal Priority Disciplines Outcome Interventions   Occupational Therapy Goal     OT, PT/OT     Description:  Goals set 8/7 to be addressed for 14 days with expiration date, 8/21:  Patient will increase functional independence with ADLs by performing:    Patient will demonstrate rolling to the right with modified independence.  Not met   Patient will demonstrate rolling to the left with modified independence.   Not met  Patient will demonstrate supine -sit with modified independence.   Not met  Patient will demonstrate stand pivot transfers with CGA.   Not met  Patient will demonstrate grooming while standing with CGA.   Not met  Patient will demonstrate upper body dressing with SBA while seated EOB.   Not met  Patient will demonstrate lower body dressing with CGA while seated EOB.   Not met  Patient will demonstrate toileting with CGA.   Not met  Patient's family / caregiver will demonstrate independence and safety with assisting patient with self-care skills and functional mobility.     Not met  Patient and/or patient's family will verbalize understanding of stroke prevention guidelines, personal risk factors and stroke warning signs via teachback method.  Not met                            Plan:  Patient to be seen 6 x/week to address the above listed problems via self-care/home management, therapeutic activities, therapeutic exercises,  neuromuscular re-education, cognitive retraining, sensory integration  Plan of Care expires: 09/02/17  Plan of Care reviewed with: patient         Marques Graham, OT  08/08/2017

## 2017-08-08 NOTE — PLAN OF CARE
Problem: Patient Care Overview  Goal: Plan of Care Review  POC reviewed with Juan Manuel Koehler and Juan Manuel Koehler's family at bedside. Pt and family verbalized understanding. Questions and concerns addressed. Required PRN labetalol and hydralazine repeatedly to control BP overnight. See flowsheets for assessments and VS. Pt progressing towards goals. Will continue to monitor.

## 2017-08-08 NOTE — ASSESSMENT & PLAN NOTE
Mr. Koehler is a 66 yo M with history of invasive urothelial bladder carcinoma (s/p open radical cystoprostatectomy, b/l pelvic lymph node resection and ileal conduit placement) and CKD III fount to have bacterial endocarditis due to Enterococcus fecalis on this admission. Pt w/ evidence of possible septic emboli to spleen on CT and w/ acute R cerebellar stroke also likely 2/2 septic embolus. Pt also has a complicated UTI w/ isolated Proteus sensitive to ceftriaxone.    - changed to AMP and high-dose CTX on 8/4.  - repeat blood culture from 8/5 is growing Staph (a contaminant?)  - recommend removing TLC and culturing tip  - anticipating 6 weeks of current abx regimen, starting 8/5 (end date: 9/16)

## 2017-08-09 LAB
ALBUMIN SERPL BCP-MCNC: 2.3 G/DL
ALP SERPL-CCNC: 73 U/L
ALT SERPL W/O P-5'-P-CCNC: 35 U/L
ANION GAP SERPL CALC-SCNC: 9 MMOL/L
AST SERPL-CCNC: 26 U/L
BACTERIA BLD CULT: NORMAL
BASOPHILS # BLD AUTO: 0.02 K/UL
BASOPHILS NFR BLD: 0.2 %
BILIRUB SERPL-MCNC: 0.4 MG/DL
BUN SERPL-MCNC: 23 MG/DL
CALCIUM SERPL-MCNC: 8.3 MG/DL
CHLORIDE SERPL-SCNC: 116 MMOL/L
CO2 SERPL-SCNC: 24 MMOL/L
CREAT SERPL-MCNC: 1.2 MG/DL
DIFFERENTIAL METHOD: ABNORMAL
EOSINOPHIL # BLD AUTO: 0.2 K/UL
EOSINOPHIL NFR BLD: 1.5 %
ERYTHROCYTE [DISTWIDTH] IN BLOOD BY AUTOMATED COUNT: 16 %
EST. GFR  (AFRICAN AMERICAN): >60 ML/MIN/1.73 M^2
EST. GFR  (NON AFRICAN AMERICAN): >60 ML/MIN/1.73 M^2
GLUCOSE SERPL-MCNC: 98 MG/DL
HCT VFR BLD AUTO: 26.6 %
HGB BLD-MCNC: 8.5 G/DL
LYMPHOCYTES # BLD AUTO: 1.2 K/UL
LYMPHOCYTES NFR BLD: 11.7 %
MAGNESIUM SERPL-MCNC: 1.8 MG/DL
MAGNESIUM SERPL-MCNC: 2.2 MG/DL
MCH RBC QN AUTO: 28.9 PG
MCHC RBC AUTO-ENTMCNC: 32 G/DL
MCV RBC AUTO: 91 FL
MONOCYTES # BLD AUTO: 0.4 K/UL
MONOCYTES NFR BLD: 3.5 %
NEUTROPHILS # BLD AUTO: 8.2 K/UL
NEUTROPHILS NFR BLD: 82.1 %
PHOSPHATE SERPL-MCNC: 2.7 MG/DL
PLATELET # BLD AUTO: 286 K/UL
PMV BLD AUTO: 8 FL
POCT GLUCOSE: 156 MG/DL (ref 70–110)
POTASSIUM SERPL-SCNC: 4.2 MMOL/L
PROT SERPL-MCNC: 6 G/DL
RBC # BLD AUTO: 2.94 M/UL
SODIUM SERPL-SCNC: 143 MMOL/L
SODIUM SERPL-SCNC: 144 MMOL/L
SODIUM SERPL-SCNC: 146 MMOL/L
SODIUM SERPL-SCNC: 147 MMOL/L
SODIUM SERPL-SCNC: 149 MMOL/L
WBC # BLD AUTO: 10 K/UL

## 2017-08-09 PROCEDURE — 25500020 PHARM REV CODE 255: Performed by: PSYCHIATRY & NEUROLOGY

## 2017-08-09 PROCEDURE — 97112 NEUROMUSCULAR REEDUCATION: CPT

## 2017-08-09 PROCEDURE — 99233 SBSQ HOSP IP/OBS HIGH 50: CPT | Mod: ,,, | Performed by: PHYSICIAN ASSISTANT

## 2017-08-09 PROCEDURE — 25000003 PHARM REV CODE 250: Performed by: NURSE PRACTITIONER

## 2017-08-09 PROCEDURE — 84100 ASSAY OF PHOSPHORUS: CPT

## 2017-08-09 PROCEDURE — A4216 STERILE WATER/SALINE, 10 ML: HCPCS | Performed by: NURSE PRACTITIONER

## 2017-08-09 PROCEDURE — 25000003 PHARM REV CODE 250: Performed by: PHYSICIAN ASSISTANT

## 2017-08-09 PROCEDURE — 20000000 HC ICU ROOM

## 2017-08-09 PROCEDURE — 94761 N-INVAS EAR/PLS OXIMETRY MLT: CPT

## 2017-08-09 PROCEDURE — 85025 COMPLETE CBC W/AUTO DIFF WBC: CPT

## 2017-08-09 PROCEDURE — 97803 MED NUTRITION INDIV SUBSEQ: CPT

## 2017-08-09 PROCEDURE — 83735 ASSAY OF MAGNESIUM: CPT

## 2017-08-09 PROCEDURE — 99233 SBSQ HOSP IP/OBS HIGH 50: CPT | Mod: ,,, | Performed by: INTERNAL MEDICINE

## 2017-08-09 PROCEDURE — 87040 BLOOD CULTURE FOR BACTERIA: CPT | Mod: 59

## 2017-08-09 PROCEDURE — 97535 SELF CARE MNGMENT TRAINING: CPT

## 2017-08-09 PROCEDURE — 80053 COMPREHEN METABOLIC PANEL: CPT

## 2017-08-09 PROCEDURE — 63600175 PHARM REV CODE 636 W HCPCS: Performed by: INTERNAL MEDICINE

## 2017-08-09 PROCEDURE — 83735 ASSAY OF MAGNESIUM: CPT | Mod: 91

## 2017-08-09 PROCEDURE — 97530 THERAPEUTIC ACTIVITIES: CPT

## 2017-08-09 PROCEDURE — 63600175 PHARM REV CODE 636 W HCPCS: Performed by: PHYSICIAN ASSISTANT

## 2017-08-09 PROCEDURE — 25000003 PHARM REV CODE 250: Performed by: STUDENT IN AN ORGANIZED HEALTH CARE EDUCATION/TRAINING PROGRAM

## 2017-08-09 PROCEDURE — 99233 SBSQ HOSP IP/OBS HIGH 50: CPT | Mod: ,,, | Performed by: PSYCHIATRY & NEUROLOGY

## 2017-08-09 PROCEDURE — 92507 TX SP LANG VOICE COMM INDIV: CPT

## 2017-08-09 PROCEDURE — 99232 SBSQ HOSP IP/OBS MODERATE 35: CPT | Mod: ,,, | Performed by: NURSE PRACTITIONER

## 2017-08-09 PROCEDURE — 84295 ASSAY OF SERUM SODIUM: CPT

## 2017-08-09 PROCEDURE — 63600175 PHARM REV CODE 636 W HCPCS: Performed by: STUDENT IN AN ORGANIZED HEALTH CARE EDUCATION/TRAINING PROGRAM

## 2017-08-09 PROCEDURE — 63600175 PHARM REV CODE 636 W HCPCS: Performed by: NURSE PRACTITIONER

## 2017-08-09 RX ORDER — HEPARIN SODIUM 5000 [USP'U]/ML
5000 INJECTION, SOLUTION INTRAVENOUS; SUBCUTANEOUS EVERY 8 HOURS
Status: DISCONTINUED | OUTPATIENT
Start: 2017-08-09 | End: 2017-08-15 | Stop reason: HOSPADM

## 2017-08-09 RX ORDER — CARVEDILOL 6.25 MG/1
6.25 TABLET ORAL 2 TIMES DAILY
Status: DISCONTINUED | OUTPATIENT
Start: 2017-08-09 | End: 2017-08-10

## 2017-08-09 RX ADMIN — CARVEDILOL 6.25 MG: 6.25 TABLET, FILM COATED ORAL at 11:08

## 2017-08-09 RX ADMIN — AMPICILLIN SODIUM 2 G: 2 INJECTION, POWDER, FOR SOLUTION INTRAMUSCULAR; INTRAVENOUS at 11:08

## 2017-08-09 RX ADMIN — HEPARIN SODIUM 5000 UNITS: 5000 INJECTION, SOLUTION INTRAVENOUS; SUBCUTANEOUS at 11:08

## 2017-08-09 RX ADMIN — IOHEXOL 100 ML: 350 INJECTION, SOLUTION INTRAVENOUS at 02:08

## 2017-08-09 RX ADMIN — CEFTRIAXONE 2 G: 2 INJECTION, SOLUTION INTRAVENOUS at 04:08

## 2017-08-09 RX ADMIN — METOPROLOL TARTRATE 25 MG: 25 TABLET ORAL at 08:08

## 2017-08-09 RX ADMIN — HEPARIN SODIUM 5000 UNITS: 5000 INJECTION, SOLUTION INTRAVENOUS; SUBCUTANEOUS at 09:08

## 2017-08-09 RX ADMIN — CEFTRIAXONE 2 G: 2 INJECTION, SOLUTION INTRAVENOUS at 03:08

## 2017-08-09 RX ADMIN — AMPICILLIN SODIUM 2 G: 2 INJECTION, POWDER, FOR SOLUTION INTRAMUSCULAR; INTRAVENOUS at 04:08

## 2017-08-09 RX ADMIN — LISINOPRIL 20 MG: 20 TABLET ORAL at 08:08

## 2017-08-09 RX ADMIN — CARVEDILOL 6.25 MG: 6.25 TABLET, FILM COATED ORAL at 09:08

## 2017-08-09 RX ADMIN — AMPICILLIN SODIUM 2 G: 2 INJECTION, POWDER, FOR SOLUTION INTRAMUSCULAR; INTRAVENOUS at 03:08

## 2017-08-09 RX ADMIN — AMPICILLIN SODIUM 2 G: 2 INJECTION, POWDER, FOR SOLUTION INTRAMUSCULAR; INTRAVENOUS at 07:08

## 2017-08-09 RX ADMIN — FAMOTIDINE 20 MG: 20 TABLET, FILM COATED ORAL at 08:08

## 2017-08-09 RX ADMIN — POLYETHYLENE GLYCOL 3350 17 G: 17 POWDER, FOR SOLUTION ORAL at 08:08

## 2017-08-09 RX ADMIN — ONDANSETRON 4 MG: 2 INJECTION INTRAMUSCULAR; INTRAVENOUS at 01:08

## 2017-08-09 RX ADMIN — STANDARDIZED SENNA CONCENTRATE AND DOCUSATE SODIUM 1 TABLET: 8.6; 5 TABLET, FILM COATED ORAL at 08:08

## 2017-08-09 RX ADMIN — FAMOTIDINE 20 MG: 20 TABLET, FILM COATED ORAL at 09:08

## 2017-08-09 RX ADMIN — SODIUM PHOSPHATE, MONOBASIC, MONOHYDRATE 15 MMOL: 276; 142 INJECTION, SOLUTION INTRAVENOUS at 08:08

## 2017-08-09 RX ADMIN — Medication 3 ML: at 02:08

## 2017-08-09 RX ADMIN — SODIUM CHLORIDE: 234 INJECTION INTRAMUSCULAR; INTRAVENOUS; SUBCUTANEOUS at 06:08

## 2017-08-09 RX ADMIN — MAGNESIUM SULFATE IN WATER 2 G: 40 INJECTION, SOLUTION INTRAVENOUS at 05:08

## 2017-08-09 RX ADMIN — ATORVASTATIN CALCIUM 40 MG: 20 TABLET, FILM COATED ORAL at 08:08

## 2017-08-09 RX ADMIN — Medication 3 ML: at 05:08

## 2017-08-09 NOTE — PROGRESS NOTES
[facility]  Vascular Neurology  Comprehensive Stroke Center  Progress Note    Assessment/Plan:     8/5/17 - doing well, no events overnight, neuro surg and NCC monitoring for swelling/suboccipital crani watch   8/7/17 NAEON. Remains on 3% and cardene; hemicrani watch.   8/9/17 neurologically stable, repeat CTA stable    Hypertension    Stroke risk factor   SBP <140   Management per primary team        Cytotoxic cerebral edema    Due to stroke   Evident on imaging        Acute bacterial endocarditis    Mitral valve vegetation seen on 8/3/17 ECHO; repeat ECHO negative for vegetation  Blood cultures 8/4 with gram positive cocci in chains  Currently on ceftriaxone, ampicillin        Urinary tract infection with hematuria    + proteus in urine culture        NSTEMI (non-ST elevated myocardial infarction)    Per NCC, trending troponins  downtrending         * Embolic stroke involving right cerebellar artery    Mr. Koehler is a 68yo M with endocarditis and an acute R cerebellar infarct with edema and midline shift as well as hemorrhagic conversion.     - Antithrombotics for secondary stroke prevention: Antiplatelets:  Aspirin: 325 mg oral now and daily  - Statins for secondary stroke prevention and hyperlipidemia, if present: None: Reason: consider if LDL>130; patient's likely etiology of stroke was septic emboli from endocarditis. LDL 76  - Diagnostics: none pending   - VTE Prophylaxis: Heparin 5000 units SQ every 8 hours  - Endocarditis treatment per NCC  - Neurosurgery hemicrani watch             Neurologic Chief Complaint: R cerebellar stroke     Subjective:     Interval History: Patient is seen for follow-up neurological assessment and treatment recommendations: LILIANA, patient neurologically stable    HPI, Past Medical, Family, and Social History remains the same as documented in the initial encounter.     Review of Systems   Constitutional: Negative for fever.   Eyes: Negative for redness.    Gastrointestinal: Positive for nausea. Negative for vomiting.   Neurological: Negative for headaches.     Scheduled Meds:   ampicillin IVPB  2 g Intravenous Q4H    atorvastatin  40 mg Oral Daily    carvedilol  6.25 mg Oral BID    cefTRIAXone (ROCEPHIN) IVPB  2 g Intravenous Q12H    famotidine  20 mg Oral BID    heparin (porcine)  5,000 Units Subcutaneous Q8H    lisinopril  20 mg Oral Daily    polyethylene glycol  17 g Oral Daily    senna-docusate 8.6-50 mg  1 tablet Oral BID    sodium chloride 0.9%  3 mL Intravenous Q8H     Continuous Infusions:   buffered 2% sodium acetate 86meq, sodium chloride 86meq, sterile water for inj IV soln 75 mL/hr at 08/09/17 1137     PRN Meds:acetaminophen, calcium gluconate IVPB, calcium gluconate IVPB, calcium gluconate IVPB, dextrose 50%, glucagon (human recombinant), hydrALAZINE, labetalol, magnesium sulfate IVPB, magnesium sulfate IVPB, ondansetron, potassium chloride **AND** potassium chloride **AND** potassium chloride, sodium phosphate IVPB, sodium phosphate IVPB, sodium phosphate IVPB    Objective:     Vital Signs (Most Recent):  Temp: 98.6 °F (37 °C) (08/09/17 1100)  Pulse: 76 (08/09/17 1100)  Resp: 14 (08/09/17 1100)  BP: (!) 176/69 (08/09/17 1151)  SpO2: 100 % (08/09/17 1100)  BP Location: Right arm    Vital Signs Range (Last 24H):  Temp:  [97.7 °F (36.5 °C)-99.2 °F (37.3 °C)]   Pulse:  [68-99]   Resp:  [14-24]   BP: (130-176)/(65-88)   SpO2:  [97 %-100 %]   Arterial Line BP: (104-176)/(60-78)   BP Location: Right arm    Physical Exam   Constitutional: He appears well-developed and well-nourished.   HENT:   Head: Normocephalic.   Eyes:   6th nerve and vertical palsy    Cardiovascular: Normal rate.    Pulmonary/Chest: Effort normal.   Neurological: He is alert.   Skin: Skin is warm and dry.   Nursing note and vitals reviewed.      Neurological Exam:   Alert, oriented to person and time  Equal sensation x 4   Full strength throughout  Gaze forward, left gaze better  than right.   B UE ataxia      NIH Stroke Scale:    Level of Consciousness: 0 - alert  LOC Questions: 0 - answers both correctly  LOC Commands: 0 - performs both correctly  Best Gaze: 1 - partial gaze palsy  Visual: 0 - no visual loss  Facial Palsy: 0 - normal  Motor Left Arm: 0 - no drift  Motor Right Arm: 0 - no drift  Motor Left Le - no drift  Motor Right Le - no drift  Limb Ataxia: 2 - present in two limbs  Sensory: 0 - normal  Best Language: 0 - no aphasia  Dysarthria: 0 - normal articulation  Extinction and Inattention: 0 - no neglect  NIH Stroke Scale Total: 3      Laboratory:  CMP:     Recent Labs  Lab 17  0200  17  1139   CALCIUM 8.3*  --   --    ALBUMIN 2.3*  --   --    PROT 6.0  --   --    *  < > 147*   K 4.2  --   --    CO2 24  --   --    *  --   --    BUN 23  --   --    CREATININE 1.2  --   --    ALKPHOS 73  --   --    ALT 35  --   --    AST 26  --   --    BILITOT 0.4  --   --    < > = values in this interval not displayed.  CBC:     Recent Labs  Lab 17  0200   WBC 10.00   RBC 2.94*   HGB 8.5*   HCT 26.6*      MCV 91   MCH 28.9   MCHC 32.0     Lipid Panel:     Recent Labs  Lab 17  1830   CHOL 132   LDLCALC 76.4   HDL 31*   TRIG 123     Coagulation:     Recent Labs  Lab 17  1830   INR 1.0   APTT 22.7     Platelet Aggregation Study: No results for input(s): PLTAGG, PLTAGINTERP, PLTAGREGLACO, ADPPLTAGGREG in the last 168 hours.  Hgb A1C:     Recent Labs  Lab 17  1830   HGBA1C 5.4     TSH:     Recent Labs  Lab 17  1830   TSH 1.817  1.817       Diagnostic Results:  I have personally reviewed:     CTA Head and Neck. Date: 17  Evolving acute infarct of the right cerebellar hemisphere/PICA distribution with stable local mass effect against the fourth ventricle.  Subtle superimposed hyperattenuation is most consistent with known hemorrhage.  No imaging finding to suggest developing hydrocephalus.    Evolving subacute to chronic areas of  infarction within the left parietal, left occipital and left cerebellar hemispheres.    Diminutive caliber and irregularity involving the distal aspect of the bilateral PICAs, right greater than left, possibly reflecting areas of high-grade stenosis.    MRI 8/5/17  1. Large region of abnormal restricted diffusion within the right cerebellar hemisphere compatible with acute infarct. There is associated hemorrhagic conversion present. There is localized mass effect on the abril as well as effacement of the fourth ventricle. Ventricular system remains mildly prominent, unchanged from prior CT examination.    2. Additional focal regions of mild diffusion hyperintensity with associated serpiginous non-masslike cortical enhancement within the left parietal and occipital lobes suggestive of subacute infarcts. Additional region of volume loss with associated intrinsic T1 hyperintensity and serpiginous enhancement is present within the left cerebellar hemisphere suggestive of a late subacute infarct with laminar necrosis. Small remote lacunar type infarcts are also present in the right corona radiata. Overall findings are suggestive of possible thromboembolic phenomena. Clinical correlation is advised.    3. No evidence of high-grade stenosis of the visualized intracranial vasculature. Note is made that the mid and distal portions of the right AICA and PICA appear somewhat irregular and are not well-visualized, although a component of this may relate to noncontrast MRA time-of-flight technique.        8/4/17 - CT head    Large area of vasogenic edema centered within the right cerebellar hemisphere resulting in severe mass effect with compression of the 4th ventricle, mild hydrocephalus and leftward deviation of the cerebral vermis.  Recommend emergent neurosurgical consultation.    Echo 8/5/17  Normal LA   CONCLUSIONS     1 - Normal left ventricular systolic function (EF 60-65%).     2 - No wall motion abnormalities.     3 -  Normal left ventricular diastolic function.     4 - Normal right ventricular systolic function .     5 - The estimated PA systolic pressure is 27 mmHg.             Darlene Ross PA-C  Comprehensive Stroke Center  Department of Vascular Neurology   [facility]

## 2017-08-09 NOTE — SUBJECTIVE & OBJECTIVE
Interval History: Events noted. Hypernatremia being corrected. Tolerating abx.    Review of Systems   Constitutional: Negative for chills, diaphoresis, fatigue and fever.   Psychiatric/Behavioral: Positive for confusion.   All other systems reviewed and are negative.    Objective:     Vital Signs (Most Recent):  Temp: 98.6 °F (37 °C) (08/09/17 1100)  Pulse: 76 (08/09/17 1100)  Resp: 14 (08/09/17 1100)  BP: (!) 176/69 (08/09/17 1151)  SpO2: 100 % (08/09/17 1100) Vital Signs (24h Range):  Temp:  [97.7 °F (36.5 °C)-99.2 °F (37.3 °C)] 98.6 °F (37 °C)  Pulse:  [68-99] 76  Resp:  [14-24] 14  SpO2:  [97 %-100 %] 100 %  BP: (130-176)/(65-88) 176/69  Arterial Line BP: (104-176)/(47-78) 160/66     Weight: 55.4 kg (122 lb 2.2 oz)  Body mass index is 15.68 kg/m².    Estimated Creatinine Clearance: 46.2 mL/min (based on Cr of 1.2).    Physical Exam   Constitutional: He appears well-developed.   Eyes: EOM are normal. Pupils are equal, round, and reactive to light.   Neck: Normal range of motion. Neck supple.   Cardiovascular: Normal rate.    Murmur heard.  Pulmonary/Chest: Effort normal and breath sounds normal.   Abdominal: Soft. Bowel sounds are normal. He exhibits no distension. There is no tenderness.   Musculoskeletal: Normal range of motion. He exhibits no edema, tenderness or deformity.   Neurological: He is alert.   Skin: No rash noted. No erythema.   Psychiatric: He has a normal mood and affect.   Nursing note and vitals reviewed.      Significant Labs:   Blood Culture:   Recent Labs  Lab 08/02/17  1802 08/02/17  1807 08/04/17  0508 08/05/17  1316 08/07/17  1206   LABBLOO Gram stain ani bottle: Gram positive cocci in chains resembling Strep   Results called to and read back by: Jeana Barrera RN 08/03/2017  10:42  Gram stain aer bottle: Gram positive cocci in chains resembling Strep  ENTEROCOCCUS FAECALISFor susceptibility see order #[id number] Gram stain ani bottle: Gram positive cocci in chains resembling Strep    Results called to and read back by: Jeana Barrera RN 08/03/2017  10:42  Gram stain aer bottle: Gram positive cocci in chains resembling Strep  ENTEROCOCCUS FAECALIS Gram stain aer bottle: Gram positive cocci in chains resembling Strep   Results called to and read back by:Kendra Montez RN 08/05/2017  11:15  ENTEROCOCCUS FAECALISFor susceptibility see order #[id number]  Gram stain aer bottle: Gram positive cocci in chains resembling Strep   Results called to and read back by: Bess Montez 08/05/2017  17:08  ENTEROCOCCUS FAECALISFor susceptibility see order #[id number] Gram stain ani bottle: Gram positive cocci in clusters resembling Staph   Results called to and read back by: Jimmy Willis RN  08/06/2017  21:11  Gram stain aer bottle: Gram positive cocci in clusters resembling Staph  08/07/2017  13:45  COAGULASE-NEGATIVE STAPHYLOCOCCUS SPECIESOrganism is a probable contaminant No Growth to date  No Growth to date  No Growth to date  No Growth to date     BMP:   Recent Labs  Lab 08/09/17  0200 08/09/17  0503   GLU 98  --    * 146*   K 4.2  --    *  --    CO2 24  --    BUN 23  --    CREATININE 1.2  --    CALCIUM 8.3*  --    MG 1.8  --      CBC:   Recent Labs  Lab 08/08/17  0145 08/09/17  0200   WBC 8.63 10.00   HGB 8.5* 8.5*   HCT 26.4* 26.6*    286       Significant Imaging: I have reviewed all pertinent imaging results/findings within the past 24 hours.

## 2017-08-09 NOTE — PT/OT/SLP PROGRESS
"Occupational Therapy  Treatment    Juan Manuel Koehler   MRN: [id number]   Admitting Diagnosis: Embolic stroke involving right cerebellar artery    OT Date of Treatment: 08/09/17   OT Start Time: 0837  OT Stop Time: 0918  OT Total Time (min): 41 min    Billable Minutes:  Self Care/Home Management 14, Therapeutic Activity 10 and Neuromuscular Re-education 17    General Precautions: Standard, aspiration, fall, seizure  Orthopedic Precautions: N/A  Braces: N/A    Do you have any cultural, spiritual, [employer]alism conflicts, given your current situation?: Temple    Subjective:  Communicated with nurse prior to session.  Patient:  "I'd like to go home and do all this at home.  I just don't know about how steady I am on my feet."  "Can we do this tomorrow?"  "I don't feel good today.  I just took a lot of medications."  "I wish I could do more.  I will do home.  I'll get there."    Pain/Comfort  Pain Rating 1: 0/10  Pain Rating Post-Intervention 1: 0/10    Objective:  Patient found with: blood pressure cuff, arterial line, central line, telemetry, peripheral IV, pulse ox (continuous), SCD (external urinary catheter)  Family not present.     Functional Mobility:  Bed Mobility:  Rolling/Turning to Left: Stand by assistance  Rolling/Turning Right: Stand by assistance  Scooting/Bridging: Stand by Assistance  Supine to Sit: Stand by Assistance  Sit to Supine: Contact Guard Assistance    Transfers:   Sit <> Stand Assistance: Minimum Assistance  Sit <> Stand Assistive Device: No Assistive Device  Bed <> Chair Technique: Stand Pivot  Bed <> Chair Transfer Assistance: Moderate Assistance  Bed <> Chair Assistive Device: No Assistive Device    Activities of Daily Living:  UE Dressing Level of Assistance: Minimum assistance (to guide around back)  LE Dressing Level of Assistance: Moderate assistance (for standing balance)  Grooming Position: Standing  Grooming Level of Assistance: Moderate assistance (for standing balance)     Additional " "Treatment:   Patient education provided on role of OT and need for rehab upon discharge.  Patient education provided on dressing, bed mobility, bridging and transfers. Patient verbalizing understanding via teach back method. Patient instructed on need to call for assistance for toileting needs and when getting up.  Continued education, patient/ family training recommended.  Patient alert and oriented x person and place.  Able to follow 4/4 one step commands.  Patient attentive and interactive throughout the session. SBA for postural control while seated.  Patient's functional status and disposition recommendation discussed with patient and nurse.  Encouragement provided throughout the session.  White board updated in patient's room.  OT asked if there were any other questions; patient/ family had no further questions.    AM-PAC 6 CLICK ADL   How much help from another person does this patient currently need?   1 = Unable, Total/Dependent Assistance  2 = A lot, Maximum/Moderate Assistance  3 = A little, Minimum/Contact Guard/Supervision  4 = None, Modified [place]/Independent    Putting on and taking off regular lower body clothing? : 2  Bathing (including washing, rinsing, drying)?: 2  Toileting, which includes using toilet, bedpan, or urinal? : 2  Putting on and taking off regular upper body clothing?: 2  Taking care of personal grooming such as brushing teeth?: 2  Eating meals?: 3  Total Score: 13     AM-PAC Raw Score CMS "G-Code Modifier Level of Impairment Assistance   6 % Total / Unable   7 - 8 CM 80 - 100% Maximal Assist   9-13 CL 60 - 80% Moderate Assist   14 - 19 CK 40 - 60% Moderate Assist   20 - 22 CJ 20 - 40% Minimal Assist   23 CI 1-20% SBA / CGA   24 CH 0% Independent/ Mod I       Patient left supine with all lines intact and call button in reach    Assessment:  JuanM anuel Koehler is a 67 y.o. male with a medical diagnosis of Embolic stroke involving right cerebellar artery and presents with " performance deficits of physical skills including impaired balance, mobility, and endurance.  These performance deficits have resulted in activity limitations including but not limited to:   bed mobility, transfers, ascending/ descending stairs, walking short and long distances, walking around obstacles, transitional movement patterns (kneeling, bending); eating, upper body dressing, lower body dressing, brushing teeth, toileting, bathing, and carrying objects.   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 Patient will benefit from skilled OT services to maximize level of independence with self-care skills and functional mobility.  Will benefit from rehab.    Rehab identified problem list/impairments: Rehab identified problem list/impairments: impaired sensation, impaired endurance, impaired self care skills, gait instability, impaired functional mobilty, impaired balance, decreased coordination, impaired coordination    Rehab potential is good.    Activity tolerance: Good    Discharge recommendations: Discharge Facility/Level Of Care Needs: rehabilitation facility     Barriers to discharge: Barriers to Discharge: Inaccessible home environment, Decreased caregiver support    Equipment recommendations: 3-in-1 commode, bath bench, wheelchair     GOALS:    Occupational Therapy Goals        Problem: Occupational Therapy Goal    Goal Priority Disciplines Outcome Interventions   Occupational Therapy Goal     OT, PT/OT     Description:  Goals set 8/7 to be addressed for 14 days with expiration date, 8/21:  Patient will increase functional independence with ADLs by performing:    Patient will demonstrate rolling to the right with modified independence.  Not met   Patient will demonstrate rolling to the left with modified independence.   Not met  Patient will demonstrate supine -sit with modified independence.   Not met  Patient will demonstrate stand pivot  transfers with CGA.   Not met  Patient will demonstrate grooming while standing with CGA.   Not met  Patient will demonstrate upper body dressing with SBA while seated EOB.   Not met  Patient will demonstrate lower body dressing with CGA while seated EOB.   Not met  Patient will demonstrate toileting with CGA.   Not met  Patient's family / caregiver will demonstrate independence and safety with assisting patient with self-care skills and functional mobility.     Not met  Patient and/or patient's family will verbalize understanding of stroke prevention guidelines, personal risk factors and stroke warning signs via teachback method.  Not met                            Plan:  Patient to be seen 6 x/week to address the above listed problems via self-care/home management, therapeutic exercises, neuromuscular re-education, therapeutic activities, cognitive retraining, sensory integration  Plan of Care expires: 09/02/17  Plan of Care reviewed with: patient         PREETHI Moses  08/09/2017

## 2017-08-09 NOTE — ASSESSMENT & PLAN NOTE
Mitral valve vegetation seen on 8/3/17 ECHO; repeat ECHO negative for vegetation  Blood cultures 8/4 with gram positive cocci in chains  Currently on ceftriaxone, ampicillin

## 2017-08-09 NOTE — PROGRESS NOTES
[facility]  Physical Medicine & Rehab  Progress Note    Patient Name: Juan Manuel oKehler  MRN: [id number]  Admission Date: 8/4/2017  Length of Stay: 5 days  Attending Physician: Caleb Valenzuela MD  Collaborating Physician: James Randle MD    Subjective:     Principal Problem:Embolic stroke involving right cerebellar artery    Interval History (08/09/2017): Patient is seen for follow-up rehab evaluation and recommendations.  No acute events over night.  Now on 2% gtt.  Participating with therapy.  Barriers for discharge/rehab admission: not ready for discharge    HPI, Past Medical, Surgical, Family, and Social History remains the same as documented in the initial encounter.    Hospital Course:   8/5/17:  Evaluated by therapy.  Bed mobility CGA-maxA.  Transfers and ADLs deferred 2/2 N/V.  Passed bedside swallow evaluation.  SLP recommending regular diet and thin liquids.  8/7/17:  Bed mobility SBA-CGA.  Sit to stand Charan and transfers Charan.  UBD Charan and LBD modA.  Evaluated by SLP.  Found to have cognitive-linguistic impairment and visvo-spatial impairment.   8/8/17:  Bed mobility SBA.  Sit to stand Charan and transfers Charan.  Ambulated 4 steps Charan.  UBD Charan and LBD Charan.    AM-PAC 6 CLICK MOBILITY (PT) - Total score: 16 (8/5), 16 (8/7)  AM-PAC 6 CLICK ADL (OT) - Total score: 6 (8/5), 13 (8/7), 12 (8/7)    AM-PAC Raw Score Level of Impairment Assistance   6 100% Total / Unable   7 - 8 80 - 100% Maximal Assist   9-13 60 - 80% Moderate Assist   14 - 19 40 - 60% Moderate Assist   20 - 22 20 - 40% Minimal Assist   23 1-20% SBA / CGA   24 0% Independent/ Mod I     Past Medical History:   Diagnosis Date    Cancer     bladder and throat    CKD (chronic kidney disease) stage 3, GFR 30-59 ml/min     Embolic stroke involving right cerebellar artery 8/4/2017    Urinary tract infection      Past Surgical History:   Procedure Laterality Date    BLADDER SURGERY      CYSTOSCOPY      HERNIA REPAIR       Ileal Conduit      THROAT SURGERY       Review of patient's allergies indicates:  No Known Allergies    Scheduled Medications:    ampicillin IVPB  2 g Intravenous Q4H    atorvastatin  40 mg Oral Daily    carvedilol  6.25 mg Oral BID    cefTRIAXone (ROCEPHIN) IVPB  2 g Intravenous Q12H    famotidine  20 mg Oral BID    heparin (porcine)  5,000 Units Subcutaneous Q8H    lisinopril  20 mg Oral Daily    polyethylene glycol  17 g Oral Daily    senna-docusate 8.6-50 mg  1 tablet Oral BID    sodium chloride 0.9%  3 mL Intravenous Q8H       PRN Medications: acetaminophen, calcium gluconate IVPB, calcium gluconate IVPB, calcium gluconate IVPB, dextrose 50%, glucagon (human recombinant), hydrALAZINE, labetalol, magnesium sulfate IVPB, magnesium sulfate IVPB, ondansetron, potassium chloride **AND** potassium chloride **AND** potassium chloride, sodium phosphate IVPB, sodium phosphate IVPB, sodium phosphate IVPB    Family History     Problem Relation (Age of Onset)    Kidney disease Mother    No Known Problems Father        Social History Main Topics    Smoking status: Never Smoker    Smokeless tobacco: Never Used    Alcohol use No    Drug use: No    Sexual activity: Yes     Partners: Female     Birth control/ protection: None     Review of Systems   Constitutional: Positive for appetite change (decreased) and fatigue. Negative for chills and fever.   HENT: Negative for drooling, hearing loss, trouble swallowing and voice change.    Eyes: Negative for pain and visual disturbance.   Respiratory: Negative for cough, shortness of breath and wheezing.    Cardiovascular: Negative for chest pain and palpitations.   Gastrointestinal: Negative for abdominal pain, nausea and vomiting.   Genitourinary: Negative for difficulty urinating and flank pain.   Musculoskeletal: Negative for arthralgias, back pain, myalgias and neck pain.   Skin: Negative for rash and wound.   Neurological: Positive for dizziness (improving) and  headaches (improving). Negative for weakness.   Psychiatric/Behavioral: Negative for agitation and hallucinations. The patient is not nervous/anxious.      Objective:     Vital Signs (Most Recent):  Temp: 99.2 °F (37.3 °C) (17 0700)  Pulse: 99 (17 0840)  Resp: (!) 21 (17 0800)  BP: (!) 155/67 (17 0840)  SpO2: 100 % (17 0800)    Vital Signs (24h Range):  Temp:  [97.7 °F (36.5 °C)-99.2 °F (37.3 °C)] 99.2 °F (37.3 °C)  Pulse:  [68-99] 99  Resp:  [14-24] 21  SpO2:  [97 %-100 %] 100 %  BP: (130-166)/(65-88) 155/67  Arterial Line BP: (104-168)/(47-78) 168/69     Body mass index is 15.68 kg/m².    Physical Exam   Constitutional: He appears well-developed and well-nourished. No distress.   HENT:   Head: Normocephalic and atraumatic.   Right Ear: External ear normal.   Left Ear: External ear normal.   Nose: Nose normal.   Eyes: Right eye exhibits no discharge. Left eye exhibits no discharge. No scleral icterus.   Neck: Normal range of motion.   Cardiovascular: Normal rate, regular rhythm and intact distal pulses.    Pulmonary/Chest: Effort normal. No respiratory distress. He has no wheezes.   Abdominal: Soft. He exhibits no distension. There is no tenderness.   Musculoskeletal: Normal range of motion. He exhibits no edema or tenderness.   Neurological:   -  Mental Status:  AAOx3.  Follows commands.  Answers correct age and .  Recent and remote memory intact.  -  Speech and language:  no aphasia or dysarthria.    -  Facial movement (CN VII): facial droop  -  Coordination:  Finger to nose exam:  RUE dysmetria, LUE dysmetria.  -  Motor:  No pronator drift. RUE: 5/5.  LUE: 5/5.  RLE: 5/5.  LLE: 5/5.  -  Tone:  Normal.  -  Sensory:  Intact to light touch and pin prick.   Skin: Skin is warm and dry. No rash noted.   Psychiatric: He has a normal mood and affect. His behavior is normal. Thought content normal.   Vitals reviewed.    Diagnostic Results:   Labs: Reviewed  CT: Reviewed  MRI:  Reviewed  CTA: Reviewed    Assessment/Plan:      Bacteremia due to Enterococcus    -  Blood cultures grew enterococcus faecalis  -  ID following and recommending ampicillin x 6 weeks for acute bacterial endocarditis        Acute bacterial endocarditis    -  Blood cultures grew enterococcus faecalis  -  ECHO showed vegetation on atrial surface of mitral valve consistent with endocarditis  -  ID following and recommending ampicillin x 6 weeks for acute bacterial endocarditis        Urinary tract infection with hematuria    -  Urine culture + proteus miribilis  -  Rocephin x 7 days (end 8/9/17)        NSTEMI (non-ST elevated myocardial infarction)    -  Elevated troponin on admission at [facility], trending down  -  Asymptomatic   -  2/2 demand ischemia         * Embolic stroke involving right cerebellar artery    -  On 8/4/17, patient found to have AMS, aphasia, and facial droop.  -  CTH showed R cerebellar hypodensity with mass effect and midline shift.  -  MRI/MRA revealed R cerebellar infarct with hemorrhagic conversion.    -  Evaluated by neurosurgery and no acute surgical intervention necessary- started on Cardene and hypertonic infusion.    Functional status: see hospital course  Cognitive/Speech/Language status:  See hospital course  Nutrition/Swallow Status:  Passed bedside swallow evaluation.  SLP recommended regular diet and thin liquids.    Recommendations  -  Encourage mobility, OOB in chair at least 3 hours per day, and early ambulation as appropriate   -  PT/OT evaluate and treat  -  SLP speech and cognitive evaluate and treat  -  Monitor sleep disturbances and establish consistent sleep-wake cycle  -  Monitor for bowel and bladder dysfunction  -  Monitor for shoulder pain and subluxation  -  Monitor for spasticity  -  Monitor for and prevent skin breakdown and pressure ulcers  · Early mobility, repositioning/weight shifting every 20-30 minutes when sitting, turn patient every 2 hours, proper  mattress/overlay and chair cushioning, pressure relief/heel protector boots  -  DVT prophylaxis:  KAMARI, SCD  -  Reviewed discharge options (IP rehab, SNF, HH therapy, and OP therapy)          Good rehab candidate.  Not ready for discharge.  Will continue to follow.    TWYLA Arteaga  Department of Physical Medicine & Rehab   [facility]

## 2017-08-09 NOTE — PROGRESS NOTES
[facility]  Neurosurgery  Progress Note    Subjective:     History of Present Illness: 67yoM with history of bladder cancer s/p bladder resection 6/21/17 presents to neurosurgery in the neuro critical care unit with right cerebellar hem/stroke with vasogenic edema and mass effect, urosepsis and endocarditis with vegetation. Family reports pt had decreased appetite since bladder resection and recent vomiting. Pt presented to [facility] 8/2 after presyncope episode and weak on the ground. He had mental status change today at approx 1400 prompting CT head and was transferred to [facility].     Neurosurgery consulted for possible EVD placement and suboccipital crani watch.      Post-Op Info:  * No surgery found *         Interval History: No acute events overnight    Medications:  Continuous Infusions:   buffered 2% sodium acetate 86meq, sodium chloride 86meq, sterile water for inj IV soln 75 mL/hr at 08/09/17 1137     Scheduled Meds:   ampicillin IVPB  2 g Intravenous Q4H    atorvastatin  40 mg Oral Daily    carvedilol  6.25 mg Oral BID    cefTRIAXone (ROCEPHIN) IVPB  2 g Intravenous Q12H    famotidine  20 mg Oral BID    heparin (porcine)  5,000 Units Subcutaneous Q8H    lisinopril  20 mg Oral Daily    polyethylene glycol  17 g Oral Daily    senna-docusate 8.6-50 mg  1 tablet Oral BID    sodium chloride 0.9%  3 mL Intravenous Q8H     PRN Meds:acetaminophen, calcium gluconate IVPB, calcium gluconate IVPB, calcium gluconate IVPB, dextrose 50%, glucagon (human recombinant), hydrALAZINE, labetalol, magnesium sulfate IVPB, magnesium sulfate IVPB, ondansetron, potassium chloride **AND** potassium chloride **AND** potassium chloride, sodium phosphate IVPB, sodium phosphate IVPB, sodium phosphate IVPB     Review of Systems    Objective:     Weight: 55.4 kg (122 lb 2.2 oz)  Body mass index is 15.68 kg/m².  Vital Signs (Most Recent):  Temp: 98.6 °F (37 °C) (08/09/17 1100)  Pulse: 76 (08/09/17 1100)  Resp: 14  (08/09/17 1100)  BP: (!) 176/69 (08/09/17 1151)  SpO2: 100 % (08/09/17 1100) Vital Signs (24h Range):  Temp:  [97.7 °F (36.5 °C)-99.2 °F (37.3 °C)] 98.6 °F (37 °C)  Pulse:  [68-99] 76  Resp:  [14-24] 14  SpO2:  [97 %-100 %] 100 %  BP: (130-176)/(65-88) 176/69  Arterial Line BP: (104-176)/(50-78) 160/66       Date 08/09/17 0700 - 08/10/17 0659   Shift 4950-3269 6248-0350 1989-6192 24 Hour Total   I  N  T  A  K  E   I.V.  (mL/kg) 249.7  (4.5)   249.7  (4.5)    IV Piggyback 450   450    Shift Total  (mL/kg) 699.7  (12.6)   699.7  (12.6)   O  U  T  P  U  T   Urine  (mL/kg/hr) 1025   1025    Shift Total  (mL/kg) 1025  (18.5)   1025  (18.5)   Weight (kg) 55.4 55.4 55.4 55.4                        Urostomy 08/02/17 2345 ileal conduit RUQ (Active)   Stoma Appearance rosebud appearance;round 8/7/2017  3:01 PM   Stomal Appliance 1 piece;Dry;Intact;No Leakage 8/7/2017  3:01 PM   Accessories/Skin Care appliance belt 8/7/2017  3:01 PM   Stoma Function yellow urine 8/7/2017  3:01 PM   Peristomal Skin unable to assess, covered by appliance 8/7/2017  3:01 PM   Tolerance no signs/symptoms of discomfort 8/7/2017  3:01 PM   Treatment Other (Comment) 8/6/2017 11:00 AM   Output (mL) 70 mL 8/7/2017  4:00 PM       Neurosurgery Physical Exam    Alert and oriented x3  Pupils equal round and reactive to light   Follows commands in all extremities  Sensation intact to light touch      Significant Labs:    Recent Labs  Lab 08/08/17  0145  08/08/17  1155  08/08/17  2305 08/09/17 0200 08/09/17  0503     --   --   --   --  98  --    *  < > 151*  < > 148* 149* 146*   K 4.2  --   --   --   --  4.2  --    *  --   --   --   --  116*  --    CO2 27  --   --   --   --  24  --    BUN 24*  --   --   --   --  23  --    CREATININE 1.2  --   --   --   --  1.2  --    CALCIUM 8.7  --   --   --   --  8.3*  --    MG 1.8  --  2.4  --   --  1.8  --    < > = values in this interval not displayed.    Recent Labs  Lab 08/08/17  0145 08/09/17 0200    WBC 8.63 10.00   HGB 8.5* 8.5*   HCT 26.4* 26.6*    286     No results for input(s): LABPT, INR, APTT in the last 48 hours.  Microbiology Results (last 7 days)     Procedure Component Value Units Date/Time    Blood culture [[id number]]     Order Status:  No result Specimen:  Blood     Blood culture [[id number]]     Order Status:  No result Specimen:  Blood     Blood culture [[id number]] Collected:  08/05/17 1316    Order Status:  Completed Specimen:  Blood from Peripheral, Hand, Right Updated:  08/09/17 0935     Blood Culture, Routine Gram stain ani bottle: Gram positive cocci in clusters resembling Staph      Blood Culture, Routine Results called to and read back by: Jimmy Willis RN  08/06/2017  21:11     Blood Culture, Routine Gram stain aer bottle: Gram positive cocci in clusters resembling Staph     Blood Culture, Routine 08/07/2017  13:45     Blood Culture, Routine --     COAGULASE-NEGATIVE STAPHYLOCOCCUS SPECIES  Organism is a probable contaminant      Narrative:       Blood cultures from 2 different sites. 4 bottles total.  Please draw before starting antibiotics.    IV catheter culture [[id number]] Collected:  08/08/17 1650    Order Status:  Completed Specimen:  Catheter Tip from Chest, Left Updated:  08/09/17 0743     Aerobic Culture - Cath tip No growth    Aerobic culture [[id number]] Collected:  08/08/17 1650    Order Status:  Canceled Specimen:  Catheter Tip from Chest, Left Updated:  08/08/17 1845    Blood culture [[id number]] Collected:  08/07/17 1206    Order Status:  Completed Specimen:  Blood from Peripheral, Antecubital, Right Updated:  08/08/17 1412     Blood Culture, Routine No Growth to date     Blood Culture, Routine No Growth to date    Narrative:       Blood cultures from 2 different sites. 4 bottles total.  Please draw before starting antibiotics.    Blood culture [[id number]] Collected:  08/07/17 1206    Order Status:  Completed Specimen:  Blood from Peripheral, Hand, Right Updated:   08/08/17 1412     Blood Culture, Routine No Growth to date     Blood Culture, Routine No Growth to date    Narrative:       Blood cultures x 2 different sites. 4 bottles total. Please  draw cultures before administering antibiotics.    Blood culture [[id number]]     Order Status:  Canceled Specimen:  Blood     Blood culture [[id number]]     Order Status:  Canceled Specimen:  Blood     Blood culture [[id number]]     Order Status:  Canceled Specimen:  Blood     Culture, Respiratory with Gram Stain [[id number]]     Order Status:  No result Specimen:  Respiratory         All pertinent labs from the last 24 hours have been reviewed.    Significant Diagnostics:  I have reviewed all pertinent imaging results/findings within the past 24 hours.    Assessment/Plan:     * Embolic stroke involving right cerebellar artery    67M p/w cerebellar stroke/hemorrhage    -No acute neurosurgical intervention indicated at this time  -No EVD placement requried at this time  -Rec HOB >30  -Rec Na goal 145-155  -SBP goal <150  -CTA negative for dissection  -No further neurosurgical needs at this time, will sign off, please call with any questions or concerns.            Andrea Cuellar MD  Neurosurgery  [facility]

## 2017-08-09 NOTE — PT/OT/SLP PROGRESS
"Speech Language Pathology  Treatment    Juan Manuel Koehler   MRN: [id number]   Admitting Diagnosis: Embolic stroke involving right cerebellar artery    Diet recommendations: Solid Diet Level: Regular  Liquid Diet Level: Thin     SLP Treatment Date: 08/09/17  Speech Start Time: 1555     Speech Stop Time: 1608     Speech Total (min): 13 min       TREATMENT BILLABLE MINUTES:  Speech Therapy Individual 13    Has the patient been evaluated by SLP for swallowing? : Yes  Keep patient NPO?: No   General Precautions: Standard,            Subjective:  "Ok well I can try" Pt response to attempting therapy this session     Pain/Comfort  Pain Rating 1: 0/10  Pain Rating Post-Intervention 1: 0/10    Objective:     Pt initially refused treatment this date 2/2 nausea and dizziness. Upon second attempt Pt with good effort to participate. Pt tolerated transition to head of bed upright. SLP went to place Pt glasses on face for table top tasks however Pt initially refusing reporting "oh I don't need those." Spouse and SLP reminded Pt of consistent use of glasses at baseline warranting glasses to participate in therapy task. Pt appearing more confused compared to previous therapy sessions. Pt presented with visual-spatial drawing task to complete the other half of a simple picture. Pt able to identify picture immediately. Pt warranting moderate verbal encouragement to persist with drawing task. After he completed task Pt responded "I think that's about it but I don't think I did very well." Pt drawing had all components present however, drawing was not symmetric and items were drawn in incorrect locations. Overall Pt completed activity with 50% acc to independently to legibly look like the other half of the drawing.  Spouse appearing to understand severity of deficit however continues to express interest in returning home to manage finances. Education provided to spouse re: need for ongoing therapy and expectations for more gradual " progress. Spouse demonstrated understanding and agreement with need for ongoing therapy services. Therapy session prematurely terminated 2/2 arrival of phlebotomist. Speech to continue to follow.     Assessment:  Juan Manuel Koehler is a 67 y.o. male with a medical diagnosis of Embolic stroke involving right cerebellar artery and presents with cognitive-linguistic impairments, visual-spatial deficits.     Discharge recommendations: Discharge Facility/Level Of Care Needs: rehabilitation facility     Goals:    SLP Goals        Problem: SLP Goal    Goal Priority Disciplines Outcome   SLP Goal     SLP    Description:  Speech Language Pathology Goals  Updated Goals expected to be met by 8/14    1. Pt will tolerate regular diet and thin liquids without s/s of airway compromise.   2. Pt will generate 10 items within a category independently to enhance cognition  3. Pt will recall 2/3 items with a delay independently to enhance memory   4. Pt will complete cancellation task with 90% acc independently to enhance visual-spatial skills   5. Pt will complete mental flexibility tasks with 80% independently to enhance cognition   6. Pt will follow 2 step directions with 80% acc with min cues to enhance comprehension skills   7. Pt will participate in ongoing assessment of reading/money/math/time management skills                                 Plan:   Patient to be seen Therapy Frequency: 5 x/week   Plan of Care expires: 09/03/17  Plan of Care reviewed with: patient, spouse  SLP Follow-up?: Yes  SLP - Next Visit Date: 08/07/17           Clara Vergara CCC-SLP  08/09/2017

## 2017-08-09 NOTE — PLAN OF CARE
Problem: Occupational Therapy Goal  Goal: Occupational Therapy Goal  Goals set 8/7 to be addressed for 14 days with expiration date, 8/21:  Patient will increase functional independence with ADLs by performing:    Patient will demonstrate rolling to the right with modified independence.  Not met   Patient will demonstrate rolling to the left with modified independence.   Not met  Patient will demonstrate supine -sit with modified independence.   Not met  Patient will demonstrate stand pivot transfers with CGA.   Not met  Patient will demonstrate grooming while standing with CGA.   Not met  Patient will demonstrate upper body dressing with SBA while seated EOB.   Not met  Patient will demonstrate lower body dressing with CGA while seated EOB.   Not met  Patient will demonstrate toileting with CGA.   Not met  Patient's family / caregiver will demonstrate independence and safety with assisting patient with self-care skills and functional mobility.     Not met  Patient and/or patient's family will verbalize understanding of stroke prevention guidelines, personal risk factors and stroke warning signs via teachback method.  Not met           Goals remain appropriate.  PREETHI Rich  8/9/2017

## 2017-08-09 NOTE — PROGRESS NOTES
[facility]  Infectious Disease  Progress Note    Patient Name: Juan Manuel Koehler  MRN: [id number]  Admission Date: 8/4/2017  Length of Stay: 5 days  Attending Physician: Caleb Valenzuela MD  Primary Care Provider: Hemant Sweeney MD    Isolation Status: No active isolations  Assessment/Plan:      Acute bacterial endocarditis    Mr. Koehler is a 68 yo M with history of invasive urothelial bladder carcinoma (s/p open radical cystoprostatectomy, b/l pelvic lymph node resection and ileal conduit placement) and CKD III fount to have bacterial endocarditis due to Enterococcus fecalis on this admission. Pt w/ evidence of possible septic emboli to spleen on CT and w/ acute R cerebellar stroke also likely 2/2 septic embolus. Pt also has a complicated UTI w/ isolated Proteus sensitive to ceftriaxone.    - changed to AMP and high-dose CTX on 8/4.  - recommend removing TLC and culturing tip  - anticipating 6 weeks of current abx regimen, starting 8/5 (end date: 9/16)  - will sign off           I will sign off. Please contact us if you have any additional questions.    Hemant Huber MD  Infectious Disease  [facility]    Subjective:     Principal Problem:Embolic stroke involving right cerebellar artery    HPI: Mr. Koehler is a 68 yo M with history of invasive urothelial bladder carcinoma (s/p open radical cystoprostatectomy, b/l pelvic lymph node resection and ileal conduit placement) and CKD III was transferred from OSH after a newly diagnosed R cerebella stroke likely 2/2 septic embolus.   Pt initially presented to the OSH after experiencing a fall; pt reports being dizzy and weak as he was descending down some stairs and ended up falling several steps as a result. Per patient and wife, he has experienced multiple episodes of weakness as a result of his decreased PO intake, decreased appetite stemming from his cancer Dx.   At the OSH, pt was found to have a complicated UTI w/  isolated Proteus species  ( sensitive to ceftriaxone) as well as multiple + Bcx which grew Enterococcus faecalis. Upon further investigation pt's ECHO revealed vegetation on atrial surface of mitral valve consistent with IB endocarditis.  Of note pt has had multiple UTIs w/ Enterococcus being the causative species .  ID was consulted for IB endocarditis.   Denies any recent fevers or chills; no recent dental work.  Interval History: Events noted. Hypernatremia being corrected. Tolerating abx.    Review of Systems   Constitutional: Negative for chills, diaphoresis, fatigue and fever.   Psychiatric/Behavioral: Positive for confusion.   All other systems reviewed and are negative.    Objective:     Vital Signs (Most Recent):  Temp: 98.6 °F (37 °C) (08/09/17 1100)  Pulse: 76 (08/09/17 1100)  Resp: 14 (08/09/17 1100)  BP: (!) 176/69 (08/09/17 1151)  SpO2: 100 % (08/09/17 1100) Vital Signs (24h Range):  Temp:  [97.7 °F (36.5 °C)-99.2 °F (37.3 °C)] 98.6 °F (37 °C)  Pulse:  [68-99] 76  Resp:  [14-24] 14  SpO2:  [97 %-100 %] 100 %  BP: (130-176)/(65-88) 176/69  Arterial Line BP: (104-176)/(47-78) 160/66     Weight: 55.4 kg (122 lb 2.2 oz)  Body mass index is 15.68 kg/m².    Estimated Creatinine Clearance: 46.2 mL/min (based on Cr of 1.2).    Physical Exam   Constitutional: He appears well-developed.   Eyes: EOM are normal. Pupils are equal, round, and reactive to light.   Neck: Normal range of motion. Neck supple.   Cardiovascular: Normal rate.    Murmur heard.  Pulmonary/Chest: Effort normal and breath sounds normal.   Abdominal: Soft. Bowel sounds are normal. He exhibits no distension. There is no tenderness.   Musculoskeletal: Normal range of motion. He exhibits no edema, tenderness or deformity.   Neurological: He is alert.   Skin: No rash noted. No erythema.   Psychiatric: He has a normal mood and affect.   Nursing note and vitals reviewed.      Significant Labs:   Blood Culture:   Recent Labs  Lab 08/02/17  2352  08/02/17  1807 08/04/17  0508 08/05/17  1316 08/07/17  1206   LABBLOO Gram stain ani bottle: Gram positive cocci in chains resembling Strep   Results called to and read back by: Jeana Barrera RN 08/03/2017  10:42  Gram stain aer bottle: Gram positive cocci in chains resembling Strep  ENTEROCOCCUS FAECALISFor susceptibility see order #[id number] Gram stain ani bottle: Gram positive cocci in chains resembling Strep   Results called to and read back by: Jeana Barrera RN 08/03/2017  10:42  Gram stain aer bottle: Gram positive cocci in chains resembling Strep  ENTEROCOCCUS FAECALIS Gram stain aer bottle: Gram positive cocci in chains resembling Strep   Results called to and read back by:Kendra Montez RN 08/05/2017  11:15  ENTEROCOCCUS FAECALISFor susceptibility see order #[id number]  Gram stain aer bottle: Gram positive cocci in chains resembling Strep   Results called to and read back by: Bess Montez 08/05/2017  17:08  ENTEROCOCCUS FAECALISFor susceptibility see order #[id number] Gram stain ani bottle: Gram positive cocci in clusters resembling Staph   Results called to and read back by: Jimmy Willis RN  08/06/2017  21:11  Gram stain aer bottle: Gram positive cocci in clusters resembling Staph  08/07/2017  13:45  COAGULASE-NEGATIVE STAPHYLOCOCCUS SPECIESOrganism is a probable contaminant No Growth to date  No Growth to date  No Growth to date  No Growth to date     BMP:   Recent Labs  Lab 08/09/17  0200 08/09/17  0503   GLU 98  --    * 146*   K 4.2  --    *  --    CO2 24  --    BUN 23  --    CREATININE 1.2  --    CALCIUM 8.3*  --    MG 1.8  --      CBC:   Recent Labs  Lab 08/08/17  0145 08/09/17  0200   WBC 8.63 10.00   HGB 8.5* 8.5*   HCT 26.4* 26.6*    286       Significant Imaging: I have reviewed all pertinent imaging results/findings within the past 24 hours.

## 2017-08-09 NOTE — ASSESSMENT & PLAN NOTE
-VN following  -NSGY signed off   -hold asa  -begin SQH  --160  -CTH overnight with mildly increased hydrocephalus, will repeat tomorrow   -increase 2% to 75 cc/hour   -serum Na goal 145-155

## 2017-08-09 NOTE — ASSESSMENT & PLAN NOTE
Mr. Koehler is a 66yo M with endocarditis and an acute R cerebellar infarct with edema and midline shift as well as hemorrhagic conversion.     - Antithrombotics for secondary stroke prevention: Antiplatelets:  Aspirin: 325 mg oral now and daily  - Statins for secondary stroke prevention and hyperlipidemia, if present: None: Reason: consider if LDL>130; patient's likely etiology of stroke was septic emboli from endocarditis. LDL 76  - Diagnostics: none pending   - VTE Prophylaxis: Heparin 5000 units SQ every 8 hours  - Endocarditis treatment per NCC  - Neurosurgery hemicrani watch

## 2017-08-09 NOTE — PLAN OF CARE
Problem: SLP Goal  Goal: SLP Goal  Speech Language Pathology Goals  Updated Goals expected to be met by 8/14    1. Pt will tolerate regular diet and thin liquids without s/s of airway compromise.   2. Pt will generate 10 items within a category independently to enhance cognition  3. Pt will recall 2/3 items with a delay independently to enhance memory   4. Pt will complete cancellation task with 90% acc independently to enhance visual-spatial skills   5. Pt will complete mental flexibility tasks with 80% independently to enhance cognition   6. Pt will follow 2 step directions with 80% acc with min cues to enhance comprehension skills   7. Pt will participate in ongoing assessment of reading/money/math/time management skills                 Pt with slow progress towards goals     Clara Vergara MS, CCC-SLP  Speech Language Pathologist  Pager: [phone]  Date 8/9/2017

## 2017-08-09 NOTE — PROGRESS NOTES
[facility]  Neurocritical Care  Progress Note    Admit Date: 8/4/2017  Service Date: 08/09/2017  Length of Stay: 5    Subjective:     Chief Complaint: Embolic stroke involving right cerebellar artery    History of Present Illness: Juan Manuel Koehler is a 67 year old male with invasive bladder cancer s/p open radical cystoproctostomy, bilateral pelvic lymph node dissection and ileal conduit urinary diversion (6/21/17, path with high grade urothelial carcinoma, LN negative for malignancy), CKD III  With deconditioning and poor PO intake secondary to cancer. He was in his usual state of health (lives at home with wife, able to ambulate, performs some adls) until day of admission (8/2) when patient was coming downstairs and all of a sudden felt dizzy and fell. He fell on his back and was unable to get up secondary to generalized weakness. He denies focal weakness. He denies any loss of consciousness.  He was admitted to OSH at that time and diagnosed with urosepsis, endocarditis, and acute anemia.     While at OSH, he developed aphasia 8/4 and telestroke consult performed. CTH shows large right cerebellar hypodensity. [place] [facility] [place]        Hospital Course: 8/2 admitted OSH for urosepsis, endocarditis, and acute anemia   8/4  episode of aphasia at OSH; CTH with large cerebellar hypodensity/concern for edema/hydrocephalus-->transfer to C  8/5 HTS infusion initiated, hypertonic boluses discontinued      Interval History: Increase 2% to 75 cc/hour to maintain serum sodium 145-155. Discontinue metoprolol and begin carvedilol for better blood pressure control.  Repeat blood cultures and plan for PICC later in the week if blood cultures are negative.     Review of Systems: negative     Vitals:   Temp: 98.6 °F (37 °C) (08/09/17 1100)  Pulse: 76 (08/09/17 1100)  Resp: 14 (08/09/17 1100)  BP: (!) 176/69 (08/09/17 1151)  SpO2: 100 % (08/09/17 1100)    Temp:  [97.7 °F  (36.5 °C)-99.2 °F (37.3 °C)] 98.6 °F (37 °C)  Pulse:  [68-99] 76  Resp:  [14-24] 14  SpO2:  [97 %-100 %] 100 %  BP: (136-176)/(65-88) 176/69  Arterial Line BP: (104-176)/(60-78) 160/66              08/08 0701 - 08/09 0700  In: 1866 [I.V.:1166]  Out: 3270 [Urine:3270]     Examination:   Constitutional: Well-nourished and -developed. No apparent distress.   Eyes: Conjunctiva clear, anicteric. Lids no lesions.  Head/Ears/Nose/Mouth/Throat/Neck: Moist mucous membranes. External ears, nose atraumatic.   Cardiovascular: Regular rhythm. No murmurs. No leg edema.  Respiratory: Comfortable respirations. Clear to auscultation.  Gastrointestinal: No hernia. Soft, nondistended, nontender. + bowel sounds.     Neurologic:  -GCS [id number]  -Alert. Oriented to person, place, and time. Speech fluent. Follows commands.  -CN 6 palsy, vertical palsy  -Motor 5/5 strength, BUE ataxia   -Sensation intact     Medications:   Continuous  buffered 2% sodium acetate 86meq, sodium chloride 86meq, sterile water for inj IV soln Last Rate: 75 mL/hr at 08/09/17 1137   Scheduled  ampicillin IVPB 2 g Q4H   atorvastatin 40 mg Daily   carvedilol 6.25 mg BID   cefTRIAXone (ROCEPHIN) IVPB 2 g Q12H   famotidine 20 mg BID   heparin (porcine) 5,000 Units Q8H   lisinopril 20 mg Daily   polyethylene glycol 17 g Daily   senna-docusate 8.6-50 mg 1 tablet BID   sodium chloride 0.9% 3 mL Q8H   PRN  acetaminophen 650 mg Q6H PRN   calcium gluconate IVPB 1 g PRN   calcium gluconate IVPB 1 g PRN   calcium gluconate IVPB 1 g PRN   dextrose 50% 12.5 g PRN   glucagon (human recombinant) 1 mg PRN   hydrALAZINE 10 mg Q4H PRN   labetalol 10 mg Q6H PRN   magnesium sulfate IVPB 2 g PRN   magnesium sulfate IVPB 4 g PRN   ondansetron 4 mg Q6H PRN   potassium chloride 40 mEq PRN   And     potassium chloride 60 mEq PRN   And     potassium chloride 80 mEq PRN   sodium phosphate IVPB 15 mmol PRN   sodium phosphate IVPB 20.01 mmol PRN   sodium phosphate IVPB 30 mmol PRN      Today I  independently reviewed pertinent medications, lines/drains/airways, imaging, cardiology, lab results, microbiology results,      Assessment/Plan:     Neuro   * Embolic stroke involving right cerebellar artery    -VN following  -NSGY signed off   -hold asa  -begin SQH  --160  -CTH overnight with mildly increased hydrocephalus, will repeat tomorrow   -increase 2% to 75 cc/hour   -serum Na goal 145-155          Cardiac/Vascular   Hypertension    --160  -metoprolol discontinued  -carvedilol 6.25 mg bid   -lisinopril 20 mg daily         Acute bacterial endocarditis    -ID consulted  -appreciate recs  -no sign of vegetation on repeat TTE  -likely embolic source of stroke         NSTEMI (non-ST elevated myocardial infarction)    -NSTEMI outside hospital  -troponin trended down   -patient denied chest pain   -continue BB and ACE-I          ID   Sepsis due to Enterococcus    -ID following  -repeat blood cultures daily until negative   -continue ampicillin and ceftriaxone   -will likely need treatment for 6 weeks after cultures clear  -plan for PICC placement after two negative cultures 48 hours apart               Prophylaxis:  Venous Thromboembolism: chemical  Stress Ulcer: H2B  Ventilator Pneumonia: not applicable     Activity Orders          Activity as tolerated starting at 08/07 1517        Full Code    Treva Quijano PA-C  Neurocritical Care  [facility]

## 2017-08-09 NOTE — PLAN OF CARE
KRISTEL spoke with Pt wife regarding rehab choices. She reported being unsure where her preference would be but it is okay to sent to [facility] and [facility]. SW will follow up for preference tomorrow.     KRISTEL sent email to [facility] to complete referral tomorrow AM.    Elizabeth Matias LMSW  Neurocritical Care   [facility]  [id number]

## 2017-08-09 NOTE — SUBJECTIVE & OBJECTIVE
Interval History: No acute events overnight    Medications:  Continuous Infusions:   buffered 2% sodium acetate 86meq, sodium chloride 86meq, sterile water for inj IV soln 75 mL/hr at 08/09/17 1137     Scheduled Meds:   ampicillin IVPB  2 g Intravenous Q4H    atorvastatin  40 mg Oral Daily    carvedilol  6.25 mg Oral BID    cefTRIAXone (ROCEPHIN) IVPB  2 g Intravenous Q12H    famotidine  20 mg Oral BID    heparin (porcine)  5,000 Units Subcutaneous Q8H    lisinopril  20 mg Oral Daily    polyethylene glycol  17 g Oral Daily    senna-docusate 8.6-50 mg  1 tablet Oral BID    sodium chloride 0.9%  3 mL Intravenous Q8H     PRN Meds:acetaminophen, calcium gluconate IVPB, calcium gluconate IVPB, calcium gluconate IVPB, dextrose 50%, glucagon (human recombinant), hydrALAZINE, labetalol, magnesium sulfate IVPB, magnesium sulfate IVPB, ondansetron, potassium chloride **AND** potassium chloride **AND** potassium chloride, sodium phosphate IVPB, sodium phosphate IVPB, sodium phosphate IVPB     Review of Systems    Objective:     Weight: 55.4 kg (122 lb 2.2 oz)  Body mass index is 15.68 kg/m².  Vital Signs (Most Recent):  Temp: 98.6 °F (37 °C) (08/09/17 1100)  Pulse: 76 (08/09/17 1100)  Resp: 14 (08/09/17 1100)  BP: (!) 176/69 (08/09/17 1151)  SpO2: 100 % (08/09/17 1100) Vital Signs (24h Range):  Temp:  [97.7 °F (36.5 °C)-99.2 °F (37.3 °C)] 98.6 °F (37 °C)  Pulse:  [68-99] 76  Resp:  [14-24] 14  SpO2:  [97 %-100 %] 100 %  BP: (130-176)/(65-88) 176/69  Arterial Line BP: (104-176)/(50-78) 160/66       Date 08/09/17 0700 - 08/10/17 0659   Shift 8005-2140 6494-6320 9139-5555 24 Hour Total   I  N  T  A  K  E   I.V.  (mL/kg) 249.7  (4.5)   249.7  (4.5)    IV Piggyback 450   450    Shift Total  (mL/kg) 699.7  (12.6)   699.7  (12.6)   O  U  T  P  U  T   Urine  (mL/kg/hr) 1025   1025    Shift Total  (mL/kg) 1025  (18.5)   1025  (18.5)   Weight (kg) 55.4 55.4 55.4 55.4                        Urostomy 08/02/17 8172 ileal conduit  RUQ (Active)   Stoma Appearance rosebud appearance;round 8/7/2017  3:01 PM   Stomal Appliance 1 piece;Dry;Intact;No Leakage 8/7/2017  3:01 PM   Accessories/Skin Care appliance belt 8/7/2017  3:01 PM   Stoma Function yellow urine 8/7/2017  3:01 PM   Peristomal Skin unable to assess, covered by appliance 8/7/2017  3:01 PM   Tolerance no signs/symptoms of discomfort 8/7/2017  3:01 PM   Treatment Other (Comment) 8/6/2017 11:00 AM   Output (mL) 70 mL 8/7/2017  4:00 PM       Neurosurgery Physical Exam    Alert and oriented x3  Pupils equal round and reactive to light   Follows commands in all extremities  Sensation intact to light touch      Significant Labs:    Recent Labs  Lab 08/08/17  0145  08/08/17  1155  08/08/17  2305 08/09/17  0200 08/09/17  0503     --   --   --   --  98  --    *  < > 151*  < > 148* 149* 146*   K 4.2  --   --   --   --  4.2  --    *  --   --   --   --  116*  --    CO2 27  --   --   --   --  24  --    BUN 24*  --   --   --   --  23  --    CREATININE 1.2  --   --   --   --  1.2  --    CALCIUM 8.7  --   --   --   --  8.3*  --    MG 1.8  --  2.4  --   --  1.8  --    < > = values in this interval not displayed.    Recent Labs  Lab 08/08/17  0145 08/09/17  0200   WBC 8.63 10.00   HGB 8.5* 8.5*   HCT 26.4* 26.6*    286     No results for input(s): LABPT, INR, APTT in the last 48 hours.  Microbiology Results (last 7 days)     Procedure Component Value Units Date/Time    Blood culture [[id number]]     Order Status:  No result Specimen:  Blood     Blood culture [[id number]]     Order Status:  No result Specimen:  Blood     Blood culture [[id number]] Collected:  08/05/17 1316    Order Status:  Completed Specimen:  Blood from Peripheral, Hand, Right Updated:  08/09/17 0975     Blood Culture, Routine Gram stain ani bottle: Gram positive cocci in clusters resembling Staph      Blood Culture, Routine Results called to and read back by: Jimmy Willis RN  08/06/2017  21:11     Blood Culture,  Routine Gram stain aer bottle: Gram positive cocci in clusters resembling Staph     Blood Culture, Routine 08/07/2017  13:45     Blood Culture, Routine --     COAGULASE-NEGATIVE STAPHYLOCOCCUS SPECIES  Organism is a probable contaminant      Narrative:       Blood cultures from 2 different sites. 4 bottles total.  Please draw before starting antibiotics.    IV catheter culture [[id number]] Collected:  08/08/17 1650    Order Status:  Completed Specimen:  Catheter Tip from Chest, Left Updated:  08/09/17 0743     Aerobic Culture - Cath tip No growth    Aerobic culture [[id number]] Collected:  08/08/17 1650    Order Status:  Canceled Specimen:  Catheter Tip from Chest, Left Updated:  08/08/17 1845    Blood culture [[id number]] Collected:  08/07/17 1206    Order Status:  Completed Specimen:  Blood from Peripheral, Antecubital, Right Updated:  08/08/17 1412     Blood Culture, Routine No Growth to date     Blood Culture, Routine No Growth to date    Narrative:       Blood cultures from 2 different sites. 4 bottles total.  Please draw before starting antibiotics.    Blood culture [[id number]] Collected:  08/07/17 1206    Order Status:  Completed Specimen:  Blood from Peripheral, Hand, Right Updated:  08/08/17 1412     Blood Culture, Routine No Growth to date     Blood Culture, Routine No Growth to date    Narrative:       Blood cultures x 2 different sites. 4 bottles total. Please  draw cultures before administering antibiotics.    Blood culture [[id number]]     Order Status:  Canceled Specimen:  Blood     Blood culture [[id number]]     Order Status:  Canceled Specimen:  Blood     Blood culture [[id number]]     Order Status:  Canceled Specimen:  Blood     Culture, Respiratory with Gram Stain [[id number]]     Order Status:  No result Specimen:  Respiratory         All pertinent labs from the last 24 hours have been reviewed.    Significant Diagnostics:  I have reviewed all pertinent imaging results/findings within the past 24  hours.

## 2017-08-09 NOTE — SUBJECTIVE & OBJECTIVE
Past Medical History:   Diagnosis Date    Cancer     bladder and throat    CKD (chronic kidney disease) stage 3, GFR 30-59 ml/min     Embolic stroke involving right cerebellar artery 8/4/2017    Urinary tract infection      Past Surgical History:   Procedure Laterality Date    BLADDER SURGERY      CYSTOSCOPY      HERNIA REPAIR      Ileal Conduit      THROAT SURGERY       Review of patient's allergies indicates:  No Known Allergies    Scheduled Medications:    ampicillin IVPB  2 g Intravenous Q4H    atorvastatin  40 mg Oral Daily    carvedilol  6.25 mg Oral BID    cefTRIAXone (ROCEPHIN) IVPB  2 g Intravenous Q12H    famotidine  20 mg Oral BID    heparin (porcine)  5,000 Units Subcutaneous Q8H    lisinopril  20 mg Oral Daily    polyethylene glycol  17 g Oral Daily    senna-docusate 8.6-50 mg  1 tablet Oral BID    sodium chloride 0.9%  3 mL Intravenous Q8H       PRN Medications: acetaminophen, calcium gluconate IVPB, calcium gluconate IVPB, calcium gluconate IVPB, dextrose 50%, glucagon (human recombinant), hydrALAZINE, labetalol, magnesium sulfate IVPB, magnesium sulfate IVPB, ondansetron, potassium chloride **AND** potassium chloride **AND** potassium chloride, sodium phosphate IVPB, sodium phosphate IVPB, sodium phosphate IVPB    Family History     Problem Relation (Age of Onset)    Kidney disease Mother    No Known Problems Father        Social History Main Topics    Smoking status: Never Smoker    Smokeless tobacco: Never Used    Alcohol use No    Drug use: No    Sexual activity: Yes     Partners: Female     Birth control/ protection: None     Review of Systems   Constitutional: Positive for appetite change (decreased) and fatigue. Negative for chills and fever.   HENT: Negative for drooling, hearing loss, trouble swallowing and voice change.    Eyes: Negative for pain and visual disturbance.   Respiratory: Negative for cough, shortness of breath and wheezing.    Cardiovascular: Negative  for chest pain and palpitations.   Gastrointestinal: Negative for abdominal pain, nausea and vomiting.   Genitourinary: Negative for difficulty urinating and flank pain.   Musculoskeletal: Negative for arthralgias, back pain, myalgias and neck pain.   Skin: Negative for rash and wound.   Neurological: Positive for dizziness (improving) and headaches (improving). Negative for weakness.   Psychiatric/Behavioral: Negative for agitation and hallucinations. The patient is not nervous/anxious.      Objective:     Vital Signs (Most Recent):  Temp: 99.2 °F (37.3 °C) (17 0700)  Pulse: 99 (17 0840)  Resp: (!) 21 (17 0800)  BP: (!) 155/67 (17 0840)  SpO2: 100 % (17 08)    Vital Signs (24h Range):  Temp:  [97.7 °F (36.5 °C)-99.2 °F (37.3 °C)] 99.2 °F (37.3 °C)  Pulse:  [68-99] 99  Resp:  [14-24] 21  SpO2:  [97 %-100 %] 100 %  BP: (130-166)/(65-88) 155/67  Arterial Line BP: (104-168)/(47-78) 168/69     Body mass index is 15.68 kg/m².    Physical Exam   Constitutional: He appears well-developed and well-nourished. No distress.   HENT:   Head: Normocephalic and atraumatic.   Right Ear: External ear normal.   Left Ear: External ear normal.   Nose: Nose normal.   Eyes: Right eye exhibits no discharge. Left eye exhibits no discharge. No scleral icterus.   Neck: Normal range of motion.   Cardiovascular: Normal rate, regular rhythm and intact distal pulses.    Pulmonary/Chest: Effort normal. No respiratory distress. He has no wheezes.   Abdominal: Soft. He exhibits no distension. There is no tenderness.   Musculoskeletal: Normal range of motion. He exhibits no edema or tenderness.   Neurological:   -  Mental Status:  AAOx3.  Follows commands.  Answers correct age and .  Recent and remote memory intact.  -  Speech and language:  no aphasia or dysarthria.    -  Facial movement (CN VII): facial droop  -  Coordination:  Finger to nose exam:  RUE dysmetria, LUE dysmetria.  -  Motor:  No pronator drift.  RUE: 5/5.  LUE: 5/5.  RLE: 5/5.  LLE: 5/5.  -  Tone:  Normal.  -  Sensory:  Intact to light touch and pin prick.   Skin: Skin is warm and dry. No rash noted.   Psychiatric: He has a normal mood and affect. His behavior is normal. Thought content normal.   Vitals reviewed.         Diagnostic Results:   Labs: Reviewed  CT: Reviewed  MRI: Reviewed  CTA: Reviewed

## 2017-08-09 NOTE — PLAN OF CARE
Problem: Patient Care Overview  Goal: Plan of Care Review  Outcome: Ongoing (interventions implemented as appropriate)  POC reviewed with Juan Manuel Koehler at bedside. Questions and concerns addressed. CT scan completed overnight. See flowsheets for assessments and VS. Pt progressing towards goals. Will continue to monitor.

## 2017-08-09 NOTE — SUBJECTIVE & OBJECTIVE
Neurologic Chief Complaint: R cerebellar stroke     Subjective:     Interval History: Patient is seen for follow-up neurological assessment and treatment recommendations: LILIANA, patient neurologically stable    HPI, Past Medical, Family, and Social History remains the same as documented in the initial encounter.     Review of Systems   Constitutional: Negative for fever.   Eyes: Negative for redness.   Gastrointestinal: Positive for nausea. Negative for vomiting.   Neurological: Negative for headaches.     Scheduled Meds:   ampicillin IVPB  2 g Intravenous Q4H    atorvastatin  40 mg Oral Daily    carvedilol  6.25 mg Oral BID    cefTRIAXone (ROCEPHIN) IVPB  2 g Intravenous Q12H    famotidine  20 mg Oral BID    heparin (porcine)  5,000 Units Subcutaneous Q8H    lisinopril  20 mg Oral Daily    polyethylene glycol  17 g Oral Daily    senna-docusate 8.6-50 mg  1 tablet Oral BID    sodium chloride 0.9%  3 mL Intravenous Q8H     Continuous Infusions:   buffered 2% sodium acetate 86meq, sodium chloride 86meq, sterile water for inj IV soln 75 mL/hr at 08/09/17 1137     PRN Meds:acetaminophen, calcium gluconate IVPB, calcium gluconate IVPB, calcium gluconate IVPB, dextrose 50%, glucagon (human recombinant), hydrALAZINE, labetalol, magnesium sulfate IVPB, magnesium sulfate IVPB, ondansetron, potassium chloride **AND** potassium chloride **AND** potassium chloride, sodium phosphate IVPB, sodium phosphate IVPB, sodium phosphate IVPB    Objective:     Vital Signs (Most Recent):  Temp: 98.6 °F (37 °C) (08/09/17 1100)  Pulse: 76 (08/09/17 1100)  Resp: 14 (08/09/17 1100)  BP: (!) 176/69 (08/09/17 1151)  SpO2: 100 % (08/09/17 1100)  BP Location: Right arm    Vital Signs Range (Last 24H):  Temp:  [97.7 °F (36.5 °C)-99.2 °F (37.3 °C)]   Pulse:  [68-99]   Resp:  [14-24]   BP: (130-176)/(65-88)   SpO2:  [97 %-100 %]   Arterial Line BP: (104-176)/(60-78)   BP Location: Right arm    Physical Exam   Constitutional: He appears  well-developed and well-nourished.   HENT:   Head: Normocephalic.   Eyes:   6th nerve and vertical palsy    Cardiovascular: Normal rate.    Pulmonary/Chest: Effort normal.   Neurological: He is alert.   Skin: Skin is warm and dry.   Nursing note and vitals reviewed.      Neurological Exam:   Alert, oriented to person and time  Equal sensation x 4   Full strength throughout  Gaze forward, left gaze better than right.   B UE ataxia      NIH Stroke Scale:    Level of Consciousness: 0 - alert  LOC Questions: 0 - answers both correctly  LOC Commands: 0 - performs both correctly  Best Gaze: 1 - partial gaze palsy  Visual: 0 - no visual loss  Facial Palsy: 0 - normal  Motor Left Arm: 0 - no drift  Motor Right Arm: 0 - no drift  Motor Left Le - no drift  Motor Right Le - no drift  Limb Ataxia: 2 - present in two limbs  Sensory: 0 - normal  Best Language: 0 - no aphasia  Dysarthria: 0 - normal articulation  Extinction and Inattention: 0 - no neglect  NIH Stroke Scale Total: 3      Laboratory:  CMP:     Recent Labs  Lab 17  1139   CALCIUM 8.3*  --   --    ALBUMIN 2.3*  --   --    PROT 6.0  --   --    *  < > 147*   K 4.2  --   --    CO2 24  --   --    *  --   --    BUN 23  --   --    CREATININE 1.2  --   --    ALKPHOS 73  --   --    ALT 35  --   --    AST 26  --   --    BILITOT 0.4  --   --    < > = values in this interval not displayed.  CBC:     Recent Labs  Lab 17  020   WBC 10.00   RBC 2.94*   HGB 8.5*   HCT 26.6*      MCV 91   MCH 28.9   MCHC 32.0     Lipid Panel:     Recent Labs  Lab 17  183   CHOL 132   LDLCALC 76.4   HDL 31*   TRIG 123     Coagulation:     Recent Labs  Lab 17   INR 1.0   APTT 22.7     Platelet Aggregation Study: No results for input(s): PLTAGG, PLTAGINTERP, PLTAGREGLACO, ADPPLTAGGREG in the last 168 hours.  Hgb A1C:     Recent Labs  Lab 17  183   HGBA1C 5.4     TSH:     Recent Labs  Lab 17   TSH 1.817  1.817        Diagnostic Results:  I have personally reviewed:     CTA Head and Neck. Date: 08/09/17  Evolving acute infarct of the right cerebellar hemisphere/PICA distribution with stable local mass effect against the fourth ventricle.  Subtle superimposed hyperattenuation is most consistent with known hemorrhage.  No imaging finding to suggest developing hydrocephalus.    Evolving subacute to chronic areas of infarction within the left parietal, left occipital and left cerebellar hemispheres.    Diminutive caliber and irregularity involving the distal aspect of the bilateral PICAs, right greater than left, possibly reflecting areas of high-grade stenosis.    MRI 8/5/17  1. Large region of abnormal restricted diffusion within the right cerebellar hemisphere compatible with acute infarct. There is associated hemorrhagic conversion present. There is localized mass effect on the abril as well as effacement of the fourth ventricle. Ventricular system remains mildly prominent, unchanged from prior CT examination.    2. Additional focal regions of mild diffusion hyperintensity with associated serpiginous non-masslike cortical enhancement within the left parietal and occipital lobes suggestive of subacute infarcts. Additional region of volume loss with associated intrinsic T1 hyperintensity and serpiginous enhancement is present within the left cerebellar hemisphere suggestive of a late subacute infarct with laminar necrosis. Small remote lacunar type infarcts are also present in the right corona radiata. Overall findings are suggestive of possible thromboembolic phenomena. Clinical correlation is advised.    3. No evidence of high-grade stenosis of the visualized intracranial vasculature. Note is made that the mid and distal portions of the right AICA and PICA appear somewhat irregular and are not well-visualized, although a component of this may relate to noncontrast MRA time-of-flight technique.        8/4/17 - CT head     Large area of vasogenic edema centered within the right cerebellar hemisphere resulting in severe mass effect with compression of the 4th ventricle, mild hydrocephalus and leftward deviation of the cerebral vermis.  Recommend emergent neurosurgical consultation.    Echo 8/5/17  Normal LA   CONCLUSIONS     1 - Normal left ventricular systolic function (EF 60-65%).     2 - No wall motion abnormalities.     3 - Normal left ventricular diastolic function.     4 - Normal right ventricular systolic function .     5 - The estimated PA systolic pressure is 27 mmHg.

## 2017-08-09 NOTE — PT/OT/SLP PROGRESS
Physical Therapy  Patient Not Seen    Juan Manuel Koehler  MRN: [id number]     PT attempted to see pt for treatment this PM x3 instances; however, RN attempting blood draw, phlebotomist present, then pt refusing stating desire to wait until arrival of dinner to sit up. Education provided on need for EOB/OOB activity to improve strength/endurance. Pt continued to refuse despite encouragement from PT and spouse. PT will follow-up 8/10 per POC.    Kiara Uriostegui, PT, DPT  [phone]  8/9/2017

## 2017-08-09 NOTE — ASSESSMENT & PLAN NOTE
Mr. Koehler is a 68 yo M with history of invasive urothelial bladder carcinoma (s/p open radical cystoprostatectomy, b/l pelvic lymph node resection and ileal conduit placement) and CKD III fount to have bacterial endocarditis due to Enterococcus fecalis on this admission. Pt w/ evidence of possible septic emboli to spleen on CT and w/ acute R cerebellar stroke also likely 2/2 septic embolus. Pt also has a complicated UTI w/ isolated Proteus sensitive to ceftriaxone.    - changed to AMP and high-dose CTX on 8/4.  - recommend removing TLC and culturing tip  - anticipating 6 weeks of current abx regimen, starting 8/5 (end date: 9/16)  - will sign off

## 2017-08-09 NOTE — ASSESSMENT & PLAN NOTE
-ID consulted  -appreciate recs  -no sign of vegetation on repeat TTE  -likely embolic source of stroke

## 2017-08-09 NOTE — ASSESSMENT & PLAN NOTE
-ID following  -repeat blood cultures daily until negative   -continue ampicillin and ceftriaxone   -will likely need treatment for 6 weeks after cultures clear  -plan for PICC placement after two negative cultures 48 hours apart

## 2017-08-09 NOTE — PROGRESS NOTES
"[facility]  Adult Nutrition  Progress Note    SUMMARY     Recommendations    Recommendation/Intervention:   1. Continue Regular diet with Boost Plus TID.   2. Encourage and promote po intake for optimal nutrition.     RD to monitor.      Goals: Meet % EEN, EPN  Nutrition Goal Status: progressing towards goal  Communication of RD Recs: reviewed with RN    Reason for Assessment    Reason for Assessment: RD follow-up  Diagnosis: stroke/CVA  Relevent Medical History: Ca   Interdisciplinary Rounds: did not attend     General Information Comments: Pt's diet advanced. Drinking Boost and consuming some solids per nsg.     Nutrition Discharge Planning: adequate po intake for optimal nutrition    Nutrition Prescription Ordered    Current Diet Order: Regular   Oral Nutrition Supplement: Boost Plus TID     Evaluation of Received Nutrients/Fluid Intake    IV Fluid (mL): 960     % Intake of Estimated Energy Needs: 75 - 100 %  % Meal Intake: 25%     Nutrition Risk Screen     Nutrition Risk Screen: no indicators present    Nutrition/Diet History    Patient Reported Diet/Restrictions/Preferences: other (see comments) (UMER; pt disoriented)     Food Preferences: Pt consuming 100% of drinks and OS, 25% of solids.        Factors Affecting Nutritional Intake: other (see comments) (None at this time)                Labs/Tests/Procedures/Meds       Pertinent Labs Reviewed: reviewed, pertinent  Pertinent Labs Comments: noted  Pertinent Medications Reviewed: reviewed, pertinent  Pertinent Medications Comments: IVF    Physical Findings    Overall Physical Appearance: underweight, loss of muscle mass, loss of subcutaneous fat     Oral/Mouth Cavity: WDL  Skin: intact    Anthropometrics    Temp: 99.2 °F (37.3 °C)     Height: 6' 2" (188 cm)  Weight Method: Bed Scale  Weight: 55.4 kg (122 lb 2.2 oz)  Ideal Body Weight (IBW), Male: 190 lb     % Ideal Body Weight, Male (lb): 67.99 lb     BMI (Calculated): 16.6  BMI Grade: " 16 - 16.9 protein-energy malnutrition grade II                            Estimated/Assessed Needs    Weight Used For Calorie Calculations: 58.6 kg (129 lb 3 oz)      Energy Calorie Requirements (kcal): 5035-0019 kcal/d  Energy Need Method: [place]-St Jeor        RMR ([place]-St. Jeor Equation): 1425.75        Weight Used For Protein Calculations: 58.6 kg (129 lb 3 oz)  Protein Requirements: 70-88 g/d  Fluid Need Method: RDA Method, other (see comments) (Per MD or 1 mL/kcal)  RDA Method (mL): 1710               Assessment and Plan    Nutrition Problem  Inadequate energy intake    Related to (etiology):   Inability to consume sufficient energy    Signs and Symptoms (as evidenced by):   NPO, no alternative means of nutrition.     Interventi ons/Recommendations (treatment strategy):  See RD recs above.    Nutrition Diagnosis Status:   Resolved      Monitor and Evaluation    Food and Nutrient Intake: energy intake, food and beverage intake  Food and Nutrient Adminstration: diet order     Physical Activity and Function: nutrition-related ADLs and IADLs  Anthropometric Measurements: weight change, weight, body mass index  Biochemical Data, Medical Tests and Procedures: electrolyte and renal panel, gastrointestinal profile, glucose/endocrine profile, inflammatory profile, lipid profile  Nutrition-Focused Physical Findings: overall appearance    Nutrition Risk    Level of Risk: other (see comments) (f/u 1x/week)    Nutrition Follow-Up    RD Follow-up?: Yes

## 2017-08-09 NOTE — ASSESSMENT & PLAN NOTE
67M p/w cerebellar stroke/hemorrhage    -No acute neurosurgical intervention indicated at this time  -No EVD placement requried at this time  -Rec HOB >30  -Rec Na goal 145-155  -SBP goal <150  -CTA negative for dissection  -No further neurosurgical needs at this time, will sign off, please call with any questions or concerns.

## 2017-08-10 LAB
ALBUMIN SERPL BCP-MCNC: 2.2 G/DL
ALP SERPL-CCNC: 69 U/L
ALT SERPL W/O P-5'-P-CCNC: 33 U/L
ANION GAP SERPL CALC-SCNC: 6 MMOL/L
AST SERPL-CCNC: 21 U/L
BACTERIA CATH TIP CULT: NO GROWTH
BASOPHILS # BLD AUTO: 0.02 K/UL
BASOPHILS NFR BLD: 0.2 %
BILIRUB SERPL-MCNC: 0.4 MG/DL
BUN SERPL-MCNC: 19 MG/DL
CALCIUM SERPL-MCNC: 8.2 MG/DL
CHLORIDE SERPL-SCNC: 112 MMOL/L
CO2 SERPL-SCNC: 25 MMOL/L
CREAT SERPL-MCNC: 1.3 MG/DL
DIFFERENTIAL METHOD: ABNORMAL
EOSINOPHIL # BLD AUTO: 0.2 K/UL
EOSINOPHIL NFR BLD: 2.3 %
ERYTHROCYTE [DISTWIDTH] IN BLOOD BY AUTOMATED COUNT: 15.4 %
EST. GFR  (AFRICAN AMERICAN): >60 ML/MIN/1.73 M^2
EST. GFR  (NON AFRICAN AMERICAN): 56.1 ML/MIN/1.73 M^2
GLUCOSE SERPL-MCNC: 102 MG/DL
HCT VFR BLD AUTO: 23.6 %
HGB BLD-MCNC: 7.9 G/DL
LYMPHOCYTES # BLD AUTO: 1.3 K/UL
LYMPHOCYTES NFR BLD: 14.4 %
MAGNESIUM SERPL-MCNC: 1.8 MG/DL
MAGNESIUM SERPL-MCNC: 2.3 MG/DL
MCH RBC QN AUTO: 29 PG
MCHC RBC AUTO-ENTMCNC: 33.5 G/DL
MCV RBC AUTO: 87 FL
MONOCYTES # BLD AUTO: 0.4 K/UL
MONOCYTES NFR BLD: 4.7 %
NEUTROPHILS # BLD AUTO: 7.2 K/UL
NEUTROPHILS NFR BLD: 77.4 %
PHOSPHATE SERPL-MCNC: 3.5 MG/DL
PLATELET # BLD AUTO: 249 K/UL
PMV BLD AUTO: 8.4 FL
POCT GLUCOSE: 102 MG/DL (ref 70–110)
POCT GLUCOSE: 106 MG/DL (ref 70–110)
POCT GLUCOSE: 126 MG/DL (ref 70–110)
POCT GLUCOSE: 136 MG/DL (ref 70–110)
POCT GLUCOSE: 77 MG/DL (ref 70–110)
POCT GLUCOSE: 93 MG/DL (ref 70–110)
POCT GLUCOSE: 94 MG/DL (ref 70–110)
POTASSIUM SERPL-SCNC: 4.3 MMOL/L
PROT SERPL-MCNC: 5.8 G/DL
RBC # BLD AUTO: 2.72 M/UL
SODIUM SERPL-SCNC: 139 MMOL/L
SODIUM SERPL-SCNC: 140 MMOL/L
SODIUM SERPL-SCNC: 143 MMOL/L
SODIUM SERPL-SCNC: 143 MMOL/L
SODIUM SERPL-SCNC: 144 MMOL/L
WBC # BLD AUTO: 9.24 K/UL

## 2017-08-10 PROCEDURE — 97110 THERAPEUTIC EXERCISES: CPT

## 2017-08-10 PROCEDURE — 99233 SBSQ HOSP IP/OBS HIGH 50: CPT | Mod: ,,, | Performed by: PHYSICIAN ASSISTANT

## 2017-08-10 PROCEDURE — 80053 COMPREHEN METABOLIC PANEL: CPT

## 2017-08-10 PROCEDURE — 25000003 PHARM REV CODE 250: Performed by: PHYSICIAN ASSISTANT

## 2017-08-10 PROCEDURE — 83735 ASSAY OF MAGNESIUM: CPT | Mod: 91

## 2017-08-10 PROCEDURE — 97530 THERAPEUTIC ACTIVITIES: CPT

## 2017-08-10 PROCEDURE — 20000000 HC ICU ROOM

## 2017-08-10 PROCEDURE — 84295 ASSAY OF SERUM SODIUM: CPT | Mod: 91

## 2017-08-10 PROCEDURE — 63600175 PHARM REV CODE 636 W HCPCS: Performed by: STUDENT IN AN ORGANIZED HEALTH CARE EDUCATION/TRAINING PROGRAM

## 2017-08-10 PROCEDURE — A4216 STERILE WATER/SALINE, 10 ML: HCPCS | Performed by: NURSE PRACTITIONER

## 2017-08-10 PROCEDURE — 99232 SBSQ HOSP IP/OBS MODERATE 35: CPT | Mod: ,,, | Performed by: NURSE PRACTITIONER

## 2017-08-10 PROCEDURE — 02HV33Z INSERTION OF INFUSION DEVICE INTO SUPERIOR VENA CAVA, PERCUTANEOUS APPROACH: ICD-10-PCS | Performed by: INTERNAL MEDICINE

## 2017-08-10 PROCEDURE — 63600175 PHARM REV CODE 636 W HCPCS: Performed by: PHYSICIAN ASSISTANT

## 2017-08-10 PROCEDURE — 25000003 PHARM REV CODE 250: Performed by: INTERNAL MEDICINE

## 2017-08-10 PROCEDURE — 99233 SBSQ HOSP IP/OBS HIGH 50: CPT | Mod: ,,, | Performed by: INTERNAL MEDICINE

## 2017-08-10 PROCEDURE — 99233 SBSQ HOSP IP/OBS HIGH 50: CPT | Mod: ,,, | Performed by: PSYCHIATRY & NEUROLOGY

## 2017-08-10 PROCEDURE — 25000003 PHARM REV CODE 250: Performed by: PSYCHIATRY & NEUROLOGY

## 2017-08-10 PROCEDURE — 25000003 PHARM REV CODE 250: Performed by: NURSE PRACTITIONER

## 2017-08-10 PROCEDURE — A4216 STERILE WATER/SALINE, 10 ML: HCPCS | Performed by: PSYCHIATRY & NEUROLOGY

## 2017-08-10 PROCEDURE — 36569 INSJ PICC 5 YR+ W/O IMAGING: CPT

## 2017-08-10 PROCEDURE — 76937 US GUIDE VASCULAR ACCESS: CPT

## 2017-08-10 PROCEDURE — 85025 COMPLETE CBC W/AUTO DIFF WBC: CPT

## 2017-08-10 PROCEDURE — 63600175 PHARM REV CODE 636 W HCPCS: Performed by: INTERNAL MEDICINE

## 2017-08-10 PROCEDURE — 63600175 PHARM REV CODE 636 W HCPCS: Performed by: NURSE PRACTITIONER

## 2017-08-10 PROCEDURE — C1751 CATH, INF, PER/CENT/MIDLINE: HCPCS

## 2017-08-10 PROCEDURE — 83735 ASSAY OF MAGNESIUM: CPT

## 2017-08-10 PROCEDURE — 84100 ASSAY OF PHOSPHORUS: CPT

## 2017-08-10 RX ORDER — SODIUM CHLORIDE 0.9 % (FLUSH) 0.9 %
10 SYRINGE (ML) INJECTION EVERY 6 HOURS
Status: DISCONTINUED | OUTPATIENT
Start: 2017-08-10 | End: 2017-08-15 | Stop reason: HOSPADM

## 2017-08-10 RX ORDER — SODIUM CHLORIDE 0.9 % (FLUSH) 0.9 %
10 SYRINGE (ML) INJECTION
Status: DISCONTINUED | OUTPATIENT
Start: 2017-08-10 | End: 2017-08-15 | Stop reason: HOSPADM

## 2017-08-10 RX ORDER — CARVEDILOL 12.5 MG/1
12.5 TABLET ORAL 2 TIMES DAILY
Status: DISCONTINUED | OUTPATIENT
Start: 2017-08-10 | End: 2017-08-15 | Stop reason: HOSPADM

## 2017-08-10 RX ADMIN — FAMOTIDINE 20 MG: 20 TABLET, FILM COATED ORAL at 08:08

## 2017-08-10 RX ADMIN — Medication 3 ML: at 09:08

## 2017-08-10 RX ADMIN — HEPARIN SODIUM 5000 UNITS: 5000 INJECTION, SOLUTION INTRAVENOUS; SUBCUTANEOUS at 09:08

## 2017-08-10 RX ADMIN — CEFTRIAXONE 2 G: 2 INJECTION, SOLUTION INTRAVENOUS at 04:08

## 2017-08-10 RX ADMIN — HEPARIN SODIUM 5000 UNITS: 5000 INJECTION, SOLUTION INTRAVENOUS; SUBCUTANEOUS at 01:08

## 2017-08-10 RX ADMIN — HYDRALAZINE HYDROCHLORIDE 10 MG: 20 INJECTION INTRAMUSCULAR; INTRAVENOUS at 06:08

## 2017-08-10 RX ADMIN — MAGNESIUM SULFATE IN WATER 2 G: 40 INJECTION, SOLUTION INTRAVENOUS at 05:08

## 2017-08-10 RX ADMIN — AMPICILLIN SODIUM 2 G: 2 INJECTION, POWDER, FOR SOLUTION INTRAMUSCULAR; INTRAVENOUS at 08:08

## 2017-08-10 RX ADMIN — POLYETHYLENE GLYCOL 3350 17 G: 17 POWDER, FOR SOLUTION ORAL at 08:08

## 2017-08-10 RX ADMIN — Medication 3 ML: at 05:08

## 2017-08-10 RX ADMIN — CARVEDILOL 6.25 MG: 6.25 TABLET, FILM COATED ORAL at 09:08

## 2017-08-10 RX ADMIN — AMPICILLIN SODIUM 2 G: 2 INJECTION, POWDER, FOR SOLUTION INTRAMUSCULAR; INTRAVENOUS at 03:08

## 2017-08-10 RX ADMIN — STANDARDIZED SENNA CONCENTRATE AND DOCUSATE SODIUM 1 TABLET: 8.6; 5 TABLET, FILM COATED ORAL at 09:08

## 2017-08-10 RX ADMIN — Medication 10 ML: at 11:08

## 2017-08-10 RX ADMIN — AMPICILLIN SODIUM 2 G: 2 INJECTION, POWDER, FOR SOLUTION INTRAMUSCULAR; INTRAVENOUS at 12:08

## 2017-08-10 RX ADMIN — Medication 10 ML: at 01:08

## 2017-08-10 RX ADMIN — ATORVASTATIN CALCIUM 40 MG: 20 TABLET, FILM COATED ORAL at 08:08

## 2017-08-10 RX ADMIN — HEPARIN SODIUM 5000 UNITS: 5000 INJECTION, SOLUTION INTRAVENOUS; SUBCUTANEOUS at 05:08

## 2017-08-10 RX ADMIN — CEFTRIAXONE 2 G: 2 INJECTION, SOLUTION INTRAVENOUS at 03:08

## 2017-08-10 RX ADMIN — LISINOPRIL 20 MG: 20 TABLET ORAL at 08:08

## 2017-08-10 RX ADMIN — Medication 3 ML: at 01:08

## 2017-08-10 RX ADMIN — SODIUM CHLORIDE: 234 INJECTION INTRAMUSCULAR; INTRAVENOUS; SUBCUTANEOUS at 01:08

## 2017-08-10 RX ADMIN — SODIUM CHLORIDE: 234 INJECTION INTRAMUSCULAR; INTRAVENOUS; SUBCUTANEOUS at 09:08

## 2017-08-10 RX ADMIN — STANDARDIZED SENNA CONCENTRATE AND DOCUSATE SODIUM 1 TABLET: 8.6; 5 TABLET, FILM COATED ORAL at 08:08

## 2017-08-10 RX ADMIN — LABETALOL HYDROCHLORIDE 10 MG: 5 INJECTION, SOLUTION INTRAVENOUS at 03:08

## 2017-08-10 RX ADMIN — CARVEDILOL 12.5 MG: 12.5 TABLET, FILM COATED ORAL at 09:08

## 2017-08-10 RX ADMIN — HYDRALAZINE HYDROCHLORIDE 10 MG: 20 INJECTION INTRAMUSCULAR; INTRAVENOUS at 11:08

## 2017-08-10 NOTE — PLAN OF CARE
Problem: SLP Goal  Goal: SLP Goal  Speech Language Pathology Goals  Updated Goals expected to be met by 8/14    1. Pt will tolerate regular diet and thin liquids without s/s of airway compromise.   2. Pt will generate 10 items within a category independently to enhance cognition  3. Pt will recall 2/3 items with a delay independently to enhance memory   4. Pt will complete cancellation task with 90% acc independently to enhance visual-spatial skills   5. Pt will complete mental flexibility tasks with 80% independently to enhance cognition   6. Pt will follow 2 step directions with 80% acc with min cues to enhance comprehension skills   7. Pt will participate in ongoing assessment of reading/money/math/time management skills                 Pt with good progress towards goals . Current plan of care remains appropriate.     Clara Vergara MS, CCC-SLP  Speech Language Pathologist  Pager: [phone]  Date 8/10/2017

## 2017-08-10 NOTE — PROCEDURES
"Jua nManuel Koehler is a 67 y.o. male patient.    Temp: 98.5 °F (36.9 °C) (08/10/17 0700)  Pulse: 83 (08/10/17 0903)  Resp: (!) 22 (08/10/17 0800)  BP: (!) 156/69 (08/10/17 0800)  SpO2: 100 % (08/10/17 0800)  Weight: 56.1 kg (123 lb 10.9 oz) (08/10/17 0300)  Height: 6' 2" (188 cm) (08/05/17 1000)    PICC  Date/Time: 8/10/2017 11:56 AM  Performed by: ANA PATHAK  Consent Done: Yes  Time out: Immediately prior to procedure a time out was called to verify the correct patient, procedure, equipment, support staff and site/side marked as required  Indications: med administration and vascular access  Anesthesia: local infiltration  Local anesthetic: lidocaine 1% without epinephrine  Anesthetic Total (mL): 3  Preparation: skin prepped with ChloraPrep  Skin prep agent dried: skin prep agent completely dried prior to procedure  Sterile barriers: all five maximum sterile barriers used - cap, mask, sterile gown, sterile gloves, and large sterile sheet  Hand hygiene: hand hygiene performed prior to central venous catheter insertion  Location details: right brachial  Catheter type: double lumen  Catheter size: 5 Fr  Catheter Length: 36cm    Ultrasound guidance: yes  Vessel Caliber: medium and patent, compressibility normal  Vascular Doppler: not done  Needle advanced into vessel with real time Ultrasound guidance.  Guidewire confirmed in vessel.  Image recorded and saved.  Sterile sheath used.  no esophageal manometryNumber of attempts: 1  Post-procedure: blood return through all ports, chlorhexidine patch and sterile dressing applied  Specimens: No  Implants: No  Assessment: placement verified by x-ray  Complications: none        Darlene Parkinson  8/10/2017  "

## 2017-08-10 NOTE — PROGRESS NOTES
[facility]  Infectious Disease  Progress Note    Patient Name: Juan Manuel Koehler  MRN: [id number]  Admission Date: 8/4/2017  Length of Stay: 6 days  Attending Physician: Caleb Valenzuela MD  Primary Care Provider: Hemant Sweeney MD    Isolation Status: No active isolations  Assessment/Plan:      Acute bacterial endocarditis    Mr. Koehler is a 66 yo M with history of invasive urothelial bladder carcinoma (s/p open radical cystoprostatectomy, b/l pelvic lymph node resection and ileal conduit placement) and CKD III fount to have bacterial endocarditis due to Enterococcus fecalis on this admission. Pt w/ evidence of possible septic emboli to spleen on CT and w/ acute R cerebellar stroke also likely 2/2 septic embolus. Pt also has a complicated UTI w/ isolated Proteus sensitive to ceftriaxone.    - changed to AMP and high-dose CTX on 8/4.  - TLC out; now needs PICC  - anticipating 6 weeks of current abx regimen, starting 8/5 (end date: 9/16)  - will sign off           I will sign off. Please contact us if you have any additional questions.    Hemant Huber MD  Infectious Disease  [facility]    Subjective:     Principal Problem:Embolic stroke involving right cerebellar artery    HPI: Mr. Koehler is a 66 yo M with history of invasive urothelial bladder carcinoma (s/p open radical cystoprostatectomy, b/l pelvic lymph node resection and ileal conduit placement) and CKD III was transferred from OSH after a newly diagnosed R cerebella stroke likely 2/2 septic embolus.   Pt initially presented to the OSH after experiencing a fall; pt reports being dizzy and weak as he was descending down some stairs and ended up falling several steps as a result. Per patient and wife, he has experienced multiple episodes of weakness as a result of his decreased PO intake, decreased appetite stemming from his cancer Dx.   At the OSH, pt was found to have a complicated UTI w/ isolated Proteus  species  ( sensitive to ceftriaxone) as well as multiple + Bcx which grew Enterococcus faecalis. Upon further investigation pt's ECHO revealed vegetation on atrial surface of mitral valve consistent with IB endocarditis.  Of note pt has had multiple UTIs w/ Enterococcus being the causative species .  ID was consulted for IB endocarditis.   Denies any recent fevers or chills; no recent dental work.  Interval History: Patient complains of phlebotomies. No fever or chills.     Review of Systems   Constitutional: Negative for chills and fever.   Psychiatric/Behavioral: Positive for confusion.   All other systems reviewed and are negative.    Objective:     Vital Signs (Most Recent):  Temp: 98.5 °F (36.9 °C) (08/10/17 0700)  Pulse: 83 (08/10/17 0903)  Resp: (!) 22 (08/10/17 0800)  BP: (!) 156/69 (08/10/17 0800)  SpO2: 100 % (08/10/17 0800) Vital Signs (24h Range):  Temp:  [97.8 °F (36.6 °C)-99.6 °F (37.6 °C)] 98.5 °F (36.9 °C)  Pulse:  [60-95] 83  Resp:  [14-23] 22  SpO2:  [98 %-100 %] 100 %  BP: (137-182)/() 156/69  Arterial Line BP: (144-190)/(56-71) 166/65     Weight: 56.1 kg (123 lb 10.9 oz)  Body mass index is 15.88 kg/m².    Estimated Creatinine Clearance: 43.2 mL/min (based on Cr of 1.3).    Physical Exam   Constitutional: He appears well-developed and well-nourished. No distress.   HENT:   Head: Normocephalic and atraumatic.   Eyes: EOM are normal. Pupils are equal, round, and reactive to light.   Cardiovascular: Normal rate and regular rhythm.    Murmur heard.  Pulmonary/Chest: Effort normal and breath sounds normal.   Abdominal: Soft. Bowel sounds are normal. He exhibits no distension.   Musculoskeletal: Normal range of motion. He exhibits no edema.   Neurological: He is alert.   Skin: No rash noted. He is not diaphoretic.   Psychiatric: He has a normal mood and affect. His behavior is normal.   Nursing note and vitals reviewed.      Significant Labs:   Blood Culture:   Recent Labs  Lab 08/04/17  [id number]  08/05/17  1316 08/07/17  1206 08/09/17  1618 08/09/17  1628   LABBLOO Gram stain aer bottle: Gram positive cocci in chains resembling Strep   Results called to and read back by:Kendra Montez RN 08/05/2017  11:15  ENTEROCOCCUS FAECALISFor susceptibility see order #[id number]  Gram stain aer bottle: Gram positive cocci in chains resembling Strep   Results called to and read back by: Bess Montez 08/05/2017  17:08  ENTEROCOCCUS FAECALISFor susceptibility see order #[id number] Gram stain ani bottle: Gram positive cocci in clusters resembling Staph   Results called to and read back by: Jimmy Willis RN  08/06/2017  21:11  Gram stain aer bottle: Gram positive cocci in clusters resembling Staph  08/07/2017  13:45  COAGULASE-NEGATIVE STAPHYLOCOCCUS SPECIESOrganism is a probable contaminant No Growth to date  No Growth to date  No Growth to date  No Growth to date  No Growth to date  No Growth to date No Growth to date No Growth to date     BMP:   Recent Labs  Lab 08/10/17  0216 08/10/17  0510     --     144   K 4.3  --    *  --    CO2 25  --    BUN 19  --    CREATININE 1.3  --    CALCIUM 8.2*  --    MG 1.8  --      CBC:   Recent Labs  Lab 08/09/17  0200 08/10/17  0216   WBC 10.00 9.24   HGB 8.5* 7.9*   HCT 26.6* 23.6*    249       Significant Imaging: I have reviewed all pertinent imaging results/findings within the past 24 hours.

## 2017-08-10 NOTE — PROGRESS NOTES
[facility]  Physical Medicine & Rehab  Progress Note    Patient Name: Juan Manuel Koehler  MRN: [id number]  Admission Date: 8/4/2017  Length of Stay: 6 days  Attending Physician: Caleb Valenzuela MD  Collaborating Physician: James Randle MD    Subjective:     Principal Problem:Embolic stroke involving right cerebellar artery    Interval History (08/10/2017): Patient is seen for follow-up rehab evaluation and recommendations.  No acute events over night.  Off HTS infusion.  Complains of intermittent nausea.  Partcipating with therapy.  Barriers for discharge/rehab admission: Insurance approval pending for rehab admission.    HPI, Past Medical, Surgical, Family, and Social History remains the same as documented in the initial encounter.    Hospital Course:   8/5/17:  Evaluated by therapy.  Bed mobility CGA-maxA.  Transfers and ADLs deferred 2/2 N/V.  Passed bedside swallow evaluation.  SLP recommending regular diet and thin liquids.  8/7/17:  Bed mobility SBA-CGA.  Sit to stand Charan and transfers Charan.  UBD Charan and LBD modA.  Evaluated by SLP.  Found to have cognitive-linguistic impairment and visvo-spatial impairment.   8/8/17:  Bed mobility SBA.  Sit to stand Charan and transfers Charan.  Ambulated 4 steps Charan.  UBD Charan and LBD Charan.  8/9/17:  Bed mobility SBA-CGA.  Sit to stand Charan and transfers modA. UBD Charan and LBD modA.    AM-PAC 6 CLICK MOBILITY (PT) - Total score: 16 (8/5), 16 (8/7)  AM-PAC 6 CLICK ADL (OT) - Total score: 6 (8/5), 13 (8/7), 12 (8/8), 13 (8/9)    AM-PAC Raw Score Level of Impairment Assistance   6 100% Total / Unable   7 - 8 80 - 100% Maximal Assist   9-13 60 - 80% Moderate Assist   14 - 19 40 - 60% Moderate Assist   20 - 22 20 - 40% Minimal Assist   23 1-20% SBA / CGA   24 0% Independent/ Mod I     Past Medical History:   Diagnosis Date    Cancer     bladder and throat    CKD (chronic kidney disease) stage 3, GFR 30-59 ml/min     Embolic stroke involving right cerebellar  artery 8/4/2017    Urinary tract infection      Past Surgical History:   Procedure Laterality Date    BLADDER SURGERY      CYSTOSCOPY      HERNIA REPAIR      Ileal Conduit      THROAT SURGERY       Review of patient's allergies indicates:  No Known Allergies    Scheduled Medications:    ampicillin IVPB  2 g Intravenous Q4H    atorvastatin  40 mg Oral Daily    carvedilol  12.5 mg Oral BID    cefTRIAXone (ROCEPHIN) IVPB  2 g Intravenous Q12H    heparin (porcine)  5,000 Units Subcutaneous Q8H    lisinopril  20 mg Oral Daily    polyethylene glycol  17 g Oral Daily    senna-docusate 8.6-50 mg  1 tablet Oral BID    sodium chloride 0.9%  10 mL Intravenous Q6H    sodium chloride 0.9%  3 mL Intravenous Q8H       PRN Medications: acetaminophen, calcium gluconate IVPB, calcium gluconate IVPB, calcium gluconate IVPB, dextrose 50%, glucagon (human recombinant), hydrALAZINE, labetalol, magnesium sulfate IVPB, magnesium sulfate IVPB, ondansetron, potassium chloride **AND** potassium chloride **AND** potassium chloride, Flushing PICC Protocol **AND** sodium chloride 0.9% **AND** sodium chloride 0.9%, sodium phosphate IVPB, sodium phosphate IVPB, sodium phosphate IVPB    Family History     Problem Relation (Age of Onset)    Kidney disease Mother    No Known Problems Father        Social History Main Topics    Smoking status: Never Smoker    Smokeless tobacco: Never Used    Alcohol use No    Drug use: No    Sexual activity: Yes     Partners: Female     Birth control/ protection: None     Review of Systems   Constitutional: Positive for appetite change (decreased) and fatigue. Negative for chills and fever.   HENT: Negative for drooling, hearing loss, trouble swallowing and voice change.    Eyes: Negative for pain and visual disturbance.   Respiratory: Negative for cough, shortness of breath and wheezing.    Cardiovascular: Negative for chest pain and palpitations.   Gastrointestinal: Positive for nausea. Negative  for abdominal pain and vomiting.   Genitourinary: Negative for difficulty urinating and flank pain.   Musculoskeletal: Negative for arthralgias, back pain, myalgias and neck pain.   Skin: Negative for rash and wound.   Neurological: Positive for dizziness (improving) and headaches (improving). Negative for weakness.   Psychiatric/Behavioral: Negative for agitation and hallucinations. The patient is not nervous/anxious.      Objective:     Vital Signs (Most Recent):  Temp: 98.3 °F (36.8 °C) (08/10/17 1100)  Pulse: 81 (08/10/17 1200)  Resp: 15 (08/10/17 1200)  BP: 131/76 (08/10/17 1200)  SpO2: 100 % (08/10/17 1200)    Vital Signs (24h Range):  Temp:  [97.8 °F (36.6 °C)-99.6 °F (37.6 °C)] 98.3 °F (36.8 °C)  Pulse:  [60-95] 81  Resp:  [15-23] 15  SpO2:  [98 %-100 %] 100 %  BP: (130-182)/() 131/76  Arterial Line BP: (118-190)/(50-71) 118/50     Body mass index is 15.88 kg/m².    Physical Exam   Constitutional: He appears well-developed and well-nourished. No distress.   HENT:   Head: Normocephalic and atraumatic.   Right Ear: External ear normal.   Left Ear: External ear normal.   Nose: Nose normal.   Eyes: Right eye exhibits no discharge. Left eye exhibits no discharge. No scleral icterus.   Neck: Normal range of motion.   Cardiovascular: Normal rate, regular rhythm and intact distal pulses.    Pulmonary/Chest: Effort normal. No respiratory distress. He has no wheezes.   Abdominal: Soft. He exhibits no distension. There is no tenderness.   Musculoskeletal: Normal range of motion. He exhibits no edema or tenderness.   Neurological:   -  Mental Status:  AAOx3.  Follows commands.  Answers correct age and .  Recent and remote memory intact.  -  Speech and language:  no aphasia or dysarthria.    -  Facial movement (CN VII): facial droop  -  Coordination:  Finger to nose exam:  RUE dysmetria, LUE dysmetria.  -  Motor:  No pronator drift. RUE: 5/5.  LUE: 5/5.  RLE: .  LLE: .  -  Tone:  Normal.  -  Sensory:   Intact to light touch and pin prick.   Skin: Skin is warm and dry. No rash noted.   Psychiatric: He has a normal mood and affect. His behavior is normal. Thought content normal.   Vitals reviewed.    Diagnostic Results:   Labs: Reviewed  CT: Reviewed  MRI: Reviewed  CTA: Reviewed    Assessment/Plan:      Bacteremia due to Enterococcus    -  Blood cultures grew enterococcus faecalis  -  ID following and recommending ampicillin x 6 weeks for acute bacterial endocarditis        Acute bacterial endocarditis    -  Blood cultures grew enterococcus faecalis  -  ECHO showed vegetation on atrial surface of mitral valve consistent with endocarditis  -  ID following and recommending ampicillin x 6 weeks for acute bacterial endocarditis        Urinary tract infection with hematuria    -  Urine culture + proteus miribilis  -  Rocephin x 7 days (end 8/9/17)        NSTEMI (non-ST elevated myocardial infarction)    -  Elevated troponin on admission at [facility], trending down  -  Asymptomatic   -  2/2 demand ischemia         * Embolic stroke involving right cerebellar artery    -  On 8/4/17, patient found to have AMS, aphasia, and facial droop.  -  CTH showed R cerebellar hypodensity with mass effect and midline shift.  -  MRI/MRA revealed R cerebellar infarct with hemorrhagic conversion.    -  Evaluated by neurosurgery and no acute surgical intervention necessary- started on Cardene and hypertonic infusion.  -  Off HTS on 8/10    Functional status: see hospital course  Cognitive/Speech/Language status:  See hospital course  Nutrition/Swallow Status:  Passed bedside swallow evaluation.  SLP recommended regular diet and thin liquids.    Recommendations  -  Encourage mobility, OOB in chair at least 3 hours per day, and early ambulation as appropriate   -  PT/OT evaluate and treat  -  SLP speech and cognitive evaluate and treat  -  Monitor sleep disturbances and establish consistent sleep-wake cycle  -  Monitor for bowel and  bladder dysfunction  -  Monitor for shoulder pain and subluxation  -  Monitor for spasticity  -  Monitor for and prevent skin breakdown and pressure ulcers  · Early mobility, repositioning/weight shifting every 20-30 minutes when sitting, turn patient every 2 hours, proper mattress/overlay and chair cushioning, pressure relief/heel protector boots  -  DVT prophylaxis:  Heparin scheduled   -  Reviewed discharge options (IP rehab, SNF, HH therapy, and OP therapy)          Patient approved for [facility].  Insurance pending admission.  Transfer to rehab when insurance clears and he is medically ready for discharge.    TWYLA Arteaga  Department of Physical Medicine & Rehab   [facility]

## 2017-08-10 NOTE — PROGRESS NOTES
[facility]  Neurocritical Care  Progress Note    Admit Date: 8/4/2017  Service Date: 08/10/2017  Length of Stay: 6    Subjective:     Chief Complaint: Embolic stroke involving right cerebellar artery    History of Present Illness: Juan Manuel Koehler is a 67 year old male with invasive bladder cancer s/p open radical cystoproctostomy, bilateral pelvic lymph node dissection and ileal conduit urinary diversion (6/21/17, path with high grade urothelial carcinoma, LN negative for malignancy), CKD III  With deconditioning and poor PO intake secondary to cancer. He was in his usual state of health (lives at home with wife, able to ambulate, performs some adls) until day of admission (8/2) when patient was coming downstairs and all of a sudden felt dizzy and fell. He fell on his back and was unable to get up secondary to generalized weakness. He denies focal weakness. He denies any loss of consciousness.  He was admitted to OSH at that time and diagnosed with urosepsis, endocarditis, and acute anemia.     While at OSH, he developed aphasia 8/4 and telestroke consult performed. CTH shows large right cerebellar hypodensity. Patient transferred to [facility] for management/NSGY eval.        Hospital Course:  8/2 admitted OSH for urosepsis, endocarditis, and acute anemia   8/4  episode of aphasia at OSH; CTH with large cerebellar hypodensity/concern for edema/hydrocephalus-->transfer to C  8/5 HTS infusion initiated, hypertonic boluses discontinued    8/10 increased carvedilol, PICC for long term abx, dc 2%, watch one more day and TTF tomorrow    Interval History: NAEON. See hospital course 8/10    Review of Systems:   Constitutional: Denies fevers or chills.  Pulmonary: Denies shortness of breath or cough.  Cardiology: Denies chest pain or palpitations.  GI: Denies abdominal pain or constipation.  Neurologic: Denies new weakness,  headache, or paresthesias.    Vitals:   Temp: 99.6 °F (37.6 °C) (08/10/17  1600)  Pulse: 81 (08/10/17 1600)  Resp: (!) 23 (08/10/17 1600)  BP: 119/85 (08/10/17 1600)  SpO2: 100 % (08/10/17 1600)    Temp:  [97.8 °F (36.6 °C)-99.6 °F (37.6 °C)] 99.6 °F (37.6 °C)  Pulse:  [60-95] 81  Resp:  [15-23] 23  SpO2:  [98 %-100 %] 100 %  BP: (110-182)/() 119/85  Arterial Line BP: (118-190)/(50-71) 118/50        08/09 0701 - 08/10 0700  In: 3748.4 [P.O.:1025; I.V.:1773.4]  Out: 3675 [Urine:3675]     Examination:   Constitutional: Well-nourished and -developed. No apparent distress.   Eyes: Conjunctiva clear, anicteric. Lids no lesions.  Head/Ears/Nose/Mouth/Throat/Neck: Moist mucous membranes. External ears, nose atraumatic.   Cardiovascular: Regular rhythm. No murmurs. No leg edema.  Respiratory: Comfortable respirations. Clear to auscultation.  Gastrointestinal: No hernia. Soft, nondistended, nontender. + bowel sounds.    Neurologic:  -GCS [id number]  -Alert. Oriented to person, place, and time. Speech fluent. Follows commands.  -CN 6 palsy, vertical palsy  -Motor 5/5 strength, BUE ataxia   -Sensation intact        Medications:   Continuous Scheduled  ampicillin IVPB 2 g Q4H   atorvastatin 40 mg Daily   carvedilol 12.5 mg BID   cefTRIAXone (ROCEPHIN) IVPB 2 g Q12H   heparin (porcine) 5,000 Units Q8H   lisinopril 20 mg Daily   polyethylene glycol 17 g Daily   senna-docusate 8.6-50 mg 1 tablet BID   sodium chloride 0.9% 10 mL Q6H   sodium chloride 0.9% 3 mL Q8H   PRN  acetaminophen 650 mg Q6H PRN   calcium gluconate IVPB 1 g PRN   calcium gluconate IVPB 1 g PRN   calcium gluconate IVPB 1 g PRN   dextrose 50% 12.5 g PRN   glucagon (human recombinant) 1 mg PRN   hydrALAZINE 10 mg Q4H PRN   labetalol 10 mg Q6H PRN   magnesium sulfate IVPB 2 g PRN   magnesium sulfate IVPB 4 g PRN   ondansetron 4 mg Q6H PRN   potassium chloride 40 mEq PRN   And     potassium chloride 60 mEq PRN   And     potassium chloride 80 mEq PRN   sodium chloride 0.9% 10 mL PRN   sodium phosphate IVPB 15 mmol PRN   sodium phosphate  IVPB 20.01 mmol PRN   sodium phosphate IVPB 30 mmol PRN      Today I independently reviewed pertinent medications, lines/drains/airways, imaging, cardiology, lab results, microbiology results, notably: CTH no hydrocephalus, Na 143, 2% Na dc, NGTD blood cx    Assessment/Plan:     Neuro   * Embolic stroke involving right cerebellar artery    -VN following  -NSGY signed off   -hold asa  -SQH  --160  -CTH 8/10: evolving infarct of R cerebral hemisphere, stable mass effect against 4th ventricle, no hydrocephalus  -2% Na dc, last Na 143  -if Na <140 will restart 2%  -hold pt in [facility] until tomorrow          Cardiac/Vascular   Hypertension    --160  -metoprolol discontinued  -carvedilol 12.5 mg bid   -lisinopril 20 mg daily         Acute bacterial endocarditis    -ID consulted  -appreciate recs  -no sign of vegetation on repeat TTE  -likely embolic source of stroke         NSTEMI (non-ST elevated myocardial infarction)    -NSTEMI outside hospital  -troponin trended down   -patient denied chest pain   -continue BB and ACE-I          ID   Sepsis due to Enterococcus    -ID following  -repeat blood cultures daily until negative   -continue ampicillin and ceftriaxone   -will likely need treatment for 6 weeks after cultures clear  -PICC placement today 8/10            Prophylaxis:  Venous Thromboembolism: mechanical chemical  Stress Ulcer: None  Ventilator Pneumonia: not applicable     Activity Orders          Activity as tolerated starting at 08/07 1517        Full Code    Hyacinth Ross PA-C  Neurocritical Care  [facility]

## 2017-08-10 NOTE — ASSESSMENT & PLAN NOTE
-  On 8/4/17, patient found to have AMS, aphasia, and facial droop.  -  CTH showed R cerebellar hypodensity with mass effect and midline shift.  -  MRI/MRA revealed R cerebellar infarct with hemorrhagic conversion.    -  Evaluated by neurosurgery and no acute surgical intervention necessary- started on Cardene and hypertonic infusion.  -  Off HTS on 8/10    Functional status: see hospital course  Cognitive/Speech/Language status:  See hospital course  Nutrition/Swallow Status:  Passed bedside swallow evaluation.  SLP recommended regular diet and thin liquids.    Recommendations  -  Encourage mobility, OOB in chair at least 3 hours per day, and early ambulation as appropriate   -  PT/OT evaluate and treat  -  SLP speech and cognitive evaluate and treat  -  Monitor sleep disturbances and establish consistent sleep-wake cycle  -  Monitor for bowel and bladder dysfunction  -  Monitor for shoulder pain and subluxation  -  Monitor for spasticity  -  Monitor for and prevent skin breakdown and pressure ulcers  · Early mobility, repositioning/weight shifting every 20-30 minutes when sitting, turn patient every 2 hours, proper mattress/overlay and chair cushioning, pressure relief/heel protector boots  -  DVT prophylaxis:  Heparin scheduled   -  Reviewed discharge options (IP rehab, SNF, HH therapy, and OP therapy)

## 2017-08-10 NOTE — ASSESSMENT & PLAN NOTE
Mr. Koehler is a 68 yo M with history of invasive urothelial bladder carcinoma (s/p open radical cystoprostatectomy, b/l pelvic lymph node resection and ileal conduit placement) and CKD III fount to have bacterial endocarditis due to Enterococcus fecalis on this admission. Pt w/ evidence of possible septic emboli to spleen on CT and w/ acute R cerebellar stroke also likely 2/2 septic embolus. Pt also has a complicated UTI w/ isolated Proteus sensitive to ceftriaxone.    - changed to AMP and high-dose CTX on 8/4.  - TLC out; now needs PICC  - anticipating 6 weeks of current abx regimen, starting 8/5 (end date: 9/16)  - will sign off

## 2017-08-10 NOTE — ASSESSMENT & PLAN NOTE
Mitral valve vegetation seen on 8/3/17 ECHO; repeat ECHO negative for vegetation  Blood cultures 8/4 with gram positive cocci in chains - Enterococcus Fecalis   Currently on ceftriaxone, ampicillin  Normal WBC count, afebrile at this time

## 2017-08-10 NOTE — PLAN OF CARE
Problem: Patient Care Overview  Goal: Plan of Care Review  Outcome: Ongoing (interventions implemented as appropriate)  POC reviewed with Juan Manuel Koehler at bedside. Pt verbalizes understanding. Questions and concerns addressed. CT scan completed overnight. See flowsheets for assessments and VS. Pt progressing towards goals. Will continue to monitor.

## 2017-08-10 NOTE — PLAN OF CARE
SW spoke with Pt wife at bedside to advise referrals were sent and she may hear from the facilities tomorrow. Pt wife completed Patient Choice form and this SW placed it in the chart.    Elizabeth Matias, JASON  Neurocritical Care   [facility]  [phone]

## 2017-08-10 NOTE — PLAN OF CARE
08/10/17 1049   Discharge Reassessment   Assessment Type Discharge Planning Reassessment   Can the patient answer the patient profile reliably? Yes, cognitively intact   How does the patient rate their overall health at the present time? Fair   Describe the patient's ability to walk at the present time. Minor restrictions or changes   How often would a person be available to care for the patient? Whenever needed   Number of comorbid conditions (as recorded on the chart) Two   During the past month, has the patient often been bothered by feeling down, depressed or hopeless? Yes   During the past month, has the patient often been bothered by little interest or pleasure in doing things? Yes   Discharge plan remains the same: No   Discharge Plan A Rehab   Discharge Plan B Home with family;Home Health   Change in patient condition or support system No       Referral sent to:   1. [facility]  2. Ivonne Zhang RN, CCRN-K, [facility]  Neuro-Critical Care   [phone]

## 2017-08-10 NOTE — SUBJECTIVE & OBJECTIVE
Neurologic Chief Complaint: R cerebellar stroke     Subjective:     Interval History: Patient is seen for follow-up neurological assessment and treatment recommendations:   NAEON, patient neurologically stable, CTH remains stable.     HPI, Past Medical, Family, and Social History remains the same as documented in the initial encounter.     Review of Systems   Constitutional: Positive for chills. Negative for fever.   Eyes: Negative for redness.   Gastrointestinal: Positive for nausea. Negative for vomiting.   Neurological: Negative for headaches.     Scheduled Meds:   ampicillin IVPB  2 g Intravenous Q4H    atorvastatin  40 mg Oral Daily    carvedilol  12.5 mg Oral BID    cefTRIAXone (ROCEPHIN) IVPB  2 g Intravenous Q12H    heparin (porcine)  5,000 Units Subcutaneous Q8H    lisinopril  20 mg Oral Daily    polyethylene glycol  17 g Oral Daily    senna-docusate 8.6-50 mg  1 tablet Oral BID    sodium chloride 0.9%  10 mL Intravenous Q6H    sodium chloride 0.9%  3 mL Intravenous Q8H     Continuous Infusions:     PRN Meds:acetaminophen, calcium gluconate IVPB, calcium gluconate IVPB, calcium gluconate IVPB, dextrose 50%, glucagon (human recombinant), hydrALAZINE, labetalol, magnesium sulfate IVPB, magnesium sulfate IVPB, ondansetron, potassium chloride **AND** potassium chloride **AND** potassium chloride, Flushing PICC Protocol **AND** sodium chloride 0.9% **AND** sodium chloride 0.9%, sodium phosphate IVPB, sodium phosphate IVPB, sodium phosphate IVPB    Objective:     Vital Signs (Most Recent):  Temp: 98.3 °F (36.8 °C) (08/10/17 1100)  Pulse: 79 (08/10/17 1300)  Resp: 18 (08/10/17 1300)  BP: (!) 155/75 (08/10/17 1300)  SpO2: 100 % (08/10/17 1300)  BP Location: Right arm    Vital Signs Range (Last 24H):  Temp:  [97.8 °F (36.6 °C)-99.6 °F (37.6 °C)]   Pulse:  [60-95]   Resp:  [15-23]   BP: (130-182)/()   SpO2:  [98 %-100 %]   Arterial Line BP: (118-190)/(50-71)   BP Location: Right arm    Physical Exam    Constitutional: He appears well-developed and well-nourished.   HENT:   Head: Normocephalic.   Eyes: Pupils are equal, round, and reactive to light.   6th nerve and vertical palsy    Cardiovascular: Normal rate.    Pulmonary/Chest: Effort normal.   Neurological: He is alert.   Skin: Skin is warm and dry.   Nursing note and vitals reviewed.      Neurological Exam:   Alert, oriented to person and month/year  Equal sensation x 4   Full strength throughout  Gaze forward, left gaze better than right.   B UE ataxia      NIH Stroke Scale:    Level of Consciousness: 1 - drowsy  LOC Questions: 0 - answers both correctly  LOC Commands: 0 - performs both correctly  Best Gaze: 1 - partial gaze palsy  Visual: 0 - no visual loss  Facial Palsy: 0 - normal  Motor Left Arm: 0 - no drift  Motor Right Arm: 0 - no drift  Motor Left Le - no drift  Motor Right Le - no drift  Limb Ataxia: 2 - present in two limbs  Sensory: 0 - normal  Best Language: 0 - no aphasia  Dysarthria: 0 - normal articulation  Extinction and Inattention: 0 - no neglect  NIH Stroke Scale Total: 4      Laboratory:  CMP:     Recent Labs  Lab 08/10/17  0216  08/10/17  1138   CALCIUM 8.2*  --   --    ALBUMIN 2.2*  --   --    PROT 5.8*  --   --      < > 143   K 4.3  --   --    CO2 25  --   --    *  --   --    BUN 19  --   --    CREATININE 1.3  --   --    ALKPHOS 69  --   --    ALT 33  --   --    AST 21  --   --    BILITOT 0.4  --   --    < > = values in this interval not displayed.  CBC:     Recent Labs  Lab 08/10/17  0216   WBC 9.24   RBC 2.72*   HGB 7.9*   HCT 23.6*      MCV 87   MCH 29.0   MCHC 33.5     Lipid Panel:     Recent Labs  Lab 17  1830   CHOL 132   LDLCALC 76.4   HDL 31*   TRIG 123     Coagulation:     Recent Labs  Lab 17  1830   INR 1.0   APTT 22.7     Platelet Aggregation Study: No results for input(s): PLTAGG, PLTAGINTERP, PLTAGREGLACO, ADPPLTAGGREG in the last 168 hours.  Hgb A1C:     Recent Labs  Lab  08/04/17 1830   HGBA1C 5.4     TSH:     Recent Labs  Lab 08/04/17  1830   TSH 1.817  1.817       Diagnostic Results:  I have personally reviewed:   CTH 9/10/17:   Evolving area of recent infarction within the right cerebral hemisphere with stable mass effect against the fourth ventricle.  No hydrocephalus.    Remaining areas of abnormal brain parenchymal attenuation within the left parietal, left occipital and left cerebellar hemispheres are not well evaluated on this exam.      CTA Head and Neck. Date: 08/09/17  Evolving acute infarct of the right cerebellar hemisphere/PICA distribution with stable local mass effect against the fourth ventricle.  Subtle superimposed hyperattenuation is most consistent with known hemorrhage.  No imaging finding to suggest developing hydrocephalus.    Evolving subacute to chronic areas of infarction within the left parietal, left occipital and left cerebellar hemispheres.    Diminutive caliber and irregularity involving the distal aspect of the bilateral PICAs, right greater than left, possibly reflecting areas of high-grade stenosis.     MRI 8/5/17  1. Large region of abnormal restricted diffusion within the right cerebellar hemisphere compatible with acute infarct. There is associated hemorrhagic conversion present. There is localized mass effect on the abril as well as effacement of the fourth ventricle. Ventricular system remains mildly prominent, unchanged from prior CT examination.    2. Additional focal regions of mild diffusion hyperintensity with associated serpiginous non-masslike cortical enhancement within the left parietal and occipital lobes suggestive of subacute infarcts. Additional region of volume loss with associated intrinsic T1 hyperintensity and serpiginous enhancement is present within the left cerebellar hemisphere suggestive of a late subacute infarct with laminar necrosis. Small remote lacunar type infarcts are also present in the right corona radiata.  Overall findings are suggestive of possible thromboembolic phenomena. Clinical correlation is advised.    3. No evidence of high-grade stenosis of the visualized intracranial vasculature. Note is made that the mid and distal portions of the right AICA and PICA appear somewhat irregular and are not well-visualized, although a component of this may relate to noncontrast MRA time-of-flight technique.        8/4/17 - CT head    Large area of vasogenic edema centered within the right cerebellar hemisphere resulting in severe mass effect with compression of the 4th ventricle, mild hydrocephalus and leftward deviation of the cerebral vermis.  Recommend emergent neurosurgical consultation.    Echo 8/5/17  Normal LA   CONCLUSIONS     1 - Normal left ventricular systolic function (EF 60-65%).     2 - No wall motion abnormalities.     3 - Normal left ventricular diastolic function.     4 - Normal right ventricular systolic function .     5 - The estimated PA systolic pressure is 27 mmHg.

## 2017-08-10 NOTE — SUBJECTIVE & OBJECTIVE
Past Medical History:   Diagnosis Date    Cancer     bladder and throat    CKD (chronic kidney disease) stage 3, GFR 30-59 ml/min     Embolic stroke involving right cerebellar artery 8/4/2017    Urinary tract infection      Past Surgical History:   Procedure Laterality Date    BLADDER SURGERY      CYSTOSCOPY      HERNIA REPAIR      Ileal Conduit      THROAT SURGERY       Review of patient's allergies indicates:  No Known Allergies    Scheduled Medications:    ampicillin IVPB  2 g Intravenous Q4H    atorvastatin  40 mg Oral Daily    carvedilol  12.5 mg Oral BID    cefTRIAXone (ROCEPHIN) IVPB  2 g Intravenous Q12H    heparin (porcine)  5,000 Units Subcutaneous Q8H    lisinopril  20 mg Oral Daily    polyethylene glycol  17 g Oral Daily    senna-docusate 8.6-50 mg  1 tablet Oral BID    sodium chloride 0.9%  10 mL Intravenous Q6H    sodium chloride 0.9%  3 mL Intravenous Q8H       PRN Medications: acetaminophen, calcium gluconate IVPB, calcium gluconate IVPB, calcium gluconate IVPB, dextrose 50%, glucagon (human recombinant), hydrALAZINE, labetalol, magnesium sulfate IVPB, magnesium sulfate IVPB, ondansetron, potassium chloride **AND** potassium chloride **AND** potassium chloride, Flushing PICC Protocol **AND** sodium chloride 0.9% **AND** sodium chloride 0.9%, sodium phosphate IVPB, sodium phosphate IVPB, sodium phosphate IVPB    Family History     Problem Relation (Age of Onset)    Kidney disease Mother    No Known Problems Father        Social History Main Topics    Smoking status: Never Smoker    Smokeless tobacco: Never Used    Alcohol use No    Drug use: No    Sexual activity: Yes     Partners: Female     Birth control/ protection: None     Review of Systems   Constitutional: Positive for appetite change (decreased) and fatigue. Negative for chills and fever.   HENT: Negative for drooling, hearing loss, trouble swallowing and voice change.    Eyes: Negative for pain and visual disturbance.    Respiratory: Negative for cough, shortness of breath and wheezing.    Cardiovascular: Negative for chest pain and palpitations.   Gastrointestinal: Positive for nausea. Negative for abdominal pain and vomiting.   Genitourinary: Negative for difficulty urinating and flank pain.   Musculoskeletal: Negative for arthralgias, back pain, myalgias and neck pain.   Skin: Negative for rash and wound.   Neurological: Positive for dizziness (improving) and headaches (improving). Negative for weakness.   Psychiatric/Behavioral: Negative for agitation and hallucinations. The patient is not nervous/anxious.      Objective:     Vital Signs (Most Recent):  Temp: 98.3 °F (36.8 °C) (08/10/17 1100)  Pulse: 81 (08/10/17 1200)  Resp: 15 (08/10/17 1200)  BP: 131/76 (08/10/17 1200)  SpO2: 100 % (08/10/17 1200)    Vital Signs (24h Range):  Temp:  [97.8 °F (36.6 °C)-99.6 °F (37.6 °C)] 98.3 °F (36.8 °C)  Pulse:  [60-95] 81  Resp:  [15-23] 15  SpO2:  [98 %-100 %] 100 %  BP: (130-182)/() 131/76  Arterial Line BP: (118-190)/(50-71) 118/50     Body mass index is 15.88 kg/m².    Physical Exam   Constitutional: He appears well-developed and well-nourished. No distress.   HENT:   Head: Normocephalic and atraumatic.   Right Ear: External ear normal.   Left Ear: External ear normal.   Nose: Nose normal.   Eyes: Right eye exhibits no discharge. Left eye exhibits no discharge. No scleral icterus.   Neck: Normal range of motion.   Cardiovascular: Normal rate, regular rhythm and intact distal pulses.    Pulmonary/Chest: Effort normal. No respiratory distress. He has no wheezes.   Abdominal: Soft. He exhibits no distension. There is no tenderness.   Musculoskeletal: Normal range of motion. He exhibits no edema or tenderness.   Neurological:   -  Mental Status:  AAOx3.  Follows commands.  Answers correct age and .  Recent and remote memory intact.  -  Speech and language:  no aphasia or dysarthria.    -  Facial movement (CN VII): facial droop  -   Coordination:  Finger to nose exam:  RUE dysmetria, LUE dysmetria.  -  Motor:  No pronator drift. RUE: 5/5.  LUE: 5/5.  RLE: 5/5.  LLE: 5/5.  -  Tone:  Normal.  -  Sensory:  Intact to light touch and pin prick.   Skin: Skin is warm and dry. No rash noted.   Psychiatric: He has a normal mood and affect. His behavior is normal. Thought content normal.   Vitals reviewed.         Diagnostic Results:   Labs: Reviewed  CT: Reviewed  MRI: Reviewed  CTA: Reviewed

## 2017-08-10 NOTE — ASSESSMENT & PLAN NOTE
-ID following  -repeat blood cultures daily until negative   -continue ampicillin and ceftriaxone   -will likely need treatment for 6 weeks after cultures clear  -PICC placement today 8/10

## 2017-08-10 NOTE — PLAN OF CARE
Problem: Physical Therapy Goal  Goal: Physical Therapy Goal  Goals to be met by: 8/15/2017     Patient will increase functional independence with mobility by performin. Supine to sit with Contact Guard Assistance  2. Sit to supine with supervision (CGA met )  3. Sit to stand transfer with Contact Guard Assistance using AD or No AD  4. Bed to chair transfer with Minimal Assistance using AD or No AD  5. Gait x25 feet with Minimal Assistance using AD or No AD  6. Ascend/descend 1 flight of stairs with bilateral Handrails with Min A  7. Sitting at edge of bed x10 minutes with Contact Guard Assistance while maintaining midline and forward gaze  8. Stand for x5 minutes with Minimal Assistance using AD or No AD  9. Lower extremity exercise program x15 reps with supervision to maintain B LE coordination and strength      Pt progressing towards goals. continue with PT POC.Goals remain appropriate.       Andrea Perry PTA  8/10/2017

## 2017-08-10 NOTE — PT/OT/SLP PROGRESS
"Speech Language Pathology  Treatment    Juan Manuel Koehler   MRN: [id number]   Admitting Diagnosis: Embolic stroke involving right cerebellar artery    Diet recommendations: Solid Diet Level: Regular  Liquid Diet Level: Thin     SLP Treatment Date: 08/10/17  Speech Start Time: 1112     Speech Stop Time: 1136     Speech Total (min): 24 min       TREATMENT BILLABLE MINUTES:  Speech Therapy Individual 23    Has the patient been evaluated by SLP for swallowing? : Yes  Keep patient NPO?: No   General Precautions: Standard,            Subjective:  "I think I'm doing a little better"     Pain/Comfort  Pain Rating 1: 0/10  Pain Rating Post-Intervention 1: 0/10    Objective:      Pt completing occupational therapy upon arrival. Pt seated upright in chair. Pt wearing glasses upon arrival. Pt presented with visual-spatial drawing task to complete the other half of a simple picture. Pt able to identify picture immediately. Pt with improved ability to complete compared to previous therapy session completing with ~80% acc independently. Pt also completed clock drawing task with improved ability compared to initial assessment. Pt completed with all numbers in correct location and even independently aware of error of number written twice.  Pt completed visual scanning task with 80% acc independently. With SLP visual cues pt increased to 85%acc.   Pt generated 7 items within a category independently with semantic cueing increased to 10 items. Spouse and Pt appearing encouraged by progress demonstrated this session. White board updated accordingly .. Pt and spouse without additional questions this date. Ongoing therapy services warranted.       Assessment:  Juan Manuel Koehler is a 67 y.o. male with a medical diagnosis of Embolic stroke involving right cerebellar artery and presents with cognitive-linguistic impairments, visual spatial deficits     Discharge recommendations: Discharge Facility/Level Of Care Needs: rehabilitation " facility     Goals:    SLP Goals        Problem: SLP Goal    Goal Priority Disciplines Outcome   SLP Goal     SLP    Description:  Speech Language Pathology Goals  Updated Goals expected to be met by 8/14    1. Pt will tolerate regular diet and thin liquids without s/s of airway compromise.   2. Pt will generate 10 items within a category independently to enhance cognition  3. Pt will recall 2/3 items with a delay independently to enhance memory   4. Pt will complete cancellation task with 90% acc independently to enhance visual-spatial skills   5. Pt will complete mental flexibility tasks with 80% independently to enhance cognition   6. Pt will follow 2 step directions with 80% acc with min cues to enhance comprehension skills   7. Pt will participate in ongoing assessment of reading/money/math/time management skills                                 Plan:   Patient to be seen Therapy Frequency: 5 x/week   Plan of Care expires: 09/03/17  Plan of Care reviewed with: patient, spouse  SLP Follow-up?: Yes  SLP - Next Visit Date: 08/07/17           Clara Vergara CCC-SLP  08/10/2017

## 2017-08-10 NOTE — PROGRESS NOTES
[facility]  Vascular Neurology  Comprehensive Stroke Center  Progress Note    Assessment/Plan:     8/5/17 - doing well, no events overnight, neuro surg and NCC monitoring for swelling/suboccipital crani watch   8/7/17 KANDISEON. Remains on 3% and cardene; hemicrani watch.   8/9/17 neurologically stable, repeat CTA stable  8/10/17 Stable CTH and neuro exam.     * Embolic stroke involving right cerebellar artery    Mr. Koehler is a 68yo M with endocarditis and an acute R cerebellar infarct with edema, midline shift, as well as hemorrhagic conversion.     - Antithrombotics for secondary stroke prevention: Antiplatelets:  Aspirin: 325 mg oral now and daily  - Statins for secondary stroke prevention and hyperlipidemia, if present: None: Reason: consider if LDL>130; patient's likely etiology of stroke was septic emboli from endocarditis. LDL 76  - Diagnostics: none pending   - VTE Prophylaxis: Heparin 5000 units SQ every 8 hours  - Endocarditis treatment per NCC  - Neurosurgery hemicrani watch. On 2% per NCC.    -PT/OT/ST; Rehab at this time        Cytotoxic cerebral edema    Due to stroke   Evident on imaging        Acute bacterial endocarditis    Mitral valve vegetation seen on 8/3/17 ECHO; repeat ECHO negative for vegetation  Blood cultures 8/4 with gram positive cocci in chains - Enterococcus Fecalis   Currently on ceftriaxone, ampicillin  Normal WBC count, afebrile at this time        Urinary tract infection with hematuria    + proteus in urine culture  Complicated UTI, ID consulted         NSTEMI (non-ST elevated myocardial infarction)    Per NCC, trending troponins  downtrending         Hypertension    Stroke risk factor   SBP <140   Management per primary team            Neurologic Chief Complaint: R cerebellar stroke     Subjective:     Interval History: Patient is seen for follow-up neurological assessment and treatment recommendations:   LILIANA, patient neurologically stable, CTH remains stable.      HPI, Past Medical, Family, and Social History remains the same as documented in the initial encounter.     Review of Systems   Constitutional: Positive for chills. Negative for fever.   Eyes: Negative for redness.   Gastrointestinal: Positive for nausea. Negative for vomiting.   Neurological: Negative for headaches.     Scheduled Meds:   ampicillin IVPB  2 g Intravenous Q4H    atorvastatin  40 mg Oral Daily    carvedilol  12.5 mg Oral BID    cefTRIAXone (ROCEPHIN) IVPB  2 g Intravenous Q12H    heparin (porcine)  5,000 Units Subcutaneous Q8H    lisinopril  20 mg Oral Daily    polyethylene glycol  17 g Oral Daily    senna-docusate 8.6-50 mg  1 tablet Oral BID    sodium chloride 0.9%  10 mL Intravenous Q6H    sodium chloride 0.9%  3 mL Intravenous Q8H     Continuous Infusions:     PRN Meds:acetaminophen, calcium gluconate IVPB, calcium gluconate IVPB, calcium gluconate IVPB, dextrose 50%, glucagon (human recombinant), hydrALAZINE, labetalol, magnesium sulfate IVPB, magnesium sulfate IVPB, ondansetron, potassium chloride **AND** potassium chloride **AND** potassium chloride, Flushing PICC Protocol **AND** sodium chloride 0.9% **AND** sodium chloride 0.9%, sodium phosphate IVPB, sodium phosphate IVPB, sodium phosphate IVPB    Objective:     Vital Signs (Most Recent):  Temp: 98.3 °F (36.8 °C) (08/10/17 1100)  Pulse: 79 (08/10/17 1300)  Resp: 18 (08/10/17 1300)  BP: (!) 155/75 (08/10/17 1300)  SpO2: 100 % (08/10/17 1300)  BP Location: Right arm    Vital Signs Range (Last 24H):  Temp:  [97.8 °F (36.6 °C)-99.6 °F (37.6 °C)]   Pulse:  [60-95]   Resp:  [15-23]   BP: (130-182)/()   SpO2:  [98 %-100 %]   Arterial Line BP: (118-190)/(50-71)   BP Location: Right arm    Physical Exam   Constitutional: He appears well-developed and well-nourished.   HENT:   Head: Normocephalic.   Eyes: Pupils are equal, round, and reactive to light.   6th nerve and vertical palsy    Cardiovascular: Normal rate.     Pulmonary/Chest: Effort normal.   Neurological: He is alert.   Skin: Skin is warm and dry.   Nursing note and vitals reviewed.      Neurological Exam:   Alert, oriented to person and month/year  Equal sensation x 4   Full strength throughout  Gaze forward, left gaze better than right.   B UE ataxia      NIH Stroke Scale:    Level of Consciousness: 1 - drowsy  LOC Questions: 0 - answers both correctly  LOC Commands: 0 - performs both correctly  Best Gaze: 1 - partial gaze palsy  Visual: 0 - no visual loss  Facial Palsy: 0 - normal  Motor Left Arm: 0 - no drift  Motor Right Arm: 0 - no drift  Motor Left Le - no drift  Motor Right Le - no drift  Limb Ataxia: 2 - present in two limbs  Sensory: 0 - normal  Best Language: 0 - no aphasia  Dysarthria: 0 - normal articulation  Extinction and Inattention: 0 - no neglect  NIH Stroke Scale Total: 4      Laboratory:  CMP:     Recent Labs  Lab 08/10/17  0216  08/10/17  1138   CALCIUM 8.2*  --   --    ALBUMIN 2.2*  --   --    PROT 5.8*  --   --      < > 143   K 4.3  --   --    CO2 25  --   --    *  --   --    BUN 19  --   --    CREATININE 1.3  --   --    ALKPHOS 69  --   --    ALT 33  --   --    AST 21  --   --    BILITOT 0.4  --   --    < > = values in this interval not displayed.  CBC:     Recent Labs  Lab 08/10/17  0216   WBC 9.24   RBC 2.72*   HGB 7.9*   HCT 23.6*      MCV 87   MCH 29.0   MCHC 33.5     Lipid Panel:     Recent Labs  Lab 17  1830   CHOL 132   LDLCALC 76.4   HDL 31*   TRIG 123     Coagulation:     Recent Labs  Lab 17  1830   INR 1.0   APTT 22.7     Platelet Aggregation Study: No results for input(s): PLTAGG, PLTAGINTERP, PLTAGREGLACO, ADPPLTAGGREG in the last 168 hours.  Hgb A1C:     Recent Labs  Lab 17  1830   HGBA1C 5.4     TSH:     Recent Labs  Lab 17  1830   TSH 1.817  1.817       Diagnostic Results:  I have personally reviewed:   [facility] 9/10/17:   Evolving area of recent infarction within the right cerebral  hemisphere with stable mass effect against the fourth ventricle.  No hydrocephalus.    Remaining areas of abnormal brain parenchymal attenuation within the left parietal, left occipital and left cerebellar hemispheres are not well evaluated on this exam.      CTA Head and Neck. Date: 08/09/17  Evolving acute infarct of the right cerebellar hemisphere/PICA distribution with stable local mass effect against the fourth ventricle.  Subtle superimposed hyperattenuation is most consistent with known hemorrhage.  No imaging finding to suggest developing hydrocephalus.    Evolving subacute to chronic areas of infarction within the left parietal, left occipital and left cerebellar hemispheres.    Diminutive caliber and irregularity involving the distal aspect of the bilateral PICAs, right greater than left, possibly reflecting areas of high-grade stenosis.     MRI 8/5/17  1. Large region of abnormal restricted diffusion within the right cerebellar hemisphere compatible with acute infarct. There is associated hemorrhagic conversion present. There is localized mass effect on the abril as well as effacement of the fourth ventricle. Ventricular system remains mildly prominent, unchanged from prior CT examination.    2. Additional focal regions of mild diffusion hyperintensity with associated serpiginous non-masslike cortical enhancement within the left parietal and occipital lobes suggestive of subacute infarcts. Additional region of volume loss with associated intrinsic T1 hyperintensity and serpiginous enhancement is present within the left cerebellar hemisphere suggestive of a late subacute infarct with laminar necrosis. Small remote lacunar type infarcts are also present in the right corona radiata. Overall findings are suggestive of possible thromboembolic phenomena. Clinical correlation is advised.    3. No evidence of high-grade stenosis of the visualized intracranial vasculature. Note is made that the mid and distal  portions of the right AICA and PICA appear somewhat irregular and are not well-visualized, although a component of this may relate to noncontrast MRA time-of-flight technique.        8/4/17 - CT head    Large area of vasogenic edema centered within the right cerebellar hemisphere resulting in severe mass effect with compression of the 4th ventricle, mild hydrocephalus and leftward deviation of the cerebral vermis.  Recommend emergent neurosurgical consultation.    Echo 8/5/17  Normal LA   CONCLUSIONS     1 - Normal left ventricular systolic function (EF 60-65%).     2 - No wall motion abnormalities.     3 - Normal left ventricular diastolic function.     4 - Normal right ventricular systolic function .     5 - The estimated PA systolic pressure is 27 mmHg.       Darlene Reyes PA-C  Comprehensive Stroke Center  Department of Vascular Neurology   [facility]

## 2017-08-10 NOTE — PT/OT/SLP PROGRESS
Physical Therapy  Treatment    Juan Manuel Koehler   MRN: [id number]   Admitting Diagnosis: Embolic stroke involving right cerebellar artery    PT Received On: 08/10/17  PT Start Time: 0753     PT Stop Time: 0816    PT Total Time (min): 23 min       Billable Minutes:  Therapeutic Activity 13 and Therapeutic Exercise 10    Treatment Type: Treatment  PT/PTA: PTA     PTA Visit Number: 1       General Precautions: Standard, aspiration, fall  Orthopedic Precautions: N/A   Braces:      Do you have any cultural, spiritual, Zoroastrianism conflicts, given your current situation?: None noted    Subjective:  Communicated with RN prior to session.  I will try    Pain/Comfort  Pain Rating 1: 0/10  Pain Rating Post-Intervention 1: 0/10    Objective:   Patient found with: blood pressure cuff, arterial line, central line, peripheral IV, pulse ox (continuous), telemetry    Functional Mobility:  Bed Mobility:   Rolling/Turning Right: Stand by assistance  Scooting/Bridging: Stand by Assistance  Supine to Sit: Stand by Assistance    Transfers:  Sit <> Stand Assistance: Minimum Assistance  Sit <> Stand Assistive Device: No Assistive Device  Bed <> Chair Technique: Stand Pivot  Bed <> Chair Assistance: Minimum Assistance, Moderate Assistance  Bed <> Chair Assistive Device: No Assistive Device  Static standing pt leans forward  requiring min A to steady pt and prevent pt from falling forward  Gait:     Pt took ~3-4 steps when t/f from bed to chair at min/mod A w/o AD    Balance:   Static Sit: GOOD-: Takes MODERATE challenges from all directions but inconsistently  Dynamic Sit: GOOD-: Maintains balance through MODERATE excursions of active trunk movement,     Static Stand: FAIR: Maintains without assist but unable to take challenges  Dynamic stand: 0: N/A  Therapeutic Activities and Exercises:  Supine B LE therex with AROM x 10-15 reps including AP,HS,hip abd,SLR seated LAQ   Discussed/educated patient on progress, safety, importance of OOB  mobility for improving outcomes, PT POC  White board updated   Donned an extra gown   AM-PAC 6 CLICK MOBILITY  How much help from another person does this patient currently need?   1 = Unable, Total/Dependent Assistance  2 = A lot, Maximum/Moderate Assistance  3 = A little, Minimum/Contact Guard/Supervision  4 = None, Modified [place]/Independent    Turning over in bed (including adjusting bedclothes, sheets and blankets)?: 3  Sitting down on and standing up from a chair with arms (e.g., wheelchair, bedside commode, etc.): 3  Moving from lying on back to sitting on the side of the bed?: 3  Moving to and from a bed to a chair (including a wheelchair)?: 3  Need to walk in hospital room?: 2  Climbing 3-5 steps with a railing?: 2  Total Score: 16    AM-PAC Raw Score CMS G-Code Modifier Level of Impairment Assistance   6 % Total / Unable   7 - 9 CM 80 - 100% Maximal Assist   10 - 14 CL 60 - 80% Moderate Assist   15 - 19 CK 40 - 60% Moderate Assist   20 - 22 CJ 20 - 40% Minimal Assist   23 CI 1-20% SBA / CGA   24 CH 0% Independent/ Mod I     Patient left up in chair with all lines intact, call button in reach and nsg notified.    Assessment:  Juan Manuel Koehler is a 67 y.o. male with a medical diagnosis of Embolic stroke involving right cerebellar artery and presents with continued deficits as listed below.Pt did well to participate in session. Pt  tolerated treatment well and is progressing slowly with mobility. Pt would continue to benefit from skilled PT to address overall functional mobility and goals. Goals remain appropriate   Rehab identified problem list/impairments: Rehab identified problem list/impairments: weakness, impaired endurance, impaired functional mobilty, gait instability, impaired self care skills, impaired fine motor, decreased lower extremity function, decreased upper extremity function, decreased safety awareness, visual deficits, impaired cognition, impaired balance, impaired  coordination    Rehab potential is good.    Activity tolerance: Good    Discharge recommendations: Discharge Facility/Level Of Care Needs: rehabilitation facility     Barriers to discharge: Barriers to Discharge: Inaccessible home environment, Decreased caregiver support    Equipment recommendations: Equipment Needed After Discharge: 3-in-1 commode, bath bench, wheelchair     GOALS:    Physical Therapy Goals        Problem: Physical Therapy Goal    Goal Priority Disciplines Outcome Goal Variances Interventions   Physical Therapy Goal     PT/OT, PT Ongoing (interventions implemented as appropriate)     Description:  Goals to be met by: 8/15/2017     Patient will increase functional independence with mobility by performin. Supine to sit with Contact Guard Assistance  2. Sit to supine with supervision (CGA met )  3. Sit to stand transfer with Contact Guard Assistance using AD or No AD  4. Bed to chair transfer with Minimal Assistance using AD or No AD  5. Gait x25 feet with Minimal Assistance using AD or No AD  6. Ascend/descend 1 flight of stairs with bilateral Handrails with Min A  7. Sitting at edge of bed x10 minutes with Contact Guard Assistance while maintaining midline and forward gaze  8. Stand for x5 minutes with Minimal Assistance using AD or No AD  9. Lower extremity exercise program x15 reps with supervision to maintain B LE coordination and strength                       PLAN:    Patient to be seen 6 x/week  to address the above listed problems via gait training, therapeutic activities, therapeutic exercises, neuromuscular re-education  Plan of Care expires: 17  Plan of Care reviewed with: patient, spouse         Andrea Perry, PTA  08/10/2017

## 2017-08-10 NOTE — ASSESSMENT & PLAN NOTE
Mr. Koehler is a 68yo M with endocarditis and an acute R cerebellar infarct with edema, midline shift, as well as hemorrhagic conversion.     - Antithrombotics for secondary stroke prevention: Antiplatelets:  Aspirin: 325 mg oral now and daily  - Statins for secondary stroke prevention and hyperlipidemia, if present: None: Reason: consider if LDL>130; patient's likely etiology of stroke was septic emboli from endocarditis. LDL 76  - Diagnostics: none pending   - VTE Prophylaxis: Heparin 5000 units SQ every 8 hours  - Endocarditis treatment per NCC  - Neurosurgery hemicrani watch. On 2% per NCC.    -PT/OT/ST; Rehab at this time

## 2017-08-10 NOTE — PLAN OF CARE
Problem: Patient Care Overview  Goal: Plan of Care Review  Outcome: Ongoing (interventions implemented as appropriate)  Plan of care reviewed with pt and spouse.All questions and concerns addressed.No acute distress noted.

## 2017-08-10 NOTE — ASSESSMENT & PLAN NOTE
-VN following  -NSGY signed off   -hold asa  -SQH  --160  -CTH 8/10: evolving infarct of R cerebral hemisphere, stable mass effect against 4th ventricle, no hydrocephalus  -2% Na dc, last Na 143  -if Na <140 will restart 2%  -hold pt in NCC until tomorrow

## 2017-08-10 NOTE — PLAN OF CARE
Problem: Occupational Therapy Goal  Goal: Occupational Therapy Goal  Goals set 8/7 to be addressed for 14 days with expiration date, 8/21:  Patient will increase functional independence with ADLs by performing:    Patient will demonstrate rolling to the right with modified independence.  Not met   Patient will demonstrate rolling to the left with modified independence.   Not met  Patient will demonstrate supine -sit with modified independence.   Not met  Patient will demonstrate stand pivot transfers with CGA.   Not met  Patient will demonstrate grooming while standing with CGA.   Not met  Patient will demonstrate upper body dressing with SBA while seated EOB.   Not met  Patient will demonstrate lower body dressing with CGA while seated EOB.   Not met  Patient will demonstrate toileting with CGA.   Not met  Patient's family / caregiver will demonstrate independence and safety with assisting patient with self-care skills and functional mobility.     Not met  Patient and/or patient's family will verbalize understanding of stroke prevention guidelines, personal risk factors and stroke warning signs via teachback method.  Not met           Continue OT POC      Comments: Marques Graham OTR/SERGIO  8/10/2017

## 2017-08-10 NOTE — CONSULTS
Double lumen PICC placed in right brachial vein of VITO, 36cm in length with 0cm exposed and 25cm arm circumference. Lot#[id number].

## 2017-08-10 NOTE — PT/OT/SLP PROGRESS
Occupational Therapy  Treatment    Juan Manuel Koehler   MRN: [id number]   Admitting Diagnosis: Embolic stroke involving right cerebellar artery    OT Date of Treatment: 08/10/17   OT Start Time: 1040  OT Stop Time: 1118  OT Total Time (min): 38 min    Billable Minutes:  Therapeutic Activity 25 minutes and Therapeutic Exercise 13 minutes    General Precautions: Standard, aspiration, fall  Orthopedic Precautions: N/A  Braces: N/A    Do you have any cultural, spiritual, Jewish conflicts, given your current situation?: Yazidi    Subjective:  Communicated with nurse prior to session.  Pt agreeable to skilled OT services.    Pain/Comfort  Pain Rating 1: 0/10  Pain Rating Post-Intervention 1: 0/10    Objective:  Patient found with: blood pressure cuff, arterial line, central line, telemetry, peripheral IV, pulse ox (continuous)  Pt received seated in bedside chair.     Functional Mobility:    Transfers:   Sit <> Stand Assistance: Minimum Assistance (Pt able to achieve upright standing at cga, but leans forward coming off balance requnring min a to steady pt and prevent pt from falling forward)  Sit <> Stand Assistive Device: No Assistive Device    Functional Ambulation: only static stand.    Balance:   Static Sit: GOOD-: Takes MODERATE challenges from all directions but inconsistently  Dynamic Sit: GOOD-: Maintains balance through MODERATE excursions of active trunk movement,     Static Stand: FAIR: Maintains without assist but unable to take challenges  Dynamic stand: 0: N/A    Therapeutic Activities and Exercises:  Pt performed BUE strengthening of triceps ext for 3x10 reps w/ red resistive theraband.   Pt performed BUE strengthening of seated rows for 3x10 reps w/ red resistive theraband.   Pt performed BUE strengthening of bicep curls for 3x10 reps w/ red resistive theraband.   Pt performed sit<>stand for 5 reps from bedside chair at min a for balance w/o AD.  Pt and Pt's wife educated on HEP.  Writing therapist  "demo'd HEP exercises.  Pt completed B/L hand FM activity of palm to tip translation (pincer grasp) w/ coins of varying seize then t/f coin to tip of tip of opposite hand then translate to palm. Pt had 3 drops w/ R hand and 1 drop w/ left.   Pt completed visual scanning activity w/ min (verbal and visual) a to identify 3/3 items. 1 done in sitting the other 2 in standing.        AM-PAC 6 CLICK ADL   How much help from another person does this patient currently need?   1 = Unable, Total/Dependent Assistance  2 = A lot, Maximum/Moderate Assistance  3 = A little, Minimum/Contact Guard/Supervision  4 = None, Modified [place]/Independent    Putting on and taking off regular lower body clothing? : 2  Bathing (including washing, rinsing, drying)?: 2  Toileting, which includes using toilet, bedpan, or urinal? : 2  Putting on and taking off regular upper body clothing?: 2  Taking care of personal grooming such as brushing teeth?: 3  Eating meals?: 3  Total Score: 14     AM-PAC Raw Score CMS "G-Code Modifier Level of Impairment Assistance   6 % Total / Unable   7 - 8 CM 80 - 100% Maximal Assist   9-13 CL 60 - 80% Moderate Assist   14 - 19 CK 40 - 60% Moderate Assist   20 - 22 CJ 20 - 40% Minimal Assist   23 CI 1-20% SBA / CGA   24 CH 0% Independent/ Mod I       Patient left up in chair with all lines intact and spouse and SLP present    ASSESSMENT:  Juan Manuel Koehler is a 67 y.o. male with a medical diagnosis of Embolic stroke involving right cerebellar artery and presents with impairments listed below. Pt did well to participate in session. Pt displayed improved strength w/ sit <>stand from previous sessions, but is still unsafe d/t decreased balance once upright standing is obtained. Pt displayed deficits in visual scanning requiring min a (verbal and visual cues) to identify objects. Pt is progressing well w/ therapy, but would benefit from in-patient rehab to increase independence and allow the individual to " resume life roles. Pt would benefit from skilled OT services to improve independence and overall occupational functioning.    Rehab identified problem list/impairments: Rehab identified problem list/impairments: weakness, impaired endurance, impaired self care skills, impaired functional mobilty, gait instability, impaired balance, impaired cognition, visual deficits, decreased upper extremity function, decreased lower extremity function, impaired coordination, impaired fine motor    Rehab potential is good.    Activity tolerance: Good    Discharge recommendations: Discharge Facility/Level Of Care Needs: rehabilitation facility     Barriers to discharge: Barriers to Discharge: Inaccessible home environment, Decreased caregiver support    Equipment recommendations: 3-in-1 commode, bath bench, wheelchair     GOALS:    Occupational Therapy Goals        Problem: Occupational Therapy Goal    Goal Priority Disciplines Outcome Interventions   Occupational Therapy Goal     OT, PT/OT     Description:  Goals set 8/7 to be addressed for 14 days with expiration date, 8/21:  Patient will increase functional independence with ADLs by performing:    Patient will demonstrate rolling to the right with modified independence.  Not met   Patient will demonstrate rolling to the left with modified independence.   Not met  Patient will demonstrate supine -sit with modified independence.   Not met  Patient will demonstrate stand pivot transfers with CGA.   Not met  Patient will demonstrate grooming while standing with CGA.   Not met  Patient will demonstrate upper body dressing with SBA while seated EOB.   Not met  Patient will demonstrate lower body dressing with CGA while seated EOB.   Not met  Patient will demonstrate toileting with CGA.   Not met  Patient's family / caregiver will demonstrate independence and safety with assisting patient with self-care skills and functional mobility.     Not met  Patient and/or patient's family will  verbalize understanding of stroke prevention guidelines, personal risk factors and stroke warning signs via teachback method.  Not met                            Plan:  Patient to be seen 6 x/week to address the above listed problems via self-care/home management, therapeutic activities, therapeutic exercises, neuromuscular re-education, cognitive retraining, sensory integration  Plan of Care expires: 09/02/17  Plan of Care reviewed with: patient    Marques DAVID Graham, OT  08/10/2017

## 2017-08-11 LAB
ALBUMIN SERPL BCP-MCNC: 2.3 G/DL
ALP SERPL-CCNC: 71 U/L
ALT SERPL W/O P-5'-P-CCNC: 28 U/L
ANION GAP SERPL CALC-SCNC: 9 MMOL/L
ANISOCYTOSIS BLD QL SMEAR: SLIGHT
AST SERPL-CCNC: 18 U/L
BASOPHILS # BLD AUTO: ABNORMAL K/UL
BASOPHILS NFR BLD: 0 %
BILIRUB SERPL-MCNC: 0.5 MG/DL
BUN SERPL-MCNC: 19 MG/DL
CALCIUM SERPL-MCNC: 8.4 MG/DL
CHLORIDE SERPL-SCNC: 110 MMOL/L
CO2 SERPL-SCNC: 20 MMOL/L
CREAT SERPL-MCNC: 1.3 MG/DL
DIFFERENTIAL METHOD: ABNORMAL
EOSINOPHIL # BLD AUTO: ABNORMAL K/UL
EOSINOPHIL NFR BLD: 1 %
ERYTHROCYTE [DISTWIDTH] IN BLOOD BY AUTOMATED COUNT: 15.1 %
EST. GFR  (AFRICAN AMERICAN): >60 ML/MIN/1.73 M^2
EST. GFR  (NON AFRICAN AMERICAN): 56.1 ML/MIN/1.73 M^2
GLUCOSE SERPL-MCNC: 93 MG/DL
HCT VFR BLD AUTO: 26 %
HGB BLD-MCNC: 8.6 G/DL
LYMPHOCYTES # BLD AUTO: ABNORMAL K/UL
LYMPHOCYTES NFR BLD: 12 %
MAGNESIUM SERPL-MCNC: 1.8 MG/DL
MCH RBC QN AUTO: 28.9 PG
MCHC RBC AUTO-ENTMCNC: 33.1 G/DL
MCV RBC AUTO: 87 FL
MONOCYTES # BLD AUTO: ABNORMAL K/UL
MONOCYTES NFR BLD: 6 %
NEUTROPHILS NFR BLD: 81 %
PHOSPHATE SERPL-MCNC: 3.4 MG/DL
PLATELET # BLD AUTO: 271 K/UL
PLATELET BLD QL SMEAR: ABNORMAL
PMV BLD AUTO: 8 FL
POTASSIUM SERPL-SCNC: 4.3 MMOL/L
PROT SERPL-MCNC: 6.1 G/DL
RBC # BLD AUTO: 2.98 M/UL
SODIUM SERPL-SCNC: 139 MMOL/L
SODIUM SERPL-SCNC: 139 MMOL/L
SODIUM SERPL-SCNC: 140 MMOL/L
SODIUM SERPL-SCNC: 142 MMOL/L
WBC # BLD AUTO: 9.38 K/UL

## 2017-08-11 PROCEDURE — 83735 ASSAY OF MAGNESIUM: CPT

## 2017-08-11 PROCEDURE — 99232 SBSQ HOSP IP/OBS MODERATE 35: CPT | Mod: ,,, | Performed by: NURSE PRACTITIONER

## 2017-08-11 PROCEDURE — 80053 COMPREHEN METABOLIC PANEL: CPT

## 2017-08-11 PROCEDURE — 63600175 PHARM REV CODE 636 W HCPCS: Performed by: STUDENT IN AN ORGANIZED HEALTH CARE EDUCATION/TRAINING PROGRAM

## 2017-08-11 PROCEDURE — 97112 NEUROMUSCULAR REEDUCATION: CPT

## 2017-08-11 PROCEDURE — 93010 ELECTROCARDIOGRAM REPORT: CPT | Mod: ,,, | Performed by: INTERNAL MEDICINE

## 2017-08-11 PROCEDURE — 25000003 PHARM REV CODE 250: Performed by: STUDENT IN AN ORGANIZED HEALTH CARE EDUCATION/TRAINING PROGRAM

## 2017-08-11 PROCEDURE — 84100 ASSAY OF PHOSPHORUS: CPT

## 2017-08-11 PROCEDURE — 25000003 PHARM REV CODE 250: Performed by: PHYSICIAN ASSISTANT

## 2017-08-11 PROCEDURE — 92507 TX SP LANG VOICE COMM INDIV: CPT

## 2017-08-11 PROCEDURE — 97535 SELF CARE MNGMENT TRAINING: CPT

## 2017-08-11 PROCEDURE — A4216 STERILE WATER/SALINE, 10 ML: HCPCS | Performed by: PSYCHIATRY & NEUROLOGY

## 2017-08-11 PROCEDURE — 20600001 HC STEP DOWN PRIVATE ROOM

## 2017-08-11 PROCEDURE — A4216 STERILE WATER/SALINE, 10 ML: HCPCS | Performed by: NURSE PRACTITIONER

## 2017-08-11 PROCEDURE — 25000003 PHARM REV CODE 250: Performed by: NURSE PRACTITIONER

## 2017-08-11 PROCEDURE — 63600175 PHARM REV CODE 636 W HCPCS: Performed by: INTERNAL MEDICINE

## 2017-08-11 PROCEDURE — 63600175 PHARM REV CODE 636 W HCPCS: Performed by: PHYSICIAN ASSISTANT

## 2017-08-11 PROCEDURE — 25000003 PHARM REV CODE 250: Performed by: PSYCHIATRY & NEUROLOGY

## 2017-08-11 PROCEDURE — 25000003 PHARM REV CODE 250: Performed by: INTERNAL MEDICINE

## 2017-08-11 PROCEDURE — 99233 SBSQ HOSP IP/OBS HIGH 50: CPT | Mod: ,,, | Performed by: PHYSICIAN ASSISTANT

## 2017-08-11 PROCEDURE — 93005 ELECTROCARDIOGRAM TRACING: CPT

## 2017-08-11 PROCEDURE — 85007 BL SMEAR W/DIFF WBC COUNT: CPT

## 2017-08-11 PROCEDURE — 85027 COMPLETE CBC AUTOMATED: CPT

## 2017-08-11 PROCEDURE — 97530 THERAPEUTIC ACTIVITIES: CPT

## 2017-08-11 PROCEDURE — 84295 ASSAY OF SERUM SODIUM: CPT

## 2017-08-11 PROCEDURE — 97532 *HC OT COG SKL DEV EA 15: CPT

## 2017-08-11 RX ADMIN — CEFTRIAXONE 2 G: 2 INJECTION, SOLUTION INTRAVENOUS at 03:08

## 2017-08-11 RX ADMIN — SODIUM CHLORIDE: 234 INJECTION INTRAMUSCULAR; INTRAVENOUS; SUBCUTANEOUS at 01:08

## 2017-08-11 RX ADMIN — Medication 10 ML: at 06:08

## 2017-08-11 RX ADMIN — CARVEDILOL 12.5 MG: 12.5 TABLET, FILM COATED ORAL at 08:08

## 2017-08-11 RX ADMIN — AMPICILLIN SODIUM 2 G: 2 INJECTION, POWDER, FOR SOLUTION INTRAMUSCULAR; INTRAVENOUS at 12:08

## 2017-08-11 RX ADMIN — ATORVASTATIN CALCIUM 40 MG: 20 TABLET, FILM COATED ORAL at 08:08

## 2017-08-11 RX ADMIN — AMPICILLIN SODIUM 2 G: 2 INJECTION, POWDER, FOR SOLUTION INTRAMUSCULAR; INTRAVENOUS at 11:08

## 2017-08-11 RX ADMIN — HEPARIN SODIUM 5000 UNITS: 5000 INJECTION, SOLUTION INTRAVENOUS; SUBCUTANEOUS at 01:08

## 2017-08-11 RX ADMIN — Medication 3 ML: at 09:08

## 2017-08-11 RX ADMIN — ACETAMINOPHEN 650 MG: 325 TABLET ORAL at 08:08

## 2017-08-11 RX ADMIN — CEFTRIAXONE 2 G: 2 INJECTION, SOLUTION INTRAVENOUS at 04:08

## 2017-08-11 RX ADMIN — Medication 10 ML: at 12:08

## 2017-08-11 RX ADMIN — AMPICILLIN SODIUM 2 G: 2 INJECTION, POWDER, FOR SOLUTION INTRAMUSCULAR; INTRAVENOUS at 04:08

## 2017-08-11 RX ADMIN — AMPICILLIN SODIUM 2 G: 2 INJECTION, POWDER, FOR SOLUTION INTRAMUSCULAR; INTRAVENOUS at 08:08

## 2017-08-11 RX ADMIN — HEPARIN SODIUM 5000 UNITS: 5000 INJECTION, SOLUTION INTRAVENOUS; SUBCUTANEOUS at 09:08

## 2017-08-11 RX ADMIN — Medication 3 ML: at 01:08

## 2017-08-11 RX ADMIN — STANDARDIZED SENNA CONCENTRATE AND DOCUSATE SODIUM 1 TABLET: 8.6; 5 TABLET, FILM COATED ORAL at 08:08

## 2017-08-11 RX ADMIN — ACETAMINOPHEN 650 MG: 325 TABLET ORAL at 04:08

## 2017-08-11 RX ADMIN — LISINOPRIL 20 MG: 20 TABLET ORAL at 08:08

## 2017-08-11 RX ADMIN — HEPARIN SODIUM 5000 UNITS: 5000 INJECTION, SOLUTION INTRAVENOUS; SUBCUTANEOUS at 06:08

## 2017-08-11 RX ADMIN — Medication 3 ML: at 06:08

## 2017-08-11 RX ADMIN — AMPICILLIN SODIUM 2 G: 2 INJECTION, POWDER, FOR SOLUTION INTRAMUSCULAR; INTRAVENOUS at 07:08

## 2017-08-11 NOTE — PLAN OF CARE
Problem: Patient Care Overview  Goal: Plan of Care Review  POC reviewed with pt at 0500. Pt verbalized understanding. Questions and concerns addressed. No acute events overnight. Pt restarted on buffered 2% overnight per serum Na <140. Pt progressing toward goals. Will continue to monitor. See flowsheets for full assessment and VS info

## 2017-08-11 NOTE — ASSESSMENT & PLAN NOTE
Mr. Koehler is a 68yo M with endocarditis and an acute R cerebellar infarct with edema, midline shift, as well as hemorrhagic conversion.     - Antithrombotics for secondary stroke prevention: Antiplatelets:  Aspirin: 325 mg oral now and daily  - Statins for secondary stroke prevention and hyperlipidemia, if present: None: Reason: consider if LDL>130; patient's likely etiology of stroke was septic emboli from endocarditis. LDL 76  - Diagnostics: none pending   - VTE Prophylaxis: Heparin 5000 units SQ every 8 hours  - Endocarditis treatment per NCC, ID consulted  - Neurosurgery s/o for hemicrani watch  - PT/OT/ST; Rehab

## 2017-08-11 NOTE — PROGRESS NOTES
[facility]  Physical Medicine & Rehab  Progress Note    Patient Name: Juan Manuel Koehler  MRN: [id number]  Admission Date: 8/4/2017  Length of Stay: 7 days  Attending Physician: Caleb Valenzuela MD  Collaborating Physician: James Randle MD    Subjective:     Principal Problem:Embolic stroke involving right cerebellar artery    Interval History (08/11/2017): Patient is seen for follow-up rehab evaluation and recommendations.  No acute events over night.  Back on 2% gtt for Na <140.  Participating with therapy.  Barriers for discharge/rehab admission: Insurance approval pending for rehab admission, remains in ICU    HPI, Past Medical, Surgical, Family, and Social History remains the same as documented in the initial encounter.    Hospital Course:   8/5/17:  Evaluated by therapy.  Bed mobility CGA-maxA.  Transfers and ADLs deferred 2/2 N/V.  Passed bedside swallow evaluation.  SLP recommending regular diet and thin liquids.  8/7/17:  Bed mobility SBA-CGA.  Sit to stand Charan and transfers Charan.  UBD Charan and LBD modA.  Evaluated by SLP.  Found to have cognitive-linguistic impairment and visvo-spatial impairment.   8/8/17:  Bed mobility SBA.  Sit to stand Charan and transfers Charan.  Ambulated 4 steps Charan.  UBD Charan and LBD Charan.  8/9/17:  Bed mobility SBA-CGA.  Sit to stand Charan and transfers modA. UBD Charan and LBD modA.  8/10/17:  Bed mobility SBA.  Sit to stand Charan and transfers min-modA.  Ambulated 4 steps min-modA.    AM-PAC 6 CLICK MOBILITY (PT) - Total score: 16 (8/5), 16 (8/7), 16 (8/10)  AM-PAC 6 CLICK ADL (OT) - Total score: 6 (8/5), 13 (8/7), 12 (8/8), 13 (8/9), 14 (8/10)    AM-PAC Raw Score Level of Impairment Assistance   6 100% Total / Unable   7 - 8 80 - 100% Maximal Assist   9-13 60 - 80% Moderate Assist   14 - 19 40 - 60% Moderate Assist   20 - 22 20 - 40% Minimal Assist   23 1-20% SBA / CGA   24 0% Independent/ Mod I     Past Medical History:   Diagnosis Date    Cancer     bladder  and throat    CKD (chronic kidney disease) stage 3, GFR 30-59 ml/min     Embolic stroke involving right cerebellar artery 8/4/2017    Urinary tract infection      Past Surgical History:   Procedure Laterality Date    BLADDER SURGERY      CYSTOSCOPY      HERNIA REPAIR      Ileal Conduit      THROAT SURGERY       Review of patient's allergies indicates:  No Known Allergies    Scheduled Medications:    ampicillin IVPB  2 g Intravenous Q4H    atorvastatin  40 mg Oral Daily    carvedilol  12.5 mg Oral BID    cefTRIAXone (ROCEPHIN) IVPB  2 g Intravenous Q12H    heparin (porcine)  5,000 Units Subcutaneous Q8H    lisinopril  20 mg Oral Daily    polyethylene glycol  17 g Oral Daily    senna-docusate 8.6-50 mg  1 tablet Oral BID    sodium chloride 0.9%  10 mL Intravenous Q6H    sodium chloride 0.9%  3 mL Intravenous Q8H       PRN Medications: acetaminophen, calcium gluconate IVPB, calcium gluconate IVPB, calcium gluconate IVPB, dextrose 50%, glucagon (human recombinant), hydrALAZINE, labetalol, magnesium sulfate IVPB, magnesium sulfate IVPB, ondansetron, potassium chloride **AND** potassium chloride **AND** potassium chloride, Flushing PICC Protocol **AND** sodium chloride 0.9% **AND** sodium chloride 0.9%, sodium phosphate IVPB, sodium phosphate IVPB, sodium phosphate IVPB    Family History     Problem Relation (Age of Onset)    Kidney disease Mother    No Known Problems Father        Social History Main Topics    Smoking status: Never Smoker    Smokeless tobacco: Never Used    Alcohol use No    Drug use: No    Sexual activity: Yes     Partners: Female     Birth control/ protection: None     Review of Systems   Constitutional: Positive for appetite change (decreased) and fatigue. Negative for chills and fever.   HENT: Negative for drooling, hearing loss, trouble swallowing and voice change.    Eyes: Negative for pain and visual disturbance.   Respiratory: Negative for cough, shortness of breath and  wheezing.    Cardiovascular: Negative for chest pain and palpitations.   Gastrointestinal: Positive for nausea. Negative for abdominal pain and vomiting.   Genitourinary: Negative for difficulty urinating and flank pain.   Musculoskeletal: Negative for arthralgias, back pain, myalgias and neck pain.   Skin: Negative for rash and wound.   Neurological: Positive for dizziness (improving) and headaches (improving). Negative for weakness.   Psychiatric/Behavioral: Negative for agitation and hallucinations. The patient is not nervous/anxious.      Objective:     Vital Signs (Most Recent):  Temp: 99.2 °F (37.3 °C) (17)  Pulse: 60 (17)  Resp: (!) 30 (17)  BP: (!) 143/81 (17)  SpO2: 100 % (17)    Vital Signs (24h Range):  Temp:  [98.3 °F (36.8 °C)-99.6 °F (37.6 °C)] 99.2 °F (37.3 °C)  Pulse:  [60-93] 60  Resp:  [15-32] 30  SpO2:  [97 %-100 %] 100 %  BP: (110-172)/(56-89) 143/81  Arterial Line BP: (118)/(50) 118/50     Body mass index is 17.78 kg/m².    Physical Exam   Constitutional: He appears well-developed and well-nourished. No distress.   HENT:   Head: Normocephalic and atraumatic.   Right Ear: External ear normal.   Left Ear: External ear normal.   Nose: Nose normal.   Eyes: Right eye exhibits no discharge. Left eye exhibits no discharge. No scleral icterus.   Neck: Normal range of motion.   Cardiovascular: Normal rate, regular rhythm and intact distal pulses.    Pulmonary/Chest: Effort normal. No respiratory distress. He has no wheezes.   Abdominal: Soft. He exhibits no distension. There is no tenderness.   Musculoskeletal: Normal range of motion. He exhibits no edema or tenderness.   Neurological:   -  Mental Status:  AAOx3.  Follows commands.  Answers correct age and .  Recent and remote memory intact.  -  Speech and language:  no aphasia or dysarthria.    -  Facial movement (CN VII): facial droop  -  Coordination:  Finger to nose exam:  RUE dysmetria, LUE  dysmetria.  -  Motor:  No pronator drift. RUE: 5/5.  LUE: 5/5.  RLE: 5/5.  LLE: 5/5.  -  Tone:  Normal.  -  Sensory:  Intact to light touch and pin prick.   Skin: Skin is warm and dry. No rash noted.   Psychiatric: He has a normal mood and affect. His behavior is normal. Thought content normal.   Vitals reviewed.    Diagnostic Results:   Labs: Reviewed  CT: Reviewed  MRI: Reviewed  CTA: Reviewed    Assessment/Plan:      Bacteremia due to Enterococcus    -  Blood cultures grew enterococcus faecalis  -  ID following and recommending ampicillin x 6 weeks for acute bacterial endocarditis        Acute bacterial endocarditis    -  Blood cultures grew enterococcus faecalis  -  ECHO showed vegetation on atrial surface of mitral valve consistent with endocarditis  -  ID following and recommending ampicillin x 6 weeks for acute bacterial endocarditis        Urinary tract infection with hematuria    -  Urine culture + proteus miribilis  -  Rocephin x 7 days (end 8/9/17)        NSTEMI (non-ST elevated myocardial infarction)    -  Elevated troponin on admission at [facility], trending down  -  Asymptomatic   -  2/2 demand ischemia         * Embolic stroke involving right cerebellar artery    -  On 8/4/17, patient found to have AMS, aphasia, and facial droop.  -  CTH showed R cerebellar hypodensity with mass effect and midline shift.  -  MRI/MRA revealed R cerebellar infarct with hemorrhagic conversion.    -  Evaluated by neurosurgery and no acute surgical intervention necessary- started on Cardene and hypertonic infusion.  -  Off HTS on 8/10, restarted on 8/11 for Na <140    Functional status: see hospital course  Cognitive/Speech/Language status:  See hospital course  Nutrition/Swallow Status:  Passed bedside swallow evaluation.  SLP recommended regular diet and thin liquids.    Recommendations  -  Encourage mobility, OOB in chair at least 3 hours per day, and early ambulation as appropriate   -  PT/OT evaluate and treat  -   SLP speech and cognitive evaluate and treat  -  Monitor sleep disturbances and establish consistent sleep-wake cycle  -  Monitor for bowel and bladder dysfunction  -  Monitor for shoulder pain and subluxation  -  Monitor for spasticity  -  Monitor for and prevent skin breakdown and pressure ulcers  · Early mobility, repositioning/weight shifting every 20-30 minutes when sitting, turn patient every 2 hours, proper mattress/overlay and chair cushioning, pressure relief/heel protector boots  -  DVT prophylaxis:  Heparin scheduled   -  Reviewed discharge options (IP rehab, SNF, HH therapy, and OP therapy)          Patient approved for [facility].  Insurance pending admission.  Transfer to rehab when insurance clears and he is medically ready for discharge.    TWYLA Arteaga  Department of Physical Medicine & Rehab   [facility]

## 2017-08-11 NOTE — ASSESSMENT & PLAN NOTE
-  On 8/4/17, patient found to have AMS, aphasia, and facial droop.  -  CTH showed R cerebellar hypodensity with mass effect and midline shift.  -  MRI/MRA revealed R cerebellar infarct with hemorrhagic conversion.    -  Evaluated by neurosurgery and no acute surgical intervention necessary- started on Cardene and hypertonic infusion.  -  Off HTS on 8/10, restarted on 8/11 for Na <140    Functional status: see hospital course  Cognitive/Speech/Language status:  See hospital course  Nutrition/Swallow Status:  Passed bedside swallow evaluation.  SLP recommended regular diet and thin liquids.    Recommendations  -  Encourage mobility, OOB in chair at least 3 hours per day, and early ambulation as appropriate   -  PT/OT evaluate and treat  -  SLP speech and cognitive evaluate and treat  -  Monitor sleep disturbances and establish consistent sleep-wake cycle  -  Monitor for bowel and bladder dysfunction  -  Monitor for shoulder pain and subluxation  -  Monitor for spasticity  -  Monitor for and prevent skin breakdown and pressure ulcers  · Early mobility, repositioning/weight shifting every 20-30 minutes when sitting, turn patient every 2 hours, proper mattress/overlay and chair cushioning, pressure relief/heel protector boots  -  DVT prophylaxis:  Heparin scheduled   -  Reviewed discharge options (IP rehab, SNF, HH therapy, and OP therapy)

## 2017-08-11 NOTE — PLAN OF CARE
SW received call from [facility] with [facility] ([phone]) reporting they are declining the referral for this Pt due to the antibiotics and feel he needs LTAC first.     KRISTEL contacted Lali with [facility] ([phone]) regarding the referral. They have accepted and can administer the antibiotics.    Elizabeth Matias, JASON  Neurocritical Care   [facility]  [phone]

## 2017-08-11 NOTE — PLAN OF CARE
Problem: Patient Care Overview  Goal: Plan of Care Review  Outcome: Ongoing (interventions implemented as appropriate)  POC reviewed with pt and family at 1400. Pt verbalized understanding. Questions and concerns addressed. No acute events today. Pt progressing toward goals. Will continue to monitor. See flowsheets for full assessment and VS info.

## 2017-08-11 NOTE — PLAN OF CARE
SW met with Pt and Pt wife at bedside. Reported update on Rehabs. They are fine with going to [facility].    Elizabeth Matias LMSW  Neurocritical Care   [facility]  [phone]

## 2017-08-11 NOTE — ASSESSMENT & PLAN NOTE
-VN following  -NSGY signed off   -hold asa  -SQH  --160  -CTH 8/10: evolving infarct of R cerebral hemisphere, stable mass effect against 4th ventricle, no hydrocephalus  -2% Na dc  -TTF VN

## 2017-08-11 NOTE — ASSESSMENT & PLAN NOTE
-ID following  -repeat blood cultures daily until negative   -continue ampicillin and ceftriaxone   -will likely need treatment for 6 weeks after cultures clear  -PICC placement 8/10

## 2017-08-11 NOTE — PROGRESS NOTES
[facility]  Neurocritical Care  Progress Note    Admit Date: 8/4/2017  Service Date: 08/11/2017  Length of Stay: 7    Subjective:     Chief Complaint: Embolic stroke involving right cerebellar artery    History of Present Illness: Juan Manuel Koehler is a 67 year old male with invasive bladder cancer s/p open radical cystoproctostomy, bilateral pelvic lymph node dissection and ileal conduit urinary diversion (6/21/17, path with high grade urothelial carcinoma, LN negative for malignancy), CKD III  With deconditioning and poor PO intake secondary to cancer. He was in his usual state of health (lives at home with wife, able to ambulate, performs some adls) until day of admission (8/2) when patient was coming downstairs and all of a sudden felt dizzy and fell. He fell on his back and was unable to get up secondary to generalized weakness. He denies focal weakness. He denies any loss of consciousness.  He was admitted to OSH at that time and diagnosed with urosepsis, endocarditis, and acute anemia.     While at OSH, he developed aphasia 8/4 and telestroke consult performed. CTH shows large right cerebellar hypodensity. Patient transferred to [facility] for management/NSGY eval.        Hospital Course:  8/2 admitted OSH for urosepsis, endocarditis, and acute anemia   8/4  episode of aphasia at OSH; CTH with large cerebellar hypodensity/concern for edema/hydrocephalus-->transfer to C  8/5 HTS infusion initiated, hypertonic boluses discontinued    8/10 increased carvedilol, PICC for long term abx, dc 2%, watch one more day and TTF tomorrow  8/11 TTF vascular neurology    Interval History: NAEON    Review of Systems:   Constitutional: Denies fevers or chills.  Pulmonary: Denies shortness of breath or cough.  Cardiology: Denies chest pain or palpitations.  GI: Denies abdominal pain or constipation.  Neurologic: Denies new weakness,  headache, or paresthesias.      Vitals:   Temp: 99.1 °F (37.3 °C) (08/11/17  1505)  Pulse: 75 (08/11/17 1605)  Resp: (!) 52 (08/11/17 1605)  BP: (!) 144/71 (08/11/17 1605)  SpO2: 100 % (08/11/17 1605)    Temp:  [99 °F (37.2 °C)-99.6 °F (37.6 °C)] 99.1 °F (37.3 °C)  Pulse:  [60-88] 75  Resp:  [16-52] 52  SpO2:  [95 %-100 %] 100 %  BP: (121-172)/(60-89) 144/71              08/10 0701 - 08/11 0700  In: 2671 [P.O.:1330; I.V.:641]  Out: 3891 [Urine:3890]     Examination:   Constitutional: Well-nourished and -developed. No apparent distress.   Eyes: Conjunctiva clear, anicteric. Lids no lesions.  Head/Ears/Nose/Mouth/Throat/Neck: Moist mucous membranes. External ears, nose atraumatic.   Cardiovascular: Regular rhythm. No murmurs. No leg edema.  Respiratory: Comfortable respirations. Clear to auscultation.  Gastrointestinal: No hernia. Soft, nondistended, nontender. + bowel sounds.    Neurologic:  -GCS [id number]  -Alert. Oriented to person, place, and time. Speech fluent. Follows commands.  -CN 6 palsy, vertical palsy  -Motor 5/5 strength, BUE ataxia   -Sensation intact       Medications:   Continuous Scheduled  ampicillin IVPB 2 g Q4H   atorvastatin 40 mg Daily   carvedilol 12.5 mg BID   cefTRIAXone (ROCEPHIN) IVPB 2 g Q12H   heparin (porcine) 5,000 Units Q8H   lisinopril 20 mg Daily   polyethylene glycol 17 g Daily   senna-docusate 8.6-50 mg 1 tablet BID   sodium chloride 0.9% 10 mL Q6H   sodium chloride 0.9% 3 mL Q8H   PRN  acetaminophen 650 mg Q6H PRN   calcium gluconate IVPB 1 g PRN   calcium gluconate IVPB 1 g PRN   calcium gluconate IVPB 1 g PRN   dextrose 50% 12.5 g PRN   glucagon (human recombinant) 1 mg PRN   hydrALAZINE 10 mg Q4H PRN   labetalol 10 mg Q6H PRN   magnesium sulfate IVPB 2 g PRN   magnesium sulfate IVPB 4 g PRN   ondansetron 4 mg Q6H PRN   potassium chloride 40 mEq PRN   And     potassium chloride 60 mEq PRN   And     potassium chloride 80 mEq PRN   sodium chloride 0.9% 10 mL PRN   sodium phosphate IVPB 15 mmol PRN   sodium phosphate IVPB 20.01 mmol PRN   sodium phosphate IVPB  30 mmol PRN      Today I independently reviewed pertinent medications, lines/drains/airways, imaging, cardiology, lab results, microbiology results, notably: Na 139, 2% Na d/c, stable to TTF VN    Assessment/Plan:     Neuro   * Embolic stroke involving right cerebellar artery    -VN following  -NSGY signed off   -hold asa  -SQH  --160  -CTH 8/10: evolving infarct of R cerebral hemisphere, stable mass effect against 4th ventricle, no hydrocephalus  -2% Na dc  -TTF VN        Cardiac/Vascular   Hypertension    --160  -metoprolol discontinued  -carvedilol 12.5 mg bid   -lisinopril 20 mg daily         Acute bacterial endocarditis    -ID consulted  -appreciate recs  -no sign of vegetation on repeat TTE  -likely embolic source of stroke         NSTEMI (non-ST elevated myocardial infarction)    -NSTEMI outside hospital  -troponin trended down   -patient denied chest pain   -continue BB and ACE-I          ID   Sepsis due to Enterococcus    -ID following  -repeat blood cultures daily until negative   -continue ampicillin and ceftriaxone   -will likely need treatment for 6 weeks after cultures clear  -PICC placement 8/10            Prophylaxis:  Venous Thromboembolism: mechanical chemical  Stress Ulcer: None  Ventilator Pneumonia: not applicable     Activity Orders          Activity as tolerated starting at 08/07 1517        Full Code    Hyacinth Ross PA-C  Neurocritical Care  [facility]

## 2017-08-11 NOTE — PROGRESS NOTES
Consult placed for scrotal pain.  Patient seen and examined.  Scrotal pain has been present for several months now.  Pain is alleviated with supportive underwear.  Has not tried heat/ice/NSAIDs.  Scrotal US with no concerning findings.    Recommend ice or heat, NSAIDs, scrotal support such as tight underwear or jock strap.  Discussed with patient and wife.    Lizette Belcher  Urology Resident  Pager: [phone]

## 2017-08-11 NOTE — PT/OT/SLP PROGRESS
"Speech Language Pathology  Treatment    Juan Manuel Koehler   MRN: [id number]   Admitting Diagnosis: Embolic stroke involving right cerebellar artery    Diet recommendations: Solid Diet Level: Regular  Liquid Diet Level: Thin   Universal aspiration precautions     SLP Treatment Date: 08/11/17  Speech Start Time: 0950     Speech Stop Time: 1005     Speech Total (min): 15 min       TREATMENT BILLABLE MINUTES:  Speech Therapy Individual 15    Has the patient been evaluated by SLP for swallowing? : Yes  Keep patient NPO?: No   General Precautions: Standard,            Subjective:  "I'm a little more tired today"     Pain/Comfort  Pain Rating 1: 0/10  Pain Rating Post-Intervention 1: 0/10    Objective:     Pt with increased drowsiness this date yet willingly participated in treatment session. Pt tolerated transition to HOB upright for therapy tasks. Pt recalled 1/3 items with a delay independently increased to 3/3 with min verbal cues. Pt completed visual scanning task with 80% acc independently. With SLP visual cues pt increased to 85%acc.   Pt generated an avg of 9 items within a category independently with semantic cueing increased to  13+ items. Visual scanning task and functional reading task completed with 40% acc independently improved to 100% with min-mod visual and verbal cues.  Ongoing therapy services warranted.          Assessment:  Juan Manuel Koehler is a 67 y.o. male with a medical diagnosis of Embolic stroke involving right cerebellar artery and presents with cognitive-linguistic impairments and visual-spatial deficits.     Discharge recommendations: Discharge Facility/Level Of Care Needs: rehabilitation facility     Goals:    SLP Goals        Problem: SLP Goal    Goal Priority Disciplines Outcome   SLP Goal     SLP    Description:  Speech Language Pathology Goals  Updated Goals expected to be met by 8/14    1. Pt will tolerate regular diet and thin liquids without s/s of airway compromise.   2. Pt will " generate 10 items within a category independently to enhance cognition  3. Pt will recall 2/3 items with a delay independently to enhance memory   4. Pt will complete cancellation task with 90% acc independently to enhance visual-spatial skills   5. Pt will complete mental flexibility tasks with 80% independently to enhance cognition   6. Pt will follow 2 step directions with 80% acc with min cues to enhance comprehension skills   7. Pt will participate in ongoing assessment of reading/money/math/time management skills                                 Plan:   Patient to be seen Therapy Frequency: 5 x/week   Plan of Care expires: 09/03/17  Plan of Care reviewed with: patient  SLP Follow-up?: Yes  SLP - Next Visit Date: 08/07/17           Clara Vergara CCC-SLP  08/11/2017

## 2017-08-11 NOTE — SUBJECTIVE & OBJECTIVE
Past Medical History:   Diagnosis Date    Cancer     bladder and throat    CKD (chronic kidney disease) stage 3, GFR 30-59 ml/min     Embolic stroke involving right cerebellar artery 8/4/2017    Urinary tract infection      Past Surgical History:   Procedure Laterality Date    BLADDER SURGERY      CYSTOSCOPY      HERNIA REPAIR      Ileal Conduit      THROAT SURGERY       Review of patient's allergies indicates:  No Known Allergies    Scheduled Medications:    ampicillin IVPB  2 g Intravenous Q4H    atorvastatin  40 mg Oral Daily    carvedilol  12.5 mg Oral BID    cefTRIAXone (ROCEPHIN) IVPB  2 g Intravenous Q12H    heparin (porcine)  5,000 Units Subcutaneous Q8H    lisinopril  20 mg Oral Daily    polyethylene glycol  17 g Oral Daily    senna-docusate 8.6-50 mg  1 tablet Oral BID    sodium chloride 0.9%  10 mL Intravenous Q6H    sodium chloride 0.9%  3 mL Intravenous Q8H       PRN Medications: acetaminophen, calcium gluconate IVPB, calcium gluconate IVPB, calcium gluconate IVPB, dextrose 50%, glucagon (human recombinant), hydrALAZINE, labetalol, magnesium sulfate IVPB, magnesium sulfate IVPB, ondansetron, potassium chloride **AND** potassium chloride **AND** potassium chloride, Flushing PICC Protocol **AND** sodium chloride 0.9% **AND** sodium chloride 0.9%, sodium phosphate IVPB, sodium phosphate IVPB, sodium phosphate IVPB    Family History     Problem Relation (Age of Onset)    Kidney disease Mother    No Known Problems Father        Social History Main Topics    Smoking status: Never Smoker    Smokeless tobacco: Never Used    Alcohol use No    Drug use: No    Sexual activity: Yes     Partners: Female     Birth control/ protection: None     Review of Systems   Constitutional: Positive for appetite change (decreased) and fatigue. Negative for chills and fever.   HENT: Negative for drooling, hearing loss, trouble swallowing and voice change.    Eyes: Negative for pain and visual disturbance.    Respiratory: Negative for cough, shortness of breath and wheezing.    Cardiovascular: Negative for chest pain and palpitations.   Gastrointestinal: Positive for nausea. Negative for abdominal pain and vomiting.   Genitourinary: Negative for difficulty urinating and flank pain.   Musculoskeletal: Negative for arthralgias, back pain, myalgias and neck pain.   Skin: Negative for rash and wound.   Neurological: Positive for dizziness (improving) and headaches (improving). Negative for weakness.   Psychiatric/Behavioral: Negative for agitation and hallucinations. The patient is not nervous/anxious.      Objective:     Vital Signs (Most Recent):  Temp: 99.2 °F (37.3 °C) (17)  Pulse: 60 (17)  Resp: (!) 30 (17)  BP: (!) 143/81 (17)  SpO2: 100 % (17)    Vital Signs (24h Range):  Temp:  [98.3 °F (36.8 °C)-99.6 °F (37.6 °C)] 99.2 °F (37.3 °C)  Pulse:  [60-93] 60  Resp:  [15-32] 30  SpO2:  [97 %-100 %] 100 %  BP: (110-172)/(56-89) 143/81  Arterial Line BP: (118)/(50) 118/50     Body mass index is 17.78 kg/m².    Physical Exam   Constitutional: He appears well-developed and well-nourished. No distress.   HENT:   Head: Normocephalic and atraumatic.   Right Ear: External ear normal.   Left Ear: External ear normal.   Nose: Nose normal.   Eyes: Right eye exhibits no discharge. Left eye exhibits no discharge. No scleral icterus.   Neck: Normal range of motion.   Cardiovascular: Normal rate, regular rhythm and intact distal pulses.    Pulmonary/Chest: Effort normal. No respiratory distress. He has no wheezes.   Abdominal: Soft. He exhibits no distension. There is no tenderness.   Musculoskeletal: Normal range of motion. He exhibits no edema or tenderness.   Neurological:   -  Mental Status:  AAOx3.  Follows commands.  Answers correct age and .  Recent and remote memory intact.  -  Speech and language:  no aphasia or dysarthria.    -  Facial movement (CN VII): facial droop  -   Coordination:  Finger to nose exam:  RUE dysmetria, LUE dysmetria.  -  Motor:  No pronator drift. RUE: 5/5.  LUE: 5/5.  RLE: 5/5.  LLE: 5/5.  -  Tone:  Normal.  -  Sensory:  Intact to light touch and pin prick.   Skin: Skin is warm and dry. No rash noted.   Psychiatric: He has a normal mood and affect. His behavior is normal. Thought content normal.   Vitals reviewed.         Diagnostic Results:   Labs: Reviewed  CT: Reviewed  MRI: Reviewed  CTA: Reviewed

## 2017-08-11 NOTE — PLAN OF CARE
Problem: Occupational Therapy Goal  Goal: Occupational Therapy Goal  Goals set 8/7 to be addressed for 14 days with expiration date, 8/21:  Patient will increase functional independence with ADLs by performing:    Patient will demonstrate rolling to the right with modified independence.  Not met   Patient will demonstrate rolling to the left with modified independence.   Not met  Patient will demonstrate supine -sit with modified independence.   Not met  Patient will demonstrate stand pivot transfers with CGA.   Not met  Patient will demonstrate grooming while standing with CGA.   Not met  Patient will demonstrate upper body dressing with SBA while seated EOB.   Not met  Patient will demonstrate lower body dressing with CGA while seated EOB.   Not met  Patient will demonstrate toileting with CGA.   Not met  Patient's family / caregiver will demonstrate independence and safety with assisting patient with self-care skills and functional mobility.     Not met  Patient and/or patient's family will verbalize understanding of stroke prevention guidelines, personal risk factors and stroke warning signs via teachback method.  Not met           Goals remain appropriate.  PREETHI Rich  8/11/2017

## 2017-08-11 NOTE — PROGRESS NOTES
[facility] [address]  Vascular Neurology  Comprehensive Stroke Center  Progress Note    Assessment/Plan:     8/5/17 - doing well, no events overnight, neuro surg and NCC monitoring for swelling/suboccipital crani watch   8/7/17 NAEON. Remains on 3% and cardene; hemicrani watch.   8/9/17 neurologically stable, repeat CTA stable  8/10/17 Stable CTH and neuro exam.   8/11/17 2% required restarting overnight due to hyponatremia. Patient will likely go to [facility] post-discharge. Continuing IV abx x 6 weeks.     * Embolic stroke involving right cerebellar artery    Mr. Koehler is a 68yo M with endocarditis and an acute R cerebellar infarct with edema, midline shift, as well as hemorrhagic conversion.     - Antithrombotics for secondary stroke prevention: Antiplatelets:  Aspirin: 325 mg oral now and daily  - Statins for secondary stroke prevention and hyperlipidemia, if present: None: Reason: consider if LDL>130; patient's likely etiology of stroke was septic emboli from endocarditis. LDL 76  - Diagnostics: none pending   - VTE Prophylaxis: Heparin 5000 units SQ every 8 hours  - Endocarditis treatment per NCC, ID consulted  - Neurosurgery s/o for hemicrani watch  - PT/OT/ST; Rehab        Cytotoxic cerebral edema    Due to stroke   Evident on imaging        Acute bacterial endocarditis    Mitral valve vegetation seen on 8/3/17 ECHO; repeat ECHO negative for vegetation  Blood cultures 8/4 with gram positive cocci in chains - Enterococcus Fecalis   Currently on ceftriaxone, ampicillin  Normal WBC count, afebrile at this time        Urinary tract infection with hematuria    + proteus in urine culture  Complicated UTI, ID consulted         NSTEMI (non-ST elevated myocardial infarction)    Per NCC, trending troponins  downtrending         Hypertension    Stroke risk factor   SBP <140   Management per primary team            Neurologic Chief Complaint: R cerebellar stroke     Subjective:     Interval History:  Patient is seen for follow-up neurological assessment and treatment recommendations:   LILIANA. Restarted 2% for short time overnight due to hyponatremia.     HPI, Past Medical, Family, and Social History remains the same as documented in the initial encounter.     Review of Systems   Constitutional: Negative for chills and fever.   Eyes: Negative for redness.   Gastrointestinal: Positive for nausea. Negative for vomiting.   Neurological: Negative for headaches.   Psychiatric/Behavioral: Negative for agitation.     Scheduled Meds:   ampicillin IVPB  2 g Intravenous Q4H    atorvastatin  40 mg Oral Daily    carvedilol  12.5 mg Oral BID    cefTRIAXone (ROCEPHIN) IVPB  2 g Intravenous Q12H    heparin (porcine)  5,000 Units Subcutaneous Q8H    lisinopril  20 mg Oral Daily    polyethylene glycol  17 g Oral Daily    senna-docusate 8.6-50 mg  1 tablet Oral BID    sodium chloride 0.9%  10 mL Intravenous Q6H    sodium chloride 0.9%  3 mL Intravenous Q8H     Continuous Infusions:     PRN Meds:acetaminophen, calcium gluconate IVPB, calcium gluconate IVPB, calcium gluconate IVPB, dextrose 50%, glucagon (human recombinant), hydrALAZINE, labetalol, magnesium sulfate IVPB, magnesium sulfate IVPB, ondansetron, potassium chloride **AND** potassium chloride **AND** potassium chloride, Flushing PICC Protocol **AND** sodium chloride 0.9% **AND** sodium chloride 0.9%, sodium phosphate IVPB, sodium phosphate IVPB, sodium phosphate IVPB    Objective:     Vital Signs (Most Recent):  Temp: 99.2 °F (37.3 °C) (08/11/17 0705)  Pulse: 60 (08/11/17 0805)  Resp: (!) 30 (08/11/17 0805)  BP: (!) 143/81 (08/11/17 0805)  SpO2: 100 % (08/11/17 0805)  BP Location: Right arm    Vital Signs Range (Last 24H):  Temp:  [99 °F (37.2 °C)-99.6 °F (37.6 °C)]   Pulse:  [60-88]   Resp:  [16-32]   BP: (110-172)/(56-89)   SpO2:  [97 %-100 %]   BP Location: Right arm    Physical Exam   Constitutional: He appears well-developed and well-nourished.   HENT:    Head: Normocephalic.   Eyes: Pupils are equal, round, and reactive to light.   6th nerve and vertical palsy    Neck: Neck supple.   Cardiovascular: Normal rate.    Pulmonary/Chest: Effort normal.   Neurological: He is alert.   Skin: Skin is warm and dry.   Nursing note and vitals reviewed.      Neurological Exam:   Alert, oriented to person and month/year  Equal sensation x 4   Full strength throughout  Gaze forward, left gaze better than right.   RUE ataxia      NIH Stroke Scale:    Level of Consciousness: 0 - alert  LOC Questions: 0 - answers both correctly  LOC Commands: 0 - performs both correctly  Best Gaze: 1 - partial gaze palsy (L 6th nerve palsy)  Visual: 0 - no visual loss  Facial Palsy: 0 - normal  Motor Left Arm: 0 - no drift  Motor Right Arm: 0 - no drift  Motor Left Le - no drift  Motor Right Le - no drift  Limb Ataxia: 1 - present in one limb (RUE)  Sensory: 0 - normal  Best Language: 0 - no aphasia  Dysarthria: 0 - normal articulation  Extinction and Inattention: 0 - no neglect  NIH Stroke Scale Total: 2      Laboratory:  CMP:     Recent Labs  Lab 17  0146 17  0610   CALCIUM 8.4*  --    ALBUMIN 2.3*  --    PROT 6.1  --     140   K 4.3  --    CO2 20*  --      --    BUN 19  --    CREATININE 1.3  --    ALKPHOS 71  --    ALT 28  --    AST 18  --    BILITOT 0.5  --      CBC:     Recent Labs  Lab 17  0146   WBC 9.38   RBC 2.98*   HGB 8.6*   HCT 26.0*      MCV 87   MCH 28.9   MCHC 33.1     Lipid Panel:     Recent Labs  Lab 17  1830   CHOL 132   LDLCALC 76.4   HDL 31*   TRIG 123     Coagulation:     Recent Labs  Lab 17  1830   INR 1.0   APTT 22.7     Platelet Aggregation Study: No results for input(s): PLTAGG, PLTAGINTERP, PLTAGREGLACO, ADPPLTAGGREG in the last 168 hours.  Hgb A1C:     Recent Labs  Lab 17  1830   HGBA1C 5.4     TSH:     Recent Labs  Lab 17  1830   TSH 1.817  1.817       Diagnostic Results:  I have personally reviewed:    CTH 9/10/17:   Evolving area of recent infarction within the right cerebral hemisphere with stable mass effect against the fourth ventricle.  No hydrocephalus.    Remaining areas of abnormal brain parenchymal attenuation within the left parietal, left occipital and left cerebellar hemispheres are not well evaluated on this exam.      CTA Head and Neck. Date: 08/09/17  Evolving acute infarct of the right cerebellar hemisphere/PICA distribution with stable local mass effect against the fourth ventricle.  Subtle superimposed hyperattenuation is most consistent with known hemorrhage.  No imaging finding to suggest developing hydrocephalus.    Evolving subacute to chronic areas of infarction within the left parietal, left occipital and left cerebellar hemispheres.    Diminutive caliber and irregularity involving the distal aspect of the bilateral PICAs, right greater than left, possibly reflecting areas of high-grade stenosis.     MRI 8/5/17  1. Large region of abnormal restricted diffusion within the right cerebellar hemisphere compatible with acute infarct. There is associated hemorrhagic conversion present. There is localized mass effect on the abril as well as effacement of the fourth ventricle. Ventricular system remains mildly prominent, unchanged from prior CT examination.    2. Additional focal regions of mild diffusion hyperintensity with associated serpiginous non-masslike cortical enhancement within the left parietal and occipital lobes suggestive of subacute infarcts. Additional region of volume loss with associated intrinsic T1 hyperintensity and serpiginous enhancement is present within the left cerebellar hemisphere suggestive of a late subacute infarct with laminar necrosis. Small remote lacunar type infarcts are also present in the right corona radiata. Overall findings are suggestive of possible thromboembolic phenomena. Clinical correlation is advised.    3. No evidence of high-grade stenosis of the  visualized intracranial vasculature. Note is made that the mid and distal portions of the right AICA and PICA appear somewhat irregular and are not well-visualized, although a component of this may relate to noncontrast MRA time-of-flight technique.        8/4/17 - CT head    Large area of vasogenic edema centered within the right cerebellar hemisphere resulting in severe mass effect with compression of the 4th ventricle, mild hydrocephalus and leftward deviation of the cerebral vermis.  Recommend emergent neurosurgical consultation.    Echo 8/5/17  Normal LA   CONCLUSIONS     1 - Normal left ventricular systolic function (EF 60-65%).     2 - No wall motion abnormalities.     3 - Normal left ventricular diastolic function.     4 - Normal right ventricular systolic function .     5 - The estimated PA systolic pressure is 27 mmHg.       Darlene Reyes PA-C  Comprehensive Stroke Center  Department of Vascular Neurology   [facility]

## 2017-08-11 NOTE — PLAN OF CARE
Problem: SLP Goal  Goal: SLP Goal  Speech Language Pathology Goals  Updated Goals expected to be met by 8/14    1. Pt will tolerate regular diet and thin liquids without s/s of airway compromise.   2. Pt will generate 10 items within a category independently to enhance cognition  3. Pt will recall 2/3 items with a delay independently to enhance memory   4. Pt will complete cancellation task with 90% acc independently to enhance visual-spatial skills   5. Pt will complete mental flexibility tasks with 80% independently to enhance cognition   6. Pt will follow 2 step directions with 80% acc with min cues to enhance comprehension skills   7. Pt will participate in ongoing assessment of reading/money/math/time management skills                   Current goals remain appropriate     Clara Vergara MS, CCC-SLP  Speech Language Pathologist  Pager: [phone]  Date 8/11/2017

## 2017-08-11 NOTE — PT/OT/SLP PROGRESS
"Occupational Therapy  Treatment    Juan Manuel Koehler   MRN: [id number]   Admitting Diagnosis: Embolic stroke involving right cerebellar artery    OT Date of Treatment: 08/11/17   OT Start Time: 0607  OT Stop Time: 0700  OT Total Time (min): 53 min    Billable Minutes:  Self Care/Home Management 13, Therapeutic Activity 10, Neuromuscular Re-education 20 and Cognitive Retraining 10    General Precautions: Standard, aspiration, fall  Orthopedic Precautions: N/A  Braces: N/A    Do you have any cultural, spiritual, Evangelical conflicts, given your current situation?: Oriental orthodox    Subjective:  Communicated with nurse prior to session.  Patient:  "I still get pretty nauseated."  Nurse:  "He can do a lot more than he thinks he can."  Pain/Comfort  Pain Rating 1: 0/10  Pain Rating Post-Intervention 1: 0/10    Objective:  Patient found with: blood pressure cuff, arterial line, central line, peripheral IV, telemetry  Family not present.     Functional Mobility:  Bed Mobility:  Rolling/Turning to Left: Supervision  Rolling/Turning Right: Supervision  Scooting/Bridging: Supervision  Supine to Sit: Stand by Assistance  Sit to Supine: Stand by Assistance    Transfers:   Sit <> Stand Assistance: Minimum Assistance  Sit <> Stand Assistive Device: No Assistive Device  Bed <> Chair Technique: Stand Pivot  Bed <> Chair Transfer Assistance: Minimum Assistance    Activities of Daily Living:  UE Dressing Level of Assistance: Minimum assistance (while standing to gather clothing )  LE Dressing Level of Assistance: Minimum assistance (for standing balance)  Grooming Position: Standing  Grooming Level of Assistance: Minimum assistance (for standing balance)     Additional Treatment:   Patient education provided on role of OT and need for rehab upon discharge.  Patient education provided on dressing, bed mobility, bridging and transfers. Patient verbalizing understanding via teach back method. Patient instructed on need to call for assistance for " toileting needs and when getting up.  Continued education, patient/ family training recommended.  Patient alert and oriented x person and place.  Able to follow 4/4 one step commands.  Patient attentive and interactive throughout the session. SBA for postural control while seated.  Min assist for standing balance during ADLs.  Addressed core stabilization while standing; min assist required.  Able to identify 20/20 items on visual scanning task.  Able to demonstrate efficient search pattern.  Able to demonstrate accurate spatial orientation during clock design.  Patient's functional status and disposition recommendation discussed with patient and nurse.  Encouragement provided throughout the session. White board updated in patient's room.  OT asked if there were any other questions; patient/ family had no further questions.    Modified Draper Scale:  4/6.  4- Moderately severe disability.  Unable to attend to own bodily needs without assistance, and unable to walk unassisted.    Postural Assessment Scale for Stroke:  27/36  1.  Sitting without support--3  2.  Standing with support--3  3.  Standing without support--2  4 and 5: Standing on nonparetic /paretic leg--2  6.  Supine to affected side lateral--3  7.  Supine to nonaffected side lateral--3  8.  Supine to sitting up on the edge of the table--3  9.  Sitting on the edge of the table to supine--3  10.  Sitting to standing up--2  11.  Standing up to sitting down--2  12.  Standing, picking up a pencil from the floor--1      AM-PAC 6 CLICK ADL   How much help from another person does this patient currently need?   1 = Unable, Total/Dependent Assistance  2 = A lot, Maximum/Moderate Assistance  3 = A little, Minimum/Contact Guard/Supervision  4 = None, Modified [place]/Independent    Putting on and taking off regular lower body clothing? : 3  Bathing (including washing, rinsing, drying)?: 3  Toileting, which includes using toilet, bedpan, or urinal? : 3  Putting  "on and taking off regular upper body clothing?: 3  Taking care of personal grooming such as brushing teeth?: 3  Eating meals?: 3  Total Score: 18     AM-PAC Raw Score CMS "G-Code Modifier Level of Impairment Assistance   6 % Total / Unable   7 - 8 CM 80 - 100% Maximal Assist   9-13 CL 60 - 80% Moderate Assist   14 - 19 CK 40 - 60% Moderate Assist   20 - 22 CJ 20 - 40% Minimal Assist   23 CI 1-20% SBA / CGA   24 CH 0% Independent/ Mod I       Patient left supine with all lines intact and call button in reach    Assessment:  Juan Manuel Koehler is a 67 y.o. male with a medical diagnosis of Embolic stroke involving right cerebellar artery and presents with performance deficits of physical skills including impaired balance, mobility, and endurance.  These performance deficits have resulted in activity limitations including but not limited to:   bed mobility, transfers, ascending/ descending stairs, walking short and long distances, walking around obstacles, transitional movement patterns (kneeling, bending); eating, upper body dressing, lower body dressing, brushing teeth, toileting, bathing, and carrying objects.   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 Patient will benefit from skilled OT services to maximize level of independence with self-care skills and functional mobility.  Will benefit from rehab.  Improvements noted with core stabilization, transfers and ADLs.    Rehab identified problem list/impairments: Rehab identified problem list/impairments: weakness, impaired endurance, impaired self care skills, impaired functional mobilty, impaired balance, visual deficits, gait instability, decreased coordination, decreased safety awareness, impaired coordination, impaired fine motor    Rehab potential is good.    Activity tolerance: Good    Discharge recommendations: Discharge Facility/Level Of Care Needs: rehabilitation facility     Barriers " to discharge: Barriers to Discharge: Decreased caregiver support    Equipment recommendations: 3-in-1 commode, bath bench, wheelchair     GOALS:    Occupational Therapy Goals        Problem: Occupational Therapy Goal    Goal Priority Disciplines Outcome Interventions   Occupational Therapy Goal     OT, PT/OT     Description:  Goals set 8/7 to be addressed for 14 days with expiration date, 8/21:  Patient will increase functional independence with ADLs by performing:    Patient will demonstrate rolling to the right with modified independence.  Not met   Patient will demonstrate rolling to the left with modified independence.   Not met  Patient will demonstrate supine -sit with modified independence.   Not met  Patient will demonstrate stand pivot transfers with CGA.   Not met  Patient will demonstrate grooming while standing with CGA.   Not met  Patient will demonstrate upper body dressing with SBA while seated EOB.   Not met  Patient will demonstrate lower body dressing with CGA while seated EOB.   Not met  Patient will demonstrate toileting with CGA.   Not met  Patient's family / caregiver will demonstrate independence and safety with assisting patient with self-care skills and functional mobility.     Not met  Patient and/or patient's family will verbalize understanding of stroke prevention guidelines, personal risk factors and stroke warning signs via teachback method.  Not met                            Plan:  Patient to be seen 6 x/week to address the above listed problems via self-care/home management, therapeutic exercises, neuromuscular re-education, therapeutic activities, sensory integration, cognitive retraining  Plan of Care expires: 09/02/17  Plan of Care reviewed with: patient         PREETHI Moses  08/11/2017

## 2017-08-11 NOTE — PT/OT/SLP PROGRESS
Physical Therapy      Juan Manuel Eamon Koehler  MRN: [id number]    Patient not seen today secondary to Patient working with Speech pathologist, unable to return for 2nd attempt.. Will follow-up on next scheduled visit.    Fran Khanna PTA

## 2017-08-11 NOTE — SUBJECTIVE & OBJECTIVE
Neurologic Chief Complaint: R cerebellar stroke     Subjective:     Interval History: Patient is seen for follow-up neurological assessment and treatment recommendations:   NAEON. Restarted 2% for short time overnight due to hyponatremia.     HPI, Past Medical, Family, and Social History remains the same as documented in the initial encounter.     Review of Systems   Constitutional: Negative for chills and fever.   Eyes: Negative for redness.   Gastrointestinal: Positive for nausea. Negative for vomiting.   Neurological: Negative for headaches.   Psychiatric/Behavioral: Negative for agitation.     Scheduled Meds:   ampicillin IVPB  2 g Intravenous Q4H    atorvastatin  40 mg Oral Daily    carvedilol  12.5 mg Oral BID    cefTRIAXone (ROCEPHIN) IVPB  2 g Intravenous Q12H    heparin (porcine)  5,000 Units Subcutaneous Q8H    lisinopril  20 mg Oral Daily    polyethylene glycol  17 g Oral Daily    senna-docusate 8.6-50 mg  1 tablet Oral BID    sodium chloride 0.9%  10 mL Intravenous Q6H    sodium chloride 0.9%  3 mL Intravenous Q8H     Continuous Infusions:     PRN Meds:acetaminophen, calcium gluconate IVPB, calcium gluconate IVPB, calcium gluconate IVPB, dextrose 50%, glucagon (human recombinant), hydrALAZINE, labetalol, magnesium sulfate IVPB, magnesium sulfate IVPB, ondansetron, potassium chloride **AND** potassium chloride **AND** potassium chloride, Flushing PICC Protocol **AND** sodium chloride 0.9% **AND** sodium chloride 0.9%, sodium phosphate IVPB, sodium phosphate IVPB, sodium phosphate IVPB    Objective:     Vital Signs (Most Recent):  Temp: 99.2 °F (37.3 °C) (08/11/17 0705)  Pulse: 60 (08/11/17 0805)  Resp: (!) 30 (08/11/17 0805)  BP: (!) 143/81 (08/11/17 0805)  SpO2: 100 % (08/11/17 0805)  BP Location: Right arm    Vital Signs Range (Last 24H):  Temp:  [99 °F (37.2 °C)-99.6 °F (37.6 °C)]   Pulse:  [60-88]   Resp:  [16-32]   BP: (110-172)/(56-89)   SpO2:  [97 %-100 %]   BP Location: Right  arm    Physical Exam   Constitutional: He appears well-developed and well-nourished.   HENT:   Head: Normocephalic.   Eyes: Pupils are equal, round, and reactive to light.   6th nerve and vertical palsy    Neck: Neck supple.   Cardiovascular: Normal rate.    Pulmonary/Chest: Effort normal.   Neurological: He is alert.   Skin: Skin is warm and dry.   Nursing note and vitals reviewed.      Neurological Exam:   Alert, oriented to person and month/year  Equal sensation x 4   Full strength throughout  Gaze forward, left gaze better than right.   RUE ataxia      NIH Stroke Scale:    Level of Consciousness: 0 - alert  LOC Questions: 0 - answers both correctly  LOC Commands: 0 - performs both correctly  Best Gaze: 1 - partial gaze palsy (L 6th nerve palsy)  Visual: 0 - no visual loss  Facial Palsy: 0 - normal  Motor Left Arm: 0 - no drift  Motor Right Arm: 0 - no drift  Motor Left Le - no drift  Motor Right Le - no drift  Limb Ataxia: 1 - present in one limb (RUE)  Sensory: 0 - normal  Best Language: 0 - no aphasia  Dysarthria: 0 - normal articulation  Extinction and Inattention: 0 - no neglect  NIH Stroke Scale Total: 2      Laboratory:  CMP:     Recent Labs  Lab 17  0146 17  0610   CALCIUM 8.4*  --    ALBUMIN 2.3*  --    PROT 6.1  --     140   K 4.3  --    CO2 20*  --      --    BUN 19  --    CREATININE 1.3  --    ALKPHOS 71  --    ALT 28  --    AST 18  --    BILITOT 0.5  --      CBC:     Recent Labs  Lab 17  0146   WBC 9.38   RBC 2.98*   HGB 8.6*   HCT 26.0*      MCV 87   MCH 28.9   MCHC 33.1     Lipid Panel:     Recent Labs  Lab 17  1830   CHOL 132   LDLCALC 76.4   HDL 31*   TRIG 123     Coagulation:     Recent Labs  Lab 17  1830   INR 1.0   APTT 22.7     Platelet Aggregation Study: No results for input(s): PLTAGG, PLTAGINTERP, PLTAGREGLACO, ADPPLTAGGREG in the last 168 hours.  Hgb A1C:     Recent Labs  Lab 17  1830   HGBA1C 5.4     TSH:     Recent Labs  Lab  08/04/17  1830   TSH 1.817  1.817       Diagnostic Results:  I have personally reviewed:   CTH 9/10/17:   Evolving area of recent infarction within the right cerebral hemisphere with stable mass effect against the fourth ventricle.  No hydrocephalus.    Remaining areas of abnormal brain parenchymal attenuation within the left parietal, left occipital and left cerebellar hemispheres are not well evaluated on this exam.      CTA Head and Neck. Date: 08/09/17  Evolving acute infarct of the right cerebellar hemisphere/PICA distribution with stable local mass effect against the fourth ventricle.  Subtle superimposed hyperattenuation is most consistent with known hemorrhage.  No imaging finding to suggest developing hydrocephalus.    Evolving subacute to chronic areas of infarction within the left parietal, left occipital and left cerebellar hemispheres.    Diminutive caliber and irregularity involving the distal aspect of the bilateral PICAs, right greater than left, possibly reflecting areas of high-grade stenosis.     MRI 8/5/17  1. Large region of abnormal restricted diffusion within the right cerebellar hemisphere compatible with acute infarct. There is associated hemorrhagic conversion present. There is localized mass effect on the abril as well as effacement of the fourth ventricle. Ventricular system remains mildly prominent, unchanged from prior CT examination.    2. Additional focal regions of mild diffusion hyperintensity with associated serpiginous non-masslike cortical enhancement within the left parietal and occipital lobes suggestive of subacute infarcts. Additional region of volume loss with associated intrinsic T1 hyperintensity and serpiginous enhancement is present within the left cerebellar hemisphere suggestive of a late subacute infarct with laminar necrosis. Small remote lacunar type infarcts are also present in the right corona radiata. Overall findings are suggestive of possible thromboembolic  phenomena. Clinical correlation is advised.    3. No evidence of high-grade stenosis of the visualized intracranial vasculature. Note is made that the mid and distal portions of the right AICA and PICA appear somewhat irregular and are not well-visualized, although a component of this may relate to noncontrast MRA time-of-flight technique.        8/4/17 - CT head    Large area of vasogenic edema centered within the right cerebellar hemisphere resulting in severe mass effect with compression of the 4th ventricle, mild hydrocephalus and leftward deviation of the cerebral vermis.  Recommend emergent neurosurgical consultation.    Echo 8/5/17  Normal LA   CONCLUSIONS     1 - Normal left ventricular systolic function (EF 60-65%).     2 - No wall motion abnormalities.     3 - Normal left ventricular diastolic function.     4 - Normal right ventricular systolic function .     5 - The estimated PA systolic pressure is 27 mmHg.

## 2017-08-12 LAB
ALBUMIN SERPL BCP-MCNC: 2.3 G/DL
ALP SERPL-CCNC: 72 U/L
ALT SERPL W/O P-5'-P-CCNC: 26 U/L
ANION GAP SERPL CALC-SCNC: 5 MMOL/L
AST SERPL-CCNC: 16 U/L
BACTERIA BLD CULT: NORMAL
BACTERIA BLD CULT: NORMAL
BASOPHILS # BLD AUTO: 0.01 K/UL
BASOPHILS NFR BLD: 0.1 %
BILIRUB SERPL-MCNC: 0.3 MG/DL
BILIRUB UR QL STRIP: NEGATIVE
BUN SERPL-MCNC: 22 MG/DL
CALCIUM SERPL-MCNC: 8.3 MG/DL
CHLORIDE SERPL-SCNC: 110 MMOL/L
CLARITY UR REFRACT.AUTO: ABNORMAL
CO2 SERPL-SCNC: 22 MMOL/L
COLOR UR AUTO: YELLOW
CREAT SERPL-MCNC: 1.3 MG/DL
DIFFERENTIAL METHOD: ABNORMAL
EOSINOPHIL # BLD AUTO: 0.1 K/UL
EOSINOPHIL NFR BLD: 1.9 %
ERYTHROCYTE [DISTWIDTH] IN BLOOD BY AUTOMATED COUNT: 14.9 %
EST. GFR  (AFRICAN AMERICAN): >60 ML/MIN/1.73 M^2
EST. GFR  (NON AFRICAN AMERICAN): 56.1 ML/MIN/1.73 M^2
GLUCOSE SERPL-MCNC: 88 MG/DL
GLUCOSE UR QL STRIP: NEGATIVE
HCT VFR BLD AUTO: 25.1 %
HGB BLD-MCNC: 8.3 G/DL
HGB UR QL STRIP: NEGATIVE
KETONES UR QL STRIP: NEGATIVE
LEUKOCYTE ESTERASE UR QL STRIP: NEGATIVE
LYMPHOCYTES # BLD AUTO: 1.3 K/UL
LYMPHOCYTES NFR BLD: 17.1 %
MAGNESIUM SERPL-MCNC: 1.7 MG/DL
MCH RBC QN AUTO: 28.8 PG
MCHC RBC AUTO-ENTMCNC: 33.1 G/DL
MCV RBC AUTO: 87 FL
MONOCYTES # BLD AUTO: 0.5 K/UL
MONOCYTES NFR BLD: 6.4 %
NEUTROPHILS # BLD AUTO: 5.6 K/UL
NEUTROPHILS NFR BLD: 73.7 %
NITRITE UR QL STRIP: NEGATIVE
PH UR STRIP: 7 [PH] (ref 5–8)
PHOSPHATE SERPL-MCNC: 3.7 MG/DL
PLATELET # BLD AUTO: 257 K/UL
PMV BLD AUTO: 8.8 FL
POCT GLUCOSE: 106 MG/DL (ref 70–110)
POCT GLUCOSE: 109 MG/DL (ref 70–110)
POCT GLUCOSE: 112 MG/DL (ref 70–110)
POCT GLUCOSE: 161 MG/DL (ref 70–110)
POCT GLUCOSE: 162 MG/DL (ref 70–110)
POCT GLUCOSE: 77 MG/DL (ref 70–110)
POCT GLUCOSE: 94 MG/DL (ref 70–110)
POTASSIUM SERPL-SCNC: 4 MMOL/L
PROT SERPL-MCNC: 6.2 G/DL
PROT UR QL STRIP: NEGATIVE
RBC # BLD AUTO: 2.88 M/UL
SODIUM SERPL-SCNC: 137 MMOL/L
SODIUM SERPL-SCNC: 139 MMOL/L
SP GR UR STRIP: 1.01 (ref 1–1.03)
URN SPEC COLLECT METH UR: ABNORMAL
UROBILINOGEN UR STRIP-ACNC: NEGATIVE EU/DL
WBC # BLD AUTO: 7.53 K/UL

## 2017-08-12 PROCEDURE — 85025 COMPLETE CBC W/AUTO DIFF WBC: CPT

## 2017-08-12 PROCEDURE — 25000003 PHARM REV CODE 250: Performed by: NURSE PRACTITIONER

## 2017-08-12 PROCEDURE — 83735 ASSAY OF MAGNESIUM: CPT

## 2017-08-12 PROCEDURE — 81003 URINALYSIS AUTO W/O SCOPE: CPT

## 2017-08-12 PROCEDURE — 63600175 PHARM REV CODE 636 W HCPCS: Performed by: INTERNAL MEDICINE

## 2017-08-12 PROCEDURE — 63600175 PHARM REV CODE 636 W HCPCS: Performed by: PHYSICIAN ASSISTANT

## 2017-08-12 PROCEDURE — 97530 THERAPEUTIC ACTIVITIES: CPT

## 2017-08-12 PROCEDURE — 97535 SELF CARE MNGMENT TRAINING: CPT

## 2017-08-12 PROCEDURE — 84295 ASSAY OF SERUM SODIUM: CPT

## 2017-08-12 PROCEDURE — 25000003 PHARM REV CODE 250: Performed by: INTERNAL MEDICINE

## 2017-08-12 PROCEDURE — 25000003 PHARM REV CODE 250: Performed by: PSYCHIATRY & NEUROLOGY

## 2017-08-12 PROCEDURE — A4216 STERILE WATER/SALINE, 10 ML: HCPCS | Performed by: PSYCHIATRY & NEUROLOGY

## 2017-08-12 PROCEDURE — 20600001 HC STEP DOWN PRIVATE ROOM

## 2017-08-12 PROCEDURE — A4216 STERILE WATER/SALINE, 10 ML: HCPCS | Performed by: NURSE PRACTITIONER

## 2017-08-12 PROCEDURE — 25000003 PHARM REV CODE 250: Performed by: PHYSICIAN ASSISTANT

## 2017-08-12 PROCEDURE — 36415 COLL VENOUS BLD VENIPUNCTURE: CPT

## 2017-08-12 PROCEDURE — 80053 COMPREHEN METABOLIC PANEL: CPT

## 2017-08-12 PROCEDURE — 99233 SBSQ HOSP IP/OBS HIGH 50: CPT | Mod: GC,,, | Performed by: PSYCHIATRY & NEUROLOGY

## 2017-08-12 PROCEDURE — 84100 ASSAY OF PHOSPHORUS: CPT

## 2017-08-12 RX ORDER — MAGNESIUM SULFATE HEPTAHYDRATE 40 MG/ML
2 INJECTION, SOLUTION INTRAVENOUS ONCE
Status: COMPLETED | OUTPATIENT
Start: 2017-08-12 | End: 2017-08-12

## 2017-08-12 RX ADMIN — AMPICILLIN SODIUM 2 G: 2 INJECTION, POWDER, FOR SOLUTION INTRAMUSCULAR; INTRAVENOUS at 03:08

## 2017-08-12 RX ADMIN — STANDARDIZED SENNA CONCENTRATE AND DOCUSATE SODIUM 1 TABLET: 8.6; 5 TABLET, FILM COATED ORAL at 09:08

## 2017-08-12 RX ADMIN — POLYETHYLENE GLYCOL 3350 17 G: 17 POWDER, FOR SOLUTION ORAL at 09:08

## 2017-08-12 RX ADMIN — CEFTRIAXONE 2 G: 2 INJECTION, SOLUTION INTRAVENOUS at 03:08

## 2017-08-12 RX ADMIN — MAGNESIUM SULFATE HEPTAHYDRATE 2 G: 40 INJECTION, SOLUTION INTRAVENOUS at 09:08

## 2017-08-12 RX ADMIN — HEPARIN SODIUM 5000 UNITS: 5000 INJECTION, SOLUTION INTRAVENOUS; SUBCUTANEOUS at 06:08

## 2017-08-12 RX ADMIN — CEFTRIAXONE 2 G: 2 INJECTION, SOLUTION INTRAVENOUS at 04:08

## 2017-08-12 RX ADMIN — HEPARIN SODIUM 5000 UNITS: 5000 INJECTION, SOLUTION INTRAVENOUS; SUBCUTANEOUS at 03:08

## 2017-08-12 RX ADMIN — AMPICILLIN SODIUM 2 G: 2 INJECTION, POWDER, FOR SOLUTION INTRAMUSCULAR; INTRAVENOUS at 07:08

## 2017-08-12 RX ADMIN — Medication 10 ML: at 07:08

## 2017-08-12 RX ADMIN — CARVEDILOL 12.5 MG: 12.5 TABLET, FILM COATED ORAL at 09:08

## 2017-08-12 RX ADMIN — Medication 3 ML: at 09:08

## 2017-08-12 RX ADMIN — HEPARIN SODIUM 5000 UNITS: 5000 INJECTION, SOLUTION INTRAVENOUS; SUBCUTANEOUS at 09:08

## 2017-08-12 RX ADMIN — LISINOPRIL 20 MG: 20 TABLET ORAL at 09:08

## 2017-08-12 RX ADMIN — Medication 3 ML: at 06:08

## 2017-08-12 RX ADMIN — AMPICILLIN SODIUM 2 G: 2 INJECTION, POWDER, FOR SOLUTION INTRAMUSCULAR; INTRAVENOUS at 08:08

## 2017-08-12 RX ADMIN — AMPICILLIN SODIUM 2 G: 2 INJECTION, POWDER, FOR SOLUTION INTRAMUSCULAR; INTRAVENOUS at 04:08

## 2017-08-12 RX ADMIN — Medication 10 ML: at 06:08

## 2017-08-12 RX ADMIN — ATORVASTATIN CALCIUM 40 MG: 20 TABLET, FILM COATED ORAL at 09:08

## 2017-08-12 RX ADMIN — Medication 3 ML: at 02:08

## 2017-08-12 RX ADMIN — AMPICILLIN SODIUM 2 G: 2 INJECTION, POWDER, FOR SOLUTION INTRAMUSCULAR; INTRAVENOUS at 11:08

## 2017-08-12 RX ADMIN — Medication 10 ML: at 11:08

## 2017-08-12 RX ADMIN — Medication 10 ML: at 03:08

## 2017-08-12 NOTE — SUBJECTIVE & OBJECTIVE
Neurologic Chief Complaint:     Subjective:     Interval History: Patient is seen for follow-up neurological assessment and treatment recommendations: Patient reports wanting ambulate with assistance and get OOB today. Still has complaints of scrotal pain that has been assessed by Urology.     HPI, Past Medical, Family, and Social History remains the same as documented in the initial encounter.     Review of Systems   Constitutional: Positive for appetite change.     Scheduled Meds:   ampicillin IVPB  2 g Intravenous Q4H    atorvastatin  40 mg Oral Daily    carvedilol  12.5 mg Oral BID    cefTRIAXone (ROCEPHIN) IVPB  2 g Intravenous Q12H    heparin (porcine)  5,000 Units Subcutaneous Q8H    lisinopril  20 mg Oral Daily    magnesium sulfate IVPB  2 g Intravenous Once    polyethylene glycol  17 g Oral Daily    senna-docusate 8.6-50 mg  1 tablet Oral BID    sodium chloride 0.9%  10 mL Intravenous Q6H    sodium chloride 0.9%  3 mL Intravenous Q8H     Continuous Infusions:   PRN Meds:acetaminophen, dextrose 50%, glucagon (human recombinant), ondansetron, [DISCONTINUED] potassium chloride **AND** [DISCONTINUED] potassium chloride **AND** potassium chloride, Flushing PICC Protocol **AND** sodium chloride 0.9% **AND** sodium chloride 0.9%, sodium phosphate IVPB    Objective:     Vital Signs (Most Recent):  Temp: 98 °F (36.7 °C) (08/12/17 0805)  Pulse: 75 (08/12/17 0805)  Resp: 18 (08/12/17 0805)  BP: (!) 153/83 (08/12/17 0805)  SpO2: 100 % (08/12/17 0805)  BP Location: Right arm    Vital Signs Range (Last 24H):  Temp:  [98 °F (36.7 °C)-99.5 °F (37.5 °C)]   Pulse:  [68-89]   Resp:  [17-52]   BP: (121-161)/(57-83)   SpO2:  [95 %-100 %]   BP Location: Right arm    Physical Exam   Constitutional: He appears well-developed and well-nourished.   HENT:   Head: Normocephalic.   Eyes: Pupils are equal, round, and reactive to light.   6th nerve and vertical palsy    Neck: Neck supple.   Cardiovascular: Normal rate.     Pulmonary/Chest: Effort normal.   Neurological: He is alert.   Skin: Skin is warm and dry.   Nursing note and vitals reviewed.      Neurological Exam:   Neurological Exam:   Alert, oriented to person and month/year  Equal sensation x 4   Full strength throughout  Gaze forward, left gaze better than right.   RUE ataxia        NIH Stroke Scale:     Level of Consciousness: 0 - alert  LOC Questions: 0 - answers both correctly  LOC Commands: 0 - performs both correctly  Best Gaze: 1 - partial gaze palsy (L 6th nerve palsy)  Visual: 0 - no visual loss  Facial Palsy: 0 - normal  Motor Left Arm: 0 - no drift  Motor Right Arm: 0 - no drift  Motor Left Le - no drift  Motor Right Le - no drift  Limb Ataxia: 1 - present in one limb (RUE)  Sensory: 0 - normal  Best Language: 0 - no aphasia  Dysarthria: 0 - normal articulation  Extinction and Inattention: 0 - no neglect  NIH Stroke Scale Total: 2    Stroke Scales  Laboratory:  CMP:   Recent Labs  Lab 17  0300   CALCIUM 8.3*   ALBUMIN 2.3*   PROT 6.2      K 4.0   CO2 22*      BUN 22   CREATININE 1.3   ALKPHOS 72   ALT 26   AST 16   BILITOT 0.3     BMP:   Recent Labs  Lab 17  0300      K 4.0      CO2 22*   BUN 22   CREATININE 1.3   CALCIUM 8.3*     CBC:   Recent Labs  Lab 17  0300   WBC 7.53   RBC 2.88*   HGB 8.3*   HCT 25.1*      MCV 87   MCH 28.8   MCHC 33.1     Lipid Panel: No results for input(s): CHOL, LDLCALC, HDL, TRIG in the last 168 hours.  Coagulation: No results for input(s): INR, APTT in the last 168 hours.    Invalid input(s): PT  Platelet Aggregation Study: No results for input(s): PLTAGG, PLTAGINTERP, PLTAGREGLACO, ADPPLTAGGREG in the last 168 hours.  Hgb A1C: No results for input(s): HGBA1C in the last 168 hours.  TSH: No results for input(s): TSH in the last 168 hours.    Diagnostic Results:  I have personally reviewed: labs, recent imaging.

## 2017-08-12 NOTE — PLAN OF CARE
Problem: Occupational Therapy Goal  Goal: Occupational Therapy Goal  Goals set 8/7 to be addressed for 14 days with expiration date, 8/21:  Patient will increase functional independence with ADLs by performing:    Patient will demonstrate rolling to the right with modified independence.  Not met   Patient will demonstrate rolling to the left with modified independence.   Not met  Patient will demonstrate supine -sit with modified independence.   Not met  Patient will demonstrate stand pivot transfers with CGA.   Not met  Patient will demonstrate grooming while standing with CGA.   Not met  Patient will demonstrate upper body dressing with SBA while seated EOB.   Not met  Patient will demonstrate lower body dressing with CGA while seated EOB.   Not met  Patient will demonstrate toileting with CGA.   Not met  Patient's family / caregiver will demonstrate independence and safety with assisting patient with self-care skills and functional mobility.     Not met  Patient and/or patient's family will verbalize understanding of stroke prevention guidelines, personal risk factors and stroke warning signs via teachback method.  Not met           Continue OT POC    Comments: Marques Graham OTR/SERGIO  8/12/2017

## 2017-08-12 NOTE — NURSING TRANSFER
Nursing Transfer Note      8/11/2017     Transfer To: 725    Transfer via bed    Transfer with cardiac monitoring    Transported by RN and nurse tech      Medicines sent: yes     Chart send with patient: Yes    Notified: spouse (voicemail left)    Patient reassessed at: 08/11/25 @ 10:55 PM      Upon arrival to floor: cardiac monitor applied, patient oriented to room, call bell in reach and bed in lowest position

## 2017-08-12 NOTE — PROGRESS NOTES
[facility]  Vascular Neurology  Comprehensive Stroke Center  Progress Note    Assessment/Plan:     8/5/17 - doing well, no events overnight, neuro surg and NCC monitoring for swelling/suboccipital crani watch   8/7/17 NAEON. Remains on 3% and cardene; hemicrani watch.   8/9/17 neurologically stable, repeat CTA stable  8/10/17 Stable CTH and neuro exam.   8/11/17 2% required restarting overnight due to hyponatremia. Patient will likely go to [facility] post-discharge. Continuing IV abx x 6 weeks.   8/12/ Patient reports no acute events overnight; persistent complaints of scrotal pain, being followed by Urology.     Hypertension    Stroke risk factor   SBP <140   -Coreg 12.5 BID, lisinopril 20mg QD        Cytotoxic cerebral edema    Due to stroke   Evident on imaging        Acute bacterial endocarditis    Mitral valve vegetation seen on 8/3/17 ECHO; repeat ECHO negative for vegetation  Blood cultures 8/4 with gram positive cocci in chains - Enterococcus Fecalis   Currently on ceftriaxone, ampicillin  Normal WBC count, afebrile at this time        Urinary tract infection with hematuria    + proteus in urine culture  Complicated UTI, ID consulted - on antibiotics   -continue scrotal support for testicular pain per Urology recommendations.   -U/S scrotal imaging shows no acute processes        NSTEMI (non-ST elevated myocardial infarction)    Per NCC, trending troponins  downtrending         Anemia of chronic disease    -daily CBC monitoring        * Embolic stroke involving right cerebellar artery    Mr. Koehler is a 68yo M with endocarditis and an acute R cerebellar infarct with edema, midline shift, as well as hemorrhagic conversion.     - Antithrombotics for secondary stroke prevention: Antiplatelets:  Aspirin: 325 mg oral now and daily  - Statins for secondary stroke prevention and hyperlipidemia, if present: None: Reason: consider if LDL>130; patient's likely etiology of stroke was septic  emboli from endocarditis. LDL 76  -atorva 40mg QD   - Diagnostics: none pending   - VTE Prophylaxis: Heparin 5000 units SQ every 8 hours  - Endocarditis treatment per ID recs, ID consulted  - Neurosurgery aware  - PT/OT/ST; Rehab            Neurologic Chief Complaint: embolic R cerebellar art stroke    Subjective:     Interval History: Patient is seen for follow-up neurological assessment and treatment recommendations: Patient reports wanting ambulate with assistance and get OOB today. Still has complaints of scrotal pain that has been assessed by Urology.     HPI, Past Medical, Family, and Social History remains the same as documented in the initial encounter.     Review of Systems   Constitutional: Positive for appetite change.     Scheduled Meds:   ampicillin IVPB  2 g Intravenous Q4H    atorvastatin  40 mg Oral Daily    carvedilol  12.5 mg Oral BID    cefTRIAXone (ROCEPHIN) IVPB  2 g Intravenous Q12H    heparin (porcine)  5,000 Units Subcutaneous Q8H    lisinopril  20 mg Oral Daily    magnesium sulfate IVPB  2 g Intravenous Once    polyethylene glycol  17 g Oral Daily    senna-docusate 8.6-50 mg  1 tablet Oral BID    sodium chloride 0.9%  10 mL Intravenous Q6H    sodium chloride 0.9%  3 mL Intravenous Q8H     Continuous Infusions:   PRN Meds:acetaminophen, dextrose 50%, glucagon (human recombinant), ondansetron, [DISCONTINUED] potassium chloride **AND** [DISCONTINUED] potassium chloride **AND** potassium chloride, Flushing PICC Protocol **AND** sodium chloride 0.9% **AND** sodium chloride 0.9%, sodium phosphate IVPB    Objective:     Vital Signs (Most Recent):  Temp: 98 °F (36.7 °C) (08/12/17 0805)  Pulse: 75 (08/12/17 0805)  Resp: 18 (08/12/17 0805)  BP: (!) 153/83 (08/12/17 0805)  SpO2: 100 % (08/12/17 0805)  BP Location: Right arm    Vital Signs Range (Last 24H):  Temp:  [98 °F (36.7 °C)-99.5 °F (37.5 °C)]   Pulse:  [68-89]   Resp:  [17-52]   BP: (121-161)/(57-83)   SpO2:  [95 %-100 %]   BP  Location: Right arm    Physical Exam   Constitutional: He appears well-developed and well-nourished.   HENT:   Head: Normocephalic.   Eyes: Pupils are equal, round, and reactive to light.   6th nerve and vertical palsy    Neck: Neck supple.   Cardiovascular: Normal rate.    Pulmonary/Chest: Effort normal.   Neurological: He is alert.   Skin: Skin is warm and dry.   Nursing note and vitals reviewed.      Neurological Exam:   Alert, oriented to person and month/year  Equal sensation x 4   Full strength throughout  Gaze forward, left gaze better than right.   RUE ataxia        NIH Stroke Scale:     Level of Consciousness: 0 - alert  LOC Questions: 0 - answers both correctly  LOC Commands: 0 - performs both correctly  Best Gaze: 1 - partial gaze palsy (L 6th nerve palsy)  Visual: 0 - no visual loss  Facial Palsy: 0 - normal  Motor Left Arm: 0 - no drift  Motor Right Arm: 0 - no drift  Motor Left Le - no drift  Motor Right Le - no drift  Limb Ataxia: 1 - present in one limb (RUE)  Sensory: 0 - normal  Best Language: 0 - no aphasia  Dysarthria: 0 - normal articulation  Extinction and Inattention: 0 - no neglect  NIH Stroke Scale Total: 2    Stroke Scales  Laboratory:  CMP:   Recent Labs  Lab 17  0300   CALCIUM 8.3*   ALBUMIN 2.3*   PROT 6.2      K 4.0   CO2 22*      BUN 22   CREATININE 1.3   ALKPHOS 72   ALT 26   AST 16   BILITOT 0.3     BMP:   Recent Labs  Lab 17  0300      K 4.0      CO2 22*   BUN 22   CREATININE 1.3   CALCIUM 8.3*     CBC:   Recent Labs  Lab 17  0300   WBC 7.53   RBC 2.88*   HGB 8.3*   HCT 25.1*      MCV 87   MCH 28.8   MCHC 33.1     Lipid Panel: No results for input(s): CHOL, LDLCALC, HDL, TRIG in the last 168 hours.  Coagulation: No results for input(s): INR, APTT in the last 168 hours.    Invalid input(s): PT  Platelet Aggregation Study: No results for input(s): PLTAGG, PLTAGINTERP, PLTAGREGLACO, ADPPLTAGGREG in the last 168 hours.  Hgb A1C:  No results for input(s): HGBA1C in the last 168 hours.  TSH: No results for input(s): TSH in the last 168 hours.    Diagnostic Results:  I have personally reviewed: labs, recent imaging.         Luisito Zhang MD  [facility]  Department of Vascular Neurology   [facility]

## 2017-08-12 NOTE — PLAN OF CARE
Problem: Patient Care Overview  Goal: Plan of Care Review  Outcome: Ongoing (interventions implemented as appropriate)  Patient has remained free of falls this shift. He is AAOx4 and VS's have been stable. Patient is a little anxious and restless. Said that he was told that he would be going to rehab Saturday AM.

## 2017-08-12 NOTE — PT/OT/SLP PROGRESS
Occupational Therapy  Treatment    Juan Manuel Koehler   MRN: [id number]   Admitting Diagnosis: Embolic stroke involving right cerebellar artery    OT Date of Treatment: 08/12/17   OT Start Time: 0928  OT Stop Time: 1009  OT Total Time (min): 41 min    Billable Minutes:  Self Care/Home Management 10 minutes and Therapeutic Activity 31 minutes    General Precautions: Standard, aspiration, fall  Orthopedic Precautions: N/A  Braces: N/A    Do you have any cultural, spiritual, Adventism conflicts, given your current situation?: Scientology    Subjective:  Communicated with nurse prior to session.  Pt agreeable to skilled OT services.    Pain/Comfort  Pain Rating 1: 0/10  Pain Rating Post-Intervention 1: 0/10    Objective:  Patient found with: blood pressure cuff, peripheral IV, telemetry  Pt received w/ HOB elevated.     Functional Mobility:  Bed Mobility:  Rolling/Turning Right: Supervision  Scooting/Bridging: Supervision  Supine to Sit: Supervision  Sit to Supine: Supervision    Transfers:   Sit <> Stand Assistance: Stand By Assistance  Sit <> Stand Assistive Device: Rolling Walker    Functional Ambulation: Pt ambualted 30 ft w/ RW at min a 2* to unsteady gait. Pt had multiple minor LOB requiring steadying assist.       Activities of Daily Living:  UE Dressing Level of Assistance: Set-up Assistance (donned gown as robe)  Grooming Position: Standing  Grooming Level of Assistance: Minimum assistance (min a for balance in standing. Pt displayed ataxic movements in standing )    Balance:   Static Sit: GOOD: Takes MODERATE challenges from all directions  Dynamic Sit: GOOD-: Maintains balance through MODERATE excursions of active trunk movement,     Static Stand: FAIR+: Takes MINIMAL challenges from all directions  Dynamic stand: FAIR: Needs CONTACT GUARD during gait    Therapeutic Activities and Exercises:  Pt performed sit<>stand for 2x5 reps from EOB w/ RW at sba.  Pt stood EOB ~3 minutes at cga to min a for balance w/o AD. Pt  "BUE ataxic movements lessend when BUE place in WB on bed side table.         The [place] Cognitive Assessment (MoCA) was designed as a rapid screening instrument for mild cognitive dysfunction. It assesses different cognitive domains: attention and concentration, executive functions, memory, language, visuoconstructional skills, conceptual thinking, calculations, and orientation. Time to administer the MoCA is approximately 10 minutes. Average score >/= 26/30.  Results reflected below:   Visuospatial/Executive: 3/5   Naming: 3/3   Attention: 6/6   Language: 2/3   Abstraction: 2/2   Delayed Recall: 2/5   Orientation 5/6  Pt total score: 23/30; Reflecting below the norm     AM-PAC 6 CLICK ADL   How much help from another person does this patient currently need?   1 = Unable, Total/Dependent Assistance  2 = A lot, Maximum/Moderate Assistance  3 = A little, Minimum/Contact Guard/Supervision  4 = None, Modified [place]/Independent    Putting on and taking off regular lower body clothing? : 3  Bathing (including washing, rinsing, drying)?: 3  Toileting, which includes using toilet, bedpan, or urinal? : 3  Putting on and taking off regular upper body clothing?: 4  Taking care of personal grooming such as brushing teeth?: 3  Eating meals?: 3  Total Score: 19     AM-PAC Raw Score CMS "G-Code Modifier Level of Impairment Assistance   6 % Total / Unable   7 - 8 CM 80 - 100% Maximal Assist   9-13 CL 60 - 80% Moderate Assist   14 - 19 CK 40 - 60% Moderate Assist   20 - 22 CJ 20 - 40% Minimal Assist   23 CI 1-20% SBA / CGA   24 CH 0% Independent/ Mod I       Patient left with bed in chair position with all lines intact and call button in reach    ASSESSMENT:  Juan Manuel Koehler is a 67 y.o. male with a medical diagnosis of Embolic stroke involving right cerebellar artery and presents with impairments listed below. Pt dislayed increase strength w/ sit<>stand from EOB. Pt displayed ataxic movements w/ oob activities. " Pt  Pt would benefit from skilled OT services to improve independence and overall occupational functioning.    Rehab identified problem list/impairments: Rehab identified problem list/impairments: weakness, impaired endurance, impaired self care skills, impaired functional mobilty, gait instability, impaired balance, decreased upper extremity function, decreased lower extremity function, decreased coordination, decreased safety awareness    Rehab potential is good.    Activity tolerance: Good    Discharge recommendations: Discharge Facility/Level Of Care Needs: rehabilitation facility     Barriers to discharge: Barriers to Discharge: Decreased caregiver support    Equipment recommendations: 3-in-1 commode, bath bench, wheelchair     GOALS:    Occupational Therapy Goals        Problem: Occupational Therapy Goal    Goal Priority Disciplines Outcome Interventions   Occupational Therapy Goal     OT, PT/OT     Description:  Goals set 8/7 to be addressed for 14 days with expiration date, 8/21:  Patient will increase functional independence with ADLs by performing:    Patient will demonstrate rolling to the right with modified independence.  Not met   Patient will demonstrate rolling to the left with modified independence.   Not met  Patient will demonstrate supine -sit with modified independence.   Not met  Patient will demonstrate stand pivot transfers with CGA.   Not met  Patient will demonstrate grooming while standing with CGA.   Not met  Patient will demonstrate upper body dressing with SBA while seated EOB.   Not met  Patient will demonstrate lower body dressing with CGA while seated EOB.   Not met  Patient will demonstrate toileting with CGA.   Not met  Patient's family / caregiver will demonstrate independence and safety with assisting patient with self-care skills and functional mobility.     Not met  Patient and/or patient's family will verbalize understanding of stroke prevention guidelines, personal risk  factors and stroke warning signs via teachback method.  Not met                            Plan:  Patient to be seen 6 x/week to address the above listed problems via self-care/home management, therapeutic activities, therapeutic exercises, neuromuscular re-education, cognitive retraining, sensory integration  Plan of Care expires: 09/02/17  Plan of Care reviewed with: patient    Marques DAVID Graham, OT  08/12/2017

## 2017-08-12 NOTE — ASSESSMENT & PLAN NOTE
Mr. Koehler is a 66yo M with endocarditis and an acute R cerebellar infarct with edema, midline shift, as well as hemorrhagic conversion.     - Antithrombotics for secondary stroke prevention: Antiplatelets:  Aspirin: 325 mg oral now and daily  - Statins for secondary stroke prevention and hyperlipidemia, if present: None: Reason: consider if LDL>130; patient's likely etiology of stroke was septic emboli from endocarditis. LDL 76  -atorva 40mg QD   - Diagnostics: none pending   - VTE Prophylaxis: Heparin 5000 units SQ every 8 hours  - Endocarditis treatment per ID recs, ID consulted  - Neurosurgery aware  - PT/OT/ST; Rehab

## 2017-08-12 NOTE — ASSESSMENT & PLAN NOTE
+ proteus in urine culture  Complicated UTI, ID consulted - on antibiotics   -continue scrotal support for testicular pain per Urology recommendations.

## 2017-08-13 PROBLEM — N39.0 URINARY TRACT INFECTION WITH HEMATURIA: Status: RESOLVED | Noted: 2017-08-03 | Resolved: 2017-08-13

## 2017-08-13 PROBLEM — R31.9 URINARY TRACT INFECTION WITH HEMATURIA: Status: RESOLVED | Noted: 2017-08-03 | Resolved: 2017-08-13

## 2017-08-13 LAB
ALBUMIN SERPL BCP-MCNC: 2.4 G/DL
ALP SERPL-CCNC: 74 U/L
ALT SERPL W/O P-5'-P-CCNC: 30 U/L
ANION GAP SERPL CALC-SCNC: 7 MMOL/L
AST SERPL-CCNC: 19 U/L
BASOPHILS # BLD AUTO: 0.02 K/UL
BASOPHILS NFR BLD: 0.3 %
BILIRUB SERPL-MCNC: 0.2 MG/DL
BUN SERPL-MCNC: 21 MG/DL
CALCIUM SERPL-MCNC: 8.5 MG/DL
CHLORIDE SERPL-SCNC: 110 MMOL/L
CO2 SERPL-SCNC: 20 MMOL/L
CREAT SERPL-MCNC: 1.3 MG/DL
DIFFERENTIAL METHOD: ABNORMAL
EOSINOPHIL # BLD AUTO: 0.1 K/UL
EOSINOPHIL NFR BLD: 1.8 %
ERYTHROCYTE [DISTWIDTH] IN BLOOD BY AUTOMATED COUNT: 15.1 %
EST. GFR  (AFRICAN AMERICAN): >60 ML/MIN/1.73 M^2
EST. GFR  (NON AFRICAN AMERICAN): 56.1 ML/MIN/1.73 M^2
GLUCOSE SERPL-MCNC: 91 MG/DL
HCT VFR BLD AUTO: 25.6 %
HGB BLD-MCNC: 8.4 G/DL
LYMPHOCYTES # BLD AUTO: 1.5 K/UL
LYMPHOCYTES NFR BLD: 19.1 %
MAGNESIUM SERPL-MCNC: 1.9 MG/DL
MCH RBC QN AUTO: 28.3 PG
MCHC RBC AUTO-ENTMCNC: 32.8 G/DL
MCV RBC AUTO: 86 FL
MONOCYTES # BLD AUTO: 0.5 K/UL
MONOCYTES NFR BLD: 6.4 %
NEUTROPHILS # BLD AUTO: 5.5 K/UL
NEUTROPHILS NFR BLD: 71.5 %
PHOSPHATE SERPL-MCNC: 3.9 MG/DL
PLATELET # BLD AUTO: 267 K/UL
PMV BLD AUTO: 8.8 FL
POCT GLUCOSE: 111 MG/DL (ref 70–110)
POCT GLUCOSE: 124 MG/DL (ref 70–110)
POCT GLUCOSE: 127 MG/DL (ref 70–110)
POCT GLUCOSE: 94 MG/DL (ref 70–110)
POTASSIUM SERPL-SCNC: 4.5 MMOL/L
PROT SERPL-MCNC: 6.5 G/DL
RBC # BLD AUTO: 2.97 M/UL
SODIUM SERPL-SCNC: 137 MMOL/L
SODIUM SERPL-SCNC: 137 MMOL/L
WBC # BLD AUTO: 7.65 K/UL

## 2017-08-13 PROCEDURE — 63600175 PHARM REV CODE 636 W HCPCS: Performed by: PHYSICIAN ASSISTANT

## 2017-08-13 PROCEDURE — 99233 SBSQ HOSP IP/OBS HIGH 50: CPT | Mod: GC,,, | Performed by: PSYCHIATRY & NEUROLOGY

## 2017-08-13 PROCEDURE — 63600175 PHARM REV CODE 636 W HCPCS: Performed by: INTERNAL MEDICINE

## 2017-08-13 PROCEDURE — 84100 ASSAY OF PHOSPHORUS: CPT

## 2017-08-13 PROCEDURE — 25000003 PHARM REV CODE 250: Performed by: INTERNAL MEDICINE

## 2017-08-13 PROCEDURE — 85025 COMPLETE CBC W/AUTO DIFF WBC: CPT

## 2017-08-13 PROCEDURE — A4216 STERILE WATER/SALINE, 10 ML: HCPCS | Performed by: PSYCHIATRY & NEUROLOGY

## 2017-08-13 PROCEDURE — 97116 GAIT TRAINING THERAPY: CPT

## 2017-08-13 PROCEDURE — 63600175 PHARM REV CODE 636 W HCPCS: Performed by: NURSE PRACTITIONER

## 2017-08-13 PROCEDURE — 20600001 HC STEP DOWN PRIVATE ROOM

## 2017-08-13 PROCEDURE — 25000003 PHARM REV CODE 250: Performed by: PHYSICIAN ASSISTANT

## 2017-08-13 PROCEDURE — 97530 THERAPEUTIC ACTIVITIES: CPT

## 2017-08-13 PROCEDURE — 80053 COMPREHEN METABOLIC PANEL: CPT

## 2017-08-13 PROCEDURE — 83735 ASSAY OF MAGNESIUM: CPT

## 2017-08-13 PROCEDURE — A4216 STERILE WATER/SALINE, 10 ML: HCPCS | Performed by: NURSE PRACTITIONER

## 2017-08-13 PROCEDURE — 25000003 PHARM REV CODE 250: Performed by: NURSE PRACTITIONER

## 2017-08-13 PROCEDURE — 25000003 PHARM REV CODE 250: Performed by: PSYCHIATRY & NEUROLOGY

## 2017-08-13 RX ORDER — HYDRALAZINE HYDROCHLORIDE 10 MG/1
10 TABLET, FILM COATED ORAL EVERY 8 HOURS PRN
Status: DISCONTINUED | OUTPATIENT
Start: 2017-08-13 | End: 2017-08-15 | Stop reason: HOSPADM

## 2017-08-13 RX ADMIN — STANDARDIZED SENNA CONCENTRATE AND DOCUSATE SODIUM 1 TABLET: 8.6; 5 TABLET, FILM COATED ORAL at 09:08

## 2017-08-13 RX ADMIN — CEFTRIAXONE 2 G: 2 INJECTION, SOLUTION INTRAVENOUS at 03:08

## 2017-08-13 RX ADMIN — CARVEDILOL 12.5 MG: 12.5 TABLET, FILM COATED ORAL at 09:08

## 2017-08-13 RX ADMIN — Medication 10 ML: at 06:08

## 2017-08-13 RX ADMIN — AMPICILLIN SODIUM 2 G: 2 INJECTION, POWDER, FOR SOLUTION INTRAMUSCULAR; INTRAVENOUS at 08:08

## 2017-08-13 RX ADMIN — POLYETHYLENE GLYCOL 3350 17 G: 17 POWDER, FOR SOLUTION ORAL at 09:08

## 2017-08-13 RX ADMIN — ATORVASTATIN CALCIUM 40 MG: 20 TABLET, FILM COATED ORAL at 09:08

## 2017-08-13 RX ADMIN — AMPICILLIN SODIUM 2 G: 2 INJECTION, POWDER, FOR SOLUTION INTRAMUSCULAR; INTRAVENOUS at 07:08

## 2017-08-13 RX ADMIN — HEPARIN SODIUM 5000 UNITS: 5000 INJECTION, SOLUTION INTRAVENOUS; SUBCUTANEOUS at 06:08

## 2017-08-13 RX ADMIN — Medication 3 ML: at 03:08

## 2017-08-13 RX ADMIN — LISINOPRIL 20 MG: 20 TABLET ORAL at 09:08

## 2017-08-13 RX ADMIN — AMPICILLIN SODIUM 2 G: 2 INJECTION, POWDER, FOR SOLUTION INTRAMUSCULAR; INTRAVENOUS at 12:08

## 2017-08-13 RX ADMIN — AMPICILLIN SODIUM 2 G: 2 INJECTION, POWDER, FOR SOLUTION INTRAMUSCULAR; INTRAVENOUS at 11:08

## 2017-08-13 RX ADMIN — AMPICILLIN SODIUM 2 G: 2 INJECTION, POWDER, FOR SOLUTION INTRAMUSCULAR; INTRAVENOUS at 03:08

## 2017-08-13 RX ADMIN — Medication 10 ML: at 05:08

## 2017-08-13 RX ADMIN — Medication 10 ML: at 03:08

## 2017-08-13 RX ADMIN — HEPARIN SODIUM 5000 UNITS: 5000 INJECTION, SOLUTION INTRAVENOUS; SUBCUTANEOUS at 03:08

## 2017-08-13 RX ADMIN — Medication 10 ML: at 12:08

## 2017-08-13 RX ADMIN — ACETAMINOPHEN 650 MG: 325 TABLET ORAL at 09:08

## 2017-08-13 RX ADMIN — Medication 3 ML: at 06:08

## 2017-08-13 RX ADMIN — Medication 3 ML: at 10:08

## 2017-08-13 RX ADMIN — ONDANSETRON 4 MG: 2 INJECTION INTRAMUSCULAR; INTRAVENOUS at 09:08

## 2017-08-13 NOTE — PT/OT/SLP PROGRESS
"Physical Therapy  Treatment    Juan Manuel Koehler   MRN: [id number]   Admitting Diagnosis: Embolic stroke involving right cerebellar artery    PT Received On: 08/13/17  PT AM session: 3236-8321  PT PM session: 7037-9381  PT Total Time (min): 23 min +18 min = 41 min    Billable Minutes:  Gait Training 25 and Therapeutic Activity 15    Treatment Type: Treatment  PT/PTA: PT        General Precautions: Standard, aspiration, fall    Subjective:  Communicated with RN (Elise) prior to and after session.    "Kiara, you are here and I know you will help me walk. Could you come back in the afternoon if you have time to walk with me again for my wife to see?"    Pain/Comfort  Pain Rating 1: 0/10  Pain Rating Post-Intervention 1: 0/10    Objective:   Patient found with: PICC line, telemetry    Functional Mobility:  Bed Mobility:   Rolling/Turning to Left: Stand by assistance  Rolling/Turning Right: Stand by assistance  Scooting/Bridging: Stand by Assistance  Supine to Sit: Stand by Assistance, With side rail  Sit to Supine:  (pt remained UIC)    Transfers:  Sit <> Stand Assistance: Minimum Assistance, Contact Guard Assistance (from EOB x2 trials; from bedside chair)  Sit <> Stand Assistive Device: No Assistive Device, Rolling Walker  Bed <> Chair Technique: Stand Pivot  Bed <> Chair Assistance: Contact Guard Assistance  Bed <> Chair Assistive Device: No Assistive Device    Gait:   Gait Distance in AM: Pt ambulated x100 feet within room and hallway setting with use of RW and CGA/Min A for trunk stabilization from PT. Pt with x3-4 minor LOB to the R with min A for balance recovery from PT. Pt with poor balance recovery and scissoring noted upon LOB.     Gait Distance in PM: Pt ambulated x106 feet in hallway setting with use of RW and CGA/Min A from PT. Pt with no overt LOB this attempt and with good safety awareness. Pt req stabilization at trunk to prevent forward flexed posture.  Assistance 1: Contact Guard Assistance, " Minimum assistance  Gait Assistive Device: Rolling walker  Gait Pattern: reciprocal  Gait Deviation(s): decreased pedro [place]ablo, decreased weight-shifting ability, foot flat    Balance:   Static Sit: GOOD-: Takes MODERATE challenges from all directions but inconsistently  Dynamic Sit: GOOD-: Maintains balance through MODERATE excursions of active trunk movement,     Static Stand: POOR+: Needs MINIMAL assist to maintain  Dynamic stand: FAIR: Needs CONTACT GUARD during gait using RW    Therapeutic Activities and Exercises:  PT arrived to pt's room to find pt resting quietly; agreeable to PT session. Pt performed mobility as above. Upon return to sitting UIC, pt instructed on appropriate sit to stand mechanics using RW. Pt receptive to this and agreeable to remain UIC through lunch.    Upon PT's return in PM, pt agreeable to ambulation as above with return to supine. Pt, pt's spouse and PT reviewed expections for Rehab and current mobility needs. Commode provided for pt and RN aware of pt's mobility needs. Questions/concerns addressed within PT scope of practice.     AM-PAC 6 CLICK MOBILITY  How much help from another person does this patient currently need?   1 = Unable, Total/Dependent Assistance  2 = A lot, Maximum/Moderate Assistance  3 = A little, Minimum/Contact Guard/Supervision  4 = None, Modified [place]/Independent    Turning over in bed (including adjusting bedclothes, sheets and blankets)?: 4  Sitting down on and standing up from a chair with arms (e.g., wheelchair, bedside commode, etc.): 3  Moving from lying on back to sitting on the side of the bed?: 4  Moving to and from a bed to a chair (including a wheelchair)?: 3  Need to walk in hospital room?: 2  Climbing 3-5 steps with a railing?: 2  Total Score: 18    AM-PAC Raw Score CMS G-Code Modifier Level of Impairment Assistance   6 % Total / Unable   7 - 9 CM 80 - 100% Maximal Assist   10 - 14 CL 60 - 80% Moderate Assist   15 - 19 CK 40 - 60%  Moderate Assist   20 - 22 CJ 20 - 40% Minimal Assist   23 CI 1-20% SBA / CGA   24 CH 0% Independent/ Mod I     Patient left HOB elevated with all lines intact, call button in reach, RN notified and spouse present.    Assessment:  Juan Manuel Koehler is a 67 y.o. male with a medical diagnosis of Embolic stroke involving right cerebellar artery and presents with excellent participation and motivation with PT this date. Pt progressing well with use of RW; however, continues to be limited by ataxia and req CGA/min A throughout gait trials. 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 Pt is not at his baseline and will benefit from Rehab prior to D/C to return to (I) PLOF. Patient will benefit from continued PT services to address:    Rehab identified problem list/impairments: Rehab identified problem list/impairments: weakness, impaired endurance, impaired self care skills, impaired functional mobilty, gait instability, impaired balance, decreased upper extremity function, decreased lower extremity function, decreased safety awareness, abnormal tone, impaired coordination, impaired fine motor    Rehab potential is good.    Activity tolerance: Good    Discharge recommendations: Discharge Facility/Level Of Care Needs: rehabilitation facility     Barriers to discharge: Barriers to Discharge: Inaccessible home environment, Decreased caregiver support    Equipment recommendations: Equipment Needed After Discharge: 3-in-1 commode, bath bench, walker, rolling, wheelchair     GOALS:    Physical Therapy Goals        Problem: Physical Therapy Goal    Goal Priority Disciplines Outcome Goal Variances Interventions   Physical Therapy Goal     PT/OT, PT Ongoing (interventions implemented as appropriate)     Description:  Goals to be met by: 8/18/2017     Patient will increase functional independence with  mobility by performin. Supine to sit with supervision (CGA met )  2. Sit to supine with supervision (CGA met )  3. Sit to stand transfer with SBA using AD or No AD (CGA met )  4. Bed to chair transfer with CGA using AD or No AD (Met )  5. Gait x100 feet with SBA using AD or No AD and no LOB (Met )  6. Ascend/descend x1 flight of stairs with bilateral Handrails with Min A  7. Sitting at edge of bed x10 minutes with (I) while performing dynamic balance tasks (CGA x10 min Met )  8. Stand for x5 minutes with SBA using AD or No AD (Met )  9. Lower extremity exercise program x15 reps with supervision to maintain B LE coordination and strength                 PLAN:    Patient to be seen 6 x/week  to address the above listed problems via gait training, therapeutic activities, therapeutic exercises, neuromuscular re-education  Plan of Care expires: 17  Plan of Care reviewed with: patient     Kiara SHAILESH Uriostegui, PT, DPT  [phone]  2017

## 2017-08-13 NOTE — PLAN OF CARE
Problem: Patient Care Overview  Goal: Plan of Care Review  Outcome: Ongoing (interventions implemented as appropriate)  Patient has remained free of falls this shift. He is AAOx4 and VS's have been stable. PT walked him in the AM and dayshift nurse walked him that evening so patient has been sleeping all night shift. He is aware to call if he needs to get up. He knows that he is weak and could easily fall

## 2017-08-13 NOTE — SUBJECTIVE & OBJECTIVE
Neurologic Chief Complaint: embolic R  Cerebellar artery stroke    Subjective:     Interval History: Patient is seen for follow-up neurological assessment and treatment recommendations: Patient is eager for discharge to Rehab facility as soon as eligible. Patient reports no acute events, does mention chronic scrotal pain. Patient has been ambulating on floor.     HPI, Past Medical, Family, and Social History remains the same as documented in the initial encounter.     Review of Systems   Constitutional: Positive for appetite change.   Musculoskeletal: Positive for gait problem. Negative for arthralgias and back pain.   Psychiatric/Behavioral: Negative for behavioral problems.     Scheduled Meds:   ampicillin IVPB  2 g Intravenous Q4H    atorvastatin  40 mg Oral Daily    carvedilol  12.5 mg Oral BID    cefTRIAXone (ROCEPHIN) IVPB  2 g Intravenous Q12H    heparin (porcine)  5,000 Units Subcutaneous Q8H    lisinopril  20 mg Oral Daily    polyethylene glycol  17 g Oral Daily    senna-docusate 8.6-50 mg  1 tablet Oral BID    sodium chloride 0.9%  10 mL Intravenous Q6H    sodium chloride 0.9%  3 mL Intravenous Q8H     Continuous Infusions:   PRN Meds:acetaminophen, dextrose 50%, glucagon (human recombinant), hydrALAZINE, ondansetron, Flushing PICC Protocol **AND** sodium chloride 0.9% **AND** sodium chloride 0.9%    Objective:     Vital Signs (Most Recent):  Temp: 98 °F (36.7 °C) (08/13/17 0800)  Pulse: 74 (08/13/17 0800)  Resp: 18 (08/13/17 0800)  BP: (!) 141/79 (08/13/17 0800)  SpO2: 98 % (08/13/17 0800)  BP Location: Left arm    Vital Signs Range (Last 24H):  Temp:  [98 °F (36.7 °C)-99.3 °F (37.4 °C)]   Pulse:  [66-90]   Resp:  [16-18]   BP: (121-167)/(68-83)   SpO2:  [97 %-98 %]   BP Location: Left arm    Physical Exam   Constitutional: He appears well-developed and well-nourished.   HENT:   Head: Normocephalic.   Eyes: Pupils are equal, round, and reactive to light.   6th nerve and vertical palsy    Neck:  Neck supple.   Cardiovascular: Normal rate.    Pulmonary/Chest: Effort normal.   Abdominal: Soft. Bowel sounds are normal. He exhibits no distension. There is no tenderness. There is no guarding.   Neurological: He is alert. He displays normal reflexes. A cranial nerve deficit is present. He exhibits normal muscle tone. Coordination abnormal.   Skin: Skin is warm and dry.   Nursing note and vitals reviewed.      Neurological Exam:   Neurological Exam:   Alert, oriented to person and month/year  Equal sensation x 4   Full strength throughout  Gaze forward, left gaze better than right.   RUE ataxia        NIH Stroke Scale:     Level of Consciousness: 0 - alert  LOC Questions: 0 - answers both correctly  LOC Commands: 0 - performs both correctly  Best Gaze: 1 - partial gaze palsy (L 6th nerve palsy)  Visual: 0 - no visual loss  Facial Palsy: 0 - normal  Motor Left Arm: 0 - no drift  Motor Right Arm: 0 - no drift  Motor Left Le - no drift  Motor Right Le - no drift  Limb Ataxia: 1 - present in one limb (RUE)  Sensory: 0 - normal  Best Language: 0 - no aphasia  Dysarthria: 0 - normal articulation  Extinction and Inattention: 0 - no neglect  NIH Stroke Scale Total: 2    Stroke Scales  Laboratory:  CMP:   Recent Labs  Lab 17  0542   CALCIUM 8.5*   ALBUMIN 2.4*   PROT 6.5     137   K 4.5   CO2 20*      BUN 21   CREATININE 1.3   ALKPHOS 74   ALT 30   AST 19   BILITOT 0.2     BMP:   Recent Labs  Lab 17  0542     137   K 4.5      CO2 20*   BUN 21   CREATININE 1.3   CALCIUM 8.5*     CBC:   Recent Labs  Lab 17  0542   WBC 7.65   RBC 2.97*   HGB 8.4*   HCT 25.6*      MCV 86   MCH 28.3   MCHC 32.8     Lipid Panel: No results for input(s): CHOL, LDLCALC, HDL, TRIG in the last 168 hours.  Coagulation: No results for input(s): INR, APTT in the last 168 hours.    Invalid input(s): PT  Platelet Aggregation Study: No results for input(s): PLTAGG, PLTAGINTERP, PLTAGREGLACO,  ADPPLTAGGREG in the last 168 hours.  Hgb A1C: No results for input(s): HGBA1C in the last 168 hours.  TSH: No results for input(s): TSH in the last 168 hours.    Diagnostic Results:  I have personally reviewed: imaging  Findings: reviewed.

## 2017-08-13 NOTE — ASSESSMENT & PLAN NOTE
Stroke risk factor   SBP <140   -Coreg 12.5 BID, lisinopril 20mg QD, hydralazine PO PRN. Consider increasing or adding 3rd agent if patient remains hypertensive on current regimen

## 2017-08-13 NOTE — PLAN OF CARE
Problem: Physical Therapy Goal  Goal: Physical Therapy Goal  Goals to be met by: 2017     Patient will increase functional independence with mobility by performin. Supine to sit with supervision (CGA met )  2. Sit to supine with supervision (CGA met )  3. Sit to stand transfer with SBA using AD or No AD (CGA met )  4. Bed to chair transfer with CGA using AD or No AD (Met )  5. Gait x100 feet with SBA using AD or No AD and no LOB (Met )  6. Ascend/descend x1 flight of stairs with bilateral Handrails with Min A  7. Sitting at edge of bed x10 minutes with (I) while performing dynamic balance tasks (CGA x10 min Met )  8. Stand for x5 minutes with SBA using AD or No AD (Met )  9. Lower extremity exercise program x15 reps with supervision to maintain B LE coordination and strength      Outcome: Ongoing (interventions implemented as appropriate)    Goals updated and appropriate to reflect pt's current mobility needs.    Kiara Uriostegui, PT, DPT  [phone]  2017

## 2017-08-13 NOTE — ASSESSMENT & PLAN NOTE
Mitral valve vegetation seen on 8/3/17 ECHO; repeat ECHO negative for vegetation  Blood cultures 8/4 - Enterococcus Fecalis +; 8/9 Bcx NGTD  Currently on ceftriaxone 2g q12h, ampicillin 2g q4h per ID recs to complete a 6 week course. PICC line in situ.  Patient started current abx therapy on 8/5- so continue 6 weeks from start date. Stop date 09/16/17  -Rocephin course for Proteus UTI completed

## 2017-08-13 NOTE — PROGRESS NOTES
[facility]  Vascular Neurology  Comprehensive Stroke Center  Progress Note    Assessment/Plan:     8/5/17 - doing well, no events overnight, neuro surg and NCC monitoring for swelling/suboccipital crani watch   8/7/17 NAEON. Remains on 3% and cardene; hemicrani watch.   8/9/17 neurologically stable, repeat CTA stable  8/10/17 Stable CTH and neuro exam.   8/11/17 2% required restarting overnight due to hyponatremia. Patient will likely go to [facility] post-discharge. Continuing IV abx x 6 weeks.   8/12/ Patient reports no acute events overnight; persistent complaints of scrotal pain, being followed by Urology.   8/13: Patient now medically stable and continuing IV abx for 6 week course, planned disposition to [facility]. S/w Case Mgmt on call regarding eligibility for [facility] today vs possibly 8/14 pending approval.     * Embolic stroke involving right cerebellar artery    Mr. Koehler is a 68yo M with endocarditis and an acute R cerebellar infarct with edema, midline shift, as well as hemorrhagic conversion.     - Antithrombotics for secondary stroke prevention: Antiplatelets:  Aspirin: 325 mg oral now and daily  - Statins for secondary stroke prevention and hyperlipidemia, if present: None: Reason: consider if LDL>130; patient's likely etiology of stroke was septic emboli from endocarditis. LDL 76  -atorva 40mg QD   - Diagnostics: none pending   - VTE Prophylaxis: Heparin 5000 units SQ every 8 hours  - Endocarditis treatment per ID recs, ID consulted  - Neurosurgery aware  - PT/OT/ST; Rehab        Acute bacterial endocarditis    Mitral valve vegetation seen on 8/3/17 ECHO; repeat ECHO negative for vegetation  Blood cultures 8/4 - Enterococcus Fecalis +; 8/9 Bcx NGTD  Currently on ceftriaxone 2g q12h, ampicillin 2g q4h per ID recs to complete a 6 week course. PICC line in situ.  Patient started current abx therapy on 8/5- so continue 6 weeks from start date. Stop date  09/16/17  -Rocephin course for Proteus UTI completed        Hypertension    Stroke risk factor   SBP <140   -Coreg 12.5 BID, lisinopril 20mg QD, hydralazine PO PRN. Consider increasing or adding 3rd agent if patient remains hypertensive on current regimen        Cytotoxic cerebral edema    Due to stroke   Evident on imaging        NSTEMI (non-ST elevated myocardial infarction)    Per NCC ACS workup, trending troponins  -TN downtrending         Anemia of chronic disease    -daily CBC monitoring, counts stable             Neurologic Chief Complaint: embolic R  Cerebellar artery stroke    Subjective:     Interval History: Patient is seen for follow-up neurological assessment and treatment recommendations: Patient is eager for discharge to Rehab facility as soon as eligible. Patient reports no acute events, does mention chronic scrotal pain. Patient has been ambulating on floor.     HPI, Past Medical, Family, and Social History remains the same as documented in the initial encounter.     Review of Systems   Constitutional: Positive for appetite change.   Musculoskeletal: Positive for gait problem. Negative for arthralgias and back pain.   Psychiatric/Behavioral: Negative for behavioral problems.     Scheduled Meds:   ampicillin IVPB  2 g Intravenous Q4H    atorvastatin  40 mg Oral Daily    carvedilol  12.5 mg Oral BID    cefTRIAXone (ROCEPHIN) IVPB  2 g Intravenous Q12H    heparin (porcine)  5,000 Units Subcutaneous Q8H    lisinopril  20 mg Oral Daily    polyethylene glycol  17 g Oral Daily    senna-docusate 8.6-50 mg  1 tablet Oral BID    sodium chloride 0.9%  10 mL Intravenous Q6H    sodium chloride 0.9%  3 mL Intravenous Q8H     Continuous Infusions:   PRN Meds:acetaminophen, dextrose 50%, glucagon (human recombinant), hydrALAZINE, ondansetron, Flushing PICC Protocol **AND** sodium chloride 0.9% **AND** sodium chloride 0.9%    Objective:     Vital Signs (Most Recent):  Temp: 98 °F (36.7 °C) (08/13/17  0800)  Pulse: 74 (17 0800)  Resp: 18 (17 0800)  BP: (!) 141/79 (17 0800)  SpO2: 98 % (17 0800)  BP Location: Left arm    Vital Signs Range (Last 24H):  Temp:  [98 °F (36.7 °C)-99.3 °F (37.4 °C)]   Pulse:  [66-90]   Resp:  [16-18]   BP: (121-167)/(68-83)   SpO2:  [97 %-98 %]   BP Location: Left arm    Physical Exam   Constitutional: He appears well-developed and well-nourished.   HENT:   Head: Normocephalic.   Eyes: Pupils are equal, round, and reactive to light.   6th nerve and vertical palsy    Neck: Neck supple.   Cardiovascular: Normal rate.    Pulmonary/Chest: Effort normal.   Abdominal: Soft. Bowel sounds are normal. He exhibits no distension. There is no tenderness. There is no guarding.   Neurological: He is alert. He displays normal reflexes. A cranial nerve deficit is present. He exhibits normal muscle tone. Coordination abnormal.   Skin: Skin is warm and dry.   Nursing note and vitals reviewed.     Neurological Exam:   Alert, oriented to person and month/year  Equal sensation x 4   Full strength throughout  Gaze forward, left gaze better than right.   RUE ataxia        NIH Stroke Scale:     Level of Consciousness: 0 - alert  LOC Questions: 0 - answers both correctly  LOC Commands: 0 - performs both correctly  Best Gaze: 1 - partial gaze palsy (L 6th nerve palsy)  Visual: 0 - no visual loss  Facial Palsy: 0 - normal  Motor Left Arm: 0 - no drift  Motor Right Arm: 0 - no drift  Motor Left Le - no drift  Motor Right Le - no drift  Limb Ataxia: 1 - present in one limb (RUE)  Sensory: 0 - normal  Best Language: 0 - no aphasia  Dysarthria: 0 - normal articulation  Extinction and Inattention: 0 - no neglect  NIH Stroke Scale Total: 2    Stroke Scales  Laboratory:  CMP:   Recent Labs  Lab 17  0542   CALCIUM 8.5*   ALBUMIN 2.4*   PROT 6.5     137   K 4.5   CO2 20*      BUN 21   CREATININE 1.3   ALKPHOS 74   ALT 30   AST 19   BILITOT 0.2     BMP:   Recent Labs  Lab  08/13/17  0542     137   K 4.5      CO2 20*   BUN 21   CREATININE 1.3   CALCIUM 8.5*     CBC:   Recent Labs  Lab 08/13/17  0542   WBC 7.65   RBC 2.97*   HGB 8.4*   HCT 25.6*      MCV 86   MCH 28.3   MCHC 32.8     Lipid Panel: No results for input(s): CHOL, LDLCALC, HDL, TRIG in the last 168 hours.  Coagulation: No results for input(s): INR, APTT in the last 168 hours.    Invalid input(s): PT  Platelet Aggregation Study: No results for input(s): PLTAGG, PLTAGINTERP, PLTAGREGLACO, ADPPLTAGGREG in the last 168 hours.  Hgb A1C: No results for input(s): HGBA1C in the last 168 hours.  TSH: No results for input(s): TSH in the last 168 hours.    Diagnostic Results:  I have personally reviewed: imaging  Findings: reviewed.       Luisito Zhang MD  [facility]  Department of Vascular Neurology   [facility]

## 2017-08-14 LAB
ALBUMIN SERPL BCP-MCNC: 2.4 G/DL
ALP SERPL-CCNC: 76 U/L
ALT SERPL W/O P-5'-P-CCNC: 28 U/L
ANION GAP SERPL CALC-SCNC: 9 MMOL/L
AST SERPL-CCNC: 19 U/L
BACTERIA BLD CULT: NORMAL
BACTERIA BLD CULT: NORMAL
BASOPHILS # BLD AUTO: 0.03 K/UL
BASOPHILS NFR BLD: 0.3 %
BILIRUB SERPL-MCNC: 0.2 MG/DL
BUN SERPL-MCNC: 22 MG/DL
CALCIUM SERPL-MCNC: 8.4 MG/DL
CHLORIDE SERPL-SCNC: 107 MMOL/L
CO2 SERPL-SCNC: 20 MMOL/L
CREAT SERPL-MCNC: 1.3 MG/DL
DIFFERENTIAL METHOD: ABNORMAL
EOSINOPHIL # BLD AUTO: 0.1 K/UL
EOSINOPHIL NFR BLD: 1.6 %
ERYTHROCYTE [DISTWIDTH] IN BLOOD BY AUTOMATED COUNT: 15.4 %
EST. GFR  (AFRICAN AMERICAN): >60 ML/MIN/1.73 M^2
EST. GFR  (NON AFRICAN AMERICAN): 56.1 ML/MIN/1.73 M^2
GLUCOSE SERPL-MCNC: 122 MG/DL
HCT VFR BLD AUTO: 25.1 %
HGB BLD-MCNC: 8.3 G/DL
LYMPHOCYTES # BLD AUTO: 1.4 K/UL
LYMPHOCYTES NFR BLD: 15.7 %
MAGNESIUM SERPL-MCNC: 1.6 MG/DL
MCH RBC QN AUTO: 28.6 PG
MCHC RBC AUTO-ENTMCNC: 33.1 G/DL
MCV RBC AUTO: 87 FL
MONOCYTES # BLD AUTO: 0.6 K/UL
MONOCYTES NFR BLD: 7 %
NEUTROPHILS # BLD AUTO: 6.5 K/UL
NEUTROPHILS NFR BLD: 74.6 %
PHOSPHATE SERPL-MCNC: 4.3 MG/DL
PLATELET # BLD AUTO: 281 K/UL
PMV BLD AUTO: 9.1 FL
POCT GLUCOSE: 103 MG/DL (ref 70–110)
POCT GLUCOSE: 115 MG/DL (ref 70–110)
POCT GLUCOSE: 130 MG/DL (ref 70–110)
POCT GLUCOSE: 93 MG/DL (ref 70–110)
POTASSIUM SERPL-SCNC: 4.4 MMOL/L
PROT SERPL-MCNC: 6.5 G/DL
RBC # BLD AUTO: 2.9 M/UL
SODIUM SERPL-SCNC: 136 MMOL/L
WBC # BLD AUTO: 8.68 K/UL

## 2017-08-14 PROCEDURE — 25000003 PHARM REV CODE 250: Performed by: PSYCHIATRY & NEUROLOGY

## 2017-08-14 PROCEDURE — 85025 COMPLETE CBC W/AUTO DIFF WBC: CPT

## 2017-08-14 PROCEDURE — 84100 ASSAY OF PHOSPHORUS: CPT

## 2017-08-14 PROCEDURE — 25000003 PHARM REV CODE 250: Performed by: PHYSICIAN ASSISTANT

## 2017-08-14 PROCEDURE — 97530 THERAPEUTIC ACTIVITIES: CPT

## 2017-08-14 PROCEDURE — 80053 COMPREHEN METABOLIC PANEL: CPT

## 2017-08-14 PROCEDURE — A4216 STERILE WATER/SALINE, 10 ML: HCPCS | Performed by: PSYCHIATRY & NEUROLOGY

## 2017-08-14 PROCEDURE — 99233 SBSQ HOSP IP/OBS HIGH 50: CPT | Mod: ,,, | Performed by: PSYCHIATRY & NEUROLOGY

## 2017-08-14 PROCEDURE — 97110 THERAPEUTIC EXERCISES: CPT

## 2017-08-14 PROCEDURE — 20600001 HC STEP DOWN PRIVATE ROOM

## 2017-08-14 PROCEDURE — 97116 GAIT TRAINING THERAPY: CPT

## 2017-08-14 PROCEDURE — 63600175 PHARM REV CODE 636 W HCPCS: Performed by: INTERNAL MEDICINE

## 2017-08-14 PROCEDURE — 83735 ASSAY OF MAGNESIUM: CPT

## 2017-08-14 PROCEDURE — 92507 TX SP LANG VOICE COMM INDIV: CPT

## 2017-08-14 PROCEDURE — 25000003 PHARM REV CODE 250: Performed by: INTERNAL MEDICINE

## 2017-08-14 PROCEDURE — 25000003 PHARM REV CODE 250: Performed by: NURSE PRACTITIONER

## 2017-08-14 PROCEDURE — A4216 STERILE WATER/SALINE, 10 ML: HCPCS | Performed by: NURSE PRACTITIONER

## 2017-08-14 PROCEDURE — 99232 SBSQ HOSP IP/OBS MODERATE 35: CPT | Mod: ,,, | Performed by: NURSE PRACTITIONER

## 2017-08-14 PROCEDURE — 63600175 PHARM REV CODE 636 W HCPCS: Performed by: PHYSICIAN ASSISTANT

## 2017-08-14 RX ORDER — ASPIRIN 81 MG/1
81 TABLET ORAL DAILY
Status: DISCONTINUED | OUTPATIENT
Start: 2017-08-14 | End: 2017-08-15 | Stop reason: HOSPADM

## 2017-08-14 RX ADMIN — CARVEDILOL 12.5 MG: 12.5 TABLET, FILM COATED ORAL at 09:08

## 2017-08-14 RX ADMIN — HEPARIN SODIUM 5000 UNITS: 5000 INJECTION, SOLUTION INTRAVENOUS; SUBCUTANEOUS at 01:08

## 2017-08-14 RX ADMIN — Medication 3 ML: at 05:08

## 2017-08-14 RX ADMIN — AMPICILLIN SODIUM 2 G: 2 INJECTION, POWDER, FOR SOLUTION INTRAMUSCULAR; INTRAVENOUS at 08:08

## 2017-08-14 RX ADMIN — Medication 10 ML: at 01:08

## 2017-08-14 RX ADMIN — SODIUM CHLORIDE, PRESERVATIVE FREE 10 ML: 5 INJECTION INTRAVENOUS at 05:08

## 2017-08-14 RX ADMIN — HEPARIN SODIUM 5000 UNITS: 5000 INJECTION, SOLUTION INTRAVENOUS; SUBCUTANEOUS at 09:08

## 2017-08-14 RX ADMIN — STANDARDIZED SENNA CONCENTRATE AND DOCUSATE SODIUM 1 TABLET: 8.6; 5 TABLET, FILM COATED ORAL at 09:08

## 2017-08-14 RX ADMIN — Medication 3 ML: at 09:08

## 2017-08-14 RX ADMIN — HEPARIN SODIUM 5000 UNITS: 5000 INJECTION, SOLUTION INTRAVENOUS; SUBCUTANEOUS at 05:08

## 2017-08-14 RX ADMIN — AMPICILLIN SODIUM 2 G: 2 INJECTION, POWDER, FOR SOLUTION INTRAMUSCULAR; INTRAVENOUS at 04:08

## 2017-08-14 RX ADMIN — POLYETHYLENE GLYCOL 3350 17 G: 17 POWDER, FOR SOLUTION ORAL at 08:08

## 2017-08-14 RX ADMIN — ATORVASTATIN CALCIUM 40 MG: 20 TABLET, FILM COATED ORAL at 08:08

## 2017-08-14 RX ADMIN — Medication 10 ML: at 12:08

## 2017-08-14 RX ADMIN — Medication 10 ML: at 05:08

## 2017-08-14 RX ADMIN — LISINOPRIL 20 MG: 20 TABLET ORAL at 08:08

## 2017-08-14 RX ADMIN — AMPICILLIN SODIUM 2 G: 2 INJECTION, POWDER, FOR SOLUTION INTRAMUSCULAR; INTRAVENOUS at 11:08

## 2017-08-14 RX ADMIN — ASPIRIN 81 MG: 81 TABLET, COATED ORAL at 11:08

## 2017-08-14 RX ADMIN — Medication 3 ML: at 01:08

## 2017-08-14 RX ADMIN — CEFTRIAXONE 2 G: 2 INJECTION, SOLUTION INTRAVENOUS at 03:08

## 2017-08-14 RX ADMIN — CARVEDILOL 12.5 MG: 12.5 TABLET, FILM COATED ORAL at 08:08

## 2017-08-14 RX ADMIN — ACETAMINOPHEN 650 MG: 325 TABLET ORAL at 09:08

## 2017-08-14 RX ADMIN — CEFTRIAXONE 2 G: 2 INJECTION, SOLUTION INTRAVENOUS at 04:08

## 2017-08-14 RX ADMIN — STANDARDIZED SENNA CONCENTRATE AND DOCUSATE SODIUM 1 TABLET: 8.6; 5 TABLET, FILM COATED ORAL at 08:08

## 2017-08-14 NOTE — ASSESSMENT & PLAN NOTE
Stroke risk factor   SBP <140 - at goal  -Coreg 12.5 BID, lisinopril 20mg QD, hydralazine PO PRN

## 2017-08-14 NOTE — SUBJECTIVE & OBJECTIVE
Past Medical History:   Diagnosis Date    Cancer     bladder and throat    CKD (chronic kidney disease) stage 3, GFR 30-59 ml/min     Embolic stroke involving right cerebellar artery 8/4/2017    Urinary tract infection      Past Surgical History:   Procedure Laterality Date    BLADDER SURGERY      CYSTOSCOPY      HERNIA REPAIR      Ileal Conduit      THROAT SURGERY       Review of patient's allergies indicates:  No Known Allergies    Scheduled Medications:    ampicillin IVPB  2 g Intravenous Q4H    atorvastatin  40 mg Oral Daily    carvedilol  12.5 mg Oral BID    cefTRIAXone (ROCEPHIN) IVPB  2 g Intravenous Q12H    heparin (porcine)  5,000 Units Subcutaneous Q8H    lisinopril  20 mg Oral Daily    polyethylene glycol  17 g Oral Daily    senna-docusate 8.6-50 mg  1 tablet Oral BID    sodium chloride 0.9%  10 mL Intravenous Q6H    sodium chloride 0.9%  3 mL Intravenous Q8H       PRN Medications: acetaminophen, dextrose 50%, glucagon (human recombinant), hydrALAZINE, ondansetron, Flushing PICC Protocol **AND** sodium chloride 0.9% **AND** sodium chloride 0.9%    Family History     Problem Relation (Age of Onset)    Kidney disease Mother    No Known Problems Father        Social History Main Topics    Smoking status: Never Smoker    Smokeless tobacco: Never Used    Alcohol use No    Drug use: No    Sexual activity: Yes     Partners: Female     Birth control/ protection: None     Review of Systems   Constitutional: Positive for appetite change (decreased). Negative for chills, fatigue and fever.   HENT: Negative for drooling, hearing loss, trouble swallowing and voice change.    Eyes: Negative for pain and visual disturbance.   Respiratory: Negative for cough, shortness of breath and wheezing.    Cardiovascular: Negative for chest pain and palpitations.   Gastrointestinal: Negative for abdominal pain, nausea and vomiting.   Genitourinary: Negative for difficulty urinating and flank pain.    Musculoskeletal: Negative for arthralgias, back pain, myalgias and neck pain.   Skin: Negative for rash and wound.   Neurological: Negative for dizziness (improving), weakness and headaches (improving).   Psychiatric/Behavioral: Negative for agitation and hallucinations. The patient is not nervous/anxious.      Objective:     Vital Signs (Most Recent):  Temp: 97.7 °F (36.5 °C) (17 0745)  Pulse: 80 (17 0745)  Resp: 17 (17 0745)  BP: 128/71 (17 0841)  SpO2: 97 % (17 0435)    Vital Signs (24h Range):  Temp:  [97.2 °F (36.2 °C)-99.7 °F (37.6 °C)] 97.7 °F (36.5 °C)  Pulse:  [] 80  Resp:  [16-18] 17  SpO2:  [97 %-98 %] 97 %  BP: (109-133)/(58-78) 128/71     Body mass index is 17.78 kg/m².    Physical Exam   Constitutional: He appears well-developed and well-nourished. No distress.   HENT:   Head: Normocephalic and atraumatic.   Right Ear: External ear normal.   Left Ear: External ear normal.   Nose: Nose normal.   Eyes: Right eye exhibits no discharge. Left eye exhibits no discharge. No scleral icterus.   Neck: Normal range of motion.   Cardiovascular: Normal rate, regular rhythm and intact distal pulses.    Pulmonary/Chest: Effort normal. No respiratory distress. He has no wheezes.   Abdominal: Soft. He exhibits no distension. There is no tenderness.   Musculoskeletal: Normal range of motion. He exhibits no edema or tenderness.   Neurological:   -  Mental Status:  AAOx3.  Follows commands.  Answers correct age and .  Recent and remote memory intact.  -  Speech and language:  no aphasia or dysarthria.    -  Coordination:  Finger to nose exam:  RUE dysmetria improving, LUE dysmetria improving.  Heel to shin:  RLE normal, LLE normal.  -  Motor:  No pronator drift. RUE: 5/5.  LUE: 5/5.  RLE: 5/5.  LLE: 5/5.  -  Tone:  Normal.  -  Sensory:  Intact to light touch and pin prick.   Skin: Skin is warm and dry. No rash noted.   Psychiatric: He has a normal mood and affect. His behavior is  normal. Thought content normal.   Vitals reviewed.         Diagnostic Results:   Labs: Reviewed  CT: Reviewed  MRI: Reviewed  CTA: Reviewed

## 2017-08-14 NOTE — ASSESSMENT & PLAN NOTE
Mr. Koehler is a 66yo M with endocarditis and an acute R cerebellar infarct with edema, midline shift, as well as hemorrhagic conversion.     - Antithrombotics for secondary stroke prevention: Antiplatelets:  Aspirin: 81 mg oral now and daily  - Statins for secondary stroke prevention and hyperlipidemia, if present: LDL 76; on atorvastatin 40mg QD; etiology of stroke however is likely due to septic emboli from endocarditis. Will d/c atorvastatin  - Diagnostics: none pending   - VTE Prophylaxis: Heparin 5000 units SQ every 8 hours  - Endocarditis treatment per ID recs  - Neurosurgery aware and has signed off due to stability of cerebellar swelling. Serial CTH negative for hydrocephalus   - PT/OT/ST; recommending Rehab

## 2017-08-14 NOTE — ASSESSMENT & PLAN NOTE
-  On 8/4/17, patient found to have AMS, aphasia, and facial droop.  -  CTH showed R cerebellar hypodensity with mass effect and midline shift.  -  MRI/MRA revealed R cerebellar infarct with hemorrhagic conversion.    -  Evaluated by neurosurgery and no acute surgical intervention necessary- started on Cardene and hypertonic infusion.  -  Off HTS on 8/10  -  Stepped down to floor on 8/11    Functional status: see hospital course  Cognitive/Speech/Language status:  See hospital course  Nutrition/Swallow Status:  Passed bedside swallow evaluation.  SLP recommended regular diet and thin liquids.    Recommendations  -  Encourage mobility, OOB in chair at least 3 hours per day, and early ambulation as appropriate   -  PT/OT evaluate and treat  -  SLP speech and cognitive evaluate and treat  -  Monitor sleep disturbances and establish consistent sleep-wake cycle  -  Monitor for bowel and bladder dysfunction  -  Monitor for shoulder pain and subluxation  -  Monitor for spasticity  -  Monitor for and prevent skin breakdown and pressure ulcers  · Early mobility, repositioning/weight shifting every 20-30 minutes when sitting, turn patient every 2 hours, proper mattress/overlay and chair cushioning, pressure relief/heel protector boots  -  DVT prophylaxis:  Heparin scheduled   -  Reviewed discharge options (IP rehab, SNF, HH therapy, and OP therapy)

## 2017-08-14 NOTE — PLAN OF CARE
Problem: Patient Care Overview  Goal: Individualization & Mutuality  Admit Date:8/4/2017  5:24 PM    Admit Dx:  Cerebellar stroke [I63.9]  Cerebellar stroke [I63.9]    PMH  Past Medical History:  No date: Cancer      Comment: bladder and throat  No date: CKD (chronic kidney disease) stage 3, GFR 30-5*  8/4/2017: Embolic stroke involving right cerebellar corry*  No date: Urinary tract infection    PSH  Past Surgical History:  No date: BLADDER SURGERY  No date: CYSTOSCOPY  No date: HERNIA REPAIR  No date: Ileal Conduit  No date: THROAT SURGERY    Pt likes multiple blankets and tv on.    Outcome: Ongoing (interventions implemented as appropriate)  POC reviewed with patient. Vital signs stable. No deficits from stroke. Cardiac monitoring in use. Receives IV antibiotic via RAC PICC for endocarditis. Patient remains afebrile. Urostomy draining clear,yellow urine in  bag. SCDs in place. No falls or injuries during this shift. Safety precautions maintained. No skin breakdown at this time. Assessment per flowsheet. Will continue to progress toward goals.

## 2017-08-14 NOTE — PROGRESS NOTES
[facility]  Physical Medicine & Rehab  Progress Note    Patient Name: Juan Manuel Koehler  MRN: [id number]  Admission Date: 8/4/2017  Length of Stay: 10 days  Attending Physician: Noreen Hinkle MD  Collaborating Physician: James Randle MD    Subjective:     Principal Problem:Embolic stroke involving right cerebellar artery    Interval History (08/14/2017): Patient is seen for follow-up rehab evaluation and recommendations.  No acute events over night.  Stepped down to floor on Friday.  Doing well, dizziness and nausea improving.  Participating with therapy.  Barriers for discharge/rehab admission: Insurance approval pending for rehab admission.      HPI, Past Medical, Surgical, Family, and Social History remains the same as documented in the initial encounter.    Hospital Course:   8/5/17:  Evaluated by therapy.  Bed mobility CGA-maxA.  Transfers and ADLs deferred 2/2 N/V.  Passed bedside swallow evaluation.  SLP recommending regular diet and thin liquids.  8/7/17:  Bed mobility SBA-CGA.  Sit to stand Charan and transfers Charan.  UBD Charan and LBD modA.  Evaluated by SLP.  Found to have cognitive-linguistic impairment and visvo-spatial impairment.   8/8/17:  Bed mobility SBA.  Sit to stand Charan and transfers Charan.  Ambulated 4 steps Charan.  UBD Charan and LBD Charan.  8/9/17:  Bed mobility SBA-CGA.  Sit to stand Charan and transfers modA. UBD Charan and LBD modA.  8/10/17:  Bed mobility SBA.  Sit to stand Charan and transfers min-modA.  Ambulated 4 steps min-modA.  8/13/17:  Bed mobility SBA.  Sit to stand CGA-Charan and transfers CGA.  Ambulated 100 ft CGA-Charan with RW.    AM-PAC 6 CLICK MOBILITY (PT) - Total score: 16 (8/5), 16 (8/7), 16 (8/10), 18 (8/13)  AM-PAC 6 CLICK ADL (OT) - Total score: 6 (8/5), 13 (8/7), 12 (8/8), 13 (8/9), 14 (8/10), 19 (8/12)    AM-PAC Raw Score Level of Impairment Assistance   6 100% Total / Unable   7 - 8 80 - 100% Maximal Assist   9-13 60 - 80% Moderate Assist   14 - 19 40 -  60% Moderate Assist   20 - 22 20 - 40% Minimal Assist   23 1-20% SBA / CGA   24 0% Independent/ Mod I     Past Medical History:   Diagnosis Date    Cancer     bladder and throat    CKD (chronic kidney disease) stage 3, GFR 30-59 ml/min     Embolic stroke involving right cerebellar artery 8/4/2017    Urinary tract infection      Past Surgical History:   Procedure Laterality Date    BLADDER SURGERY      CYSTOSCOPY      HERNIA REPAIR      Ileal Conduit      THROAT SURGERY       Review of patient's allergies indicates:  No Known Allergies    Scheduled Medications:    ampicillin IVPB  2 g Intravenous Q4H    atorvastatin  40 mg Oral Daily    carvedilol  12.5 mg Oral BID    cefTRIAXone (ROCEPHIN) IVPB  2 g Intravenous Q12H    heparin (porcine)  5,000 Units Subcutaneous Q8H    lisinopril  20 mg Oral Daily    polyethylene glycol  17 g Oral Daily    senna-docusate 8.6-50 mg  1 tablet Oral BID    sodium chloride 0.9%  10 mL Intravenous Q6H    sodium chloride 0.9%  3 mL Intravenous Q8H       PRN Medications: acetaminophen, dextrose 50%, glucagon (human recombinant), hydrALAZINE, ondansetron, Flushing PICC Protocol **AND** sodium chloride 0.9% **AND** sodium chloride 0.9%    Family History     Problem Relation (Age of Onset)    Kidney disease Mother    No Known Problems Father        Social History Main Topics    Smoking status: Never Smoker    Smokeless tobacco: Never Used    Alcohol use No    Drug use: No    Sexual activity: Yes     Partners: Female     Birth control/ protection: None     Review of Systems   Constitutional: Positive for appetite change (decreased). Negative for chills, fatigue and fever.   HENT: Negative for drooling, hearing loss, trouble swallowing and voice change.    Eyes: Negative for pain and visual disturbance.   Respiratory: Negative for cough, shortness of breath and wheezing.    Cardiovascular: Negative for chest pain and palpitations.   Gastrointestinal: Negative for  abdominal pain, nausea and vomiting.   Genitourinary: Negative for difficulty urinating and flank pain.   Musculoskeletal: Negative for arthralgias, back pain, myalgias and neck pain.   Skin: Negative for rash and wound.   Neurological: Negative for dizziness (improving), weakness and headaches (improving).   Psychiatric/Behavioral: Negative for agitation and hallucinations. The patient is not nervous/anxious.      Objective:     Vital Signs (Most Recent):  Temp: 97.7 °F (36.5 °C) (17 0745)  Pulse: 80 (17 0745)  Resp: 17 (17 0745)  BP: 128/71 (17 0841)  SpO2: 97 % (17 0435)    Vital Signs (24h Range):  Temp:  [97.2 °F (36.2 °C)-99.7 °F (37.6 °C)] 97.7 °F (36.5 °C)  Pulse:  [] 80  Resp:  [16-18] 17  SpO2:  [97 %-98 %] 97 %  BP: (109-133)/(58-78) 128/71     Body mass index is 17.78 kg/m².    Physical Exam   Constitutional: He appears well-developed and well-nourished. No distress.   HENT:   Head: Normocephalic and atraumatic.   Right Ear: External ear normal.   Left Ear: External ear normal.   Nose: Nose normal.   Eyes: Right eye exhibits no discharge. Left eye exhibits no discharge. No scleral icterus.   Neck: Normal range of motion.   Cardiovascular: Normal rate, regular rhythm and intact distal pulses.    Pulmonary/Chest: Effort normal. No respiratory distress. He has no wheezes.   Abdominal: Soft. He exhibits no distension. There is no tenderness.   Musculoskeletal: Normal range of motion. He exhibits no edema or tenderness.   Neurological:   -  Mental Status:  AAOx3.  Follows commands.  Answers correct age and .  Recent and remote memory intact.  -  Speech and language:  no aphasia or dysarthria.    -  Coordination:  Finger to nose exam:  RUE dysmetria improving, LUE dysmetria improving.  Heel to shin:  RLE normal, LLE normal.  -  Motor:  No pronator drift. RUE: 5/5.  LUE: 5/5.  RLE: 5/5.  LLE: 5/5.  -  Tone:  Normal.  -  Sensory:  Intact to light touch and pin prick.    Skin: Skin is warm and dry. No rash noted.   Psychiatric: He has a normal mood and affect. His behavior is normal. Thought content normal.   Vitals reviewed.    Diagnostic Results:   Labs: Reviewed  CT: Reviewed  MRI: Reviewed  CTA: Reviewed    Assessment/Plan:      Bacteremia due to Enterococcus    -  Blood cultures grew enterococcus faecalis  -  See acute bacterial endocarditis        Acute bacterial endocarditis    -  Blood cultures 8/4/17 +enterococcus faecalis, 8/9/17 NGTD  -  ECHO showed vegetation on atrial surface of mitral valve consistent with endocarditis  -  ID following and recommending ampicillin x 6 weeks- stop date 9/16/17        NSTEMI (non-ST elevated myocardial infarction)    -  Elevated troponin on admission at [facility], trending down  -  Asymptomatic   -  2/2 demand ischemia         * Embolic stroke involving right cerebellar artery    -  On 8/4/17, patient found to have AMS, aphasia, and facial droop.  -  CTH showed R cerebellar hypodensity with mass effect and midline shift.  -  MRI/MRA revealed R cerebellar infarct with hemorrhagic conversion.    -  Evaluated by neurosurgery and no acute surgical intervention necessary- started on Cardene and hypertonic infusion.  -  Off HTS on 8/10  -  Stepped down to floor on 8/11    Functional status: see hospital course  Cognitive/Speech/Language status:  See hospital course  Nutrition/Swallow Status:  Passed bedside swallow evaluation.  SLP recommended regular diet and thin liquids.    Recommendations  -  Encourage mobility, OOB in chair at least 3 hours per day, and early ambulation as appropriate   -  PT/OT evaluate and treat  -  SLP speech and cognitive evaluate and treat  -  Monitor sleep disturbances and establish consistent sleep-wake cycle  -  Monitor for bowel and bladder dysfunction  -  Monitor for shoulder pain and subluxation  -  Monitor for spasticity  -  Monitor for and prevent skin breakdown and pressure ulcers  · Early mobility,  repositioning/weight shifting every 20-30 minutes when sitting, turn patient every 2 hours, proper mattress/overlay and chair cushioning, pressure relief/heel protector boots  -  DVT prophylaxis:  Heparin scheduled   -  Reviewed discharge options (IP rehab, SNF, HH therapy, and OP therapy)          Patient approved for [facility].  Insurance pending admission.  Transfer to rehab when insurance clears and rehab bed available.      TWYLA Arteaga  Department of Physical Medicine & Rehab   [facility]

## 2017-08-14 NOTE — PT/OT/SLP PROGRESS
Occupational Therapy  Treatment    Juan Manuel Koehler   MRN: [id number]   Admitting Diagnosis: Embolic stroke involving right cerebellar artery    OT Date of Treatment: 08/14/17   OT Start Time: 1432  OT Stop Time: 1455  OT Total Time (min): 23 min    Billable Minutes:  Therapeutic Exercise 23 minutes    General Precautions: Standard, aspiration, fall  Orthopedic Precautions: N/A  Braces: N/A    Do you have any cultural, spiritual, Gnosticism conflicts, given your current situation?: Voodoo    Subjective:  Communicated with nurse prior to session.  Pt agreeable to skilled OT services.    Pain/Comfort  Pain Rating 1: 0/10  Pain Rating Post-Intervention 1: 0/10    Objective:  Patient found with: PICC line, telemetry   Pt received w/ HOB elevated.    Functional Mobility:  Bed Mobility:  Rolling/Turning Right: Supervision  Scooting/Bridging: Supervision  Supine to Sit: Supervision  Sit to Supine: Supervision    Transfers:   Sit <> Stand Assistance: Stand By Assistance  Sit <> Stand Assistive Device: Rolling Walker    Functional Ambulation: only static stand.    Balance:   Static Sit: GOOD: Takes MODERATE challenges from all directions  Dynamic Sit: GOOD-: Maintains balance through MODERATE excursions of active trunk movement,     Static Stand: FAIR+: Takes MINIMAL challenges from all directions  Dynamic stand: FAIR+: Needs CLOSE SUPERVISION during gait and is able to right self with minor LOB    Therapeutic Activities and Exercises:  Pt educated on POC.  Pt performed BUE strengthening of shoulder external rotation for 3x10 reps w/ red resistive theraband.   Pt performed BUE strengthening of horizontal abduction for 3x10 reps w/ red resistive theraband.   Pt performed sit and reach activity of hitting static targets at varying heights and distances for 2x10 reps while crossing midline. 2 minor LOB, but pt was able to self-correct.  Pt performed modified sit up w/ slight HOB elevation for 3x10 reps.  Pt performed BUE hand  "strengthening of gross grasp squeezes for 3x10 reps w/ resistive ball.  Pt performed sit<>stand for 2x10 reps from EOB at sba w/ RW.     AM-PAC 6 CLICK ADL   How much help from another person does this patient currently need?   1 = Unable, Total/Dependent Assistance  2 = A lot, Maximum/Moderate Assistance  3 = A little, Minimum/Contact Guard/Supervision  4 = None, Modified [place]/Independent    Putting on and taking off regular lower body clothing? : 3  Bathing (including washing, rinsing, drying)?: 3  Toileting, which includes using toilet, bedpan, or urinal? : 3  Putting on and taking off regular upper body clothing?: 4  Taking care of personal grooming such as brushing teeth?: 4  Eating meals?: 4  Total Score: 21     AM-PAC Raw Score CMS "G-Code Modifier Level of Impairment Assistance   6 % Total / Unable   7 - 8 CM 80 - 100% Maximal Assist   9-13 CL 60 - 80% Moderate Assist   14 - 19 CK 40 - 60% Moderate Assist   20 - 22 CJ 20 - 40% Minimal Assist   23 CI 1-20% SBA / CGA   24 CH 0% Independent/ Mod I       Patient left HOB elevated with call button in reach and spouse present    ASSESSMENT:  Juan Manuel Koehler is a 67 y.o. male with a medical diagnosis of Embolic stroke involving right cerebellar artery and presents with impairments listed below. Pt is progressing well towards goals. Pt displayed good strength w/ sit<>stand activity. Pt also displayed good endurance throughout session. Pt would benefit from skilled OT services to improve independence and overall occupational functioning.    Rehab identified problem list/impairments: Rehab identified problem list/impairments: weakness, impaired endurance, impaired self care skills, impaired functional mobilty, gait instability, impaired balance    Rehab potential is good.    Activity tolerance: Good    Discharge recommendations: Discharge Facility/Level Of Care Needs: rehabilitation facility     Barriers to discharge: Barriers to Discharge: Decreased " caregiver support    Equipment recommendations: 3-in-1 commode, bath bench, walker, rolling     GOALS:    Occupational Therapy Goals        Problem: Occupational Therapy Goal    Goal Priority Disciplines Outcome Interventions   Occupational Therapy Goal     OT, PT/OT     Description:  Goals set 8/7 to be addressed for 14 days with expiration date, 8/21:  Patient will increase functional independence with ADLs by performing:    Patient will demonstrate rolling to the right with modified independence.  Not met   Patient will demonstrate rolling to the left with modified independence.   Not met  Patient will demonstrate supine -sit with modified independence.   Not met  Patient will demonstrate stand pivot transfers with CGA.   Not met  Patient will demonstrate grooming while standing with CGA.   Not met  Patient will demonstrate upper body dressing with SBA while seated EOB.   Not met  Patient will demonstrate lower body dressing with CGA while seated EOB.   Not met  Patient will demonstrate toileting with CGA.   Not met  Patient's family / caregiver will demonstrate independence and safety with assisting patient with self-care skills and functional mobility.     Not met  Patient and/or patient's family will verbalize understanding of stroke prevention guidelines, personal risk factors and stroke warning signs via teachback method.  Not met                            Plan:  Patient to be seen 6 x/week to address the above listed problems via self-care/home management, therapeutic activities, therapeutic exercises, neuromuscular re-education, cognitive retraining  Plan of Care expires: 09/02/17  Plan of Care reviewed with: patient    Marques Graham, OT  08/14/2017

## 2017-08-14 NOTE — PLAN OF CARE
Problem: SLP Goal  Goal: SLP Goal  Speech Language Pathology Goals  Updated Goals expected to be met by 8/14    1. Pt will tolerate regular diet and thin liquids without s/s of airway compromise.   2. Pt will generate 10 items within a category independently to enhance cognition  3. Pt will recall 2/3 items with a delay independently to enhance memory   4. Pt will complete cancellation task with 90% acc independently to enhance visual-spatial skills   5. Pt will complete mental flexibility tasks with 80% independently to enhance cognition   6. Pt will follow 2 step directions with 80% acc with min cues to enhance comprehension skills   7. Pt will participate in ongoing assessment of reading/money/math/time management skills               Outcome: Ongoing (interventions implemented as appropriate)  Pt. Progressing toward goals.    Susan Carranza MA/GET-SLP  Speech Language Pathologist  Pager [phone]  8/14/2017

## 2017-08-14 NOTE — PROGRESS NOTES
POC reviewed with pt and wife. Pt is AAOx4. Safety precautions initiated. No changes throughout shift. Will continue to monitor.

## 2017-08-14 NOTE — PROGRESS NOTES
[facility]  Vascular Neurology  Comprehensive Stroke Center  Progress Note    Assessment/Plan:     8/5/17 - doing well, no events overnight, neuro surg and NCC monitoring for swelling/suboccipital crani watch   8/7/17 NAEON. Remains on 3% and cardene; hemicrani watch.   8/9/17 neurologically stable, repeat CTA stable  8/10/17 Stable CTH and neuro exam.   8/11/17 2% required restarting overnight due to hyponatremia. Patient will likely go to [facility] post-discharge. Continuing IV abx x 6 weeks.   8/12/ Patient reports no acute events overnight; persistent complaints of scrotal pain, being followed by Urology.   8/13: Patient now medically stable and continuing IV abx for 6 week course, planned disposition to Rehab. S/w Case Mgmt on call regarding eligibility for [facility] today.   8/14/17 NAEON. Awaiting placement to rehab.     * Embolic stroke involving right cerebellar artery    Mr. Koehler is a 68yo M with endocarditis and an acute R cerebellar infarct with edema, midline shift, as well as hemorrhagic conversion.     - Antithrombotics for secondary stroke prevention: Antiplatelets:  Aspirin: 81 mg oral now and daily  - Statins for secondary stroke prevention and hyperlipidemia, if present: LDL 76; on atorvastatin 40mg QD; etiology of stroke however is likely due to septic emboli from endocarditis. Will d/c atorvastatin  - Diagnostics: none pending   - VTE Prophylaxis: Heparin 5000 units SQ every 8 hours  - Endocarditis treatment per ID recs  - Neurosurgery aware and has signed off due to stability of cerebellar swelling. Serial CTH negative for hydrocephalus   - PT/OT/ST; recommending Rehab        Cytotoxic cerebral edema    Due to stroke   Evident on imaging        Acute bacterial endocarditis    Mitral valve vegetation seen on 8/3/17 ECHO; repeat ECHO negative for vegetation  Blood cultures 8/4 - Enterococcus Fecalis +; 8/9 Bcx NGTD  Currently on ceftriaxone 2g q12h, ampicillin 2g q4h  per ID recs to complete a 6 week course. PICC line in situ.  Patient started current abx therapy on 8/5- so continue 6 weeks from start date. Stop date 09/16/17  -Rocephin course for Proteus UTI completed        NSTEMI (non-ST elevated myocardial infarction)    Per [facility] ACS workup, trending troponins  -TN downtrending on admission   - ASA 81mg daily         Hypertension    Stroke risk factor   SBP <140 - at goal  -Coreg 12.5 BID, lisinopril 20mg QD, hydralazine PO PRN          Anemia of chronic disease    -daily CBC monitoring, counts stable             Neurologic Chief Complaint: embolic R  Cerebellar artery stroke    Subjective:     Interval History: Patient is seen for follow-up neurological assessment and treatment recommendations:   Patient is eager for discharge to Rehab facility as soon as eligible. NAEON. Will start 81mg aspirin for secondary stroke prevention.     HPI, Past Medical, Family, and Social History remains the same as documented in the initial encounter.     Review of Systems   Constitutional: Positive for appetite change.   Gastrointestinal: Negative for diarrhea, nausea and vomiting.   Musculoskeletal: Positive for gait problem. Negative for arthralgias and back pain.   Neurological: Negative for headaches.   Psychiatric/Behavioral: Negative for agitation and behavioral problems.     Scheduled Meds:   ampicillin IVPB  2 g Intravenous Q4H    atorvastatin  40 mg Oral Daily    carvedilol  12.5 mg Oral BID    cefTRIAXone (ROCEPHIN) IVPB  2 g Intravenous Q12H    heparin (porcine)  5,000 Units Subcutaneous Q8H    lisinopril  20 mg Oral Daily    polyethylene glycol  17 g Oral Daily    senna-docusate 8.6-50 mg  1 tablet Oral BID    sodium chloride 0.9%  10 mL Intravenous Q6H    sodium chloride 0.9%  3 mL Intravenous Q8H     Continuous Infusions:   PRN Meds:acetaminophen, dextrose 50%, glucagon (human recombinant), hydrALAZINE, ondansetron, Flushing PICC Protocol **AND** sodium chloride 0.9%  **AND** sodium chloride 0.9%    Objective:     Vital Signs (Most Recent):  Temp: 97.7 °F (36.5 °C) (17 0745)  Pulse: 80 (17 0745)  Resp: 17 (17 0745)  BP: 128/71 (17 0841)  SpO2: 97 % (17 0435)  BP Location: Right arm    Vital Signs Range (Last 24H):  Temp:  [97.2 °F (36.2 °C)-99.7 °F (37.6 °C)]   Pulse:  []   Resp:  [16-18]   BP: (109-133)/(58-78)   SpO2:  [97 %-98 %]   BP Location: Right arm    Physical Exam   Constitutional: He is oriented to person, place, and time. He appears well-developed and well-nourished.   HENT:   Head: Normocephalic.   Eyes: Pupils are equal, round, and reactive to light.   6th nerve and vertical palsy both improved significantly, patient near baseline with EOM   Neck: Neck supple.   Cardiovascular: Normal rate.    Pulmonary/Chest: Effort normal.   Abdominal: Soft. Bowel sounds are normal. He exhibits no distension. There is no tenderness. There is no guarding.   Neurological: He is alert and oriented to person, place, and time. He displays normal reflexes. A cranial nerve deficit (improved gaze palsy) is present. He exhibits normal muscle tone. Coordination (improved significantly) abnormal.   Skin: Skin is warm and dry.   Nursing note and vitals reviewed.      Neurological Exam:   Alert, oriented to person and month/year  Equal sensation x 4   Full strength throughout  Gaze forward, left gaze better than right.   RUE ataxia, improved        NIH Stroke Scale:    Level of Consciousness: 0 - alert  LOC Questions: 0 - answers both correctly  LOC Commands: 0 - performs both correctly  Best Gaze: 1 - partial gaze palsy  Visual: 0 - no visual loss  Facial Palsy: 0 - normal  Motor Left Arm: 0 - no drift  Motor Right Arm: 0 - no drift  Motor Left Le - no drift  Motor Right Le - no drift  Limb Ataxia: 1 - present in one limb  Sensory: 0 - normal  Best Language: 0 - no aphasia  Dysarthria: 0 - normal articulation  Extinction and Inattention: 0 - no  neglect  NIH Stroke Scale Total: 2      Laboratory:  CMP:     Recent Labs  Lab 08/14/17  0539   CALCIUM 8.4*   ALBUMIN 2.4*   PROT 6.5      K 4.4   CO2 20*      BUN 22   CREATININE 1.3   ALKPHOS 76   ALT 28   AST 19   BILITOT 0.2     BMP:     Recent Labs  Lab 08/14/17  0539      K 4.4      CO2 20*   BUN 22   CREATININE 1.3   CALCIUM 8.4*     CBC:     Recent Labs  Lab 08/14/17  0539   WBC 8.68   RBC 2.90*   HGB 8.3*   HCT 25.1*      MCV 87   MCH 28.6   MCHC 33.1     Lipid Panel: No results for input(s): CHOL, LDLCALC, HDL, TRIG in the last 168 hours.  Coagulation: No results for input(s): INR, APTT in the last 168 hours.    Invalid input(s): PT  Platelet Aggregation Study: No results for input(s): PLTAGG, PLTAGINTERP, PLTAGREGLACO, ADPPLTAGGREG in the last 168 hours.  Hgb A1C: No results for input(s): HGBA1C in the last 168 hours.  TSH: No results for input(s): TSH in the last 168 hours.    Diagnostic Results:  I have personally reviewed: [facility] 8/10/17 imaging  Findings:  Evolving area of recent infarction within the right cerebral hemisphere with stable mass effect against the fourth ventricle.  No hydrocephalus.    Remaining areas of abnormal brain parenchymal attenuation within the left parietal, left occipital and left cerebellar hemispheres are not well evaluated on this exam.        Darlene Reyes PA-C  Comprehensive Stroke Center  Department of Vascular Neurology   [facility]

## 2017-08-14 NOTE — PLAN OF CARE
Problem: Physical Therapy Goal  Goal: Physical Therapy Goal  Goals to be met by: 2017     Patient will increase functional independence with mobility by performin. Supine to sit with supervision (CGA met )  2. Sit to supine with supervision (CGA met )  3. Sit to stand transfer with SBA using AD or No AD (CGA met )  4. Bed to chair transfer with CGA using AD or No AD (Met )  5. Gait x100 feet with SBA using AD or No AD and no LOB (Met )  6. Ascend/descend x1 flight of stairs with bilateral Handrails with Min A  7. Sitting at edge of bed x10 minutes with (I) while performing dynamic balance tasks (CGA x10 min Met )  8. Stand for x5 minutes with SBA using AD or No AD (Met )  9. Lower extremity exercise program x15 reps with supervision to maintain B LE coordination and strength             Goals remain appropriate.     Priscila Campbell, PTA.  2017

## 2017-08-14 NOTE — PT/OT/SLP PROGRESS
"Speech Language Pathology  Treatment    Juan Manuel Koehler   MRN: [id number]   Admitting Diagnosis: Embolic stroke involving right cerebellar artery    Diet recommendations: Solid Diet Level: Regular  Liquid Diet Level: Thin     SLP Treatment Date: 08/14/17  Speech Start Time: 0940     Speech Stop Time: 1000     Speech Total (min): 20 min       TREATMENT BILLABLE MINUTES:  Speech Therapy Individual 20           General Precautions: Standard,            Subjective:  "My memory is good.."    Pain/Comfort  Pain Rating 1: 0/10  Pain Rating Post-Intervention 1: 0/10    Objective:   Pt. Seen at bedside and was alert/responsive and oriented x3.  Recall of recent events was good.  Mr. Koehler responded to functional math and time calculations with 100% accuracy with no cues and no delays in responding.   He responded to category exclusion tasks in field of 5 with 100% accuracy and mental manipulation tasks with 100% accuracy.  No delays in responding noted on tasks.  Pt. Initiated conversation and verbalized good insight to deficits.      Assessment:  Juan Manuel Koehler is a 67 y.o. male with a medical diagnosis of Embolic stroke involving right cerebellar artery and presents with mild cog impairment.  .    Discharge recommendations: Discharge Facility/Level Of Care Needs: rehabilitation facility     Goals:    SLP Goals        Problem: SLP Goal    Goal Priority Disciplines Outcome   SLP Goal     SLP Ongoing (interventions implemented as appropriate)   Description:  Speech Language Pathology Goals  Updated Goals expected to be met by 8/14    1. Pt will tolerate regular diet and thin liquids without s/s of airway compromise.   2. Pt will generate 10 items within a category independently to enhance cognition  3. Pt will recall 2/3 items with a delay independently to enhance memory   4. Pt will complete cancellation task with 90% acc independently to enhance visual-spatial skills   5. Pt will complete mental flexibility tasks with " 80% independently to enhance cognition   6. Pt will follow 2 step directions with 80% acc with min cues to enhance comprehension skills   7. Pt will participate in ongoing assessment of reading/money/math/time management skills                                 Plan:   Patient to be seen Therapy Frequency: 5 x/week   Plan of Care expires: 09/03/17  Plan of Care reviewed with: patient  SLP Follow-up?: Yes  SLP - Next Visit Date: 08/15/17           Ssuan Carranza MA, CCC-SLP  08/14/2017

## 2017-08-14 NOTE — PLAN OF CARE
Problem: Occupational Therapy Goal  Goal: Occupational Therapy Goal  Goals set 8/7 to be addressed for 14 days with expiration date, 8/21:  Patient will increase functional independence with ADLs by performing:    Patient will demonstrate rolling to the right with modified independence.  Not met   Patient will demonstrate rolling to the left with modified independence.   Not met  Patient will demonstrate supine -sit with modified independence.   Not met  Patient will demonstrate stand pivot transfers with CGA.   Not met  Patient will demonstrate grooming while standing with CGA.   Not met  Patient will demonstrate upper body dressing with SBA while seated EOB.   Not met  Patient will demonstrate lower body dressing with CGA while seated EOB.   Not met  Patient will demonstrate toileting with CGA.   Not met  Patient's family / caregiver will demonstrate independence and safety with assisting patient with self-care skills and functional mobility.     Not met  Patient and/or patient's family will verbalize understanding of stroke prevention guidelines, personal risk factors and stroke warning signs via teachback method.  Not met           Continue OT POC    Comments: Marques Graham OTR/SERGIO  8/14/2017

## 2017-08-14 NOTE — PLAN OF CARE
Pt has been approved for inpatient rehab at [facility], facility has no bed available today.  Plan for d/c to facility on tomorrow.  Pt's wife has been notified via telephone.    Marisa Walsh RN  Case Management  [id number]

## 2017-08-14 NOTE — PT/OT/SLP PROGRESS
Physical Therapy  Treatment    Juan Manuel Koehler   MRN: [id number]   Admitting Diagnosis: Embolic stroke involving right cerebellar artery    PT Received On: 08/14/17  PT Start Time: 1343     PT Stop Time: 1417    PT Total Time (min): 34 min       Billable Minutes:  Gait Training 20 and Therapeutic Activity 14    Treatment Type: Treatment  PT/PTA: PTA     PTA Visit Number: 1       General Precautions: Standard, aspiration, fall  Orthopedic Precautions: N/A   Braces: N/A    Do you have any cultural, spiritual, Sikh conflicts, given your current situation?: None noted    Subjective:  Communicated with RN Ga) prior to session.  Pt agreeable to PT session    Pain/Comfort  Pain Rating 1: 0/10  Pain Rating Post-Intervention 1: 0/10    Objective:   Patient found with: PICC line, telemetry, SCD, suprapubic catheter (Pt found sup in bed with wife present)  2 attempts for tx session 2* pt eating lunch at 11:59 am      FUNCTIONAL MOBILITY    Bed Mobility (with vc's for sequencing and safe technique of functional mobility):        Sup > sit at the EOB with SBA to the R        Sit > sup with SBA       Scooting hips to the EOB upon sitting with SBA     Transfers  (vc's for hand placement and safety of task)       Sit > stand from EOB with RW requiring CGA for safety       Stand > sit to EOB requiring CGA for safety    Gait  (Rehab tech follows with IV pole)       Distance: 120ft and 140ft        Assistive Device: RW       Assistance Required: CGA/min A for balance       Verbal Cues required: for postural control, pedro pablo and safety (especially when turning)       Gait Deviations: Pt demonstrates increased pedro pablo, decreased time in double stance,                              Decreased B step length, decreased stride length, decreased toe-to-floor                              clearance, decreased weight-shifting ability and forward lean      THERAPEUTIC ACTIVITIES     SITTING (8-10 min)       Pt tolerates sitting at the  EOB with SBA with tc's/vc's for trunk ext       ELEVATIONS    Stairs       NP 2* pt with limited endurance for gait      Education:  Education provided to pt regarding: postural control, pedro pablo and safety. Verbalized understanding.     Whiteboard updated with correct mobility information. RN/PCT notified.  Pt safe to amb with RN/PCT. Use RW & 1 person assist.       AM-PAC 6 CLICK MOBILITY  How much help from another person does this patient currently need?   1 = Unable, Total/Dependent Assistance  2 = A lot, Maximum/Moderate Assistance  3 = A little, Minimum/Contact Guard/Supervision  4 = None, Modified [place]/Independent    Turning over in bed (including adjusting bedclothes, sheets and blankets)?: 4  Sitting down on and standing up from a chair with arms (e.g., wheelchair, bedside commode, etc.): 3  Moving from lying on back to sitting on the side of the bed?: 4  Moving to and from a bed to a chair (including a wheelchair)?: 3  Need to walk in hospital room?: 3  Climbing 3-5 steps with a railing?: 2  Total Score: 19    AM-PAC Raw Score CMS G-Code Modifier Level of Impairment Assistance   6 % Total / Unable   7 - 9 CM 80 - 100% Maximal Assist   10 - 14 CL 60 - 80% Moderate Assist   15 - 19 CK 40 - 60% Moderate Assist   20 - 22 CJ 20 - 40% Minimal Assist   23 CI 1-20% SBA / CGA   24 CH 0% Independent/ Mod I     Patient left supine with all lines intact, call button in reach, RN notified and wife present.    Assessment:  Juan Manuel Koehler is a 67 y.o. male with a medical diagnosis of Embolic stroke involving right cerebellar artery and presents with weakness, impaired balance and limited endurance for OOB mobility requiring assistance for balance and prolonged recovery time in between gait trials.  Pt will cont to benefit from skilled PT intervention to address deficits and improve functional mobility.     Rehab identified problem list/impairments: Rehab identified problem list/impairments: weakness,  impaired endurance, impaired self care skills, impaired functional mobilty, gait instability, impaired balance, decreased safety awareness    Rehab potential is good.    Activity tolerance: Good    Discharge recommendations: Discharge Facility/Level Of Care Needs: rehabilitation facility     Barriers to discharge: Barriers to Discharge: Inaccessible home environment, Decreased caregiver support    Equipment recommendations: Equipment Needed After Discharge: 3-in-1 commode, bath bench, walker, rolling, wheelchair     GOALS:    Physical Therapy Goals        Problem: Physical Therapy Goal    Goal Priority Disciplines Outcome Goal Variances Interventions   Physical Therapy Goal     PT/OT, PT Ongoing (interventions implemented as appropriate)     Description:  Goals to be met by: 2017     Patient will increase functional independence with mobility by performin. Supine to sit with supervision (CGA met )  2. Sit to supine with supervision (CGA met )  3. Sit to stand transfer with SBA using AD or No AD (CGA met )  4. Bed to chair transfer with CGA using AD or No AD (Met )  5. Gait x100 feet with SBA using AD or No AD and no LOB (Met )  6. Ascend/descend x1 flight of stairs with bilateral Handrails with Min A  7. Sitting at edge of bed x10 minutes with (I) while performing dynamic balance tasks (CGA x10 min Met )  8. Stand for x5 minutes with SBA using AD or No AD (Met )  9. Lower extremity exercise program x15 reps with supervision to maintain B LE coordination and strength                        PLAN:    Patient to be seen 6 x/week  to address the above listed problems via gait training, therapeutic activities, therapeutic exercises, neuromuscular re-education  Plan of Care expires: 17  Plan of Care reviewed with: patient         Priscila Campbell, PTA  2017

## 2017-08-14 NOTE — ASSESSMENT & PLAN NOTE
-  Blood cultures 8/4/17 +enterococcus faecalis, 8/9/17 NGTD  -  ECHO showed vegetation on atrial surface of mitral valve consistent with endocarditis  -  ID following and recommending ampicillin x 6 weeks- stop date 9/16/17

## 2017-08-14 NOTE — SUBJECTIVE & OBJECTIVE
Neurologic Chief Complaint: embolic R  Cerebellar artery stroke    Subjective:     Interval History: Patient is seen for follow-up neurological assessment and treatment recommendations:   Patient is eager for discharge to Rehab facility as soon as eligible. WILLON. Will start 81mg aspirin for secondary stroke prevention.     HPI, Past Medical, Family, and Social History remains the same as documented in the initial encounter.     Review of Systems   Constitutional: Positive for appetite change.   Gastrointestinal: Negative for diarrhea, nausea and vomiting.   Musculoskeletal: Positive for gait problem. Negative for arthralgias and back pain.   Neurological: Negative for headaches.   Psychiatric/Behavioral: Negative for agitation and behavioral problems.     Scheduled Meds:   ampicillin IVPB  2 g Intravenous Q4H    atorvastatin  40 mg Oral Daily    carvedilol  12.5 mg Oral BID    cefTRIAXone (ROCEPHIN) IVPB  2 g Intravenous Q12H    heparin (porcine)  5,000 Units Subcutaneous Q8H    lisinopril  20 mg Oral Daily    polyethylene glycol  17 g Oral Daily    senna-docusate 8.6-50 mg  1 tablet Oral BID    sodium chloride 0.9%  10 mL Intravenous Q6H    sodium chloride 0.9%  3 mL Intravenous Q8H     Continuous Infusions:   PRN Meds:acetaminophen, dextrose 50%, glucagon (human recombinant), hydrALAZINE, ondansetron, Flushing PICC Protocol **AND** sodium chloride 0.9% **AND** sodium chloride 0.9%    Objective:     Vital Signs (Most Recent):  Temp: 97.7 °F (36.5 °C) (08/14/17 0745)  Pulse: 80 (08/14/17 0745)  Resp: 17 (08/14/17 0745)  BP: 128/71 (08/14/17 0841)  SpO2: 97 % (08/14/17 0435)  BP Location: Right arm    Vital Signs Range (Last 24H):  Temp:  [97.2 °F (36.2 °C)-99.7 °F (37.6 °C)]   Pulse:  []   Resp:  [16-18]   BP: (109-133)/(58-78)   SpO2:  [97 %-98 %]   BP Location: Right arm    Physical Exam   Constitutional: He is oriented to person, place, and time. He appears well-developed and well-nourished.    HENT:   Head: Normocephalic.   Eyes: Pupils are equal, round, and reactive to light.   6th nerve and vertical palsy both improved significantly, patient near baseline with EOM   Neck: Neck supple.   Cardiovascular: Normal rate.    Pulmonary/Chest: Effort normal.   Abdominal: Soft. Bowel sounds are normal. He exhibits no distension. There is no tenderness. There is no guarding.   Neurological: He is alert and oriented to person, place, and time. He displays normal reflexes. A cranial nerve deficit (improved gaze palsy) is present. He exhibits normal muscle tone. Coordination (improved significantly) abnormal.   Skin: Skin is warm and dry.   Nursing note and vitals reviewed.      Neurological Exam:   Alert, oriented to person and month/year  Equal sensation x 4   Full strength throughout  Gaze forward, left gaze better than right.   RUE ataxia, improved        NIH Stroke Scale:    Level of Consciousness: 0 - alert  LOC Questions: 0 - answers both correctly  LOC Commands: 0 - performs both correctly  Best Gaze: 1 - partial gaze palsy  Visual: 0 - no visual loss  Facial Palsy: 0 - normal  Motor Left Arm: 0 - no drift  Motor Right Arm: 0 - no drift  Motor Left Le - no drift  Motor Right Le - no drift  Limb Ataxia: 1 - present in one limb  Sensory: 0 - normal  Best Language: 0 - no aphasia  Dysarthria: 0 - normal articulation  Extinction and Inattention: 0 - no neglect  NIH Stroke Scale Total: 2      Laboratory:  CMP:     Recent Labs  Lab 17  0539   CALCIUM 8.4*   ALBUMIN 2.4*   PROT 6.5      K 4.4   CO2 20*      BUN 22   CREATININE 1.3   ALKPHOS 76   ALT 28   AST 19   BILITOT 0.2     BMP:     Recent Labs  Lab 17  0539      K 4.4      CO2 20*   BUN 22   CREATININE 1.3   CALCIUM 8.4*     CBC:     Recent Labs  Lab 17  0539   WBC 8.68   RBC 2.90*   HGB 8.3*   HCT 25.1*      MCV 87   MCH 28.6   MCHC 33.1     Lipid Panel: No results for input(s): CHOL, LDLCALC, HDL,  TRIG in the last 168 hours.  Coagulation: No results for input(s): INR, APTT in the last 168 hours.    Invalid input(s): PT  Platelet Aggregation Study: No results for input(s): PLTAGG, PLTAGINTERP, PLTAGREGLACO, ADPPLTAGGREG in the last 168 hours.  Hgb A1C: No results for input(s): HGBA1C in the last 168 hours.  TSH: No results for input(s): TSH in the last 168 hours.    Diagnostic Results:  I have personally reviewed: [facility] 8/10/17 imaging  Findings:  Evolving area of recent infarction within the right cerebral hemisphere with stable mass effect against the fourth ventricle.  No hydrocephalus.    Remaining areas of abnormal brain parenchymal attenuation within the left parietal, left occipital and left cerebellar hemispheres are not well evaluated on this exam.

## 2017-08-15 ENCOUNTER — HOSPITAL ENCOUNTER (INPATIENT)
Facility: HOSPITAL | Age: 67
LOS: 14 days | Discharge: HOME HEALTH | DRG: 056 | End: 2017-08-29
Attending: PHYSICAL MEDICINE & REHABILITATION | Admitting: PHYSICAL MEDICINE & REHABILITATION
Payer: MEDICARE

## 2017-08-15 VITALS
HEIGHT: 74 IN | BODY MASS INDEX: 17.77 KG/M2 | OXYGEN SATURATION: 96 % | TEMPERATURE: 98 F | DIASTOLIC BLOOD PRESSURE: 60 MMHG | HEART RATE: 73 BPM | WEIGHT: 138.44 LBS | SYSTOLIC BLOOD PRESSURE: 110 MMHG | RESPIRATION RATE: 18 BRPM

## 2017-08-15 DIAGNOSIS — Z78.9 IMPAIRED MOBILITY AND ADLS: Primary | ICD-10-CM

## 2017-08-15 DIAGNOSIS — I63.441 EMBOLIC STROKE INVOLVING RIGHT CEREBELLAR ARTERY: ICD-10-CM

## 2017-08-15 DIAGNOSIS — E43 SEVERE MALNUTRITION: ICD-10-CM

## 2017-08-15 DIAGNOSIS — I63.9 RIGHT-SIDED CEREBROVASCULAR ACCIDENT (CVA): ICD-10-CM

## 2017-08-15 DIAGNOSIS — Z74.09 IMPAIRED MOBILITY AND ADLS: Primary | ICD-10-CM

## 2017-08-15 PROBLEM — Z93.6 S/P ILEAL CONDUIT: Status: ACTIVE | Noted: 2017-08-15

## 2017-08-15 LAB
ALBUMIN SERPL BCP-MCNC: 2.7 G/DL
ALP SERPL-CCNC: 74 U/L
ALT SERPL W/O P-5'-P-CCNC: 27 U/L
ANION GAP SERPL CALC-SCNC: 7 MMOL/L
AST SERPL-CCNC: 16 U/L
BASOPHILS # BLD AUTO: 0.03 K/UL
BASOPHILS NFR BLD: 0.4 %
BILIRUB SERPL-MCNC: 0.4 MG/DL
BUN SERPL-MCNC: 21 MG/DL
CALCIUM SERPL-MCNC: 9 MG/DL
CHLORIDE SERPL-SCNC: 109 MMOL/L
CO2 SERPL-SCNC: 22 MMOL/L
CREAT SERPL-MCNC: 1.5 MG/DL
DIFFERENTIAL METHOD: ABNORMAL
EOSINOPHIL # BLD AUTO: 0.2 K/UL
EOSINOPHIL NFR BLD: 1.9 %
ERYTHROCYTE [DISTWIDTH] IN BLOOD BY AUTOMATED COUNT: 15.5 %
EST. GFR  (AFRICAN AMERICAN): 54.5 ML/MIN/1.73 M^2
EST. GFR  (NON AFRICAN AMERICAN): 47.2 ML/MIN/1.73 M^2
GLUCOSE SERPL-MCNC: 95 MG/DL
HCT VFR BLD AUTO: 26.6 %
HGB BLD-MCNC: 9 G/DL
LYMPHOCYTES # BLD AUTO: 1.5 K/UL
LYMPHOCYTES NFR BLD: 18.1 %
MAGNESIUM SERPL-MCNC: 1.6 MG/DL
MCH RBC QN AUTO: 29.1 PG
MCHC RBC AUTO-ENTMCNC: 33.8 G/DL
MCV RBC AUTO: 86 FL
MONOCYTES # BLD AUTO: 0.4 K/UL
MONOCYTES NFR BLD: 5.3 %
NEUTROPHILS # BLD AUTO: 5.9 K/UL
NEUTROPHILS NFR BLD: 73.7 %
PHOSPHATE SERPL-MCNC: 4.5 MG/DL
PLATELET # BLD AUTO: 298 K/UL
PMV BLD AUTO: 9.1 FL
POCT GLUCOSE: 102 MG/DL (ref 70–110)
POCT GLUCOSE: 126 MG/DL (ref 70–110)
POTASSIUM SERPL-SCNC: 4.2 MMOL/L
PROT SERPL-MCNC: 6.7 G/DL
RBC # BLD AUTO: 3.09 M/UL
SODIUM SERPL-SCNC: 138 MMOL/L
WBC # BLD AUTO: 8.05 K/UL

## 2017-08-15 PROCEDURE — 63600175 PHARM REV CODE 636 W HCPCS: Performed by: PHYSICIAN ASSISTANT

## 2017-08-15 PROCEDURE — 80053 COMPREHEN METABOLIC PANEL: CPT

## 2017-08-15 PROCEDURE — 25000003 PHARM REV CODE 250: Performed by: PHYSICIAN ASSISTANT

## 2017-08-15 PROCEDURE — 12800000 HC REHAB SEMI-PRIVATE ROOM

## 2017-08-15 PROCEDURE — A4216 STERILE WATER/SALINE, 10 ML: HCPCS | Performed by: PSYCHIATRY & NEUROLOGY

## 2017-08-15 PROCEDURE — 25000003 PHARM REV CODE 250: Performed by: PSYCHIATRY & NEUROLOGY

## 2017-08-15 PROCEDURE — 25000003 PHARM REV CODE 250: Performed by: INTERNAL MEDICINE

## 2017-08-15 PROCEDURE — 99223 1ST HOSP IP/OBS HIGH 75: CPT | Mod: ,,, | Performed by: PHYSICAL MEDICINE & REHABILITATION

## 2017-08-15 PROCEDURE — 94799 UNLISTED PULMONARY SVC/PX: CPT

## 2017-08-15 PROCEDURE — 97530 THERAPEUTIC ACTIVITIES: CPT

## 2017-08-15 PROCEDURE — 99233 SBSQ HOSP IP/OBS HIGH 50: CPT | Mod: ,,, | Performed by: PSYCHIATRY & NEUROLOGY

## 2017-08-15 PROCEDURE — 25000003 PHARM REV CODE 250: Performed by: PHYSICAL MEDICINE & REHABILITATION

## 2017-08-15 PROCEDURE — 94761 N-INVAS EAR/PLS OXIMETRY MLT: CPT

## 2017-08-15 PROCEDURE — 25000003 PHARM REV CODE 250: Performed by: NURSE PRACTITIONER

## 2017-08-15 PROCEDURE — 63600175 PHARM REV CODE 636 W HCPCS: Performed by: NURSE PRACTITIONER

## 2017-08-15 PROCEDURE — 84100 ASSAY OF PHOSPHORUS: CPT

## 2017-08-15 PROCEDURE — 63600175 PHARM REV CODE 636 W HCPCS: Performed by: INTERNAL MEDICINE

## 2017-08-15 PROCEDURE — 83735 ASSAY OF MAGNESIUM: CPT

## 2017-08-15 PROCEDURE — 85025 COMPLETE CBC W/AUTO DIFF WBC: CPT

## 2017-08-15 RX ORDER — ONDANSETRON 2 MG/ML
4 INJECTION INTRAMUSCULAR; INTRAVENOUS EVERY 6 HOURS PRN
Status: CANCELLED | OUTPATIENT
Start: 2017-08-15

## 2017-08-15 RX ORDER — ACETAMINOPHEN 325 MG/1
650 TABLET ORAL EVERY 6 HOURS PRN
Status: DISCONTINUED | OUTPATIENT
Start: 2017-08-15 | End: 2017-08-29 | Stop reason: HOSPADM

## 2017-08-15 RX ORDER — BISACODYL 10 MG
10 SUPPOSITORY, RECTAL RECTAL DAILY PRN
Status: DISCONTINUED | OUTPATIENT
Start: 2017-08-15 | End: 2017-08-29 | Stop reason: HOSPADM

## 2017-08-15 RX ORDER — HYDRALAZINE HYDROCHLORIDE 10 MG/1
10 TABLET, FILM COATED ORAL EVERY 8 HOURS PRN
Status: CANCELLED | OUTPATIENT
Start: 2017-08-15

## 2017-08-15 RX ORDER — POLYETHYLENE GLYCOL 3350 17 G/17G
17 POWDER, FOR SOLUTION ORAL DAILY
Status: DISCONTINUED | OUTPATIENT
Start: 2017-08-16 | End: 2017-08-21

## 2017-08-15 RX ORDER — SODIUM CHLORIDE 0.9 % (FLUSH) 0.9 %
10 SYRINGE (ML) INJECTION EVERY 6 HOURS
Status: CANCELLED | OUTPATIENT
Start: 2017-08-15

## 2017-08-15 RX ORDER — SODIUM CHLORIDE 0.9 % (FLUSH) 0.9 %
10 SYRINGE (ML) INJECTION
Status: CANCELLED | OUTPATIENT
Start: 2017-08-15

## 2017-08-15 RX ORDER — ASPIRIN 81 MG/1
81 TABLET ORAL DAILY
Status: CANCELLED | OUTPATIENT
Start: 2017-08-16

## 2017-08-15 RX ORDER — AMOXICILLIN 250 MG
1 CAPSULE ORAL 2 TIMES DAILY
Status: DISCONTINUED | OUTPATIENT
Start: 2017-08-15 | End: 2017-08-18

## 2017-08-15 RX ORDER — AMOXICILLIN 250 MG
1 CAPSULE ORAL 2 TIMES DAILY
Status: CANCELLED | OUTPATIENT
Start: 2017-08-15

## 2017-08-15 RX ORDER — POLYETHYLENE GLYCOL 3350 17 G/17G
17 POWDER, FOR SOLUTION ORAL DAILY
Status: CANCELLED | OUTPATIENT
Start: 2017-08-16

## 2017-08-15 RX ORDER — GLUCAGON 1 MG
1 KIT INJECTION
Status: DISCONTINUED | OUTPATIENT
Start: 2017-08-15 | End: 2017-08-29 | Stop reason: HOSPADM

## 2017-08-15 RX ORDER — CARVEDILOL 12.5 MG/1
12.5 TABLET ORAL 2 TIMES DAILY
Status: CANCELLED | OUTPATIENT
Start: 2017-08-15

## 2017-08-15 RX ORDER — LISINOPRIL 20 MG/1
20 TABLET ORAL DAILY
Status: DISCONTINUED | OUTPATIENT
Start: 2017-08-16 | End: 2017-08-18

## 2017-08-15 RX ORDER — LISINOPRIL 20 MG/1
20 TABLET ORAL DAILY
Status: CANCELLED | OUTPATIENT
Start: 2017-08-16

## 2017-08-15 RX ORDER — SODIUM CHLORIDE 0.9 % (FLUSH) 0.9 %
3 SYRINGE (ML) INJECTION EVERY 8 HOURS
Status: CANCELLED | OUTPATIENT
Start: 2017-08-15

## 2017-08-15 RX ORDER — ONDANSETRON 8 MG/1
8 TABLET, ORALLY DISINTEGRATING ORAL EVERY 8 HOURS PRN
Status: DISCONTINUED | OUTPATIENT
Start: 2017-08-15 | End: 2017-08-16

## 2017-08-15 RX ORDER — ACETAMINOPHEN 325 MG/1
650 TABLET ORAL EVERY 6 HOURS PRN
Status: CANCELLED | OUTPATIENT
Start: 2017-08-15

## 2017-08-15 RX ORDER — SODIUM CHLORIDE 0.9 % (FLUSH) 0.9 %
10 SYRINGE (ML) INJECTION EVERY 6 HOURS
Status: DISCONTINUED | OUTPATIENT
Start: 2017-08-15 | End: 2017-08-15

## 2017-08-15 RX ORDER — ACETAMINOPHEN 325 MG/1
650 TABLET ORAL EVERY 6 HOURS PRN
Status: DISCONTINUED | OUTPATIENT
Start: 2017-08-15 | End: 2017-08-15

## 2017-08-15 RX ORDER — HEPARIN SODIUM 5000 [USP'U]/ML
5000 INJECTION, SOLUTION INTRAVENOUS; SUBCUTANEOUS EVERY 8 HOURS
Status: DISCONTINUED | OUTPATIENT
Start: 2017-08-15 | End: 2017-08-29 | Stop reason: HOSPADM

## 2017-08-15 RX ORDER — CARVEDILOL 12.5 MG/1
12.5 TABLET ORAL 2 TIMES DAILY
Status: DISCONTINUED | OUTPATIENT
Start: 2017-08-15 | End: 2017-08-22

## 2017-08-15 RX ORDER — RAMELTEON 8 MG/1
8 TABLET ORAL NIGHTLY PRN
Status: DISCONTINUED | OUTPATIENT
Start: 2017-08-15 | End: 2017-08-16

## 2017-08-15 RX ORDER — SODIUM CHLORIDE 0.9 % (FLUSH) 0.9 %
10 SYRINGE (ML) INJECTION
Status: DISCONTINUED | OUTPATIENT
Start: 2017-08-15 | End: 2017-08-15

## 2017-08-15 RX ORDER — GLUCAGON 1 MG
1 KIT INJECTION
Status: CANCELLED | OUTPATIENT
Start: 2017-08-15

## 2017-08-15 RX ORDER — ASPIRIN 81 MG/1
81 TABLET ORAL DAILY
Status: DISCONTINUED | OUTPATIENT
Start: 2017-08-16 | End: 2017-08-29 | Stop reason: HOSPADM

## 2017-08-15 RX ORDER — HEPARIN SODIUM 5000 [USP'U]/ML
5000 INJECTION, SOLUTION INTRAVENOUS; SUBCUTANEOUS EVERY 8 HOURS
Status: CANCELLED | OUTPATIENT
Start: 2017-08-15

## 2017-08-15 RX ADMIN — CARVEDILOL 12.5 MG: 12.5 TABLET, FILM COATED ORAL at 09:08

## 2017-08-15 RX ADMIN — HEPARIN SODIUM 5000 UNITS: 5000 INJECTION, SOLUTION INTRAVENOUS; SUBCUTANEOUS at 05:08

## 2017-08-15 RX ADMIN — ONDANSETRON 8 MG: 8 TABLET, ORALLY DISINTEGRATING ORAL at 06:08

## 2017-08-15 RX ADMIN — Medication 10 ML: at 05:08

## 2017-08-15 RX ADMIN — HEPARIN SODIUM 5000 UNITS: 5000 INJECTION, SOLUTION INTRAVENOUS; SUBCUTANEOUS at 02:08

## 2017-08-15 RX ADMIN — CARVEDILOL 12.5 MG: 12.5 TABLET, FILM COATED ORAL at 08:08

## 2017-08-15 RX ADMIN — ONDANSETRON 4 MG: 2 INJECTION INTRAMUSCULAR; INTRAVENOUS at 08:08

## 2017-08-15 RX ADMIN — HEPARIN SODIUM 5000 UNITS: 5000 INJECTION, SOLUTION INTRAVENOUS; SUBCUTANEOUS at 09:08

## 2017-08-15 RX ADMIN — AMPICILLIN SODIUM 2 G: 2 INJECTION, POWDER, FOR SOLUTION INTRAMUSCULAR; INTRAVENOUS at 10:08

## 2017-08-15 RX ADMIN — Medication 10 ML: at 12:08

## 2017-08-15 RX ADMIN — ASPIRIN 81 MG: 81 TABLET, COATED ORAL at 08:08

## 2017-08-15 RX ADMIN — STANDARDIZED SENNA CONCENTRATE AND DOCUSATE SODIUM 1 TABLET: 8.6; 5 TABLET, FILM COATED ORAL at 08:08

## 2017-08-15 RX ADMIN — CEFTRIAXONE 2 G: 2 INJECTION, SOLUTION INTRAVENOUS at 02:08

## 2017-08-15 RX ADMIN — AMPICILLIN SODIUM 2 G: 2 INJECTION, POWDER, FOR SOLUTION INTRAMUSCULAR; INTRAVENOUS at 12:08

## 2017-08-15 RX ADMIN — LISINOPRIL 20 MG: 20 TABLET ORAL at 08:08

## 2017-08-15 RX ADMIN — AMPICILLIN SODIUM 2 G: 2 INJECTION, POWDER, FOR SOLUTION INTRAMUSCULAR; INTRAVENOUS at 02:08

## 2017-08-15 RX ADMIN — AMPICILLIN SODIUM 2 G: 2 INJECTION, POWDER, FOR SOLUTION INTRAMUSCULAR; INTRAVENOUS at 03:08

## 2017-08-15 RX ADMIN — RAMELTEON 8 MG: 8 TABLET, FILM COATED ORAL at 10:08

## 2017-08-15 RX ADMIN — AMPICILLIN SODIUM 2 G: 2 INJECTION, POWDER, FOR SOLUTION INTRAMUSCULAR; INTRAVENOUS at 08:08

## 2017-08-15 RX ADMIN — CEFTRIAXONE 2 G: 2 INJECTION, SOLUTION INTRAVENOUS at 04:08

## 2017-08-15 RX ADMIN — STANDARDIZED SENNA CONCENTRATE AND DOCUSATE SODIUM 1 TABLET: 8.6; 5 TABLET, FILM COATED ORAL at 09:08

## 2017-08-15 NOTE — ASSESSMENT & PLAN NOTE
Mr. Koehler is a 68yo M with endocarditis and an acute R cerebellar infarct with edema, midline shift, as well as hemorrhagic conversion.     - Antithrombotics for secondary stroke prevention: Antiplatelets:  Aspirin: 81 mg oral now and daily  - Statins for secondary stroke prevention and hyperlipidemia, if present: LDL 76; on atorvastatin 40mg QD; etiology of stroke however is likely due to septic emboli from endocarditis. Will d/c atorvastatin  - Diagnostics: none pending   - VTE Prophylaxis: Heparin 5000 units SQ every 8 hours  - Endocarditis treatment per ID recs- per note, end date of abx 9/16 (6 week course)  - Neurosurgery aware and has signed off due to stability of cerebellar swelling. Serial CTH negative for hydrocephalus   - PT/OT/ST; recommending Rehab  Indwelling bolanos

## 2017-08-15 NOTE — PLAN OF CARE
Problem: Patient Care Overview  Goal: Individualization & Mutuality  Admit Date:8/4/2017  5:24 PM    Admit Dx:  Cerebellar stroke [I63.9]  Cerebellar stroke [I63.9]    PMH  Past Medical History:  No date: Cancer      Comment: bladder and throat  No date: CKD (chronic kidney disease) stage 3, GFR 30-5*  8/4/2017: Embolic stroke involving right cerebellar corry*  No date: Urinary tract infection    PSH  Past Surgical History:  No date: BLADDER SURGERY  No date: CYSTOSCOPY  No date: HERNIA REPAIR  No date: Ileal Conduit  No date: THROAT SURGERY    Pt likes multiple blankets and tv on.    Outcome: Ongoing (interventions implemented as appropriate)  POC reviewed with patient. Pt has remained free from falls/injury falls tonight. No new skin impairments noted. Afebrile & remains on IV abx. Safety precautions maintained. Continuous telemetry monitoring in progress. N/v episode x1 tonight. Urostomy draining clear yellow urine to drainage bag. No worsening neuro changes noted overnight. Independent with bed mobility. No correction insulin warranted. Pt progressing towards goals. Stroke booklet reviewed with patient. D/c pending to inpatient rehab.

## 2017-08-15 NOTE — DISCHARGE SUMMARY
[facility]  Vascular Neurology  Comprehensive Stroke Center  Discharge Summary     Summary:     Admit Date: 8/4/2017  5:24 PM    Discharge Date and Time: 8/15/2017 11:17 AM    Attending Physician: Dr. Noreen Hinkle MD     Discharge Provider: DEJA Guan    History of Present Illness: Mr. Koehler is a 68yo M with history of urothelial bladder carcinoma (s/p bladder resection in June 2017), CKD; he has been transferred to [facility] for a large R cerebellar infarct with edema and mass effect.    Mr. Koehler had a fall down stairs on Wednesday 8/2/17, then had vomiting so he was admitted to [facility]. He was diagnosed with complicated proteus UTI and endocarditis. Friday 8/4/17, patient developed an acute mental status change where he became confused and was unable to answer questions appropriately as well as a new R facial droop. Telestroke was performed with Dr. Carbajal. Patient found to have R cerebellar hemisphere edema w/ midline shift of the posterior fossa with mass effect on the 4th ventricle and mild enlargement of the temporal horns on CTH. Patient was transferred to [facility] NCC for further care.     Hospital Course (synopsis of major diagnoses, care, treatment, and services provided during the course of the hospital stay): 8/5/17 - doing well, no events overnight, neuro surg and NCC monitoring for swelling/suboccipital crani watch   8/7/17 NAEON. Remains on 3% and cardene; hemicrani watch.   8/9/17 neurologically stable, repeat CTA stable  8/10/17 Stable CTH and neuro exam.   8/11/17 2% required restarting overnight due to hyponatremia. Patient will likely go to [facility] post-discharge. Continuing IV abx x 6 weeks.   8/12/ Patient reports no acute events overnight; persistent complaints of scrotal pain, being followed by Urology.   8/13: Patient now medically stable and continuing IV abx for 6 week course, planned disposition to Rehab. S/w Case Mgmt on call regarding eligibility for  [facility] today.   17 KANDISCATHION. Awaiting placement to rehab.   8/15/17 - doing well overall, ready for discharge to inpatient rehab today.     Will dc to rehab with 6 weeks abx for endocarditis. ID made recs with end date 17.   The patient with urostomy - so dc with bolanos.       NIH Stroke Scale:    Level of Consciousness: 0 - alert  LOC Questions: 0 - answers both correctly  LOC Commands: 0 - performs both correctly  Best Gaze: 1 - partial gaze palsy  Visual: 0 - no visual loss  Facial Palsy: 0 - normal  Motor Left Arm: 0 - no drift  Motor Right Arm: 0 - no drift  Motor Left Le - no drift  Motor Right Le - no drift  Limb Ataxia: 1 - present in one limb  Sensory: 0 - normal  Best Language: 0 - no aphasia  Dysarthria: 0 - normal articulation  Extinction and Inattention: 0 - no neglect  NIH Stroke Scale Total: 2  Modified McLennan Scale:   Timeline: At discharge  Modified McLennan Score: 4 - moderately severe disability      echo 8/3/17  CONCLUSIONS     1 - Concentric hypertrophy.     2 - No wall motion abnormalities.     3 - Normal left ventricular systolic function (EF 55-60%).     4 - Normal left ventricular diastolic function.     5 - Normal right ventricular systolic function .     6 - The estimated PA systolic pressure is 38 mmHg.     7 - Trivial aortic regurgitation.     8 - Evidence of mitral vegetation .     9 - Increased central venous pressure.     MRI 17  1. Large region of abnormal restricted diffusion within the right cerebellar hemisphere compatible with acute infarct. There is associated hemorrhagic conversion present. There is localized mass effect on the abril as well as effacement of the fourth ventricle. Ventricular system remains mildly prominent, unchanged from prior CT examination.    2. Additional focal regions of mild diffusion hyperintensity with associated serpiginous non-masslike cortical enhancement within the left parietal and occipital lobes suggestive of subacute  infarcts. Additional region of volume loss with associated intrinsic T1 hyperintensity and serpiginous enhancement is present within the left cerebellar hemisphere suggestive of a late subacute infarct with laminar necrosis. Small remote lacunar type infarcts are also present in the right corona radiata. Overall findings are suggestive of possible thromboembolic phenomena. Clinical correlation is advised.    3. No evidence of high-grade stenosis of the visualized intracranial vasculature. Note is made that the mid and distal portions of the right AICA and PICA appear somewhat irregular and are not well-visualized, although a component of this may relate to noncontrast MRA time-of-flight technique.          8/4/17 - CT head    Large area of vasogenic edema centered within the right cerebellar hemisphere resulting in severe mass effect with compression of the 4th ventricle, mild hydrocephalus and leftward deviation of the cerebral vermis.  Recommend emergent neurosurgical consultation.    Echo 8/5/17  Left atrium normal   CONCLUSIONS     1 - Normal left ventricular systolic function (EF 60-65%).     2 - No wall motion abnormalities.     3 - Normal left ventricular diastolic function.     4 - Normal right ventricular systolic function .     5 - The estimated PA systolic pressure is 27 mmHg.     [facility] 8/10/17 imaging  Findings:  Evolving area of recent infarction within the right cerebral hemisphere with stable mass effect against the fourth ventricle.  No hydrocephalus.    Remaining areas of abnormal brain parenchymal attenuation within the left parietal, left occipital and left cerebellar hemispheres are not well evaluated on this exam.           Assessment/Plan:     Interventions: None    Complications: None    Research Candidate?:  No    Neurological deficit at discharge: Ataxia, gaze palsy     Disposition: Rehab Facility    Final Active Diagnoses:    Diagnosis Date Noted POA    PRINCIPAL PROBLEM:  Embolic stroke  involving right cerebellar artery [I63.441] 08/04/2017 Yes    Acute bacterial endocarditis [I33.0] 08/03/2017 Yes    NSTEMI (non-ST elevated myocardial infarction) [I21.4] 08/03/2017 Yes    Cytotoxic cerebral edema [G93.6] 08/05/2017 Yes    Hypertension [I10] 08/06/2017 Unknown    Compression of brainstem [G93.5] 08/05/2017 Yes    Bacteremia due to Enterococcus [R78.81] 08/04/2017 Yes    Sepsis due to Enterococcus [A41.81] 08/04/2017 Yes    Anemia of chronic disease [D63.8] 06/12/2017 Yes      Problems Resolved During this Admission:    Diagnosis Date Noted Date Resolved POA    Urinary tract infection with hematuria [N39.0, R31.9] 08/03/2017 08/13/2017 Yes     * Embolic stroke involving right cerebellar artery    Mr. Koehler is a 66yo M with endocarditis and an acute R cerebellar infarct with edema, midline shift, as well as hemorrhagic conversion.     - Antithrombotics for secondary stroke prevention: Antiplatelets:  Aspirin: 81 mg oral now and daily  - Statins for secondary stroke prevention and hyperlipidemia, if present: LDL 76; on atorvastatin 40mg QD; etiology of stroke however is likely due to septic emboli from endocarditis. Will d/c atorvastatin  - Diagnostics: none pending   - VTE Prophylaxis: Heparin 5000 units SQ every 8 hours  - Endocarditis treatment per ID recs- per note, end date of abx 9/16 (6 week course)  - Neurosurgery aware and has signed off due to stability of cerebellar swelling. Serial CTH negative for hydrocephalus   - PT/OT/ST; recommending Rehab  Indwelling bolanos - urostomy        Acute bacterial endocarditis    Mitral valve vegetation seen on 8/3/17 ECHO; repeat ECHO negative for vegetation  Blood cultures 8/4 - Enterococcus Fecalis +; 8/9 Bcx NGTD  Currently on ceftriaxone 2g q12h, ampicillin 2g q4h per ID recs to complete a 6 week course. PICC line in situ.  Patient started current abx therapy on 8/5- so continue 6 weeks from start date. Stop date 09/16/17  -Rocephin course for  Proteus UTI completed        NSTEMI (non-ST elevated myocardial infarction)    Per [facility] ACS workup, trending troponins  -TN downtrending on admission   - ASA 81mg daily         Cytotoxic cerebral edema    Due to stroke   Evident on imaging        Hypertension    Stroke risk factor   SBP <140 - at goal  -Coreg 12.5 BID, lisinopril 20mg QD, hydralazine PO PRN          Anemia of chronic disease    -daily CBC monitoring, counts stable             Recommendations:     Post-discharge complication risks: Falls    Stroke Education given to: patient    Follow-up in Stroke Clinic in 4-6 weeks     Discharge Plan:  Antithrombotics: Aspirin 81mg  Statin: none indicated due to stroke by bacterial endocarditis   Aggresive risk factor modification:  Htn, bacterial endocarditis   IV ceftriaxone and ampicillin x 6 weeks - end date 9/16 per ID note dated 8/10    Follow Up:    Patient Instructions:   No discharge procedures on file.  Medications:  Transfer Medications (for Discharge Readmit only):   No current facility-administered medications for this encounter.      No current outpatient prescriptions on file.     Facility-Administered Medications Ordered in Other Encounters   Medication Dose Route Frequency Provider Last Rate Last Dose    acetaminophen tablet 650 mg  650 mg Oral Q6H PRN Verónica Lewis MD        ampicillin 2 g in sodium chloride 0.9 % 100 mL IVPB (ready to mix system)  2 g Intravenous Q4H DEJA Schreiber 100 mL/hr at 08/15/17 1455 2 g at 08/15/17 1455    [START ON 8/16/2017] aspirin EC tablet 81 mg  81 mg Oral Daily DEJA Schreiber        bisacodyl suppository 10 mg  10 mg Rectal Daily PRN Verónica Lewis MD        carvedilol tablet 12.5 mg  12.5 mg Oral BID DEJA Schreiber        cefTRIAXone (ROCEPHIN) 2 g in dextrose 5 % 50 mL IVPB  2 g Intravenous Q12H DEJA Schreiber        dextrose 50% injection 12.5 g  12.5 g Intravenous PRN DEJA Schreiber        glucagon (human recombinant) injection  1 mg  1 mg Intramuscular PRN DEJA Schreiber        heparin (porcine) injection 5,000 Units  5,000 Units Subcutaneous Q8H DEJA Schreiber   5,000 Units at 08/15/17 1456    [START ON 8/16/2017] lisinopril tablet 20 mg  20 mg Oral Daily DEJA Schreiber        ondansetron disintegrating tablet 8 mg  8 mg Oral Q8H PRN Verónica Lewis MD        [START ON 8/16/2017] polyethylene glycol packet 17 g  17 g Oral Daily DEJA Schreiber        senna-docusate 8.6-50 mg per tablet 1 tablet  1 tablet Oral BID DEJA Schreiber PA  Comprehensive Stroke Center  Department of Vascular Neurology   [facility]

## 2017-08-15 NOTE — SUBJECTIVE & OBJECTIVE
Neurologic Chief Complaint: embolic R  Cerebellar artery stroke    Subjective:     Interval History: Patient is seen for follow-up neurological assessment and treatment recommendations:   Mild nausea last night, reports due to food being cold when he woke up from his nap. No vomiting. Denies abdominal pain   Ready for discharge today     HPI, Past Medical, Family, and Social History remains the same as documented in the initial encounter.     Review of Systems   Constitutional: Positive for appetite change.   Gastrointestinal: Positive for nausea. Negative for diarrhea and vomiting.   Musculoskeletal: Positive for gait problem. Negative for arthralgias and back pain.   Neurological: Negative for headaches.   Psychiatric/Behavioral: Negative for agitation and behavioral problems.     Scheduled Meds:   ampicillin IVPB  2 g Intravenous Q4H    aspirin  81 mg Oral Daily    carvedilol  12.5 mg Oral BID    cefTRIAXone (ROCEPHIN) IVPB  2 g Intravenous Q12H    heparin (porcine)  5,000 Units Subcutaneous Q8H    lisinopril  20 mg Oral Daily    polyethylene glycol  17 g Oral Daily    senna-docusate 8.6-50 mg  1 tablet Oral BID    sodium chloride 0.9%  10 mL Intravenous Q6H    sodium chloride 0.9%  3 mL Intravenous Q8H     Continuous Infusions:   PRN Meds:acetaminophen, dextrose 50%, glucagon (human recombinant), hydrALAZINE, ondansetron, Flushing PICC Protocol **AND** sodium chloride 0.9% **AND** sodium chloride 0.9%    Objective:     Vital Signs (Most Recent):  Temp: 99 °F (37.2 °C) (08/15/17 0301)  Pulse: 81 (08/15/17 0800)  Resp: 18 (08/15/17 0301)  BP: (!) 102/58 (08/15/17 0301)  SpO2: 95 % (08/15/17 0301)  BP Location: Left arm    Vital Signs Range (Last 24H):  Temp:  [97.5 °F (36.4 °C)-99.1 °F (37.3 °C)]   Pulse:  [70-96]   Resp:  [17-18]   BP: (102-156)/(58-80)   SpO2:  [95 %-98 %]   BP Location: Left arm    Physical Exam   Constitutional: He is oriented to person, place, and time. He appears well-developed and  well-nourished.   HENT:   Head: Normocephalic.   Eyes:   6th nerve and vertical palsy both improved significantly, patient near baseline with EOM   Cardiovascular: Normal rate.    Pulmonary/Chest: Effort normal.   Abdominal: Soft. There is no tenderness.   Neurological: He is alert and oriented to person, place, and time. He displays normal reflexes. A cranial nerve deficit (improved gaze palsy) is present. He exhibits normal muscle tone. Coordination (improved significantly) abnormal.   Skin: Skin is warm and dry.   Nursing note and vitals reviewed.      Neurological Exam:   Alert, oriented to person and month/year  Equal sensation x 4   Full strength throughout  Gaze forward, left gaze better than right.   RUE ataxia, improved        NIH Stroke Scale:    Level of Consciousness: 0 - alert  LOC Questions: 0 - answers both correctly  LOC Commands: 0 - performs both correctly  Best Gaze: 1 - partial gaze palsy  Visual: 0 - no visual loss  Facial Palsy: 0 - normal  Motor Left Arm: 0 - no drift  Motor Right Arm: 0 - no drift  Motor Left Le - no drift  Motor Right Le - no drift  Limb Ataxia: 1 - present in one limb  Sensory: 0 - normal  Best Language: 0 - no aphasia  Dysarthria: 0 - normal articulation  Extinction and Inattention: 0 - no neglect  NIH Stroke Scale Total: 2      Laboratory:  CMP:     Recent Labs  Lab 08/15/17  0515   CALCIUM 9.0   ALBUMIN 2.7*   PROT 6.7      K 4.2   CO2 22*      BUN 21   CREATININE 1.5*   ALKPHOS 74   ALT 27   AST 16   BILITOT 0.4     BMP:     Recent Labs  Lab 08/15/17  0515      K 4.2      CO2 22*   BUN 21   CREATININE 1.5*   CALCIUM 9.0     CBC:     Recent Labs  Lab 08/15/17  0515   WBC 8.05   RBC 3.09*   HGB 9.0*   HCT 26.6*      MCV 86   MCH 29.1   MCHC 33.8     Lipid Panel: No results for input(s): CHOL, LDLCALC, HDL, TRIG in the last 168 hours.  Coagulation: No results for input(s): INR, APTT in the last 168 hours.    Invalid input(s):  PT  Platelet Aggregation Study: No results for input(s): PLTAGG, PLTAGINTERP, PLTAGREGLACO, ADPPLTAGGREG in the last 168 hours.  Hgb A1C: No results for input(s): HGBA1C in the last 168 hours.  TSH: No results for input(s): TSH in the last 168 hours.    Diagnostic Results:  I have personally reviewed: [facility] 8/10/17 imaging  Findings:  Evolving area of recent infarction within the right cerebral hemisphere with stable mass effect against the fourth ventricle.  No hydrocephalus.    Remaining areas of abnormal brain parenchymal attenuation within the left parietal, left occipital and left cerebellar hemispheres are not well evaluated on this exam.

## 2017-08-15 NOTE — NURSING TRANSFER
Nursing Transfer Note      8/15/2017     Transfer From: 725A    Transfer via wheelchair    Transfer with NA    Transported by SBA    Medicines sent: No    Chart send with patient: No    Notified: spouse    Patient reassessed at: 8/15/17, 1104    Upon arrival to floor: cardiac monitor applied, patient oriented to room, call bell in reach and bed in lowest position

## 2017-08-15 NOTE — PROGRESS NOTES
[facility]  Vascular Neurology  Comprehensive Stroke Center  Progress Note    Assessment/Plan:     8/5/17 - doing well, no events overnight, neuro surg and NCC monitoring for swelling/suboccipital crani watch   8/7/17 NAEON. Remains on 3% and cardene; hemicrani watch.   8/9/17 neurologically stable, repeat CTA stable  8/10/17 Stable CTH and neuro exam.   8/11/17 2% required restarting overnight due to hyponatremia. Patient will likely go to [facility] post-discharge. Continuing IV abx x 6 weeks.   8/12/ Patient reports no acute events overnight; persistent complaints of scrotal pain, being followed by Urology.   8/13: Patient now medically stable and continuing IV abx for 6 week course, planned disposition to Rehab. S/w Case Mgmt on call regarding eligibility for [facility] today.   8/14/17 NAEON. Awaiting placement to rehab.   8/15/17 - doing well overall, ready for discharge to inpatient rehab today     * Embolic stroke involving right cerebellar artery    Mr. Koehler is a 66yo M with endocarditis and an acute R cerebellar infarct with edema, midline shift, as well as hemorrhagic conversion.     - Antithrombotics for secondary stroke prevention: Antiplatelets:  Aspirin: 81 mg oral now and daily  - Statins for secondary stroke prevention and hyperlipidemia, if present: LDL 76; on atorvastatin 40mg QD; etiology of stroke however is likely due to septic emboli from endocarditis. Will d/c atorvastatin  - Diagnostics: none pending   - VTE Prophylaxis: Heparin 5000 units SQ every 8 hours  - Endocarditis treatment per ID recs- per note, end date of abx 9/16 (6 week course)  - Neurosurgery aware and has signed off due to stability of cerebellar swelling. Serial CTH negative for hydrocephalus   - PT/OT/ST; recommending Rehab  Indwelling bolanos         Acute bacterial endocarditis    Mitral valve vegetation seen on 8/3/17 ECHO; repeat ECHO negative for vegetation  Blood cultures 8/4 - Enterococcus  Fecalis +; 8/9 Bcx NGTD  Currently on ceftriaxone 2g q12h, ampicillin 2g q4h per ID recs to complete a 6 week course. PICC line in situ.  Patient started current abx therapy on 8/5- so continue 6 weeks from start date. Stop date 09/16/17  -Rocephin course for Proteus UTI completed        NSTEMI (non-ST elevated myocardial infarction)    Per [facility] ACS workup, trending troponins  -TN downtrending on admission   - ASA 81mg daily         Cytotoxic cerebral edema    Due to stroke   Evident on imaging        Hypertension    Stroke risk factor   SBP <140 - at goal  -Coreg 12.5 BID, lisinopril 20mg QD, hydralazine PO PRN          Anemia of chronic disease    -daily CBC monitoring, counts stable             Neurologic Chief Complaint: embolic R  Cerebellar artery stroke    Subjective:     Interval History: Patient is seen for follow-up neurological assessment and treatment recommendations:   Mild nausea last night, reports due to food being cold when he woke up from his nap. No vomiting. Denies abdominal pain   Ready for discharge today     HPI, Past Medical, Family, and Social History remains the same as documented in the initial encounter.     Review of Systems   Constitutional: Positive for appetite change.   Gastrointestinal: Positive for nausea. Negative for diarrhea and vomiting.   Musculoskeletal: Positive for gait problem. Negative for arthralgias and back pain.   Neurological: Negative for headaches.   Psychiatric/Behavioral: Negative for agitation and behavioral problems.     Scheduled Meds:   ampicillin IVPB  2 g Intravenous Q4H    aspirin  81 mg Oral Daily    carvedilol  12.5 mg Oral BID    cefTRIAXone (ROCEPHIN) IVPB  2 g Intravenous Q12H    heparin (porcine)  5,000 Units Subcutaneous Q8H    lisinopril  20 mg Oral Daily    polyethylene glycol  17 g Oral Daily    senna-docusate 8.6-50 mg  1 tablet Oral BID    sodium chloride 0.9%  10 mL Intravenous Q6H    sodium chloride 0.9%  3 mL Intravenous Q8H      Continuous Infusions:   PRN Meds:acetaminophen, dextrose 50%, glucagon (human recombinant), hydrALAZINE, ondansetron, Flushing PICC Protocol **AND** sodium chloride 0.9% **AND** sodium chloride 0.9%    Objective:     Vital Signs (Most Recent):  Temp: 99 °F (37.2 °C) (08/15/17 0301)  Pulse: 81 (08/15/17 0800)  Resp: 18 (08/15/17 0301)  BP: (!) 102/58 (08/15/17 0301)  SpO2: 95 % (08/15/17 0301)  BP Location: Left arm    Vital Signs Range (Last 24H):  Temp:  [97.5 °F (36.4 °C)-99.1 °F (37.3 °C)]   Pulse:  [70-96]   Resp:  [17-18]   BP: (102-156)/(58-80)   SpO2:  [95 %-98 %]   BP Location: Left arm    Physical Exam   Constitutional: He is oriented to person, place, and time. He appears well-developed and well-nourished.   HENT:   Head: Normocephalic.   Eyes:   6th nerve and vertical palsy both improved significantly, patient near baseline with EOM   Cardiovascular: Normal rate.    Pulmonary/Chest: Effort normal.   Abdominal: Soft. There is no tenderness.   Neurological: He is alert and oriented to person, place, and time. He displays normal reflexes. A cranial nerve deficit (improved gaze palsy) is present. He exhibits normal muscle tone. Coordination (improved significantly) abnormal.   Skin: Skin is warm and dry.   Nursing note and vitals reviewed.      Neurological Exam:   Alert, oriented to person and month/year  Equal sensation x 4   Full strength throughout  Gaze forward, left gaze better than right.   RUE ataxia, improved        NIH Stroke Scale:    Level of Consciousness: 0 - alert  LOC Questions: 0 - answers both correctly  LOC Commands: 0 - performs both correctly  Best Gaze: 1 - partial gaze palsy  Visual: 0 - no visual loss  Facial Palsy: 0 - normal  Motor Left Arm: 0 - no drift  Motor Right Arm: 0 - no drift  Motor Left Le - no drift  Motor Right Le - no drift  Limb Ataxia: 1 - present in one limb  Sensory: 0 - normal  Best Language: 0 - no aphasia  Dysarthria: 0 - normal  articulation  Extinction and Inattention: 0 - no neglect  NIH Stroke Scale Total: 2      Laboratory:  CMP:     Recent Labs  Lab 08/15/17  0515   CALCIUM 9.0   ALBUMIN 2.7*   PROT 6.7      K 4.2   CO2 22*      BUN 21   CREATININE 1.5*   ALKPHOS 74   ALT 27   AST 16   BILITOT 0.4     BMP:     Recent Labs  Lab 08/15/17  0515      K 4.2      CO2 22*   BUN 21   CREATININE 1.5*   CALCIUM 9.0     CBC:     Recent Labs  Lab 08/15/17  0515   WBC 8.05   RBC 3.09*   HGB 9.0*   HCT 26.6*      MCV 86   MCH 29.1   MCHC 33.8     Lipid Panel: No results for input(s): CHOL, LDLCALC, HDL, TRIG in the last 168 hours.  Coagulation: No results for input(s): INR, APTT in the last 168 hours.    Invalid input(s): PT  Platelet Aggregation Study: No results for input(s): PLTAGG, PLTAGINTERP, PLTAGREGLACO, ADPPLTAGGREG in the last 168 hours.  Hgb A1C: No results for input(s): HGBA1C in the last 168 hours.  TSH: No results for input(s): TSH in the last 168 hours.    Diagnostic Results:  I have personally reviewed: [facility] 8/10/17 imaging  Findings:  Evolving area of recent infarction within the right cerebral hemisphere with stable mass effect against the fourth ventricle.  No hydrocephalus.    Remaining areas of abnormal brain parenchymal attenuation within the left parietal, left occipital and left cerebellar hemispheres are not well evaluated on this exam.        DEJA Guan  [facility]  Department of Vascular Neurology   [facility]

## 2017-08-15 NOTE — PLAN OF CARE
Nurse to call report to [phone] to [facility].  SPD transport arranged for pickup.  No additional needs at this time.    Marisa Walsh RN  Case Management  [phone]

## 2017-08-15 NOTE — PT/OT/SLP PROGRESS
Physical Therapy  Treatment    Juan Manuel Koehler   MRN: [id number]   Admitting Diagnosis: Embolic stroke involving right cerebellar artery    PT Received On: 08/15/17  PT Start Time: 1023     PT Stop Time: 1040    PT Total Time (min): 17 min       Billable Minutes:  Therapeutic Activity 17    Treatment Type: Treatment  PT/PTA: PTA     PTA Visit Number: 2       General Precautions: Standard, aspiration, fall  Orthopedic Precautions: N/A   Braces: N/A    Do you have any cultural, spiritual, Buddhist conflicts, given your current situation?: None noted    Subjective:  Communicated with RN (Flor0 prior to session.  Pt agreeable to PT session    Pain/Comfort  Pain Rating 1: 0/10  Pain Rating Post-Intervention 1: 0/10    Objective:   Patient found with: bed alarm, PICC line, telemetry (suprapubic catheter)  Pt found sup in bed with wife present in the room      FUNCTIONAL MOBILITY    Bed Mobility (with vc's for sequencing and safe technique of functional mobility):       Sup > sit at the EOB with min A from R side lying        Sit > sup with SBA       Scooting hips to the EOB upon sitting with SBA x2 scoots    Transfers  (vc's for hand placement and safety of task)       Sit > stand from EOB with RW requiring CGA  for safety       Stand > sit to EOB requiring CGA for safety    Gait        Distance: 150ft        Assistive Device: RW       Assistance Required: CG/min A for balance (especially when turning)       Verbal Cues required: for postural control, pedro pablo and safety       Gait Deviations: Pt demonstrates increased pedro pablo, Decreased B step length,                              decreased stride length, decreased toe-to-floor clearance,                              decreased weight-shifting ability and forward lean      THERAPEUTIC EXERCISES         Pt performs B LE AAROM ther ex's sup in bed (APs, HSs, hip add/abd, hip ext, SAQ's)       x15 reps with VCs and TCs      Education:  Education provided to pt regarding:  safety. Verbalized understanding.     Whiteboard updated with correct mobility information. RN/PCT notified.  Pt safe to amb RN/PCT. Use RW & assistance of 1 person.    AM-PAC 6 CLICK MOBILITY  How much help from another person does this patient currently need?   1 = Unable, Total/Dependent Assistance  2 = A lot, Maximum/Moderate Assistance  3 = A little, Minimum/Contact Guard/Supervision  4 = None, Modified [place]/Independent    Turning over in bed (including adjusting bedclothes, sheets and blankets)?: 4  Sitting down on and standing up from a chair with arms (e.g., wheelchair, bedside commode, etc.): 3  Moving from lying on back to sitting on the side of the bed?: 4  Moving to and from a bed to a chair (including a wheelchair)?: 3  Need to walk in hospital room?: 3  Climbing 3-5 steps with a railing?: 2  Total Score: 19    AM-PAC Raw Score CMS G-Code Modifier Level of Impairment Assistance   6 % Total / Unable   7 - 9 CM 80 - 100% Maximal Assist   10 - 14 CL 60 - 80% Moderate Assist   15 - 19 CK 40 - 60% Moderate Assist   20 - 22 CJ 20 - 40% Minimal Assist   23 CI 1-20% SBA / CGA   24 CH 0% Independent/ Mod I     Patient left supine with all lines intact, call button in reach, bed alarm on, RN notified and wife present.    Assessment:  Juan Manuel Koehler is a 67 y.o. male with a medical diagnosis of Embolic stroke involving right cerebellar artery and presents with impaired balance, weakness and decreased safety awareness.    Rehab identified problem list/impairments: Rehab identified problem list/impairments: weakness, impaired endurance, impaired self care skills, impaired functional mobilty, gait instability, impaired balance    Rehab potential is good.    Activity tolerance: Good    Discharge recommendations: Discharge Facility/Level Of Care Needs: rehabilitation facility     Barriers to discharge: Barriers to Discharge: Inaccessible home environment, Decreased caregiver support    Equipment  recommendations: Equipment Needed After Discharge: 3-in-1 commode, bath bench, walker, rolling, wheelchair     GOALS:    Physical Therapy Goals        Problem: Physical Therapy Goal    Goal Priority Disciplines Outcome Goal Variances Interventions   Physical Therapy Goal     PT/OT, PT Ongoing (interventions implemented as appropriate)     Description:  Goals to be met by: 2017     Patient will increase functional independence with mobility by performin. Supine to sit with supervision (CGA met )  2. Sit to supine with supervision (CGA met )  3. Sit to stand transfer with SBA using AD or No AD (CGA met )  4. Bed to chair transfer with CGA using AD or No AD (Met )  5. Gait x100 feet with SBA using AD or No AD and no LOB (Met )  6. Ascend/descend x1 flight of stairs with bilateral Handrails with Min A  7. Sitting at edge of bed x10 minutes with (I) while performing dynamic balance tasks (CGA x10 min Met )  8. Stand for x5 minutes with SBA using AD or No AD (Met )  9. Lower extremity exercise program x15 reps with supervision to maintain B LE coordination and strength                        PLAN:  Pt was discharged from hospital on this date.  Full discharge summary to follow.       Plan of Care reviewed with: patient         Priscila Campbell, PTA  08/15/2017

## 2017-08-15 NOTE — ASSESSMENT & PLAN NOTE
Mr. Koehler is a 68yo M with endocarditis and an acute R cerebellar infarct with edema, midline shift, as well as hemorrhagic conversion.     - Antithrombotics for secondary stroke prevention: Antiplatelets:  Aspirin: 81 mg oral now and daily  - Statins for secondary stroke prevention and hyperlipidemia, if present: LDL 76; on atorvastatin 40mg QD; etiology of stroke however is likely due to septic emboli from endocarditis. Will d/c atorvastatin  - Diagnostics: none pending   - VTE Prophylaxis: Heparin 5000 units SQ every 8 hours  - Endocarditis treatment per ID recs- per note, end date of abx 9/16 (6 week course)  - Neurosurgery aware and has signed off due to stability of cerebellar swelling. Serial CTH negative for hydrocephalus   - PT/OT/ST; recommending Rehab  Indwelling bolanos - urostomy

## 2017-08-15 NOTE — PLAN OF CARE
Pt has been accepted to [facility] for admission today pending medical stability.  Primary team to place D/C Readmit Orders in EPIC for bed assignment.  Primary team notified, will await orders.    Marisa Walsh RN  Case Management  [phone]

## 2017-08-15 NOTE — SUBJECTIVE & OBJECTIVE
NIH Stroke Scale:    Level of Consciousness: 0 - alert  LOC Questions: 0 - answers both correctly  LOC Commands: 0 - performs both correctly  Best Gaze: 1 - partial gaze palsy  Visual: 0 - no visual loss  Facial Palsy: 0 - normal  Motor Left Arm: 0 - no drift  Motor Right Arm: 0 - no drift  Motor Left Le - no drift  Motor Right Le - no drift  Limb Ataxia: 1 - present in one limb  Sensory: 0 - normal  Best Language: 0 - no aphasia  Dysarthria: 0 - normal articulation  Extinction and Inattention: 0 - no neglect  NIH Stroke Scale Total: 2  Modified [place] Scale:   Timeline: At discharge  Modified [place] Score: 4 - moderately severe disability      echo 8/3/17  CONCLUSIONS     1 - Concentric hypertrophy.     2 - No wall motion abnormalities.     3 - Normal left ventricular systolic function (EF 55-60%).     4 - Normal left ventricular diastolic function.     5 - Normal right ventricular systolic function .     6 - The estimated PA systolic pressure is 38 mmHg.     7 - Trivial aortic regurgitation.     8 - Evidence of mitral vegetation .     9 - Increased central venous pressure.     MRI 17  1. Large region of abnormal restricted diffusion within the right cerebellar hemisphere compatible with acute infarct. There is associated hemorrhagic conversion present. There is localized mass effect on the abril as well as effacement of the fourth ventricle. Ventricular system remains mildly prominent, unchanged from prior CT examination.    2. Additional focal regions of mild diffusion hyperintensity with associated serpiginous non-masslike cortical enhancement within the left parietal and occipital lobes suggestive of subacute infarcts. Additional region of volume loss with associated intrinsic T1 hyperintensity and serpiginous enhancement is present within the left cerebellar hemisphere suggestive of a late subacute infarct with laminar necrosis. Small remote lacunar type infarcts are also present in the right corona  radiata. Overall findings are suggestive of possible thromboembolic phenomena. Clinical correlation is advised.    3. No evidence of high-grade stenosis of the visualized intracranial vasculature. Note is made that the mid and distal portions of the right AICA and PICA appear somewhat irregular and are not well-visualized, although a component of this may relate to noncontrast MRA time-of-flight technique.          8/4/17 - CT head    Large area of vasogenic edema centered within the right cerebellar hemisphere resulting in severe mass effect with compression of the 4th ventricle, mild hydrocephalus and leftward deviation of the cerebral vermis.  Recommend emergent neurosurgical consultation.    Echo 8/5/17  Left atrium normal   CONCLUSIONS     1 - Normal left ventricular systolic function (EF 60-65%).     2 - No wall motion abnormalities.     3 - Normal left ventricular diastolic function.     4 - Normal right ventricular systolic function .     5 - The estimated PA systolic pressure is 27 mmHg.     [facility] 8/10/17 imaging  Findings:  Evolving area of recent infarction within the right cerebral hemisphere with stable mass effect against the fourth ventricle.  No hydrocephalus.    Remaining areas of abnormal brain parenchymal attenuation within the left parietal, left occipital and left cerebellar hemispheres are not well evaluated on this exam.

## 2017-08-15 NOTE — PLAN OF CARE
Problem: Physical Therapy Goal  Goal: Physical Therapy Goal  Goals to be met by: 2017     Patient will increase functional independence with mobility by performin. Supine to sit with supervision (CGA met )  2. Sit to supine with supervision (CGA met )  3. Sit to stand transfer with SBA using AD or No AD (CGA met )  4. Bed to chair transfer with CGA using AD or No AD (Met )  5. Gait x100 feet with SBA using AD or No AD and no LOB (Met )  6. Ascend/descend x1 flight of stairs with bilateral Handrails with Min A  7. Sitting at edge of bed x10 minutes with (I) while performing dynamic balance tasks (CGA x10 min Met )  8. Stand for x5 minutes with SBA using AD or No AD (Met )  9. Lower extremity exercise program x15 reps with supervision to maintain B LE coordination and strength             Goals remain appropriate.     Priscila Campbell, PTA.  8/15/2017

## 2017-08-15 NOTE — PT/OT/SLP DISCHARGE
Physical Therapy Discharge Summary    Juan Manuel Koehler  MRN: [id number]   Embolic stroke involving right cerebellar artery     Patient Discharged from acute Physical Therapy on 8/15/2017.  Please refer to prior PT noted date on 2017 for functional status.     Assessment:   Patient appropriate for care in another setting.  GOALS:    Physical Therapy Goals        Problem: Physical Therapy Goal    Goal Priority Disciplines Outcome Goal Variances Interventions   Physical Therapy Goal     PT/OT, PT      Description:  Goals to be met by: 2017     Patient will increase functional independence with mobility by performin. Supine to sit with supervision (CGA met )  2. Sit to supine with supervision (CGA met )  3. Sit to stand transfer with SBA using AD or No AD (CGA met )  4. Bed to chair transfer with CGA using AD or No AD (Met )  5. Gait x100 feet with SBA using AD or No AD and no LOB (Met )  6. Ascend/descend x1 flight of stairs with bilateral Handrails with Min A  7. Sitting at edge of bed x10 minutes with (I) while performing dynamic balance tasks (CGA x10 min Met )  8. Stand for x5 minutes with SBA using AD or No AD (Met )  9. Lower extremity exercise program x15 reps with supervision to maintain B LE coordination and strength                      Reasons for Discontinuation of Therapy Services  Transfer to alternate level of care.      Plan:  Patient Discharged to: Inpatient Rehab.    Kiara Uriostegui, PT, DPT  [phone]  8/15/2017

## 2017-08-16 LAB
ALBUMIN SERPL BCP-MCNC: 2.8 G/DL
ALP SERPL-CCNC: 70 U/L
ALT SERPL W/O P-5'-P-CCNC: 23 U/L
ANION GAP SERPL CALC-SCNC: 8 MMOL/L
AST SERPL-CCNC: 19 U/L
BASOPHILS # BLD AUTO: 0.04 K/UL
BASOPHILS NFR BLD: 0.5 %
BILIRUB SERPL-MCNC: 0.5 MG/DL
BUN SERPL-MCNC: 22 MG/DL
CALCIUM SERPL-MCNC: 9.2 MG/DL
CHLORIDE SERPL-SCNC: 106 MMOL/L
CO2 SERPL-SCNC: 22 MMOL/L
CREAT SERPL-MCNC: 1.6 MG/DL
DIFFERENTIAL METHOD: ABNORMAL
EOSINOPHIL # BLD AUTO: 0.1 K/UL
EOSINOPHIL NFR BLD: 1.7 %
ERYTHROCYTE [DISTWIDTH] IN BLOOD BY AUTOMATED COUNT: 15.7 %
EST. GFR  (AFRICAN AMERICAN): 50.4 ML/MIN/1.73 M^2
EST. GFR  (NON AFRICAN AMERICAN): 43.6 ML/MIN/1.73 M^2
GLUCOSE SERPL-MCNC: 89 MG/DL
HCT VFR BLD AUTO: 27 %
HGB BLD-MCNC: 8.7 G/DL
LYMPHOCYTES # BLD AUTO: 1.4 K/UL
LYMPHOCYTES NFR BLD: 17.4 %
MAGNESIUM SERPL-MCNC: 1.7 MG/DL
MCH RBC QN AUTO: 28.9 PG
MCHC RBC AUTO-ENTMCNC: 32.2 G/DL
MCV RBC AUTO: 90 FL
MONOCYTES # BLD AUTO: 0.8 K/UL
MONOCYTES NFR BLD: 10.1 %
NEUTROPHILS # BLD AUTO: 5.4 K/UL
NEUTROPHILS NFR BLD: 70.3 %
PLATELET # BLD AUTO: 321 K/UL
PMV BLD AUTO: 10.9 FL
POTASSIUM SERPL-SCNC: 4.2 MMOL/L
PREALB SERPL-MCNC: 14 MG/DL
PROT SERPL-MCNC: 6.8 G/DL
RBC # BLD AUTO: 3.01 M/UL
SODIUM SERPL-SCNC: 136 MMOL/L
WBC # BLD AUTO: 7.74 K/UL

## 2017-08-16 PROCEDURE — 97167 OT EVAL HIGH COMPLEX 60 MIN: CPT

## 2017-08-16 PROCEDURE — 97535 SELF CARE MNGMENT TRAINING: CPT

## 2017-08-16 PROCEDURE — 97163 PT EVAL HIGH COMPLEX 45 MIN: CPT

## 2017-08-16 PROCEDURE — 85025 COMPLETE CBC W/AUTO DIFF WBC: CPT

## 2017-08-16 PROCEDURE — 99233 SBSQ HOSP IP/OBS HIGH 50: CPT | Mod: ,,, | Performed by: PHYSICAL MEDICINE & REHABILITATION

## 2017-08-16 PROCEDURE — 63600175 PHARM REV CODE 636 W HCPCS: Performed by: PHYSICAL MEDICINE & REHABILITATION

## 2017-08-16 PROCEDURE — 83735 ASSAY OF MAGNESIUM: CPT

## 2017-08-16 PROCEDURE — 80053 COMPREHEN METABOLIC PANEL: CPT

## 2017-08-16 PROCEDURE — 12800000 HC REHAB SEMI-PRIVATE ROOM

## 2017-08-16 PROCEDURE — 36415 COLL VENOUS BLD VENIPUNCTURE: CPT

## 2017-08-16 PROCEDURE — 97112 NEUROMUSCULAR REEDUCATION: CPT

## 2017-08-16 PROCEDURE — 63600175 PHARM REV CODE 636 W HCPCS: Performed by: PHYSICIAN ASSISTANT

## 2017-08-16 PROCEDURE — 84134 ASSAY OF PREALBUMIN: CPT

## 2017-08-16 PROCEDURE — 92523 SPEECH SOUND LANG COMPREHEN: CPT

## 2017-08-16 PROCEDURE — 25000003 PHARM REV CODE 250: Performed by: PHYSICAL MEDICINE & REHABILITATION

## 2017-08-16 PROCEDURE — 25000003 PHARM REV CODE 250: Performed by: PHYSICIAN ASSISTANT

## 2017-08-16 RX ORDER — ONDANSETRON 4 MG/1
8 TABLET, FILM COATED ORAL
Status: DISCONTINUED | OUTPATIENT
Start: 2017-08-16 | End: 2017-08-17

## 2017-08-16 RX ORDER — RAMELTEON 8 MG/1
8 TABLET ORAL NIGHTLY
Status: DISCONTINUED | OUTPATIENT
Start: 2017-08-16 | End: 2017-08-17

## 2017-08-16 RX ORDER — ONDANSETRON 8 MG/1
8 TABLET, ORALLY DISINTEGRATING ORAL EVERY 6 HOURS PRN
Status: DISCONTINUED | OUTPATIENT
Start: 2017-08-16 | End: 2017-08-29 | Stop reason: HOSPADM

## 2017-08-16 RX ADMIN — HEPARIN SODIUM 5000 UNITS: 5000 INJECTION, SOLUTION INTRAVENOUS; SUBCUTANEOUS at 09:08

## 2017-08-16 RX ADMIN — CEFTRIAXONE 2 G: 2 INJECTION, SOLUTION INTRAVENOUS at 03:08

## 2017-08-16 RX ADMIN — ONDANSETRON 8 MG: 8 TABLET, ORALLY DISINTEGRATING ORAL at 12:08

## 2017-08-16 RX ADMIN — AMPICILLIN SODIUM 2 G: 2 INJECTION, POWDER, FOR SOLUTION INTRAMUSCULAR; INTRAVENOUS at 02:08

## 2017-08-16 RX ADMIN — CARVEDILOL 12.5 MG: 12.5 TABLET, FILM COATED ORAL at 09:08

## 2017-08-16 RX ADMIN — LISINOPRIL 20 MG: 20 TABLET ORAL at 07:08

## 2017-08-16 RX ADMIN — CEFTRIAXONE 2 G: 2 INJECTION, SOLUTION INTRAVENOUS at 04:08

## 2017-08-16 RX ADMIN — HEPARIN SODIUM 5000 UNITS: 5000 INJECTION, SOLUTION INTRAVENOUS; SUBCUTANEOUS at 01:08

## 2017-08-16 RX ADMIN — CARVEDILOL 12.5 MG: 12.5 TABLET, FILM COATED ORAL at 07:08

## 2017-08-16 RX ADMIN — RAMELTEON 8 MG: 8 TABLET, FILM COATED ORAL at 09:08

## 2017-08-16 RX ADMIN — AMPICILLIN SODIUM 2 G: 2 INJECTION, POWDER, FOR SOLUTION INTRAMUSCULAR; INTRAVENOUS at 06:08

## 2017-08-16 RX ADMIN — AMPICILLIN SODIUM 2 G: 2 INJECTION, POWDER, FOR SOLUTION INTRAMUSCULAR; INTRAVENOUS at 12:08

## 2017-08-16 RX ADMIN — STANDARDIZED SENNA CONCENTRATE AND DOCUSATE SODIUM 1 TABLET: 8.6; 5 TABLET, FILM COATED ORAL at 09:08

## 2017-08-16 RX ADMIN — ONDANSETRON 8 MG: 4 TABLET, FILM COATED ORAL at 04:08

## 2017-08-16 RX ADMIN — ASPIRIN 81 MG: 81 TABLET, COATED ORAL at 07:08

## 2017-08-16 RX ADMIN — ONDANSETRON 8 MG: 8 TABLET, ORALLY DISINTEGRATING ORAL at 06:08

## 2017-08-16 NOTE — PROGRESS NOTES
"Occupational Therapy   Evaluation + Treatment    Juan Manuel Koehler   MRN: [id number]    08/16/17 1030   OT Time Calculation   OT Start Time 1030   OT Stop Time 1200   OT Total Time (min) 90 min   General   OT Date of Treatment 08/16/17   Chart Reviewed Yes   Onset of Illness/Injury or Date of Surgery 08/04/17   Diagnosis R cerebellar infarct with hemorrhagic conversion   Additional Pertinent History Past Medical History:   Diagnosis Date    Cancer     bladder and throat    CKD (chronic kidney disease) stage 3, GFR 30-59 ml/min     Embolic stroke involving right cerebellar artery 8/4/2017    Urinary tract infection      Past Surgical History:   Procedure Laterality Date    BLADDER SURGERY      CYSTOSCOPY      HERNIA REPAIR      Ileal Conduit      THROAT SURGERY        Date Referred to OT 08/15/17   Referring Physician Dr. Lewis   Family/Caregiver Present Yes  (wife)   Patient Found (position) Supine in bed   Pt found with (urinary stoma)   Precautions   General Precautions aspiration;fall   Visual/Auditory Vision impaired  (glasses)   IADL History   Type of Occupation [employer] business   Leisure and Hobbies Dogs, cars   Living Environment   Lives With spouse   Living Arrangements house   Home Accessibility (ramp access, tub-shower combo)   Home Layout Bedroom on 2nd floor   Stair Railings at Home inside, present on left side   Living Environment Comment Pt lives in [place], [place] with his wife and 3 dogs. Prior to admit pt was (I) with ADLs, walking, and working. Pt's wife will be primary caregiver at d/c, also reports they have friends and family that will be able to check on him during the day.    Equipment Currently Used at Home none   Subjective   Patient states "I don't think I'll be very good at this"   Pain/Comfort   Pain Rating 5/10   Location (scrotal)   Cognitive Status   Level of Consciousness (AVPU) alert   Arousal Level opens eyes spontaneously   Orientation oriented x 4   Able to Follow Commands " (Communication) WFL   Speech clear/fluent;follows commands   Mood/Behavior calm;cooperative   Range of Motion (ROM)   Range of Motion Examination no ROM deficits were identified   Manual Muscle Testing (MMT)   Manual Muscle Testing Results (BUEs grossly 4/5)   Tone   Right UE  normal   Left UE normal   Observation   Appearance thin male, seated upright in bed   Posture Rounded shoulders;Posterior pelvic tilt   Skin warm;dry;thin   Bed Mobility   Rolling/Turning to Left Supervision   Rolling/Turning Right Supervision   Scooting/Bridging Supervision   Supine to Sit Supervision   Sit to Supine Supervision   Transfers   Transfer yes   Sit to Stand   Sit <> Stand Assistance Contact Guard Assistance   Sit <> Stand Assistive Device No Assistive Device   Trials/Comments posterior lean, unsteady   Stand to Sit   Assistance Contact Guard Assistance   Assistive Device No Assistive Device   Trials/Comments cues for hand placement   Bed to Chair   Bed <> Chair Technique Stand Pivot   Bed <> Chair Transfer Assistance Minimum Assistance   Bed <> Chair Assistive Device No Assistive Device   Toilet Transfer   Toilet Transfer Technique Stand Pivot   Toilet Transfer Assistance Minimum Assistance   Trials/Comments steadying assist with RW   Feeding   Feeding Level of Assistance Independent   Feeding Where Assessed Wheelchair   Grooming   Grooming Level of Assistance Contact guard assistance   Grooming Where Assessed Standing sinkside   Grooming Comments steadying assist to wash face and brush teeth, pt requesting to sit due to impaired balance   Bathing   Bathing Level of Assistance Minimum assistance   Bathing Where Assessed Shower;Shower Chair/bench   Bathing Comments steadying assist and assist to thoroughly dry backside   UE Dressing   UE Dressing Level of Assistance Set-up Assistance;Supervision   UE Dressing Where Assessed Wheelchair   LE Dressing   LE Dressing Yes   LE Dressing  Level of Assistance Moderate assistance   LE  Dressing Level of Assistance Moderate assistance  (assist to manage over hips and tie)   Sock Level of Assistance Supervision   LE Dressing Where Assessed Wheelchair   LE Dressing Comments pt completed 4/6 tasks   Toileting   Toileting Level of Assistance Moderate assistance   Toileting Where Assessed Toilet   Toileting Comments assist to manage clothing up   Treatment   Treatment Cognition:    Communication   Comprehension · Needs cues to understand (Basic tasks)  (5)  · Understands basic needs 90% of the time (5)     Mode B   Expression - Needs cues to express basic needs  (5)     Mode B   Social Cognition   Social Interaction - nteracts appropriately 75-89% of time - needs redirection for appropriate language or to initiate interaction  (4)     Problem Solving - Solves basic problems 75-89% of the time  (4)     Memory - Recognizes, recalls, or executes 25-49% of time 1 step of 2 step request  (2)        Activity Tolerance   Activity Tolerance Patient tolerated treatment well  (c/o nausea at end of session)   Medical Staff Made Aware NSG   After Treatment   Patient Position After Treatment Seated in wheelchair   Patient after treatment left call button in reach  (wife present)   Assessment   Prognosis Good   Problem List Decreased Self Care skills;Decreased upper extremity strength;Decreased safe judgment during ADL;Decreased endurance;Decreased functional mobility;Decreased gross motor control;Decreased IADLs;Decreased trunk control for functional activities   Assessment Pt presents with LE ataxia, impaired balance, posterior lean and overall decreased self care skills after cerebellar stroke. 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    Level of Motivation/Participation good   Short Term Goals   Additional Documentation yes   Time For Goal Achievement 7 days    Pt Will Perform Supine To Sit Modified Independently;New goal   Supine to Sit - Met/Not Met Not Met   Pt Will Perform Sit to Supine Modified Independently;New goal   Supine to Sit - Met/Not Met Not Met   Pt Will Transfer To Bedside Commode With RW;With contact guard assist;New goal   Transfer Bedside Commode -Met/Not Met Not Met   Pt Will Perform Bathing On shower seat/tub bench;With contact guard assistance;New goal   Bathing - Met/Not Met Not Met   Pt Will Perform UE Dressing With modified independence;New goal   UE Dressing - Met/Not Met Not Met   Pt Will Perform LE Dressing With minimal assistance;New goal   LE Dressing - Met/Not Met Not Met   Pt Will Perform Toileting With minimal assistance;New goal   Toileting-Met/Not Met Not Met   Long Term Goals   Additional Documentation yes   Time For Goal Achievement (10 days)   Pt Will Perform Supine To Sit Independently;New goal   Supine to Sit - Met/Not Met Not Met   Pt Will Perform Sit to Supine Independently;New goal   Supine to Sit - Met/Not Met Not Met   Pt Will Transfer Bed/Chair With RW;With stand by assist;New goal   Transfer Bed/Chair - Met/Not Met Not Met   Pt Will Transfer To Toilet With RW;With Stand by assist;New goal   Transfer Toilet - Met/Not Met Not Met   Pt Will Transfer To Shower With RW;With stand by assist;New goal   Transfer to Shower-Met/Not Met Not Met   Pt Will Perform Eating Independently;New goal   Eating - Met/Not Met Not Met   Pt Will Perform Grooming Independently;New goal   Grooming - Met/Not Met Not Met   Pt Will Perform Bathing With stand by assistance;New goal;On shower seat/tub bench   Bathing - Met/Not Met Not Met   Pt Will Perform UE Dressing Independently;New goal   UE Dressing - Met/Not Met Not Met   Pt Will Perform LE Dressing With stand by assistance;New goal   LE Dressing - Met/Not Met Not Met   Pt Will Perform Toileting With stand by assistance;New goal   Toileting-Met/Not Met Not Met   Discharge Recommendations   Equipment  Needed After Discharge bath bench;walker, rolling   Discharge Facility/Level Of Care Needs home health OT   Plan   Plan Self care retraining;Functional transfer training;UE thereex;Equipment Training;Family training;Safety training;Functional endurance training;Patient education;Postural control;Community re-training;AROM;Neuromuscular Re-education;Functional Standing Activities;Continue with current plan   Therapy Frequency 2 times/day;Monday-Friday;Saturday or Sunday   Occupational Therapy Follow-up   OT Follow-up? Yes   Treatment/Billable Minutes   Evaluation 60   Self Care/Home Management 30   Total Time 90     PREETHI Cooper   8/16/2017

## 2017-08-16 NOTE — PROGRESS NOTES
BLADDER  [] Pt. uses toilet (7)  [] Pt. is on dialysis - does not urinate (7)  [] Pt. uses bedside commode (5)  []Pt. uses bedpan - staff does____% (includes rolling on/off, holding bedpan in place                                                                   and emptying pan)   [x] Pt. uses urinal - staff empties                          []   Pt. needs help with positioning the urinal (4)                          []   Pt. needs help holding the urinal (2)  []  Pt. has bolanos catheter/suprapubic catheter - staff empties (1)  []  Pt. Is on ICP program:                           []  Pt. does catheterization (6)                           [] Staff does catheterization (1)  [] Pt. Is incontinent - staff does _______% of tasks  Number of accidents during this shift ______    BOWEL  [] Pt. uses toilet (7)  [] Pt. uses bedside commode (5)  [] Pt. uses bedpan - staff does _______% of task  [] Pt. is on bowel program::                         []   Pt. inserts suppository (6)                         []   Nurse inserts suppository (4)                         []  Nurse does dig. stim. (1)  [] Pt. has colostomy - staff maintains care and empties (1)                       Pt. does _____% of care  [] Pt. is incontinent - staff does ______% of tasks  [x] No bowel movement during this shift  [] Number of accidents during this shift ______    EATING  [x] Pt. opens packages, cuts food, pours liquids, and feeds self, regular consistency (7)  [] Pt. needs dentures, swallow technique, special foods or drink consistency (6)  [] Pt. needs supervision (cueing), setup (open containers, cut food, etc.) (5)  []Pt. needs assist with occasional scooping, or checking for food pocketing (75-90%) (4)  []Pt. needs assist to lead each bite on utensils, patient brings food to mouth (50-74%)(3)  []Pt. needs assist to scoop, bring food to mouth (hand over hand) (25-49%) (2)  []AxillaryPt. Is receiving IV fluids for hydration or tube feeding  (1)    GROOMING  []Pt. performs all tasks independently and safely (7)  []Pt. obtains all articles, needs adaptive/assistive device (wash mitt, etc.) (6)  []Pt. needs supervision (cueing), setup (5)  []Pt. doing 3 of 4 or 4 of 5 tasks, needs assist to put in or remove dentures, steadying (Patient doing 75-90%) (4)   []Pt. doing 2 of 4 or 3 of 5 tasks (3)  []Pt. doing 1 of 4 of 2 of 5 tasks (25-49%) (2)  []Pt. doing 0 of tasks, needs assistance of 2 helpers (1)  []Activity occurred with OT    BATHING  []Pt. safety bathes whole body (10 parts of 10) (7)  []Pt. doing 10 of 10 body parts, uses adaptive devices (wash mitt, etc.) (6)  []Pt. doing 10 of 10 body parts or needs supervision or setup (5)  []Pt. doing 8-9 of 10 body parts (75-99%) (4)  []Pt. doing 5-7 of 10 body parts (50-74%) (3)   []Pt. doing 3-4 of 10 body parts (25-49%( (2)  []Pt. doing 0-2 of 10 body parts (0-24%0 (1)  []Activity occurred with OT    DRESSING UPPER BODY  []Pt. dresses or undresses independently, obtaining clothes from storage area (7)  []Pt. needs adaptive devices (6)  []Pt. needs supervision (cueing), setup (5)  []Pt. doing 75-99% (4)  []Pt. doing 50-74% (3)  []Pt. doing 25-49% (2)  []Pt. doing 0-24% or needs assist of 2 helpers (1)  []Activity occurred with OT    DRESSING LOWER BODY  []Pt. dresses or undresses independently (7)   []Pt. needs adaptive devices (6)  []Pt. needs supervision (cueing), set up helper applies KAMARI hose or AFO (5)  []Pt. doing 75-99%, needs steadying assist, fastening a belt, etc.) (4)  []Pt. doing 50-74% (3)  []Pt. doing 25-49% (2)  []Pt. doing 0-24% or needs assist of 2 helpers (1)    TOILETING  []Pt. doing 3 of 3 tasks (pulling down pants, cleaning manjit area, pulling up pants) (7)  []Pt. doing all 3 parts but needs assistive device (grab bar) (6)  []Pt. needs supervision or setup (5)  []Pt. doing 3 of 3 parts (75-99%) (4)  []Pt. doing 2 of 3 parts (50-74%) (3)  []Pt. doing 1 of 3 parts (25.49%) (2)  []Pt. needs  assist (more than steadying) with all 3 parts or needs assist of 2 helpers,  Incontinent of B/B today (0-24%) (1)    BED/ CHAIR/WHEELCHAIR TRANSFER  []Pt. transfers without assistive device into and out of wheelchair/bed/chair (7)  []Pt. uses assistive/adaptive devices (grab bars, sliding board, walker, bed rails,   wheelchair arm rests, etc.) (6)  []Pt. needs supervision, cues, head of bed raised by staff (5)  []Pt. needs steadying assist with any or all parts of transfer, needs assist with one   leg during transfer ( 4)  []Pt. needs lifting or lowering, needs assist with 2 legs during transfer (3)  []Pt. needs lifting and lowering (2)  []Pt. needs assist of 2 helpers(even if 2nd person is just there for safety) or mechanical lift (1)  [x]Activity did not occur     TOILET TRANSFER  []Pt. transfers from wheelchair or walking without assistive device (7)  []Pt. uses assistive/adaptive device (commode, grab bars, sliding board, walker,   prosthesis, orthotic, raised toilet seat, etc.) (6)  []Pt. needs supervision, cues, or setup (needs assist to lock brakes, remove leg or arm  rest, position sliding board) (5)  []Pt. needs steadying assist with any or all parts of transfer, needs assist with one leg  during transfer (4)  []Pt. needs lifting or lowering, needs assist with two legs during transfer (3)  []Pt. needs lifting and lowering (2)  []Pt. needs assist of 2 helpers or mechanical lift (1)  [x]Activity did not occur    SHOWER TRANSFER  []Pt. transfers without assistive device into and out of shower (7)  []Pt. uses assistive/adaptive device (shower bench, grab bars, etc.) (6)  []Pt. needs supervision, cues, or setup (5)  []Pt. needs steadying assist with any or all parts of transfer, needs assist with one leg  during transfer (4)  []Pt. needs lifting or lowering, needs assist with 2 legs during transfer (3)  []Pt. needs lifting and lowering (2)  []Pt. needs assist of 2 helpers, helper pushes shower chair on wheels in  and out of  shower (1)

## 2017-08-16 NOTE — ASSESSMENT & PLAN NOTE
- Hx of  Urothelial carcinoma of bladder    Follow-up with urology as an outpatient with Dr. Robles and with oncology, Dr. Calero

## 2017-08-16 NOTE — PROGRESS NOTES
Physical Therapy   [facility]    Juan Manuel Koehler   MRN: [id number]        08/16/17 1350   Transfers   Bed/Chair/WC 3   Toilet 3   Shower 3   Locomotion   Distance Walked 1   Distance Wheelchair 3   Walk 1   Wheelchair 5   Mode B   Stairs 2     Carola Burns, PT  8/16/2017

## 2017-08-16 NOTE — ASSESSMENT & PLAN NOTE
Other Rehab Plan/Continue to monitor:   Nutrition/Swallow Status:    - Prealbumin - pending    - Nutritionist on board   Bladder Management: illeostomy  Bowel Management:  continent  - Colace BID and Suppository PRN   - Will monitor for regularity   Mood: stable   Sleep: monitor; Ramelteon PRN   Skin: Monitor   DVT ppx:  Heparin

## 2017-08-16 NOTE — ASSESSMENT & PLAN NOTE
Endocarditis treatment w IVAbx (ceftriaxone 2g q12h, ampicillin 2g q4h) with end date 9/16 (6 week course)    8/16 Cr 1.6, discussed w pharm. Will decrease Ampicillin to Q6

## 2017-08-16 NOTE — PROGRESS NOTES
[facility]  Physical Medicine & Rehab  Progress Note    Patient Name: Juan Manuel Koehler  MRN: [id number]  Patient Class: IP- Rehab   Admission Date: 8/15/2017  Length of Stay: 1 days  Attending Physician: Verónica Lewis MD  Primary Care Provider: Hemant Sweeney MD    Subjective:     Principal Problem:Right-sided cerebrovascular accident (CVA)    Interval History: No acute events over night. Patient is tolerating therapy. Had nausea this AM, no dizziness. No associated with movement or PO intake      Scheduled Medications:    ampicillin IVPB  2 g Intravenous Q6H    aspirin  81 mg Oral Daily    carvedilol  12.5 mg Oral BID    cefTRIAXone (ROCEPHIN) IVPB  2 g Intravenous Q12H    heparin (porcine)  5,000 Units Subcutaneous Q8H    lisinopril  20 mg Oral Daily    ondansetron  8 mg Oral TID AC    polyethylene glycol  17 g Oral Daily    ramelteon  8 mg Oral QHS    senna-docusate 8.6-50 mg  1 tablet Oral BID       PRN Medications: acetaminophen, bisacodyl, dextrose 50%, glucagon (human recombinant), ondansetron    Review of Systems   Constitutional: Negative for unexpected weight change.        Vision stable w glasses  Hearing intact  Continent of bowel   HENT: Negative for congestion and ear pain.    Eyes: Negative for discharge.   Respiratory: Negative for shortness of breath.    Cardiovascular: Negative for chest pain.   Gastrointestinal: Positive for nausea. Negative for abdominal pain and vomiting.   Endocrine: Negative for cold intolerance.   Genitourinary: Negative for flank pain.   Neurological: Positive for weakness.   Psychiatric/Behavioral: Negative for hallucinations.     Objective:     Vital Signs (Most Recent):  Temp: 97.6 °F (36.4 °C) (08/16/17 0700)  Pulse: 72 (08/16/17 0700)  Resp: 18 (08/16/17 0700)  BP: 121/66 (08/16/17 0700)  SpO2: 96 % (08/16/17 0700)    Vital Signs (24h Range):  Temp:  [97.6 °F (36.4 °C)-98.8 °F (37.1 °C)] 97.6 °F (36.4 °C)  Pulse:  [72-80] 72  Resp:   [18-20] 18  SpO2:  [96 %-98 %] 96 %  BP: (121-132)/(66-67) 121/66     Physical Exam   Constitutional: He appears well-developed and well-nourished.   HENT:   Head: Normocephalic and atraumatic.   Eyes: EOM are normal.   Cardiovascular: Normal rate.    Pulmonary/Chest: Effort normal. No respiratory distress.   Abdominal: Soft. He exhibits no distension. There is no tenderness.   Genitourinary:   Genitourinary Comments: + urostomy   Musculoskeletal:   UE: SA, EF, EE,  5/5  LE: HF 5/5, KE< DF/PF 5/5  No calf tenderness    Neurological: He is alert.   Followed all commands  Memory 3/3 immediate, 2/3 delayed    Skin: Skin is warm.   Psychiatric: He has a normal mood and affect.   Vitals reviewed.    NEUROLOGICAL EXAMINATION:     CRANIAL NERVES     CN III, IV, VI   Extraocular motions are normal.       Assessment/Plan:      Juan Manuel Koehler is a 67 y.o. male admitted to inpatient rehabilitation on 8/15/2017 for Right-sided cerebrovascular accident (CVA) with impaired mobility and ADLs. Patient remains appropriate for PT, OT, and as required Speech therapy. Patient continues to require 24 hour nursing care as well as daily Physician assessment.    * Right-sided cerebrovascular accident (CVA)    - R  Cerebellar  , embolic  - cont w ASA 81/statin          S/P ileal conduit    - Hx of  Urothelial carcinoma of bladder    Follow-up with urology as an outpatient with Dr. Robles and with oncology, Dr. Calero         Hypertension      -cont with Coreg 12.5 BID, lisinopril 20mg QD, hydralazine PO PRN  - cont to monitor BP/HR        Bacteremia due to Enterococcus    Endocarditis treatment w IVAbx (ceftriaxone 2g q12h, ampicillin 2g q4h) with end date 9/16 (6 week course)    8/16 Cr 1.6, discussed w pharm. Will decrease Ampicillin to Q6        Impaired mobility and ADLs    Other Rehab Plan/Continue to monitor:   Nutrition/Swallow Status:    - Prealbumin - pending    - Nutritionist on board   Bladder Management:  illeostomy  Bowel Management:  continent  - Colace BID and Suppository PRN   - Will monitor for regularity   Mood: stable   Sleep: monitor; Ramelteon PRN   Skin: Monitor   DVT ppx:  Heparin                DISCHARGE PLANNING:  Tentative Discharge Date:     Future Appointments  Date Time Provider Department Center   9/5/2017 10:40 AM Jewels Toth PA-C [facility] NEURO Damon Teja Lewis MD  Department of Physical Medicine & Rehab   [facility]

## 2017-08-16 NOTE — PROGRESS NOTES
Occupational Therapy   FIM Scores    Juan Manuel Koehler   MRN: [id number]      08/16/17 1030   Transfers   Bed/Chair/WC 4   Toilet 4   Self Care   Eating 7   Grooming 5   Bathing 4   Dressing-Upper 5   Dressing-Lower 3   Toileting 3   Communication   Comprehension 5   Mode B   Expression 5   Mode B   Social Cognition   Social Interaction 4   Problem Solving 4   Memory 2     PREETHI Cooper   8/16/2017

## 2017-08-16 NOTE — PROGRESS NOTES
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 3 dogs at home.     Past Medical History:   Diagnosis Date    Cancer     bladder and throat    CKD (chronic kidney disease) stage 3, GFR 30-59 ml/min     Embolic stroke involving right cerebellar artery 8/4/2017    Urinary tract infection         Patient Found (position) Seated in wheelchair   Pt found with bolanos catheter   Precautions   General Precautions aspiration;fall   Visual/Auditory Vision impaired  (glasses)   Previous Level of Function   Ambulation Skills independent   Transfer Skills independent   ADL Skills independent   Work/Leisure Activity independent   Living Environment   Lives With spouse  (and 3 dogs)   Living Arrangements house   Home Accessibility other (see comments)  (2 story home with no steps to enter; full bath upstairs)   Home Layout Bedroom on 2nd floor   Stair Railings at Home inside, present on left side   Transportation Available other (see comments)  (wife primarily drives)   Living Environment Comment Pt lives in 2SH with 0 SHAVONNE with his wife and 3 dogs. Prior to admit, pt was independent /s AD. Pt states that he owns a RW but does not know where it is currently. Pt reports no falls in the last six months except the one associated with current stroke.    Equipment Currently Used at Home none   Subjective   Patient states Pt c/o nausea but is willing to participate in PT evaluation.    Patient stated goals "I want to be steady on my feet again."    Pain/Comfort   Pain Rating 1 no pain   Cognitive Status   Level of " Consciousness (AVPU) alert   Orientation oriented x 4   Able to Follow Commands (Communication) WNL   Sensory Examination   Additional Documentation yes   Light Touch   LLE w/i normal limits   RLE w/i normal limits   Range of Motion (ROM)   Range of Motion Examination LUE ROM was WNL   Manual Muscle Testing (MMT)   Manual Muscle Testing Results other (see comments)   Additional Documentation Yes       MMT: Hip, Rehab Eval   Left Flexion (4/5) good, left   Right Flexion (4/5) good, right       MMT: Knee, Rehab Eval   Left Flexion (4/5) good, left   Right Flexion (4/5) good, right   Left Extension (4/5) good, left   Right Extension (4/5) good, right       MMT: Ankle, Rehab Eval   Left Dorsiflexion (4/5) good, left   Right Dorsiflexion (4/5) good, right   Left Plantarflexion (4/5) good, left   Right Plantarflexion (4/5) good, right   Observation   Posture Other (see comments)  (slight kyphotic posture )   Bed Mobility   Bed Mobility yes   Rolling/Turning to Left Supervision  (on mat)   Rolling/Turning Right Supervision  (on mat)   Supine to Sit Supervision  (on mat)   Sit to Supine Supervision  (on mat)   Transfers   Transfer yes   Sit to Stand   Sit <> Stand Assistance Contact Guard Assistance;Minimum Assistance   Sit <> Stand Assistive Device No Assistive Device   Trials/Comments Multiple trials performed throughout the evaluation requiring no assistance for standing, however, required CGA-min A for stabilization upon standing    Stand to Sit   Assistance Minimum Assistance   Assistive Device No Assistive Device   Trials/Comments Multiple trials performed throughout the evaluation;  pt required min A for eccentric control into the WC.    Chair to Bed   Technique Squat Pivot   Assistance Minimum Assistance   Assistive Device No Assistive Device   Trials/Comments x1 trial; pt required min A to control descent into bed. Pt has a tendency to perform transfer quickly and become uncoordinated during the descent.    Chair  to Mat   Chair<> Mat Technique Stand Pivot   Chair<>Mat Assistance Minimum Assistance   Chair <> Mat Assistive Device No Assistive Device   Trials/Comments x1 trial to mat; pt required min A for stability during the pivot as well as during the descent onto the mat   Mat to Chair   Technique Stand Pivot   Assistance Minimum Assistance   Assistive Device No Assistive Device   Trials/Comments x1 trial; pt required min A for stability during the pivot as well as during the descent onto the chair   Shower Transfer   Technique Stand Pivot   Assistance Minimum Assistance   Assistive Device No Assistive Device   Trials/Comments x1 trial; pt used wall for stability and performed a step over shower transfer successfully with min A for stability while in stand.    Toilet Transfer   Toilet Transfer Technique Stand Pivot   Toilet Transfer Assistance Minimum Assistance   Toilet Transfer Assistive Device No Assistive Device   Trials/Comments x1 trial; required min A for stability while in stand and to control ascent onto toilet.    Car Transfer   Car Transfer Technique Stand Pivot   Car Transfer Assistance Minimum Assistance   Car Transfer Assistive Device No Assistive Device   Trials/Comments x1 trial; pt required min A for stability upon standing and VC for hand placement to safely transfer in/out of car.    Wheelchair Activities   Pressure Relief Type Lateral lean   Level of Assistance for Pressure Relief Activities Supervision   Propulsion Yes   Propulsion Type 1 Manual   Level 1 Level tile   Method 1 Right upper extremity;Left upper extremity   Level of Assistance 1 Supervision   Description/ Details 1 Pt propelled self 150' with supervision and VC to efficiently turn WC. Pt negiotiating x2 180 deg turns.    Gait   Gait Yes   Gait 1   Surface 1 Level tile   Gait Assistive Device No device   Assistance 1 Minimum assistance;Moderate assistance   Gait Distance Pt ambulated 42' with min A for stability while standing and required  "mod A due to posterior LOB x2 during trial. Pt ambulated with decreased step length, narrow ANNALISA, and increased time spent in double limb support. Pt looked at ground during trial due to "not trusting feet."    Gait Pattern swing-through gait   Gait Deviation(s) decreased pedro pablo;increased time in double stance;decreased step length;decreased stride length   Impairments Contributing to Gait Deviations impaired balance;impaired coordination;impaired postural control;impaired motor control   Stairs   Stairs Yes   Stairs/Curb Step   Rails 1 Bilateral   Device 1 No device   Assistance 1 Minimum assistance   Comment/# Steps 1 Pt negiotiated 4 steps with min A and a step over pattern. Pt heavily relied on UE support for stability and required min A from PT during SLS.    Stairs/ Curb Step  2   Rails 2 None (Comment)   Device 2 No device   Assistance 2 Minimum assistance   Comment/# Steps 2 Pt negiotiated 4" curb with min A when in SLS.    Balance   Balance Yes   High Level Ambulation   Comments Pt performed a mini BESTest and scored a 7/28 indicating fall risk (fall risk < 17.5)    Activity Tolerance   Activity Tolerance Patient tolerated treatment well  (with frequent rest breaks secondary to nausea)   After Treatment   Patient Position After Treatment Supine in bed   Patient after treatment left call button in reach;nursing notified   Assessment   Prognosis Good   Problem List Decreased endurance;Impaired balance;Decreased coordination;Decreased mobility   Assessment 66 yo male s/p R cerebellar infarct with hemorrhagic conversion is admited to IP with balance, coordination, and mobility deficts. Prior to incident, pt was independent in all ADLs and mobility. Pt demonstrates impaired balance/fall risk evident by scoring a 7/28 on the Mini BESTest. Pt would benefit from IP rehab in order to address these deficits to improve functional mobility and decrease caregiver burden.    Level of Motivation/Participation great "   Barriers to Discharge Decreased caregiver support   Barriers to Discharge Comments Bedroom and full bathroom on second floor.    Short Term Goals   Additional Documentation yes   Time For Goal Achievement Other (comment)  (10 days)   Pt Will Perform Supine To Sit New goal;Modified Independently   Pt Will Perform Sit to Supine New goal;Modified Independently   Pt Will Transfer Bed/Chair New goal;With stand by assist;Stand Pivot   Pt Will Ambulate New goal;151-200 feet;With stand by assist  (with appropriate AD )   Pt Will Go Up / Down Stairs With contact guard assist;With 1 Rail;1 flight   Pt Will Demo / Request Pressure Relief Modified [place]   Other Goal Pt will improve balance evident by improving score to 10/28.    Long Term Goals   Additional Documentation no  (STG = LTG )   Discharge Recommendations   Equipment Needed After Discharge other (see comments)  (TBD)   Discharge Facility/Level Of Care Needs outpatient PT;home health PT  (HH vs OP depending on transportation and progress)   Plan   Planned Therapy Intervention Balance Training;Gait training;Bed mobility training;Transfer training;Neuromuscular Re-education;Strengthening;Postural Re-education;Wheelchair Management/Propulsion;Plan of care initiated;Motor Coordination Training   Therapy Frequency daily;Monday-Friday;Saturday and Sunday   Physical Therapy Follow-up   PT Follow-up? Yes   PT - Next Visit Date 08/17/17   Treatment/Billable Minutes   Evaluation 60   Neuromuscular Re-education 30   Total Time 90     Carola Burns, PT  8/16/2017

## 2017-08-16 NOTE — PROGRESS NOTES
Paged and notified Dr. Amanda per pt's request to have something to help him sleep tonight.  MD aware and gave TORB for Remeron 8 mg PO PRN nightly for sleep.  Order implemented and updated nurse. Queen who is caring for the pt.

## 2017-08-16 NOTE — PROGRESS NOTES
BLADDER  [] Pt. uses toilet (7)  [] Pt. is on dialysis - does not urinate (7)  [] Pt. uses bedside commode (5)  []Pt. uses bedpan - staff does____% (includes rolling on/off, holding bedpan in place                                                                   and emptying pan)   [] Pt. uses urinal - staff empties                          []   Pt. needs help with positioning the urinal (4)                          []   Pt. needs help holding the urinal (2)  [x]  Pt. has bolanos catheter/suprapubic catheter - staff empties (1)  []  Pt. Is on ICP program:                           []  Pt. does catheterization (6)                           [] Staff does catheterization (1)  [] Pt. Is incontinent - staff does _______% of tasks  Number of accidents during this shift ______    BOWEL  [] Pt. uses toilet (7)  [] Pt. uses bedside commode (5)  [] Pt. uses bedpan - staff does _______% of task  [] Pt. is on bowel program::                         []   Pt. inserts suppository (6)                         []   Nurse inserts suppository (4)                         []  Nurse does dig. stim. (1)  [] Pt. has colostomy - staff maintains care and empties (1)                       Pt. does _____% of care  [] Pt. is incontinent - staff does _____% of tasks  [x] No bowel movement during this shift  [] Number of accidents during this shift ______    EATING  [x] Pt. opens packages, cuts food, pours liquids, and feeds self, regular consistency (7)  [] Pt. needs dentures, swallow technique, special foods or drink consistency (6)  [] Pt. needs supervision (cueing), setup (open containers, cut food, etc.) (5)  []Pt. needs assist with occasional scooping, or checking for food pocketing (75-90%) (4)  []Pt. needs assist to lead each bite on utensils, patient brings food to mouth (50-74%)(3)  []Pt. needs assist to scoop, bring food to mouth (hand over hand) (25-49%) (2)  []AxillaryPt. Is receiving IV fluids for hydration or tube feeding  (1)    GROOMING  []Pt. performs all tasks independently and safely (7)  []Pt. obtains all articles, needs adaptive/assistive device (wash mitt, etc.) (6)  []Pt. needs supervision (cueing), setup (5)  []Pt. doing 3 of 4 or 4 of 5 tasks, needs assist to put in or remove dentures, steadying (Patient doing 75-90%) (4)   []Pt. doing 2 of 4 or 3 of 5 tasks (3)  []Pt. doing 1 of 4 of 2 of 5 tasks (25-49%) (2)  []Pt. doing 0 of tasks, needs assistance of 2 helpers (1)  [x]Activity occurred with OT    BATHING  []Pt. safety bathes whole body (10 parts of 10) (7)  []Pt. doing 10 of 10 body parts, uses adaptive devices (wash mitt, etc.) (6)  []Pt. doing 10 of 10 body parts or needs supervision or setup (5)  []Pt. doing 8-9 of 10 body parts (75-99%) (4)  []Pt. doing 5-7 of 10 body parts (50-74%) (3)   []Pt. doing 3-4 of 10 body parts (25-49%( (2)  []Pt. doing 0-2 of 10 body parts (0-24%0 (1)  [x]Activity occurred with OT    DRESSING UPPER BODY  []Pt. dresses or undresses independently, obtaining clothes from storage area (7)  []Pt. needs adaptive devices (6)  []Pt. needs supervision (cueing), setup (5)  []Pt. doing 75-99% (4)  []Pt. doing 50-74% (3)  []Pt. doing 25-49% (2)  []Pt. doing 0-24% or needs assist of 2 helpers (1)  [x]Activity occurred with OT    DRESSING LOWER BODY  []Pt. dresses or undresses independently (7)   []Pt. needs adaptive devices (6)  []Pt. needs supervision (cueing), set up helper applies KAMARI hose or AFO (5)  []Pt. doing 75-99%, needs steadying assist, fastening a belt, etc.) (4)  []Pt. doing 50-74% (3)  []Pt. doing 25-49% (2)  []Pt. doing 0-24% or needs assist of 2 helpers (1)    TOILETING  []Pt. doing 3 of 3 tasks (pulling down pants, cleaning manjit area, pulling up pants) (7)  []Pt. doing all 3 parts but needs assistive device (grab bar) (6)  []Pt. needs supervision or setup (5)  []Pt. doing 3 of 3 parts (75-99%) (4)  []Pt. doing 2 of 3 parts (50-74%) (3)  []Pt. doing 1 of 3 parts (25.49%) (2)  []Pt.  needs assist (more than steadying) with all 3 parts or needs assist of 2 helpers,  Incontinent of B/B today (0-24%) (1)    BED/ CHAIR/WHEELCHAIR TRANSFER  []Pt. transfers without assistive device into and out of wheelchair/bed/chair (7)  []Pt. uses assistive/adaptive devices (grab bars, sliding board, walker, bed rails,   wheelchair arm rests, etc.) (6)  []Pt. needs supervision, cues, head of bed raised by staff (5)  []Pt. needs steadying assist with any or all parts of transfer, needs assist with one   leg during transfer ( 4)  []Pt. needs lifting or lowering, needs assist with 2 legs during transfer (3)  []Pt. needs lifting and lowering (2)  [x]Pt. needs assist of 2 helpers(even if 2nd person is just there for safety) or mechanical lift (1)  []Activity did not occur     TOILET TRANSFER  []Pt. transfers from wheelchair or walking without assistive device (7)  []Pt. uses assistive/adaptive device (commode, grab bars, sliding board, walker,   prosthesis, orthotic, raised toilet seat, etc.) (6)  []Pt. needs supervision, cues, or setup (needs assist to lock brakes, remove leg or arm  rest, position sliding board) (5)  []Pt. needs steadying assist with any or all parts of transfer, needs assist with one leg  during transfer (4)  []Pt. needs lifting or lowering, needs assist with two legs during transfer (3)  []Pt. needs lifting and lowering (2)  []Pt. needs assist of 2 helpers or mechanical lift (1)  [x]Activity did not occur    SHOWER TRANSFER  []Pt. transfers without assistive device into and out of shower (7)  []Pt. uses assistive/adaptive device (shower bench, grab bars, etc.) (6)  []Pt. needs supervision, cues, or setup (5)  []Pt. needs steadying assist with any or all parts of transfer, needs assist with one leg  during transfer (4)  []Pt. needs lifting or lowering, needs assist with 2 legs during transfer (3)  []Pt. needs lifting and lowering (2)  []Pt. needs assist of 2 helpers, helper pushes shower chair on  wheels in and out of  shower (1)

## 2017-08-16 NOTE — H&P
[facility]  Physical Medicine & Rehab  History & Physical    Patient Name: Juan Manuel Koehler  MRN: [id number]  Admission Date: 8/15/2017  Attending Physician: Verónica Lewis MD  Primary Care Provider: Hemant Sweeney MD    Subjective:     Principal Problem: Right-sided cerebrovascular accident (CVA)    HPI: 67-year-old male with PMHx of CKD III and invasive bladder cancer s/p open radical cystoproctostomy, bilateral pelvic lymph node dissection, and ileal conduit urinary diversion on 6/21/17 (pathology with high grade urothelial carcinoma, LN negative for malignancy) with associated deconditioning, poor PO intake, and weight loss presented to [facility] on 8/2/17 for worsening weakness and dizziness with subsequent fall.  Denied head trauma and LOC.  On arrival, found to be septic 2/2 complicated proteus UTI.  Blood cultures grew enterococcus faecalis.  Upon further work-up, ECHO showed vegetation on atrial surface of mitral valve consistent with endocarditis.  On 8/4/17, patient found to have AMS, aphasia, and facial droop.  Telemedicine consult completed.  CTH showed R cerebellar hypodensity with mass effect and midline shift.  He was then transferred to [facility] on 8/4/17 for further evaluation and management.  MRI/MRA revealed R cerebellar infarct with hemorrhagic conversion.  Evaluated by neurosurgery and no acute surgical intervention necessary.  Started on Cardene and hypertonic infusion.  Repeat blood cultures NGTD.  ID following and recommending ampicillin x 6 weeks for acute bacterial endocarditis.  Stepped down to floor 8/11/2017.    Current Functional Status:  Participating with therapy.  Bed mobility SBA.  Sit to stand CGA-Charan and transfers CGA.  Ambulated 100 ft CGA-Charan with RW. UBD Charan and LBD modA.    Functional History: Patient lives in [place] with his wife in a 2 story home without steps to enter.  Bed and bath on 2nd floor.  Prior to admission, he was independent with  ADLs, including working and driving, and mobility.  DME: RW.       Past Medical History:   Diagnosis Date    Cancer     bladder and throat    CKD (chronic kidney disease) stage 3, GFR 30-59 ml/min     Embolic stroke involving right cerebellar artery 8/4/2017    Urinary tract infection      Past Surgical History:   Procedure Laterality Date    BLADDER SURGERY      CYSTOSCOPY      HERNIA REPAIR      Ileal Conduit      THROAT SURGERY       Review of patient's allergies indicates:  No Known Allergies    Scheduled Medications:    ampicillin IVPB  2 g Intravenous Q4H    [START ON 8/16/2017] aspirin  81 mg Oral Daily    carvedilol  12.5 mg Oral BID    cefTRIAXone (ROCEPHIN) IVPB  2 g Intravenous Q12H    heparin (porcine)  5,000 Units Subcutaneous Q8H    [START ON 8/16/2017] lisinopril  20 mg Oral Daily    [START ON 8/16/2017] polyethylene glycol  17 g Oral Daily    senna-docusate 8.6-50 mg  1 tablet Oral BID       PRN Medications: acetaminophen, bisacodyl, dextrose 50%, glucagon (human recombinant), ondansetron    Family History     Problem Relation (Age of Onset)    Kidney disease Mother    No Known Problems Father        Social History Main Topics    Smoking status: Never Smoker    Smokeless tobacco: Never Used    Alcohol use No    Drug use: No    Sexual activity: Yes     Partners: Female     Birth control/ protection: None     Review of Systems   Constitutional: Negative for unexpected weight change.        Vision stable w glasses  Hearing intact  Continent of B/B   HENT: Negative for congestion and ear pain.    Eyes: Negative for discharge.   Respiratory: Negative for shortness of breath.    Cardiovascular: Negative for chest pain.   Gastrointestinal: Negative for abdominal pain and vomiting.   Endocrine: Negative for cold intolerance.   Genitourinary: Negative for flank pain.   Neurological: Positive for weakness.   Psychiatric/Behavioral: Negative for hallucinations.     Objective:     Vital  Signs (Most Recent):  Temp: 98.1 °F (36.7 °C) (08/15/17 1145)  Pulse: 78 (08/15/17 1145)  Resp: 18 (08/15/17 1145)  BP: 107/71 (08/15/17 1145)  SpO2: 97 % (08/15/17 1515)    Vital Signs (24h Range):  Temp:  [98.1 °F (36.7 °C)-99 °F (37.2 °C)] 98.1 °F (36.7 °C)  Pulse:  [72-96] 78  Resp:  [18] 18  SpO2:  [95 %-98 %] 97 %  BP: (102-118)/(58-71) 107/71     Body mass index is 16.04 kg/m².    Physical Exam   Constitutional: He is oriented to person, place, and time. He appears well-developed and well-nourished.   HENT:   Head: Normocephalic and atraumatic.   Eyes: EOM are normal.   Cardiovascular: Normal rate.    Pulmonary/Chest: Effort normal. No respiratory distress.   Abdominal: Soft. He exhibits no distension. There is no tenderness.   Genitourinary:   Genitourinary Comments: + urostomy   Musculoskeletal:   UE: SA, EF, EE,  5/5  LE: HF 5/5, KE< DF/PF 5/5  No calf tenderness   Neurological: He is alert and oriented to person, place, and time.   Followed all commands  Memory 3/3 immediate, 2/3 delayed   Skin: Skin is warm.   Psychiatric: He has a normal mood and affect.   Vitals reviewed.    NEUROLOGICAL EXAMINATION:     MENTAL STATUS   Oriented to person, place, and time.     CRANIAL NERVES     CN III, IV, VI   Extraocular motions are normal.       Diagnostic Results: Labs: Reviewed    Assessment/Plan:     Juan Manuel Koehler is a 67 y.o. male being admitted to inpatient rehabilitation on 8/15/2017 for Right-sided cerebrovascular accident (CVA) with impaired mobility and ADLs.     * Right-sided cerebrovascular accident (CVA)    - R  Cerebellar  , embolic  - cont w ASA 81/statin          S/P ileal conduit    - Hx of  Urothelial carcinoma of bladder    Follow-up with urology as an outpatient with Dr. Robles and with oncology, Dr. Calero         Hypertension      -cont with Coreg 12.5 BID, lisinopril 20mg QD, hydralazine PO PRN  - cont to monitor BP/HR        Bacteremia due to Enterococcus    Endocarditis treatment w  IVAbx (ceftriaxone 2g q12h, ampicillin 2g q4h) with end date 9/16 (6 week course)        Impaired mobility and ADLs    Other Rehab Plan/Continue to monitor:   Nutrition/Swallow Status:    - Prealbumin - will order on admission    - Nutritionist on board   Bladder Management: illeostomy  Bowel Management:  continent  - Colace BID and Suppository PRN   - Will monitor for regularity   Mood: stable   Sleep: monitor; Ramelteon PRN   Skin: Monitor   DVT ppx:  Heparin                Verónica Lewis MD  Department of Physical Medicine & Rehab   [facility]    Post Admission Assessment:    Juan Manuel Koehler is a 67 y.o. male being admitted to inpatient rehabilitation on 8/15/2017 for stroke with impaired mobility and ADLs.   Patient is appropriate for inpatient rehabilitation and is expected to tolerate 3 hours of therapy a day or 15 hours of therapy a week.  Patient is expected to benefit from the following therapy services: Physical Therapy, Occupational Therapy,  Speech Therapy, as well as Recreational Therapy  Impairments include:   Impaired balance, Decreased Endurance, Impaired activity tolerance  Goals:   Supervision to Mod I with Mobility, ADLs, Transfers using LRAD   Precautions:  Fall, Aspiration   ELOS: 10-14 days   Disposition: Home with family     I have had the opportunity to examine the patient within 24 hours of admission and have reviewed the Pre-Admission Assessment and find it consistent with my examination and evaluation of the patient. I confirm that this patient is appropriate for admission and treatment in this inpatient rehabilitation hospital, needs intense interdisciplinary rehabilitation care under my direction and is expected to achieve meaningful goals within a reasonable period of time that are consistent with the planned discharge disposition.     The patient will be admitted for inpatient comprehensive interdisciplinary rehabilitation to address the impairments and medical  conditions listed above while assessing equipment needs and compensatory strategies, with coordinated interdisciplinary services that will include physical therapy, occupational therapy, and close monitoring and treatment with 24-hour rehabilitative nursing. This interdisciplinary program will be performed under the direction of a physiatrist.      Verónica Lewis MD

## 2017-08-16 NOTE — PLAN OF CARE
Problem: Patient Care Overview  Goal: Plan of Care Review  Outcome: Ongoing (interventions implemented as appropriate)  Patient has denied any pain this shift. Patient is up ambulatory in wheelchair with assist with no falls or injuries noted. Patient is stable with no distress noted. Plan of care explained to patient and verbal acknowledgement of understanding from patient. Patient is receiving antibiotics as ordered. Patient is resting with call light in reach, bed in low,locked position with bed alarm set. Will continue to monitor.

## 2017-08-16 NOTE — SUBJECTIVE & OBJECTIVE
Interval History: No acute events over night. Patient is tolerating therapy. Had nausea this AM, no dizziness. No associated with movement or PO intake      Scheduled Medications:    ampicillin IVPB  2 g Intravenous Q6H    aspirin  81 mg Oral Daily    carvedilol  12.5 mg Oral BID    cefTRIAXone (ROCEPHIN) IVPB  2 g Intravenous Q12H    heparin (porcine)  5,000 Units Subcutaneous Q8H    lisinopril  20 mg Oral Daily    ondansetron  8 mg Oral TID AC    polyethylene glycol  17 g Oral Daily    ramelteon  8 mg Oral QHS    senna-docusate 8.6-50 mg  1 tablet Oral BID       PRN Medications: acetaminophen, bisacodyl, dextrose 50%, glucagon (human recombinant), ondansetron    Review of Systems   Constitutional: Negative for unexpected weight change.        Vision stable w glasses  Hearing intact  Continent of bowel   HENT: Negative for congestion and ear pain.    Eyes: Negative for discharge.   Respiratory: Negative for shortness of breath.    Cardiovascular: Negative for chest pain.   Gastrointestinal: Positive for nausea. Negative for abdominal pain and vomiting.   Endocrine: Negative for cold intolerance.   Genitourinary: Negative for flank pain.   Neurological: Positive for weakness.   Psychiatric/Behavioral: Negative for hallucinations.     Objective:     Vital Signs (Most Recent):  Temp: 97.6 °F (36.4 °C) (08/16/17 0700)  Pulse: 72 (08/16/17 0700)  Resp: 18 (08/16/17 0700)  BP: 121/66 (08/16/17 0700)  SpO2: 96 % (08/16/17 0700)    Vital Signs (24h Range):  Temp:  [97.6 °F (36.4 °C)-98.8 °F (37.1 °C)] 97.6 °F (36.4 °C)  Pulse:  [72-80] 72  Resp:  [18-20] 18  SpO2:  [96 %-98 %] 96 %  BP: (121-132)/(66-67) 121/66     Physical Exam   Constitutional: He appears well-developed and well-nourished.   HENT:   Head: Normocephalic and atraumatic.   Eyes: EOM are normal.   Cardiovascular: Normal rate.    Pulmonary/Chest: Effort normal. No respiratory distress.   Abdominal: Soft. He exhibits no distension. There is no  tenderness.   Genitourinary:   Genitourinary Comments: + urostomy   Musculoskeletal:   UE: SA, EF, EE,  5/5  LE: HF 5/5, KE< DF/PF 5/5  No calf tenderness    Neurological: He is alert.   Followed all commands  Memory 3/3 immediate, 2/3 delayed    Skin: Skin is warm.   Psychiatric: He has a normal mood and affect.   Vitals reviewed.    NEUROLOGICAL EXAMINATION:     CRANIAL NERVES     CN III, IV, VI   Extraocular motions are normal.

## 2017-08-16 NOTE — PLAN OF CARE
Problem: Fall Risk (Adult)  Goal: Absence of Falls  Patient will demonstrate the desired outcomes by discharge/transition of care.   Outcome: Ongoing (interventions implemented as appropriate)   08/15/17 2000   Fall Risk (Adult)   Absence of Falls making progress toward outcome   Pt remain free from falls/injury trauma. Call light w/i reach. Will monitor

## 2017-08-16 NOTE — ASSESSMENT & PLAN NOTE
- R  Cerebellar , embolic  - cont w ASA 81/statin    Nausea , ongoing. Will juaquin Zofran prior to meals and monitor

## 2017-08-16 NOTE — PROGRESS NOTES
"Speech Therapy   Evaluation    Juan Manuel Koehler   MRN: [id number]        08/16/17 0930   Speech Time Calculation   Speech Start Time 0930   Speech Stop Time 1015   Speech Total (min) 45 min   General Information   SLP Treatment Date 08/16/17   Referring Physician Dr Lewis   General Observations Pt seen at bedside individually.   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    Urinary tract infection       General Precautions aspiration;fall   Visual/Auditory Vision impaired  (glasses/contacts)   Subjective   Patient states Pt stated "I'm going to be home this weekend."   Pain/Comfort   Pain Rating no pain   Assessment   SLP Diagnosis mild-moderate cognitive deficits   Prognosis Good   Problem List decreased attention skills; poor problem solving skills; decreased safety awareness; decreased thuoght organization skills; mild visual changes/deficits   Assessment Pt is a 68 y/o male admitted to IP rehab following right cerebellar infarct with hemorrhagic conversion. Pt previously living independently with spouse, including working and driving. Pt presents today with mild-moderate cognitive deficits characterized by decreased insight skills, decreased safety awareness, decreased problem solving skills, and mild visual changes. Pt oriented to person, place, and time indly with 100% acc; however, pt disoriented to current Dx and situation. Pt able to verbalize situation once given moderate verbal cues. Pt completed complex YNQ task with 90% acc, completed following directions task with 75% acc, and completed auditory comprehension task of paragraph level with 75% acc. Pt listed 18 items within one minute of time of concrete category (norm 15-20). Pt also completed responsive naming task with 100% acc. Pt denies deficits of language at " this time as well as denies deficits of cognition. Pt completed immediate memory task with 42% acc, completed STM task with 33% acc, and completed LTM task with 100% acc. Verbal reasoning task completed with 75% acc, simple verbal sequencing task completed with 80% acc, and simple home environment problem solving task completed with 80% acc. Pt noted to require multiple repetitions of information for increased processing skills prior to response. Simple math calculations task completed with 75% acc with observed decreased attention skills to specific details. Pt completed clock drawing, writing task, and visual ID task of simple words/symbols with 80% acc.     Education provided regarding ST role, IP rehab program, safety precautions, and plan of care. Pt agreeable and willing to participate. However, pt reports/request to be d/c'd home prior to this coming weekend. Attempted education of typical rehab LOS, but also encouraged pt to speak to MD regarding concerns. Pt to benefit from skilled ST services targeting cognition and mild visual changes for return to baseline status.     Pt observed with hoarse vocal quality with episodes of dysphonia; however, this to be baseline status 2' Hx of throat cancer. No treatment warranted at this time for voice.    Level of Motivation/Participation good   Short Term Goals   Goal 1 Pt will be oriented x4 indly with 100% acc.    Goal 2 Pt will complete complex language comprehension skills with modified independence with 95% acc.    Goal 3 Pt will complete complex language expression tasks with modified independence with 95% acc.    Goal 4 Pt will complete reasoning/problem solving/sequencing tasks given supervision with 90% acc.    Goal 5 Pt will participate in full dysphagia evaluation with POC to follow.   Long Term Goals   Goal 1 Pt will complete complex language comprehension skills with independence with 100% acc.    Goal 2 Pt will complete complex language expression tasks  with independence with 100% acc.    Goal 3 Pt will complete reasoning/problem solving/sequencing tasks with modified independence with 95% acc.   Goal 4 Pt will complete functional/recent recall tasks given min verbal cues with 80% acc.    Goal 5 Pt will complete functional visual scanning tasks given supervision with 90% acc.    Discharge Recommendations   Discharge Facility/Level Of Care Needs home health speech therapy;outpatient speech therapy  (pending pt's progress)   Plan   Plan Plan of care intiated;Ongoing swallow assessment to intiate least restrictive diet;Speech Language/Cognitive Therapy;Patient Education;Family Education  (dysphagia evaluation)   Therapy Frequency Monday-Friday;Saturday or    Plan of Care Expires on 17   SLP Follow-up   SLP Follow-up? Yes   Treatment/Billable Minutes   Eval 45   Total Time 45     The patient's spiritual, cultural, social, and educational needs were considered with no evidence of barriers noted, and the patient is agreeable to plan of care.    FIM Scores  Comprehension:5  Expression: 5  Social Interaction:4  Problem Solvin  Memory: 2    Comprehension:  Complex= humor, finances, rationale for medical treatment(hip precautions, pressure relief)  Basic= pain, hunger, thirst, bathroom needs, cold, nutrition, sleep    Understands basic needs 90% of the time (5)    Expression:  Expresses basic needs or ideas 90% of the time  (5)    Social Interaction:  Interacts appropriately 75-89% of time - needs redirection for appropriate language or to initiate interaction  (4)    Problem Solving:  Solves basic problems 75-89% of the time  (4)    Memory:  Recognizes, recalls, or executes 25-49% of time 1 step of 2 step request  (2)    Allison Charles CCC-SLP  2017

## 2017-08-17 PROBLEM — Z98.890 HISTORY OF UROSTOMY: Status: ACTIVE | Noted: 2017-08-17

## 2017-08-17 LAB
ANION GAP SERPL CALC-SCNC: 8 MMOL/L
BASOPHILS # BLD AUTO: 0.03 K/UL
BASOPHILS NFR BLD: 0.4 %
BUN SERPL-MCNC: 27 MG/DL
CALCIUM SERPL-MCNC: 9.1 MG/DL
CHLORIDE SERPL-SCNC: 107 MMOL/L
CO2 SERPL-SCNC: 22 MMOL/L
CREAT SERPL-MCNC: 1.8 MG/DL
DIFFERENTIAL METHOD: ABNORMAL
EOSINOPHIL # BLD AUTO: 0.1 K/UL
EOSINOPHIL NFR BLD: 1.6 %
ERYTHROCYTE [DISTWIDTH] IN BLOOD BY AUTOMATED COUNT: 15.7 %
EST. GFR  (AFRICAN AMERICAN): 43.7 ML/MIN/1.73 M^2
EST. GFR  (NON AFRICAN AMERICAN): 37.8 ML/MIN/1.73 M^2
GLUCOSE SERPL-MCNC: 100 MG/DL
HCT VFR BLD AUTO: 27.1 %
HGB BLD-MCNC: 8.6 G/DL
LYMPHOCYTES # BLD AUTO: 1.1 K/UL
LYMPHOCYTES NFR BLD: 16.6 %
MCH RBC QN AUTO: 28.8 PG
MCHC RBC AUTO-ENTMCNC: 31.7 G/DL
MCV RBC AUTO: 91 FL
MONOCYTES # BLD AUTO: 0.4 K/UL
MONOCYTES NFR BLD: 6.5 %
NEUTROPHILS # BLD AUTO: 5.1 K/UL
NEUTROPHILS NFR BLD: 74.9 %
PLATELET # BLD AUTO: 322 K/UL
PMV BLD AUTO: 10.2 FL
POTASSIUM SERPL-SCNC: 4.1 MMOL/L
RBC # BLD AUTO: 2.99 M/UL
SODIUM SERPL-SCNC: 137 MMOL/L
WBC # BLD AUTO: 6.81 K/UL

## 2017-08-17 PROCEDURE — 63600175 PHARM REV CODE 636 W HCPCS: Performed by: PHYSICAL MEDICINE & REHABILITATION

## 2017-08-17 PROCEDURE — 85025 COMPLETE CBC W/AUTO DIFF WBC: CPT

## 2017-08-17 PROCEDURE — 25000003 PHARM REV CODE 250: Performed by: PHYSICAL MEDICINE & REHABILITATION

## 2017-08-17 PROCEDURE — 97116 GAIT TRAINING THERAPY: CPT

## 2017-08-17 PROCEDURE — 97150 GROUP THERAPEUTIC PROCEDURES: CPT

## 2017-08-17 PROCEDURE — 97110 THERAPEUTIC EXERCISES: CPT

## 2017-08-17 PROCEDURE — 97530 THERAPEUTIC ACTIVITIES: CPT

## 2017-08-17 PROCEDURE — 92507 TX SP LANG VOICE COMM INDIV: CPT

## 2017-08-17 PROCEDURE — 80048 BASIC METABOLIC PNL TOTAL CA: CPT

## 2017-08-17 PROCEDURE — 97802 MEDICAL NUTRITION INDIV IN: CPT

## 2017-08-17 PROCEDURE — 63600175 PHARM REV CODE 636 W HCPCS: Performed by: PHYSICIAN ASSISTANT

## 2017-08-17 PROCEDURE — 99233 SBSQ HOSP IP/OBS HIGH 50: CPT | Mod: ,,, | Performed by: PHYSICAL MEDICINE & REHABILITATION

## 2017-08-17 PROCEDURE — 25000003 PHARM REV CODE 250: Performed by: PHYSICIAN ASSISTANT

## 2017-08-17 PROCEDURE — 12800000 HC REHAB SEMI-PRIVATE ROOM

## 2017-08-17 PROCEDURE — 36415 COLL VENOUS BLD VENIPUNCTURE: CPT

## 2017-08-17 RX ORDER — PROMETHAZINE HYDROCHLORIDE 12.5 MG/1
12.5 TABLET ORAL EVERY 6 HOURS PRN
Status: DISCONTINUED | OUTPATIENT
Start: 2017-08-17 | End: 2017-08-29 | Stop reason: HOSPADM

## 2017-08-17 RX ORDER — ONDANSETRON 8 MG/1
8 TABLET, ORALLY DISINTEGRATING ORAL
Status: DISCONTINUED | OUTPATIENT
Start: 2017-08-17 | End: 2017-08-21

## 2017-08-17 RX ADMIN — ONDANSETRON 8 MG: 8 TABLET, ORALLY DISINTEGRATING ORAL at 04:08

## 2017-08-17 RX ADMIN — CEFTRIAXONE 2 G: 2 INJECTION, SOLUTION INTRAVENOUS at 03:08

## 2017-08-17 RX ADMIN — HEPARIN SODIUM 5000 UNITS: 5000 INJECTION, SOLUTION INTRAVENOUS; SUBCUTANEOUS at 03:08

## 2017-08-17 RX ADMIN — AMPICILLIN SODIUM 2 G: 2 INJECTION, POWDER, FOR SOLUTION INTRAMUSCULAR; INTRAVENOUS at 11:08

## 2017-08-17 RX ADMIN — ONDANSETRON 8 MG: 8 TABLET, ORALLY DISINTEGRATING ORAL at 11:08

## 2017-08-17 RX ADMIN — ONDANSETRON 8 MG: 8 TABLET, ORALLY DISINTEGRATING ORAL at 09:08

## 2017-08-17 RX ADMIN — AMPICILLIN SODIUM 2 G: 2 INJECTION, POWDER, FOR SOLUTION INTRAMUSCULAR; INTRAVENOUS at 12:08

## 2017-08-17 RX ADMIN — ONDANSETRON 8 MG: 8 TABLET, ORALLY DISINTEGRATING ORAL at 07:08

## 2017-08-17 RX ADMIN — CARVEDILOL 12.5 MG: 12.5 TABLET, FILM COATED ORAL at 09:08

## 2017-08-17 RX ADMIN — STANDARDIZED SENNA CONCENTRATE AND DOCUSATE SODIUM 1 TABLET: 8.6; 5 TABLET, FILM COATED ORAL at 09:08

## 2017-08-17 RX ADMIN — ONDANSETRON 8 MG: 4 TABLET, FILM COATED ORAL at 07:08

## 2017-08-17 RX ADMIN — CARVEDILOL 12.5 MG: 12.5 TABLET, FILM COATED ORAL at 07:08

## 2017-08-17 RX ADMIN — LISINOPRIL 20 MG: 20 TABLET ORAL at 07:08

## 2017-08-17 RX ADMIN — TRAZODONE HYDROCHLORIDE 25 MG: 50 TABLET ORAL at 09:08

## 2017-08-17 RX ADMIN — AMPICILLIN SODIUM 2 G: 2 INJECTION, POWDER, FOR SOLUTION INTRAMUSCULAR; INTRAVENOUS at 06:08

## 2017-08-17 RX ADMIN — STANDARDIZED SENNA CONCENTRATE AND DOCUSATE SODIUM 1 TABLET: 8.6; 5 TABLET, FILM COATED ORAL at 07:08

## 2017-08-17 RX ADMIN — ACETAMINOPHEN 650 MG: 325 TABLET ORAL at 09:08

## 2017-08-17 RX ADMIN — HEPARIN SODIUM 5000 UNITS: 5000 INJECTION, SOLUTION INTRAVENOUS; SUBCUTANEOUS at 09:08

## 2017-08-17 RX ADMIN — ASPIRIN 81 MG: 81 TABLET, COATED ORAL at 07:08

## 2017-08-17 NOTE — ASSESSMENT & PLAN NOTE
Endocarditis treatment w IVAbx (ceftriaxone 2g q12h, ampicillin 2g q6h) with end date 9/16 (6 week course)    8/16 Cr 1.6, discussed w pharm. decreased Ampicillin to Q6  8/17 Cr 1.8, repeat BMP tomorrow

## 2017-08-17 NOTE — PLAN OF CARE
Problem: Patient Care Overview  Goal: Plan of Care Review  Outcome: Ongoing (interventions implemented as appropriate)  Inadequate oral food and beverage intake related to decreased appetite and hypermetabolism from cancer and infection as evidenced by po intake of 50%, BMI 16.1 and diagnosis of severe malnutrition and FTT.      Recommendation/Intervention: Continue Regular diet, Continue oral supplement tid, Encourage po intake , RD to monitor  Goals: Meet 85% of EEN  Nutrition Goal Status: new

## 2017-08-17 NOTE — PLAN OF CARE
Patient was nauseated most of the day. Dr. Lewis was informed and patient went down for xray. Scheduled zofran was given. Patient stated zofran was not helping so Dr. Lewis ordered phenergan also.     Ostomy was changed by wound care today. No issues since ostomy changed.

## 2017-08-17 NOTE — PROGRESS NOTES
"Occupational Therapy   PM Treatment    Juan Manuel Koehler   MRN: [id number]      08/17/17 1300   OT Time Calculation   OT Start Time 1300   OT Stop Time 1357   OT Total Time (min) 57 min   General   OT Date of Treatment 08/17/17   Precautions   General Precautions aspiration;fall   Subjective   Patient states "I'll try to do what I can"   Tub Bench Transfer   Tub Transfer Technique Stand Pivot   Tub Bench Transfer Assistance Moderate Assistance   Tub Transfer Assistive Device No Assistive Device   Additional Activities   Additional Activities Other (Comment)   Additional Activities Comments  - Bilateral josemanuel x 4# x 20 reps x 5 sets on flat surface with rest breaks between sets   - Rickshaw x 15# x 20 reps x 5 sets with rest breaks between sets   - 3# dowel x 20 reps x 2 sets (bicep curls, chest press, shoulder raises)   - Pt stood at tabletop, tf'd wooden pegs to R/L of midline with CGA for balance ~3 min.    Activity Tolerance   Activity Tolerance (limited by nausea)   Medical Staff Made Aware NSG   After Treatment   Patient Position After Treatment Seated in wheelchair   Patient after treatment left call button in reach   Assessment   Prognosis Good   Assessment Pt limited by nausea this session,  tolerated activities fair and required frequent rest breaks. Noted improvements in transfers with use of RW, however pt still remains at risk for falls.    Level of Motivation/Participation good   Discharge Recommendations   Discharge Facility/Level Of Care Needs home health OT   Plan   Plan Continue with current plan   Therapy Frequency 2 times/day;Monday-Friday;Saturday or Sunday   Occupational Therapy Follow-up   OT Follow-up? Yes   Treatment/Billable Minutes   Therapeutic Activity 15   Therapeutic Exercise 42   Total Time 57     PREETHI Cooper   8/17/2017        "

## 2017-08-17 NOTE — CONSULTS
Wound care consulted for leaking urostomy pouch.  The manjit-stomal skin is red, irritated, moist.   Plan of care discussed with patient/family who verbalized understanding.  Supplies at bedside.      08/17/17 0900       Urostomy 08/02/17 2345 ileal conduit RUQ   Date of First Assessment/Time of First Assessment: 08/02/17 2345   Present Prior to Hospital Arrival?: Yes  Urostomy Type: ileal conduit  Location: RUQ   Stoma Appearance moist;red;protruding above skin level   Stoma Size (in) 28 mm   Stomal Appliance 1 piece;Clean;Leakage;Changed;To drainage bag to dependent drainage   Accessories/Skin Care cleansed w/ water;skin barrier powder;skin sealant   Stoma Function yellow urine   Peristomal Skin erythema

## 2017-08-17 NOTE — PROGRESS NOTES
"Speech Therapy   Treatment    Juan Manuel Koehler   MRN: [id number]            08/17/17 0800   Speech Time Calculation   Speech Start Time 0800   Speech Stop Time 0845   Speech Total (min) 45 min   General Information   SLP Treatment Date 08/17/17   General Observations Patient seen while eating breakfast and sitting upright in wheelchair in patient's room. Pt c/o nausea, nurse provided medication.    General Precautions aspiration;fall   Visual/Auditory Vision impaired   Pain/Comfort   Pain Addressed Nurse notified   Pain Comment "Very nauseous."       SLP Cognitive Treatment   Treatment Detail (SLP Cognitive Treatment) Patient oriented to person, place, time/date, and situation. In a medication management problem solving task, patient provided solutions to hypothetical situations with 90% accuracy independently. In a reasoning task, patient compared x2 items with 100% accuracy independently, contrasted x2 items with 80% accuracy independently. Patient completed reasoning task involving interpreting figurative language with 80% accuracy independently. Patient presented with decreased mental flexibility, as patient frequently provided justification for incorrect answers t/o session.        SLP Treatment: Verbal Expression   Treatment Detail (SLP Verbal Expression) To improve expressive language skills, patient participated in naming tasks. In a concrete convergent naming task, patient identified category of x3 words with 100% accuracy. In an abstract naming task, patient identified similarity among x3 words with 80% accuracy independently. In a concrete divergent naming task, patient listed x11 items in the kitchen in 1 minute given moderate verbal cues. Pt stated, "most men aren't too good around the kitchen so.." Patient listed x13 holidays in 1 minute independently.   Assessment   SLP Diagnosis mild-moderate cog-ling deficits   Prognosis Good   Problem List decreased reasoning skills   Session Assessment progressing " toward goals   Level of Motivation/Participation Good   Discharge Recommendations   Discharge Facility/Level Of Care Needs home health speech therapy;outpatient speech therapy   Plan   Plan Continue with current plan   SLP Follow-up   SLP Follow-up? Yes   Treatment/Billable Minutes   Speech Therapy Individual 45   Total Time 45       FIM Scores  Comprehension:5  Expression: 5  Social Interaction:4  Problem Solvin  Memory: 2     Comprehension:  Complex= humor, finances, rationale for medical treatment(hip precautions, pressure relief)  Basic= pain, hunger, thirst, bathroom needs, cold, nutrition, sleep     Understands basic needs 90% of the time (5)     Expression:  Expresses basic needs or ideas 90% of the time  (5)     Social Interaction:  Interacts appropriately 75-89% of time - needs redirection for appropriate language or to initiate interaction  (4)     Problem Solving:  Solves basic problems 75-89% of the time  (4)     Memory:  Recognizes, recalls, or executes 25-49% of time 1 step of 2 step request  (2)    CHINEDU Olivares-SLP  2017

## 2017-08-17 NOTE — CONSULTS
[facility]  Adult Nutrition  Consult Note    SUMMARY     Recommendations    Recommendation/Intervention: Continue Regular diet, Continue oral supplement tid, Encourage po intake , RD to monitor  Goals: Meet 85% of EEN  Nutrition Goal Status: new  Communication of RD Recs: discussed on rounds    Continuum of Care Plan    Referral to Outpatient Services:  (follow weekly)    Reason for Assessment    Reason for Assessment: physician consult  Diagnosis: stroke/CVA  Relevent Medical History: UTI, sepis, endocarditis, bladder cancer, HTN, anemia, STEMI, CKD 3, FTT and severe malnutrition   Interdisciplinary Rounds: attended          Nutrition Discharge Planning: DC on Regular diet, high protein , high calorie    Nutrition Prescription Ordered    Current Diet Order: Regular              Oral Nutrition Supplement: Boost plus chocolate tid     Evaluation of Received Nutrients/Fluid Intake         Energy Calories Required: not meeting needs         Protein Required: meeting needs                Fluid Required: meeting needs  Comments: PO 50-60%     Tolerance: tolerating (some nausea)  % Intake of Estimated Energy Needs: 50 - 75 %  % Meal Intake: 50%     Nutrition Risk Screen     Nutrition Risk Screen: no indicators present    Nutrition/Diet History    Patient Reported Diet/Restrictions/Preferences: general              Factors Affecting Nutritional Intake: decreased appetite, impaired cognitive status/motor control                Labs/Tests/Procedures/Meds       Pertinent Labs Reviewed: reviewed, pertinent  Pertinent Labs Comments: Hg 8.6, Ht 27.1, Na 137, BUN 27, Cr 1.8  Pertinent Medications Reviewed: reviewed, pertinent  Pertinent Medications Comments: Lisinopril, ASA, statin, Abx senna-docusate    Physical Findings    Overall Physical Appearance: weak, underweight, loss of subcutaneous fat, loss of muscle mass  Tubes: other (see comments) (urostomy)          Anthropometrics    Temp: 97.9 °F (36.6  "°C)     Height: 6' 2" (188 cm)  Weight Method: Stated  Weight: 56.7 kg (125 lb)  Ideal Body Weight (IBW), Male: 190 lb     % Ideal Body Weight, Male (lb): 65.79 lb     BMI (Calculated): 16.1  BMI Grade: less than 16 protein-energy malnutrition grade III  Weight Loss: unintentional        Estimated/Assessed Needs     Energy Calorie Requirements (kcal): 1129-1845  Energy Need Method: [place]-St Jeor (x 1.25 x 1.2)         Weight Used For Protein Calculations: 56.7 kg (125 lb)  Protein Requirements: 70-88      RDA Method (mL): 1781         Assessment and Plan    Inadequate oral food and beverage intake related to decreased appetite and hypermetabolism from cancer and infection as evidenced by po intake of 50%, BMI 16.1 and diagnosis of severe malnutrition and FTT.    Monitor and Evaluation  Food and Nutrient Intake: energy intake  Food and Nutrient Adminstration: diet order   Physical Activity and Function: nutrition-related ADLs and IADLs  Anthropometric Measurements: weight change  Biochemical Data, Medical Tests and Procedures: electrolyte and renal panel, inflammatory profile  Nutrition-Focused Physical Findings: overall appearance  Nutrition Risk      Follow weekly  Nutrition Follow-Up  RD Follow-up?: Yes      "

## 2017-08-17 NOTE — PROGRESS NOTES
[facility]  Physical Medicine & Rehab  Progress Note    Patient Name: Juan Manuel Koehler  MRN: [id number]  Patient Class: IP- Rehab   Admission Date: 8/15/2017  Length of Stay: 2 days  Attending Physician: Verónica Lewis MD  Primary Care Provider: Hemant Sweeney MD    Subjective:     Principal Problem:Right-sided cerebrovascular accident (CVA)    Interval History: No acute events over night. Patient is tolerating therapy. Denies any issues with pain. No BM yesterday. Nausea persists and episode of emesis this morning. Reports sleep was poor as well, but this is premorbid. Did well in therapy and tolerating.       Scheduled Medications:    ampicillin IVPB  2 g Intravenous Q6H    aspirin  81 mg Oral Daily    carvedilol  12.5 mg Oral BID    cefTRIAXone (ROCEPHIN) IVPB  2 g Intravenous Q12H    heparin (porcine)  5,000 Units Subcutaneous Q8H    lisinopril  20 mg Oral Daily    ondansetron  8 mg Oral TID AC    polyethylene glycol  17 g Oral Daily    ramelteon  8 mg Oral QHS    senna-docusate 8.6-50 mg  1 tablet Oral BID       PRN Medications: acetaminophen, bisacodyl, dextrose 50%, glucagon (human recombinant), ondansetron    Review of Systems   Constitutional: Negative for unexpected weight change.        Vision stable w glasses  Hearing intact  Continent of bowel   HENT: Negative for congestion and ear pain.    Eyes: Negative for discharge.   Respiratory: Negative for shortness of breath.    Cardiovascular: Negative for chest pain.   Gastrointestinal: Positive for nausea. Negative for abdominal pain and vomiting.   Endocrine: Negative for cold intolerance.   Genitourinary: Negative for flank pain.   Neurological: Positive for weakness.   Psychiatric/Behavioral: Negative for hallucinations.     Objective:     Vital Signs (Most Recent):  Temp: 97.9 °F (36.6 °C) (08/17/17 0646)  Pulse: 77 (08/17/17 0646)  Resp: 18 (08/17/17 0646)  BP: 118/68 (08/17/17 0646)  SpO2: 97 % (08/17/17 0646)     Vital Signs (24h Range):  Temp:  [97.9 °F (36.6 °C)-98.4 °F (36.9 °C)] 97.9 °F (36.6 °C)  Pulse:  [77-86] 77  Resp:  [18] 18  SpO2:  [96 %-97 %] 97 %  BP: (113-122)/(57-72) 118/68     Physical Exam   Constitutional: He appears well-developed and well-nourished.   HENT:   Head: Normocephalic and atraumatic.   Eyes: EOM are normal.   Cardiovascular: Normal rate.    Pulmonary/Chest: Effort normal. No respiratory distress.   Abdominal: Soft. He exhibits no distension. There is no tenderness.   Genitourinary:   Genitourinary Comments: + urostomy   Musculoskeletal:   UE: SA, EF, EE,  5/5  LE: HF 5/5, KE< DF/PF 5/5  No calf tenderness    Neurological: He is alert.   Followed all commands  Memory 3/3 immediate, 2/3 delayed    Skin: Skin is warm.   Psychiatric: He has a normal mood and affect.   Vitals reviewed.    NEUROLOGICAL EXAMINATION:     CRANIAL NERVES     CN III, IV, VI   Extraocular motions are normal.       Assessment/Plan:      Juan Manuel Koehler is a 67 y.o. male admitted to inpatient rehabilitation on 8/15/2017 for Right-sided cerebrovascular accident (CVA) with impaired mobility and ADLs. Patient remains appropriate for PT, OT, and as required Speech therapy. Patient continues to require 24 hour nursing care as well as daily Physician assessment.    * Right-sided cerebrovascular accident (CVA)    - R  Cerebellar , embolic  - cont w ASA 81/statin    Bed mobility supervision  Sit>stand CGA, stand>sit min A  t/f squat pivot min A, t/f stand pivot min A  shower t/f min A, toilet t/f min A, car t/f min A  WC supervision 150  Amb min/mod A 42 (posterior LOB), stairs min A 4 steps    Feeding Ind, grooming CGA, bathing min A  UB dress setup, LB dress mod A  Toileting mod A    Comprehension:Supervision  Expression: Supervision  Social Interaction:Min A  Problem Solving:Min A  Memory: Max A    Nausea , ongoing. Will juaquin Zofran prior to meals and monitor  Obtain KUB to rule out constipation, and consider other  anti-emetics in the future if juaquin zofran ineffective        Bacteremia due to Enterococcus    Endocarditis treatment w IVAbx (ceftriaxone 2g q12h, ampicillin 2g q6h) with end date 9/16 (6 week course)    8/16 Cr 1.6, discussed w pharm. decreased Ampicillin to Q6  8/17 Cr 1.8, repeat BMP tomorrow        Hypertension      -cont with Coreg 12.5 BID, lisinopril 20mg QD, hydralazine PO PRN  - cont to monitor BP/HR, stable        S/P ileal conduit    - Hx of  Urothelial carcinoma of bladder    Follow-up with urology as an outpatient with Dr. Robles and with oncology, Dr. Calero         Impaired mobility and ADLs    Other Rehab Plan/Continue to monitor:   Nutrition/Swallow Status:    - Prealbumin - 14   - Nutritionist on board   Bladder Management: illeostomy  Bowel Management:  continent  - Colace BID and Suppository PRN   - Will monitor for regularity   Mood: stable   Sleep: monitor; Ramelteon PRN   Skin: Monitor   DVT ppx:  Heparin                DISCHARGE PLANNING:  Tentative Discharge Date:     Future Appointments  Date Time Provider Department Center   9/5/2017 10:40 AM Jewels Toth PA-C [facility] NEURO Damon Teja Ruiz MD  Department of Physical Medicine & Rehab   [facility]

## 2017-08-17 NOTE — ASSESSMENT & PLAN NOTE
-cont with Coreg 12.5 BID, lisinopril 20mg QD, hydralazine PO PRN  - cont to monitor BP/HR, stable

## 2017-08-17 NOTE — PROGRESS NOTES
NURSE TECH FIM  REPORT    EATING  [] Pt. opens packages, cuts food, pours liquids, and feeds self.  Regular consistency (7)  [] Pt. needs dentures, swallow technique, special foods or drink consistency (6)  [x] Pt. needs supervision (cueing), set up (open containers, cut food, etc. (5)  [] Pt. needs assist with occasional scooping or checking for food pocketing (75-99%) (4)  [] Pt. needs assist to load each bite on utensils, pt.brings food to mouth (50-74%) (3)  [] Pt. needs assist to scoop, bring food to mouth (hand over hand) (25-49%) (2)  [] Pt. Is receiving IV fluids for hydration or tube feedings  (0-24%) (1)      GROOMING   [] Pt. performs all tasks independently and safely (7)  [] Pt. obtains all articles.  Needs adaptive/assistive device (such as wash mitt) (6)  [x] Pt. needs supervision (cueing), set up (helper obtains articles, applies toothpaste,                                                                  plugs razor, hands items to patient, opens                                                                          containers, adjusts water temperature)  (5)  [] Pt. doing 3 out of 4,  or 4 out of 5 tasks.  Needs assist to put in or remove dentures.                                                               Needs steadying assist. (Pt. doing 75-99%) (4)  [] Pt. doing 2 out of 4, or 3 out of 5 tasks (50-74%) (3)  [] Pt. doing 1 out of 4, or 2 out of 5 tasks (25-49%) (2)  [] Pt. doing 0 out of 4, or 1 out of 5 tasks or needs assistance of 2 helpers (0-24%) (1)  [] Activity done with OT      BATHING  [] Pt. safely bathes whole body (10 parts of 10) (7)  [] Pt. doing 10 out of 10 body parts.  Uses adaptive devices (such as wash mitt, long                                                              handled sponge, etc.) (6)  [] Pt. doing 10 out of 10 body parts buts needs supervision or set up (5)  [x] Pt. doing 8-9 out of 10 body parts , or pt. needs steadying assistance. (75-99%) (4)  [] Pt.  doing 5-7 out of 10 body parts (50-74%) (3)  [] Pt. doing 3-4 of out 10 body parts (25-49%) (2)  [] Pt. doing 0-2 out of 10 body parts or needs assist of two helpers. (0-24%) (1)  [] Activity done with OT      DRESSING UPPER BODY  [] Pt. dresses independently and undresses independently, obtaining clothes from                                                                   storage area (7)  [] Pt. needs adaptive devices ( such as Velcro fastener, reacher, button hook, etc.)                                                                  Applies abdominal binder or any orthotic.  Uses                                                            walker for steadying. (6)  [x] Pt. needs supervision (cueing), set up (helper brings clothes or applies orthotics                                                          (such as abdominal binder, TLSO, cervical collar) (5)  [] Pt. doing 75-99%. (4)  [] Pt. doing 50-74%. (3)  [] Pt. doing 25-49%. (2)  [] Pt. doing 0-24%, or needs assistance of 2 helpers. (1)  [] Activity done with OT.      DRESSING LOWER BODY  [] Pt. independent with all parts of dressing lower body. (7)  [] Pt. needs adaptive devices ( such as reacher, long handled shoe horn, sock aid) (6)  [] Pt. needs supervision (cueing), set up(helper brings clothes or applies orthotic, such                                                               as KAMARI hose, AFO) (5)  [x] Pt. doing 75-99%.  Needs steadying assistance: buttoning pants, zipping a zipper,                                                                  fastening a belt, tying shoelaces, applying one                                                                    sock/shoe. (4)  [] Pt. doing 50-74%. (3)  [] Pt. doing 25-49%. (2)  [] Pt. Doing 0-24%, or needs assistance of 2 helpers. (1)  [] Activity done with OT.      TOILETING  [] Pt. doing 3 out of 3 tasks independently (pulling down clothes, cleansing manjit area,                                                            pulling up clothes) (7)  [] Pt. doing 3 out of 3 tasks, but needs assistive device (grab bars, etc.) (6)  [x] Pt. needs supervision (cueing), or set up (obtaining supplies for manjit care.) (5)  [] Pt. doing 3 out of 3 tasks, but needs steadying assistance with one or more parts.                                                          (75-90%) (4)  [] Pt. doing 2 out of 3 tasks (50-74%) (3)  [] Pt. doing out of 3 tasks (25-49%) (2)  [] Pt. needs assist (more than steadying) with all 3 tasks.  Needs assist of 2 helpers.                                                            Incontinent of B/B today. (0-24%) (1)  [] Activity did not occur.      BLADDER  [] Pt.  uses toilet (7)  [] Pt. is on dialysis - does not urinate (7)  [] Pt. uses bedside commode (5)  [] Pt. uses bedpan - staff does ____% of tasks(includes rolling on/off, holding bedpan in                                                              place, hygiene, and emptying pan)   Pt. uses urinal - staff empties (5)                     []  Pt. needs help with positioning the urinal (4)                     []  Pt. needs help holding the urinal (1)  [x] Pt. has bolanos catheter/suprapubic catheter - staff empties (1)  [] Pt. is on ICP:                      []  Pt. does catheterization (6)                      []  Staff does catheterization (1)  [] Pt. is incontinent - staff does ____% of tasks (includes clothes management,                                                             hygiene, and changing diaper)  [] Number of accidents during this shift ____       BOWEL  [x] Pt. uses toilet (7)  [] Pt. uses bedside commode (5)  [] Pt. uses bedpan - staff does ____% of tasks (includes rolling on/off, holding bedpan                                                          In place, hygiene, and emptying pan)  [] Pt. on bowel program:                      [] Pt.inserts suppository (6)                      [] Nurse inserts suppository (4)                       []  Nurse does dig. stim (1)  [] Pt. has colostomy - staff maintains care and empties (1)                      [] Pt. does ____% of care for colostomy  [] Pt. is incontinent - staff does ____% of tasks (includes clothes management,                                                             hygiene, and changing diaper)  [] Number of accidents during this shift____  [] No bowel movement this shift____      TRANSFERS:  BED/CHAIR/WHEELCHAIR  [] Pt. transfers without assistive device into and out of wheelchair/bed/chair (7)  [] Pt. uses assistive/adaptive devices (grab bars, sliding board, walker, bed rails,                                                            wheelchair armrests, etc.) (6)  [] Pt. needs supervision, cues, or head of bed raised by staff (5)  [x] Pt. needs steadying assist with any or all parts of transfer, or needs assist with one                                                             leg during transfer (4)  [] Pt. needs lifting or lowering, or needs assist with 2 legs during transfer (3)  [] Pt. needs lifting and lowering during transfer (2)  [] Pt. needs assist of 2 helpers or mechanical lift (1)  [] Activity did not occur  ________________________________________________________________  Includes lying to seated position at edge of bed.  Check assist level needed:  [] Used bedrails              []  Supervision                   []   Min. A  [] Mod A                          []  Max A                            []  Total A    If in wheelchair:  Check assist level needed:  [] Lock brakes                 []  Lifts/lowers footrests       []  Removes w/c arm                                                                                (for slide board transfers)    TRANSFER:  TOILET/BEDSIDE COMMODE   [] Pt. transfers from wheelchair or walking without assistive device to toilet.                                                            Gets on and off a standard toilet.  (7)  [] Pt. uses assistive/adaptive device (commode, grab bars, sliding board, walker                                                            prosthesis, orthotic, raised toilet seat) (6)  [] Pt. needs supervision, cues, or set up (needs assist to lock brakes, remove leg                                                            or arm rest, position sliding board) (5)  [x] Pt. needs steadying assist with any or all parts of transfer or needs assist with one                                                            leg during transfer. (4)  [] Pt. needs lifting or lowering or needs assist with two legs during transfer. (3)  [] Pt. needs lifting and lowering during transfer. (2)  [] Pt. needs assist of 2 helpers or mechanical lift. (1)  [] Activity did not occur.      TRANSFER:  SHOWER  [] Pt. transfers without assistive device into and out of shower. (7)  [] Pt. uses assistive/adaptive device (shower chair/bench, grab bars, sliding board,                                                             walker, prothesis, orthotic, raised toilet seat. (6)  [x] Pt. needs supervision, cues, or set up (needs assist to lock brakes, remove leg or arm                                                            rest, position sliding board) (5)  [] Pt. needs steadying assist with any or all parts of transfer or needs assist with one                                                                leg during transfer. (4)  [] Pt. needs lifting or lowering or needs assist with two legs during transfer. (3)  [] Pt. needs lifting and lowering during transfer. (2)  [] Pt. needs assist of 2 helpers.  [place] pushes shower chair on wheels in and out of                                                            shower. (1)  [] Activity did not occur.                                                                                                                                                                                  kristan Francois

## 2017-08-17 NOTE — ASSESSMENT & PLAN NOTE
- R  Cerebellar , embolic  - cont w ASA 81/statin    Bed mobility supervision  Sit>stand CGA, stand>sit min A  t/f squat pivot min A, t/f stand pivot min A  shower t/f min A, toilet t/f min A, car t/f min A  WC supervision 150  Amb min/mod A 42 (posterior LOB), stairs min A 4 steps    Feeding Ind, grooming CGA, bathing min A  UB dress setup, LB dress mod A  Toileting mod A    Comprehension:Supervision  Expression: Supervision  Social Interaction:Min A  Problem Solving:Min A  Memory: Max A    Nausea , ongoing. Will juaquin Zofran prior to meals and monitor  Obtain KUB to rule out constipation, and consider other anti-emetics in the future if juaquin zofran ineffective

## 2017-08-17 NOTE — PROGRESS NOTES
Recreational Therapy   Evaluation    Juan Manuel Koehler  MRN: [id number]       08/17/17 0920   Rec Therapy Time Calculation   Rec Start Time 0920   Rec Stop Time 0000   Rec Total Time (min) 880 min   General   Admit Date 08/15/17   Primary Diagnosis L CVA   [employer]   Number of Children 3   Occupation [employer]   Do you feel like you have enough to keep you busy now? Yes   Do you believe that you have the opportunity for physical activity? Yes   Activity Capabilities Moderate   Subjective   Patient states I feel nausated  , I may need the pan.   Precautions   General Precautions aspiration;fall   Visual/Auditory Vision impaired   Assessment   Mobility manual wheelchair   Musculoskeletal impaired balance;impaired strength   Visual Acuity impaired vision   Hearing normal   Speech/Communication normal   Cognitive Concerns oriented x4   Emotional Concerns (pt denies depression/anxiety)   Leisure Interest Survey   Leisure Interest Survey Yes   Social/Group Activities   Rastafari/Jain Current Interest   Solitary Activities   Watching TV Current Interest   Reading Current Interest   Computer Activities Current Interest   Music Listening Current Interest   Physical Activities   Dancing Past Interest   Swimming Past Interest   Bowling Past Interest   Creative Activities   Playing Musical Instru Current Interest   Outdoor Activities   Gardening Current Interest   Spectator Events   Concerts Past Interest   Therapeutic Recreation   Stress Management/Relaxation Training Able to identify stress of pain levels   Social Skills Interacts independently in social activity   Leisure Education Demonstrates knowledge of benefits of leisure involvement   Leisure Resources Identifies awareness of available leisure resources   Leisure Attitude/Participation With coaxing, participates in group activity and identifies benefits of involvement   Rec Therapy Group Assistance Moderate Assistance   Problem Solving Activity Assistance Minimal  Assistance;Moderate Assistance   Assessment   Assessment pleasant, cooperative, no pain complaints voiced, barriers: decrease motoric skills.   Goals   Additional Documentation yes   Goal Formulation With patient   Time For Goal Achievement (12-14 days)   Goal 1 Pt will attend and actively participate in various Rt activities to improve endurance and physical skills.   Plan   Planned Therapy Intervention Social group;Group Recreational Therapy;Music;Volleyball   PT Frequency Minimum of 1 visit per week   Time   Treatment time 3 units         SHAE Sanders,   8/17/2017

## 2017-08-17 NOTE — PROGRESS NOTES
Physical Therapy   Treatment    Juan Manuel Koehler   MRN: [id number]   PTA visit #: 1     08/17/17 1020   PT Time Calculation   PT Start Time 1020   PT Stop Time 1105   PT Total Time (min) 45 min   Treatment   Treatment Type Treatment   PT/PTA PTA   PTA Visit Number 1   General   PT Received On 08/17/17   Patient Found (position) Seated in wheelchair   Precautions   General Precautions aspiration;fall   Visual/Auditory Vision impaired   Subjective   Patient states Pt agreeable to tx but with c/o nausea   Pain/Comfort   Pain Rating 1 no pain   Bed Mobility   Bed Mobility no   Transfers   Transfer yes   Sit to Stand   Sit <> Stand Assistance Contact Guard Assistance   Sit <> Stand Assistive Device Rolling Walker   Stand to Sit   Assistance Contact Guard Assistance   Assistive Device Rolling Walker   Wheelchair Activities   Propulsion Yes   Propulsion Type 1 Manual   Level 1 Level tile   Method 1 Right upper extremity;Left upper extremity   Level of Assistance 1 Supervision   Description/ Details 1 150'    Gait   Gait Yes   Gait 1   Surface 1 Level tile   Gait Assistive Device Rolling walker   Assistance 1 Contact Guard Assistance;Minimum assistance   Gait Distance 128' x 2 with mild unsteadiness   Gait Pattern swing-through gait   Gait Deviation(s) decreased pedro pablo;increased time in double stance;decreased velocity of limb motion;decreased step length;decreased stride length;forward lean   Impairments Contributing to Gait Deviations impaired balance;impaired coordination;impaired postural control;decreased strength   Balance   Balance Yes   High Level Ambulation   Side Stepping Parallel bars   Side Stepping Comments length of bars x 2 trials   Backwards Walking No device;Parallel bars   Backwards Walking Comments length of bars x 2 trials with min A   Seated   Seated-Exercises Lower extremity;Specific exercises   Seated-Exercise Type Ankle pumps;Hip flexion;Long arc quads   Seated-Exercise Comments B Le x 20 reps    Standing   Standing-Exercises Lower extremity;Specific exercises   Standing-Exercise Type Hip flexion;ABduction;Knee flexion;Toe raises;Heel raises   Standing-Exercise Comments B Le x 10 reps in // bars with sba   Other Activities   Comments Standing on foam disc in // bars for ~ 2 sec w/o holding on.   Activity Tolerance   Activity Tolerance Patient tolerated treatment well   After Treatment   Patient Position After Treatment Seated in wheelchair   Assessment   Prognosis Good   Problem List Decreased strength;Decreased endurance;Impaired balance;Decreased mobility;Decreased coordination   Session Assessment progressing toward goals   Assessment Pt nathaly tx well but with decreased balance and c/o nausea.  Cont POC   Level of Motivation/Participation good   Barriers to Discharge Decreased caregiver support   Plan   Planned Therapy Intervention Continue with current plan   Therapy Frequency daily;Monday-Friday;Saturday or Sunday   Physical Therapy Follow-up   PT Follow-up? Yes   Treatment/Billable Minutes   Gait training 15   Therapeutic Activity 15   Therapeutic Exercise 15   Total Time 45     A client care conference was performed between the PT and PTA, prior to treatment by PTA, to discuss the patient's status, treatment plan and established goals.     Stefanie Zaragoza, PTA  8/17/2017

## 2017-08-17 NOTE — PROGRESS NOTES
Recreational Therapy   Evaluation    Juan Manuel Koehler  MRN: [id number]       08/17/17 0920   Rec Therapy Time Calculation   Rec Start Time 0920   Rec Stop Time 1009   Rec Total Time (min) 49 min   General   Admit Date 08/15/17   Primary Diagnosis L CVA   [employer]   Number of Children 3   Occupation [employer]   Do you feel like you have enough to keep you busy now? Yes   Do you believe that you have the opportunity for physical activity? Yes   Activity Capabilities Moderate   Subjective   Patient states I feel nauseated, I may need my pan.   Precautions   General Precautions aspiration;fall   Visual/Auditory Vision impaired   Assessment   Mobility manual wheelchair   Musculoskeletal impaired balance;impaired strength;impaired endurance   Visual Acuity impaired vision   Hearing normal   Speech/Communication normal   Cognitive Concerns oriented x4   Emotional Concerns (pt denies depression/anxiety)   Leisure Interest Survey   Leisure Interest Survey Yes   Social/Group Activities   Scientology/Sikhism Current Interest   Solitary Activities   Watching TV Current Interest   Reading Current Interest   Computer Activities Current Interest   Music Listening Current Interest   Physical Activities   Dancing Past Interest   Swimming Past Interest   Bowling Past Interest   Creative Activities   Playing Musical Instru Current Interest   Outdoor Activities   Gardening Current Interest   Spectator Events   Concerts Past Interest   Therapeutic Recreation   Stress Management/Relaxation Training Able to identify stress of pain levels   Social Skills Interacts independently in social activity   Leisure Education Demonstrates knowledge of benefits of leisure involvement   Leisure Resources Identifies awareness of available leisure resources   Leisure Attitude/Participation With coaxing, participates in group activity and identifies benefits of involvement   Rec Therapy Group Assistance Moderate Assistance   Problem Solving Activity  Assistance Minimal Assistance;Moderate Assistance   Assessment   Assessment Pt stated that he was not feeling very well, he asked if he could close his eyes for a moment.  Once pt given opportunity to relax, he was able to answers all questions asked appropriately answer questions to complete this assessment.  No pain complaints voiced, but pt did state that he was feeling discomfort 2* nauseated.  No significant barriers noted.   Goals   Additional Documentation yes   Goal Formulation With patient   Time For Goal Achievement (12-14 days)   Goal 1 Pt will attend and actively participate in various Rt activities to improve endurance and physical skills   Plan   Planned Therapy Intervention Social group;Group Recreational Therapy;Music;Volleyball   PT Frequency Minimum of 1 visit per week   Time   Treatment time 3 units     Rehab orientation completed and unit safety rules reviewed. Pt verbalized that he understood what was reviewed with him.    Unique Malik, CTRS,   8/17/2017

## 2017-08-17 NOTE — PROGRESS NOTES
BLADDER  [] Pt. uses toilet (7)  [] Pt. is on dialysis - does not urinate (7)  [] Pt. uses bedside commode (5)  []Pt. uses bedpan - staff does__% (includes rolling on/off, holding bedpan in place                                                                   and emptying pan)   [] Pt. uses urinal - staff empties                          []   Pt. needs help with positioning the urinal (4)                          []   Pt. needs help holding the urinal (2)  [x]  Pt. has bolanos catheter/suprapubic catheter - staff empties (1)  []  Pt. Is on ICP program:                           []  Pt. does catheterization (6)                           [] Staff does catheterization (1)  [] Pt. Is incontinent - staff does ______% of tasks  Number of accidents during this shift ___    BOWEL  [] Pt. uses toilet (7)  [] Pt. uses bedside commode (5)  [] Pt. uses bedpan - staff does ______% of task  [] Pt. is on bowel program::                         []   Pt. inserts suppository (6)                         []   Nurse inserts suppository (4)                         []  Nurse does dig. stim. (1)  [] Pt. has colostomy - staff maintains care and empties (1)                       Pt. does __% of care  [] Pt. is incontinent - staff does _____% of tasks  [x] No bowel movement during this shift  [] Number of accidents during this shift ______    EATING  [x] Pt. opens packages, cuts food, pours liquids, and feeds self, regular consistency (7)  [] Pt. needs dentures, swallow technique, special foods or drink consistency (6)  [] Pt. needs supervision (cueing), setup (open containers, cut food, etc.) (5)  []Pt. needs assist with occasional scooping, or checking for food pocketing (75-90%) (4)  []Pt. needs assist to lead each bite on utensils, patient brings food to mouth (50-74%)(3)  []Pt. needs assist to scoop, bring food to mouth (hand over hand) (25-49%) (2)  []AxillaryPt. Is receiving IV fluids for hydration or tube feeding  (1)    GROOMING  []Pt. performs all tasks independently and safely (7)  []Pt. obtains all articles, needs adaptive/assistive device (wash mitt, etc.) (6)  []Pt. needs supervision (cueing), setup (5)  []Pt. doing 3 of 4 or 4 of 5 tasks, needs assist to put in or remove dentures, steadying (Patient doing 75-90%) (4)   []Pt. doing 2 of 4 or 3 of 5 tasks (3)  []Pt. doing 1 of 4 of 2 of 5 tasks (25-49%) (2)  []Pt. doing 0 of tasks, needs assistance of 2 helpers (1)  [x]Activity occurred with OT    BATHING  []Pt. safety bathes whole body (10 parts of 10) (7)  []Pt. doing 10 of 10 body parts, uses adaptive devices (wash mitt, etc.) (6)  []Pt. doing 10 of 10 body parts or needs supervision or setup (5)  []Pt. doing 8-9 of 10 body parts (75-99%) (4)  []Pt. doing 5-7 of 10 body parts (50-74%) (3)   []Pt. doing 3-4 of 10 body parts (25-49%( (2)  []Pt. doing 0-2 of 10 body parts (0-24%0 (1)  [x]Activity occurred with OT    DRESSING UPPER BODY  []Pt. dresses or undresses independently, obtaining clothes from storage area (7)  []Pt. needs adaptive devices (6)  []Pt. needs supervision (cueing), setup (5)  []Pt. doing 75-99% (4)  []Pt. doing 50-74% (3)  []Pt. doing 25-49% (2)  []Pt. doing 0-24% or needs assist of 2 helpers (1)  [x]Activity occurred with OT    DRESSING LOWER BODY  []Pt. dresses or undresses independently (7)   []Pt. needs adaptive devices (6)  []Pt. needs supervision (cueing), set up helper applies KAMARI hose or AFO (5)  []Pt. doing 75-99%, needs steadying assist, fastening a belt, etc.) (4)  []Pt. doing 50-74% (3)  []Pt. doing 25-49% (2)  []Pt. doing 0-24% or needs assist of 2 helpers (1)    TOILETING  []Pt. doing 3 of 3 tasks (pulling down pants, cleaning manjit area, pulling up pants) (7)  []Pt. doing all 3 parts but needs assistive device (grab bar) (6)  []Pt. needs supervision or setup (5)  []Pt. doing 3 of 3 parts (75-99%) (4)  []Pt. doing 2 of 3 parts (50-74%) (3)  []Pt. doing 1 of 3 parts (25.49%) (2)  []Pt.  needs assist (more than steadying) with all 3 parts or needs assist of 2 helpers,  Incontinent of B/B today (0-24%) (1)    BED/ CHAIR/WHEELCHAIR TRANSFER  []Pt. transfers without assistive device into and out of wheelchair/bed/chair (7)  []Pt. uses assistive/adaptive devices (grab bars, sliding board, walker, bed rails,   wheelchair arm rests, etc.) (6)  []Pt. needs supervision, cues, head of bed raised by staff (5)  []Pt. needs steadying assist with any or all parts of transfer, needs assist with one   leg during transfer ( 4)  []Pt. needs lifting or lowering, needs assist with 2 legs during transfer (3)  []Pt. needs lifting and lowering (2)  []Pt. needs assist of 2 helpers(even if 2nd person is just there for safety) or mechanical lift (1)  [x]Activity did not occur     TOILET TRANSFER  []Pt. transfers from wheelchair or walking without assistive device (7)  []Pt. uses assistive/adaptive device (commode, grab bars, sliding board, walker,   prosthesis, orthotic, raised toilet seat, etc.) (6)  []Pt. needs supervision, cues, or setup (needs assist to lock brakes, remove leg or arm  rest, position sliding board) (5)  []Pt. needs steadying assist with any or all parts of transfer, needs assist with one leg  during transfer (4)  []Pt. needs lifting or lowering, needs assist with two legs during transfer (3)  []Pt. needs lifting and lowering (2)  []Pt. needs assist of 2 helpers or mechanical lift (1)  [x]Activity did not occur    SHOWER TRANSFER  []Pt. transfers without assistive device into and out of shower (7)  []Pt. uses assistive/adaptive device (shower bench, grab bars, etc.) (6)  []Pt. needs supervision, cues, or setup (5)  []Pt. needs steadying assist with any or all parts of transfer, needs assist with one leg  during transfer (4)  []Pt. needs lifting or lowering, needs assist with 2 legs during transfer (3)  []Pt. needs lifting and lowering (2)  []Pt. needs assist of 2 helpers, helper pushes shower chair on  wheels in and out of  shower (1)

## 2017-08-17 NOTE — PROGRESS NOTES
Physical Therapy   Daily FIM    Juan Manuel Koehler   MRN: [id number]      08/17/17 1020   Locomotion   Distance Walked 2   Distance Wheelchair 3   Walk 2   Wheelchair 5   Mode B         Stefanie Zaragoza, PTA  8/17/2017

## 2017-08-17 NOTE — PROGRESS NOTES
Occupational Therapy   Transfer Group    Juan Manuel Koehler   MRN: [id number]     Patient participated in 45 minute functional transfers group. The group activity challenged bilateral upper extremity and lower extremity strengthening. In addition, cardiovascular and functional endurance were challenged during this group. Patient completed bed mobility from supine to sitting edge of mat with __CGA___ assistance. Pt required ___Max____ assistance for w/c management parts. Patient completed toilet transfer with _CGA__ assistance and use of (any AE/DME). . Educated patient on compensatory and adaptive techniques for functional home transfers. Patient demonstrated increased independence with all transfers (or list specific transfers). These activities were performed in a group setting to encourage increased participation with peers and social interaction skills.    Pt left group early to empty stoma bag with wife, then had episode of vomiting in room. OT will make up missed time in pm session.           08/17/17 1115   OT Time Calculation   OT Start Time 1115   OT Stop Time 1148   OT Total Time (min) 33 min   General   OT Date of Treatment 08/17/17   Family/Caregiver Present Yes  (wife)   Plan   Plan Continue with current plan   Occupational Therapy Follow-up   OT Follow-up? Yes   Treatment/Billable Minutes   Therapeutic Group 33   Total Time 33     PREETHI Cooper   8/17/2017

## 2017-08-17 NOTE — ASSESSMENT & PLAN NOTE
Other Rehab Plan/Continue to monitor:   Nutrition/Swallow Status:    - Prealbumin - 14   - Nutritionist on board   Bladder Management: illeostomy  Bowel Management:  continent  - Colace BID and Suppository PRN   - Will monitor for regularity   Mood: stable   Sleep: monitor; Ramelteon PRN   Skin: Monitor   DVT ppx:  Heparin

## 2017-08-17 NOTE — SUBJECTIVE & OBJECTIVE
Interval History: No acute events over night. Patient is tolerating therapy. Denies any issues with pain. No BM yesterday. Nausea persists and episode of emesis this morning. Reports sleep was poor as well, but this is premorbid. Did well in therapy and tolerating.       Scheduled Medications:    ampicillin IVPB  2 g Intravenous Q6H    aspirin  81 mg Oral Daily    carvedilol  12.5 mg Oral BID    cefTRIAXone (ROCEPHIN) IVPB  2 g Intravenous Q12H    heparin (porcine)  5,000 Units Subcutaneous Q8H    lisinopril  20 mg Oral Daily    ondansetron  8 mg Oral TID AC    polyethylene glycol  17 g Oral Daily    ramelteon  8 mg Oral QHS    senna-docusate 8.6-50 mg  1 tablet Oral BID       PRN Medications: acetaminophen, bisacodyl, dextrose 50%, glucagon (human recombinant), ondansetron    Review of Systems   Constitutional: Negative for unexpected weight change.        Vision stable w glasses  Hearing intact  Continent of bowel   HENT: Negative for congestion and ear pain.    Eyes: Negative for discharge.   Respiratory: Negative for shortness of breath.    Cardiovascular: Negative for chest pain.   Gastrointestinal: Positive for nausea. Negative for abdominal pain and vomiting.   Endocrine: Negative for cold intolerance.   Genitourinary: Negative for flank pain.   Neurological: Positive for weakness.   Psychiatric/Behavioral: Negative for hallucinations.     Objective:     Vital Signs (Most Recent):  Temp: 97.9 °F (36.6 °C) (08/17/17 0646)  Pulse: 77 (08/17/17 0646)  Resp: 18 (08/17/17 0646)  BP: 118/68 (08/17/17 0646)  SpO2: 97 % (08/17/17 0646)    Vital Signs (24h Range):  Temp:  [97.9 °F (36.6 °C)-98.4 °F (36.9 °C)] 97.9 °F (36.6 °C)  Pulse:  [77-86] 77  Resp:  [18] 18  SpO2:  [96 %-97 %] 97 %  BP: (113-122)/(57-72) 118/68     Physical Exam   Constitutional: He appears well-developed and well-nourished.   HENT:   Head: Normocephalic and atraumatic.   Eyes: EOM are normal.   Cardiovascular: Normal rate.     Pulmonary/Chest: Effort normal. No respiratory distress.   Abdominal: Soft. He exhibits no distension. There is no tenderness.   Genitourinary:   Genitourinary Comments: + urostomy   Musculoskeletal:   UE: SA, EF, EE,  5/5  LE: HF 5/5, KE< DF/PF 5/5  No calf tenderness    Neurological: He is alert.   Followed all commands  Memory 3/3 immediate, 2/3 delayed    Skin: Skin is warm.   Psychiatric: He has a normal mood and affect.   Vitals reviewed.    NEUROLOGICAL EXAMINATION:     CRANIAL NERVES     CN III, IV, VI   Extraocular motions are normal.

## 2017-08-18 LAB
ANION GAP SERPL CALC-SCNC: 9 MMOL/L
BUN SERPL-MCNC: 26 MG/DL
CALCIUM SERPL-MCNC: 9.2 MG/DL
CHLORIDE SERPL-SCNC: 106 MMOL/L
CO2 SERPL-SCNC: 21 MMOL/L
CREAT SERPL-MCNC: 1.9 MG/DL
EST. GFR  (AFRICAN AMERICAN): 41 ML/MIN/1.73 M^2
EST. GFR  (NON AFRICAN AMERICAN): 35.4 ML/MIN/1.73 M^2
GLUCOSE SERPL-MCNC: 91 MG/DL
POTASSIUM SERPL-SCNC: 4 MMOL/L
SODIUM SERPL-SCNC: 136 MMOL/L

## 2017-08-18 PROCEDURE — 97110 THERAPEUTIC EXERCISES: CPT

## 2017-08-18 PROCEDURE — 97535 SELF CARE MNGMENT TRAINING: CPT

## 2017-08-18 PROCEDURE — 63600175 PHARM REV CODE 636 W HCPCS: Performed by: PHYSICIAN ASSISTANT

## 2017-08-18 PROCEDURE — 25000003 PHARM REV CODE 250: Performed by: PHYSICAL MEDICINE & REHABILITATION

## 2017-08-18 PROCEDURE — 63600175 PHARM REV CODE 636 W HCPCS: Performed by: PHYSICAL MEDICINE & REHABILITATION

## 2017-08-18 PROCEDURE — 36415 COLL VENOUS BLD VENIPUNCTURE: CPT

## 2017-08-18 PROCEDURE — 80048 BASIC METABOLIC PNL TOTAL CA: CPT

## 2017-08-18 PROCEDURE — 92507 TX SP LANG VOICE COMM INDIV: CPT

## 2017-08-18 PROCEDURE — 97530 THERAPEUTIC ACTIVITIES: CPT

## 2017-08-18 PROCEDURE — 97116 GAIT TRAINING THERAPY: CPT

## 2017-08-18 PROCEDURE — 25000003 PHARM REV CODE 250: Performed by: PHYSICIAN ASSISTANT

## 2017-08-18 PROCEDURE — 97150 GROUP THERAPEUTIC PROCEDURES: CPT

## 2017-08-18 PROCEDURE — 12800000 HC REHAB SEMI-PRIVATE ROOM

## 2017-08-18 PROCEDURE — 99233 SBSQ HOSP IP/OBS HIGH 50: CPT | Mod: ,,, | Performed by: PHYSICAL MEDICINE & REHABILITATION

## 2017-08-18 RX ORDER — SODIUM CHLORIDE 9 MG/ML
INJECTION, SOLUTION INTRAVENOUS CONTINUOUS
Status: ACTIVE | OUTPATIENT
Start: 2017-08-18 | End: 2017-08-19

## 2017-08-18 RX ORDER — PANTOPRAZOLE SODIUM 40 MG/1
40 TABLET, DELAYED RELEASE ORAL DAILY
Status: DISCONTINUED | OUTPATIENT
Start: 2017-08-18 | End: 2017-08-29 | Stop reason: HOSPADM

## 2017-08-18 RX ADMIN — AMPICILLIN SODIUM 2 G: 2 INJECTION, POWDER, FOR SOLUTION INTRAMUSCULAR; INTRAVENOUS at 05:08

## 2017-08-18 RX ADMIN — CARVEDILOL 12.5 MG: 12.5 TABLET, FILM COATED ORAL at 09:08

## 2017-08-18 RX ADMIN — PANTOPRAZOLE SODIUM 40 MG: 40 TABLET, DELAYED RELEASE ORAL at 10:08

## 2017-08-18 RX ADMIN — PROMETHAZINE HYDROCHLORIDE 12.5 MG: 12.5 TABLET ORAL at 05:08

## 2017-08-18 RX ADMIN — LISINOPRIL 20 MG: 20 TABLET ORAL at 09:08

## 2017-08-18 RX ADMIN — TRAZODONE HYDROCHLORIDE 25 MG: 50 TABLET ORAL at 09:08

## 2017-08-18 RX ADMIN — HEPARIN SODIUM 5000 UNITS: 5000 INJECTION, SOLUTION INTRAVENOUS; SUBCUTANEOUS at 02:08

## 2017-08-18 RX ADMIN — SODIUM CHLORIDE: 0.9 INJECTION, SOLUTION INTRAVENOUS at 05:08

## 2017-08-18 RX ADMIN — ASPIRIN 81 MG: 81 TABLET, COATED ORAL at 09:08

## 2017-08-18 RX ADMIN — ONDANSETRON 8 MG: 8 TABLET, ORALLY DISINTEGRATING ORAL at 05:08

## 2017-08-18 RX ADMIN — STANDARDIZED SENNA CONCENTRATE AND DOCUSATE SODIUM 1 TABLET: 8.6; 5 TABLET, FILM COATED ORAL at 08:08

## 2017-08-18 RX ADMIN — ONDANSETRON 8 MG: 8 TABLET, ORALLY DISINTEGRATING ORAL at 10:08

## 2017-08-18 RX ADMIN — PROMETHAZINE HYDROCHLORIDE 12.5 MG: 12.5 TABLET ORAL at 07:08

## 2017-08-18 RX ADMIN — HEPARIN SODIUM 5000 UNITS: 5000 INJECTION, SOLUTION INTRAVENOUS; SUBCUTANEOUS at 09:08

## 2017-08-18 RX ADMIN — CARVEDILOL 12.5 MG: 12.5 TABLET, FILM COATED ORAL at 08:08

## 2017-08-18 RX ADMIN — ONDANSETRON 8 MG: 8 TABLET, ORALLY DISINTEGRATING ORAL at 04:08

## 2017-08-18 RX ADMIN — AMPICILLIN SODIUM 2 G: 2 INJECTION, POWDER, FOR SOLUTION INTRAMUSCULAR; INTRAVENOUS at 12:08

## 2017-08-18 RX ADMIN — CEFTRIAXONE 2 G: 2 INJECTION, SOLUTION INTRAVENOUS at 03:08

## 2017-08-18 RX ADMIN — CEFTRIAXONE 2 G: 2 INJECTION, SOLUTION INTRAVENOUS at 04:08

## 2017-08-18 RX ADMIN — HEPARIN SODIUM 5000 UNITS: 5000 INJECTION, SOLUTION INTRAVENOUS; SUBCUTANEOUS at 05:08

## 2017-08-18 RX ADMIN — AMPICILLIN SODIUM 2 G: 2 INJECTION, POWDER, FOR SOLUTION INTRAMUSCULAR; INTRAVENOUS at 06:08

## 2017-08-18 NOTE — PROGRESS NOTES
Admit Assessment    Patient Identification  Juan Manuel Koehler   :  1950  Admit Date:  8/15/2017  Attending Provider:  Verónica Lewis MD                EXPECTED DATE OF DISCHARGE:  To be determined at first treatment team staffing.     Pt was admitted to inpatient rehab.   is involved.    NAME OF PERSON PROVIDING INFORMATION FOR ASSESSMENT:  Darlene Koehler    EMERGENCY CONTACT:  Primary Contact: Darlene                             Mobile Phone: [phone]                Relation: Spouse    LIVING ARRANGEMENTS:  [address] [place] [address] [place] [phone].  Pt lives with spouse in a two story home with bedroom on second floor.      LEVEL OF INDEPENDENCE/ASSISTANCE REQUIRED PRIOR TO ADMISSION: Independent      HOME HEALTH OR OUTPATIENT THERAPY USAGE PRIOR TO ADMISSION: None      DURABLE MEDICAL EQUIPMENT:  None    Patient Preference of agencies include: None stated  Patient/Caregiver informed of right to choose providers or agencies.  Patient provides permission to release any necessary information to [facility] and to Non-[facility] agencies as needed to facilitate patient care, treatment planning, and patient discharge planning.  Written and verbal resources provided.      Outpatient Pharmacy:     [facility]/pharmacy #[id number] - [place] - [address] AT [facility]  [address] [address]  Phone: [phone] Fax: [phone]      FAMILY/CAREGIVER SUPPORT:  Pt's spouse is primary caregiver.  Pt will be home alone part of the day.      LEVEL OF ORIENTATION:  AA&Ox4      ADJUSTMENT TO DIAGNOSIS AND TREATMENT:  Pt adjusting appropriately      EMOTIONAL/BEHAVIORAL STATUS/COGNITIVE ISSUES:  Pt presented with calm mood and good recall.        History/Current Substance Use:   Social History     Social History Main Topics    Smoking status: Never Smoker    Smokeless tobacco: Never Used    Alcohol use No    Drug use: No    Sexual activity: Yes     Partners: Female      Birth control/ protection: None         Financial:  Payor/Plan Subscr  Sex Relation Sub. Ins. ID Effective Group Num   1. LIDA SALAZAR* ASIA ROTHMAN* 1950 Male  [id number] 1/1/15 [id number]                                   [address]          DISCHARGE PLAN:  Home with spouse.      PATIENT PREFERENCE OF AGENCIES:  None stated      PATIENT/CAREGIVER CONCERNS EXPRESSED DURING ASSESSMENT:  None      INTERVENTIONS/REFERRALS:  Pt caregiver engaged in treatment planning process. SW will continue to follow for all resources, education, discharge planning and psychosocial needs.  Patient/caregiver engaged in treatment planning process.  SW available as needed.

## 2017-08-18 NOTE — ASSESSMENT & PLAN NOTE
-cont with Coreg 12.5 BID, lisinopril 20mg QD, hydralazine PO PRN  - cont to monitor BP/HR, stable    8/18 Given rising Cr, will hold Lisinopril. Will consider adjusting Coreg/adding Amlodipine as needed for BP control.

## 2017-08-18 NOTE — PLAN OF CARE
Problem: Patient Care Overview  Goal: Plan of Care Review  POC reviewed with pt, states generalized discomfort, intermitttent nausea, Zofran given, fell asleep after 2300, awakes easily when RN walks in the room, antibiotics given as prescribed, urostomy draining, pt assisted with repositioning self, remains free of falls or injury. All questions answered and addressed, verbalized understanding and compliance.

## 2017-08-18 NOTE — PROGRESS NOTES
"Speech Therapy   Treatment    Juan Manuel Koehler   MRN: [id number]            08/18/17 0945   Speech Time Calculation   Speech Start Time 0945   Speech Stop Time 1030   Speech Total (min) 45 min   General Information   SLP Treatment Date 08/18/17   General Observations Patient seen while sitting upright in w/c in ST office. Patient c/o feeling nauseous/"stomach issues" at beginning of session. ST placed pink basin at patient's side t/o session, if needed. Patient with decreased mental flexibility t/o tasks. As patient provided incorrect answers, ST explained reasoning for what was inaccurate. Patient with decreased insight as evidenced by frequently stating, "I don't agree, but that's fair."   General Precautions aspiration;fall   Visual/Auditory Vision impaired   Subjective   Patient states "I feel fair."   Pain/Comfort   Pain Addressed Pre-medicate for activity   Pain Comment "I feel the usual discomfort right here," patient pointed to abdomen.       SLP Cognitive Treatment   Treatment Detail (SLP Cognitive Treatment) Patient oriented x4 independently. Patient recalled recent information with min difficulty - unable to recall details. In a visual recognition/problem solving task, patient identified errors on a calendar, given min verbal cues. In a 4-step sequencing task, patient ranked steps with 100% accuracy independently. In a 6-step sequencing task, patient ranked steps with 60% accuracy independently, 80% accuracy given mod verbal cues. As ST informed patient of correct sequence, patient demonstrated limited insight of why items were ranked in said order. Patient completed a mental manipulation task of x3 words. Patient arranged words in chronological order with 90% accuracy independently, 100% accuracy given min verbal cues.        SLP Treatment: Verbal Expression   Treatment Detail (SLP Verbal Expression) Patient participated in naming tasks to improve word fluency skills. In an abstract convergent naming " "task, patient identified similarity among x3 words with 100% accuracy independently. In a concrete divergent naming task, patient listed x8 things at the beach in 1 minute given mod verbal cues. Patient listed x15 hospital items in 1 minute given min verbal cues. Patient listed x10 occupations in 1 minute given mod verbal cues - however, noted that patient listed x5 consecutive items relating to clothing manufacture/sales. ST cued patient to consider different occupations, such as what people do who work in hospital, pt stated, "sales." After task, ST informed patient that 'sales' is not an accurate answer in regards to occupations in a hospital setting, and ST provided list of occupations that are prevalent in hospitals. Patient stated, "I don't agree but I don't disagree."   Assessment   SLP Diagnosis mild-moderate cog-ling deficits   Prognosis Good   Problem List decreased sequencing skills; decreased mental flexibility   Session Assessment progressing toward goals   Level of Motivation/Participation Good   Discharge Recommendations   Discharge Facility/Level Of Care Needs home health speech therapy   Plan   Plan Continue with current plan   SLP Follow-up   SLP Follow-up? Yes   Treatment/Billable Minutes   Speech Therapy Individual 45   Total Time 45     FIM Scores  Comprehension:5  Expression: 5  Social Interaction:4  Problem Solvin  Memory: 2     Comprehension:  Complex= humor, finances, rationale for medical treatment(hip precautions, pressure relief)  Basic= pain, hunger, thirst, bathroom needs, cold, nutrition, sleep     Understands basic needs 90% of the time (5)     Expression:  Expresses basic needs or ideas 90% of the time  (5)     Social Interaction:  Interacts appropriately 75-89% of time - needs redirection for appropriate language or to initiate interaction  (4)     Problem Solving:  Solves basic problems 75-89% of the time  (4)     Memory:  Recognizes, recalls, or executes 25-49% of time 1 step " of 2 step request  (2)      CHINEDU Olivares-SLP  8/18/2017

## 2017-08-18 NOTE — PROGRESS NOTES
Physical Therapy   Daily FIM PT    Juan Manuel Koehler   MRN: [id number]        08/18/17 1501   Locomotion   Distance Walked 3   Walk 4   Mode B   Carola Burns, PT  8/18/2017

## 2017-08-18 NOTE — ASSESSMENT & PLAN NOTE
Endocarditis treatment w IVAbx (ceftriaxone 2g q12h, ampicillin 2g q6h) with end date 9/16 (6 week course)    8/16 Cr 1.6, discussed w pharm. decreased Ampicillin to Q6  8/17 Cr 1.8, repeat BMP tomorrow  8/18 CR rising to 1.9. ELDER vs AIN from the Ampicillin.  Reviewed w pharmacy, and current dose of Amp is appropriate until CrCl is < 10    ----  Will hold lisinopril, and cont to trend BMP through wknd. Given rise in BUN in setting on Nausea, intermittent emesis, and poor PO intake will give 1L IVF this evening.    ----- Will also reach out to ID is there is another option for coverage       BMP  Lab Results   Component Value Date     08/18/2017    K 4.0 08/18/2017     08/18/2017    CO2 21 (L) 08/18/2017    BUN 26 (H) 08/18/2017    CREATININE 1.9 (H) 08/18/2017    CALCIUM 9.2 08/18/2017    ANIONGAP 9 08/18/2017    ESTGFRAFRICA 41.0 (A) 08/18/2017    EGFRNONAA 35.4 (A) 08/18/2017

## 2017-08-18 NOTE — ASSESSMENT & PLAN NOTE
- R  Cerebellar, embolic  - cont w ASA 81/statin  Tentative DC: 8/25, will need 24 hr sup/assistance       Bed mobility supervision  Sit>stand CGA, stand>sit min A  t/f squat pivot min A, t/f stand pivot min A  shower t/f min A, toilet t/f min A, car t/f min A  WC supervision 150  Amb min/mod A 42 (posterior LOB), stairs min A 4 steps    Feeding Ind, grooming CGA, bathing min A  UB dress setup, LB dress mod A  Toileting mod A    Comp/Exp :Supervision  Problem Solving:Min A  Memory: Max A    Cont to be limited by endurance, decreased balance, memory, insight

## 2017-08-18 NOTE — PROGRESS NOTES
BLADDER  [] Pt. uses toilet (7)  [] Pt. is on dialysis - does not urinate (7)  [] Pt. uses bedside commode (5)  []Pt. uses bedpan - staff does____% (includes rolling on/off, holding bedpan in place                                                                   and emptying pan)   [x] Pt. uses urinal - staff empties                          []   Pt. needs help with positioning the urinal (4)                          []   Pt. needs help holding the urinal (2)  []  Pt. has bolanos catheter/suprapubic catheter - staff empties (1)  []  Pt. Is on ICP program:                           []  Pt. does catheterization (6)                           [] Staff does catheterization (1)  [] Pt. Is incontinent - staff does _______% of tasks  Number of accidents during this shift ___0___    BOWEL  [] Pt. uses toilet (7)  [] Pt. uses bedside commode (5)  [] Pt. uses bedpan - staff does _______% of task  [] Pt. is on bowel program::                         []   Pt. inserts suppository (6)                         []   Nurse inserts suppository (4)                         []  Nurse does dig. stim. (1)  [] Pt. has colostomy - staff maintains care and empties (1)                       Pt. does _____% of care  [] Pt. is incontinent - staff does ______% of tasks  [x] No bowel movement during this shift  [] Number of accidents during this shift ______    EATING  [] Pt. opens packages, cuts food, pours liquids, and feeds self, regular consistency (7)  [] Pt. needs dentures, swallow technique, special foods or drink consistency (6)  [] Pt. needs supervision (cueing), setup (open containers, cut food, etc.) (5)  []Pt. needs assist with occasional scooping, or checking for food pocketing (75-90%) (4)  []Pt. needs assist to lead each bite on utensils, patient brings food to mouth (50-74%)(3)  []Pt. needs assist to scoop, bring food to mouth (hand over hand) (25-49%) (2)  []AxillaryPt. Is receiving IV fluids for hydration or tube feeding  (1)    GROOMING  []Pt. performs all tasks independently and safely (7)  []Pt. obtains all articles, needs adaptive/assistive device (wash mitt, etc.) (6)  []Pt. needs supervision (cueing), setup (5)  []Pt. doing 3 of 4 or 4 of 5 tasks, needs assist to put in or remove dentures, steadying (Patient doing 75-90%) (4)   []Pt. doing 2 of 4 or 3 of 5 tasks (3)  []Pt. doing 1 of 4 of 2 of 5 tasks (25-49%) (2)  []Pt. doing 0 of tasks, needs assistance of 2 helpers (1)  []Activity occurred with OT    BATHING  []Pt. safety bathes whole body (10 parts of 10) (7)  []Pt. doing 10 of 10 body parts, uses adaptive devices (wash mitt, etc.) (6)  []Pt. doing 10 of 10 body parts or needs supervision or setup (5)  []Pt. doing 8-9 of 10 body parts (75-99%) (4)  []Pt. doing 5-7 of 10 body parts (50-74%) (3)   []Pt. doing 3-4 of 10 body parts (25-49%( (2)  []Pt. doing 0-2 of 10 body parts (0-24%0 (1)  []Activity occurred with OT    DRESSING UPPER BODY  []Pt. dresses or undresses independently, obtaining clothes from storage area (7)  []Pt. needs adaptive devices (6)  []Pt. needs supervision (cueing), setup (5)  []Pt. doing 75-99% (4)  []Pt. doing 50-74% (3)  []Pt. doing 25-49% (2)  []Pt. doing 0-24% or needs assist of 2 helpers (1)  []Activity occurred with OT    DRESSING LOWER BODY  []Pt. dresses or undresses independently (7)   []Pt. needs adaptive devices (6)  []Pt. needs supervision (cueing), set up helper applies KAMARI hose or AFO (5)  []Pt. doing 75-99%, needs steadying assist, fastening a belt, etc.) (4)  []Pt. doing 50-74% (3)  []Pt. doing 25-49% (2)  []Pt. doing 0-24% or needs assist of 2 helpers (1)    TOILETING  []Pt. doing 3 of 3 tasks (pulling down pants, cleaning manjit area, pulling up pants) (7)  []Pt. doing all 3 parts but needs assistive device (grab bar) (6)  []Pt. needs supervision or setup (5)  []Pt. doing 3 of 3 parts (75-99%) (4)  []Pt. doing 2 of 3 parts (50-74%) (3)  []Pt. doing 1 of 3 parts (25.49%) (2)  []Pt. needs  assist (more than steadying) with all 3 parts or needs assist of 2 helpers,  Incontinent of B/B today (0-24%) (1)    BED/ CHAIR/WHEELCHAIR TRANSFER  []Pt. transfers without assistive device into and out of wheelchair/bed/chair (7)  []Pt. uses assistive/adaptive devices (grab bars, sliding board, walker, bed rails,   wheelchair arm rests, etc.) (6)  []Pt. needs supervision, cues, head of bed raised by staff (5)  []Pt. needs steadying assist with any or all parts of transfer, needs assist with one   leg during transfer ( 4)  []Pt. needs lifting or lowering, needs assist with 2 legs during transfer (3)  []Pt. needs lifting and lowering (2)  []Pt. needs assist of 2 helpers(even if 2nd person is just there for safety) or mechanical lift (1)  [x]Activity did not occur     TOILET TRANSFER  []Pt. transfers from wheelchair or walking without assistive device (7)  []Pt. uses assistive/adaptive device (commode, grab bars, sliding board, walker,   prosthesis, orthotic, raised toilet seat, etc.) (6)  []Pt. needs supervision, cues, or setup (needs assist to lock brakes, remove leg or arm  rest, position sliding board) (5)  []Pt. needs steadying assist with any or all parts of transfer, needs assist with one leg  during transfer (4)  []Pt. needs lifting or lowering, needs assist with two legs during transfer (3)  []Pt. needs lifting and lowering (2)  []Pt. needs assist of 2 helpers or mechanical lift (1)  [x]Activity did not occur    SHOWER TRANSFER  []Pt. transfers without assistive device into and out of shower (7)  []Pt. uses assistive/adaptive device (shower bench, grab bars, etc.) (6)  []Pt. needs supervision, cues, or setup (5)  []Pt. needs steadying assist with any or all parts of transfer, needs assist with one leg  during transfer (4)  []Pt. needs lifting or lowering, needs assist with 2 legs during transfer (3)  []Pt. needs lifting and lowering (2)  []Pt. needs assist of 2 helpers, helper pushes shower chair on wheels in  and out of  shower (1)

## 2017-08-18 NOTE — PROGRESS NOTES
[facility]  Physical Medicine & Rehab  Progress Note    Patient Name: Juan Manuel Koehler  MRN: [id number]  Patient Class: IP- Rehab   Admission Date: 8/15/2017  Length of Stay: 3 days  Attending Physician: Verónica Lewis MD  Primary Care Provider: Hemant Sweeney MD    Subjective:     Principal Problem:Right-sided cerebrovascular accident (CVA)    Interval History: No acute events over night. Patient continues to endorse Nausea,, intermittent. No emesis today. Was able to 1/3 of breakfast.       Scheduled Medications:    ampicillin IVPB  2 g Intravenous Q6H    aspirin  81 mg Oral Daily    carvedilol  12.5 mg Oral BID    cefTRIAXone (ROCEPHIN) IVPB  2 g Intravenous Q12H    heparin (porcine)  5,000 Units Subcutaneous Q8H    ondansetron  8 mg Oral TID AC    polyethylene glycol  17 g Oral Daily    senna-docusate 8.6-50 mg  1 tablet Oral BID    trazodone  25 mg Oral QHS       PRN Medications: acetaminophen, bisacodyl, dextrose 50%, glucagon (human recombinant), ondansetron, promethazine    Review of Systems   Constitutional: Negative for unexpected weight change.        Vision stable w glasses  Hearing intact  Continent of bowel   HENT: Negative for congestion and ear pain.    Eyes: Negative for discharge.   Respiratory: Negative for shortness of breath.    Cardiovascular: Negative for chest pain.   Gastrointestinal: Positive for nausea. Negative for abdominal pain and vomiting.   Endocrine: Negative for cold intolerance.   Genitourinary: Negative for flank pain.   Neurological: Positive for weakness.   Psychiatric/Behavioral: Negative for hallucinations.     Objective:     Vital Signs (Most Recent):  Temp: 98.4 °F (36.9 °C) (08/18/17 0642)  Pulse: 72 (08/18/17 0642)  Resp: 18 (08/18/17 0642)  BP: 127/70 (08/18/17 0642)  SpO2: 97 % (08/18/17 0642)    Vital Signs (24h Range):  Temp:  [97.8 °F (36.6 °C)-98.4 °F (36.9 °C)] 98.4 °F (36.9 °C)  Pulse:  [72-82] 72  Resp:  [18] 18  SpO2:  [97  %-99 %] 97 %  BP: (105-127)/(70) 127/70     Physical Exam   Constitutional: He appears well-developed and well-nourished.   HENT:   Head: Normocephalic and atraumatic.   Eyes: EOM are normal.   Cardiovascular: Normal rate.    Pulmonary/Chest: Effort normal. No respiratory distress.   Abdominal: Soft. He exhibits no distension. There is no tenderness.   Genitourinary:   Genitourinary Comments: + urostomy   Musculoskeletal:   UE: SA, EF, EE,  5/5  LE: HF 5/5, KE 5/5, DF/PF 5/5  No calf tenderness    Neurological: He is alert.   Followed all commands  Memory 3/3 immediate, 2/3 delayed    Skin: Skin is warm.   Psychiatric: He has a normal mood and affect.   Vitals reviewed.    NEUROLOGICAL EXAMINATION:     CRANIAL NERVES     CN III, IV, VI   Extraocular motions are normal.       Assessment/Plan:      Juan Manuel Koehler is a 67 y.o. male admitted to inpatient rehabilitation on 8/15/2017 for Right-sided cerebrovascular accident (CVA) with impaired mobility and ADLs. Patient remains appropriate for PT, OT, and as required Speech therapy. Patient continues to require 24 hour nursing care as well as daily Physician assessment.    * Right-sided cerebrovascular accident (CVA)    - R  Cerebellar, embolic  - cont w ASA 81/statin  Tentative DC: 8/25, will need 24 hr sup/assistance       Bed mobility supervision  Sit>stand CGA, stand>sit min A  t/f squat pivot min A, t/f stand pivot min A  shower t/f min A, toilet t/f min A, car t/f min A  WC supervision 150  Amb min/mod A 42 (posterior LOB), stairs min A 4 steps    Feeding Ind, grooming CGA, bathing min A  UB dress setup, LB dress mod A  Toileting mod A    Comp/Exp :Supervision  Problem Solving:Min A  Memory: Max A    Cont to be limited by endurance, decreased balance, memory, insight         Nausea    - Has had frequent nausea with intermittent emesis since admission at [facility]    Suspect secondary to IV Abx.     - 8/16 juaquin Zofran prior to meals and monitor, KUB was  unremarkable  - 8/18 Added phenergan yesterday. Took first dose this AM, and thought nausea was a little better. No emesis today. Will add PPI, and cont to monitor closely.            S/P ileal conduit    Hx of  Urothelial carcinoma of bladder   - Follow-up with urology as an outpatient with Dr. Robles and with oncology, Dr. Calero     8/18 Functional, Last UA 8/12, and that was negative         Hypertension      -cont with Coreg 12.5 BID, lisinopril 20mg QD, hydralazine PO PRN  - cont to monitor BP/HR, stable    8/18 Given rising Cr, will hold Lisinopril. Will consider adjusting Coreg/adding Amlodipine as needed for BP control.         Bacteremia due to Enterococcus    Endocarditis treatment w IVAbx (ceftriaxone 2g q12h, ampicillin 2g q6h) with end date 9/16 (6 week course); + R sided PICC     8/16 Cr 1.6, discussed w pharm. decreased Ampicillin to Q6  8/17 Cr 1.8, repeat BMP tomorrow  8/18 CR rising to 1.9. ELDER vs AIN from the Ampicillin.  Reviewed w pharmacy, and current dose of Amp is appropriate until CrCl is < 10    ----  Will hold lisinopril, and cont to trend BMP through wknd. Given rise in BUN in setting on Nausea, intermittent emesis, and poor PO intake will give 1L IVF this evening.    ----- Will also reach out to ID is there is another option for coverage       BMP  Lab Results   Component Value Date     08/18/2017    K 4.0 08/18/2017     08/18/2017    CO2 21 (L) 08/18/2017    BUN 26 (H) 08/18/2017    CREATININE 1.9 (H) 08/18/2017    CALCIUM 9.2 08/18/2017    ANIONGAP 9 08/18/2017    ESTGFRAFRICA 41.0 (A) 08/18/2017    EGFRNONAA 35.4 (A) 08/18/2017             History of urostomy    - wound care assisting with monitoring of site/mgmt         Impaired mobility and ADLs    Other Rehab Plan/Continue to monitor:   Nutrition/Swallow Status:    - Prealbumin - 14   - Nutritionist on board   Bladder Management: illeostomy  Bowel Management:  continent  - Miralax and Suppository PRN   - Will monitor  for regularity   Mood: stable   Sleep: monitor; Failed Ramelteon. 8/17 trial trazadone   Skin: Monitor   DVT ppx:  Heparin                DISCHARGE PLANNING:  Tentative Discharge Date:     Future Appointments  Date Time Provider Department Center   9/5/2017 10:40 AM Jewels Toth PA-C [facility] NEURO Damon Teja Lewis MD  Department of Physical Medicine & Rehab   [facility]

## 2017-08-18 NOTE — ASSESSMENT & PLAN NOTE
- Has had frequent nausea with intermittent emesis since admission at [facility]    Suspect secondary to IV Abx.     - 8/16 juaquin Zofran prior to meals and monitor, KUB was unremarkable  - 8/18 Added phenergan yesterday. Took first dose this AM, and thought nausea was a little better. No emesis today. Will add PPI, and cont to monitor closely.    8/19 one time episode of emesis yesterday per patient, but reports that the nausea has improved with newly added phenergan.

## 2017-08-18 NOTE — PROGRESS NOTES
"Occupational Therapy   AM Treatment    Juan Manuel Koehler   MRN: [id number]      08/18/17 1115   OT Time Calculation   OT Start Time 1115   OT Stop Time 1200   OT Total Time (min) 45 min   General   OT Date of Treatment 08/18/17   Patient Found (position) Seated in wheelchair   Precautions   General Precautions aspiration;fall   Subjective   Patient states "The nausea is slightly better"   Transfers   Transfer yes   Sit to Stand   Sit <> Stand Assistance Contact Guard Assistance   Sit <> Stand Assistive Device Rolling Walker   Trials/Comments posterior lean   Stand to Sit   Assistance Contact Guard Assistance   Assistive Device Rolling Walker   Trials/Comments cues for hand placement and technique   Chair to Mat   Chair <>Mat Technique Stand Pivot   Chair <> Mat Assistance Contact Guard Assistance   Chair<> Mat Assistive Device Rolling Walker   Mat to Chair   Technique Stand Pivot   Assistance Contact Guard Assistance   Assistive Device Rolling Walker   LE Dressing   LE Dressing Level of Assistance Minimum assistance   LE Dressing Where Assessed Wheelchair   LE Dressing Comments steadying assist   Toileting   Toileting Comments pt had incontinent BM in session, assist for manjit hygiene, pt manages clothing   Additional Activities:    - Pt ambulated from w/c to cabinets with RW and CGA. Once at cabinet he tf'd grocery items from overhead cabinet onto countertops.    - Bilateral josemanuel x 3# x 20 reps x 5 sets on flat surface, Rickshaw x 15# x 20 reps x 5 sets    - Pt participated in short distance ambulation with RW in prep for household mobility, ADLs, and IADLs.    After Treatment   Patient Position After Treatment Seated in wheelchair   Patient after treatment left call button in reach   Assessment   Prognosis Good   Problem List Decreased Self Care skills;Decreased upper extremity strength;Decreased cognition;Decreased safe judgment during ADL;Decreased endurance;Decreased functional mobility;Decreased gross motor " control;Decreased IADLs;Decreased trunk control for functional activities   Asssessment  Pt tolerated activities fair, reports slightly less nausea than previous day but still having nausea. Pt remains limited by decreased endurance, requiring frequent rest breaks throughout session.    Level of Motivation/Participation good   Discharge Recommendations   Equipment Needed After Discharge walker, rolling;bath bench;wheelchair   Discharge Facility/Level Of Care Needs home health OT   Plan   Plan Continue with current plan   Therapy Frequency 2 times/day;Monday-Friday;Saturday or Sunday   Occupational Therapy Follow-up   OT Follow-up? Yes   Treatment/Billable Minutes   Self Care/Home Management 15   Therapeutic Exercise 30   Total Time 45     PREETHI Cooper   8/18/2017

## 2017-08-18 NOTE — ASSESSMENT & PLAN NOTE
Endocarditis treatment w IVAbx (ceftriaxone 2g q12h, ampicillin 2g q6h) with end date 9/16 (6 week course); + R sided PICC     8/16 Cr 1.6, discussed w pharm. decreased Ampicillin to Q6  8/17 Cr 1.8, repeat BMP tomorrow  8/18 CR rising to 1.9. ELDER vs AIN from the Ampicillin.  Reviewed w pharmacy, and current dose of Amp is appropriate until CrCl is < 10    ----  Will hold lisinopril, and cont to trend BMP through wknd (not drawn Saturday). Given rise in BUN in setting on Nausea, intermittent emesis, and poor PO intake will give 1L IVF this evening (completed)   ----- Will also reach out to ID is there is another option for coverage     8/19 no further emesis since yesterday afternoon, continue to encourage fluids and repeat BMP to monitor BUN/Cr      BMP  Lab Results   Component Value Date     08/18/2017    K 4.0 08/18/2017     08/18/2017    CO2 21 (L) 08/18/2017    BUN 26 (H) 08/18/2017    CREATININE 1.9 (H) 08/18/2017    CALCIUM 9.2 08/18/2017    ANIONGAP 9 08/18/2017    ESTGFRAFRICA 41.0 (A) 08/18/2017    EGFRNONAA 35.4 (A) 08/18/2017

## 2017-08-18 NOTE — ASSESSMENT & PLAN NOTE
Other Rehab Plan/Continue to monitor:   Nutrition/Swallow Status:    - Prealbumin - 14   - Nutritionist on board   Bladder Management: illeostomy  Bowel Management:  continent  - Miralax and Suppository PRN   - Will monitor for regularity   Mood: stable   Sleep: monitor; Failed Ramelteon. 8/17 trial trazadone   Skin: Monitor   DVT ppx:  Heparin

## 2017-08-18 NOTE — PROGRESS NOTES
Occupational Therapy   FIM Scores    Juan Manuel Koehler   MRN: [id number]      08/18/17 1600   Transfers   Bed/Chair/WC 4   Self Care   Dressing-Lower 4     PREETHI Cooper   8/18/2017

## 2017-08-18 NOTE — ASSESSMENT & PLAN NOTE
- Has had frequent nausea with intermittent emesis since admission at [facility] [place]    Suspect secondary to IV Abx.     - 8/16 juaquin Zofran prior to meals and monitor, KUB was unremarkable  - 8/18 Added phenergan yesterday. Took first dose this AM, and thought nausea was a little better. No emesis today. Will monitor closely.

## 2017-08-18 NOTE — PROGRESS NOTES
Physical Therapy   PT Daily Treatment    Juan Manuel Koehler   MRN: [id number]        08/18/17 1501   PT Time Calculation   PT Start Time 1501   PT Stop Time 1546   PT Total Time (min) 45 min   Treatment   Treatment Type Treatment   PT/PTA PT   General   PT Received On 08/18/17   Family/Caregiver Present No   Patient Found (position) Seated in wheelchair   Precautions   General Precautions aspiration;fall   Subjective   Patient states Pt is agreeable to participate in skilled PT intervention    Pain/Comfort   Pain Rating 1 no pain  (c/o nausea)   Pain Rating Post-Intervention 1 no pain   Sit to Stand   Sit <> Stand Assistance Contact Guard Assistance   Sit <> Stand Assistive Device Rolling Walker   Trials/Comments Pt performed multiple trials throughout the treatment session.   Stand to Sit   Assistance Contact Guard Assistance   Assistive Device Rolling Walker   Trials/Comments Pt performed multiple trials throughout the PT session.    Gait   Gait Yes   Gait 1   Surface 1 Level tile;Carpet;Ramp   Gait Assistive Device Rolling walker   Assistance 1 Minimum assistance   Gait Distance Pt ambulated x2 228 feet from rehab gym with tile, over carpet, negotiated a ramp, and uneven surfaces. Pt was CGA during ambulation but required min A x2 with LOB due to uneven surfaces and x1 min A for scissoring during the two trials.    Gait Pattern swing-through gait   Gait Deviation(s) decreased pedro pablo;decreased step length   Impairments Contributing to Gait Deviations impaired motor control;impaired coordination;impaired balance   Other Activities   Comments Pt participated in a cognitive task that required UE movements (connect four) while maintaining balance on balance disks. Pt required min A due to posterior lean when pt looked up at the game. Patient stood for 5 min and 32 seconds.    Pt participated in an obstacle course that challenged his balance and encouraged single leg stance- walking on two balance disks, stepping over  "6" block, then kicking a ball for a total of 30 feet x3 trials.     PT guarded patient as he kicked a ball back and forth with rehab tech in order to encourage single leg stance. x10 reps with RW; x40 reps without RW. CGA for the activity except min A for occasional posterior LOB while in SLS.    Activity Tolerance   Activity Tolerance Patient tolerated treatment well   After Treatment   Patient Position After Treatment Seated in wheelchair  (posey belt donned)   Patient after treatment left call button in reach   Assessment   Problem List Decreased endurance;Impaired balance;Decreased mobility;Decreased coordination;Decreased safety awareness   Session Assessment progressing toward goals   Assessment Pt is demonstrating improved balance with RW, however, continues to be vision dependent during ambulation. Pt has a tendency to look at the ground with occasional times looking up. A change in gait pedro pablo and a slight posterior lean is noted when the pt looks up. Pt would continue from skilled interventions to address these deficits.    Level of Motivation/Participation good   Barriers to Discharge Inaccessible home environment;Decreased caregiver support   Plan   Planned Therapy Intervention Continue with current plan   Therapy Frequency daily;Monday-Friday;Saturday or Sunday   Physical Therapy Follow-up   PT Follow-up? Yes   PT - Next Visit Date 08/19/17   Treatment/Billable Minutes   Gait training 10   Therapeutic Activity 35   Total Time 45     Carola Burns, PT  8/18/2017          "

## 2017-08-18 NOTE — ASSESSMENT & PLAN NOTE
Hx of  Urothelial carcinoma of bladder   - Follow-up with urology as an outpatient with Dr. Robles and with oncology, Dr. Calero     8/18 Functional, Last UA 8/12, and that was negative

## 2017-08-18 NOTE — PROGRESS NOTES
Nutrition note    Pt visited for preferences noted in computer order. He will only accept chocolate milk and chocolate boost. He reports weight loss since his surgery of 40#. Through chart review I find that pt weight was recorded for surgery on 7/6/17 at 140# showing a 10% weight change in < 90 days. PO intake has been < 75% of needs > one month, due to critical illness, nausea,weakness, and failure to thrive. Moderate Muscle and fat loss noted.   Patient meets the criteria for severe malnutrition acute context.

## 2017-08-18 NOTE — PROGRESS NOTES
Physical Therapy   Volleyball Group Treatment    Juan Manuel Koehler   MRN: [id number]          08/18/17 1330   PT Time Calculation   PT Start Time 1330   PT Stop Time 1415   PT Total Time (min) 45 min   Treatment   Treatment Type Treatment  (Group Volleyball Treatment)   PT/PTA PT   General   PT Received On 08/18/17   Family/Caregiver Present No   Patient Found (position) Seated in wheelchair   Pt found with (posey belt donned)   Precautions   General Precautions aspiration;fall   Subjective   Patient states Pt agreeable to participate in skilled PT intervention   Pain/Comfort   Pain Rating 1 no pain   Pain Rating Post-Intervention 1 no pain   Other Activities   Comments Patient participated in 45 minute volleyball group activity.  The group activity challenged dynamic standing/sitting skills, bilateral upper extremity strengthening, cardiovascular and respiratory capacity, hand -eye coordination, visual scanning and social affect/mood.  The patient performed this activity at w/c level with minimal assist.The patient required no rest breaks during this activity.  Patient performed activity using bilateral upper extremities to volley the ball, lateral arm movement noted throughout the activity.  Endurance was fair, no pain complaints voiced are observed , social affect good as patient was observed throughout this activity ie., appropriately engaged in social exchange with peers and staff.     Treatment/Billable Minutes   Therapeutic Activity Group 45   Total Time 45         Darlene Campuzano, PT  8/18/2017

## 2017-08-18 NOTE — ASSESSMENT & PLAN NOTE
- R  Cerebellar, embolic  - cont w ASA 81/statin    Bed mobility supervision  Sit>stand CGA, stand>sit min A  t/f squat pivot min A, t/f stand pivot min A  shower t/f min A, toilet t/f min A, car t/f min A  WC supervision 150  Amb min/mod A 42 (posterior LOB), stairs min A 4 steps    Feeding Ind, grooming CGA, bathing min A  UB dress setup, LB dress mod A  Toileting mod A    Comprehension:Supervision  Expression: Supervision  Social Interaction:Min A  Problem Solving:Min A  Memory: Max A

## 2017-08-18 NOTE — PROGRESS NOTES
Pt discussed during treatment team staffing.  Expected discharge date is 8/25/17.  Pt informed and would like to discharge Monday, 8/21/17 or Tuesday, 8/22/17.  Voice message left for pt's spouse on both home and mobile phones informing of date and requesting return call.

## 2017-08-18 NOTE — SUBJECTIVE & OBJECTIVE
Interval History: No acute events over night. Patient continues to endorse Nausea,, intermittent. No emesis today. Was able to 1/3 of breakfast.       Scheduled Medications:    ampicillin IVPB  2 g Intravenous Q6H    aspirin  81 mg Oral Daily    carvedilol  12.5 mg Oral BID    cefTRIAXone (ROCEPHIN) IVPB  2 g Intravenous Q12H    heparin (porcine)  5,000 Units Subcutaneous Q8H    ondansetron  8 mg Oral TID AC    polyethylene glycol  17 g Oral Daily    senna-docusate 8.6-50 mg  1 tablet Oral BID    trazodone  25 mg Oral QHS       PRN Medications: acetaminophen, bisacodyl, dextrose 50%, glucagon (human recombinant), ondansetron, promethazine    Review of Systems   Constitutional: Negative for unexpected weight change.        Vision stable w glasses  Hearing intact  Continent of bowel   HENT: Negative for congestion and ear pain.    Eyes: Negative for discharge.   Respiratory: Negative for shortness of breath.    Cardiovascular: Negative for chest pain.   Gastrointestinal: Positive for nausea. Negative for abdominal pain and vomiting.   Endocrine: Negative for cold intolerance.   Genitourinary: Negative for flank pain.   Neurological: Positive for weakness.   Psychiatric/Behavioral: Negative for hallucinations.     Objective:     Vital Signs (Most Recent):  Temp: 98.4 °F (36.9 °C) (08/18/17 0642)  Pulse: 72 (08/18/17 0642)  Resp: 18 (08/18/17 0642)  BP: 127/70 (08/18/17 0642)  SpO2: 97 % (08/18/17 0642)    Vital Signs (24h Range):  Temp:  [97.8 °F (36.6 °C)-98.4 °F (36.9 °C)] 98.4 °F (36.9 °C)  Pulse:  [72-82] 72  Resp:  [18] 18  SpO2:  [97 %-99 %] 97 %  BP: (105-127)/(70) 127/70     Physical Exam   Constitutional: He appears well-developed and well-nourished.   HENT:   Head: Normocephalic and atraumatic.   Eyes: EOM are normal.   Cardiovascular: Normal rate.    Pulmonary/Chest: Effort normal. No respiratory distress.   Abdominal: Soft. He exhibits no distension. There is no tenderness.   Genitourinary:    Genitourinary Comments: + urostomy   Musculoskeletal:   UE: SA, EF, EE,  5/5  LE: HF 5/5, KE 5/5, DF/PF 5/5  No calf tenderness    Neurological: He is alert.   Followed all commands  Memory 3/3 immediate, 2/3 delayed    Skin: Skin is warm.   Psychiatric: He has a normal mood and affect.   Vitals reviewed.    NEUROLOGICAL EXAMINATION:     CRANIAL NERVES     CN III, IV, VI   Extraocular motions are normal.

## 2017-08-19 LAB
ANION GAP SERPL CALC-SCNC: 7 MMOL/L
BUN SERPL-MCNC: 27 MG/DL
CALCIUM SERPL-MCNC: 9 MG/DL
CHLORIDE SERPL-SCNC: 108 MMOL/L
CO2 SERPL-SCNC: 23 MMOL/L
CREAT SERPL-MCNC: 2.2 MG/DL
EST. GFR  (AFRICAN AMERICAN): 34.3 ML/MIN/1.73 M^2
EST. GFR  (NON AFRICAN AMERICAN): 29.7 ML/MIN/1.73 M^2
GLUCOSE SERPL-MCNC: 91 MG/DL
POTASSIUM SERPL-SCNC: 4.1 MMOL/L
SODIUM SERPL-SCNC: 138 MMOL/L

## 2017-08-19 PROCEDURE — 25000003 PHARM REV CODE 250: Performed by: PHYSICIAN ASSISTANT

## 2017-08-19 PROCEDURE — 99233 SBSQ HOSP IP/OBS HIGH 50: CPT | Mod: ,,, | Performed by: PHYSICAL MEDICINE & REHABILITATION

## 2017-08-19 PROCEDURE — 97110 THERAPEUTIC EXERCISES: CPT

## 2017-08-19 PROCEDURE — 63600175 PHARM REV CODE 636 W HCPCS: Performed by: PHYSICIAN ASSISTANT

## 2017-08-19 PROCEDURE — 97530 THERAPEUTIC ACTIVITIES: CPT

## 2017-08-19 PROCEDURE — 12800000 HC REHAB SEMI-PRIVATE ROOM

## 2017-08-19 PROCEDURE — 80048 BASIC METABOLIC PNL TOTAL CA: CPT

## 2017-08-19 PROCEDURE — 92507 TX SP LANG VOICE COMM INDIV: CPT

## 2017-08-19 PROCEDURE — 92610 EVALUATE SWALLOWING FUNCTION: CPT

## 2017-08-19 PROCEDURE — 97150 GROUP THERAPEUTIC PROCEDURES: CPT

## 2017-08-19 PROCEDURE — 25000003 PHARM REV CODE 250: Performed by: PHYSICAL MEDICINE & REHABILITATION

## 2017-08-19 PROCEDURE — 63600175 PHARM REV CODE 636 W HCPCS: Performed by: PHYSICAL MEDICINE & REHABILITATION

## 2017-08-19 PROCEDURE — 97116 GAIT TRAINING THERAPY: CPT

## 2017-08-19 RX ADMIN — ONDANSETRON 8 MG: 8 TABLET, ORALLY DISINTEGRATING ORAL at 04:08

## 2017-08-19 RX ADMIN — CARVEDILOL 12.5 MG: 12.5 TABLET, FILM COATED ORAL at 08:08

## 2017-08-19 RX ADMIN — HEPARIN SODIUM 5000 UNITS: 5000 INJECTION, SOLUTION INTRAVENOUS; SUBCUTANEOUS at 02:08

## 2017-08-19 RX ADMIN — AMPICILLIN SODIUM 2 G: 2 INJECTION, POWDER, FOR SOLUTION INTRAMUSCULAR; INTRAVENOUS at 05:08

## 2017-08-19 RX ADMIN — ONDANSETRON 8 MG: 8 TABLET, ORALLY DISINTEGRATING ORAL at 11:08

## 2017-08-19 RX ADMIN — ASPIRIN 81 MG: 81 TABLET, COATED ORAL at 08:08

## 2017-08-19 RX ADMIN — ACETAMINOPHEN 650 MG: 325 TABLET ORAL at 09:08

## 2017-08-19 RX ADMIN — CEFTRIAXONE 2 G: 2 INJECTION, SOLUTION INTRAVENOUS at 03:08

## 2017-08-19 RX ADMIN — TRAZODONE HYDROCHLORIDE 25 MG: 50 TABLET ORAL at 09:08

## 2017-08-19 RX ADMIN — AMPICILLIN SODIUM 2 G: 2 INJECTION, POWDER, FOR SOLUTION INTRAMUSCULAR; INTRAVENOUS at 12:08

## 2017-08-19 RX ADMIN — CEFTRIAXONE 2 G: 2 INJECTION, SOLUTION INTRAVENOUS at 04:08

## 2017-08-19 RX ADMIN — HEPARIN SODIUM 5000 UNITS: 5000 INJECTION, SOLUTION INTRAVENOUS; SUBCUTANEOUS at 05:08

## 2017-08-19 RX ADMIN — CARVEDILOL 12.5 MG: 12.5 TABLET, FILM COATED ORAL at 09:08

## 2017-08-19 RX ADMIN — PANTOPRAZOLE SODIUM 40 MG: 40 TABLET, DELAYED RELEASE ORAL at 08:08

## 2017-08-19 RX ADMIN — ONDANSETRON 8 MG: 8 TABLET, ORALLY DISINTEGRATING ORAL at 07:08

## 2017-08-19 RX ADMIN — AMPICILLIN SODIUM 2 G: 2 INJECTION, POWDER, FOR SOLUTION INTRAMUSCULAR; INTRAVENOUS at 06:08

## 2017-08-19 NOTE — PROGRESS NOTES
"Occupational Therapy   Treatment        08/19/17 1011   OT Time Calculation   OT Start Time 1011   OT Stop Time 1106   OT Total Time (min) 55 min   General   OT Date of Treatment 08/19/17   Family/Caregiver Present Yes   Patient Found (position) Supine in bed   Pt found with peripheral IV  (catheter)   Precautions   General Precautions aspiration;fall   Subjective   Patient states ("I might need to stop early.  My wife is coming for us to do some work in about 30 minutes.")   Bed Mobility   Rolling/Turning Right Modified independent   Rolling/Turning Right Comments used bedrail   Scooting/Bridging Modified Independent   Supine to Sit Contact Guard Assistance at shoulder and using bed rail   Transfers   Transfer yes   Sit to Stand   Sit <> Stand Assistance Contact Guard Assistance   Bed to Chair   Bed <> Chair Technique Stand Pivot   Bed <> Chair Transfer Assistance Contact Guard Assistance for stability   Bed <> Chair Assistive Device No Assistive Device   Therapeutic Exercise - ROM   ROM Yes   All Joints - ROM - Right   R Motion All Joints AROM   R Position All Joints Seated   Equipment Dowel  (2#)   R Weight/Reps/Sets All Joints (3x20 reps)   All Joints - ROM - Left   L Motion All Joints AROM   L Position All Joints Seated   Equipment Dowel  (2 #)   L Weight/Reps/Sets All Joints 3x20 reps   Exercise Tools   Exercise Tools Yes   UE Ergometer (10 minutes in forward motion without stopping)   Additional Activities   Additional Activities Other (Comment)   Additional Activities Comments (pt completed standing activity while working on the [employer] board for 11 minutes - pt had almost completed the board during this time and used R hand for task.  Pt able to manipulate puzzle pieces without difficulty but required some additional time to insert pieces into slots when changed in position.  No LOB noted during task. )   After Treatment   Patient Position After Treatment Seated in wheelchair   Patient " after treatment left (waiting in therapy gym for next session with wife)   Assessment   Prognosis Good   Problem List Decreased Self Care skills;Decreased upper extremity strength;Decreased safe judgment during ADL;Decreased cognition;Decreased endurance;Decreased functional mobility;Decreased IADLs   Level of Motivation/Participation (good)   Discharge Recommendations   Equipment Needed After Discharge bath bench;walker, rolling;wheelchair   Discharge Facility/Level Of Care Needs home health OT   Plan   Plan Continue with current plan   Therapy Frequency 2 times/day;Monday-Friday;Saturday or Sunday   Occupational Therapy Follow-up   OT Follow-up? Yes   Treatment/Billable Minutes   Therapeutic Activity 15   Therapeutic Exercise 40   Total Time 55         ANalysis:  Pt noted with improved bed mobility and endurance skills during tx session.  He was able to perform transfers with CGA for stability and safety as pt performs tasks rapidly with poor safety awareness.  Pt noted with good endurance during UBE activity.  Pt is making progress towards his goals in therapy and will continue to benefit from skilled OT in order to work towards increasing his safety and level of independence with ADLs and functional mobility.      Patricia Olivo, OT  8/19/2017       Juan Manuel Koehler   MRN: [id number]

## 2017-08-19 NOTE — ASSESSMENT & PLAN NOTE
Endocarditis treatment w IVAbx (ceftriaxone 2g q12h, ampicillin 2g q6h) with end date 9/16 (6 week course); + R sided PICC     8/16 Cr 1.6, discussed w pharm. decreased Ampicillin to Q6  8/17 Cr 1.8, repeat BMP tomorrow  8/18 CR rising to 1.9. ELDER vs AIN from the Ampicillin.  Reviewed w pharmacy, and current dose of Amp is appropriate until CrCl is < 10    ----  Will hold lisinopril, and cont to trend BMP through wknd (not drawn Saturday). Given rise in BUN in setting on Nausea, intermittent emesis, and poor PO intake will give 1L IVF this evening (completed)   ----- Will also reach out to ID is there is another option for coverage     8/19 no further emesis since yesterday afternoon, continue to encourage fluids and repeat BMP to monitor BUN/Cr    8/20 BUN/Cr 25/2.1, will give an additional 1L of NS and follow up BMP tomorrow      BMP  Lab Results   Component Value Date     08/20/2017    K 4.1 08/20/2017     08/20/2017    CO2 23 08/20/2017    BUN 25 (H) 08/20/2017    CREATININE 2.1 (H) 08/20/2017    CALCIUM 9.0 08/20/2017    ANIONGAP 9 08/20/2017    ESTGFRAFRICA 36.3 (A) 08/20/2017    EGFRNONAA 31.4 (A) 08/20/2017

## 2017-08-19 NOTE — PROGRESS NOTES
Occupational Therapy   Dressing Group    Juan Manuel Koehler  MRN: [id number]  Room/Bed: E255/E255        08/19/17 1128   OT Time Calculation   OT Start Time 1128   OT Stop Time 1213   OT Total Time (min) 45 min   General   OT Date of Treatment 08/19/17   OT Therapeutic Groups   OT Therapeutic Yes   Other Pt participated in 45 min functional dressing group. The group activity reviewed safety considerations, energy conservation, and adaptive strategies for upper body and lower body dressing. Patient educated on adaptive equipment available to assist with dressing (button hook, long handled shoe horn, reacher, dressing stick, elastic shoe laces). Patient also educated on dressing tips that promote increased (I) with dressing such as wearing loose fit clothing, using footstool, and adapted clothing available.     UB dressing (jacket):  Min Assist    LB dressing (socks)  (S)                      (pants)   (S)                      (shoes)  (S)    These activities were performed in a group setting to encourage participation with peers and social interaction skills.      Occupational Therapy Follow-up   OT Follow-up? Yes   Treatment/Billable Minutes   Therapeutic Group 46   Total Time 46       PREETHI Rebollar  8/19/2017    LEGEND:   CGA: Contact Guard Assist   EOB: Edge of Bed   HHA: Hand Held Assist   HOB: Head of Bed   (I): Independent-patient performs task in a timely manner   Max (A): Maximal Assist-patient performs 25-49% of task   Min (A): Minimal Assist- patient performs 75% or more of task   Mod (A): Moderate Assist- patient performs 50-74% of task   NA: Not applicable   NT: Not tested   OOB: Out of Bed   PTA: Prior to admit   QC: Quad Cane   RW: Rolling Walker   (S): Supervision- patient requires cues, coaxing, prompting   SBA: Stand By Assist   SC: Straight Cane   SW: Standard Walker   TBA: To be assessed   Total (A): Total Assist- patient performs less than 25% of task   WC: Wheelchair   WFL: Within  Functional Limits   WNL: Within Normal Limits

## 2017-08-19 NOTE — PROGRESS NOTES
Physical Therapy   Daily FIM Scores    Juan Manuel Koehler   MRN: [id number]      08/19/17 1510   Transfers   Bed/Chair/WC 4   Locomotion   Distance Walked 3   Walk 4   Stairs 4   ROMA MAGANA, ALEN  8/19/2017

## 2017-08-19 NOTE — PROGRESS NOTES
Occupational Therapy   FIMs        08/19/17 1011   Transfers   Bed/Chair/WC 4       Juan Manuel Koehler   MRN: [id number]

## 2017-08-19 NOTE — NURSING
Called to bedside due to patient vomiting. About 250 mL of brown colored emesis noted. Patient states he ate a few meals today and drank a cherry coke. The current anti emetic regimen is better per patient. At this time he denies any further feeling of nausea. Will administer SL anti emetic per MD order and follow up with patient as needed.

## 2017-08-19 NOTE — ASSESSMENT & PLAN NOTE
-cont with Coreg 12.5 BID, lisinopril 20mg QD, hydralazine PO PRN  - cont to monitor BP/HR, stable    8/18 Given rising Cr, will hold Lisinopril. Will consider adjusting Coreg/adding Amlodipine as needed for BP control.   8/19 stable

## 2017-08-19 NOTE — SUBJECTIVE & OBJECTIVE
Interval History: No acute events over night. Patient continues to endorse Nausea, but it has improved with phenergan. He last had an episode of emesis yesterday afternoon. He reports that his sleep continues to be poor as well, but was an issue premorbidly.       Scheduled Medications:    ampicillin IVPB  2 g Intravenous Q6H    aspirin  81 mg Oral Daily    carvedilol  12.5 mg Oral BID    cefTRIAXone (ROCEPHIN) IVPB  2 g Intravenous Q12H    heparin (porcine)  5,000 Units Subcutaneous Q8H    ondansetron  8 mg Oral TID AC    pantoprazole  40 mg Oral Daily    polyethylene glycol  17 g Oral Daily    trazodone  25 mg Oral QHS       PRN Medications: acetaminophen, bisacodyl, dextrose 50%, glucagon (human recombinant), ondansetron, promethazine    Review of Systems   Constitutional: Negative for unexpected weight change.        Vision stable w glasses  Hearing intact  Continent of bowel   HENT: Negative for congestion and ear pain.    Eyes: Negative for discharge.   Respiratory: Negative for shortness of breath.    Cardiovascular: Negative for chest pain.   Gastrointestinal: Positive for nausea. Negative for abdominal pain and vomiting.   Endocrine: Negative for cold intolerance.   Genitourinary: Negative for flank pain.   Neurological: Positive for weakness.   Psychiatric/Behavioral: Negative for hallucinations.     Objective:     Vital Signs (Most Recent):  Temp: 98.5 °F (36.9 °C) (08/19/17 0732)  Pulse: 76 (08/19/17 0732)  Resp: 18 (08/19/17 0732)  BP: 121/64 (08/19/17 0732)  SpO2: 95 % (08/19/17 0732)    Vital Signs (24h Range):  Temp:  [98.5 °F (36.9 °C)-98.8 °F (37.1 °C)] 98.5 °F (36.9 °C)  Pulse:  [76-78] 76  Resp:  [18] 18  SpO2:  [95 %-98 %] 95 %  BP: (119-121)/(56-64) 121/64     Physical Exam   Constitutional: He appears well-developed and well-nourished.   HENT:   Head: Normocephalic and atraumatic.   Eyes: EOM are normal.   Cardiovascular: Normal rate.    Pulmonary/Chest: Effort normal. No respiratory  distress.   Abdominal: Soft. He exhibits no distension. There is no tenderness.   Genitourinary:   Genitourinary Comments: + urostomy   Musculoskeletal:   UE: SA, EF, EE,  5/5  LE: HF 5/5, KE 5/5, DF/PF 5/5  No calf tenderness    Neurological: He is alert.   Followed all commands  Memory 3/3 immediate, 2/3 delayed    Skin: Skin is warm.   Psychiatric: He has a normal mood and affect.   Vitals reviewed.    NEUROLOGICAL EXAMINATION:     CRANIAL NERVES     CN III, IV, VI   Extraocular motions are normal.

## 2017-08-19 NOTE — PROGRESS NOTES
"Speech Therapy  Bedside Swallow Evaluation/ Cognitive Treatment    Juan Manuel Koehler   MRN: [id number]     Pt to cont REGULAR/THIN diet - PO meds with TL or with apple sauce for large pills. Pt and nurse ed re findings and recommendations - voiced understanding and compliance.      08/19/17 0745   Speech Time Calculation   Speech Start Time 0745   Speech Stop Time 0830   Speech Total (min) 45 min   General Information   SLP Treatment Date 08/19/17   General Observations Pt seen sitting upright in bed receiving IV antibiotics - alert and motivated to participate in ST. Pt reports no c/o dysphagia/odonophagia durings meals or PO meds. Pt reports recent episodes of nausea, but subsiding with recent PO meds. Pt reports hx throat cancer [id number] - pt received radiation but no chemo for treatment - pt reports no difficulty swallowing since throat cancer dx.    General Precautions aspiration;fall   Visual/Auditory Vision impaired   Subjective   Patient states "I'm leaving here Tuesday or Wednseday by the latest. I just want to go home."   Pain/Comfort   Pain Rating no pain       SLP Cognitive Treatment   Treatment Detail (SLP Cognitive Treatment) Oriented to person, time, place - pt difficulty orienting to situation (i.e. when asked reason for admit, pt responded, "I fell because of a chemical imbalance"). Pt recalled recent events (i.e. job prior to admit, meals, meds) with no difficulty. Pt with difficulty recalling admit despite max verbal cues. Pt engaged in conversation with SLP re home safety - pt identified potential barriers to safety at home with no difficulty.        SLP Treatment: Swallowing   Treatment Detail  Pt completed Bedside Swallow Evaluation to determine tolerance of current diet. PO intake single sips TL via cup x6, consecutive sips TL via cup x2, tsp bites puree x8, tsp bites dental soft x6, and small, single bites regular consistency x8. No overt s/s aspiration noted across consistencies. Laryngeal " lift noted on palpation and vocal quality clear following PO intake. Cont REGULAR/THIN diet. PO meds with TL - large pills PO with apple sauce per pt request.        SLP Treatment: Verbal Expression   Treatment Detail (SLP Verbal Expression) Pt completed concrete divergent naming task to improve word finding - pt completed task naming holidatyts x18 under one minute. To increase difficulty, pt completed task with abstract items naming items that are cold x15, hot x11, green x16, and soft x11 each under one minute.    Assessment   SLP Diagnosis mild-moderate cog-ling deficits   Prognosis Good   Problem List decreased memory   Session Assessment progressing toward goals   Level of Motivation/Participation good   Recommendations   Solid Diet Level Regular   Liquid Diet Level Thin   Short Term Goals   Goal 1 Pt will tolerate least restrictive diet with no overt s/s aspiration to ensure safety/tolerance of REGULAR/THIN diet.    Long Term Goals   Goal 1 .Pt will tolerate least restrictive diet with no overt s/s aspiration to ensure safety/tolerance of REGULAR/THIN diet.    Discharge Recommendations   Discharge Facility/Level Of Care Needs home health speech therapy   Plan   Plan Continue with current plan   Therapy Frequency Monday-Friday   SLP Follow-up   SLP Follow-up? Yes   Treatment/Billable Minutes   Speech Therapy Individual 15   Eval Swallow and Oral Function 30   Total Time 45     FIM Scores  Comprehension:5  Expression: 5  Social Interaction:5  Problem Solvin  Memory: 2     Comprehension:  Complex= humor, finances, rationale for medical treatment(hip precautions, pressure relief)  Basic= pain, hunger, thirst, bathroom needs, cold, nutrition, sleep     Understands basic needs 90% of the time (5)     Expression:  Expresses basic needs or ideas 90% of the time  (5)     Social Interaction:  Interacts appropriately 90% of time - needs monitoring or encouragement for participation or interaction  (5)     Problem  Solving:  Solves basic problems 75-89% of the time  (4)     Memory:  Recognizes, recalls, or executes 25-49% of time 1 step of 2 step request  (2)     CHINEDU [id number]  8/19/2017

## 2017-08-19 NOTE — PROGRESS NOTES
[facility]  Physical Medicine & Rehab  Progress Note    Patient Name: Juan Manuel Koehler  MRN: [id number]  Patient Class: IP- Rehab   Admission Date: 8/15/2017  Length of Stay: 4 days  Attending Physician: Verónica Lewis MD  Primary Care Provider: Hemant Sweeney MD    Subjective:     Principal Problem:Right-sided cerebrovascular accident (CVA)    Interval History: No acute events over night. Patient continues to endorse Nausea, but it has improved with phenergan. He last had an episode of emesis yesterday afternoon. He reports that his sleep continues to be poor as well, but was an issue premorbidly.       Scheduled Medications:    ampicillin IVPB  2 g Intravenous Q6H    aspirin  81 mg Oral Daily    carvedilol  12.5 mg Oral BID    cefTRIAXone (ROCEPHIN) IVPB  2 g Intravenous Q12H    heparin (porcine)  5,000 Units Subcutaneous Q8H    ondansetron  8 mg Oral TID AC    pantoprazole  40 mg Oral Daily    polyethylene glycol  17 g Oral Daily    trazodone  25 mg Oral QHS       PRN Medications: acetaminophen, bisacodyl, dextrose 50%, glucagon (human recombinant), ondansetron, promethazine    Review of Systems   Constitutional: Negative for unexpected weight change.        Vision stable w glasses  Hearing intact  Continent of bowel   HENT: Negative for congestion and ear pain.    Eyes: Negative for discharge.   Respiratory: Negative for shortness of breath.    Cardiovascular: Negative for chest pain.   Gastrointestinal: Positive for nausea. Negative for abdominal pain and vomiting.   Endocrine: Negative for cold intolerance.   Genitourinary: Negative for flank pain.   Neurological: Positive for weakness.   Psychiatric/Behavioral: Negative for hallucinations.     Objective:     Vital Signs (Most Recent):  Temp: 98.5 °F (36.9 °C) (08/19/17 0732)  Pulse: 76 (08/19/17 0732)  Resp: 18 (08/19/17 0732)  BP: 121/64 (08/19/17 0732)  SpO2: 95 % (08/19/17 0732)    Vital Signs (24h Range):  Temp:  [98.5  °F (36.9 °C)-98.8 °F (37.1 °C)] 98.5 °F (36.9 °C)  Pulse:  [76-78] 76  Resp:  [18] 18  SpO2:  [95 %-98 %] 95 %  BP: (119-121)/(56-64) 121/64     Physical Exam   Constitutional: He appears well-developed and well-nourished.   HENT:   Head: Normocephalic and atraumatic.   Eyes: EOM are normal.   Cardiovascular: Normal rate.    Pulmonary/Chest: Effort normal. No respiratory distress.   Abdominal: Soft. He exhibits no distension. There is no tenderness.   Genitourinary:   Genitourinary Comments: + urostomy   Musculoskeletal:   UE: SA, EF, EE,  5/5  LE: HF 5/5, KE 5/5, DF/PF 5/5  No calf tenderness    Neurological: He is alert.   Followed all commands  Memory 3/3 immediate, 2/3 delayed    Skin: Skin is warm.   Psychiatric: He has a normal mood and affect.   Vitals reviewed.    NEUROLOGICAL EXAMINATION:     CRANIAL NERVES     CN III, IV, VI   Extraocular motions are normal.       Assessment/Plan:      Juan Manuel Koehler is a 67 y.o. male admitted to inpatient rehabilitation on 8/15/2017 for Right-sided cerebrovascular accident (CVA) with impaired mobility and ADLs. Patient remains appropriate for PT, OT, and as required Speech therapy. Patient continues to require 24 hour nursing care as well as daily Physician assessment.    * Right-sided cerebrovascular accident (CVA)    - R  Cerebellar, embolic  - cont w ASA 81/statin  Tentative DC: 8/25, will need 24 hr sup/assistance       Bed mobility supervision  Sit>stand CGA, stand>sit min A  t/f squat pivot min A, t/f stand pivot min A  shower t/f min A, toilet t/f min A, car t/f min A  WC supervision 150  Amb min/mod A 42 (posterior LOB), stairs min A 4 steps    Feeding Ind, grooming CGA, bathing min A  UB dress setup, LB dress mod A  Toileting mod A    Comp/Exp :Supervision  Problem Solving:Min A  Memory: Max A    Cont to be limited by endurance, decreased balance, memory, insight         Bacteremia due to Enterococcus    Endocarditis treatment w IVAbx (ceftriaxone 2g  q12h, ampicillin 2g q6h) with end date 9/16 (6 week course); + R sided PICC     8/16 Cr 1.6, discussed w pharm. decreased Ampicillin to Q6  8/17 Cr 1.8, repeat BMP tomorrow  8/18 CR rising to 1.9. ELDER vs AIN from the Ampicillin.  Reviewed w pharmacy, and current dose of Amp is appropriate until CrCl is < 10    ----  Will hold lisinopril, and cont to trend BMP through wknd (not drawn Saturday). Given rise in BUN in setting on Nausea, intermittent emesis, and poor PO intake will give 1L IVF this evening (completed)   ----- Will also reach out to ID is there is another option for coverage     8/19 no further emesis since yesterday afternoon, continue to encourage fluids and repeat BMP to monitor BUN/Cr      BMP  Lab Results   Component Value Date     08/18/2017    K 4.0 08/18/2017     08/18/2017    CO2 21 (L) 08/18/2017    BUN 26 (H) 08/18/2017    CREATININE 1.9 (H) 08/18/2017    CALCIUM 9.2 08/18/2017    ANIONGAP 9 08/18/2017    ESTGFRAFRICA 41.0 (A) 08/18/2017    EGFRNONAA 35.4 (A) 08/18/2017             Hypertension      -cont with Coreg 12.5 BID, lisinopril 20mg QD, hydralazine PO PRN  - cont to monitor BP/HR, stable    8/18 Given rising Cr, will hold Lisinopril. Will consider adjusting Coreg/adding Amlodipine as needed for BP control.   8/19 stable        S/P ileal conduit    Hx of  Urothelial carcinoma of bladder   - Follow-up with urology as an outpatient with Dr. Robles and with oncology, Dr. Calero     8/18 Functional, Last UA 8/12, and that was negative         Nausea    - Has had frequent nausea with intermittent emesis since admission at [facility] [place]    Suspect secondary to IV Abx.     - 8/16 juaquin Zofran prior to meals and monitor, KUB was unremarkable  - 8/18 Added phenergan yesterday. Took first dose this AM, and thought nausea was a little better. No emesis today. Will add PPI, and cont to monitor closely.    8/19 one time episode of emesis yesterday per patient, but reports that the nausea has  improved with newly added phenergan.          History of urostomy    - wound care assisting with monitoring of site/mgmt         Impaired mobility and ADLs    Other Rehab Plan/Continue to monitor:   Nutrition/Swallow Status:    - Prealbumin - 14   - Nutritionist on board   Bladder Management: illeostomy  Bowel Management:  continent  - Miralax and Suppository PRN   - Will monitor for regularity   Mood: stable   Sleep: monitor; Failed Ramelteon. 8/17 trial trazadone   Skin: Monitor   DVT ppx:  Heparin                DISCHARGE PLANNING:  Tentative Discharge Date: 8/25/2017    Future Appointments  Date Time Provider Department Center   9/5/2017 10:40 AM Jewels Toth PA-C [facility] NEURO [facility]       Dread Ruiz MD  Department of Physical Medicine & Rehab   [facility]

## 2017-08-20 LAB
ANION GAP SERPL CALC-SCNC: 9 MMOL/L
BUN SERPL-MCNC: 25 MG/DL
CALCIUM SERPL-MCNC: 9 MG/DL
CHLORIDE SERPL-SCNC: 109 MMOL/L
CO2 SERPL-SCNC: 23 MMOL/L
CREAT SERPL-MCNC: 2.1 MG/DL
EST. GFR  (AFRICAN AMERICAN): 36.3 ML/MIN/1.73 M^2
EST. GFR  (NON AFRICAN AMERICAN): 31.4 ML/MIN/1.73 M^2
GLUCOSE SERPL-MCNC: 96 MG/DL
POTASSIUM SERPL-SCNC: 4.1 MMOL/L
SODIUM SERPL-SCNC: 141 MMOL/L

## 2017-08-20 PROCEDURE — 25000003 PHARM REV CODE 250: Performed by: PHYSICAL MEDICINE & REHABILITATION

## 2017-08-20 PROCEDURE — 80048 BASIC METABOLIC PNL TOTAL CA: CPT

## 2017-08-20 PROCEDURE — 63600175 PHARM REV CODE 636 W HCPCS: Performed by: PHYSICAL MEDICINE & REHABILITATION

## 2017-08-20 PROCEDURE — 12800000 HC REHAB SEMI-PRIVATE ROOM

## 2017-08-20 PROCEDURE — 99233 SBSQ HOSP IP/OBS HIGH 50: CPT | Mod: ,,, | Performed by: PHYSICAL MEDICINE & REHABILITATION

## 2017-08-20 PROCEDURE — 25000003 PHARM REV CODE 250: Performed by: PHYSICIAN ASSISTANT

## 2017-08-20 PROCEDURE — 63600175 PHARM REV CODE 636 W HCPCS: Performed by: PHYSICIAN ASSISTANT

## 2017-08-20 RX ORDER — SODIUM CHLORIDE 9 MG/ML
INJECTION, SOLUTION INTRAVENOUS CONTINUOUS
Status: ACTIVE | OUTPATIENT
Start: 2017-08-20 | End: 2017-08-21

## 2017-08-20 RX ADMIN — CARVEDILOL 12.5 MG: 12.5 TABLET, FILM COATED ORAL at 09:08

## 2017-08-20 RX ADMIN — ONDANSETRON 8 MG: 8 TABLET, ORALLY DISINTEGRATING ORAL at 04:08

## 2017-08-20 RX ADMIN — AMPICILLIN SODIUM 2 G: 2 INJECTION, POWDER, FOR SOLUTION INTRAMUSCULAR; INTRAVENOUS at 12:08

## 2017-08-20 RX ADMIN — TRAZODONE HYDROCHLORIDE 25 MG: 50 TABLET ORAL at 08:08

## 2017-08-20 RX ADMIN — ONDANSETRON 8 MG: 8 TABLET, ORALLY DISINTEGRATING ORAL at 08:08

## 2017-08-20 RX ADMIN — AMPICILLIN SODIUM 2 G: 2 INJECTION, POWDER, FOR SOLUTION INTRAMUSCULAR; INTRAVENOUS at 01:08

## 2017-08-20 RX ADMIN — AMPICILLIN SODIUM 2 G: 2 INJECTION, POWDER, FOR SOLUTION INTRAMUSCULAR; INTRAVENOUS at 07:08

## 2017-08-20 RX ADMIN — PANTOPRAZOLE SODIUM 40 MG: 40 TABLET, DELAYED RELEASE ORAL at 09:08

## 2017-08-20 RX ADMIN — HEPARIN SODIUM 5000 UNITS: 5000 INJECTION, SOLUTION INTRAVENOUS; SUBCUTANEOUS at 09:08

## 2017-08-20 RX ADMIN — HEPARIN SODIUM 5000 UNITS: 5000 INJECTION, SOLUTION INTRAVENOUS; SUBCUTANEOUS at 01:08

## 2017-08-20 RX ADMIN — CARVEDILOL 12.5 MG: 12.5 TABLET, FILM COATED ORAL at 08:08

## 2017-08-20 RX ADMIN — AMPICILLIN SODIUM 2 G: 2 INJECTION, POWDER, FOR SOLUTION INTRAMUSCULAR; INTRAVENOUS at 06:08

## 2017-08-20 RX ADMIN — ASPIRIN 81 MG: 81 TABLET, COATED ORAL at 09:08

## 2017-08-20 RX ADMIN — HEPARIN SODIUM 5000 UNITS: 5000 INJECTION, SOLUTION INTRAVENOUS; SUBCUTANEOUS at 05:08

## 2017-08-20 RX ADMIN — CEFTRIAXONE 2 G: 2 INJECTION, SOLUTION INTRAVENOUS at 04:08

## 2017-08-20 RX ADMIN — SODIUM CHLORIDE: 0.9 INJECTION, SOLUTION INTRAVENOUS at 11:08

## 2017-08-20 RX ADMIN — ONDANSETRON 8 MG: 8 TABLET, ORALLY DISINTEGRATING ORAL at 05:08

## 2017-08-20 RX ADMIN — ONDANSETRON 8 MG: 8 TABLET, ORALLY DISINTEGRATING ORAL at 11:08

## 2017-08-20 RX ADMIN — PROMETHAZINE HYDROCHLORIDE 12.5 MG: 12.5 TABLET ORAL at 07:08

## 2017-08-20 RX ADMIN — CEFTRIAXONE 2 G: 2 INJECTION, SOLUTION INTRAVENOUS at 03:08

## 2017-08-20 NOTE — NURSING
Patient ostomy bag changed three times this shift due to leaking. Stoma moist and red with area around red but no c/o tenderness. Wife at bedside assisting with appliance change. Bag has been in place since 1400 with no leakage noted. Will continue to monitor and follow up as needed.  aware of issues with ostomy site. Safety precautions maintained.

## 2017-08-20 NOTE — SUBJECTIVE & OBJECTIVE
Interval History: No acute events over night. Patient continues to endorse Nausea, but it has improved with phenergan. He has had 2 episodes of emesis over the last 24 hours. Feeling better this morning, and would like to rest.       Scheduled Medications:    ampicillin IVPB  2 g Intravenous Q6H    aspirin  81 mg Oral Daily    carvedilol  12.5 mg Oral BID    cefTRIAXone (ROCEPHIN) IVPB  2 g Intravenous Q12H    heparin (porcine)  5,000 Units Subcutaneous Q8H    ondansetron  8 mg Oral TID AC    pantoprazole  40 mg Oral Daily    polyethylene glycol  17 g Oral Daily    trazodone  25 mg Oral QHS       PRN Medications: acetaminophen, bisacodyl, dextrose 50%, glucagon (human recombinant), ondansetron, promethazine    Review of Systems   Constitutional: Negative for unexpected weight change.        Vision stable w glasses  Hearing intact  Continent of bowel   HENT: Negative for congestion and ear pain.    Eyes: Negative for discharge.   Respiratory: Negative for shortness of breath.    Cardiovascular: Negative for chest pain.   Gastrointestinal: Positive for nausea. Negative for abdominal pain and vomiting.   Endocrine: Negative for cold intolerance.   Genitourinary: Negative for flank pain.   Neurological: Positive for weakness.   Psychiatric/Behavioral: Negative for hallucinations.     Objective:     Vital Signs (Most Recent):  Temp: 97.6 °F (36.4 °C) (08/20/17 0703)  Pulse: 85 (08/20/17 0703)  Resp: 18 (08/20/17 0703)  BP: 121/70 (08/20/17 0703)  SpO2: 98 % (08/20/17 0703)    Vital Signs (24h Range):  Temp:  [97.6 °F (36.4 °C)-97.7 °F (36.5 °C)] 97.6 °F (36.4 °C)  Pulse:  [68-85] 85  Resp:  [16-18] 18  SpO2:  [97 %-98 %] 98 %  BP: (118-121)/(66-70) 121/70     Physical Exam   Constitutional: He appears well-developed and well-nourished.   HENT:   Head: Normocephalic and atraumatic.   Eyes: EOM are normal.   Cardiovascular: Normal rate.    Pulmonary/Chest: Effort normal. No respiratory distress.   Abdominal: Soft.  He exhibits no distension. There is no tenderness.   Genitourinary:   Genitourinary Comments: + urostomy   Musculoskeletal:   UE: SA, EF, EE,  5/5  LE: HF 5/5, KE 5/5, DF/PF 5/5  No calf tenderness    Neurological: He is alert.   Followed all commands  Memory 3/3 immediate, 2/3 delayed    Skin: Skin is warm.   Psychiatric: He has a normal mood and affect.   Vitals reviewed.    NEUROLOGICAL EXAMINATION:     CRANIAL NERVES     CN III, IV, VI   Extraocular motions are normal.

## 2017-08-20 NOTE — PROGRESS NOTES
[facility]  Physical Medicine & Rehab  Progress Note    Patient Name: Juan Manuel Koehler  MRN: [id number]  Patient Class: IP- Rehab   Admission Date: 8/15/2017  Length of Stay: 5 days  Attending Physician: Verónica Lewis MD  Primary Care Provider: Hemant Sweeney MD    Subjective:     Principal Problem:Right-sided cerebrovascular accident (CVA)    Interval History: No acute events over night. Patient continues to endorse Nausea, but it has improved with phenergan. He has had 2 episodes of emesis over the last 24 hours. Feeling better this morning, and would like to rest.       Scheduled Medications:    ampicillin IVPB  2 g Intravenous Q6H    aspirin  81 mg Oral Daily    carvedilol  12.5 mg Oral BID    cefTRIAXone (ROCEPHIN) IVPB  2 g Intravenous Q12H    heparin (porcine)  5,000 Units Subcutaneous Q8H    ondansetron  8 mg Oral TID AC    pantoprazole  40 mg Oral Daily    polyethylene glycol  17 g Oral Daily    trazodone  25 mg Oral QHS       PRN Medications: acetaminophen, bisacodyl, dextrose 50%, glucagon (human recombinant), ondansetron, promethazine    Review of Systems   Constitutional: Negative for unexpected weight change.        Vision stable w glasses  Hearing intact  Continent of bowel   HENT: Negative for congestion and ear pain.    Eyes: Negative for discharge.   Respiratory: Negative for shortness of breath.    Cardiovascular: Negative for chest pain.   Gastrointestinal: Positive for nausea. Negative for abdominal pain and vomiting.   Endocrine: Negative for cold intolerance.   Genitourinary: Negative for flank pain.   Neurological: Positive for weakness.   Psychiatric/Behavioral: Negative for hallucinations.     Objective:     Vital Signs (Most Recent):  Temp: 97.6 °F (36.4 °C) (08/20/17 0703)  Pulse: 85 (08/20/17 0703)  Resp: 18 (08/20/17 0703)  BP: 121/70 (08/20/17 0703)  SpO2: 98 % (08/20/17 0703)    Vital Signs (24h Range):  Temp:  [97.6 °F (36.4 °C)-97.7 °F (36.5 °C)]  97.6 °F (36.4 °C)  Pulse:  [68-85] 85  Resp:  [16-18] 18  SpO2:  [97 %-98 %] 98 %  BP: (118-121)/(66-70) 121/70     Physical Exam   Constitutional: He appears well-developed and well-nourished.   HENT:   Head: Normocephalic and atraumatic.   Eyes: EOM are normal.   Cardiovascular: Normal rate.    Pulmonary/Chest: Effort normal. No respiratory distress.   Abdominal: Soft. He exhibits no distension. There is no tenderness.   Genitourinary:   Genitourinary Comments: + urostomy   Musculoskeletal:   UE: SA, EF, EE,  5/5  LE: HF 5/5, KE 5/5, DF/PF 5/5  No calf tenderness    Neurological: He is alert.   Followed all commands  Memory 3/3 immediate, 2/3 delayed    Skin: Skin is warm.   Psychiatric: He has a normal mood and affect.   Vitals reviewed.    NEUROLOGICAL EXAMINATION:     CRANIAL NERVES     CN III, IV, VI   Extraocular motions are normal.       Assessment/Plan:      Juan Manuel Koehler is a 67 y.o. male admitted to inpatient rehabilitation on 8/15/2017 for Right-sided cerebrovascular accident (CVA) with impaired mobility and ADLs. Patient remains appropriate for PT, OT, and as required Speech therapy. Patient continues to require 24 hour nursing care as well as daily Physician assessment.    * Right-sided cerebrovascular accident (CVA)    - R  Cerebellar, embolic  - cont w ASA 81/statin  Tentative DC: 8/25, will need 24 hr sup/assistance       Bed mobility supervision  Sit>stand CGA, stand>sit min A  t/f squat pivot min A, t/f stand pivot min A  shower t/f min A, toilet t/f min A, car t/f min A  WC supervision 150  Amb min/mod A 42 (posterior LOB), stairs min A 4 steps    Feeding Ind, grooming CGA, bathing min A  UB dress setup, LB dress mod A  Toileting mod A    Comp/Exp :Supervision  Problem Solving:Min A  Memory: Max A    Cont to be limited by endurance, decreased balance, memory, insight         Bacteremia due to Enterococcus    Endocarditis treatment w IVAbx (ceftriaxone 2g q12h, ampicillin 2g q6h) with  end date 9/16 (6 week course); + R sided PICC     8/16 Cr 1.6, discussed w pharm. decreased Ampicillin to Q6  8/17 Cr 1.8, repeat BMP tomorrow  8/18 CR rising to 1.9. ELDER vs AIN from the Ampicillin.  Reviewed w pharmacy, and current dose of Amp is appropriate until CrCl is < 10    ----  Will hold lisinopril, and cont to trend BMP through wknd (not drawn Saturday). Given rise in BUN in setting on Nausea, intermittent emesis, and poor PO intake will give 1L IVF this evening (completed)   ----- Will also reach out to ID is there is another option for coverage     8/19 no further emesis since yesterday afternoon, continue to encourage fluids and repeat BMP to monitor BUN/Cr    8/20 BUN/Cr 25/2.1, will give an additional 1L of NS and follow up BMP tomorrow      BMP  Lab Results   Component Value Date     08/20/2017    K 4.1 08/20/2017     08/20/2017    CO2 23 08/20/2017    BUN 25 (H) 08/20/2017    CREATININE 2.1 (H) 08/20/2017    CALCIUM 9.0 08/20/2017    ANIONGAP 9 08/20/2017    ESTGFRAFRICA 36.3 (A) 08/20/2017    EGFRNONAA 31.4 (A) 08/20/2017             Hypertension      -cont with Coreg 12.5 BID, lisinopril 20mg QD, hydralazine PO PRN  - cont to monitor BP/HR, stable    8/18 Given rising Cr, will hold Lisinopril. Will consider adjusting Coreg/adding Amlodipine as needed for BP control.   8/19 stable        S/P ileal conduit    Hx of  Urothelial carcinoma of bladder   - Follow-up with urology as an outpatient with Dr. Robles and with oncology, Dr. Calero     8/18 Functional, Last UA 8/12, and that was negative         Nausea    - Has had frequent nausea with intermittent emesis since admission at [facility] [place]    Suspect secondary to IV Abx.     - 8/16 juaquin Zofran prior to meals and monitor, KUB was unremarkable  - 8/18 Added phenergan yesterday. Took first dose this AM, and thought nausea was a little better. No emesis today. Will add PPI, and cont to monitor closely.    8/19 one time episode of emesis yesterday  per patient, but reports that the nausea has improved with newly added phenergan.          History of urostomy    - wound care assisting with monitoring of site/mgmt         Impaired mobility and ADLs    Other Rehab Plan/Continue to monitor:   Nutrition/Swallow Status:    - Prealbumin - 14   - Nutritionist on board   Bladder Management: illeostomy  Bowel Management:  continent  - Miralax and Suppository PRN   - Will monitor for regularity   Mood: stable   Sleep: monitor; Failed Ramelteon. 8/17 trial trazadone   Skin: Monitor   DVT ppx:  Heparin                DISCHARGE PLANNING:  Tentative Discharge Date: 8/25/2017    Future Appointments  Date Time Provider Department Center   9/5/2017 10:40 AM Jewels Toth PA-C [facility] NEURO [facility]       Dread Ruiz MD  Department of Physical Medicine & Rehab   [facility]

## 2017-08-20 NOTE — PLAN OF CARE
Problem: Patient Care Overview  Goal: Plan of Care Review  Outcome: Ongoing (interventions implemented as appropriate)  Pressure Ulcer Risk: Encouraged patient to turn throughout the shift to help minimize his risk of pressure ulcer. Encouraged patient to call for staff assistance if he needs assistance to turn . Patient verbalized understanding. Will maintain safety precautions and follow up as needed.

## 2017-08-20 NOTE — NURSING
Called to bedside for c/o urostomy site leaking. Seal compromised and leaking clear yellow urine. Wound care orders followed for change. Patient tolerated dressing change. Safety precautions maintained. Will follow up as needed and report to oncoming night shift.

## 2017-08-21 LAB
ANION GAP SERPL CALC-SCNC: 7 MMOL/L
BASOPHILS # BLD AUTO: 0.04 K/UL
BASOPHILS NFR BLD: 0.5 %
BUN SERPL-MCNC: 22 MG/DL
CALCIUM SERPL-MCNC: 9.1 MG/DL
CHLORIDE SERPL-SCNC: 109 MMOL/L
CO2 SERPL-SCNC: 25 MMOL/L
CREAT SERPL-MCNC: 2 MG/DL
DIFFERENTIAL METHOD: ABNORMAL
EOSINOPHIL # BLD AUTO: 0.1 K/UL
EOSINOPHIL NFR BLD: 1 %
ERYTHROCYTE [DISTWIDTH] IN BLOOD BY AUTOMATED COUNT: 15.9 %
EST. GFR  (AFRICAN AMERICAN): 38.5 ML/MIN/1.73 M^2
EST. GFR  (NON AFRICAN AMERICAN): 33.3 ML/MIN/1.73 M^2
GLUCOSE SERPL-MCNC: 91 MG/DL
HCT VFR BLD AUTO: 24.9 %
HGB BLD-MCNC: 7.9 G/DL
LYMPHOCYTES # BLD AUTO: 1.2 K/UL
LYMPHOCYTES NFR BLD: 14.5 %
MCH RBC QN AUTO: 29 PG
MCHC RBC AUTO-ENTMCNC: 31.7 G/DL
MCV RBC AUTO: 92 FL
MONOCYTES # BLD AUTO: 0.7 K/UL
MONOCYTES NFR BLD: 8 %
NEUTROPHILS # BLD AUTO: 6.2 K/UL
NEUTROPHILS NFR BLD: 76 %
PLATELET # BLD AUTO: 261 K/UL
PMV BLD AUTO: 10.3 FL
POTASSIUM SERPL-SCNC: 3.7 MMOL/L
RBC # BLD AUTO: 2.72 M/UL
SODIUM SERPL-SCNC: 141 MMOL/L
WBC # BLD AUTO: 8.14 K/UL

## 2017-08-21 PROCEDURE — 85025 COMPLETE CBC W/AUTO DIFF WBC: CPT

## 2017-08-21 PROCEDURE — 97110 THERAPEUTIC EXERCISES: CPT

## 2017-08-21 PROCEDURE — 97535 SELF CARE MNGMENT TRAINING: CPT

## 2017-08-21 PROCEDURE — 97530 THERAPEUTIC ACTIVITIES: CPT

## 2017-08-21 PROCEDURE — 63600175 PHARM REV CODE 636 W HCPCS: Performed by: PHYSICIAN ASSISTANT

## 2017-08-21 PROCEDURE — 63600175 PHARM REV CODE 636 W HCPCS: Performed by: PHYSICAL MEDICINE & REHABILITATION

## 2017-08-21 PROCEDURE — 36415 COLL VENOUS BLD VENIPUNCTURE: CPT

## 2017-08-21 PROCEDURE — 25000003 PHARM REV CODE 250: Performed by: PHYSICAL MEDICINE & REHABILITATION

## 2017-08-21 PROCEDURE — 80048 BASIC METABOLIC PNL TOTAL CA: CPT

## 2017-08-21 PROCEDURE — 99233 SBSQ HOSP IP/OBS HIGH 50: CPT | Mod: ,,, | Performed by: PHYSICAL MEDICINE & REHABILITATION

## 2017-08-21 PROCEDURE — 25000003 PHARM REV CODE 250: Performed by: PHYSICIAN ASSISTANT

## 2017-08-21 PROCEDURE — 12800000 HC REHAB SEMI-PRIVATE ROOM

## 2017-08-21 RX ORDER — TRAZODONE HYDROCHLORIDE 50 MG/1
50 TABLET ORAL NIGHTLY
Status: DISCONTINUED | OUTPATIENT
Start: 2017-08-21 | End: 2017-08-29 | Stop reason: HOSPADM

## 2017-08-21 RX ORDER — DRONABINOL 2.5 MG/1
2.5 CAPSULE ORAL
Status: DISCONTINUED | OUTPATIENT
Start: 2017-08-21 | End: 2017-08-22

## 2017-08-21 RX ORDER — DRONABINOL 2.5 MG/1
2.5 CAPSULE ORAL ONCE
Status: COMPLETED | OUTPATIENT
Start: 2017-08-21 | End: 2017-08-21

## 2017-08-21 RX ORDER — DRONABINOL 2.5 MG/1
2.5 CAPSULE ORAL 2 TIMES DAILY
Status: DISCONTINUED | OUTPATIENT
Start: 2017-08-21 | End: 2017-08-21

## 2017-08-21 RX ORDER — SODIUM CHLORIDE 9 MG/ML
INJECTION, SOLUTION INTRAVENOUS CONTINUOUS
Status: ACTIVE | OUTPATIENT
Start: 2017-08-21 | End: 2017-08-22

## 2017-08-21 RX ADMIN — PROMETHAZINE HYDROCHLORIDE 12.5 MG: 12.5 TABLET ORAL at 07:08

## 2017-08-21 RX ADMIN — HEPARIN SODIUM 5000 UNITS: 5000 INJECTION, SOLUTION INTRAVENOUS; SUBCUTANEOUS at 08:08

## 2017-08-21 RX ADMIN — DRONABINOL 2.5 MG: 2.5 CAPSULE ORAL at 09:08

## 2017-08-21 RX ADMIN — DRONABINOL 2.5 MG: 2.5 CAPSULE ORAL at 05:08

## 2017-08-21 RX ADMIN — AMPICILLIN SODIUM 2 G: 2 INJECTION, POWDER, FOR SOLUTION INTRAMUSCULAR; INTRAVENOUS at 12:08

## 2017-08-21 RX ADMIN — CARVEDILOL 12.5 MG: 12.5 TABLET, FILM COATED ORAL at 08:08

## 2017-08-21 RX ADMIN — ONDANSETRON 8 MG: 8 TABLET, ORALLY DISINTEGRATING ORAL at 06:08

## 2017-08-21 RX ADMIN — SODIUM CHLORIDE: 0.9 INJECTION, SOLUTION INTRAVENOUS at 05:08

## 2017-08-21 RX ADMIN — DRONABINOL 2.5 MG: 2.5 CAPSULE ORAL at 01:08

## 2017-08-21 RX ADMIN — AMPICILLIN SODIUM 2 G: 2 INJECTION, POWDER, FOR SOLUTION INTRAMUSCULAR; INTRAVENOUS at 06:08

## 2017-08-21 RX ADMIN — TRAZODONE HYDROCHLORIDE 50 MG: 50 TABLET ORAL at 08:08

## 2017-08-21 RX ADMIN — ONDANSETRON 8 MG: 8 TABLET, ORALLY DISINTEGRATING ORAL at 11:08

## 2017-08-21 RX ADMIN — CEFTRIAXONE 2 G: 2 INJECTION, SOLUTION INTRAVENOUS at 04:08

## 2017-08-21 RX ADMIN — HEPARIN SODIUM 5000 UNITS: 5000 INJECTION, SOLUTION INTRAVENOUS; SUBCUTANEOUS at 05:08

## 2017-08-21 RX ADMIN — AMPICILLIN SODIUM 2 G: 2 INJECTION, POWDER, FOR SOLUTION INTRAMUSCULAR; INTRAVENOUS at 05:08

## 2017-08-21 RX ADMIN — CEFTRIAXONE 2 G: 2 INJECTION, SOLUTION INTRAVENOUS at 03:08

## 2017-08-21 NOTE — ASSESSMENT & PLAN NOTE
- Has had frequent nausea with intermittent emesis since admission at [facility]    Suspect secondary to IV Abx.     - 8/16 juaquin Zofran prior to meals and monitor, KUB was unremarkable  - 8/18 Added phenergan yesterday. Took first dose this AM, and thought nausea was a little better. No emesis today. Will add PPI, and cont to monitor closely.    8/19 one time episode of emesis yesterday per patient, but reports that the nausea has improved with newly added phenergan.  8/21 nausea and emesis persists, will add on schedule marinol BID, await ID recs for antibiotics as they are likely contributing to nausea

## 2017-08-21 NOTE — PROGRESS NOTES
"Occupational Therapy   AM Treatment/Weekly Re-assessment    Juan Manuel Koehler   MRN: [id number]      08/21/17 0830   OT Time Calculation   OT Start Time 0830   OT Stop Time 0915   OT Total Time (min) 45 min   General   OT Date of Treatment 08/21/17   Patient Found (position) Supine in bed   Precautions   General Precautions aspiration;fall   Visual/Auditory (glasses)   Subjective   Patient states "I don't know if I can do this today"   Pain/Comfort   Pain Rating no pain   Bed Mobility   Supine to Sit Modified Independent   Transfers   Transfer yes   Sit to Stand   Sit <> Stand Assistance Contact Guard Assistance   Sit <> Stand Assistive Device Rolling Walker   Stand to Sit   Assistance Contact Guard Assistance   Assistive Device Rolling Walker   Bed to Chair   Bed <> Chair Technique Stand Pivot   Bed <> Chair Transfer Assistance Contact Guard Assistance   Bed <> Chair Assistive Device Rolling Walker   Toilet Transfer   Toilet Transfer Technique Stand Pivot   Toilet Transfer Assistance Contact Guard Assistance   Toilet Transfer Assistive Device Rolling Walker   UE Dressing   UE Dressing Level of Assistance Set-up Assistance   UE Dressing Where Assessed Wheelchair   UE Dressing Comments rest break after completing due to nausea   LE Dressing   LE Dressing  Level of Assistance Minimum assistance   LE Dressing Level of Assistance Minimum assistance  (slight assist to manage on L hip)   Sock Level of Assistance Supervision   LE Dressing Where Assessed Wheelchair   LE Dressing Comments frequent rest breaks due to nausea   Additional Activities   Additional Activities Other (Comment)   Additional Activities Comments  - Bilateral josemanuel x 5# x 20 reps x 5 sets on flat surface with extended rest breaks between sessions  - Rickshaw x 15# x 20 reps x 5 sets with extended rest breaks between sessions   Activity Tolerance   Activity Tolerance (limited by nausea)   Medical Staff Made Aware NSG   After Treatment   Patient Position " After Treatment Seated in wheelchair   Assessment   Prognosis Good   Assessment Pt re-assessed today and is making slow progress. Largest barrier remains nausea which pt reports has not improved since initial evaluation. Pt continues to demo decreased insight into current deficits and will need 24 hr S at discharge.   (Due to continued nausea, some goals downgraded as appropriate)   Level of Motivation/Participation fair   Short Term Goals   Time For Goal Achievement 7 days   Pt Will Perform Supine To Sit Modified Independently   Supine to Sit - Met/Not Met Not Met   Pt Will Perform Sit to Supine Modified Independently   Supine to Sit - Met/Not Met Not Met   Pt Will Transfer To Bedside Commode With contact guard assist   Transfer Bedside Commode -Met/Not Met Met   Pt Will Perform Bathing With contact guard assistance;On shower seat/tub bench   Bathing - Met/Not Met Not Met   Pt Will Perform UE Dressing With modified independence   UE Dressing - Met/Not Met Not Met   Pt Will Perform LE Dressing With minimal assistance   LE Dressing - Met/Not Met Met   Pt Will Perform Toileting With minimal assistance   Toileting-Met/Not Met Not Met   Long Term Goals   Pt Will Perform UE Dressing Independently  (revised to supervision)   Discharge Recommendations   Equipment Needed After Discharge bath bench;walker, rolling;wheelchair   Discharge Facility/Level Of Care Needs home health OT   Plan   Plan Continue with current plan   Therapy Frequency 2 times/day;Monday-Friday;Saturday or Sunday   Occupational Therapy Follow-up   OT Follow-up? Yes   Treatment/Billable Minutes   Self Care/Home Management 15   Therapeutic Exercise 30   Total Time 45     PREETHI Cooper   8/21/2017

## 2017-08-21 NOTE — NURSING
"Patient vomited twice during this shift. Dr. Lewis was informed and new nausea/vomitting meds ordered. Patient did not have any more nausea or vomiting after marinol was given. Patient still stated "he did not feel good but at least he did not vomit any more."  "

## 2017-08-21 NOTE — PROGRESS NOTES
Physical Therapy   Reassessment FIM PT    Juan Manuel Koehler   MRN: [id number]        08/21/17 1025   Transfers   Bed/Chair/WC 4   Toilet 4   Locomotion   Distance Walked 2   Distance Wheelchair 2   Walk 2   Wheelchair 2   Mode B     Carola Burns, PT  8/21/2017

## 2017-08-21 NOTE — SUBJECTIVE & OBJECTIVE
Interval History: No acute events over night. Patient reports that he did have 2 episodes of emesis immediately following intake this morning. His nausea persists, but still in good spirits. Poor PO intake secondary to nausea. Also with some leaking issues regarding the urostomy bag. Last BM 8/20.       Scheduled Medications:    ampicillin IVPB  2 g Intravenous Q6H    aspirin  81 mg Oral Daily    carvedilol  12.5 mg Oral BID    cefTRIAXone (ROCEPHIN) IVPB  2 g Intravenous Q12H    dronabinol  2.5 mg Oral BID    heparin (porcine)  5,000 Units Subcutaneous Q8H    ondansetron  8 mg Oral TID AC    pantoprazole  40 mg Oral Daily    polyethylene glycol  17 g Oral Daily    trazodone  25 mg Oral QHS       PRN Medications: acetaminophen, bisacodyl, dextrose 50%, glucagon (human recombinant), ondansetron, promethazine    Review of Systems   Constitutional: Negative for unexpected weight change.        Vision stable w glasses  Hearing intact  Continent of bowel   HENT: Negative for congestion and ear pain.    Eyes: Negative for discharge.   Respiratory: Negative for shortness of breath.    Cardiovascular: Negative for chest pain.   Gastrointestinal: Positive for nausea and vomiting. Negative for abdominal pain.   Endocrine: Negative for cold intolerance.   Genitourinary: Negative for flank pain.   Neurological: Positive for weakness.   Psychiatric/Behavioral: Negative for dysphoric mood and hallucinations.     Objective:     Vital Signs (Most Recent):  Temp: 97.9 °F (36.6 °C) (08/21/17 0643)  Pulse: 78 (08/21/17 0643)  Resp: 18 (08/21/17 0643)  BP: (!) 143/74 (08/21/17 0643)  SpO2: 98 % (08/21/17 0643)    Vital Signs (24h Range):  Temp:  [97.7 °F (36.5 °C)-97.9 °F (36.6 °C)] 97.9 °F (36.6 °C)  Pulse:  [70-78] 78  Resp:  [18] 18  SpO2:  [98 %-99 %] 98 %  BP: (138-143)/(65-74) 143/74     Physical Exam   Constitutional: He appears well-developed and well-nourished.   HENT:   Head: Normocephalic and atraumatic.   Eyes:  EOM are normal.   Cardiovascular: Normal rate.    Pulmonary/Chest: Effort normal. No respiratory distress.   Abdominal: Soft. He exhibits no distension. There is no tenderness.   Genitourinary:   Genitourinary Comments: + urostomy   Musculoskeletal:   UE: SA, EF, EE,  5/5  LE: HF 5/5, KE 5/5, DF/PF 5/5  No calf tenderness    Neurological: He is alert.   Followed all commands  Memory 3/3 immediate, 2/3 delayed    Skin: Skin is warm.   Psychiatric: He has a normal mood and affect.   Vitals reviewed.    NEUROLOGICAL EXAMINATION:     CRANIAL NERVES     CN III, IV, VI   Extraocular motions are normal.

## 2017-08-21 NOTE — PROGRESS NOTES
"Physical Therapy   PT Daily Note    Juan Manuel Koehler   MRN: [id number]        08/21/17 1543   PT Time Calculation   PT Start Time 1543   PT Stop Time 1624   PT Total Time (min) 41 min   Treatment   Treatment Type Treatment   PT/PTA PT   General   PT Received On 08/21/17   Patient Found (position) Supine in bed   Precautions   General Precautions aspiration;fall   Subjective   Patient states "I'm not sure how much I will be able to do."    Pain/Comfort   Pain Rating 1 no pain   Other Activities   Comments Pt participated in 20 minute fall risk/prevention conversation. PT and pt reviewed safety techniques, energy conservation and work simplification for home accessibility and safe mobility. Pt reviewed with a fall prevention checklist and discussed its application in the bedroom, bathroom, living room, kitchen, hallway, stairs and outside. PT also reviewed with the group personal risk factors to consider in preventing falls. Patient reported increased awareness of fall risk behaviors and prevention of falls at end of session.     Pt demonstrated good understanding of pressure relief techniques by verbalizing two different ways to relieve pressure- lateral weight shifts and standing up. Pt stated that he would not get up without assistance due to his fall risk.     Pt was able to tell PT the steps to prepare his WC for standing including the following steps- locking brakes, swing away leg rests, and doffing safety belt.     Pt was also educated on the procedure for returning to driving which includes having the doctor's permission prior to returning to driving.     Pt performed the following supine therapeutic exercises in order to increase strength and educate the patient on other activities that he could perform during his time not in therapy:   3 x10 reps of the following- ankle pumps, heel slides, SLR, and glute sets.      After Treatment   Patient Position After Treatment Supine in bed   Patient after treatment " left call button in reach   Assessment   Prognosis Good   Problem List Impaired balance;Decreased endurance;Decreased strength;Decreased coordination  (nausea)   Session Assessment progressing toward goals   Assessment Pt continues to be limited by nausea. Pt did demonstrate good understanding of safety awareness and ways to decrease risk of falls and skin breakdown. Pt would benefit from cont PT in order to address goals.    Level of Motivation/Participation fair   Barriers to Discharge Inaccessible home environment;Decreased caregiver support   Physical Therapy Follow-up   PT Follow-up? Yes   PT - Next Visit Date 08/22/17   Treatment/Billable Minutes   Therapeutic Activity 26   Therapeutic Exercise 15   Total Time 41     Carola Burns, PT  8/21/2017

## 2017-08-21 NOTE — PLAN OF CARE
Problem: Patient Care Overview  Goal: Plan of Care Review  Outcome: Ongoing (interventions implemented as appropriate)  Pt remained free from falls, injury and trauma throughout shift. Pt AAO x 4. Bed in lowest position and wheels locked. Pt instructed to call for assistance. Hourly rounds to monitor pt for safety and comfort. Personal items and call bell within reach. PICC line intact, NS fluids infusing. Blood glucose monitored.  Urostomy patent and draining. Pt denies pain. No signs or symptoms of distress.  Will continue to monitor pt.

## 2017-08-21 NOTE — PROGRESS NOTES
"Physical Therapy   Reassessment PT Daily     Juan Manuel Koehler   MRN: [id number]      08/21/17 1025   PT Time Calculation   PT Start Time 1025   PT Stop Time 1051   PT Total Time (min) 26 min   Treatment   Treatment Type Treatment   PT/PTA PT   General   PT Received On 08/21/17   Patient Found (position) Seated in wheelchair   Pt found with (bucket)   Precautions   General Precautions aspiration;fall   Subjective   Patient states " wanted me to tell you that if I don't feel well, I can go back to my room." C/o nausea   Bed Mobility   Bed Mobility yes   Rolling/Turning to Left Supervision   Rolling/Turning Right Supervision   Supine to Sit Supervision   Sit to Supine Supervision   Transfers   Transfer yes   Sit to Stand   Sit <> Stand Assistance Contact Guard Assistance   Sit <> Stand Assistive Device Rolling Walker   Trials/Comments Pt performed multiple trirals throughout treatment session with CGA    Stand to Sit   Assistance Contact Guard Assistance   Assistive Device Rolling Walker   Trials/Comments Pt performed multiple trirals throughout treatment session with CGA    Chair to Bed   Technique Stand Pivot   Assistance Contact Guard Assistance   Assistive Device Rolling Walker   Trials/Comments x1 trial; pt required CGA in order to perform stand pivot.    Wheelchair Activities   Propulsion Yes   Propulsion Type 1 Manual   Level 1 Level tile   Method 1 Right upper extremity;Left upper extremity   Level of Assistance 1 Supervision   Description/ Details 1 Pt propelled self 105' with BUE. Pt terminated trial due to nausea    Gait   Gait Yes   Gait 1   Surface 1 Level tile   Gait Assistive Device Rolling walker   Assistance 1 Contact Guard Assistance   Gait Distance Pt ambulated 81' with RW CGA. Pt had minor scissoring of gait. Pt terminated trial due to nausea.    Gait Pattern swing-through gait   Gait Deviation(s) decreased step length;decreased stride length;decreased pedro pablo;increased time in double stance " "  Impairments Contributing to Gait Deviations impaired balance;impaired coordination   Stairs/Curb Step   Rails 1 None (Comment)   Device 1 Rolling Walker   Assistance 1 Contact guard   Comment/# Steps 1 Pt negotiated a 4" curb with RW with CGA requiring min VC for sequencing of task.    Stairs/ Curb Step  2   Comment/# Steps 2 Pt refused stairs secondary to nausea.    Other Activities   Comments Pt performed a toilet transfer to a 3-1 Commode with CGA stand pivot with RW.    Activity Tolerance   Activity Tolerance Other (Comment)  (limited by nausea)   Other Comments   Comments Patient c/o nausea throughout entire PT session. Pt was brought into room and assisted into bed. After performing bed mobiltiy, pt vomited into pink bucket. Nurse Janie was notified. Reassessment was terminated due to n/v.    After Treatment   Patient Position After Treatment Supine in bed   Patient after treatment left call button in reach   Assessment   Prognosis Good   Problem List Impaired balance;Decreased mobility;Decreased coordination  (nausea)   Session Assessment progressing toward goals   Assessment Pt is progressing toward goals, however, nausea is limiting full participation in physical therapy. The treatment was terminated due to patient vomiting. Nurse Janie was notified. Pt is requiring less assistance for functional mobility evident by decreased scissoring and posterior LOB. Pt would continue to benefit from skilled therapy in order to improve functional mobility and reduce burden on caregiver. Reassessment was not completed due to n/v; reassessment will continue tomorrow 8/22/17.    Level of Motivation/Participation good   Barriers to Discharge Decreased caregiver support;Inaccessible home environment   Short Term Goals   Additional Documentation yes   Supine to Sit - Progress supervision    Supine to Sit-Met/Not Met Not Met   Supine to Sit - Progress supervision    Sit to Supine - Met/Not Met Not Met   Transfer " Bed/Chair - Progress CGA    Transfer Bed/Chair - Met/Not Met Not Met   Ambulate - Progress CGA    Ambulate - Met/Not Met Not met   Go Up/Down Stairs - Met/Not Met Not met   Demo Pressure Relief - Met/Not met Not met   Discharge Recommendations   Equipment Needed After Discharge 3-in-1 commode;walker, rolling   Plan   Planned Therapy Intervention Continue with current plan   Therapy Frequency daily;Monday-Friday;Saturday or Sunday   Physical Therapy Follow-up   PT Follow-up? Yes   PT - Next Visit Date 08/22/17   Treatment/Billable Minutes   Therapeutic Activity 26   Total Time 26     Carola Burns, PT  8/21/2017

## 2017-08-21 NOTE — PROGRESS NOTES
MISSED PT VISIT    ____Mr Green________ missed  ____45___ minutes of ___transfer training group_____ therapy today due to _____nausea and not feeling well_____. Will attempt to make up this missed time as soon as possible.   Treating physician has been made aware.     Eusebia Addison, PT  8/21/2017

## 2017-08-21 NOTE — ASSESSMENT & PLAN NOTE
- R  Cerebellar, embolic  - cont w ASA 81/statin  Tentative DC: 8/25, will need 24 hr sup/assistance       Bed mobility supervision  Sit>stand CGA, stand>sit CGA  t/f squat pivot min A, t/f stand pivot CGA  shower t/f min A, toilet t/f min A, car t/f min A  WC supervision 150  Amb 'x2,  stairs CGA x12 steps    Feeding Ind, grooming CGA, bathing min A  UB dress setup, LB dress supervision  Toileting mod A    Comprehension:supervision  Expression: supervision  Social Interaction:supervision  Problem Solving:min A  Memory: max A    Cont to be limited by endurance, decreased balance, memory, insight , and nausea/emesis

## 2017-08-21 NOTE — ASSESSMENT & PLAN NOTE
Hx of  Urothelial carcinoma of bladder   - Follow-up with urology as an outpatient with Dr. Robles and with oncology, Dr. Calero     8/18 Functional, Last UA 8/12, and that was negative   8/21 mild issues with leaking from the urostomy site, now controlled

## 2017-08-21 NOTE — PROGRESS NOTES
Occupational Therapy   FIM Scores    Juan Manuel Koehler   MRN: [id number]      08/21/17 0930   Transfers   Bed/Chair/WC 4   Toilet 4   Self Care   Dressing-Upper 5   Dressing-Lower 4     PREETHI Cooper   8/21/2017

## 2017-08-21 NOTE — PROGRESS NOTES
[facility]  Physical Medicine & Rehab  Progress Note    Patient Name: Juan Manuel Koehler  MRN: [id number]  Patient Class: IP- Rehab   Admission Date: 8/15/2017  Length of Stay: 6 days  Attending Physician: Verónica Lewis MD  Primary Care Provider: Hemant Sweeney MD    Subjective:     Principal Problem:Right-sided cerebrovascular accident (CVA)    Interval History: No acute events over night. Patient reports that he did have 2 episodes of emesis immediately following intake this morning. His nausea persists, but still in good spirits. Poor PO intake secondary to nausea. Also with some leaking issues regarding the urostomy bag. Last BM 8/20.       Scheduled Medications:    ampicillin IVPB  2 g Intravenous Q6H    aspirin  81 mg Oral Daily    carvedilol  12.5 mg Oral BID    cefTRIAXone (ROCEPHIN) IVPB  2 g Intravenous Q12H    dronabinol  2.5 mg Oral BID    heparin (porcine)  5,000 Units Subcutaneous Q8H    ondansetron  8 mg Oral TID AC    pantoprazole  40 mg Oral Daily    polyethylene glycol  17 g Oral Daily    trazodone  25 mg Oral QHS       PRN Medications: acetaminophen, bisacodyl, dextrose 50%, glucagon (human recombinant), ondansetron, promethazine    Review of Systems   Constitutional: Negative for unexpected weight change.        Vision stable w glasses  Hearing intact  Continent of bowel   HENT: Negative for congestion and ear pain.    Eyes: Negative for discharge.   Respiratory: Negative for shortness of breath.    Cardiovascular: Negative for chest pain.   Gastrointestinal: Positive for nausea and vomiting. Negative for abdominal pain.   Endocrine: Negative for cold intolerance.   Genitourinary: Negative for flank pain.   Neurological: Positive for weakness.   Psychiatric/Behavioral: Negative for dysphoric mood and hallucinations.     Objective:     Vital Signs (Most Recent):  Temp: 97.9 °F (36.6 °C) (08/21/17 0643)  Pulse: 78 (08/21/17 0643)  Resp: 18 (08/21/17 0643)  BP:  (!) 143/74 (08/21/17 0643)  SpO2: 98 % (08/21/17 0643)    Vital Signs (24h Range):  Temp:  [97.7 °F (36.5 °C)-97.9 °F (36.6 °C)] 97.9 °F (36.6 °C)  Pulse:  [70-78] 78  Resp:  [18] 18  SpO2:  [98 %-99 %] 98 %  BP: (138-143)/(65-74) 143/74     Physical Exam   Constitutional: He appears well-developed and well-nourished.   HENT:   Head: Normocephalic and atraumatic.   Eyes: EOM are normal.   Cardiovascular: Normal rate.    Pulmonary/Chest: Effort normal. No respiratory distress.   Abdominal: Soft. He exhibits no distension. There is no tenderness.   Genitourinary:   Genitourinary Comments: + urostomy   Musculoskeletal:   UE: SA, EF, EE,  5/5  LE: HF 5/5, KE 5/5, DF/PF 5/5  No calf tenderness    Neurological: He is alert.   Followed all commands  Memory 3/3 immediate, 2/3 delayed    Skin: Skin is warm.   Psychiatric: He has a normal mood and affect.   Vitals reviewed.    NEUROLOGICAL EXAMINATION:     CRANIAL NERVES     CN III, IV, VI   Extraocular motions are normal.       Assessment/Plan:      Juan Manuel Koehler is a 67 y.o. male admitted to inpatient rehabilitation on 8/15/2017 for Right-sided cerebrovascular accident (CVA) with impaired mobility and ADLs. Patient remains appropriate for PT, OT, and as required Speech therapy. Patient continues to require 24 hour nursing care as well as daily Physician assessment.    * Right-sided cerebrovascular accident (CVA)    - R  Cerebellar, embolic  - cont w ASA 81/statin  Tentative DC: 8/25, will need 24 hr sup/assistance       Bed mobility supervision  Sit>stand CGA, stand>sit CGA  t/f squat pivot min A, t/f stand pivot CGA  shower t/f min A, toilet t/f min A, car t/f min A  WC supervision 150  Amb 'x2,  stairs CGA x12 steps    Feeding Ind, grooming CGA, bathing min A  UB dress setup, LB dress supervision  Toileting mod A    Comprehension:supervision  Expression: supervision  Social Interaction:supervision  Problem Solving:min A  Memory: max A    Cont to be  limited by endurance, decreased balance, memory, insight , and nausea/emesis        Bacteremia due to Enterococcus    Endocarditis treatment w IVAbx (ceftriaxone 2g q12h, ampicillin 2g q6h) with end date 9/16 (6 week course); + R sided PICC     8/16 Cr 1.6, discussed w pharm. decreased Ampicillin to Q6  8/17 Cr 1.8, repeat BMP tomorrow  8/18 CR rising to 1.9. ELDER vs AIN from the Ampicillin.  Reviewed w pharmacy, and current dose of Amp is appropriate until CrCl is < 10    ----  Will hold lisinopril, and cont to trend BMP through wknd (not drawn Saturday). Given rise in BUN in setting on Nausea, intermittent emesis, and poor PO intake will give 1L IVF this evening (completed)   ----- Will also reach out to ID is there is another option for coverage     8/19 no further emesis since yesterday afternoon, continue to encourage fluids and repeat BMP to monitor BUN/Cr    8/20 BUN/Cr 25/2.1, will give an additional 1L of NS and follow up BMP tomorrow  8/21 BUN/Cr 22/2.0, continue to encourage fluids and alleviate nausea/emesis - await recs from ID regarding antibiotic coverage      BMP  Lab Results   Component Value Date     08/21/2017    K 3.7 08/21/2017     08/21/2017    CO2 25 08/21/2017    BUN 22 08/21/2017    CREATININE 2.0 (H) 08/21/2017    CALCIUM 9.1 08/21/2017    ANIONGAP 7 (L) 08/21/2017    ESTGFRAFRICA 38.5 (A) 08/21/2017    EGFRNONAA 33.3 (A) 08/21/2017             Hypertension      -cont with Coreg 12.5 BID, lisinopril 20mg QD, hydralazine PO PRN  - cont to monitor BP/HR, stable    8/18 Given rising Cr, will hold Lisinopril. Will consider adjusting Coreg/adding Amlodipine as needed for BP control.   8/19 stable        S/P ileal conduit    Hx of  Urothelial carcinoma of bladder   - Follow-up with urology as an outpatient with Dr. Robles and with oncology, Dr. Calero     8/18 Functional, Last UA 8/12, and that was negative   8/21 mild issues with leaking from the urostomy site, now controlled         Nausea    - Has had frequent nausea with intermittent [place]esis [place] [facility] [place]    Suspect secondary to IV Abx.     - 8/16 juaquin Zofran prior to meals and monitor, KUB was unremarkable  - 8/18 Added phenergan yesterday. Took first dose this AM, and thought nausea was a little better. No emesis today. Will add PPI, and cont to monitor closely.    8/19 one time episode of emesis yesterday per patient, but reports that the nausea has improved with newly added phenergan.  8/21 nausea and emesis persists, will add on schedule marinol BID, await ID recs for antibiotics as they are likely contributing to nausea          History of urostomy    - wound care assisting with monitoring of site/mgmt         Impaired mobility and ADLs    Other Rehab Plan/Continue to monitor:   Nutrition/Swallow Status:    - Prealbumin - 14   - Nutritionist on board   Bladder Management: illeostomy  Bowel Management:  continent  - Miralax and Suppository PRN   - Will monitor for regularity   Mood: stable   Sleep: monitor; Failed Ramelteon. 8/17 trial trazadone   Skin: Monitor   DVT ppx:  Heparin                DISCHARGE PLANNING:  Tentative Discharge Date: 8/25/2017    Future Appointments  Date Time Provider Department Center   9/5/2017 10:40 AM Jewels Toth PA-C [facility] NEURO Damon tracy Ruiz MD  Department of Physical Medicine & Rehab   [facility]

## 2017-08-21 NOTE — PLAN OF CARE
Patient Care Overview    Infection, Risk/Actual  Goal - promote personal hygiene, utilize aseptic technique, monitor vital signs  Outcome - Educated patient on infection control.     Fall Risk  Goal - No falls during this shift  Outcome - Pt remains free from falls this shift.  Continuing to monitor.    Pressure Ulcer Risk  Goal - No new pressure ulcers formed  Outcome - Goal Met - Patient turned per protocol to decrease risk of forming pressure ulcer. No new skin breakdown noted.

## 2017-08-21 NOTE — ASSESSMENT & PLAN NOTE
Endocarditis treatment w IVAbx (ceftriaxone 2g q12h, ampicillin 2g q6h) with end date 9/16 (6 week course); + R sided PICC     8/16 Cr 1.6, discussed w pharm. decreased Ampicillin to Q6  8/17 Cr 1.8, repeat BMP tomorrow  8/18 CR rising to 1.9. ELDER vs AIN from the Ampicillin.  Reviewed w pharmacy, and current dose of Amp is appropriate until CrCl is < 10    ----  Will hold lisinopril, and cont to trend BMP through wknd (not drawn Saturday). Given rise in BUN in setting on Nausea, intermittent emesis, and poor PO intake will give 1L IVF this evening (completed)   ----- Will also reach out to ID is there is another option for coverage     8/19 no further emesis since yesterday afternoon, continue to encourage fluids and repeat BMP to monitor BUN/Cr    8/20 BUN/Cr 25/2.1, will give an additional 1L of NS and follow up BMP tomorrow  8/21 BUN/Cr 22/2.0, continue to encourage fluids and alleviate nausea/emesis - await recs from ID regarding antibiotic coverage      BMP  Lab Results   Component Value Date     08/21/2017    K 3.7 08/21/2017     08/21/2017    CO2 25 08/21/2017    BUN 22 08/21/2017    CREATININE 2.0 (H) 08/21/2017    CALCIUM 9.1 08/21/2017    ANIONGAP 7 (L) 08/21/2017    ESTGFRAFRICA 38.5 (A) 08/21/2017    EGFRNONAA 33.3 (A) 08/21/2017

## 2017-08-22 LAB
ANION GAP SERPL CALC-SCNC: 9 MMOL/L
BUN SERPL-MCNC: 17 MG/DL
CALCIUM SERPL-MCNC: 8.7 MG/DL
CHLORIDE SERPL-SCNC: 109 MMOL/L
CO2 SERPL-SCNC: 24 MMOL/L
CREAT SERPL-MCNC: 1.9 MG/DL
EST. GFR  (AFRICAN AMERICAN): 41 ML/MIN/1.73 M^2
EST. GFR  (NON AFRICAN AMERICAN): 35.4 ML/MIN/1.73 M^2
GLUCOSE SERPL-MCNC: 98 MG/DL
POTASSIUM SERPL-SCNC: 3.5 MMOL/L
SODIUM SERPL-SCNC: 142 MMOL/L

## 2017-08-22 PROCEDURE — 80048 BASIC METABOLIC PNL TOTAL CA: CPT

## 2017-08-22 PROCEDURE — 12800000 HC REHAB SEMI-PRIVATE ROOM

## 2017-08-22 PROCEDURE — 97110 THERAPEUTIC EXERCISES: CPT

## 2017-08-22 PROCEDURE — 25000003 PHARM REV CODE 250: Performed by: PHYSICAL MEDICINE & REHABILITATION

## 2017-08-22 PROCEDURE — 97530 THERAPEUTIC ACTIVITIES: CPT

## 2017-08-22 PROCEDURE — 92508 TX SP LANG VOICE COMM GROUP: CPT

## 2017-08-22 PROCEDURE — 99233 SBSQ HOSP IP/OBS HIGH 50: CPT | Mod: ,,, | Performed by: PHYSICAL MEDICINE & REHABILITATION

## 2017-08-22 PROCEDURE — 63600175 PHARM REV CODE 636 W HCPCS: Performed by: PHYSICAL MEDICINE & REHABILITATION

## 2017-08-22 PROCEDURE — 97112 NEUROMUSCULAR REEDUCATION: CPT

## 2017-08-22 PROCEDURE — 36415 COLL VENOUS BLD VENIPUNCTURE: CPT

## 2017-08-22 PROCEDURE — 97535 SELF CARE MNGMENT TRAINING: CPT

## 2017-08-22 PROCEDURE — 63600175 PHARM REV CODE 636 W HCPCS: Performed by: PHYSICIAN ASSISTANT

## 2017-08-22 RX ORDER — DRONABINOL 2.5 MG/1
5 CAPSULE ORAL
Status: DISCONTINUED | OUTPATIENT
Start: 2017-08-22 | End: 2017-08-29 | Stop reason: HOSPADM

## 2017-08-22 RX ORDER — CARVEDILOL 25 MG/1
25 TABLET ORAL 2 TIMES DAILY
Status: DISCONTINUED | OUTPATIENT
Start: 2017-08-22 | End: 2017-08-29 | Stop reason: HOSPADM

## 2017-08-22 RX ORDER — SODIUM CHLORIDE 9 MG/ML
INJECTION, SOLUTION INTRAVENOUS CONTINUOUS
Status: ACTIVE | OUTPATIENT
Start: 2017-08-22 | End: 2017-08-23

## 2017-08-22 RX ORDER — SCOLOPAMINE TRANSDERMAL SYSTEM 1 MG/1
1 PATCH, EXTENDED RELEASE TRANSDERMAL
Status: DISCONTINUED | OUTPATIENT
Start: 2017-08-22 | End: 2017-08-29 | Stop reason: HOSPADM

## 2017-08-22 RX ADMIN — TRAZODONE HYDROCHLORIDE 50 MG: 50 TABLET ORAL at 09:08

## 2017-08-22 RX ADMIN — SCOPALAMINE 1 PATCH: 1 PATCH, EXTENDED RELEASE TRANSDERMAL at 02:08

## 2017-08-22 RX ADMIN — AMPICILLIN SODIUM 2 G: 2 INJECTION, POWDER, FOR SOLUTION INTRAMUSCULAR; INTRAVENOUS at 12:08

## 2017-08-22 RX ADMIN — AMPICILLIN SODIUM 2 G: 2 INJECTION, POWDER, FOR SOLUTION INTRAMUSCULAR; INTRAVENOUS at 11:08

## 2017-08-22 RX ADMIN — CEFTRIAXONE 2 G: 2 INJECTION, SOLUTION INTRAVENOUS at 03:08

## 2017-08-22 RX ADMIN — DRONABINOL 5 MG: 2.5 CAPSULE ORAL at 05:08

## 2017-08-22 RX ADMIN — HEPARIN SODIUM 5000 UNITS: 5000 INJECTION, SOLUTION INTRAVENOUS; SUBCUTANEOUS at 03:08

## 2017-08-22 RX ADMIN — SODIUM CHLORIDE: 0.9 INJECTION, SOLUTION INTRAVENOUS at 05:08

## 2017-08-22 RX ADMIN — AMPICILLIN SODIUM 2 G: 2 INJECTION, POWDER, FOR SOLUTION INTRAMUSCULAR; INTRAVENOUS at 05:08

## 2017-08-22 RX ADMIN — CEFTRIAXONE 2 G: 2 INJECTION, SOLUTION INTRAVENOUS at 04:08

## 2017-08-22 RX ADMIN — HEPARIN SODIUM 5000 UNITS: 5000 INJECTION, SOLUTION INTRAVENOUS; SUBCUTANEOUS at 05:08

## 2017-08-22 RX ADMIN — CARVEDILOL 25 MG: 25 TABLET, FILM COATED ORAL at 09:08

## 2017-08-22 RX ADMIN — HEPARIN SODIUM 5000 UNITS: 5000 INJECTION, SOLUTION INTRAVENOUS; SUBCUTANEOUS at 09:08

## 2017-08-22 RX ADMIN — PROMETHAZINE HYDROCHLORIDE 12.5 MG: 12.5 TABLET ORAL at 09:08

## 2017-08-22 RX ADMIN — ONDANSETRON 8 MG: 8 TABLET, ORALLY DISINTEGRATING ORAL at 08:08

## 2017-08-22 RX ADMIN — DRONABINOL 5 MG: 2.5 CAPSULE ORAL at 11:08

## 2017-08-22 NOTE — NURSING
Therapy witnessed patient vomiting breakfast and informed RN. Patient vomited sprite and yogurt he tried to eat for breakfast. Marinol was offered but patient stated he wanted to wait

## 2017-08-22 NOTE — PROGRESS NOTES
"Physical Therapy   PT Daily Treatment    Juan Manuel Koehler   MRN: [id number]        08/22/17 1456   PT Time Calculation   PT Start Time 1456   PT Stop Time 1549   PT Total Time (min) 53 min   Treatment   Treatment Type Treatment   PT/PTA PT   General   PT Received On 08/22/17   Missed Time Reason Patient ill (Comment)  (n/v)   Family/Caregiver Present Yes   Patient Found (position) Supine in bed   Precautions   General Precautions aspiration;fall   Subjective   Patient states "You're back again? I don't think I will last very long."    Pain/Comfort   Pain Rating 1 no pain   Transfers   Transfer yes   Sit to Stand   Sit <> Stand Assistance Contact Guard Assistance   Sit <> Stand Assistive Device Rolling Walker   Trials/Comments Pt performed mulitple trials throughout PT treatment   Stand to Sit   Assistance Contact Guard Assistance   Assistive Device Rolling Walker   Trials/Comments Pt performed mulitple trials throughout PT treatment   Bed to Chair   Bed <> Chair Technique Squat Pivot   Bed <> Chair Assistance Contact Guard Assistance   Bed <> Chair Assistive Device No Assistive Device   Trials/Comments x1 trial    Chair to Bed   Technique Squat Pivot   Assistance Contact Guard Assistance   Assistive Device No Assistive Device   Trials/Comments x1 trial   Chair to Mat   Chair<> Mat Technique Squat Pivot   Chair<>Mat Assistance Contact Guard Assistance   Chair <> Mat Assistive Device No Assistive Device   Trials/Comments x1 trial reliance on UE support   Mat to Chair   Technique Squat Pivot   Assistance Contact Guard Assistance   Assistive Device No Assistive Device   Trials/Comments x1 trial reliance on UE support   Other Activities   Comments Pt is negative for B kimani-smith pike and L horizontal canalithiasis. Pt has a positive R horizontal canalithiasis, which was treated with a log roll.     Pt has positive bilateral head thrust test.    Smooth pursuits appear normal in all directions.    Activity Tolerance "   Activity Tolerance Patient tolerated treatment well;Other (Comment)  (limited by n/v )   Medical Staff Made Aware Nurse Janie   After Treatment   Patient Position After Treatment Supine in bed   Patient after treatment left call button in reach   Assessment   Prognosis Good   Problem List Decreased strength;Decreased endurance;Impaired balance;Decreased mobility;Decreased coordination   Session Assessment progressing toward goals   Assessment Pt continues to be limited by nausea. Pt tells PT when he feels his nausea increased. Throughout three treatments session, patient had three vomitting episodes in which the nurse was informed. Pt has a positive R horizontal canalithiasis which was treated with a log roll. Pt would benefit from IPR in order to address these deficits and continue to progress towards goal.    Level of Motivation/Participation good   Barriers to Discharge Decreased caregiver support   Plan   Planned Therapy Intervention Continue with current plan   Physical Therapy Follow-up   PT Follow-up? Yes   PT - Next Visit Date 08/23/17   Treatment/Billable Minutes   Therapeutic Activity 13   Neuromuscular Re-education 40   Total Time 53     Carola Burns, PT  8/22/2017

## 2017-08-22 NOTE — ASSESSMENT & PLAN NOTE
-cont with Coreg 12.5 BID, lisinopril 20mg QD, hydralazine PO PRN  - cont to monitor BP/HR, stable    8/18 Given rising Cr, will hold Lisinopril. Will consider adjusting Coreg/adding Amlodipine as needed for BP control.   8/22 stable

## 2017-08-22 NOTE — PROGRESS NOTES
Speech Therapy   Dysphagia Group/ Weekly Progress Note    Juan Manuel Koehler   MRN: [id number]       Short Term Goals   Goal 1 Pt will be oriented x4 indly with 100% acc. CONTINUE   Goal 2 Pt will complete complex language comprehension skills with modified independence with 95% acc. CONTINUE   Goal 3 Pt will complete complex language expression tasks with modified independence with 95% acc. CONTINUE   Goal 4 Pt will complete reasoning/problem solving/sequencing tasks given supervision with 90% acc. CONTINUE   Goal 5 Pt will participate in full dysphagia evaluation with POC to follow. GOAL MET   Long Term Goals   Goal 1 Pt will complete complex language comprehension skills with independence with 100% acc.    Goal 2 Pt will complete complex language expression tasks with independence with 100% acc.    Goal 3 Pt will complete reasoning/problem solving/sequencing tasks with modified independence with 95% acc.   Goal 4 Pt will complete functional/recent recall tasks given min verbal cues with 80% acc.    Goal 5 Pt will complete functional visual scanning tasks given supervision with 90% acc.           08/22/17 1015   Speech Time Calculation   Speech Start Time 1015   Speech Stop Time 1100   Speech Total (min) 45 min   General Information   SLP Treatment Date 08/22/17   General Observations Patient seen for dysphagia group session.    General Precautions aspiration;fall       SLP Treatment: Swallowing   Treatment Detail   Patient participated in a 45 minute swallowing exercise group.  The group activity challenged use/recall of swallowing exercises and oral/pharyngeal swallowing strengthening/range of motion.  The patient completed the following swallowing exercises with minimal cues: effortful swallows x20, Elysia x20, BOT strengthening exercise x20, pitch glides x20, falsetto /i/ x20, and tongue based retraction x20.   The patient recalled/demonstrated safe swallowing strategies with 70% given min assistance.  Patient  has increased strength/range of motion of oral/pharyngeal musculature for swallowing.  These activities were performed in a group setting to encourage increased participation with peers and social interaction.   Discharge Recommendations   Discharge Facility/Level Of Care Needs home health speech therapy   Plan   Plan Continue with current plan   SLP Follow-up   SLP Follow-up? Yes   Treatment/Billable Minutes   Treatment Swallowing Dysfunction 45   Total Time 45     FIM Scores  Comprehension:5  Expression: 5  Social Interaction:5  Problem Solvin  Memory: 2     Comprehension:  Complex= humor, finances, rationale for medical treatment(hip precautions, pressure relief)  Basic= pain, hunger, thirst, bathroom needs, cold, nutrition, sleep     Understands basic needs 90% of the time (5)     Expression:  Expresses basic needs or ideas 90% of the time  (5)     Social Interaction:  Interacts appropriately 90% of time - needs monitoring or encouragement for participation or interaction  (5)     Problem Solving:  Solves basic problems 75-89% of the time  (4)     Memory:  Recognizes, recalls, or executes 25-49% of time 1 step of 2 step request  (2)    CHINEDU Olivares-SLP  2017

## 2017-08-22 NOTE — PROGRESS NOTES
Wound/ostomy follow-up. The manjit-stomal skin is slightly red/blanches.  The stoma is more oval shaped this week 35mm @ 10 & 4:00, 30 mm otherwise.  Wife at bedside and assisted with pouch change,  Patient is uninterested in ostomy care.  Pouch intact.  Plan of care discussed with patient/family who verbalized understanding.       08/22/17 1700       Urostomy 08/02/17 2345 ileal conduit RUQ   Date of First Assessment/Time of First Assessment: 08/02/17 2345   Present Prior to Hospital Arrival?: Yes  Urostomy Type: ileal conduit  Location: RUQ   Stoma Appearance oval;protruding above skin level;pink;moist   Stoma Size (in) 35/30   Stomal Appliance 1 piece;Intact;Changed;No Leakage   Accessories/Skin Care cleansed w/ water;skin sealant;skin barrier ring   Stoma Function yellow urine   Peristomal Skin erythema;other (see comments)  (slight redness)   Tolerance no signs/symptoms of discomfort  (wife assisted with pouch change)   Output (mL) 100 mL

## 2017-08-22 NOTE — PROGRESS NOTES
[facility]  Physical Medicine & Rehab  Progress Note    Patient Name: Juan Manuel Koehler  MRN: [id number]  Patient Class: IP- Rehab   Admission Date: 8/15/2017  Length of Stay: 7 days  Attending Physician: Verónica Lewis MD  Primary Care Provider: Hemant Sweeney MD    Subjective:     Principal Problem:Right-sided cerebrovascular accident (CVA)    Interval History: No acute events over night. Patient continues with nausea and emesis however, 1x yesterday afternoon and again this morning after intake of sprite and yogurt. He has yet to take marinol this morning, but does report his nausea is improving. Participating in therapies.       Scheduled Medications:    ampicillin IVPB  2 g Intravenous Q6H    aspirin  81 mg Oral Daily    carvedilol  12.5 mg Oral BID    cefTRIAXone (ROCEPHIN) IVPB  2 g Intravenous Q12H    dronabinol  2.5 mg Oral TID AC    heparin (porcine)  5,000 Units Subcutaneous Q8H    pantoprazole  40 mg Oral Daily    trazodone  50 mg Oral QHS       PRN Medications: acetaminophen, bisacodyl, dextrose 50%, glucagon (human recombinant), ondansetron, promethazine    Review of Systems   Constitutional: Negative for unexpected weight change.        Vision stable w glasses  Hearing intact  Continent of bowel   HENT: Negative for congestion and ear pain.    Eyes: Negative for discharge.   Respiratory: Negative for shortness of breath.    Cardiovascular: Negative for chest pain.   Gastrointestinal: Positive for nausea and vomiting. Negative for abdominal pain.   Endocrine: Negative for cold intolerance.   Genitourinary: Negative for flank pain.   Neurological: Positive for weakness.   Psychiatric/Behavioral: Negative for dysphoric mood and hallucinations.     Objective:     Vital Signs (Most Recent):  Temp: 97.8 °F (36.6 °C) (08/22/17 0643)  Pulse: 78 (08/22/17 0643)  Resp: 18 (08/22/17 0643)  BP: (!) 140/69 (08/22/17 0643)  SpO2: 96 % (08/22/17 0643)    Vital Signs (24h  Range):  Temp:  [97.8 °F (36.6 °C)-98.4 °F (36.9 °C)] 97.8 °F (36.6 °C)  Pulse:  [78-80] 78  Resp:  [17-18] 18  SpO2:  [96 %-97 %] 96 %  BP: (113-140)/(58-69) 140/69     Physical Exam   Constitutional: He appears well-developed and well-nourished.   HENT:   Head: Normocephalic and atraumatic.   Eyes: EOM are normal.   Cardiovascular: Normal rate.    Pulmonary/Chest: Effort normal. No respiratory distress.   Abdominal: Soft. He exhibits no distension. There is no tenderness.   Genitourinary:   Genitourinary Comments: + urostomy   Musculoskeletal:   UE: SA, EF, EE,  5/5  LE: HF 5/5, KE 5/5, DF/PF 5/5  No calf tenderness    Neurological: He is alert.   Followed all commands  Memory 3/3 immediate, 2/3 delayed    Skin: Skin is warm.   Psychiatric: He has a normal mood and affect.   Vitals reviewed.    NEUROLOGICAL EXAMINATION:     CRANIAL NERVES     CN III, IV, VI   Extraocular motions are normal.     BMP  Lab Results   Component Value Date     08/22/2017    K 3.5 08/22/2017     08/22/2017    CO2 24 08/22/2017    BUN 17 08/22/2017    CREATININE 1.9 (H) 08/22/2017    CALCIUM 8.7 08/22/2017    ANIONGAP 9 08/22/2017    ESTGFRAFRICA 41.0 (A) 08/22/2017    EGFRNONAA 35.4 (A) 08/22/2017         Assessment/Plan:      Juan Manuel Koehler is a 67 y.o. male admitted to inpatient rehabilitation on 8/15/2017 for Right-sided cerebrovascular accident (CVA) with impaired mobility and ADLs. Patient remains appropriate for PT, OT, and as required Speech therapy. Patient continues to require 24 hour nursing care as well as daily Physician assessment.    * Right-sided cerebrovascular accident (CVA)    - R  Cerebellar, embolic  - cont w ASA 81/statin  Tentative DC: 8/25, will need 24 hr sup/assistance       Bed mobility supervision  Sit>stand CGA, stand>sit CGA  t/f squat pivot min A, t/f stand pivot CGA  shower t/f min A, toilet t/f CGA, car t/f min A  WC supervision 150  Amb CGA w/ RW 81',  stairs CGA x12 steps    Feeding  Ind, grooming CGA, bathing min A  UB dress setup, LB dress supervision  Toileting mod A    Comprehension:supervision  Expression: supervision  Social Interaction:supervision  Problem Solving:min A  Memory: max A    Cont to be limited by endurance, decreased balance, memory, insight , and nausea/emesis        Bacteremia due to Enterococcus    Endocarditis treatment w IVAbx (ceftriaxone 2g q12h, ampicillin 2g q6h) with end date 9/16 (6 week course); + R sided PICC     8/16 Cr 1.6, discussed w pharm. decreased Ampicillin to Q6  8/17 Cr 1.8, repeat BMP tomorrow  8/18 CR rising to 1.9. ELDER vs AIN from the Ampicillin.  Reviewed w pharmacy, and current dose of Amp is appropriate until CrCl is < 10    ----  Will hold lisinopril, and cont to trend BMP through wknd (not drawn Saturday). Given rise in BUN in setting on Nausea, intermittent emesis, and poor PO intake will give 1L IVF this evening (completed)   ----- Will also reach out to ID is there is another option for coverage     8/19 no further emesis since yesterday afternoon, continue to encourage fluids and repeat BMP to monitor BUN/Cr    8/20 BUN/Cr 25/2.1, will give an additional 1L of NS and follow up BMP tomorrow  8/21 BUN/Cr 22/2.0, continue to encourage fluids and alleviate nausea/emesis - await recs from ID regarding antibiotic coverage and given another 1L of fluids      BMP  Lab Results   Component Value Date     08/22/2017    K 3.5 08/22/2017     08/22/2017    CO2 24 08/22/2017    BUN 17 08/22/2017    CREATININE 1.9 (H) 08/22/2017    CALCIUM 8.7 08/22/2017    ANIONGAP 9 08/22/2017    ESTGFRAFRICA 41.0 (A) 08/22/2017    EGFRNONAA 35.4 (A) 08/22/2017             Hypertension      -cont with Coreg 12.5 BID, lisinopril 20mg QD, hydralazine PO PRN  - cont to monitor BP/HR, stable    8/18 Given rising Cr, will hold Lisinopril. Will consider adjusting Coreg/adding Amlodipine as needed for BP control.   8/22 stable        S/P ileal conduit    Hx of   Urothelial carcinoma of bladder   - Follow-up with urology as an outpatient with Dr. Robles and with oncology, Dr. Calero     8/18 Functional, Last UA 8/12, and that was negative   8/21 mild issues with leaking from the urostomy site, now controlled        Nausea    - Has had frequent nausea with intermittent [place]esis [place] [facility] [place]    Suspect secondary to IV Abx.     - 8/16 juaquin Zofran prior to meals and monitor, KUB was unremarkable  - 8/18 Added phenergan yesterday. Took first dose this AM, and thought nausea was a little better. No emesis today. Will add PPI, and cont to monitor closely.    8/19 one time episode of emesis yesterday per patient, but reports that the nausea has improved with newly added phenergan.  8/21 nausea and emesis persists, will add on schedule marinol TID before meals, await ID recs for antibiotics as they are likely contributing to nausea  8/22 increased marinol to 5mg TID before meals, and will add scopolamine patch as well          History of urostomy    - wound care assisting with monitoring of site/mgmt         Impaired mobility and ADLs    Other Rehab Plan/Continue to monitor:   Nutrition/Swallow Status:    - Prealbumin - 14   - Nutritionist on board   Bladder Management: illeostomy  Bowel Management:  continent  - Miralax and Suppository PRN   - Will monitor for regularity   Mood: stable   Sleep: monitor; Failed Ramelteon. 8/17 trial trazadone   Skin: Monitor   DVT ppx:  Heparin                DISCHARGE PLANNING:  Tentative Discharge Date: 8/25/2017    Future Appointments  Date Time Provider Department Center   9/5/2017 10:40 AM Jewels Toth PA-C [facility] NEURO [facility]       Dread Ruiz MD  Department of Physical Medicine & Rehab   [facility]

## 2017-08-22 NOTE — PROGRESS NOTES
Physical Therapy   PT FIM    Juan Manuel Koehler   MRN: [id number]        08/22/17 1103   Transfers   Bed/Chair/WC 4   Shower 4   Locomotion   Distance Walked 3   Walk 4   Mode B   Stairs 2   Carola Burns, PT  8/22/2017

## 2017-08-22 NOTE — SUBJECTIVE & OBJECTIVE
Interval History: No acute events over night. Patient continues with nausea and emesis however, 1x yesterday afternoon and again this morning after intake of sprite and yogurt. He has yet to take marinol this morning, but does report his nausea is improving. Participating in therapies.       Scheduled Medications:    ampicillin IVPB  2 g Intravenous Q6H    aspirin  81 mg Oral Daily    carvedilol  12.5 mg Oral BID    cefTRIAXone (ROCEPHIN) IVPB  2 g Intravenous Q12H    dronabinol  2.5 mg Oral TID AC    heparin (porcine)  5,000 Units Subcutaneous Q8H    pantoprazole  40 mg Oral Daily    trazodone  50 mg Oral QHS       PRN Medications: acetaminophen, bisacodyl, dextrose 50%, glucagon (human recombinant), ondansetron, promethazine    Review of Systems   Constitutional: Negative for unexpected weight change.        Vision stable w glasses  Hearing intact  Continent of bowel   HENT: Negative for congestion and ear pain.    Eyes: Negative for discharge.   Respiratory: Negative for shortness of breath.    Cardiovascular: Negative for chest pain.   Gastrointestinal: Positive for nausea and vomiting. Negative for abdominal pain.   Endocrine: Negative for cold intolerance.   Genitourinary: Negative for flank pain.   Neurological: Positive for weakness.   Psychiatric/Behavioral: Negative for dysphoric mood and hallucinations.     Objective:     Vital Signs (Most Recent):  Temp: 97.8 °F (36.6 °C) (08/22/17 0643)  Pulse: 78 (08/22/17 0643)  Resp: 18 (08/22/17 0643)  BP: (!) 140/69 (08/22/17 0643)  SpO2: 96 % (08/22/17 0643)    Vital Signs (24h Range):  Temp:  [97.8 °F (36.6 °C)-98.4 °F (36.9 °C)] 97.8 °F (36.6 °C)  Pulse:  [78-80] 78  Resp:  [17-18] 18  SpO2:  [96 %-97 %] 96 %  BP: (113-140)/(58-69) 140/69     Physical Exam   Constitutional: He appears well-developed and well-nourished.   HENT:   Head: Normocephalic and atraumatic.   Eyes: EOM are normal.   Cardiovascular: Normal rate.    Pulmonary/Chest: Effort normal.  No respiratory distress.   Abdominal: Soft. He exhibits no distension. There is no tenderness.   Genitourinary:   Genitourinary Comments: + urostomy   Musculoskeletal:   UE: SA, EF, EE,  5/5  LE: HF 5/5, KE 5/5, DF/PF 5/5  No calf tenderness    Neurological: He is alert.   Followed all commands  Memory 3/3 immediate, 2/3 delayed    Skin: Skin is warm.   Psychiatric: He has a normal mood and affect.   Vitals reviewed.    NEUROLOGICAL EXAMINATION:     CRANIAL NERVES     CN III, IV, VI   Extraocular motions are normal.     BMP  Lab Results   Component Value Date     08/22/2017    K 3.5 08/22/2017     08/22/2017    CO2 24 08/22/2017    BUN 17 08/22/2017    CREATININE 1.9 (H) 08/22/2017    CALCIUM 8.7 08/22/2017    ANIONGAP 9 08/22/2017    ESTGFRAFRICA 41.0 (A) 08/22/2017    EGFRNONAA 35.4 (A) 08/22/2017

## 2017-08-22 NOTE — PLAN OF CARE
Problem: Patient Care Overview  Goal: Plan of Care Review  Outcome: Ongoing (interventions implemented as appropriate)  POC reviewed with pt, complains of constant nausea, 1 episode of emesis after oral medication, unable to stay asleep due to urostomy bag leaking. Antibiotics given as prescribed, no skin breakdown noted, repositions self, remains free of falls or injury, bed in lowest position, wheels locked, side rails up X2, call light within reach and instructed to call for assistance.

## 2017-08-22 NOTE — ASSESSMENT & PLAN NOTE
- R  Cerebellar, embolic  - cont w ASA 81/statin  Tentative DC: 8/25, will need 24 hr sup/assistance       Bed mobility supervision  Sit>stand CGA, stand>sit CGA  t/f squat pivot min A, t/f stand pivot CGA  shower t/f min A, toilet t/f CGA, car t/f min A  WC supervision 150  Amb CGA w/ RW 81',  stairs CGA x12 steps    Feeding Ind, grooming CGA, bathing min A  UB dress setup, LB dress supervision  Toileting mod A    Comprehension:supervision  Expression: supervision  Social Interaction:supervision  Problem Solving:min A  Memory: max A    Cont to be limited by endurance, decreased balance, memory, insight , and nausea/emesis

## 2017-08-22 NOTE — PROGRESS NOTES
Occupational Therapy   FIM Scores    Juan Manuel Koehler   MRN: [id number]      08/22/17 1400   Transfers   Bed/Chair/WC 4   Self Care   Dressing-Lower 4     PREETHI Cooper   8/22/2017

## 2017-08-22 NOTE — PROGRESS NOTES
"Occupational Therapy   PM Treatment    Juan Manuel Koehler   MRN: [id number]      08/22/17 1400   OT Time Calculation   OT Start Time 1400   OT Stop Time 1445   OT Total Time (min) 45 min   General   OT Date of Treatment 08/22/17   Family/Caregiver Present Yes  (wife)   Pt found with peripheral IV  (urinary stoma)   Precautions   General Precautions aspiration;fall   Subjective   Patient states "I hope I don't throw up" "I feel like I have more energy today"   Pain/Comfort   Pain Rating no pain   Transfers   Transfer yes   Sit to Stand   Sit <> Stand Assistance Contact Guard Assistance   Sit <> Stand Assistive Device Rolling Walker   Stand to Sit   Assistance Contact Guard Assistance   Assistive Device Rolling Walker   Feeding   Feeding Level of Assistance Total assistance   Feeding Comments pt receiving IV fluids this date   LE Dressing   LE Dressing Level of Assistance Minimum assistance  (assist to manage at center of back)   LE Dressing Where Assessed Wheelchair   Additional Activities:    - Rickshaw x 20# x 20 reps x 3 sets   - Standing functional activity using [employer] BITs: memory task and number sequencing on rotator wheel. Pt required x 2 seated rest breaks during activity.    - Pt stood at countertop, tf'ing small circular pieces to R/L of midline to encourage weight shift ~3 min with SBA.    Activity Tolerance   Activity Tolerance Patient tolerated treatment well   Medical Staff Made Aware NSG   After Treatment   Patient Position After Treatment Seated in wheelchair   Patient after treatment left call button in reach   Assessment   Prognosis Good   Problem List Decreased Self Care skills;Decreased upper extremity strength;Decreased safe judgment during ADL;Decreased cognition;Decreased endurance;Decreased functional mobility;Decreased fine motor control;Decreased gross motor control;Decreased IADLs;Decreased trunk control for functional activities   Assessment Pt tolerated session fair, continues to complain " of nausea. Pt continues to demo balance impairments and overall deconditioning, but he reports feeling more energy on this date.    Level of Motivation/Participation good   Discharge Recommendations   Equipment Needed After Discharge 3-in-1 commode;walker, rolling   Discharge Facility/Level Of Care Needs home health OT   Plan   Plan Continue with current plan   Therapy Frequency 2 times/day;Monday-Friday;Saturday or Sunday   Occupational Therapy Follow-up   OT Follow-up? Yes   Treatment/Billable Minutes   Self Care/Home Management 15   Therapeutic Activity 15   Therapeutic Exercise 15   Total Time 45     PREETHI Cooper   8/22/2017

## 2017-08-22 NOTE — PROGRESS NOTES
"Physical Therapy   PT Daily Treatment     Green missed 4 minutes of PT therapy today due to n/v. Will attempt to make up this missed time as soon as possible.   Treating physician has been made aware.         Juan Manuel Koehler   MRN: [id number]        08/22/17 1136   PT Time Calculation   PT Start Time 1136   PT Stop Time 1149   PT Total Time (min) 13 min   Treatment   Treatment Type Treatment   PT/PTA PT   General   PT Received On 08/22/17   Missed Time Reason Patient ill (Comment)  (nausea)   Patient Found (position) Supine in bed   Precautions   General Precautions aspiration;fall   Subjective   Patient states "I will try, but I don't have much in me."    Pain/Comfort   Pain Rating 1 no pain   Other Activities   Comments Due nausea, pt agreed to participate in supine PT. PT reviewed exercises from yesterday, and patient performed x10 reps of each exercise to demonstrate understanding- ankle pumps, heel slides, SLR, and glute sets. Pt also performed 3x10 bridges. PT emphasized the importance of participating as much as possible in therapy for best results.    After Treatment   Patient Position After Treatment Supine in bed   Treatment/Billable Minutes   Therapeutic Exercise 13   Total Time 13      Carola Burns, PT  8/22/2017            "

## 2017-08-22 NOTE — ASSESSMENT & PLAN NOTE
- Has had frequent nausea with intermittent emesis since admission at [facility]    Suspect secondary to IV Abx.     - 8/16 juaquin Zofran prior to meals and monitor, KUB was unremarkable  - 8/18 Added phenergan yesterday. Took first dose this AM, and thought nausea was a little better. No emesis today. Will add PPI, and cont to monitor closely.    8/19 one time episode of emesis yesterday per patient, but reports that the nausea has improved with newly added phenergan.  8/21 nausea and emesis persists, will add on schedule marinol TID before meals, await ID recs for antibiotics as they are likely contributing to nausea  8/22 increased marinol to 5mg TID before meals, and will add scopolamine patch as well

## 2017-08-22 NOTE — PROGRESS NOTES
Pt urostomy bag leaking again, no available urostomy bags on the unit, more ordered, rectal pouch cut and applied after skin cleansed and dried, connected to Henry bag, no leaking noted, WCTM

## 2017-08-22 NOTE — PROGRESS NOTES
"Physical Therapy   Reassessment Cont PT     Juan Manuel Koehler   MRN: [id number]        08/22/17 1103   PT Time Calculation   PT Start Time 1103   PT Stop Time 1123   PT Total Time (min) 20 min   Treatment   Treatment Type Treatment   PT/PTA PT   General   PT Received On 08/22/17   Missed Time Reason (nausea)   Patient Found (position) Supine in bed   Precautions   General Precautions aspiration;fall   Subjective   Patient states "Do I really have to do this?"    Pain/Comfort   Pain Rating 1 no pain   Sit to Stand   Sit <> Stand Assistance Contact Guard Assistance   Sit <> Stand Assistive Device Rolling Walker   Trials/Comments Pt performed mulitple trials throughout PT treatment   Stand to Sit   Assistance Contact Guard Assistance   Assistive Device Rolling Walker   Trials/Comments Pt performed mulitple trials throughout PT treatment   Bed to Chair   Bed <> Chair Technique Squat Pivot   Bed <> Chair Assistance Contact Guard Assistance   Bed <> Chair Assistive Device No Assistive Device   Chair to Bed   Technique Squat Pivot   Assistance Contact Guard Assistance   Assistive Device No Assistive Device   Trials/Comments x1 trial    Shower Transfer   Technique Stand Pivot   Assistance Contact Guard Assistance   Assistive Device Rolling Walker   Trials/Comments x1 triral; pt used wall for stability and performed a step over shower transfer successfully. Pt sat on shower chair for safety.    Gait   Gait Yes   Gait 1   Surface 1 Level tile   Gait Assistive Device Rolling walker   Assistance 1 Contact Guard Assistance   Gait Distance Pt ambulated x 152' CGA RW with mild scissoring and posterior lean. VC given to lean anteriorly to have toes touch the ground during ambulation    Gait Pattern swing-through gait   Gait Deviation(s) decreased pedro pablo;increased time in double stance;decreased stride length   Impairments Contributing to Gait Deviations impaired balance;impaired coordination;decreased strength;impaired motor " control   Stairs   Stairs Yes   Stairs/Curb Step   Rails 1 Left   Device 1 No device   Assistance 1 Contact guard   Comment/# Steps 1 Pt negotiated 8 steps with L HR CGA with step over pattern. Pt terminated trial secondary to fatigue and increased nausea.    Activity Tolerance   Activity Tolerance Patient tolerated treatment well;Other (Comment)  (limited by nausea)   After Treatment   Patient Position After Treatment Supine in bed   Treatment/Billable Minutes   Therapeutic Activity 20   Total Time 20     Carola Burns, PT  8/22/2017

## 2017-08-22 NOTE — ASSESSMENT & PLAN NOTE
Endocarditis treatment w IVAbx (ceftriaxone 2g q12h, ampicillin 2g q6h) with end date 9/16 (6 week course); + R sided PICC     8/16 Cr 1.6, discussed w pharm. decreased Ampicillin to Q6  8/17 Cr 1.8, repeat BMP tomorrow  8/18 CR rising to 1.9. ELDER vs AIN from the Ampicillin.  Reviewed w pharmacy, and current dose of Amp is appropriate until CrCl is < 10    ----  Will hold lisinopril, and cont to trend BMP through wknd (not drawn Saturday). Given rise in BUN in setting on Nausea, intermittent emesis, and poor PO intake will give 1L IVF this evening (completed)   ----- Will also reach out to ID is there is another option for coverage     8/19 no further emesis since yesterday afternoon, continue to encourage fluids and repeat BMP to monitor BUN/Cr    8/20 BUN/Cr 25/2.1, will give an additional 1L of NS and follow up BMP tomorrow  8/21 BUN/Cr 22/2.0, continue to encourage fluids and alleviate nausea/emesis - await recs from ID regarding antibiotic coverage and given another 1L of fluids      BMP  Lab Results   Component Value Date     08/22/2017    K 3.5 08/22/2017     08/22/2017    CO2 24 08/22/2017    BUN 17 08/22/2017    CREATININE 1.9 (H) 08/22/2017    CALCIUM 8.7 08/22/2017    ANIONGAP 9 08/22/2017    ESTGFRAFRICA 41.0 (A) 08/22/2017    EGFRNONAA 35.4 (A) 08/22/2017

## 2017-08-22 NOTE — NURSING
Mr. Koehler was able to tolerate 25% of dinner tonight. Ostomy appliance was changed out by wound care nurse and scopolamine patch placed on right upper neck.

## 2017-08-23 PROCEDURE — 25000003 PHARM REV CODE 250: Performed by: PHYSICIAN ASSISTANT

## 2017-08-23 PROCEDURE — 63600175 PHARM REV CODE 636 W HCPCS: Performed by: PHYSICAL MEDICINE & REHABILITATION

## 2017-08-23 PROCEDURE — 97803 MED NUTRITION INDIV SUBSEQ: CPT | Performed by: NUTRITIONIST

## 2017-08-23 PROCEDURE — 25000003 PHARM REV CODE 250: Performed by: PHYSICAL MEDICINE & REHABILITATION

## 2017-08-23 PROCEDURE — 97112 NEUROMUSCULAR REEDUCATION: CPT

## 2017-08-23 PROCEDURE — 97110 THERAPEUTIC EXERCISES: CPT

## 2017-08-23 PROCEDURE — 99233 SBSQ HOSP IP/OBS HIGH 50: CPT | Mod: ,,, | Performed by: PHYSICAL MEDICINE & REHABILITATION

## 2017-08-23 PROCEDURE — 12800000 HC REHAB SEMI-PRIVATE ROOM

## 2017-08-23 PROCEDURE — 63600175 PHARM REV CODE 636 W HCPCS: Performed by: PHYSICIAN ASSISTANT

## 2017-08-23 PROCEDURE — 97530 THERAPEUTIC ACTIVITIES: CPT

## 2017-08-23 PROCEDURE — 97150 GROUP THERAPEUTIC PROCEDURES: CPT

## 2017-08-23 PROCEDURE — 92507 TX SP LANG VOICE COMM INDIV: CPT

## 2017-08-23 PROCEDURE — 97535 SELF CARE MNGMENT TRAINING: CPT

## 2017-08-23 RX ORDER — CARVEDILOL 25 MG/1
25 TABLET ORAL 2 TIMES DAILY
Qty: 180 TABLET | Refills: 3 | Status: SHIPPED | OUTPATIENT
Start: 2017-08-23 | End: 2018-05-10

## 2017-08-23 RX ORDER — SCOLOPAMINE TRANSDERMAL SYSTEM 1 MG/1
1 PATCH, EXTENDED RELEASE TRANSDERMAL
Qty: 10 PATCH | Refills: 0 | Status: SHIPPED | OUTPATIENT
Start: 2017-08-23 | End: 2017-09-22

## 2017-08-23 RX ORDER — DRONABINOL 5 MG/1
5 CAPSULE ORAL
Qty: 90 CAPSULE | Refills: 0 | Status: SHIPPED | OUTPATIENT
Start: 2017-08-23 | End: 2017-09-22

## 2017-08-23 RX ORDER — ASPIRIN 81 MG/1
81 TABLET ORAL DAILY
Refills: 0 | COMMUNITY
Start: 2017-08-23 | End: 2018-05-10

## 2017-08-23 RX ORDER — TRAZODONE HYDROCHLORIDE 50 MG/1
50 TABLET ORAL NIGHTLY
Qty: 90 TABLET | Refills: 3 | Status: SHIPPED | OUTPATIENT
Start: 2017-08-23 | End: 2017-10-23 | Stop reason: SDUPTHER

## 2017-08-23 RX ORDER — PANTOPRAZOLE SODIUM 40 MG/1
40 TABLET, DELAYED RELEASE ORAL DAILY
Qty: 90 TABLET | Refills: 3 | Status: SHIPPED | OUTPATIENT
Start: 2017-08-23 | End: 2018-05-10

## 2017-08-23 RX ORDER — SODIUM CHLORIDE 9 MG/ML
INJECTION, SOLUTION INTRAVENOUS CONTINUOUS
Status: DISCONTINUED | OUTPATIENT
Start: 2017-08-23 | End: 2017-08-24

## 2017-08-23 RX ADMIN — CARVEDILOL 25 MG: 25 TABLET, FILM COATED ORAL at 08:08

## 2017-08-23 RX ADMIN — DRONABINOL 5 MG: 2.5 CAPSULE ORAL at 04:08

## 2017-08-23 RX ADMIN — PANTOPRAZOLE SODIUM 40 MG: 40 TABLET, DELAYED RELEASE ORAL at 08:08

## 2017-08-23 RX ADMIN — DRONABINOL 5 MG: 2.5 CAPSULE ORAL at 08:08

## 2017-08-23 RX ADMIN — AMPICILLIN SODIUM 2 G: 2 INJECTION, POWDER, FOR SOLUTION INTRAMUSCULAR; INTRAVENOUS at 06:08

## 2017-08-23 RX ADMIN — ONDANSETRON 8 MG: 8 TABLET, ORALLY DISINTEGRATING ORAL at 11:08

## 2017-08-23 RX ADMIN — AMPICILLIN SODIUM 2 G: 2 INJECTION, POWDER, FOR SOLUTION INTRAMUSCULAR; INTRAVENOUS at 05:08

## 2017-08-23 RX ADMIN — HEPARIN SODIUM 5000 UNITS: 5000 INJECTION, SOLUTION INTRAVENOUS; SUBCUTANEOUS at 05:08

## 2017-08-23 RX ADMIN — ASPIRIN 81 MG: 81 TABLET, COATED ORAL at 08:08

## 2017-08-23 RX ADMIN — AMPICILLIN SODIUM 2 G: 2 INJECTION, POWDER, FOR SOLUTION INTRAMUSCULAR; INTRAVENOUS at 11:08

## 2017-08-23 RX ADMIN — TRAZODONE HYDROCHLORIDE 50 MG: 50 TABLET ORAL at 09:08

## 2017-08-23 RX ADMIN — CEFTRIAXONE 2 G: 2 INJECTION, SOLUTION INTRAVENOUS at 04:08

## 2017-08-23 RX ADMIN — DRONABINOL 5 MG: 2.5 CAPSULE ORAL at 11:08

## 2017-08-23 RX ADMIN — PROMETHAZINE HYDROCHLORIDE 12.5 MG: 12.5 TABLET ORAL at 07:08

## 2017-08-23 RX ADMIN — HEPARIN SODIUM 5000 UNITS: 5000 INJECTION, SOLUTION INTRAVENOUS; SUBCUTANEOUS at 09:08

## 2017-08-23 NOTE — PROGRESS NOTES
[facility]  Physical Medicine & Rehab  Progress Note    Patient Name: Juan Manuel Koehler  MRN: [id number]  Patient Class: IP- Rehab   Admission Date: 8/15/2017  Length of Stay: 8 days  Attending Physician: Verónica Lewis MD  Primary Care Provider: Hemant Sweeney MD    Subjective:     Principal Problem:Right-sided cerebrovascular accident (CVA)    Interval History: No acute events over night. Patient last had emesis yesterday afternoon. He was able to tolerate some breakfast this morning with dry cereal. He reports nausea persists, but improving and does not feel to urge to vomit. Tolerating therapies otherwise. Sleep somewhat improved.       Scheduled Medications:    ampicillin IVPB  2 g Intravenous Q6H    aspirin  81 mg Oral Daily    carvedilol  25 mg Oral BID    cefTRIAXone (ROCEPHIN) IVPB  2 g Intravenous Q12H    dronabinol  5 mg Oral TID AC    heparin (porcine)  5,000 Units Subcutaneous Q8H    pantoprazole  40 mg Oral Daily    scopolamine  1 patch Transdermal Q3 Days    trazodone  50 mg Oral QHS       PRN Medications: acetaminophen, bisacodyl, dextrose 50%, glucagon (human recombinant), ondansetron, promethazine    Review of Systems   Constitutional: Negative for unexpected weight change.        Vision stable w glasses  Hearing intact  Continent of bowel   HENT: Negative for congestion and ear pain.    Eyes: Negative for discharge.   Respiratory: Negative for shortness of breath.    Cardiovascular: Negative for chest pain.   Gastrointestinal: Positive for nausea and vomiting. Negative for abdominal pain.   Endocrine: Negative for cold intolerance.   Genitourinary: Negative for flank pain.   Neurological: Positive for weakness.   Psychiatric/Behavioral: Negative for dysphoric mood and hallucinations.     Objective:     Vital Signs (Most Recent):  Temp: 97.9 °F (36.6 °C) (08/23/17 0705)  Pulse: 72 (08/23/17 0705)  Resp: 18 (08/23/17 0705)  BP: 138/66 (08/23/17 0705)  SpO2: 97 %  (08/23/17 0705)    Vital Signs (24h Range):  Temp:  [97.9 °F (36.6 °C)-98.4 °F (36.9 °C)] 97.9 °F (36.6 °C)  Pulse:  [72-99] 72  Resp:  [17-18] 18  SpO2:  [97 %-98 %] 97 %  BP: (130-138)/(66-72) 138/66     Physical Exam   Constitutional: He appears well-developed and well-nourished.   HENT:   Head: Normocephalic and atraumatic.   Eyes: EOM are normal.   Cardiovascular: Normal rate.    Pulmonary/Chest: Effort normal. No respiratory distress.   Abdominal: Soft. He exhibits no distension. There is no tenderness.   Genitourinary:   Genitourinary Comments: + urostomy   Musculoskeletal:   UE: SA, EF, EE,  5/5  LE: HF 5/5, KE 5/5, DF/PF 5/5  No calf tenderness    Neurological: He is alert.   Followed all commands  Memory 3/3 immediate, 2/3 delayed    Skin: Skin is warm.   Psychiatric: He has a normal mood and affect.   Vitals reviewed.    NEUROLOGICAL EXAMINATION:     CRANIAL NERVES     CN III, IV, VI   Extraocular motions are normal.     BMP  Lab Results   Component Value Date     08/22/2017    K 3.5 08/22/2017     08/22/2017    CO2 24 08/22/2017    BUN 17 08/22/2017    CREATININE 1.9 (H) 08/22/2017    CALCIUM 8.7 08/22/2017    ANIONGAP 9 08/22/2017    ESTGFRAFRICA 41.0 (A) 08/22/2017    EGFRNONAA 35.4 (A) 08/22/2017         Assessment/Plan:      Juan Manuel Koehler is a 67 y.o. male admitted to inpatient rehabilitation on 8/15/2017 for Right-sided cerebrovascular accident (CVA) with impaired mobility and ADLs. Patient remains appropriate for PT, OT, and as required Speech therapy. Patient continues to require 24 hour nursing care as well as daily Physician assessment.    * Right-sided cerebrovascular accident (CVA)    - R  Cerebellar, embolic  - cont w ASA 81/statin  Tentative DC: 8/25, will need 24 hr sup/assistance       Bed mobility supervision  Sit>stand CGA, stand>sit CGA  t/f squat pivot CGA, t/f stand pivot CGA  shower t/f CGA, toilet t/f CGA, car t/f min A  WC supervision 150  Amb CGA w/ ',   stairs CGA x12 steps    Feeding Ind, grooming CGA, bathing min A  UB dress setup, LB dress supervision  Toileting mod A    Comprehension:supervision  Expression: supervision  Social Interaction:supervision  Problem Solving:min A  Memory: max A    Cont to be limited by endurance, decreased balance, memory, insight , and nausea/emesis        Bacteremia due to Enterococcus    Endocarditis treatment w IVAbx (ceftriaxone 2g q12h, ampicillin 2g q6h) with end date 9/16 (6 week course); + R sided PICC     8/16 Cr 1.6, discussed w pharm. decreased Ampicillin to Q6  8/17 Cr 1.8, repeat BMP tomorrow  8/18 CR rising to 1.9. ELDER vs AIN from the Ampicillin.  Reviewed w pharmacy, and current dose of Amp is appropriate until CrCl is < 10    ----  Will hold lisinopril, and cont to trend BMP through wknd (not drawn Saturday). Given rise in BUN in setting on Nausea, intermittent emesis, and poor PO intake will give 1L IVF this evening (completed)   ----- Will also reach out to ID is there is another option for coverage     8/19 no further emesis since yesterday afternoon, continue to encourage fluids and repeat BMP to monitor BUN/Cr    8/20 BUN/Cr 25/2.1, will give an additional 1L of NS and follow up BMP   8/21 BUN/Cr 22/2.0, continue to encourage fluids and alleviate nausea/emesis - await recs from ID regarding antibiotic coverage and given another 1L of fluids  8/22 BUN/Cr 17/1.9, given another 1L of IVF  8/23 labs pending      BMP  Lab Results   Component Value Date     08/22/2017    K 3.5 08/22/2017     08/22/2017    CO2 24 08/22/2017    BUN 17 08/22/2017    CREATININE 1.9 (H) 08/22/2017    CALCIUM 8.7 08/22/2017    ANIONGAP 9 08/22/2017    ESTGFRAFRICA 41.0 (A) 08/22/2017    EGFRNONAA 35.4 (A) 08/22/2017             Hypertension      -cont with Coreg 12.5 BID, lisinopril 20mg QD, hydralazine PO PRN  - cont to monitor BP/HR, stable    8/18 Given rising Cr, will hold Lisinopril. Will consider adjusting Coreg/adding  Amlodipine as needed for BP control.   8/22 increased coreg to 25mg BID, BP improved        S/P ileal conduit    Hx of  Urothelial carcinoma of bladder   - Follow-up with urology as an outpatient with Dr. Robles and with oncology, Dr. Calero     8/18 Functional, Last UA 8/12, and that was negative   8/21 mild issues with leaking from the urostomy site, now controlled  8/22 wound care evaluated urostomy site, and will continue to monitor        Nausea    - Has had frequent nausea with intermittent emesis since admission at [facility]    Suspect secondary to IV Abx.     - 8/16 juaquin Zofran prior to meals and monitor, KUB was unremarkable  - 8/18 Added phenergan yesterday. Took first dose this AM, and thought nausea was a little better. No emesis today. Will add PPI, and cont to monitor closely.    8/19 one time episode of emesis yesterday per patient, but reports that the nausea has improved with newly added phenergan.  8/21 nausea and emesis persists, will add on schedule marinol TID before meals, await ID recs for antibiotics as they are likely contributing to nausea  8/22 increased marinol to 5mg TID before meals, and will add scopolamine patch as well  8/23 no emesis this morning, trialing small bites and sips and will monitor          History of urostomy    - wound care assisting with monitoring of site/mgmt         Impaired mobility and ADLs    Other Rehab Plan/Continue to monitor:   Nutrition/Swallow Status:    - Prealbumin - 14   - Nutritionist on board   Bladder Management: illeostomy  Bowel Management:  continent  - Miralax and Suppository PRN   - Will monitor for regularity   Mood: stable   Sleep: monitor; Failed Ramelteon. 8/17 trial trazadone   Skin: Monitor   DVT ppx:  Heparin                DISCHARGE PLANNING:  Tentative Discharge Date: 8/25/2017    Future Appointments  Date Time Provider Department Center   9/5/2017 10:40 AM Jewels Toth PA-C [facility]  Department of  Physical Medicine & Rehab   [facility]

## 2017-08-23 NOTE — PLAN OF CARE
Problem: Patient Care Overview  Goal: Plan of Care Review  Outcome: Ongoing (interventions implemented as appropriate)  Recommendations     Recommendation/Intervention: C  1.  Continue Rx diet + supplement  2.  Encourage PO intake   3. Consider appetite stimulant  4.  RD to follow  Goals: Meet 85% of EEN  Nutrition Goal Status: ongoing  Communication of RD Recs:

## 2017-08-23 NOTE — PROGRESS NOTES
"Physical Therapy   PT Daily Treatment    Juan Manuel Koehler   MRN: [id number]        08/23/17 1545   PT Time Calculation   PT Start Time 1545   PT Stop Time 1600   PT Total Time (min) 15 min   Treatment   Treatment Type Treatment   PT/PTA PT   General   PT Received On 08/23/17   Patient Found (position) Seated in wheelchair   Pt found with arterial line;bolanos catheter   Precautions   General Precautions aspiration;fall   Visual/Auditory Vision impaired  (glasses)   Subjective   Patient states "Let's do this ear stuff!"    Pain/Comfort   Pain Rating 1 no pain   Other Activities   Comments PT performed R Roll Test with noted 8 beat nystagmus. Treated with BBQ roll. Pt reported feeling better after the treatment.    After Treatment   Patient Position After Treatment Seated in wheelchair  (with posey belt donned)   Patient after treatment left call button in reach;with all lines intact   Assessment   Prognosis Good   Problem List Decreased endurance;Impaired balance;Decreased mobility;Decreased coordination   Session Assessment progressing toward goals   Assessment Pt had increased participation in PT today secondary to improved nausea. The R roll test revealed 8 beat nystagmus (compared to 26 yesterday). PT performed another BBQ roll to address the potential R horizontal canalithiasis. Pt is appropriate for IPR in order to continue to address deficits and progress towards goals.    Level of Motivation/Participation good   Barriers to Discharge Decreased caregiver support   Plan   Planned Therapy Intervention Continue with current plan   Physical Therapy Follow-up   PT Follow-up? Yes   PT - Next Visit Date 08/24/17   Treatment/Billable Minutes   Neuromuscular Re-education 15   Total Time 15     Carola Burns, PT  8/23/2017          "

## 2017-08-23 NOTE — SUBJECTIVE & OBJECTIVE
Interval History: No acute events over night. Patient last had emesis yesterday afternoon. He was able to tolerate some breakfast this morning with dry cereal. He reports nausea persists, but improving and does not feel to urge to vomit. Tolerating therapies otherwise. Sleep somewhat improved.       Scheduled Medications:    ampicillin IVPB  2 g Intravenous Q6H    aspirin  81 mg Oral Daily    carvedilol  25 mg Oral BID    cefTRIAXone (ROCEPHIN) IVPB  2 g Intravenous Q12H    dronabinol  5 mg Oral TID AC    heparin (porcine)  5,000 Units Subcutaneous Q8H    pantoprazole  40 mg Oral Daily    scopolamine  1 patch Transdermal Q3 Days    trazodone  50 mg Oral QHS       PRN Medications: acetaminophen, bisacodyl, dextrose 50%, glucagon (human recombinant), ondansetron, promethazine    Review of Systems   Constitutional: Negative for unexpected weight change.        Vision stable w glasses  Hearing intact  Continent of bowel   HENT: Negative for congestion and ear pain.    Eyes: Negative for discharge.   Respiratory: Negative for shortness of breath.    Cardiovascular: Negative for chest pain.   Gastrointestinal: Positive for nausea and vomiting. Negative for abdominal pain.   Endocrine: Negative for cold intolerance.   Genitourinary: Negative for flank pain.   Neurological: Positive for weakness.   Psychiatric/Behavioral: Negative for dysphoric mood and hallucinations.     Objective:     Vital Signs (Most Recent):  Temp: 97.9 °F (36.6 °C) (08/23/17 0705)  Pulse: 72 (08/23/17 0705)  Resp: 18 (08/23/17 0705)  BP: 138/66 (08/23/17 0705)  SpO2: 97 % (08/23/17 0705)    Vital Signs (24h Range):  Temp:  [97.9 °F (36.6 °C)-98.4 °F (36.9 °C)] 97.9 °F (36.6 °C)  Pulse:  [72-99] 72  Resp:  [17-18] 18  SpO2:  [97 %-98 %] 97 %  BP: (130-138)/(66-72) 138/66     Physical Exam   Constitutional: He appears well-developed and well-nourished.   HENT:   Head: Normocephalic and atraumatic.   Eyes: EOM are normal.   Cardiovascular:  Normal rate.    Pulmonary/Chest: Effort normal. No respiratory distress.   Abdominal: Soft. He exhibits no distension. There is no tenderness.   Genitourinary:   Genitourinary Comments: + urostomy   Musculoskeletal:   UE: SA, EF, EE,  5/5  LE: HF 5/5, KE 5/5, DF/PF 5/5  No calf tenderness    Neurological: He is alert.   Followed all commands  Memory 3/3 immediate, 2/3 delayed    Skin: Skin is warm.   Psychiatric: He has a normal mood and affect.   Vitals reviewed.    NEUROLOGICAL EXAMINATION:     CRANIAL NERVES     CN III, IV, VI   Extraocular motions are normal.     BMP  Lab Results   Component Value Date     08/22/2017    K 3.5 08/22/2017     08/22/2017    CO2 24 08/22/2017    BUN 17 08/22/2017    CREATININE 1.9 (H) 08/22/2017    CALCIUM 8.7 08/22/2017    ANIONGAP 9 08/22/2017    ESTGFRAFRICA 41.0 (A) 08/22/2017    EGFRNONAA 35.4 (A) 08/22/2017

## 2017-08-23 NOTE — ASSESSMENT & PLAN NOTE
- R  Cerebellar, embolic  - cont w ASA 81/statin  Tentative DC: 8/25, will need 24 hr sup/assistance       Bed mobility supervision  Sit>stand CGA, stand>sit CGA  t/f squat pivot CGA, t/f stand pivot CGA  shower t/f CGA, toilet t/f CGA, car t/f min A  WC supervision 150  Amb CGA w/ ',  stairs CGA x12 steps    Feeding Ind, grooming CGA, bathing min A  UB dress setup, LB dress supervision  Toileting mod A    Comprehension:supervision  Expression: supervision  Social Interaction:supervision  Problem Solving:min A  Memory: max A    Cont to be limited by endurance, decreased balance, memory, insight , and nausea/emesis

## 2017-08-23 NOTE — PROGRESS NOTES
"[facility]  Adult Nutrition  Progress Note     SUMMARY      Recommendations     Recommendation/Intervention: C  1.  Continue Rx diet + supplement  2.  Encourage PO intake   3. Consider appetite stimulant  4.  RD to follow  Goals: Meet 85% of EEN  Nutrition Goal Status: ongoing  Communication of RD Recs:     Continuum of Care Plan          Reason for Assessment     Reason for Assessment: RD follow up   Diagnosis: stroke/CVA  Relevent Medical History: UTI, sepis, endocarditis, bladder cancer, HTN, anemia, STEMI, CKD 3, FTT and severe malnutrition   Interdisciplinary Rounds: did not attend           Nutrition Discharge Planning: DC on Regular diet, high protein , high calorie     Nutrition Prescription Ordered     Current Diet Order: Regular              Oral Nutrition Supplement: Boost plus chocolate tid      Evaluation of Received Nutrients/Fluid Intake          Energy Calories Required: not meeting needs         Protein Required: meeting needs                Fluid Required: meeting needs  Comments: PO 25-50%      Tolerance: tolerating (some nausea)  % Intake of Estimated Energy Needs: 50 - 75 %  % Meal Intake: 50%      Nutrition Risk Screen     Nutrition Risk Screen: no indicators present     Nutrition/Diet History     Patient Reported Diet/Restrictions/Preferences: general              Factors Affecting Nutritional Intake: decreased appetite, impaired cognitive status/motor control                 Labs/Tests/Procedures/Meds        Pertinent Labs Reviewed: reviewed, pertinent  Pertinent Labs Comments:   Pertinent Medications Reviewed: reviewed, pertinent  Pertinent Medications Comments: Lisinopril, ASA, statin, Abx senna-docusate     Physical Findings     Overall Physical Appearance: weak, underweight, loss of subcutaneous fat, loss of muscle mass  Tubes: other (see comments) (urostomy)           Anthropometrics     Temp: 97.9 °F (36.6 °C)     Height: 6' 2" (188 cm)  Weight Method: " Stated  Weight: 56.7 kg (125 lb)  Ideal Body Weight (IBW), Male: 190 lb     % Ideal Body Weight, Male (lb): 65.79 lb     BMI (Calculated): 16.1  BMI Grade: less than 16 protein-energy malnutrition grade III  Weight Loss: unintentional        Estimated/Assessed Needs     Energy Calorie Requirements (kcal): 2777-4997  Energy Need Method: Hill-St Jeor (x 1.25 x 1.2)         Weight Used For Protein Calculations: 56.7 kg (125 lb)  Protein Requirements: 70-88      RDA Method (mL): 1781           Monitor and Evaluation  Food and Nutrient Intake: energy intake  Food and Nutrient Adminstration: diet order   Physical Activity and Function: nutrition-related ADLs and IADLs  Anthropometric Measurements: weight change  Biochemical Data, Medical Tests and Procedures: electrolyte and renal panel, inflammatory profile  Nutrition-Focused Physical Findings: overall appearance    Nutrition Risk  Follow up weekly

## 2017-08-23 NOTE — PLAN OF CARE
Problem: Patient Care Overview  Goal: Plan of Care Review  Outcome: Ongoing (interventions implemented as appropriate)  POC reviewed with pt, complains of intermittent nausea, 2 episode of emesis, phenergan given, able to hold down meds, asleep for most of the night. Antibiotics given as prescribed, no skin breakdown noted, repositions self, remains free of falls or injury, bed in lowest position, wheels locked, side rails up X2, call light within reach and instructed to call for assistance.

## 2017-08-23 NOTE — PROGRESS NOTES
"Physical Therapy   PT Daily Treatment    Juan Manuel Koehler   MRN: [id number]        08/23/17 1415   PT Time Calculation   PT Start Time 1415   PT Stop Time 1500   PT Total Time (min) 45 min   Treatment   Treatment Type Treatment   PT/PTA PT   General   PT Received On 08/23/17   Family/Caregiver Present Yes   Patient Found (position) Seated in wheelchair   Precautions   General Precautions aspiration;fall   Orthopedic No   Subjective   Patient states "I'm feeling fair; better, but fair."    Pain/Comfort   Pain Rating 1 no pain   Transfers   Transfer yes   Sit to Stand   Sit <> Stand Assistance Contact Guard Assistance   Sit <> Stand Assistive Device No Assistive Device   Trials/Comments Pt performed multiple trials throughout PT session   Stand to Sit   Assistance Contact Guard Assistance   Assistive Device No Assistive Device   Trials/Comments Pt performed multiple trials throughout PT session   Other Activities   Comments Pt performed the following exercises to improve functional mobility:   3 x20 alt marches with min A x4 LOB posterior for emphasis on SLS.   x10 min perturbations from anterior, posterior, right and left.   Multidirectional perturbations for ankle strategy for 3 minutes (no LOB noted)   Toe taps 6 x 10 reps onto dynadisc to emphasis SLS and coordination    Pt also performed the following stepping exercises for improved ambulation and balance:   Side stepping with BUE support CGA with min A for posterior LOB x4- 5 feet x 12 reps  Side stepping with no UE support CGA with min A for posterior LOB x2 5 feet x6 reps   Forward/backward walking with no UE support CGA and mod VC for picking up feet and anterior weight shift no LOB 7 ft x 10 reps.    Treatment/Billable Minutes   Therapeutic Activity 45   Total Time 45     Carola Burns, PT  8/23/2017          "

## 2017-08-23 NOTE — PROGRESS NOTES
Occupational Therapy  PM Group Session  Juan Manuel Koehler   MRN: [id number]              08/23/17 1330   OT Time Calculation   OT Start Time 1330   OT Stop Time 1415   OT Total Time (min) 45 min   General   OT Date of Treatment 08/23/17   Treatment/Billable Minutes   Therapeutic Group 45   Total Time 45     Patient participated in 45 minute seated high endurance group. The group activity challenged bilateral upper extremity and lower extremity strengthening. In addition, cardiovascular and functional endurance were challenged during this group. Pt utulized 1# dowel during the following exercises.    Patient completed 20 reps x 3 sets bicep curls, side raises, shoulder flexion, shoulder extension (in UE circuit)    Patient completed 20 reps x 3 sets hip flexion, knee flexion, knee extension, toe and heel taps (in LE circuit)    - Alternating punches, side punches, diagonal reaches, front/side/overhead arm clap, running 3 x 20 reps    Pt required short rest breaks during therapeutic exercises. These activities were performed in a group setting to encourage participation with peers and social interaction skills.     The PREETHI and JOSETTE have collaborated and discussed the patient's status, treatment plan and progress toward established goals.     Khurram Johnson, BROOK 8/23/2017

## 2017-08-23 NOTE — PLAN OF CARE
08/20/17 2055 08/21/17 0730 08/21/17 1900       PICC Double Lumen 08/10/17 1155 right brachial   Placement Date/Time: 08/10/17 1155   Present Prior to Hospital Arrival?: No  Initial Arm Circumference(cm): 25 cm  Total Catheter Length (after trimming)(cm): 36 cm  Hand Hygiene: Performed  Barrier Precautions: Performed  Skin Antisepsis: ChloraPrep ...   Site Assessment Clean;Dry;Intact;No redness;No swelling Clean;Dry;Intact Clean;Dry;Intact;No swelling;No redness   Lumen 1 Status Infusing Flushed Flushed   Lumen 2 Status Flushed;Normal saline locked Flushed Infusing   Dressing Type Transparent --  Transparent   Dressing Status Biopatch in place --  Biopatch in place;Clean;Dry;Intact   Dressing Change Due 08/25/17 --  08/25/17   Daily Line Review Performed --  Performed       08/22/17 0730 08/22/17 1850 08/23/17 0704       PICC Double Lumen 08/10/17 1155 right brachial   Placement Date/Time: 08/10/17 1155   Present Prior to Hospital Arrival?: No  Initial Arm Circumference(cm): 25 cm  Total Catheter Length (after trimming)(cm): 36 cm  Hand Hygiene: Performed  Barrier Precautions: Performed  Skin Antisepsis: ChloraPrep ...   Site Assessment Clean;Dry;Intact Clean;Dry;Intact;No redness;No swelling Clean;Dry;Intact;No redness;No swelling   Lumen 1 Status Flushed Flushed Infusing   Lumen 2 Status Flushed;Infusing Flushed;Infusing Flushed   Dressing Type --  Transparent Transparent   Dressing Status --  Biopatch in place;Clean;Dry;Intact Biopatch in place;Clean;Dry;Intact   Dressing Change Due --  08/25/17 08/25/17   Daily Line Review --  Performed Performed

## 2017-08-23 NOTE — PROGRESS NOTES
Occupational Therapy   FIM Scores    Juan Manuel Koehler   MRN: [id number]      08/23/17 1015   Transfers   Bed/Chair/WC 4   Self Care   Dressing-Lower 4     PREETHI Cooper 8/23/2017

## 2017-08-23 NOTE — ASSESSMENT & PLAN NOTE
-cont with Coreg 12.5 BID, lisinopril 20mg QD, hydralazine PO PRN  - cont to monitor BP/HR, stable    8/18 Given rising Cr, will hold Lisinopril. Will consider adjusting Coreg/adding Amlodipine as needed for BP control.   8/22 increased coreg to 25mg BID, BP improved

## 2017-08-23 NOTE — ASSESSMENT & PLAN NOTE
Endocarditis treatment w IVAbx (ceftriaxone 2g q12h, ampicillin 2g q6h) with end date 9/16 (6 week course); + R sided PICC     8/16 Cr 1.6, discussed w pharm. decreased Ampicillin to Q6  8/17 Cr 1.8, repeat BMP tomorrow  8/18 CR rising to 1.9. ELDER vs AIN from the Ampicillin.  Reviewed w pharmacy, and current dose of Amp is appropriate until CrCl is < 10    ----  Will hold lisinopril, and cont to trend BMP through wknd (not drawn Saturday). Given rise in BUN in setting on Nausea, intermittent emesis, and poor PO intake will give 1L IVF this evening (completed)   ----- Will also reach out to ID is there is another option for coverage     8/19 no further emesis since yesterday afternoon, continue to encourage fluids and repeat BMP to monitor BUN/Cr    8/20 BUN/Cr 25/2.1, will give an additional 1L of NS and follow up BMP   8/21 BUN/Cr 22/2.0, continue to encourage fluids and alleviate nausea/emesis - await recs from ID regarding antibiotic coverage and given another 1L of fluids  8/22 BUN/Cr 17/1.9, given another 1L of IVF  8/23 labs pending      BMP  Lab Results   Component Value Date     08/22/2017    K 3.5 08/22/2017     08/22/2017    CO2 24 08/22/2017    BUN 17 08/22/2017    CREATININE 1.9 (H) 08/22/2017    CALCIUM 8.7 08/22/2017    ANIONGAP 9 08/22/2017    ESTGFRAFRICA 41.0 (A) 08/22/2017    EGFRNONAA 35.4 (A) 08/22/2017

## 2017-08-23 NOTE — PROGRESS NOTES
"Speech Therapy   Treatment    Juan Manuel Koehler   MRN: [id number]        08/23/17 1500   Speech Time Calculation   Speech Start Time 1500   Speech Stop Time 1545   Speech Total (min) 45 min   General Information   SLP Treatment Date 08/23/17   General Observations Pt seen in ST office while sitting upright in w/c with spouse present for 10 minutes of sessino only.   General Precautions aspiration;fall   Visual/Auditory Vision impaired  (glasses)   Subjective   Patient states Pt stated "I think my thinking has improved with the stroke."   Pain/Comfort   Pain Rating no pain       SLP Cognitive Treatment   Treatment Detail (SLP Cognitive Treatment) Pt engaged in orientation task to place and time with 100% acc indly. Pt completed recall task of previous sessions completed today with 70% acc, given min verbal cues pt with increase to 100% acc. Insight task completed verbally regarding cognition and CVA requiring moderate-max verbal cues. Pt noted with confusion within conversation with pt stating "She took me off a regular diet and game me a fruit and liquid diet only" in regarding to nausea and CVA results. CVA education provided at length within reading task. Pt with significant amount of errors within reading causing confusion and poor comprehension. Reading task taken away with education provided verbally regarding incidence, types of CVA, home modifications, and signs/symptoms of CVA. Pt verbalized understanding, but would benefit from repetitions of information. Pt requires supervision at this time 2' poor safety awareness and insight skills.    Assessment   SLP Diagnosis mild-moderate cognitive deficits   Prognosis Good   Problem List decreased attention skills; poor insight skills; decreased thought organization skills   Session Assessment progressing toward goals   Level of Motivation/Participation good   Discharge Recommendations   Discharge Facility/Level Of Care Needs home health speech therapy   Plan   Plan " Continue with current plan   Treatment/Billable Minutes   Speech Therapy Individual 45   Total Time 45     FIM Scores  Comprehension:5  Expression: 5  Social Interaction:5  Problem Solvin  Memory: 3    Comprehension:  Complex= humor, finances, rationale for medical treatment(hip precautions, pressure relief)  Basic= pain, hunger, thirst, bathroom needs, cold, nutrition, sleep    Understands basic needs 90% of the time (5)    Expression:  Expresses basic needs or ideas 90% of the time  (5)    Social Interaction:  Interacts appropriately 90% of time - needs monitoring or encouragement for participation or interaction  (5)    Problem Solving:  Solves basic problems 75-89% of the time  (4)    Memory:  Recognizes, recalls, or executes 50-74% of time 2 steps of 2 step request (3)      Allison Charles CCC-SLP  2017

## 2017-08-23 NOTE — ASSESSMENT & PLAN NOTE
- Has had frequent nausea with intermittent [place] [facility] [place]    Suspect secondary to IV Abx.     - 8/16 juaquin Zofran prior to meals and monitor, KUB was unremarkable  - 8/18 Added phenergan yesterday. Took first dose this AM, and thought nausea was a little better. No emesis today. Will add PPI, and cont to monitor closely.    8/19 one time episode of emesis yesterday per patient, but reports that the nausea has improved with newly added phenergan.  8/21 nausea and emesis persists, will add on schedule marinol TID before meals, await ID recs for antibiotics as they are likely contributing to nausea  8/22 increased marinol to 5mg TID before meals, and will add scopolamine patch as well  8/23 no emesis this morning, trialing small bites and sips and will monitor

## 2017-08-23 NOTE — TREATMENT PLAN
"[facility] DME recommendations  DME to be delivered home unless otherwise specified.         [] NO DME NEEDED ________________________________________________________________________    [x]Walker:(can only select one of these)  [x]Adult (5'4"-6'6") []Ritesh (4'4"-5'7")                           [] Wide/ Heavy Duty         []Miguel                  []  Rollator                                                 [] knee walker       Accessories/Other:____________________________________     Wheels:must complete) [] Yes  [x] No     []Crutches:  []Axillary []Loft strand    []Cane:              []Straight []Narrow based quad   []Wide based quad         [] Other:____________________________________________    [] Transfer Devices:   []Mackenzie Lift    []Slide Board ( [] with cut out  []26  []29)    ______________________________________________________________________    []Wheelchair:     Number of hours up in wheelchair per     day:____________     Style:  [] Standard [] Pediatric  [] Light weight  []Reclining     []Amputee [] Miguel [] POV []Custom     Custom Vendor:________________________________________                                                      Seat Width: []18 (standard adult)    []16" (small adult)   []14(child)      [] 20  [] 22 [] 24         Seat Depth:   []16    []18  []20      Back Height:    []Standard      []Miguel          []Other     Leg Support: []Standard []Heel loops []Elevating leg rest    []Articulating              []Swing Away     Arm Height:  []Detachable []Full   [] Desk  []Swing Away [] Adjustable Height          Lap Belt: []Velcro []Buckle                               Accessories: [] Front brakes          [] Brake Extensions  []Anti-tippers                          []Safety Belt    Other:_______________________________________________     Cushion: []Basic []Foam []Gel  []Roho []Eran I      Justification for Cushion(Must " complete):________________________________    ______________________________________________________________________        For wheelchair order: (please choose all that apply)  - Caregiver is capable and willing to operate wheelchair safely. []  - Patients upper body strength is sufficient for propulsion. []   - The patient has a cast, brace, or musculoskeletal condition which prevents 90 degree flexion of the knee. []  - The patient has significant edema of the lower extremities that requires elevating leg rests.  []  - A reclining back is ordered. []  - The patient requires the use of a wheel chair for ADLs within the home. []  - Patient mobility limitations cannot be sufficiently resolved by the use of other ambulatory therapies. []         __    [x]3 in 1 commode: []Standard     []Wide-Heavy Duty           [x]Drop arm                                      []Heavy Duty Drop Arm                              [x]Tub bench:  []Padded      [x](Unpadded=standard)     []Commode Opening                                          [] Heavy Duty    []Shower Chair: []With back   []Without back      []Hospital Bed: []Semi Electric    []Manual    []Electric   []Heavy Duty  []Extra Heavy Duty(>600#)  Patient requires a bed height different than a fixed height hospital bed to permit transfers to chair, wheel chair or standing. 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

## 2017-08-23 NOTE — ASSESSMENT & PLAN NOTE
Hx of  Urothelial carcinoma of bladder   - Follow-up with urology as an outpatient with Dr. Robles and with oncology, Dr. Calero     8/18 Functional, Last UA 8/12, and that was negative   8/21 mild issues with leaking from the urostomy site, now controlled  8/22 wound care evaluated urostomy site, and will continue to monitor

## 2017-08-23 NOTE — PROGRESS NOTES
"Occupational Therapy   AM Treatment    Juan Manuel Koehler   MRN: [id number]      08/23/17 1015   OT Time Calculation   OT Start Time 1015   OT Stop Time 1100   OT Total Time (min) 45 min   General   OT Date of Treatment 08/23/17   Family/Caregiver Present Yes  (wife)   Patient Found (position) Seated in wheelchair   Pt found with (urinary stoma)   Precautions   General Precautions aspiration;fall   Orthopedic No   Visual/Auditory (glasses)   Subjective   Patient states "Doing a little better today"   Pain/Comfort   Pain Rating no pain   Transfers   Transfer yes   Sit to Stand   Sit <> Stand Assistance Contact Guard Assistance   Sit <> Stand Assistive Device Rolling Walker   Trials/Comments posterior lean; from w/c   Stand to Sit   Assistance Contact Guard Assistance   Assistive Device Rolling Walker   Trials/Comments to w/c; VCs for reaching for armrest   LE Dressing   LE Dressing Yes   LE Dressing  Level of Assistance Contact guard   LE Dressing Level of Assistance Contact guard   LE Dressing Where Assessed Wheelchair   LE Dressing Comments CGA for steadying assist with RW as pt performed 4/4 steps for pants    Exercise Tools   Exercise Tools Yes   Bilateral Gilbert 5x20 reps 5# inclined to improve UB strength for functional t/fs    Additional Activities   Additional Activities Other (Comment)   Additional Activities Comments Pt performed card matching task to improve standing endurance and balance for x10 minutes without requiring a rest break.   Activity Tolerance   Activity Tolerance Patient tolerated treatment well   After Treatment   Patient Position After Treatment Seated in wheelchair   Patient after treatment left call button in reach  (wife present)   Assessment   Prognosis Good   Problem List Decreased Self Care skills;Decreased upper extremity strength;Decreased safe judgment during ADL;Decreased endurance;Decreased functional mobility;Decreased gross motor control;Decreased IADLs   Assessment Pt tolerated " treatment well today. He performed standing activities with improved endurance, as he did not request a seated rest break. Pt with nausea and minimal vomiting during session. Pt demos balance impairments during standing and required min VCs for posture. Pt would continue to benefit from skilled OT services to improved functional (I) with ADLs/IADls.   Level of Motivation/Participation good   Barriers to Discharge None   Plan   Plan Continue with current plan   Therapy Frequency 2 times/day;Monday-Friday;Saturday or Sunday   Plan of Care Expires on 09/02/17   Occupational Therapy Follow-up   OT Follow-up? Yes   Treatment/Billable Minutes   Self Care/Home Management 15   Therapeutic Activity 15   Therapeutic Exercise 15   Total Time 45     PREETHI Cooper   8/23/2017

## 2017-08-24 LAB
ANION GAP SERPL CALC-SCNC: 6 MMOL/L
BASOPHILS # BLD AUTO: 0.03 K/UL
BASOPHILS NFR BLD: 0.6 %
BUN SERPL-MCNC: 19 MG/DL
CALCIUM SERPL-MCNC: 8.7 MG/DL
CHLORIDE SERPL-SCNC: 107 MMOL/L
CO2 SERPL-SCNC: 27 MMOL/L
CREAT SERPL-MCNC: 2.1 MG/DL
DIFFERENTIAL METHOD: ABNORMAL
EOSINOPHIL # BLD AUTO: 0 K/UL
EOSINOPHIL NFR BLD: 0.8 %
ERYTHROCYTE [DISTWIDTH] IN BLOOD BY AUTOMATED COUNT: 15.5 %
EST. GFR  (AFRICAN AMERICAN): 36.3 ML/MIN/1.73 M^2
EST. GFR  (NON AFRICAN AMERICAN): 31.4 ML/MIN/1.73 M^2
GLUCOSE SERPL-MCNC: 89 MG/DL
HCT VFR BLD AUTO: 23.6 %
HGB BLD-MCNC: 7.7 G/DL
LYMPHOCYTES # BLD AUTO: 1.1 K/UL
LYMPHOCYTES NFR BLD: 21.9 %
MCH RBC QN AUTO: 29.5 PG
MCHC RBC AUTO-ENTMCNC: 32.6 G/DL
MCV RBC AUTO: 90 FL
MONOCYTES # BLD AUTO: 0.5 K/UL
MONOCYTES NFR BLD: 9.1 %
NEUTROPHILS # BLD AUTO: 3.4 K/UL
NEUTROPHILS NFR BLD: 67.6 %
PLATELET # BLD AUTO: 210 K/UL
PMV BLD AUTO: 9.5 FL
POTASSIUM SERPL-SCNC: 3.3 MMOL/L
RBC # BLD AUTO: 2.61 M/UL
SODIUM SERPL-SCNC: 140 MMOL/L
WBC # BLD AUTO: 5.08 K/UL

## 2017-08-24 PROCEDURE — 36415 COLL VENOUS BLD VENIPUNCTURE: CPT

## 2017-08-24 PROCEDURE — 25000003 PHARM REV CODE 250: Performed by: PHYSICIAN ASSISTANT

## 2017-08-24 PROCEDURE — 85025 COMPLETE CBC W/AUTO DIFF WBC: CPT

## 2017-08-24 PROCEDURE — 97530 THERAPEUTIC ACTIVITIES: CPT

## 2017-08-24 PROCEDURE — 97535 SELF CARE MNGMENT TRAINING: CPT

## 2017-08-24 PROCEDURE — 80048 BASIC METABOLIC PNL TOTAL CA: CPT

## 2017-08-24 PROCEDURE — 12800000 HC REHAB SEMI-PRIVATE ROOM

## 2017-08-24 PROCEDURE — 99233 SBSQ HOSP IP/OBS HIGH 50: CPT | Mod: ,,, | Performed by: PHYSICAL MEDICINE & REHABILITATION

## 2017-08-24 PROCEDURE — 63600175 PHARM REV CODE 636 W HCPCS: Performed by: PHYSICAL MEDICINE & REHABILITATION

## 2017-08-24 PROCEDURE — 92507 TX SP LANG VOICE COMM INDIV: CPT

## 2017-08-24 PROCEDURE — 97150 GROUP THERAPEUTIC PROCEDURES: CPT

## 2017-08-24 PROCEDURE — 25000003 PHARM REV CODE 250: Performed by: PHYSICAL MEDICINE & REHABILITATION

## 2017-08-24 PROCEDURE — 63600175 PHARM REV CODE 636 W HCPCS: Performed by: PHYSICIAN ASSISTANT

## 2017-08-24 PROCEDURE — 97112 NEUROMUSCULAR REEDUCATION: CPT

## 2017-08-24 RX ADMIN — HEPARIN SODIUM 5000 UNITS: 5000 INJECTION, SOLUTION INTRAVENOUS; SUBCUTANEOUS at 04:08

## 2017-08-24 RX ADMIN — AMPICILLIN SODIUM 2 G: 2 INJECTION, POWDER, FOR SOLUTION INTRAMUSCULAR; INTRAVENOUS at 01:08

## 2017-08-24 RX ADMIN — AMPICILLIN SODIUM 2 G: 2 INJECTION, POWDER, FOR SOLUTION INTRAMUSCULAR; INTRAVENOUS at 07:08

## 2017-08-24 RX ADMIN — PANTOPRAZOLE SODIUM 40 MG: 40 TABLET, DELAYED RELEASE ORAL at 07:08

## 2017-08-24 RX ADMIN — CEFTRIAXONE 2 G: 2 INJECTION, SOLUTION INTRAVENOUS at 04:08

## 2017-08-24 RX ADMIN — CARVEDILOL 25 MG: 25 TABLET, FILM COATED ORAL at 07:08

## 2017-08-24 RX ADMIN — AMPICILLIN SODIUM 2 G: 2 INJECTION, POWDER, FOR SOLUTION INTRAMUSCULAR; INTRAVENOUS at 11:08

## 2017-08-24 RX ADMIN — DRONABINOL 5 MG: 2.5 CAPSULE ORAL at 11:08

## 2017-08-24 RX ADMIN — AMPICILLIN SODIUM 2 G: 2 INJECTION, POWDER, FOR SOLUTION INTRAMUSCULAR; INTRAVENOUS at 05:08

## 2017-08-24 RX ADMIN — ASPIRIN 81 MG: 81 TABLET, COATED ORAL at 07:08

## 2017-08-24 RX ADMIN — DRONABINOL 5 MG: 2.5 CAPSULE ORAL at 07:08

## 2017-08-24 RX ADMIN — DRONABINOL 5 MG: 2.5 CAPSULE ORAL at 04:08

## 2017-08-24 RX ADMIN — CARVEDILOL 25 MG: 25 TABLET, FILM COATED ORAL at 08:08

## 2017-08-24 RX ADMIN — TRAZODONE HYDROCHLORIDE 50 MG: 50 TABLET ORAL at 09:08

## 2017-08-24 NOTE — ASSESSMENT & PLAN NOTE
Endocarditis treatment w IVAbx (ceftriaxone 2g q12h, ampicillin 2g q6h) with end date 9/16 (6 week course); + R sided PICC     8/16 Cr 1.6, discussed w pharm. decreased Ampicillin to Q6  8/17 Cr 1.8, repeat BMP tomorrow  8/18 CR rising to 1.9. ELDER vs AIN from the Ampicillin.  Reviewed w pharmacy, and current dose of Amp is appropriate until CrCl is < 10    ----  Will hold lisinopril, and cont to trend BMP through wknd (not drawn Saturday). Given rise in BUN in setting on Nausea, intermittent emesis, and poor PO intake will give 1L IVF this evening (completed)   ----- Will also reach out to ID is there is another option for coverage     8/19 no further emesis since yesterday afternoon, continue to encourage fluids and repeat BMP to monitor BUN/Cr    8/20 BUN/Cr 25/2.1, will give an additional 1L of NS and follow up BMP   8/21 BUN/Cr 22/2.0, continue to encourage fluids and alleviate nausea/emesis - await recs from ID regarding antibiotic coverage and given another 1L of fluids  8/22 BUN/Cr 17/1.9, given another 1L of IVF  8/24 BUN/Cr 19/2.1, nausea/emesis persists - will give another 1L of fluids this afternoon if patient open to extension for rehab      BMP  Lab Results   Component Value Date     08/24/2017    K 3.3 (L) 08/24/2017     08/24/2017    CO2 27 08/24/2017    BUN 19 08/24/2017    CREATININE 2.1 (H) 08/24/2017    CALCIUM 8.7 08/24/2017    ANIONGAP 6 (L) 08/24/2017    ESTGFRAFRICA 36.3 (A) 08/24/2017    EGFRNONAA 31.4 (A) 08/24/2017

## 2017-08-24 NOTE — NURSING
Patient had a vomiting episode at 1600 today. Patient's wife stated she thinks it was from too much movement in the afternoon. Marinol scheduled given to help alleviate nausea/vomiting.

## 2017-08-24 NOTE — PROGRESS NOTES
Physical Therapy   PT FIM    Juan Manuel Koehler   MRN: [id number]        08/24/17 1259   Transfers   Bed/Chair/WC 5   Toilet 5   Tub 4   Locomotion   Distance Walked 3   Distance Wheelchair 3   Walk 5   Wheelchair 5   Mode B   Stairs 5     Carola Burns, PT  8/24/2017

## 2017-08-24 NOTE — PROGRESS NOTES
"Speech Therapy   Treatment    Juan Manuel Koehler   MRN: [id number]        08/24/17 0830   Speech Time Calculation   Speech Start Time 0830   Speech Stop Time 0915   Speech Total (min) 45 min   General Information   SLP Treatment Date 08/24/17   General Observations Pt seen individually in St office while sitting upright in w/c. Pt with c/o nausea with belching t/o session. Pt with eventual vomiting at end of session with returning to room and nursing notified.    General Precautions aspiration;fall   Visual/Auditory Vision impaired  (glasses)   Subjective   Patient states Pt stated "I am not feeling to well today, but I will try to get this done."   Pain/Comfort   Pain Rating no pain       SLP Cognitive Treatment   Treatment Detail (SLP Cognitive Treatment) Pt oriented x4 indly with 100% acc. Pt completed recall task of "to do list" following 3 minute delay with 75% acc indly, given repetitions of information and multiple choices for answers, pt with increase to 83% acc. Despite multiple choices and cues pt with denial of wrong information with stating "That paper does not have that on it. You are wrong." When given visual aid for self correction pt stated "I didn't see that information the first time looking at it." 5 step written sequencing task completed with 75% acc indly, given increased time and min cues for attention skills pt with increase to 100% acc. Word list retention task of F=4 for inclusion completed with 30% acc indly. Pt with poor attention skills and recall skills. Pt requires max verbal cues for use of recall strategies and increased attention for increase to 70% acc. Mental manipulation task unable to be complete 2' pt began vomiting. Pt returned to room with call light in reach and nursing notified.    Assessment   SLP Diagnosis mild-moderate cognitive deficits   Prognosis Good   Problem List decreased attention skills; poor insight; decreased thought organization skills   Session Assessment " progressing toward goals   Level of Motivation/Participation good   Discharge Recommendations   Discharge Facility/Level Of Care Needs home health speech therapy   Plan   Plan Continue with current plan   Treatment/Billable Minutes   Speech Therapy Individual 45   Total Time 45     FIM Scores  Comprehension:6  Expression: 6  Social Interaction:5  Problem Solvin  Memory: 3    Comprehension:  Complex= humor, finances, rationale for medical treatment(hip precautions, pressure relief)  Basic= pain, hunger, thirst, bathroom needs, cold, nutrition, sleep    Understands complex/abstract conversation/directions with extra time, assistance device(glasses, if visual mode or both; hearing aide if auditory mode or both) (6)    Expression:  Expresses complex / abstract ideas with extra time, assistive devic (augmentative communication device    (6)    Social Interaction:  Interacts appropriately 90% of time - needs monitoring or encouragement for participation or interaction  (5)    Problem Solving:  Solves basic problems 75-89% of the time  (4)    Memory:  Recognizes, recalls, or executes 50-74% of time 2 steps of 2 step request (3)      Allison Charles CCC-SLP  2017

## 2017-08-24 NOTE — ASSESSMENT & PLAN NOTE
- Has had frequent nausea with intermittent emesis since [place] [facility] [place]    Suspect secondary to IV Abx.     - 8/16 juaquin Zofran prior to meals and monitor, KUB was unremarkable  - 8/18 Added phenergan yesterday. Took first dose this AM, and thought nausea was a little better. No emesis today. Will add PPI, and cont to monitor closely.    8/19 one time episode of emesis yesterday per patient, but reports that the nausea has improved with newly added phenergan.  8/21 nausea and emesis persists, will add on schedule marinol TID before meals, await ID recs for antibiotics as they are likely contributing to nausea  8/22 increased marinol to 5mg TID before meals, and will add scopolamine patch as well  8/23 no emesis this morning, trialing small bites and sips and will monitor  8/24 emesis this morning following breakfast, marinol appears to have benefit when taken, attempting preauth for marinol before discharge

## 2017-08-24 NOTE — PROGRESS NOTES
Referral for home IV abx faxed to [facility] 8/23/17 for processing.  Referral faxed to [facility] 8/24/17.  Pt scheduled to discharge 8/25/17.

## 2017-08-24 NOTE — PROGRESS NOTES
[facility]  Physical Medicine & Rehab  Progress Note    Patient Name: Juan Manuel Koehler  MRN: [id number]  Patient Class: IP- Rehab   Admission Date: 8/15/2017  Length of Stay: 9 days  Attending Physician: Verónica Lewis MD  Primary Care Provider: Hemant Sweeney MD    Subjective:     Principal Problem:Right-sided cerebrovascular accident (CVA)    Interval History: No acute events over night. Patient continues with nausea and had episode of emesis this morning already after intake of breakfast (boost shake and banana). He took the marinol and reports that his nausea is improving however. Participating in therapies, denies any other issues besides the nausea/emesis.       Scheduled Medications:    ampicillin IVPB  2 g Intravenous Q6H    aspirin  81 mg Oral Daily    carvedilol  25 mg Oral BID    cefTRIAXone (ROCEPHIN) IVPB  2 g Intravenous Q12H    dronabinol  5 mg Oral TID AC    heparin (porcine)  5,000 Units Subcutaneous Q8H    pantoprazole  40 mg Oral Daily    scopolamine  1 patch Transdermal Q3 Days    trazodone  50 mg Oral QHS       PRN Medications: acetaminophen, bisacodyl, dextrose 50%, glucagon (human recombinant), ondansetron, promethazine    Review of Systems   Constitutional: Negative for unexpected weight change.        Vision stable w glasses  Hearing intact  Continent of bowel   HENT: Negative for congestion and ear pain.    Eyes: Negative for discharge.   Respiratory: Negative for shortness of breath.    Cardiovascular: Negative for chest pain.   Gastrointestinal: Positive for nausea and vomiting. Negative for abdominal pain.   Endocrine: Negative for cold intolerance.   Genitourinary: Negative for flank pain.   Neurological: Positive for weakness.   Psychiatric/Behavioral: Negative for dysphoric mood and hallucinations.     Objective:     Vital Signs (Most Recent):  Temp: 98.1 °F (36.7 °C) (08/24/17 0713)  Pulse: 74 (08/24/17 0713)  Resp: 18 (08/24/17 0713)  BP: 132/67  (08/24/17 0713)  SpO2: 97 % (08/23/17 0705)    Vital Signs (24h Range):  Temp:  [98.1 °F (36.7 °C)-98.7 °F (37.1 °C)] 98.1 °F (36.7 °C)  Pulse:  [74-83] 74  Resp:  [17-18] 18  BP: (126-132)/(62-67) 132/67     Physical Exam   Constitutional: He appears well-developed and well-nourished.   HENT:   Head: Normocephalic and atraumatic.   Eyes: EOM are normal.   Cardiovascular: Normal rate.    Pulmonary/Chest: Effort normal. No respiratory distress.   Abdominal: Soft. He exhibits no distension. There is no tenderness.   Genitourinary:   Genitourinary Comments: + urostomy   Musculoskeletal:   UE: SA, EF, EE,  5/5  LE: HF 5/5, KE 5/5, DF/PF 5/5  No calf tenderness    Neurological: He is alert.   Follows all commands   Skin: Skin is warm.   Psychiatric: He has a normal mood and affect.   Vitals reviewed.    NEUROLOGICAL EXAMINATION:     CRANIAL NERVES     CN III, IV, VI   Extraocular motions are normal.     BMP  Lab Results   Component Value Date     08/24/2017    K 3.3 (L) 08/24/2017     08/24/2017    CO2 27 08/24/2017    BUN 19 08/24/2017    CREATININE 2.1 (H) 08/24/2017    CALCIUM 8.7 08/24/2017    ANIONGAP 6 (L) 08/24/2017    ESTGFRAFRICA 36.3 (A) 08/24/2017    EGFRNONAA 31.4 (A) 08/24/2017         Assessment/Plan:      Juan Manuel Koehler is a 67 y.o. male admitted to inpatient rehabilitation on 8/15/2017 for Right-sided cerebrovascular accident (CVA) with impaired mobility and ADLs. Patient remains appropriate for PT, OT, and as required Speech therapy. Patient continues to require 24 hour nursing care as well as daily Physician assessment.    * Right-sided cerebrovascular accident (CVA)    - R  Cerebellar, embolic  - cont w ASA 81/statin  Tentative DC: 8/25, will need 24 hr sup/assistance       Bed mobility supervision  Sit>stand CGA, stand>sit CGA  t/f squat pivot CGA, t/f stand pivot CGA  shower t/f CGA, toilet t/f CGA, car t/f min A  WC supervision 150  Amb CGA w/ ',  stairs CGA x12  steps    Feeding Ind, grooming CGA, bathing min A  UB dress setup, LB dress supervision  Toileting mod A    Comprehension:supervision  Expression: supervision  Social Interaction:supervision  Problem Solving:min A  Memory: mod A    Cont to be limited by endurance, decreased balance, memory, insight , and nausea/emesis        Bacteremia due to Enterococcus    Endocarditis treatment w IVAbx (ceftriaxone 2g q12h, ampicillin 2g q6h) with end date 9/16 (6 week course); + R sided PICC     8/16 Cr 1.6, discussed w pharm. decreased Ampicillin to Q6  8/17 Cr 1.8, repeat BMP tomorrow  8/18 CR rising to 1.9. EDLER vs AIN from the Ampicillin.  Reviewed w pharmacy, and current dose of Amp is appropriate until CrCl is < 10    ----  Will hold lisinopril, and cont to trend BMP through wknd (not drawn Saturday). Given rise in BUN in setting on Nausea, intermittent emesis, and poor PO intake will give 1L IVF this evening (completed)   ----- Will also reach out to ID is there is another option for coverage     8/19 no further emesis since yesterday afternoon, continue to encourage fluids and repeat BMP to monitor BUN/Cr    8/20 BUN/Cr 25/2.1, will give an additional 1L of NS and follow up BMP   8/21 BUN/Cr 22/2.0, continue to encourage fluids and alleviate nausea/emesis - await recs from ID regarding antibiotic coverage and given another 1L of fluids  8/22 BUN/Cr 17/1.9, given another 1L of IVF  8/24 BUN/Cr 19/2.1, nausea/emesis persists - will give another 1L of fluids this afternoon if patient open to extension for rehab      BMP  Lab Results   Component Value Date     08/24/2017    K 3.3 (L) 08/24/2017     08/24/2017    CO2 27 08/24/2017    BUN 19 08/24/2017    CREATININE 2.1 (H) 08/24/2017    CALCIUM 8.7 08/24/2017    ANIONGAP 6 (L) 08/24/2017    ESTGFRAFRICA 36.3 (A) 08/24/2017    EGFRNONAA 31.4 (A) 08/24/2017             Hypertension      -cont with Coreg 12.5 BID, lisinopril 20mg QD, hydralazine PO PRN  - cont to  monitor BP/HR, stable    8/18 Given rising Cr, will hold Lisinopril. Will consider adjusting Coreg/adding Amlodipine as needed for BP control.   8/22 increased coreg to 25mg BID, BP improved        S/P ileal conduit    Hx of  Urothelial carcinoma of bladder   - Follow-up with urology as an outpatient with Dr. Rboles and with oncology, Dr. Calero     8/18 Functional, Last UA 8/12, and that was negative   8/21 mild issues with leaking from the urostomy site, now controlled  8/22 wound care evaluated urostomy site, and will continue to monitor        Nausea    - Has had frequent nausea with intermittent emesis since [place] at [facility]    Suspect secondary to IV Abx.     - 8/16 juaquin Zofran prior to meals and monitor, KUB was unremarkable  - 8/18 Added phenergan yesterday. Took first dose this AM, and thought nausea was a little better. No emesis today. Will add PPI, and cont to monitor closely.    8/19 one time episode of emesis yesterday per patient, but reports that the nausea has improved with newly added phenergan.  8/21 nausea and emesis persists, will add on schedule marinol TID before meals, await ID recs for antibiotics as they are likely contributing to nausea  8/22 increased marinol to 5mg TID before meals, and will add scopolamine patch as well  8/23 no emesis this morning, trialing small bites and sips and will monitor  8/24 emesis this morning following breakfast, marinol appears to have benefit when taken, attempting preauth for marinol before discharge          History of urostomy    - wound care assisting with monitoring of site/mgmt         Impaired mobility and ADLs    Other Rehab Plan/Continue to monitor:   Nutrition/Swallow Status:    - Prealbumin - 14   - Nutritionist on board   Bladder Management: illeostomy  Bowel Management:  continent  - Miralax and Suppository PRN   - Will monitor for regularity   Mood: stable   Sleep: monitor; Failed Ramelteon. 8/17 trial trazadone   Skin: Monitor   DVT ppx:   Heparin                DISCHARGE PLANNING:  Tentative Discharge Date: 8/25/2017    Future Appointments  Date Time Provider Department Center   9/5/2017 10:40 AM Jewels Toth PA-C [facility] NEURO [facility]       Dread Ruiz MD  Department of Physical Medicine & Rehab   [facility]

## 2017-08-24 NOTE — PROGRESS NOTES
Pouch leaking, changed:  Cleaned with water, dried well, cavlion barrier spray, convexity pouch, paste around pouch opening, barrier ring opened and placed on manjit-stomal skin from 7:00-12:00, belt applied and heat applied x 20 minutes.  Tolerated well.  Discussed sitting up at bedside or in bed--patient sits up in bed with waist at turn in bed for hips/buttocks area.  This creates a crease in his abdomen and leaking at this site.  Plan of care discussed with patient/family who verbalized understanding.  CHERYL Alvarado, NP, notified for Rx up-date to convexity pouch and [employer] representative Chris Weaver notified for samples.

## 2017-08-24 NOTE — PROGRESS NOTES
Occupational Therapy   FIM Scores    Juan Manuel Koehler   MRN: [id number]      08/24/17 0730   Transfers   Bed/Chair/WC 5   Toilet 5   Tub 5   Self Care   Eating 1   Grooming 7   Bathing 5   Dressing-Upper 7   Dressing-Lower 5   Toileting 5   Communication   Comprehension 6   Mode B   Expression 7   Mode B   Social Cognition   Social Interaction 7   Problem Solving 6   Memory 6     PREETHI Cooper   8/24/2017

## 2017-08-24 NOTE — PROGRESS NOTES
"Physical Therapy   PT Daily Note    Juan Manuel Koehler   MRN: [id number]      08/24/17 1259   PT Time Calculation   PT Start Time 1259   PT Stop Time 1344   PT Total Time (min) 45 min   Treatment   Treatment Type Treatment   PT/PTA PT   General   PT Received On 08/24/17   Patient Found (position) Supine in bed   Precautions   General Precautions aspiration;fall   Visual/Auditory Vision impaired  (glasses)   Subjective   Patient states "Are we doing that ear thing again? I'm feeling fair today."    Pain/Comfort   Pain Rating 1 no pain   Pain Rating Post-Intervention 1 no pain   Transfers   Transfer yes   Sit to Stand   Sit <> Stand Assistance Stand By Assistance   Sit <> Stand Assistive Device Rolling Walker   Trials/Comments Pt performed multiple trials throughout PT session   Stand to Sit   Assistance Stand By Assistance   Assistive Device Rolling Walker   Trials/Comments Pt performed multiple trials throughout PT session   Bed to Chair   Bed <> Chair Technique Stand Pivot   Bed <> Chair Assistance Stand By Assistance   Bed <> Chair Assistive Device Rolling Walker   Trials/Comments x1 trial; VC for hand placement; staying inside walking and aligning hips to chair for safe descent   Chair to Bed   Technique Stand Pivot   Assistance Stand By Assistance   Assistive Device Rolling Walker   Trials/Comments x1 trial; VC for hand placement; staying inside walking and aligning hips to chair for safe descent   Chair to Mat   Chair<> Mat Technique Stand Pivot   Chair<>Mat Assistance Stand By Assistance   Chair <> Mat Assistive Device Rolling Walker   Trials/Comments x1 trial; VC for hand placement; staying inside walking and aligning hips to chair for safe descent   Mat to Chair   Technique Stand Pivot   Assistance Stand By Assistance   Assistive Device Rolling Walker   Trials/Comments x1 trial; VC for hand placement; staying inside walking and aligning hips to chair for safe descent   Tub Bench Transfer   Technique Stand " Pivot   Assistance Contact Guard Assistance   Assistive Device Rolling Walker   Trials/Comments x1 trial; VC for hand placement; staying inside walking and aligning hips to chair for safe descent. PT had to put hands on pt to stop descent (no assistance given) in order to better align hips for descent with TTB   Wheelchair Activities   Propulsion Yes   Propulsion Type 1 Manual   Level 1 Level tile   Method 1 Right upper extremity;Left upper extremity   Level of Assistance 1 Supervision   Description/ Details 1 Pt propelled self 162' with BUE and VC to assist in more efficent turns; x2 turns 180 deg   Gait   Gait Yes   Gait 1   Surface 1 Level tile   Gait Assistive Device Rolling walker   Assistance 1 Stand by Assistance   Gait Distance Pt ambulated x185' with VC to slow down and maintain balance. Pt had a tendency to walk outside of walker with occasional scissoring causing minor LOB in which he self recovered without assistance from PT.    Gait Pattern swing-through gait   Gait Deviation(s) decreased step length;backward lean   Stairs   Stairs Yes   Stairs/Curb Step   Rails 1 Bilateral   Device 1 No device   Assistance 1 Stand by Assistance   Comment/# Steps 1 Pt negotiated 16 steps with BHR and SBA with a reciprocal gait pattern.    Other Activities   Comments PT performed R BBQ roll treatment to address L horizontal canalithiasis.    Activity Tolerance   Activity Tolerance Patient tolerated treatment well   After Treatment   Patient Position After Treatment Seated in wheelchair  (waiting for PT group)   Assessment   Prognosis Good   Problem List Impaired balance;Decreased mobility;Decreased coordination;Decreased safety awareness   Session Assessment progressing toward goals   Assessment Pt continues to demonstrate improvement in his functional mobility and ADLs. Pt would benefit from continued PT in order to increase safety awareness and ability to utilize the RW in functional mobility. Pt is appropriate for  continued IPR.    Level of Motivation/Participation good   Barriers to Discharge Decreased caregiver support   Plan   Planned Therapy Intervention Continue with current plan   Treatment/Billable Minutes   Therapeutic Activity 30   Neuromuscular Re-education 15   Total Time 45   Carola Burns, PT  8/24/2017

## 2017-08-24 NOTE — ASSESSMENT & PLAN NOTE
- R  Cerebellar, embolic  - cont w ASA 81/statin  Tentative DC: 8/25, will need 24 hr sup/assistance       Bed mobility supervision  Sit>stand CGA, stand>sit CGA  t/f squat pivot CGA, t/f stand pivot CGA  shower t/f CGA, toilet t/f CGA, car t/f min A  WC supervision 150  Amb CGA w/ ',  stairs CGA x12 steps    Feeding Ind, grooming CGA, bathing min A  UB dress setup, LB dress supervision  Toileting mod A    Comprehension:supervision  Expression: supervision  Social Interaction:supervision  Problem Solving:min A  Memory: mod A    Cont to be limited by endurance, decreased balance, memory, insight , and nausea/emesis

## 2017-08-24 NOTE — PROGRESS NOTES
Physical Therapy   PT seated endurance group    Juan Manuel Koehler   MRN: [id number]        08/24/17 1406   PT Time Calculation   PT Start Time 1406   PT Stop Time 1451   PT Total Time (min) 45 min   Treatment   Treatment Type Treatment;Other (see comments)  (Seated Endurance group)   PT/PTA PT   General   PT Received On 08/24/17   Patient Found (position) Seated in wheelchair   Precautions   General Precautions aspiration;fall   Visual/Auditory Vision impaired  (glasses)   Subjective   Patient states Pt is agreeable to participate in the PT group   Pain/Comfort   Pain Rating 1 no pain   Pain Rating Post-Intervention 1 no pain   Other Activities   Comments Patient participated in a seated endurance group for 45 minutes with emphasis on endurance building and strengthening muscle groups. Pt began with a warm up that included shoulder rolls and gentle cervical ROM. Pt then performed the following exercises continuously for endurance and strengthening: hip flexion, hip add/abduction, hip circumduction, LAQs, DF/PF, shoulder circles (low & mid levels), shoulder reaches overhead, bicep curls, shoulder flexion, and other shoulder movements. These activities were performed in a group setting to encourage increased participation with peers and social interaction.   Activity Tolerance   Activity Tolerance Patient tolerated treatment well   After Treatment   Patient Position After Treatment Seated in wheelchair  (posey belt donned; wife escorted back )   Treatment/Billable Minutes   Therapeutic Activity Group 45   Total Time 45     Carola Burns, PT  8/24/2017

## 2017-08-24 NOTE — PROGRESS NOTES
"Occupational Therapy   AM Treatment    Juan Manuel Koehler   MRN: [id number]    08/24/17 0730   OT Time Calculation   OT Start Time 0730   OT Stop Time 0816   OT Total Time (min) 46 min   General   OT Date of Treatment 08/24/17   Family/Caregiver Present No   Patient Found (position) Supine in bed   Pt found with arterial line   Precautions   General Precautions aspiration;fall   Orthopedic No   Visual/Auditory (glasses)   Subjective   Patient states "I'm feeling better now. Let's do this."   Pain/Comfort   Pain Rating no pain   Bed Mobility   Rolling/Turning to Left Independent   Rolling/Turning Left Comments no use of bedrails   Rolling/Turning Right Independent   Rolling/Turning Right Comments no use of bedrails   Scooting/Bridging Independent   Scooting/Bridging Comments no use of bedrails   Supine to Sit Independent   Supine to Sit Comments no use of bedrails   Sit to Supine Independent   Sit to Supine Comments no use of bedrails   Transfers   Transfer yes   Sit to Stand   Sit <> Stand Assistance Supervision   Sit <> Stand Assistive Device Rolling Walker   Trials/Comments (S) for safety with balance   Stand to Sit   Assistance Supervision   Assistive Device Rolling Walker   Trials/Comments (S) for safety with balance   Bed to Chair   Bed <> Chair Technique Stand Pivot   Bed <> Chair Transfer Assistance Stand By Assistance   Bed <> Chair Assistive Device Rolling Walker   Trials/Comments SBA for safety with standing balance to w/c   Chair to Mat   Chair <>Mat Technique Stand Pivot   Chair <> Mat Assistance Stand By Assistance   Chair<> Mat Assistive Device Rolling Walker   Trials/Comments SBA for safety with standing balance from w/c   Mat to Chair   Technique Stand Pivot   Assistance Stand By Assistance   Assistive Device Rolling Walker   Trials/Comments SBA for safety with standing balance to w/c   Tub Bench Transfer   Tub Transfer Technique Stand Pivot   Tub Bench Transfer Assistance Stand By Assistance   Tub " Transfer Assistive Device Rolling Walker   Trials/Comments SBA for safety with standing balance from w/c to tub bench with RW   Toilet Transfer   Toilet Transfer Technique Stand Pivot   Toilet Transfer Assistance Stand By Assistance   Toilet Transfer Assistive Device Rolling Walker   Trials/Comments SBA for safety with standing balance from w/c to drop arm commode with RW   Feeding   Feeding Level of Assistance Total assistance   Feeding Where Assessed Wheelchair   Feeding Comments Total (A) pt receiving IV fluids   Grooming   Grooming Level of Assistance Independent   Grooming Where Assessed Sitting sinkside   Grooming Comments pt performed 4/4 grooming tasks with no extra time    Bathing   Bathing Level of Assistance Stand by assistance   Bathing Where Assessed Shower;Shower Chair/bench   Bathing Comments SBA for safety with standing balance as pt bathed 10/10 body parts   UE Dressing   UE Dressing Level of Assistance Independent   UE Dressing Where Assessed Other (Comment)  (shower bench)   UE Dressing Comments Pt performed 3/3 tasks with pullover shirt   LE Dressing   LE Dressing Yes   LE Dressing  Level of Assistance Stand by assistance   LE Dressing Level of Assistance Stand by assistance   Sock Level of Assistance Supervision   LE Dressing Where Assessed Other (Comment)  (shower bench)   LE Dressing Comments SBA for safety with standing balance as pt performed 4/4 steps for pants and (S) for donning B socks   Toileting   Toileting Level of Assistance Stand by assistance   Toileting Where Assessed Toilet   Toileting Comments SBA for safety with standing balance as pt able to perform 3/3 steps   After Treatment   Patient Position After Treatment Seated in wheelchair   Patient after treatment left call button in reach  (safety belt donned)   Assessment   Prognosis Good   Problem List Decreased Self Care skills;Decreased upper extremity strength;Decreased endurance;Decreased functional mobility;Decreased IADLs    Assessment Pt tolerated tx session well today with a minor episode of nausea. Pt performed bathing, toileting, and t/fs with SBA for safety with standing balance. Pt reports his spouse will be present in home upon D/C to assist if needed.    Level of Motivation/Participation good   Barriers to Discharge None   Discharge Recommendations   Equipment Needed After Discharge 3-in-1 commode;walker, rolling;bath bench   Plan   Plan Continue with current plan   Therapy Frequency 2 times/day;Monday-Friday;Saturday or Sunday   Plan of Care Expires on 09/02/17   Occupational Therapy Follow-up   OT Follow-up? Yes   Treatment/Billable Minutes   Self Care/Home Management 46   Total Time 46     PREETHI Cooper   8/24/2017

## 2017-08-24 NOTE — SUBJECTIVE & OBJECTIVE
Interval History: No acute events over night. Patient continues with nausea and had episode of emesis this morning already after intake of breakfast (boost shake and banana). He took the marinol and reports that his nausea is improving however. Participating in therapies, denies any other issues besides the nausea/emesis.       Scheduled Medications:    ampicillin IVPB  2 g Intravenous Q6H    aspirin  81 mg Oral Daily    carvedilol  25 mg Oral BID    cefTRIAXone (ROCEPHIN) IVPB  2 g Intravenous Q12H    dronabinol  5 mg Oral TID AC    heparin (porcine)  5,000 Units Subcutaneous Q8H    pantoprazole  40 mg Oral Daily    scopolamine  1 patch Transdermal Q3 Days    trazodone  50 mg Oral QHS       PRN Medications: acetaminophen, bisacodyl, dextrose 50%, glucagon (human recombinant), ondansetron, promethazine    Review of Systems   Constitutional: Negative for unexpected weight change.        Vision stable w glasses  Hearing intact  Continent of bowel   HENT: Negative for congestion and ear pain.    Eyes: Negative for discharge.   Respiratory: Negative for shortness of breath.    Cardiovascular: Negative for chest pain.   Gastrointestinal: Positive for nausea and vomiting. Negative for abdominal pain.   Endocrine: Negative for cold intolerance.   Genitourinary: Negative for flank pain.   Neurological: Positive for weakness.   Psychiatric/Behavioral: Negative for dysphoric mood and hallucinations.     Objective:     Vital Signs (Most Recent):  Temp: 98.1 °F (36.7 °C) (08/24/17 0713)  Pulse: 74 (08/24/17 0713)  Resp: 18 (08/24/17 0713)  BP: 132/67 (08/24/17 0713)  SpO2: 97 % (08/23/17 0705)    Vital Signs (24h Range):  Temp:  [98.1 °F (36.7 °C)-98.7 °F (37.1 °C)] 98.1 °F (36.7 °C)  Pulse:  [74-83] 74  Resp:  [17-18] 18  BP: (126-132)/(62-67) 132/67     Physical Exam   Constitutional: He appears well-developed and well-nourished.   HENT:   Head: Normocephalic and atraumatic.   Eyes: EOM are normal.   Cardiovascular:  Normal rate.    Pulmonary/Chest: Effort normal. No respiratory distress.   Abdominal: Soft. He exhibits no distension. There is no tenderness.   Genitourinary:   Genitourinary Comments: + urostomy   Musculoskeletal:   UE: SA, EF, EE,  5/5  LE: HF 5/5, KE 5/5, DF/PF 5/5  No calf tenderness    Neurological: He is alert.   Follows all commands   Skin: Skin is warm.   Psychiatric: He has a normal mood and affect.   Vitals reviewed.    NEUROLOGICAL EXAMINATION:     CRANIAL NERVES     CN III, IV, VI   Extraocular motions are normal.     BMP  Lab Results   Component Value Date     08/24/2017    K 3.3 (L) 08/24/2017     08/24/2017    CO2 27 08/24/2017    BUN 19 08/24/2017    CREATININE 2.1 (H) 08/24/2017    CALCIUM 8.7 08/24/2017    ANIONGAP 6 (L) 08/24/2017    ESTGFRAFRICA 36.3 (A) 08/24/2017    EGFRNONAA 31.4 (A) 08/24/2017

## 2017-08-25 PROBLEM — D64.9 ANEMIA: Status: ACTIVE | Noted: 2017-08-25

## 2017-08-25 LAB
ABO + RH BLD: NORMAL
ANION GAP SERPL CALC-SCNC: 10 MMOL/L
ANION GAP SERPL CALC-SCNC: 10 MMOL/L
BASOPHILS # BLD AUTO: 0.03 K/UL
BASOPHILS NFR BLD: 0.6 %
BLD GP AB SCN CELLS X3 SERPL QL: NORMAL
BLD PROD TYP BPU: NORMAL
BLD PROD TYP BPU: NORMAL
BLOOD UNIT EXPIRATION DATE: NORMAL
BLOOD UNIT EXPIRATION DATE: NORMAL
BLOOD UNIT TYPE CODE: 7300
BLOOD UNIT TYPE CODE: 7300
BLOOD UNIT TYPE: NORMAL
BLOOD UNIT TYPE: NORMAL
BUN SERPL-MCNC: 17 MG/DL
BUN SERPL-MCNC: 17 MG/DL
CALCIUM SERPL-MCNC: 8.9 MG/DL
CALCIUM SERPL-MCNC: 8.9 MG/DL
CHLORIDE SERPL-SCNC: 108 MMOL/L
CHLORIDE SERPL-SCNC: 108 MMOL/L
CO2 SERPL-SCNC: 24 MMOL/L
CO2 SERPL-SCNC: 24 MMOL/L
CODING SYSTEM: NORMAL
CODING SYSTEM: NORMAL
CREAT SERPL-MCNC: 2.1 MG/DL
CREAT SERPL-MCNC: 2.1 MG/DL
DIFFERENTIAL METHOD: ABNORMAL
DISPENSE STATUS: NORMAL
DISPENSE STATUS: NORMAL
EOSINOPHIL # BLD AUTO: 0.1 K/UL
EOSINOPHIL NFR BLD: 2 %
ERYTHROCYTE [DISTWIDTH] IN BLOOD BY AUTOMATED COUNT: 15.7 %
EST. GFR  (AFRICAN AMERICAN): 36.3 ML/MIN/1.73 M^2
EST. GFR  (AFRICAN AMERICAN): 36.3 ML/MIN/1.73 M^2
EST. GFR  (NON AFRICAN AMERICAN): 31.4 ML/MIN/1.73 M^2
EST. GFR  (NON AFRICAN AMERICAN): 31.4 ML/MIN/1.73 M^2
GLUCOSE SERPL-MCNC: 89 MG/DL
GLUCOSE SERPL-MCNC: 89 MG/DL
HCT VFR BLD AUTO: 22.7 %
HGB BLD-MCNC: 7.5 G/DL
LYMPHOCYTES # BLD AUTO: 0.9 K/UL
LYMPHOCYTES NFR BLD: 19.1 %
MAGNESIUM SERPL-MCNC: 1.5 MG/DL
MCH RBC QN AUTO: 30.1 PG
MCHC RBC AUTO-ENTMCNC: 33 G/DL
MCV RBC AUTO: 91 FL
MONOCYTES # BLD AUTO: 0.5 K/UL
MONOCYTES NFR BLD: 9.2 %
NEUTROPHILS # BLD AUTO: 3.4 K/UL
NEUTROPHILS NFR BLD: 69.1 %
PLATELET # BLD AUTO: 183 K/UL
PMV BLD AUTO: 10.2 FL
POTASSIUM SERPL-SCNC: 3.5 MMOL/L
POTASSIUM SERPL-SCNC: 3.5 MMOL/L
RBC # BLD AUTO: 2.49 M/UL
SODIUM SERPL-SCNC: 142 MMOL/L
SODIUM SERPL-SCNC: 142 MMOL/L
TRANS ERYTHROCYTES VOL PATIENT: NORMAL ML
TRANS ERYTHROCYTES VOL PATIENT: NORMAL ML
WBC # BLD AUTO: 4.91 K/UL

## 2017-08-25 PROCEDURE — 97110 THERAPEUTIC EXERCISES: CPT

## 2017-08-25 PROCEDURE — 99233 SBSQ HOSP IP/OBS HIGH 50: CPT | Mod: ,,, | Performed by: PHYSICAL MEDICINE & REHABILITATION

## 2017-08-25 PROCEDURE — 25000003 PHARM REV CODE 250: Performed by: PHYSICAL MEDICINE & REHABILITATION

## 2017-08-25 PROCEDURE — P9021 RED BLOOD CELLS UNIT: HCPCS

## 2017-08-25 PROCEDURE — 63600175 PHARM REV CODE 636 W HCPCS: Performed by: PHYSICIAN ASSISTANT

## 2017-08-25 PROCEDURE — 97116 GAIT TRAINING THERAPY: CPT

## 2017-08-25 PROCEDURE — 97150 GROUP THERAPEUTIC PROCEDURES: CPT

## 2017-08-25 PROCEDURE — 83735 ASSAY OF MAGNESIUM: CPT

## 2017-08-25 PROCEDURE — 80048 BASIC METABOLIC PNL TOTAL CA: CPT

## 2017-08-25 PROCEDURE — 86850 RBC ANTIBODY SCREEN: CPT

## 2017-08-25 PROCEDURE — 97112 NEUROMUSCULAR REEDUCATION: CPT

## 2017-08-25 PROCEDURE — 12800000 HC REHAB SEMI-PRIVATE ROOM

## 2017-08-25 PROCEDURE — 86920 COMPATIBILITY TEST SPIN: CPT

## 2017-08-25 PROCEDURE — 97803 MED NUTRITION INDIV SUBSEQ: CPT

## 2017-08-25 PROCEDURE — 85025 COMPLETE CBC W/AUTO DIFF WBC: CPT

## 2017-08-25 PROCEDURE — 25000003 PHARM REV CODE 250: Performed by: PHYSICIAN ASSISTANT

## 2017-08-25 PROCEDURE — 86900 BLOOD TYPING SEROLOGIC ABO: CPT

## 2017-08-25 PROCEDURE — 36415 COLL VENOUS BLD VENIPUNCTURE: CPT

## 2017-08-25 PROCEDURE — 92507 TX SP LANG VOICE COMM INDIV: CPT

## 2017-08-25 PROCEDURE — 97530 THERAPEUTIC ACTIVITIES: CPT

## 2017-08-25 PROCEDURE — 63600175 PHARM REV CODE 636 W HCPCS: Performed by: PHYSICAL MEDICINE & REHABILITATION

## 2017-08-25 RX ORDER — HYDROCODONE BITARTRATE AND ACETAMINOPHEN 500; 5 MG/1; MG/1
TABLET ORAL
Status: DISCONTINUED | OUTPATIENT
Start: 2017-08-25 | End: 2017-08-29 | Stop reason: HOSPADM

## 2017-08-25 RX ADMIN — DRONABINOL 5 MG: 2.5 CAPSULE ORAL at 05:08

## 2017-08-25 RX ADMIN — SCOPALAMINE 1 PATCH: 1 PATCH, EXTENDED RELEASE TRANSDERMAL at 02:08

## 2017-08-25 RX ADMIN — CARVEDILOL 25 MG: 25 TABLET, FILM COATED ORAL at 09:08

## 2017-08-25 RX ADMIN — SODIUM CHLORIDE: 0.9 INJECTION, SOLUTION INTRAVENOUS at 05:08

## 2017-08-25 RX ADMIN — TRAZODONE HYDROCHLORIDE 50 MG: 50 TABLET ORAL at 09:08

## 2017-08-25 RX ADMIN — AMPICILLIN SODIUM 2 G: 2 INJECTION, POWDER, FOR SOLUTION INTRAMUSCULAR; INTRAVENOUS at 11:08

## 2017-08-25 RX ADMIN — AMPICILLIN SODIUM 2 G: 2 INJECTION, POWDER, FOR SOLUTION INTRAMUSCULAR; INTRAVENOUS at 12:08

## 2017-08-25 RX ADMIN — DRONABINOL 5 MG: 2.5 CAPSULE ORAL at 11:08

## 2017-08-25 RX ADMIN — CEFTRIAXONE 2 G: 2 INJECTION, SOLUTION INTRAVENOUS at 03:08

## 2017-08-25 RX ADMIN — PROMETHAZINE HYDROCHLORIDE 12.5 MG: 12.5 TABLET ORAL at 08:08

## 2017-08-25 RX ADMIN — DRONABINOL 5 MG: 2.5 CAPSULE ORAL at 08:08

## 2017-08-25 RX ADMIN — CEFTRIAXONE 2 G: 2 INJECTION, SOLUTION INTRAVENOUS at 02:08

## 2017-08-25 RX ADMIN — CARVEDILOL 25 MG: 25 TABLET, FILM COATED ORAL at 08:08

## 2017-08-25 RX ADMIN — HEPARIN SODIUM 5000 UNITS: 5000 INJECTION, SOLUTION INTRAVENOUS; SUBCUTANEOUS at 09:08

## 2017-08-25 RX ADMIN — HEPARIN SODIUM 5000 UNITS: 5000 INJECTION, SOLUTION INTRAVENOUS; SUBCUTANEOUS at 02:08

## 2017-08-25 RX ADMIN — AMPICILLIN SODIUM 2 G: 2 INJECTION, POWDER, FOR SOLUTION INTRAMUSCULAR; INTRAVENOUS at 05:08

## 2017-08-25 RX ADMIN — ONDANSETRON 8 MG: 8 TABLET, ORALLY DISINTEGRATING ORAL at 08:08

## 2017-08-25 RX ADMIN — ASPIRIN 81 MG: 81 TABLET, COATED ORAL at 08:08

## 2017-08-25 RX ADMIN — PANTOPRAZOLE SODIUM 40 MG: 40 TABLET, DELAYED RELEASE ORAL at 08:08

## 2017-08-25 NOTE — NURSING
500ml NS up with Blood tubing. 1st Unit PRBC's up to infuse over 3-4 hours. See transfusion flow sheet. Infusing without diff, no signs of initial reaction noted. Will remain with patient for first 15 min.

## 2017-08-25 NOTE — ASSESSMENT & PLAN NOTE
- R  Cerebellar, embolic  - cont w ASA 81/statin  Tentative DC: 8/25, will need 24 hr sup/assistance       Bed mobility Ind  Sit<>stand SBA  t/f squat pivot CGA, t/f stand pivot SBA  shower t/f CGA, toilet t/f SBA, car t/f min A  WC supervision 162'  Amb SBAw/ ',  stairs SBAx12 steps    Feeding Ind, grooming Ind, bathing SBA  UB dress Ind, LB dress SBA  Toileting SBA    Comprehension: mod I  Expression: mod I   Social Interaction:supervision  Problem Solving:min A  Memory: mod A    Cont to be limited by endurance, decreased balance, memory, insight , and nausea/emesis

## 2017-08-25 NOTE — PROGRESS NOTES
PICC line dressing changed, unable to change PICC line lomas due to not being available. Redness noted at insertion site with some crusting, cleansed really well with the swabs provided, bio patch placed, dressing secured.

## 2017-08-25 NOTE — PROGRESS NOTES
[facility]  Adult Nutrition  Progress Note    SUMMARY     Recommendations    Recommendation/Intervention: Continue Regular diet, Continue oral supplement tid, Encourage po intake , RD to monitor  Goals: Meet 85% of EEN  Nutrition Goal Status: goal not met  Communication of RD Recs: discussed on rounds    Continuum of Care Plan      Reason for Assessment    Reason for Assessment: RD follow-up  Diagnosis: stroke/CVA  Relevent Medical History: UTI, sepis, endocarditis, bladder cancer, HTN, anemia, STEMI, CKD 3,   Interdisciplinary Rounds: attended      Nutrition Discharge Planning: DC on Regular diet, high protein , high calorie    Nutrition Prescription Ordered    Current Diet Order: Regular  Nutrition Order Comments:  (patient slept through lunch , did not eat lunch yesterday )      Oral Nutrition Supplement: Boost plus chocolate     Evaluation of Received Nutrients/Fluid Intake           Energy Calories Required: not meeting needs            Protein Required: not meeting needs             Fluid Required: not meeting needs  Comments: PO 25-50%  (N/V continues pt asked for saltines before PT after lunch)  Tolerance: not tolerating     % Intake of Estimated Eneggy Needs: 25 - 50 %  % Meal Intake: 50%     Nutrition Risk Screen     Nutrition Risk Screen: no indicators present    Nutrition/Diet History    Patient Reported Diet/Restrictions/Preferences: general     Food Preferences: No cultural or [place] food preferences        Factors Affecting Nutritional Intake: decreased appetite, impaired cognitive status/motor control          Labs/Tests/Procedures/Meds    Pertinent Labs Reviewed: reviewed, pertinent  Pertinent Labs Comments: Cr 2.1  Pertinent Medications Reviewed: other (see comments)  Pertinent Medications Comments: Lisinopril, ASA, statin, Abx senna-docusate, dronabinol    Physical Findings    Overall Physical Appearance: weak, underweight, loss of subcutaneous fat, loss of muscle  "mass  Tubes: other (see comments) (urostomy)     Anthropometrics     Height: 6' 2" (188 cm)  Weight Method: Standard Scale  Weight: 57.7 kg (127 lb 3.2 oz)  Ideal Body Weight (IBW), Male: 190 lb     % Ideal Body Weight, Male (lb): 66.95 lb     BMI (Calculated): 16.4  BMI Grade: less than 16 protein-energy malnutrition grade III  Weight Loss: unintentional     Weight Change Amount: 15 lb (pt reports 40# weight loss this summer, 140# surgery 7/6/17)      Estimated/Assessed Needs         Energy Calorie Requirements (kcal): 5901-7634  Energy Need Method: [place]-St Jeor (x 1.25 x 1.2)      Weight Used For Protein Calculations: 56.7 kg (125 lb)  Protein Requirements: 70-88          RDA Method (mL): 1781   Assessment and Plan  Inadequate oral food and beverage intake related to decreased appetite and hypermetabolism from cancer and infection as evidenced by po intake of 50%, BMI 16.1 and diagnosis of severe malnutrition and FTT.      Monitor and Evaluation  Food and Nutrient Intake: energy intake  Food and Nutrient Adminstration: diet order   Physical Activity and Function: nutrition-related ADLs and IADLs  Anthropometric Measurements: weight change  Biochemical Data, Medical Tests and Procedures: electrolyte and renal panel, inflammatory profile  Nutrition-Focused Physical Findings: overall appearance  Nutrition Risk  Level of Risk:  (follow twice weekly)  Nutrition Follow-Up  RD Follow-up?: Yes  "

## 2017-08-25 NOTE — SUBJECTIVE & OBJECTIVE
Interval History: No acute events over night. Patient with an episode of nausea yesterday afternoon, but none since. He is working with PT this morning, and looks improved. No emesis as of yet today, and nausea improving. Agreeable to extend DC as he can continue to make progress and monitor medical co-morbidities.       Scheduled Medications:    ampicillin IVPB  2 g Intravenous Q6H    aspirin  81 mg Oral Daily    carvedilol  25 mg Oral BID    cefTRIAXone (ROCEPHIN) IVPB  2 g Intravenous Q12H    dronabinol  5 mg Oral TID AC    heparin (porcine)  5,000 Units Subcutaneous Q8H    pantoprazole  40 mg Oral Daily    scopolamine  1 patch Transdermal Q3 Days    trazodone  50 mg Oral QHS       PRN Medications: acetaminophen, bisacodyl, dextrose 50%, glucagon (human recombinant), ondansetron, promethazine    Review of Systems   Constitutional: Negative for unexpected weight change.        Vision stable w glasses  Hearing intact  Continent of bowel   HENT: Negative for congestion and ear pain.    Eyes: Negative for discharge.   Respiratory: Negative for shortness of breath.    Cardiovascular: Negative for chest pain.   Gastrointestinal: Positive for nausea. Negative for abdominal pain and vomiting.   Endocrine: Negative for cold intolerance.   Genitourinary: Negative for flank pain.   Neurological: Positive for weakness.   Psychiatric/Behavioral: Negative for dysphoric mood and hallucinations.     Objective:     Vital Signs (Most Recent):  Temp: 98 °F (36.7 °C) (08/25/17 0700)  Pulse: 67 (08/25/17 0700)  Resp: 18 (08/25/17 0700)  BP: 132/62 (08/25/17 0700)  SpO2: 98 % (08/25/17 0700)    Vital Signs (24h Range):  Temp:  [98 °F (36.7 °C)-98.5 °F (36.9 °C)] 98 °F (36.7 °C)  Pulse:  [67-72] 67  Resp:  [17-18] 18  SpO2:  [97 %-98 %] 98 %  BP: (129-132)/(62-76) 132/62     Physical Exam   Constitutional: He appears well-developed and well-nourished.   HENT:   Head: Normocephalic and atraumatic.   Eyes: EOM are normal.    Cardiovascular: Normal rate.    Pulmonary/Chest: Effort normal. No respiratory distress.   Abdominal: Soft. He exhibits no distension. There is no tenderness.   Genitourinary:   Genitourinary Comments: + urostomy   Musculoskeletal:   UE: SA, EF, EE,  5/5  LE: HF 5/5, KE 5/5, DF/PF 5/5  No calf tenderness    Neurological: He is alert.   Follows all commands   Skin: Skin is warm.   Psychiatric: He has a normal mood and affect.   Vitals reviewed.    NEUROLOGICAL EXAMINATION:     CRANIAL NERVES     CN III, IV, VI   Extraocular motions are normal.     BMP  Lab Results   Component Value Date     08/25/2017     08/25/2017    K 3.5 08/25/2017    K 3.5 08/25/2017     08/25/2017     08/25/2017    CO2 24 08/25/2017    CO2 24 08/25/2017    BUN 17 08/25/2017    BUN 17 08/25/2017    CREATININE 2.1 (H) 08/25/2017    CREATININE 2.1 (H) 08/25/2017    CALCIUM 8.9 08/25/2017    CALCIUM 8.9 08/25/2017    ANIONGAP 10 08/25/2017    ANIONGAP 10 08/25/2017    ESTGFRAFRICA 36.3 (A) 08/25/2017    ESTGFRAFRICA 36.3 (A) 08/25/2017    EGFRNONAA 31.4 (A) 08/25/2017    EGFRNONAA 31.4 (A) 08/25/2017

## 2017-08-25 NOTE — ASSESSMENT & PLAN NOTE
- Has had frequent nausea with intermittent emesis since admission at [facility]    Suspect secondary to IV Abx.     - 8/16 juaquin Zofran prior to meals and monitor, KUB was unremarkable  - 8/18 Added phenergan yesterday. Took first dose this AM, and thought nausea was a little better. No emesis today. Will add PPI, and cont to monitor closely.    8/19 one time episode of emesis yesterday per patient, but reports that the nausea has improved with newly added phenergan.  8/21 nausea and emesis persists, will add on schedule marinol TID before meals, await ID recs for antibiotics as they are likely contributing to nausea  8/22 increased marinol to 5mg TID before meals, and will add scopolamine patch as well  8/23 no emesis this morning, trialing small bites and sips and will monitor  8/24 emesis this morning following breakfast, marinol appears to have benefit when taken, attempting preauth for marinol before discharge  8/25 improving, continue to monitor

## 2017-08-25 NOTE — PROGRESS NOTES
"Speech Therapy   Treatment    Juan Manuel Koehler   MRN: [id number]        08/25/17 1605   Speech Time Calculation   Speech Start Time 1605   Speech Stop Time 1650   Speech Total (min) 45 min   General Information   SLP Treatment Date 08/25/17   General Observations Pt seen individually in ST office while sitting upright in w/c.    General Precautions aspiration;fall   Visual/Auditory Vision impaired   Subjective   Patient states Pt stated "Memory just isn't important to me."   Pain/Comfort   Pain Rating no pain       SLP Cognitive Treatment   Treatment Detail (SLP Cognitive Treatment) Pt completed orientation task to time with 90% acc indly. Pt completed written 4 step sequencing task with 50% acc given min verbal cues for attention to details. Pt noted with agitation and c/o fatigue within session today. Pt completed visual picture recall task following 5 minute delay with 40% acc indly, given moderate-max verbal cues for utilization of recall strategies within task, pt with increase to 60% acc only. Pt with poor motivation towards cognitive goals at this time. Verbal analogies task completed targeting thought organization skills with 73% acc indly, given verbal reasoning cues, pt with increase to 100% acc. Wrong category members of abstract objects completed with 57% acc indly, given repetitions of information and frequent definitions of items, pt with increase to 100% acc.     Pt continues to require supervision at this time 2' decreased safety and awareness skills.    Assessment   SLP Diagnosis mild-moderate cognitive deficits   Prognosis Good   Problem List decreased recall skills; poor attention skills; decreased safety and insight skills   Session Assessment progressing toward goals   Level of Motivation/Participation fair   Discharge Recommendations   Discharge Facility/Level Of Care Needs home health speech therapy   Plan   Plan Continue with current plan   Treatment/Billable Minutes   Speech Therapy " Individual 45   Total Time 45     FIM Scores  Comprehension:6  Expression: 6  Social Interaction:5  Problem Solvin  Memory: 3     Comprehension:  Complex= humor, finances, rationale for medical treatment(hip precautions, pressure relief)  Basic= pain, hunger, thirst, bathroom needs, cold, nutrition, sleep     Understands complex/abstract conversation/directions with extra time, assistance device(glasses, if visual mode or both; hearing aide if auditory mode or both) (6)     Expression:  Expresses complex / abstract ideas with extra time, assistive devic (augmentative communication device    (6)     Social Interaction:  Interacts appropriately 90% of time - needs monitoring or encouragement for participation or interaction  (5)     Problem Solving:  Solves basic problems 75-89% of the time  (4)     Memory:  Recognizes, recalls, or executes 50-74% of time 2 steps of 2 step request (3)    Allison Charles CCC-SLP  2017

## 2017-08-25 NOTE — PROGRESS NOTES
Pt discussed during treatment team staffing.  Expected discharge date is 8/29/17.  Pt informed and agrees with discharge date.  Options for discharge presented.    Family training to occur

## 2017-08-25 NOTE — PROGRESS NOTES
Physical Therapy   PT FIM    Juan Manuel Koehler   MRN: [id number]        08/25/17 0859   Transfers   Bed/Chair/WC 5   Tub 5   Locomotion   Distance Walked 3   Walk 4   Mode W   Stairs 2     Carola Burns, PT  8/25/2017

## 2017-08-25 NOTE — PROGRESS NOTES
[facility]  Physical Medicine & Rehab  Progress Note    Patient Name: Juan Manuel Koehler  MRN: [id number]  Patient Class: IP- Rehab   Admission Date: 8/15/2017  Length of Stay: 10 days  Attending Physician: Verónica Lewis MD  Primary Care Provider: Hemant Sweeney MD    Subjective:     Principal Problem:Right-sided cerebrovascular accident (CVA)    Interval History: No acute events over night. Patient with an episode of nausea yesterday afternoon, but none since. He is working with PT this morning, and looks improved. He reports that his nausea is improving, but has had 2 small episodes of emesis this morning during PT. Agreeable to extend DC as he can continue to make progress and monitor medical co-morbidities.       Scheduled Medications:    ampicillin IVPB  2 g Intravenous Q6H    aspirin  81 mg Oral Daily    carvedilol  25 mg Oral BID    cefTRIAXone (ROCEPHIN) IVPB  2 g Intravenous Q12H    dronabinol  5 mg Oral TID AC    heparin (porcine)  5,000 Units Subcutaneous Q8H    pantoprazole  40 mg Oral Daily    scopolamine  1 patch Transdermal Q3 Days    trazodone  50 mg Oral QHS       PRN Medications: acetaminophen, bisacodyl, dextrose 50%, glucagon (human recombinant), ondansetron, promethazine    Review of Systems   Constitutional: Negative for unexpected weight change.        Vision stable w glasses  Hearing intact  Continent of bowel   HENT: Negative for congestion and ear pain.    Eyes: Negative for discharge.   Respiratory: Negative for shortness of breath.    Cardiovascular: Negative for chest pain.   Gastrointestinal: Positive for nausea. Negative for abdominal pain and vomiting.   Endocrine: Negative for cold intolerance.   Genitourinary: Negative for flank pain.   Neurological: Positive for weakness.   Psychiatric/Behavioral: Negative for dysphoric mood and hallucinations.     Objective:     Vital Signs (Most Recent):  Temp: 98 °F (36.7 °C) (08/25/17 0700)  Pulse: 67  (08/25/17 0700)  Resp: 18 (08/25/17 0700)  BP: 132/62 (08/25/17 0700)  SpO2: 98 % (08/25/17 0700)    Vital Signs (24h Range):  Temp:  [98 °F (36.7 °C)-98.5 °F (36.9 °C)] 98 °F (36.7 °C)  Pulse:  [67-72] 67  Resp:  [17-18] 18  SpO2:  [97 %-98 %] 98 %  BP: (129-132)/(62-76) 132/62     Physical Exam   Constitutional: He appears well-developed and well-nourished.   HENT:   Head: Normocephalic and atraumatic.   Eyes: EOM are normal.   Cardiovascular: Normal rate.    Pulmonary/Chest: Effort normal. No respiratory distress.   Abdominal: Soft. He exhibits no distension. There is no tenderness.   Genitourinary:   Genitourinary Comments: + urostomy   Musculoskeletal:   UE: SA, EF, EE,  5/5  LE: HF 5/5, KE 5/5, DF/PF 5/5  No calf tenderness    Neurological: He is alert.   Follows all commands   Skin: Skin is warm.   Psychiatric: He has a normal mood and affect.   Vitals reviewed.    NEUROLOGICAL EXAMINATION:     CRANIAL NERVES     CN III, IV, VI   Extraocular motions are normal.     BMP  Lab Results   Component Value Date     08/25/2017     08/25/2017    K 3.5 08/25/2017    K 3.5 08/25/2017     08/25/2017     08/25/2017    CO2 24 08/25/2017    CO2 24 08/25/2017    BUN 17 08/25/2017    BUN 17 08/25/2017    CREATININE 2.1 (H) 08/25/2017    CREATININE 2.1 (H) 08/25/2017    CALCIUM 8.9 08/25/2017    CALCIUM 8.9 08/25/2017    ANIONGAP 10 08/25/2017    ANIONGAP 10 08/25/2017    ESTGFRAFRICA 36.3 (A) 08/25/2017    ESTGFRAFRICA 36.3 (A) 08/25/2017    EGFRNONAA 31.4 (A) 08/25/2017    EGFRNONAA 31.4 (A) 08/25/2017         Assessment/Plan:      Juan Manuel Koehler is a 67 y.o. male admitted to inpatient rehabilitation on 8/15/2017 for Right-sided cerebrovascular accident (CVA) with impaired mobility and ADLs. Patient remains appropriate for PT, OT, and as required Speech therapy. Patient continues to require 24 hour nursing care as well as daily Physician assessment.    * Right-sided cerebrovascular accident  (CVA)    - R  Cerebellar, embolic  - cont w ASA 81/statin  Tentative DC: 8/25, will need 24 hr sup/assistance       Bed mobility Ind  Sit<>stand SBA  t/f squat pivot CGA, t/f stand pivot SBA  shower t/f CGA, toilet t/f SBA, car t/f min A  WC supervision 162'  Amb SBAw/ ',  stairs SBAx12 steps    Feeding Ind, grooming Ind, bathing SBA  UB dress Ind, LB dress SBA  Toileting SBA    Comprehension: mod I  Expression: mod I   Social Interaction:supervision  Problem Solving:min A  Memory: mod A    Cont to be limited by endurance, decreased balance, memory, insight , and nausea/emesis        Bacteremia due to Enterococcus    Endocarditis treatment w IVAbx (ceftriaxone 2g q12h, ampicillin 2g q6h) with end date 9/16 (6 week course); + R sided PICC     8/16 Cr 1.6, discussed w pharm. decreased Ampicillin to Q6  8/17 Cr 1.8, repeat BMP tomorrow  8/18 CR rising to 1.9. ELDER vs AIN from the Ampicillin.  Reviewed w pharmacy, and current dose of Amp is appropriate until CrCl is < 10    ----  Will hold lisinopril, and cont to trend BMP through wknd (not drawn Saturday). Given rise in BUN in setting on Nausea, intermittent emesis, and poor PO intake will give 1L IVF this evening (completed)   ----- Will also reach out to ID is there is another option for coverage     8/19 no further emesis since yesterday afternoon, continue to encourage fluids and repeat BMP to monitor BUN/Cr    8/20 BUN/Cr 25/2.1, will give an additional 1L of NS and follow up BMP   8/21 BUN/Cr 22/2.0, continue to encourage fluids and alleviate nausea/emesis - await recs from ID regarding antibiotic coverage and given another 1L of fluids  8/22 BUN/Cr 17/1.9, given another 1L of IVF  8/24 BUN/Cr 19/2.1, nausea/emesis persists  8/25 BUN/Cr 17/2.1, nausea improving, no emesis yet today, monitor K level but holding replacement due to elevated Cr      BMP  Lab Results   Component Value Date     08/25/2017     08/25/2017    K 3.5 08/25/2017    K 3.5  08/25/2017     08/25/2017     08/25/2017    CO2 24 08/25/2017    CO2 24 08/25/2017    BUN 17 08/25/2017    BUN 17 08/25/2017    CREATININE 2.1 (H) 08/25/2017    CREATININE 2.1 (H) 08/25/2017    CALCIUM 8.9 08/25/2017    CALCIUM 8.9 08/25/2017    ANIONGAP 10 08/25/2017    ANIONGAP 10 08/25/2017    ESTGFRAFRICA 36.3 (A) 08/25/2017    ESTGFRAFRICA 36.3 (A) 08/25/2017    EGFRNONAA 31.4 (A) 08/25/2017    EGFRNONAA 31.4 (A) 08/25/2017             Hypertension      -cont with Coreg 12.5 BID, lisinopril 20mg QD, hydralazine PO PRN  - cont to monitor BP/HR, stable    8/18 Given rising Cr, will hold Lisinopril. Will consider adjusting Coreg/adding Amlodipine as needed for BP control.   8/22 increased coreg to 25mg BID, BP improved  8/25 stable        S/P ileal conduit    Hx of  Urothelial carcinoma of bladder   - Follow-up with urology as an outpatient with Dr. Robles and with oncology, Dr. Calero     8/18 Functional, Last UA 8/12, and that was negative   8/21 mild issues with leaking from the urostomy site, now controlled  8/22 wound care evaluated urostomy site, and will continue to monitor        Anemia    Downtrending H/H, pending CBC for today -- considering transfusion if trend worsens        Nausea    - Has had frequent nausea with intermittent emesis since admission at [facility]    Suspect secondary to IV Abx.     - 8/16 juaquin Zofran prior to meals and monitor, KUB was unremarkable  - 8/18 Added phenergan yesterday. Took first dose this AM, and thought nausea was a little better. No emesis today. Will add PPI, and cont to monitor closely.    8/19 one time episode of emesis yesterday per patient, but reports that the nausea has improved with newly added phenergan.  8/21 nausea and emesis persists, will add on schedule marinol TID before meals, await ID recs for antibiotics as they are likely contributing to nausea  8/22 increased marinol to 5mg TID before meals, and will add scopolamine patch as well  8/23 no  emesis this morning, trialing small bites and sips and will monitor  8/24 emesis this morning following breakfast, marinol appears to have benefit when taken, attempting preauth for marinol before discharge  8/25 improving, continue to monitor          History of urostomy    - wound care assisting with monitoring of site/mgmt         Impaired mobility and ADLs    Other Rehab Plan/Continue to monitor:   Nutrition/Swallow Status:    - Prealbumin - 14   - Nutritionist on board   Bladder Management: illeostomy  Bowel Management:  continent  - Miralax and Suppository PRN   - Will monitor for regularity   Mood: stable   Sleep: monitor; Failed Ramelteon. 8/17 trial trazadone   Skin: Monitor   DVT ppx:  Heparin                DISCHARGE PLANNING:  Tentative Discharge Date: 8/29/2017    Future Appointments  Date Time Provider Department Center   9/5/2017 10:40 AM Jewels Toth PA-C [facility]  Department of Physical Medicine & Rehab   [facility]

## 2017-08-25 NOTE — PROGRESS NOTES
"Physical Therapy   PT Daily Note    Juan Manuel Koehler   MRN: [id number]      08/25/17 0859   PT Time Calculation   PT Start Time 0859   PT Stop Time 1029   PT Total Time (min) 90 min   Treatment   Treatment Type Treatment   PT/PTA PT   General   PT Received On 08/25/17   Patient Found (position) Supine in bed   Precautions   General Precautions aspiration;fall   Visual/Auditory Vision impaired   Subjective   Patient states "Can we take it easy today?"    Pain/Comfort   Pain Rating 1 no pain   Pain Rating Post-Intervention 1 no pain   Bed Mobility   Rolling/Turning Right Supervision   Supine to Sit Supervision   Transfers   Transfer yes   Sit to Stand   Sit <> Stand Assistance Stand By Assistance   Sit <> Stand Assistive Device Rolling Walker   Trials/Comments Pt performed multiple trials throughout PT session   Stand to Sit   Assistance Stand By Assistance   Assistive Device Rolling Walker   Trials/Comments Pt performed multiple trials throughout PT session   Bed to Chair   Bed <> Chair Technique Squat Pivot   Bed <> Chair Assistance Stand By Assistance   Bed <> Chair Assistive Device No Assistive Device   Trials/Comments x1 trial; reliant on UE support during t/f   Chair to Bed   Technique Stand Pivot   Assistance Contact Guard Assistance   Assistive Device Rolling Walker   Trials/Comments x1 trial; improved coordination compared to prior treatment   Chair to Mat   Chair<> Mat Technique Stand Pivot   Chair<>Mat Assistance Contact Guard Assistance   Chair <> Mat Assistive Device Rolling Walker   Trials/Comments x1 trial    Mat to Chair   Technique Stand Pivot   Assistance Contact Guard Assistance   Assistive Device Rolling Walker   Trials/Comments x1 trial    Tub Bench Transfer   Technique Stand Pivot   Assistance Stand By Assistance   Assistive Device Rolling Walker   Trials/Comments x1 trial; pt required VC in order to back up safely to the TTB to ensure the hips will descend onto TTB. Pt ambulated 136' to the tub, " preformed the transfer, and ambulated back to his prior to rest break.    Gait   Gait Yes   Gait 1   Surface 1 Level tile   Gait Assistive Device Rolling walker   Assistance 1 Stand by Assistance;Minimum assistance   Gait Distance Pt ambulated the following distances: 162' and 172' on level tile. Pt also ambulated 287' over uneven surface, carpet, and tile. Pt was able to ambulated with SBA, however, pt got distracted and required min A to recover from LOB to the L.    Gait Pattern swing-through gait   Gait Deviation(s) decreased pedro pablo;increased time in double stance;foot flat   Stairs   Stairs Yes   Stairs/Curb Step   Rails 1 Bilateral   Device 1 No device   Assistance 1 Stand by Assistance   Comment/# Steps 1 Pt negotiated 8 steps with BHR SBA. Pt required mod VC for hand placement to transition from RW to stair railings safely. Would benefit from more practice.    Supine   Supine-Exercise Comments Pt performed glute sets, heel slides, and SLR 3x10 reps each in bed secondary to nausea.    Other Activities   Comments PT performed a horizontal roll test R. Pt was negative, however, PT performed BBQ roll treatment to ensure full clearance of R horizontal canal due to previous history.    Activity Tolerance   Activity Tolerance Patient tolerated treatment well;Other (Comment)  (limited by nausea)   After Treatment   Patient Position After Treatment Supine in bed   Assessment   Prognosis Good   Problem List Decreased strength;Decreased range of motion;Decreased endurance;Impaired balance;Decreased mobility;Decreased coordination;Impaired judgement   Session Assessment progressing toward goals   Assessment Pt is progressing and is demonstrating improved safety awareness. However, patient has difficulty negotiating obstacles in unfamiliar envirnonments. Pt would benefit from dynamic environments to improve negotiating of obstacles to improve functional mobility. Family agreed to training on Monday at 10am. Cont POC.     Level of Motivation/Participation good   Barriers to Discharge Decreased caregiver support   Treatment/Billable Minutes   Gait training 15   Therapeutic Activity 45   Therapeutic Exercise 15   Neuromuscular Re-education 15   Total Time 90     Carola Burns, PT  8/25/2017

## 2017-08-25 NOTE — ASSESSMENT & PLAN NOTE
-cont with Coreg 12.5 BID, lisinopril 20mg QD, hydralazine PO PRN  - cont to monitor BP/HR, stable    8/18 Given rising Cr, will hold Lisinopril. Will consider adjusting Coreg/adding Amlodipine as needed for BP control.   8/22 increased coreg to 25mg BID, BP improved  8/25 stable

## 2017-08-25 NOTE — PROGRESS NOTES
Occupational Therapy  PM Group Session  Juan Manuel Koehler   MRN: [id number]           08/25/17 1330   OT Time Calculation   OT Start Time 1330   OT Stop Time 1415   OT Total Time (min) 45 min   General   OT Date of Treatment 08/25/17   Treatment/Billable Minutes   Therapeutic Group 45   Total Time 45     Patient participated in 45-60 minute volleyball group activity.  The group activity challenged dynamic standing/sitting skills, bilateral upper extremity strengthening, cardiovascular and respiratory capacity, hand -eye coordination, visual scanning and social affect/mood.  The patient performed this activity at w/c level with __S____assist. The patient required _short___ rest breaks during this activity.  Patient performed activity using bilateral upper extremities to volley the ball, lateral arm movement noted throughout the activity.  Endurance __good___, no pain complaints voiced are observed , social affect good as patient was observed throughout this activity ie., appropriately engaged in social exchange with peers and staff.          The ROSETTAR and JOSETTE have collaborated and discussed the patient's status, treatment plan and progress toward established goals.     BROOK Herring 8/25/2017

## 2017-08-25 NOTE — ASSESSMENT & PLAN NOTE
Endocarditis treatment w IVAbx (ceftriaxone 2g q12h, ampicillin 2g q6h) with end date 9/16 (6 week course); + R sided PICC     8/16 Cr 1.6, discussed w pharm. decreased Ampicillin to Q6  8/17 Cr 1.8, repeat BMP tomorrow  8/18 CR rising to 1.9. ELDER vs AIN from the Ampicillin.  Reviewed w pharmacy, and current dose of Amp is appropriate until CrCl is < 10    ----  Will hold lisinopril, and cont to trend BMP through wknd (not drawn Saturday). Given rise in BUN in setting on Nausea, intermittent emesis, and poor PO intake will give 1L IVF this evening (completed)   ----- Will also reach out to ID is there is another option for coverage     8/19 no further emesis since yesterday afternoon, continue to encourage fluids and repeat BMP to monitor BUN/Cr    8/20 BUN/Cr 25/2.1, will give an additional 1L of NS and follow up BMP   8/21 BUN/Cr 22/2.0, continue to encourage fluids and alleviate nausea/emesis - await recs from ID regarding antibiotic coverage and given another 1L of fluids  8/22 BUN/Cr 17/1.9, given another 1L of IVF  8/24 BUN/Cr 19/2.1, nausea/emesis persists  8/25 BUN/Cr 17/2.1, nausea improving, no emesis yet today, monitor K level but holding replacement due to elevated Cr      BMP  Lab Results   Component Value Date     08/25/2017     08/25/2017    K 3.5 08/25/2017    K 3.5 08/25/2017     08/25/2017     08/25/2017    CO2 24 08/25/2017    CO2 24 08/25/2017    BUN 17 08/25/2017    BUN 17 08/25/2017    CREATININE 2.1 (H) 08/25/2017    CREATININE 2.1 (H) 08/25/2017    CALCIUM 8.9 08/25/2017    CALCIUM 8.9 08/25/2017    ANIONGAP 10 08/25/2017    ANIONGAP 10 08/25/2017    ESTGFRAFRICA 36.3 (A) 08/25/2017    ESTGFRAFRICA 36.3 (A) 08/25/2017    EGFRNONAA 31.4 (A) 08/25/2017    EGFRNONAA 31.4 (A) 08/25/2017

## 2017-08-26 LAB
ANION GAP SERPL CALC-SCNC: 9 MMOL/L
ANISOCYTOSIS BLD QL SMEAR: SLIGHT
BASOPHILS # BLD AUTO: 0.02 K/UL
BASOPHILS NFR BLD: 0.3 %
BUN SERPL-MCNC: 19 MG/DL
CALCIUM SERPL-MCNC: 8.6 MG/DL
CHLORIDE SERPL-SCNC: 107 MMOL/L
CO2 SERPL-SCNC: 26 MMOL/L
CREAT SERPL-MCNC: 2.2 MG/DL
DIFFERENTIAL METHOD: ABNORMAL
EOSINOPHIL # BLD AUTO: 0.1 K/UL
EOSINOPHIL NFR BLD: 0.8 %
ERYTHROCYTE [DISTWIDTH] IN BLOOD BY AUTOMATED COUNT: 16 %
EST. GFR  (AFRICAN AMERICAN): 34.3 ML/MIN/1.73 M^2
EST. GFR  (NON AFRICAN AMERICAN): 29.7 ML/MIN/1.73 M^2
GLUCOSE SERPL-MCNC: 92 MG/DL
HCT VFR BLD AUTO: 26.2 %
HGB BLD-MCNC: 9 G/DL
HYPOCHROMIA BLD QL SMEAR: ABNORMAL
LYMPHOCYTES # BLD AUTO: 1.2 K/UL
LYMPHOCYTES NFR BLD: 14.6 %
MCH RBC QN AUTO: 29.2 PG
MCHC RBC AUTO-ENTMCNC: 34.4 G/DL
MCV RBC AUTO: 85 FL
MONOCYTES # BLD AUTO: 0.5 K/UL
MONOCYTES NFR BLD: 5.8 %
NEUTROPHILS # BLD AUTO: 6.2 K/UL
NEUTROPHILS NFR BLD: 78.5 %
OVALOCYTES BLD QL SMEAR: ABNORMAL
PLATELET # BLD AUTO: 156 K/UL
PMV BLD AUTO: ABNORMAL FL
POIKILOCYTOSIS BLD QL SMEAR: SLIGHT
POLYCHROMASIA BLD QL SMEAR: ABNORMAL
POTASSIUM SERPL-SCNC: 3.7 MMOL/L
RBC # BLD AUTO: 3.08 M/UL
SODIUM SERPL-SCNC: 142 MMOL/L
WBC # BLD AUTO: 7.94 K/UL

## 2017-08-26 PROCEDURE — 99233 SBSQ HOSP IP/OBS HIGH 50: CPT | Mod: ,,, | Performed by: PHYSICAL MEDICINE & REHABILITATION

## 2017-08-26 PROCEDURE — 12800000 HC REHAB SEMI-PRIVATE ROOM

## 2017-08-26 PROCEDURE — 63600175 PHARM REV CODE 636 W HCPCS: Performed by: PHYSICAL MEDICINE & REHABILITATION

## 2017-08-26 PROCEDURE — 63600175 PHARM REV CODE 636 W HCPCS: Performed by: PHYSICIAN ASSISTANT

## 2017-08-26 PROCEDURE — 97110 THERAPEUTIC EXERCISES: CPT

## 2017-08-26 PROCEDURE — 92507 TX SP LANG VOICE COMM INDIV: CPT

## 2017-08-26 PROCEDURE — 97150 GROUP THERAPEUTIC PROCEDURES: CPT

## 2017-08-26 PROCEDURE — 85025 COMPLETE CBC W/AUTO DIFF WBC: CPT

## 2017-08-26 PROCEDURE — 97530 THERAPEUTIC ACTIVITIES: CPT

## 2017-08-26 PROCEDURE — 25000003 PHARM REV CODE 250: Performed by: PHYSICAL MEDICINE & REHABILITATION

## 2017-08-26 PROCEDURE — 25000003 PHARM REV CODE 250: Performed by: PHYSICIAN ASSISTANT

## 2017-08-26 PROCEDURE — 80048 BASIC METABOLIC PNL TOTAL CA: CPT

## 2017-08-26 RX ADMIN — AMPICILLIN SODIUM 2 G: 2 INJECTION, POWDER, FOR SOLUTION INTRAMUSCULAR; INTRAVENOUS at 11:08

## 2017-08-26 RX ADMIN — PANTOPRAZOLE SODIUM 40 MG: 40 TABLET, DELAYED RELEASE ORAL at 08:08

## 2017-08-26 RX ADMIN — AMPICILLIN SODIUM 2 G: 2 INJECTION, POWDER, FOR SOLUTION INTRAMUSCULAR; INTRAVENOUS at 06:08

## 2017-08-26 RX ADMIN — CEFTRIAXONE 2 G: 2 INJECTION, SOLUTION INTRAVENOUS at 02:08

## 2017-08-26 RX ADMIN — AMPICILLIN SODIUM 2 G: 2 INJECTION, POWDER, FOR SOLUTION INTRAMUSCULAR; INTRAVENOUS at 05:08

## 2017-08-26 RX ADMIN — CARVEDILOL 25 MG: 25 TABLET, FILM COATED ORAL at 09:08

## 2017-08-26 RX ADMIN — CARVEDILOL 25 MG: 25 TABLET, FILM COATED ORAL at 08:08

## 2017-08-26 RX ADMIN — HEPARIN SODIUM 5000 UNITS: 5000 INJECTION, SOLUTION INTRAVENOUS; SUBCUTANEOUS at 06:08

## 2017-08-26 RX ADMIN — DRONABINOL 5 MG: 2.5 CAPSULE ORAL at 07:08

## 2017-08-26 RX ADMIN — ONDANSETRON 8 MG: 8 TABLET, ORALLY DISINTEGRATING ORAL at 08:08

## 2017-08-26 RX ADMIN — AMPICILLIN SODIUM 2 G: 2 INJECTION, POWDER, FOR SOLUTION INTRAMUSCULAR; INTRAVENOUS at 12:08

## 2017-08-26 RX ADMIN — HEPARIN SODIUM 5000 UNITS: 5000 INJECTION, SOLUTION INTRAVENOUS; SUBCUTANEOUS at 09:08

## 2017-08-26 RX ADMIN — HEPARIN SODIUM 5000 UNITS: 5000 INJECTION, SOLUTION INTRAVENOUS; SUBCUTANEOUS at 02:08

## 2017-08-26 RX ADMIN — TRAZODONE HYDROCHLORIDE 50 MG: 50 TABLET ORAL at 09:08

## 2017-08-26 RX ADMIN — DRONABINOL 5 MG: 2.5 CAPSULE ORAL at 04:08

## 2017-08-26 RX ADMIN — DRONABINOL 5 MG: 2.5 CAPSULE ORAL at 10:08

## 2017-08-26 RX ADMIN — ASPIRIN 81 MG: 81 TABLET, COATED ORAL at 08:08

## 2017-08-26 RX ADMIN — CEFTRIAXONE 2 G: 2 INJECTION, SOLUTION INTRAVENOUS at 04:08

## 2017-08-26 NOTE — ASSESSMENT & PLAN NOTE
Endocarditis treatment w IVAbx (ceftriaxone 2g q12h, ampicillin 2g q6h) with end date 9/16 (6 week course); + R sided PICC     8/16 Cr 1.6, discussed w pharm. decreased Ampicillin to Q6  8/17 Cr 1.8, repeat BMP tomorrow  8/18 CR rising to 1.9. ELDER vs AIN from the Ampicillin.  Reviewed w pharmacy, and current dose of Amp is appropriate until CrCl is < 10    ----  Will hold lisinopril, and cont to trend BMP through wknd (not drawn Saturday). Given rise in BUN in setting on Nausea, intermittent emesis, and poor PO intake will give 1L IVF this evening (completed)   ----- Will also reach out to ID is there is another option for coverage     8/19 no further emesis since yesterday afternoon, continue to encourage fluids and repeat BMP to monitor BUN/Cr    8/20 BUN/Cr 25/2.1, will give an additional 1L of NS and follow up BMP   8/21 BUN/Cr 22/2.0, continue to encourage fluids and alleviate nausea/emesis - await recs from ID regarding antibiotic coverage and given another 1L of fluids  8/22 BUN/Cr 17/1.9, given another 1L of IVF  8/24 BUN/Cr 19/2.1, nausea/emesis persists  8/25 BUN/Cr 17/2.1, nausea improving, no emesis yet today, monitor K level but holding replacement due to elevated Cr      BMP  Lab Results   Component Value Date     08/26/2017    K 3.7 08/26/2017     08/26/2017    CO2 26 08/26/2017    BUN 19 08/26/2017    CREATININE 2.2 (H) 08/26/2017    CALCIUM 8.6 (L) 08/26/2017    ANIONGAP 9 08/26/2017    ESTGFRAFRICA 34.3 (A) 08/26/2017    EGFRNONAA 29.7 (A) 08/26/2017

## 2017-08-26 NOTE — PROGRESS NOTES
Occupational Therapy  Patient FIM  Juan Manuel Koehler   MRN: [id number]      08/26/17 1002   Transfers   Bed/Chair/WC 5   Self Care   Dressing-Lower 5     A client care conference was performed between the [facility] and [facility], prior to treatment by FINCH, to discuss the patient's status, treatment plan and established goals.   JOSETTE Pereyra/SERGIO 8/26/2017

## 2017-08-26 NOTE — PROGRESS NOTES
"Speech Therapy   Treatment    Juan Manuel Koehler   MRN: [id number]            08/26/17 0800   Speech Time Calculation   Speech Start Time 0800   Speech Stop Time 0830   Speech Total (min) 30 min   General Information   SLP Treatment Date 08/26/17   General Observations Patient seen while sitting upright in bed. Patient c/o nausea with dry heaving and vomiting t/o session. Pt's nurse provided nausea medication. Despite nausea, patient exhibited increased participation and motivation for therapy in comparison to previous sessions.    General Precautions aspiration;fall   Visual/Auditory Vision impaired   Subjective   Patient states "Can't keep it down."       SLP Cognitive Treatment   Treatment Detail (SLP Cognitive Treatment)  Patient oriented x4 independently. Patient recalled recent information with moderate difficulty. Patient able to recall 2/3 therapy sessions from previous day, but unable to provide details, despite mod verbal cues. In a reasoning task, patient compared x2 items/concepts with 100% accuracy independently. In a d/c planning task, patient demonstrated good insight of physical limitations, as patient listed various activities he will need assistance with and modifications he will need to be made to the environment to optimize his safety. Patient completed word deduction task with 70% accuracy independently, 90% accuracy given moderate verbal cues.        SLP Treatment: Verbal Expression   Treatment Detail (SLP Verbal Expression)  Patient completed concrete divergent naming task, in which he listed x12 holidays in 1 minute independently, and pt listed x11 things at the beach in 1 minute given mod verbal cues. Patient completed homonyms task, in which he provided multiple meanings of a given word with 80% accuracy independently and given mod verbal cues.    Assessment   SLP Diagnosis mild-mod cog-ling deficits'   Prognosis Good   Problem List decreased thought organization; decreased recall; decreased " word finding   Session Assessment progressing toward goals   Level of Motivation/Participation Good   Discharge Recommendations   Discharge Facility/Level Of Care Needs home health speech therapy   Plan   Plan Continue with current plan   Treatment/Billable Minutes   Speech Therapy Individual 30   Total Time 30       FIM Scores  Comprehension:6  Expression: 6  Social Interaction:5  Problem Solvin  Memory: 3     Comprehension:  Complex= humor, finances, rationale for medical treatment(hip precautions, pressure relief)  Basic= pain, hunger, thirst, bathroom needs, cold, nutrition, sleep     Understands complex/abstract conversation/directions with extra time, assistance device(glasses, if visual mode or both; hearing aide if auditory mode or both) (6)     Expression:  Expresses complex / abstract ideas with extra time, assistive devic (augmentative communication device    (6)     Social Interaction:  Interacts appropriately 90% of time - needs monitoring or encouragement for participation or interaction  (5)     Problem Solving:  Solves basic problems 75-89% of the time  (4)     Memory:  Recognizes, recalls, or executes 50-74% of time 2 steps of 2 step request (3)    CHINEDU Olivares-SLP  2017

## 2017-08-26 NOTE — PROGRESS NOTES
[facility]  Physical Medicine & Rehab  Progress Note    Patient Name: Juan Manuel Koehler  MRN: [id number]  Patient Class: IP- Rehab   Admission Date: 8/15/2017  Length of Stay: 11 days  Attending Physician: Verónica Lewis MD  Primary Care Provider: Hemant Sweeney MD    Subjective:     Principal Problem:Right-sided cerebrovascular accident (CVA)    Interval History: No acute events over night. PMFSH reviewed and no changes from admission.       Scheduled Medications:    ampicillin IVPB  2 g Intravenous Q6H    aspirin  81 mg Oral Daily    carvedilol  25 mg Oral BID    cefTRIAXone (ROCEPHIN) IVPB  2 g Intravenous Q12H    dronabinol  5 mg Oral TID AC    heparin (porcine)  5,000 Units Subcutaneous Q8H    pantoprazole  40 mg Oral Daily    scopolamine  1 patch Transdermal Q3 Days    trazodone  50 mg Oral QHS       PRN Medications: sodium chloride, acetaminophen, bisacodyl, dextrose 50%, glucagon (human recombinant), ondansetron, promethazine    Review of Systems   Constitutional: Negative for unexpected weight change.        Vision stable w glasses  Hearing intact  Continent of bowel   HENT: Negative for congestion and ear pain.    Eyes: Negative for discharge.   Respiratory: Negative for shortness of breath.    Cardiovascular: Negative for chest pain.   Gastrointestinal: Positive for nausea. Negative for abdominal pain and vomiting.   Endocrine: Negative for cold intolerance.   Genitourinary: Negative for flank pain.   Neurological: Positive for weakness.   Psychiatric/Behavioral: Negative for dysphoric mood and hallucinations.     Objective:     Vital Signs (Most Recent):  Temp: 98.3 °F (36.8 °C) (08/26/17 0654)  Pulse: (!) 54 (08/26/17 0654)  Resp: 18 (08/26/17 0654)  BP: (!) 146/67 (08/26/17 0654)  SpO2: 95 % (08/25/17 2127)    Vital Signs (24h Range):  Temp:  [97.8 °F (36.6 °C)-99.1 °F (37.3 °C)] 98.3 °F (36.8 °C)  Pulse:  [54-75] 54  Resp:  [16-18] 18  SpO2:  [95 %-96 %] 95 %  BP:  (120-158)/(63-72) 146/67     Physical Exam   Constitutional: He appears well-developed and well-nourished.   HENT:   Head: Normocephalic and atraumatic.   Eyes: EOM are normal.   Cardiovascular: Normal rate.    Pulmonary/Chest: Effort normal. No respiratory distress.   Abdominal: Soft. He exhibits no distension. There is no tenderness.   Genitourinary:   Genitourinary Comments: + urostomy   Musculoskeletal:   UE: SA, EF, EE,  5/5  LE: HF 5/5, KE 5/5, DF/PF 5/5  No calf tenderness    Neurological: He is alert.   Follows all commands   Skin: Skin is warm.   Psychiatric: He has a normal mood and affect.   Vitals reviewed.    NEUROLOGICAL EXAMINATION:     CRANIAL NERVES     CN III, IV, VI   Extraocular motions are normal.     BMP  Lab Results   Component Value Date     08/26/2017    K 3.7 08/26/2017     08/26/2017    CO2 26 08/26/2017    BUN 19 08/26/2017    CREATININE 2.2 (H) 08/26/2017    CALCIUM 8.6 (L) 08/26/2017    ANIONGAP 9 08/26/2017    ESTGFRAFRICA 34.3 (A) 08/26/2017    EGFRNONAA 29.7 (A) 08/26/2017         Assessment/Plan:      Juan Manuel Koehler is a 67 y.o. male admitted to inpatient rehabilitation on 8/15/2017 for Right-sided cerebrovascular accident (CVA) with impaired mobility and ADLs. Patient remains appropriate for PT, OT, and as required Speech therapy. Patient continues to require 24 hour nursing care as well as daily Physician assessment.    Anemia    Downtrending H/H, pending CBC for today -- considering transfusion if trend worsens        History of urostomy    - wound care assisting with monitoring of site/mgmt         Impaired mobility and ADLs    Other Rehab Plan/Continue to monitor:   Nutrition/Swallow Status:    - Prealbumin - 14   - Nutritionist on board   Bladder Management: illeostomy  Bowel Management:  continent  - Miralax and Suppository PRN   - Will monitor for regularity   Mood: stable   Sleep: monitor; Failed Ramelteon. 8/17 trial trazadone   Skin: Monitor   DVT ppx:   Heparin            S/P ileal conduit    Hx of  Urothelial carcinoma of bladder   - Follow-up with urology as an outpatient with Dr. Robles and with oncology, Dr. Calero     8/18 Functional, Last UA 8/12, and that was negative   8/21 mild issues with leaking from the urostomy site, now controlled  8/22 wound care evaluated urostomy site, and will continue to monitor        Hypertension      -cont with Coreg 12.5 BID, lisinopril 20mg QD, hydralazine PO PRN  - cont to monitor BP/HR, stable    8/18 Given rising Cr, will hold Lisinopril. Will consider adjusting Coreg/adding Amlodipine as needed for BP control.   8/22 increased coreg to 25mg BID, BP improved  8/25 stable        Bacteremia due to Enterococcus    Endocarditis treatment w IVAbx (ceftriaxone 2g q12h, ampicillin 2g q6h) with end date 9/16 (6 week course); + R sided PICC     8/16 Cr 1.6, discussed w pharm. decreased Ampicillin to Q6  8/17 Cr 1.8, repeat BMP tomorrow  8/18 CR rising to 1.9. ELDER vs AIN from the Ampicillin.  Reviewed w pharmacy, and current dose of Amp is appropriate until CrCl is < 10    ----  Will hold lisinopril, and cont to trend BMP through wknd (not drawn Saturday). Given rise in BUN in setting on Nausea, intermittent emesis, and poor PO intake will give 1L IVF this evening (completed)   ----- Will also reach out to ID is there is another option for coverage     8/19 no further emesis since yesterday afternoon, continue to encourage fluids and repeat BMP to monitor BUN/Cr    8/20 BUN/Cr 25/2.1, will give an additional 1L of NS and follow up BMP   8/21 BUN/Cr 22/2.0, continue to encourage fluids and alleviate nausea/emesis - await recs from ID regarding antibiotic coverage and given another 1L of fluids  8/22 BUN/Cr 17/1.9, given another 1L of IVF  8/24 BUN/Cr 19/2.1, nausea/emesis persists  8/25 BUN/Cr 17/2.1, nausea improving, no emesis yet today, monitor K level but holding replacement due to elevated Cr      BMP  Lab Results   Component  Value Date     08/26/2017    K 3.7 08/26/2017     08/26/2017    CO2 26 08/26/2017    BUN 19 08/26/2017    CREATININE 2.2 (H) 08/26/2017    CALCIUM 8.6 (L) 08/26/2017    ANIONGAP 9 08/26/2017    ESTGFRAFRICA 34.3 (A) 08/26/2017    EGFRNONAA 29.7 (A) 08/26/2017             Nausea    - Has had frequent nausea with intermittent emesis since admission at [facility]    Suspect secondary to IV Abx.     - 8/16 juaquin Zofran prior to meals and monitor, KUB was unremarkable  - 8/18 Added phenergan yesterday. Took first dose this AM, and thought nausea was a little better. No emesis today. Will add PPI, and cont to monitor closely.    8/19 one time episode of emesis yesterday per patient, but reports that the nausea has improved with newly added phenergan.  8/21 nausea and emesis persists, will add on schedule marinol TID before meals, await ID recs for antibiotics as they are likely contributing to nausea  8/22 increased marinol to 5mg TID before meals, and will add scopolamine patch as well  8/23 no emesis this morning, trialing small bites and sips and will monitor  8/24 emesis this morning following breakfast, marinol appears to have benefit when taken, attempting preauth for marinol before discharge  8/25 improving, continue to monitor          * Right-sided cerebrovascular accident (CVA)    - R  Cerebellar, embolic  - cont w ASA 81/statin  Tentative DC: 8/25, will need 24 hr sup/assistance       Bed mobility Ind  Sit<>stand SBA  t/f squat pivot CGA, t/f stand pivot SBA  shower t/f CGA, toilet t/f SBA, car t/f min A  WC supervision 162'  Amb SBAw/ ',  stairs SBAx12 steps    Feeding Ind, grooming Ind, bathing SBA  UB dress Ind, LB dress SBA  Toileting SBA    Comprehension: mod I  Expression: mod I   Social Interaction:supervision  Problem Solving:min A  Memory: mod A    Cont to be limited by endurance, decreased balance, memory, insight , and nausea/emesis            DISCHARGE PLANNING:  Tentative Discharge Date:  8/29/2017    Future Appointments  Date Time Provider Department Center   9/5/2017 10:40 AM Jewels Toth PA-C [facility]  Department of Physical Medicine & Rehab   [facility]

## 2017-08-26 NOTE — PROGRESS NOTES
Physical Therapy   Seated Endurance Group    Juan Manuel Koehler   MRN: [id number]        08/26/17 1305   PT Time Calculation   PT Start Time 1305   PT Stop Time 1350   PT Total Time (min) 45 min   Treatment   Treatment Type Treatment  (Seated Endurance Group)   PT/PTA PT   General   PT Received On 08/26/17   Family/Caregiver Present Yes  (Pt's wife present in room)   Patient Found (position) Supine in bed   Pt found with bolanos catheter;PICC line   Precautions   General Precautions aspiration;fall   Orthopedic No   Visual/Auditory Vision impaired   Subjective   Patient states Pt agreeable to participate in group treatment session   Pain/Comfort   Pain Rating 1 no pain   Pain Rating Post-Intervention 1 no pain   Other Activities   Comments Patient participated in 45 minute seated high endurance group. The group activity challenged bilateral upper extremity and lower extremity strengthening. In addition, cardiovascular and functional endurance were challenged during this group. Pt utulized 3# free weights during the following exercises including: bicep curls and chest press. Patient was educated on appropriate pressure relief techniques to complete sitting upright in wheelchair including: lateral weight shifts and wheelchair pushups    Patient completed 20 reps x 5 sets bicep curls, side raises, shoulder flexion, shoulder extension, shoulder scrubs (upper, middle, lower), shoulder shrugs (forward/backward) (in UE circuit)    Patient completed 20 reps x 5 sets hip flexion, knee flexion, knee extension, hip ABD, toe and heel taps (in LE circuit)    - Alternating punches, side punches, diagonal reaches,, upper cut punches x 20 reps x 2 sets    Pt required no rest breaks during therapeutic exercises. These activities were performed in a group setting to encourage participation with peers and social interaction skills.      Activity Tolerance   Activity Tolerance Patient tolerated treatment well   After Treatment   Patient  Position After Treatment Supine in bed   Patient after treatment left call button in reach   Treatment/Billable Minutes   Therapeutic Activity Group 45   Total Time 45     Darlene Campuzano, PT  8/26/2017

## 2017-08-26 NOTE — PROGRESS NOTES
"Occupational Therapy  Patient AM Treatment Note  Juan Manuel Koehler   MRN: [id number]      08/26/17 1002   OT Time Calculation   OT Start Time 1002   OT Stop Time 1047   OT Total Time (min) 45 min   General   OT Date of Treatment 08/26/17   Family/Caregiver Present Yes  (wife present half of OT session.)   Patient Found (position) Supine in bed   Pt found with arterial line   Precautions   General Precautions aspiration;fall   Orthopedic No   Visual/Auditory Vision impaired   Subjective   Patient states "I'll try as much as I can."   Pain/Comfort   Pain Rating no pain   Bed Mobility   Scooting/Bridging Independent   Scooting/Bridging Comments no use of bed rails   Supine to Sit Independent   Supine to Sit Comments no use of bed rails   Sit to Supine Independent   Sit to Supine Comments no use of bed rails   Transfers   Transfer yes   Sit to Stand   Sit <> Stand Assistance Supervision   Sit <> Stand Assistive Device No Assistive Device   Trials/Comments Supervision for safety   Stand to Sit   Assistance Supervision   Assistive Device No Assistive Device   Trials/Comments supervision for safety   Bed to Chair   Bed <> Chair Technique Stand Pivot   Bed <> Chair Transfer Assistance Stand By Assistance   Bed <> Chair Assistive Device No Assistive Device   Trials/Comments SBA for safety   LE Dressing   LE Dressing  Level of Assistance Stand by assistance   LE Dressing Level of Assistance Stand by assistance   Sock Level of Assistance Supervision   LE Dressing Where Assessed Wheelchair   LE Dressing Comments SBA for safety   Exercise Tools   Rickshaw 15lb 20 x 5   Bilateral Gilbert 5lb on incline 20 x 5   Other Exercise Tool 1 4lb dowel ex 15 x 3      A client care conference was performed between the [facility] and [facility], prior to treatment by [facility], to discuss the patient's status, treatment plan and established goals.   CHELI Pereyra 8/26/2017     "

## 2017-08-26 NOTE — SUBJECTIVE & OBJECTIVE
Interval History: No acute events over night. PMFSH reviewed and no changes from admission.       Scheduled Medications:    ampicillin IVPB  2 g Intravenous Q6H    aspirin  81 mg Oral Daily    carvedilol  25 mg Oral BID    cefTRIAXone (ROCEPHIN) IVPB  2 g Intravenous Q12H    dronabinol  5 mg Oral TID AC    heparin (porcine)  5,000 Units Subcutaneous Q8H    pantoprazole  40 mg Oral Daily    scopolamine  1 patch Transdermal Q3 Days    trazodone  50 mg Oral QHS       PRN Medications: sodium chloride, acetaminophen, bisacodyl, dextrose 50%, glucagon (human recombinant), ondansetron, promethazine    Review of Systems   Constitutional: Negative for unexpected weight change.        Vision stable w glasses  Hearing intact  Continent of bowel   HENT: Negative for congestion and ear pain.    Eyes: Negative for discharge.   Respiratory: Negative for shortness of breath.    Cardiovascular: Negative for chest pain.   Gastrointestinal: Positive for nausea. Negative for abdominal pain and vomiting.   Endocrine: Negative for cold intolerance.   Genitourinary: Negative for flank pain.   Neurological: Positive for weakness.   Psychiatric/Behavioral: Negative for dysphoric mood and hallucinations.     Objective:     Vital Signs (Most Recent):  Temp: 98.3 °F (36.8 °C) (08/26/17 0654)  Pulse: (!) 54 (08/26/17 0654)  Resp: 18 (08/26/17 0654)  BP: (!) 146/67 (08/26/17 0654)  SpO2: 95 % (08/25/17 2127)    Vital Signs (24h Range):  Temp:  [97.8 °F (36.6 °C)-99.1 °F (37.3 °C)] 98.3 °F (36.8 °C)  Pulse:  [54-75] 54  Resp:  [16-18] 18  SpO2:  [95 %-96 %] 95 %  BP: (120-158)/(63-72) 146/67     Physical Exam   Constitutional: He appears well-developed and well-nourished.   HENT:   Head: Normocephalic and atraumatic.   Eyes: EOM are normal.   Cardiovascular: Normal rate.    Pulmonary/Chest: Effort normal. No respiratory distress.   Abdominal: Soft. He exhibits no distension. There is no tenderness.   Genitourinary:   Genitourinary  Comments: + urostomy   Musculoskeletal:   UE: SA, EF, EE,  5/5  LE: HF 5/5, KE 5/5, DF/PF 5/5  No calf tenderness    Neurological: He is alert.   Follows all commands   Skin: Skin is warm.   Psychiatric: He has a normal mood and affect.   Vitals reviewed.    NEUROLOGICAL EXAMINATION:     CRANIAL NERVES     CN III, IV, VI   Extraocular motions are normal.     BMP  Lab Results   Component Value Date     08/26/2017    K 3.7 08/26/2017     08/26/2017    CO2 26 08/26/2017    BUN 19 08/26/2017    CREATININE 2.2 (H) 08/26/2017    CALCIUM 8.6 (L) 08/26/2017    ANIONGAP 9 08/26/2017    ESTGFRAFRICA 34.3 (A) 08/26/2017    EGFRNONAA 29.7 (A) 08/26/2017

## 2017-08-26 NOTE — ASSESSMENT & PLAN NOTE
- Has had frequent nausea with intermittent emesis since admission at [facility] [place]    Suspect secondary to IV Abx.     - 8/16 juaquin Zofran prior to meals and monitor, KUB was unremarkable  - 8/18 Added phenergan yesterday. Took first dose this AM, and thought nausea was a little better. No emesis today. Will add PPI, and cont to monitor closely.    8/19 one time episode of emesis yesterday per patient, but reports that the nausea has improved with newly added phenergan.  8/21 nausea and emesis persists, will add on schedule marinol TID before meals, await ID recs for antibiotics as they are likely contributing to nausea  8/22 increased marinol to 5mg TID before meals, and will add scopolamine patch as well  8/23 no emesis this morning, trialing small bites and sips and will monitor  8/24 emesis this morning following breakfast, marinol appears to have benefit when taken, attempting preauth for marinol before discharge  8/25 improving, continue to monitor

## 2017-08-26 NOTE — PLAN OF CARE
Problem: Patient Care Overview  Goal: Plan of Care Review  Outcome: Ongoing (interventions implemented as appropriate)  Pressure Ulcer Risk: Encouraged patient to turn throughout the night to help minimize his risk of pressure ulcer. Encouraged patient to call for staff assistance if he needs assistance to turn . Patient verbalized understanding. Will maintain safety precautions and follow up as needed.

## 2017-08-27 PROCEDURE — 63600175 PHARM REV CODE 636 W HCPCS: Performed by: PHYSICIAN ASSISTANT

## 2017-08-27 PROCEDURE — 25000003 PHARM REV CODE 250: Performed by: PHYSICAL MEDICINE & REHABILITATION

## 2017-08-27 PROCEDURE — 25000003 PHARM REV CODE 250: Performed by: PHYSICIAN ASSISTANT

## 2017-08-27 PROCEDURE — 63600175 PHARM REV CODE 636 W HCPCS: Performed by: PHYSICAL MEDICINE & REHABILITATION

## 2017-08-27 PROCEDURE — 99233 SBSQ HOSP IP/OBS HIGH 50: CPT | Mod: ,,, | Performed by: PHYSICAL MEDICINE & REHABILITATION

## 2017-08-27 PROCEDURE — 12800000 HC REHAB SEMI-PRIVATE ROOM

## 2017-08-27 RX ADMIN — CARVEDILOL 25 MG: 25 TABLET, FILM COATED ORAL at 08:08

## 2017-08-27 RX ADMIN — CEFTRIAXONE 2 G: 2 INJECTION, SOLUTION INTRAVENOUS at 02:08

## 2017-08-27 RX ADMIN — DRONABINOL 5 MG: 2.5 CAPSULE ORAL at 04:08

## 2017-08-27 RX ADMIN — AMPICILLIN SODIUM 2 G: 2 INJECTION, POWDER, FOR SOLUTION INTRAMUSCULAR; INTRAVENOUS at 11:08

## 2017-08-27 RX ADMIN — CEFTRIAXONE 2 G: 2 INJECTION, SOLUTION INTRAVENOUS at 04:08

## 2017-08-27 RX ADMIN — HEPARIN SODIUM 5000 UNITS: 5000 INJECTION, SOLUTION INTRAVENOUS; SUBCUTANEOUS at 05:08

## 2017-08-27 RX ADMIN — AMPICILLIN SODIUM 2 G: 2 INJECTION, POWDER, FOR SOLUTION INTRAMUSCULAR; INTRAVENOUS at 12:08

## 2017-08-27 RX ADMIN — CARVEDILOL 25 MG: 25 TABLET, FILM COATED ORAL at 09:08

## 2017-08-27 RX ADMIN — AMPICILLIN SODIUM 2 G: 2 INJECTION, POWDER, FOR SOLUTION INTRAMUSCULAR; INTRAVENOUS at 05:08

## 2017-08-27 RX ADMIN — TRAZODONE HYDROCHLORIDE 50 MG: 50 TABLET ORAL at 09:08

## 2017-08-27 RX ADMIN — DRONABINOL 5 MG: 2.5 CAPSULE ORAL at 11:08

## 2017-08-27 RX ADMIN — HEPARIN SODIUM 5000 UNITS: 5000 INJECTION, SOLUTION INTRAVENOUS; SUBCUTANEOUS at 01:08

## 2017-08-27 NOTE — ASSESSMENT & PLAN NOTE
- Has had frequent nausea with intermittent [place]esis [place] [facility] [place]    Suspect secondary to IV Abx.     - 8/16 juaquin Zofran prior to meals and monitor, KUB was unremarkable  - 8/18 Added phenergan yesterday. Took first dose this AM, and thought nausea was a little better. No emesis today. Will add PPI, and cont to monitor closely.    8/19 one time episode of emesis yesterday per patient, but reports that the nausea has improved with newly added phenergan.  8/21 nausea and emesis persists, will add on schedule marinol TID before meals, await ID recs for antibiotics as they are likely contributing to nausea  8/22 increased marinol to 5mg TID before meals, and will add scopolamine patch as well  8/23 no emesis this morning, trialing small bites and sips and will monitor  8/24 emesis this morning following breakfast, marinol appears to have benefit when taken, attempting preauth for marinol before discharge  8/25 improving, continue to monitor  8/27 refusing nausea meds at times

## 2017-08-27 NOTE — ASSESSMENT & PLAN NOTE
- R  Cerebellar, embolic  - cont w ASA 81/statin  Tentative DC: 8/25, will need 24 hr sup/assistance       Bed mobility Ind  Sit<>stand SBA  t/f squat pivot SBA, t/f stand pivot SBA  shower t/f SBA, toilet t/f SBA  WC supervision 162'  Amb SBAw/ ',  stairs SBAx12 steps    Feeding Ind, grooming Ind, bathing SBA  UB dress Ind, LB dress SBA  Toileting SBA    Comprehension: mod I  Expression: mod I   Social Interaction:supervision  Problem Solving:min A  Memory: mod A    Cont to be limited by endurance, decreased balance, memory, insight , and nausea/emesis

## 2017-08-27 NOTE — PROGRESS NOTES
[facility]  Physical Medicine & Rehab  Progress Note    Patient Name: Juan Manuel Koehler  MRN: [id number]  Patient Class: IP- Rehab   Admission Date: 8/15/2017  Length of Stay: 12 days  Attending Physician: Verónica Lewis MD  Primary Care Provider: Hemant Sweeney MD    Subjective:     Principal Problem:Right-sided cerebrovascular accident (CVA)    Interval History: No acute events over night. PMFSH reviewed and no changes from admission.       Scheduled Medications:    ampicillin IVPB  2 g Intravenous Q6H    aspirin  81 mg Oral Daily    carvedilol  25 mg Oral BID    cefTRIAXone (ROCEPHIN) IVPB  2 g Intravenous Q12H    dronabinol  5 mg Oral TID AC    heparin (porcine)  5,000 Units Subcutaneous Q8H    pantoprazole  40 mg Oral Daily    scopolamine  1 patch Transdermal Q3 Days    trazodone  50 mg Oral QHS       PRN Medications: sodium chloride, acetaminophen, bisacodyl, dextrose 50%, glucagon (human recombinant), ondansetron, promethazine    Review of Systems   Constitutional: Negative for unexpected weight change.        Vision stable w glasses  Hearing intact  Continent of bowel   HENT: Negative for congestion and ear pain.    Eyes: Negative for discharge.   Respiratory: Negative for shortness of breath.    Cardiovascular: Negative for chest pain.   Gastrointestinal: Positive for nausea. Negative for abdominal pain and vomiting.   Endocrine: Negative for cold intolerance.   Genitourinary: Negative for flank pain.   Neurological: Positive for weakness.   Psychiatric/Behavioral: Negative for dysphoric mood and hallucinations.     Objective:     Vital Signs (Most Recent):  Temp: 98.7 °F (37.1 °C) (08/27/17 0655)  Pulse: (!) 58 (08/27/17 0655)  Resp: 18 (08/26/17 1907)  BP: (!) 172/64 (08/27/17 0655)  SpO2: (!) 94 % (08/26/17 1907)    Vital Signs (24h Range):  Temp:  [98.1 °F (36.7 °C)-98.7 °F (37.1 °C)] 98.7 °F (37.1 °C)  Pulse:  [58-64] 58  Resp:  [18] 18  SpO2:  [94 %] 94 %  BP:  (132-172)/(64 172/64     Physical Exam   Constitutional: He appears well-developed and well-nourished.   HENT:   Head: Normocephalic and atraumatic.   Eyes: EOM are normal.   Cardiovascular: Normal rate.    Pulmonary/Chest: Effort normal. No respiratory distress.   Abdominal: Soft. He exhibits no distension. There is no tenderness.   Genitourinary:   Genitourinary Comments: + urostomy   Musculoskeletal:   UE: SA, EF, EE,  5/5  LE: HF 5/5, KE 5/5, DF/PF 5/5  No calf tenderness    Neurological: He is alert.   Follows all commands   Skin: Skin is warm.   Psychiatric: He has a normal mood and affect.   Vitals reviewed.    NEUROLOGICAL EXAMINATION:     CRANIAL NERVES     CN III, IV, VI   Extraocular motions are normal.     BMP  Lab Results   Component Value Date     08/26/2017    K 3.7 08/26/2017     08/26/2017    CO2 26 08/26/2017    BUN 19 08/26/2017    CREATININE 2.2 (H) 08/26/2017    CALCIUM 8.6 (L) 08/26/2017    ANIONGAP 9 08/26/2017    ESTGFRAFRICA 34.3 (A) 08/26/2017    EGFRNONAA 29.7 (A) 08/26/2017         Assessment/Plan:      Juan Manuel Koehler is a 67 y.o. male admitted to inpatient rehabilitation on 8/15/2017 for Right-sided cerebrovascular accident (CVA) with impaired mobility and ADLs. Patient remains appropriate for PT, OT, and as required Speech therapy. Patient continues to require 24 hour nursing care as well as daily Physician assessment.    Anemia    Downtrending H/H, pending CBC for today -- considering transfusion if trend worsens        History of urostomy    - wound care assisting with monitoring of site/mgmt         Impaired mobility and ADLs    Other Rehab Plan/Continue to monitor:   Nutrition/Swallow Status:    - Prealbumin - 14   - Nutritionist on board   Bladder Management: illeostomy  Bowel Management:  continent  - Miralax and Suppository PRN   - Will monitor for regularity   Mood: stable   Sleep: monitor; Failed Ramelteon. 8/17 trial trazadone   Skin: Monitor   DVT ppx:   Heparin            S/P ileal conduit    Hx of  Urothelial carcinoma of bladder   - Follow-up with urology as an outpatient with Dr. Robles and with oncology, Dr. Calero     8/18 Functional, Last UA 8/12, and that was negative   8/21 mild issues with leaking from the urostomy site, now controlled  8/22 wound care evaluated urostomy site, and will continue to monitor        Hypertension      -cont with Coreg 12.5 BID, lisinopril 20mg QD, hydralazine PO PRN  - cont to monitor BP/HR, stable    8/18 Given rising Cr, will hold Lisinopril. Will consider adjusting Coreg/adding Amlodipine as needed for BP control.   8/22 increased coreg to 25mg BID, BP improved  8/25 stable        Bacteremia due to Enterococcus    Endocarditis treatment w IVAbx (ceftriaxone 2g q12h, ampicillin 2g q6h) with end date 9/16 (6 week course); + R sided PICC     8/16 Cr 1.6, discussed w pharm. decreased Ampicillin to Q6  8/17 Cr 1.8, repeat BMP tomorrow  8/18 CR rising to 1.9. ELDER vs AIN from the Ampicillin.  Reviewed w pharmacy, and current dose of Amp is appropriate until CrCl is < 10    ----  Will hold lisinopril, and cont to trend BMP through wknd (not drawn Saturday). Given rise in BUN in setting on Nausea, intermittent emesis, and poor PO intake will give 1L IVF this evening (completed)   ----- Will also reach out to ID is there is another option for coverage     8/19 no further emesis since yesterday afternoon, continue to encourage fluids and repeat BMP to monitor BUN/Cr    8/20 BUN/Cr 25/2.1, will give an additional 1L of NS and follow up BMP   8/21 BUN/Cr 22/2.0, continue to encourage fluids and alleviate nausea/emesis - await recs from ID regarding antibiotic coverage and given another 1L of fluids  8/22 BUN/Cr 17/1.9, given another 1L of IVF  8/24 BUN/Cr 19/2.1, nausea/emesis persists  8/25 BUN/Cr 17/2.1, nausea improving, no emesis yet today, monitor K level but holding replacement due to elevated Cr      BMP  Lab Results   Component  Value Date     08/26/2017    K 3.7 08/26/2017     08/26/2017    CO2 26 08/26/2017    BUN 19 08/26/2017    CREATININE 2.2 (H) 08/26/2017    CALCIUM 8.6 (L) 08/26/2017    ANIONGAP 9 08/26/2017    ESTGFRAFRICA 34.3 (A) 08/26/2017    EGFRNONAA 29.7 (A) 08/26/2017             Nausea    - Has had frequent nausea with intermittent emesis since admission at [facility] [place]    Suspect secondary to IV Abx.     - 8/16 juaquin Zofran prior to meals and monitor, KUB was unremarkable  - 8/18 Added phenergan yesterday. Took first dose this AM, and thought nausea was a little better. No emesis today. Will add PPI, and cont to monitor closely.    8/19 one time episode of emesis yesterday per patient, but reports that the nausea has improved with newly added phenergan.  8/21 nausea and emesis persists, will add on schedule marinol TID before meals, await ID recs for antibiotics as they are likely contributing to nausea  8/22 increased marinol to 5mg TID before meals, and will add scopolamine patch as well  8/23 no emesis this morning, trialing small bites and sips and will monitor  8/24 emesis this morning following breakfast, marinol appears to have benefit when taken, attempting preauth for marinol before discharge  8/25 improving, continue to monitor  8/27 refusing nausea meds at times          * Right-sided cerebrovascular accident (CVA)    - R  Cerebellar, embolic  - cont w ASA 81/statin  Tentative DC: 8/25, will need 24 hr sup/assistance       Bed mobility Ind  Sit<>stand SBA  t/f squat pivot SBA, t/f stand pivot SBA  shower t/f SBA, toilet t/f SBA  WC supervision 162'  Amb SBAw/ ',  stairs SBAx12 steps    Feeding Ind, grooming Ind, bathing SBA  UB dress Ind, LB dress SBA  Toileting SBA    Comprehension: mod I  Expression: mod I   Social Interaction:supervision  Problem Solving:min A  Memory: mod A    Cont to be limited by endurance, decreased balance, memory, insight , and nausea/emesis            DISCHARGE  PLANNING:  Tentative Discharge Date: 8/29/2017    Future Appointments  Date Time Provider Department Center   9/5/2017 10:40 AM Jewels Toth PA-C [facility] NEURO [facility]       James Randle MD  Department of Physical Medicine & Rehab   [facility]

## 2017-08-27 NOTE — SUBJECTIVE & OBJECTIVE
Interval History: No acute events over night. PMFSH reviewed and no changes from admission.       Scheduled Medications:    ampicillin IVPB  2 g Intravenous Q6H    aspirin  81 mg Oral Daily    carvedilol  25 mg Oral BID    cefTRIAXone (ROCEPHIN) IVPB  2 g Intravenous Q12H    dronabinol  5 mg Oral TID AC    heparin (porcine)  5,000 Units Subcutaneous Q8H    pantoprazole  40 mg Oral Daily    scopolamine  1 patch Transdermal Q3 Days    trazodone  50 mg Oral QHS       PRN Medications: sodium chloride, acetaminophen, bisacodyl, dextrose 50%, glucagon (human recombinant), ondansetron, promethazine    Review of Systems   Constitutional: Negative for unexpected weight change.        Vision stable w glasses  Hearing intact  Continent of bowel   HENT: Negative for congestion and ear pain.    Eyes: Negative for discharge.   Respiratory: Negative for shortness of breath.    Cardiovascular: Negative for chest pain.   Gastrointestinal: Positive for nausea. Negative for abdominal pain and vomiting.   Endocrine: Negative for cold intolerance.   Genitourinary: Negative for flank pain.   Neurological: Positive for weakness.   Psychiatric/Behavioral: Negative for dysphoric mood and hallucinations.     Objective:     Vital Signs (Most Recent):  Temp: 98.7 °F (37.1 °C) (08/27/17 0655)  Pulse: (!) 58 (08/27/17 0655)  Resp: 18 (08/26/17 1907)  BP: (!) 172/64 (08/27/17 0655)  SpO2: (!) 94 % (08/26/17 1907)    Vital Signs (24h Range):  Temp:  [98.1 °F (36.7 °C)-98.7 °F (37.1 °C)] 98.7 °F (37.1 °C)  Pulse:  [58-64] 58  Resp:  [18] 18  SpO2:  [94 %] 94 %  BP: (132-172)/(64) 172/64     Physical Exam   Constitutional: He appears well-developed and well-nourished.   HENT:   Head: Normocephalic and atraumatic.   Eyes: EOM are normal.   Cardiovascular: Normal rate.    Pulmonary/Chest: Effort normal. No respiratory distress.   Abdominal: Soft. He exhibits no distension. There is no tenderness.   Genitourinary:   Genitourinary Comments: +  urostomy   Musculoskeletal:   UE: SA, EF, EE,  5/5  LE: HF 5/5, KE 5/5, DF/PF 5/5  No calf tenderness    Neurological: He is alert.   Follows all commands   Skin: Skin is warm.   Psychiatric: He has a normal mood and affect.   Vitals reviewed.    NEUROLOGICAL EXAMINATION:     CRANIAL NERVES     CN III, IV, VI   Extraocular motions are normal.     BMP  Lab Results   Component Value Date     08/26/2017    K 3.7 08/26/2017     08/26/2017    CO2 26 08/26/2017    BUN 19 08/26/2017    CREATININE 2.2 (H) 08/26/2017    CALCIUM 8.6 (L) 08/26/2017    ANIONGAP 9 08/26/2017    ESTGFRAFRICA 34.3 (A) 08/26/2017    EGFRNONAA 29.7 (A) 08/26/2017

## 2017-08-27 NOTE — PLAN OF CARE
Problem: Patient Care Overview  Goal: Plan of Care Review  Outcome: Ongoing (interventions implemented as appropriate)  Mr Koehler required a minimal amt of assistance with transferring from w/c to toilet to attempt BM. Mr Koehler was able to perform all 3 phases of toileting independently. He had a lg, formed, and brown BM. 600 cc of clear, yellow urine emptied from urostomy bag. Pt was assisted with bathing and changing clothing. He currently remains up in w/c. He is talking with his wife at the bedside. Safety measures maintained. Call light is within reach. Will continue with plan of care.

## 2017-08-27 NOTE — PROGRESS NOTES
Mr Koehler is having nausea & vomiting. PRN Zofran was offered to the patient. Mr Koehler refused the Zofran. Dr Randle was made aware of patient's episodes of n/v, and his unwillingness to take any medication for nausea. No new orders received at this time. Will continue to monitor.

## 2017-08-28 LAB
ALBUMIN SERPL BCP-MCNC: 2.8 G/DL
ALP SERPL-CCNC: 64 U/L
ALT SERPL W/O P-5'-P-CCNC: 6 U/L
ANION GAP SERPL CALC-SCNC: 10 MMOL/L
AST SERPL-CCNC: 9 U/L
BASOPHILS # BLD AUTO: 0.02 K/UL
BASOPHILS NFR BLD: 0.2 %
BILIRUB SERPL-MCNC: 0.8 MG/DL
BUN SERPL-MCNC: 19 MG/DL
CALCIUM SERPL-MCNC: 8.8 MG/DL
CHLORIDE SERPL-SCNC: 108 MMOL/L
CO2 SERPL-SCNC: 25 MMOL/L
CREAT SERPL-MCNC: 2.1 MG/DL
CRP SERPL-MCNC: 15.79 MG/L
DIFFERENTIAL METHOD: ABNORMAL
EOSINOPHIL # BLD AUTO: 0.1 K/UL
EOSINOPHIL NFR BLD: 0.9 %
ERYTHROCYTE [DISTWIDTH] IN BLOOD BY AUTOMATED COUNT: 15.6 %
ERYTHROCYTE [SEDIMENTATION RATE] IN BLOOD BY WESTERGREN METHOD: 13 MM/HR
EST. GFR  (AFRICAN AMERICAN): 36.3 ML/MIN/1.73 M^2
EST. GFR  (NON AFRICAN AMERICAN): 31.4 ML/MIN/1.73 M^2
GLUCOSE SERPL-MCNC: 100 MG/DL
HCT VFR BLD AUTO: 30.4 %
HGB BLD-MCNC: 10.1 G/DL
LYMPHOCYTES # BLD AUTO: 1 K/UL
LYMPHOCYTES NFR BLD: 12.6 %
MCH RBC QN AUTO: 29.4 PG
MCHC RBC AUTO-ENTMCNC: 33.2 G/DL
MCV RBC AUTO: 88 FL
MONOCYTES # BLD AUTO: 0.7 K/UL
MONOCYTES NFR BLD: 8.3 %
NEUTROPHILS # BLD AUTO: 6.4 K/UL
NEUTROPHILS NFR BLD: 78 %
PLATELET # BLD AUTO: 190 K/UL
PMV BLD AUTO: 10 FL
POTASSIUM SERPL-SCNC: 3.5 MMOL/L
PROT SERPL-MCNC: 6.2 G/DL
RBC # BLD AUTO: 3.44 M/UL
SODIUM SERPL-SCNC: 143 MMOL/L
WBC # BLD AUTO: 8.16 K/UL

## 2017-08-28 PROCEDURE — 86141 C-REACTIVE PROTEIN HS: CPT

## 2017-08-28 PROCEDURE — 36415 COLL VENOUS BLD VENIPUNCTURE: CPT

## 2017-08-28 PROCEDURE — 25000003 PHARM REV CODE 250: Performed by: PHYSICAL MEDICINE & REHABILITATION

## 2017-08-28 PROCEDURE — 97530 THERAPEUTIC ACTIVITIES: CPT

## 2017-08-28 PROCEDURE — 97535 SELF CARE MNGMENT TRAINING: CPT

## 2017-08-28 PROCEDURE — 63600175 PHARM REV CODE 636 W HCPCS: Performed by: PHYSICIAN ASSISTANT

## 2017-08-28 PROCEDURE — 12800000 HC REHAB SEMI-PRIVATE ROOM

## 2017-08-28 PROCEDURE — 99233 SBSQ HOSP IP/OBS HIGH 50: CPT | Mod: ,,, | Performed by: PHYSICAL MEDICINE & REHABILITATION

## 2017-08-28 PROCEDURE — 92508 TX SP LANG VOICE COMM GROUP: CPT

## 2017-08-28 PROCEDURE — 85025 COMPLETE CBC W/AUTO DIFF WBC: CPT

## 2017-08-28 PROCEDURE — 80053 COMPREHEN METABOLIC PANEL: CPT

## 2017-08-28 PROCEDURE — 97110 THERAPEUTIC EXERCISES: CPT

## 2017-08-28 PROCEDURE — 25000003 PHARM REV CODE 250: Performed by: PHYSICIAN ASSISTANT

## 2017-08-28 PROCEDURE — 63600175 PHARM REV CODE 636 W HCPCS: Performed by: PHYSICAL MEDICINE & REHABILITATION

## 2017-08-28 PROCEDURE — 85651 RBC SED RATE NONAUTOMATED: CPT

## 2017-08-28 RX ADMIN — AMPICILLIN SODIUM 2 G: 2 INJECTION, POWDER, FOR SOLUTION INTRAMUSCULAR; INTRAVENOUS at 12:08

## 2017-08-28 RX ADMIN — SCOPALAMINE 1 PATCH: 1 PATCH, EXTENDED RELEASE TRANSDERMAL at 03:08

## 2017-08-28 RX ADMIN — AMPICILLIN SODIUM 2 G: 2 INJECTION, POWDER, FOR SOLUTION INTRAMUSCULAR; INTRAVENOUS at 11:08

## 2017-08-28 RX ADMIN — DRONABINOL 5 MG: 2.5 CAPSULE ORAL at 04:08

## 2017-08-28 RX ADMIN — CARVEDILOL 25 MG: 25 TABLET, FILM COATED ORAL at 09:08

## 2017-08-28 RX ADMIN — CEFTRIAXONE 2 G: 2 INJECTION, SOLUTION INTRAVENOUS at 04:08

## 2017-08-28 RX ADMIN — CARVEDILOL 25 MG: 25 TABLET, FILM COATED ORAL at 08:08

## 2017-08-28 RX ADMIN — AMPICILLIN SODIUM 2 G: 2 INJECTION, POWDER, FOR SOLUTION INTRAMUSCULAR; INTRAVENOUS at 01:08

## 2017-08-28 RX ADMIN — AMPICILLIN SODIUM 2 G: 2 INJECTION, POWDER, FOR SOLUTION INTRAMUSCULAR; INTRAVENOUS at 05:08

## 2017-08-28 RX ADMIN — HEPARIN SODIUM 5000 UNITS: 5000 INJECTION, SOLUTION INTRAVENOUS; SUBCUTANEOUS at 05:08

## 2017-08-28 RX ADMIN — AMPICILLIN SODIUM 2 G: 2 INJECTION, POWDER, FOR SOLUTION INTRAMUSCULAR; INTRAVENOUS at 06:08

## 2017-08-28 RX ADMIN — CEFTRIAXONE 2 G: 2 INJECTION, SOLUTION INTRAVENOUS at 03:08

## 2017-08-28 RX ADMIN — HEPARIN SODIUM 5000 UNITS: 5000 INJECTION, SOLUTION INTRAVENOUS; SUBCUTANEOUS at 09:08

## 2017-08-28 RX ADMIN — HEPARIN SODIUM 5000 UNITS: 5000 INJECTION, SOLUTION INTRAVENOUS; SUBCUTANEOUS at 03:08

## 2017-08-28 RX ADMIN — ONDANSETRON 8 MG: 8 TABLET, ORALLY DISINTEGRATING ORAL at 09:08

## 2017-08-28 RX ADMIN — ONDANSETRON 8 MG: 8 TABLET, ORALLY DISINTEGRATING ORAL at 03:08

## 2017-08-28 RX ADMIN — TRAZODONE HYDROCHLORIDE 50 MG: 50 TABLET ORAL at 09:08

## 2017-08-28 RX ADMIN — PANTOPRAZOLE SODIUM 40 MG: 40 TABLET, DELAYED RELEASE ORAL at 08:08

## 2017-08-28 RX ADMIN — ASPIRIN 81 MG: 81 TABLET, COATED ORAL at 08:08

## 2017-08-28 NOTE — ASSESSMENT & PLAN NOTE
-cont with Coreg 12.5 BID, lisinopril 20mg QD, hydralazine PO PRN  - cont to monitor BP/HR, stable    8/18 Given rising Cr, will hold Lisinopril. Will consider adjusting Coreg/adding Amlodipine as needed for BP control.   8/22 increased coreg to 25mg BID, BP improved  8/25 stable  8/28 stable has mild elevations at times but appears OK without lisinopril

## 2017-08-28 NOTE — TREATMENT PLAN
"[facility]hab [facility]' DME recommendations  DME to be delivered home unless otherwise specified.       [] NO DME NEEDED __________________________________    [x]Walker:(can only select one of these) [x]Adult (5'4"-6'6") []Ritesh (4'4"-5'7")  [] Wide/ Heavy Duty         []Miguel      []  Rollator       [] knee walker   Accessories/Other:_____    Wheels:must complete) [x] Yes   [] No     []Crutches:  []Axillary []Loft strand    []Cane:              []Straight []Narrow based quad   []Wide based quad         [] Other:____________________________________________    [] Transfer Devices:   []Mackenzie Lift    []Slide Board ( [] with cut out  []26  []29)    ______________________________________________________________________    []Wheelchair:    Number of hours up in wheelchair per day:__     Style:  [] Standard [] Pediatric  [] Light weight  []Reclining     []Amputee [] Miguel [] POV []Custom     Custom Vendor:________________________________________                                                      Seat Width: []18 (standard adult)    []16" (small adult)   []14(child)      [] 20  [] 22 [] 24         Seat Depth:   []16    []18  []20      Back Height:    []Standard      []Miguel          []Other     Leg Support: []Standard []Heel loops []Elevating leg rest    []Articulating []Swing Away     Arm Height:  []Detachable []Full   [] Desk  []Swing Away [] Adjustable Height      Lap Belt: []Velcro []Buckle                               Accessories: [] Front brakes          [] Brake Extensions  []Anti-tippers                          []Safety Belt    Other:_______________________________________________     Cushion: []Basic []Foam []Gel  []Roho []Eran I       Justification for Cushion(Must complete):________________________________      For wheelchair order: (please choose all that apply)  - Caregiver is capable and willing to operate wheelchair safely. []  - Patients upper body strength is sufficient for propulsion. []   - The " patient has a cast, brace, or musculoskeletal condition which prevents 90 degree flexion of the knee. []  - The patient has significant edema of the lower extremities that requires elevating leg rests.  []  - A reclining back is ordered. []  - The patient requires the use of a wheel chair for ADLs within the home. []  - Patient mobility limitations cannot be sufficiently resolved by the use of other ambulatory therapies. []     []3 in 1 commode: []Standard     []Wide-Heavy Duty           []Drop arm                                      []Heavy Duty Drop Arm                              [x]Tub bench:  []Padded      [x](Unpadded=standard)     []Commode Opening                                          [] Heavy Duty    []Shower Chair: []With back   []Without back      []Hospital Bed: []Semi Electric    []Manual    []Electric   []Heavy Duty  []Extra Heavy Duty(>600#)  Patient requires a bed height different than a fixed height hospital bed to permit transfers to chair, wheel chair or standing. 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

## 2017-08-28 NOTE — PROGRESS NOTES
"Physical Therapy   Daily Treatment/DC Summary     Juan Manuel Koehler   MRN: [id number]      08/28/17 0957   PT Time Calculation   PT Start Time 0957   PT Stop Time 1042   PT Total Time (min) 45 min   Treatment   Treatment Type Treatment  (Family Training)   PT/PTA PT   General   PT Received On 08/28/17   Family/Caregiver Present Yes  (wfie)   Patient Found (position) Supine in bed   Precautions   General Precautions aspiration;fall   Visual/Auditory Vision impaired  (glasses)   Subjective   Patient states "I cannot give it my all. I really don't want to do this."    Pain/Comfort   Pain Rating 1 no pain   Pain Rating Post-Intervention 1 no pain   Bed Mobility   Rolling/Turning to Left Modified independent   Rolling/Turning Right Modified independent   Supine to Sit Modified Independent   Sit to Supine Modified Independent   Transfers   Transfer yes   Sit to Stand   Sit <> Stand Assistance Stand By Assistance   Sit <> Stand Assistive Device Rolling Walker   Trials/Comments multiple trials throughout treatment session   Stand to Sit   Assistance Stand By Assistance   Assistive Device Rolling Walker   Trials/Comments multiple trials throughout treatment session   Bed to Chair   Bed <> Chair Technique Squat Pivot   Bed <> Chair Assistance Stand By Assistance   Bed <> Chair Assistive Device No Assistive Device   Trials/Comments x1 trial; wife guarded in front of patient to ensure patient go into chair.    Chair to Bed   Technique Squat Pivot   Assistance Stand By Assistance   Assistive Device No Assistive Device   Trials/Comments x1 trial; VC to wait for PT to guard in front.    Tub Bench Transfer   Technique Stand Pivot   Assistance Contact Guard Assistance   Assistive Device No Assistive Device   Trials/Comments x1 trial with TTB. Pt ambulated with wife's HHA to the TTB in which the patient safely ascended/descended with wife's assistance.    Car Transfer   Car Transfer Technique Stand Pivot   Car Transfer Assistance " "Contact Guard Assistance   Car Transfer Assistive Device Rolling Walker   Trials/Comments x1 trial with wife; Wife gave appropriate safety verbal cues   Wheelchair Activities   Propulsion Yes   Propulsion Type 1 Manual   Level 1 Level tile   Method 1 Right upper extremity;Left upper extremity   Level of Assistance 1 Supervision   Description/ Details 1 Pt propelled self 152' with supervision and VC to negotiate obstacles. x2 turns    Gait   Gait Yes   Gait 1   Surface 1 Level tile   Gait Assistive Device Rolling walker   Assistance 1 Stand by Assistance   Gait Distance Pt ambulated 162' in which the PT guarded for the first 50 feet for demonstration, and without stopping, transitioned into wife guarding for the remainder of the walk. Wife needed mod VC to stay closer particularly at the descent into the chair after trial.    Gait Pattern swing-through gait   Gait Deviation(s) increased time in double stance   Stairs   Stairs Yes   Stairs/Curb Step   Rails 1 Bilateral   Device 1 No device   Assistance 1 Stand by Assistance   Comment/# Steps 1 Pt negotiated 12 steps with PT guarding the first 4 steps followed by the wife guarding for 8 steps. Wife required min VC to stay closer for proper safety techniqes.    Stairs/ Curb Step  2   Rails 2 None (Comment)   Device 2 Rolling walker   Assistance 2 Contact Guard Assistance   Comment/# Steps 2 Pt negotiated 4" curb with wife. Wife demonstrated safe guarding technique.    Other Activities   Comments Pt safely picked up an item from the floow with RW SBA. Pt ambulated with RW SBA over 40' on uneven surface.    Activity Tolerance   Activity Tolerance Patient tolerated treatment well  (limited by nausea)   After Treatment   Patient Position After Treatment Supine in bed   Patient after treatment left call button in reach   Assessment   Prognosis Good   Problem List Decreased endurance;Impaired balance;Decreased mobility;Decreased coordination;Decreased safety awareness "   Session Assessment progressing toward goals   Assessment Pt's wife participated in family training today. Wife is demonstrating good guarding techniques but requires some cueing to stay closer to the patient. The wife has difficulty maintaining safe guarding techniques for descent into a chair as the patient has a tendency to rush his descent into the chair. Wife stated that she feels comfortable with all guarding techniques. Pt continues to be limited by nausea and decreased safety awareness. Pt has met 6/6 goals and would be appropriate for  PT services in order to continue improve functional mobility and decrease caregiver burden. PT is recommending RW, BSC, and TTB for DME and require 24/7 supervision.    Level of Motivation/Participation good   Barriers to Discharge Decreased caregiver support;Inaccessible home environment   Short Term Goals   Supine to Sit-Met/Not Met Met   Sit to Supine - Met/Not Met Met   Transfer Bed/Chair - Met/Not Met Met   Ambulate - Met/Not Met Met   Go Up/Down Stairs - Met/Not Met Met   Demo Pressure Relief - Met/Not met Met   Other Goal did not complete secondary to FT and nausea; discontinue   Discharge Recommendations   Equipment Needed After Discharge 3-in-1 commode;bath bench;walker, rolling   Discharge Facility/Level Of Care Needs home health PT   Physical Therapy Follow-up   PT Follow-up? No   Treatment/Billable Minutes   Therapeutic Activity 45   Total Time 45     Carola Burns, PT  8/28/2017

## 2017-08-28 NOTE — SUBJECTIVE & OBJECTIVE
Interval History: No acute events over night. PMFSH reviewed and no changes from admission.       Scheduled Medications:    ampicillin IVPB  2 g Intravenous Q6H    aspirin  81 mg Oral Daily    carvedilol  25 mg Oral BID    cefTRIAXone (ROCEPHIN) IVPB  2 g Intravenous Q12H    dronabinol  5 mg Oral TID AC    heparin (porcine)  5,000 Units Subcutaneous Q8H    pantoprazole  40 mg Oral Daily    scopolamine  1 patch Transdermal Q3 Days    trazodone  50 mg Oral QHS       PRN Medications: sodium chloride, acetaminophen, bisacodyl, dextrose 50%, glucagon (human recombinant), ondansetron, promethazine    Review of Systems   Constitutional: Negative for unexpected weight change.        Vision stable w glasses  Hearing intact  Continent of bowel   HENT: Negative for congestion and ear pain.    Eyes: Negative for discharge.   Respiratory: Negative for shortness of breath.    Cardiovascular: Negative for chest pain.   Gastrointestinal: Positive for nausea. Negative for abdominal pain and vomiting.   Endocrine: Negative for cold intolerance.   Genitourinary: Negative for flank pain.   Neurological: Positive for weakness.   Psychiatric/Behavioral: Negative for dysphoric mood and hallucinations.     Objective:     Vital Signs (Most Recent):  Temp: 98.6 °F (37 °C) (08/28/17 0600)  Pulse: 62 (08/28/17 0600)  Resp: 18 (08/28/17 0600)  BP: (!) 160/82 (08/28/17 0600)  SpO2: 97 % (08/28/17 0600)    Vital Signs (24h Range):  Temp:  [98.6 °F (37 °C)] 98.6 °F (37 °C)  Pulse:  [62-82] 62  Resp:  [18] 18  SpO2:  [97 %] 97 %  BP: (127-160)/(66-82) 160/82     Physical Exam   Constitutional: He appears well-developed and well-nourished.   HENT:   Head: Normocephalic and atraumatic.   Eyes: EOM are normal.   Cardiovascular: Normal rate.    Pulmonary/Chest: Effort normal. No respiratory distress.   Abdominal: Soft. He exhibits no distension. There is no tenderness.   Genitourinary:   Genitourinary Comments: + urostomy   Musculoskeletal:    UE: SA, EF, EE,  5/5  LE: HF 5/5, KE 5/5, DF/PF 5/5  No calf tenderness    Neurological: He is alert.   Follows all commands   Skin: Skin is warm.   Psychiatric: He has a normal mood and affect.   Vitals reviewed.    NEUROLOGICAL EXAMINATION:     CRANIAL NERVES     CN III, IV, VI   Extraocular motions are normal.     BMP  Lab Results   Component Value Date     08/28/2017    K 3.5 08/28/2017     08/28/2017    CO2 25 08/28/2017    BUN 19 08/28/2017    CREATININE 2.1 (H) 08/28/2017    CALCIUM 8.8 08/28/2017    ANIONGAP 10 08/28/2017    ESTGFRAFRICA 36.3 (A) 08/28/2017    EGFRNONAA 31.4 (A) 08/28/2017

## 2017-08-28 NOTE — PROGRESS NOTES
"Occupational Therapy   AM Treatment 1/Discharge Summary    Juan Manuel Koehler   MRN: [id number]    08/28/17 0830   OT Time Calculation   OT Start Time 0830   OT Stop Time 0916   OT Total Time (min) 46 min   General   OT Date of Treatment 08/28/17   Pt found with (urinary stoma and catheter)   Precautions   General Precautions aspiration;fall   Visual/Auditory Vision impaired  (glasses)   Subjective   Patient states "I don't feel well'   Pain/Comfort   Pain Rating no pain   Bed Mobility   Rolling/Turning to Left Independent   Rolling/Turning Right Independent   Scooting/Bridging Independent   Supine to Sit Independent   Sit to Supine Independent   Transfers   Transfer yes   Sit to Stand   Sit <> Stand Assistance Supervision   Sit <> Stand Assistive Device Rolling Walker   Stand to Sit   Assistance Supervision   Assistive Device Rolling Walker   Bed to Chair   Bed <> Chair Technique Stand Pivot   Bed <> Chair Transfer Assistance Stand By Assistance   Bed <> Chair Assistive Device Rolling Walker   Tub Bench Transfer   Tub Transfer Technique Stand Pivot   Tub Bench Transfer Assistance Contact Guard Assistance   Tub Transfer Assistive Device Rolling Walker;Tub transfer bench   Toilet Transfer   Toilet Transfer Technique Stand Pivot   Toilet Transfer Assistance Stand By Assistance   Toilet Transfer Assistive Device Rolling Walker;bedside commode   Feeding   Feeding Level of Assistance Independent   Feeding Where Assessed Wheelchair   Grooming   Grooming Level of Assistance Independent   Grooming Where Assessed Wheelchair;Sitting sinkside   Bathing   Bathing Level of Assistance Stand by assistance   Bathing Where Assessed Edge of bed   Bathing Comments increased time to perform due to nausea, cues for safety during standing portions   UE Dressing   UE Dressing Level of Assistance Independent   UE Dressing Where Assessed Wheelchair   LE Dressing   LE Dressing  Level of Assistance Stand by assistance   LE Dressing Level of " Assistance Stand by assistance   Sock Level of Assistance Supervision   LE Dressing Where Assessed Wheelchair   LE Dressing Comments RW for balance in standing, close supervision for safety    Toileting   Toileting Level of Assistance Stand by assistance   Toileting Where Assessed Bedside commode   Additional Activities   Additional Activities Comments Pt had several episodes of vomitting in session, requiring extended rest breaks until able to perform self care tasks.     Cognition:     Communication   Comprehension · Understands complex/abstract conversation/directions with extra time, assistance device(glasses, if visual mode or both; hearing aide if auditory mode or both) (6)   Mode B   Expression - Expresses complex / abstract ideas with extra time, assistive devic (augmentative communication device    (6)     Mode B   Social Cognition   Social Interaction - Interacts appropriately 90% of time - needs monitoring or encouragement for participation or interaction  (5)   Problem Solving - Solves basic problems 75-89% of the time  (4)   Memory - Recognizes, recalls, or executes 50-74% of time 2 steps of 2 step request (3)          Activity Tolerance   Activity Tolerance Patient tolerated treatment well   Medical Staff Made Aware NSG   After Treatment   Patient Position After Treatment Supine in bed  (HOB elevated)   Patient after treatment left call button in reach   Assessment   Prognosis Fair   Problem List Decreased Self Care skills;Decreased safe judgment during ADL;Decreased upper extremity strength;Decreased endurance;Decreased functional mobility;Decreased fine motor control;Decreased gross motor control;Decreased IADLs;Decreased trunk control for functional activities;Decreased cognition   Assessment Pt continues to be limited by nausea and episodes of vomitting in sessions. Pt required extended rest breaks throughout session today and coaxing to complete self care.    Level of Motivation/Participation fair    Long Term Goals   Pt Will Perform Supine To Sit Independently-met   Pt Will Perform Sit to Supine Independently-met   Pt Will Transfer Bed/Chair With stand by assist;With RW-met   Pt Will Transfer To Toilet With Stand by assist;With RW-not met   Pt Will Transfer To Shower With stand by assist-not met   Pt Will Perform Eating Independently-met   Pt Will Perform Grooming Independently-met   Pt Will Perform Bathing With stand by assistance-not met   Pt Will Perform UE Dressing With supervision-met   Pt Will Perform LE Dressing With stand by assistance-met   Pt Will Perform Toileting With stand by assistance-met   Discharge Recommendations   Equipment Needed After Discharge 3-in-1 commode;walker, rolling;bath bench   Discharge Facility/Level Of Care Needs home health OT   Plan   Plan Continue with current plan   Therapy Frequency 2 times/day;Monday-Friday;Saturday or Sunday   Treatment/Billable Minutes   Self Care/Home Management 46   Total Time 46     PREETHI Cooper   8/28/2017

## 2017-08-28 NOTE — PROGRESS NOTES
Occupational Therapy   FIM Scores    Juan Manuel Koehler   MRN: [id number]      08/28/17 0830   Transfers   Bed/Chair/WC 5   Toilet 5   Tub 4   Self Care   Eating 6   Grooming 5   Bathing 5   Dressing-Upper 7   Dressing-Lower 5   Toileting 5   Communication   Comprehension 6   Mode B   Expression 6   Mode B   Social Cognition   Social Interaction 5   Problem Solving 4   Memory 3     PREETHI Cooper   8/28/2017

## 2017-08-28 NOTE — PROGRESS NOTES
Physical Therapy   PT [facility] DC     Juan Manuel Koehler   MRN: [id number]        08/28/17 0957   Transfers   Bed/Chair/WC 5   Tub 4   Locomotion   Distance Walked 3   Distance Wheelchair 3   Walk 5   Wheelchair 5   Mode W   Stairs 5   Carola Burns, PT  8/28/2017

## 2017-08-28 NOTE — PROGRESS NOTES
[facility]  Physical Medicine & Rehab  Progress Note    Patient Name: Juan Manuel Koehler  MRN: [id number]  Patient Class: IP- Rehab   Admission Date: 8/15/2017  Length of Stay: 13 days  Attending Physician: Verónica Lewis MD  Primary Care Provider: Hemant Sweeney MD    Subjective:     Principal Problem:Right-sided cerebrovascular accident (CVA)    Interval History: No acute events over night. PMFSH reviewed and no changes from admission.       Scheduled Medications:    ampicillin IVPB  2 g Intravenous Q6H    aspirin  81 mg Oral Daily    carvedilol  25 mg Oral BID    cefTRIAXone (ROCEPHIN) IVPB  2 g Intravenous Q12H    dronabinol  5 mg Oral TID AC    heparin (porcine)  5,000 Units Subcutaneous Q8H    pantoprazole  40 mg Oral Daily    scopolamine  1 patch Transdermal Q3 Days    trazodone  50 mg Oral QHS       PRN Medications: sodium chloride, acetaminophen, bisacodyl, dextrose 50%, glucagon (human recombinant), ondansetron, promethazine    Review of Systems   Constitutional: Negative for unexpected weight change.        Vision stable w glasses  Hearing intact  Continent of bowel   HENT: Negative for congestion and ear pain.    Eyes: Negative for discharge.   Respiratory: Negative for shortness of breath.    Cardiovascular: Negative for chest pain.   Gastrointestinal: Positive for nausea. Negative for abdominal pain and vomiting.   Endocrine: Negative for cold intolerance.   Genitourinary: Negative for flank pain.   Neurological: Positive for weakness.   Psychiatric/Behavioral: Negative for dysphoric mood and hallucinations.     Objective:     Vital Signs (Most Recent):  Temp: 98.6 °F (37 °C) (08/28/17 0600)  Pulse: 62 (08/28/17 0600)  Resp: 18 (08/28/17 0600)  BP: (!) 160/82 (08/28/17 0600)  SpO2: 97 % (08/28/17 0600)    Vital Signs (24h Range):  Temp:  [98.6 °F (37 °C)] 98.6 °F (37 °C)  Pulse:  [62-82] 62  Resp:  [18] 18  SpO2:  [97 %] 97 %  BP: (127-160)/(66-82) 160/82     Physical  Exam   Constitutional: He appears well-developed and well-nourished.   HENT:   Head: Normocephalic and atraumatic.   Eyes: EOM are normal.   Cardiovascular: Normal rate.    Pulmonary/Chest: Effort normal. No respiratory distress.   Abdominal: Soft. He exhibits no distension. There is no tenderness.   Genitourinary:   Genitourinary Comments: + urostomy   Musculoskeletal:   UE: SA, EF, EE,  5/5  LE: HF 5/5, KE 5/5, DF/PF 5/5  No calf tenderness    Neurological: He is alert.   Follows all commands   Skin: Skin is warm.   Psychiatric: He has a normal mood and affect.   Vitals reviewed.    NEUROLOGICAL EXAMINATION:     CRANIAL NERVES     CN III, IV, VI   Extraocular motions are normal.     BMP  Lab Results   Component Value Date     08/28/2017    K 3.5 08/28/2017     08/28/2017    CO2 25 08/28/2017    BUN 19 08/28/2017    CREATININE 2.1 (H) 08/28/2017    CALCIUM 8.8 08/28/2017    ANIONGAP 10 08/28/2017    ESTGFRAFRICA 36.3 (A) 08/28/2017    EGFRNONAA 31.4 (A) 08/28/2017         Assessment/Plan:      Juan Manuel Koehler is a 67 y.o. male admitted to inpatient rehabilitation on 8/15/2017 for Right-sided cerebrovascular accident (CVA) with impaired mobility and ADLs. Patient remains appropriate for PT, OT, and as required Speech therapy. Patient continues to require 24 hour nursing care as well as daily Physician assessment.    Anemia    Downtrending H/H, pending CBC for today -- considering transfusion if trend worsens        History of urostomy    - wound care assisting with monitoring of site/mgmt         Impaired mobility and ADLs    Other Rehab Plan/Continue to monitor:   Nutrition/Swallow Status:    - Prealbumin - 14   - Nutritionist on board   Bladder Management: illeostomy  Bowel Management:  continent  - Miralax and Suppository PRN   - Will monitor for regularity   Mood: stable   Sleep: monitor; Failed Ramelteon. 8/17 trial trazadone   Skin: Monitor   DVT ppx:  Heparin            S/P ileal conduit     Hx of  Urothelial carcinoma of bladder   - Follow-up with urology as an outpatient with Dr. Robles and with oncology, Dr. Calero     8/18 Functional, Last UA 8/12, and that was negative   8/21 mild issues with leaking from the urostomy site, now controlled  8/22 wound care evaluated urostomy site, and will continue to monitor        Hypertension      -cont with Coreg 12.5 BID, lisinopril 20mg QD, hydralazine PO PRN  - cont to monitor BP/HR, stable    8/18 Given rising Cr, will hold Lisinopril. Will consider adjusting Coreg/adding Amlodipine as needed for BP control.   8/22 increased coreg to 25mg BID, BP improved  8/25 stable  8/28 stable has mild elevations at times but appears OK without lisinopril        Bacteremia due to Enterococcus    Endocarditis treatment w IVAbx (ceftriaxone 2g q12h, ampicillin 2g q6h) with end date 9/16 (6 week course); + R sided PICC     8/16 Cr 1.6, discussed w pharm. decreased Ampicillin to Q6  8/17 Cr 1.8, repeat BMP tomorrow  8/18 CR rising to 1.9. ELDER vs AIN from the Ampicillin.  Reviewed w pharmacy, and current dose of Amp is appropriate until CrCl is < 10    ----  Will hold lisinopril, and cont to trend BMP through wknd (not drawn Saturday). Given rise in BUN in setting on Nausea, intermittent emesis, and poor PO intake will give 1L IVF this evening (completed)   ----- Will also reach out to ID is there is another option for coverage     8/19 no further emesis since yesterday afternoon, continue to encourage fluids and repeat BMP to monitor BUN/Cr    8/20 BUN/Cr 25/2.1, will give an additional 1L of NS and follow up BMP   8/21 BUN/Cr 22/2.0, continue to encourage fluids and alleviate nausea/emesis - await recs from ID regarding antibiotic coverage and given another 1L of fluids  8/22 BUN/Cr 17/1.9, given another 1L of IVF  8/24 BUN/Cr 19/2.1, nausea/emesis persists  8/25 BUN/Cr 17/2.1, nausea improving, no emesis yet today, monitor K level but holding replacement due to elevated  Cr      BMP  Lab Results   Component Value Date     08/26/2017    K 3.7 08/26/2017     08/26/2017    CO2 26 08/26/2017    BUN 19 08/26/2017    CREATININE 2.2 (H) 08/26/2017    CALCIUM 8.6 (L) 08/26/2017    ANIONGAP 9 08/26/2017    ESTGFRAFRICA 34.3 (A) 08/26/2017    EGFRNONAA 29.7 (A) 08/26/2017             Nausea    - Has had frequent nausea with intermittent emesis since admission at [facility]    Suspect secondary to IV Abx.     - 8/16 juaquin Zofran prior to meals and monitor, KUB was unremarkable  - 8/18 Added phenergan yesterday. Took first dose this AM, and thought nausea was a little better. No emesis today. Will add PPI, and cont to monitor closely.    8/19 one time episode of emesis yesterday per patient, but reports that the nausea has improved with newly added phenergan.  8/21 nausea and emesis persists, will add on schedule marinol TID before meals, await ID recs for antibiotics as they are likely contributing to nausea  8/22 increased marinol to 5mg TID before meals, and will add scopolamine patch as well  8/23 no emesis this morning, trialing small bites and sips and will monitor  8/24 emesis this morning following breakfast, marinol appears to have benefit when taken, attempting preauth for marinol before discharge  8/25 improving, continue to monitor  8/27 refusing nausea meds at times          * Right-sided cerebrovascular accident (CVA)    - R  Cerebellar, embolic  - cont w ASA 81/statin  Tentative DC: 8/29, will need 24 hr sup/assistance       Bed mobility Ind  Sit<>stand SBA  t/f squat pivot SBA, t/f stand pivot SBA  shower t/f SBA, toilet t/f SBA  WC supervision 162'  Amb SBAw/ ',  stairs SBAx12 steps    Feeding Ind, grooming Ind, bathing SBA  UB dress Ind, LB dress SBA  Toileting SBA    Comprehension: mod I  Expression: mod I   Social Interaction:supervision  Problem Solving:min A  Memory: mod A    Cont to be limited by endurance, decreased balance, memory, insight , and nausea/emesis             DISCHARGE PLANNING:  Tentative Discharge Date: 8/29/2017    Future Appointments  Date Time Provider Department Center   9/5/2017 10:40 AM Jewels Toth PA-C [facility] NEURO [facility]       James Randle MD  Department of Physical Medicine & Rehab   [facility]

## 2017-08-28 NOTE — ASSESSMENT & PLAN NOTE
- Has had frequent nausea with intermittent emesis since admission at [facility]    Suspect secondary to IV Abx.     - 8/16 juaquin Zofran prior to meals and monitor, KUB was unremarkable  - 8/18 Added phenergan yesterday. Took first dose this AM, and thought nausea was a little better. No emesis today. Will add PPI, and cont to monitor closely.    8/19 one time episode of emesis yesterday per patient, but reports that the nausea has improved with newly added phenergan.  8/21 nausea and emesis persists, will add on schedule marinol TID before meals, await ID recs for antibiotics as they are likely contributing to nausea  8/22 increased marinol to 5mg TID before meals, and will add scopolamine patch as well  8/23 no emesis this morning, trialing small bites and sips and will monitor  8/24 emesis this morning following breakfast, marinol appears to have benefit when taken, attempting preauth for marinol before discharge  8/25 improving, continue to monitor  8/27 refusing nausea meds at times

## 2017-08-28 NOTE — ASSESSMENT & PLAN NOTE
- R  Cerebellar, embolic  - cont w ASA 81/statin  Tentative DC: 8/29, will need 24 hr sup/assistance       Bed mobility Ind  Sit<>stand SBA  t/f squat pivot SBA, t/f stand pivot SBA  shower t/f SBA, toilet t/f SBA  WC supervision 162'  Amb SBAw/ ',  stairs SBAx12 steps    Feeding Ind, grooming Ind, bathing SBA  UB dress Ind, LB dress SBA  Toileting SBA    Comprehension: mod I  Expression: mod I   Social Interaction:supervision  Problem Solving:min A  Memory: mod A    Cont to be limited by endurance, decreased balance, memory, insight , and nausea/emesis

## 2017-08-28 NOTE — PROGRESS NOTES
"Occupational Therapy   AM Treatment 2    Juan Manuel Koehler   MRN: [id number]      08/28/17 1115   OT Time Calculation   OT Start Time 1115   OT Stop Time 1200   OT Total Time (min) 45 min   General   OT Date of Treatment 08/28/17   Precautions   General Precautions aspiration;fall   Visual/Auditory Vision impaired   Subjective   Patient states "What are we going to do?"   Pain/Comfort   Pain Rating no pain   Additional Activities   Additional Activities Comments  Therex:  - Bilateral josemanuel x 5# x 20 reps x 5 sets on incline   - Rickshaw x 20# x 20 reps x 5 sets   - Pt provided with UB HEP, performed x 10 reps/exercise with supervision of wife    - BUE manipulation task: pt removed lids/containers and sorted into 2 piles. Pt then matched lids/containers back appropriately with ~75% accuracy. Pt required cues for problem solving and memory during task.    Activity Tolerance   Activity Tolerance (pt tolerated fair)   Medical Staff Made Aware NSG   After Treatment   Patient Position After Treatment Supine in bed   Patient after treatment left call button in reach   Assessment   Prognosis Fair   Problem List Decreased Self Care skills;Decreased upper extremity strength;Decreased safe judgment during ADL;Decreased cognition;Decreased endurance;Decreased functional mobility;Decreased fine motor control;Decreased gross motor control;Decreased IADLs;Decreased Function of right upper extremity;Decreased Function of left upper extremity;Decreased trunk control for functional activities   Assessment Pt tolerated session fair, required mild coaxing to complete session due to nausea. Pt still requires cues for safety and to decrease pace of movements during transfers and self care.    Level of Motivation/Participation fair   Discharge Recommendations   Equipment Needed After Discharge 3-in-1 commode;walker, rolling;bath bench   Discharge Facility/Level Of Care Needs home health OT   Plan   Therapy Frequency 2 " times/day;Monday-Friday;Saturday or Sunday   Occupational Therapy Follow-up   OT Follow-up? Yes   Treatment/Billable Minutes   Therapeutic Exercise 45   Total Time 45     PREETHI Cooper   8/28/2017  PREETHI Cooper   8/28/2017

## 2017-08-28 NOTE — PROGRESS NOTES
"Speech Therapy   Treatment/Discharge Summary    Juan Manuel Koehler   MRN: [id number]     Short Term Goals   Goal 1 Pt will be oriented x4 indly with 100% acc. MET   Goal 2 Pt will complete complex language comprehension skills with modified independence with 95% acc. MET   Goal 3 Pt will complete complex language expression tasks with modified independence with 95% acc. MET   Goal 4 Pt will complete reasoning/problem solving/sequencing tasks given supervision with 90% acc. MET   Goal 5 Pt will participate in full dysphagia evaluation with POC to follow. GOAL MET   Long Term Goals   Goal 1 Pt will complete complex language comprehension skills with independence with 100% acc.  NOT MET   Goal 2 Pt will complete complex language expression tasks with independence with 100% acc. NOT MET   Goal 3 Pt will complete reasoning/problem solving/sequencing tasks with modified independence with 95% acc. NOT MET   Goal 4 Pt will complete functional/recent recall tasks given min verbal cues with 80% acc.  NOT MET   Goal 5 Pt will complete functional visual scanning tasks given supervision with 90% acc.  MET          08/28/17 1400   Speech Time Calculation   Speech Start Time 1400   Speech Stop Time 1445   Speech Total (min) 45 min   General Information   SLP Treatment Date 08/28/17   General Observations Pt seen for cognitive group treatment.   General Precautions aspiration;fall   Visual/Auditory Vision impaired   Subjective   Patient states Pt stated "I am going home tomorrow."   Pain/Comfort   Pain Rating no pain       SLP Cognitive Treatment   Treatment Detail (SLP Cognitive Treatment) Patient participated in a 45 minute problem solving group.  The group activity assessed and trained improved critical thinking skills, problem solving and reasoning in regards to common ADL problems.  The patient provided appropriate potential problems for common ADL situations with 80 % accuracy with minimal cues; The patient provided appropriate " solutions to common ADL problems with 85 % accuracy with supervision cues.   Assessment   SLP Diagnosis mild-moderate cognitive deficits   Prognosis Good   Problem List decreased attention skills; poor insight; decreased problem solving skills   Session Assessment progressing toward goals   Level of Motivation/Participation good   Discharge Recommendations   Discharge Facility/Level Of Care Needs home health speech therapy   Plan   Plan Alter current plan  (pt planned for d/c home 17)   Treatment/Billable Minutes   Speech Therapy Individual 45   Total Time 45     Recommend continued skilled ST services targeting cognition via home health. Recommend supervision with financial/medication management. Pt's spouse participated in family training with all questions answered at this time.     FIM Scores  Comprehension:6  Expression: 6  Social Interaction:5  Problem Solvin  Memory: 3     Comprehension:  Complex= humor, finances, rationale for medical treatment(hip precautions, pressure relief)  Basic= pain, hunger, thirst, bathroom needs, cold, nutrition, sleep     Understands complex/abstract conversation/directions with extra time, assistance device(glasses, if visual mode or both; hearing aide if auditory mode or both) (6)     Expression:  Expresses complex / abstract ideas with extra time, assistive devic (augmentative communication device    (6)     Social Interaction:  Interacts appropriately 90% of time - needs monitoring or encouragement for participation or interaction  (5)     Problem Solving:  Solves basic problems 75-89% of the time  (4)     Memory:  Recognizes, recalls, or executes 50-74% of time 2 steps of 2 step request (3)    Allison Charles CCC-SLP  2017

## 2017-08-29 ENCOUNTER — TELEPHONE (OUTPATIENT)
Dept: HEMATOLOGY/ONCOLOGY | Facility: CLINIC | Age: 67
End: 2017-08-29

## 2017-08-29 VITALS
BODY MASS INDEX: 16.32 KG/M2 | HEART RATE: 60 BPM | HEIGHT: 74 IN | WEIGHT: 127.19 LBS | SYSTOLIC BLOOD PRESSURE: 138 MMHG | TEMPERATURE: 98 F | DIASTOLIC BLOOD PRESSURE: 62 MMHG | RESPIRATION RATE: 18 BRPM | OXYGEN SATURATION: 98 %

## 2017-08-29 PROCEDURE — 99238 HOSP IP/OBS DSCHRG MGMT 30/<: CPT | Mod: ,,, | Performed by: PHYSICAL MEDICINE & REHABILITATION

## 2017-08-29 PROCEDURE — 25000003 PHARM REV CODE 250: Performed by: PHYSICIAN ASSISTANT

## 2017-08-29 PROCEDURE — 63600175 PHARM REV CODE 636 W HCPCS: Performed by: PHYSICIAN ASSISTANT

## 2017-08-29 PROCEDURE — 97803 MED NUTRITION INDIV SUBSEQ: CPT | Performed by: NUTRITIONIST

## 2017-08-29 PROCEDURE — 25000003 PHARM REV CODE 250: Performed by: PHYSICAL MEDICINE & REHABILITATION

## 2017-08-29 PROCEDURE — 63600175 PHARM REV CODE 636 W HCPCS: Performed by: PHYSICAL MEDICINE & REHABILITATION

## 2017-08-29 RX ADMIN — CEFTRIAXONE 2 G: 2 INJECTION, SOLUTION INTRAVENOUS at 02:08

## 2017-08-29 RX ADMIN — AMPICILLIN SODIUM 2 G: 2 INJECTION, POWDER, FOR SOLUTION INTRAMUSCULAR; INTRAVENOUS at 05:08

## 2017-08-29 RX ADMIN — PANTOPRAZOLE SODIUM 40 MG: 40 TABLET, DELAYED RELEASE ORAL at 08:08

## 2017-08-29 RX ADMIN — DRONABINOL 5 MG: 2.5 CAPSULE ORAL at 05:08

## 2017-08-29 RX ADMIN — ASPIRIN 81 MG: 81 TABLET, COATED ORAL at 08:08

## 2017-08-29 RX ADMIN — CARVEDILOL 25 MG: 25 TABLET, FILM COATED ORAL at 08:08

## 2017-08-29 RX ADMIN — ONDANSETRON 8 MG: 8 TABLET, ORALLY DISINTEGRATING ORAL at 05:08

## 2017-08-29 NOTE — PLAN OF CARE
Problem: Patient Care Overview  Goal: Plan of Care Review  Outcome: Ongoing (interventions implemented as appropriate)  Safety:  call light in reach, patient oriented to room & instructed how to notify nurse if assistance is needed, questions & concerns addressed, bed in lowest position with wheels locked & side rails up X 2, fall precautions followed, patient free from fall & injury thus far this shift;  VTE/bleeding precautions maintained.  Activity:  patient turned independently, weight shifted at least every other hour.  Neurological:  patient A&O X 4, follows commands, equal  strength & dorsi/plantarflexion, neuro checks performed as ordered & WDL.  Respiratory:  patient tolerates room air without distress, denies SOB.  Cardiac:  Denies chest pain, BP stable.  HR stable.  Afebrile this shift.  GI:  Patient tolerates PO intake fairly well, received prn antiemetic as ordered and expressed relief, LBM 8/27/2017.  :  patient voids clear yellow urine without foul odor via urostomy adequate output for shift, started leaking scant fluid around ostomy site during night but patient refused to change bag, stated he will change it in AM and requested to place towel over site until then.  Skin:  Urostomy in place.  Devices:  PICC to R arm CDI, negative for s/sx of infection & infiltration, administered IV abx as ordered, patient tolerated well.  Pain:  patient denies pain.  Will continue to monitor patient.

## 2017-08-29 NOTE — PROGRESS NOTES
Pt scheduled to discharge home today.  Home IV abx supplied by [facility] and home health to be assigned by PHN.  Home health orders faxed to PHN.  Pt supplied rolling walker by [facility].  Spouse will order tub bench online.  Pt to be transported home by spouse after discharge.

## 2017-08-29 NOTE — PROGRESS NOTES
"[facility]  Adult Nutrition  Progress Note     SUMMARY      Recommendations     Recommendation/Intervention: C  1.  Continue Rx diet + supplement  2.  Encourage PO intake   3. Consider appetite stimulant  4.  RD to follow  Goals: Meet 85% of EEN  Nutrition Goal Status: ongoing  Communication of RD Recs:     Continuum of Care Plan           Reason for Assessment     Reason for Assessment: RD follow up   Diagnosis: stroke/CVA  Relevent Medical History: UTI, sepis, endocarditis, bladder cancer, HTN, anemia, STEMI, CKD 3, FTT and severe malnutrition   Interdisciplinary Rounds: did not attend           Nutrition Discharge Planning: DC on Regular diet, high protein , high calorie     Nutrition Prescription Ordered     Current Diet Order: Regular              Oral Nutrition Supplement: Boost plus chocolate tid      Evaluation of Received Nutrients/Fluid Intake        Energy Calories Required: not meeting needs        Protein Required: meeting needs              Fluid Required: meeting needs  Comments: PO 25-50%      Tolerance: tolerating (some nausea)  % Intake of Estimated Energy Needs: 50 - 75 %  % Meal Intake: 50%      Nutrition Risk Screen     Nutrition Risk Screen: no indicators present     Nutrition/Diet History     Patient Reported Diet/Restrictions/Preferences: general              Factors Affecting Nutritional Intake: decreased appetite, impaired cognitive status/motor control                 Labs/Tests/Procedures/Meds        Pertinent Labs Reviewed: reviewed, pertinent  Pertinent Labs Comments:   Pertinent Medications Reviewed: reviewed, pertinent  Pertinent Medications Comments: Lisinopril, ASA, statin, Abx senna-docusate     Physical Findings     Overall Physical Appearance: weak, underweight, loss of subcutaneous fat, loss of muscle mass  Tubes: other (see comments) (urostomy)           Anthropometrics     Temp: 97.9 °F (36.6 °C)     Height: 6' 2" (188 cm)  Weight Method: Stated  Weight: " 56.7 kg (125 lb)  Ideal Body Weight (IBW), Male: 190 lb     % Ideal Body Weight, Male (lb): 65.79 lb     BMI (Calculated): 16.1  BMI Grade: less than 16 protein-energy malnutrition grade III  Weight Loss: unintentional        Estimated/Assessed Needs     Energy Calorie Requirements (kcal): 1652-7284  Energy Need Method: Saunders-St Jeor (x 1.25 x 1.2)        Weight Used For Protein Calculations: 56.7 kg (125 lb)  Protein Requirements: 70-88     RDA Method (mL): 1781           Monitor and Evaluation  Food and Nutrient Intake: energy intake  Food and Nutrient Adminstration: diet order   Physical Activity and Function: nutrition-related ADLs and IADLs  Anthropometric Measurements: weight change  Biochemical Data, Medical Tests and Procedures: electrolyte and renal panel, inflammatory profile  Nutrition-Focused Physical Findings: overall appearance     Nutrition Risk  Follow up weekly

## 2017-08-29 NOTE — PLAN OF CARE
Problem: Patient Care Overview  Goal: Plan of Care Review  Outcome: Outcome(s) achieved Date Met: 08/29/17  Patient AAOx4, Patient VS stable. Patient refused to eat breakfast this morning. Instructed patient to use call light for assistance and before getting up. Bed alarm set, will continue to monitor patient.

## 2017-08-29 NOTE — PROGRESS NOTES
Ostomy follow-up:  The pouch was changed per wife with minimal assist.  Tolerated well. Supplies at bedside. Plan of care discussed with patient/family who verbalized understanding.     08/29/17 1000       Urostomy 08/02/17 2345 ileal conduit RUQ   Date of First Assessment/Time of First Assessment: 08/02/17 2345   Present Prior to Hospital Arrival?: Yes  Urostomy Type: ileal conduit  Location: RUQ   Stoma Appearance round;moist;pink;protruding above skin level   Stoma Size (in) 28   Stomal Appliance 1 piece;Leakage;Irrigation;Changed;To drainage bag to dependent drainage   Accessories/Skin Care convex insert;appliance belt;barrier substance over peristomal skin;cleansed w/ water   Stoma Function yellow urine   Peristomal Skin (irritated from removing old stoma paste)   Tolerance signs/symptoms of discomfort  (pulling on hair when removing old paste)

## 2017-08-29 NOTE — DISCHARGE INSTRUCTIONS
[facility] [phone].  You will be contacted to schedule initial visit.  If you have not received a call the day after discharge you should call to schedule a visit.    [facility] [phone].  You received a rolling walker supplied by [facility].  If you have concerns or questions about your equipment please call the number above.    [facility] [phone] Your IV antibiotic medication is supplied by [facility].  If you have questions or concerns about your antibiotics please call the number above.

## 2017-08-29 NOTE — TELEPHONE ENCOUNTER
----- Message from Rea Valle sent at 8/29/2017  8:04 AM CDT -----  Contact: Lizbeth @ [facility] [phone]    [facility] (staff) calling to [facility] appt for 2 wks for f/u    Pt being d/c today

## 2017-08-31 ENCOUNTER — PATIENT OUTREACH (OUTPATIENT)
Dept: ADMINISTRATIVE | Facility: CLINIC | Age: 67
End: 2017-08-31

## 2017-08-31 NOTE — PROGRESS NOTES
C3 nurse attempted to contact patient. The following occurred:   C3 nurse attempted to contact Juan Manuel Koehler  for a TCC post hospital discharge follow up call. The patient is unable to conduct the call @ this time. The patient requested a callback.    The patient does not have a scheduled HOSFU appointment within 7-14 days post hospital discharge date 8/29/17. Message sent to Physician staff to assist with HOSFU appointment scheduling.

## 2017-09-05 ENCOUNTER — OFFICE VISIT (OUTPATIENT)
Dept: NEUROLOGY | Facility: CLINIC | Age: 67
DRG: 987 | End: 2017-09-05
Payer: MEDICARE

## 2017-09-05 ENCOUNTER — TELEPHONE (OUTPATIENT)
Dept: WOUND CARE | Facility: CLINIC | Age: 67
End: 2017-09-05

## 2017-09-05 VITALS
DIASTOLIC BLOOD PRESSURE: 78 MMHG | HEIGHT: 74 IN | BODY MASS INDEX: 16.72 KG/M2 | SYSTOLIC BLOOD PRESSURE: 142 MMHG | HEART RATE: 52 BPM | WEIGHT: 130.31 LBS

## 2017-09-05 DIAGNOSIS — I10 ESSENTIAL HYPERTENSION: ICD-10-CM

## 2017-09-05 DIAGNOSIS — I33.0 ACUTE BACTERIAL ENDOCARDITIS: ICD-10-CM

## 2017-09-05 DIAGNOSIS — I63.9 RIGHT-SIDED CEREBROVASCULAR ACCIDENT (CVA): Primary | ICD-10-CM

## 2017-09-05 DIAGNOSIS — R11.0 NAUSEA: ICD-10-CM

## 2017-09-05 PROCEDURE — 3077F SYST BP >= 140 MM HG: CPT | Mod: S$GLB,,, | Performed by: PHYSICIAN ASSISTANT

## 2017-09-05 PROCEDURE — 1125F AMNT PAIN NOTED PAIN PRSNT: CPT | Mod: S$GLB,,, | Performed by: PHYSICIAN ASSISTANT

## 2017-09-05 PROCEDURE — 3078F DIAST BP <80 MM HG: CPT | Mod: S$GLB,,, | Performed by: PHYSICIAN ASSISTANT

## 2017-09-05 PROCEDURE — 1159F MED LIST DOCD IN RCRD: CPT | Mod: S$GLB,,, | Performed by: PHYSICIAN ASSISTANT

## 2017-09-05 PROCEDURE — 99999 PR PBB SHADOW E&M-EST. PATIENT-LVL III: CPT | Mod: PBBFAC,,, | Performed by: PHYSICIAN ASSISTANT

## 2017-09-05 PROCEDURE — 3008F BODY MASS INDEX DOCD: CPT | Mod: S$GLB,,, | Performed by: PHYSICIAN ASSISTANT

## 2017-09-05 PROCEDURE — 99214 OFFICE O/P EST MOD 30 MIN: CPT | Mod: S$GLB,,, | Performed by: PHYSICIAN ASSISTANT

## 2017-09-05 NOTE — TELEPHONE ENCOUNTER
----- Message from Dafne Frankel sent at 9/5/2017  8:17 AM CDT -----  Contact: pt's wife Mercedes [phone]  Mercedes states pt's bag is not skinning to pt's skin and leaking all over. Pt's wife states this is an emergency and she would like to be seen today. Pt's wife states she is running out of supplies also. Please call Mercedes.

## 2017-09-05 NOTE — DISCHARGE SUMMARY
[facility]  Physical Medicine & Rehab  Discharge Summary    Patient Name: Juan Manuel Koehler  MRN: [id number]  Admission Date: 8/15/2017  Hospital Length of Stay: 14 days  Discharge Date and Time: 8/29/2017 11:45 AM  Attending Physician: No att. providers found  Discharging Provider: Verónica Lewis MD  Primary Care Physician: Hemant Sweeney MD    HPI: 67-year-old male with PMHx of CKD III and invasive bladder cancer s/p open radical cystoproctostomy, bilateral pelvic lymph node dissection, and ileal conduit urinary diversion on 6/21/17 (pathology with high grade urothelial carcinoma, LN negative for malignancy) with associated deconditioning, poor PO intake, and weight loss presented to [facility] on 8/2/17 for worsening weakness and dizziness with subsequent fall.  Denied head trauma and LOC.  On arrival, found to be septic 2/2 complicated proteus UTI.  Blood cultures grew enterococcus faecalis.  Upon further work-up, ECHO showed vegetation on atrial surface of mitral valve consistent with endocarditis.  On 8/4/17, patient found to have AMS, aphasia, and facial droop.  Telemedicine consult completed.  CTH showed R cerebellar hypodensity with mass effect and midline shift.  He was then transferred to [facility] on 8/4/17 for further evaluation and management.  MRI/MRA revealed R cerebellar infarct with hemorrhagic conversion.  Evaluated by neurosurgery and no acute surgical intervention necessary.  Started on Cardene and hypertonic infusion.  Repeat blood cultures NGTD.  ID following and recommending ampicillin x 6 weeks for acute bacterial endocarditis.  Stepped down to floor 8/11/2017.  Functional status on admit: Bed mobility SBA.  Sit to stand CGA-Charan and transfers CGA.  Ambulated 100 ft CGA-Charan with RW. UBD Charan and LBD modA.    Indwelling Lines/Drains at time of discharge:   Lines/Drains/Airways     Peripherally Inserted Central Catheter Line                 PICC Double Lumen 08/10/17  1155 right brachial 26 days          Drain                 Ureteral Drain/Stent -- days         Nephrostomy 05/05/17 1503 Right 8 Fr. 123 days         Nephrostomy 05/05/17 1504 Left 8 Fr. 123 days         Closed/Suction Drain 06/21/17 1221 Abdomen Bulb 19 Fr. 76 days         Ureteral Drain/Stent 06/21/17 1053 Left ureter 6 Fr. 76 days         Ureteral Drain/Stent 06/21/17 1054 Right ureter 6 Fr. 76 days         Urostomy 06/21/17 ileal conduit RLQ 76 days         Urostomy 08/02/17 2345 ileal conduit RUQ 33 days                Hospital Course: While admitted to [facility], medically managed for:     * Right-sided cerebrovascular accident (CVA)     - R  Cerebellar, embolic  - was continued on ASA 81mg statin          S/P ileal conduit     Hx of  Urothelial carcinoma of bladder   - Will have Follow-up with urology as an outpatient with Dr. Robles and with oncology, Dr. Calero      8/18 Functional, Last UA 8/12, and that was negative   8/22 wound care evaluated urostomy site, and will continue to monitor       Hypertension        -continued on Coreg 25mg BID; Lisinopril held given rise worsening renal fxn   - cont to monitor BP/HR, stable             Bacteremia due to Enterococcus     Endocarditis treatment w IVAbx (ceftriaxone 2g q12h, ampicillin 2g q6h) with end date 9/16 (6 week course); + R sided PICC      8/16 Cr 1.6, discussed w pharm. decreased Ampicillin to Q6    ELDER on CKD - renal function was closely monitored and tracked. Continued on adjusted dose of Ampicillin for DC.             Nausea     - Has had frequent nausea with intermittent emesis since admission at [facility], multifactorial    - improvement with Scopolamine Patch  Marinol, with Zofran PRN                       Functional progress:   Bed mobility Ind  Sit<>stand SBA  t/f squat pivot SBA, t/f stand pivot SBA  shower t/f SBA, toilet t/f SBA  WC supervision 162'  Amb SBAw/ ',  stairs SBAx12 steps     Feeding Ind, grooming Ind, bathing SBA  UB dress Ind, LB  dress SBA  Toileting SBA     Comprehension: mod I  Expression: mod I   Social Interaction:supervision  Problem Solving:min A  Memory: mod A       Consults         Status Ordering Provider     IP consult to dietary  Once     Provider:  (Not yet assigned)    VERÓNICA Neely          Significant Diagnostic Studies: Labs:   BMP  Lab Results   Component Value Date     08/28/2017    K 3.5 08/28/2017     08/28/2017    CO2 25 08/28/2017    BUN 19 08/28/2017    CREATININE 2.1 (H) 08/28/2017    CALCIUM 8.8 08/28/2017    ANIONGAP 10 08/28/2017    ESTGFRAFRICA 36.3 (A) 08/28/2017    EGFRNONAA 31.4 (A) 08/28/2017     Lab Results   Component Value Date    WBC 8.16 08/28/2017    HGB 10.1 (L) 08/28/2017    HCT 30.4 (L) 08/28/2017    MCV 88 08/28/2017     08/28/2017         Final Active Diagnoses:    Diagnosis Date Noted POA    PRINCIPAL PROBLEM:  Right-sided cerebrovascular accident (CVA) [I63.9] 08/15/2017 Yes     Chronic    Nausea [R11.0] 05/06/2017 Yes    S/P ileal conduit [Z93.6] 08/15/2017 Not Applicable    Hypertension [I10] 08/06/2017 Yes    Bacteremia due to Enterococcus [R78.81] 08/04/2017 Yes    History of urostomy [Z98.890] 08/17/2017 Not Applicable    Impaired mobility and ADLs [Z74.09] 08/15/2017 Yes    Anemia [D64.9] 08/25/2017 Yes      Problems Resolved During this Admission:    Diagnosis Date Noted Date Resolved POA       Discharged Condition: stable    Disposition: Home-Health Care [facility]    Follow Up:  Follow-up Information     Jewels Toth PA-C On 9/5/2017.    Specialty:  Neurology  Why:  10:40  Contact information:  [address]  [facility] [id number] [phone]             Verónica Lewis MD In 2 months.    Specialty:  Physical Medicine and Rehabilitation  Why:  As needed  Contact information:  [address]  [address]  [place] [id number] [phone]             Mingo Calero MD In 2 weeks.    Specialty:  Hematology and Oncology  Contact information:  [address] [id number] [phone]             Hemant Huber MD In 2 weeks.    Specialties:  Infectious Diseases, Hospice and Palliative Medicine  Contact information:  [address]  [place] [address]  [phone]             Villa Robles MD In 1 month.    Specialty:  Urology  Contact information:  [address] [address]  [phone]             Mingo Calero MD In 1 month.    Specialty:  Hematology and Oncology  Contact information:  [address]  [place] [id number] [phone]                 Patient Instructions:     WALKER FOR HOME USE   Order Specific Question Answer Comments   Type of Walker: Adult (5'4"-6'6")    With wheels? Yes    Height: 6' 2" (1.88 m)    Weight: 57.7 kg (127 lb 3.2 oz)    Length of need (1-99 months): 99    Does patient have medical equipment at home? none    Please check all that apply: Patient's condition impairs ambulation.    Please check all that apply: Patient is unable to safely ambulate without equipment.    [facility] to pull from Rehab closet    Expected Date of Delivery: 8/29/2017      TRANSFER TUB BENCH FOR HOME USE   Order Specific Question Answer Comments   Type of Transfer Tub Bench: Unpadded    Height: 6' 2" (1.88 m)    Weight: 57.7 kg (127 lb 3.2 oz)    Does patient have medical equipment at home? none    Length of need (1-99 months): 99    Vendor: [facility] to pull from Rehab closet    Expected Date of Delivery: 8/29/2017      Referral to Home health   Referral Priority: Routine Referral Type: Home Health Care   Referral Reason: Specialty Services Required    Requested Specialty: Home Health Services    Number of Visits Requested: 1      Diet general       Medications:  Reconciled Home Medications:   Discharge Medication List as of 8/29/2017  9:57 AM      START taking these medications    Details   AMPICILLIN SODIUM (AMPICILLIN 2 G/100 ML NS, READY TO MIX SYSTEM,) Inject 100 " mLs (2 g total) into the vein every 6 (six) hours., Starting Wed 8/23/2017, Until Sat 9/16/2017, Print      aspirin (ECOTRIN) 81 MG EC tablet Take 1 tablet (81 mg total) by mouth once daily., Starting Wed 8/23/2017, Until Thu 8/23/2018, OTC      carvedilol (COREG) 25 MG tablet Take 1 tablet (25 mg total) by mouth 2 (two) times daily., Starting Wed 8/23/2017, Until Thu 8/23/2018, Normal      cefTRIAXone 2 g in dextrose 5 % 50 mL (ROCEPHIN) 2 g/50 mL PgBk IVPB Inject 50 mLs (2 g total) into the vein every 12 (twelve) hours., Starting Wed 8/23/2017, Until Sat 9/16/2017, Print      dronabinol (MARINOL) 5 MG capsule Take 1 capsule (5 mg total) by mouth 3 (three) times daily before meals., Starting Wed 8/23/2017, Until Fri 9/22/2017, Normal      pantoprazole (PROTONIX) 40 MG tablet Take 1 tablet (40 mg total) by mouth once daily., Starting Wed 8/23/2017, Until Thu 8/23/2018, Normal      scopolamine (TRANSDERM-SCOP) 1.3-1.5 mg (1 mg over 3 days) Place 1 patch onto the skin Every 3 (three) days., Starting Wed 8/23/2017, Until Fri 9/22/2017, Normal      trazodone (DESYREL) 50 MG tablet Take 1 tablet (50 mg total) by mouth every evening., Starting Wed 8/23/2017, Until Thu 8/23/2018, Normal         CONTINUE these medications which have NOT CHANGED    Details   ondansetron (ZOFRAN-ODT) 8 MG TbDL Take 1 tablet (8 mg total) by mouth every 6 (six) hours as needed (nausea)., Starting Thu 7/6/2017, Print         STOP taking these medications       megestrol (MEGACE) 400 mg/10 mL (40 mg/mL) Susp Comments:   Reason for Stopping:         baclofen (LIORESAL) 10 MG tablet Comments:   Reason for Stopping:         oxycodone-acetaminophen (PERCOCET) 5-325 mg per tablet Comments:   Reason for Stopping:         oxycodone-acetaminophen (PERCOCET) 5-325 mg per tablet Comments:   Reason for Stopping:         polyethylene glycol (GLYCOLAX) 17 gram PwPk Comments:   Reason for Stopping:               Time spent on the discharge of patient: 25  minutes    Verónica Lewis MD  Department of Physical Medicine & Rehab  [facility]

## 2017-09-05 NOTE — LETTER
September 5, 2017      Noreen Hinkle MD  [address]  [facility] [address]           [facility]  Neurology  [address]  [facility] [address]  Phone: [phone]  Fax: [phone]          Patient: Juan Manuel Koehler   MR Number: [id number]   YOB: 1950   Date of Visit: 9/5/2017       Dear Dr. Noreen Hinkle:    Thank you for referring Juan Manuel Koehler to me for evaluation. Attached you will find relevant portions of my assessment and plan of care.    If you have questions, please do not hesitate to call me. I look forward to following Juan Manuel Koehler along with you.    Sincerely,    Jewels Toth PA-C    Enclosure  CC:  No Recipients    If you would like to receive this communication electronically, please contact [email or web address] or [phone] to request more information on [facility] [employer] access.    For providers and/or their staff who would like to refer a patient to [facility], please contact us through our one-stop-shop provider referral line, [facility] , at [phone].    If you feel you have received this communication in error or would no longer like to receive these types of communications, please e-mail [email or web address]

## 2017-09-05 NOTE — PROGRESS NOTES
[facility], Department of Neurology   [facility] [place] [address]  Phone:[phone]  Fax: [phone]    Patient name: Juan Manuel Koehler  : 1950  MRN: [id number]    2017      Chief complaint:   Chief Complaint   Patient presents with    Consult       PCP: Hemant Sweeney MD    Assessment:     ICD-10-CM ICD-9-CM    1. Right-sided cerebrovascular accident (CVA) I63.9 434.91    2. Essential hypertension I10 401.9    3. Nausea R11.0 787.02    4. Acute bacterial endocarditis I33.0 421.0        Dx: R PICA distribution infarct, etiology endocarditis septic emboli   Stroke risk factors: endocarditis     Plan:  -continue current medications. Can hold off on initiating statin LDL 76  -complete abx for endocarditis   -Continue f/u with PCP regarding stroke risk factor modification as outlined below and discussed with patient.   -RTC 2-3 mos       Stroke education: Continue to address with PCP these risk factor guidelines: SBP<140, FBS<100, LDL<70 mg/dL, antiplatelet. Continue exercise as tolerated, avoid tobacco, abstain from ETOH (<3 bevs optimal a week), stay well hydrated, avoid PO intake of NaCl, regular sleep. Will have patient continue to address this with PCP. Discussed CVA symptoms with patient at length, etiology of CVA and need to remain compliant on all medications. Mentioned that medication is preventative however nothing is absolute. Robust recovery first 4-6 months up to a year for noticeable improvement. If symptomatic, will need to report to ED in <2h for prompt eval. Patient voiced understanding.  All questions answered. The patient indicates understanding of these issues and agrees to the plan.    HPI:  Juan Manuel Koehler is a 68 yo male with history of urothelial bladder carcinoma (s/p bladder resection in 2017), CKD, HTN presents for f/u recent stroke. Pt was admitted after a fall. Found to have UTI and endocarditis. During admission he had acute  mental status change assoc with right facial droop. Imaging with acute infarct R PICA distribution.      Continuing to receive IV ABX for endocarditis. Denies new stroke signs or symptoms. Confusion is improved. Some sleep issues the past few months, gets about 5 hrs night, attributes to anxiety. He continues to suffer from nausea, though improving. [place] to be due to IV ABX. He ambulates independently but uses wheelchair for distances due to generalized weakness. Denies depression. Denies HA.     Currently on ASA 81 mg qd.       Cerebrovascular history:   8/4-8/15/17  History of Present Illness: Mr. Koehler is a 68yo M with history of urothelial bladder carcinoma (s/p bladder resection in June 2017), CKD; he has been transferred to [facility] for a large R cerebellar infarct with edema and mass effect.    Mr. Koehler had a fall down stairs on Wednesday 8/2/17, then had vomiting so he was admitted to [facility]. He was diagnosed with complicated proteus UTI and endocarditis. Friday 8/4/17, patient developed an acute mental status change where he became confused and was unable to answer questions appropriately as well as a new R facial droop. Telestroke was performed with Dr. Carbajal. Patient found to have R cerebellar hemisphere edema w/ midline shift of the posterior fossa with mass effect on the 4th ventricle and mild enlargement of the temporal horns on CTH. Patient was transferred to [facility] for further care.      Hospital Course (synopsis of major diagnoses, care, treatment, and services provided during the course of the hospital stay): 8/5/17 - doing well, no events overnight, neuro surg and NCC monitoring for swelling/suboccipital crani watch   8/7/17 NAEON. Remains on 3% and cardene; hemicrani watch.   8/9/17 neurologically stable, repeat CTA stable  8/10/17 Stable CTH and neuro exam.   8/11/17 2% required restarting overnight due to hyponatremia. Patient will likely go to [facility] post-discharge. Continuing IV  abx x 6 weeks.   8/12/ Patient reports no acute events overnight; persistent complaints of scrotal pain, being followed by Urology.   8/13: Patient now medically stable and continuing IV abx for 6 week course, planned disposition to Rehab. S/w Case Mgmt on call regarding eligibility for [facility] today.   8/14/17 LILIANA. Awaiting placement to rehab.   8/15/17 - doing well overall, ready for discharge to inpatient rehab today.      Will dc to rehab with 6 weeks abx for endocarditis. ID made recs with end date 9/16/17.   The patient with urostomy - so dc with bolanos.       Medications:   Current Outpatient Prescriptions   Medication Sig Dispense Refill    AMPICILLIN SODIUM (AMPICILLIN 2 G/100 ML NS, READY TO MIX SYSTEM,) Inject 100 mLs (2 g total) into the vein every 6 (six) hours. 9600 mL 0    aspirin (ECOTRIN) 81 MG EC tablet Take 1 tablet (81 mg total) by mouth once daily.  0    carvedilol (COREG) 25 MG tablet Take 1 tablet (25 mg total) by mouth 2 (two) times daily. 180 tablet 3    cefTRIAXone 2 g in dextrose 5 % 50 mL (ROCEPHIN) 2 g/50 mL PgBk IVPB Inject 50 mLs (2 g total) into the vein every 12 (twelve) hours. 48 each 0    dronabinol (MARINOL) 5 MG capsule Take 1 capsule (5 mg total) by mouth 3 (three) times daily before meals. 90 capsule 0    ondansetron (ZOFRAN-ODT) 8 MG TbDL Take 1 tablet (8 mg total) by mouth every 6 (six) hours as needed (nausea). 30 tablet 1    pantoprazole (PROTONIX) 40 MG tablet Take 1 tablet (40 mg total) by mouth once daily. 90 tablet 3    scopolamine (TRANSDERM-SCOP) 1.3-1.5 mg (1 mg over 3 days) Place 1 patch onto the skin Every 3 (three) days. 10 patch 0    trazodone (DESYREL) 50 MG tablet Take 1 tablet (50 mg total) by mouth every evening. 90 tablet 3     No current facility-administered medications for this visit.        Allergies:  Review of patient's allergies indicates:  No Known Allergies    PMHx:  Past Medical History:   Diagnosis Date    Cancer     bladder and  throat    CKD (chronic kidney disease) stage 3, GFR 30-59 ml/min     Embolic stroke involving right cerebellar artery 8/4/2017    Urinary tract infection      Past Surgical History:   Procedure Laterality Date    BLADDER SURGERY      CYSTOSCOPY      HERNIA REPAIR      Ileal Conduit      THROAT SURGERY         Fhx:  Family History   Problem Relation Age of Onset    No Known Problems Father     Kidney disease Mother     Prostate cancer Neg Hx        Shx:   Social History     Social History    Marital status:      Spouse name: N/A    Number of children: N/A    Years of education: N/A     Occupational History    Not on file.     Social History Main Topics    Smoking status: Never Smoker    Smokeless tobacco: Never Used    Alcohol use No    Drug use: No    Sexual activity: Yes     Partners: Female     Birth control/ protection: None     Other Topics Concern    Not on file     Social History Narrative    No narrative on file       Labs:  Results for ASIA ROTHMAN (MRN [id number]) as of 9/5/2017 11:10   Ref. Range 8/4/2017 18:30   Cholesterol Latest Ref Range: 120 - 199 mg/dL 132   HDL Latest Ref Range: 40 - 75 mg/dL 31 (L)   LDL Cholesterol Latest Ref Range: 63.0 - 159.0 mg/dL 76.4   Total Cholesterol/HDL Ratio Latest Ref Range: 2.0 - 5.0  4.3   Triglycerides Latest Ref Range: 30 - 150 mg/dL 123   Results for ASIA ROTHMAN (MRN [id number]) as of 9/5/2017 11:10   Ref. Range 8/4/2017 18:30   Hemoglobin A1C Latest Ref Range: 4.0 - 5.6 % 5.4   Estimated Avg Glucose Latest Ref Range: 68 - 131 mg/dL 108   TSH Latest Ref Range: 0.400 - 4.000 uIU/mL 1.817     Imaging:  CT head 8/4/17  Impression    Large area of vasogenic edema centered within the right cerebellar hemisphere resulting in severe mass effect with compression of the 4th ventricle, mild hydrocephalus and leftward deviation of the cerebral vermis.  Recommend emergent neurosurgical consultation.     MRI brain, MRA head/neck  8/5/17  Impression    1. Large region of abnormal restricted diffusion within the right cerebellar hemisphere compatible with acute infarct. There is associated hemorrhagic conversion present. There is localized mass effect on the abril as well as effacement of the fourth ventricle. Ventricular system remains mildly prominent, unchanged from prior CT examination.    2. Additional focal regions of mild diffusion hyperintensity with associated serpiginous non-masslike cortical enhancement within the left parietal and occipital lobes suggestive of subacute infarcts. Additional region of volume loss with associated intrinsic T1 hyperintensity and serpiginous enhancement is present within the left cerebellar hemisphere suggestive of a late subacute infarct with laminar necrosis. Small remote lacunar type infarcts are also present in the right corona radiata. Overall findings are suggestive of possible thromboembolic phenomena. Clinical correlation is advised.    3. No evidence of high-grade stenosis of the visualized intracranial vasculature. Note is made that the mid and distal portions of the right AICA and PICA appear somewhat irregular and are not well-visualized, although a component of this may relate to noncontrast MRA time-of-flight technique.     CTA head neck 8/9/17  Impression   Evolving acute infarct of the right cerebellar hemisphere/PICA distribution with stable local mass effect against the fourth ventricle.  Subtle superimposed hyperattenuation is most consistent with known hemorrhage.  No imaging finding to suggest developing hydrocephalus.    Evolving subacute to chronic areas of infarction within the left parietal, left occipital and left cerebellar hemispheres.    Diminutive caliber and irregularity involving the distal aspect of the bilateral PICAs, right greater than left, possibly reflecting areas of high-grade stenosis.     Echo 8/5/17  CONCLUSIONS     1 - Normal left ventricular systolic function  "(EF 60-65%).     2 - No wall motion abnormalities.     3 - Normal left ventricular diastolic function.     4 - Normal right ventricular systolic function .     5 - The estimated PA systolic pressure is 27 mmHg.         ROS:   Review Of Systems (questions asked, positive or additions in BOLD)  Gen: Weight change, fatigue/malaise, pyrexia   HEENT: Tinnitus, headache,  blurred vision, eye pain, diplopia, photophobia   Card: Palpitations, CP   Pulm: SOB   Vas: Easy bruising, easy bleeding   GI: N/V/D/C, incontinence, hematemesis, hematochezia    : incontinence, hematuria   Endocrine: Temp intolerance, polyuria, polydipsia   M/S: Neck pain, myalgia, back pain, joint pain, falls    Neuro: PER HPI   PSY: Memory loss, confusion, depression, anxiety, trouble in sleep      Physical Exam:  BP (!) 142/78   Pulse (!) 52   Ht 6' 2" (1.88 m)   Wt 59.1 kg (130 lb 4.7 oz)   BMI 16.73 kg/m²     Well developed, well nourished male  Extremities: no edema    NIH Stroke Scale:  Level of Consciousness: 0 - alert  LOC Questions: 0 - answers both correctly  LOC Commands: 0 - performs both correctly  Best Gaze: 0 - normal  Visual: 0 - no visual loss  Facial Palsy: 0 - normal  Motor Left Arm: 0 - no drift  Motor Right Arm: 0 - no drift  Motor Left Le - no drift  Motor Right Le - no drift  Limb Ataxia: 0 - absent  Sensory: 0 - normal  Best Language: 0 - no aphasia  Dysarthria: 0 - normal articulation  Extinction and Inattention: 0 - no neglect    NIH: 0  Mental status:                        Awake, alert and appropriately oriented                        Normal recent and remote memory                        Normal attention and concentration                        Normal speech and language                        Normal fund of knowledge                        No extinction  Cranial nerves:                        PERRLA                        EOMF without nystagmus                        VFF                        Normal facial " sensation                        Normal facial movements                        Intact hearing bilaterally                        Palate elevates symmetrically                        Normal SCM and trapezius strength                        Tongue midline  Motor:                        No pronator drift                        Normal FF movements bilaterally                        Normal muscle tone, bulk and power                        No abnormal movements  Sensory                        Intact to LT  DTRs                        2+ and symmetric  Coordination                        Intact to FNF and HTS  Gait                        presents in wheelchair        Attending, Dr. Coronado, was available during today's encounter. Any change to plan along with cosign to appear in the EMR.       This document has been electronically signed by Jewels Toth PA-C on 9/5/2017, 10:02 AM. I have personally typed this message using the EMR.       Jewels Toth PA-C  Department of Neurology   [facility] [place], [place]

## 2017-09-05 NOTE — TELEPHONE ENCOUNTER
Wife reports the pouch  Is leaking and has been a problem, pt discharged last week from rehab. They are about out of supplies as well, I have offered an appt early tomorrow am,

## 2017-09-06 ENCOUNTER — OFFICE VISIT (OUTPATIENT)
Dept: UROLOGY | Facility: CLINIC | Age: 67
DRG: 987 | End: 2017-09-06
Payer: MEDICARE

## 2017-09-06 ENCOUNTER — OFFICE VISIT (OUTPATIENT)
Dept: WOUND CARE | Facility: CLINIC | Age: 67
DRG: 987 | End: 2017-09-06
Payer: MEDICARE

## 2017-09-06 VITALS
DIASTOLIC BLOOD PRESSURE: 64 MMHG | WEIGHT: 130 LBS | HEART RATE: 59 BPM | RESPIRATION RATE: 16 BRPM | SYSTOLIC BLOOD PRESSURE: 131 MMHG | HEIGHT: 74 IN | BODY MASS INDEX: 16.68 KG/M2

## 2017-09-06 DIAGNOSIS — C67.0 MALIGNANT NEOPLASM OF TRIGONE OF URINARY BLADDER: Primary | ICD-10-CM

## 2017-09-06 DIAGNOSIS — R31.9 BLOOD IN URINE: ICD-10-CM

## 2017-09-06 DIAGNOSIS — K63.89 GRANULOMA OF INTESTINE: ICD-10-CM

## 2017-09-06 DIAGNOSIS — Z93.6 PRESENCE OF UROSTOMY: ICD-10-CM

## 2017-09-06 DIAGNOSIS — Z71.89 ENCOUNTER FOR OSTOMY CARE EDUCATION: ICD-10-CM

## 2017-09-06 DIAGNOSIS — Z43.6 ATTENTION TO UROSTOMY: Primary | ICD-10-CM

## 2017-09-06 PROCEDURE — 17250 CHEM CAUT OF GRANLTJ TISSUE: CPT | Mod: S$GLB,,, | Performed by: CLINICAL NURSE SPECIALIST

## 2017-09-06 PROCEDURE — 99024 POSTOP FOLLOW-UP VISIT: CPT | Mod: S$GLB,,, | Performed by: UROLOGY

## 2017-09-06 PROCEDURE — 99999 PR PBB SHADOW E&M-EST. PATIENT-LVL III: CPT | Mod: PBBFAC,,, | Performed by: UROLOGY

## 2017-09-06 PROCEDURE — 1159F MED LIST DOCD IN RCRD: CPT | Mod: S$GLB,,, | Performed by: CLINICAL NURSE SPECIALIST

## 2017-09-06 PROCEDURE — 99999 PR PBB SHADOW E&M-EST. PATIENT-LVL I: CPT | Mod: PBBFAC,,, | Performed by: CLINICAL NURSE SPECIALIST

## 2017-09-06 PROCEDURE — 99214 OFFICE O/P EST MOD 30 MIN: CPT | Mod: 25,S$GLB,, | Performed by: CLINICAL NURSE SPECIALIST

## 2017-09-06 PROCEDURE — 3008F BODY MASS INDEX DOCD: CPT | Mod: S$GLB,,, | Performed by: CLINICAL NURSE SPECIALIST

## 2017-09-06 NOTE — PROGRESS NOTES
"Subjective:       Patient ID: Juan Manuel Koehler is a 67 y.o. male.    Chief Complaint: Urostomy (leakages)    This is return visit for problems with leakages from urostomy daily and wife and pt are very frustrated. This has been going on even when in rehab and so here to have this addressed, He looks very thin, weak, needs a walker, he is post stroke and reports he is better but daily nausea and weakness still noted. He has PICC line and still on antibiotics, He has HH coming but wife not sure who company is.       Review of Systems   Constitutional: Positive for activity change and appetite change. Negative for fever.   HENT: Negative.    Respiratory: Negative for cough and shortness of breath.    Cardiovascular: Negative for chest pain and leg swelling.   Gastrointestinal: Positive for nausea and vomiting. Negative for abdominal pain.   Genitourinary:        Urostomy , clear urine upon arrival   Skin: Positive for rash.   Neurological: Positive for weakness.       Objective:      Physical Exam   Constitutional: He is oriented to person, place, and time. He appears well-developed.   Pulmonary/Chest: Effort normal.   Abdominal: Soft. Bowel sounds are normal. There is no tenderness.   Musculoskeletal:   Weakness and balance issues   Neurological: He is alert and oriented to person, place, and time.   Skin: Skin is warm.       PT had a granuloma growth at 9 oclock edge of stoma, that should be removed as it could be causing pouching issues, if they land on it.   I cauterized this and cut the "tag" off, the bleeding was controlled with silver nitrate and then pressure,   Shortly after the bleeding stopped, he began to have blood come out of his stoma along with urine, clots also noted. This must be resulting from the cautery/cutting of granuloma but a very unusual situation   PROCEDURE:  CHEMICAL CAUTERY: Performed for hypergranulation tissue/granuloma(s) of stoma edge . The tissue was anesthetized topically with " application of Lidocaine gel 2% and 5 minute wait time. The area noted was cauterized with AGNO3. The patient stated pain was 0 on 1-10 scale with this procedure.    NO EXTERNAL BLEEDING when I placed the pouch, all the blood is coming out with his urine  He is to go see Dr Robles after this so I will let them know   As far as pouching goes, stoma  Is well budded, I suspect they are cutting TOO small (30mm) and his stoma base is 34 mm so I have advised a cut to 35 mm at minimum,   I placed a convex pch today but should be able to wear flat   Assessment:       1. Attention to urostomy    2. Encounter for ostomy care education    3. Granuloma of intestine        Plan:       Granuloma cauterized and removed. Bleeding controlled with pressure,  Bloody urine to be monitored and to see Dr Robles  Continue with pouch cutting to 35 mm and use pouches I gave them  I have reviewed the plan of care with the patient and/ or caregiver and they express understanding. I spent over 50% of this 25 minute visit in face to face counseling.

## 2017-09-07 ENCOUNTER — ANESTHESIA EVENT (OUTPATIENT)
Dept: SURGERY | Facility: HOSPITAL | Age: 67
DRG: 987 | End: 2017-09-07
Payer: MEDICARE

## 2017-09-07 ENCOUNTER — ANESTHESIA (OUTPATIENT)
Dept: SURGERY | Facility: HOSPITAL | Age: 67
DRG: 987 | End: 2017-09-07
Payer: MEDICARE

## 2017-09-07 ENCOUNTER — HOSPITAL ENCOUNTER (INPATIENT)
Facility: HOSPITAL | Age: 67
LOS: 7 days | Discharge: HOME HEALTH | DRG: 987 | End: 2017-09-14
Attending: EMERGENCY MEDICINE | Admitting: UROLOGY
Payer: MEDICARE

## 2017-09-07 DIAGNOSIS — D62 ACUTE BLOOD LOSS ANEMIA: ICD-10-CM

## 2017-09-07 DIAGNOSIS — F43.22 ADJUSTMENT DISORDER WITH ANXIETY: ICD-10-CM

## 2017-09-07 DIAGNOSIS — I33.0 ACUTE BACTERIAL ENDOCARDITIS: ICD-10-CM

## 2017-09-07 DIAGNOSIS — R55 POSTURAL DIZZINESS WITH PRESYNCOPE: ICD-10-CM

## 2017-09-07 DIAGNOSIS — C67.9 UROTHELIAL CARCINOMA OF BLADDER: ICD-10-CM

## 2017-09-07 DIAGNOSIS — R31.9 HEMATURIA: ICD-10-CM

## 2017-09-07 DIAGNOSIS — Z93.6 S/P ILEAL CONDUIT: ICD-10-CM

## 2017-09-07 DIAGNOSIS — N99.528: Primary | ICD-10-CM

## 2017-09-07 DIAGNOSIS — R11.0 NAUSEA: ICD-10-CM

## 2017-09-07 DIAGNOSIS — I21.4 NSTEMI (NON-ST ELEVATED MYOCARDIAL INFARCTION): ICD-10-CM

## 2017-09-07 DIAGNOSIS — R62.7 FAILURE TO THRIVE IN ADULT: ICD-10-CM

## 2017-09-07 DIAGNOSIS — N17.0 ACUTE RENAL FAILURE WITH ACUTE TUBULAR NECROSIS SUPERIMPOSED ON STAGE 3 CHRONIC KIDNEY DISEASE: ICD-10-CM

## 2017-09-07 DIAGNOSIS — B95.2 BACTEREMIA DUE TO ENTEROCOCCUS: ICD-10-CM

## 2017-09-07 DIAGNOSIS — A41.81 SEPSIS DUE TO ENTEROCOCCUS: ICD-10-CM

## 2017-09-07 DIAGNOSIS — D63.8 ANEMIA OF CHRONIC DISEASE: ICD-10-CM

## 2017-09-07 DIAGNOSIS — N18.30 STAGE 3 CHRONIC KIDNEY DISEASE: ICD-10-CM

## 2017-09-07 DIAGNOSIS — R79.89 ELEVATED TROPONIN: ICD-10-CM

## 2017-09-07 DIAGNOSIS — T88.4XXA DIFFICULT INTUBATION: ICD-10-CM

## 2017-09-07 DIAGNOSIS — N18.30 ACUTE RENAL FAILURE WITH ACUTE TUBULAR NECROSIS SUPERIMPOSED ON STAGE 3 CHRONIC KIDNEY DISEASE: ICD-10-CM

## 2017-09-07 DIAGNOSIS — R42 POSTURAL DIZZINESS WITH PRESYNCOPE: ICD-10-CM

## 2017-09-07 DIAGNOSIS — R78.81 BACTEREMIA DUE TO ENTEROCOCCUS: ICD-10-CM

## 2017-09-07 LAB
ABO + RH BLD: NORMAL
ALBUMIN SERPL BCP-MCNC: 2.4 G/DL
ALP SERPL-CCNC: 48 U/L
ALT SERPL W/O P-5'-P-CCNC: 7 U/L
ANION GAP SERPL CALC-SCNC: 11 MMOL/L
APTT BLDCRRT: 22.2 SEC
AST SERPL-CCNC: 7 U/L
BASOPHILS # BLD AUTO: 0.04 K/UL
BASOPHILS NFR BLD: 0.3 %
BILIRUB SERPL-MCNC: 0.6 MG/DL
BLD GP AB SCN CELLS X3 SERPL QL: NORMAL
BLD PROD TYP BPU: NORMAL
BLD PROD TYP BPU: NORMAL
BLOOD UNIT EXPIRATION DATE: NORMAL
BLOOD UNIT EXPIRATION DATE: NORMAL
BLOOD UNIT TYPE CODE: 7300
BLOOD UNIT TYPE CODE: 7300
BLOOD UNIT TYPE: NORMAL
BLOOD UNIT TYPE: NORMAL
BUN SERPL-MCNC: 21 MG/DL
CALCIUM SERPL-MCNC: 8.3 MG/DL
CHLORIDE SERPL-SCNC: 106 MMOL/L
CO2 SERPL-SCNC: 23 MMOL/L
CODING SYSTEM: NORMAL
CODING SYSTEM: NORMAL
CREAT SERPL-MCNC: 3 MG/DL
DIFFERENTIAL METHOD: ABNORMAL
DISPENSE STATUS: NORMAL
DISPENSE STATUS: NORMAL
EOSINOPHIL # BLD AUTO: 0.1 K/UL
EOSINOPHIL NFR BLD: 0.8 %
ERYTHROCYTE [DISTWIDTH] IN BLOOD BY AUTOMATED COUNT: 15.2 %
EST. GFR  (AFRICAN AMERICAN): 23.6 ML/MIN/1.73 M^2
EST. GFR  (NON AFRICAN AMERICAN): 20.4 ML/MIN/1.73 M^2
GLUCOSE SERPL-MCNC: 177 MG/DL
HCT VFR BLD AUTO: 20.9 %
HGB BLD-MCNC: 7.1 G/DL
INR PPP: 1.2
LACTATE SERPL-SCNC: 1.9 MMOL/L
LYMPHOCYTES # BLD AUTO: 1.3 K/UL
LYMPHOCYTES NFR BLD: 10.6 %
MCH RBC QN AUTO: 29.3 PG
MCHC RBC AUTO-ENTMCNC: 34 G/DL
MCV RBC AUTO: 86 FL
MONOCYTES # BLD AUTO: 0.5 K/UL
MONOCYTES NFR BLD: 4 %
NEUTROPHILS # BLD AUTO: 10.6 K/UL
NEUTROPHILS NFR BLD: 83.7 %
PLATELET # BLD AUTO: 196 K/UL
PMV BLD AUTO: 9 FL
POTASSIUM SERPL-SCNC: 4 MMOL/L
PROT SERPL-MCNC: 5.2 G/DL
PROTHROMBIN TIME: 12.8 SEC
RBC # BLD AUTO: 2.42 M/UL
SODIUM SERPL-SCNC: 140 MMOL/L
TRANS ERYTHROCYTES VOL PATIENT: NORMAL ML
TRANS ERYTHROCYTES VOL PATIENT: NORMAL ML
WBC # BLD AUTO: 12.62 K/UL

## 2017-09-07 PROCEDURE — A4216 STERILE WATER/SALINE, 10 ML: HCPCS | Performed by: ANESTHESIOLOGY

## 2017-09-07 PROCEDURE — P9021 RED BLOOD CELLS UNIT: HCPCS

## 2017-09-07 PROCEDURE — 96374 THER/PROPH/DIAG INJ IV PUSH: CPT

## 2017-09-07 PROCEDURE — D9220A PRA ANESTHESIA: Mod: ANES,,, | Performed by: ANESTHESIOLOGY

## 2017-09-07 PROCEDURE — 74420 UROGRAPHY RTRGR +-KUB: CPT | Mod: 26,,, | Performed by: UROLOGY

## 2017-09-07 PROCEDURE — 99024 POSTOP FOLLOW-UP VISIT: CPT | Mod: ,,, | Performed by: UROLOGY

## 2017-09-07 PROCEDURE — 27000221 HC OXYGEN, UP TO 24 HOURS

## 2017-09-07 PROCEDURE — 71000033 HC RECOVERY, INTIAL HOUR: Performed by: UROLOGY

## 2017-09-07 PROCEDURE — D9220A PRA ANESTHESIA: Mod: CRNA,,, | Performed by: NURSE ANESTHETIST, CERTIFIED REGISTERED

## 2017-09-07 PROCEDURE — 25000003 PHARM REV CODE 250: Performed by: EMERGENCY MEDICINE

## 2017-09-07 PROCEDURE — 96361 HYDRATE IV INFUSION ADD-ON: CPT

## 2017-09-07 PROCEDURE — 36000708 HC OR TIME LEV III 1ST 15 MIN: Performed by: UROLOGY

## 2017-09-07 PROCEDURE — 80053 COMPREHEN METABOLIC PANEL: CPT

## 2017-09-07 PROCEDURE — 36000709 HC OR TIME LEV III EA ADD 15 MIN: Performed by: UROLOGY

## 2017-09-07 PROCEDURE — 25000003 PHARM REV CODE 250: Performed by: ANESTHESIOLOGY

## 2017-09-07 PROCEDURE — 85025 COMPLETE CBC W/AUTO DIFF WBC: CPT

## 2017-09-07 PROCEDURE — 11000001 HC ACUTE MED/SURG PRIVATE ROOM

## 2017-09-07 PROCEDURE — 86901 BLOOD TYPING SEROLOGIC RH(D): CPT

## 2017-09-07 PROCEDURE — 37000009 HC ANESTHESIA EA ADD 15 MINS: Performed by: UROLOGY

## 2017-09-07 PROCEDURE — 85730 THROMBOPLASTIN TIME PARTIAL: CPT

## 2017-09-07 PROCEDURE — 37000008 HC ANESTHESIA 1ST 15 MINUTES: Performed by: UROLOGY

## 2017-09-07 PROCEDURE — 99284 EMERGENCY DEPT VISIT MOD MDM: CPT | Mod: 25

## 2017-09-07 PROCEDURE — 99284 EMERGENCY DEPT VISIT MOD MDM: CPT | Mod: ,,, | Performed by: EMERGENCY MEDICINE

## 2017-09-07 PROCEDURE — 63600175 PHARM REV CODE 636 W HCPCS: Performed by: NURSE ANESTHETIST, CERTIFIED REGISTERED

## 2017-09-07 PROCEDURE — 86900 BLOOD TYPING SEROLOGIC ABO: CPT

## 2017-09-07 PROCEDURE — 63600175 PHARM REV CODE 636 W HCPCS: Performed by: ANESTHESIOLOGY

## 2017-09-07 PROCEDURE — 94761 N-INVAS EAR/PLS OXIMETRY MLT: CPT

## 2017-09-07 PROCEDURE — BT1CZZZ FLUOROSCOPY OF ILEAL DIVERSION LOOP: ICD-10-PCS | Performed by: UROLOGY

## 2017-09-07 PROCEDURE — 0TJ98ZZ INSPECTION OF URETER, VIA NATURAL OR ARTIFICIAL OPENING ENDOSCOPIC: ICD-10-PCS | Performed by: UROLOGY

## 2017-09-07 PROCEDURE — 85610 PROTHROMBIN TIME: CPT

## 2017-09-07 PROCEDURE — 63600175 PHARM REV CODE 636 W HCPCS: Performed by: EMERGENCY MEDICINE

## 2017-09-07 PROCEDURE — 86920 COMPATIBILITY TEST SPIN: CPT

## 2017-09-07 PROCEDURE — 25000003 PHARM REV CODE 250: Performed by: NURSE ANESTHETIST, CERTIFIED REGISTERED

## 2017-09-07 PROCEDURE — 25500020 PHARM REV CODE 255: Performed by: UROLOGY

## 2017-09-07 PROCEDURE — 71000039 HC RECOVERY, EACH ADD'L HOUR: Performed by: UROLOGY

## 2017-09-07 PROCEDURE — 50690 INJECTION FOR URETER X-RAY: CPT | Mod: 78,,, | Performed by: UROLOGY

## 2017-09-07 PROCEDURE — 44380 SMALL BOWEL ENDOSCOPY BR/WA: CPT | Mod: 51,78,, | Performed by: UROLOGY

## 2017-09-07 PROCEDURE — 83605 ASSAY OF LACTIC ACID: CPT

## 2017-09-07 PROCEDURE — 25000003 PHARM REV CODE 250: Performed by: STUDENT IN AN ORGANIZED HEALTH CARE EDUCATION/TRAINING PROGRAM

## 2017-09-07 PROCEDURE — 25500020 PHARM REV CODE 255: Performed by: STUDENT IN AN ORGANIZED HEALTH CARE EDUCATION/TRAINING PROGRAM

## 2017-09-07 RX ORDER — FENTANYL CITRATE 50 UG/ML
INJECTION, SOLUTION INTRAMUSCULAR; INTRAVENOUS
Status: DISCONTINUED | OUTPATIENT
Start: 2017-09-07 | End: 2017-09-07

## 2017-09-07 RX ORDER — TRAZODONE HYDROCHLORIDE 50 MG/1
50 TABLET ORAL NIGHTLY
Status: DISCONTINUED | OUTPATIENT
Start: 2017-09-07 | End: 2017-09-14 | Stop reason: HOSPADM

## 2017-09-07 RX ORDER — CARVEDILOL 25 MG/1
25 TABLET ORAL 2 TIMES DAILY
Status: DISCONTINUED | OUTPATIENT
Start: 2017-09-07 | End: 2017-09-14 | Stop reason: HOSPADM

## 2017-09-07 RX ORDER — HYDROCODONE BITARTRATE AND ACETAMINOPHEN 500; 5 MG/1; MG/1
TABLET ORAL
Status: DISCONTINUED | OUTPATIENT
Start: 2017-09-07 | End: 2017-09-09

## 2017-09-07 RX ORDER — NEOSTIGMINE METHYLSULFATE 1 MG/ML
INJECTION, SOLUTION INTRAVENOUS
Status: DISCONTINUED | OUTPATIENT
Start: 2017-09-07 | End: 2017-09-07

## 2017-09-07 RX ORDER — HYDROMORPHONE HYDROCHLORIDE 1 MG/ML
0.2 INJECTION, SOLUTION INTRAMUSCULAR; INTRAVENOUS; SUBCUTANEOUS EVERY 5 MIN PRN
Status: DISCONTINUED | OUTPATIENT
Start: 2017-09-07 | End: 2017-09-07 | Stop reason: HOSPADM

## 2017-09-07 RX ORDER — OXYCODONE AND ACETAMINOPHEN 5; 325 MG/1; MG/1
1 TABLET ORAL EVERY 4 HOURS PRN
Status: DISCONTINUED | OUTPATIENT
Start: 2017-09-07 | End: 2017-09-14 | Stop reason: HOSPADM

## 2017-09-07 RX ORDER — SODIUM CHLORIDE 0.9 % (FLUSH) 0.9 %
3 SYRINGE (ML) INJECTION
Status: DISCONTINUED | OUTPATIENT
Start: 2017-09-07 | End: 2017-09-14 | Stop reason: HOSPADM

## 2017-09-07 RX ORDER — MIDAZOLAM HYDROCHLORIDE 1 MG/ML
INJECTION, SOLUTION INTRAMUSCULAR; INTRAVENOUS
Status: DISCONTINUED | OUTPATIENT
Start: 2017-09-07 | End: 2017-09-07

## 2017-09-07 RX ORDER — ETOMIDATE 2 MG/ML
INJECTION INTRAVENOUS
Status: DISCONTINUED | OUTPATIENT
Start: 2017-09-07 | End: 2017-09-07

## 2017-09-07 RX ORDER — OXYCODONE AND ACETAMINOPHEN 10; 325 MG/1; MG/1
1 TABLET ORAL EVERY 4 HOURS PRN
Status: DISCONTINUED | OUTPATIENT
Start: 2017-09-07 | End: 2017-09-14 | Stop reason: HOSPADM

## 2017-09-07 RX ORDER — HYDROCODONE BITARTRATE AND ACETAMINOPHEN 500; 5 MG/1; MG/1
TABLET ORAL
Status: DISCONTINUED | OUTPATIENT
Start: 2017-09-07 | End: 2017-09-10

## 2017-09-07 RX ORDER — ROCURONIUM BROMIDE 10 MG/ML
INJECTION, SOLUTION INTRAVENOUS
Status: DISCONTINUED | OUTPATIENT
Start: 2017-09-07 | End: 2017-09-07

## 2017-09-07 RX ORDER — ONDANSETRON 2 MG/ML
4 INJECTION INTRAMUSCULAR; INTRAVENOUS
Status: COMPLETED | OUTPATIENT
Start: 2017-09-07 | End: 2017-09-07

## 2017-09-07 RX ORDER — ONDANSETRON 2 MG/ML
4 INJECTION INTRAMUSCULAR; INTRAVENOUS EVERY 6 HOURS PRN
Status: DISCONTINUED | OUTPATIENT
Start: 2017-09-07 | End: 2017-09-10

## 2017-09-07 RX ORDER — PANTOPRAZOLE SODIUM 40 MG/1
40 TABLET, DELAYED RELEASE ORAL DAILY
Status: DISCONTINUED | OUTPATIENT
Start: 2017-09-08 | End: 2017-09-14 | Stop reason: HOSPADM

## 2017-09-07 RX ORDER — GLYCOPYRROLATE 0.2 MG/ML
INJECTION INTRAMUSCULAR; INTRAVENOUS
Status: DISCONTINUED | OUTPATIENT
Start: 2017-09-07 | End: 2017-09-07

## 2017-09-07 RX ORDER — SODIUM CHLORIDE 9 MG/ML
INJECTION, SOLUTION INTRAVENOUS CONTINUOUS
Status: DISCONTINUED | OUTPATIENT
Start: 2017-09-07 | End: 2017-09-10

## 2017-09-07 RX ORDER — PHENYLEPHRINE HYDROCHLORIDE 10 MG/ML
INJECTION INTRAVENOUS
Status: DISCONTINUED | OUTPATIENT
Start: 2017-09-07 | End: 2017-09-07

## 2017-09-07 RX ORDER — SODIUM CHLORIDE 0.9 % (FLUSH) 0.9 %
3 SYRINGE (ML) INJECTION EVERY 8 HOURS
Status: DISCONTINUED | OUTPATIENT
Start: 2017-09-07 | End: 2017-09-14 | Stop reason: HOSPADM

## 2017-09-07 RX ADMIN — SODIUM CHLORIDE 1000 ML: 0.9 INJECTION, SOLUTION INTRAVENOUS at 10:09

## 2017-09-07 RX ADMIN — NEOSTIGMINE METHYLSULFATE 4 MG: 1 INJECTION INTRAVENOUS at 03:09

## 2017-09-07 RX ADMIN — ONDANSETRON 4 MG: 2 INJECTION INTRAMUSCULAR; INTRAVENOUS at 03:09

## 2017-09-07 RX ADMIN — FENTANYL CITRATE 25 MCG: 50 INJECTION, SOLUTION INTRAMUSCULAR; INTRAVENOUS at 02:09

## 2017-09-07 RX ADMIN — IOHEXOL 15 ML: 350 INJECTION, SOLUTION INTRAVENOUS at 09:09

## 2017-09-07 RX ADMIN — GLYCOPYRROLATE 0.4 MG: 0.2 INJECTION, SOLUTION INTRAMUSCULAR; INTRAVENOUS at 03:09

## 2017-09-07 RX ADMIN — ONDANSETRON 4 MG: 2 INJECTION INTRAMUSCULAR; INTRAVENOUS at 10:09

## 2017-09-07 RX ADMIN — ROCURONIUM BROMIDE 40 MG: 10 INJECTION, SOLUTION INTRAVENOUS at 02:09

## 2017-09-07 RX ADMIN — ETOMIDATE 14 MG: 2 INJECTION, SOLUTION INTRAVENOUS at 02:09

## 2017-09-07 RX ADMIN — MIDAZOLAM HYDROCHLORIDE 1 MG: 1 INJECTION, SOLUTION INTRAMUSCULAR; INTRAVENOUS at 03:09

## 2017-09-07 RX ADMIN — SODIUM CHLORIDE: 0.9 INJECTION, SOLUTION INTRAVENOUS at 12:09

## 2017-09-07 RX ADMIN — MIDAZOLAM HYDROCHLORIDE 1 MG: 1 INJECTION, SOLUTION INTRAMUSCULAR; INTRAVENOUS at 02:09

## 2017-09-07 RX ADMIN — Medication 3 ML: at 11:09

## 2017-09-07 RX ADMIN — TRAZODONE HYDROCHLORIDE 50 MG: 50 TABLET ORAL at 11:09

## 2017-09-07 RX ADMIN — PHENYLEPHRINE HYDROCHLORIDE 100 MCG: 10 INJECTION INTRAVENOUS at 03:09

## 2017-09-07 RX ADMIN — CARVEDILOL 25 MG: 25 TABLET, FILM COATED ORAL at 11:09

## 2017-09-07 RX ADMIN — IOHEXOL 15 ML: 350 INJECTION, SOLUTION INTRAVENOUS at 08:09

## 2017-09-07 NOTE — NURSING
Pt admitted to floor, stable, VSS, no complaints at this time noted or stated, I.S at bedside, SCDs intact, will hand over to nurse. Ritu Leach RN

## 2017-09-07 NOTE — OP NOTE
[facility]  Operative Note    Date: 09/07/2017    Pre-Op Diagnosis:   1. Invasive bladder cancer s/p radical cystectomy   2. Bleeding from urostomy   3. Acute blood loss anemia     Patient Active Problem List   Diagnosis    Stage 3 chronic kidney disease    Difficult intubation    Nausea    Anemia of chronic disease    Urothelial carcinoma of bladder    Failure to thrive in adult    Postural dizziness with presyncope    NSTEMI (non-ST elevated myocardial infarction)    Elevated troponin    Acute bacterial endocarditis    Bacteremia due to Enterococcus    Sepsis due to Enterococcus    Urine culture positive for Proteus organism    Severe malnutrition    Embolic stroke involving right cerebellar artery    Cytotoxic cerebral edema    Compression of brainstem    Hypertension    Right-sided cerebrovascular accident (CVA)    S/P ileal conduit    Impaired mobility and ADLs    History of urostomy    Anemia    Urinary stoma complication       Post-Op Diagnosis: same    Procedure(s) Performed:   1.  Looposcopy  2.  Loogogram  3.  Fluoroscopy with radiologic interpretation < 1 hour    Specimen(s): none    Staff Surgeon: Villa Robles MD    Assistant Surgeon: Kiley Hwang MD    Anesthesia: General endotracheal anesthesia    Indications: Juan Manuel Koehler is a 67 y.o. male with hx of T3 bladder cancer s/p radical cystectomy who presented to the ED with severe bleeding from his urostomy.     Findings:   1. No extravasation on loopogram, bilateral reflux seen  2. Source of bleeding appeared to be from right ureter, no obvious fistula seen, no mucosal bleeding    Estimated Blood Loss: min    Drains: 14 fr bolanos cathter through urostomy     Procedure in Detail:  After risks, benefits and possible complications of cystoscopy were explained, the patient elected to undergo the procedure and informed consent was obtained. All questions were answered in the manjit-operative area. The  patient was transferred to the cystoscopy suite and placed in the supine position.  SCDs were applied and working.  Anesthesia was administered. Time out was performed, manjit-procedural antibiotics were confirmed.     A loopogram was performed through a 14 fr red rubber catheter. There was no signs of extravasation. There was bilateral ureteral reflux seen.     Looposcopy was then performed. There was no obvious sign of mucosal bleeding. The bleeding appeared to be originating from the right ureter. There was no bleeding from the left ureter.     A 14 fr bolanos catheter was placed through the urostomy, 5 cc was placed in the balloon. The bolanos was secured to the skin with 2-0 nylon.     The patient tolerated the procedure well and was transferred to recovery in stable condition.    Disposition:  The patient will be admitted for monitoring. He will undergo CT abdomen/pelvis.      Kiley Hwang MD    As the attending surgeon, Dr. Robles was present and performed all key aspects of the operation.

## 2017-09-07 NOTE — TRANSFER OF CARE
"Anesthesia Transfer of Care Note    Patient: Juan Manuel Koehler    Procedure(s) Performed: Procedure(s) (LRB):  LOOPOGRAM (N/A)  LOOPOSCOPY (N/A)    Patient location: PACU    Anesthesia Type: general    Transport from OR: Transported from OR on 100% O2 by closed face mask with adequate spontaneous ventilation    Post pain: adequate analgesia    Post assessment: no apparent anesthetic complications    Post vital signs: stable    Level of consciousness: awake, alert and oriented    Nausea/Vomiting: no nausea/vomiting    Complications: none    Transfer of care protocol was followed      Last vitals:   Visit Vitals  BP (!) 110/54 (BP Location: Left arm, Patient Position: Lying)   Pulse 76   Temp 36.7 °C (98.1 °F) (Temporal)   Resp 17   Ht 6' 2" (1.88 m)   Wt 59 kg (130 lb)   SpO2 100%   BMI 16.69 kg/m²     "

## 2017-09-07 NOTE — ED NOTES
"Pt's wife now reports patient was seen in ostomy/ wound clinic yesterday. Pt's wife states "The nurse saw some kind of protrusion on the stoma. So she cauterized it and then cut the burned piece off. It started bleeding after that."   "

## 2017-09-07 NOTE — CONSULTS
[facility]  Urology  Consult Note    Patient Name: Juan Manuel Koehler  MRN: [id number]  Admission Date: 9/7/2017  Hospital Length of Stay: 0   Code Status: Full Code   Attending Provider:  Dr. Villa Robles MD  Consulting Provider: Evelyne Camacho MD  Primary Care Physician: Hemant Sweeney MD  Principal Problem:<principal problem not specified>    Inpatient consult to Urology  Consult performed by: EVELYNE CAMACHO  Consult ordered by: NICOLE GARCIA        Subjective:     HPI:  Juan Manuel Koehler is a 67 y.o male w hx of invasive bladder cancer s/p open radical cystoprostatectomy, bilateral pelvic lymph node dissection, and creation of an ileal conduit on 6/21/17.     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 He represented to Dr. Boucher for hematuria and was found to have a large bladder tumor on his trigone. The tumor was also causing bilateral ureteral obstruction with resulting ELDER.     The patient underwent tumor resection and attempted JJ stent placement on 4/5/17 which demonstrated cT2 urothelial carcinoma (nested variant). Bilateral nephrostomy tubes were placed with improvement in the patient's renal function. The patient's eGFR fluctuated, and, ultimately, he could not receive neoadjuvant cisplatin-based chemotherapy due to his creatinine clearance.     Thus, the patient underwent surgery on 6/21/17 and did very well in the immediate post-operative period.     His pathology revealed urothelial carcinoma with nested variant into the perivesical fat. His lymph nodes and margins are negative. His final pathologic stage is [id number].     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 Has been slowly recovering. He was recently discharged from rehab and has been making progress.     He returned to clinic, after being seen by enterostomal therapy,  "with blood per stoma and leakage of urine after having some tissue next to the stoma excised. Urine may now be leaking through the skin. Overnight he has had 1500 cc of what his wife describes as "bloody" output. He became dizzy this AM and "crumpled" to the ground without hitting his head. He has not lost consciousness. He notes recent vomiting that started with IV Abx - currently on rocephin and ampicillin for treatment of vegetative endocarditis.    Past Medical History:   Diagnosis Date    Cancer     bladder and throat    CKD (chronic kidney disease) stage 3, GFR 30-59 ml/min     Embolic stroke involving right cerebellar artery 8/4/2017    Urinary tract infection        Past Surgical History:   Procedure Laterality Date    BLADDER SURGERY      CYSTOSCOPY      HERNIA REPAIR      Ileal Conduit      THROAT SURGERY         Review of patient's allergies indicates:  No Known Allergies    Family History     Problem Relation (Age of Onset)    Kidney disease Mother    No Known Problems Father          Social History Main Topics    Smoking status: Never Smoker    Smokeless tobacco: Never Used    Alcohol use No    Drug use: No    Sexual activity: Yes     Partners: Female     Birth control/ protection: None       Review of Systems   Constitutional: Negative for chills, diaphoresis and fever.   Respiratory: Negative for shortness of breath.    Cardiovascular: Negative for chest pain.   Gastrointestinal: Positive for nausea and vomiting. Negative for abdominal pain, constipation and diarrhea.   Genitourinary: Positive for hematuria. Negative for discharge.   Neurological: Positive for dizziness and light-headedness. Negative for syncope.       Objective:     Temp:  [97.7 °F (36.5 °C)] 97.7 °F (36.5 °C)  Pulse:  [65-73] 65  Resp:  [15-18] 15  SpO2:  [98 %-100 %] 100 %  BP: ()/(51-58) 109/58     Body mass index is 16.69 kg/m².            Drains     Drain                 Ureteral Drain/Stent -- days         " Nephrostomy 05/05/17 1503 Right 8 Fr. 124 days         Nephrostomy 05/05/17 1504 Left 8 Fr. 124 days         Urostomy 06/21/17 ileal conduit RLQ 78 days         Closed/Suction Drain 06/21/17 1221 Abdomen Bulb 19 Fr. 77 days         Ureteral Drain/Stent 06/21/17 1053 Left ureter 6 Fr. 77 days         Ureteral Drain/Stent 06/21/17 1054 Right ureter 6 Fr. 77 days         Urostomy 08/02/17 2345 ileal conduit RUQ 35 days                Physical Exam   Vitals reviewed.  Constitutional: He is oriented to person, place, and time. No distress.   Ampicillin pump from home at bedside   HENT:   Head: Normocephalic and atraumatic.   Eyes: Pupils are equal, round, and reactive to light.   Cardiovascular: Normal rate.    Pulmonary/Chest: Effort normal. No respiratory distress.   Abdominal: He exhibits no distension.   Urostomy without stent in place, pink with thick, dark red output, with large clots. Tube and collection bag with thick red output, no clots.    Musculoskeletal: He exhibits no edema.   Neurological: He is alert and oriented to person, place, and time.   Skin: Skin is dry. He is not diaphoretic.         Significant Labs:    BMP:    Recent Labs  Lab 09/07/17  0959      K 4.0      CO2 23   BUN 21   CREATININE 3.0*   CALCIUM 8.3*       CBC:    Recent Labs  Lab 09/07/17  0959   WBC 12.62   HGB 7.1*   HCT 20.9*          Blood Culture: No results for input(s): LABBLOO in the last 168 hours.  Coagulation:   Recent Labs  Lab 09/07/17  0959   INR 1.2   APTT 22.2       Significant Imaging:  All pertinent imaging results/findings from the past 24 hours have been reviewed.                    Assessment and Plan:     Urinary stoma complication    Juan Manuel Koehler is a 67 y.o. male with hx of invasive bladder cancer s/p open radical cystoprostatectomy, bilateral pelvic lymph node dissection, and creation of an ileal conduit on 6/21/17, now with blood per ostomy site and acute anemia.   - Admit to inpatient  -  NPO  - Cont IVF  - Transfuse 2u pRBC  - OR today for looposcopy and loopogram                VTE Risk Mitigation         Ordered     Place KAMARI hose  Until discontinued      09/07/17 1212     Place sequential compression device  Until discontinued      09/07/17 1044     Medium Risk of VTE  Once      09/07/17 1044          Thank you for your consult. I will follow-up with patient. Please contact us if you have any additional questions.    Evelyne Navas MD  Urology  [facility]

## 2017-09-07 NOTE — ED NOTES
Pt reports bright, red, blood per urostomy yesterday. He reports weakness, dizziness and fall today. History of bladder and prostate CA.

## 2017-09-07 NOTE — ASSESSMENT & PLAN NOTE
Juan Manuel Koehler is a 67 y.o. male with hx of invasive bladder cancer s/p open radical cystoprostatectomy, bilateral pelvic lymph node dissection, and creation of an ileal conduit on 6/21/17, now with blood per ostomy site and acute anemia.   - Admit to inpatient  - NPO  - Cont IVF  - Transfuse 2u pRBC  - OR today for looposcopy and loopogram

## 2017-09-07 NOTE — ED NOTES
Patient identifiers verified and correct for Juan Manuel Koehler.    LOC: The patient is awake, alert and aware of environment with an appropriate affect, the patient is oriented x 3 and speaking appropriately.  APPEARANCE: Patient resting comfortably and in no acute distress, patient is clean and well groomed, patient's clothing is properly fastened.  SKIN: The skin is warm and dry, color is pale, mucous membranes dry,  skin intact.   MUSCULOSKELETAL: Patient moving all extremities spontaneously, no obvious swelling or deformities noted.  RESPIRATORY: Airway is open and patent, respirations are spontaneous, patient has a normal effort and rate, no accessory muscle use noted, bilateral breath sounds clear and present.  CARDIAC: Patient has a normal rate and regular rhythm, no periphreal edema noted, capillary refill < 3 seconds.  ABDOMEN: Soft and non tender to palpation, no distention noted, normoactive bowel sounds present in all four quadrants.  NEUROLOGIC: PERRL, 3 mm bilaterally, eyes open spontaneously, behavior appropriate to situation, follows commands, facial expression symmetrical, bilateral hand grasp equal and even, purposeful motor response noted, normal sensation in all extremities when touched with a finger.

## 2017-09-07 NOTE — SUBJECTIVE & OBJECTIVE
Past Medical History:   Diagnosis Date    Cancer     bladder and throat    CKD (chronic kidney disease) stage 3, GFR 30-59 ml/min     Embolic stroke involving right cerebellar artery 8/4/2017    Urinary tract infection        Past Surgical History:   Procedure Laterality Date    BLADDER SURGERY      CYSTOSCOPY      HERNIA REPAIR      Ileal Conduit      THROAT SURGERY         Review of patient's allergies indicates:  No Known Allergies    Family History     Problem Relation (Age of Onset)    Kidney disease Mother    No Known Problems Father          Social History Main Topics    Smoking status: Never Smoker    Smokeless tobacco: Never Used    Alcohol use No    Drug use: No    Sexual activity: Yes     Partners: Female     Birth control/ protection: None       Review of Systems   Constitutional: Negative for chills, diaphoresis and fever.   Respiratory: Negative for shortness of breath.    Cardiovascular: Negative for chest pain.   Gastrointestinal: Positive for nausea and vomiting. Negative for abdominal pain, constipation and diarrhea.   Genitourinary: Positive for hematuria. Negative for discharge.   Neurological: Positive for dizziness and light-headedness. Negative for syncope.       Objective:     Temp:  [97.7 °F (36.5 °C)] 97.7 °F (36.5 °C)  Pulse:  [65-73] 65  Resp:  [15-18] 15  SpO2:  [98 %-100 %] 100 %  BP: ()/(51-58) 109/58     Body mass index is 16.69 kg/m².            Drains     Drain                 Ureteral Drain/Stent -- days         Nephrostomy 05/05/17 1503 Right 8 Fr. 124 days         Nephrostomy 05/05/17 1504 Left 8 Fr. 124 days         Urostomy 06/21/17 ileal conduit RLQ 78 days         Closed/Suction Drain 06/21/17 1221 Abdomen Bulb 19 Fr. 77 days         Ureteral Drain/Stent 06/21/17 1053 Left ureter 6 Fr. 77 days         Ureteral Drain/Stent 06/21/17 1054 Right ureter 6 Fr. 77 days         Urostomy 08/02/17 2345 ileal conduit RUQ 35 days                Physical Exam   Vitals  reviewed.  Constitutional: He is oriented to person, place, and time. No distress.   Ampicillin pump from home at bedside   HENT:   Head: Normocephalic and atraumatic.   Eyes: Pupils are equal, round, and reactive to light.   Cardiovascular: Normal rate.    Pulmonary/Chest: Effort normal. No respiratory distress.   Abdominal: He exhibits no distension.   Urostomy without stent in place, pink with thick, dark red output, with large clots. Tube and collection bag with thick red output, no clots.    Musculoskeletal: He exhibits no edema.   Neurological: He is alert and oriented to person, place, and time.   Skin: Skin is dry. He is not diaphoretic.         Significant Labs:    BMP:    Recent Labs  Lab 09/07/17  0959      K 4.0      CO2 23   BUN 21   CREATININE 3.0*   CALCIUM 8.3*       CBC:    Recent Labs  Lab 09/07/17  0959   WBC 12.62   HGB 7.1*   HCT 20.9*          Blood Culture: No results for input(s): LABBLOO in the last 168 hours.  Coagulation:   Recent Labs  Lab 09/07/17  0959   INR 1.2   APTT 22.2       Significant Imaging:  All pertinent imaging results/findings from the past 24 hours have been reviewed.

## 2017-09-07 NOTE — NURSING TRANSFER
Nursing Transfer Note      9/7/2017     Transfer To: [id number]    Transfer via stretcher    Transfer with NONE    Transported by RN    Medicines sent: PRBC INFUSING    Chart send with patient: Yes    Notified: spouse SENT TO ROOM    Patient reassessed at: 7108

## 2017-09-07 NOTE — PROGRESS NOTES
Subjective:       Patient ID: Juan Manuel Koehler is a 67 y.o. male.    Chief Complaint: Bladder Cancer and Nausea      HPI: Juan Manuel Koehler is a 67 y.o. White male who returns today in follow-up for invasive bladder cancer s/p open radical cystoprostatectomy, bilateral pelvic lymph node dissection, and creation of an ileal conduit on 6/21/17.    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 He represented to Dr. Boucher for hematuria and was found to have a large bladder tumor on his trigone. The tumor was also causing bilateral ureteral obstruction with resulting ELDER.    The patient underwent tumor resection and attempted JJ stent placement on 4/5/17 which demonstrated cT2 urothelial carcinoma (nested variant). Bilateral nephrostomy tubes were placed with improvement in the patient's renal function. The patient's eGFR fluctuated, and, ultimately, he could not receive neoadjuvant cisplatin-based chemotherapy due to his creatinine clearance.    Thus, the patient underwent surgery on 6/21/17 and did very well in the immediate post-operative period.    His pathology revealed urothelial carcinoma with nested variant into the perivesical fat. His lymph nodes and margins are negative. His final pathologic stage is [id number].    Unfortunately, the patient has a CVA in August and has been slowly recovering. He was recently discharged from rehab and has been making progress.    He returns today to see me after being seen by enterostomal therapy. The patient is having blood per stoma and leakage of urine after having some tissue next to the stoma excised. It appears that the urine may now be leaking through the skin.    Review of patient's allergies indicates:   Allergen Reactions    Pneumococcal 23-margarito ps vaccine        Current Outpatient Prescriptions   Medication Sig Dispense Refill    AMPICILLIN SODIUM (AMPICILLIN 2 G/100 ML NS, READY TO MIX SYSTEM,)  Inject 100 mLs (2 g total) into the vein every 6 (six) hours. 9600 mL 0    aspirin (ECOTRIN) 81 MG EC tablet Take 1 tablet (81 mg total) by mouth once daily.  0    carvedilol (COREG) 25 MG tablet Take 1 tablet (25 mg total) by mouth 2 (two) times daily. 180 tablet 3    cefTRIAXone 2 g in dextrose 5 % 50 mL (ROCEPHIN) 2 g/50 mL PgBk IVPB Inject 50 mLs (2 g total) into the vein every 12 (twelve) hours. 48 each 0    dronabinol (MARINOL) 5 MG capsule Take 1 capsule (5 mg total) by mouth 3 (three) times daily before meals. 90 capsule 0    ondansetron (ZOFRAN-ODT) 8 MG TbDL Take 1 tablet (8 mg total) by mouth every 6 (six) hours as needed (nausea). 30 tablet 1    pantoprazole (PROTONIX) 40 MG tablet Take 1 tablet (40 mg total) by mouth once daily. 90 tablet 3    scopolamine (TRANSDERM-SCOP) 1.3-1.5 mg (1 mg over 3 days) Place 1 patch onto the skin Every 3 (three) days. 10 patch 0    trazodone (DESYREL) 50 MG tablet Take 1 tablet (50 mg total) by mouth every evening. 90 tablet 3     No current facility-administered medications for this visit.        Past Medical History:   Diagnosis Date    Cancer     bladder and throat    CKD (chronic kidney disease) stage 3, GFR 30-59 ml/min     Embolic stroke involving right cerebellar artery 8/4/2017    Urinary tract infection        Past Surgical History:   Procedure Laterality Date    BLADDER SURGERY      CYSTOSCOPY      HERNIA REPAIR      Ileal Conduit      THROAT SURGERY         Family History   Problem Relation Age of Onset    No Known Problems Father     Kidney disease Mother     Prostate cancer Neg Hx        Review of Systems    Review of Systems   Constitutional: Negative for fever, chills, activity change, appetite change and unexpected weight change.   HENT: Negative for congestion, nosebleeds, sneezing, sore throat and trouble swallowing.    Eyes: Negative for pain, discharge, redness and visual disturbance.   Respiratory: Negative for cough, choking,  chest tightness and shortness of breath.    Cardiovascular: Negative for chest pain, palpitations and leg swelling.   Gastrointestinal: Negative for nausea, vomiting, abdominal pain, diarrhea, blood in stool, abdominal distention and anal bleeding.  Genitourinary: As documented per HPI   Endocrine: Negative for cold intolerance, heat intolerance, polydipsia, polyphagia and polyuria.   Musculoskeletal: Negative for myalgias, gait problem, neck pain and neck stiffness.   Skin: Negative for color change, pallor, rash and wound.   Allergic/Immunologic: Negative for immunocompromised state.   Neurological: Negative for seizures, facial asymmetry, speech difficulty, weakness and light-headedness.   Hematological: Negative for adenopathy. Does not bruise/bleed easily.   Psychiatric/Behavioral: Negative for hallucinations, behavioral problems, self-injury and agitation. The patient is not hyperactive.    All other systems were reviewed and were negative.      Objective:     Vitals:    09/06/17 1046   BP: 131/64   Pulse: (!) 59   Resp: 16     Physical Exam   Vitals reviewed.  Constitutional: He is oriented to person, place, and time. He appears well-developed and well-nourished. No distress.   HENT:   Head: Normocephalic and atraumatic.   Right Ear: External ear normal.   Left Ear: External ear normal.   Nose: Nose normal.   Eyes: EOM are normal. Pupils are equal, round, and reactive to light. Right eye exhibits no discharge. Left eye exhibits no discharge.   Neck: Normal range of motion. Neck supple. No tracheal deviation present. No thyroid enlargement or tenderness.   Cardiovascular: Regular rhythm and intact distal pulses. No signs of peripheral edema.    Pulmonary/Chest: Effort normal and breath sounds normal. No stridor. No respiratory distress. He has no wheezes.   Abdominal: Soft. Bowel sounds are normal. He exhibits no distension. There is no tenderness. Hernia confirmed negative in the right inguinal area and  confirmed negative in the left inguinal area. No hepatic or splenic enlargement or tenderness. Well-healed lower midline incision. Protuberant, pink stoma, draining clear urine with blood.  Genitourinary: Penis normal. Right testis shows no mass, no swelling and no tenderness. Right testis is descended. Left testis shows no mass, no swelling and no tenderness. Left testis is descended. Circumcised. No phimosis or hypospadias.   MARICEL: Deferred.  Musculoskeletal: Normal range of motion. He exhibits no edema.   Neurological: He is alert and oriented to person, place, and time. He exhibits normal muscle tone. Coordination normal.   Skin: Skin is warm. No rash noted.   Lymphatic: No palpable lymph nodes.  Psychiatric: He has a normal mood and affect. His behavior is normal. Judgment and thought content normal.     No results found for: PSA  Lab Results   Component Value Date    CREATININE 2.1 (H) 08/28/2017     Lab Results   Component Value Date    EGFRNONAA 31.4 (A) 08/28/2017     Lab Results   Component Value Date    ESTGFRAFRICA 36.3 (A) 08/28/2017     I personally reviewed all the patient's imaging studies.    CT scan non-contrast abdomen/pelvis (4/03/17): Thickened posterior bladder wall with marked bilateral hydroureteronephrosis to the level of the UVJ.  No metastatic disease identified, noting limited evaluation without IV contrast.    CT chest/CT urogram (5/5/17): Bilateral double-J ureteral stents and left nephrostomy tube are present.  There has been interval resolution of left-sided hydronephrosis and significant improvement in right-sided hydronephrosis which now appears mild. Bladder is nondistended and not well evaluated.  There is no CT evidence of distant metastatic disease referable to provide history of bladder neoplasm.    Assessment:       1. Malignant neoplasm of trigone of urinary bladder    2. Presence of urostomy    3. Blood in urine        Plan:     Juan Manuel was seen today for bladder cancer and  nausea.    Diagnoses and all orders for this visit:    Malignant neoplasm of trigone of urinary bladder    Presence of urostomy    Blood in urine    On exam today, it does appear the patient may be leaking urine through a new tract that was created with excision of this tissue. It may be that he has been leaking urine for a period of time, but something today worsened.    For now, I placed a red rubber into the conduit in the hope that this will divert the urine through the stoma for now.    We will talk to the patient later today or tomorrow to see how he is doing.    I answered all the patient's and his wife's questions.    I encouraged him or any of his family members to call or email me with questions and/or concerns.    I spent 20 minutes with the patient of which more than half was spent in coordinating the patient's care as well as in direct consultation with the patient in regards to our treatment and plan.

## 2017-09-07 NOTE — PATIENT INSTRUCTIONS
Urostomy: Caring for Your Stoma    After a urostomy, youll have to care for your stoma and the skin around it (called the peristomal skin). You must keep the stoma clean and protect the peristomal skin from moisture and urine. This is to prevent skin problems and odor.  Checking the stoma  · Check your stoma and the skin around it each time you change your pouch.  ·  front of a mirror, or use a hand mirror so that you can see the entire stoma.  · The stoma should look shiny, moist, and pink or red.  · The skin around it should be smooth, with no red or open areas.  Cleaning the stoma  When you change your pouch, be sure to clean the stoma and the skin around it. Do this using warm water and a soft washcloth. Water does not harm the stoma.  · Clean and dry the stoma gently. Because the stoma has no sensory nerves, you could injure it without feeling any pain.  · The stoma may bleed a little when you clean it. Thats because it has tiny blood vessels. To stop the bleeding quickly, apply gentle pressure to  the stoma using a dry cloth or tissue.  · Be aware that urine will keep flowing out as you clean your stoma. You can use a folded paper towel or piece of gauze to absorb the urine.  Protect the skin around the stoma  For the pouch to stick well, the peristomal skin needs to be dry and smooth. If the skin is moist or uneven, the pouch is more likely to leak. And any urine that leaks out of the pouch can pool on your skin. This can irritate the skin. Urine that leaks from the pouch can also cause odor or be absorbed by your clothes. You can help prevent these problems by following these steps:  · Be sure your skin is dry before applying the skin barrier. This helps keep the skin healthy. Always pat your skin dry after washing it. Or, try drying your skin with a hair dryer thats set on cool.  · Try applying a skin barrier wipe before you put on a new pouch. This helps protect the skin if urine leaks  around the pouch. A skin barrier wipe may shorten or lengthen the amount of time you can wear some pouches. Before using a wipe, check the product information that came with your pouch.   When to seek medical care  Call your [facility] nurse or other health care provider if:  · The skin around the stoma is red, weepy, bleeding, or has open areas.  · The skin around the stoma itches, burns, stings, or has white spots.  · The stoma swells, changes color, or bleeds without stopping.  · The stoma sinks below its normal level or below the skin.  · The stoma sticks up above the skin more than normal.   Date Last Reviewed: 10/29/2014  © 5943-9864 [employer] [place]. [address] [place] [address], [place] [address]. All rights reserved. This information is not intended as a substitute for professional medical care. Always follow your healthcare professional's instructions.

## 2017-09-07 NOTE — PLAN OF CARE
Plan of care reviewed with patient and family. Verbalization of understanding. Wife at bedside. Will cont to monitor.

## 2017-09-07 NOTE — PROGRESS NOTES
2nd unit of PRBC released per Dr Norris and placed in [facility] blood refrigerator (in storage room, next to Pre-op room 7).

## 2017-09-07 NOTE — ED PROVIDER NOTES
Encounter Date: 9/7/2017    SCRIBE #1 NOTE: I, Krys Martínez, am scribing for, and in the presence of,  Dr. Morales. I have scribed the following portions of the note - the Resident attestation.       History     Chief Complaint   Patient presents with    Wound Check     Stoma bleeding since yesterday- blood noted in stoma collection bag.      HPI     67-year-old man with invasive bladder cancer with urostomy, CKD 3, recent history of cerebellar stroke but discharged 2 days ago presents with bleeding from urostomy stoma, started after cauterization and clipping of stoma yesterday and wound care clinic, constant, unrelieved, 1600 mL from leg bag yesterday and 300 mL today of dark red blood and urine mixed, associated with mild lightheadedness earlier this morning and nausea.  Sees Dr. Robles with urology.    Review of patient's allergies indicates:  No Known Allergies  Past Medical History:   Diagnosis Date    Cancer     bladder and throat    CKD (chronic kidney disease) stage 3, GFR 30-59 ml/min     Embolic stroke involving right cerebellar artery 8/4/2017    Urinary tract infection      Past Surgical History:   Procedure Laterality Date    BLADDER SURGERY      CYSTOSCOPY      HERNIA REPAIR      Ileal Conduit      THROAT SURGERY       Family History   Problem Relation Age of Onset    No Known Problems Father     Kidney disease Mother     Prostate cancer Neg Hx      Social History   Substance Use Topics    Smoking status: Never Smoker    Smokeless tobacco: Never Used    Alcohol use No     Review of Systems   Constitutional: Negative for diaphoresis and fever.   HENT: Negative for drooling and facial swelling.    Eyes: Negative for discharge and redness.   Respiratory: Negative for shortness of breath and stridor.    Cardiovascular: Negative for chest pain and leg swelling.   Gastrointestinal: Positive for nausea. Negative for abdominal pain and vomiting.   Genitourinary: Positive for hematuria. Negative  for dysuria.   Musculoskeletal: Negative for joint swelling and neck stiffness.   Skin: Positive for wound. Negative for rash.   Neurological: Positive for light-headedness. Negative for facial asymmetry and speech difficulty.   Psychiatric/Behavioral: Negative for agitation and confusion.       Physical Exam     Initial Vitals [09/07/17 0943]   BP Pulse Resp Temp SpO2   (!) 77/51 73 18 97.7 °F (36.5 °C) 98 %      MAP       59.67         Physical Exam    Nursing note and vitals reviewed.  Constitutional: He appears well-developed and well-nourished. He is not diaphoretic. No distress.   HENT:   Head: Normocephalic and atraumatic.   Right Ear: External ear normal.   Left Ear: External ear normal.   Nose: Nose normal.   Mouth/Throat: Oropharynx is clear and moist.   Eyes: Conjunctivae are normal. Pupils are equal, round, and reactive to light. Right eye exhibits no discharge. Left eye exhibits no discharge. No scleral icterus.   Pale conjunctiva.   Neck: Neck supple. No thyromegaly present. No tracheal deviation present. No JVD present.   Cardiovascular: Normal rate, regular rhythm, normal heart sounds and intact distal pulses. Exam reveals no gallop and no friction rub.    No murmur heard.  Pulmonary/Chest: Breath sounds normal. No stridor. No respiratory distress. He has no wheezes. He has no rhonchi. He has no rales.   Abdominal: Soft. Bowel sounds are normal. He exhibits no distension. There is no tenderness. There is no rebound and no guarding.       Musculoskeletal: He exhibits no edema.   Lymphadenopathy:     He has no cervical adenopathy.   Neurological: He is alert and oriented to person, place, and time.   Skin: Skin is warm and dry. There is pallor.         ED Course   Procedures  Labs Reviewed   CBC W/ AUTO DIFFERENTIAL - Abnormal; Notable for the following:        Result Value    RBC 2.42 (*)     Hemoglobin 7.1 (*)     Hematocrit 20.9 (*)     RDW 15.2 (*)     MPV 9.0 (*)     Gran # 10.6 (*)     Gran% 83.7  (*)     Lymph% 10.6 (*)     All other components within normal limits   COMPREHENSIVE METABOLIC PANEL - Abnormal; Notable for the following:     Glucose 177 (*)     Creatinine 3.0 (*)     Calcium 8.3 (*)     Total Protein 5.2 (*)     Albumin 2.4 (*)     Alkaline Phosphatase 48 (*)     AST 7 (*)     ALT 7 (*)     eGFR if  23.6 (*)     eGFR if non  20.4 (*)     All other components within normal limits   PROTIME-INR - Abnormal; Notable for the following:     Prothrombin Time 12.8 (*)     All other components within normal limits   APTT   LACTIC ACID, PLASMA   TYPE & SCREEN          HO-III MDM:  Juan Manuel Koehler is a 67 y.o. male with bleeding from urostomy stoma status post stoma clipping cauterization.  Ddx includes's stoma wound, anemia, coagulation disorder, bleeding from bladder or ureters.    CBC, CMP, coags, lactic acid, type and screen, 1 L normal saline.  Consultation urology who is seeing patient.    James Olivares, PGY3 10:15 AM 09/07/2017         Medical Decision Making:   History:   Old Medical Records: I decided to obtain old medical records.  Clinical Tests:   Lab Tests: Ordered and Reviewed  Other:   I have discussed this case with another health care provider.            Scribe Attestation:   Scribe #1: I performed the above scribed service and the documentation accurately describes the services I performed. I attest to the accuracy of the note.    Attending Attestation:   Physician Attestation Statement for Resident:  As the supervising MD   Physician Attestation Statement: I have personally seen and examined this patient.   I agree with the above history. -: 67 y.o. male with recent instrumentation of urostomy present for evaluation of persistent bleeding since yesterday.   As the supervising MD I agree with the above PE.    As the supervising MD I agree with the above treatment, course, plan, and disposition.  I have reviewed and agree with the residents  interpretation of the following: lab data.          Physician Attestation for Scribe:  Physician Attestation Statement for Scribe #1: I, Dr. Morales, reviewed documentation, as scribed by Krys Martínez in my presence, and it is both accurate and complete.                 ED Course      Clinical Impression:   The primary encounter diagnosis was Urinary stoma complication. Diagnoses of S/P ileal conduit, Urothelial carcinoma of bladder, Nausea, Anemia of chronic disease, and Stage 3 chronic kidney disease were also pertinent to this visit.    Disposition:   Disposition: Admitted                        Hemant Morales MD  09/15/17 4047

## 2017-09-07 NOTE — PROGRESS NOTES
1st unit of PRBC's started at 1315. VSS. Pt informed of s/s to look out for and to notify if he is feeling any symptoms. Verbalization of understanding. Will monitor closely for 1st 15 mins.

## 2017-09-07 NOTE — ANESTHESIA PREPROCEDURE EVALUATION
09/07/2017  Juan Manuel Koehler is a 67 y.o., male.  Past Medical History:   Diagnosis Date    Cancer     bladder and throat    CKD (chronic kidney disease) stage 3, GFR 30-59 ml/min     Embolic stroke involving right cerebellar artery 8/4/2017    Urinary tract infection      Patient Active Problem List   Diagnosis    Stage 3 chronic kidney disease    Difficult intubation    Nausea    Anemia of chronic disease    Urothelial carcinoma of bladder    Failure to thrive in adult    Postural dizziness with presyncope    NSTEMI (non-ST elevated myocardial infarction)    Elevated troponin    Acute bacterial endocarditis    Bacteremia due to Enterococcus    Sepsis due to Enterococcus    Urine culture positive for Proteus organism    Severe malnutrition    Embolic stroke involving right cerebellar artery    Cytotoxic cerebral edema    Compression of brainstem    Hypertension    Right-sided cerebrovascular accident (CVA)    S/P ileal conduit    Impaired mobility and ADLs    History of urostomy    Anemia    Urinary stoma complication     Past Surgical History:   Procedure Laterality Date    BLADDER SURGERY      CYSTOSCOPY      HERNIA REPAIR      Ileal Conduit      THROAT SURGERY         Anesthesia Evaluation    I have reviewed the Patient Summary Reports.    I have reviewed the Nursing Notes.   I have reviewed the Medications.     Review of Systems  Cardiovascular:   Hypertension endocarditis   Pulmonary:   Denies Asthma.  Denies Shortness of breath.    Renal/:   Chronic Renal Disease, CRI    Hepatic/GI:   GERD Nausea  Scopolamine patch currently behind left ear   Neurological:   CVA Denies Seizures. CVA <1mo ago   Endocrine:  Endocrine Normal Denies Diabetes. Denies Hypothyroidism.  Denies Hyperthyroidism.      Lab Results   Component Value Date    WBC 12.62 09/07/2017    HGB 7.1 (L)  09/07/2017    HCT 20.9 (L) 09/07/2017    MCV 86 09/07/2017     09/07/2017     BMP  Lab Results   Component Value Date     09/07/2017    K 4.0 09/07/2017     09/07/2017    CO2 23 09/07/2017    BUN 21 09/07/2017    CREATININE 3.0 (H) 09/07/2017    CALCIUM 8.3 (L) 09/07/2017    ANIONGAP 11 09/07/2017    ESTGFRAFRICA 23.6 (A) 09/07/2017    EGFRNONAA 20.4 (A) 09/07/2017         Physical Exam  General:  Malnutrition    Airway/Jaw/Neck:  Airway Findings: Mouth Opening: Normal Tongue: Normal  General Airway Assessment: Adult  Mallampati: II  TM Distance: Normal, at least 6 cm  Jaw/Neck Findings:  Neck ROM: Normal ROM      Dental:  Dental Findings: In tact        Mental Status:  Mental Status Findings:  Cooperative         Anesthesia Plan  Type of Anesthesia, risks & benefits discussed:  Anesthesia Type:  general  Patient's Preference: GA  Intra-op Monitoring Plan:   Intra-op Monitoring Plan Comments:   Post Op Pain Control Plan:   Post Op Pain Control Plan Comments:   Induction:   IV  Beta Blocker:  Patient is on a Beta-Blocker and has received one dose within the past 24 hours (No further documentation required).       Informed Consent: Patient understands risks and agrees with Anesthesia plan.  Questions answered. Anesthesia consent signed with patient.  ASA Score: 4  emergent   Day of Surgery Review of History & Physical:    H&P update referred to the surgeon.     Anesthesia Plan Notes: Patient receiving blood transfusion currently.  Will continue 1UPRBC and provide 2nd UPRBC in OR.  Induction with etomidate.  Will begin additional PIV after induction.        Ready For Surgery From Anesthesia Perspective.

## 2017-09-07 NOTE — PHYSICIAN QUERY
PT Name: Juan Manuel Koehler  MR #: [id number]    Physician Query Form -Present on Admission (POA) Diagnosis Clarification     CDS/: Jana Hannon RN  CCDS               Contact information: [email or web address]     This form is a permanent document in the medical record.     Query Date: September 7, 2017    By submitting this query, we are merely seeking further clarification of documentation. Please utilize your independent clinical judgment when addressing the question(s) below.       The Medical record contains the following:    Diagnosis      Supporting Clinical Information   Location in Medical Record    cerebellar infarction             was in his usual state of health until day of admission when patient was coming downstairs when he all of a sudden felt dizzy. He slipped and fell; he did not hit his head. He fell on his back and was unable to get up secondary to generalized weakness. He denies focal weakness.    Called to bedside at 1425 for change in mental status.   Patient awake and alert, but appeared more lethargic and was not answering question appropriately. This was a change from a few hours prior when we talked to the patient.    Large area of vasogenic edema centered within the right cerebellar hemisphere resulting in severe mass effect with compression of the 4th ventricle, mild hydrocephalus and leftward deviation of the cerebral vermis. Recommend emergent neurosurgical   consultation.     Acute encephalopathy 2/2 Posterior fossa lesion with severe mass effect   - Acute change in mental status with CT head concerning for infarct vs abscess with severe mass effect   - Mannitol given per neurology/neuro ICU recommendations   - Transferred to [facility] for vascular neurology and NSG evaluation       subacute septic embolus w/ cerebellar infarction now with significant edema w/ mass effect; can't rule out new hyperacute infarct in left MCA territory   [facility] Neuro consult 8/4      Discharge Summary                         Event note 8/4                     CT head 8/4                         Discharge Summary                                 [facility] Neuro consult 8/4         Doctor, Please specify Present On Admission (POA) status of cerebellar infarction:    [  ] Present on Admission   [ x ] Not Present on Admission  [  ] Clinically undetermined

## 2017-09-07 NOTE — PROGRESS NOTES
Dr. Isabel and Dr. Robles at bedside informed her that patient was on cont ampicillin home infusion. She stated keep the cont ampicillin infusion running at this time until it is finished, then start the ampicillin Q6hr order. Pt has PICC line to right arm with ampicillin infusing in one port and blood in other port. Order to Hold Rocephin for now. Will get PIV in cysto room.

## 2017-09-07 NOTE — HPI
"Juan Manuel Koehler is a 67 y.o male w hx of invasive bladder cancer s/p open radical cystoprostatectomy, bilateral pelvic lymph node dissection, and creation of an ileal conduit on 6/21/17.     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 He represented to Dr. Boucher for hematuria and was found to have a large bladder tumor on his trigone. The tumor was also causing bilateral ureteral obstruction with resulting ELDER.     The patient underwent tumor resection and attempted JJ stent placement on 4/5/17 which demonstrated cT2 urothelial carcinoma (nested variant). Bilateral nephrostomy tubes were placed with improvement in the patient's renal function. The patient's eGFR fluctuated, and, ultimately, he could not receive neoadjuvant cisplatin-based chemotherapy due to his creatinine clearance.     Thus, the patient underwent surgery on 6/21/17 and did very well in the immediate post-operative period.     His pathology revealed urothelial carcinoma with nested variant into the perivesical fat. His lymph nodes and margins are negative. His final pathologic stage is [id number].     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 Has been slowly recovering. He was recently discharged from rehab and has been making progress.     He returned to clinic, after being seen by enterostomal therapy, with blood per stoma and leakage of urine after having some tissue next to the stoma excised. Urine may now be leaking through the skin. Overnight he has had 1500 cc of what his wife describes as "bloody" output. He became dizzy this AM and "crumpled" to the ground without hitting his head. He has not lost consciousness. He notes recent vomiting that started with IV Abx - currently on rocephin and ampicillin for treatment of vegetative endocarditis.    Pt underwent embolization of bleeding vessel in ileal conduit 9/10.  "

## 2017-09-08 PROBLEM — F43.22 ADJUSTMENT DISORDER WITH ANXIETY: Status: ACTIVE | Noted: 2017-09-08

## 2017-09-08 LAB
ANION GAP SERPL CALC-SCNC: 11 MMOL/L
ANION GAP SERPL CALC-SCNC: 9 MMOL/L
BASOPHILS # BLD AUTO: 0.03 K/UL
BASOPHILS NFR BLD: 0.4 %
BLD PROD TYP BPU: NORMAL
BLD PROD TYP BPU: NORMAL
BLOOD UNIT EXPIRATION DATE: NORMAL
BLOOD UNIT EXPIRATION DATE: NORMAL
BLOOD UNIT TYPE CODE: 7300
BLOOD UNIT TYPE CODE: 7300
BLOOD UNIT TYPE: NORMAL
BLOOD UNIT TYPE: NORMAL
BUN SERPL-MCNC: 23 MG/DL
BUN SERPL-MCNC: 24 MG/DL
CALCIUM SERPL-MCNC: 8.1 MG/DL
CALCIUM SERPL-MCNC: 8.1 MG/DL
CHLORIDE SERPL-SCNC: 105 MMOL/L
CHLORIDE SERPL-SCNC: 108 MMOL/L
CO2 SERPL-SCNC: 21 MMOL/L
CO2 SERPL-SCNC: 23 MMOL/L
CODING SYSTEM: NORMAL
CODING SYSTEM: NORMAL
CREAT SERPL-MCNC: 3 MG/DL
CREAT SERPL-MCNC: 3.1 MG/DL
DIFFERENTIAL METHOD: ABNORMAL
DISPENSE STATUS: NORMAL
DISPENSE STATUS: NORMAL
EOSINOPHIL # BLD AUTO: 0.1 K/UL
EOSINOPHIL NFR BLD: 0.6 %
ERYTHROCYTE [DISTWIDTH] IN BLOOD BY AUTOMATED COUNT: 14.7 %
EST. GFR  (AFRICAN AMERICAN): 22.7 ML/MIN/1.73 M^2
EST. GFR  (AFRICAN AMERICAN): 23.6 ML/MIN/1.73 M^2
EST. GFR  (NON AFRICAN AMERICAN): 19.6 ML/MIN/1.73 M^2
EST. GFR  (NON AFRICAN AMERICAN): 20.4 ML/MIN/1.73 M^2
GLUCOSE SERPL-MCNC: 103 MG/DL
GLUCOSE SERPL-MCNC: 109 MG/DL
HCT VFR BLD AUTO: 25.8 %
HGB BLD-MCNC: 9 G/DL
LYMPHOCYTES # BLD AUTO: 1 K/UL
LYMPHOCYTES NFR BLD: 12.7 %
MCH RBC QN AUTO: 29.4 PG
MCHC RBC AUTO-ENTMCNC: 34.9 G/DL
MCV RBC AUTO: 84 FL
MONOCYTES # BLD AUTO: 0.5 K/UL
MONOCYTES NFR BLD: 6.2 %
NEUTROPHILS # BLD AUTO: 6.4 K/UL
NEUTROPHILS NFR BLD: 79.5 %
PLATELET # BLD AUTO: 113 K/UL
PMV BLD AUTO: 8.9 FL
POTASSIUM SERPL-SCNC: 3.8 MMOL/L
POTASSIUM SERPL-SCNC: 4.5 MMOL/L
RBC # BLD AUTO: 3.06 M/UL
SODIUM SERPL-SCNC: 138 MMOL/L
SODIUM SERPL-SCNC: 139 MMOL/L
TRANS ERYTHROCYTES VOL PATIENT: NORMAL ML
TRANS ERYTHROCYTES VOL PATIENT: NORMAL ML
WBC # BLD AUTO: 8.09 K/UL

## 2017-09-08 PROCEDURE — 36415 COLL VENOUS BLD VENIPUNCTURE: CPT

## 2017-09-08 PROCEDURE — 25000003 PHARM REV CODE 250: Performed by: STUDENT IN AN ORGANIZED HEALTH CARE EDUCATION/TRAINING PROGRAM

## 2017-09-08 PROCEDURE — A4216 STERILE WATER/SALINE, 10 ML: HCPCS | Performed by: ANESTHESIOLOGY

## 2017-09-08 PROCEDURE — 90792 PSYCH DIAG EVAL W/MED SRVCS: CPT | Mod: ,,, | Performed by: PSYCHIATRY & NEUROLOGY

## 2017-09-08 PROCEDURE — 63600175 PHARM REV CODE 636 W HCPCS: Performed by: ANESTHESIOLOGY

## 2017-09-08 PROCEDURE — 80048 BASIC METABOLIC PNL TOTAL CA: CPT | Mod: 91

## 2017-09-08 PROCEDURE — 11000001 HC ACUTE MED/SURG PRIVATE ROOM

## 2017-09-08 PROCEDURE — 85025 COMPLETE CBC W/AUTO DIFF WBC: CPT

## 2017-09-08 PROCEDURE — 25000003 PHARM REV CODE 250: Performed by: ANESTHESIOLOGY

## 2017-09-08 PROCEDURE — 80048 BASIC METABOLIC PNL TOTAL CA: CPT

## 2017-09-08 PROCEDURE — 99024 POSTOP FOLLOW-UP VISIT: CPT | Mod: ,,, | Performed by: UROLOGY

## 2017-09-08 RX ORDER — MIRTAZAPINE 7.5 MG/1
7.5 TABLET, FILM COATED ORAL NIGHTLY
Status: DISCONTINUED | OUTPATIENT
Start: 2017-09-08 | End: 2017-09-14 | Stop reason: HOSPADM

## 2017-09-08 RX ORDER — POLYETHYLENE GLYCOL 3350 17 G/17G
17 POWDER, FOR SOLUTION ORAL DAILY
Status: DISCONTINUED | OUTPATIENT
Start: 2017-09-08 | End: 2017-09-10

## 2017-09-08 RX ADMIN — CARVEDILOL 25 MG: 25 TABLET, FILM COATED ORAL at 09:09

## 2017-09-08 RX ADMIN — AMPICILLIN SODIUM 2 G: 2 INJECTION, POWDER, FOR SOLUTION INTRAMUSCULAR; INTRAVENOUS at 10:09

## 2017-09-08 RX ADMIN — PANTOPRAZOLE SODIUM 40 MG: 40 TABLET, DELAYED RELEASE ORAL at 09:09

## 2017-09-08 RX ADMIN — ONDANSETRON 4 MG: 2 INJECTION INTRAMUSCULAR; INTRAVENOUS at 10:09

## 2017-09-08 RX ADMIN — TRAZODONE HYDROCHLORIDE 50 MG: 50 TABLET ORAL at 09:09

## 2017-09-08 RX ADMIN — CEFTRIAXONE 2 G: 2 INJECTION, SOLUTION INTRAVENOUS at 01:09

## 2017-09-08 RX ADMIN — AMPICILLIN SODIUM 2 G: 2 INJECTION, POWDER, FOR SOLUTION INTRAMUSCULAR; INTRAVENOUS at 03:09

## 2017-09-08 RX ADMIN — ONDANSETRON 4 MG: 2 INJECTION INTRAMUSCULAR; INTRAVENOUS at 09:09

## 2017-09-08 RX ADMIN — Medication 3 ML: at 09:09

## 2017-09-08 RX ADMIN — CEFTRIAXONE 2 G: 2 INJECTION, SOLUTION INTRAVENOUS at 02:09

## 2017-09-08 RX ADMIN — AMPICILLIN SODIUM 2 G: 2 INJECTION, POWDER, FOR SOLUTION INTRAMUSCULAR; INTRAVENOUS at 02:09

## 2017-09-08 RX ADMIN — Medication 3 ML: at 05:09

## 2017-09-08 NOTE — ASSESSMENT & PLAN NOTE
Juan Manuel Koehler is a 67 y.o. male with hx of invasive bladder cancer s/p open radical cystoprostatectomy, bilateral pelvic lymph node dissection, and creation of an ileal conduit on 6/21/17, now with blood per ostomy site and acute anemia.     - Regular diet  - IVF  - Ambulate/OOB  - KAMARI/SCDs  - Home meds restarted  - CT negative for leak for significant explanation for bleeding   - Nephrology consult for persistently elevated creatinine above baseline  - Possible DC this PM

## 2017-09-08 NOTE — PROGRESS NOTES
"[facility]  Urology  Progress Note    Patient Name: Juan Manuel Koehler  MRN: [id number]  Admission Date: 9/7/2017  Hospital Length of Stay: 1 days  Code Status: Full Code   Attending Provider: Villa Robles MD   Primary Care Physician: Hemant Sweeney MD    Subjective:     HPI:  Juan Manuel Koehler is a 67 y.o male w hx of invasive bladder cancer s/p open radical cystoprostatectomy, bilateral pelvic lymph node dissection, and creation of an ileal conduit on 6/21/17.     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 He represented to Dr. Boucher for hematuria and was found to have a large bladder tumor on his trigone. The tumor was also causing bilateral ureteral obstruction with resulting ELDER.     The patient underwent tumor resection and attempted JJ stent placement on 4/5/17 which demonstrated cT2 urothelial carcinoma (nested variant). Bilateral nephrostomy tubes were placed with improvement in the patient's renal function. The patient's eGFR fluctuated, and, ultimately, he could not receive neoadjuvant cisplatin-based chemotherapy due to his creatinine clearance.     Thus, the patient underwent surgery on 6/21/17 and did very well in the immediate post-operative period.     His pathology revealed urothelial carcinoma with nested variant into the perivesical fat. His lymph nodes and margins are negative. His final pathologic stage is [id number].     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 Has been slowly recovering. He was recently discharged from rehab and has been making progress.     He returned to clinic, after being seen by enterostomal therapy, with blood per stoma and leakage of urine after having some tissue next to the stoma excised. Urine may now be leaking through the skin. Overnight he has had 1500 cc of what his wife describes as "bloody" output. He " "became dizzy this AM and "crumpled" to the ground without hitting his head. He has not lost consciousness. He notes recent vomiting that started with IV Abx - currently on rocephin and ampicillin for treatment of vegetative endocarditis.    Interval History:   No acute events overnight  Urine draining light pink this am  CT negative yesterday  Responded well to transfusion  Feeling well this am  Denies pain  No nausea      Review of Systems  Objective:     Temp:  [97.5 °F (36.4 °C)-98.4 °F (36.9 °C)] 97.9 °F (36.6 °C)  Pulse:  [56-82] 56  Resp:  [13-20] 20  SpO2:  [98 %-100 %] 99 %  BP: ()/(51-75) 143/65     Body mass index is 16.69 kg/m².      Date 09/08/17 0700 - 09/09/17 0659   Shift 6617-3507 2313-9357 5189-0902 24 Hour Total   I  N  T  A  K  E   Shift Total  (mL/kg)       O  U  T  P  U  T   Urine  (mL/kg/hr) 550   550    Shift Total  (mL/kg) 550  (9.3)   550  (9.3)   Weight (kg) 59 59 59 59          Drains     Drain                 Ureteral Drain/Stent -- days         Nephrostomy 05/05/17 1503 Right 8 Fr. 125 days         Nephrostomy 05/05/17 1504 Left 8 Fr. 125 days         Closed/Suction Drain 06/21/17 1221 Abdomen Bulb 19 Fr. 78 days         Ureteral Drain/Stent 06/21/17 1053 Left ureter 6 Fr. 78 days         Ureteral Drain/Stent 06/21/17 1054 Right ureter 6 Fr. 78 days         Urostomy 08/02/17 2345 ileal conduit RUQ 36 days         Urostomy 09/07/17 1522  RLQ less than 1 day                Physical Exam   Vitals reviewed.  Constitutional: He is oriented to person, place, and time. No distress.   Ampicillin pump from home at bedside   HENT:   Head: Normocephalic and atraumatic.   Eyes: Pupils are equal, round, and reactive to light. No scleral icterus.   Neck: No JVD present.   Cardiovascular: Normal rate and regular rhythm.    Pulmonary/Chest: Effort normal. No respiratory distress.   Abdominal: He exhibits no distension. There is no tenderness. There is no rebound and no guarding.   Urostomy pink " and patent, draining light pink urine  14 Fr bolanos in place   Musculoskeletal: He exhibits no edema.   Neurological: He is alert and oriented to person, place, and time.   Skin: Skin is dry. He is not diaphoretic. There is pallor.     Psychiatric: He has a normal mood and affect. His behavior is normal.       Significant Labs:    BMP:    Recent Labs  Lab 09/07/17  0959 09/08/17  0349    139   K 4.0 3.8    105   CO2 23 23   BUN 21 24*   CREATININE 3.0* 3.0*   CALCIUM 8.3* 8.1*       CBC:     Recent Labs  Lab 09/07/17  0959 09/08/17  0349   WBC 12.62 8.09   HGB 7.1* 9.0*   HCT 20.9* 25.8*    113*       All pertinent labs results from the past 24 hours have been reviewed.    Significant Imaging:  All pertinent imaging results/findings from the past 24 hours have been reviewed.        Assessment/Plan:     Urinary stoma complication    Juan Manuel Koehler is a 67 y.o. male with hx of invasive bladder cancer s/p open radical cystoprostatectomy, bilateral pelvic lymph node dissection, and creation of an ileal conduit on 6/21/17, now with blood per ostomy site and acute anemia.     - Regular diet  - IVF  - Ambulate/OOB  - KAMARI/SCDs  - Home meds restarted  - CT negative for leak for significant explanation for bleeding   - Nephrology consult for persistently elevated creatinine above baseline  - Possible DC this PM                VTE Risk Mitigation         Ordered     Place KAMARI hose  Until discontinued      09/07/17 1212     Place sequential compression device  Until discontinued      09/07/17 1044     Medium Risk of VTE  Once      09/07/17 1044          Kiley Hwang MD  Urology  [facility]

## 2017-09-08 NOTE — PLAN OF CARE
[facility]ST[facility] was contacted by [facility] and they are sending the referrals again to [facility].    Suzanne Johnson, [facility]  X [phone]

## 2017-09-08 NOTE — ASSESSMENT & PLAN NOTE
- complains of poor sleep 2/2 anxiety related to medical diagnosis and condition  - Recommend starting Remeron 7.5 mg PO qhs

## 2017-09-08 NOTE — NURSING
Con't ampiciliin home infusion was not stopped. Paged MD on call to verify, as orders state Q6, but has not been given as such.. Dr Navas aware.  Will let remaining ml infuse and start as ordered for next scheduled dose at 115.

## 2017-09-08 NOTE — CONSULTS
"[facility]  Psychiatry  Consult Note    Patient Name: Juan Manuel Koehler  MRN: [id number]   Code Status: Full Code  Admission Date: 9/7/2017  Hospital Length of Stay: 1 days  Attending Physician: Villa Robles MD  Primary Care Provider: Hemant Sweeney MD  Inpatient consult to Psychiatry  Consult performed by: LETITIA SHAH  Consult ordered by: CHANTELLE ANGEL        Subjective:     Principal Problem:<principal problem not specified>    Chief Complaint:  "depression"    HPI: Patient is a 68 yo man with PMH of invasinvasive bladder cancer s/p open radical cystoprostatectomy, CKD, CVA, endocarditis currently on IV abx who was admitted on 9/6/17 for bleeding from urostomy site. Psychiatry was consulted to address possible underlying depression. The patient report his wife asked that psychiatry be consulted but he does not know why. He states he is not currently depressed, has not been irritable, and has a "good" mood. Denies crying spells and says on average his mood is an 8/10. Denies feelings of hopelessness, worthlessness, or guilt. Denies history of depressive episodes in the past. States he has been getting along well at home with his family. Denies history of stacy, Si, Hi, AVH, paranoia. Does endorse poor sleep about 4 hours per night since April 2017 after surgery. 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 Reports he was prescribed trazodone in the past for poor sleep but it didn't help. Does endorse some anxiety and worry that comes and goes during the day about his job. Denies anhedonia, poor concentration. Reports poor appetite due to his physical illness. Denies prior history of seeing a psychiatrist, psychiatric hospitalizations, suicide attempts, and past trauma.  Denies substance abuse and family history of mental illness. He " reports he does not have access to [employer].    Hospital Course: No notes on file         Patient History           Medical as of 9/8/2017     Past Medical History     Diagnosis Date Comments Source    Cancer -- bladder and throat Provider    CKD (chronic kidney disease) stage 3, GFR 30-59 ml/min -- -- Provider    Embolic stroke involving right cerebellar artery 8/4/2017 -- Provider    Urinary tract infection -- -- Provider          Pertinent Negatives     Diagnosis Date Noted Comments Source    Elevated PSA 3/30/2017 -- Provider    Kidney stone 3/30/2017 -- Provider    STD (sexually transmitted disease) 3/30/2017 -- Provider                  Surgical as of 9/8/2017     Past Surgical History     Procedure Laterality Date Comments Source    BLADDER SURGERY -- -- -- Provider    THROAT SURGERY -- -- -- Provider    HERNIA REPAIR -- -- -- Provider    CYSTOSCOPY -- -- -- Provider    Ileal Conduit [Other] -- -- -- Provider                  Family as of 9/8/2017     Problem Relation Name Age of Onset Comments Source    No Known Problems Father -- -- -- Provider    Kidney disease Mother -- -- -- Provider    Prostate cancer Neg Hx -- -- -- Provider            Tobacco Use as of 9/8/2017     Smoking Status Smoking Start Date Smoking Quit Date Packs/day Years Used    Never Smoker -- -- -- --    Types Comments Smokeless Tobacco Status Smokeless Tobacco Quit Date Source    -- -- Never Used -- Provider            Alcohol Use as of 9/8/2017     Alcohol Use Drinks/Week Alcohol/Week Comments Source    No 0 Standard drinks or equivalent 0.0 oz -- Provider            Drug Use as of 9/8/2017     Drug Use Types Frequency Comments Source    No -- -- -- Provider            Sexual Activity as of 9/8/2017     Sexually Active Birth Control Partners Comments Source    Yes None Female -- Provider            Activities of Daily Living as of 9/8/2017    **None**           Social Documentation as of 9/8/2017    **None**           Occupational as of  9/8/2017    **None**           Socioeconomic as of 9/8/2017     Marital Status Spouse Name Number of Children Years Education Preferred Language Ethnicity Race Source     -- -- -- English /White White --         Pertinent History Q A Comments    as of 9/8/2017 Lives with      Place in Birth Order      Lives in      Number of Siblings      Raised by      Legal Involvement      Childhood Trauma      Criminal History of      Financial Status      Highest Level of Education      Does patient have access to a firearm?       Service      Primary Leisure Activity      Spirituality         Review of patient's allergies indicates:  No Known Allergies    No current facility-administered medications on file prior to encounter.      Current Outpatient Prescriptions on File Prior to Encounter   Medication Sig    AMPICILLIN SODIUM (AMPICILLIN 2 G/100 ML NS, READY TO MIX SYSTEM,) Inject 100 mLs (2 g total) into the vein every 6 (six) hours.    aspirin (ECOTRIN) 81 MG EC tablet Take 1 tablet (81 mg total) by mouth once daily.    carvedilol (COREG) 25 MG tablet Take 1 tablet (25 mg total) by mouth 2 (two) times daily.    cefTRIAXone 2 g in dextrose 5 % 50 mL (ROCEPHIN) 2 g/50 mL PgBk IVPB Inject 50 mLs (2 g total) into the vein every 12 (twelve) hours.    dronabinol (MARINOL) 5 MG capsule Take 1 capsule (5 mg total) by mouth 3 (three) times daily before meals.    ondansetron (ZOFRAN-ODT) 8 MG TbDL Take 1 tablet (8 mg total) by mouth every 6 (six) hours as needed (nausea).    pantoprazole (PROTONIX) 40 MG tablet Take 1 tablet (40 mg total) by mouth once daily.    trazodone (DESYREL) 50 MG tablet Take 1 tablet (50 mg total) by mouth every evening.    scopolamine (TRANSDERM-SCOP) 1.3-1.5 mg (1 mg over 3 days) Place 1 patch onto the skin Every 3 (three) days.     Psychotherapeutics     Start     Stop Route Frequency Ordered    09/07/17 2100  trazodone tablet 50 mg      -- Oral Nightly 09/07/17 7964     "    Review of Systems     Negative except as above.    Objective:     Vital Signs (Most Recent):  Temp: 98.1 °F (36.7 °C) (09/08/17 1136)  Pulse: 77 (09/08/17 1416)  Resp: 20 (09/08/17 1136)  BP: (!) 154/70 (09/08/17 1136)  SpO2: 96 % (09/08/17 1136) Vital Signs (24h Range):  Temp:  [97.5 °F (36.4 °C)-98.4 °F (36.9 °C)] 98.1 °F (36.7 °C)  Pulse:  [56-82] 77  Resp:  [14-20] 20  SpO2:  [96 %-100 %] 96 %  BP: (104-154)/(54-75) 154/70     Height: 6' 2" (188 cm)  Weight: 59 kg (130 lb)  Body mass index is 16.69 kg/m².      Intake/Output Summary (Last 24 hours) at 09/08/17 1512  Last data filed at 09/08/17 0700   Gross per 24 hour   Intake              400 ml   Output              550 ml   Net             -150 ml       Physical Exam       CONSTITUTIONAL  General Appearance: in gown, NAD, calm, cooperative, underweight    PSYCHIATRIC   Level of Consciousness: alert  Orientation: oriented to person, place, situation  Grooming: fair  Psychomotor Behavior: no agitation, retardation  Speech: normal rate, volume, tone  Language: English, no asphasia  Mood: "good"  Affect: full, appropriate, mood congruent  Thought Process: linear, logical  Associations: cohesive  Thought Content: denies Si, Hi, AVH  Memory: intact to recent and remote events  Attention: not distracted  Fund of Knowledge: appropriate for education level  Insight: fair  Judgment: fair      Assessment/Plan:     Adjustment disorder with anxiety    - complains of poor sleep 2/2 anxiety related to medical diagnosis and condition  - Recommend starting Remeron 7.5 mg PO qhs              Aba Valverde MD   Psychiatry  [facility]  "

## 2017-09-08 NOTE — PLAN OF CARE
S/P Looposcopy,   Loogogram on 9/7/17. CM visited with pt and wife. Pt known to service from cystectomy in June. Pt stated he had a stroke on 8/16 and then went to [facility]. Pt states he is current with [facility] for IV abx( he is on 2) and [facility]. Pt states  see's him for PT/OT and abx. He has urostomy supplies. Unsure name of company he gets them from. CM will ask SW if pt needs new  orders. Pt states Dr. Robles stated he would maybe dc on Sunday. Will cont to follow.        09/08/17 1236   Discharge Assessment   Assessment Type Discharge Planning Assessment   Confirmed/corrected address and phone number on facesheet? Yes   Assessment information obtained from? Patient;Caregiver   Expected Length of Stay (days) 3   Prior to hospitilization cognitive status: Alert/Oriented   Prior to hospitalization functional status: Independent   Current cognitive status: Alert/Oriented   Current Functional Status: Independent   Lives With spouse   Able to Return to Prior Arrangements yes   Is patient able to care for self after discharge? Yes   Who are your caregiver(s) and their phone number(s)? Darlene/wife [phone]    Patient's perception of discharge disposition home or selfcare   Readmission Within The Last 30 Days no previous admission in last 30 days   Patient currently being followed by outpatient case management? No   Patient currently receives any other outside agency services? No   Equipment Currently Used at Home none   Do you have any problems affording any of your prescribed medications? No   Is the patient taking medications as prescribed? yes   Does the patient have transportation home? Yes   Transportation Available family or friend will provide   Discharge Plan A Home with family;Home Health   Discharge Plan B Home with family   Patient/Family In Agreement With Plan yes

## 2017-09-08 NOTE — PLAN OF CARE
[facility]    HOME HEALTH ORDERS  FACE TO FACE ENCOUNTER    Patient Name: Juan Manuel Koehler  YOB: 1950    PCP: Hemant Sweeney MD   PCP Address: [address] / [place] [address]  PCP Phone Number: [phone]  PCP Fax: [phone]    Encounter Date: 09/08/2017    Admit to Home Health    Diagnoses:  Active Hospital Problems    Diagnosis  POA    Urinary stoma complication [N99.528]  Yes      Resolved Hospital Problems    Diagnosis Date Resolved POA   No resolved problems to display.       Future Appointments  Date Time Provider Department Center   9/15/2017 2:30 PM BRITTANIE Stoddard [facility] ENTER[facility] [address]   9/15/2017 4:00 PM Mingo Calero MD [facility] HEM ONC [facility]           I have seen and examined this patient face to face today. My clinical findings that support the need for the home health skilled services and home bound status are the following:  Weakness/numbness causing balance and gait disturbance due to Weakness/Debility and Surgery making it taxing to leave home.    Allergies:Review of patient's allergies indicates:  No Known Allergies    Diet: regular diet    Activities: activity as tolerated and no driving while on analgesics    Nursing:   SN to complete comprehensive assessment including routine vital signs. Instruct on disease process and s/s of complications to report to MD. Review/verify medication list sent home with the patient at time of discharge  and instruct patient/caregiver as needed. Frequency may be adjusted depending on start of care date.    Notify MD if SBP > 160 or < 90; DBP > 90 or < 50; HR > 120 or < 50; Temp > 101; Other:        CONSULTS:    Physical Therapy to evaluate and treat. Evaluate for home safety and equipment needs; Establish/upgrade home exercise program. Perform / instruct on therapeutic exercises, gait training, transfer training, and Range of Motion.  Aide to provide assistance with personal care, ADLs, and  vital signs.    MISCELLANEOUS CARE:  Colostomy Care:  Instruct patient/caregiver to empty bag when full and PRN. and Monitor skin integrity.     Home Infusion Therapy:   SN to perform Infusion Therapy/Central Line Care.  Review Central Line Care & Central Line Flush with patient.  Line care per home health protocol      Administer (drug and dose): Ampicillin 2g IV q6h    First dose: 9/7/17                     Last dose: 9/16/17    Administer (drug and dose): Rocephin 2g IV q12h    First dose: 9/7/17                     Last dose: 9/16/17    Scrub the Hub: Prior to accessing the line, always perform a 30 second alcohol scrub  Each lumen of the central line is to be flushed at least daily with 10 mL Normal Saline and 3 mL Heparin flush (100 units/mL)  Skilled Nurse (SN) may draw blood from IV access  Blood Draw Procedure:   - Aspirate at least 5 mL of blood   - Discard   - Obtain specimen   - Change posiflow cap   - Flush with 20 mL Normal Saline followed by a                 3-5 mL Heparin flush (100 units/mL)  Central :   - Sterile dressing changes are done weekly and as needed.   - Use chlor-hexadine scrub to cleanse site, apply Biopatch to insertion site,       apply securement device dressing   - Posi-flow caps are changed weekly and after EVERY lab draw.   - If sterile gauze is under dressing to control oozing,                 dressing change must be performed every 24 hours until gauze is not needed.      WOUND CARE ORDERS  n/a      Medications: Review discharge medications with patient and family and provide education.    Current Discharge Medication List      CONTINUE these medications which have NOT CHANGED    Details   AMPICILLIN SODIUM (AMPICILLIN 2 G/100 ML NS, READY TO MIX SYSTEM,) Inject 100 mLs (2 g total) into the vein every 6 (six) hours.  Qty: 9600 mL, Refills: 0    Comments: Endocarditis treatment w IVAbx (ceftriaxone, ampicillin ) with end date 9/16      aspirin (ECOTRIN) 81 MG EC  tablet Take 1 tablet (81 mg total) by mouth once daily.  Refills: 0      carvedilol (COREG) 25 MG tablet Take 1 tablet (25 mg total) by mouth 2 (two) times daily.  Qty: 180 tablet, Refills: 3      cefTRIAXone 2 g in dextrose 5 % 50 mL (ROCEPHIN) 2 g/50 mL PgBk IVPB Inject 50 mLs (2 g total) into the vein every 12 (twelve) hours.  Qty: 48 each, Refills: 0    Comments: Endocarditis treatment w IVAbx (ceftriaxone , ampicillin ) with end date 9/16      dronabinol (MARINOL) 5 MG capsule Take 1 capsule (5 mg total) by mouth 3 (three) times daily before meals.  Qty: 90 capsule, Refills: 0      ondansetron (ZOFRAN-ODT) 8 MG TbDL Take 1 tablet (8 mg total) by mouth every 6 (six) hours as needed (nausea).  Qty: 30 tablet, Refills: 1      pantoprazole (PROTONIX) 40 MG tablet Take 1 tablet (40 mg total) by mouth once daily.  Qty: 90 tablet, Refills: 3      trazodone (DESYREL) 50 MG tablet Take 1 tablet (50 mg total) by mouth every evening.  Qty: 90 tablet, Refills: 3      scopolamine (TRANSDERM-SCOP) 1.3-1.5 mg (1 mg over 3 days) Place 1 patch onto the skin Every 3 (three) days.  Qty: 10 patch, Refills: 0             I certify that this patient is confined to his home and needs intermittent skilled nursing care and physical therapy.

## 2017-09-08 NOTE — HPI
"Patient is a 66 yo man with PMH of invasinvasive bladder cancer s/p open radical cystoprostatectomy, CKD, CVA, endocarditis currently on IV abx who was admitted on 9/6/17 for bleeding from urostomy site. Psychiatry was consulted to address possible underlying depression. The patient report his wife asked that psychiatry be consulted but he does not know why. He states he is not currently depressed, has not been irritable, and has a "good" mood. Denies crying spells and says on average his mood is an 8/10. Denies feelings of hopelessness, worthlessness, or guilt. Denies history of depressive episodes in the past. States he has been getting along well at home with his family. Denies history of stacy, Si, Hi, AVH, paranoia. Does endorse poor sleep about 4 hours per night since April 2017 after surgery. 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 Reports he was prescribed trazodone in the past for poor sleep but it didn't help. Does endorse some anxiety and worry that comes and goes during the day about his job. Denies anhedonia, poor concentration. Reports poor appetite due to his physical illness. Denies prior history of seeing a psychiatrist, psychiatric hospitalizations, suicide attempts, and past trauma.  Denies substance abuse and family history of mental illness. He reports he does not have access to guns.  "

## 2017-09-08 NOTE — SUBJECTIVE & OBJECTIVE
Interval History:   No acute events overnight  Urine draining light pink this am  CT negative yesterday  Responded well to transfusion  Feeling well this am  Denies pain  No nausea      Review of Systems  Objective:     Temp:  [97.5 °F (36.4 °C)-98.4 °F (36.9 °C)] 97.9 °F (36.6 °C)  Pulse:  [56-82] 56  Resp:  [13-20] 20  SpO2:  [98 %-100 %] 99 %  BP: ()/(51-75) 143/65     Body mass index is 16.69 kg/m².      Date 09/08/17 0700 - 09/09/17 0659   Shift 4911-1258 2419-5529 3465-2661 24 Hour Total   I  N  T  A  K  E   Shift Total  (mL/kg)       O  U  T  P  U  T   Urine  (mL/kg/hr) 550   550    Shift Total  (mL/kg) 550  (9.3)   550  (9.3)   Weight (kg) 59 59 59 59          Drains     Drain                 Ureteral Drain/Stent -- days         Nephrostomy 05/05/17 1503 Right 8 Fr. 125 days         Nephrostomy 05/05/17 1504 Left 8 Fr. 125 days         Closed/Suction Drain 06/21/17 1221 Abdomen Bulb 19 Fr. 78 days         Ureteral Drain/Stent 06/21/17 1053 Left ureter 6 Fr. 78 days         Ureteral Drain/Stent 06/21/17 1054 Right ureter 6 Fr. 78 days         Urostomy 08/02/17 2345 ileal conduit RUQ 36 days         Urostomy 09/07/17 1522  RLQ less than 1 day                Physical Exam   Vitals reviewed.  Constitutional: He is oriented to person, place, and time. No distress.   Ampicillin pump from home at bedside   HENT:   Head: Normocephalic and atraumatic.   Eyes: Pupils are equal, round, and reactive to light. No scleral icterus.   Neck: No JVD present.   Cardiovascular: Normal rate and regular rhythm.    Pulmonary/Chest: Effort normal. No respiratory distress.   Abdominal: He exhibits no distension. There is no tenderness. There is no rebound and no guarding.   Urostomy pink and patent, draining light pink urine  14 Fr bolanos in place   Musculoskeletal: He exhibits no edema.   Neurological: He is alert and oriented to person, place, and time.   Skin: Skin is dry. He is not diaphoretic. There is pallor.      Psychiatric: He has a normal mood and affect. His behavior is normal.       Significant Labs:    BMP:    Recent Labs  Lab 09/07/17  0959 09/08/17  0349    139   K 4.0 3.8    105   CO2 23 23   BUN 21 24*   CREATININE 3.0* 3.0*   CALCIUM 8.3* 8.1*       CBC:     Recent Labs  Lab 09/07/17  0959 09/08/17  0349   WBC 12.62 8.09   HGB 7.1* 9.0*   HCT 20.9* 25.8*    113*       All pertinent labs results from the past 24 hours have been reviewed.    Significant Imaging:  All pertinent imaging results/findings from the past 24 hours have been reviewed.

## 2017-09-08 NOTE — PLAN OF CARE
KRISTEL was informed that the pt may dc today or over the weekend. Pt is current with [facility] and [facility] with IV infusions.  SW spoke with [facility] ([phone]) and [facility] ([phone]) to confirm this.  KRISTEL sent the HH and infusion orders to [facility].  They will call the SW back once it is arranged.    Suzanne Johnson, [facility]  [phone]

## 2017-09-08 NOTE — SUBJECTIVE & OBJECTIVE
Patient History           Medical as of 9/8/2017     Past Medical History     Diagnosis Date Comments Source    Cancer -- bladder and throat Provider    CKD (chronic kidney disease) stage 3, GFR 30-59 ml/min -- -- Provider    Embolic stroke involving right cerebellar artery 8/4/2017 -- Provider    Urinary tract infection -- -- Provider          Pertinent Negatives     Diagnosis Date Noted Comments Source    Elevated PSA 3/30/2017 -- Provider    Kidney stone 3/30/2017 -- Provider    STD (sexually transmitted disease) 3/30/2017 -- Provider                  Surgical as of 9/8/2017     Past Surgical History     Procedure Laterality Date Comments Source    BLADDER SURGERY -- -- -- Provider    THROAT SURGERY -- -- -- Provider    HERNIA REPAIR -- -- -- Provider    CYSTOSCOPY -- -- -- Provider    Ileal Conduit [Other] -- -- -- Provider                  Family as of 9/8/2017     Problem Relation Name Age of Onset Comments Source    No Known Problems Father -- -- -- Provider    Kidney disease Mother -- -- -- Provider    Prostate cancer Neg Hx -- -- -- Provider            Tobacco Use as of 9/8/2017     Smoking Status Smoking Start Date Smoking Quit Date Packs/day Years Used    Never Smoker -- -- -- --    Types Comments Smokeless Tobacco Status Smokeless Tobacco Quit Date Source    -- -- Never Used -- Provider            Alcohol Use as of 9/8/2017     Alcohol Use Drinks/Week Alcohol/Week Comments Source    No 0 Standard drinks or equivalent 0.0 oz -- Provider            Drug Use as of 9/8/2017     Drug Use Types Frequency Comments Source    No -- -- -- Provider            Sexual Activity as of 9/8/2017     Sexually Active Birth Control Partners Comments Source    Yes None Female -- Provider            Activities of Daily Living as of 9/8/2017    **None**           Social Documentation as of 9/8/2017    **None**           Occupational as of 9/8/2017    **None**           Socioeconomic as of 9/8/2017     Marital Status  Spouse Name Number of Children Years Education Preferred Language Ethnicity Race Source     -- -- -- English /White White --         Pertinent History Q A Comments    as of 9/8/2017 Lives with      Place in Birth Order      Lives in      Number of Siblings      Raised by      Legal Involvement      Childhood Trauma      Criminal History of      Financial Status      Highest Level of Education      Does patient have access to a firearm?       Service      Primary Leisure Activity      Spirituality         Review of patient's allergies indicates:  No Known Allergies    No current facility-administered medications on file prior to encounter.      Current Outpatient Prescriptions on File Prior to Encounter   Medication Sig    AMPICILLIN SODIUM (AMPICILLIN 2 G/100 ML NS, READY TO MIX SYSTEM,) Inject 100 mLs (2 g total) into the vein every 6 (six) hours.    aspirin (ECOTRIN) 81 MG EC tablet Take 1 tablet (81 mg total) by mouth once daily.    carvedilol (COREG) 25 MG tablet Take 1 tablet (25 mg total) by mouth 2 (two) times daily.    cefTRIAXone 2 g in dextrose 5 % 50 mL (ROCEPHIN) 2 g/50 mL PgBk IVPB Inject 50 mLs (2 g total) into the vein every 12 (twelve) hours.    dronabinol (MARINOL) 5 MG capsule Take 1 capsule (5 mg total) by mouth 3 (three) times daily before meals.    ondansetron (ZOFRAN-ODT) 8 MG TbDL Take 1 tablet (8 mg total) by mouth every 6 (six) hours as needed (nausea).    pantoprazole (PROTONIX) 40 MG tablet Take 1 tablet (40 mg total) by mouth once daily.    trazodone (DESYREL) 50 MG tablet Take 1 tablet (50 mg total) by mouth every evening.    scopolamine (TRANSDERM-SCOP) 1.3-1.5 mg (1 mg over 3 days) Place 1 patch onto the skin Every 3 (three) days.     Psychotherapeutics     Start     Stop Route Frequency Ordered    09/07/17 2100  trazodone tablet 50 mg      -- Oral Nightly 09/07/17 1609        Review of Systems     Negative except as above.    Objective:     Vital Signs  "(Most Recent):  Temp: 98.1 °F (36.7 °C) (09/08/17 1136)  Pulse: 77 (09/08/17 1416)  Resp: 20 (09/08/17 1136)  BP: (!) 154/70 (09/08/17 1136)  SpO2: 96 % (09/08/17 1136) Vital Signs (24h Range):  Temp:  [97.5 °F (36.4 °C)-98.4 °F (36.9 °C)] 98.1 °F (36.7 °C)  Pulse:  [56-82] 77  Resp:  [14-20] 20  SpO2:  [96 %-100 %] 96 %  BP: (104-154)/(54-75) 154/70     Height: 6' 2" (188 cm)  Weight: 59 kg (130 lb)  Body mass index is 16.69 kg/m².      Intake/Output Summary (Last 24 hours) at 09/08/17 1512  Last data filed at 09/08/17 0700   Gross per 24 hour   Intake              400 ml   Output              550 ml   Net             -150 ml       Physical Exam       CONSTITUTIONAL  General Appearance: in gown, NAD, calm, cooperative, underweight    PSYCHIATRIC   Level of Consciousness: alert  Orientation: oriented to person, place, situation  Grooming: fair  Psychomotor Behavior: no agitation, retardation  Speech: normal rate, volume, tone  Language: English, no asphasia  Mood: "good"  Affect: full, appropriate, mood congruent  Thought Process: linear, logical  Associations: cohesive  Thought Content: denies Si, Hi, AVH  Memory: intact to recent and remote events  Attention: not distracted  Fund of Knowledge: appropriate for education level  Insight: fair  Judgment: fair    "

## 2017-09-08 NOTE — NURSING
Pt alert and oriented. VSS. Pt irritable d/tmultiple blood draws and wanting to go home. Wife at bed side this afternoon. No c/o pain. C/o nausea throughout day, given prn zofran. Call light in reach. Will continue to monitor.

## 2017-09-09 PROBLEM — N17.9 ACUTE-ON-CHRONIC KIDNEY INJURY: Status: ACTIVE | Noted: 2017-09-09

## 2017-09-09 PROBLEM — N17.0 ACUTE RENAL FAILURE WITH ACUTE TUBULAR NECROSIS SUPERIMPOSED ON STAGE 3 CHRONIC KIDNEY DISEASE: Status: ACTIVE | Noted: 2017-09-09

## 2017-09-09 PROBLEM — F43.22 ADJUSTMENT DISORDER WITH ANXIETY: Status: ACTIVE | Noted: 2017-09-09

## 2017-09-09 PROBLEM — N18.9 ACUTE-ON-CHRONIC KIDNEY INJURY: Status: ACTIVE | Noted: 2017-09-09

## 2017-09-09 PROBLEM — R11.2 INTRACTABLE NAUSEA AND VOMITING: Status: ACTIVE | Noted: 2017-09-09

## 2017-09-09 PROBLEM — N18.30 ACUTE RENAL FAILURE WITH ACUTE TUBULAR NECROSIS SUPERIMPOSED ON STAGE 3 CHRONIC KIDNEY DISEASE: Status: ACTIVE | Noted: 2017-09-09

## 2017-09-09 LAB
ANION GAP SERPL CALC-SCNC: 8 MMOL/L
BACTERIA #/AREA URNS AUTO: ABNORMAL /HPF
BASOPHILS # BLD AUTO: 0.02 K/UL
BASOPHILS NFR BLD: 0.4 %
BILIRUB UR QL STRIP: NEGATIVE
BUN SERPL-MCNC: 23 MG/DL
CALCIUM SERPL-MCNC: 7.7 MG/DL
CHLORIDE SERPL-SCNC: 107 MMOL/L
CLARITY UR REFRACT.AUTO: ABNORMAL
CO2 SERPL-SCNC: 22 MMOL/L
COLOR UR AUTO: YELLOW
CREAT SERPL-MCNC: 3 MG/DL
DIFFERENTIAL METHOD: ABNORMAL
EOSINOPHIL # BLD AUTO: 0.1 K/UL
EOSINOPHIL NFR BLD: 1.8 %
ERYTHROCYTE [DISTWIDTH] IN BLOOD BY AUTOMATED COUNT: 14.7 %
EST. GFR  (AFRICAN AMERICAN): 23.6 ML/MIN/1.73 M^2
EST. GFR  (NON AFRICAN AMERICAN): 20.4 ML/MIN/1.73 M^2
GLUCOSE SERPL-MCNC: 92 MG/DL
GLUCOSE UR QL STRIP: NEGATIVE
HCT VFR BLD AUTO: 20.9 %
HGB BLD-MCNC: 7.4 G/DL
HGB UR QL STRIP: ABNORMAL
KETONES UR QL STRIP: NEGATIVE
LEUKOCYTE ESTERASE UR QL STRIP: ABNORMAL
LYMPHOCYTES # BLD AUTO: 0.9 K/UL
LYMPHOCYTES NFR BLD: 15.4 %
MCH RBC QN AUTO: 29.5 PG
MCHC RBC AUTO-ENTMCNC: 35.4 G/DL
MCV RBC AUTO: 83 FL
MICROSCOPIC COMMENT: ABNORMAL
MONOCYTES # BLD AUTO: 0.4 K/UL
MONOCYTES NFR BLD: 7.4 %
NEUTROPHILS # BLD AUTO: 4.1 K/UL
NEUTROPHILS NFR BLD: 74.5 %
NITRITE UR QL STRIP: NEGATIVE
PH UR STRIP: 7 [PH] (ref 5–8)
PLATELET # BLD AUTO: 116 K/UL
PMV BLD AUTO: 9 FL
POTASSIUM SERPL-SCNC: 3.2 MMOL/L
PROT UR QL STRIP: NEGATIVE
RBC # BLD AUTO: 2.51 M/UL
RBC #/AREA URNS AUTO: >100 /HPF (ref 0–4)
SODIUM SERPL-SCNC: 137 MMOL/L
SP GR UR STRIP: 1.01 (ref 1–1.03)
URN SPEC COLLECT METH UR: ABNORMAL
UROBILINOGEN UR STRIP-ACNC: NEGATIVE EU/DL
WBC # BLD AUTO: 5.52 K/UL
WBC #/AREA URNS AUTO: 27 /HPF (ref 0–5)
YEAST UR QL AUTO: ABNORMAL

## 2017-09-09 PROCEDURE — 25000003 PHARM REV CODE 250: Performed by: STUDENT IN AN ORGANIZED HEALTH CARE EDUCATION/TRAINING PROGRAM

## 2017-09-09 PROCEDURE — 63600175 PHARM REV CODE 636 W HCPCS: Performed by: ANESTHESIOLOGY

## 2017-09-09 PROCEDURE — 25000003 PHARM REV CODE 250: Performed by: ANESTHESIOLOGY

## 2017-09-09 PROCEDURE — 25000003 PHARM REV CODE 250: Performed by: UROLOGY

## 2017-09-09 PROCEDURE — 85025 COMPLETE CBC W/AUTO DIFF WBC: CPT

## 2017-09-09 PROCEDURE — 99222 1ST HOSP IP/OBS MODERATE 55: CPT | Mod: GC,,, | Performed by: INTERNAL MEDICINE

## 2017-09-09 PROCEDURE — 11000001 HC ACUTE MED/SURG PRIVATE ROOM

## 2017-09-09 PROCEDURE — 99223 1ST HOSP IP/OBS HIGH 75: CPT | Mod: GC,,, | Performed by: INTERNAL MEDICINE

## 2017-09-09 PROCEDURE — 80048 BASIC METABOLIC PNL TOTAL CA: CPT

## 2017-09-09 PROCEDURE — A4216 STERILE WATER/SALINE, 10 ML: HCPCS | Performed by: ANESTHESIOLOGY

## 2017-09-09 PROCEDURE — 81001 URINALYSIS AUTO W/SCOPE: CPT

## 2017-09-09 RX ORDER — POTASSIUM CHLORIDE 20 MEQ/1
60 TABLET, EXTENDED RELEASE ORAL ONCE
Status: COMPLETED | OUTPATIENT
Start: 2017-09-09 | End: 2017-09-09

## 2017-09-09 RX ORDER — CALCIUM CARBONATE 200(500)MG
1000 TABLET,CHEWABLE ORAL ONCE
Status: COMPLETED | OUTPATIENT
Start: 2017-09-09 | End: 2017-09-09

## 2017-09-09 RX ORDER — SCOLOPAMINE TRANSDERMAL SYSTEM 1 MG/1
1 PATCH, EXTENDED RELEASE TRANSDERMAL
Status: DISCONTINUED | OUTPATIENT
Start: 2017-09-09 | End: 2017-09-14 | Stop reason: HOSPADM

## 2017-09-09 RX ADMIN — MIRTAZAPINE 7.5 MG: 7.5 TABLET ORAL at 09:09

## 2017-09-09 RX ADMIN — SCOPALAMINE 1 PATCH: 1 PATCH, EXTENDED RELEASE TRANSDERMAL at 09:09

## 2017-09-09 RX ADMIN — Medication 3 ML: at 05:09

## 2017-09-09 RX ADMIN — AMPICILLIN SODIUM 2 G: 2 INJECTION, POWDER, FOR SOLUTION INTRAMUSCULAR; INTRAVENOUS at 01:09

## 2017-09-09 RX ADMIN — CALCIUM CARBONATE (ANTACID) CHEW TAB 500 MG 1000 MG: 500 CHEW TAB at 09:09

## 2017-09-09 RX ADMIN — CEFTRIAXONE 2 G: 2 INJECTION, SOLUTION INTRAVENOUS at 02:09

## 2017-09-09 RX ADMIN — POLYETHYLENE GLYCOL 3350 17 G: 17 POWDER, FOR SOLUTION ORAL at 09:09

## 2017-09-09 RX ADMIN — TRAZODONE HYDROCHLORIDE 50 MG: 50 TABLET ORAL at 09:09

## 2017-09-09 RX ADMIN — POTASSIUM CHLORIDE 60 MEQ: 1500 TABLET, EXTENDED RELEASE ORAL at 09:09

## 2017-09-09 RX ADMIN — CARVEDILOL 25 MG: 25 TABLET, FILM COATED ORAL at 09:09

## 2017-09-09 RX ADMIN — ONDANSETRON 4 MG: 2 INJECTION INTRAMUSCULAR; INTRAVENOUS at 05:09

## 2017-09-09 RX ADMIN — PANTOPRAZOLE SODIUM 40 MG: 40 TABLET, DELAYED RELEASE ORAL at 09:09

## 2017-09-09 RX ADMIN — OXYCODONE HYDROCHLORIDE AND ACETAMINOPHEN 1 TABLET: 5; 325 TABLET ORAL at 06:09

## 2017-09-09 RX ADMIN — AMPICILLIN SODIUM 2 G: 2 INJECTION, POWDER, FOR SOLUTION INTRAMUSCULAR; INTRAVENOUS at 03:09

## 2017-09-09 RX ADMIN — AMPICILLIN SODIUM 2 G: 2 INJECTION, POWDER, FOR SOLUTION INTRAMUSCULAR; INTRAVENOUS at 08:09

## 2017-09-09 RX ADMIN — CEFTRIAXONE 2 G: 2 INJECTION, SOLUTION INTRAVENOUS at 12:09

## 2017-09-09 RX ADMIN — Medication 3 ML: at 09:09

## 2017-09-09 NOTE — SUBJECTIVE & OBJECTIVE
Interval History:   No acute events overnight  Denies pain   Still experiencing significant nausea  Urine draining clear yellow    Review of Systems  Objective:     Temp:  [96.5 °F (35.8 °C)-99.1 °F (37.3 °C)] 96.8 °F (36 °C)  Pulse:  [56-78] 59  Resp:  [16-20] 18  SpO2:  [95 %-98 %] 98 %  BP: (122-154)/(65-88) 131/66     Body mass index is 16.69 kg/m².            Drains     Drain                 Ureteral Drain/Stent -- days         Nephrostomy 05/05/17 1503 Right 8 Fr. 126 days         Nephrostomy 05/05/17 1504 Left 8 Fr. 126 days         Closed/Suction Drain 06/21/17 1221 Abdomen Bulb 19 Fr. 79 days         Ureteral Drain/Stent 06/21/17 1053 Left ureter 6 Fr. 79 days         Ureteral Drain/Stent 06/21/17 1054 Right ureter 6 Fr. 79 days         Urostomy 08/02/17 2345 ileal conduit RUQ 37 days         Urostomy 09/07/17 1522  RLQ 1 day                Physical Exam   Vitals reviewed.  Constitutional: He is oriented to person, place, and time. No distress.   Ampicillin pump from home at bedside   HENT:   Head: Normocephalic and atraumatic.   Eyes: Pupils are equal, round, and reactive to light. No scleral icterus.   Neck: No JVD present.   Cardiovascular: Normal rate and regular rhythm.    Pulmonary/Chest: Effort normal. No respiratory distress.   Abdominal: He exhibits no distension. There is no tenderness. There is no rebound and no guarding.   Urostomy pink and patent, draining clear yellow urine  14 Fr bolanos in place   Musculoskeletal: He exhibits no edema.   Neurological: He is alert and oriented to person, place, and time.   Skin: Skin is dry. He is not diaphoretic. There is pallor.     Psychiatric: He has a normal mood and affect. His behavior is normal.       Significant Labs:    BMP:    Recent Labs  Lab 09/08/17  0349 09/08/17  1251 09/09/17  0329    138 137   K 3.8 4.5 3.2*    108 107   CO2 23 21* 22*   BUN 24* 23 23   CREATININE 3.0* 3.1* 3.0*   CALCIUM 8.1* 8.1* 7.7*       CBC:     Recent  Labs  Lab 09/07/17  0959 09/08/17  0349 09/09/17  0329   WBC 12.62 8.09 5.52   HGB 7.1* 9.0* 7.4*   HCT 20.9* 25.8* 20.9*    113* 116*       All pertinent labs results from the past 24 hours have been reviewed.    Significant Imaging:  All pertinent imaging results/findings from the past 24 hours have been reviewed.

## 2017-09-09 NOTE — ASSESSMENT & PLAN NOTE
67 year old male with a history of bladder cancer and CVA on who GI is being consulted for intractable nausea/emesis. In the setting of his co-morbidities his N/E is most likely multifactorial yet it would not be unreasonable to perform an EGD an rule out H pylori and/or other gastric etiologies. Would have patient try his best eating and provide anti-emetics as needed. Once home patient can continue using his medical marijuana which per wife provides substantial improvement.    Plan:  -Continue current diet   -CLD diet tomorrow and NPO after MN for EGD on Monday, if patient is discharged before that he should obtain an EGD as outpatient  -Recommend PPI PO Qdaily as well as anti-emetics as needed in hopes of increasing PO intake

## 2017-09-09 NOTE — HPI
67 year old male with a history of invasive bladder cancer s/p open radical cystoprostatectomy, bilateral pelvic lymph node dissection, and creation of an ileal conduit on 6/21/17 as well as CVA in August on who GI is being consulted for intractable nausea and emesis.    History provided by patient and wife.   Since his bladder cancer patient has been having issues with nausea and emesis after meals. Since the cancer and stroke he has lost ~ 40lbs per wife. Nausea/emesis has mildly improved as he feels that he is able to keep more food down (less episodes of emesis). He also feels that the medical marijuana helps. He denies any dysphagia, odynophagia, abdominal pain, or changes in bowel/blader habits. He is a non-smoker, doesn't drink, and doesn't use NSAIDS.    Last EGD and colon where prior to the bladder cancer and performed at an outside hospital.

## 2017-09-09 NOTE — SUBJECTIVE & OBJECTIVE
Past Medical History:   Diagnosis Date    Cancer     bladder and throat    CKD (chronic kidney disease) stage 3, GFR 30-59 ml/min     Embolic stroke involving right cerebellar artery 8/4/2017    Urinary tract infection        Past Surgical History:   Procedure Laterality Date    BLADDER SURGERY      CYSTOSCOPY      HERNIA REPAIR      Ileal Conduit      THROAT SURGERY         Review of patient's allergies indicates:  No Known Allergies  Family History     Problem Relation (Age of Onset)    Kidney disease Mother    No Known Problems Father        Social History Main Topics    Smoking status: Never Smoker    Smokeless tobacco: Never Used    Alcohol use No    Drug use: No    Sexual activity: Yes     Partners: Female     Birth control/ protection: None     Review of Systems   Constitutional: Positive for fatigue.   HENT: Negative.    Eyes: Negative.    Respiratory: Negative.    Cardiovascular: Negative.    Gastrointestinal: Positive for nausea and vomiting. Negative for abdominal distention, abdominal pain, anal bleeding, blood in stool, constipation, diarrhea and rectal pain.   Endocrine: Negative.    Genitourinary: Negative.    Musculoskeletal: Negative.    Skin: Negative.    Neurological: Negative.    Hematological: Negative.      Objective:     Vital Signs (Most Recent):  Temp: 99 °F (37.2 °C) (09/09/17 1600)  Pulse: 71 (09/09/17 1600)  Resp: 17 (09/09/17 1600)  BP: 129/62 (09/09/17 1600)  SpO2: 100 % (09/09/17 1600) Vital Signs (24h Range):  Temp:  [96.8 °F (36 °C)-99.1 °F (37.3 °C)] 99 °F (37.2 °C)  Pulse:  [56-78] 71  Resp:  [16-18] 17  SpO2:  [95 %-100 %] 100 %  BP: (122-165)/(62-73) 129/62     Weight: 59 kg (130 lb) (09/07/17 1746)  Body mass index is 16.69 kg/m².      Intake/Output Summary (Last 24 hours) at 09/09/17 1805  Last data filed at 09/09/17 0678   Gross per 24 hour   Intake             2090 ml   Output             1050 ml   Net             1040 ml       Lines/Drains/Airways      Peripherally Inserted Central Catheter Line                 PICC Double Lumen 08/10/17 1155 right brachial 30 days          Drain                 Ureteral Drain/Stent -- days         Nephrostomy 05/05/17 1503 Right 8 Fr. 127 days         Nephrostomy 05/05/17 1504 Left 8 Fr. 127 days         Closed/Suction Drain 06/21/17 1221 Abdomen Bulb 19 Fr. 80 days         Ureteral Drain/Stent 06/21/17 1053 Left ureter 6 Fr. 80 days         Ureteral Drain/Stent 06/21/17 1054 Right ureter 6 Fr. 80 days         Urostomy 08/02/17 2345 ileal conduit RUQ 37 days         Urostomy 09/07/17 1522  RLQ 2 days          Peripheral Intravenous Line                 Midline Catheter Insertion/Assessment  - Single Lumen 06/26/17 1047 Right brachial vein 18g x 8cm 75 days         Peripheral IV - Single Lumen 09/07/17 1500 Left Hand 2 days                Physical Exam   Constitutional: He is oriented to person, place, and time. No distress.   HENT:   Head: Normocephalic.   Eyes: Pupils are equal, round, and reactive to light.   Neck: Normal range of motion.   Cardiovascular: Normal rate and regular rhythm.    Pulmonary/Chest: Effort normal and breath sounds normal.   Abdominal: Soft. Bowel sounds are normal.   Well healed abdominal incision with urostomy bag in place   Musculoskeletal: Normal range of motion. He exhibits no edema.   Neurological: He is alert and oriented to person, place, and time.   Skin: Skin is warm. He is not diaphoretic.       Significant Labs:  CBC:   Recent Labs  Lab 09/08/17  0349 09/09/17  0329   WBC 8.09 5.52   HGB 9.0* 7.4*   HCT 25.8* 20.9*   * 116*     CMP:   Recent Labs  Lab 09/09/17  0329   GLU 92   CALCIUM 7.7*      K 3.2*   CO2 22*      BUN 23   CREATININE 3.0*       Significant Imaging:  Imaging results within the past 24 hours have been reviewed.

## 2017-09-09 NOTE — PLAN OF CARE
"Problem: Patient Care Overview  Goal: Plan of Care Review  Outcome: Ongoing (interventions implemented as appropriate)  Pt verbalized understanding POC.  Pt. VSS, pt. Urine turning more pink than red.  Pt with intermittent nausea and emesis.  Pt. Reports once he "throws up", the nausea goes away. Will continue to monitor      "

## 2017-09-09 NOTE — SUBJECTIVE & OBJECTIVE
Past Medical History:   Diagnosis Date    Cancer     bladder and throat    CKD (chronic kidney disease) stage 3, GFR 30-59 ml/min     Embolic stroke involving right cerebellar artery 8/4/2017    Urinary tract infection        Past Surgical History:   Procedure Laterality Date    BLADDER SURGERY      CYSTOSCOPY      HERNIA REPAIR      Ileal Conduit      THROAT SURGERY         Review of patient's allergies indicates:  No Known Allergies  Current Facility-Administered Medications   Medication Frequency    0.9%  NaCl infusion (for blood administration) Q24H PRN    0.9%  NaCl infusion Continuous    ampicillin 2 g in sodium chloride 0.9 % 100 mL IVPB (ready to mix system) Q6H    carvedilol tablet 25 mg BID    cefTRIAXone (ROCEPHIN) 2 g in dextrose 5 % 50 mL IVPB Q12H    mirtazapine tablet 7.5 mg QHS    ondansetron injection 4 mg Q6H PRN    oxycodone-acetaminophen  mg per tablet 1 tablet Q4H PRN    oxycodone-acetaminophen 5-325 mg per tablet 1 tablet Q4H PRN    pantoprazole EC tablet 40 mg Daily    polyethylene glycol packet 17 g Daily    promethazine (PHENERGAN) 6.25 mg in dextrose 5 % 50 mL IVPB Q6H PRN    scopolamine 1.3-1.5 mg (1 mg over 3 days) 1 patch Q3 Days    sodium chloride 0.9% flush 3 mL Q8H    sodium chloride 0.9% flush 3 mL PRN    trazodone tablet 50 mg QHS     Family History     Problem Relation (Age of Onset)    Kidney disease Mother    No Known Problems Father        Social History Main Topics    Smoking status: Never Smoker    Smokeless tobacco: Never Used    Alcohol use No    Drug use: No    Sexual activity: Yes     Partners: Female     Birth control/ protection: None     Review of Systems   Constitutional: Negative for fever and unexpected weight change.   Respiratory: Negative for cough, chest tightness, shortness of breath and wheezing.    Cardiovascular: Negative for chest pain and leg swelling.   Gastrointestinal: Negative for abdominal distention, abdominal pain,  diarrhea and nausea.   Endocrine: Negative for polydipsia.   Genitourinary: Positive for hematuria. Negative for difficulty urinating, dysuria, flank pain and frequency.   Musculoskeletal: Negative for arthralgias and myalgias.   Skin: Negative for rash.   Allergic/Immunologic: Negative for immunocompromised state.   Neurological: Negative for dizziness and light-headedness.   Hematological: Negative for adenopathy.   Psychiatric/Behavioral: Negative for confusion.     Objective:     Vital Signs (Most Recent):  Temp: 96.8 °F (36 °C) (09/09/17 0831)  Pulse: (!) 59 (09/09/17 1100)  Resp: 18 (09/09/17 0831)  BP: 131/66 (09/09/17 0831)  SpO2: 98 % (09/09/17 0528)  O2 Device (Oxygen Therapy): room air (09/07/17 1900) Vital Signs (24h Range):  Temp:  [96.5 °F (35.8 °C)-99.1 °F (37.3 °C)] 96.8 °F (36 °C)  Pulse:  [56-78] 59  Resp:  [16-18] 18  SpO2:  [95 %-98 %] 98 %  BP: (122-152)/(65-88) 131/66     Weight: 59 kg (130 lb) (09/07/17 1746)  Body mass index is 16.69 kg/m².  Body surface area is 1.76 meters squared.    I/O last 3 completed shifts:  In: 2090 [P.O.:690; I.V.:1250; IV Piggyback:150]  Out: 1600 [Urine:1500; Emesis/NG output:100]    Physical Exam   Constitutional: He is oriented to person, place, and time. He appears well-developed.   HENT:   Head: Normocephalic and atraumatic.   Eyes: EOM are normal.   Neck: No JVD present.   Cardiovascular: Normal rate and regular rhythm.  Exam reveals no friction rub.    Pulmonary/Chest: Effort normal and breath sounds normal.   Abdominal: Soft. He exhibits no distension.   Musculoskeletal: He exhibits edema.   Neurological: He is alert and oriented to person, place, and time.   Skin: Skin is warm.   Psychiatric: He has a normal mood and affect.       Significant Labs:  CBC:   Recent Labs  Lab 09/09/17  0329   WBC 5.52   RBC 2.51*   HGB 7.4*   HCT 20.9*   *   MCV 83   MCH 29.5   MCHC 35.4     CMP:   Recent Labs  Lab 09/07/17 0959  09/09/17 0329   *  < > 92    CALCIUM 8.3*  < > 7.7*   ALBUMIN 2.4*  --   --    PROT 5.2*  --   --      < > 137   K 4.0  < > 3.2*   CO2 23  < > 22*     < > 107   BUN 21  < > 23   CREATININE 3.0*  < > 3.0*   ALKPHOS 48*  --   --    ALT 7*  --   --    AST 7*  --   --    BILITOT 0.6  --   --    < > = values in this interval not displayed.

## 2017-09-09 NOTE — ASSESSMENT & PLAN NOTE
Has been with a stable sCr since admit, rise from 2.1 to 3.1 seems to have been associated with blood loss and hypotension, in short, an ischemic ATN seems likely, I discussed with urology and obstructive pathology has been ruled out, voleum status, acid/base and lytes are fine    Patient apparently is schedule for tentative discharge tomorrow.    Plan/Recommendations:  1) will repeat Ua, urine lytes and check UPC ratio  2) spin urine and review microscopy  3) if here tomorrow would check a renal panel  4) patient does not need to stay in hospital for nephro work up, but can follow up outpatient with nephrologist he's already established with, however if here we'll go ahead with work-up/studies as outlined, but would not anticipate finding anything that would keep him in the hospital

## 2017-09-09 NOTE — PROGRESS NOTES
"[facility]  Urology  Progress Note    Patient Name: Juan Manuel Koehler  MRN: [id number]  Admission Date: 9/7/2017  Hospital Length of Stay: 2 days  Code Status: Full Code   Attending Provider: Villa Robles MD   Primary Care Physician: Hemant Sweeney MD    Subjective:     HPI:  Juan Manuel Koehler is a 67 y.o male w hx of invasive bladder cancer s/p open radical cystoprostatectomy, bilateral pelvic lymph node dissection, and creation of an ileal conduit on 6/21/17.     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 He represented to Dr. Boucher for hematuria and was found to have a large bladder tumor on his trigone. The tumor was also causing bilateral ureteral obstruction with resulting ELDER.     The patient underwent tumor resection and attempted JJ stent placement on 4/5/17 which demonstrated cT2 urothelial carcinoma (nested variant). Bilateral nephrostomy tubes were placed with improvement in the patient's renal function. The patient's eGFR fluctuated, and, ultimately, he could not receive neoadjuvant cisplatin-based chemotherapy due to his creatinine clearance.     Thus, the patient underwent surgery on 6/21/17 and did very well in the immediate post-operative period.     His pathology revealed urothelial carcinoma with nested variant into the perivesical fat. His lymph nodes and margins are negative. His final pathologic stage is [id number].     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 Has been slowly recovering. He was recently discharged from rehab and has been making progress.     He returned to clinic, after being seen by enterostomal therapy, with blood per stoma and leakage of urine after having some tissue next to the stoma excised. Urine may now be leaking through the skin. Overnight he has had 1500 cc of what his wife describes as "bloody" output. He " "became dizzy this AM and "crumpled" to the ground without hitting his head. He has not lost consciousness. He notes recent vomiting that started with IV Abx - currently on rocephin and ampicillin for treatment of vegetative endocarditis.    Interval History:   No acute events overnight  Denies pain   Still experiencing significant nausea  Urine draining clear yellow    Review of Systems  Objective:     Temp:  [96.5 °F (35.8 °C)-99.1 °F (37.3 °C)] 96.8 °F (36 °C)  Pulse:  [56-78] 59  Resp:  [16-20] 18  SpO2:  [95 %-98 %] 98 %  BP: (122-154)/(65-88) 131/66     Body mass index is 16.69 kg/m².            Drains     Drain                 Ureteral Drain/Stent -- days         Nephrostomy 05/05/17 1503 Right 8 Fr. 126 days         Nephrostomy 05/05/17 1504 Left 8 Fr. 126 days         Closed/Suction Drain 06/21/17 1221 Abdomen Bulb 19 Fr. 79 days         Ureteral Drain/Stent 06/21/17 1053 Left ureter 6 Fr. 79 days         Ureteral Drain/Stent 06/21/17 1054 Right ureter 6 Fr. 79 days         Urostomy 08/02/17 2345 ileal conduit RUQ 37 days         Urostomy 09/07/17 1522  RLQ 1 day                Physical Exam   Vitals reviewed.  Constitutional: He is oriented to person, place, and time. No distress.   Ampicillin pump from home at bedside   HENT:   Head: Normocephalic and atraumatic.   Eyes: Pupils are equal, round, and reactive to light. No scleral icterus.   Neck: No JVD present.   Cardiovascular: Normal rate and regular rhythm.    Pulmonary/Chest: Effort normal. No respiratory distress.   Abdominal: He exhibits no distension. There is no tenderness. There is no rebound and no guarding.   Urostomy pink and patent, draining clear yellow urine  14 Fr bolanos in place   Musculoskeletal: He exhibits no edema.   Neurological: He is alert and oriented to person, place, and time.   Skin: Skin is dry. He is not diaphoretic. There is pallor.     Psychiatric: He has a normal mood and affect. His behavior is normal.       Significant " Labs:    BMP:    Recent Labs  Lab 09/08/17  0349 09/08/17  1251 09/09/17  0329    138 137   K 3.8 4.5 3.2*    108 107   CO2 23 21* 22*   BUN 24* 23 23   CREATININE 3.0* 3.1* 3.0*   CALCIUM 8.1* 8.1* 7.7*       CBC:     Recent Labs  Lab 09/07/17  0959 09/08/17  0349 09/09/17  0329   WBC 12.62 8.09 5.52   HGB 7.1* 9.0* 7.4*   HCT 20.9* 25.8* 20.9*    113* 116*       All pertinent labs results from the past 24 hours have been reviewed.    Significant Imaging:  All pertinent imaging results/findings from the past 24 hours have been reviewed.                  Assessment/Plan:     Urinary stoma complication    Juan Manuel Koehler is a 67 y.o. male with hx of invasive bladder cancer s/p open radical cystoprostatectomy, bilateral pelvic lymph node dissection, and creation of an ileal conduit on 6/21/17, now with blood per ostomy site and acute anemia.     - Regular diet  - IVF  - Ambulate/OOB  - KAMARI/SCDs  - Home meds restarted  - CT negative for leak for significant explanation for bleeding   - Nephrology consult for persistently elevated creatinine above baseline  - GI consult for intractable nausea  - Transfuse 1 unit PRBCs today                VTE Risk Mitigation         Ordered     Place KAMARI hose  Until discontinued      09/07/17 1212     Place sequential compression device  Until discontinued      09/07/17 1044     Medium Risk of VTE  Once      09/07/17 1044          Kiley Hwang MD  Urology  [facility]

## 2017-09-09 NOTE — ASSESSMENT & PLAN NOTE
Juan Manuel Koehler is a 67 y.o. male with hx of invasive bladder cancer s/p open radical cystoprostatectomy, bilateral pelvic lymph node dissection, and creation of an ileal conduit on 6/21/17, now with blood per ostomy site and acute anemia.     - Regular diet  - IVF  - Ambulate/OOB  - KAMARI/SCDs  - Home meds restarted  - CT negative for leak for significant explanation for bleeding   - Nephrology consult for persistently elevated creatinine above baseline  - GI consult for intractable nausea  - Transfuse 1 unit PRBCs today

## 2017-09-09 NOTE — CONSULTS
"[facility]  Nephrology  Consult Note    Patient Name: Juan Manuel Koehler  MRN: [id number]  Admission Date: 9/7/2017  Hospital Length of Stay: 2 days  Attending Provider: Villa Robles MD   Primary Care Physician: Hemant Sweeney MD  Principal Problem:<principal problem not specified>    Inpatient consult to Nephrology  Consult performed by: VERONIQUE VIERA  Consult ordered by: TELLO MARTINEZ  Reason for consult: ELDER        Subjective:     HPI: Juan Manuel Koehler is a 67 y.o male w hx of invasive bladder cancer s/p open radical cystoprostatectomy, bilateral pelvic lymph node dissection, and creation of an ileal conduit on 6/21/17.    Patient was seen in urology clinic on 9/5/17, reported dizziness, blood pressure reported "borderline," and was estimated to have had about 1500ccs of bloody output from his stoma.    Noted to be on abxs (rocephin) for endocardtitis.    He is tentatively scheduled for discharge for tomorrow.    Nephrology consulted for ELDER on CKI    sCr has been between 3.0 to 3.1 since admit, it was 2.1 in late August, after surgery seems to have dropped down to 1.0-1.2 range for a few weeks    UA noted for +3 blood, no significant protein, urine sodium at 37    Past Medical History:   Diagnosis Date    Cancer     bladder and throat    CKD (chronic kidney disease) stage 3, GFR 30-59 ml/min     Embolic stroke involving right cerebellar artery 8/4/2017    Urinary tract infection        Past Surgical History:   Procedure Laterality Date    BLADDER SURGERY      CYSTOSCOPY      HERNIA REPAIR      Ileal Conduit      THROAT SURGERY         Review of patient's allergies indicates:  No Known Allergies  Current Facility-Administered Medications   Medication Frequency    0.9%  NaCl infusion (for blood administration) Q24H PRN    0.9%  NaCl infusion Continuous    ampicillin 2 g in sodium chloride 0.9 % 100 mL IVPB (ready to mix system) Q6H    carvedilol tablet 25 mg " BID    cefTRIAXone (ROCEPHIN) 2 g in dextrose 5 % 50 mL IVPB Q12H    mirtazapine tablet 7.5 mg QHS    ondansetron injection 4 mg Q6H PRN    oxycodone-acetaminophen  mg per tablet 1 tablet Q4H PRN    oxycodone-acetaminophen 5-325 mg per tablet 1 tablet Q4H PRN    pantoprazole EC tablet 40 mg Daily    polyethylene glycol packet 17 g Daily    promethazine (PHENERGAN) 6.25 mg in dextrose 5 % 50 mL IVPB Q6H PRN    scopolamine 1.3-1.5 mg (1 mg over 3 days) 1 patch Q3 Days    sodium chloride 0.9% flush 3 mL Q8H    sodium chloride 0.9% flush 3 mL PRN    trazodone tablet 50 mg QHS     Family History     Problem Relation (Age of Onset)    Kidney disease Mother    No Known Problems Father        Social History Main Topics    Smoking status: Never Smoker    Smokeless tobacco: Never Used    Alcohol use No    Drug use: No    Sexual activity: Yes     Partners: Female     Birth control/ protection: None     Review of Systems   Constitutional: Negative for fever and unexpected weight change.   Respiratory: Negative for cough, chest tightness, shortness of breath and wheezing.    Cardiovascular: Negative for chest pain and leg swelling.   Gastrointestinal: Negative for abdominal distention, abdominal pain, diarrhea and nausea.   Endocrine: Negative for polydipsia.   Genitourinary: Positive for hematuria. Negative for difficulty urinating, dysuria, flank pain and frequency.   Musculoskeletal: Negative for arthralgias and myalgias.   Skin: Negative for rash.   Allergic/Immunologic: Negative for immunocompromised state.   Neurological: Negative for dizziness and light-headedness.   Hematological: Negative for adenopathy.   Psychiatric/Behavioral: Negative for confusion.     Objective:     Vital Signs (Most Recent):  Temp: 96.8 °F (36 °C) (09/09/17 0831)  Pulse: (!) 59 (09/09/17 1100)  Resp: 18 (09/09/17 0831)  BP: 131/66 (09/09/17 0831)  SpO2: 98 % (09/09/17 0528)  O2 Device (Oxygen Therapy): room air (09/07/17  1900) Vital Signs (24h Range):  Temp:  [96.5 °F (35.8 °C)-99.1 °F (37.3 °C)] 96.8 °F (36 °C)  Pulse:  [56-78] 59  Resp:  [16-18] 18  SpO2:  [95 %-98 %] 98 %  BP: (122-152)/(65-88) 131/66     Weight: 59 kg (130 lb) (09/07/17 1746)  Body mass index is 16.69 kg/m².  Body surface area is 1.76 meters squared.    I/O last 3 completed shifts:  In: 2090 [P.O.:690; I.V.:1250; IV Piggyback:150]  Out: 1600 [Urine:1500; Emesis/NG output:100]    Physical Exam   Constitutional: He is oriented to person, place, and time. He appears well-developed.   HENT:   Head: Normocephalic and atraumatic.   Eyes: EOM are normal.   Neck: No JVD present.   Cardiovascular: Normal rate and regular rhythm.  Exam reveals no friction rub.    Pulmonary/Chest: Effort normal and breath sounds normal.   Abdominal: Soft. He exhibits no distension.   Musculoskeletal: He exhibits edema.   Neurological: He is alert and oriented to person, place, and time.   Skin: Skin is warm.   Psychiatric: He has a normal mood and affect.       Significant Labs:  CBC:   Recent Labs  Lab 09/09/17  0329   WBC 5.52   RBC 2.51*   HGB 7.4*   HCT 20.9*   *   MCV 83   MCH 29.5   MCHC 35.4     CMP:   Recent Labs  Lab 09/07/17  0959  09/09/17  0329   *  < > 92   CALCIUM 8.3*  < > 7.7*   ALBUMIN 2.4*  --   --    PROT 5.2*  --   --      < > 137   K 4.0  < > 3.2*   CO2 23  < > 22*     < > 107   BUN 21  < > 23   CREATININE 3.0*  < > 3.0*   ALKPHOS 48*  --   --    ALT 7*  --   --    AST 7*  --   --    BILITOT 0.6  --   --    < > = values in this interval not displayed.      Assessment/Plan:     Acute-on-chronic kidney injury    Has been with a stable sCr since admit, rise from 2.1 to 3.1 seems to have been associated with blood loss and hypotension, in short, an ischemic ATN seems likely, I discussed with urology and obstructive pathology has been ruled out, voleum status, acid/base and lytes are fine    Patient apparently is schedule for tentative discharge  tomorrow.    Plan/Recommendations:  1) will repeat Ua, urine lytes and check UPC ratio  2) spin urine and review microscopy  3) if here tomorrow would check a renal panel  4) patient does not need to stay in hospital for nephro work up, but can follow up outpatient with nephrologist he's already established with, however if here we'll go ahead with work-up/studies as outlined, but would not anticipate finding anything that would keep him in the hospital            Thank you for your consult. I will follow-up with patient. Please contact us if you have any additional questions.    Andrea Coats MD  Nephrology  [facility]    ATTENDING PHYSICIAN ATTESTATION  I have personally interviewed and examined the patient. I thoroughly reviewed the demographic, clinical, laboratorial and imaging information available in medical records. I agree with the assessment and recommendations provided by the subspecialty resident. Dr. Coats was under my supervision.

## 2017-09-09 NOTE — HPI
"Juan Manuel Koehler is a 67 y.o male w hx of invasive bladder cancer s/p open radical cystoprostatectomy, bilateral pelvic lymph node dissection, and creation of an ileal conduit on 6/21/17.    Patient was seen in urology clinic on 9/5/17, reported dizziness, blood pressure reported "borderline," and was estimated to have had about 1500ccs of bloody output from his stoma.    Noted to be on abxs (rocephin) for endocardtitis.    He is tentatively scheduled for discharge for tomorrow.    Nephrology consulted for ELDER on CKI    sCr has been between 3.0 to 3.1 since admit, it was 2.1 in late August, after surgery seems to have dropped down to 1.0-1.2 range for a few weeks    UA noted for +3 blood, no significant protein, urine sodium at 37  "

## 2017-09-09 NOTE — CONSULTS
[facility]  Gastroenterology  Consult Note    Patient Name: Juan Manuel Koehler  MRN: [id number]  Admission Date: 9/7/2017  Hospital Length of Stay: 2 days  Code Status: Full Code   Attending Provider: Villa Robles MD   Consulting Provider: Judi Rondon MD  Primary Care Physician: Hemant Sweeney MD  Principal Problem:<principal problem not specified>    Inpatient consult to Gastroenterology  Consult performed by: JUDI RONDON  Consult ordered by: TELLO MARTINEZ        Subjective:     HPI:  67 year old male with a history of invasive bladder cancer s/p open radical cystoprostatectomy, bilateral pelvic lymph node dissection, and creation of an ileal conduit on 6/21/17 as well as CVA in August on who GI is being consulted for intractable nausea and emesis.    History provided by patient and wife.   Since his bladder cancer patient has been having issues with nausea and emesis after meals. Since the cancer and stroke he has lost ~ 40lbs per wife. Nausea/emesis has mildly improved as he feels that he is able to keep more food down (less episodes of emesis). He also feels that the medical marijuana helps. He denies any dysphagia, odynophagia, abdominal pain, or changes in bowel/blader habits. He is a non-smoker, doesn't drink, and doesn't use NSAIDS.    Last EGD and colon where prior to the bladder cancer and performed at an outside hospital.       Past Medical History:   Diagnosis Date    Cancer     bladder and throat    CKD (chronic kidney disease) stage 3, GFR 30-59 ml/min     Embolic stroke involving right cerebellar artery 8/4/2017    Urinary tract infection        Past Surgical History:   Procedure Laterality Date    BLADDER SURGERY      CYSTOSCOPY      HERNIA REPAIR      Ileal Conduit      THROAT SURGERY         Review of patient's allergies indicates:  No Known Allergies  Family History     Problem Relation (Age of Onset)    Kidney disease Mother    No Known Problems  Father        Social History Main Topics    Smoking status: Never Smoker    Smokeless tobacco: Never Used    Alcohol use No    Drug use: No    Sexual activity: Yes     Partners: Female     Birth control/ protection: None     Review of Systems   Constitutional: Positive for fatigue.   HENT: Negative.    Eyes: Negative.    Respiratory: Negative.    Cardiovascular: Negative.    Gastrointestinal: Positive for nausea and vomiting. Negative for abdominal distention, abdominal pain, anal bleeding, blood in stool, constipation, diarrhea and rectal pain.   Endocrine: Negative.    Genitourinary: Negative.    Musculoskeletal: Negative.    Skin: Negative.    Neurological: Negative.    Hematological: Negative.      Objective:     Vital Signs (Most Recent):  Temp: 99 °F (37.2 °C) (09/09/17 1600)  Pulse: 71 (09/09/17 1600)  Resp: 17 (09/09/17 1600)  BP: 129/62 (09/09/17 1600)  SpO2: 100 % (09/09/17 1600) Vital Signs (24h Range):  Temp:  [96.8 °F (36 °C)-99.1 °F (37.3 °C)] 99 °F (37.2 °C)  Pulse:  [56-78] 71  Resp:  [16-18] 17  SpO2:  [95 %-100 %] 100 %  BP: (122-165)/(62-73) 129/62     Weight: 59 kg (130 lb) (09/07/17 1746)  Body mass index is 16.69 kg/m².      Intake/Output Summary (Last 24 hours) at 09/09/17 1805  Last data filed at 09/09/17 0655   Gross per 24 hour   Intake             2090 ml   Output             1050 ml   Net             1040 ml       Lines/Drains/Airways     Peripherally Inserted Central Catheter Line                 PICC Double Lumen 08/10/17 1155 right brachial 30 days          Drain                 Ureteral Drain/Stent -- days         Nephrostomy 05/05/17 1503 Right 8 Fr. 127 days         Nephrostomy 05/05/17 1504 Left 8 Fr. 127 days         Closed/Suction Drain 06/21/17 1221 Abdomen Bulb 19 Fr. 80 days         Ureteral Drain/Stent 06/21/17 1053 Left ureter 6 Fr. 80 days         Ureteral Drain/Stent 06/21/17 1054 Right ureter 6 Fr. 80 days         Urostomy 08/02/17 2345 ileal conduit RUQ 37 days          Urostomy 09/07/17 1522  RLQ 2 days          Peripheral Intravenous Line                 Midline Catheter Insertion/Assessment  - Single Lumen 06/26/17 1047 Right brachial vein 18g x 8cm 75 days         Peripheral IV - Single Lumen 09/07/17 1500 Left Hand 2 days                Physical Exam   Constitutional: He is oriented to person, place, and time. No distress.   HENT:   Head: Normocephalic.   Eyes: Pupils are equal, round, and reactive to light.   Neck: Normal range of motion.   Cardiovascular: Normal rate and regular rhythm.    Pulmonary/Chest: Effort normal and breath sounds normal.   Abdominal: Soft. Bowel sounds are normal.   Well healed abdominal incision with urostomy bag in place   Musculoskeletal: Normal range of motion. He exhibits no edema.   Neurological: He is alert and oriented to person, place, and time.   Skin: Skin is warm. He is not diaphoretic.       Significant Labs:  CBC:   Recent Labs  Lab 09/08/17  0349 09/09/17  0329   WBC 8.09 5.52   HGB 9.0* 7.4*   HCT 25.8* 20.9*   * 116*     CMP:   Recent Labs  Lab 09/09/17  0329   GLU 92   CALCIUM 7.7*      K 3.2*   CO2 22*      BUN 23   CREATININE 3.0*       Significant Imaging:  Imaging results within the past 24 hours have been reviewed.    Assessment/Plan:     Intractable nausea and vomiting    67 year old male with a history of bladder cancer and CVA on who GI is being consulted for intractable nausea/emesis. In the setting of his co-morbidities his N/E is most likely multifactorial yet it would not be unreasonable to perform an EGD an rule out H pylori and/or other gastric etiologies. Would have patient try his best eating and provide anti-emetics as needed. Once home patient can continue using his medical marijuana which per wife provides substantial improvement.    Plan:  -Continue current diet   -CLD diet tomorrow and NPO after MN for EGD on Monday, if patient is discharged before that he should obtain an EGD as  outpatient  -Recommend PPI PO Qdaily as well as anti-emetics as needed in hopes of increasing PO intake            Thank you for your consult. I will follow-up with patient. Please contact us if you have any additional questions.    Chasity Crespo M.D.  Gastroenterology Fellow, PGY-IV  Pager: [phone]  [facility]    I was present with the fellow during the above evaluation, including history and exam.  I discussed the case with the fellow and agree with the findings and plan as documented in the fellow's note.

## 2017-09-10 PROBLEM — R31.9 HEMATURIA: Status: ACTIVE | Noted: 2017-09-10

## 2017-09-10 PROBLEM — D62 ACUTE BLOOD LOSS ANEMIA: Status: ACTIVE | Noted: 2017-09-10

## 2017-09-10 PROBLEM — N18.30 ACUTE RENAL FAILURE WITH ACUTE TUBULAR NECROSIS SUPERIMPOSED ON STAGE 3 CHRONIC KIDNEY DISEASE: Status: ACTIVE | Noted: 2017-09-10

## 2017-09-10 PROBLEM — N17.0 ACUTE RENAL FAILURE WITH ACUTE TUBULAR NECROSIS SUPERIMPOSED ON STAGE 3 CHRONIC KIDNEY DISEASE: Status: ACTIVE | Noted: 2017-09-10

## 2017-09-10 LAB
ALBUMIN SERPL BCP-MCNC: 2.2 G/DL
ALP SERPL-CCNC: 40 U/L
ALT SERPL W/O P-5'-P-CCNC: 6 U/L
ANION GAP SERPL CALC-SCNC: 5 MMOL/L
ANION GAP SERPL CALC-SCNC: 9 MMOL/L
ANISOCYTOSIS BLD QL SMEAR: SLIGHT
APTT BLDCRRT: 23.5 SEC
APTT BLDCRRT: 25.2 SEC
AST SERPL-CCNC: 11 U/L
BASOPHILS # BLD AUTO: 0.02 K/UL
BASOPHILS # BLD AUTO: 0.02 K/UL
BASOPHILS # BLD AUTO: 0.05 K/UL
BASOPHILS NFR BLD: 0.1 %
BASOPHILS NFR BLD: 0.4 %
BASOPHILS NFR BLD: 0.5 %
BILIRUB SERPL-MCNC: 1.3 MG/DL
BLD PROD TYP BPU: NORMAL
BLOOD UNIT EXPIRATION DATE: NORMAL
BLOOD UNIT TYPE CODE: 7300
BLOOD UNIT TYPE: NORMAL
BUN SERPL-MCNC: 20 MG/DL
BUN SERPL-MCNC: 22 MG/DL
CALCIUM SERPL-MCNC: 7.6 MG/DL
CALCIUM SERPL-MCNC: 7.8 MG/DL
CHLORIDE SERPL-SCNC: 110 MMOL/L
CHLORIDE SERPL-SCNC: 110 MMOL/L
CO2 SERPL-SCNC: 18 MMOL/L
CO2 SERPL-SCNC: 23 MMOL/L
CODING SYSTEM: NORMAL
CREAT SERPL-MCNC: 2.8 MG/DL
CREAT SERPL-MCNC: 3 MG/DL
DIFFERENTIAL METHOD: ABNORMAL
DISPENSE STATUS: NORMAL
EOSINOPHIL # BLD AUTO: 0.1 K/UL
EOSINOPHIL NFR BLD: 0.8 %
EOSINOPHIL NFR BLD: 0.9 %
EOSINOPHIL NFR BLD: 2.4 %
ERYTHROCYTE [DISTWIDTH] IN BLOOD BY AUTOMATED COUNT: 14.3 %
ERYTHROCYTE [DISTWIDTH] IN BLOOD BY AUTOMATED COUNT: 14.4 %
ERYTHROCYTE [DISTWIDTH] IN BLOOD BY AUTOMATED COUNT: 14.6 %
EST. GFR  (AFRICAN AMERICAN): 23.6 ML/MIN/1.73 M^2
EST. GFR  (AFRICAN AMERICAN): 25.6 ML/MIN/1.73 M^2
EST. GFR  (NON AFRICAN AMERICAN): 20.4 ML/MIN/1.73 M^2
EST. GFR  (NON AFRICAN AMERICAN): 22.2 ML/MIN/1.73 M^2
GLUCOSE SERPL-MCNC: 113 MG/DL
GLUCOSE SERPL-MCNC: 91 MG/DL
HCT VFR BLD AUTO: 19 %
HCT VFR BLD AUTO: 21.9 %
HCT VFR BLD AUTO: 27.6 %
HGB BLD-MCNC: 6.8 G/DL
HGB BLD-MCNC: 7.6 G/DL
HGB BLD-MCNC: 9.9 G/DL
HYPOCHROMIA BLD QL SMEAR: ABNORMAL
INR PPP: 1.2
INR PPP: 1.2
LYMPHOCYTES # BLD AUTO: 0.7 K/UL
LYMPHOCYTES # BLD AUTO: 0.9 K/UL
LYMPHOCYTES # BLD AUTO: 1 K/UL
LYMPHOCYTES NFR BLD: 17 %
LYMPHOCYTES NFR BLD: 6.8 %
LYMPHOCYTES NFR BLD: 7 %
MAGNESIUM SERPL-MCNC: 1.1 MG/DL
MCH RBC QN AUTO: 28.9 PG
MCH RBC QN AUTO: 29.4 PG
MCH RBC QN AUTO: 30.1 PG
MCHC RBC AUTO-ENTMCNC: 34.7 G/DL
MCHC RBC AUTO-ENTMCNC: 35.8 G/DL
MCHC RBC AUTO-ENTMCNC: 35.9 G/DL
MCV RBC AUTO: 82 FL
MCV RBC AUTO: 83 FL
MCV RBC AUTO: 84 FL
MONOCYTES # BLD AUTO: 0.3 K/UL
MONOCYTES # BLD AUTO: 0.4 K/UL
MONOCYTES # BLD AUTO: 0.6 K/UL
MONOCYTES NFR BLD: 3.2 %
MONOCYTES NFR BLD: 4.2 %
MONOCYTES NFR BLD: 6.7 %
NEUTROPHILS # BLD AUTO: 12.4 K/UL
NEUTROPHILS # BLD AUTO: 4 K/UL
NEUTROPHILS # BLD AUTO: 9.3 K/UL
NEUTROPHILS NFR BLD: 73 %
NEUTROPHILS NFR BLD: 87.9 %
NEUTROPHILS NFR BLD: 88 %
OVALOCYTES BLD QL SMEAR: ABNORMAL
PLATELET # BLD AUTO: 129 K/UL
PLATELET # BLD AUTO: 130 K/UL
PLATELET # BLD AUTO: 147 K/UL
PMV BLD AUTO: 8.5 FL
PMV BLD AUTO: 8.6 FL
PMV BLD AUTO: 8.8 FL
POIKILOCYTOSIS BLD QL SMEAR: SLIGHT
POLYCHROMASIA BLD QL SMEAR: ABNORMAL
POTASSIUM SERPL-SCNC: 3.3 MMOL/L
POTASSIUM SERPL-SCNC: 4.3 MMOL/L
PROT SERPL-MCNC: 4.4 G/DL
PROTHROMBIN TIME: 12.4 SEC
PROTHROMBIN TIME: 12.7 SEC
RBC # BLD AUTO: 2.26 M/UL
RBC # BLD AUTO: 2.63 M/UL
RBC # BLD AUTO: 3.37 M/UL
SODIUM SERPL-SCNC: 137 MMOL/L
SODIUM SERPL-SCNC: 138 MMOL/L
TRANS ERYTHROCYTES VOL PATIENT: NORMAL ML
WBC # BLD AUTO: 10.54 K/UL
WBC # BLD AUTO: 14.17 K/UL
WBC # BLD AUTO: 5.53 K/UL

## 2017-09-10 PROCEDURE — 83735 ASSAY OF MAGNESIUM: CPT

## 2017-09-10 PROCEDURE — 25500020 PHARM REV CODE 255: Performed by: UROLOGY

## 2017-09-10 PROCEDURE — 04L53DZ OCCLUSION OF SUPERIOR MESENTERIC ARTERY WITH INTRALUMINAL DEVICE, PERCUTANEOUS APPROACH: ICD-10-PCS | Performed by: RADIOLOGY

## 2017-09-10 PROCEDURE — 80048 BASIC METABOLIC PNL TOTAL CA: CPT

## 2017-09-10 PROCEDURE — A4216 STERILE WATER/SALINE, 10 ML: HCPCS | Performed by: STUDENT IN AN ORGANIZED HEALTH CARE EDUCATION/TRAINING PROGRAM

## 2017-09-10 PROCEDURE — 25000003 PHARM REV CODE 250: Performed by: ANESTHESIOLOGY

## 2017-09-10 PROCEDURE — 99233 SBSQ HOSP IP/OBS HIGH 50: CPT | Mod: ,,, | Performed by: ANESTHESIOLOGY

## 2017-09-10 PROCEDURE — 25000003 PHARM REV CODE 250: Performed by: STUDENT IN AN ORGANIZED HEALTH CARE EDUCATION/TRAINING PROGRAM

## 2017-09-10 PROCEDURE — 99024 POSTOP FOLLOW-UP VISIT: CPT | Mod: ,,, | Performed by: UROLOGY

## 2017-09-10 PROCEDURE — 63600175 PHARM REV CODE 636 W HCPCS: Performed by: STUDENT IN AN ORGANIZED HEALTH CARE EDUCATION/TRAINING PROGRAM

## 2017-09-10 PROCEDURE — 85730 THROMBOPLASTIN TIME PARTIAL: CPT

## 2017-09-10 PROCEDURE — 20000000 HC ICU ROOM

## 2017-09-10 PROCEDURE — 80053 COMPREHEN METABOLIC PANEL: CPT

## 2017-09-10 PROCEDURE — 85025 COMPLETE CBC W/AUTO DIFF WBC: CPT | Mod: 91

## 2017-09-10 PROCEDURE — 63600175 PHARM REV CODE 636 W HCPCS: Performed by: ANESTHESIOLOGY

## 2017-09-10 PROCEDURE — 85730 THROMBOPLASTIN TIME PARTIAL: CPT | Mod: 91

## 2017-09-10 PROCEDURE — 85610 PROTHROMBIN TIME: CPT

## 2017-09-10 PROCEDURE — 85610 PROTHROMBIN TIME: CPT | Mod: 91

## 2017-09-10 PROCEDURE — P9021 RED BLOOD CELLS UNIT: HCPCS

## 2017-09-10 PROCEDURE — 36430 TRANSFUSION BLD/BLD COMPNT: CPT

## 2017-09-10 PROCEDURE — A4216 STERILE WATER/SALINE, 10 ML: HCPCS | Performed by: ANESTHESIOLOGY

## 2017-09-10 RX ORDER — OXYCODONE HYDROCHLORIDE 5 MG/1
10 TABLET ORAL EVERY 4 HOURS PRN
Status: DISCONTINUED | OUTPATIENT
Start: 2017-09-10 | End: 2017-09-10

## 2017-09-10 RX ORDER — SODIUM CHLORIDE 0.9 % (FLUSH) 0.9 %
3 SYRINGE (ML) INJECTION EVERY 8 HOURS
Status: DISCONTINUED | OUTPATIENT
Start: 2017-09-10 | End: 2017-09-10

## 2017-09-10 RX ORDER — LABETALOL HYDROCHLORIDE 5 MG/ML
10 INJECTION, SOLUTION INTRAVENOUS EVERY 4 HOURS PRN
Status: DISCONTINUED | OUTPATIENT
Start: 2017-09-11 | End: 2017-09-14 | Stop reason: HOSPADM

## 2017-09-10 RX ORDER — MAGNESIUM SULFATE HEPTAHYDRATE 40 MG/ML
2 INJECTION, SOLUTION INTRAVENOUS
Status: DISCONTINUED | OUTPATIENT
Start: 2017-09-10 | End: 2017-09-13

## 2017-09-10 RX ORDER — POTASSIUM CHLORIDE 20 MEQ/1
60 TABLET, EXTENDED RELEASE ORAL ONCE
Status: COMPLETED | OUTPATIENT
Start: 2017-09-10 | End: 2017-09-10

## 2017-09-10 RX ORDER — HYDROCODONE BITARTRATE AND ACETAMINOPHEN 500; 5 MG/1; MG/1
TABLET ORAL
Status: DISCONTINUED | OUTPATIENT
Start: 2017-09-10 | End: 2017-09-14 | Stop reason: HOSPADM

## 2017-09-10 RX ORDER — BISACODYL 10 MG
10 SUPPOSITORY, RECTAL RECTAL DAILY PRN
Status: DISCONTINUED | OUTPATIENT
Start: 2017-09-10 | End: 2017-09-14 | Stop reason: HOSPADM

## 2017-09-10 RX ORDER — POTASSIUM CHLORIDE 7.45 MG/ML
80 INJECTION INTRAVENOUS
Status: DISCONTINUED | OUTPATIENT
Start: 2017-09-10 | End: 2017-09-13

## 2017-09-10 RX ORDER — ACETAMINOPHEN 325 MG/1
650 TABLET ORAL EVERY 6 HOURS PRN
Status: DISCONTINUED | OUTPATIENT
Start: 2017-09-10 | End: 2017-09-14 | Stop reason: HOSPADM

## 2017-09-10 RX ORDER — POTASSIUM CHLORIDE 7.45 MG/ML
60 INJECTION INTRAVENOUS
Status: DISCONTINUED | OUTPATIENT
Start: 2017-09-10 | End: 2017-09-13

## 2017-09-10 RX ORDER — MIDAZOLAM HYDROCHLORIDE 1 MG/ML
INJECTION, SOLUTION INTRAMUSCULAR; INTRAVENOUS CODE/TRAUMA/SEDATION MEDICATION
Status: COMPLETED | OUTPATIENT
Start: 2017-09-10 | End: 2017-09-10

## 2017-09-10 RX ORDER — OXYCODONE HYDROCHLORIDE 5 MG/1
5 TABLET ORAL EVERY 4 HOURS PRN
Status: DISCONTINUED | OUTPATIENT
Start: 2017-09-10 | End: 2017-09-10

## 2017-09-10 RX ORDER — POTASSIUM CHLORIDE 7.45 MG/ML
40 INJECTION INTRAVENOUS
Status: DISCONTINUED | OUTPATIENT
Start: 2017-09-10 | End: 2017-09-13

## 2017-09-10 RX ORDER — SODIUM CHLORIDE 9 MG/ML
INJECTION, SOLUTION INTRAVENOUS CONTINUOUS
Status: DISCONTINUED | OUTPATIENT
Start: 2017-09-10 | End: 2017-09-14 | Stop reason: HOSPADM

## 2017-09-10 RX ORDER — MAGNESIUM SULFATE HEPTAHYDRATE 40 MG/ML
4 INJECTION, SOLUTION INTRAVENOUS
Status: DISCONTINUED | OUTPATIENT
Start: 2017-09-10 | End: 2017-09-13

## 2017-09-10 RX ORDER — ONDANSETRON 2 MG/ML
4 INJECTION INTRAMUSCULAR; INTRAVENOUS EVERY 12 HOURS PRN
Status: DISCONTINUED | OUTPATIENT
Start: 2017-09-10 | End: 2017-09-14 | Stop reason: HOSPADM

## 2017-09-10 RX ORDER — AMOXICILLIN 250 MG
1 CAPSULE ORAL 2 TIMES DAILY
Status: DISCONTINUED | OUTPATIENT
Start: 2017-09-10 | End: 2017-09-14 | Stop reason: HOSPADM

## 2017-09-10 RX ORDER — ACETAMINOPHEN 325 MG/1
650 TABLET ORAL EVERY 8 HOURS PRN
Status: DISCONTINUED | OUTPATIENT
Start: 2017-09-10 | End: 2017-09-14 | Stop reason: HOSPADM

## 2017-09-10 RX ORDER — PANTOPRAZOLE SODIUM 40 MG/1
40 TABLET, DELAYED RELEASE ORAL DAILY
Status: CANCELLED | OUTPATIENT
Start: 2017-09-11

## 2017-09-10 RX ORDER — METOCLOPRAMIDE HYDROCHLORIDE 5 MG/ML
5 INJECTION INTRAMUSCULAR; INTRAVENOUS EVERY 6 HOURS PRN
Status: DISCONTINUED | OUTPATIENT
Start: 2017-09-10 | End: 2017-09-10

## 2017-09-10 RX ORDER — SODIUM CHLORIDE, SODIUM LACTATE, POTASSIUM CHLORIDE, CALCIUM CHLORIDE 600; 310; 30; 20 MG/100ML; MG/100ML; MG/100ML; MG/100ML
INJECTION, SOLUTION INTRAVENOUS CONTINUOUS
Status: DISCONTINUED | OUTPATIENT
Start: 2017-09-10 | End: 2017-09-10

## 2017-09-10 RX ORDER — POLYETHYLENE GLYCOL 3350 17 G/17G
17 POWDER, FOR SOLUTION ORAL DAILY
Status: DISCONTINUED | OUTPATIENT
Start: 2017-09-11 | End: 2017-09-10

## 2017-09-10 RX ADMIN — CEFTRIAXONE 2 G: 2 INJECTION, SOLUTION INTRAVENOUS at 12:09

## 2017-09-10 RX ADMIN — ONDANSETRON 4 MG: 2 INJECTION INTRAMUSCULAR; INTRAVENOUS at 06:09

## 2017-09-10 RX ADMIN — SODIUM CHLORIDE 1000 ML: 0.9 INJECTION, SOLUTION INTRAVENOUS at 02:09

## 2017-09-10 RX ADMIN — TRAZODONE HYDROCHLORIDE 50 MG: 50 TABLET ORAL at 09:09

## 2017-09-10 RX ADMIN — Medication 3 ML: at 10:09

## 2017-09-10 RX ADMIN — CARVEDILOL 25 MG: 25 TABLET, FILM COATED ORAL at 08:09

## 2017-09-10 RX ADMIN — POLYETHYLENE GLYCOL 3350 17 G: 17 POWDER, FOR SOLUTION ORAL at 08:09

## 2017-09-10 RX ADMIN — SODIUM CHLORIDE, SODIUM LACTATE, POTASSIUM CHLORIDE, AND CALCIUM CHLORIDE: .6; .31; .03; .02 INJECTION, SOLUTION INTRAVENOUS at 08:09

## 2017-09-10 RX ADMIN — STANDARDIZED SENNA CONCENTRATE AND DOCUSATE SODIUM 1 TABLET: 8.6; 5 TABLET, FILM COATED ORAL at 11:09

## 2017-09-10 RX ADMIN — OXYCODONE HYDROCHLORIDE AND ACETAMINOPHEN 1 TABLET: 5; 325 TABLET ORAL at 06:09

## 2017-09-10 RX ADMIN — MIDAZOLAM HYDROCHLORIDE 2 MG: 1 INJECTION, SOLUTION INTRAMUSCULAR; INTRAVENOUS at 04:09

## 2017-09-10 RX ADMIN — POTASSIUM CHLORIDE 60 MEQ: 1500 TABLET, EXTENDED RELEASE ORAL at 08:09

## 2017-09-10 RX ADMIN — CEFTRIAXONE 2 G: 2 INJECTION, SOLUTION INTRAVENOUS at 03:09

## 2017-09-10 RX ADMIN — PANTOPRAZOLE SODIUM 40 MG: 40 TABLET, DELAYED RELEASE ORAL at 08:09

## 2017-09-10 RX ADMIN — MIRTAZAPINE 7.5 MG: 7.5 TABLET ORAL at 09:09

## 2017-09-10 RX ADMIN — IOHEXOL 100 ML: 350 INJECTION, SOLUTION INTRAVENOUS at 01:09

## 2017-09-10 RX ADMIN — AMPICILLIN SODIUM 2 G: 2 INJECTION, POWDER, FOR SOLUTION INTRAMUSCULAR; INTRAVENOUS at 12:09

## 2017-09-10 RX ADMIN — SODIUM CHLORIDE, PRESERVATIVE FREE 3 ML: 5 INJECTION INTRAVENOUS at 10:09

## 2017-09-10 RX ADMIN — SODIUM CHLORIDE: 0.9 INJECTION, SOLUTION INTRAVENOUS at 11:09

## 2017-09-10 RX ADMIN — PROMETHAZINE HYDROCHLORIDE 6.25 MG: 25 INJECTION INTRAMUSCULAR; INTRAVENOUS at 07:09

## 2017-09-10 RX ADMIN — CARVEDILOL 25 MG: 25 TABLET, FILM COATED ORAL at 09:09

## 2017-09-10 RX ADMIN — OXYCODONE HYDROCHLORIDE AND ACETAMINOPHEN 1 TABLET: 10; 325 TABLET ORAL at 10:09

## 2017-09-10 RX ADMIN — AMPICILLIN SODIUM 2 G: 2 INJECTION, POWDER, FOR SOLUTION INTRAMUSCULAR; INTRAVENOUS at 08:09

## 2017-09-10 RX ADMIN — AMPICILLIN SODIUM 2 G: 2 INJECTION, POWDER, FOR SOLUTION INTRAMUSCULAR; INTRAVENOUS at 06:09

## 2017-09-10 RX ADMIN — IOHEXOL 105 ML: 300 INJECTION, SOLUTION INTRAVENOUS at 05:09

## 2017-09-10 RX ADMIN — SODIUM CHLORIDE, SODIUM LACTATE, POTASSIUM CHLORIDE, AND CALCIUM CHLORIDE: .6; .31; .03; .02 INJECTION, SOLUTION INTRAVENOUS at 03:09

## 2017-09-10 NOTE — PROGRESS NOTES
IR arterial bleed embolization complete. Pt tolerated well. VSS. NAD noted. Dressing to right groin CDI. HOB may be elevated at 1945. Will call report to floor nurse.

## 2017-09-10 NOTE — PROGRESS NOTES
Pt arrived to room 189 for IR arterial bleed embolization. Pt identified using two pt identifiers. Allergies reviewed. NAD noted.GABRIELA.

## 2017-09-10 NOTE — SIGNIFICANT EVENT
In the setting of concern for a life threatening bleed which may require vascular intervention, a CT with contrast is felt necessary to adequately plan a possible intervention. This was discussed with the urology team who is in agreement with contrast administration, and the risks of CAROLA are felt necessary in this patient with a potentially life threatening bleed.

## 2017-09-10 NOTE — SUBJECTIVE & OBJECTIVE
Interval History:   No acute events overnight  Denies pain  Nausea improved with scopolamine patch  Began having profuse bleeding from stoma again this am    Review of Systems  Objective:     Temp:  [98.2 °F (36.8 °C)-99 °F (37.2 °C)] 98.2 °F (36.8 °C)  Pulse:  [50-79] 52  Resp:  [10-20] 20  SpO2:  [96 %-100 %] 100 %  BP: (129-179)/(62-81) 179/81     Body mass index is 16.69 kg/m².            Drains     Drain                 Ureteral Drain/Stent -- days         Nephrostomy 05/05/17 1503 Right 8 Fr. 127 days         Nephrostomy 05/05/17 1504 Left 8 Fr. 127 days         Closed/Suction Drain 06/21/17 1221 Abdomen Bulb 19 Fr. 80 days         Ureteral Drain/Stent 06/21/17 1053 Left ureter 6 Fr. 80 days         Ureteral Drain/Stent 06/21/17 1054 Right ureter 6 Fr. 80 days         Urostomy 08/02/17 2345 ileal conduit RUQ 38 days         Urostomy 09/07/17 1522  RLQ 2 days                Physical Exam   Vitals reviewed.  Constitutional: He is oriented to person, place, and time. No distress.   Ampicillin pump from home at bedside   HENT:   Head: Normocephalic and atraumatic.   Eyes: Pupils are equal, round, and reactive to light. No scleral icterus.   Neck: No JVD present.   Cardiovascular: Normal rate and regular rhythm.    Pulmonary/Chest: Effort normal. No respiratory distress.   Abdominal: Soft. He exhibits no distension. There is no tenderness. There is no rebound and no guarding.   Urostomy pink and patent, draining dark red  14 Fr bolanos in place   Musculoskeletal: He exhibits no edema.   Neurological: He is alert and oriented to person, place, and time.   Skin: Skin is dry. He is not diaphoretic. There is pallor.     Psychiatric: He has a normal mood and affect. His behavior is normal.       Significant Labs:    BMP:    Recent Labs  Lab 09/08/17  1251 09/09/17  0329 09/10/17  0700    137 138   K 4.5 3.2* 3.3*    107 110   CO2 21* 22* 23   BUN 23 23 20   CREATININE 3.1* 3.0* 3.0*   CALCIUM 8.1* 7.7* 7.8*        CBC:     Recent Labs  Lab 09/08/17  0349 09/09/17  0329 09/10/17  0700   WBC 8.09 5.52 5.53   HGB 9.0* 7.4* 7.6*   HCT 25.8* 20.9* 21.9*   * 116* 147*       All pertinent labs results from the past 24 hours have been reviewed.    Significant Imaging:  All pertinent imaging results/findings from the past 24 hours have been reviewed.

## 2017-09-10 NOTE — PROGRESS NOTES
Second unit of blood transfusion initiated per protocol. Vss. Awaiting transfer to IR. Will monitor.

## 2017-09-10 NOTE — PROGRESS NOTES
"[facility]  Psychiatry  Progress Note    Patient Name: Juan Manuel Koehler  MRN: [id number]   Code Status: Full Code  Admission Date: 9/7/2017  Hospital Length of Stay: 2 days  Expected Discharge Date: 9/11/2017  Attending Physician: Villa Robles MD  Primary Care Provider: Hemant Sweeney MD    Current Legal Status: N/A    Patient information was obtained from patient and past medical records.     Subjective:     Principal Problem:<principal problem not specified>    HPI: Patient is a 66 yo man with PMH of invasinvasive bladder cancer s/p open radical cystoprostatectomy, CKD, CVA, endocarditis currently on IV abx who was admitted on 9/6/17 for bleeding from urostomy site. Psychiatry was consulted to address possible underlying depression. The patient report his wife asked that psychiatry be consulted but he does not know why. He states he is not currently depressed, has not been irritable, and has a "good" mood. Denies crying spells and says on average his mood is an 8/10. Denies feelings of hopelessness, worthlessness, or guilt. Denies history of depressive episodes in the past. States he has been getting along well at home with his family. Denies history of stacy, Si, Hi, AVH, paranoia. Does endorse poor sleep about 4 hours per night since April 2017 after surgery. 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 Reports he was prescribed trazodone in the past for poor sleep but it didn't help. Does endorse some anxiety and worry that comes and goes during the day about his job. Denies anhedonia, poor concentration. Reports poor appetite due to his physical illness. Denies prior history of seeing a psychiatrist, psychiatric hospitalizations, suicide attempts, and past trauma.  Denies substance abuse and family history of mental illness. He reports he " "does not have access to guns.    Hospital Course: No notes on file    Interval History:   Pt refused scheduled Remeron last night. When questioned about this pt states that he "does not know" why he refused it. He states that he will agree to take it tonight. He has no complaints today. Not very interested in speaking with me.     Psychotherapeutics     Start     Stop Route Frequency Ordered    09/08/17 2100  mirtazapine tablet 7.5 mg      -- Oral Nightly 09/08/17 1557    09/07/17 2100  trazodone tablet 50 mg      -- Oral Nightly 09/07/17 1609           Review of Systems   Psychiatric/Behavioral: Positive for dysphoric mood and sleep disturbance. Negative for agitation, behavioral problems, confusion, decreased concentration, hallucinations, self-injury and suicidal ideas. The patient is not nervous/anxious and is not hyperactive.      Objective:     Vital Signs (Most Recent):  Temp: 99 °F (37.2 °C) (09/09/17 2006)  Pulse: 79 (09/09/17 2006)  Resp: 16 (09/09/17 2006)  BP: (!) 149/70 (09/09/17 2006)  SpO2: 96 % (09/09/17 2006) Vital Signs (24h Range):  Temp:  [96.8 °F (36 °C)-99.1 °F (37.3 °C)] 99 °F (37.2 °C)  Pulse:  [56-79] 79  Resp:  [16-18] 16  SpO2:  [96 %-100 %] 96 %  BP: (129-165)/(62-71) 149/70     Height: 6' 2" (188 cm)  Weight: 59 kg (130 lb)  Body mass index is 16.69 kg/m².      Intake/Output Summary (Last 24 hours) at 09/09/17 2149  Last data filed at 09/09/17 1814   Gross per 24 hour   Intake             3770 ml   Output             2200 ml   Net             1570 ml       Physical Exam   Psychiatric:   Mental Status Exam:  Appearance: no apparent distress, poor hygiene and grooming, lying in bed  Behavior/Cooperation: guarded  Speech: monotonous  Mood: appears depressed  Affect: constricted  Thought Process: goal-directed  Thought Content: denies SI/HI/AVH  Sensorium: alert, awake   Orientation: person, place, situation  Memory: grossly intact  Attention/Concentration: able to attend to brief " interview  Fund of knowledge: intact and appropriate to age and level of education   Insight: limited  Judgement: fair     Nursing note and vitals reviewed.       Significant Labs:   Last 24 Hours:   Recent Lab Results       09/09/17  0531 09/09/17  0329      Anion Gap  8     Appearance, UA Hazy(A)      Bacteria, UA Occasional      Baso #  0.02     Basophil%  0.4     Bilirubin (UA) Negative      BUN, Bld  23     Calcium  7.7(L)     Chloride  107     CO2  22(L)     Color, UA Yellow      Creatinine  3.0(H)     Differential Method  Automated     eGFR if   23.6(A)     eGFR if non   20.4  Comment:  Calculation used to obtain the estimated glomerular filtration  rate (eGFR) is the CKD-EPI equation. Since race is unknown   in our information system, the eGFR values for   -American and Non--American patients are given   for each creatinine result.  (A)     Eos #  0.1     Eosinophil%  1.8     Glucose  92     Glucose, UA Negative      Gran #  4.1     Gran%  74.5(H)     Hematocrit  20.9(L)     Hemoglobin  7.4(L)     Ketones, UA Negative      Leukocytes, UA 2+(A)      Lymph #  0.9(L)     Lymph%  15.4(L)     MCH  29.5     MCHC  35.4     MCV  83     Microscopic Comment SEE COMMENT  Comment:  Other formed elements not mentioned in the report are not   present in the microscopic examination.         Mono #  0.4     Mono%  7.4     MPV  9.0(L)     Nitrite, UA Negative      Occult Blood UA 3+(A)      pH, UA 7.0      Platelets  116(L)     Potassium  3.2(L)     Protein, UA Negative  Comment:  Recommend a 24 hour urine protein or a urine   protein/creatinine ratio if globulin induced proteinuria is  clinically suspected.        RBC  2.51(L)     RBC, UA >100(H)      RDW  14.7(H)     Sodium  137     Specific Gravity, UA 1.010      Specimen UA Urine, Catheterized      Urobilinogen, UA Negative      WBC, UA 27(H)      WBC  5.52     Yeast, UA Occasional(A)            Significant Imaging: I have  reviewed all pertinent imaging results/findings within the past 24 hours.    Assessment/Plan:     Adjustment disorder with anxiety    - Complaints of poor sleep 2/2 anxiety related to medical diagnosis and condition  - Continue Remeron 7.5 mg PO qhs              Need for Continued Hospitalization:   No need for inpatient psychiatric hospitalization. Continue medical care as per the primary team.    Anticipated Disposition: Admitted as an Inpatient    Lillian Velasco MD  [facility]"" Psychiatry, PGY-2  09/09/2017 9:54 PM

## 2017-09-10 NOTE — H&P
History & Physical  Surgical Intensive Care    SUBJECTIVE:     Chief Complaint/Reason for Admission: Arterial hemorrhage s/p embolization    History of Present Illness:  Patient is a 67 y.o. 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 He presented to the ED on 9/7/17 with severe bleeding from his urostomy. A loopogram and looposcopy was performed which showed no obvious extravasation, bleeding was found to be coming from the right ureter. A bolanos catheter with balloon filled with 5cc water was placed through the urostomy and he was admitted to the floor. Received 6 units of blood since his stay here. CT scan on 9/7 negative for active bleeding. Patient began having profuse bleeding from stoma again the morning of 9/10. He was found to have arterial hemorrhage from ileal conduit and taken to IR for embolization. He arrived to ICU extubated, HDS off pressor support.    PTA Medications   Medication Sig    AMPICILLIN SODIUM (AMPICILLIN 2 G/100 ML NS, READY TO MIX SYSTEM,) Inject 100 mLs (2 g total) into the vein every 6 (six) hours.    aspirin (ECOTRIN) 81 MG EC tablet Take 1 tablet (81 mg total) by mouth once daily.    carvedilol (COREG) 25 MG tablet Take 1 tablet (25 mg total) by mouth 2 (two) times daily.    cefTRIAXone 2 g in dextrose 5 % 50 mL (ROCEPHIN) 2 g/50 mL PgBk IVPB Inject 50 mLs (2 g total) into the vein every 12 (twelve) hours.    dronabinol (MARINOL) 5 MG capsule Take 1 capsule (5 mg total) by mouth 3 (three) times daily before meals.    ondansetron (ZOFRAN-ODT) 8 MG TbDL Take 1 tablet (8 mg total) by mouth every 6 (six) hours as needed (nausea).    pantoprazole (PROTONIX) 40 MG  tablet Take 1 tablet (40 mg total) by mouth once daily.    trazodone (DESYREL) 50 MG tablet Take 1 tablet (50 mg total) by mouth every evening.    scopolamine (TRANSDERM-SCOP) 1.3-1.5 mg (1 mg over 3 days) Place 1 patch onto the skin Every 3 (three) days.       Review of patient's allergies indicates:  No Known Allergies    Past Medical History:   Diagnosis Date    Cancer     bladder and throat    CKD (chronic kidney disease) stage 3, GFR 30-59 ml/min     Embolic stroke involving right cerebellar artery 8/4/2017    Urinary tract infection      Past Surgical History:   Procedure Laterality Date    BLADDER SURGERY      CYSTOSCOPY      HERNIA REPAIR      Ileal Conduit      THROAT SURGERY       Family History   Problem Relation Age of Onset    No Known Problems Father     Kidney disease Mother     Prostate cancer Neg Hx      Social History   Substance Use Topics    Smoking status: Never Smoker    Smokeless tobacco: Never Used    Alcohol use No        Review of Systems:  Review of Systems   Constitutional: Negative for chills, diaphoresis and fever.   Respiratory: Negative for cough, sputum production and shortness of breath.    Cardiovascular: Negative for chest pain and leg swelling.   Gastrointestinal: Positive for constipation and nausea. Negative for abdominal pain, diarrhea and vomiting.   Skin: Negative for rash.         OBJECTIVE:     Vital Signs (Most Recent)  Temp: 98 °F (36.7 °C) (09/10/17 1535)  Pulse: 75 (09/10/17 1700)  Resp: 15 (09/10/17 1700)  BP: (!) 153/84 (09/10/17 1700)  SpO2: 100 % (09/10/17 1700)     Physical Exam   Constitutional: He is oriented to person, place, and time. He appears well-developed.   HENT:   Head: Normocephalic and atraumatic.   Cardiovascular: Normal rate, regular rhythm and normal heart sounds.    Pulmonary/Chest: Effort normal and breath sounds normal. No respiratory distress. He has no wheezes. He has no rales.   Abdominal: Soft. Bowel sounds are normal. He  exhibits no distension. There is no tenderness.   Urostomy RLQ   Neurological: He is alert and oriented to person, place, and time.   Skin: Skin is warm and dry.       Lines/Drains:       PICC Double Lumen 08/10/17 1155 right brachial (Active)   Site Assessment Intact;Clean 9/10/2017  8:09 AM   Lumen 1 Status Infusing 9/10/2017  8:09 AM   Lumen 2 Status Normal saline locked 9/9/2017  7:15 PM   Dressing Type Transparent 9/9/2017  7:15 PM   Dressing Status Biopatch in place 9/9/2017  7:15 PM   Dressing Change Due 09/22/17 9/9/2017  7:15 PM   Number of days: 31            Urostomy 09/07/17 1522  RLQ (Active)   Stoma Appearance pink 9/9/2017  7:15 PM   Stomal Appliance Intact 9/9/2017  7:15 PM   Stoma Function red urine 9/8/2017  7:57 PM   Output (mL) 250 mL 9/10/2017  3:19 PM   Number of days: 3       Laboratory  CBC:   Recent Labs  Lab 09/10/17  1303   WBC 14.17*   RBC 2.26*   HGB 6.8*   HCT 19.0*   *   MCV 84   MCH 30.1   MCHC 35.8     BMP:   Recent Labs  Lab 09/10/17  0700   GLU 91      K 3.3*      CO2 23   BUN 20   CREATININE 3.0*   CALCIUM 7.8*     CMP:   Recent Labs  Lab 09/07/17  0959  09/10/17  0700   *  < > 91   CALCIUM 8.3*  < > 7.8*   ALBUMIN 2.4*  --   --    PROT 5.2*  --   --      < > 138   K 4.0  < > 3.3*   CO2 23  < > 23     < > 110   BUN 21  < > 20   CREATININE 3.0*  < > 3.0*   ALKPHOS 48*  --   --    ALT 7*  --   --    AST 7*  --   --    BILITOT 0.6  --   --    < > = values in this interval not displayed.  Cardiac markers: No results for input(s): CKMB, CPKMB, TROPONINT, TROPONINI, MYOGLOBIN in the last 168 hours.  ABGs: No results for input(s): PH, PCO2, PO2, HCO3, POCSATURATED, BE in the last 168 hours.    Chest X-Ray: No results found in the last 24 hours.      ASSESSMENT/PLAN:       Plan:    Neuro:   - PO PRN percocet, tylenol    Pulmonary:   -extubated, maintaining sats on room air    Cardiac:  -MAP goal >65  -HDS not requiring pressors  -Home meds include  carvedilol, continue     Renal:   -urostomy in place  -monitor UOP   -Bun/Cr 20/3.0    Fluids/Electrolytes/Nutrition/GI:   -Nutritional status: NPO  -replace lytes PRN  -maintenance fluids NS@100ml/hr  -Bowel regimen: senna-docusate 1 tab BID, bisacodyl suppository 10mg daily PRN    Hematology/Oncology:  -H/H stable, receiving 3rd unit of blood today  -INR/Plts 1.2/130  -Anticoagulation: not indicated at this time due to active bleeding, will continue to monitor    Infectious Disease:   -Afebrile  -WBC elevated  -Abx: ampicillin, ceftriaxone for endocarditis    Endocrine:  -Glucose goal of 120-150  -SSI    Dispo:  -Continue care in the ICU setting per primary team    Brynn Blackman, PGY-1  General Surgery  [phone]  09/10/2017

## 2017-09-10 NOTE — PROGRESS NOTES
"[facility]  Urology  Progress Note    Patient Name: Juan Manuel Koehler  MRN: [id number]  Admission Date: 9/7/2017  Hospital Length of Stay: 3 days  Code Status: Full Code   Attending Provider: Villa Robles MD   Primary Care Physician: Hemant Sweeney MD    Subjective:     HPI:  Juan Manuel Koehler is a 67 y.o male w hx of invasive bladder cancer s/p open radical cystoprostatectomy, bilateral pelvic lymph node dissection, and creation of an ileal conduit on 6/21/17.     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 He represented to Dr. Boucher for hematuria and was found to have a large bladder tumor on his trigone. The tumor was also causing bilateral ureteral obstruction with resulting ELDER.     The patient underwent tumor resection and attempted JJ stent placement on 4/5/17 which demonstrated cT2 urothelial carcinoma (nested variant). Bilateral nephrostomy tubes were placed with improvement in the patient's renal function. The patient's eGFR fluctuated, and, ultimately, he could not receive neoadjuvant cisplatin-based chemotherapy due to his creatinine clearance.     Thus, the patient underwent surgery on 6/21/17 and did very well in the immediate post-operative period.     His pathology revealed urothelial carcinoma with nested variant into the perivesical fat. His lymph nodes and margins are negative. His final pathologic stage is [id number].     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 Has been slowly recovering. He was recently discharged from rehab and has been making progress.     He returned to clinic, after being seen by enterostomal therapy, with blood per stoma and leakage of urine after having some tissue next to the stoma excised. Urine may now be leaking through the skin. Overnight he has had 1500 cc of what his wife describes as "bloody" output. He " "became dizzy this AM and "crumpled" to the ground without hitting his head. He has not lost consciousness. He notes recent vomiting that started with IV Abx - currently on rocephin and ampicillin for treatment of vegetative endocarditis.    Interval History:   No acute events overnight  Denies pain  Nausea improved with scopolamine patch  Began having profuse bleeding from stoma again this am    Review of Systems  Objective:     Temp:  [98.2 °F (36.8 °C)-99 °F (37.2 °C)] 98.2 °F (36.8 °C)  Pulse:  [50-79] 52  Resp:  [10-20] 20  SpO2:  [96 %-100 %] 100 %  BP: (129-179)/(62-81) 179/81     Body mass index is 16.69 kg/m².            Drains     Drain                 Ureteral Drain/Stent -- days         Nephrostomy 05/05/17 1503 Right 8 Fr. 127 days         Nephrostomy 05/05/17 1504 Left 8 Fr. 127 days         Closed/Suction Drain 06/21/17 1221 Abdomen Bulb 19 Fr. 80 days         Ureteral Drain/Stent 06/21/17 1053 Left ureter 6 Fr. 80 days         Ureteral Drain/Stent 06/21/17 1054 Right ureter 6 Fr. 80 days         Urostomy 08/02/17 2345 ileal conduit RUQ 38 days         Urostomy 09/07/17 1522  RLQ 2 days                Physical Exam   Vitals reviewed.  Constitutional: He is oriented to person, place, and time. No distress.   Ampicillin pump from home at bedside   HENT:   Head: Normocephalic and atraumatic.   Eyes: Pupils are equal, round, and reactive to light. No scleral icterus.   Neck: No JVD present.   Cardiovascular: Normal rate and regular rhythm.    Pulmonary/Chest: Effort normal. No respiratory distress.   Abdominal: Soft. He exhibits no distension. There is no tenderness. There is no rebound and no guarding.   Urostomy pink and patent, draining dark red  14 Fr bolanos in place   Musculoskeletal: He exhibits no edema.   Neurological: He is alert and oriented to person, place, and time.   Skin: Skin is dry. He is not diaphoretic. There is pallor.     Psychiatric: He has a normal mood and affect. His behavior is " normal.       Significant Labs:    BMP:    Recent Labs  Lab 09/08/17  1251 09/09/17  0329 09/10/17  0700    137 138   K 4.5 3.2* 3.3*    107 110   CO2 21* 22* 23   BUN 23 23 20   CREATININE 3.1* 3.0* 3.0*   CALCIUM 8.1* 7.7* 7.8*       CBC:     Recent Labs  Lab 09/08/17  0349 09/09/17  0329 09/10/17  0700   WBC 8.09 5.52 5.53   HGB 9.0* 7.4* 7.6*   HCT 25.8* 20.9* 21.9*   * 116* 147*       All pertinent labs results from the past 24 hours have been reviewed.    Significant Imaging:  All pertinent imaging results/findings from the past 24 hours have been reviewed.      Assessment/Plan:     Urinary stoma complication    Juan Manuel Koehler is a 67 y.o. male with hx of invasive bladder cancer s/p open radical cystoprostatectomy, bilateral pelvic lymph node dissection, and creation of an ileal conduit on 6/21/17, now with blood per ostomy site and acute anemia.     - Clear liquid diet, NPO midnight  - EGD tomorrow with GI, appreciate recs  - IVF  - Ambulate/OOB  - KAMARI/SCDs  - Home meds restarted  - Will discuss need for contrasted study with nephrology  - Will speak with radiology about imagining studies to best localize the source of bleeding   - Transfuse 2 units PRBC today                VTE Risk Mitigation         Ordered     Place KAMARI hose  Until discontinued      09/07/17 1212     Place sequential compression device  Until discontinued      09/07/17 1044     Medium Risk of VTE  Once      09/07/17 1044          Kiley Hwang MD  Urology  [facility]

## 2017-09-10 NOTE — ASSESSMENT & PLAN NOTE
Juan Manuel Koehler is a 67 y.o. male with hx of invasive bladder cancer s/p open radical cystoprostatectomy, bilateral pelvic lymph node dissection, and creation of an ileal conduit on 6/21/17, now with blood per ostomy site and acute anemia.     - Clear liquid diet, NPO midnight  - EGD tomorrow with GI, appreciate recs  - IVF  - Ambulate/OOB  - KAMARI/SCDs  - Home meds restarted  - Will discuss need for contrasted study with nephrology  - Will speak with radiology about imagining studies to best localize the source of bleeding   - Transfuse 2 units PRBC today

## 2017-09-10 NOTE — ASSESSMENT & PLAN NOTE
- Complaints of poor sleep 2/2 anxiety related to medical diagnosis and condition  - Continue Remeron 7.5 mg PO qhs

## 2017-09-10 NOTE — PROCEDURES
Radiology Post-Procedure Note    Pre Op Diagnosis: urostomy stoma bleed  Post Op Diagnosis: Same    Procedure: embolization    Procedure performed by: Kenny Kong MD    Written Informed Consent Obtained: Yes  Specimen Removed: NO  Estimated Blood Loss: Minimal    Findings:   Successful coil embolization of SMA branches supplying the bleeding stoma.    Patient tolerated procedure well.    @SIG@

## 2017-09-10 NOTE — H&P
Inpatient Radiology Pre-procedure Note    History of Present Illness:  Juan Manuel Koehler is a 67 y.o. male who presents for embolization of arterial hemorrhage from ileal conduit. Case discussed with staff urologist and staff interventional radiologist.    Admission H&P reviewed.  Past Medical History:   Diagnosis Date    Cancer     bladder and throat    CKD (chronic kidney disease) stage 3, GFR 30-59 ml/min     Embolic stroke involving right cerebellar artery 8/4/2017    Urinary tract infection      Past Surgical History:   Procedure Laterality Date    BLADDER SURGERY      CYSTOSCOPY      HERNIA REPAIR      Ileal Conduit      THROAT SURGERY         Review of Systems:   As documented in primary team H&P    Home Meds:   Prior to Admission medications    Medication Sig Start Date End Date Taking? Authorizing Provider   AMPICILLIN SODIUM (AMPICILLIN 2 G/100 ML NS, READY TO MIX SYSTEM,) Inject 100 mLs (2 g total) into the vein every 6 (six) hours. 8/23/17 9/16/17 Yes Verónica Lewis MD   aspirin (ECOTRIN) 81 MG EC tablet Take 1 tablet (81 mg total) by mouth once daily. 8/23/17 8/23/18 Yes Verónica Lewis MD   carvedilol (COREG) 25 MG tablet Take 1 tablet (25 mg total) by mouth 2 (two) times daily. 8/23/17 8/23/18 Yes Verónica Lewis MD   cefTRIAXone 2 g in dextrose 5 % 50 mL (ROCEPHIN) 2 g/50 mL PgBk IVPB Inject 50 mLs (2 g total) into the vein every 12 (twelve) hours. 8/23/17 9/16/17 Yes Verónica Lewis MD   dronabinol (MARINOL) 5 MG capsule Take 1 capsule (5 mg total) by mouth 3 (three) times daily before meals. 8/23/17 9/22/17 Yes Verónica Lewis MD   ondansetron (ZOFRAN-ODT) 8 MG TbDL Take 1 tablet (8 mg total) by mouth every 6 (six) hours as needed (nausea). 7/6/17  Yes Fabian Merida MD   pantoprazole (PROTONIX) 40 MG tablet Take 1 tablet (40 mg total) by mouth once daily. 8/23/17 8/23/18 Yes Verónica Lewis MD   trazodone (DESYREL) 50 MG tablet Take 1 tablet (50 mg total) by mouth every evening. 8/23/17 8/23/18  Yes Verónica Lewis MD   scopolamine (TRANSDERM-SCOP) 1.3-1.5 mg (1 mg over 3 days) Place 1 patch onto the skin Every 3 (three) days. 8/23/17 9/22/17  Verónica Lewis MD     Scheduled Meds:    ampicillin IVPB  2 g Intravenous Q6H    carvedilol  25 mg Oral BID    cefTRIAXone (ROCEPHIN) IVPB  2 g Intravenous Q12H    mirtazapine  7.5 mg Oral QHS    pantoprazole  40 mg Oral Daily    polyethylene glycol  17 g Oral Daily    scopolamine  1 patch Transdermal Q3 Days    sodium chloride 0.9%  3 mL Intravenous Q8H    trazodone  50 mg Oral QHS     Continuous Infusions:    lactated Ringers 125 mL/hr at 09/10/17 0842     PRN Meds:sodium chloride, sodium chloride, omnipaque 350 iohexol, ondansetron, oxycodone-acetaminophen, oxycodone-acetaminophen, promethazine (PHENERGAN) IVPB, sodium chloride 0.9%  Anticoagulants/Antiplatelets: aspirin    Allergies: Review of patient's allergies indicates:  No Known Allergies  Sedation Hx: have not been any systemic reactions    Labs:    Recent Labs  Lab 09/10/17  1151   INR 1.2       Recent Labs  Lab 09/10/17  1303   WBC 14.17*   HGB 6.8*   HCT 19.0*   MCV 84   *      Recent Labs  Lab 09/07/17  0959  09/10/17  0700   *  < > 91     < > 138   K 4.0  < > 3.3*     < > 110   CO2 23  < > 23   BUN 21  < > 20   CREATININE 3.0*  < > 3.0*   CALCIUM 8.3*  < > 7.8*   ALT 7*  --   --    AST 7*  --   --    ALBUMIN 2.4*  --   --    BILITOT 0.6  --   --    < > = values in this interval not displayed.      Vitals:  Temp: 96.9 °F (36.1 °C) (09/10/17 1514)  Pulse: 67 (09/10/17 1514)  Resp: 18 (09/10/17 1514)  BP: (!) 139/93 (09/10/17 1514)  SpO2: 100 % (09/10/17 1514)     Physical Exam:  ASA: 3  Mallampati: 2    General: no acute distress  Mental Status: alert and oriented to person, place and time  HEENT: normocephalic, atraumatic  Chest: unlabored breathing  Heart: regular heart rate  Abdomen: nondistended  Extremity: moves all extremities    Plan: IR arterial bleed  embolization  Sedation Plan: moderate    Eric Mills MD  Radiology

## 2017-09-10 NOTE — SUBJECTIVE & OBJECTIVE
"Interval History:   Pt refused scheduled Remeron last night. When questioned about this pt states that he "does not know" why he refused it. He states that he will agree to take it tonight. He has no complaints today. Not very interested in speaking with me.     Psychotherapeutics     Start     Stop Route Frequency Ordered    09/08/17 2100  mirtazapine tablet 7.5 mg      -- Oral Nightly 09/08/17 1557    09/07/17 2100  trazodone tablet 50 mg      -- Oral Nightly 09/07/17 1609           Review of Systems   Psychiatric/Behavioral: Positive for dysphoric mood and sleep disturbance. Negative for agitation, behavioral problems, confusion, decreased concentration, hallucinations, self-injury and suicidal ideas. The patient is not nervous/anxious and is not hyperactive.      Objective:     Vital Signs (Most Recent):  Temp: 99 °F (37.2 °C) (09/09/17 2006)  Pulse: 79 (09/09/17 2006)  Resp: 16 (09/09/17 2006)  BP: (!) 149/70 (09/09/17 2006)  SpO2: 96 % (09/09/17 2006) Vital Signs (24h Range):  Temp:  [96.8 °F (36 °C)-99.1 °F (37.3 °C)] 99 °F (37.2 °C)  Pulse:  [56-79] 79  Resp:  [16-18] 16  SpO2:  [96 %-100 %] 96 %  BP: (129-165)/(62-71) 149/70     Height: 6' 2" (188 cm)  Weight: 59 kg (130 lb)  Body mass index is 16.69 kg/m².      Intake/Output Summary (Last 24 hours) at 09/09/17 2149  Last data filed at 09/09/17 1814   Gross per 24 hour   Intake             3770 ml   Output             2200 ml   Net             1570 ml       Physical Exam   Psychiatric:   Mental Status Exam:  Appearance: no apparent distress, poor hygiene and grooming, lying in bed  Behavior/Cooperation: guarded  Speech: monotonous  Mood: appears depressed  Affect: constricted  Thought Process: goal-directed  Thought Content: denies SI/HI/AVH  Sensorium: alert, awake   Orientation: person, place, situation  Memory: grossly intact  Attention/Concentration: able to attend to brief interview  Fund of knowledge: intact and appropriate to age and level of " education   Insight: limited  Judgement: fair     Nursing note and vitals reviewed.       Significant Labs:   Last 24 Hours:   Recent Lab Results       09/09/17  0531 09/09/17  0329      Anion Gap  8     Appearance, UA Hazy(A)      Bacteria, UA Occasional      Baso #  0.02     Basophil%  0.4     Bilirubin (UA) Negative      BUN, Bld  23     Calcium  7.7(L)     Chloride  107     CO2  22(L)     Color, UA Yellow      Creatinine  3.0(H)     Differential Method  Automated     eGFR if   23.6(A)     eGFR if non   20.4  Comment:  Calculation used to obtain the estimated glomerular filtration  rate (eGFR) is the CKD-EPI equation. Since race is unknown   in our information system, the eGFR values for   -American and Non--American patients are given   for each creatinine result.  (A)     Eos #  0.1     Eosinophil%  1.8     Glucose  92     Glucose, UA Negative      Gran #  4.1     Gran%  74.5(H)     Hematocrit  20.9(L)     Hemoglobin  7.4(L)     Ketones, UA Negative      Leukocytes, UA 2+(A)      Lymph #  0.9(L)     Lymph%  15.4(L)     MCH  29.5     MCHC  35.4     MCV  83     Microscopic Comment SEE COMMENT  Comment:  Other formed elements not mentioned in the report are not   present in the microscopic examination.         Mono #  0.4     Mono%  7.4     MPV  9.0(L)     Nitrite, UA Negative      Occult Blood UA 3+(A)      pH, UA 7.0      Platelets  116(L)     Potassium  3.2(L)     Protein, UA Negative  Comment:  Recommend a 24 hour urine protein or a urine   protein/creatinine ratio if globulin induced proteinuria is  clinically suspected.        RBC  2.51(L)     RBC, UA >100(H)      RDW  14.7(H)     Sodium  137     Specific Gravity, UA 1.010      Specimen UA Urine, Catheterized      Urobilinogen, UA Negative      WBC, UA 27(H)      WBC  5.52     Yeast, UA Occasional(A)            Significant Imaging: I have reviewed all pertinent imaging results/findings within the past 24 hours.

## 2017-09-11 ENCOUNTER — ANESTHESIA (OUTPATIENT)
Dept: ENDOSCOPY | Facility: HOSPITAL | Age: 67
DRG: 987 | End: 2017-09-11
Payer: MEDICARE

## 2017-09-11 ENCOUNTER — ANESTHESIA EVENT (OUTPATIENT)
Dept: ENDOSCOPY | Facility: HOSPITAL | Age: 67
DRG: 987 | End: 2017-09-11
Payer: MEDICARE

## 2017-09-11 LAB
ALBUMIN SERPL BCP-MCNC: 2.2 G/DL
ALP SERPL-CCNC: 40 U/L
ALT SERPL W/O P-5'-P-CCNC: 6 U/L
ANION GAP SERPL CALC-SCNC: 9 MMOL/L
APTT BLDCRRT: 22.6 SEC
AST SERPL-CCNC: 11 U/L
BASOPHILS # BLD AUTO: 0.03 K/UL
BASOPHILS # BLD AUTO: 0.03 K/UL
BASOPHILS NFR BLD: 0.3 %
BASOPHILS NFR BLD: 0.3 %
BILIRUB SERPL-MCNC: 1.1 MG/DL
BUN SERPL-MCNC: 23 MG/DL
CALCIUM SERPL-MCNC: 7.8 MG/DL
CHLORIDE SERPL-SCNC: 110 MMOL/L
CO2 SERPL-SCNC: 19 MMOL/L
CREAT SERPL-MCNC: 2.8 MG/DL
DIFFERENTIAL METHOD: ABNORMAL
DIFFERENTIAL METHOD: ABNORMAL
EOSINOPHIL # BLD AUTO: 0.1 K/UL
EOSINOPHIL # BLD AUTO: 0.1 K/UL
EOSINOPHIL NFR BLD: 0.7 %
EOSINOPHIL NFR BLD: 0.9 %
ERYTHROCYTE [DISTWIDTH] IN BLOOD BY AUTOMATED COUNT: 14.2 %
ERYTHROCYTE [DISTWIDTH] IN BLOOD BY AUTOMATED COUNT: 14.4 %
EST. GFR  (AFRICAN AMERICAN): 25.6 ML/MIN/1.73 M^2
EST. GFR  (NON AFRICAN AMERICAN): 22.2 ML/MIN/1.73 M^2
GLUCOSE SERPL-MCNC: 111 MG/DL
HCT VFR BLD AUTO: 26.1 %
HCT VFR BLD AUTO: 28.4 %
HGB BLD-MCNC: 10 G/DL
HGB BLD-MCNC: 9.3 G/DL
INR PPP: 1.1
LYMPHOCYTES # BLD AUTO: 0.7 K/UL
LYMPHOCYTES # BLD AUTO: 0.9 K/UL
LYMPHOCYTES NFR BLD: 6.9 %
LYMPHOCYTES NFR BLD: 9 %
MAGNESIUM SERPL-MCNC: 3 MG/DL
MCH RBC QN AUTO: 28.9 PG
MCH RBC QN AUTO: 29.4 PG
MCHC RBC AUTO-ENTMCNC: 35.2 G/DL
MCHC RBC AUTO-ENTMCNC: 35.6 G/DL
MCV RBC AUTO: 82 FL
MCV RBC AUTO: 83 FL
MONOCYTES # BLD AUTO: 0.3 K/UL
MONOCYTES # BLD AUTO: 0.5 K/UL
MONOCYTES NFR BLD: 2.6 %
MONOCYTES NFR BLD: 4.8 %
NEUTROPHILS # BLD AUTO: 8.9 K/UL
NEUTROPHILS # BLD AUTO: 8.9 K/UL
NEUTROPHILS NFR BLD: 86.4 %
NEUTROPHILS NFR BLD: 86.8 %
PLATELET # BLD AUTO: 133 K/UL
PLATELET # BLD AUTO: 139 K/UL
PMV BLD AUTO: 8.6 FL
PMV BLD AUTO: 8.9 FL
POCT GLUCOSE: 134 MG/DL (ref 70–110)
POTASSIUM SERPL-SCNC: 4.4 MMOL/L
PROT SERPL-MCNC: 4.5 G/DL
PROTHROMBIN TIME: 11.4 SEC
RBC # BLD AUTO: 3.16 M/UL
RBC # BLD AUTO: 3.46 M/UL
SODIUM SERPL-SCNC: 138 MMOL/L
WBC # BLD AUTO: 10.2 K/UL
WBC # BLD AUTO: 10.29 K/UL

## 2017-09-11 PROCEDURE — 43239 EGD BIOPSY SINGLE/MULTIPLE: CPT | Performed by: INTERNAL MEDICINE

## 2017-09-11 PROCEDURE — 85610 PROTHROMBIN TIME: CPT

## 2017-09-11 PROCEDURE — D9220A PRA ANESTHESIA: Mod: ANES,,, | Performed by: ANESTHESIOLOGY

## 2017-09-11 PROCEDURE — 0DB68ZX EXCISION OF STOMACH, VIA NATURAL OR ARTIFICIAL OPENING ENDOSCOPIC, DIAGNOSTIC: ICD-10-PCS | Performed by: INTERNAL MEDICINE

## 2017-09-11 PROCEDURE — 63600175 PHARM REV CODE 636 W HCPCS: Performed by: ANESTHESIOLOGY

## 2017-09-11 PROCEDURE — 99024 POSTOP FOLLOW-UP VISIT: CPT | Mod: ,,, | Performed by: UROLOGY

## 2017-09-11 PROCEDURE — 94761 N-INVAS EAR/PLS OXIMETRY MLT: CPT

## 2017-09-11 PROCEDURE — 37000008 HC ANESTHESIA 1ST 15 MINUTES: Performed by: INTERNAL MEDICINE

## 2017-09-11 PROCEDURE — A4216 STERILE WATER/SALINE, 10 ML: HCPCS | Performed by: ANESTHESIOLOGY

## 2017-09-11 PROCEDURE — 99233 SBSQ HOSP IP/OBS HIGH 50: CPT | Mod: ,,, | Performed by: SURGERY

## 2017-09-11 PROCEDURE — 88305 TISSUE EXAM BY PATHOLOGIST: CPT | Performed by: PATHOLOGY

## 2017-09-11 PROCEDURE — 25000003 PHARM REV CODE 250: Performed by: ANESTHESIOLOGY

## 2017-09-11 PROCEDURE — 25000003 PHARM REV CODE 250: Performed by: UROLOGY

## 2017-09-11 PROCEDURE — 63600175 PHARM REV CODE 636 W HCPCS: Performed by: STUDENT IN AN ORGANIZED HEALTH CARE EDUCATION/TRAINING PROGRAM

## 2017-09-11 PROCEDURE — 83735 ASSAY OF MAGNESIUM: CPT

## 2017-09-11 PROCEDURE — D9220A PRA ANESTHESIA: Mod: CRNA,,, | Performed by: NURSE ANESTHETIST, CERTIFIED REGISTERED

## 2017-09-11 PROCEDURE — 63600175 PHARM REV CODE 636 W HCPCS: Performed by: NURSE ANESTHETIST, CERTIFIED REGISTERED

## 2017-09-11 PROCEDURE — 85025 COMPLETE CBC W/AUTO DIFF WBC: CPT

## 2017-09-11 PROCEDURE — 27201012 HC FORCEPS, HOT/COLD, DISP: Performed by: INTERNAL MEDICINE

## 2017-09-11 PROCEDURE — 63600175 PHARM REV CODE 636 W HCPCS: Performed by: UROLOGY

## 2017-09-11 PROCEDURE — 99232 SBSQ HOSP IP/OBS MODERATE 35: CPT | Mod: ,,, | Performed by: PSYCHIATRY & NEUROLOGY

## 2017-09-11 PROCEDURE — 0DB98ZX EXCISION OF DUODENUM, VIA NATURAL OR ARTIFICIAL OPENING ENDOSCOPIC, DIAGNOSTIC: ICD-10-PCS | Performed by: INTERNAL MEDICINE

## 2017-09-11 PROCEDURE — 85730 THROMBOPLASTIN TIME PARTIAL: CPT

## 2017-09-11 PROCEDURE — 11000001 HC ACUTE MED/SURG PRIVATE ROOM

## 2017-09-11 PROCEDURE — 0DB68ZZ EXCISION OF STOMACH, VIA NATURAL OR ARTIFICIAL OPENING ENDOSCOPIC: ICD-10-PCS | Performed by: INTERNAL MEDICINE

## 2017-09-11 PROCEDURE — 88342 IMHCHEM/IMCYTCHM 1ST ANTB: CPT | Mod: 26,,, | Performed by: PATHOLOGY

## 2017-09-11 PROCEDURE — 80053 COMPREHEN METABOLIC PANEL: CPT

## 2017-09-11 PROCEDURE — B4185 PARENTERAL SOL 10 GM LIPIDS: HCPCS | Performed by: UROLOGY

## 2017-09-11 PROCEDURE — 37000009 HC ANESTHESIA EA ADD 15 MINS: Performed by: INTERNAL MEDICINE

## 2017-09-11 PROCEDURE — 88305 TISSUE EXAM BY PATHOLOGIST: CPT | Mod: 26,,, | Performed by: PATHOLOGY

## 2017-09-11 PROCEDURE — 25000003 PHARM REV CODE 250: Performed by: STUDENT IN AN ORGANIZED HEALTH CARE EDUCATION/TRAINING PROGRAM

## 2017-09-11 PROCEDURE — 43239 EGD BIOPSY SINGLE/MULTIPLE: CPT | Mod: ,,, | Performed by: INTERNAL MEDICINE

## 2017-09-11 RX ORDER — POLYETHYLENE GLYCOL 3350 17 G/17G
17 POWDER, FOR SOLUTION ORAL 2 TIMES DAILY
Status: DISCONTINUED | OUTPATIENT
Start: 2017-09-11 | End: 2017-09-14 | Stop reason: HOSPADM

## 2017-09-11 RX ORDER — SODIUM CHLORIDE 0.9 % (FLUSH) 0.9 %
3 SYRINGE (ML) INJECTION
Status: DISCONTINUED | OUTPATIENT
Start: 2017-09-11 | End: 2017-09-14 | Stop reason: HOSPADM

## 2017-09-11 RX ORDER — PROPOFOL 10 MG/ML
VIAL (ML) INTRAVENOUS
Status: DISCONTINUED | OUTPATIENT
Start: 2017-09-11 | End: 2017-09-11

## 2017-09-11 RX ORDER — LIDOCAINE HCL/PF 100 MG/5ML
SYRINGE (ML) INTRAVENOUS
Status: DISCONTINUED | OUTPATIENT
Start: 2017-09-11 | End: 2017-09-11

## 2017-09-11 RX ORDER — ONDANSETRON 2 MG/ML
INJECTION INTRAMUSCULAR; INTRAVENOUS
Status: DISCONTINUED | OUTPATIENT
Start: 2017-09-11 | End: 2017-09-11

## 2017-09-11 RX ORDER — DRONABINOL 2.5 MG/1
2.5 CAPSULE ORAL 2 TIMES DAILY
Status: DISCONTINUED | OUTPATIENT
Start: 2017-09-11 | End: 2017-09-14 | Stop reason: HOSPADM

## 2017-09-11 RX ORDER — SODIUM CHLORIDE 9 MG/ML
INJECTION, SOLUTION INTRAVENOUS CONTINUOUS
Status: DISCONTINUED | OUTPATIENT
Start: 2017-09-11 | End: 2017-09-13

## 2017-09-11 RX ORDER — PROPOFOL 10 MG/ML
VIAL (ML) INTRAVENOUS CONTINUOUS PRN
Status: DISCONTINUED | OUTPATIENT
Start: 2017-09-11 | End: 2017-09-11

## 2017-09-11 RX ORDER — DEXAMETHASONE SODIUM PHOSPHATE 4 MG/ML
INJECTION, SOLUTION INTRA-ARTICULAR; INTRALESIONAL; INTRAMUSCULAR; INTRAVENOUS; SOFT TISSUE
Status: DISCONTINUED | OUTPATIENT
Start: 2017-09-11 | End: 2017-09-11

## 2017-09-11 RX ADMIN — DEXAMETHASONE SODIUM PHOSPHATE 4 MG: 4 INJECTION, SOLUTION INTRAMUSCULAR; INTRAVENOUS at 04:09

## 2017-09-11 RX ADMIN — ASCORBIC ACID, VITAMIN A PALMITATE, CHOLECALCIFEROL, THIAMINE HYDROCHLORIDE, RIBOFLAVIN-5 PHOSPHATE SODIUM, PYRIDOXINE HYDROCHLORIDE, NIACINAMIDE, DEXPANTHENOL, ALPHA-TOCOPHEROL ACETATE, VITAMIN K1, FOLIC ACID, BIOTIN, CYANOCOBALAMIN: 200; 3300; 200; 6; 3.6; 6; 40; 15; 10; 150; 600; 60; 5 INJECTION, SOLUTION INTRAVENOUS at 11:09

## 2017-09-11 RX ADMIN — LABETALOL HYDROCHLORIDE 10 MG: 5 INJECTION INTRAVENOUS at 01:09

## 2017-09-11 RX ADMIN — PROPOFOL 50 MG: 10 INJECTION, EMULSION INTRAVENOUS at 04:09

## 2017-09-11 RX ADMIN — AMPICILLIN SODIUM 2 G: 2 INJECTION, POWDER, FOR SOLUTION INTRAMUSCULAR; INTRAVENOUS at 05:09

## 2017-09-11 RX ADMIN — AMPICILLIN SODIUM 2 G: 2 INJECTION, POWDER, FOR SOLUTION INTRAMUSCULAR; INTRAVENOUS at 06:09

## 2017-09-11 RX ADMIN — TRAZODONE HYDROCHLORIDE 50 MG: 50 TABLET ORAL at 10:09

## 2017-09-11 RX ADMIN — MIRTAZAPINE 7.5 MG: 7.5 TABLET ORAL at 10:09

## 2017-09-11 RX ADMIN — CARVEDILOL 25 MG: 25 TABLET, FILM COATED ORAL at 11:09

## 2017-09-11 RX ADMIN — Medication 3 ML: at 10:09

## 2017-09-11 RX ADMIN — SODIUM CHLORIDE: 0.9 INJECTION, SOLUTION INTRAVENOUS at 04:09

## 2017-09-11 RX ADMIN — PANTOPRAZOLE SODIUM 40 MG: 40 TABLET, DELAYED RELEASE ORAL at 11:09

## 2017-09-11 RX ADMIN — CEFTRIAXONE 2 G: 2 INJECTION, SOLUTION INTRAVENOUS at 02:09

## 2017-09-11 RX ADMIN — SOYBEAN OIL 250 ML: 20 INJECTION, SOLUTION INTRAVENOUS at 10:09

## 2017-09-11 RX ADMIN — MAGNESIUM SULFATE IN WATER 4 G: 40 INJECTION, SOLUTION INTRAVENOUS at 01:09

## 2017-09-11 RX ADMIN — AMPICILLIN SODIUM 2 G: 2 INJECTION, POWDER, FOR SOLUTION INTRAMUSCULAR; INTRAVENOUS at 12:09

## 2017-09-11 RX ADMIN — AMPICILLIN SODIUM 2 G: 2 INJECTION, POWDER, FOR SOLUTION INTRAMUSCULAR; INTRAVENOUS at 01:09

## 2017-09-11 RX ADMIN — DRONABINOL 2.5 MG: 2.5 CAPSULE ORAL at 10:09

## 2017-09-11 RX ADMIN — LIDOCAINE HYDROCHLORIDE 75 MG: 20 INJECTION, SOLUTION INTRAVENOUS at 04:09

## 2017-09-11 RX ADMIN — POLYETHYLENE GLYCOL 3350 17 G: 17 POWDER, FOR SOLUTION ORAL at 10:09

## 2017-09-11 RX ADMIN — STANDARDIZED SENNA CONCENTRATE AND DOCUSATE SODIUM 1 TABLET: 8.6; 5 TABLET, FILM COATED ORAL at 10:09

## 2017-09-11 RX ADMIN — ONDANSETRON 4 MG: 2 INJECTION INTRAMUSCULAR; INTRAVENOUS at 04:09

## 2017-09-11 RX ADMIN — Medication 3 ML: at 04:09

## 2017-09-11 RX ADMIN — PROPOFOL 100 MCG/KG/MIN: 10 INJECTION, EMULSION INTRAVENOUS at 04:09

## 2017-09-11 RX ADMIN — CEFTRIAXONE 2 G: 2 INJECTION, SOLUTION INTRAVENOUS at 03:09

## 2017-09-11 RX ADMIN — ONDANSETRON 4 MG: 2 INJECTION INTRAMUSCULAR; INTRAVENOUS at 07:09

## 2017-09-11 RX ADMIN — CARVEDILOL 25 MG: 25 TABLET, FILM COATED ORAL at 10:09

## 2017-09-11 RX ADMIN — SODIUM CHLORIDE: 0.9 INJECTION, SOLUTION INTRAVENOUS at 06:09

## 2017-09-11 RX ADMIN — Medication 3 ML: at 05:09

## 2017-09-11 RX ADMIN — STANDARDIZED SENNA CONCENTRATE AND DOCUSATE SODIUM 1 TABLET: 8.6; 5 TABLET, FILM COATED ORAL at 11:09

## 2017-09-11 NOTE — NURSING TRANSFER
Nursing Transfer Note      9/11/2017     Transfer from ICU    Transfer via hospital bed    Transfer with refugio and telementry    Transported by Jermaine Headley    Medicines sent: no    Chart send with patient: yes    Notified: Patient's wife notified per voicemail of transfer to floor.  Yovany Cagle given report.  Question and concerns addressed.    Patient reassessed at: 0925      Upon arrival to floor: Yovany WEAVER at bedside with patient.

## 2017-09-11 NOTE — SUBJECTIVE & OBJECTIVE
Interval History:   No acute events overnight  Successful embolization by IR yesterday  Bleeding much improved this morning, however stoma has a dusky appearance  Pain controlled  H/H responded appropriately to transfusion     Review of Systems  Objective:     Temp:  [96.8 °F (36 °C)-98.4 °F (36.9 °C)] 97.7 °F (36.5 °C)  Pulse:  [52-83] 75  Resp:  [1-41] 11  SpO2:  [96 %-100 %] 99 %  BP: (115-191)/(58-93) 152/80     Body mass index is 16.98 kg/m².            Drains     Drain                 Ureteral Drain/Stent -- days         Nephrostomy 05/05/17 1503 Right 8 Fr. 128 days         Nephrostomy 05/05/17 1504 Left 8 Fr. 128 days         Closed/Suction Drain 06/21/17 1221 Abdomen Bulb 19 Fr. 81 days         Ureteral Drain/Stent 06/21/17 1053 Left ureter 6 Fr. 81 days         Ureteral Drain/Stent 06/21/17 1054 Right ureter 6 Fr. 81 days         Urostomy 08/02/17 2345 ileal conduit RUQ 39 days         Urostomy 09/07/17 1522  RLQ 3 days    Arterial Sheath 09/10/17 1623 Right less than 1 day                Physical Exam   Vitals reviewed.  Constitutional: He is oriented to person, place, and time. No distress.   HENT:   Head: Normocephalic and atraumatic.   Eyes: Pupils are equal, round, and reactive to light. No scleral icterus.   Neck: No JVD present.   Cardiovascular: Normal rate and regular rhythm.    Pulmonary/Chest: Effort normal. No respiratory distress.   Abdominal: Soft. He exhibits no distension. There is no tenderness. There is no rebound and no guarding.   Urostomy with dusky appearance  Minimal blood noted in urostomy bag  14 Fr bolanos in place   Musculoskeletal: He exhibits no edema.   Neurological: He is alert and oriented to person, place, and time.   Skin: Skin is dry. He is not diaphoretic. There is pallor.     Psychiatric: He has a normal mood and affect. His behavior is normal.       Significant Labs:    BMP:    Recent Labs  Lab 09/10/17  0700 09/10/17  2256 09/11/17  0405    137 138   K 3.3* 4.3  4.4    110 110   CO2 23 18* 19*   BUN 20 22 23   CREATININE 3.0* 2.8* 2.8*   CALCIUM 7.8* 7.6* 7.8*       CBC:     Recent Labs  Lab 09/10/17  1303 09/10/17  2245 09/10/17  2345   WBC 14.17* 10.54 10.20   HGB 6.8* 9.9* 10.0*   HCT 19.0* 27.6* 28.4*   * 129* 133*       All pertinent labs results from the past 24 hours have been reviewed.    Significant Imaging:  All pertinent imaging results/findings from the past 24 hours have been reviewed.

## 2017-09-11 NOTE — CONSULTS
Wound care consulted for leaking ileostomy.  The patient has a urostomy with a catheter placed in the os, stitched in place to the manjit-stomal area.  The manjit-stomal skin is tender, scattered erythema noted.  Plan of care discussed with patient/family who verbalized understanding.  Pouch connected to a  drainage bag.       09/11/17 1100       Urostomy 09/07/17 1522  RLQ   Date of First Assessment/Time of First Assessment: 09/07/17 1522   Present Prior to Hospital Arrival?: No  Inserted by: MD  Urostomy Type: (c)   Location: RLQ  Initial Stoma Size (in): 14 in  Additional Comments: 14 Syriac CARROLL CATHETER   Stoma Appearance red;moist;swollen;protruding above skin level;catheter;other (see comments)  (suture)   Stoma Size (in) 30   Stomal Appliance Leakage;Changed;1 piece  (05195 sensura post-op ostomy pouch)   Accessories/Skin Care cleansed w/ water;skin sealant;skin barrier ring   Stoma Function yellow urine   Peristomal Skin erythema   Tolerance signs/symptoms of discomfort   Output (mL) 125 mL

## 2017-09-11 NOTE — PROGRESS NOTES
Progress Note  Surgical Intensive Care    Admit Date: 9/7/2017  Post-operative Day: 4 Days Post-Op  Hospital Day: 5    SUBJECTIVE:     Follow-up For:  Procedure(s) (LRB):  LOOPOGRAM (N/A)  LOOPOSCOPY (N/A)    HPI:    Patient is a 67 y.o. 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 He presented to the ED on 9/7/17 with severe bleeding from his urostomy. A loopogram and looposcopy was performed which showed no obvious extravasation, bleeding was found to be coming from the right ureter. A bolanos catheter with balloon filled with 5cc water was placed through the urostomy and he was admitted to the floor. Received 6 units of blood since his stay here. CT scan on 9/7 negative for active bleeding. Patient began having profuse bleeding from stoma again the morning of 9/10. He was found to have arterial hemorrhage from ileal conduit and taken to IR for embolization. He arrived to ICU extubated, HDS off pressor support.    Interval history:    Placed IJ. On CRRT per nephrology.    Continuous Infusions:   sodium chloride 0.9% 100 mL/hr at 09/11/17 0500     Scheduled Meds:   ampicillin IVPB  2 g Intravenous Q6H    carvedilol  25 mg Oral BID    cefTRIAXone (ROCEPHIN) IVPB  2 g Intravenous Q12H    mirtazapine  7.5 mg Oral QHS    pantoprazole  40 mg Oral Daily    scopolamine  1 patch Transdermal Q3 Days    senna-docusate 8.6-50 mg  1 tablet Oral BID    sodium chloride 0.9%  3 mL Intravenous Q8H    trazodone  50 mg Oral QHS     PRN Meds:sodium chloride, acetaminophen, acetaminophen, bisacodyl, calcium gluconate IVPB, calcium gluconate IVPB, calcium gluconate IVPB, labetalol, magnesium sulfate IVPB,  magnesium sulfate IVPB, ondansetron, oxycodone-acetaminophen, oxycodone-acetaminophen, potassium chloride **AND** potassium chloride **AND** potassium chloride, promethazine (PHENERGAN) IVPB, sodium chloride 0.9%    Review of patient's allergies indicates:  No Known Allergies    OBJECTIVE:     Vital Signs (Most Recent)  Temp: 97.7 °F (36.5 °C) (09/11/17 0300)  Pulse: 73 (09/11/17 0500)  Resp: (!) 26 (09/11/17 0500)  BP: (!) 147/67 (09/11/17 0500)  SpO2: 96 % (09/11/17 0500)    Vital Signs Range (Last 24H):  Temp:  [96.8 °F (36 °C)-98.4 °F (36.9 °C)]   Pulse:  [50-83]   Resp:  [1-41]   BP: (115-191)/(58-93)   SpO2:  [96 %-100 %]     I & O (Last 24H):  Intake/Output Summary (Last 24 hours) at 09/11/17 0527  Last data filed at 09/11/17 0500   Gross per 24 hour   Intake             5630 ml   Output             2950 ml   Net             2680 ml     Physical Exam:  General: well developed  Lungs:  clear to auscultation bilaterally and normal respiratory effort  Cardiovascular: Heart: regular rate and rhythm, S1, S2 normal, no murmur, click, rub or gallop. Chest Wall: no tenderness. Extremities: no cyanosis or edema, or clubbing. Pulses: 2+ and symmetric.  Abdomen/Rectal: Abdomen: soft, non-tender non-distented; bowel sounds normal; no masses,  no organomegaly.     Wound/Incision:  clean, dry, intact    Laboratory (Last 24H):  CBC:    Recent Labs  Lab 09/10/17  2345   WBC 10.20   HGB 10.0*   HCT 28.4*   *     CMP:    Recent Labs  Lab 09/11/17  0405   CALCIUM 7.8*   ALBUMIN 2.2*   PROT 4.5*      K 4.4   CO2 19*      BUN 23   CREATININE 2.8*   ALKPHOS 40*   ALT 6*   AST 11   BILITOT 1.1*       Chest X-Ray: No results found in the last 24 hours.    Diagnostic Results:  none    ASSESSMENT/PLAN:         Plan:    Neuro:   - PO PRN percocet, tylenol     Pulmonary:   -extubated, maintaining sats on room air     Cardiac:  -MAP goal >65  -HDS not requiring pressors  -Home meds include carvedilol, continue       Renal:   -urostomy in place  -monitor UOP   -Bun/Cr 20/3.0     Fluids/Electrolytes/Nutrition/GI:   -Nutritional status: NPO  -replace lytes PRN  -maintenance fluids NS@100ml/hr  -Bowel regimen: senna-docusate 1 tab BID, bisacodyl suppository 10mg daily PRN     Hematology/Oncology:  -H/H stable, received 3 units of blood today  -INR/Plts 1.2/130  -Anticoagulation: not indicated at this time due to active bleeding, will continue to monitor     Infectious Disease:   -Afebrile  -WBC elevated  -Abx: ampicillin, ceftriaxone for endocarditis     Endocrine:  -Glucose goal of 120-150  -SSI     Dispo:  -Continue care in the ICU setting per primary team     Brynn Blackman MD PGY-1  [phone]  09/11/2017

## 2017-09-11 NOTE — NURSING TRANSFER
Nursing Transfer Note      9/11/2017     Transfer To:  From: [facility]    Transfer via stretcher    Transfer with IV PUMP    Transported by [facility]     Medicines sent: IV FLUIDS    Chart send with patient: Yes    Notified: OWEN Pedroza     Patient reassessed at: 09/11/17 (date, time)    Upon arrival to floor: patient oriented to room, call bell in reach and bed in lowest position

## 2017-09-11 NOTE — PROGRESS NOTES
"[facility]  Psychiatry  Progress Note    Patient Name: Juan Manuel Koehler  MRN: [id number]   Code Status: Full Code  Admission Date: 9/7/2017  Hospital Length of Stay: 4 days  Expected Discharge Date: 9/13/2017  Attending Physician: Villa Robles MD  Primary Care Provider: Hemant Sweeney MD      Subjective:     Principal Problem:Urinary stoma complication    HPI: Patient is a 66 yo man with PMH of invasinvasive bladder cancer s/p open radical cystoprostatectomy, CKD, CVA, endocarditis currently on IV abx who was admitted on 9/6/17 for bleeding from urostomy site. Psychiatry was consulted to address possible underlying depression. The patient report his wife asked that psychiatry be consulted but he does not know why. He states he is not currently depressed, has not been irritable, and has a "good" mood. Denies crying spells and says on average his mood is an 8/10. Denies feelings of hopelessness, worthlessness, or guilt. Denies history of depressive episodes in the past. States he has been getting along well at home with his family. Denies history of stacy, Si, Hi, AVH, paranoia. Does endorse poor sleep about 4 hours per night since April 2017 after surgery. 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 Reports he was prescribed trazodone in the past for poor sleep but it didn't help. Does endorse some anxiety and worry that comes and goes during the day about his job. Denies anhedonia, poor concentration. Reports poor appetite due to his physical illness. Denies prior history of seeing a psychiatrist, psychiatric hospitalizations, suicide attempts, and past trauma.  Denies substance abuse and family history of mental illness. He reports he does not have access to guns.    Hospital Course: 9/11/17: Tolerating remeron. Sleep improved last night. " "Continues to complain of anxiety but says it is tolerable.    Interval History:     States his sleep was better last night. Continues to complain of anxiety regarding his medical problems however states it is tolerable. He says his mood is "good."      Psychotherapeutics     Start     Stop Route Frequency Ordered    09/08/17 2100  mirtazapine tablet 7.5 mg      -- Oral Nightly 09/08/17 1557    09/07/17 2100  trazodone tablet 50 mg      -- Oral Nightly 09/07/17 1609           Review of Systems   Reviewed systems negative.      Objective:     Vital Signs (Most Recent):  Temp: 98.2 °F (36.8 °C) (09/11/17 0945)  Pulse: 78 (09/11/17 0945)  Resp: 16 (09/11/17 0945)  BP: (!) 149/81 (09/11/17 0945)  SpO2: 99 % (09/11/17 0945) Vital Signs (24h Range):  Temp:  [96.8 °F (36 °C)-98.4 °F (36.9 °C)] 98.2 °F (36.8 °C)  Pulse:  [62-83] 78  Resp:  [1-60] 16  SpO2:  [96 %-100 %] 99 %  BP: (115-191)/(58-93) 149/81     Height: 6' 2" (188 cm)  Weight: 60 kg (132 lb 4.4 oz)  Body mass index is 16.98 kg/m².      Intake/Output Summary (Last 24 hours) at 09/11/17 1135  Last data filed at 09/11/17 1100   Gross per 24 hour   Intake             2850 ml   Output             1275 ml   Net             1575 ml       Physical Exam    CONSTITUTIONAL  General Appearance: in gown, NAD, calm, cooperative      PSYCHIATRIC   Level of Consciousness: alert  Orientation: oriented to person, place, situation  Grooming: fair  Psychomotor Behavior: no agitation, retardation  Speech: normal rate, volume, tone  Language: English, no asphasia  Mood: "good"  Affect: full range, appropriate, mood congruent  Thought Process: linear, logical  Associations: cohesive  Thought Content: denies SI  Memory: intact to recent and remote events  Attention: not distracted  Fund of Knowledge: appropriate for education level  Insight: fair  Judgment: fair      Assessment/Plan:     Adjustment disorder with anxiety    - Complaints of poor sleep 2/2 anxiety related to medical " diagnosis and condition  - tolerating well with some improvement, can consider anxiolytic if needed  - Continue Remeron 7.5 mg PO qhs            Will sign off. Please call back with any questions or if patient's anxiety worsens.  Case discussed with attending, Dr. Webb, who agrees with plan as above.    Aba Valverde MD   Psychiatry  [facility]    Patient seen and examined with resident agree with assessment and plan as documented above.     Phillip Webb MD  [facility]

## 2017-09-11 NOTE — ASSESSMENT & PLAN NOTE
Juan Manuel Koehler is a 67 y.o. male with hx of invasive bladder cancer s/p open radical cystoprostatectomy, bilateral pelvic lymph node dissection, and creation of an ileal conduit on 6/21/17, now with blood per ostomy site and acute anemia.     - NPO  - Scheduled for EGD today - ok for diet following  - IVF  - Continue amp and rocephin   - Ambulate/OOB  - KAMARI/SCDs  - Home meds restarted  - H/H stable this am, continue to monitor  - Wound care consult   - Will continue to monitor appearance of stoma, if does not improve will likely need to be revised in the future   - Ok to step down to floor

## 2017-09-11 NOTE — NURSING
Pt. Transferred to SICU, report called to Allison, Blood transfusion in  Progress, pt. Reports feeling tired and wanting to rest.  Stated he was not up to meeting new nurse.

## 2017-09-11 NOTE — TREATMENT PLAN
Treatment Plan  09/10/2017   8:46 PM    NPO after MN for EGD tomorrow.    Chasity Crespo M.D.  Gastroenterology Fellow, PGY-IV  Pager: [phone]  [facility]

## 2017-09-11 NOTE — PROGRESS NOTES
"[facility]  Urology  Progress Note    Patient Name: Juan Manuel Koehler  MRN: [id number]  Admission Date: 9/7/2017  Hospital Length of Stay: 4 days  Code Status: Full Code   Attending Provider: Villa Robles MD   Primary Care Physician: Hemant Sweeney MD    Subjective:     HPI:  Juan Manuel Koehler is a 67 y.o male w hx of invasive bladder cancer s/p open radical cystoprostatectomy, bilateral pelvic lymph node dissection, and creation of an ileal conduit on 6/21/17.     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 He represented to Dr. Boucher for hematuria and was found to have a large bladder tumor on his trigone. The tumor was also causing bilateral ureteral obstruction with resulting ELDER.     The patient underwent tumor resection and attempted JJ stent placement on 4/5/17 which demonstrated cT2 urothelial carcinoma (nested variant). Bilateral nephrostomy tubes were placed with improvement in the patient's renal function. The patient's eGFR fluctuated, and, ultimately, he could not receive neoadjuvant cisplatin-based chemotherapy due to his creatinine clearance.     Thus, the patient underwent surgery on 6/21/17 and did very well in the immediate post-operative period.     His pathology revealed urothelial carcinoma with nested variant into the perivesical fat. His lymph nodes and margins are negative. His final pathologic stage is [id number].     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 Has been slowly recovering. He was recently discharged from rehab and has been making progress.     He returned to clinic, after being seen by enterostomal therapy, with blood per stoma and leakage of urine after having some tissue next to the stoma excised. Urine may now be leaking through the skin. Overnight he has had 1500 cc of what his wife describes as "bloody" output. He " "became dizzy this AM and "crumpled" to the ground without hitting his head. He has not lost consciousness. He notes recent vomiting that started with IV Abx - currently on rocephin and ampicillin for treatment of vegetative endocarditis.    Interval History:   No acute events overnight  Successful embolization by IR yesterday  Bleeding much improved this morning, however stoma has a dusky appearance  Pain controlled  H/H responded appropriately to transfusion     Review of Systems  Objective:     Temp:  [96.8 °F (36 °C)-98.4 °F (36.9 °C)] 97.7 °F (36.5 °C)  Pulse:  [52-83] 75  Resp:  [1-41] 11  SpO2:  [96 %-100 %] 99 %  BP: (115-191)/(58-93) 152/80     Body mass index is 16.98 kg/m².            Drains     Drain                 Ureteral Drain/Stent -- days         Nephrostomy 05/05/17 1503 Right 8 Fr. 128 days         Nephrostomy 05/05/17 1504 Left 8 Fr. 128 days         Closed/Suction Drain 06/21/17 1221 Abdomen Bulb 19 Fr. 81 days         Ureteral Drain/Stent 06/21/17 1053 Left ureter 6 Fr. 81 days         Ureteral Drain/Stent 06/21/17 1054 Right ureter 6 Fr. 81 days         Urostomy 08/02/17 2345 ileal conduit RUQ 39 days         Urostomy 09/07/17 1522  RLQ 3 days    Arterial Sheath 09/10/17 1623 Right less than 1 day                Physical Exam   Vitals reviewed.  Constitutional: He is oriented to person, place, and time. No distress.   HENT:   Head: Normocephalic and atraumatic.   Eyes: Pupils are equal, round, and reactive to light. No scleral icterus.   Neck: No JVD present.   Cardiovascular: Normal rate and regular rhythm.    Pulmonary/Chest: Effort normal. No respiratory distress.   Abdominal: Soft. He exhibits no distension. There is no tenderness. There is no rebound and no guarding.   Urostomy with dusky appearance  Minimal blood noted in urostomy bag  14 Fr bolanos in place   Musculoskeletal: He exhibits no edema.   Neurological: He is alert and oriented to person, place, and time.   Skin: Skin is dry. He " is not diaphoretic. There is pallor.     Psychiatric: He has a normal mood and affect. His behavior is normal.       Significant Labs:    BMP:    Recent Labs  Lab 09/10/17  0700 09/10/17  2256 09/11/17  0405    137 138   K 3.3* 4.3 4.4    110 110   CO2 23 18* 19*   BUN 20 22 23   CREATININE 3.0* 2.8* 2.8*   CALCIUM 7.8* 7.6* 7.8*       CBC:     Recent Labs  Lab 09/10/17  1303 09/10/17  2245 09/10/17  2345   WBC 14.17* 10.54 10.20   HGB 6.8* 9.9* 10.0*   HCT 19.0* 27.6* 28.4*   * 129* 133*       All pertinent labs results from the past 24 hours have been reviewed.    Significant Imaging:  All pertinent imaging results/findings from the past 24 hours have been reviewed.        Assessment/Plan:     * Urinary stoma complication    Juan Manuel Koehler is a 67 y.o. male with hx of invasive bladder cancer s/p open radical cystoprostatectomy, bilateral pelvic lymph node dissection, and creation of an ileal conduit on 6/21/17, now with blood per ostomy site and acute anemia.     - NPO  - Scheduled for EGD today - ok for diet following  - IVF  - Continue amp and rocephin   - Ambulate/OOB  - KAMARI/SCDs  - Home meds restarted  - H/H stable this am, continue to monitor  - Wound care consult   - Will continue to monitor appearance of stoma, if does not improve will likely need to be revised in the future   - Ok to step down to floor            VTE Risk Mitigation         Ordered     Medium Risk of VTE  Once      09/10/17 2248     Place KAMARI hose  Until discontinued      09/10/17 2248     Place sequential compression device  Until discontinued      09/10/17 2248          Kiley Hwang MD  Urology  [facility]

## 2017-09-11 NOTE — PATIENT INSTRUCTIONS
Discharge Summary/Instructions after an Endoscopic Procedure  Patient Name: Juan Manuel Koehler  Patient MRN: [id number]  Patient YOB: 1950  Monday, September 11, 2017  Jay Hummel MD  RESTRICTIONS:  During your procedure today, you received medications for sedation.  These   medications may affect your judgment, balance and coordination.  Therefore,   for 24 hours, you have the following restrictions:   - DO NOT drive a car, operate machinery, make legal/financial decisions,   sign important papers or drink alcohol.    ACTIVITY:  The following day: return to full activity including work, except no heavy   lifting, straining or running for 3 days if polyps were removed.  DIET:  Eat and drink normally unless instructed otherwise.  TREATMENT FOR COMMON SIDE EFFECTS:  - Mild abdominal pain, belching, bloating or excessive gas: rest, eat   lightly and use a heating pad.  - Sore Throat: treat with throat lozenges and/or gargle with warm salt   water.  SYMPTOMS TO WATCH FOR AND REPORT TO YOUR PHYSICIAN:  1. Abdominal pain or bloating, other than gas cramps.  2. Chest pain.  3. Back pain.  4. Chills or fever occurring within 24 hours after the procedure.  5. Rectal bleeding, which would show as bright red, maroon, or black stools.   (A tablespoon of blood from the rectum is not serious, especially if   hemorrhoids are present.)  6. Vomiting.  7. Weakness or dizziness.  8. Because air was used during the procedure, expelling large amounts of air   from your rectum or belching is normal.  9. If a bowel prep was taken, you may not have a bowel movement for 1-3   days.  This is normal.  GO DIRECTLY TO THE EMERGENCY ROOM IF YOU HAVE ANY OF THE FOLLOWING:   Difficulty breathing   Chills and/or fever over 101 F   Persistent vomiting and/or vomiting blood   Severe abdominal pain   Severe chest pain   Black, tarry stools   Bleeding- more than one tablespoon  Your doctor recommends these additional instructions:  If any  biopsies were taken, your doctors clinic will call you in 1 to 2   weeks with any results.  Follow an antireflux regimen.  This includes:       - Do not lie down for at least 3 to 4 hours after meals.        - Raise the head of the bed 4 to 6 inches.        - Decrease excess weight.        - Avoid citrus juices and other acidic foods, alcohol, chocolate, mints,   coffee and other caffeinated beverages, carbonated beverages, fatty and   fried foods.        - Avoid tight-fitting clothing.        - Avoid cigarettes and other tobacco products.   We are waiting for your pathology results.   Telephone your endoscopist for pathology results in two weeks.   Your physician has recommended a repeat upper endoscopy in five years for   surveillance based on pathology results.   The findings and recommendations have been discussed with you.   The findings and recommendations were discussed with your primary physician.     The findings and recommendations were discussed with your family.  For questions, problems or results please call your physician - Jay Hummel MD at Work:  [phone].  [facility], EMERGENCY ROOM PHONE NUMBER: [phone]  IF A COMPLICATION OR EMERGENCY SITUATION ARISES AND YOU ARE UNABLE TO REACH   YOUR PHYSICIAN - GO DIRECTLY TO THE EMERGENCY ROOM.  Jay Hummel MD  9/11/2017 5:07:13 PM  This report has been verified and signed electronically.

## 2017-09-11 NOTE — SUBJECTIVE & OBJECTIVE
"Interval History:     States his sleep was better last night. Continues to complain of anxiety regarding his medical problems however states it is tolerable. He says his mood is "good."      Psychotherapeutics     Start     Stop Route Frequency Ordered    09/08/17 2100  mirtazapine tablet 7.5 mg      -- Oral Nightly 09/08/17 1557    09/07/17 2100  trazodone tablet 50 mg      -- Oral Nightly 09/07/17 1609           Review of Systems   Reviewed systems negative.      Objective:     Vital Signs (Most Recent):  Temp: 98.2 °F (36.8 °C) (09/11/17 0945)  Pulse: 78 (09/11/17 0945)  Resp: 16 (09/11/17 0945)  BP: (!) 149/81 (09/11/17 0945)  SpO2: 99 % (09/11/17 0945) Vital Signs (24h Range):  Temp:  [96.8 °F (36 °C)-98.4 °F (36.9 °C)] 98.2 °F (36.8 °C)  Pulse:  [62-83] 78  Resp:  [1-60] 16  SpO2:  [96 %-100 %] 99 %  BP: (115-191)/(58-93) 149/81     Height: 6' 2" (188 cm)  Weight: 60 kg (132 lb 4.4 oz)  Body mass index is 16.98 kg/m².      Intake/Output Summary (Last 24 hours) at 09/11/17 1135  Last data filed at 09/11/17 1100   Gross per 24 hour   Intake             2850 ml   Output             1275 ml   Net             1575 ml       Physical Exam    CONSTITUTIONAL  General Appearance: in gown, NAD, calm, cooperative      PSYCHIATRIC   Level of Consciousness: alert  Orientation: oriented to person, place, situation  Grooming: fair  Psychomotor Behavior: no agitation, retardation  Speech: normal rate, volume, tone  Language: English, no asphasia  Mood: "good"  Affect: full range, appropriate, mood congruent  Thought Process: linear, logical  Associations: cohesive  Thought Content: denies SI  Memory: intact to recent and remote events  Attention: not distracted  Fund of Knowledge: appropriate for education level  Insight: fair  Judgment: fair    "

## 2017-09-11 NOTE — PROGRESS NOTES
Pt arrived to SICU bed 6085 via RN. Pt attached to SICU monitor and full assessment performed. Pt oriented to room and call bell within reach. Dr. Blackman at bedside. Orders taken and implemented. 1 unit of PRBCs is infusing (infusion started on GISSU). Will continue to monitor pt status.     Skin note: No skin breakdown assessed on heels, sacrum, elbows. Pt repositioned independently.

## 2017-09-11 NOTE — H&P
[facility]  History & Physical    Subjective:      Chief Complaint/Reason for Admission:     EGD    Juan Manuel Koehler is a 67 y.o. male.    Past Medical History:   Diagnosis Date    Cancer     bladder and throat    CKD (chronic kidney disease) stage 3, GFR 30-59 ml/min     Embolic stroke involving right cerebellar artery 8/4/2017    Urinary tract infection      Past Surgical History:   Procedure Laterality Date    BLADDER SURGERY      CYSTOSCOPY      HERNIA REPAIR      Ileal Conduit      THROAT SURGERY       Family History   Problem Relation Age of Onset    No Known Problems Father     Kidney disease Mother     Prostate cancer Neg Hx      Social History   Substance Use Topics    Smoking status: Never Smoker    Smokeless tobacco: Never Used    Alcohol use No       PTA Medications   Medication Sig    AMPICILLIN SODIUM (AMPICILLIN 2 G/100 ML NS, READY TO MIX SYSTEM,) Inject 100 mLs (2 g total) into the vein every 6 (six) hours.    aspirin (ECOTRIN) 81 MG EC tablet Take 1 tablet (81 mg total) by mouth once daily.    carvedilol (COREG) 25 MG tablet Take 1 tablet (25 mg total) by mouth 2 (two) times daily.    cefTRIAXone 2 g in dextrose 5 % 50 mL (ROCEPHIN) 2 g/50 mL PgBk IVPB Inject 50 mLs (2 g total) into the vein every 12 (twelve) hours.    dronabinol (MARINOL) 5 MG capsule Take 1 capsule (5 mg total) by mouth 3 (three) times daily before meals.    ondansetron (ZOFRAN-ODT) 8 MG TbDL Take 1 tablet (8 mg total) by mouth every 6 (six) hours as needed (nausea).    pantoprazole (PROTONIX) 40 MG tablet Take 1 tablet (40 mg total) by mouth once daily.    trazodone (DESYREL) 50 MG tablet Take 1 tablet (50 mg total) by mouth every evening.    scopolamine (TRANSDERM-SCOP) 1.3-1.5 mg (1 mg over 3 days) Place 1 patch onto the skin Every 3 (three) days.     Review of patient's allergies indicates:  No Known Allergies     Review of Systems   Constitutional: Negative for chills, fever and  weight loss.   Gastrointestinal: Positive for nausea and vomiting. Negative for abdominal pain, constipation and diarrhea.       Objective:      Vital Signs (Most Recent)  Temp: 98.2 °F (36.8 °C) (09/11/17 1617)  Pulse: 77 (09/11/17 1617)  Resp: 18 (09/11/17 1617)  BP: (!) 162/91 (09/11/17 1617)  SpO2: 99 % (09/11/17 0945)    Vital Signs Range (Last 24H):  Temp:  [97.5 °F (36.4 °C)-98.4 °F (36.9 °C)]   Pulse:  [62-83]   Resp:  [1-60]   BP: (131-191)/(62-93)   SpO2:  [96 %-100 %]     Physical Exam   Constitutional: He appears well-developed and well-nourished.   Cardiovascular: Normal rate.    Pulmonary/Chest: Effort normal and breath sounds normal.   Abdominal: Soft. Bowel sounds are normal.   Skin: Skin is warm and dry.   Psychiatric: He has a normal mood and affect. His behavior is normal. Judgment and thought content normal.           Assessment:      Active Hospital Problems    Diagnosis  POA    *Urinary stoma complication [N99.528]  Yes    Acute renal failure with acute tubular necrosis superimposed on stage 3 chronic kidney disease [N17.0, N18.3]  Yes    Acute blood loss anemia [D62]  Yes    Hematuria [R31.9]  Yes    Adjustment disorder with anxiety [F43.22]  Yes     - complains of poor sleep 2/2 anxiety related to medical diagnosis and condition  - Recommend starting Remeron 7.5 mg PO qhs       Intractable nausea and vomiting [R11.2]  Unknown      Resolved Hospital Problems    Diagnosis Date Resolved POA   No resolved problems to display.       Plan:      EGD for nausea and vomiting

## 2017-09-11 NOTE — HOSPITAL COURSE
9/11/17: Tolerating remeron. Sleep improved last night. Continues to complain of anxiety but says it is tolerable.

## 2017-09-11 NOTE — ASSESSMENT & PLAN NOTE
- Complaints of poor sleep 2/2 anxiety related to medical diagnosis and condition  - tolerating well with some improvement, can consider anxiolytic if needed  - Continue Remeron 7.5 mg PO qhs

## 2017-09-11 NOTE — ANESTHESIA PREPROCEDURE EVALUATION
09/11/2017  Pre-operative evaluation for Procedure(s) (LRB):  ESOPHAGOGASTRODUODENOSCOPY (EGD) (N/A)    Juan Manuel Koehler is a 67 y.o. 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 He presented back to the ED with severe bleeding from his urostomy. He has now received 6 units of blood since admission. He is s/p IR embolization from an ileal conduit and was stepped down to the floor today.     R brachial DL PICC  Urostomy    Previous airway DL Grade II with no complicating factors noted. Easy mask.     Patient Active Problem List   Diagnosis    Stage 3 chronic kidney disease    Difficult intubation    Nausea    Anemia of chronic disease    Urothelial carcinoma of bladder    Failure to thrive in adult    Postural dizziness with presyncope    NSTEMI (non-ST elevated myocardial infarction)    Elevated troponin    Acute bacterial endocarditis    Bacteremia due to Enterococcus    Sepsis due to Enterococcus    Urine culture positive for Proteus organism    Severe malnutrition    Embolic stroke involving right cerebellar artery    Cytotoxic cerebral edema    Compression of brainstem    Hypertension    Right-sided cerebrovascular accident (CVA)    S/P ileal conduit    Impaired mobility and ADLs    History of urostomy    Anemia    Urinary stoma complication    Adjustment disorder with anxiety    Acute renal failure with acute tubular necrosis superimposed on stage 3 chronic kidney disease    Intractable nausea and vomiting    Acute blood loss anemia    Hematuria       Review of patient's allergies indicates:  No Known Allergies    No current facility-administered medications on file prior to encounter.      Current Outpatient Prescriptions on File Prior to  Encounter   Medication Sig Dispense Refill    AMPICILLIN SODIUM (AMPICILLIN 2 G/100 ML NS, READY TO MIX SYSTEM,) Inject 100 mLs (2 g total) into the vein every 6 (six) hours. 9600 mL 0    aspirin (ECOTRIN) 81 MG EC tablet Take 1 tablet (81 mg total) by mouth once daily.  0    carvedilol (COREG) 25 MG tablet Take 1 tablet (25 mg total) by mouth 2 (two) times daily. 180 tablet 3    cefTRIAXone 2 g in dextrose 5 % 50 mL (ROCEPHIN) 2 g/50 mL PgBk IVPB Inject 50 mLs (2 g total) into the vein every 12 (twelve) hours. 48 each 0    dronabinol (MARINOL) 5 MG capsule Take 1 capsule (5 mg total) by mouth 3 (three) times daily before meals. 90 capsule 0    ondansetron (ZOFRAN-ODT) 8 MG TbDL Take 1 tablet (8 mg total) by mouth every 6 (six) hours as needed (nausea). 30 tablet 1    pantoprazole (PROTONIX) 40 MG tablet Take 1 tablet (40 mg total) by mouth once daily. 90 tablet 3    trazodone (DESYREL) 50 MG tablet Take 1 tablet (50 mg total) by mouth every evening. 90 tablet 3    scopolamine (TRANSDERM-SCOP) 1.3-1.5 mg (1 mg over 3 days) Place 1 patch onto the skin Every 3 (three) days. 10 patch 0       Past Surgical History:   Procedure Laterality Date    BLADDER SURGERY      CYSTOSCOPY      HERNIA REPAIR      Ileal Conduit      THROAT SURGERY         Social History     Social History    Marital status:      Spouse name: N/A    Number of children: N/A    Years of education: N/A     Occupational History    Not on file.     Social History Main Topics    Smoking status: Never Smoker    Smokeless tobacco: Never Used    Alcohol use No    Drug use: No    Sexual activity: Yes     Partners: Female     Birth control/ protection: None     Other Topics Concern    Not on file     Social History Narrative    No narrative on file         Vital Signs Range (Last 24H):  Temp:  [36 °C (96.8 °F)-36.9 °C (98.4 °F)]   Pulse:  [61-83]   Resp:  [1-60]   BP: (115-191)/(58-93)   SpO2:  [96 %-100 %]       CBC:   Recent  Labs      09/10/17   2345  09/11/17   0840   WBC  10.20  10.29   RBC  3.46*  3.16*   HGB  10.0*  9.3*   HCT  28.4*  26.1*   PLT  133*  139*   MCV  82  83   MCH  28.9  29.4   MCHC  35.2  35.6       CMP:   Recent Labs      09/10/17   2256  09/11/17   0405   NA  137  138   K  4.3  4.4   CL  110  110   CO2  18*  19*   BUN  22  23   CREATININE  2.8*  2.8*   GLU  113*  111*   MG  1.1*  3.0*   CALCIUM  7.6*  7.8*   ALBUMIN  2.2*  2.2*   PROT  4.4*  4.5*   ALKPHOS  40*  40*   ALT  6*  6*   AST  11  11   BILITOT  1.3*  1.1*       INR  Recent Labs      09/10/17   1151  09/10/17   2256  09/11/17   0405   INR  1.2  1.2  1.1   APTT  25.2  23.5  22.6       Diagnostic Studies:      EKG:  Vent. Rate : 075 BPM     Atrial Rate : 075 BPM     P-R Int : 154 ms          QRS Dur : 080 ms      QT Int : 402 ms       P-R-T Axes : 081 070 083 degrees     QTc Int : 448 ms    Normal sinus rhythm  Normal ECG  When compared with ECG of 04-AUG-2017 16:04,  No significant change was found  Confirmed by Blair Martin MD ([id number]) on 8/12/2017 2:46:17 PM    Referred By: UNKNOWN REFERRING           Confirmed By:Blair Martin MD    2D Echo:  CONCLUSIONS     1 - Normal left ventricular systolic function (EF 60-65%).     2 - No wall motion abnormalities.     3 - Normal left ventricular diastolic function.     4 - Normal right ventricular systolic function .     5 - The estimated PA systolic pressure is 27 mmHg.       This document has been electronically    SIGNED BY: Zakia Ramirez MD On: 08/05/2017 15:19      Specimen Collected: 08/05/17 10:07 Last Resulted: 08/05/17 15:23              Anesthesia Evaluation    I have reviewed the Patient Summary Reports.     I have reviewed the Medications.     Review of Systems  Anesthesia Hx:  No problems with previous Anesthesia  History of prior surgery of interest to airway management or planning: Previous anesthesia: General Airway issues documented on chart review include mask, easy, GETA, easy direct  laryngoscopy  Denies Family Hx of Anesthesia complications.   Denies Personal Hx of Anesthesia complications.   Hematology/Oncology:     Oncology Normal    -- Anemia:   EENT/Dental:EENT/Dental Normal   Cardiovascular:   Exercise tolerance: poor Hypertension Past MI CAD      Pulmonary:  Pulmonary Normal    Renal/:   Chronic Renal Disease, ARF, CRI    Hepatic/GI:  Hepatic/GI Normal    Musculoskeletal:  Musculoskeletal Normal    Neurological:   CVA, no residual symptoms    Endocrine:  Endocrine Normal    Psych:   Psychiatric History          Physical Exam  General:  Cachexia    Airway/Jaw/Neck:  Airway Findings: Mouth Opening: Normal Tongue: Normal  General Airway Assessment: Adult  Mallampati: II  TM Distance: Normal, at least 6 cm      Dental:  Dental Findings: In tact   Chest/Lungs:  Chest/Lungs Findings: Normal Respiratory Rate     Heart/Vascular:  Heart Findings: Rate: Normal  Rhythm: Regular Rhythm  Vascular Findings:  Vascular Access: PICC Line        Mental Status:  Mental Status Findings:  Cooperative, Alert and Oriented         Anesthesia Plan  Type of Anesthesia, risks & benefits discussed:  Anesthesia Type:  general, MAC  Patient's Preference:   Intra-op Monitoring Plan: standard ASA monitors  Intra-op Monitoring Plan Comments:   Post Op Pain Control Plan:   Post Op Pain Control Plan Comments:   Induction:   IV  Beta Blocker:  Patient is on a Beta-Blocker and has received one dose within the past 24 hours (No further documentation required).       Informed Consent: Patient understands risks and agrees with Anesthesia plan.  Questions answered. Anesthesia consent signed with patient.  ASA Score: 3     Day of Surgery Review of History & Physical:    H&P update referred to the provider.         Ready For Surgery From Anesthesia Perspective.

## 2017-09-11 NOTE — PLAN OF CARE
Problem: Patient Care Overview  Goal: Plan of Care Review  Dx: Acute blood loss anemia    PMH: Cancer (bladder and throat), CKD stage 3, embolic stroke involving right cerebellar artery, UTI    Nursing:  CBC Q8h  9/10/17: IR for embolization  9/10/17: Admitted to SICU for blood loss via urostomy stoma; 3 units PRBCs     Outcome: Ongoing (interventions implemented as appropriate)  No acute events overnight. All VSS currently. Pt given PRN labetalol for systolic BP >170. Minimal bleeding from urostomy stoma. MIVF @ 100ml/hr. Labs drawn and electrolytes replaced PRN. Plan of care reviewed with patient and all questions answered. Will continue to monitor patient.

## 2017-09-12 LAB
ABO + RH BLD: NORMAL
ALBUMIN SERPL BCP-MCNC: 2.1 G/DL
ALP SERPL-CCNC: 34 U/L
ALT SERPL W/O P-5'-P-CCNC: 5 U/L
ANION GAP SERPL CALC-SCNC: 7 MMOL/L
APTT BLDCRRT: 24.1 SEC
AST SERPL-CCNC: 9 U/L
BASOPHILS # BLD AUTO: 0.01 K/UL
BASOPHILS # BLD AUTO: 0.02 K/UL
BASOPHILS # BLD AUTO: 0.03 K/UL
BASOPHILS NFR BLD: 0.1 %
BASOPHILS NFR BLD: 0.2 %
BASOPHILS NFR BLD: 0.3 %
BILIRUB SERPL-MCNC: 0.2 MG/DL
BLD GP AB SCN CELLS X3 SERPL QL: NORMAL
BLD PROD TYP BPU: NORMAL
BLOOD UNIT EXPIRATION DATE: NORMAL
BLOOD UNIT TYPE CODE: 7300
BLOOD UNIT TYPE: NORMAL
BUN SERPL-MCNC: 34 MG/DL
CALCIUM SERPL-MCNC: 7.7 MG/DL
CHLORIDE SERPL-SCNC: 108 MMOL/L
CO2 SERPL-SCNC: 20 MMOL/L
CODING SYSTEM: NORMAL
CREAT SERPL-MCNC: 3.1 MG/DL
DIFFERENTIAL METHOD: ABNORMAL
DISPENSE STATUS: NORMAL
EOSINOPHIL # BLD AUTO: 0 K/UL
EOSINOPHIL # BLD AUTO: 0 K/UL
EOSINOPHIL # BLD AUTO: 0.1 K/UL
EOSINOPHIL NFR BLD: 0 %
EOSINOPHIL NFR BLD: 0.2 %
EOSINOPHIL NFR BLD: 1.1 %
ERYTHROCYTE [DISTWIDTH] IN BLOOD BY AUTOMATED COUNT: 14.7 %
ERYTHROCYTE [DISTWIDTH] IN BLOOD BY AUTOMATED COUNT: 14.8 %
ERYTHROCYTE [DISTWIDTH] IN BLOOD BY AUTOMATED COUNT: 15.2 %
EST. GFR  (AFRICAN AMERICAN): 22.7 ML/MIN/1.73 M^2
EST. GFR  (NON AFRICAN AMERICAN): 19.6 ML/MIN/1.73 M^2
GLUCOSE SERPL-MCNC: 210 MG/DL
HCT VFR BLD AUTO: 19.2 %
HCT VFR BLD AUTO: 20.6 %
HCT VFR BLD AUTO: 21.3 %
HGB BLD-MCNC: 6.9 G/DL
HGB BLD-MCNC: 7.5 G/DL
HGB BLD-MCNC: 7.8 G/DL
INR PPP: 1
LYMPHOCYTES # BLD AUTO: 0.6 K/UL
LYMPHOCYTES # BLD AUTO: 1 K/UL
LYMPHOCYTES # BLD AUTO: 1.1 K/UL
LYMPHOCYTES NFR BLD: 10.4 %
LYMPHOCYTES NFR BLD: 6 %
LYMPHOCYTES NFR BLD: 9.3 %
MAGNESIUM SERPL-MCNC: 2.3 MG/DL
MCH RBC QN AUTO: 29.1 PG
MCH RBC QN AUTO: 29.2 PG
MCH RBC QN AUTO: 29.2 PG
MCHC RBC AUTO-ENTMCNC: 35.9 G/DL
MCHC RBC AUTO-ENTMCNC: 36.4 G/DL
MCHC RBC AUTO-ENTMCNC: 36.6 G/DL
MCV RBC AUTO: 80 FL
MCV RBC AUTO: 80 FL
MCV RBC AUTO: 81 FL
MONOCYTES # BLD AUTO: 0.5 K/UL
MONOCYTES # BLD AUTO: 0.6 K/UL
MONOCYTES # BLD AUTO: 0.7 K/UL
MONOCYTES NFR BLD: 4.9 %
MONOCYTES NFR BLD: 5.6 %
MONOCYTES NFR BLD: 5.7 %
NEUTROPHILS # BLD AUTO: 10.2 K/UL
NEUTROPHILS # BLD AUTO: 8 K/UL
NEUTROPHILS # BLD AUTO: 8.9 K/UL
NEUTROPHILS NFR BLD: 82.3 %
NEUTROPHILS NFR BLD: 84.2 %
NEUTROPHILS NFR BLD: 88.6 %
PLATELET # BLD AUTO: 137 K/UL
PLATELET # BLD AUTO: 150 K/UL
PLATELET # BLD AUTO: 169 K/UL
PMV BLD AUTO: 8.9 FL
PMV BLD AUTO: 8.9 FL
PMV BLD AUTO: 9 FL
POTASSIUM SERPL-SCNC: 4.1 MMOL/L
PROT SERPL-MCNC: 4.5 G/DL
PROTHROMBIN TIME: 10.7 SEC
RBC # BLD AUTO: 2.36 M/UL
RBC # BLD AUTO: 2.58 M/UL
RBC # BLD AUTO: 2.67 M/UL
SODIUM SERPL-SCNC: 135 MMOL/L
TRANS ERYTHROCYTES VOL PATIENT: NORMAL ML
WBC # BLD AUTO: 10 K/UL
WBC # BLD AUTO: 12.09 K/UL
WBC # BLD AUTO: 9.75 K/UL

## 2017-09-12 PROCEDURE — 86900 BLOOD TYPING SEROLOGIC ABO: CPT

## 2017-09-12 PROCEDURE — 25000003 PHARM REV CODE 250: Performed by: UROLOGY

## 2017-09-12 PROCEDURE — P9021 RED BLOOD CELLS UNIT: HCPCS

## 2017-09-12 PROCEDURE — 25000003 PHARM REV CODE 250: Performed by: STUDENT IN AN ORGANIZED HEALTH CARE EDUCATION/TRAINING PROGRAM

## 2017-09-12 PROCEDURE — 25000003 PHARM REV CODE 250: Performed by: ANESTHESIOLOGY

## 2017-09-12 PROCEDURE — 11000001 HC ACUTE MED/SURG PRIVATE ROOM

## 2017-09-12 PROCEDURE — B4185 PARENTERAL SOL 10 GM LIPIDS: HCPCS | Performed by: STUDENT IN AN ORGANIZED HEALTH CARE EDUCATION/TRAINING PROGRAM

## 2017-09-12 PROCEDURE — 83735 ASSAY OF MAGNESIUM: CPT

## 2017-09-12 PROCEDURE — 97802 MEDICAL NUTRITION INDIV IN: CPT

## 2017-09-12 PROCEDURE — 63600175 PHARM REV CODE 636 W HCPCS: Performed by: ANESTHESIOLOGY

## 2017-09-12 PROCEDURE — 36430 TRANSFUSION BLD/BLD COMPNT: CPT

## 2017-09-12 PROCEDURE — 36415 COLL VENOUS BLD VENIPUNCTURE: CPT

## 2017-09-12 PROCEDURE — 86920 COMPATIBILITY TEST SPIN: CPT

## 2017-09-12 PROCEDURE — 63600175 PHARM REV CODE 636 W HCPCS: Performed by: UROLOGY

## 2017-09-12 PROCEDURE — 85025 COMPLETE CBC W/AUTO DIFF WBC: CPT

## 2017-09-12 PROCEDURE — 86850 RBC ANTIBODY SCREEN: CPT

## 2017-09-12 PROCEDURE — 80053 COMPREHEN METABOLIC PANEL: CPT

## 2017-09-12 PROCEDURE — A4216 STERILE WATER/SALINE, 10 ML: HCPCS | Performed by: ANESTHESIOLOGY

## 2017-09-12 PROCEDURE — 85610 PROTHROMBIN TIME: CPT

## 2017-09-12 PROCEDURE — 99024 POSTOP FOLLOW-UP VISIT: CPT | Mod: ,,, | Performed by: UROLOGY

## 2017-09-12 PROCEDURE — 85730 THROMBOPLASTIN TIME PARTIAL: CPT

## 2017-09-12 RX ORDER — HYDROCODONE BITARTRATE AND ACETAMINOPHEN 500; 5 MG/1; MG/1
TABLET ORAL
Status: DISCONTINUED | OUTPATIENT
Start: 2017-09-12 | End: 2017-09-14 | Stop reason: HOSPADM

## 2017-09-12 RX ADMIN — AMPICILLIN SODIUM 2 G: 2 INJECTION, POWDER, FOR SOLUTION INTRAMUSCULAR; INTRAVENOUS at 05:09

## 2017-09-12 RX ADMIN — TRAZODONE HYDROCHLORIDE 50 MG: 50 TABLET ORAL at 10:09

## 2017-09-12 RX ADMIN — PANTOPRAZOLE SODIUM 40 MG: 40 TABLET, DELAYED RELEASE ORAL at 08:09

## 2017-09-12 RX ADMIN — CARVEDILOL 25 MG: 25 TABLET, FILM COATED ORAL at 10:09

## 2017-09-12 RX ADMIN — DRONABINOL 2.5 MG: 2.5 CAPSULE ORAL at 08:09

## 2017-09-12 RX ADMIN — STANDARDIZED SENNA CONCENTRATE AND DOCUSATE SODIUM 1 TABLET: 8.6; 5 TABLET, FILM COATED ORAL at 10:09

## 2017-09-12 RX ADMIN — AMPICILLIN SODIUM 2 G: 2 INJECTION, POWDER, FOR SOLUTION INTRAMUSCULAR; INTRAVENOUS at 11:09

## 2017-09-12 RX ADMIN — SCOPALAMINE 1 PATCH: 1 PATCH, EXTENDED RELEASE TRANSDERMAL at 11:09

## 2017-09-12 RX ADMIN — AMPICILLIN SODIUM 2 G: 2 INJECTION, POWDER, FOR SOLUTION INTRAMUSCULAR; INTRAVENOUS at 12:09

## 2017-09-12 RX ADMIN — ASCORBIC ACID, VITAMIN A PALMITATE, CHOLECALCIFEROL, THIAMINE HYDROCHLORIDE, RIBOFLAVIN-5 PHOSPHATE SODIUM, PYRIDOXINE HYDROCHLORIDE, NIACINAMIDE, DEXPANTHENOL, ALPHA-TOCOPHEROL ACETATE, VITAMIN K1, FOLIC ACID, BIOTIN, CYANOCOBALAMIN: 200; 3300; 200; 6; 3.6; 6; 40; 15; 10; 150; 600; 60; 5 INJECTION, SOLUTION INTRAVENOUS at 10:09

## 2017-09-12 RX ADMIN — DRONABINOL 2.5 MG: 2.5 CAPSULE ORAL at 10:09

## 2017-09-12 RX ADMIN — CARVEDILOL 25 MG: 25 TABLET, FILM COATED ORAL at 08:09

## 2017-09-12 RX ADMIN — CEFTRIAXONE 2 G: 2 INJECTION, SOLUTION INTRAVENOUS at 01:09

## 2017-09-12 RX ADMIN — CEFTRIAXONE 2 G: 2 INJECTION, SOLUTION INTRAVENOUS at 12:09

## 2017-09-12 RX ADMIN — SOYBEAN OIL 250 ML: 20 INJECTION, SOLUTION INTRAVENOUS at 10:09

## 2017-09-12 RX ADMIN — Medication 3 ML: at 01:09

## 2017-09-12 RX ADMIN — MIRTAZAPINE 7.5 MG: 7.5 TABLET ORAL at 09:09

## 2017-09-12 RX ADMIN — STANDARDIZED SENNA CONCENTRATE AND DOCUSATE SODIUM 1 TABLET: 8.6; 5 TABLET, FILM COATED ORAL at 08:09

## 2017-09-12 RX ADMIN — POLYETHYLENE GLYCOL 3350 17 G: 17 POWDER, FOR SOLUTION ORAL at 08:09

## 2017-09-12 NOTE — PROGRESS NOTES
"[facility]  Urology  Progress Note    Patient Name: Juan Manuel Koehler  MRN: [id number]  Admission Date: 9/7/2017  Hospital Length of Stay: 5 days  Code Status: Full Code   Attending Provider: Villa Robles MD   Primary Care Physician: Hemant Sweeney MD    Subjective:     HPI:  Juan Manuel Koehler is a 67 y.o male w hx of invasive bladder cancer s/p open radical cystoprostatectomy, bilateral pelvic lymph node dissection, and creation of an ileal conduit on 6/21/17.     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 He represented to Dr. Boucher for hematuria and was found to have a large bladder tumor on his trigone. The tumor was also causing bilateral ureteral obstruction with resulting ELDER.     The patient underwent tumor resection and attempted JJ stent placement on 4/5/17 which demonstrated cT2 urothelial carcinoma (nested variant). Bilateral nephrostomy tubes were placed with improvement in the patient's renal function. The patient's eGFR fluctuated, and, ultimately, he could not receive neoadjuvant cisplatin-based chemotherapy due to his creatinine clearance.     Thus, the patient underwent surgery on 6/21/17 and did very well in the immediate post-operative period.     His pathology revealed urothelial carcinoma with nested variant into the perivesical fat. His lymph nodes and margins are negative. His final pathologic stage is [id number].     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 Has been slowly recovering. He was recently discharged from rehab and has been making progress.     He returned to clinic, after being seen by enterostomal therapy, with blood per stoma and leakage of urine after having some tissue next to the stoma excised. Urine may now be leaking through the skin. Overnight he has had 1500 cc of what his wife describes as "bloody" output. He " "became dizzy this AM and "crumpled" to the ground without hitting his head. He has not lost consciousness. He notes recent vomiting that started with IV Abx - currently on rocephin and ampicillin for treatment of vegetative endocarditis.    Interval History:   EGD yesterday  Hasnt tried po overnight, not much of an appetite  No issues overnight    Review of Systems  Objective:     Temp:  [97.9 °F (36.6 °C)-98.6 °F (37 °C)] 97.9 °F (36.6 °C)  Pulse:  [63-81] 68  Resp:  [18-20] 20  SpO2:  [97 %-99 %] 97 %  BP: (130-162)/(60-91) 150/72     Body mass index is 16.98 kg/m².            Drains     Drain                 Ureteral Drain/Stent -- days         Nephrostomy 05/05/17 1503 Right 8 Fr. 129 days         Nephrostomy 05/05/17 1504 Left 8 Fr. 129 days         Ureteral Drain/Stent 06/21/17 1053 Left ureter 6 Fr. 83 days         Ureteral Drain/Stent 06/21/17 1054 Right ureter 6 Fr. 83 days         Closed/Suction Drain 06/21/17 1221 Abdomen Bulb 19 Fr. 82 days         Urostomy 08/02/17 2345 ileal conduit RUQ 40 days         Urostomy 09/07/17 1522  RLQ 4 days    Arterial Sheath 09/10/17 1623 Right 1 day                Physical Exam   Vitals reviewed.  Constitutional: He is oriented to person, place, and time. No distress.   HENT:   Head: Normocephalic and atraumatic.   Eyes: Pupils are equal, round, and reactive to light. No scleral icterus.   Neck: No JVD present.   Cardiovascular: Normal rate and regular rhythm.    Pulmonary/Chest: Effort normal. No respiratory distress.   Abdominal: Soft. He exhibits no distension. There is no tenderness. There is no rebound and no guarding.   Urostomy with dusky appearance, slightly improved from yesterday  Minimal blood noted in urostomy bag  14 Fr bolanos in place   Musculoskeletal: He exhibits no edema.   Neurological: He is alert and oriented to person, place, and time.   Skin: Skin is dry. He is not diaphoretic. There is pallor.     Psychiatric: He has a normal mood and affect. His " behavior is normal.       Significant Labs:    BMP:    Recent Labs  Lab 09/10/17  2256 09/11/17  0405 09/12/17  0503    138 135*   K 4.3 4.4 4.1    110 108   CO2 18* 19* 20*   BUN 22 23 34*   CREATININE 2.8* 2.8* 3.1*   CALCIUM 7.6* 7.8* 7.7*       CBC:     Recent Labs  Lab 09/11/17  0840 09/12/17  0156 09/12/17  0833   WBC 10.29 10.00 12.09   HGB 9.3* 7.8* 7.5*   HCT 26.1* 21.3* 20.6*   * 150 169       All pertinent labs results from the past 24 hours have been reviewed.    Significant Imaging:  All pertinent imaging results/findings from the past 24 hours have been reviewed.                  Assessment/Plan:     * Urinary stoma complication    Juan Manuel Koehler is a 67 y.o. male with hx of invasive bladder cancer s/p open radical cystoprostatectomy, bilateral pelvic lymph node dissection, and creation of an ileal conduit on 6/21/17, now with blood per ostomy site and acute anemia.     - Regular diet  - Reorder TPN  - mirtazapine, dronabinol  - Continue amp and rocephin   - Ambulate/OOB  - KAMARI/SCDs  - Home meds restarted  - H/H dropped slightly, continue to monitor  - Will continue to monitor appearance of stoma            VTE Risk Mitigation         Ordered     Medium Risk of VTE  Once      09/10/17 2248     Place KAMARI hose  Until discontinued      09/10/17 2248     Place sequential compression device  Until discontinued      09/10/17 2248          Bonifacio Segura MD  Urology  [facility]

## 2017-09-12 NOTE — PLAN OF CARE
KRISTEL was contacted by the nurse to meet with the pt.  THe pt wanted to confirm that his HH and IV abx were set up and he would be ready to dc tomorrow.  KRISTEL informed him that everything has been arranged.  KRISTEL informed Becky that he will dc tomorrow. He has all of his needed ABX at his house.  KRISTEL called [facility]  and informed them that the pt will dc tomorrow.    Suzanne Johnson, [facility] x [phone]

## 2017-09-12 NOTE — TREATMENT PLAN
Treatment Plan  09/11/2017  7:24 PM    EGD performed today with impression and recommendations below.    Impression:             - Normal examined duodenum. Biopsied.  - A few gastric polyps. Resected and retrieved.  - 2 cm hiatal hernia.  - Biopsies were taken with a cold forceps for Helicobacter pylori testing.    Recommendation:        - Return patient to hospital bills for ongoing care.  - Follow an antireflux regimen.  - Await pathology results.  - Telephone endoscopist for pathology results in 2 weeks.  - Repeat upper endoscopy in 5 years for surveillance based on pathology results.  - The findings and recommendations were discussed with the patient.  - The findings and recommendations were discussed with the patient's primary physician.  - The findings and recommendations were discussed with the patient's family.    -Patient can have a full liquid diet   -At this point will sign off, if any additional questions please contact us    Chasity Crespo M.D.  Gastroenterology Fellow, PGY-IV  Pager: [phone]  [facility]

## 2017-09-12 NOTE — SUBJECTIVE & OBJECTIVE
Interval History:   EGD yesterday  Hasnt tried po overnight, not much of an appetite  No issues overnight    Review of Systems  Objective:     Temp:  [97.9 °F (36.6 °C)-98.6 °F (37 °C)] 97.9 °F (36.6 °C)  Pulse:  [63-81] 68  Resp:  [18-20] 20  SpO2:  [97 %-99 %] 97 %  BP: (130-162)/(60-91) 150/72     Body mass index is 16.98 kg/m².            Drains     Drain                 Ureteral Drain/Stent -- days         Nephrostomy 05/05/17 1503 Right 8 Fr. 129 days         Nephrostomy 05/05/17 1504 Left 8 Fr. 129 days         Ureteral Drain/Stent 06/21/17 1053 Left ureter 6 Fr. 83 days         Ureteral Drain/Stent 06/21/17 1054 Right ureter 6 Fr. 83 days         Closed/Suction Drain 06/21/17 1221 Abdomen Bulb 19 Fr. 82 days         Urostomy 08/02/17 2345 ileal conduit RUQ 40 days         Urostomy 09/07/17 1522  RLQ 4 days    Arterial Sheath 09/10/17 1623 Right 1 day                Physical Exam   Vitals reviewed.  Constitutional: He is oriented to person, place, and time. No distress.   HENT:   Head: Normocephalic and atraumatic.   Eyes: Pupils are equal, round, and reactive to light. No scleral icterus.   Neck: No JVD present.   Cardiovascular: Normal rate and regular rhythm.    Pulmonary/Chest: Effort normal. No respiratory distress.   Abdominal: Soft. He exhibits no distension. There is no tenderness. There is no rebound and no guarding.   Urostomy with dusky appearance, slightly improved from yesterday  Minimal blood noted in urostomy bag  14 Fr bolanos in place   Musculoskeletal: He exhibits no edema.   Neurological: He is alert and oriented to person, place, and time.   Skin: Skin is dry. He is not diaphoretic. There is pallor.     Psychiatric: He has a normal mood and affect. His behavior is normal.       Significant Labs:    BMP:    Recent Labs  Lab 09/10/17  2256 09/11/17  0405 09/12/17  0503    138 135*   K 4.3 4.4 4.1    110 108   CO2 18* 19* 20*   BUN 22 23 34*   CREATININE 2.8* 2.8* 3.1*   CALCIUM  7.6* 7.8* 7.7*       CBC:     Recent Labs  Lab 09/11/17  0840 09/12/17  0156 09/12/17  0833   WBC 10.29 10.00 12.09   HGB 9.3* 7.8* 7.5*   HCT 26.1* 21.3* 20.6*   * 150 169       All pertinent labs results from the past 24 hours have been reviewed.    Significant Imaging:  All pertinent imaging results/findings from the past 24 hours have been reviewed.

## 2017-09-12 NOTE — PROGRESS NOTES
[facility]  Adult Nutrition  Progress Note    SUMMARY     Recommendations  Recommendation/Intervention:   1. Encourage increased PO intake as tolerated.   2. If PO intake poor, recommend adding Boost Plus OS to all meals to increase calorie and protein intake.   3. Once PO intake > 50%, recommend discontinuing lipids and weaning TPN.   RD to monitor.    Goals: Patient to meet > 85% EEN and EPN  Nutrition Goal Status: new  Communication of RD Recs: reviewed with RN    Reason for Assessment  Reason for Assessment: new TPN  Diagnosis:  (urinary stoma complication)  Relevent Medical History: CKD3, HTN, CVA, urothelial carcinoma of bladder s/p radical cystectomy 6/21/17   General Information Comments: Patient started on Regular diet last night, had not eaten yet at time of visit. On TPN.  Nutrition Discharge Planning: Adequate nutrition via PO intake.    Nutrition Prescription Ordered  Current Diet Order: Regular  Current Nutrition Support Formula Ordered: Clinimix E 5/20 (+ lipids)  Current Nutrition Support Rate Ordered: 75 (ml)  Current Nutrition Support Frequency Ordered: mL/hr      Evaluation of Received Nutrients/Fluid Intake  Parenteral Calories (kcal): 1478  Parenteral Protein (gm): 90  Parenteral Fluid (mL): 1800  Total Calories (kcal): 2084  Energy Calories Required: meeting needs  % Kcal Needs: 100  Protein Required: meeting needs  % Protein Needs: 100  IV Fluid (mL): 1200  Total Fluid Intake (mL): 3000  GIR (Glucose Infusion Rate) (mg/kg/min): 4.2 mg/kg/min  Lipid Calories (kcals): 500 kcals  I/O: +3.5L since admit  Fluid Required: exceed needs  Tolerance: tolerating  % Intake of Estimated Energy Needs: 75 - 100 %  % Meal Intake: Other: had not eaten yet     Nutrition Risk Screen   Nutrition Risk Screen: no indicators present    Nutrition/Diet History  Patient Reported Diet/Restrictions/Preferences: general  Food Preferences: No relgious or cultural needs identified at this  "time.    Labs/Tests/Procedures/Meds   Pertinent Labs Reviewed: reviewed  Pertinent Labs Comments: Na 135, BUN 34, Creat 3.1, Glu 210, POCT Glu , Ca 7.7, Alb 2.1  Pertinent Medications Reviewed: reviewed  Pertinent Medications Comments: pantoprazole, IVF    Physical Findings  Overall Physical Appearance: underweight  Tubes:  (urostomy)  Oral/Mouth Cavity: tooth/teeth missing  Skin: intact    Anthropometrics  Height: 6' 2" (188 cm)  Weight Method: Bed Scale  Weight: 60 kg (132 lb 4.4 oz)  Ideal Body Weight (IBW), Male: 190 lb  % Ideal Body Weight, Male (lb): 68.42 lb  BMI (Calculated): 16.7  BMI Grade: 16 - 16.9 protein-energy malnutrition grade II  Weight Loss: unintentional  Usual Body Weight (UBW), k.9 kg (per chart review 3/2017)  Weight Change Amount: 41 lb 10.7 oz    Estimated/Assessed Needs  Weight Used For Calorie Calculations: 60 kg (132 lb 4.4 oz)   Energy Calorie Requirements (kcal): 6703-1967 kcal/day  Energy Need Method: Kcal/kg (30-35 kcal/kg)  RMR ([place]-St. Jeor Equation): 1439.75  Weight Used For Protein Calculations: 60 kg (132 lb 4.4 oz)  Protein Requirements: 78-90 g/day (1.3-1.5 g/kg)  RDA Method (mL): 1800    Assessment and Plan  Nutrition Problem  Inadequate energy intake    Related to (etiology):   Decreased ability to consume sufficient energy    Signs and Symptoms (as evidenced by):   Diet recently advanced, no PO intake yet, and requiring TPN    Interventions/Recommendations (treatment strategy):  See RD recs above.    Nutrition Diagnosis Status:   New    Monitor and Evaluation  Food and Nutrient Intake: energy intake, food and beverage intake, parenteral nutrition intake  Food and Nutrient Adminstration: diet order, enteral and parenteral nutrition administration  Physical Activity and Function: nutrition-related ADLs and IADLs  Anthropometric Measurements: weight, weight change  Biochemical Data, Medical Tests and Procedures: electrolyte and renal panel, gastrointestinal " profile, glucose/endocrine profile, inflammatory profile, lipid profile  Nutrition-Focused Physical Findings: overall appearance    Nutrition Risk  Level of Risk:  (1x/week)    Nutrition Follow-Up  RD Follow-up?: Yes

## 2017-09-12 NOTE — PLAN OF CARE
Problem: Patient Care Overview  Goal: Plan of Care Review  Dx: Acute blood loss anemia    PMH: Cancer (bladder and throat), CKD stage 3, embolic stroke involving right cerebellar artery, UTI    Nursing:  CBC Q8h  9/10/17: IR for embolization  9/10/17: Admitted to SICU for blood loss via urostomy stoma; 3 units PRBCs     Outcome: Ongoing (interventions implemented as appropriate)  Patient is awake, alert and oriented. Patient has complained of not being able to sleep. Patient has slept the whole shift but he does not appear to be rested.   Patient is ambulatory and independent. Remained free from injury and falls this shift. Bed is in the low and locked position with side rail up x 2. Call light is within reach. Informed to call if need assistance. Will continue to monitor.

## 2017-09-12 NOTE — ASSESSMENT & PLAN NOTE
Juan Manuel Koehler is a 67 y.o. male with hx of invasive bladder cancer s/p open radical cystoprostatectomy, bilateral pelvic lymph node dissection, and creation of an ileal conduit on 6/21/17, now with blood per ostomy site and acute anemia.     - Regular diet  - Reorder TPN  - mirtazapine, dronabinol  - Continue amp and rocephin   - Ambulate/OOB  - KAMARI/SCDs  - Home meds restarted  - H/H dropped slightly, continue to monitor  - Will continue to monitor appearance of stoma

## 2017-09-12 NOTE — PLAN OF CARE
Problem: Patient Care Overview  Goal: Plan of Care Review  Outcome: Ongoing (interventions implemented as appropriate)  Recommendations  1. Encourage increased PO intake as tolerated.   2. If PO intake poor, recommend adding Boost Plus OS to all meals to increase calorie and protein intake.   3. Once PO intake > 50%, recommend discontinuing lipids and weaning TPN.     RD to monitor. Full assessment completed. See RD Progress Note 9/12/17.

## 2017-09-13 LAB
ALBUMIN SERPL BCP-MCNC: 1.9 G/DL
ALP SERPL-CCNC: 36 U/L
ALT SERPL W/O P-5'-P-CCNC: <5 U/L
ANION GAP SERPL CALC-SCNC: 9 MMOL/L
APTT BLDCRRT: 25.1 SEC
AST SERPL-CCNC: 10 U/L
BASOPHILS # BLD AUTO: 0.03 K/UL
BASOPHILS NFR BLD: 0.4 %
BILIRUB SERPL-MCNC: 0.3 MG/DL
BUN SERPL-MCNC: 41 MG/DL
CALCIUM SERPL-MCNC: 7.5 MG/DL
CHLORIDE SERPL-SCNC: 109 MMOL/L
CO2 SERPL-SCNC: 18 MMOL/L
CREAT SERPL-MCNC: 3.2 MG/DL
DIFFERENTIAL METHOD: ABNORMAL
EOSINOPHIL # BLD AUTO: 0.2 K/UL
EOSINOPHIL NFR BLD: 2 %
EOSINOPHIL NFR BLD: 2 %
EOSINOPHIL NFR BLD: 2.1 %
ERYTHROCYTE [DISTWIDTH] IN BLOOD BY AUTOMATED COUNT: 15 %
ERYTHROCYTE [DISTWIDTH] IN BLOOD BY AUTOMATED COUNT: 15 %
ERYTHROCYTE [DISTWIDTH] IN BLOOD BY AUTOMATED COUNT: 15.1 %
EST. GFR  (AFRICAN AMERICAN): 21.8 ML/MIN/1.73 M^2
EST. GFR  (NON AFRICAN AMERICAN): 18.9 ML/MIN/1.73 M^2
GLUCOSE SERPL-MCNC: 110 MG/DL
HCT VFR BLD AUTO: 22.2 %
HCT VFR BLD AUTO: 23.6 %
HCT VFR BLD AUTO: 23.6 %
HGB BLD-MCNC: 8 G/DL
HGB BLD-MCNC: 8.4 G/DL
HGB BLD-MCNC: 8.4 G/DL
INR PPP: 1
INR PPP: 1
LYMPHOCYTES # BLD AUTO: 0.8 K/UL
LYMPHOCYTES # BLD AUTO: 0.8 K/UL
LYMPHOCYTES # BLD AUTO: 1.1 K/UL
LYMPHOCYTES NFR BLD: 13 %
LYMPHOCYTES NFR BLD: 9.8 %
LYMPHOCYTES NFR BLD: 9.8 %
MAGNESIUM SERPL-MCNC: 1.9 MG/DL
MCH RBC QN AUTO: 29.6 PG
MCH RBC QN AUTO: 29.6 PG
MCH RBC QN AUTO: 30.1 PG
MCHC RBC AUTO-ENTMCNC: 35.6 G/DL
MCHC RBC AUTO-ENTMCNC: 35.6 G/DL
MCHC RBC AUTO-ENTMCNC: 36 G/DL
MCV RBC AUTO: 83 FL
MCV RBC AUTO: 83 FL
MCV RBC AUTO: 84 FL
MONOCYTES # BLD AUTO: 0.5 K/UL
MONOCYTES # BLD AUTO: 0.6 K/UL
MONOCYTES # BLD AUTO: 0.6 K/UL
MONOCYTES NFR BLD: 5.6 %
MONOCYTES NFR BLD: 7.2 %
MONOCYTES NFR BLD: 7.2 %
NEUTROPHILS # BLD AUTO: 6.4 K/UL
NEUTROPHILS # BLD AUTO: 6.4 K/UL
NEUTROPHILS # BLD AUTO: 6.7 K/UL
NEUTROPHILS NFR BLD: 78.3 %
NEUTROPHILS NFR BLD: 80 %
NEUTROPHILS NFR BLD: 80 %
PLATELET # BLD AUTO: 132 K/UL
PLATELET # BLD AUTO: 132 K/UL
PLATELET # BLD AUTO: 141 K/UL
PMV BLD AUTO: 8.8 FL
PMV BLD AUTO: 8.8 FL
PMV BLD AUTO: 9.1 FL
POTASSIUM SERPL-SCNC: 3.6 MMOL/L
PROT SERPL-MCNC: 4.2 G/DL
PROTHROMBIN TIME: 10.5 SEC
PROTHROMBIN TIME: 10.5 SEC
RBC # BLD AUTO: 2.66 M/UL
RBC # BLD AUTO: 2.84 M/UL
RBC # BLD AUTO: 2.84 M/UL
SODIUM SERPL-SCNC: 136 MMOL/L
WBC # BLD AUTO: 8.04 K/UL
WBC # BLD AUTO: 8.04 K/UL
WBC # BLD AUTO: 8.51 K/UL

## 2017-09-13 PROCEDURE — 80053 COMPREHEN METABOLIC PANEL: CPT

## 2017-09-13 PROCEDURE — 25000003 PHARM REV CODE 250: Performed by: STUDENT IN AN ORGANIZED HEALTH CARE EDUCATION/TRAINING PROGRAM

## 2017-09-13 PROCEDURE — 85610 PROTHROMBIN TIME: CPT

## 2017-09-13 PROCEDURE — 97116 GAIT TRAINING THERAPY: CPT

## 2017-09-13 PROCEDURE — 25000003 PHARM REV CODE 250: Performed by: UROLOGY

## 2017-09-13 PROCEDURE — 63600175 PHARM REV CODE 636 W HCPCS: Performed by: ANESTHESIOLOGY

## 2017-09-13 PROCEDURE — 85730 THROMBOPLASTIN TIME PARTIAL: CPT

## 2017-09-13 PROCEDURE — 63600175 PHARM REV CODE 636 W HCPCS: Performed by: UROLOGY

## 2017-09-13 PROCEDURE — 11000001 HC ACUTE MED/SURG PRIVATE ROOM

## 2017-09-13 PROCEDURE — 25000003 PHARM REV CODE 250: Performed by: ANESTHESIOLOGY

## 2017-09-13 PROCEDURE — A4216 STERILE WATER/SALINE, 10 ML: HCPCS | Performed by: ANESTHESIOLOGY

## 2017-09-13 PROCEDURE — 83735 ASSAY OF MAGNESIUM: CPT

## 2017-09-13 PROCEDURE — 99232 SBSQ HOSP IP/OBS MODERATE 35: CPT | Mod: ,,, | Performed by: UROLOGY

## 2017-09-13 PROCEDURE — 97161 PT EVAL LOW COMPLEX 20 MIN: CPT

## 2017-09-13 PROCEDURE — 85025 COMPLETE CBC W/AUTO DIFF WBC: CPT | Mod: 91

## 2017-09-13 RX ADMIN — DRONABINOL 2.5 MG: 2.5 CAPSULE ORAL at 08:09

## 2017-09-13 RX ADMIN — AMPICILLIN SODIUM 2 G: 2 INJECTION, POWDER, FOR SOLUTION INTRAMUSCULAR; INTRAVENOUS at 06:09

## 2017-09-13 RX ADMIN — STANDARDIZED SENNA CONCENTRATE AND DOCUSATE SODIUM 1 TABLET: 8.6; 5 TABLET, FILM COATED ORAL at 08:09

## 2017-09-13 RX ADMIN — POLYETHYLENE GLYCOL 3350 17 G: 17 POWDER, FOR SOLUTION ORAL at 09:09

## 2017-09-13 RX ADMIN — AMPICILLIN SODIUM 2 G: 2 INJECTION, POWDER, FOR SOLUTION INTRAMUSCULAR; INTRAVENOUS at 01:09

## 2017-09-13 RX ADMIN — DRONABINOL 2.5 MG: 2.5 CAPSULE ORAL at 09:09

## 2017-09-13 RX ADMIN — CARVEDILOL 25 MG: 25 TABLET, FILM COATED ORAL at 08:09

## 2017-09-13 RX ADMIN — ASCORBIC ACID, VITAMIN A PALMITATE, CHOLECALCIFEROL, THIAMINE HYDROCHLORIDE, RIBOFLAVIN-5 PHOSPHATE SODIUM, PYRIDOXINE HYDROCHLORIDE, NIACINAMIDE, DEXPANTHENOL, ALPHA-TOCOPHEROL ACETATE, VITAMIN K1, FOLIC ACID, BIOTIN, CYANOCOBALAMIN: 200; 3300; 200; 6; 3.6; 6; 40; 15; 10; 150; 600; 60; 5 INJECTION, SOLUTION INTRAVENOUS at 11:09

## 2017-09-13 RX ADMIN — MIRTAZAPINE 7.5 MG: 7.5 TABLET ORAL at 08:09

## 2017-09-13 RX ADMIN — PANTOPRAZOLE SODIUM 40 MG: 40 TABLET, DELAYED RELEASE ORAL at 09:09

## 2017-09-13 RX ADMIN — STANDARDIZED SENNA CONCENTRATE AND DOCUSATE SODIUM 1 TABLET: 8.6; 5 TABLET, FILM COATED ORAL at 09:09

## 2017-09-13 RX ADMIN — OXYCODONE HYDROCHLORIDE AND ACETAMINOPHEN 1 TABLET: 5; 325 TABLET ORAL at 08:09

## 2017-09-13 RX ADMIN — TRAZODONE HYDROCHLORIDE 50 MG: 50 TABLET ORAL at 08:09

## 2017-09-13 RX ADMIN — Medication 3 ML: at 08:09

## 2017-09-13 RX ADMIN — CARVEDILOL 25 MG: 25 TABLET, FILM COATED ORAL at 09:09

## 2017-09-13 RX ADMIN — CEFTRIAXONE 2 G: 2 INJECTION, SOLUTION INTRAVENOUS at 02:09

## 2017-09-13 RX ADMIN — CEFTRIAXONE 2 G: 2 INJECTION, SOLUTION INTRAVENOUS at 01:09

## 2017-09-13 RX ADMIN — AMPICILLIN SODIUM 2 G: 2 INJECTION, POWDER, FOR SOLUTION INTRAMUSCULAR; INTRAVENOUS at 11:09

## 2017-09-13 NOTE — PT/OT/SLP EVAL
"Physical Therapy  Evaluation    Juan Manuel Koehler   MRN: [id number]   Admitting Diagnosis: Urinary stoma complication    PT Received On: 09/13/17  PT Start Time: 0858     PT Stop Time: 0921    PT Total Time (min): 23 min       Billable Minutes:  Evaluation 10 and Gait [id number]    Diagnosis: Urinary stoma complication    Past Medical History:   Diagnosis Date    Cancer     bladder and throat    CKD (chronic kidney disease) stage 3, GFR 30-59 ml/min     Embolic stroke involving right cerebellar artery 8/4/2017    Urinary tract infection       Past Surgical History:   Procedure Laterality Date    BLADDER SURGERY      CYSTOSCOPY      HERNIA REPAIR      Ileal Conduit      THROAT SURGERY         Referring physician: Bonifacio Segura MD  Date referred to PT: 9/12/17    General Precautions: Standard, aspiration  Orthopedic Precautions: N/A   Braces: N/A       Patient History:  Pt states living in a 2SH with no SHAVONNE with his wife. Pts states his bed/bathroom are on 2nd floor with 12 steps to second floor. Pt states he is planning on staying on first floor (only has half bath and they are renting a hospital bed.) Pt states being using a RW for mobility and a shower chair. Prior to patient's stroke in August 2017, pt was independent with all mobility and ADLs, driving and working.  Equipment used at home:  Rolling Walker, Straight Cane and shower chair.  Equipment owned but not using:  No Assistive Device.  Activity level: sedentary.   Work: no.   Drive: no.   Cooking/Cleaning/Managing Home: no.   Hand dominance:right.     Previous Level of Function:  Ambulation Skills: needs device  Transfer Skills: needs device  ADL Skills: needs device    Subjective:  Communicated with RN prior to session.  "I'd like to try walking without the walker." "My strength is better, its really all balance now."  Chief Complaint: poor balance  Patient goals: to return home    Pain/Comfort  Pain Rating 1: 0/10  Pain Rating " Post-Intervention 1: 0/10      Objective:   Patient found with: PICC line, bolanos catheter     Cognitive Exam:  Oriented to: Person, Place, Time and Situation    Follows Commands/attention: Follows multistep  commands  Communication: clear/fluent  Safety awareness/insight to disability: intact    Physical Exam:  Postural examination/scapula alignment: Rounded shoulder and Head forward    Skin integrity: Visible skin intact  Edema: None noted    Sensation:   Intact  light/touch BUE and BLE    Lower Extremity Range of Motion:  Right Lower Extremity: WFL  Left Lower Extremity: WFL    Lower Extremity Strength:  Right Lower Extremity: WFL  Left Lower Extremity: WFL     Functional Mobility:  Pt found seated in bedside chair  Bed Mobility:    Not tested, pt found/returned to bedside chair    Transfers:    Sit <> Stand: x2 trials from bedside chair with Contact Guard Assistance with Rolling Walker    Stand <> Sit: [id number] to bedside chair  with Contact Guard Assistance with Rolling Walker   Comments: Pt required vc's for performance    Gait:   Distance Ambulated: 40ft, 15ft, 15ft with HHA    Assistance level: Contact Guard Assistance and Moderate Assistance   Assistive Device used:  No Assistive Device and Rolling Walker   Gait Pattern: swing-through gait   Gait Deviation(s): decreased pedro pablo, decreased step length, decreased stride length, decreased swing-to-stance ratio, decreased toe-to-floor clearance and decreased weight-shifting ability   Impairments due to: decreased balance   Comments: 1st and 2nd trial with RW, pt required CGA for balance. Final trial with HHA, pt required tactile and verbal cues for balance, step length. Pt with narrow ANNALISA and anterior weight shift.    Tinetti Gait and Balance Assessment     Assistive Device  Normally Used: Yes. Type of Assistive Device: RW  Assistive Device During Testing: Yes       Balance Tests:  1. Sitting Balance:          Steady; safe = 1  2. Arises :        Able, uses arms  "to help = 1  3. Attempts to arise :        Able to arise, 1 attempt = 2  4. Immediate standing Balance :        Steady but uses walker or other support = 1  5. Standing balance:         Steady but wide stance (medal heel>4" apart) & uses cane or other support = 1  6. Nudged :        Staggers, grabs, catches self = 1  7. Eyes closed:         Unsteady = 0  8. Turning 360 degrees:         Discontinuous steps = 0 and Steady = 1  9.Sitting Down :         Uses arms or not a smooth motion = 1       Balance Score  9/16    Gait Tests:  10. Initiation of Gait:         No hesitancy = 1  11. Step length and height :        Right swing foot:  Passes left stance foot = 1 and Does not clear foot completely with step = 0 and Left swing foot:  Passes left stance foot = 1 and Does not clear foot completely with step = 0  12. Step symmetry         Right & Left step length not equal (estimate) = 0  13. Step continuity :        Stooping or discontinuity between steps = 0  14. Path :        Mild/moderate deviation or uses walking aid = 1  15. Trunk :          Marked sway or uses walking aid = 0  16. Walking stance width          Heels apart = 0       Gait Score:    4/12     Balance + Gait Score: 13 /28       25-28= Low fall risk  19-24 Medium fall risk  < 19= High fall risk    Therapeutic Activities and Exercises:  Education:  Patient provided with daily orientation and goals of this PT session. Patient agreed to participate in session.Patient aware of patient's deficits and therapy progression. They were educated to transfer with RN/PCT only; min A of 1 person with RW. Time provided for therapeutic counseling and discussion of health disposition. All questions answered to patient's satisfaction, within scope of PT practice; voiced no other concerns. White board updated in patient's room, RN notified of session.    AM-PAC 6 CLICK MOBILITY  How much help from another person does this patient currently need?   1 = Unable, Total/Dependent " Assistance  2 = A lot, Maximum/Moderate Assistance  3 = A little, Minimum/Contact Guard/Supervision  4 = None, Modified [place]/Independent    Turning over in bed (including adjusting bedclothes, sheets and blankets)?: 3  Sitting down on and standing up from a chair with arms (e.g., wheelchair, bedside commode, etc.): 3  Moving from lying on back to sitting on the side of the bed?: 3  Moving to and from a bed to a chair (including a wheelchair)?: 3  Need to walk in hospital room?: 3  Climbing 3-5 steps with a railing?: 2  Total Score: 17     AM-PAC Raw Score CMS G-Code Modifier Level of Impairment Assistance   6 % Total / Unable   7 - 9 CM 80 - 100% Maximal Assist   10 - 14 CL 60 - 80% Moderate Assist   15 - 19 CK 40 - 60% Moderate Assist   20 - 22 CJ 20 - 40% Minimal Assist   23 CI 1-20% SBA / CGA   24 CH 0% Independent/ Mod I     Patient left up in chair with all lines intact, call button in reach and RN notified.    Assessment:   Juan Manuel Koehler is a 67 y.o. male with a medical diagnosis of Urinary stoma complication. Pt presents with decreased functional mobility and balance. Mr. Koehler's deficits affect their ability to safely and independently participate in self-care tasks and functional mobility. Due to his physical therapy diagnosis of debility and deconditioning, they continue to benefit from acute PT services to address the following limitations: high fall risk, weakness, instability, the need for supervised instructions of exercise, and the decreased ability to perform functional activities which they were able to complete PTA. Education was provided to patient regarding importance of continued participation in therapy, patient's progress and discharge disposition. Pt would benefit from HHPT upon discharge to improve quality of life and focus on recovery of impairments.     Rehab identified problem list/impairments: Rehab identified problem list/impairments: impaired functional mobilty,  impaired balance, gait instability    Rehab potential is good.    Activity tolerance: Good    Discharge recommendations: Discharge Facility/Level Of Care Needs: home health PT     Barriers to discharge: Barriers to Discharge: None    Equipment recommendations: Equipment Needed After Discharge:  (pt has needed equipment ordered from IP rehab)     GOALS:    Physical Therapy Goals        Problem: Physical Therapy Goal    Goal Priority Disciplines Outcome Goal Variances Interventions   Physical Therapy Goal     PT/OT, PT Ongoing (interventions implemented as appropriate)     Description:  Goals to be met by: 9/18/17     Patient will increase functional independence with mobility by performing:    Supine to sit with Modified [place].  Sit to supine with Modified [place].   Sit to stand transfer with Modified [place] using Rolling Walker.  Bed to chair transfer via Squat Pivot with Supervision using Rolling Walker.  Gait  x 100 feet with Stand-by Assistance using Rolling Walker.    Dynamic standing for 10 minutes with Contact Guard Assistance using No Assistive Device.  Able to tolerate exercise for 15-20 reps with independence.  Ascend/descend 12 stairs with right Handrails Minimal Assistance using No Assistive Device.                        PLAN:    Patient to be seen 4 x/week to address the above listed problems via gait training, therapeutic activities, therapeutic exercises, neuromuscular re-education  Plan of Care expires: 10/12/17  Plan of Care reviewed with: patient     Personal factors/comorbidities: prior stroke. Due to the listed comorbidities the patient cannot fully participate in PT POC.  Body systems elements affected: lower extremities, upper extremities, trunk, neuromuscular system,   Clinical Presentation: stable  Functional Outcome Tools: AMPAC, ROM, MMT, Tinetti  Evaluation Complexity Level: Low    Sophia Gibbons PT, DPT  9/13/2017  Pager: [phone]

## 2017-09-13 NOTE — PLAN OF CARE
Problem: Patient Care Overview  Goal: Plan of Care Review  Dx: Acute blood loss anemia    PMH: Cancer (bladder and throat), CKD stage 3, embolic stroke involving right cerebellar artery, UTI    Nursing:  CBC Q8h  9/10/17: IR for embolization  9/10/17: Admitted to SICU for blood loss via urostomy stoma; 3 units PRBCs     Outcome: Ongoing (interventions implemented as appropriate)  Patient is awake, alert and oriented. Patient remained free from complaints this shift. Patient is on bedrest. Remained free from injury and falls this shift. Bed is in the low and locked position with side rail up x 2. Call light is within reach. Informed to call if need assistance. Will continue to monitor.

## 2017-09-13 NOTE — PLAN OF CARE
Problem: Physical Therapy Goal  Goal: Physical Therapy Goal  Goals to be met by: 9/18/17     Patient will increase functional independence with mobility by performing:    Supine to sit with Modified [place].  Sit to supine with Modified [place].   Sit to stand transfer with Modified [place] using Rolling Walker.  Bed to chair transfer via Squat Pivot with Supervision using Rolling Walker.  Gait  x 100 feet with Stand-by Assistance using Rolling Walker.    Dynamic standing for 10 minutes with Contact Guard Assistance using No Assistive Device.  Able to tolerate exercise for 15-20 reps with independence.  Ascend/descend 12 stairs with right Handrails Minimal Assistance using No Assistive Device.      Outcome: Ongoing (interventions implemented as appropriate)    Pt would benefit from HHPT upon discharge.  Appropriate to transfer with: min A of 1 person with RW  Sophia Gibbons PT, DPT  9/13/2017  Pager: [phone]

## 2017-09-13 NOTE — CONSULTS
Patient and wife well known to wound/ostomy care.  Venkatesh Mathur, wound/ostomy nurse and myself discussed care, supplies with wife and .  Will see tomorrow to change pouch- patient sitting up eating lunch at present and would like to be seen around 10:00.  Wife states plan is to send patient home with catheter in place and she would like a wider/larger pouch to accommodate the changes.  Plan of care discussed with patient/family who verbalized understanding.

## 2017-09-13 NOTE — PROGRESS NOTES
"[facility]  Urology  Progress Note    Patient Name: Juan Manuel Koehler  MRN: [id number]  Admission Date: 9/7/2017  Hospital Length of Stay: 6 days  Code Status: Full Code   Attending Provider: Villa Robles MD   Primary Care Physician: Hemant Sweeney MD    Subjective:     HPI:  Juan Manuel Koehler is a 67 y.o male w hx of invasive bladder cancer s/p open radical cystoprostatectomy, bilateral pelvic lymph node dissection, and creation of an ileal conduit on 6/21/17.     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 He represented to Dr. Boucher for hematuria and was found to have a large bladder tumor on his trigone. The tumor was also causing bilateral ureteral obstruction with resulting ELDER.     The patient underwent tumor resection and attempted JJ stent placement on 4/5/17 which demonstrated cT2 urothelial carcinoma (nested variant). Bilateral nephrostomy tubes were placed with improvement in the patient's renal function. The patient's eGFR fluctuated, and, ultimately, he could not receive neoadjuvant cisplatin-based chemotherapy due to his creatinine clearance.     Thus, the patient underwent surgery on 6/21/17 and did very well in the immediate post-operative period.     His pathology revealed urothelial carcinoma with nested variant into the perivesical fat. His lymph nodes and margins are negative. His final pathologic stage is [id number].     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 Has been slowly recovering. He was recently discharged from rehab and has been making progress.     He returned to clinic, after being seen by enterostomal therapy, with blood per stoma and leakage of urine after having some tissue next to the stoma excised. Urine may now be leaking through the skin. Overnight he has had 1500 cc of what his wife describes as "bloody" output. He " "became dizzy this AM and "crumpled" to the ground without hitting his head. He has not lost consciousness. He notes recent vomiting that started with IV Abx - currently on rocephin and ampicillin for treatment of vegetative endocarditis.    Pt underwent embolization of bleeding vessel in ileal conduit 9/10.    Interval History:     - WILL  - minimal ambulation  - tolerating diet   - transfused 1 U PRBC with appropriate response overnight       Review of Systems  Objective:     Temp:  [96.8 °F (36 °C)-98.2 °F (36.8 °C)] 97.9 °F (36.6 °C)  Pulse:  [65-77] 69  Resp:  [16-18] 16  SpO2:  [97 %-99 %] 99 %  BP: (108-159)/(55-78) 159/78     Body mass index is 16.98 kg/m².            Drains     Drain                 Ureteral Drain/Stent -- days         Nephrostomy 05/05/17 1503 Right 8 Fr. 130 days         Nephrostomy 05/05/17 1504 Left 8 Fr. 130 days         Closed/Suction Drain 06/21/17 1221 Abdomen Bulb 19 Fr. 83 days         Ureteral Drain/Stent 06/21/17 1053 Left ureter 6 Fr. 83 days         Ureteral Drain/Stent 06/21/17 1054 Right ureter 6 Fr. 83 days         Urostomy 08/02/17 2345 ileal conduit RUQ 41 days         Urostomy 09/07/17 1522  RLQ 5 days    Arterial Sheath 09/10/17 1623 Right 2 days                Physical Exam   Vitals reviewed.  Constitutional: He is oriented to person, place, and time. No distress.   HENT:   Head: Normocephalic and atraumatic.   Eyes: Pupils are equal, round, and reactive to light. No scleral icterus.   Neck: No JVD present.   Cardiovascular: Normal rate and regular rhythm.    Pulmonary/Chest: Effort normal. No respiratory distress.   Abdominal: Soft. He exhibits no distension. There is no tenderness. There is no rebound and no guarding.   Urostomy with slightly dusky appearance  14 fr bolanos in urostomy with clear urine    Musculoskeletal: He exhibits no edema.   Neurological: He is alert and oriented to person, place, and time.   Skin: Skin is dry. He is not diaphoretic.     Psychiatric: " He has a normal mood and affect. His behavior is normal.       Significant Labs:    BMP:    Recent Labs  Lab 09/11/17  0405 09/12/17  0503 09/13/17  0456    135* 136   K 4.4 4.1 3.6    108 109   CO2 19* 20* 18*   BUN 23 34* 41*   CREATININE 2.8* 3.1* 3.2*   CALCIUM 7.8* 7.7* 7.5*       CBC:     Recent Labs  Lab 09/12/17  0833 09/12/17  1634 09/13/17  0456   WBC 12.09 9.75 8.51   HGB 7.5* 6.9* 8.0*   HCT 20.6* 19.2* 22.2*    137* 141*       All pertinent labs results from the past 24 hours have been reviewed.    Significant Imaging:  All pertinent imaging results/findings from the past 24 hours have been reviewed.                  Assessment/Plan:     * Urinary stoma complication    Juan Manuel Koehler is a 67 y.o. male with hx of invasive bladder cancer s/p open radical cystoprostatectomy, bilateral pelvic lymph node dissection, and creation of an ileal conduit on 6/21/17, now with blood per ostomy site and acute anemia.     - Regular diet  - Reorder TPN  - mirtazapine, dronabinol  - Continue amp and rocephin   - Ambulate/OOB  - KAMARI/SCDs  - Home meds restarted  - responded appropriately to 1 u PRBC  - Will continue to monitor appearance of stoma  - f/u PM CBC    Dispo:  Likely home tomorrow            VTE Risk Mitigation         Ordered     Medium Risk of VTE  Once      09/10/17 2248     Place KAMARI hose  Until discontinued      09/10/17 2248     Place sequential compression device  Until discontinued      09/10/17 2248          Ramon Martin MD  Urology  [facility]

## 2017-09-13 NOTE — ASSESSMENT & PLAN NOTE
Juan Manuel Koehler is a 67 y.o. male with hx of invasive bladder cancer s/p open radical cystoprostatectomy, bilateral pelvic lymph node dissection, and creation of an ileal conduit on 6/21/17, now with blood per ostomy site and acute anemia.     - Regular diet  - Reorder TPN  - mirtazapine, dronabinol  - Continue amp and rocephin   - Ambulate/OOB  - KAMARI/SCDs  - Home meds restarted  - responded appropriately to 1 u PRBC  - Will continue to monitor appearance of stoma  - f/u PM CBC    Dispo:  Likely home tomorrow

## 2017-09-13 NOTE — SUBJECTIVE & OBJECTIVE
Interval History:     - WILL  - minimal ambulation  - tolerating diet   - transfused 1 U PRBC with appropriate response overnight       Review of Systems  Objective:     Temp:  [96.8 °F (36 °C)-98.2 °F (36.8 °C)] 97.9 °F (36.6 °C)  Pulse:  [65-77] 69  Resp:  [16-18] 16  SpO2:  [97 %-99 %] 99 %  BP: (108-159)/(55-78) 159/78     Body mass index is 16.98 kg/m².            Drains     Drain                 Ureteral Drain/Stent -- days         Nephrostomy 05/05/17 1503 Right 8 Fr. 130 days         Nephrostomy 05/05/17 1504 Left 8 Fr. 130 days         Closed/Suction Drain 06/21/17 1221 Abdomen Bulb 19 Fr. 83 days         Ureteral Drain/Stent 06/21/17 1053 Left ureter 6 Fr. 83 days         Ureteral Drain/Stent 06/21/17 1054 Right ureter 6 Fr. 83 days         Urostomy 08/02/17 2345 ileal conduit RUQ 41 days         Urostomy 09/07/17 1522  RLQ 5 days    Arterial Sheath 09/10/17 1623 Right 2 days                Physical Exam   Vitals reviewed.  Constitutional: He is oriented to person, place, and time. No distress.   HENT:   Head: Normocephalic and atraumatic.   Eyes: Pupils are equal, round, and reactive to light. No scleral icterus.   Neck: No JVD present.   Cardiovascular: Normal rate and regular rhythm.    Pulmonary/Chest: Effort normal. No respiratory distress.   Abdominal: Soft. He exhibits no distension. There is no tenderness. There is no rebound and no guarding.   Urostomy with slightly dusky appearance  14 fr bolanos in urostomy with clear urine    Musculoskeletal: He exhibits no edema.   Neurological: He is alert and oriented to person, place, and time.   Skin: Skin is dry. He is not diaphoretic.     Psychiatric: He has a normal mood and affect. His behavior is normal.       Significant Labs:    BMP:    Recent Labs  Lab 09/11/17  0405 09/12/17  0503 09/13/17  0456    135* 136   K 4.4 4.1 3.6    108 109   CO2 19* 20* 18*   BUN 23 34* 41*   CREATININE 2.8* 3.1* 3.2*   CALCIUM 7.8* 7.7* 7.5*       CBC:      Recent Labs  Lab 09/12/17  0833 09/12/17  1634 09/13/17  0456   WBC 12.09 9.75 8.51   HGB 7.5* 6.9* 8.0*   HCT 20.6* 19.2* 22.2*    137* 141*       All pertinent labs results from the past 24 hours have been reviewed.    Significant Imaging:  All pertinent imaging results/findings from the past 24 hours have been reviewed.

## 2017-09-14 VITALS
HEIGHT: 74 IN | WEIGHT: 132.25 LBS | RESPIRATION RATE: 20 BRPM | DIASTOLIC BLOOD PRESSURE: 79 MMHG | TEMPERATURE: 97 F | OXYGEN SATURATION: 96 % | SYSTOLIC BLOOD PRESSURE: 142 MMHG | BODY MASS INDEX: 16.97 KG/M2 | HEART RATE: 95 BPM

## 2017-09-14 LAB
ALBUMIN SERPL BCP-MCNC: 1.8 G/DL
ALBUMIN SERPL BCP-MCNC: 1.9 G/DL
ALP SERPL-CCNC: 41 U/L
ALT SERPL W/O P-5'-P-CCNC: <5 U/L
ANION GAP SERPL CALC-SCNC: 8 MMOL/L
ANION GAP SERPL CALC-SCNC: 9 MMOL/L
APTT BLDCRRT: 24.9 SEC
AST SERPL-CCNC: 9 U/L
BASOPHILS # BLD AUTO: 0.02 K/UL
BASOPHILS # BLD AUTO: 0.02 K/UL
BASOPHILS NFR BLD: 0.3 %
BASOPHILS NFR BLD: 0.3 %
BILIRUB SERPL-MCNC: 0.3 MG/DL
BUN SERPL-MCNC: 48 MG/DL
BUN SERPL-MCNC: 49 MG/DL
CALCIUM SERPL-MCNC: 7.5 MG/DL
CALCIUM SERPL-MCNC: 7.7 MG/DL
CHLORIDE SERPL-SCNC: 109 MMOL/L
CHLORIDE SERPL-SCNC: 109 MMOL/L
CO2 SERPL-SCNC: 19 MMOL/L
CO2 SERPL-SCNC: 20 MMOL/L
CREAT SERPL-MCNC: 3.5 MG/DL
CREAT SERPL-MCNC: 3.5 MG/DL
DIFFERENTIAL METHOD: ABNORMAL
DIFFERENTIAL METHOD: ABNORMAL
EOSINOPHIL # BLD AUTO: 0.2 K/UL
EOSINOPHIL # BLD AUTO: 0.2 K/UL
EOSINOPHIL NFR BLD: 2.3 %
EOSINOPHIL NFR BLD: 2.9 %
ERYTHROCYTE [DISTWIDTH] IN BLOOD BY AUTOMATED COUNT: 15.2 %
ERYTHROCYTE [DISTWIDTH] IN BLOOD BY AUTOMATED COUNT: 15.3 %
EST. GFR  (AFRICAN AMERICAN): 19.6 ML/MIN/1.73 M^2
EST. GFR  (AFRICAN AMERICAN): 19.6 ML/MIN/1.73 M^2
EST. GFR  (NON AFRICAN AMERICAN): 16.9 ML/MIN/1.73 M^2
EST. GFR  (NON AFRICAN AMERICAN): 16.9 ML/MIN/1.73 M^2
GLUCOSE SERPL-MCNC: 105 MG/DL
GLUCOSE SERPL-MCNC: 107 MG/DL
HCT VFR BLD AUTO: 21.1 %
HCT VFR BLD AUTO: 22.2 %
HGB BLD-MCNC: 7.5 G/DL
HGB BLD-MCNC: 7.9 G/DL
INR PPP: 0.9
LYMPHOCYTES # BLD AUTO: 0.6 K/UL
LYMPHOCYTES # BLD AUTO: 1 K/UL
LYMPHOCYTES NFR BLD: 14.4 %
LYMPHOCYTES NFR BLD: 7.9 %
MAGNESIUM SERPL-MCNC: 1.7 MG/DL
MCH RBC QN AUTO: 29.2 PG
MCH RBC QN AUTO: 29.7 PG
MCHC RBC AUTO-ENTMCNC: 35.5 G/DL
MCHC RBC AUTO-ENTMCNC: 35.6 G/DL
MCV RBC AUTO: 82 FL
MCV RBC AUTO: 84 FL
MONOCYTES # BLD AUTO: 0.3 K/UL
MONOCYTES # BLD AUTO: 0.5 K/UL
MONOCYTES NFR BLD: 4.4 %
MONOCYTES NFR BLD: 6.5 %
NEUTROPHILS # BLD AUTO: 5.1 K/UL
NEUTROPHILS # BLD AUTO: 5.7 K/UL
NEUTROPHILS NFR BLD: 76.9 %
NEUTROPHILS NFR BLD: 81.9 %
PHOSPHATE SERPL-MCNC: 4.6 MG/DL
PLATELET # BLD AUTO: 138 K/UL
PLATELET # BLD AUTO: 140 K/UL
PMV BLD AUTO: 8.8 FL
PMV BLD AUTO: 9.3 FL
POTASSIUM SERPL-SCNC: 3.9 MMOL/L
POTASSIUM SERPL-SCNC: 4 MMOL/L
PROT SERPL-MCNC: 4 G/DL
PROTHROMBIN TIME: 10.2 SEC
RBC # BLD AUTO: 2.57 M/UL
RBC # BLD AUTO: 2.66 M/UL
SODIUM SERPL-SCNC: 137 MMOL/L
SODIUM SERPL-SCNC: 137 MMOL/L
WBC # BLD AUTO: 6.59 K/UL
WBC # BLD AUTO: 6.96 K/UL

## 2017-09-14 PROCEDURE — 83735 ASSAY OF MAGNESIUM: CPT

## 2017-09-14 PROCEDURE — 25000003 PHARM REV CODE 250: Performed by: STUDENT IN AN ORGANIZED HEALTH CARE EDUCATION/TRAINING PROGRAM

## 2017-09-14 PROCEDURE — 99024 POSTOP FOLLOW-UP VISIT: CPT | Mod: ,,, | Performed by: UROLOGY

## 2017-09-14 PROCEDURE — 36415 COLL VENOUS BLD VENIPUNCTURE: CPT

## 2017-09-14 PROCEDURE — 97165 OT EVAL LOW COMPLEX 30 MIN: CPT

## 2017-09-14 PROCEDURE — 25000003 PHARM REV CODE 250: Performed by: UROLOGY

## 2017-09-14 PROCEDURE — 80053 COMPREHEN METABOLIC PANEL: CPT

## 2017-09-14 PROCEDURE — 97535 SELF CARE MNGMENT TRAINING: CPT

## 2017-09-14 PROCEDURE — 85730 THROMBOPLASTIN TIME PARTIAL: CPT

## 2017-09-14 PROCEDURE — 27000192 HC POUCH, WOUND DRAINAGE COLLECTOR (ANY SIZE)

## 2017-09-14 PROCEDURE — 25000003 PHARM REV CODE 250: Performed by: ANESTHESIOLOGY

## 2017-09-14 PROCEDURE — 63600175 PHARM REV CODE 636 W HCPCS: Performed by: UROLOGY

## 2017-09-14 PROCEDURE — 85610 PROTHROMBIN TIME: CPT

## 2017-09-14 PROCEDURE — 63600175 PHARM REV CODE 636 W HCPCS: Performed by: ANESTHESIOLOGY

## 2017-09-14 PROCEDURE — 80069 RENAL FUNCTION PANEL: CPT

## 2017-09-14 PROCEDURE — 85025 COMPLETE CBC W/AUTO DIFF WBC: CPT | Mod: 91

## 2017-09-14 PROCEDURE — A4216 STERILE WATER/SALINE, 10 ML: HCPCS | Performed by: ANESTHESIOLOGY

## 2017-09-14 RX ADMIN — POLYETHYLENE GLYCOL 3350 17 G: 17 POWDER, FOR SOLUTION ORAL at 08:09

## 2017-09-14 RX ADMIN — CEFTRIAXONE 2 G: 2 INJECTION, SOLUTION INTRAVENOUS at 02:09

## 2017-09-14 RX ADMIN — STANDARDIZED SENNA CONCENTRATE AND DOCUSATE SODIUM 1 TABLET: 8.6; 5 TABLET, FILM COATED ORAL at 08:09

## 2017-09-14 RX ADMIN — Medication 3 ML: at 02:09

## 2017-09-14 RX ADMIN — CEFTRIAXONE 2 G: 2 INJECTION, SOLUTION INTRAVENOUS at 01:09

## 2017-09-14 RX ADMIN — Medication 3 ML: at 05:09

## 2017-09-14 RX ADMIN — PANTOPRAZOLE SODIUM 40 MG: 40 TABLET, DELAYED RELEASE ORAL at 08:09

## 2017-09-14 RX ADMIN — CARVEDILOL 25 MG: 25 TABLET, FILM COATED ORAL at 08:09

## 2017-09-14 RX ADMIN — DRONABINOL 2.5 MG: 2.5 CAPSULE ORAL at 08:09

## 2017-09-14 RX ADMIN — AMPICILLIN SODIUM 2 G: 2 INJECTION, POWDER, FOR SOLUTION INTRAMUSCULAR; INTRAVENOUS at 12:09

## 2017-09-14 RX ADMIN — AMPICILLIN SODIUM 2 G: 2 INJECTION, POWDER, FOR SOLUTION INTRAMUSCULAR; INTRAVENOUS at 05:09

## 2017-09-14 NOTE — PLAN OF CARE
Problem: Occupational Therapy Goal  Goal: Occupational Therapy Goal  Goals to be met by: 9/28/2017    Pt will complete LB dressing with sup  Pt will complete UB dressing with sup  Pt will complete grooming standing at sink with mod I  Pt will complete toilet transfer with mod I  Pt will complete toileting with mod I    Outcome: Ongoing (interventions implemented as appropriate)  OT Evaluation completed and OT POC initiated 9/14/2017.

## 2017-09-14 NOTE — PROGRESS NOTES
Pt discharged. Instructions  given and explained. Pt verbalized understanding with no questions. Pt AAOx3.

## 2017-09-14 NOTE — SUBJECTIVE & OBJECTIVE
Interval History:   NAEON  Wound care showed patient and wife how to manage urostomy yesterday  Patient says he feels better this morning  Worked with PT/OT      Review of Systems  Objective:     Temp:  [97.8 °F (36.6 °C)-98.5 °F (36.9 °C)] 98.2 °F (36.8 °C)  Pulse:  [69-78] 76  Resp:  [16-18] 18  SpO2:  [95 %-99 %] 95 %  BP: (110-159)/(56-78) 111/56     Body mass index is 16.98 kg/m².            Drains     Drain                 Ureteral Drain/Stent -- days         Nephrostomy 05/05/17 1503 Right 8 Fr. 131 days         Nephrostomy 05/05/17 1504 Left 8 Fr. 131 days         Closed/Suction Drain 06/21/17 1221 Abdomen Bulb 19 Fr. 84 days         Ureteral Drain/Stent 06/21/17 1053 Left ureter 6 Fr. 84 days         Ureteral Drain/Stent 06/21/17 1054 Right ureter 6 Fr. 84 days         Urostomy 08/02/17 2345 ileal conduit RUQ 42 days         Urostomy 09/07/17 1522  RLQ 6 days    Arterial Sheath 09/10/17 1623 Right 3 days                Physical Exam   Vitals reviewed.  Constitutional: He is oriented to person, place, and time. No distress.   HENT:   Head: Normocephalic and atraumatic.   Eyes: Pupils are equal, round, and reactive to light. No scleral icterus.   Neck: No JVD present.   Cardiovascular: Normal rate and regular rhythm.    Pulmonary/Chest: Effort normal. No respiratory distress.   Abdominal: Soft. He exhibits no distension. There is no tenderness. There is no rebound and no guarding.   Urostomy with slightly dusky appearance  14 fr bolanos in urostomy with clear urine    Musculoskeletal: He exhibits no edema.   Neurological: He is alert and oriented to person, place, and time.   Skin: Skin is dry. He is not diaphoretic.     Psychiatric: He has a normal mood and affect. His behavior is normal.       Significant Labs:    BMP:    Recent Labs  Lab 09/12/17  0503 09/13/17  0456 09/14/17  0222   * 136 137   K 4.1 3.6 3.9    109 109   CO2 20* 18* 19*   BUN 34* 41* 48*   CREATININE 3.1* 3.2* 3.5*   CALCIUM  7.7* 7.5* 7.5*       CBC:     Recent Labs  Lab 09/13/17  0456 09/13/17  1409 09/14/17  0222   WBC 8.51 8.04  8.04 6.59   HGB 8.0* 8.4*  8.4* 7.5*   HCT 22.2* 23.6*  23.6* 21.1*   * 132*  132* 138*       All pertinent labs results from the past 24 hours have been reviewed.    Significant Imaging:  All pertinent imaging results/findings from the past 24 hours have been reviewed.

## 2017-09-14 NOTE — PLAN OF CARE
PLAN- home with [facility] confusion       09/14/17 3182   Discharge Reassessment   Assessment Type Discharge Planning Reassessment   Provided patient/caregiver education on the expected discharge date and the discharge plan Yes   Do you have any problems affording any of your prescribed medications? No   Discharge Plan A Home with family;Home Health   Discharge Plan B Home with family;Home Health   Can the patient answer the patient profile reliably? Yes, cognitively intact   How does the patient rate their overall health at the present time? Fair   Describe the patient's ability to walk at the present time. Minor restrictions or changes   How often would a person be available to care for the patient? Whenever needed   Number of comorbid conditions (as recorded on the chart) Five or more

## 2017-09-14 NOTE — PT/OT/SLP EVAL
Occupational Therapy  Evaluation &  Treatment Session    Juan Manuel Koehler   MRN: [id number]   Admitting Diagnosis: Urinary stoma complication    OT Date of Treatment: 09/14/17   OT Start Time: 1003  OT Stop Time: 1026  OT Total Time (min): 23 min    Billable Minutes:  Evaluation 13  Self Care/Home Management 10    Diagnosis: Urinary stoma complication   S/p loopogram/looposcopy & EGD    Past Medical History:   Diagnosis Date    Cancer     bladder and throat    CKD (chronic kidney disease) stage 3, GFR 30-59 ml/min     Embolic stroke involving right cerebellar artery 8/4/2017    Urinary tract infection       Past Surgical History:   Procedure Laterality Date    BLADDER SURGERY      CYSTOSCOPY      HERNIA REPAIR      Ileal Conduit      THROAT SURGERY         Referring physician: Bonifacio Mendez MD  Date referred to Ot: 9/12/2017    General Precautions: Standard, aspiration, fall  Orthopedic Precautions: N/A  Braces: N/A    Do you have any cultural, spiritual, Shinto conflicts, given your current situation?: none stated     Patient History:  Living Environment  Lives With: spouse  Living Arrangements: house  Home Accessibility: stairs within home  Home Layout: Able to live on 1st floor, Bathroom on 2nd floor, Bedroom on 2nd floor  Number of Stairs Within Home: 12  Stair Railings at Home: inside, present on left side  Transportation Available: family or friend will provide  Living Environment Comment: Pt lives with wife in a 2SH with 0STE. PTA, pt was independent with self-care, however he was requiring assist with bathing and was ambulating with a RW predominantly. Pt reports he has only fallen when he had a stroke. bathroom has a tub with a bath bench. Pt will be able to stay on the first floor upon d/c.   Equipment Currently Used at Home: walker, rolling, shower chair, cane, straight    Prior level of function:   Bed Mobility/Transfers: independent  Grooming: independent  Bathing: needs assist  Upper Body  "Dressing: independent  Lower Body Dressing: independent  Toileting: independent  Mode of Transportation: Friends, Family     Subjective:  Communicated with nursing prior to session. As per nursing, pt ok to be seen for therapy at this time. Pt agreeable to OT evaluation.   "I bathed in the shower earlier"  Chief Complaint: weakness  Patient/Family stated goals: return home    Pain/Comfort  Pain Rating 1: 0/10  Pain Rating Post-Intervention 1: 0/10    Objective:  Patient found with: peripheral IV, PICC line, bolanos catheter    Cognitive Exam:  Oriented to: Person, Place, Time and Situation  Follows Commands/attention: Follows two-step commands  Communication: clear/fluent  Memory:  No Deficits noted  Safety awareness/insight to disability: intact  Coping skills/emotional control: Appropriate to situation    Physical Exam:  Postural examination/scapula alignment: Rounded shoulder  Skin integrity: Visible skin intact  Edema: None noted     Sensation:   Intact    Upper Extremity Range of Motion:  Right Upper Extremity: WFL  Left Upper Extremity: WFL    Upper Extremity Strength:  Right Upper Extremity: WFL  Left Upper Extremity: WFL   Strength: WFL    Fine motor coordination:   Intact    Gross motor coordination: WFL    Functional Mobility:  Bed Mobility:  Rolling/Turning Right: Supervision  Scooting/Bridging: Supervision  Supine to Sit: Contact Guard Assistance  Sit to Supine: Contact Guard Assistance    Transfers:  Sit <> Stand Assistance: Contact Guard Assistance (from EOB)  Sit <> Stand Assistive Device: Rolling Walker    Functional Ambulation: Pt ambulating functional household distances with CGA utilizing RW and without noted LOB, however pt expressing fatigue with functional mobility.     Activities of Daily Living:  Feeding Level of Assistance: Independent (simulated; pt with ability to bring BUE's to face)  UE Dressing Level of Assistance: Minimum assistance (management of lines)  LE Dressing Level of " "Assistance: Maximum assistance (unable to access BLE's)  Grooming Position: Standing at sink  Grooming Level of Assistance: Contact guard assistance (ability to cross midline to access ADL items)    Balance:   Static Sit: GOOD: Takes MODERATE challenges from all directions  Dynamic Sit: GOOD: Maintains balance through MODERATE excursions of active trunk movement  Static Stand: FAIR+: Takes MINIMAL challenges from all directions  Dynamic stand: FAIR: Needs CONTACT GUARD during gait    Therapeutic Activities:  Pt able to maintain standing at sink to complete grooming for approx 10 minutes with intermittent UE support on sink. Pt noted with lateral lean to the L.    Additional:  Educated on importance of OOB mobility, engagement in self-care, asking for assist during mobility/functional transfers  Communicated role of OT/POC  Updated communication board    AM-PAC 6 CLICK ADL  How much help from another person does this patient currently need?  1 = Unable, Total/Dependent Assistance  2 = A lot, Maximum/Moderate Assistance  3 = A little, Minimum/Contact Guard/Supervision  4 = None, Modified [place]/Independent    Putting on and taking off regular lower body clothing? : 2  Bathing (including washing, rinsing, drying)?: 2  Toileting, which includes using toilet, bedpan, or urinal? : 3  Putting on and taking off regular upper body clothing?: 3  Taking care of personal grooming such as brushing teeth?: 3  Eating meals?: 4  Total Score: 17    AM-PAC Raw Score CMS "G-Code Modifier Level of Impairment Assistance   6 % Total / Unable   7 - 9 CM 80 - 100% Maximal Assist   10-14 CL 60 - 80% Moderate Assist   15 - 19 CK 40 - 60% Moderate Assist   20 - 22 CJ 20 - 40% Minimal Assist   23 CI 1-20% SBA / CGA   24 CH 0% Independent/ Mod I       Patient left supine with all lines intact, call button in reach and nursing notified    Assessment:  Juan Manuel Koehler is a 67 y.o. male with a medical diagnosis of Urinary stoma " complication. Pt with good motivation to engage in therapy at this time and with going insight into condition. Pt p/w decreased functional activity tolerance, decreased independence with self-care/functional mobility, decreased standing tolerance, and impaired dynamic standing balance. Pt would continue to benefit from skilled OT services to maximize independence to ensure safe return to PLOF and living environement.     Rehab identified problem list/impairments: Rehab identified problem list/impairments: weakness, impaired endurance, impaired self care skills, impaired functional mobilty, impaired balance    Rehab potential is good.    Activity tolerance: Good    Discharge recommendations: Discharge Facility/Level Of Care Needs: home health OT     Barriers to discharge: Barriers to Discharge: Inaccessible home environment    Equipment recommendations: none     GOALS:    Occupational Therapy Goals        Problem: Occupational Therapy Goal    Goal Priority Disciplines Outcome Interventions   Occupational Therapy Goal     OT, PT/OT Ongoing (interventions implemented as appropriate)    Description:  Goals to be met by: 9/28/2017    Pt will complete LB dressing with sup  Pt will complete UB dressing with sup  Pt will complete grooming standing at sink with mod I  Pt will complete toilet transfer with mod I  Pt will complete toileting with mod I                      PLAN:  Patient to be seen 3 x/week to address the above listed problems via self-care/home management, therapeutic activities, therapeutic exercises  Plan of Care expires: 10/13/17  Plan of Care reviewed with: patient         Blank Juan Jose, OT  09/14/2017

## 2017-09-14 NOTE — PROGRESS NOTES
Requested to see again for urostomy instruction         Discussed ostomy products again with patient and his wife . Wife has assumed care for the urostomy as it has been complex due to frequent leaks . Presently, has bolanos catheter which has been sutured to the peristomal skin, Using Anaya cohesive to assist with pouch seal  . Using post op pouch with window that can be connected to  bag for night time drainage . Gave them samples of appliances for home use.   Venkatesh Mathur RN CWON  [id number]

## 2017-09-14 NOTE — PLAN OF CARE
Problem: Patient Care Overview  Goal: Plan of Care Review  Dx: Acute blood loss anemia    PMH: Cancer (bladder and throat), CKD stage 3, embolic stroke involving right cerebellar artery, UTI    Nursing:  CBC Q8h  9/10/17: IR for embolization  9/10/17: Admitted to SICU for blood loss via urostomy stoma; 3 units PRBCs     Outcome: Ongoing (interventions implemented as appropriate)  No acute events throughout night, VS and assessment per flow sheet, patient progressing towards goals as tolerated, plan of care reviewed with Juan Manuel Koehler, all concerns addressed, will continue to monitor.

## 2017-09-14 NOTE — PROGRESS NOTES
"[facility]  Urology  Progress Note    Patient Name: Juan Manuel Koehler  MRN: [id number]  Admission Date: 9/7/2017  Hospital Length of Stay: 7 days  Code Status: Full Code   Attending Provider: Villa Robles MD   Primary Care Physician: Hemant Sweeney MD    Subjective:     HPI:  Juan Manuel Koehler is a 67 y.o male w hx of invasive bladder cancer s/p open radical cystoprostatectomy, bilateral pelvic lymph node dissection, and creation of an ileal conduit on 6/21/17.     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 He represented to Dr. Boucher for hematuria and was found to have a large bladder tumor on his trigone. The tumor was also causing bilateral ureteral obstruction with resulting ELDER.     The patient underwent tumor resection and attempted JJ stent placement on 4/5/17 which demonstrated cT2 urothelial carcinoma (nested variant). Bilateral nephrostomy tubes were placed with improvement in the patient's renal function. The patient's eGFR fluctuated, and, ultimately, he could not receive neoadjuvant cisplatin-based chemotherapy due to his creatinine clearance.     Thus, the patient underwent surgery on 6/21/17 and did very well in the immediate post-operative period.     His pathology revealed urothelial carcinoma with nested variant into the perivesical fat. His lymph nodes and margins are negative. His final pathologic stage is [id number].     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 Has been slowly recovering. He was recently discharged from rehab and has been making progress.     He returned to clinic, after being seen by enterostomal therapy, with blood per stoma and leakage of urine after having some tissue next to the stoma excised. Urine may now be leaking through the skin. Overnight he has had 1500 cc of what his wife describes as "bloody" output. He " "became dizzy this AM and "crumpled" to the ground without hitting his head. He has not lost consciousness. He notes recent vomiting that started with IV Abx - currently on rocephin and ampicillin for treatment of vegetative endocarditis.    Pt underwent embolization of bleeding vessel in ileal conduit 9/10.    Interval History:   NAEON  Wound care showed patient and wife how to manage urostomy yesterday  Patient says he feels better this morning  Worked with PT/OT      Review of Systems  Objective:     Temp:  [97.8 °F (36.6 °C)-98.5 °F (36.9 °C)] 98.2 °F (36.8 °C)  Pulse:  [69-78] 76  Resp:  [16-18] 18  SpO2:  [95 %-99 %] 95 %  BP: (110-159)/(56-78) 111/56     Body mass index is 16.98 kg/m².            Drains     Drain                 Ureteral Drain/Stent -- days         Nephrostomy 05/05/17 1503 Right 8 Fr. 131 days         Nephrostomy 05/05/17 1504 Left 8 Fr. 131 days         Closed/Suction Drain 06/21/17 1221 Abdomen Bulb 19 Fr. 84 days         Ureteral Drain/Stent 06/21/17 1053 Left ureter 6 Fr. 84 days         Ureteral Drain/Stent 06/21/17 1054 Right ureter 6 Fr. 84 days         Urostomy 08/02/17 2345 ileal conduit RUQ 42 days         Urostomy 09/07/17 1522  RLQ 6 days    Arterial Sheath 09/10/17 1623 Right 3 days                Physical Exam   Vitals reviewed.  Constitutional: He is oriented to person, place, and time. No distress.   HENT:   Head: Normocephalic and atraumatic.   Eyes: Pupils are equal, round, and reactive to light. No scleral icterus.   Neck: No JVD present.   Cardiovascular: Normal rate and regular rhythm.    Pulmonary/Chest: Effort normal. No respiratory distress.   Abdominal: Soft. He exhibits no distension. There is no tenderness. There is no rebound and no guarding.   Urostomy with slightly dusky appearance  14 fr bolanos in urostomy with clear urine    Musculoskeletal: He exhibits no edema.   Neurological: He is alert and oriented to person, place, and time.   Skin: Skin is dry. He is not " diaphoretic.     Psychiatric: He has a normal mood and affect. His behavior is normal.       Significant Labs:    BMP:    Recent Labs  Lab 09/12/17  0503 09/13/17  0456 09/14/17  0222   * 136 137   K 4.1 3.6 3.9    109 109   CO2 20* 18* 19*   BUN 34* 41* 48*   CREATININE 3.1* 3.2* 3.5*   CALCIUM 7.7* 7.5* 7.5*       CBC:     Recent Labs  Lab 09/13/17  0456 09/13/17  1409 09/14/17 0222   WBC 8.51 8.04  8.04 6.59   HGB 8.0* 8.4*  8.4* 7.5*   HCT 22.2* 23.6*  23.6* 21.1*   * 132*  132* 138*       All pertinent labs results from the past 24 hours have been reviewed.    Significant Imaging:  All pertinent imaging results/findings from the past 24 hours have been reviewed.                  Assessment/Plan:     * Urinary stoma complication    Juan Manuel Koehler is a 67 y.o. male with hx of invasive bladder cancer s/p open radical cystoprostatectomy, bilateral pelvic lymph node dissection, and creation of an ileal conduit on 6/21/17, now with blood per ostomy site and acute anemia.     - Cr rising despite good UOP, will discuss with Nephrology  - Regular diet  - Reorder TPN  - mirtazapine, dronabinol  - Continue amp and rocephin until 9/16  - Ambulate/OOB  - KAMARI/SCDs  - Home meds restarted  - monitor H/H  - Will continue to monitor appearance of stoma                  VTE Risk Mitigation         Ordered     Medium Risk of VTE  Once      09/10/17 2248     Place KAMARI hose  Until discontinued      09/10/17 2248     Place sequential compression device  Until discontinued      09/10/17 2248          Bonifacio Segura MD  Urology  [facility]

## 2017-09-14 NOTE — ASSESSMENT & PLAN NOTE
Juan Manuel Koehler is a 67 y.o. male with hx of invasive bladder cancer s/p open radical cystoprostatectomy, bilateral pelvic lymph node dissection, and creation of an ileal conduit on 6/21/17, now with blood per ostomy site and acute anemia.     - Cr rising despite good UOP, will discuss with Nephrology  - Regular diet  - Reorder TPN  - mirtazapine, dronabinol  - Continue amp and rocephin until 9/16  - Ambulate/OOB  - KAMARI/SCDs  - Home meds restarted  - monitor H/H  - Will continue to monitor appearance of stoma

## 2017-09-15 ENCOUNTER — TELEPHONE (OUTPATIENT)
Dept: GASTROENTEROLOGY | Facility: CLINIC | Age: 67
End: 2017-09-15

## 2017-09-15 ENCOUNTER — TELEPHONE (OUTPATIENT)
Dept: UROLOGY | Facility: CLINIC | Age: 67
End: 2017-09-15

## 2017-09-15 ENCOUNTER — PATIENT OUTREACH (OUTPATIENT)
Dept: ADMINISTRATIVE | Facility: CLINIC | Age: 67
End: 2017-09-15

## 2017-09-15 NOTE — TELEPHONE ENCOUNTER
----- Message from Jay Hummel MD sent at 9/14/2017  6:39 PM CDT -----  Sanra - please tell Juan Manuel that his EGD pathology was benign.    SPECIMEN  1) Duodenum for celiac, CMV, HSV.  2) Stomach and small gastric polyp for H. pylori, CMV, HSV.    FINAL PATHOLOGIC DIAGNOSIS    1. Duodenum, biopsy:  Unremarkable small bowel mucosa without evidence of intraepithelial lymphocytes, villous blunting, dysplasia,  malignancy, or infectious organisms.    2. Stomach, stomach and small gastric polyp, biopsy:  Reactive gastropathy. There is no significant inflammation and no intestinal metaplasia or epithelial dysplasia  present. Negative for viral cytopathic change. Negative for Helicobacter pylori by specific immunohistochemical  stain. Positive and negative controls stain appropriately.    Diagnosed by: Marcel Ontiveros M.D.  (Electronically Signed: 2017-09-14

## 2017-09-15 NOTE — PATIENT INSTRUCTIONS
Anatomy of the Male Urinary Tract  Your urinary tract helps to get rid of your bodys liquid waste. The kidneys constantly filter the blood to collect unneeded chemicals and water, making urine. Urine travels through the ureters to the bladder. The bladder fills with urine, holding it until youre ready to release it. Signals from the brain tell the sphincter (muscles around the opening of the bladder) when to let urine flow out of the bladder. The urethra is the tube that carries urine from the bladder out of the body. In men, the prostate gland wraps around the urethra near the bladder.     Front view of male urinary tract.     Date Last Reviewed: 1/1/2017 © 2000-2017 [employer]. [address] [place] [address], [place] [address]. All rights reserved. This information is not intended as a substitute for professional medical care. Always follow your healthcare professional's instructions.        Urostomy: Caring for Your Stoma    After a urostomy, youll have to care for your stoma and the skin around it (called the peristomal skin). You must keep the stoma clean and protect the peristomal skin from moisture and urine. This will prevent skin problems and odor.  Checking the stoma  · Check your stoma and the skin around it each time you change your pouch.  ·  front of a mirror, or use a hand mirror so that you can see the entire stoma.  · The stoma should look shiny, moist, and pink or red.  · The skin around it should be smooth, with no red or open areas.  Cleaning the stoma  When you change your pouch, be sure to clean the stoma and the skin around it. Do this using warm water and a soft washcloth. Water does not harm the stoma.  · Clean and dry the stoma gently. Because the stoma has no sensory nerves, you could injure it without feeling any pain.  · The stoma may bleed a little when you clean it. Thats because it has tiny blood vessels. To stop the bleeding quickly, apply gentle pressure to   the stoma using a dry cloth or tissue.  · Be aware that urine will keep flowing out as you clean your stoma. You can use a folded paper towel or piece of gauze to absorb the urine.  Protect the skin around the stoma  For the pouch to stick well, the peristomal skin needs to be dry and smooth. If the skin is moist or uneven, the pouch is more likely to leak. And any urine that leaks out of the pouch can pool on your skin. This can irritate the skin. Urine that leaks from the pouch can also cause odor or be absorbed by your clothes. You can help prevent these problems by following these steps:  · Be sure your skin is dry before applying the skin barrier. This helps keep the skin healthy. Always pat your skin dry after washing it. Or, try drying your skin with a hair dryer thats set on cool.  · Try applying a skin barrier wipe before you put on a new pouch. This helps protect the skin if urine leaks around the pouch. A skin barrier wipe may shorten or lengthen the amount of time you can wear some pouches. Before using a wipe, check the product information that came with your pouch.   When to see your healthcare provider  Call your Wound Ostomy and Continence (WOC) nurse or other healthcare provider if:  · The skin around the stoma is red, weepy, bleeding, or has open areas.  · The skin around the stoma itches, burns, stings, or has white spots.  · The stoma swells, changes color, or bleeds without stopping.  · The stoma sinks below its normal level or below the skin.  · The stoma sticks up above the skin more than normal.   Date Last Reviewed: 1/1/2017  © 4426-7108 [employer]. [address] [place] [address], [place] [address]. All rights reserved. This information is not intended as a substitute for professional medical care. Always follow your healthcare professional's instructions.

## 2017-09-15 NOTE — TELEPHONE ENCOUNTER
----- Message from Darvin Hinkle sent at 9/15/2017  2:07 PM CDT -----  Contact: Pt wife Guerline:[phone]  Pt wife called and states she would like to speak with the nurse in regards to the pt having a hospital bed sent to him.Pt wife states she would like to speak with the nurse today because this is very impotent.

## 2017-09-15 NOTE — PT/OT/SLP DISCHARGE
Occupational Therapy Discharge Summary    Juan Manuel Koehler  MRN: [id number]   Urinary stoma complication   Patient Discharged from acute Occupational Therapy on 9/14/2017.  Please refer to prior OT note dated on 9/14/2017 for functional status.     Assessment:   Patient appropriate for care in another setting.  GOALS:    Occupational Therapy Goals        Problem: Occupational Therapy Goal    Goal Priority Disciplines Outcome Interventions   Occupational Therapy Goal     OT, PT/OT Ongoing (interventions implemented as appropriate)    Description:  Goals to be met by: 9/28/2017    Pt will complete LB dressing with sup  Pt will complete UB dressing with sup  Pt will complete grooming standing at sink with mod I  Pt will complete toilet transfer with mod I  Pt will complete toileting with mod I                    Reasons for Discontinuation of Therapy Services  Transfer to alternate level of care.      Plan:  Patient Discharged to: Home with Home Health Service.

## 2017-09-16 NOTE — DISCHARGE SUMMARY
[facility]  Urology  Discharge Summary      Patient Name: Juan Manuel Koehler  MRN: [id number]  Admission Date: 9/7/2017  Hospital Length of Stay: 7 days  Discharge Date and Time: 9/14/2017  4:27 PM  Attending Physician: Dr. Villa Robles MD  Discharging Provider: Bonifacio Segura MD  Primary Care Physician: Hemant Sweeney MD    HPI: Juan Manuel Koehler is a 67 y.o male w hx of invasive bladder cancer s/p open radical cystoprostatectomy, bilateral pelvic lymph node dissection, and creation of an ileal conduit on 6/21/17.     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 He represented to Dr. Boucher for hematuria and was found to have a large bladder tumor on his trigone. The tumor was also causing bilateral ureteral obstruction with resulting ELDER.     The patient underwent tumor resection and attempted JJ stent placement on 4/5/17 which demonstrated cT2 urothelial carcinoma (nested variant). Bilateral nephrostomy tubes were placed with improvement in the patient's renal function. The patient's eGFR fluctuated, and, ultimately, he could not receive neoadjuvant cisplatin-based chemotherapy due to his creatinine clearance.     Thus, the patient underwent surgery on 6/21/17 and did very well in the immediate post-operative period.     His pathology revealed urothelial carcinoma with nested variant into the perivesical fat. His lymph nodes and margins are negative. His final pathologic stage is [id number].     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 Has been slowly recovering. He was recently discharged from rehab and has been making progress.     He returned to clinic, after being seen by enterostomal therapy, with blood per stoma and leakage of urine after having some tissue next to the stoma excised. Urine may now be leaking through the skin. Overnight he has had 1500 cc of  "what his wife describes as "bloody" output. He became dizzy this AM and "crumpled" to the ground without hitting his head. He has not lost consciousness. He notes recent vomiting that started with IV Abx - currently on rocephin and ampicillin for treatment of vegetative endocarditis.    Procedure(s) (LRB):  ESOPHAGOGASTRODUODENOSCOPY (EGD) (N/A)     Indwelling Lines/Drains at time of discharge:   Lines/Drains/Airways     Peripherally Inserted Central Catheter Line                 PICC Double Lumen 08/10/17 1155 right brachial 36 days          Drain                 Ureteral Drain/Stent -- days         Nephrostomy 05/05/17 1503 Right 8 Fr. 133 days         Nephrostomy 05/05/17 1504 Left 8 Fr. 133 days         Closed/Suction Drain 06/21/17 1221 Abdomen Bulb 19 Fr. 86 days         Ureteral Drain/Stent 06/21/17 1053 Left ureter 6 Fr. 86 days         Ureteral Drain/Stent 06/21/17 1054 Right ureter 6 Fr. 86 days         Urostomy 08/02/17 2345 ileal conduit RUQ 43 days         Urostomy 09/07/17 1522  RLQ 8 days    Arterial Sheath 09/10/17 1623 Right 5 days                Hospital Course (synopsis of major diagnoses, care, treatment, and services provided during the course of the hospital stay): Patient admitted to hospital for profuse bleeding form his urostomy with associated anemia and dizziness. He was taken to the OR for loopogram which showed possible bleed coming from right ureter. A bolanos catheter with inflated balloon was placed in conduit and the bleeding was noted to have stopped the next morning. CT scan done did not show any clear source for bleed. On the morning of 9/10 he began to bleed again from his stoma and was taken for embolization procedure by interventional radiology. He required a short stay in the surgical ICU for closer observation. Nephrology was consulted for elevated Cr and was placed on CRRT temporarily. He was transferred back to the floor. He was transfused blood products as needed. His " appetite steadily increased and he was tolerating his diet. His antibiotic therapy for endocarditis was continued throughout his hospital stay. He worked with PT/OT and was seen by wound care for ostomy teaching. His pain was controlled with PO medications. Patient stable for discharge.     Consults:   Consults         Status Ordering Provider     Inpatient consult to Gastroenterology  Once     Provider:  (Not yet assigned)    Completed TELLO MARTINEZ     Inpatient consult to Nephrology  Once     Provider:  (Not yet assigned)    Completed TELLO MARTINEZ     Inpatient consult to Psychiatry  Once     Provider:  (Not yet assigned)    Completed CHANTELLE ANGEL     Inpatient consult to Urology  Once     Provider:  (Not yet assigned)    Completed NICOLE GARCIA          Significant Diagnostic Studies:     EGD: normal duodenum, few gastric polyps, 2cm hiatal hernia  CT: no definitive evidence of source for stomal bleed    Pending Diagnostic Studies:     Procedure Component Value Units Date/Time    CBC auto differential [[id number]] Collected:  09/10/17 2256    Order Status:  Sent Lab Status:  In process Updated:  09/10/17 2256    Specimen:  Blood from Blood           Final Active Diagnoses:    Diagnosis Date Noted POA    PRINCIPAL PROBLEM:  Urinary stoma complication [N99.528] 09/07/2017 Yes    Acute renal failure with acute tubular necrosis superimposed on stage 3 chronic kidney disease [N17.0, N18.3] 09/10/2017 Yes    Acute blood loss anemia [D62] 09/10/2017 Yes    Hematuria [R31.9] 09/10/2017 Yes    Adjustment disorder with anxiety [F43.22] 09/09/2017 Yes    Intractable nausea and vomiting [R11.2] 09/09/2017 Unknown      Problems Resolved During this Admission:    Diagnosis Date Noted Date Resolved POA       Discharged Condition: stable    Disposition: Home-Health Care [facility]    Follow Up:  Follow-up Information     [facility].    Specialty:  Home Health Services  Why:  Home Health:  "[phone] - The home health nurse will see the patient the day after discharge.           [facility].    Specialties:  Pharmacist, DME Provider, IV Infusion  Why:  Infusion: The patient is a current patient and has supplies at home.  The patient can call them if needed.  Contact information:  [address]  [facility] [address] [phone]             Lenard Fisher MD In 1 week.    Specialties:  Nephrology, Endocrinology  Contact information:  [address] [facility] [address] [phone]             Hemant Huber MD In 1 week.    Specialties:  Infectious Diseases, Hospice and Palliative Medicine  Why:  post 6 week treatment for endocarditis  Contact information:  [address] [id number] [phone]             BRITTANIE Stoddard In 2 weeks.    Specialties:  Colon and Rectal Surgery, Wound Care  Contact information:  [address] [facility] [address] [phone]             Villa Robles MD In 2 weeks.    Specialty:  Urology  Contact information:  [address] [facility] [address] [phone]                 Patient Instructions:     HOSPITAL BED FOR HOME USE   Order Specific Question Answer Comments   Type: Semi-electric    Length of need (1-99 months): 99    Does patient have medical equipment at home? walker, rolling    Does patient have medical equipment at home? shower chair    Does patient have medical equipment at home? cane, straight    Height: 6' 2" (1.88 m)    Weight: 60 kg (132 lb 4.4 oz)    Please check all that apply: Patient requires positioning of the body in ways not feasible in an ordinary bed due to a medical condition which is expected to last at least one month.    Please check all that apply: Patient requires, for the alleviation of pain, positioning of the body in ways not feasible in an ordinary bed.    Please check all that apply: Patient requires the head of bed to be elevated more than 30 degrees most of the time " due to congestive heart failure, chronic pulmonary disease, or aspiration.  Pillows and wedges have been considered and ruled out.    Please check all that apply: Patient requires traction which can only be attached to a hospital bed.    Please check all that apply: Patient requires frequent changes in body position and/or has an immediate need for a change in body position.    Please check all that apply: Patient requires a bed height different than a fixed height hospital bed to permit transfers to chair, wheelchair, or standing.    Vendor: [facility] to deliver   Expected Date of Delivery: 9/15/2017      Diet general     Activity as tolerated     Call MD for:  persistent nausea and vomiting or diarrhea     Call MD for:  severe uncontrolled pain     Call MD for:   Order Comments: Temperature > 101F       Medications:  Reconciled Home Medications:   Discharge Medication List as of 9/14/2017  3:09 PM      CONTINUE these medications which have NOT CHANGED    Details   AMPICILLIN SODIUM (AMPICILLIN 2 G/100 ML NS, READY TO MIX SYSTEM,) Inject 100 mLs (2 g total) into the vein every 6 (six) hours., Starting Wed 8/23/2017, Until Sat 9/16/2017, Print      aspirin (ECOTRIN) 81 MG EC tablet Take 1 tablet (81 mg total) by mouth once daily., Starting Wed 8/23/2017, Until Thu 8/23/2018, OTC      carvedilol (COREG) 25 MG tablet Take 1 tablet (25 mg total) by mouth 2 (two) times daily., Starting Wed 8/23/2017, Until Thu 8/23/2018, Normal      cefTRIAXone 2 g in dextrose 5 % 50 mL (ROCEPHIN) 2 g/50 mL PgBk IVPB Inject 50 mLs (2 g total) into the vein every 12 (twelve) hours., Starting Wed 8/23/2017, Until Sat 9/16/2017, Print      dronabinol (MARINOL) 5 MG capsule Take 1 capsule (5 mg total) by mouth 3 (three) times daily before meals., Starting Wed 8/23/2017, Until Fri 9/22/2017, Normal      ondansetron (ZOFRAN-ODT) 8 MG TbDL Take 1 tablet (8 mg total) by mouth every 6 (six) hours as needed (nausea)., Starting Thu  7/6/2017, Print      pantoprazole (PROTONIX) 40 MG tablet Take 1 tablet (40 mg total) by mouth once daily., Starting Wed 8/23/2017, Until Thu 8/23/2018, Normal      scopolamine (TRANSDERM-SCOP) 1.3-1.5 mg (1 mg over 3 days) Place 1 patch onto the skin Every 3 (three) days., Starting Wed 8/23/2017, Until Fri 9/22/2017, Normal      trazodone (DESYREL) 50 MG tablet Take 1 tablet (50 mg total) by mouth every evening., Starting Wed 8/23/2017, Until Thu 8/23/2018, Normal             Time spent on the discharge of patient: 20 minutes    Bonifacio Segura MD  Urology  [facility]

## 2017-09-18 ENCOUNTER — TELEPHONE (OUTPATIENT)
Dept: INFECTIOUS DISEASES | Facility: CLINIC | Age: 67
End: 2017-09-18

## 2017-09-18 NOTE — TELEPHONE ENCOUNTER
----- Message from Hemant Huber MD sent at 9/18/2017  1:19 PM CDT -----  Contact: Dr Huber pt   Last seen in hospital on 8/10 for endocarditis. Needs to f/u with anyone. Thanks, CB  ----- Message -----  From: Neniat Berman MA  Sent: 9/18/2017  10:50 AM  To: Hemant Huber MD    Do you know anything in regards to this?  ----- Message -----  From: Chaparrita Crandall  Sent: 9/18/2017  10:33 AM  To: Nenita Berman MA    Pt was told by Dr Calero  / Dr Robles  To make appt now   With Dr Huber    Pt doesn't know reason -      Has bladder cancer, prostrate cancer  And lymphoma      Recently had stroke  -  No Chemo  Will not help   -        That's all the info pt had to give    Received message to make appt with Dr Huber in Infectious Disease       Please call [phone]- jim (pt)     Thanks

## 2017-09-19 ENCOUNTER — OFFICE VISIT (OUTPATIENT)
Dept: WOUND CARE | Facility: CLINIC | Age: 67
End: 2017-09-19
Payer: MEDICARE

## 2017-09-19 DIAGNOSIS — Z71.89 ENCOUNTER FOR OSTOMY CARE EDUCATION: ICD-10-CM

## 2017-09-19 DIAGNOSIS — Z43.6 ATTENTION TO UROSTOMY: Primary | ICD-10-CM

## 2017-09-19 PROCEDURE — 99999 PR PBB SHADOW E&M-EST. PATIENT-LVL I: CPT | Mod: PBBFAC,,, | Performed by: CLINICAL NURSE SPECIALIST

## 2017-09-19 PROCEDURE — 99214 OFFICE O/P EST MOD 30 MIN: CPT | Mod: S$GLB,,, | Performed by: CLINICAL NURSE SPECIALIST

## 2017-09-19 NOTE — PROGRESS NOTES
Subjective:       Patient ID: Juan Manuel Koehler is a 67 y.o. male.    Chief Complaint: Urostomy    This is return visit for problems with leakages from urostomy post bleed from stoma after cautery done. He has been in hospital again and had embolization to stop bleed.  Comes today for pouching help.      Review of Systems   Constitutional: Negative for activity change, appetite change and fever.   HENT: Negative.    Respiratory: Negative for cough and shortness of breath.    Cardiovascular: Negative for chest pain and leg swelling.   Gastrointestinal: Negative for abdominal pain, nausea and vomiting.   Genitourinary: Negative for hematuria.        Urostomy , clear urine upon arrival   Skin: Positive for rash.   Neurological: Positive for weakness.       Objective:      Physical Exam   Constitutional: He is oriented to person, place, and time. He appears well-developed.   Pulmonary/Chest: Effort normal.   Abdominal: Soft. Bowel sounds are normal. There is no tenderness.   Musculoskeletal:   Weakness and balance issues   Neurological: He is alert and oriented to person, place, and time.   Skin: Skin is warm.       First visit before cautery      9/19 removed catheter placed by urology per Margaret     He has a rash which appears a bit yeasty so applied antifungal powder and cavilon then a FLAT KRANTHI , cut to 35 mm,  Applied NU HOPE COMFORT BELT as he has a bulge and this will give him support   reviewed all items they have and what can be used and what should not be used.   Assessment:       1. Attention to urostomy    2. Encounter for ostomy care education        Plan:       Removed the catheter from stoma]  Rec flat kranthi pouch,   Applied Comfort belt for light support  Continue with pouch cutting to 35 mm and use pouches I gave them  Call me for any problems   I have reviewed the plan of care with the patient and/ or caregiver and they express understanding. I spent over 50% of this 25 minute visit in face to face  counseling.

## 2017-09-19 NOTE — Clinical Note
I saw him today and better, took out the cath  They want you to make a follow up for when you need/want to see them, I am keeping close tabs for now

## 2017-09-19 NOTE — ANESTHESIA POSTPROCEDURE EVALUATION
"Anesthesia Post Evaluation    Patient: Juan Manuel Koehler    Procedure(s) Performed: Procedure(s) (LRB):  ESOPHAGOGASTRODUODENOSCOPY (EGD) (N/A)    Final Anesthesia Type: general  Patient location during evaluation: [facility]  Patient participation: Yes- Able to Participate  Level of consciousness: awake and alert and oriented  Post-procedure vital signs: reviewed and stable  Pain management: adequate  Airway patency: patent  PONV status at discharge: No PONV  Anesthetic complications: no      Cardiovascular status: hemodynamically stable  Respiratory status: unassisted, spontaneous ventilation and room air  Hydration status: euvolemic  Follow-up not needed.        Visit Vitals  BP (!) 142/79 (BP Location: Left arm, Patient Position: Lying)   Pulse 95   Temp 36.1 °C (96.9 °F) (Oral)   Resp 20   Ht 6' 2" (1.88 m)   Wt 60 kg (132 lb 4.4 oz)   SpO2 96%   BMI 16.98 kg/m²       Pain/Deepika Score: No Data Recorded      "

## 2017-09-21 ENCOUNTER — TELEPHONE (OUTPATIENT)
Dept: INFECTIOUS DISEASES | Facility: CLINIC | Age: 67
End: 2017-09-21

## 2017-09-26 DIAGNOSIS — Z98.890 HISTORY OF UROSTOMY: Primary | ICD-10-CM

## 2017-09-26 NOTE — PROGRESS NOTES
Wife states leakages at night continue, he did not wear belt until last night and this one did not leak, she also crusted per nurse on phone at [facility],

## 2017-10-02 ENCOUNTER — TELEPHONE (OUTPATIENT)
Dept: UROLOGY | Facility: CLINIC | Age: 67
End: 2017-10-02

## 2017-10-02 ENCOUNTER — TELEPHONE (OUTPATIENT)
Dept: INFECTIOUS DISEASES | Facility: CLINIC | Age: 67
End: 2017-10-02

## 2017-10-02 DIAGNOSIS — R78.81 BACTEREMIA: Primary | ICD-10-CM

## 2017-10-02 DIAGNOSIS — A41.81 SEPTICEMIA DUE TO ENTEROCOCCUS: ICD-10-CM

## 2017-10-02 NOTE — TELEPHONE ENCOUNTER
Patient ended abx on 9/16 still has pic linie in.  HH not sure what to do need orders to remove pic????  Ara Arambula with Becky

## 2017-10-02 NOTE — TELEPHONE ENCOUNTER
----- Message from Darvin Hinkle sent at 10/2/2017  3:08 PM CDT -----  Contact: [facility] Tyesha:[phone]  [facility] Tyesha called and states the pt needs orders placed for his picc line. Tyesha states the orders needs to be placed today .Tyesha would like to speak with the nurse.

## 2017-10-03 ENCOUNTER — INFUSION (OUTPATIENT)
Dept: INFECTIOUS DISEASES | Facility: HOSPITAL | Age: 67
End: 2017-10-03
Attending: INTERNAL MEDICINE
Payer: MEDICARE

## 2017-10-03 NOTE — TELEPHONE ENCOUNTER
Spoke to patient's wife and patient is coming in today to have picc remove. Please sign picc removal orders.

## 2017-10-16 ENCOUNTER — OFFICE VISIT (OUTPATIENT)
Dept: UROLOGY | Facility: CLINIC | Age: 67
End: 2017-10-16
Payer: MEDICARE

## 2017-10-16 ENCOUNTER — LAB VISIT (OUTPATIENT)
Dept: LAB | Facility: HOSPITAL | Age: 67
End: 2017-10-16
Attending: UROLOGY
Payer: MEDICARE

## 2017-10-16 VITALS
BODY MASS INDEX: 15.78 KG/M2 | DIASTOLIC BLOOD PRESSURE: 80 MMHG | HEART RATE: 80 BPM | WEIGHT: 123 LBS | SYSTOLIC BLOOD PRESSURE: 160 MMHG | HEIGHT: 74 IN

## 2017-10-16 DIAGNOSIS — C67.0 MALIGNANT NEOPLASM OF TRIGONE OF URINARY BLADDER: Primary | ICD-10-CM

## 2017-10-16 DIAGNOSIS — Z93.6 PRESENCE OF UROSTOMY: ICD-10-CM

## 2017-10-16 DIAGNOSIS — N18.4 CKD (CHRONIC KIDNEY DISEASE), STAGE IV: ICD-10-CM

## 2017-10-16 DIAGNOSIS — C67.0 MALIGNANT NEOPLASM OF TRIGONE OF URINARY BLADDER: ICD-10-CM

## 2017-10-16 LAB
ALBUMIN SERPL BCP-MCNC: 3.7 G/DL
ALP SERPL-CCNC: 142 U/L
ALT SERPL W/O P-5'-P-CCNC: 31 U/L
ANION GAP SERPL CALC-SCNC: 9 MMOL/L
AST SERPL-CCNC: 13 U/L
BASOPHILS # BLD AUTO: 0.08 K/UL
BASOPHILS NFR BLD: 1 %
BILIRUB SERPL-MCNC: 0.6 MG/DL
BUN SERPL-MCNC: 41 MG/DL
CALCIUM SERPL-MCNC: 9.8 MG/DL
CHLORIDE SERPL-SCNC: 114 MMOL/L
CO2 SERPL-SCNC: 18 MMOL/L
CREAT SERPL-MCNC: 2.9 MG/DL
DIFFERENTIAL METHOD: ABNORMAL
EOSINOPHIL # BLD AUTO: 0.3 K/UL
EOSINOPHIL NFR BLD: 3.9 %
ERYTHROCYTE [DISTWIDTH] IN BLOOD BY AUTOMATED COUNT: 14.7 %
EST. GFR  (AFRICAN AMERICAN): 24.6 ML/MIN/1.73 M^2
EST. GFR  (NON AFRICAN AMERICAN): 21.3 ML/MIN/1.73 M^2
GLUCOSE SERPL-MCNC: 103 MG/DL
HCT VFR BLD AUTO: 28 %
HGB BLD-MCNC: 9.3 G/DL
IMM GRANULOCYTES # BLD AUTO: 0.05 K/UL
IMM GRANULOCYTES NFR BLD AUTO: 0.6 %
LYMPHOCYTES # BLD AUTO: 1.3 K/UL
LYMPHOCYTES NFR BLD: 15.3 %
MCH RBC QN AUTO: 30.3 PG
MCHC RBC AUTO-ENTMCNC: 33.2 G/DL
MCV RBC AUTO: 91 FL
MONOCYTES # BLD AUTO: 0.6 K/UL
MONOCYTES NFR BLD: 7.2 %
NEUTROPHILS # BLD AUTO: 5.9 K/UL
NEUTROPHILS NFR BLD: 72 %
NRBC BLD-RTO: 0 /100 WBC
PLATELET # BLD AUTO: 257 K/UL
PMV BLD AUTO: 9.1 FL
POTASSIUM SERPL-SCNC: 5.9 MMOL/L
PROT SERPL-MCNC: 7.3 G/DL
RBC # BLD AUTO: 3.07 M/UL
SODIUM SERPL-SCNC: 141 MMOL/L
WBC # BLD AUTO: 8.24 K/UL

## 2017-10-16 PROCEDURE — 99214 OFFICE O/P EST MOD 30 MIN: CPT | Mod: S$GLB,,, | Performed by: UROLOGY

## 2017-10-16 PROCEDURE — 99999 PR PBB SHADOW E&M-EST. PATIENT-LVL III: CPT | Mod: PBBFAC,,, | Performed by: UROLOGY

## 2017-10-16 PROCEDURE — 36415 COLL VENOUS BLD VENIPUNCTURE: CPT

## 2017-10-16 PROCEDURE — 85025 COMPLETE CBC W/AUTO DIFF WBC: CPT

## 2017-10-16 PROCEDURE — 80053 COMPREHEN METABOLIC PANEL: CPT

## 2017-10-16 RX ORDER — SCOLOPAMINE TRANSDERMAL SYSTEM 1 MG/1
1 PATCH, EXTENDED RELEASE TRANSDERMAL
COMMUNITY
End: 2017-10-16 | Stop reason: SDUPTHER

## 2017-10-16 RX ORDER — SCOLOPAMINE TRANSDERMAL SYSTEM 1 MG/1
1 PATCH, EXTENDED RELEASE TRANSDERMAL
Qty: 10 PATCH | Refills: 3 | Status: SHIPPED | OUTPATIENT
Start: 2017-10-16 | End: 2018-05-10

## 2017-10-16 NOTE — PATIENT INSTRUCTIONS
Urostomy: Caring for Your Stoma    After a urostomy, youll have to care for your stoma and the skin around it (called the peristomal skin). You must keep the stoma clean and protect the peristomal skin from moisture and urine. This will prevent skin problems and odor.  Checking the stoma  · Check your stoma and the skin around it each time you change your pouch.  ·  front of a mirror, or use a hand mirror so that you can see the entire stoma.  · The stoma should look shiny, moist, and pink or red.  · The skin around it should be smooth, with no red or open areas.  Cleaning the stoma  When you change your pouch, be sure to clean the stoma and the skin around it. Do this using warm water and a soft washcloth. Water does not harm the stoma.  · Clean and dry the stoma gently. Because the stoma has no sensory nerves, you could injure it without feeling any pain.  · The stoma may bleed a little when you clean it. Thats because it has tiny blood vessels. To stop the bleeding quickly, apply gentle pressure to  the stoma using a dry cloth or tissue.  · Be aware that urine will keep flowing out as you clean your stoma. You can use a folded paper towel or piece of gauze to absorb the urine.  Protect the skin around the stoma  For the pouch to stick well, the peristomal skin needs to be dry and smooth. If the skin is moist or uneven, the pouch is more likely to leak. And any urine that leaks out of the pouch can pool on your skin. This can irritate the skin. Urine that leaks from the pouch can also cause odor or be absorbed by your clothes. You can help prevent these problems by following these steps:  · Be sure your skin is dry before applying the skin barrier. This helps keep the skin healthy. Always pat your skin dry after washing it. Or, try drying your skin with a hair dryer thats set on cool.  · Try applying a skin barrier wipe before you put on a new pouch. This helps protect the skin if urine leaks around  the pouch. A skin barrier wipe may shorten or lengthen the amount of time you can wear some pouches. Before using a wipe, check the product information that came with your pouch.   When to see your healthcare provider  Call your Wound Ostomy and Continence (WOC) nurse or other healthcare provider if:  · The skin around the stoma is red, weepy, bleeding, or has open areas.  · The skin around the stoma itches, burns, stings, or has white spots.  · The stoma swells, changes color, or bleeds without stopping.  · The stoma sinks below its normal level or below the skin.  · The stoma sticks up above the skin more than normal.   Date Last Reviewed: 1/1/2017 © 2000-2017 [employer] [address] [place] [address], [place] [address]. All rights reserved. This information is not intended as a substitute for professional medical care. Always follow your healthcare professional's instructions.

## 2017-10-16 NOTE — PROGRESS NOTES
Subjective:       Patient ID: Juan Manuel Koehler is a 67 y.o. male.    Chief Complaint: No chief complaint on file.      HPI: Juan Manuel Koehler is a 67 y.o. White male who returns today in follow-up for invasive bladder cancer s/p open radical cystoprostatectomy, bilateral pelvic lymph node dissection, and creation of an ileal conduit on 6/21/17.    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 He represented to Dr. Boucher for hematuria and was found to have a large bladder tumor on his trigone. The tumor was also causing bilateral ureteral obstruction with resulting ELDER.    The patient underwent tumor resection and attempted JJ stent placement on 4/5/17 which demonstrated cT2 urothelial carcinoma (nested variant). Bilateral nephrostomy tubes were placed with improvement in the patient's renal function. The patient's eGFR fluctuated, and, ultimately, he could not receive neoadjuvant cisplatin-based chemotherapy due to his creatinine clearance. Thus, the patient underwent surgery on 6/21/17 and did very well in the immediate post-operative period.    His pathology revealed urothelial carcinoma with nested variant into the perivesical fat. His lymph nodes and margins are negative. His final pathologic stage is [id number].    Unfortunately, the patient has a CVA in August 2017 and had been slowly recovering. Unfortunately, in 9/6/17, the patient developed blood per stoma after being seen by enterostomal therapy. Despite conservative measures, the patient continued with blood per stoma and required multiple blood transfusions. Looposcopy was unrevealing, and ultimately the patient needed embolization of a arterial vessel to his stoma. After embolization, the bleeding stopped, and the patient improved.    Since discharge from the hospital, the patient has been making steady progress. His appetite is returning as well as his energy level. His stamina is  still low. He is making plenty of urine.    He returns today for an interval follow-up visit.    Review of patient's allergies indicates:   Allergen Reactions    Pneumococcal 23-margarito ps vaccine        Current Outpatient Prescriptions   Medication Sig Dispense Refill    aspirin (ECOTRIN) 81 MG EC tablet Take 1 tablet (81 mg total) by mouth once daily.  0    carvedilol (COREG) 25 MG tablet Take 1 tablet (25 mg total) by mouth 2 (two) times daily. 180 tablet 3    ondansetron (ZOFRAN-ODT) 8 MG TbDL Take 1 tablet (8 mg total) by mouth every 6 (six) hours as needed (nausea). 30 tablet 1    scopolamine (TRANSDERM-SCOP) 1.3-1.5 mg (1 mg over 3 days) Place 1 patch onto the skin Every 3 (three) days. 10 patch 3    pantoprazole (PROTONIX) 40 MG tablet Take 1 tablet (40 mg total) by mouth once daily. 90 tablet 3    trazodone (DESYREL) 50 MG tablet Take 1 tablet (50 mg total) by mouth every evening. 90 tablet 3     No current facility-administered medications for this visit.        Past Medical History:   Diagnosis Date    Cancer     bladder and throat    CKD (chronic kidney disease) stage 3, GFR 30-59 ml/min     Embolic stroke involving right cerebellar artery 8/4/2017    Urinary tract infection        Past Surgical History:   Procedure Laterality Date    BLADDER SURGERY      CYSTOSCOPY      HERNIA REPAIR      Ileal Conduit      THROAT SURGERY         Family History   Problem Relation Age of Onset    No Known Problems Father     Kidney disease Mother     Prostate cancer Neg Hx        Review of Systems    Review of Systems   Constitutional: Negative for fever, chills, activity change, appetite change and unexpected weight change.   HENT: Negative for congestion, nosebleeds, sneezing, sore throat and trouble swallowing.    Eyes: Negative for pain, discharge, redness and visual disturbance.   Respiratory: Negative for cough, choking, chest tightness and shortness of breath.    Cardiovascular: Negative for chest  pain, palpitations and leg swelling.   Gastrointestinal: Negative for nausea, vomiting, abdominal pain, diarrhea, blood in stool, abdominal distention and anal bleeding.  Genitourinary: As documented per HPI   Endocrine: Negative for cold intolerance, heat intolerance, polydipsia, polyphagia and polyuria.   Musculoskeletal: Negative for myalgias, gait problem, neck pain and neck stiffness.   Skin: Negative for color change, pallor, rash and wound.   Allergic/Immunologic: Negative for immunocompromised state.   Neurological: Negative for seizures, facial asymmetry, speech difficulty, weakness and light-headedness.   Hematological: Negative for adenopathy. Does not bruise/bleed easily.   Psychiatric/Behavioral: Negative for hallucinations, behavioral problems, self-injury and agitation. The patient is not hyperactive.    All other systems were reviewed and were negative.      Objective:     Vitals:    10/16/17 1119   BP: (!) 160/80   Pulse: 80     Physical Exam   Vitals reviewed.  Constitutional: He is oriented to person, place, and time. He appears well-developed and well-nourished. No distress.   HENT:   Head: Normocephalic and atraumatic.   Right Ear: External ear normal.   Left Ear: External ear normal.   Nose: Nose normal.   Eyes: EOM are normal. Pupils are equal, round, and reactive to light. Right eye exhibits no discharge. Left eye exhibits no discharge.   Neck: Normal range of motion. Neck supple. No tracheal deviation present. No thyroid enlargement or tenderness.   Cardiovascular: Regular rhythm and intact distal pulses. No signs of peripheral edema.    Pulmonary/Chest: Effort normal and breath sounds normal. No stridor. No respiratory distress. He has no wheezes.   Abdominal: Soft. Bowel sounds are normal. He exhibits no distension. There is no tenderness. Hernia confirmed negative in the right inguinal area and confirmed negative in the left inguinal area. No hepatic or splenic enlargement or tenderness.  Well-healed lower midline incision. Protuberant, pink stoma, draining clear urine with blood. Area of necrosis has sloughed.  Genitourinary: Penis normal. Right testis shows no mass, no swelling and no tenderness. Right testis is descended. Left testis shows no mass, no swelling and no tenderness. Left testis is descended. Circumcised. No phimosis or hypospadias.   MARICEL: Deferred.  Musculoskeletal: Normal range of motion. He exhibits no edema.   Neurological: He is alert and oriented to person, place, and time. He exhibits normal muscle tone. Coordination normal.   Skin: Skin is warm. No rash noted.   Lymphatic: No palpable lymph nodes.  Psychiatric: He has a normal mood and affect. His behavior is normal. Judgment and thought content normal.     No results found for: PSA  Lab Results   Component Value Date    CREATININE 3.5 (H) 09/14/2017     Lab Results   Component Value Date    EGFRNONAA 16.9 (A) 09/14/2017     Lab Results   Component Value Date    ESTGFRAFRICA 19.6 (A) 09/14/2017     I personally reviewed all the patient's imaging studies.    CT scan non-contrast abdomen/pelvis (4/03/17): Thickened posterior bladder wall with marked bilateral hydroureteronephrosis to the level of the UVJ.  No metastatic disease identified, noting limited evaluation without IV contrast.    CT chest/CT urogram (5/5/17): Bilateral double-J ureteral stents and left nephrostomy tube are present.  There has been interval resolution of left-sided hydronephrosis and significant improvement in right-sided hydronephrosis which now appears mild. Bladder is nondistended and not well evaluated.  There is no CT evidence of distant metastatic disease referable to provide history of bladder neoplasm.    Assessment:       1. Malignant neoplasm of trigone of urinary bladder    2. Presence of urostomy    3. CKD (chronic kidney disease), stage IV        Plan:     Diagnoses and all orders for this visit:    Malignant neoplasm of trigone of urinary  bladder  -     CBC auto differential; Future  -     Comprehensive metabolic panel; Future    Presence of urostomy  -     CBC auto differential; Future  -     Comprehensive metabolic panel; Future    CKD (chronic kidney disease), stage IV  -     CBC auto differential; Future  -     Comprehensive metabolic panel; Future    Other orders  -     scopolamine (TRANSDERM-SCOP) 1.3-1.5 mg (1 mg over 3 days); Place 1 patch onto the skin Every 3 (three) days.    The patient has made significant improvement since discharge from the hospital after his embolization. His stoma and urine look great.    His appetite and energy are similarly much improved.    At this point, the patient needs blood work today and a follow-up chest CT. He will need to see Dr. Calero within the next couple of weeks and me in 3 months.    Overall, it appears he has turned the corner.    I answered all the patient's and his wife's questions.    I encouraged him or any of his family members to call or email me with questions and/or concerns.    I spent 25 minutes with the patient of which more than half was spent in coordinating the patient's care as well as in direct consultation with the patient in regards to our treatment and plan.

## 2017-10-17 ENCOUNTER — LAB VISIT (OUTPATIENT)
Dept: LAB | Facility: HOSPITAL | Age: 67
End: 2017-10-17
Attending: UROLOGY
Payer: MEDICARE

## 2017-10-17 ENCOUNTER — OFFICE VISIT (OUTPATIENT)
Dept: INFECTIOUS DISEASES | Facility: CLINIC | Age: 67
End: 2017-10-17
Payer: MEDICARE

## 2017-10-17 VITALS
WEIGHT: 124.56 LBS | SYSTOLIC BLOOD PRESSURE: 134 MMHG | HEIGHT: 74 IN | HEART RATE: 71 BPM | DIASTOLIC BLOOD PRESSURE: 83 MMHG | BODY MASS INDEX: 15.99 KG/M2 | TEMPERATURE: 98 F

## 2017-10-17 DIAGNOSIS — I38 ENDOCARDITIS, UNSPECIFIED CHRONICITY, UNSPECIFIED ENDOCARDITIS TYPE: Primary | ICD-10-CM

## 2017-10-17 DIAGNOSIS — C67.9 MALIGNANT NEOPLASM OF URINARY BLADDER, UNSPECIFIED SITE: ICD-10-CM

## 2017-10-17 DIAGNOSIS — C67.9 MALIGNANT NEOPLASM OF URINARY BLADDER, UNSPECIFIED SITE: Primary | ICD-10-CM

## 2017-10-17 LAB
ANION GAP SERPL CALC-SCNC: 8 MMOL/L
BUN SERPL-MCNC: 44 MG/DL
CALCIUM SERPL-MCNC: 9.7 MG/DL
CHLORIDE SERPL-SCNC: 113 MMOL/L
CO2 SERPL-SCNC: 19 MMOL/L
CREAT SERPL-MCNC: 2.8 MG/DL
EST. GFR  (AFRICAN AMERICAN): 25.6 ML/MIN/1.73 M^2
EST. GFR  (NON AFRICAN AMERICAN): 22.2 ML/MIN/1.73 M^2
GLUCOSE SERPL-MCNC: 99 MG/DL
POTASSIUM SERPL-SCNC: 5.9 MMOL/L
SODIUM SERPL-SCNC: 140 MMOL/L

## 2017-10-17 PROCEDURE — 36415 COLL VENOUS BLD VENIPUNCTURE: CPT

## 2017-10-17 PROCEDURE — 99999 PR PBB SHADOW E&M-EST. PATIENT-LVL III: CPT | Mod: PBBFAC,,, | Performed by: INTERNAL MEDICINE

## 2017-10-17 PROCEDURE — 99214 OFFICE O/P EST MOD 30 MIN: CPT | Mod: S$GLB,,, | Performed by: INTERNAL MEDICINE

## 2017-10-17 PROCEDURE — 80048 BASIC METABOLIC PNL TOTAL CA: CPT

## 2017-10-17 NOTE — PROGRESS NOTES
Subjective:      Patient ID: Juan Manuel Koehler is a 67 y.o. male.    Chief Complaint:Hospital Follow Up      History of Present Illness          Mr. Koehler is here for follow-up for enterococcal endocarditis. He is a 66 yo M with history of invasive urothelial bladder carcinoma (s/p open radical cystoprostatectomy, b/l pelvic lymph node resection and ileal conduit placement) and CKD III fount to have bacterial endocarditis due to Enterococcus fecalis on this admission. Pt w/ evidence of possible septic emboli to spleen on CT and w/ acute R cerebellar stroke also likely 2/2 septic embolus. Pt also has a complicated UTI w/ isolated Proteus sensitive to ceftriaxone. The patient was treated with CTX and AMP x 6 weeks and completed last month. His PICC line was subsequently removed. He denies any fever or chills.    Review of Systems   Constitution: Positive for weight loss.   Psychiatric/Behavioral: The patient has insomnia.      Objective:   Physical Exam   Constitutional: He is oriented to person, place, and time. He appears well-developed. No distress.   cachectic   Eyes: EOM are normal. Pupils are equal, round, and reactive to light.   Neck: Normal range of motion.   Cardiovascular: Normal rate and regular rhythm.    Murmur heard.  Pulmonary/Chest: Effort normal and breath sounds normal. No respiratory distress.   Abdominal: Soft. Bowel sounds are normal.   Ostomy    Musculoskeletal: Normal range of motion. He exhibits no edema or tenderness.   Neurological: He is alert and oriented to person, place, and time.   Skin: He is not diaphoretic.   Psychiatric: He has a normal mood and affect. His behavior is normal. Thought content normal.   Nursing note and vitals reviewed.    Assessment/Plan:       Enterococcal endocarditis  - resolved  - needs abx prophylaxis as needed per AHA guidleines  - RTC as needed

## 2017-10-19 ENCOUNTER — TELEPHONE (OUTPATIENT)
Dept: UROLOGY | Facility: CLINIC | Age: 67
End: 2017-10-19

## 2017-10-19 NOTE — TELEPHONE ENCOUNTER
Called pt on 10/17/17 and told him that dr preston wanted him to see his nephrologist right away for his elevated potassium level of 5.9. Pt verbalized understanding and stated that he would stop at his nephrologists' office, who was a non [facility] md that same day and that he would call us back and let us know what his md's plan was.    Pt called back on 10/18/17 and said that his nephrologist started him on Veltassa to lower his potassium level and that she would monitor him closely.  Mr Koehler stated that he would call us if he needed anything and that he appreciated the call.  All questions answered. Verbalized understanding to all.

## 2017-10-20 ENCOUNTER — LAB VISIT (OUTPATIENT)
Dept: LAB | Facility: HOSPITAL | Age: 67
End: 2017-10-20
Attending: INTERNAL MEDICINE
Payer: MEDICARE

## 2017-10-20 DIAGNOSIS — E87.5 HYPERKALEMIA: Primary | ICD-10-CM

## 2017-10-20 LAB
ANION GAP SERPL CALC-SCNC: 8 MMOL/L
BUN SERPL-MCNC: 50 MG/DL
CALCIUM SERPL-MCNC: 10 MG/DL
CHLORIDE SERPL-SCNC: 113 MMOL/L
CO2 SERPL-SCNC: 19 MMOL/L
CREAT SERPL-MCNC: 3.1 MG/DL
EST. GFR  (AFRICAN AMERICAN): 23 ML/MIN/1.73 M^2
EST. GFR  (NON AFRICAN AMERICAN): 20 ML/MIN/1.73 M^2
GLUCOSE SERPL-MCNC: 105 MG/DL
POTASSIUM SERPL-SCNC: 5.8 MMOL/L
SODIUM SERPL-SCNC: 140 MMOL/L

## 2017-10-20 PROCEDURE — 36415 COLL VENOUS BLD VENIPUNCTURE: CPT

## 2017-10-20 PROCEDURE — 80048 BASIC METABOLIC PNL TOTAL CA: CPT

## 2017-10-23 ENCOUNTER — HOSPITAL ENCOUNTER (OUTPATIENT)
Dept: RADIOLOGY | Facility: HOSPITAL | Age: 67
Discharge: HOME | End: 2017-10-23
Attending: NURSE PRACTITIONER
Payer: MEDICARE

## 2017-10-23 DIAGNOSIS — R63.0 ANOREXIA: ICD-10-CM

## 2017-10-23 DIAGNOSIS — N17.9 AKI (ACUTE KIDNEY INJURY): ICD-10-CM

## 2017-10-23 DIAGNOSIS — Z09 CHEMOTHERAPY FOLLOW-UP EXAMINATION: ICD-10-CM

## 2017-10-23 DIAGNOSIS — N13.30 HYDRONEPHROSIS, UNSPECIFIED HYDRONEPHROSIS TYPE: ICD-10-CM

## 2017-10-23 DIAGNOSIS — C67.0 MALIGNANT NEOPLASM OF TRIGONE OF URINARY BLADDER: ICD-10-CM

## 2017-10-23 PROCEDURE — 71260 CT THORAX DX C+: CPT | Mod: 26,,, | Performed by: RADIOLOGY

## 2017-10-23 PROCEDURE — 71260 CT THORAX DX C+: CPT | Mod: TC

## 2017-10-23 PROCEDURE — 25500020 PHARM REV CODE 255: Performed by: NURSE PRACTITIONER

## 2017-10-23 RX ADMIN — IOHEXOL 75 ML: 350 INJECTION, SOLUTION INTRAVENOUS at 09:10

## 2017-10-24 ENCOUNTER — LAB VISIT (OUTPATIENT)
Dept: LAB | Facility: HOSPITAL | Age: 67
End: 2017-10-24
Payer: MEDICARE

## 2017-10-24 ENCOUNTER — OFFICE VISIT (OUTPATIENT)
Dept: HEMATOLOGY/ONCOLOGY | Facility: CLINIC | Age: 67
End: 2017-10-24
Payer: MEDICARE

## 2017-10-24 VITALS
BODY MASS INDEX: 15.93 KG/M2 | TEMPERATURE: 98 F | DIASTOLIC BLOOD PRESSURE: 69 MMHG | WEIGHT: 124.13 LBS | RESPIRATION RATE: 16 BRPM | SYSTOLIC BLOOD PRESSURE: 141 MMHG | HEART RATE: 66 BPM | HEIGHT: 74 IN

## 2017-10-24 DIAGNOSIS — C67.8 MALIGNANT NEOPLASM OF OVERLAPPING SITES OF BLADDER: ICD-10-CM

## 2017-10-24 DIAGNOSIS — N17.9 AKI (ACUTE KIDNEY INJURY): ICD-10-CM

## 2017-10-24 DIAGNOSIS — N13.30 HYDRONEPHROSIS, UNSPECIFIED HYDRONEPHROSIS TYPE: ICD-10-CM

## 2017-10-24 DIAGNOSIS — R63.0 ANOREXIA: ICD-10-CM

## 2017-10-24 DIAGNOSIS — C67.0 MALIGNANT NEOPLASM OF TRIGONE OF URINARY BLADDER: ICD-10-CM

## 2017-10-24 DIAGNOSIS — Z09 CHEMOTHERAPY FOLLOW-UP EXAMINATION: ICD-10-CM

## 2017-10-24 DIAGNOSIS — C67.0 MALIGNANT NEOPLASM OF TRIGONE OF URINARY BLADDER: Primary | ICD-10-CM

## 2017-10-24 DIAGNOSIS — C67.9 MALIGNANT NEOPLASM OF URINARY BLADDER, UNSPECIFIED SITE: ICD-10-CM

## 2017-10-24 LAB
ALBUMIN SERPL BCP-MCNC: 3.7 G/DL
ALP SERPL-CCNC: 108 U/L
ALT SERPL W/O P-5'-P-CCNC: 19 U/L
ANION GAP SERPL CALC-SCNC: 10 MMOL/L
AST SERPL-CCNC: 11 U/L
BILIRUB SERPL-MCNC: 0.7 MG/DL
BUN SERPL-MCNC: 42 MG/DL
CALCIUM SERPL-MCNC: 9.9 MG/DL
CHLORIDE SERPL-SCNC: 106 MMOL/L
CO2 SERPL-SCNC: 23 MMOL/L
CREAT SERPL-MCNC: 3 MG/DL
ERYTHROCYTE [DISTWIDTH] IN BLOOD BY AUTOMATED COUNT: 14.6 %
EST. GFR  (AFRICAN AMERICAN): 23.6 ML/MIN/1.73 M^2
EST. GFR  (NON AFRICAN AMERICAN): 20.4 ML/MIN/1.73 M^2
GLUCOSE SERPL-MCNC: 120 MG/DL
HCT VFR BLD AUTO: 28 %
HGB BLD-MCNC: 9.5 G/DL
IMM GRANULOCYTES # BLD AUTO: 0.03 K/UL
MAGNESIUM SERPL-MCNC: 1.9 MG/DL
MCH RBC QN AUTO: 30.4 PG
MCHC RBC AUTO-ENTMCNC: 33.9 G/DL
MCV RBC AUTO: 90 FL
NEUTROPHILS # BLD AUTO: 4.6 K/UL
PLATELET # BLD AUTO: 268 K/UL
PMV BLD AUTO: 9.2 FL
POTASSIUM SERPL-SCNC: 4.8 MMOL/L
PROT SERPL-MCNC: 7 G/DL
RBC # BLD AUTO: 3.12 M/UL
SODIUM SERPL-SCNC: 139 MMOL/L
WBC # BLD AUTO: 6.44 K/UL

## 2017-10-24 PROCEDURE — 99999 PR PBB SHADOW E&M-EST. PATIENT-LVL III: CPT | Mod: PBBFAC,,, | Performed by: INTERNAL MEDICINE

## 2017-10-24 PROCEDURE — 99214 OFFICE O/P EST MOD 30 MIN: CPT | Mod: S$GLB,,, | Performed by: INTERNAL MEDICINE

## 2017-10-24 PROCEDURE — 36415 COLL VENOUS BLD VENIPUNCTURE: CPT

## 2017-10-24 PROCEDURE — 83735 ASSAY OF MAGNESIUM: CPT

## 2017-10-24 PROCEDURE — 85027 COMPLETE CBC AUTOMATED: CPT

## 2017-10-24 PROCEDURE — 80053 COMPREHEN METABOLIC PANEL: CPT

## 2017-10-24 RX ORDER — TRAZODONE HYDROCHLORIDE 50 MG/1
50 TABLET ORAL NIGHTLY
Qty: 90 TABLET | Refills: 3 | Status: SHIPPED | OUTPATIENT
Start: 2017-10-24 | End: 2017-11-06 | Stop reason: SDUPTHER

## 2017-10-24 RX ORDER — DRONABINOL 5 MG/1
5 CAPSULE ORAL
COMMUNITY
End: 2018-05-10

## 2017-10-24 NOTE — PROGRESS NOTES
Subjective:       Patient ID: Juan Manuel Koehler is a 67 y.o. male.    Chief Complaint: Bladder Cancer    Nausea   Associated symptoms include abdominal pain and nausea. Pertinent negatives include no chest pain, coughing, fatigue, fever, headaches, rash, vomiting or weakness.   Constipation   Associated symptoms include abdominal pain and nausea. Pertinent negatives include no back pain, diarrhea, difficulty urinating, fever or vomiting.      Juan Manuel Koehler is a 67 y.o. White male, initially referred by Dr. Robles, who presents today for follow-up after surgery for invasive bladder cancer performed on 6/21/17. Dr. Robles performed an open Radical cystoprostatectomy, Bilateral pelvic lymph node dissection, Creation of ileal conduit urinary diversion and Bilateral ureterotomy for open ureteral J stent placement. Additionally, patient had bilateral nephrostomy tubes removed on 7/6/17.    Patient reports pain continues to decrease daily since surgery and notes some drainage from his surgical incision. Recently started Megace and attempting to gain weight now.    He denies any mouth sores, nausea, vomiting, diarrhea, constipation, chest pain, shortness of breath, leg swelling, fatigue, headache, dizziness, or mood changes.    His ECOG PS is 2 and he is accompanied by his wife to clinic.    Oncologic History:  Patient has previously been seen by Dr. 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 He presented to Dr. Boucher with pain and hematuria and was found to have a large bladder tumor on his trigone. The tumor was also causing bilateral ureteral obstruction with resulting ELDER.The patient underwent tumor resection and attempted JJ stent placement on 4/5/17 which demonstrated cT2 urothelial carcinoma (nested variant). Bilateral nephrostomy tubes were placed with  "improvement in the patient's renal function. Patient discharged today from [facility] for ELDER from 5/5-5/9. During hospitalization, right nephrostomy tube was placed and the left was exchanged.      5/5/17 CT CAP reveals " Bilateral double-J ureteral stents and left nephrostomy tube are present.  There has been interval resolution of left-sided hydronephrosis and significant improvement in right-sided hydronephrosis which now appears mild. Bladder is nondistended and not well evaluated.  There is no CT evidence of distant metastatic disease referable to provide history of bladder neoplasm."    6/21/17-Dr. Robles performed an open Radical cystoprostatectomy, Bilateral pelvic lymph node dissection, Creation of ileal conduit urinary diversion and Bilateral ureterotomy for open ureteral J stent placement.   7/6/17- patient had bilateral nephrostomy tubes removed     Review of Systems   Constitutional: Positive for appetite change and unexpected weight change. Negative for activity change, fatigue and fever.   HENT: Negative for mouth sores, nosebleeds and trouble swallowing.    Eyes: Negative for discharge and visual disturbance.   Respiratory: Negative for cough, shortness of breath and wheezing.    Cardiovascular: Negative for chest pain and leg swelling.   Gastrointestinal: Positive for abdominal pain and nausea. Negative for abdominal distention, blood in stool, constipation, diarrhea and vomiting.   Genitourinary: Negative for decreased urine volume, difficulty urinating, frequency and hematuria.   Musculoskeletal: Negative for back pain.   Skin: Negative for color change and rash.        +surgical incision with drainage   Neurological: Negative for weakness and headaches.   Hematological: Negative for adenopathy.   Psychiatric/Behavioral: Negative for sleep disturbance. The patient is not nervous/anxious.    All other systems reviewed and are negative.      Allergies:  Review of patient's allergies " indicates:  No Known Allergies    Medications:  Current Outpatient Prescriptions   Medication Sig Dispense Refill    aspirin (ECOTRIN) 81 MG EC tablet Take 1 tablet (81 mg total) by mouth once daily.  0    carvedilol (COREG) 25 MG tablet Take 1 tablet (25 mg total) by mouth 2 (two) times daily. 180 tablet 3    citric acid-sodium citrate 500-334 mg/5 ml (BICITRA) 500-334 mg/5 mL solution Take 30 mLs by mouth 3 (three) times daily with meals. 1000 mL 3    dronabinol (MARINOL) 5 MG capsule Take 5 mg by mouth 3 (three) times daily before meals.      ondansetron (ZOFRAN-ODT) 8 MG TbDL Take 1 tablet (8 mg total) by mouth every 6 (six) hours as needed (nausea). 30 tablet 1    pantoprazole (PROTONIX) 40 MG tablet Take 1 tablet (40 mg total) by mouth once daily. 90 tablet 3    patiromer calcium sorbitex (VELTASSA) 8.4 gram PwPk Take by mouth 2 (two) times daily.      scopolamine (TRANSDERM-SCOP) 1.3-1.5 mg (1 mg over 3 days) Place 1 patch onto the skin Every 3 (three) days. 10 patch 3    traZODone (DESYREL) 50 MG tablet Take 1 tablet (50 mg total) by mouth every evening. 90 tablet 3     No current facility-administered medications for this visit.        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/4/2017    HTN (hypertension)     Insomnia     Malignant neoplasm of bladder     Malnutrition     Urinary tract infection        PSH:  Past Surgical History:   Procedure Laterality Date    BLADDER SURGERY      CYSTOSCOPY      HERNIA REPAIR      Ileal Conduit      pelvic lymph node resection      Radical cystoprostatectomy      THROAT SURGERY       urostomy tube placement         FamHx:  Family History   Problem Relation Age of Onset    No Known Problems Father     Kidney disease Mother     Prostate cancer Neg Hx        SocHx:  Social History     Social History    Marital status:      Spouse name: N/A    Number of children: N/A    Years of education: N/A     Occupational History    Not on file.     Social History Main Topics    Smoking status: Never Smoker    Smokeless tobacco: Never Used    Alcohol use No    Drug use: No    Sexual activity: Yes     Partners: Female     Birth control/ protection: None      Comment:  lives with spouse     Other Topics Concern    Not on file     Social History Narrative    No narrative on file       Objective:      Physical Exam   Constitutional: He is oriented to person, place, and time. He appears well-developed.   Appears fatigued and thin   HENT:   Head: Normocephalic and atraumatic.   Right Ear: External ear normal.   Left Ear: External ear normal.   Eyes: Conjunctivae and EOM are normal. Pupils are equal, round, and reactive to light. Right eye exhibits no discharge. Left eye exhibits no discharge. No scleral icterus.   Neck: Normal range of motion. Neck supple.   Pulmonary/Chest: Effort normal.   Musculoskeletal: Normal range of motion.   Bilateral nephrostomy tubes draining clear, yellow urine.   Neurological: He is alert and oriented to person, place, and time.   Skin: Skin is warm and dry. No erythema.   Nursing note and vitals reviewed.      LABS:  WBC   Date Value Ref Range Status   10/24/2017 6.44 3.90 - 12.70 K/uL Final     Hemoglobin   Date Value Ref Range Status   10/24/2017 9.5 (L) 14.0 - 18.0 g/dL Final     POC Hematocrit   Date Value Ref Range Status   06/21/2017 17 (LL) 36 - 54 %PCV Final     Hematocrit   Date Value Ref Range Status   10/24/2017 28.0 (L) 40.0 - 54.0 % Final     Platelets   Date Value Ref Range Status   10/24/2017 268 150 - 350 K/uL Final       Chemistry         Component Value Date/Time     10/24/2017 0848    K 4.8 10/24/2017 0848     10/24/2017 0848    CO2 23 10/24/2017 0848    BUN 42 (H) 10/24/2017 0848    CREATININE 3.0 (H) 10/24/2017 0848     (H) 10/24/2017 0848        Component Value Date/Time    CALCIUM 9.9 10/24/2017 0848    ALKPHOS 108 10/24/2017 0848    AST 11 10/24/2017 0848    ALT 19 10/24/2017 0848    BILITOT 0.7 10/24/2017 0848          Assessment:       1. Malignant neoplasm of trigone of urinary bladder    2. Chemotherapy follow-up examination    3. ELDER (acute kidney injury)    4. Malignant neoplasm of overlapping sites of bladder    5. Malignant neoplasm of urinary bladder, unspecified site        Plan:       1,2- Juan Manuel Koehler is a 67 y.o. White male, referred by Dr. Robles, who presents today for evaluation and management of invasive bladder cancer. Patient has previously been seen by Dr. 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 He presented to Dr. Boucher with pain and hematuria and was found to have a large bladder tumor on his trigone. Staging CTCAP on 5/5/17 shows no evidence of distant metastasis.    Given his continued increased creatinine, it was unclear if he would be considered a candidate for neoadjuvant chemotherapy This has improved and the patient wanted to move forward with chemo, he understood the risks, particularly to his kidneys. We split the dose of cisplatin between D1+D8 and gave IVFs.  Yet still his creatinine increased significantly.  We had a lengthy conversation about the risk of recurrence without chemo and real risk of permanent and irreversible kidney damage.   Also discussed with Dr. Robles.  At this point, we will forgo further chemotherapy in favor of moving forward with surgery.      S/p surgery with negative margins and negative lymph node  involvement. Favorable pathology report reviewed with patient and his accompanying wife. Data is controversial in the benefit of adjuvant chemotherapy. Decision made by Dr. Calero and Dr. Robles to defer resuming chemotherapy at this time. Will get restaging scans post surgery in 6 weeks and set patient up for 3 month surveillance with CT scans and labs for the first year after.    3,4- Bilateral nephrostomy tubes removed. Will repeat labs today.    5- Refer to nutrition. Discussed calorie dense foods. Continue Megace.    Patient was also seen and examined by Dr. Calero. Patient is in agreement with the proposed treatment plan. All questions were answered to the patient's satisfaction. Pt knows to call clinic if anything is needed before the next clinic visit.    TWYLA Smith-DAVID  Hematology and Medical Oncology      I have reviewed the notes, assessments, and/or procedures performed by the housestaff, as above.  I have personally interviewed and examined the patient at the beside, and rounded with the housestaff. I concur with her/his assessment and plan and the documentation of Juan Manuel Koehler.      Discussed case extensively with Dr. Robles, will hold further chemo for now.  Plan for restaging in 6 wks.      More than 45 mins were spent during this encounter, greater than 50% was spent in direct counseling and/or coordination of care.     Mingo Calero M.D., M.S., F.A.C.P.  Hematology/Oncology Attending  [facility]

## 2017-10-24 NOTE — PROGRESS NOTES
Subjective:       Patient ID: Juan aMnuel Koehler is a 67 y.o. male.    Chief Complaint: Bladder Cancer    Nausea   Associated symptoms include abdominal pain and nausea. Pertinent negatives include no chest pain, coughing, fatigue, fever, headaches, rash, vomiting or weakness.   Constipation   Associated symptoms include abdominal pain and nausea. Pertinent negatives include no back pain, diarrhea, difficulty urinating, fever or vomiting.      Juan Manuel Koehler is a 67 y.o. White male, initially referred by Dr. Robles, who presents today for follow-up after surgery for invasive bladder cancer performed on 6/21/17. Dr. Robles performed an open Radical cystoprostatectomy, Bilateral pelvic lymph node dissection, Creation of ileal conduit urinary diversion and Bilateral ureterotomy for open ureteral J stent placement. Additionally, patient had bilateral nephrostomy tubes removed on 7/6/17.    Patient reports pain continues to decrease daily since surgery and notes some drainage from his surgical incision. Recently started Megace and attempting to gain weight now.    He denies any mouth sores, nausea, vomiting, diarrhea, constipation, chest pain, shortness of breath, leg swelling, fatigue, headache, dizziness, or mood changes.    His ECOG PS is 2 and he is accompanied by his wife to clinic.    Oncologic History:  Patient has previously been seen by Dr. 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 He presented to Dr. Boucher with pain and hematuria and was found to have a large bladder tumor on his trigone. The tumor was also causing bilateral ureteral obstruction with resulting ELDER.The patient underwent tumor resection and attempted JJ stent placement on 4/5/17 which demonstrated cT2 urothelial carcinoma (nested variant). Bilateral nephrostomy tubes were placed with  "improvement in the patient's renal function. Patient discharged today from [facility] for ELDER from 5/5-5/9. During hospitalization, right nephrostomy tube was placed and the left was exchanged.      5/5/17 CT CAP reveals " Bilateral double-J ureteral stents and left nephrostomy tube are present.  There has been interval resolution of left-sided hydronephrosis and significant improvement in right-sided hydronephrosis which now appears mild. Bladder is nondistended and not well evaluated.  There is no CT evidence of distant metastatic disease referable to provide history of bladder neoplasm."    6/21/17-Dr. Robles performed an open Radical cystoprostatectomy, Bilateral pelvic lymph node dissection, Creation of ileal conduit urinary diversion and Bilateral ureterotomy for open ureteral J stent placement.   7/6/17- patient had bilateral nephrostomy tubes removed     Review of Systems   Constitutional: Positive for appetite change and unexpected weight change. Negative for activity change, fatigue and fever.   HENT: Negative for mouth sores, nosebleeds and trouble swallowing.    Eyes: Negative for discharge and visual disturbance.   Respiratory: Negative for cough, shortness of breath and wheezing.    Cardiovascular: Negative for chest pain and leg swelling.   Gastrointestinal: Positive for abdominal pain and nausea. Negative for abdominal distention, blood in stool, constipation, diarrhea and vomiting.   Genitourinary: Negative for decreased urine volume, difficulty urinating, frequency and hematuria.   Musculoskeletal: Negative for back pain.   Skin: Negative for color change and rash.        +surgical incision with drainage   Neurological: Negative for weakness and headaches.   Hematological: Negative for adenopathy.   Psychiatric/Behavioral: Negative for sleep disturbance. The patient is not nervous/anxious.    All other systems reviewed and are negative.      Allergies:  Review of patient's allergies " indicates:  No Known Allergies    Medications:  Current Outpatient Prescriptions   Medication Sig Dispense Refill    aspirin (ECOTRIN) 81 MG EC tablet Take 1 tablet (81 mg total) by mouth once daily.  0    carvedilol (COREG) 25 MG tablet Take 1 tablet (25 mg total) by mouth 2 (two) times daily. 180 tablet 3    citric acid-sodium citrate 500-334 mg/5 ml (BICITRA) 500-334 mg/5 mL solution Take 30 mLs by mouth 3 (three) times daily with meals. 1000 mL 3    ondansetron (ZOFRAN-ODT) 8 MG TbDL Take 1 tablet (8 mg total) by mouth every 6 (six) hours as needed (nausea). 30 tablet 1    pantoprazole (PROTONIX) 40 MG tablet Take 1 tablet (40 mg total) by mouth once daily. 90 tablet 3    patiromer calcium sorbitex (VELTASSA) 8.4 gram PwPk Take by mouth 2 (two) times daily.      scopolamine (TRANSDERM-SCOP) 1.3-1.5 mg (1 mg over 3 days) Place 1 patch onto the skin Every 3 (three) days. 10 patch 3    trazodone (DESYREL) 50 MG tablet Take 1 tablet (50 mg total) by mouth every evening. 90 tablet 3     No current facility-administered medications for this visit.        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/4/2017    HTN (hypertension)     Insomnia     Malignant neoplasm of bladder     Malnutrition     Urinary tract infection        PSH:  Past Surgical History:   Procedure Laterality Date    BLADDER SURGERY      CYSTOSCOPY      HERNIA REPAIR      Ileal Conduit      pelvic lymph node resection      Radical cystoprostatectomy      THROAT SURGERY      urostomy tube placement         FamHx:  Family History   Problem Relation Age of Onset    No  Known Problems Father     Kidney disease Mother     Prostate cancer Neg Hx        SocHx:  Social History     Social History    Marital status:      Spouse name: N/A    Number of children: N/A    Years of education: N/A     Occupational History    Not on file.     Social History Main Topics    Smoking status: Never Smoker    Smokeless tobacco: Never Used    Alcohol use No    Drug use: No    Sexual activity: Yes     Partners: Female     Birth control/ protection: None      Comment:  lives with spouse     Other Topics Concern    Not on file     Social History Narrative    No narrative on file       Objective:      Physical Exam   Constitutional: He is oriented to person, place, and time. He appears well-developed.   Appears fatigued and thin   HENT:   Head: Normocephalic and atraumatic.   Right Ear: External ear normal.   Left Ear: External ear normal.   Eyes: Conjunctivae and EOM are normal. Pupils are equal, round, and reactive to light. Right eye exhibits no discharge. Left eye exhibits no discharge. No scleral icterus.   Neck: Normal range of motion. Neck supple.   Pulmonary/Chest: Effort normal.   Musculoskeletal: Normal range of motion.   Bilateral nephrostomy tubes draining clear, yellow urine.   Neurological: He is alert and oriented to person, place, and time.   Skin: Skin is warm and dry. No erythema.   Nursing note and vitals reviewed.      LABS:  WBC   Date Value Ref Range Status   10/24/2017 6.44 3.90 - 12.70 K/uL Final     Hemoglobin   Date Value Ref Range Status   10/24/2017 9.5 (L) 14.0 - 18.0 g/dL Final     POC Hematocrit   Date Value Ref Range Status   06/21/2017 17 (LL) 36 - 54 %PCV Final     Hematocrit   Date Value Ref Range Status   10/24/2017 28.0 (L) 40.0 - 54.0 % Final     Platelets   Date Value Ref Range Status   10/24/2017 268 150 - 350 K/uL Final       Chemistry        Component Value Date/Time     10/24/2017 0848    K 4.8 10/24/2017 0848     10/24/2017  0848    CO2 23 10/24/2017 0848    BUN 42 (H) 10/24/2017 0848    CREATININE 3.0 (H) 10/24/2017 0848     (H) 10/24/2017 0848        Component Value Date/Time    CALCIUM 9.9 10/24/2017 0848    ALKPHOS 108 10/24/2017 0848    AST 11 10/24/2017 0848    ALT 19 10/24/2017 0848    BILITOT 0.7 10/24/2017 0848          Assessment:       1. Malignant neoplasm of trigone of urinary bladder    2. Chemotherapy follow-up examination    3. ELDER (acute kidney injury)    4. Malignant neoplasm of overlapping sites of bladder    5. Malignant neoplasm of urinary bladder, unspecified site        Plan:       1. Bladder Cancer:  Juan Manuel Koehler is a 67 y.o. White male, referred by Dr. Robles, who presents today for follow-up surgery for  invasive bladder cancer.     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 He presented to Dr. Boucher with pain and hematuria and was found to have a large bladder tumor on his trigone. Staging CTCAP on 5/5/17 shows no evidence of distant metastasis.    Given his continued increased creatinine, it was unclear if he would be considered a candidate for neoadjuvant chemotherapy This has improved and the patient wanted to move forward with chemo, he understood the risks, particularly to his kidneys. We split the dose of cisplatin between D1+D8 and gave IVFs.  Yet still his creatinine increased significantly.  We had a lengthy conversation about the risk of recurrence without chemo and real risk of permanent and irreversible kidney damage.   Also discussed with Dr. Robles.  At this point, we will forgo further chemotherapy in favor of moving forward with surgery.      S/p surgery with negative margins and negative lymph node involvement. Favorable pathology report reviewed with patient and his accompanying wife. Data is controversial in the benefit of adjuvant chemotherapy. Decision made by Dr. Calero and Dr. Robles to defer  resuming chemotherapy at this time.  Currently RENETTA, will plan for q3 month surveillance with CT scans and labs for the first year after.    2. - Bilateral nephrostomy tubes removed. Will repeat labs today.      Patient is in agreement with the proposed treatment plan. All questions were answered to the patient's satisfaction. Pt knows to call clinic if anything is needed before the next clinic visit.    Discussed case extensively with Dr. Robles, will con't to hold further chemo for now.  Plan for restaging in 3 mos.      More than 25 mins were spent during this encounter, greater than 50% was spent in direct counseling and/or coordination of care.     Mingo Calero M.D., M.S., F.A.C.P.  Hematology/Oncology Attending  [facility]

## 2017-11-01 NOTE — PT/OT/SLP DISCHARGE
Physical Therapy Discharge Summary    Juan Manuel Koehler  MRN: [id number]   Urinary stoma complication   Patient Discharged from acute Physical Therapy on 11/1/17.  Please refer to prior PT noted date on 9/13/17 for functional status.     Assessment:   Patient appropriate for care in another setting.  GOALS:    Physical Therapy Goals        Problem: Physical Therapy Goal    Goal Priority Disciplines Outcome Goal Variances Interventions   Physical Therapy Goal     PT/OT, PT Ongoing (interventions implemented as appropriate)     Description:  Goals to be met by: 9/18/17     Patient will increase functional independence with mobility by performing:    Supine to sit with Modified Bath.  Sit to supine with Modified Bath.   Sit to stand transfer with Modified Bath using Rolling Walker.  Bed to chair transfer via Squat Pivot with Supervision using Rolling Walker.  Gait  x 100 feet with Stand-by Assistance using Rolling Walker.    Dynamic standing for 10 minutes with Contact Guard Assistance using No Assistive Device.  Able to tolerate exercise for 15-20 reps with independence.  Ascend/descend 12 stairs with right Handrails Minimal Assistance using No Assistive Device.                      Reasons for Discontinuation of Therapy Services  Transfer to alternate level of care.      Plan:  Patient Discharged to: Home with Home Health Service.  Sophia Gibbons PT, DPT  11/1/2017  Pager: [phone]

## 2017-11-03 ENCOUNTER — LAB VISIT (OUTPATIENT)
Dept: LAB | Facility: HOSPITAL | Age: 67
End: 2017-11-03
Attending: INTERNAL MEDICINE
Payer: MEDICARE

## 2017-11-03 DIAGNOSIS — N17.0 ACUTE RENAL FAILURE WITH ACUTE TUBULAR NECROSIS SUPERIMPOSED ON STAGE 3 CHRONIC KIDNEY DISEASE: ICD-10-CM

## 2017-11-03 DIAGNOSIS — N18.30 ACUTE RENAL FAILURE WITH ACUTE TUBULAR NECROSIS SUPERIMPOSED ON STAGE 3 CHRONIC KIDNEY DISEASE: ICD-10-CM

## 2017-11-03 LAB
ALBUMIN SERPL BCP-MCNC: 3.5 G/DL
ANION GAP SERPL CALC-SCNC: 9 MMOL/L
BASOPHILS # BLD AUTO: 0.03 K/UL
BASOPHILS NFR BLD: 0.3 %
BUN SERPL-MCNC: 40 MG/DL
CALCIUM SERPL-MCNC: 9.8 MG/DL
CHLORIDE SERPL-SCNC: 105 MMOL/L
CO2 SERPL-SCNC: 25 MMOL/L
CREAT SERPL-MCNC: 2.9 MG/DL
DIFFERENTIAL METHOD: ABNORMAL
EOSINOPHIL # BLD AUTO: 0.2 K/UL
EOSINOPHIL NFR BLD: 2 %
ERYTHROCYTE [DISTWIDTH] IN BLOOD BY AUTOMATED COUNT: 14.2 %
EST. GFR  (AFRICAN AMERICAN): 25 ML/MIN/1.73 M^2
EST. GFR  (NON AFRICAN AMERICAN): 21 ML/MIN/1.73 M^2
FERRITIN SERPL-MCNC: 815 NG/ML
GLUCOSE SERPL-MCNC: 129 MG/DL
HCT VFR BLD AUTO: 29.7 %
HGB BLD-MCNC: 9.8 G/DL
IRON SERPL-MCNC: 75 UG/DL
LYMPHOCYTES # BLD AUTO: 0.9 K/UL
LYMPHOCYTES NFR BLD: 9.9 %
MCH RBC QN AUTO: 30 PG
MCHC RBC AUTO-ENTMCNC: 33 G/DL
MCV RBC AUTO: 91 FL
MONOCYTES # BLD AUTO: 0.5 K/UL
MONOCYTES NFR BLD: 5.3 %
NEUTROPHILS # BLD AUTO: 7.4 K/UL
NEUTROPHILS NFR BLD: 82.2 %
PHOSPHATE SERPL-MCNC: 3.7 MG/DL
PLATELET # BLD AUTO: 272 K/UL
PMV BLD AUTO: 9 FL
POTASSIUM SERPL-SCNC: 5 MMOL/L
PTH-INTACT SERPL-MCNC: 55.5 PG/ML
RBC # BLD AUTO: 3.27 M/UL
SATURATED IRON: 29 %
SODIUM SERPL-SCNC: 139 MMOL/L
TOTAL IRON BINDING CAPACITY: 258 UG/DL
TRANSFERRIN SERPL-MCNC: 174 MG/DL
URATE SERPL-MCNC: 6.3 MG/DL
WBC # BLD AUTO: 9.03 K/UL

## 2017-11-03 PROCEDURE — 83970 ASSAY OF PARATHORMONE: CPT

## 2017-11-03 PROCEDURE — 84550 ASSAY OF BLOOD/URIC ACID: CPT

## 2017-11-03 PROCEDURE — 80069 RENAL FUNCTION PANEL: CPT

## 2017-11-03 PROCEDURE — 82728 ASSAY OF FERRITIN: CPT

## 2017-11-03 PROCEDURE — 85025 COMPLETE CBC W/AUTO DIFF WBC: CPT

## 2017-11-03 PROCEDURE — 36415 COLL VENOUS BLD VENIPUNCTURE: CPT

## 2017-11-03 PROCEDURE — 83540 ASSAY OF IRON: CPT

## 2017-11-16 ENCOUNTER — TELEPHONE (OUTPATIENT)
Dept: HEMATOLOGY/ONCOLOGY | Facility: CLINIC | Age: 67
End: 2017-11-16

## 2017-11-16 NOTE — TELEPHONE ENCOUNTER
Spoke with patient, let him know that he can take the antibiotic his dentist has prescribed. Patient states an understanding. He will call for any further needs.

## 2017-11-16 NOTE — TELEPHONE ENCOUNTER
----- Message from Kathie Yeh sent at 11/16/2017 10:07 AM CST -----  Contact: Self  Pt is calling to speak with Staff regarding an antibiotic his Dentist wants to give him.    He can be reached at [phone].    Thank you.

## 2017-12-15 ENCOUNTER — LAB VISIT (OUTPATIENT)
Dept: LAB | Facility: HOSPITAL | Age: 67
End: 2017-12-15
Attending: INTERNAL MEDICINE
Payer: MEDICARE

## 2017-12-15 DIAGNOSIS — N18.30 STAGE 3 CHRONIC KIDNEY DISEASE: ICD-10-CM

## 2017-12-15 DIAGNOSIS — E87.5 HYPERKALEMIA: ICD-10-CM

## 2017-12-15 DIAGNOSIS — N17.9 AKI (ACUTE KIDNEY INJURY): ICD-10-CM

## 2017-12-15 LAB
ALBUMIN SERPL BCP-MCNC: 3.6 G/DL
ANION GAP SERPL CALC-SCNC: 8 MMOL/L
BASOPHILS # BLD AUTO: 0.04 K/UL
BASOPHILS NFR BLD: 0.6 %
BUN SERPL-MCNC: 41 MG/DL
CALCIUM SERPL-MCNC: 9.9 MG/DL
CHLORIDE SERPL-SCNC: 110 MMOL/L
CO2 SERPL-SCNC: 25 MMOL/L
CREAT SERPL-MCNC: 2.7 MG/DL
DIFFERENTIAL METHOD: ABNORMAL
EOSINOPHIL # BLD AUTO: 0.2 K/UL
EOSINOPHIL NFR BLD: 2.5 %
ERYTHROCYTE [DISTWIDTH] IN BLOOD BY AUTOMATED COUNT: 13.8 %
EST. GFR  (AFRICAN AMERICAN): 27 ML/MIN/1.73 M^2
EST. GFR  (NON AFRICAN AMERICAN): 23 ML/MIN/1.73 M^2
GLUCOSE SERPL-MCNC: 110 MG/DL
HCT VFR BLD AUTO: 30.2 %
HGB BLD-MCNC: 9.8 G/DL
LYMPHOCYTES # BLD AUTO: 0.8 K/UL
LYMPHOCYTES NFR BLD: 11.1 %
MCH RBC QN AUTO: 29.8 PG
MCHC RBC AUTO-ENTMCNC: 32.5 G/DL
MCV RBC AUTO: 92 FL
MONOCYTES # BLD AUTO: 0.5 K/UL
MONOCYTES NFR BLD: 6.6 %
NEUTROPHILS # BLD AUTO: 5.7 K/UL
NEUTROPHILS NFR BLD: 78.8 %
PHOSPHATE SERPL-MCNC: 3.8 MG/DL
PLATELET # BLD AUTO: 273 K/UL
PMV BLD AUTO: 9.1 FL
POTASSIUM SERPL-SCNC: 5 MMOL/L
PTH-INTACT SERPL-MCNC: 63.3 PG/ML
RBC # BLD AUTO: 3.29 M/UL
SODIUM SERPL-SCNC: 143 MMOL/L
URATE SERPL-MCNC: 7.8 MG/DL
WBC # BLD AUTO: 7.27 K/UL

## 2017-12-15 PROCEDURE — 86704 HEP B CORE ANTIBODY TOTAL: CPT

## 2017-12-15 PROCEDURE — 85025 COMPLETE CBC W/AUTO DIFF WBC: CPT

## 2017-12-15 PROCEDURE — 87340 HEPATITIS B SURFACE AG IA: CPT

## 2017-12-15 PROCEDURE — 83970 ASSAY OF PARATHORMONE: CPT

## 2017-12-15 PROCEDURE — 36415 COLL VENOUS BLD VENIPUNCTURE: CPT

## 2017-12-15 PROCEDURE — 86706 HEP B SURFACE ANTIBODY: CPT

## 2017-12-15 PROCEDURE — 84550 ASSAY OF BLOOD/URIC ACID: CPT

## 2017-12-15 PROCEDURE — 80069 RENAL FUNCTION PANEL: CPT

## 2017-12-18 LAB
HBV CORE AB SERPL QL IA: NEGATIVE
HBV SURFACE AB SER-ACNC: NEGATIVE M[IU]/ML
HBV SURFACE AG SERPL QL IA: NEGATIVE

## 2018-01-17 ENCOUNTER — TELEPHONE (OUTPATIENT)
Dept: HEMATOLOGY/ONCOLOGY | Facility: CLINIC | Age: 68
End: 2018-01-17

## 2018-01-17 NOTE — TELEPHONE ENCOUNTER
spoke with pt this morning in regards to rescheduling ct/scan from today to 1/19/18, pt has confirm new schedule times.

## 2018-01-19 ENCOUNTER — HOSPITAL ENCOUNTER (OUTPATIENT)
Dept: RADIOLOGY | Facility: HOSPITAL | Age: 68
Discharge: HOME | End: 2018-01-19
Attending: INTERNAL MEDICINE
Payer: MEDICARE

## 2018-01-19 DIAGNOSIS — C67.0 MALIGNANT NEOPLASM OF TRIGONE OF URINARY BLADDER: ICD-10-CM

## 2018-01-19 PROCEDURE — 71250 CT THORAX DX C-: CPT | Mod: 26,,, | Performed by: RADIOLOGY

## 2018-01-19 PROCEDURE — 71250 CT THORAX DX C-: CPT | Mod: TC

## 2018-01-19 PROCEDURE — 74176 CT ABD & PELVIS W/O CONTRAST: CPT | Mod: TC

## 2018-01-19 PROCEDURE — 74176 CT ABD & PELVIS W/O CONTRAST: CPT | Mod: 26,,, | Performed by: RADIOLOGY

## 2018-01-19 PROCEDURE — 25500020 PHARM REV CODE 255: Performed by: INTERNAL MEDICINE

## 2018-01-19 RX ADMIN — IOHEXOL 30 ML: 350 INJECTION, SOLUTION INTRAVENOUS at 07:01

## 2018-01-19 NOTE — PROGRESS NOTES
Subjective:       Patient ID: Juan Manuel Koehler is a 67 y.o. male.    Chief Complaint: Bladder Cancer    Nausea   Associated symptoms include abdominal pain and nausea. Pertinent negatives include no chest pain, coughing, fatigue, fever, headaches, rash, vomiting or weakness.   Constipation   Associated symptoms include abdominal pain and nausea. Pertinent negatives include no back pain, diarrhea, difficulty urinating, fever or vomiting.      Juan Manuel Koehler is a 67 y.o. White male, initially referred by Dr. Robles, who presents today for 3 month follow-up after surgery for invasive bladder cancer performed on 6/21/17. Dr. Robles performed an open Radical cystoprostatectomy, Bilateral pelvic lymph node dissection, Creation of ileal conduit urinary diversion and Bilateral ureterotomy for open ureteral J stent placement. Additionally, patient had bilateral nephrostomy tubes removed on 7/6/17.    He denies any mouth sores, nausea, vomiting, diarrhea, constipation, chest pain, shortness of breath, leg swelling, fatigue, headache, dizziness, or mood changes.    His ECOG PS is 2 and he is accompanied by his wife to clinic.    Oncologic History:  Patient has previously been seen by Dr. 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 He presented to Dr. Boucher with pain and hematuria and was found to have a large bladder tumor on his trigone. The tumor was also causing bilateral ureteral obstruction with resulting ELDER.The patient underwent tumor resection and attempted JJ stent placement on 4/5/17 which demonstrated cT2 urothelial carcinoma (nested variant). Bilateral nephrostomy tubes were placed with improvement in the patient's renal function. Patient discharged today from [facility] for ELDER from 5/5-5/9. During hospitalization, right nephrostomy tube was placed  "and the left was exchanged.      5/5/17 CT CAP reveals " Bilateral double-J ureteral stents and left nephrostomy tube are present.  There has been interval resolution of left-sided hydronephrosis and significant improvement in right-sided hydronephrosis which now appears mild. Bladder is nondistended and not well evaluated.  There is no CT evidence of distant metastatic disease referable to provide history of bladder neoplasm."    6/21/17-Dr. Robles performed an open Radical cystoprostatectomy, Bilateral pelvic lymph node dissection, Creation of ileal conduit urinary diversion and Bilateral ureterotomy for open ureteral J stent placement.   7/6/17- patient had bilateral nephrostomy tubes removed     Review of Systems   Constitutional: Positive for appetite change and unexpected weight change. Negative for activity change, fatigue and fever.   HENT: Negative for mouth sores, nosebleeds and trouble swallowing.    Eyes: Negative for discharge and visual disturbance.   Respiratory: Negative for cough, shortness of breath and wheezing.    Cardiovascular: Negative for chest pain and leg swelling.   Gastrointestinal: Positive for abdominal pain and nausea. Negative for abdominal distention, blood in stool, constipation, diarrhea and vomiting.   Genitourinary: Negative for decreased urine volume, difficulty urinating, frequency and hematuria.   Musculoskeletal: Negative for back pain.   Skin: Negative for color change and rash.        +surgical incision with drainage   Neurological: Negative for weakness and headaches.   Hematological: Negative for adenopathy.   Psychiatric/Behavioral: Negative for sleep disturbance. The patient is not nervous/anxious.    All other systems reviewed and are negative.      Allergies:  Review of patient's allergies indicates:  No Known Allergies    Medications:  Current Outpatient Prescriptions   Medication Sig Dispense Refill    aspirin (ECOTRIN) 81 MG EC tablet Take 1 tablet (81 mg total) " by mouth once daily.  0    carvedilol (COREG) 25 MG tablet Take 1 tablet (25 mg total) by mouth 2 (two) times daily. 180 tablet 3    citric acid-sodium citrate 500-334 mg/5 ml (BICITRA) 500-334 mg/5 mL solution Take 30 mLs by mouth 3 (three) times daily with meals. (Patient taking differently: Take 30 mLs by mouth once. ) 1000 mL 3    dronabinol (MARINOL) 5 MG capsule Take 5 mg by mouth 3 (three) times daily before meals.      ondansetron (ZOFRAN-ODT) 8 MG TbDL Take 1 tablet (8 mg total) by mouth every 6 (six) hours as needed (nausea). 30 tablet 1    pantoprazole (PROTONIX) 40 MG tablet Take 1 tablet (40 mg total) by mouth once daily. 90 tablet 3    polyethylene glycol (GLYCOLAX) 17 gram/dose powder       scopolamine (TRANSDERM-SCOP) 1.3-1.5 mg (1 mg over 3 days) Place 1 patch onto the skin Every 3 (three) days. 10 patch 3    traZODone (DESYREL) 100 MG tablet Take 1 tablet (100 mg total) by mouth every evening. 90 tablet 3    VELTASSA 16.8 gram PwPk 1 packet once daily.       No current facility-administered medications for this visit.        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    HTN (hypertension)     Hydronephrosis     Hyperkalemia     Insomnia     Malignant neoplasm of bladder     Malnutrition     Urinary tract infection     Vitamin D deficiency        PSH:  Past Surgical History:   Procedure Laterality Date    BLADDER SURGERY      CYSTOSCOPY      HERNIA REPAIR      Ileal Conduit      pelvic lymph node resection      Radical cystoprostatectomy      THROAT SURGERY       urostomy tube placement         FamHx:  Family History   Problem Relation Age of Onset    No Known Problems Father     Kidney disease Mother     Prostate cancer Neg Hx        SocHx:  Social History     Social History    Marital status:      Spouse name: N/A    Number of children: N/A    Years of education: N/A     Occupational History    Not on file.     Social History Main Topics    Smoking status: Never Smoker    Smokeless tobacco: Never Used    Alcohol use No    Drug use: No    Sexual activity: Yes     Partners: Female     Birth control/ protection: None      Comment:  lives with spouse     Other Topics Concern    Not on file     Social History Narrative    No narrative on file       Objective:      Physical Exam   Constitutional: He is oriented to person, place, and time. He appears well-developed.   Appears fatigued and thin   HENT:   Head: Normocephalic and atraumatic.   Right Ear: External ear normal.   Left Ear: External ear normal.   Eyes: Conjunctivae and EOM are normal. Pupils are equal, round, and reactive to light. Right eye exhibits no discharge. Left eye exhibits no discharge. No scleral icterus.   Neck: Normal range of motion. Neck supple.   Pulmonary/Chest: Effort normal.   Musculoskeletal: Normal range of motion.   Bilateral nephrostomy tubes draining clear, yellow urine.   Neurological: He is alert and oriented to person, place, and time.   Skin: Skin is warm and dry. No erythema.   Nursing note and vitals reviewed.      LABS:  WBC   Date Value Ref Range Status   01/22/2018 7.91 3.90 - 12.70 K/uL Final     Hemoglobin   Date Value Ref Range Status   01/22/2018 9.2 (L) 14.0 - 18.0 g/dL Final     POC Hematocrit   Date Value Ref Range Status   06/21/2017 17 (LL) 36 - 54 %PCV Final     Hematocrit   Date Value Ref Range Status   01/22/2018 26.9 (L) 40.0 - 54.0 % Final     Platelets   Date Value Ref Range Status   01/22/2018 227 150 - 350 K/uL Final       Chemistry         Component Value Date/Time     01/22/2018 1312    K 4.4 01/22/2018 1312     (H) 01/22/2018 1312    CO2 21 (L) 01/22/2018 1312    BUN 48 (H) 01/22/2018 1312    CREATININE 2.7 (H) 01/22/2018 1312     01/22/2018 1312        Component Value Date/Time    CALCIUM 9.7 01/22/2018 1312    ALKPHOS 68 01/22/2018 1312    AST 10 01/22/2018 1312    ALT 6 (L) 01/22/2018 1312    BILITOT 0.8 01/22/2018 1312          Assessment:       1. History of bladder cancer        Plan:       1. Bladder Cancer:  Juan Manuel Koehler is a 67 y.o. White male, referred by Dr. Robles, who presents today for follow-up surgery for  invasive bladder cancer.     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 He presented to Dr. Boucher with pain and hematuria and was found to have a large bladder tumor on his trigone. Staging CTCAP on 5/5/17 shows no evidence of distant metastasis.    Given his continued increased creatinine, it was unclear if he would be considered a candidate for neoadjuvant chemotherapy This has improved and the patient wanted to move forward with chemo, he understood the risks, particularly to his kidneys. We split the dose of cisplatin between D1+D8 and gave IVFs.  Yet still his creatinine increased significantly.  We had a lengthy conversation about the risk of recurrence without chemo and real risk of permanent and irreversible kidney damage.   Also discussed with Dr. Robles.  At this point, we will forgo further chemotherapy in favor of moving forward with surgery.      S/p surgery with negative margins and negative lymph node involvement. Favorable pathology report reviewed with patient and his accompanying wife. Data is controversial in the benefit of adjuvant chemotherapy. Decision made by Dr. Calero and Dr. Robles to defer resuming chemotherapy at this time.  Currently RENETTA, will plan for q3 month surveillance with CT scans and labs  for the first year after. Plan to go to 4 months after next set of scans.    RTC 3 months with CT CAP, labs (CBC,CMP), and to see Dr. Calero.    Patient was also seen and examined by Dr. Calero. Patient is in agreement with the proposed treatment plan. All questions were answered to the patient's satisfaction. Pt knows to call clinic if anything is needed before the next clinic visit.    TWYLA Smith-DAVID  Hematology and Medical Oncology      I have reviewed the notes, assessments, and/or procedures performed by the nurse practitioner, as above.  I have personally interviewed the patient at the beside, and rounded with the nurse practitioner. I formulated the plan of care.  I concur with her documentation of Juan Manuel Koehler.  I, Dr. Mingo Calero, personally spent more than 25 mins during this encounter, greater than 50% was spent in direct counseling and/or coordination of care.     Mingo Calero M.D., M.S., F.A.C.P.  Hematology/Oncology Attending  [facility]

## 2018-01-22 ENCOUNTER — LAB VISIT (OUTPATIENT)
Dept: LAB | Facility: HOSPITAL | Age: 68
End: 2018-01-22
Attending: INTERNAL MEDICINE
Payer: MEDICARE

## 2018-01-22 ENCOUNTER — OFFICE VISIT (OUTPATIENT)
Dept: HEMATOLOGY/ONCOLOGY | Facility: CLINIC | Age: 68
End: 2018-01-22
Payer: MEDICARE

## 2018-01-22 VITALS
RESPIRATION RATE: 16 BRPM | WEIGHT: 132.06 LBS | SYSTOLIC BLOOD PRESSURE: 149 MMHG | TEMPERATURE: 98 F | HEART RATE: 71 BPM | DIASTOLIC BLOOD PRESSURE: 65 MMHG | HEIGHT: 74 IN | BODY MASS INDEX: 16.95 KG/M2

## 2018-01-22 DIAGNOSIS — C67.0 MALIGNANT NEOPLASM OF TRIGONE OF URINARY BLADDER: ICD-10-CM

## 2018-01-22 DIAGNOSIS — Z85.51 HISTORY OF BLADDER CANCER: Primary | ICD-10-CM

## 2018-01-22 LAB
ALBUMIN SERPL BCP-MCNC: 3.8 G/DL
ALP SERPL-CCNC: 68 U/L
ALT SERPL W/O P-5'-P-CCNC: 6 U/L
ANION GAP SERPL CALC-SCNC: 9 MMOL/L
AST SERPL-CCNC: 10 U/L
BASOPHILS # BLD AUTO: 0.05 K/UL
BASOPHILS NFR BLD: 0.6 %
BILIRUB SERPL-MCNC: 0.8 MG/DL
BUN SERPL-MCNC: 48 MG/DL
CALCIUM SERPL-MCNC: 9.7 MG/DL
CHLORIDE SERPL-SCNC: 111 MMOL/L
CO2 SERPL-SCNC: 21 MMOL/L
CREAT SERPL-MCNC: 2.7 MG/DL
DIFFERENTIAL METHOD: ABNORMAL
EOSINOPHIL # BLD AUTO: 0.2 K/UL
EOSINOPHIL NFR BLD: 2.3 %
ERYTHROCYTE [DISTWIDTH] IN BLOOD BY AUTOMATED COUNT: 14.1 %
EST. GFR  (AFRICAN AMERICAN): 26.8 ML/MIN/1.73 M^2
EST. GFR  (NON AFRICAN AMERICAN): 23.2 ML/MIN/1.73 M^2
GLUCOSE SERPL-MCNC: 102 MG/DL
HCT VFR BLD AUTO: 26.9 %
HGB BLD-MCNC: 9.2 G/DL
IMM GRANULOCYTES # BLD AUTO: 0.02 K/UL
IMM GRANULOCYTES NFR BLD AUTO: 0.3 %
LYMPHOCYTES # BLD AUTO: 1.3 K/UL
LYMPHOCYTES NFR BLD: 15.8 %
MCH RBC QN AUTO: 31.1 PG
MCHC RBC AUTO-ENTMCNC: 34.2 G/DL
MCV RBC AUTO: 91 FL
MONOCYTES # BLD AUTO: 0.4 K/UL
MONOCYTES NFR BLD: 5.4 %
NEUTROPHILS # BLD AUTO: 6 K/UL
NEUTROPHILS NFR BLD: 75.6 %
NRBC BLD-RTO: 0 /100 WBC
PLATELET # BLD AUTO: 227 K/UL
PMV BLD AUTO: 9.5 FL
POTASSIUM SERPL-SCNC: 4.4 MMOL/L
PROT SERPL-MCNC: 7 G/DL
RBC # BLD AUTO: 2.96 M/UL
SODIUM SERPL-SCNC: 141 MMOL/L
WBC # BLD AUTO: 7.91 K/UL

## 2018-01-22 PROCEDURE — 80053 COMPREHEN METABOLIC PANEL: CPT

## 2018-01-22 PROCEDURE — 99214 OFFICE O/P EST MOD 30 MIN: CPT | Mod: S$GLB,,, | Performed by: INTERNAL MEDICINE

## 2018-01-22 PROCEDURE — 85025 COMPLETE CBC W/AUTO DIFF WBC: CPT

## 2018-01-22 PROCEDURE — 36415 COLL VENOUS BLD VENIPUNCTURE: CPT

## 2018-01-22 PROCEDURE — 99999 PR PBB SHADOW E&M-EST. PATIENT-LVL III: CPT | Mod: PBBFAC,,, | Performed by: INTERNAL MEDICINE

## 2018-04-27 ENCOUNTER — HOSPITAL ENCOUNTER (OUTPATIENT)
Dept: RADIOLOGY | Facility: HOSPITAL | Age: 68
Discharge: HOME | End: 2018-04-27
Attending: NURSE PRACTITIONER
Payer: MEDICARE

## 2018-04-27 DIAGNOSIS — Z85.51 HISTORY OF BLADDER CANCER: ICD-10-CM

## 2018-04-27 PROCEDURE — 71250 CT THORAX DX C-: CPT | Mod: TC

## 2018-04-27 PROCEDURE — 74176 CT ABD & PELVIS W/O CONTRAST: CPT | Mod: TC

## 2018-04-27 PROCEDURE — 71250 CT THORAX DX C-: CPT | Mod: 26,,, | Performed by: RADIOLOGY

## 2018-04-27 PROCEDURE — 74176 CT ABD & PELVIS W/O CONTRAST: CPT | Mod: 26,,, | Performed by: RADIOLOGY

## 2018-05-10 ENCOUNTER — LAB VISIT (OUTPATIENT)
Dept: LAB | Facility: HOSPITAL | Age: 68
End: 2018-05-10
Payer: MEDICARE

## 2018-05-10 ENCOUNTER — OFFICE VISIT (OUTPATIENT)
Dept: HEMATOLOGY/ONCOLOGY | Facility: CLINIC | Age: 68
End: 2018-05-10
Payer: MEDICARE

## 2018-05-10 VITALS
DIASTOLIC BLOOD PRESSURE: 69 MMHG | RESPIRATION RATE: 16 BRPM | BODY MASS INDEX: 17.77 KG/M2 | WEIGHT: 138.44 LBS | TEMPERATURE: 98 F | HEART RATE: 61 BPM | HEIGHT: 74 IN | OXYGEN SATURATION: 100 % | SYSTOLIC BLOOD PRESSURE: 153 MMHG

## 2018-05-10 DIAGNOSIS — N18.30 STAGE 3 CHRONIC KIDNEY DISEASE: ICD-10-CM

## 2018-05-10 DIAGNOSIS — Z93.6 S/P ILEAL CONDUIT: ICD-10-CM

## 2018-05-10 DIAGNOSIS — Z85.51 HISTORY OF BLADDER CANCER: ICD-10-CM

## 2018-05-10 DIAGNOSIS — Z85.51 HISTORY OF BLADDER CANCER: Primary | ICD-10-CM

## 2018-05-10 LAB
ALBUMIN SERPL BCP-MCNC: 3.9 G/DL
ALP SERPL-CCNC: 75 U/L
ALT SERPL W/O P-5'-P-CCNC: 9 U/L
ANION GAP SERPL CALC-SCNC: 8 MMOL/L
AST SERPL-CCNC: 10 U/L
BILIRUB SERPL-MCNC: 0.8 MG/DL
BUN SERPL-MCNC: 37 MG/DL
CALCIUM SERPL-MCNC: 9.5 MG/DL
CHLORIDE SERPL-SCNC: 112 MMOL/L
CO2 SERPL-SCNC: 23 MMOL/L
CREAT SERPL-MCNC: 2.4 MG/DL
ERYTHROCYTE [DISTWIDTH] IN BLOOD BY AUTOMATED COUNT: 13.5 %
EST. GFR  (AFRICAN AMERICAN): 30.7 ML/MIN/1.73 M^2
EST. GFR  (NON AFRICAN AMERICAN): 26.5 ML/MIN/1.73 M^2
GLUCOSE SERPL-MCNC: 108 MG/DL
HCT VFR BLD AUTO: 30.3 %
HGB BLD-MCNC: 9.8 G/DL
IMM GRANULOCYTES # BLD AUTO: 0.03 K/UL
MCH RBC QN AUTO: 30.2 PG
MCHC RBC AUTO-ENTMCNC: 32.3 G/DL
MCV RBC AUTO: 94 FL
NEUTROPHILS # BLD AUTO: 5.5 K/UL
PLATELET # BLD AUTO: 211 K/UL
PMV BLD AUTO: 9.7 FL
POTASSIUM SERPL-SCNC: 4.3 MMOL/L
PROT SERPL-MCNC: 6.9 G/DL
RBC # BLD AUTO: 3.24 M/UL
SODIUM SERPL-SCNC: 143 MMOL/L
WBC # BLD AUTO: 7.73 K/UL

## 2018-05-10 PROCEDURE — 36415 COLL VENOUS BLD VENIPUNCTURE: CPT

## 2018-05-10 PROCEDURE — 85027 COMPLETE CBC AUTOMATED: CPT

## 2018-05-10 PROCEDURE — 3078F DIAST BP <80 MM HG: CPT | Mod: CPTII,S$GLB,, | Performed by: INTERNAL MEDICINE

## 2018-05-10 PROCEDURE — 99214 OFFICE O/P EST MOD 30 MIN: CPT | Mod: S$GLB,,, | Performed by: INTERNAL MEDICINE

## 2018-05-10 PROCEDURE — 80053 COMPREHEN METABOLIC PANEL: CPT

## 2018-05-10 PROCEDURE — 99999 PR PBB SHADOW E&M-EST. PATIENT-LVL III: CPT | Mod: PBBFAC,,, | Performed by: INTERNAL MEDICINE

## 2018-05-10 PROCEDURE — 3077F SYST BP >= 140 MM HG: CPT | Mod: CPTII,S$GLB,, | Performed by: INTERNAL MEDICINE

## 2018-05-10 NOTE — PROGRESS NOTES
"Subjective:       Patient ID: Juan Manuel Koehler is a 68 y.o. male.    Chief Complaint: Bladder Cancer    Nausea   Pertinent negatives include no abdominal pain, chest pain, coughing, fatigue, fever, headaches, nausea, rash, vomiting or weakness.   Constipation   Pertinent negatives include no abdominal pain, back pain, diarrhea, difficulty urinating, fever, nausea or vomiting.      Juan Manuel Koehler is a 68 y.o. White male, initially referred by Dr. Robles, who presents today for 3 month follow-up and to review CT scans after surgery for invasive bladder cancer performed on 6/21/17. Patient feels well, notes difficulty sleeping, but otherwise doing great. He is planning vacations to [facility] and [place].    He denies any mouth sores, nausea, vomiting, diarrhea, constipation, chest pain, shortness of breath, leg swelling, fatigue, headache, dizziness, or mood changes.    His ECOG PS is 1 and he is accompanied by his wife to clinic.    Oncologic History:  Patient has previously been seen by Dr. 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 He presented to Dr. Boucher with pain and hematuria and was found to have a large bladder tumor on his trigone. The tumor was also causing bilateral ureteral obstruction with resulting ELDER.The patient underwent tumor resection and attempted JJ stent placement on 4/5/17 which demonstrated cT2 urothelial carcinoma (nested variant). Bilateral nephrostomy tubes were placed with improvement in the patient's renal function. Patient discharged today from [facility] for ELDER from 5/5-5/9. During hospitalization, right nephrostomy tube was placed and the left was exchanged.      5/5/17 CT CAP reveals " Bilateral double-J ureteral stents and left nephrostomy tube are present.  There has been interval resolution of left-sided hydronephrosis " "and significant improvement in right-sided hydronephrosis which now appears mild. Bladder is nondistended and not well evaluated.  There is no CT evidence of distant metastatic disease referable to provide history of bladder neoplasm."    6/21/17-Dr. Robles performed an open Radical cystoprostatectomy, Bilateral pelvic lymph node dissection, Creation of ileal conduit urinary diversion and Bilateral ureterotomy for open ureteral J stent placement.   7/6/17- patient had bilateral nephrostomy tubes removed     Review of Systems   Constitutional: Negative for activity change, appetite change, fatigue, fever and unexpected weight change.   HENT: Negative for mouth sores, nosebleeds and trouble swallowing.    Eyes: Negative for discharge and visual disturbance.   Respiratory: Negative for cough, shortness of breath and wheezing.    Cardiovascular: Negative for chest pain and leg swelling.   Gastrointestinal: Negative for abdominal distention, abdominal pain, blood in stool, constipation, diarrhea, nausea and vomiting.   Genitourinary: Negative for decreased urine volume, difficulty urinating, frequency and hematuria.   Musculoskeletal: Negative for back pain.   Skin: Negative for color change and rash.   Neurological: Negative for weakness and headaches.   Hematological: Negative for adenopathy.   Psychiatric/Behavioral: Positive for sleep disturbance. The patient is not nervous/anxious.    All other systems reviewed and are negative.      Allergies:  Review of patient's allergies indicates:  No Known Allergies    Medications:  Current Outpatient Prescriptions   Medication Sig Dispense Refill    VELTASSA 16.8 gram PwPk 1 packet once daily.       No current facility-administered medications for this visit.        PMH:  Past Medical History:   Diagnosis Date    ELDER (acute kidney injury)     Anemia     Bacterial endocarditis     Bladder cancer     Cancer     bladder and throat    CKD (chronic kidney disease) stage 3, " GFR 30-59 ml/min     CKD (chronic kidney disease) stage 3, GFR 30-59 ml/min     CKD (chronic kidney disease), symptom management only, stage 5     CVA (cerebral vascular accident)     Embolic stroke involving right cerebellar artery 8/4/2017    HTN (hypertension)     Hydronephrosis     Hyperkalemia     Insomnia     Malignant neoplasm of bladder     Malnutrition     Urinary tract infection     Vitamin D deficiency        PSH:  Past Surgical History:   Procedure Laterality Date    BLADDER SURGERY      CYSTOSCOPY      HERNIA REPAIR      Ileal Conduit      pelvic lymph node resection      Radical cystoprostatectomy      THROAT SURGERY      urostomy tube placement         FamHx:  Family History   Problem Relation Age of Onset    No Known Problems Father     Kidney disease Mother     Prostate cancer Neg Hx        SocHx:  Social History     Social History    Marital status:      Spouse name: N/A    Number of children: N/A    Years of education: N/A     Occupational History    Not on file.     Social History Main Topics    Smoking status: Never Smoker    Smokeless tobacco: Never Used    Alcohol use No    Drug use: No    Sexual activity: Yes     Partners: Female     Birth control/ protection: None      Comment:  lives with spouse     Other Topics Concern    Not on file     Social History Narrative    No narrative on file       Objective:      Physical Exam   Constitutional: He is oriented to person, place, and time. He appears well-developed.   Appears fatigued and thin   HENT:   Head: Normocephalic and atraumatic.   Right Ear: External ear normal.   Left Ear: External ear normal.   Eyes: Conjunctivae and EOM are normal. Pupils are equal, round, and reactive to light. Right eye exhibits no discharge. Left eye exhibits no discharge. No scleral icterus.   Neck: Normal range of motion. Neck supple.   Pulmonary/Chest: Effort normal.   Musculoskeletal: Normal range of motion.    Bilateral nephrostomy tubes draining clear, yellow urine.   Neurological: He is alert and oriented to person, place, and time.   Skin: Skin is warm and dry. No erythema.   Nursing note and vitals reviewed.      LABS:  WBC   Date Value Ref Range Status   05/10/2018 7.73 3.90 - 12.70 K/uL Final     Hemoglobin   Date Value Ref Range Status   05/10/2018 9.8 (L) 14.0 - 18.0 g/dL Final     POC Hematocrit   Date Value Ref Range Status   06/21/2017 17 (LL) 36 - 54 %PCV Final     Hematocrit   Date Value Ref Range Status   05/10/2018 30.3 (L) 40.0 - 54.0 % Final     Platelets   Date Value Ref Range Status   05/10/2018 211 150 - 350 K/uL Final       Chemistry        Component Value Date/Time     05/10/2018 1254    K 4.3 05/10/2018 1254     (H) 05/10/2018 1254    CO2 23 05/10/2018 1254    BUN 37 (H) 05/10/2018 1254    CREATININE 2.4 (H) 05/10/2018 1254     05/10/2018 1254        Component Value Date/Time    CALCIUM 9.5 05/10/2018 1254    ALKPHOS 75 05/10/2018 1254    AST 10 05/10/2018 1254    ALT 9 (L) 05/10/2018 1254    BILITOT 0.8 05/10/2018 1254          Assessment:       1. History of bladder cancer    2. S/P ileal conduit    3. Stage 3 chronic kidney disease        Plan:       1,2,3- Bladder Cancer:  Juan Manuel Koehler is a 67 y.o. White male, referred by Dr. Robles, who presents today for follow-up surgery for  invasive bladder cancer.     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 He presented to Dr. Boucher with pain and hematuria and was found to have a large bladder tumor on his trigone. Staging CTCAP on 5/5/17 shows no evidence of distant metastasis.    Given his continued increased creatinine, it was unclear if he would be considered a candidate for neoadjuvant chemotherapy This has improved and the patient wanted to move forward with chemo, he understood the risks, particularly to his kidneys. We split the dose of  cisplatin between D1+D8 and gave IVFs.  Yet still his creatinine increased significantly.  We had a lengthy conversation about the risk of recurrence without chemo and real risk of permanent and irreversible kidney damage.   Also discussed with Dr. Robles.  At this point, we will forgo further chemotherapy in favor of moving forward with surgery.      S/p surgery with negative margins and negative lymph node involvement. Favorable pathology report reviewed with patient and his accompanying wife. Data is controversial in the benefit of adjuvant chemotherapy. Decision made by Dr. Calero and Dr. Robles to defer resuming chemotherapy at this time.  Currently RENETTA, will plan for q3 month surveillance with CT scans and labs for the first year after. Plan to go to 4 months after next set of scans.    RTC 3 months with CT CAP, labs (CBC,CMP), and to see Dr. Calero.    Patient was also seen and examined by Dr. Calero. Patient is in agreement with the proposed treatment plan. All questions were answered to the patient's satisfaction. Pt knows to call clinic if anything is needed before the next clinic visit.    TWYLA Smith-DAVID  Hematology and Medical Oncology      I have reviewed the notes, assessments, and/or procedures performed by the nurse practitioner, as above.  I have personally interviewed the patient at the beside, and rounded with the nurse practitioner. I formulated the plan of care.  I concur with her documentation of Juan Manuel Koehler.  I, Dr. Mingo Calero, personally spent more than 25 mins during this encounter, greater than 50% was spent in direct counseling and/or coordination of care.     Mingo Calero M.D., M.S., F.A.C.P.  Hematology/Oncology Attending  [facility]

## 2018-06-18 ENCOUNTER — OFFICE VISIT (OUTPATIENT)
Dept: WOUND CARE | Facility: CLINIC | Age: 68
End: 2018-06-18
Payer: MEDICARE

## 2018-06-18 VITALS — SYSTOLIC BLOOD PRESSURE: 138 MMHG | HEART RATE: 65 BPM | DIASTOLIC BLOOD PRESSURE: 74 MMHG

## 2018-06-18 DIAGNOSIS — Z43.6 ATTENTION TO UROSTOMY: Primary | ICD-10-CM

## 2018-06-18 PROCEDURE — 3078F DIAST BP <80 MM HG: CPT | Mod: CPTII,S$GLB,, | Performed by: CLINICAL NURSE SPECIALIST

## 2018-06-18 PROCEDURE — 99999 PR PBB SHADOW E&M-EST. PATIENT-LVL II: CPT | Mod: PBBFAC,,, | Performed by: CLINICAL NURSE SPECIALIST

## 2018-06-18 PROCEDURE — 3075F SYST BP GE 130 - 139MM HG: CPT | Mod: CPTII,S$GLB,, | Performed by: CLINICAL NURSE SPECIALIST

## 2018-06-18 PROCEDURE — 99214 OFFICE O/P EST MOD 30 MIN: CPT | Mod: S$GLB,,, | Performed by: CLINICAL NURSE SPECIALIST

## 2018-06-18 RX ORDER — TRAMADOL HYDROCHLORIDE 50 MG/1
TABLET ORAL
COMMUNITY
Start: 2018-06-04 | End: 2020-10-07

## 2018-06-18 RX ORDER — TRAZODONE HYDROCHLORIDE 100 MG/1
TABLET ORAL
COMMUNITY
Start: 2018-06-09 | End: 2018-12-20 | Stop reason: SDUPTHER

## 2018-06-18 NOTE — PROGRESS NOTES
Subjective:       Patient ID: Juan Manuel Koehler is a 68 y.o. male.    Chief Complaint: Urostomy    This is an annual visit for  urostomy eval and follow up, comes with wife today, he has had no real problems since he got over his post op complications last year, he does not yet do his own care, wife still puts pouch on and I have encouraged him to start practicing himself.       Review of Systems   Constitutional: Negative for activity change and appetite change.   HENT: Negative.    Respiratory: Negative.    Cardiovascular: Negative for chest pain and leg swelling.   Gastrointestinal: Negative for abdominal pain.        Reports nervous stomach    Genitourinary: Negative for hematuria.        Urostomy , clear urine slight mucous   Skin: Negative for rash.   Neurological: Negative for weakness.       Objective:      Physical Exam   Constitutional: He appears well-developed.   Pulmonary/Chest: Effort normal.   Abdominal: Soft. Bowel sounds are normal.   Skin: Skin is warm.       First visit before cautery 2017 6/18/18     Urostomy is budded stoma that is 29 mm , has slight discoloration of skin but this is normal wearing convexity and belt, he recent ly stopped the belt, gets a 3 day wear typically has a lot of mucous. He does not clip hair as he is afraid he may hurt the stoma, which does bleed easily ( noted today)  Wears coloplast x rpo convex , wife cuts pouch.       Assessment:       1. Attention to urostomy        Plan:       reeval stoma fit and equipment   No issues of concern, continue with [facility]   Will send new script to [facility] for annual supplies  Call me for any problems   I have reviewed the plan of care with the patient and/ or caregiver and they express understanding. I spent over 50% of this 25 minute visit in face to face counseling.

## 2018-08-27 ENCOUNTER — HOSPITAL ENCOUNTER (OUTPATIENT)
Dept: RADIOLOGY | Facility: HOSPITAL | Age: 68
Discharge: HOME | End: 2018-08-27
Attending: NURSE PRACTITIONER
Payer: MEDICARE

## 2018-08-27 DIAGNOSIS — Z85.51 HISTORY OF BLADDER CANCER: ICD-10-CM

## 2018-08-27 PROCEDURE — 71250 CT THORAX DX C-: CPT | Mod: 26,,, | Performed by: RADIOLOGY

## 2018-08-27 PROCEDURE — 71250 CT THORAX DX C-: CPT | Mod: TC

## 2018-08-27 PROCEDURE — 25500020 PHARM REV CODE 255: Performed by: NURSE PRACTITIONER

## 2018-08-27 PROCEDURE — 74176 CT ABD & PELVIS W/O CONTRAST: CPT | Mod: 26,,, | Performed by: RADIOLOGY

## 2018-08-27 PROCEDURE — 74176 CT ABD & PELVIS W/O CONTRAST: CPT | Mod: TC

## 2018-08-27 RX ADMIN — IOHEXOL 30 ML: 350 INJECTION, SOLUTION INTRAVENOUS at 09:08

## 2018-08-30 NOTE — PROGRESS NOTES
"Subjective:       Patient ID: Juan Manuel Koehler is a 68 y.o. male.    Chief Complaint: Bladder Cancer    Nausea   Pertinent negatives include no abdominal pain, chest pain, coughing, fatigue, fever, headaches, nausea, rash, vomiting or weakness.   Constipation   Pertinent negatives include no abdominal pain, back pain, diarrhea, difficulty urinating, fever, nausea or vomiting.      Juan Manuel Koehler is a 68 y.o. White male, initially referred by Dr. Robles, who presents today for 3 month follow-up and to review CT scans after surgery for invasive bladder cancer performed on 6/21/17. Patient feels well, notes difficulty sleeping, but otherwise doing great. He is planning vacations to [facility] and [place].    He denies any mouth sores, nausea, vomiting, diarrhea, constipation, chest pain, shortness of breath, leg swelling, fatigue, headache, dizziness, or mood changes.    His ECOG PS is 1 and he is accompanied by his wife to clinic.    Oncologic History:  Patient has previously been seen by Dr. 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 He presented to Dr. Boucher with pain and hematuria and was found to have a large bladder tumor on his trigone. The tumor was also causing bilateral ureteral obstruction with resulting ELDER.The patient underwent tumor resection and attempted JJ stent placement on 4/5/17 which demonstrated cT2 urothelial carcinoma (nested variant). Bilateral nephrostomy tubes were placed with improvement in the patient's renal function. Patient discharged today from [facility] for ELDER from 5/5-5/9. During hospitalization, right nephrostomy tube was placed and the left was exchanged.      5/5/17 CT CAP reveals " Bilateral double-J ureteral stents and left nephrostomy tube are present.  There has been interval resolution of left-sided hydronephrosis " "and significant improvement in right-sided hydronephrosis which now appears mild. Bladder is nondistended and not well evaluated.  There is no CT evidence of distant metastatic disease referable to provide history of bladder neoplasm."    6/21/17-Dr. Robles performed an open Radical cystoprostatectomy, Bilateral pelvic lymph node dissection, Creation of ileal conduit urinary diversion and Bilateral ureterotomy for open ureteral J stent placement.   7/6/17- patient had bilateral nephrostomy tubes removed     Review of Systems   Constitutional: Negative for activity change, appetite change, fatigue, fever and unexpected weight change.   HENT: Negative for mouth sores, nosebleeds and trouble swallowing.    Eyes: Negative for discharge and visual disturbance.   Respiratory: Negative for cough, shortness of breath and wheezing.    Cardiovascular: Negative for chest pain and leg swelling.   Gastrointestinal: Negative for abdominal distention, abdominal pain, blood in stool, constipation, diarrhea, nausea and vomiting.   Genitourinary: Negative for decreased urine volume, difficulty urinating, frequency and hematuria.   Musculoskeletal: Negative for back pain.   Skin: Negative for color change and rash.   Neurological: Negative for weakness and headaches.   Hematological: Negative for adenopathy.   Psychiatric/Behavioral: Positive for sleep disturbance. The patient is not nervous/anxious.    All other systems reviewed and are negative.      Allergies:  Review of patient's allergies indicates:  No Known Allergies    Medications:  Current Outpatient Medications   Medication Sig Dispense Refill    traMADol (ULTRAM) 50 mg tablet       traZODone (DESYREL) 100 MG tablet       VELTASSA 16.8 gram PwPk 1 packet once daily.       No current facility-administered medications for this visit.        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    HTN (hypertension)     Hydronephrosis     Hyperkalemia     Insomnia     Malignant neoplasm of bladder     Malnutrition     Urinary tract infection     Vitamin D deficiency        PSH:  Past Surgical History:   Procedure Laterality Date    BLADDER SURGERY      CYSTOSCOPY      HERNIA REPAIR      Ileal Conduit      pelvic lymph node resection      Radical cystoprostatectomy      THROAT SURGERY      urostomy tube placement         FamHx:  Family History   Problem Relation Age of Onset    No Known Problems Father     Kidney disease Mother     Prostate cancer Neg Hx        SocHx:  Social History     Socioeconomic History    Marital status:      Spouse name: Not on file    Number of children: Not on file    Years of education: Not on file    Highest education level: Not on file   Social Needs    Financial resource strain: Not on file    Food insecurity - worry: Not on file    Food insecurity - inability: Not on file    Transportation needs - medical: Not on file    Transportation needs - non-medical: Not on file   Occupational History    Not on file   Tobacco Use    Smoking status: Never Smoker    Smokeless tobacco: Never Used   Substance and Sexual Activity    Alcohol use: No     Alcohol/week: 0.0 oz    Drug use: No    Sexual activity: Yes     Partners: Female     Birth control/protection: None     Comment:  lives with spouse   Other Topics Concern    Not on file   Social History Narrative    Not on file       Objective:      Physical Exam   Constitutional: He is oriented to person, place, and time. He appears well-developed.   Appears fatigued and thin    HENT:   Head: Normocephalic and atraumatic.   Right Ear: External ear normal.   Left Ear: External ear normal.   Eyes: Conjunctivae and EOM are normal. Pupils are equal, round, and reactive to light. Right eye exhibits no discharge. Left eye exhibits no discharge. No scleral icterus.   Neck: Normal range of motion. Neck supple.   Pulmonary/Chest: Effort normal.   Musculoskeletal: Normal range of motion.   Bilateral nephrostomy tubes draining clear, yellow urine.   Neurological: He is alert and oriented to person, place, and time.   Skin: Skin is warm and dry. No erythema.   Nursing note and vitals reviewed.      LABS:  WBC   Date Value Ref Range Status   05/10/2018 7.73 3.90 - 12.70 K/uL Final     Hemoglobin   Date Value Ref Range Status   05/10/2018 9.8 (L) 14.0 - 18.0 g/dL Final     POC Hematocrit   Date Value Ref Range Status   06/21/2017 17 (LL) 36 - 54 %PCV Final     Hematocrit   Date Value Ref Range Status   05/10/2018 30.3 (L) 40.0 - 54.0 % Final     Platelets   Date Value Ref Range Status   05/10/2018 211 150 - 350 K/uL Final       Chemistry        Component Value Date/Time     05/10/2018 1254    K 4.3 05/10/2018 1254     (H) 05/10/2018 1254    CO2 23 05/10/2018 1254    BUN 37 (H) 05/10/2018 1254    CREATININE 2.4 (H) 05/10/2018 1254     05/10/2018 1254        Component Value Date/Time    CALCIUM 9.5 05/10/2018 1254    ALKPHOS 75 05/10/2018 1254    AST 10 05/10/2018 1254    ALT 9 (L) 05/10/2018 1254    BILITOT 0.8 05/10/2018 1254          Assessment:       1. History of bladder cancer    2. S/P ileal conduit    3. Stage 3 chronic kidney disease        Plan:       1,2,3- Bladder Cancer:  Juan Manuel Koehler is a 68 y.o. White male, referred by Dr. Robles, who presents today for follow-up surgery for  invasive bladder cancer.     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 He presented to Dr. Boucher with pain and  hematuria and was found to have a large bladder tumor on his trigone. Staging CTCAP on 5/5/17 shows no evidence of distant metastasis.    Given his continued increased creatinine, it was unclear if he would be considered a candidate for neoadjuvant chemotherapy This has improved and the patient wanted to move forward with chemo, he understood the risks, particularly to his kidneys. We split the dose of cisplatin between D1+D8 and gave IVFs.  Yet still his creatinine increased significantly.  We had a lengthy conversation about the risk of recurrence without chemo and real risk of permanent and irreversible kidney damage.   Also discussed with Dr. Robles.  At this point, we will forgo further chemotherapy in favor of moving forward with surgery.      S/p surgery with negative margins and negative lymph node involvement. Favorable pathology report reviewed with patient and his accompanying wife. Data is controversial in the benefit of adjuvant chemotherapy. Decision made by Dr. Calero and Dr. Robles to defer resuming chemotherapy at this time.  Currently RENETTA.    Pt also noted to have a UTI - sent cipro 250mg po BID x 7 days to pharmacy (dose adjusted for kidney function).     RTC 4 months with CT CAP, labs (CBC,CMP), and to see Dr. Calero.    The patient indicates understanding of these issues and agrees with the plan.    More than 25 mins were spent during this encounter, greater than 50% was spent in direct counseling and/or coordination of care.       Mingo Calero M.D., M.S., F.A.C.P.  Hematology and Oncology Attending  [facility]

## 2018-08-31 ENCOUNTER — LAB VISIT (OUTPATIENT)
Dept: LAB | Facility: HOSPITAL | Age: 68
End: 2018-08-31
Payer: MEDICARE

## 2018-08-31 ENCOUNTER — OFFICE VISIT (OUTPATIENT)
Dept: HEMATOLOGY/ONCOLOGY | Facility: CLINIC | Age: 68
End: 2018-08-31
Payer: MEDICARE

## 2018-08-31 VITALS
RESPIRATION RATE: 16 BRPM | HEIGHT: 74 IN | DIASTOLIC BLOOD PRESSURE: 63 MMHG | BODY MASS INDEX: 18.34 KG/M2 | WEIGHT: 142.88 LBS | TEMPERATURE: 98 F | SYSTOLIC BLOOD PRESSURE: 136 MMHG | HEART RATE: 57 BPM

## 2018-08-31 DIAGNOSIS — N30.00 ACUTE CYSTITIS WITHOUT HEMATURIA: ICD-10-CM

## 2018-08-31 DIAGNOSIS — Z85.51 HISTORY OF BLADDER CANCER: ICD-10-CM

## 2018-08-31 DIAGNOSIS — Z85.51 HISTORY OF BLADDER CANCER: Primary | ICD-10-CM

## 2018-08-31 DIAGNOSIS — N18.30 STAGE 3 CHRONIC KIDNEY DISEASE: ICD-10-CM

## 2018-08-31 DIAGNOSIS — Z93.6 S/P ILEAL CONDUIT: ICD-10-CM

## 2018-08-31 LAB
ALBUMIN SERPL BCP-MCNC: 4.2 G/DL
ALP SERPL-CCNC: 74 U/L
ALT SERPL W/O P-5'-P-CCNC: 10 U/L
ANION GAP SERPL CALC-SCNC: 10 MMOL/L
AST SERPL-CCNC: 15 U/L
BACTERIA #/AREA URNS AUTO: ABNORMAL /HPF
BILIRUB SERPL-MCNC: 1.2 MG/DL
BILIRUB UR QL STRIP: NEGATIVE
BUN SERPL-MCNC: 45 MG/DL
CALCIUM SERPL-MCNC: 9.8 MG/DL
CHLORIDE SERPL-SCNC: 110 MMOL/L
CLARITY UR REFRACT.AUTO: ABNORMAL
CO2 SERPL-SCNC: 21 MMOL/L
COLOR UR AUTO: YELLOW
CREAT SERPL-MCNC: 2.7 MG/DL
ERYTHROCYTE [DISTWIDTH] IN BLOOD BY AUTOMATED COUNT: 13.7 %
EST. GFR  (AFRICAN AMERICAN): 26.6 ML/MIN/1.73 M^2
EST. GFR  (NON AFRICAN AMERICAN): 23 ML/MIN/1.73 M^2
GLUCOSE SERPL-MCNC: 103 MG/DL
GLUCOSE UR QL STRIP: NEGATIVE
HCT VFR BLD AUTO: 32.6 %
HGB BLD-MCNC: 10.7 G/DL
HGB UR QL STRIP: NEGATIVE
HYALINE CASTS UR QL AUTO: 0 /LPF
IMM GRANULOCYTES # BLD AUTO: 0.04 K/UL
KETONES UR QL STRIP: NEGATIVE
LEUKOCYTE ESTERASE UR QL STRIP: ABNORMAL
MCH RBC QN AUTO: 30 PG
MCHC RBC AUTO-ENTMCNC: 32.8 G/DL
MCV RBC AUTO: 91 FL
MICROSCOPIC COMMENT: ABNORMAL
NEUTROPHILS # BLD AUTO: 4.5 K/UL
NITRITE UR QL STRIP: POSITIVE
PH UR STRIP: >8 [PH] (ref 5–8)
PLATELET # BLD AUTO: 196 K/UL
PMV BLD AUTO: 9.9 FL
POTASSIUM SERPL-SCNC: 5.2 MMOL/L
PROT SERPL-MCNC: 7.4 G/DL
PROT UR QL STRIP: ABNORMAL
RBC # BLD AUTO: 3.57 M/UL
RBC #/AREA URNS AUTO: 2 /HPF (ref 0–4)
SODIUM SERPL-SCNC: 141 MMOL/L
SP GR UR STRIP: 1.01 (ref 1–1.03)
TRI-PHOS CRY UR QL COMP ASSIST: ABNORMAL
URN SPEC COLLECT METH UR: ABNORMAL
UROBILINOGEN UR STRIP-ACNC: NEGATIVE EU/DL
WBC # BLD AUTO: 6.74 K/UL
WBC #/AREA URNS AUTO: >100 /HPF (ref 0–5)

## 2018-08-31 PROCEDURE — 99999 PR PBB SHADOW E&M-EST. PATIENT-LVL III: CPT | Mod: PBBFAC,,, | Performed by: INTERNAL MEDICINE

## 2018-08-31 PROCEDURE — 81001 URINALYSIS AUTO W/SCOPE: CPT

## 2018-08-31 PROCEDURE — 99214 OFFICE O/P EST MOD 30 MIN: CPT | Mod: S$GLB,,, | Performed by: INTERNAL MEDICINE

## 2018-08-31 PROCEDURE — 85027 COMPLETE CBC AUTOMATED: CPT

## 2018-08-31 PROCEDURE — 3075F SYST BP GE 130 - 139MM HG: CPT | Mod: CPTII,S$GLB,, | Performed by: INTERNAL MEDICINE

## 2018-08-31 PROCEDURE — 80053 COMPREHEN METABOLIC PANEL: CPT

## 2018-08-31 PROCEDURE — 36415 COLL VENOUS BLD VENIPUNCTURE: CPT

## 2018-08-31 PROCEDURE — 3078F DIAST BP <80 MM HG: CPT | Mod: CPTII,S$GLB,, | Performed by: INTERNAL MEDICINE

## 2018-08-31 RX ORDER — CIPROFLOXACIN 250 MG/1
250 TABLET, FILM COATED ORAL 2 TIMES DAILY
Qty: 14 TABLET | Refills: 0 | Status: SHIPPED | OUTPATIENT
Start: 2018-08-31 | End: 2018-09-07

## 2018-08-31 RX ORDER — PREDNISONE 20 MG/1
20 TABLET ORAL DAILY
COMMUNITY
Start: 2018-07-18 | End: 2018-08-31

## 2018-08-31 RX ORDER — PREDNISONE 10 MG/1
10 TABLET ORAL DAILY
Qty: 14 TABLET | Refills: 1 | Status: SHIPPED | OUTPATIENT
Start: 2018-08-31 | End: 2018-09-14

## 2018-12-18 ENCOUNTER — HOSPITAL ENCOUNTER (OUTPATIENT)
Dept: RADIOLOGY | Facility: HOSPITAL | Age: 68
Discharge: HOME | End: 2018-12-18
Attending: INTERNAL MEDICINE
Payer: MEDICARE

## 2018-12-18 DIAGNOSIS — N18.30 STAGE 3 CHRONIC KIDNEY DISEASE: ICD-10-CM

## 2018-12-18 DIAGNOSIS — N30.00 ACUTE CYSTITIS WITHOUT HEMATURIA: ICD-10-CM

## 2018-12-18 DIAGNOSIS — Z93.6 S/P ILEAL CONDUIT: ICD-10-CM

## 2018-12-18 DIAGNOSIS — Z85.51 HISTORY OF BLADDER CANCER: ICD-10-CM

## 2018-12-18 PROCEDURE — 25500020 PHARM REV CODE 255: Performed by: INTERNAL MEDICINE

## 2018-12-18 PROCEDURE — 71250 CT THORAX DX C-: CPT | Mod: TC

## 2018-12-18 PROCEDURE — 74176 CT ABD & PELVIS W/O CONTRAST: CPT | Mod: 26,,, | Performed by: RADIOLOGY

## 2018-12-18 PROCEDURE — 74176 CT ABD & PELVIS W/O CONTRAST: CPT | Mod: TC

## 2018-12-18 PROCEDURE — 71250 CT THORAX DX C-: CPT | Mod: 26,,, | Performed by: RADIOLOGY

## 2018-12-18 RX ADMIN — IOHEXOL 30 ML: 350 INJECTION, SOLUTION INTRAVENOUS at 09:12

## 2018-12-20 ENCOUNTER — LAB VISIT (OUTPATIENT)
Dept: LAB | Facility: HOSPITAL | Age: 68
End: 2018-12-20
Attending: INTERNAL MEDICINE
Payer: MEDICARE

## 2018-12-20 ENCOUNTER — OFFICE VISIT (OUTPATIENT)
Dept: HEMATOLOGY/ONCOLOGY | Facility: CLINIC | Age: 68
End: 2018-12-20
Payer: MEDICARE

## 2018-12-20 VITALS
TEMPERATURE: 98 F | HEIGHT: 74 IN | OXYGEN SATURATION: 100 % | SYSTOLIC BLOOD PRESSURE: 133 MMHG | HEART RATE: 64 BPM | RESPIRATION RATE: 20 BRPM | BODY MASS INDEX: 19.47 KG/M2 | WEIGHT: 151.69 LBS | DIASTOLIC BLOOD PRESSURE: 71 MMHG

## 2018-12-20 DIAGNOSIS — C67.0 MALIGNANT NEOPLASM OF TRIGONE OF URINARY BLADDER: ICD-10-CM

## 2018-12-20 DIAGNOSIS — Z85.51 HISTORY OF BLADDER CANCER: Primary | ICD-10-CM

## 2018-12-20 LAB
ALBUMIN SERPL BCP-MCNC: 4.2 G/DL
ALP SERPL-CCNC: 75 U/L
ALT SERPL W/O P-5'-P-CCNC: 12 U/L
ANION GAP SERPL CALC-SCNC: 9 MMOL/L
AST SERPL-CCNC: 10 U/L
BASOPHILS # BLD AUTO: 0.07 K/UL
BASOPHILS NFR BLD: 0.7 %
BILIRUB SERPL-MCNC: 1.1 MG/DL
BUN SERPL-MCNC: 33 MG/DL
CALCIUM SERPL-MCNC: 9.4 MG/DL
CHLORIDE SERPL-SCNC: 106 MMOL/L
CO2 SERPL-SCNC: 24 MMOL/L
CREAT SERPL-MCNC: 2.3 MG/DL
DIFFERENTIAL METHOD: ABNORMAL
EOSINOPHIL # BLD AUTO: 0.3 K/UL
EOSINOPHIL NFR BLD: 2.5 %
ERYTHROCYTE [DISTWIDTH] IN BLOOD BY AUTOMATED COUNT: 14 %
EST. GFR  (AFRICAN AMERICAN): 32.3 ML/MIN/1.73 M^2
EST. GFR  (NON AFRICAN AMERICAN): 27.9 ML/MIN/1.73 M^2
GLUCOSE SERPL-MCNC: 95 MG/DL
HCT VFR BLD AUTO: 34.6 %
HGB BLD-MCNC: 11.5 G/DL
IMM GRANULOCYTES # BLD AUTO: 0.12 K/UL
IMM GRANULOCYTES NFR BLD AUTO: 1.2 %
LYMPHOCYTES # BLD AUTO: 1.8 K/UL
LYMPHOCYTES NFR BLD: 18.5 %
MCH RBC QN AUTO: 30.3 PG
MCHC RBC AUTO-ENTMCNC: 33.2 G/DL
MCV RBC AUTO: 91 FL
MONOCYTES # BLD AUTO: 0.7 K/UL
MONOCYTES NFR BLD: 7.1 %
NEUTROPHILS # BLD AUTO: 6.9 K/UL
NEUTROPHILS NFR BLD: 70 %
NRBC BLD-RTO: 0 /100 WBC
PLATELET # BLD AUTO: 232 K/UL
PMV BLD AUTO: 9.6 FL
POTASSIUM SERPL-SCNC: 4.5 MMOL/L
PROT SERPL-MCNC: 7.4 G/DL
RBC # BLD AUTO: 3.8 M/UL
SODIUM SERPL-SCNC: 139 MMOL/L
WBC # BLD AUTO: 9.83 K/UL

## 2018-12-20 PROCEDURE — 3078F DIAST BP <80 MM HG: CPT | Mod: CPTII,S$GLB,, | Performed by: INTERNAL MEDICINE

## 2018-12-20 PROCEDURE — 1101F PT FALLS ASSESS-DOCD LE1/YR: CPT | Mod: CPTII,S$GLB,, | Performed by: INTERNAL MEDICINE

## 2018-12-20 PROCEDURE — 85025 COMPLETE CBC W/AUTO DIFF WBC: CPT

## 2018-12-20 PROCEDURE — 99999 PR PBB SHADOW E&M-EST. PATIENT-LVL III: CPT | Mod: PBBFAC,,, | Performed by: INTERNAL MEDICINE

## 2018-12-20 PROCEDURE — 80053 COMPREHEN METABOLIC PANEL: CPT

## 2018-12-20 PROCEDURE — 99214 OFFICE O/P EST MOD 30 MIN: CPT | Mod: S$GLB,,, | Performed by: INTERNAL MEDICINE

## 2018-12-20 PROCEDURE — 36415 COLL VENOUS BLD VENIPUNCTURE: CPT

## 2018-12-20 PROCEDURE — 3075F SYST BP GE 130 - 139MM HG: CPT | Mod: CPTII,S$GLB,, | Performed by: INTERNAL MEDICINE

## 2018-12-20 RX ORDER — TRAZODONE HYDROCHLORIDE 100 MG/1
100 TABLET ORAL NIGHTLY PRN
Qty: 30 TABLET | Refills: 2 | Status: SHIPPED | OUTPATIENT
Start: 2018-12-20 | End: 2019-03-16 | Stop reason: SDUPTHER

## 2018-12-20 RX ORDER — PREDNISONE 20 MG/1
20 TABLET ORAL
COMMUNITY
Start: 2018-12-10 | End: 2020-10-07 | Stop reason: ALTCHOICE

## 2018-12-20 NOTE — PROGRESS NOTES
"Subjective:       Patient ID: Juan Manuel Koehler is a 68 y.o. male.    Chief Complaint: Bladder Cancer    Nausea   Pertinent negatives include no abdominal pain, chest pain, coughing, fatigue, fever, headaches, nausea, rash, vomiting or weakness.   Constipation   Pertinent negatives include no abdominal pain, back pain, diarrhea, difficulty urinating, fever, nausea or vomiting.      Juan Manuel Koehler is a 68 y.o. White male, initially referred by Dr. Robles, who presents today for follow-up and to review CT scans after surgery for invasive bladder cancer performed on 6/21/17.     He denies any mouth sores, nausea, vomiting, diarrhea, constipation, chest pain, shortness of breath, leg swelling, fatigue, headache, dizziness, or mood changes.    His ECOG PS is 1 and he is accompanied by his wife to clinic.    Oncologic History:  Patient has previously been seen by Dr. 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 He presented to Dr. Boucher with pain and hematuria and was found to have a large bladder tumor on his trigone. The tumor was also causing bilateral ureteral obstruction with resulting ELDER.The patient underwent tumor resection and attempted JJ stent placement on 4/5/17 which demonstrated cT2 urothelial carcinoma (nested variant). Bilateral nephrostomy tubes were placed with improvement in the patient's renal function. Patient discharged today from [facility] for ELDER from 5/5-5/9. During hospitalization, right nephrostomy tube was placed and the left was exchanged.      5/5/17 CT CAP reveals " Bilateral double-J ureteral stents and left nephrostomy tube are present.  There has been interval resolution of left-sided hydronephrosis and significant improvement in right-sided hydronephrosis which now appears mild. Bladder is nondistended and not well evaluated. " " There is no CT evidence of distant metastatic disease referable to provide history of bladder neoplasm."    6/21/17-Dr. Robles performed an open Radical cystoprostatectomy, Bilateral pelvic lymph node dissection, Creation of ileal conduit urinary diversion and Bilateral ureterotomy for open ureteral J stent placement.   7/6/17- patient had bilateral nephrostomy tubes removed     Review of Systems   Constitutional: Negative for activity change, appetite change, fatigue, fever and unexpected weight change.   HENT: Negative for mouth sores, nosebleeds and trouble swallowing.    Eyes: Negative for discharge and visual disturbance.   Respiratory: Negative for cough, shortness of breath and wheezing.    Cardiovascular: Negative for chest pain and leg swelling.   Gastrointestinal: Negative for abdominal distention, abdominal pain, blood in stool, constipation, diarrhea, nausea and vomiting.   Genitourinary: Negative for decreased urine volume, difficulty urinating, frequency and hematuria.   Musculoskeletal: Negative for back pain.   Skin: Negative for color change and rash.   Neurological: Negative for weakness and headaches.   Hematological: Negative for adenopathy.   Psychiatric/Behavioral: Positive for sleep disturbance. The patient is not nervous/anxious.    All other systems reviewed and are negative.      Allergies:  Review of patient's allergies indicates:  No Known Allergies    Medications:  Current Outpatient Medications   Medication Sig Dispense Refill    predniSONE (DELTASONE) 20 MG tablet Take 20 mg by mouth every Mon, Wed, Fri, Sun.      traZODone (DESYREL) 100 MG tablet       VELTASSA 16.8 gram PwPk 1 packet once daily.      traMADol (ULTRAM) 50 mg tablet        No current facility-administered medications for this visit.        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 OR 2ND FLR    CYSTOSCOPY      CYSTOSCOPY WITH RETROGRADE PYELOGRAM Bilateral 4/5/2017    [facility] OR    ESOPHAGOGASTRODUODENOSCOPY (EGD) N/A 9/11/2017    Performed by Jay [facility]mel [facility] at [facility] ENDO (2ND FLR)    EXCISION-BLADDER TUMOR-TRANSURETHRAL (TURBT) N/A 4/5/2017    [facility] OR    HERNIA REPAIR      Ileal Conduit      LOOPOGRAM N/A 9/7/2017    Performed [facility] OR 1ST FLR    LOOPOSCOPY N/A 9/7/2017    [facility] 1ST FLR    NEPHROSTOGRAM - ANTEGRADE Bilateral 7/6/2017    Performed [facility] at [facility] 1ST FLR    pelvic lymph node resection      [id number]-[id number]-[id number] TUBE Right 5/5/2017    [facility] at [facility] OR    GFPXJZONJ-[id number]-FEIUECJNEGK TUBE N/A 4/6/2017    [facility] by [facility] at [facility] OR    Radical cystoprostatectomy      REMOVAL-DRAIN-nephrostomy tube Bilateral 7/6/2017    Performed by [facility] at [facility] OR 1ST FLR    THROAT SURGERY      urostomy tube placement         FamHx:  Family History   Problem  Relation Age of Onset    No Known Problems Father     Kidney disease Mother     Prostate cancer Neg Hx        SocHx:  Social History     Socioeconomic History    Marital status:      Spouse name: Not on file    Number of children: Not on file    Years of education: Not on file    Highest education level: Not on file   Social Needs    Financial resource strain: Not on file    Food insecurity - worry: Not on file    Food insecurity - inability: Not on file    Transportation needs - medical: Not on file    Transportation needs - non-medical: Not on file   Occupational History    Not on file   Tobacco Use    Smoking status: Never Smoker    Smokeless tobacco: Never Used   Substance and Sexual Activity    Alcohol use: No     Alcohol/week: 0.0 oz    Drug use: No    Sexual activity: Yes     Partners: Female     Birth control/protection: None     Comment:  lives with spouse   Other Topics Concern    Not on file   Social History Narrative    Not on file       Objective:      Physical Exam   Constitutional: He is oriented to person, place, and time. He appears well-developed.   Appears fatigued and thin   HENT:   Head: Normocephalic and atraumatic.   Right Ear: External ear normal.   Left Ear: External ear normal.   Eyes: Conjunctivae and EOM are normal. Pupils are equal, round, and reactive to light. Right eye exhibits no discharge. Left eye exhibits no discharge. No scleral icterus.   Neck: Normal range of motion. Neck supple.   Pulmonary/Chest: Effort normal.   Musculoskeletal: Normal range of motion.   Bilateral nephrostomy tubes draining clear, yellow urine.   Neurological: He is alert and oriented to person, place, and time.   Skin: Skin is warm and dry. No erythema.   Nursing note and vitals reviewed.      LABS:  WBC   Date Value Ref Range Status   12/20/2018 9.83 3.90 - 12.70 K/uL Final     Hemoglobin   Date Value Ref Range Status   12/20/2018 11.5 (L) 14.0 - 18.0 g/dL Final     POC  Hematocrit   Date Value Ref Range Status   06/21/2017 17 (LL) 36 - 54 %PCV Final     Hematocrit   Date Value Ref Range Status   12/20/2018 34.6 (L) 40.0 - 54.0 % Final     Platelets   Date Value Ref Range Status   12/20/2018 232 150 - 350 K/uL Final       Chemistry        Component Value Date/Time     12/20/2018 1351    K 4.5 12/20/2018 1351     12/20/2018 1351    CO2 24 12/20/2018 1351    BUN 33 (H) 12/20/2018 1351    CREATININE 2.3 (H) 12/20/2018 1351    GLU 95 12/20/2018 1351        Component Value Date/Time    CALCIUM 9.4 12/20/2018 1351    ALKPHOS 75 12/20/2018 1351    AST 10 12/20/2018 1351    ALT 12 12/20/2018 1351    BILITOT 1.1 (H) 12/20/2018 1351          Assessment:       1. History of bladder cancer          Plan:       1.- Bladder Cancer:  Juan Manuel Koehler is a 68 y.o. White male, referred by Dr. Robles, who presents today for follow-up surgery for  invasive bladder cancer.     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 He presented to Dr. Boucher with pain and hematuria and was found to have a large bladder tumor on his trigone. Staging CTCAP on 5/5/17 shows no evidence of distant metastasis.    Given his continued increased creatinine, it was unclear if he would be considered a candidate for neoadjuvant chemotherapy This has improved and the patient wanted to move forward with chemo, he understood the risks, particularly to his kidneys. We split the dose of cisplatin between D1+D8 and gave IVFs.  Yet still his creatinine increased significantly.  We had a lengthy conversation about the risk of recurrence without chemo and real risk of permanent and irreversible kidney damage.   Also discussed with Dr. Robles.  At this point, we will forgo further chemotherapy in favor of moving forward with surgery.      S/p surgery with negative margins and negative lymph node involvement. Favorable pathology report reviewed  with patient and his accompanying wife. Data is controversial in the benefit of adjuvant chemotherapy. Decision made by Dr. Calero and Dr. Robles to defer resuming chemotherapy at this time.  Currently RENETTA.    RTC 4 months with CT CAP, labs (CBC,CMP), and to see Dr. Calero.    The patient indicates understanding of these issues and agrees with the plan.    More than 25 mins were spent during this encounter, greater than 50% was spent in direct counseling and/or coordination of care.       Mingo Calero M.D., M.S., F.A.C.P.  Hematology and Oncology Attending  [facility]

## 2019-03-13 DIAGNOSIS — Z85.51 HISTORY OF BLADDER CANCER: Primary | ICD-10-CM

## 2019-03-16 DIAGNOSIS — Z85.51 HISTORY OF BLADDER CANCER: ICD-10-CM

## 2019-03-18 RX ORDER — TRAZODONE HYDROCHLORIDE 100 MG/1
100 TABLET ORAL NIGHTLY PRN
Qty: 30 TABLET | Refills: 2 | Status: SHIPPED | OUTPATIENT
Start: 2019-03-18 | End: 2019-04-11 | Stop reason: SDUPTHER

## 2019-04-11 DIAGNOSIS — Z85.51 HISTORY OF BLADDER CANCER: ICD-10-CM

## 2019-04-11 RX ORDER — TRAZODONE HYDROCHLORIDE 100 MG/1
100 TABLET ORAL NIGHTLY PRN
Qty: 30 TABLET | Refills: 0 | Status: SHIPPED | OUTPATIENT
Start: 2019-04-11 | End: 2019-05-09 | Stop reason: SDUPTHER

## 2019-04-24 ENCOUNTER — HOSPITAL ENCOUNTER (OUTPATIENT)
Dept: RADIOLOGY | Facility: HOSPITAL | Age: 69
Discharge: HOME | End: 2019-04-24
Attending: INTERNAL MEDICINE
Payer: MEDICARE

## 2019-04-24 DIAGNOSIS — Z85.51 HISTORY OF BLADDER CANCER: ICD-10-CM

## 2019-04-24 PROCEDURE — 71250 CT CHEST ABDOMEN PELVIS WITHOUT CONTRAST(XPD): ICD-10-PCS | Mod: 26,,, | Performed by: RADIOLOGY

## 2019-04-24 PROCEDURE — 74176 CT ABD & PELVIS W/O CONTRAST: CPT | Mod: 26,,, | Performed by: RADIOLOGY

## 2019-04-24 PROCEDURE — 74176 CT CHEST ABDOMEN PELVIS WITHOUT CONTRAST(XPD): ICD-10-PCS | Mod: 26,,, | Performed by: RADIOLOGY

## 2019-04-24 PROCEDURE — 71250 CT THORAX DX C-: CPT | Mod: TC

## 2019-04-24 PROCEDURE — 71250 CT THORAX DX C-: CPT | Mod: 26,,, | Performed by: RADIOLOGY

## 2019-04-24 PROCEDURE — 74176 CT ABD & PELVIS W/O CONTRAST: CPT | Mod: TC

## 2019-04-25 NOTE — PROGRESS NOTES
"Subjective:       Patient ID: Juan Manuel Koehler is a 69 y.o. male.    Chief Complaint: Bladder Cancer    HPI     69 y.o.White male, initially referred by Dr. Robles, who presents today for 4 month follow-up and to review CT scans after surgery for invasive bladder cancer performed on 6/21/17.     He denies any mouth sores, nausea, vomiting, diarrhea, constipation, chest pain, shortness of breath, leg swelling, fatigue, headache, dizziness, or mood changes.    His ECOG PS is 1 and he is accompanied by his wife to clinic.    Oncologic History:  Patient has previously been seen by Dr. 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 He presented to Dr. Boucher with pain and hematuria and was found to have a large bladder tumor on his trigone. The tumor was also causing bilateral ureteral obstruction with resulting ELDER.The patient underwent tumor resection and attempted JJ stent placement on 4/5/17 which demonstrated cT2 urothelial carcinoma (nested variant). Bilateral nephrostomy tubes were placed with improvement in the patient's renal function. Patient discharged today from [facility] for ELDER from 5/5-5/9. During hospitalization, right nephrostomy tube was placed and the left was exchanged.      5/5/17 CT CAP reveals " Bilateral double-J ureteral stents and left nephrostomy tube are present.  There has been interval resolution of left-sided hydronephrosis and significant improvement in right-sided hydronephrosis which now appears mild. Bladder is nondistended and not well evaluated.  There is no CT evidence of distant metastatic disease referable to provide history of bladder neoplasm."    6/21/17-Dr. Robles performed an open Radical cystoprostatectomy, Bilateral pelvic lymph node dissection, Creation of ileal conduit urinary diversion and Bilateral " ureterotomy for open ureteral J stent placement.   7/6/17- patient had bilateral nephrostomy tubes removed     Review of Systems   Constitutional: Negative for activity change, appetite change and unexpected weight change.   HENT: Negative for mouth sores, nosebleeds and trouble swallowing.    Eyes: Negative for discharge and visual disturbance.   Respiratory: Negative for shortness of breath and wheezing.    Cardiovascular: Negative for leg swelling.   Gastrointestinal: Negative for abdominal distention and blood in stool.   Genitourinary: Negative for decreased urine volume, frequency and hematuria.   Skin: Negative for color change.   Hematological: Negative for adenopathy.   Psychiatric/Behavioral: Positive for sleep disturbance. The patient is not nervous/anxious.    All other systems reviewed and are negative.      Allergies:  Review of patient's allergies indicates:  No Known Allergies    Medications:  Current Outpatient Medications   Medication Sig Dispense Refill    MELATONIN ORAL Take by mouth.      predniSONE (DELTASONE) 20 MG tablet Take 20 mg by mouth every Mon, Wed, Fri, Sun.      traZODone (DESYREL) 100 MG tablet TAKE 1 TABLET (100 MG TOTAL) BY MOUTH NIGHTLY AS NEEDED FOR INSOMNIA. 30 tablet 0    VELTASSA 16.8 gram PwPk 1 packet once daily.      traMADol (ULTRAM) 50 mg tablet        No current facility-administered medications for this visit.        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    HTN (hypertension)     Hydronephrosis     Hyperkalemia     Insomnia     Malignant neoplasm  of bladder     Malnutrition     Urinary tract infection     Vitamin D deficiency        PSH:  Past Surgical History:   Procedure Laterality Date    BIOPSY-URETER Bilateral 4/5/2017    [facility] OR    BLADDER SURGERY      CYSTECTOMY-RADICAL/OPEN WITH BILATERAL PELVIC LYMPH NODE DISSECTION AND CREATION OF ILEAL CONDUIT N/A 6/21/2017    [facility]formed by [facility] OR 2ND FLR    CYSTOSCOPY      CYSTOSCOPY WITH RETROGRADE PYELOGRAM Bilateral 4/5/2017    [facility] OR    ESOPHAGOGASTRODUODENOSCOPY (EGD) N/A 9/11/2017    Performed by Jay Hummel [facility] at [facility] ENDO (2ND FLR)    EXCISION-BLADDER TUMOR-TRANSURETHRAL (TURBT) N/A 4/5/2017    [facility] OR    HERNIA REPAIR      Ileal Conduit      LOOPOGRAM N/A 9/7/2017    [facility]formed by [facility] OR 1ST FLR    LOOPOSCOPY N/A 9/7/2017    Performed by [facility] OR 1ST FLR    NEPHROSTOGRAM - ANTEGRADE Bilateral 7/6/2017    [facility] OR 1ST FLR    pelvic lymph node resection      [id number]-[id number] TUBE Right 5/5/2017    [facility] at [facility] OR    [id number]-[id number]-[id number] TUBE N/A 4/6/2017    Per[facility] by [facility] OR    Radical cystoprostatectomy      REMOVAL-DRAIN-nephrostomy tube Bilateral 7/6/2017    [facility]formed [facility] OR 1ST FLR    THROAT SURGERY      urostomy tube placement         FamHx:  Family History   Problem Relation Age of Onset    No Known Problems Father     Kidney disease Mother     Prostate cancer Neg Hx        SocHx:  Social History     Socioeconomic History    Marital status:      Spouse name: Not on file    Number of children: Not on file    Years of education: Not on file    Highest education level: Not on file   Occupational History    Not on file   Social  Needs    Financial resource strain: Not on file    Food insecurity:     Worry: Not on file     Inability: Not on file    Transportation needs:     Medical: Not on file     Non-medical: Not on file   Tobacco Use    Smoking status: Never Smoker    Smokeless tobacco: Never Used   Substance and Sexual Activity    Alcohol use: No     Alcohol/week: 0.0 oz    Drug use: No    Sexual activity: Yes     Partners: Female     Birth control/protection: None     Comment:  lives with spouse   Lifestyle    Physical activity:     Days per week: Not on file     Minutes per session: Not on file    Stress: Not on file   Relationships    Social connections:     Talks on phone: Not on file     Gets together: Not on file     Attends Taoist service: Not on file     Active member of club or organization: Not on file     Attends meetings of clubs or organizations: Not on file     Relationship status: Not on file   Other Topics Concern    Not on file   Social History Narrative    Not on file       Objective:      Physical Exam   Constitutional: He is oriented to person, place, and time. He appears well-developed.   Appears fatigued and thin   HENT:   Head: Normocephalic and atraumatic.   Right Ear: External ear normal.   Left Ear: External ear normal.   Eyes: Pupils are equal, round, and reactive to light. Conjunctivae and EOM are normal. Right eye exhibits no discharge. Left eye exhibits no discharge. No scleral icterus.   Neck: Normal range of motion. Neck supple.   Pulmonary/Chest: Effort normal.   Musculoskeletal: Normal range of motion.   Bilateral nephrostomy tubes draining clear, yellow urine.   Neurological: He is alert and oriented to person, place, and time.   Skin: Skin is warm and dry. No erythema.   Nursing note and vitals reviewed.      LABS:  WBC   Date Value Ref Range Status   04/26/2019 8.55 3.90 - 12.70 K/uL Final     Hemoglobin   Date Value Ref Range Status   04/26/2019 11.9 (L) 14.0 - 18.0 g/dL Final      POC Hematocrit   Date Value Ref Range Status   06/21/2017 17 (LL) 36 - 54 %PCV Final     Hematocrit   Date Value Ref Range Status   04/26/2019 36.0 (L) 40.0 - 54.0 % Final     Platelets   Date Value Ref Range Status   04/26/2019 251 150 - 350 K/uL Final       Chemistry        Component Value Date/Time     04/26/2019 1252    K 5.3 (H) 04/26/2019 1252     (H) 04/26/2019 1252    CO2 22 (L) 04/26/2019 1252    BUN 38 (H) 04/26/2019 1252    CREATININE 2.5 (H) 04/26/2019 1252     04/26/2019 1252        Component Value Date/Time    CALCIUM 10.2 04/26/2019 1252    ALKPHOS 75 04/26/2019 1252    AST 12 04/26/2019 1252    ALT 11 04/26/2019 1252    BILITOT 1.0 04/26/2019 1252          Assessment:       1. History of bladder cancer    2. S/P ileal conduit    3. Stage 3 chronic kidney disease          Plan:       1,2- Bladder Cancer:  Juan Manuel Koehler is a 68 y.o. White male, referred by Dr. Robles, who presents today for follow-up surgery for  invasive bladder cancer.     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 He presented to Dr. Boucher with pain and hematuria and was found to have a large bladder tumor on his trigone. Staging CTCAP on 5/5/17 shows no evidence of distant metastasis.    Given his continued increased creatinine, it was unclear if he would be considered a candidate for neoadjuvant chemotherapy This has improved and the patient wanted to move forward with chemo, he understood the risks, particularly to his kidneys. We split the dose of cisplatin between D1+D8 and gave IVFs.  Yet still his creatinine increased significantly.  We had a lengthy conversation about the risk of recurrence without chemo and real risk of permanent and irreversible kidney damage.   Also discussed with Dr. Robles.  At this point, we will forgo further chemotherapy in favor of moving forward with surgery.      S/p surgery with negative  margins and negative lymph node involvement. Favorable pathology report reviewed with patient and his accompanying wife. Data is controversial in the benefit of adjuvant chemotherapy. Decision made by Dr. Calero and Dr. Robles to defer resuming chemotherapy at this time.  Currently RENETTA.    RTC 4 months with CT CAP, labs (CBC,CMP), and to see Dr. Calero.    3- Stable. Continue to follow with nephrology.    4- Advised to monitor diet for high potassium foods. Following with nephrology.    Patient was also seen and examined by Dr. Calero. Patient is in agreement with the proposed treatment plan. All questions were answered to the patient's satisfaction. Pt knows to call clinic if anything is needed before the next clinic visit.    TWYLA Smith-DAVID  Hematology and Medical Oncology      I have reviewed the notes, assessments, and/or procedures performed by the nurse practitioner, as above.  I have personally interviewed the patient at the beside, and rounded with the nurse practitioner. I formulated the plan of care.  I concur with her documentation of Juan Manuel Koehler.  I, Dr. Mingo Calero, personally spent more than 25 mins during this encounter, greater than 50% was spent in direct counseling and/or coordination of care.     Mingo Calero M.D., M.S., F.A.C.P.  Hematology/Oncology Attending  [facility]

## 2019-04-26 ENCOUNTER — OFFICE VISIT (OUTPATIENT)
Dept: HEMATOLOGY/ONCOLOGY | Facility: CLINIC | Age: 69
End: 2019-04-26
Payer: MEDICARE

## 2019-04-26 ENCOUNTER — TELEPHONE (OUTPATIENT)
Dept: HEMATOLOGY/ONCOLOGY | Facility: CLINIC | Age: 69
End: 2019-04-26

## 2019-04-26 ENCOUNTER — LAB VISIT (OUTPATIENT)
Dept: LAB | Facility: HOSPITAL | Age: 69
End: 2019-04-26
Attending: INTERNAL MEDICINE
Payer: MEDICARE

## 2019-04-26 VITALS
DIASTOLIC BLOOD PRESSURE: 66 MMHG | WEIGHT: 148.13 LBS | HEIGHT: 74 IN | RESPIRATION RATE: 18 BRPM | BODY MASS INDEX: 19.01 KG/M2 | OXYGEN SATURATION: 100 % | TEMPERATURE: 98 F | HEART RATE: 65 BPM | SYSTOLIC BLOOD PRESSURE: 137 MMHG

## 2019-04-26 DIAGNOSIS — C67.0 MALIGNANT NEOPLASM OF TRIGONE OF URINARY BLADDER: ICD-10-CM

## 2019-04-26 DIAGNOSIS — E87.5 HYPERKALEMIA: ICD-10-CM

## 2019-04-26 DIAGNOSIS — N18.30 STAGE 3 CHRONIC KIDNEY DISEASE: ICD-10-CM

## 2019-04-26 DIAGNOSIS — Z85.51 HISTORY OF BLADDER CANCER: Primary | ICD-10-CM

## 2019-04-26 DIAGNOSIS — Z93.6 S/P ILEAL CONDUIT: ICD-10-CM

## 2019-04-26 LAB
ALBUMIN SERPL BCP-MCNC: 4.1 G/DL (ref 3.5–5.2)
ALP SERPL-CCNC: 75 U/L (ref 55–135)
ALT SERPL W/O P-5'-P-CCNC: 11 U/L (ref 10–44)
ANION GAP SERPL CALC-SCNC: 9 MMOL/L (ref 8–16)
AST SERPL-CCNC: 12 U/L (ref 10–40)
BASOPHILS # BLD AUTO: 0.07 K/UL (ref 0–0.2)
BASOPHILS NFR BLD: 0.8 % (ref 0–1.9)
BILIRUB SERPL-MCNC: 1 MG/DL (ref 0.1–1)
BUN SERPL-MCNC: 38 MG/DL (ref 8–23)
CALCIUM SERPL-MCNC: 10.2 MG/DL (ref 8.7–10.5)
CHLORIDE SERPL-SCNC: 111 MMOL/L (ref 95–110)
CO2 SERPL-SCNC: 22 MMOL/L (ref 23–29)
CREAT SERPL-MCNC: 2.5 MG/DL (ref 0.5–1.4)
DIFFERENTIAL METHOD: ABNORMAL
EOSINOPHIL # BLD AUTO: 0.3 K/UL (ref 0–0.5)
EOSINOPHIL NFR BLD: 3.3 % (ref 0–8)
ERYTHROCYTE [DISTWIDTH] IN BLOOD BY AUTOMATED COUNT: 14 % (ref 11.5–14.5)
EST. GFR  (AFRICAN AMERICAN): 29 ML/MIN/1.73 M^2
EST. GFR  (NON AFRICAN AMERICAN): 25.1 ML/MIN/1.73 M^2
GLUCOSE SERPL-MCNC: 109 MG/DL (ref 70–110)
HCT VFR BLD AUTO: 36 % (ref 40–54)
HGB BLD-MCNC: 11.9 G/DL (ref 14–18)
IMM GRANULOCYTES # BLD AUTO: 0.08 K/UL (ref 0–0.04)
IMM GRANULOCYTES NFR BLD AUTO: 0.9 % (ref 0–0.5)
LYMPHOCYTES # BLD AUTO: 1.4 K/UL (ref 1–4.8)
LYMPHOCYTES NFR BLD: 16.5 % (ref 18–48)
MCH RBC QN AUTO: 29.9 PG (ref 27–31)
MCHC RBC AUTO-ENTMCNC: 33.1 G/DL (ref 32–36)
MCV RBC AUTO: 91 FL (ref 82–98)
MONOCYTES # BLD AUTO: 0.7 K/UL (ref 0.3–1)
MONOCYTES NFR BLD: 8.4 % (ref 4–15)
NEUTROPHILS # BLD AUTO: 6 K/UL (ref 1.8–7.7)
NEUTROPHILS NFR BLD: 70.1 % (ref 38–73)
NRBC BLD-RTO: 0 /100 WBC
PLATELET # BLD AUTO: 251 K/UL (ref 150–350)
PMV BLD AUTO: 9.7 FL (ref 9.2–12.9)
POTASSIUM SERPL-SCNC: 5.3 MMOL/L (ref 3.5–5.1)
PROT SERPL-MCNC: 7.2 G/DL (ref 6–8.4)
RBC # BLD AUTO: 3.98 M/UL (ref 4.6–6.2)
SODIUM SERPL-SCNC: 142 MMOL/L (ref 136–145)
WBC # BLD AUTO: 8.55 K/UL (ref 3.9–12.7)

## 2019-04-26 PROCEDURE — 3078F PR MOST RECENT DIASTOLIC BLOOD PRESSURE < 80 MM HG: ICD-10-PCS | Mod: CPTII,S$GLB,, | Performed by: INTERNAL MEDICINE

## 2019-04-26 PROCEDURE — 1101F PR PT FALLS ASSESS DOC 0-1 FALLS W/OUT INJ PAST YR: ICD-10-PCS | Mod: CPTII,S$GLB,, | Performed by: INTERNAL MEDICINE

## 2019-04-26 PROCEDURE — 3075F SYST BP GE 130 - 139MM HG: CPT | Mod: CPTII,S$GLB,, | Performed by: INTERNAL MEDICINE

## 2019-04-26 PROCEDURE — 3078F DIAST BP <80 MM HG: CPT | Mod: CPTII,S$GLB,, | Performed by: INTERNAL MEDICINE

## 2019-04-26 PROCEDURE — 99999 PR PBB SHADOW E&M-EST. PATIENT-LVL IV: CPT | Mod: PBBFAC,,, | Performed by: INTERNAL MEDICINE

## 2019-04-26 PROCEDURE — 80053 COMPREHEN METABOLIC PANEL: CPT

## 2019-04-26 PROCEDURE — 36415 COLL VENOUS BLD VENIPUNCTURE: CPT

## 2019-04-26 PROCEDURE — 85025 COMPLETE CBC W/AUTO DIFF WBC: CPT

## 2019-04-26 PROCEDURE — 3075F PR MOST RECENT SYSTOLIC BLOOD PRESS GE 130-139MM HG: ICD-10-PCS | Mod: CPTII,S$GLB,, | Performed by: INTERNAL MEDICINE

## 2019-04-26 PROCEDURE — 1101F PT FALLS ASSESS-DOCD LE1/YR: CPT | Mod: CPTII,S$GLB,, | Performed by: INTERNAL MEDICINE

## 2019-04-26 PROCEDURE — 99999 PR PBB SHADOW E&M-EST. PATIENT-LVL IV: ICD-10-PCS | Mod: PBBFAC,,, | Performed by: INTERNAL MEDICINE

## 2019-04-26 PROCEDURE — 99214 OFFICE O/P EST MOD 30 MIN: CPT | Mod: S$GLB,,, | Performed by: INTERNAL MEDICINE

## 2019-04-26 PROCEDURE — 99214 PR OFFICE/OUTPT VISIT, EST, LEVL IV, 30-39 MIN: ICD-10-PCS | Mod: S$GLB,,, | Performed by: INTERNAL MEDICINE

## 2019-04-26 NOTE — Clinical Note
Schedule follow up with Izabela Adames.RTC 4 months with CT CAP, labs (CBC,CMP), and to see Dr. Calero.

## 2019-04-26 NOTE — TELEPHONE ENCOUNTER
----- Message from Jessa Penny NP sent at 4/26/2019  2:41 PM CDT -----  Schedule follow up with Izabela Adames.    RTC 4 months with CT CAP, labs (CBC,CMP), and to see Dr. Calero.

## 2019-05-02 ENCOUNTER — TELEPHONE (OUTPATIENT)
Dept: HEMATOLOGY/ONCOLOGY | Facility: CLINIC | Age: 69
End: 2019-05-02

## 2019-05-02 NOTE — TELEPHONE ENCOUNTER
----- Message from Cam Valle, RN sent at 5/2/2019 11:26 AM CDT -----  Were you able to get in touch with Izabela for him or does he know to call?  ----- Message -----  From: Jessa Penny NP  Sent: 4/26/2019   2:41 PM  To: Cam Valle RN    Schedule follow up with Izabela Adames.    RTC 4 months with CT CAP, labs (CBC,CMP), and to see Dr. Calero.

## 2019-05-09 DIAGNOSIS — Z85.51 HISTORY OF BLADDER CANCER: ICD-10-CM

## 2019-05-09 RX ORDER — TRAZODONE HYDROCHLORIDE 100 MG/1
100 TABLET ORAL NIGHTLY PRN
Qty: 30 TABLET | Refills: 0 | Status: SHIPPED | OUTPATIENT
Start: 2019-05-09 | End: 2019-08-16 | Stop reason: SDUPTHER

## 2019-05-13 ENCOUNTER — OFFICE VISIT (OUTPATIENT)
Dept: WOUND CARE | Facility: CLINIC | Age: 69
End: 2019-05-13
Payer: MEDICARE

## 2019-05-13 DIAGNOSIS — L85.9 EPITHELIAL HYPERPLASIA OF SKIN: ICD-10-CM

## 2019-05-13 DIAGNOSIS — Z43.6 ATTENTION TO UROSTOMY: Primary | ICD-10-CM

## 2019-05-13 PROCEDURE — 99999 PR PBB SHADOW E&M-EST. PATIENT-LVL II: CPT | Mod: PBBFAC,,, | Performed by: CLINICAL NURSE SPECIALIST

## 2019-05-13 PROCEDURE — 99214 PR OFFICE/OUTPT VISIT, EST, LEVL IV, 30-39 MIN: ICD-10-PCS | Mod: S$GLB,,, | Performed by: CLINICAL NURSE SPECIALIST

## 2019-05-13 PROCEDURE — 99999 PR PBB SHADOW E&M-EST. PATIENT-LVL II: ICD-10-PCS | Mod: PBBFAC,,, | Performed by: CLINICAL NURSE SPECIALIST

## 2019-05-13 PROCEDURE — 1101F PT FALLS ASSESS-DOCD LE1/YR: CPT | Mod: CPTII,S$GLB,, | Performed by: CLINICAL NURSE SPECIALIST

## 2019-05-13 PROCEDURE — 1101F PR PT FALLS ASSESS DOC 0-1 FALLS W/OUT INJ PAST YR: ICD-10-PCS | Mod: CPTII,S$GLB,, | Performed by: CLINICAL NURSE SPECIALIST

## 2019-05-13 PROCEDURE — 99214 OFFICE O/P EST MOD 30 MIN: CPT | Mod: S$GLB,,, | Performed by: CLINICAL NURSE SPECIALIST

## 2019-05-13 NOTE — PROGRESS NOTES
Subjective:       Patient ID: Juan Manuel Koehler is a 69 y.o. male.    Chief Complaint: Urostomy and Skin Problem    This is an annual visit for urostomy eval and follow up, comes with wife today, he has a few concerns with his skin and smell of urine, he does not yet do his own care, wife still puts pouch on and I have encouraged him to start practicing himself.     Review of Systems   Constitutional: Negative for activity change and appetite change.   HENT: Negative.    Respiratory: Negative.    Cardiovascular: Negative for chest pain and leg swelling.   Gastrointestinal: Negative for abdominal pain.        Reports nervous stomach    Genitourinary: Negative for hematuria.        Urostomy , clear urine slight mucous   Skin: Negative for rash.   Neurological: Negative for weakness.       Objective:      Physical Exam   Constitutional: He appears well-developed.   Pulmonary/Chest: Effort normal.   Abdominal: Soft. Bowel sounds are normal.   Skin: Skin is warm.       First visit before cautery 2017 6/18/18 5/13/19    Urostomy is budded stoma that is 31 mm , has developed hyperplasia around stoma , wife cuts pouch to 35 mm and this could be contributing, also has concern over strong urine odor.   Advised to cut pch smaller and retry XPRO version instead of KRANTHI light. To help reduce the thickened,skin, he does wear belt, I made it a bit snugger too.   reviewed ways to clean his bedside bag and discussed he CAN take a shower and get all wet,   Encouraged him to do this for himself and not depend on his wife    Assessment:       1. Attention to urostomy    2. Epithelial hyperplasia of skin        Plan:       reeval stoma fit and equipment   No issues of concern, continue with Duramed   Will have samples of precut sent from [facility] to try   Call me for any problems   I have reviewed the plan of care with the patient and/ or caregiver and they express understanding. I spent over 50% of this 25 minute visit in face  to face counseling.

## 2019-06-07 DIAGNOSIS — Z85.51 HISTORY OF BLADDER CANCER: ICD-10-CM

## 2019-06-10 RX ORDER — TRAZODONE HYDROCHLORIDE 100 MG/1
100 TABLET ORAL NIGHTLY PRN
Qty: 30 TABLET | Refills: 1 | Status: SHIPPED | OUTPATIENT
Start: 2019-06-10 | End: 2019-07-04 | Stop reason: SDUPTHER

## 2019-06-17 NOTE — ANESTHESIA POSTPROCEDURE EVALUATION
"Anesthesia Post Evaluation    Patient: Juan Manuel Koehler    Procedure(s) Performed: Procedure(s) (LRB):  LOOPOGRAM (N/A)  LOOPOSCOPY (N/A)    Final Anesthesia Type: general  Patient location during evaluation: PACU  Patient participation: Yes- Able to Participate  Level of consciousness: awake and alert and oriented  Post-procedure vital signs: reviewed and stable  Pain management: adequate  Airway patency: patent  PONV status at discharge: No PONV  Anesthetic complications: no      Cardiovascular status: stable  Respiratory status: unassisted  Hydration status: euvolemic  Follow-up not needed.        Visit Vitals  BP (!) 126/58 (BP Location: Left arm, Patient Position: Lying)   Pulse 82   Temp 36.9 °C (98.4 °F) (Oral)   Resp 16   Ht 6' 2" (1.88 m)   Wt 59 kg (130 lb)   SpO2 100%   BMI 16.69 kg/m²       Pain/Deepika Score: Pain Assessment Performed: Yes (9/8/2017  4:00 AM)  Presence of Pain: denies (9/8/2017  4:00 AM)  Deepika Score: 10 (9/7/2017  4:45 PM)      " negative

## 2019-06-25 NOTE — CONSULTS
Inpatient Radiology Pre-procedure Note    History of Present Illness:  Juan Manuel Koehler is a 67 y.o. male who presents for bilateral nephrostomy placement and possible stent placement.  Admission H&P reviewed.  Past Medical History:   Diagnosis Date    Cancer     bladder and throat    Urinary tract infection      Past Surgical History:   Procedure Laterality Date    BLADDER SURGERY      HERNIA REPAIR      THROAT SURGERY         Review of Systems:   As documented in primary team H&P    Home Meds:   Prior to Admission medications    Medication Sig Start Date End Date Taking? Authorizing Provider   pantoprazole (PROTONIX) 40 MG tablet TAKE 1 BY MOUTH ONCE A DAY FOR 60 DAYS 3/21/17  Yes Historical Provider, MD   ketoconazole (NIZORAL) 2 % shampoo USE TO WASH AFFECTED AREA ONCE DAILY 3/13/17   Historical Provider, MD   oxybutynin (DITROPAN) 5 MG Tab Take 1 tablet (5 mg total) by mouth 3 (three) times daily. 4/5/17 5/5/17  James Boucher MD   oxycodone-acetaminophen (PERCOCET) 5-325 mg per tablet Take 1 tablet by mouth every 4 (four) hours as needed for Pain. 4/5/17 4/15/17  James Boucher MD     Scheduled Meds:    albuterol sulfate  10 mg Nebulization Once    insulin regular  10 Units Intravenous Once    And    dextrose 50%  25 g Intravenous Once    pantoprazole  40 mg Oral Daily    sodium chloride 0.9%  3 mL Intravenous Q8H    sodium polystyrene  30 g Oral Q6H     Continuous Infusions:    sodium chloride 0.9% 75 mL/hr at 04/05/17 1740     PRN Meds:belladonna alkaloids-opium 16.2-30 mg, diphenhydrAMINE, hydrocodone-acetaminophen 5-325mg, HYDROmorphone, ondansetron, oxybutynin, oxycodone-acetaminophen, oxycodone-acetaminophen  Anticoagulants/Antiplatelets: no anticoagulation    Allergies:   Review of patient's allergies indicates:   Allergen Reactions    Pneumococcal 23-margarito ps vaccine      Sedation Hx: have not been any systemic reactions    Labs:    Recent Labs  Lab 04/06/17  0702   INR 1.0       Recent  Is This A New Presentation, Or A Follow-Up?: Rash Labs  Lab 04/05/17  1012   WBC 7.35   HGB 9.9*   HCT 29.4*   MCV 88         Recent Labs  Lab 04/06/17  0702   *      K 6.4*   *   CO2 20*   BUN 40*   CREATININE 4.6*   CALCIUM 9.0   ALT 12   AST 22   ALBUMIN 3.8   BILITOT 0.7         Vitals:  Temp: 97.8 °F (36.6 °C) (04/06/17 0526)  Pulse: 75 (04/06/17 0526)  Resp: 20 (04/06/17 0526)  BP: 116/67 (04/06/17 0526)  SpO2: 98 % (04/05/17 1940)     Physical Exam:  ASA: Per anesthesia  Mallampati: Per anesthesia    General: no acute distress  Mental Status: alert and oriented to person, place and time  HEENT: normocephalic, atraumatic  Chest: unlabored breathing  Heart: regular heart rate  Abdomen: nondistended  Extremity: moves all extremities    Plan: US and fluoroscopic guided bilateral neph tube placements. Pt will need high potassium corrected before the procedure.  Sedation Plan: Per anesthesia.    Lenard Gaviria M.D.  Diagnostic and Interventional Radiologist  Department of Radiology  Pager: [phone]

## 2019-07-04 DIAGNOSIS — Z85.51 HISTORY OF BLADDER CANCER: ICD-10-CM

## 2019-07-05 RX ORDER — TRAZODONE HYDROCHLORIDE 100 MG/1
100 TABLET ORAL NIGHTLY PRN
Qty: 30 TABLET | Refills: 1 | Status: SHIPPED | OUTPATIENT
Start: 2019-07-05 | End: 2019-08-07 | Stop reason: SDUPTHER

## 2019-07-30 ENCOUNTER — TELEPHONE (OUTPATIENT)
Dept: HEMATOLOGY/ONCOLOGY | Facility: CLINIC | Age: 69
End: 2019-07-30

## 2019-07-30 DIAGNOSIS — Z43.6 ATTENTION TO UROSTOMY: Primary | ICD-10-CM

## 2019-07-30 NOTE — TELEPHONE ENCOUNTER
Spoke to patient, recommended that he go through nephrology to sign the paperwork since they were the ones who prescribed the medication.

## 2019-07-30 NOTE — TELEPHONE ENCOUNTER
----- Message from Allie Pascal sent at 7/30/2019  1:24 PM CDT -----  8/16 appt, he has questions about copay and use of medication. Asking for a form to be completed to receive financial aid through the [employer] [phone].  Thank you     Rx:  - VELTASSA 16.8 gram PwPk  Pharmacy:  [facility]     Thank you

## 2019-08-07 DIAGNOSIS — Z85.51 HISTORY OF BLADDER CANCER: ICD-10-CM

## 2019-08-07 RX ORDER — TRAZODONE HYDROCHLORIDE 100 MG/1
100 TABLET ORAL NIGHTLY PRN
Qty: 30 TABLET | Refills: 1 | Status: SHIPPED | OUTPATIENT
Start: 2019-08-07 | End: 2020-08-03

## 2019-08-14 ENCOUNTER — HOSPITAL ENCOUNTER (OUTPATIENT)
Dept: RADIOLOGY | Facility: HOSPITAL | Age: 69
Discharge: HOME | End: 2019-08-14
Attending: NURSE PRACTITIONER
Payer: MEDICARE

## 2019-08-14 DIAGNOSIS — Z93.6 S/P ILEAL CONDUIT: ICD-10-CM

## 2019-08-14 DIAGNOSIS — Z85.51 HISTORY OF BLADDER CANCER: ICD-10-CM

## 2019-08-14 PROCEDURE — 74176 CT ABDOMEN PELVIS WITHOUT CONTRAST: ICD-10-PCS | Mod: 26,,, | Performed by: RADIOLOGY

## 2019-08-14 PROCEDURE — 71250 CT THORAX DX C-: CPT | Mod: TC

## 2019-08-14 PROCEDURE — 74176 CT ABD & PELVIS W/O CONTRAST: CPT | Mod: TC

## 2019-08-14 PROCEDURE — 71250 CT THORAX DX C-: CPT | Mod: 26,,, | Performed by: RADIOLOGY

## 2019-08-14 PROCEDURE — 74176 CT ABD & PELVIS W/O CONTRAST: CPT | Mod: 26,,, | Performed by: RADIOLOGY

## 2019-08-14 PROCEDURE — 71250 CT CHEST WITHOUT CONTRAST: ICD-10-PCS | Mod: 26,,, | Performed by: RADIOLOGY

## 2019-08-15 NOTE — PROGRESS NOTES
"Subjective:       Patient ID: Juan Manuel Koehler is a 69 y.o. male.    Chief Complaint: Bladder Cancer    HPI     69 y.o.White male  who presents today for 4 month follow-up and to review CT scans after surgery for invasive bladder cancer performed on 6/21/17.     He denies any mouth sores, nausea, vomiting, diarrhea, constipation, chest pain, shortness of breath, leg swelling, fatigue, headache, dizziness, or mood changes.    Notes some stomach discomfort and bleaching at night.     His ECOG PS is 1 and he is accompanied by his wife to clinic.    Oncologic History:  Patient has previously been seen by Dr. 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 He presented to Dr. Boucher with pain and hematuria and was found to have a large bladder tumor on his trigone. The tumor was also causing bilateral ureteral obstruction with resulting ELDER.The patient underwent tumor resection and attempted JJ stent placement on 4/5/17 which demonstrated cT2 urothelial carcinoma (nested variant). Bilateral nephrostomy tubes were placed with improvement in the patient's renal function. Patient discharged today from [facility] for ELDER from 5/5-5/9. During hospitalization, right nephrostomy tube was placed and the left was exchanged.      5/5/17 CT CAP reveals " Bilateral double-J ureteral stents and left nephrostomy tube are present.  There has been interval resolution of left-sided hydronephrosis and significant improvement in right-sided hydronephrosis which now appears mild. Bladder is nondistended and not well evaluated.  There is no CT evidence of distant metastatic disease referable to provide history of bladder neoplasm."    6/21/17-Dr. Robles performed an open Radical cystoprostatectomy, Bilateral pelvic lymph node dissection, Creation of ileal conduit urinary diversion " and Bilateral ureterotomy for open ureteral J stent placement.   7/6/17- patient had bilateral nephrostomy tubes removed     Review of Systems   Constitutional: Negative for activity change, appetite change and unexpected weight change.   HENT: Negative for mouth sores, nosebleeds and trouble swallowing.    Eyes: Negative for discharge and visual disturbance.   Respiratory: Negative for shortness of breath and wheezing.    Cardiovascular: Negative for leg swelling.   Gastrointestinal: Negative for abdominal distention and blood in stool.   Genitourinary: Negative for decreased urine volume, frequency and hematuria.   Skin: Negative for color change.   Hematological: Negative for adenopathy.   Psychiatric/Behavioral: Positive for sleep disturbance. The patient is not nervous/anxious.    All other systems reviewed and are negative.      Allergies:  Review of patient's allergies indicates:  No Known Allergies    Medications:  Current Outpatient Medications   Medication Sig Dispense Refill    MELATONIN ORAL Take by mouth.      predniSONE (DELTASONE) 20 MG tablet Take 20 mg by mouth every Mon, Wed, Fri, Sun.      traMADol (ULTRAM) 50 mg tablet       traZODone (DESYREL) 100 MG tablet TAKE 1 TABLET (100 MG TOTAL) BY MOUTH NIGHTLY AS NEEDED FOR INSOMNIA. 30 tablet 0    traZODone (DESYREL) 100 MG tablet TAKE 1 TABLET (100 MG TOTAL) BY MOUTH NIGHTLY AS NEEDED FOR INSOMNIA. 30 tablet 1    VELTASSA 16.8 gram PwPk 1 packet once daily.       No current facility-administered medications for this visit.        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    HTN (hypertension)     Hydronephrosis     Hyperkalemia     Insomnia     Malignant neoplasm of bladder     Malnutrition     Urinary tract infection     Vitamin D deficiency        PSH:  Past Surgical History:   Procedure Laterality Date    BIOPSY-URETER Bilateral 4/5/2017    [facility]formed by [facility] OR    BLADDER SURGERY      CYSTECTOMY-RADICAL/OPEN WITH BILATERAL PELVIC LYMPH NODE DISSECTION AND CREATION OF ILEAL CONDUIT N/A 6/21/2017    Performed by [facility] at [facility] OR 2ND [facility]    CYSTOSCOPY      CYSTOSCOPY WITH RETROGRADE PYELOGRAM Bilateral 4/5/2017    [facility] by [facility] at [facility] OR    ESOPHAGOGASTRODUODENOSCOPY (EGD) N/A 9/11/2017    Performed by Jay Hummel [facility] at [facility] ENDO (2ND FLR)    EXCISION-BLADDER TUMOR-TRANSURETHRAL (TURBT) N/A 4/5/2017    [facility]formed by [facility] at [facility] OR    HERNIA REPAIR      Ileal Conduit      LOOPOGRAM N/A 9/7/2017    Performed [facility] at [facility]    LOOPOSCOPY N/A 9/7/2017    [facility]    NEPHROSTOGRAM - ANTEGRADE Bilateral 7/6/2017    [facility]    pelvic lymph node resection      GKBJUGLAK-YQJUZLKONPHS-[id number] TUBE Right 5/5/2017    Performed by Lenard Gaviria [facility] at [facility] OR    HAVBSRPKE-[id number]-[id number] TUBE N/A 4/6/2017    Performed by [facility]    Radical cystoprostatectomy      REMOVAL-DRAIN-nephrostomy tube Bilateral 7/6/2017    Performed [facility] at [facility]    THROAT SURGERY      urostomy tube placement         FamHx:  Family History   Problem Relation Age of Onset    No Known Problems Father     Kidney disease Mother     Prostate cancer Neg Hx        SocHx:  Social History     Socioeconomic History    Marital status:      Spouse name: Not on file    Number of  children: Not on file    Years of education: Not on file    Highest education level: Not on file   Occupational History    Not on file   Social Needs    Financial resource strain: Not on file    Food insecurity:     Worry: Not on file     Inability: Not on file    Transportation needs:     Medical: Not on file     Non-medical: Not on file   Tobacco Use    Smoking status: Never Smoker    Smokeless tobacco: Never Used   Substance and Sexual Activity    Alcohol use: No     Alcohol/week: 0.0 oz    Drug use: No    Sexual activity: Yes     Partners: Female     Birth control/protection: None     Comment:  lives with spouse   Lifestyle    Physical activity:     Days per week: Not on file     Minutes per session: Not on file    Stress: Not on file   Relationships    Social connections:     Talks on phone: Not on file     Gets together: Not on file     Attends Yarsanism service: Not on file     Active member of club or organization: Not on file     Attends meetings of clubs or organizations: Not on file     Relationship status: Not on file   Other Topics Concern    Not on file   Social History Narrative    Not on file       Objective:      Physical Exam   Constitutional: He is oriented to person, place, and time. He appears well-developed.   Appears fatigued and thin   HENT:   Head: Normocephalic and atraumatic.   Right Ear: External ear normal.   Left Ear: External ear normal.   Eyes: Pupils are equal, round, and reactive to light. Conjunctivae and EOM are normal. Right eye exhibits no discharge. Left eye exhibits no discharge. No scleral icterus.   Neck: Normal range of motion. Neck supple.   Pulmonary/Chest: Effort normal.   Musculoskeletal: Normal range of motion.   Bilateral nephrostomy tubes draining clear, yellow urine.   Neurological: He is alert and oriented to person, place, and time.   Skin: Skin is warm and dry. No erythema.   Nursing note and vitals reviewed.      LABS:  WBC   Date Value  Ref Range Status   04/26/2019 8.55 3.90 - 12.70 K/uL Final     Hemoglobin   Date Value Ref Range Status   04/26/2019 11.9 (L) 14.0 - 18.0 g/dL Final     POC Hematocrit   Date Value Ref Range Status   06/21/2017 17 (LL) 36 - 54 %PCV Final     Hematocrit   Date Value Ref Range Status   04/26/2019 36.0 (L) 40.0 - 54.0 % Final     Platelets   Date Value Ref Range Status   04/26/2019 251 150 - 350 K/uL Final       Chemistry        Component Value Date/Time     04/26/2019 1252    K 5.3 (H) 04/26/2019 1252     (H) 04/26/2019 1252    CO2 22 (L) 04/26/2019 1252    BUN 38 (H) 04/26/2019 1252    CREATININE 2.5 (H) 04/26/2019 1252     04/26/2019 1252        Component Value Date/Time    CALCIUM 10.2 04/26/2019 1252    ALKPHOS 75 04/26/2019 1252    AST 12 04/26/2019 1252    ALT 11 04/26/2019 1252    BILITOT 1.0 04/26/2019 1252          Assessment:       1. History of bladder cancer    2. S/P ileal conduit    3. Stage 3 chronic kidney disease    4. Hyperkalemia          Plan:       1. Bladder Cancer:  Juan Manuel Koehler is a 69 y.o.White male, referred by Dr. Robles, who presents today for follow-up surgery for  invasive bladder cancer.     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 He presented to Dr. Boucher with pain and hematuria and was found to have a large bladder tumor on his trigone. Staging CTCAP on 5/5/17 shows no evidence of distant metastasis.    Given his continued increased creatinine, it was unclear if he would be considered a candidate for neoadjuvant chemotherapy This has improved and the patient wanted to move forward with chemo, he understood the risks, particularly to his kidneys. We split the dose of cisplatin between D1+D8 and gave IVFs.  Yet still his creatinine increased significantly.  We had a lengthy conversation about the risk of recurrence without chemo and real risk of permanent and irreversible kidney  damage.   Also discussed with Dr. Robles.  At this point, we will forgo further chemotherapy in favor of moving forward with surgery.      S/p surgery with negative margins and negative lymph node involvement. Favorable pathology report reviewed with patient and his accompanying wife. Data is controversial in the benefit of adjuvant chemotherapy. Decision made by Dr. Calero and Dr. Robles to defer resuming chemotherapy at this time.  Currently RENETTA.    OTC antiacids for stomach discomfort, f/u with GI given that he is on prednisone.     RTC 6 months with CT CAP, labs (CBC,CMP), and to see Dr. Calero.    2. CKD: Continue to follow with nephrology.    3. H/o Hyperkalemia -  Advised to monitor diet for high potassium foods. Following with nephrology.    The patient agrees with the plan, and all questions have been answered to their satisfaction.      More than 25 mins were spent during this encounter, greater than 50% was spent in direct counseling and/or coordination of care.     Mingo Calero M.D., M.S., F.A.C.P.  Hematology and Oncology Attending  [facility]

## 2019-08-16 ENCOUNTER — LAB VISIT (OUTPATIENT)
Dept: LAB | Facility: HOSPITAL | Age: 69
End: 2019-08-16
Payer: MEDICARE

## 2019-08-16 ENCOUNTER — OFFICE VISIT (OUTPATIENT)
Dept: HEMATOLOGY/ONCOLOGY | Facility: CLINIC | Age: 69
End: 2019-08-16
Payer: MEDICARE

## 2019-08-16 VITALS
DIASTOLIC BLOOD PRESSURE: 67 MMHG | WEIGHT: 145.5 LBS | TEMPERATURE: 98 F | RESPIRATION RATE: 16 BRPM | HEART RATE: 73 BPM | SYSTOLIC BLOOD PRESSURE: 129 MMHG | HEIGHT: 74 IN | BODY MASS INDEX: 18.67 KG/M2

## 2019-08-16 DIAGNOSIS — E87.5 HYPERKALEMIA: ICD-10-CM

## 2019-08-16 DIAGNOSIS — Z85.51 HISTORY OF BLADDER CANCER: ICD-10-CM

## 2019-08-16 DIAGNOSIS — Z85.51 HISTORY OF BLADDER CANCER: Primary | ICD-10-CM

## 2019-08-16 DIAGNOSIS — N18.30 STAGE 3 CHRONIC KIDNEY DISEASE: ICD-10-CM

## 2019-08-16 DIAGNOSIS — Z93.6 S/P ILEAL CONDUIT: ICD-10-CM

## 2019-08-16 LAB
ALBUMIN SERPL BCP-MCNC: 4.1 G/DL (ref 3.5–5.2)
ALP SERPL-CCNC: 69 U/L (ref 55–135)
ALT SERPL W/O P-5'-P-CCNC: 12 U/L (ref 10–44)
ANION GAP SERPL CALC-SCNC: 7 MMOL/L (ref 8–16)
AST SERPL-CCNC: 11 U/L (ref 10–40)
BILIRUB SERPL-MCNC: 1.3 MG/DL (ref 0.1–1)
BUN SERPL-MCNC: 32 MG/DL (ref 8–23)
CALCIUM SERPL-MCNC: 10.1 MG/DL (ref 8.7–10.5)
CHLORIDE SERPL-SCNC: 111 MMOL/L (ref 95–110)
CO2 SERPL-SCNC: 27 MMOL/L (ref 23–29)
CREAT SERPL-MCNC: 2.4 MG/DL (ref 0.5–1.4)
ERYTHROCYTE [DISTWIDTH] IN BLOOD BY AUTOMATED COUNT: 14.6 % (ref 11.5–14.5)
EST. GFR  (AFRICAN AMERICAN): 30.5 ML/MIN/1.73 M^2
EST. GFR  (NON AFRICAN AMERICAN): 26.3 ML/MIN/1.73 M^2
GLUCOSE SERPL-MCNC: 103 MG/DL (ref 70–110)
HCT VFR BLD AUTO: 36.7 % (ref 40–54)
HGB BLD-MCNC: 12 G/DL (ref 14–18)
IMM GRANULOCYTES # BLD AUTO: 0.13 K/UL (ref 0–0.04)
MCH RBC QN AUTO: 29.9 PG (ref 27–31)
MCHC RBC AUTO-ENTMCNC: 32.7 G/DL (ref 32–36)
MCV RBC AUTO: 91 FL (ref 82–98)
NEUTROPHILS # BLD AUTO: 6.2 K/UL (ref 1.8–7.7)
PLATELET # BLD AUTO: 233 K/UL (ref 150–350)
PMV BLD AUTO: 10.1 FL (ref 9.2–12.9)
POTASSIUM SERPL-SCNC: 4.9 MMOL/L (ref 3.5–5.1)
PROT SERPL-MCNC: 7 G/DL (ref 6–8.4)
RBC # BLD AUTO: 4.02 M/UL (ref 4.6–6.2)
SODIUM SERPL-SCNC: 145 MMOL/L (ref 136–145)
WBC # BLD AUTO: 9 K/UL (ref 3.9–12.7)

## 2019-08-16 PROCEDURE — 80053 COMPREHEN METABOLIC PANEL: CPT

## 2019-08-16 PROCEDURE — 1101F PR PT FALLS ASSESS DOC 0-1 FALLS W/OUT INJ PAST YR: ICD-10-PCS | Mod: CPTII,S$GLB,, | Performed by: INTERNAL MEDICINE

## 2019-08-16 PROCEDURE — 1101F PT FALLS ASSESS-DOCD LE1/YR: CPT | Mod: CPTII,S$GLB,, | Performed by: INTERNAL MEDICINE

## 2019-08-16 PROCEDURE — 99999 PR PBB SHADOW E&M-EST. PATIENT-LVL III: CPT | Mod: PBBFAC,,, | Performed by: INTERNAL MEDICINE

## 2019-08-16 PROCEDURE — 99999 PR PBB SHADOW E&M-EST. PATIENT-LVL III: ICD-10-PCS | Mod: PBBFAC,,, | Performed by: INTERNAL MEDICINE

## 2019-08-16 PROCEDURE — 85027 COMPLETE CBC AUTOMATED: CPT

## 2019-08-16 PROCEDURE — 99214 OFFICE O/P EST MOD 30 MIN: CPT | Mod: S$GLB,,, | Performed by: INTERNAL MEDICINE

## 2019-08-16 PROCEDURE — 3074F SYST BP LT 130 MM HG: CPT | Mod: CPTII,S$GLB,, | Performed by: INTERNAL MEDICINE

## 2019-08-16 PROCEDURE — 3078F DIAST BP <80 MM HG: CPT | Mod: CPTII,S$GLB,, | Performed by: INTERNAL MEDICINE

## 2019-08-16 PROCEDURE — 3078F PR MOST RECENT DIASTOLIC BLOOD PRESSURE < 80 MM HG: ICD-10-PCS | Mod: CPTII,S$GLB,, | Performed by: INTERNAL MEDICINE

## 2019-08-16 PROCEDURE — 36415 COLL VENOUS BLD VENIPUNCTURE: CPT

## 2019-08-16 PROCEDURE — 3074F PR MOST RECENT SYSTOLIC BLOOD PRESSURE < 130 MM HG: ICD-10-PCS | Mod: CPTII,S$GLB,, | Performed by: INTERNAL MEDICINE

## 2019-08-16 PROCEDURE — 99214 PR OFFICE/OUTPT VISIT, EST, LEVL IV, 30-39 MIN: ICD-10-PCS | Mod: S$GLB,,, | Performed by: INTERNAL MEDICINE

## 2019-09-08 DIAGNOSIS — Z85.51 HISTORY OF BLADDER CANCER: ICD-10-CM

## 2019-09-09 RX ORDER — TRAZODONE HYDROCHLORIDE 100 MG/1
100 TABLET ORAL NIGHTLY PRN
Qty: 30 TABLET | Refills: 1 | Status: SHIPPED | OUTPATIENT
Start: 2019-09-09 | End: 2020-04-28

## 2019-10-09 DIAGNOSIS — Z85.51 HISTORY OF BLADDER CANCER: ICD-10-CM

## 2019-10-09 RX ORDER — TRAZODONE HYDROCHLORIDE 100 MG/1
100 TABLET ORAL NIGHTLY PRN
Qty: 30 TABLET | Refills: 1 | Status: SHIPPED | OUTPATIENT
Start: 2019-10-09 | End: 2019-11-03 | Stop reason: SDUPTHER

## 2019-11-02 DIAGNOSIS — Z85.51 HISTORY OF BLADDER CANCER: ICD-10-CM

## 2019-11-03 RX ORDER — TRAZODONE HYDROCHLORIDE 100 MG/1
100 TABLET ORAL NIGHTLY PRN
Qty: 30 TABLET | Refills: 1 | Status: SHIPPED | OUTPATIENT
Start: 2019-11-03 | End: 2020-04-28

## 2019-12-08 DIAGNOSIS — Z85.51 HISTORY OF BLADDER CANCER: ICD-10-CM

## 2019-12-09 RX ORDER — TRAZODONE HYDROCHLORIDE 100 MG/1
100 TABLET ORAL NIGHTLY PRN
Qty: 30 TABLET | Refills: 1 | Status: SHIPPED | OUTPATIENT
Start: 2019-12-09 | End: 2019-12-17 | Stop reason: SDUPTHER

## 2019-12-17 DIAGNOSIS — Z85.51 HISTORY OF BLADDER CANCER: ICD-10-CM

## 2019-12-17 RX ORDER — TRAZODONE HYDROCHLORIDE 100 MG/1
100 TABLET ORAL NIGHTLY PRN
Qty: 30 TABLET | Refills: 1 | Status: SHIPPED | OUTPATIENT
Start: 2019-12-17 | End: 2020-04-28

## 2020-02-13 ENCOUNTER — HOSPITAL ENCOUNTER (OUTPATIENT)
Dept: RADIOLOGY | Facility: HOSPITAL | Age: 70
Discharge: HOME | End: 2020-02-13
Attending: INTERNAL MEDICINE
Payer: MEDICARE

## 2020-02-13 DIAGNOSIS — Z85.51 HISTORY OF BLADDER CANCER: ICD-10-CM

## 2020-02-13 DIAGNOSIS — E87.5 HYPERKALEMIA: ICD-10-CM

## 2020-02-13 DIAGNOSIS — N18.30 STAGE 3 CHRONIC KIDNEY DISEASE: ICD-10-CM

## 2020-02-13 DIAGNOSIS — Z93.6 S/P ILEAL CONDUIT: ICD-10-CM

## 2020-02-13 PROCEDURE — 71250 CT THORAX DX C-: CPT | Mod: 26,,, | Performed by: RADIOLOGY

## 2020-02-13 PROCEDURE — 71250 CT THORAX DX C-: CPT | Mod: TC

## 2020-02-13 PROCEDURE — 74176 CT ABDOMEN PELVIS WITHOUT CONTRAST: ICD-10-PCS | Mod: 26,,, | Performed by: RADIOLOGY

## 2020-02-13 PROCEDURE — 71250 CT CHEST WITHOUT CONTRAST: ICD-10-PCS | Mod: 26,,, | Performed by: RADIOLOGY

## 2020-02-13 PROCEDURE — 74176 CT ABD & PELVIS W/O CONTRAST: CPT | Mod: TC

## 2020-02-13 PROCEDURE — 74176 CT ABD & PELVIS W/O CONTRAST: CPT | Mod: 26,,, | Performed by: RADIOLOGY

## 2020-02-17 ENCOUNTER — LAB VISIT (OUTPATIENT)
Dept: LAB | Facility: HOSPITAL | Age: 70
End: 2020-02-17
Attending: INTERNAL MEDICINE
Payer: MEDICARE

## 2020-02-17 ENCOUNTER — OFFICE VISIT (OUTPATIENT)
Dept: HEMATOLOGY/ONCOLOGY | Facility: CLINIC | Age: 70
End: 2020-02-17
Payer: MEDICARE

## 2020-02-17 VITALS
BODY MASS INDEX: 19.01 KG/M2 | HEART RATE: 55 BPM | SYSTOLIC BLOOD PRESSURE: 139 MMHG | OXYGEN SATURATION: 100 % | WEIGHT: 148.13 LBS | RESPIRATION RATE: 16 BRPM | HEIGHT: 74 IN | DIASTOLIC BLOOD PRESSURE: 63 MMHG | TEMPERATURE: 97 F

## 2020-02-17 DIAGNOSIS — Z85.51 HISTORY OF BLADDER CANCER: Primary | ICD-10-CM

## 2020-02-17 DIAGNOSIS — C67.9 MALIGNANT NEOPLASM OF URINARY BLADDER, UNSPECIFIED SITE: ICD-10-CM

## 2020-02-17 DIAGNOSIS — R63.0 ANOREXIA: ICD-10-CM

## 2020-02-17 LAB
ALBUMIN SERPL BCP-MCNC: 4.2 G/DL (ref 3.5–5.2)
ALP SERPL-CCNC: 76 U/L (ref 55–135)
ALT SERPL W/O P-5'-P-CCNC: 15 U/L (ref 10–44)
ANION GAP SERPL CALC-SCNC: 5 MMOL/L (ref 8–16)
AST SERPL-CCNC: 12 U/L (ref 10–40)
BASOPHILS # BLD AUTO: 0.07 K/UL (ref 0–0.2)
BASOPHILS NFR BLD: 1.1 % (ref 0–1.9)
BILIRUB SERPL-MCNC: 0.9 MG/DL (ref 0.1–1)
BUN SERPL-MCNC: 27 MG/DL (ref 8–23)
CALCIUM SERPL-MCNC: 9.8 MG/DL (ref 8.7–10.5)
CHLORIDE SERPL-SCNC: 111 MMOL/L (ref 95–110)
CO2 SERPL-SCNC: 28 MMOL/L (ref 23–29)
CREAT SERPL-MCNC: 2 MG/DL (ref 0.5–1.4)
DIFFERENTIAL METHOD: ABNORMAL
EOSINOPHIL # BLD AUTO: 0.3 K/UL (ref 0–0.5)
EOSINOPHIL NFR BLD: 4.7 % (ref 0–8)
ERYTHROCYTE [DISTWIDTH] IN BLOOD BY AUTOMATED COUNT: 14 % (ref 11.5–14.5)
EST. GFR  (AFRICAN AMERICAN): 38 ML/MIN/1.73 M^2
EST. GFR  (NON AFRICAN AMERICAN): 32.8 ML/MIN/1.73 M^2
GLUCOSE SERPL-MCNC: 102 MG/DL (ref 70–110)
HCT VFR BLD AUTO: 37.1 % (ref 40–54)
HGB BLD-MCNC: 11.5 G/DL (ref 14–18)
IMM GRANULOCYTES # BLD AUTO: 0.02 K/UL (ref 0–0.04)
IMM GRANULOCYTES NFR BLD AUTO: 0.3 % (ref 0–0.5)
LYMPHOCYTES # BLD AUTO: 1.4 K/UL (ref 1–4.8)
LYMPHOCYTES NFR BLD: 21 % (ref 18–48)
MCH RBC QN AUTO: 29.4 PG (ref 27–31)
MCHC RBC AUTO-ENTMCNC: 31 G/DL (ref 32–36)
MCV RBC AUTO: 95 FL (ref 82–98)
MONOCYTES # BLD AUTO: 0.4 K/UL (ref 0.3–1)
MONOCYTES NFR BLD: 6.2 % (ref 4–15)
NEUTROPHILS # BLD AUTO: 4.3 K/UL (ref 1.8–7.7)
NEUTROPHILS NFR BLD: 66.7 % (ref 38–73)
NRBC BLD-RTO: 0 /100 WBC
PLATELET # BLD AUTO: 218 K/UL (ref 150–350)
PMV BLD AUTO: 9.9 FL (ref 9.2–12.9)
POTASSIUM SERPL-SCNC: 5.2 MMOL/L (ref 3.5–5.1)
PROT SERPL-MCNC: 7.3 G/DL (ref 6–8.4)
RBC # BLD AUTO: 3.91 M/UL (ref 4.6–6.2)
SODIUM SERPL-SCNC: 144 MMOL/L (ref 136–145)
WBC # BLD AUTO: 6.43 K/UL (ref 3.9–12.7)

## 2020-02-17 PROCEDURE — 99214 PR OFFICE/OUTPT VISIT, EST, LEVL IV, 30-39 MIN: ICD-10-PCS | Mod: S$GLB,,, | Performed by: INTERNAL MEDICINE

## 2020-02-17 PROCEDURE — 1126F AMNT PAIN NOTED NONE PRSNT: CPT | Mod: S$GLB,,, | Performed by: INTERNAL MEDICINE

## 2020-02-17 PROCEDURE — 3075F PR MOST RECENT SYSTOLIC BLOOD PRESS GE 130-139MM HG: ICD-10-PCS | Mod: CPTII,S$GLB,, | Performed by: INTERNAL MEDICINE

## 2020-02-17 PROCEDURE — 1126F PR PAIN SEVERITY QUANTIFIED, NO PAIN PRESENT: ICD-10-PCS | Mod: S$GLB,,, | Performed by: INTERNAL MEDICINE

## 2020-02-17 PROCEDURE — 1159F PR MEDICATION LIST DOCUMENTED IN MEDICAL RECORD: ICD-10-PCS | Mod: S$GLB,,, | Performed by: INTERNAL MEDICINE

## 2020-02-17 PROCEDURE — 85025 COMPLETE CBC W/AUTO DIFF WBC: CPT

## 2020-02-17 PROCEDURE — 1101F PR PT FALLS ASSESS DOC 0-1 FALLS W/OUT INJ PAST YR: ICD-10-PCS | Mod: CPTII,S$GLB,, | Performed by: INTERNAL MEDICINE

## 2020-02-17 PROCEDURE — 1159F MED LIST DOCD IN RCRD: CPT | Mod: S$GLB,,, | Performed by: INTERNAL MEDICINE

## 2020-02-17 PROCEDURE — 80053 COMPREHEN METABOLIC PANEL: CPT

## 2020-02-17 PROCEDURE — 3075F SYST BP GE 130 - 139MM HG: CPT | Mod: CPTII,S$GLB,, | Performed by: INTERNAL MEDICINE

## 2020-02-17 PROCEDURE — 99999 PR PBB SHADOW E&M-EST. PATIENT-LVL III: ICD-10-PCS | Mod: PBBFAC,,, | Performed by: INTERNAL MEDICINE

## 2020-02-17 PROCEDURE — 99214 OFFICE O/P EST MOD 30 MIN: CPT | Mod: S$GLB,,, | Performed by: INTERNAL MEDICINE

## 2020-02-17 PROCEDURE — 36415 COLL VENOUS BLD VENIPUNCTURE: CPT

## 2020-02-17 PROCEDURE — 3078F PR MOST RECENT DIASTOLIC BLOOD PRESSURE < 80 MM HG: ICD-10-PCS | Mod: CPTII,S$GLB,, | Performed by: INTERNAL MEDICINE

## 2020-02-17 PROCEDURE — 1101F PT FALLS ASSESS-DOCD LE1/YR: CPT | Mod: CPTII,S$GLB,, | Performed by: INTERNAL MEDICINE

## 2020-02-17 PROCEDURE — 99999 PR PBB SHADOW E&M-EST. PATIENT-LVL III: CPT | Mod: PBBFAC,,, | Performed by: INTERNAL MEDICINE

## 2020-02-17 PROCEDURE — 3078F DIAST BP <80 MM HG: CPT | Mod: CPTII,S$GLB,, | Performed by: INTERNAL MEDICINE

## 2020-02-17 NOTE — PROGRESS NOTES
"Subjective:       Patient ID: Juan Manuel Koehler is a 69 y.o. male.    Chief Complaint: Bladder Cancer    HPI     69 y.o.White male  who presents today for follow-up and to review CT scans after surgery for invasive bladder cancer performed on 6/21/17.     He denies any mouth sores, nausea, vomiting, diarrhea, constipation, chest pain, shortness of breath, leg swelling, fatigue, headache, dizziness, or mood changes.    Notes some stomach discomfort and bleaching at night.     His ECOG PS is 1 and he is accompanied by his wife to clinic.    Oncologic History:  Patient has previously been seen by Dr. 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 He presented to Dr. Boucher with pain and hematuria and was found to have a large bladder tumor on his trigone. The tumor was also causing bilateral ureteral obstruction with resulting ELDER.The patient underwent tumor resection and attempted JJ stent placement on 4/5/17 which demonstrated cT2 urothelial carcinoma (nested variant). Bilateral nephrostomy tubes were placed with improvement in the patient's renal function. Patient discharged today from [facility] for ELDER from 5/5-5/9. During hospitalization, right nephrostomy tube was placed and the left was exchanged.      5/5/17 CT CAP reveals " Bilateral double-J ureteral stents and left nephrostomy tube are present.  There has been interval resolution of left-sided hydronephrosis and significant improvement in right-sided hydronephrosis which now appears mild. Bladder is nondistended and not well evaluated.  There is no CT evidence of distant metastatic disease referable to provide history of bladder neoplasm."    6/21/17-Dr. Robles performed an open Radical cystoprostatectomy, Bilateral pelvic lymph node dissection, Creation of ileal conduit urinary diversion and " Bilateral ureterotomy for open ureteral J stent placement.   7/6/17- patient had bilateral nephrostomy tubes removed     Review of Systems   Constitutional: Negative for activity change, appetite change and unexpected weight change.   HENT: Negative for mouth sores, nosebleeds and trouble swallowing.    Eyes: Negative for discharge and visual disturbance.   Respiratory: Negative for shortness of breath and wheezing.    Cardiovascular: Negative for leg swelling.   Gastrointestinal: Negative for abdominal distention and blood in stool.   Genitourinary: Negative for decreased urine volume, frequency and hematuria.   Skin: Negative for color change.   Hematological: Negative for adenopathy.   Psychiatric/Behavioral: Positive for sleep disturbance. The patient is not nervous/anxious.    All other systems reviewed and are negative.      Allergies:  Review of patient's allergies indicates:  No Known Allergies    Medications:  Current Outpatient Medications   Medication Sig Dispense Refill    apremilast (OTEZLA) 30 mg Tab Take by mouth.      MELATONIN ORAL Take by mouth.      predniSONE (DELTASONE) 20 MG tablet Take 20 mg by mouth every Mon, Wed, Fri, Sun.      traMADol (ULTRAM) 50 mg tablet       traZODone (DESYREL) 100 MG tablet TAKE 1 TABLET (100 MG TOTAL) BY MOUTH NIGHTLY AS NEEDED FOR INSOMNIA. 30 tablet 1    traZODone (DESYREL) 100 MG tablet TAKE 1 TABLET (100 MG TOTAL) BY MOUTH NIGHTLY AS NEEDED FOR INSOMNIA. (Patient not taking: Reported on 2/17/2020) 30 tablet 1    traZODone (DESYREL) 100 MG tablet TAKE 1 TABLET (100 MG TOTAL) BY MOUTH NIGHTLY AS NEEDED FOR INSOMNIA. (Patient not taking: Reported on 2/17/2020) 30 tablet 1    traZODone (DESYREL) 100 MG tablet Take 1 tablet (100 mg total) by mouth nightly as needed for Insomnia. (Patient not taking: Reported on 2/17/2020) 30 tablet 1    VELTASSA 16.8 gram PwPk 1 packet once daily.       No current facility-administered medications for this visit.         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    HTN (hypertension)     Hydronephrosis     Hyperkalemia     Insomnia     Malignant neoplasm of bladder     Malnutrition     Urinary tract infection     Vitamin D deficiency        PSH:  Past Surgical History:   Procedure Laterality Date    BLADDER SURGERY      CYSTOSCOPY      HERNIA REPAIR      Ileal Conduit      pelvic lymph node resection      Radical cystoprostatectomy      THROAT SURGERY      urostomy tube placement         FamHx:  Family History   Problem Relation Age of Onset    No Known Problems Father     Kidney disease Mother     Prostate cancer Neg Hx        SocHx:  Social History     Socioeconomic History    Marital status:      Spouse name: Not on file    Number of children: Not on file    Years of education: Not on file    Highest education level: Not on file   Occupational History    Not on file   Social Needs    Financial resource strain: Not on file    Food insecurity:     Worry: Not on file     Inability: Not on file    Transportation needs:     Medical: Not on file     Non-medical: Not on file   Tobacco Use    Smoking status: Never Smoker    Smokeless tobacco: Never Used   Substance and Sexual Activity    Alcohol use: No     Alcohol/week: 0.0 standard drinks    Drug use: No    Sexual activity: Yes     Partners: Female     Birth control/protection: None     Comment:  lives with spouse   Lifestyle    Physical activity:     Days per week: Not on file     Minutes per session: Not on file    Stress:  Not on file   Relationships    Social connections:     Talks on phone: Not on file     Gets together: Not on file     Attends Cheondo[employer] service: Not on file     Active member of club or organization: Not on file     Attends meetings of clubs or organizations: Not on file     Relationship status: Not on file   Other Topics Concern    Not on file   Social History Narrative    Not on file       Objective:      Physical Exam   Constitutional: He is oriented to person, place, and time. He appears well-developed.   Appears fatigued and thin   HENT:   Head: Normocephalic and atraumatic.   Right Ear: External ear normal.   Left Ear: External ear normal.   Eyes: Pupils are equal, round, and reactive to light. Conjunctivae and EOM are normal. Right eye exhibits no discharge. Left eye exhibits no discharge. No scleral icterus.   Neck: Normal range of motion. Neck supple.   Pulmonary/Chest: Effort normal.   Musculoskeletal: Normal range of motion.   Bilateral nephrostomy tubes draining clear, yellow urine.   Neurological: He is alert and oriented to person, place, and time.   Skin: Skin is warm and dry. No erythema.   Nursing note and vitals reviewed.      LABS:  WBC   Date Value Ref Range Status   02/17/2020 6.43 3.90 - 12.70 K/uL Final     Hemoglobin   Date Value Ref Range Status   02/17/2020 11.5 (L) 14.0 - 18.0 g/dL Final     POC Hematocrit   Date Value Ref Range Status   06/21/2017 17 (LL) 36 - 54 %PCV Final     Hematocrit   Date Value Ref Range Status   02/17/2020 37.1 (L) 40.0 - 54.0 % Final     Platelets   Date Value Ref Range Status   02/17/2020 218 150 - 350 K/uL Final       Chemistry        Component Value Date/Time     02/17/2020 0900    K 5.2 (H) 02/17/2020 0900     (H) 02/17/2020 0900    CO2 28 02/17/2020 0900    BUN 27 (H) 02/17/2020 0900    CREATININE 2.0 (H) 02/17/2020 0900     02/17/2020 0900        Component Value Date/Time    CALCIUM 9.8 02/17/2020 0900    ALKPHOS 76 02/17/2020 0900     AST 12 02/17/2020 0900    ALT 15 02/17/2020 0900    BILITOT 0.9 02/17/2020 0900          Assessment:       1. History of bladder cancer          Plan:       1. Bladder Cancer:  Juan Manuel Koehler is a 69 y.o.White male, referred by Dr. Robles, who presents today for follow-up surgery for  invasive bladder cancer.     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 He presented to Dr. Boucher with pain and hematuria and was found to have a large bladder tumor on his trigone. Staging CTCAP on 5/5/17 shows no evidence of distant metastasis.    Given his continued increased creatinine, it was unclear if he would be considered a candidate for neoadjuvant chemotherapy This has improved and the patient wanted to move forward with chemo, he understood the risks, particularly to his kidneys. We split the dose of cisplatin between D1+D8 and gave IVFs.  Yet still his creatinine increased significantly.  We had a lengthy conversation about the risk of recurrence without chemo and real risk of permanent and irreversible kidney damage.   Also discussed with Dr. Robles.  At this point, we will forgo further chemotherapy in favor of moving forward with surgery.      S/p surgery with negative margins and negative lymph node involvement. Favorable pathology report reviewed with patient and his accompanying wife. Data is controversial in the benefit of adjuvant chemotherapy. Decision made by Dr. Calero and Dr. Robles to defer resuming chemotherapy at this time.  Currently RENETTA.    RTC 6 months with CT CAP, labs (CBC,CMP), and to see Dr. Calero.    2. CKD: Improved, continue to follow with nephrology.    3. H/o Hyperkalemia -  Stable, advised to con't monitoring diet for high potassium foods. Following with nephrology.    The patient agrees with the plan, and all questions have been answered to their satisfaction.      More than 25 mins were spent during this  encounter, greater than 50% was spent in direct counseling and/or coordination of care.     Mingo Calero M.D., M.S., F.A.C.P.  Hematology and Oncology Attending  [facility]

## 2020-02-17 NOTE — Clinical Note
Set up appt with ostomy nurse (Izabela only, if not available, he doesn't want appt).  RTC 6 months with CT CAP, labs (CBC,CMP), and to see Dr. Calero.

## 2020-03-03 ENCOUNTER — OFFICE VISIT (OUTPATIENT)
Dept: WOUND CARE | Facility: CLINIC | Age: 70
End: 2020-03-03
Payer: MEDICARE

## 2020-03-03 DIAGNOSIS — Z43.6 ATTENTION TO UROSTOMY: Primary | ICD-10-CM

## 2020-03-03 DIAGNOSIS — L85.9 EPITHELIAL HYPERPLASIA OF SKIN: ICD-10-CM

## 2020-03-03 PROCEDURE — 1159F PR MEDICATION LIST DOCUMENTED IN MEDICAL RECORD: ICD-10-PCS | Mod: S$GLB,,, | Performed by: CLINICAL NURSE SPECIALIST

## 2020-03-03 PROCEDURE — 99214 PR OFFICE/OUTPT VISIT, EST, LEVL IV, 30-39 MIN: ICD-10-PCS | Mod: S$GLB,,, | Performed by: CLINICAL NURSE SPECIALIST

## 2020-03-03 PROCEDURE — 1101F PR PT FALLS ASSESS DOC 0-1 FALLS W/OUT INJ PAST YR: ICD-10-PCS | Mod: CPTII,S$GLB,, | Performed by: CLINICAL NURSE SPECIALIST

## 2020-03-03 PROCEDURE — 1101F PT FALLS ASSESS-DOCD LE1/YR: CPT | Mod: CPTII,S$GLB,, | Performed by: CLINICAL NURSE SPECIALIST

## 2020-03-03 PROCEDURE — 99214 OFFICE O/P EST MOD 30 MIN: CPT | Mod: S$GLB,,, | Performed by: CLINICAL NURSE SPECIALIST

## 2020-03-03 PROCEDURE — 99999 PR PBB SHADOW E&M-EST. PATIENT-LVL II: CPT | Mod: PBBFAC,,, | Performed by: CLINICAL NURSE SPECIALIST

## 2020-03-03 PROCEDURE — 99999 PR PBB SHADOW E&M-EST. PATIENT-LVL II: ICD-10-PCS | Mod: PBBFAC,,, | Performed by: CLINICAL NURSE SPECIALIST

## 2020-03-03 PROCEDURE — 1159F MED LIST DOCD IN RCRD: CPT | Mod: S$GLB,,, | Performed by: CLINICAL NURSE SPECIALIST

## 2020-03-03 NOTE — PROGRESS NOTES
Subjective:       Patient ID: Juan Manuel Koehler is a 69 y.o. male.    Chief Complaint: Urostomy    This is visit for new issue and urostomy eval and follow up, comes with wife today, he has a new concern with his skin and continued smell of urine, he does not yet do his own care, wife still puts pouch on and I have encouraged him to start practicing himself AGAIN      Review of Systems   Constitutional: Negative for activity change and appetite change.   HENT: Negative.    Respiratory: Negative.    Cardiovascular: Negative for chest pain and leg swelling.   Gastrointestinal: Negative for abdominal pain.        Reports nervous stomach    Genitourinary: Negative for hematuria.        Urostomy , clear urine slight mucous   Skin: Negative for rash.   Neurological: Negative for weakness.       Objective:      Physical Exam   Constitutional: He appears well-developed.   Pulmonary/Chest: Effort normal.   Abdominal: Soft. Bowel sounds are normal.   Skin: Skin is warm.       First visit before cautery 2017    6/18/18     5/13/19     March 2020    3/3/20 can see more epithelialization of stoma mucosa.  No issues with os or stenosis, he is here about the bump at 5 oclock which may be bigger and is sensitive along with other skin along edge.  Discussed options and for now will try a more rigid convex to help flatten skin better.   Stoma is 30 mm   Switch from soft convex to coloplast assura convex or graham light but today gave 4 assura to try first,    [facility]    Urostomy is budded stoma that is 31 mm , has developed hyperplasia around stoma , wife cuts pouch to 35 mm and this could be contributing, also has concern over strong urine odor.   Advised to cut pch smaller and retry XPRO version instead of GRAHAM light. To help reduce the thickened,skin, he does wear belt, I made it a bit snugger too.   reviewed ways to clean his bedside bag and discussed he CAN take a shower and get all wet,   Encouraged him  to do this for himself and not depend on his wife    Assessment:       1. Attention to urostomy    2. Epithelial hyperplasia of skin        Plan:       reeval stoma fit and equipment   New DME   Will have samples of precut sent from [employer] to try   Call me for any problems   I have reviewed the plan of care with the patient and/ or caregiver and they express understanding. I spent over 50% of this 25 minute visit in face to face counseling.

## 2020-04-28 ENCOUNTER — TELEPHONE (OUTPATIENT)
Dept: HEMATOLOGY/ONCOLOGY | Facility: CLINIC | Age: 70
End: 2020-04-28

## 2020-04-28 DIAGNOSIS — N18.30 STAGE 3 CHRONIC KIDNEY DISEASE: Primary | ICD-10-CM

## 2020-04-28 NOTE — TELEPHONE ENCOUNTER
----- Message from Socorro Oquendo sent at 4/28/2020  9:03 AM CDT -----  Contact: Patient  Patient Advice/Staff Message     Caller name: Pt    Reason for call: Pt calling to speak with the nurse, says he has a msg that he need relayed to Dr Calero.    Do you feel you need to be seen today:: No        Communication Preference: [phone]    Additional Information:

## 2020-04-28 NOTE — TELEPHONE ENCOUNTER
Patient calling, he is taking veltassa to keep kidney function normal. He has patient assistance to help pay for medication. Nephrology originally prescribed. Would like to know if should continue to take.

## 2020-05-04 ENCOUNTER — TELEPHONE (OUTPATIENT)
Dept: NEPHROLOGY | Facility: CLINIC | Age: 70
End: 2020-05-04

## 2020-05-04 ENCOUNTER — TELEPHONE (OUTPATIENT)
Dept: HEMATOLOGY/ONCOLOGY | Facility: CLINIC | Age: 70
End: 2020-05-04

## 2020-05-04 NOTE — TELEPHONE ENCOUNTER
----- Message from Cam Valle RN sent at 5/4/2020  9:39 AM CDT -----  Contact: Patient  Patient is scheduled to see you on 5/6, he would prefer to do a virtual visit if possible.   ----- Message -----  From: Socorro Oquendo  Sent: 5/4/2020   9:00 AM CDT  To: Abdias Aguila Staff    Patient Advice/Staff Message     Caller name: Pt    Reason for call: Calling for an update on the referral to the nephrologist, and also has questions concerning medication.    Do you feel you need to be seen today:: no        Communication Preference: [phone]    Additional Information:

## 2020-05-04 NOTE — TELEPHONE ENCOUNTER
----- Message from Socorro Oquendo sent at 5/4/2020  9:00 AM CDT -----  Contact: Patient  Patient Advice/Staff Message     Caller name: Pt    Reason for call: Calling for an update on the referral to the nephrologist, and also has questions concerning medication.    Do you feel you need to be seen today:: no        Communication Preference: [phone]    Additional Information:

## 2020-05-06 ENCOUNTER — OFFICE VISIT (OUTPATIENT)
Dept: NEPHROLOGY | Facility: CLINIC | Age: 70
End: 2020-05-06
Payer: MEDICARE

## 2020-05-06 DIAGNOSIS — C67.9 UROTHELIAL CARCINOMA OF BLADDER: ICD-10-CM

## 2020-05-06 DIAGNOSIS — D63.8 ANEMIA OF CHRONIC DISEASE: ICD-10-CM

## 2020-05-06 DIAGNOSIS — E87.5 HYPERKALEMIA: ICD-10-CM

## 2020-05-06 DIAGNOSIS — N18.30 STAGE 3 CHRONIC KIDNEY DISEASE: Primary | ICD-10-CM

## 2020-05-06 PROCEDURE — 99443 PR PHYSICIAN TELEPHONE EVALUATION 21-30 MIN: CPT | Mod: 95,,, | Performed by: INTERNAL MEDICINE

## 2020-05-06 PROCEDURE — 99443 PR PHYSICIAN TELEPHONE EVALUATION 21-30 MIN: ICD-10-PCS | Mod: 95,,, | Performed by: INTERNAL MEDICINE

## 2020-05-06 PROCEDURE — 1101F PR PT FALLS ASSESS DOC 0-1 FALLS W/OUT INJ PAST YR: ICD-10-PCS | Mod: CPTII,95,, | Performed by: INTERNAL MEDICINE

## 2020-05-06 PROCEDURE — 1159F PR MEDICATION LIST DOCUMENTED IN MEDICAL RECORD: ICD-10-PCS | Mod: 95,,, | Performed by: INTERNAL MEDICINE

## 2020-05-06 PROCEDURE — 1159F MED LIST DOCD IN RCRD: CPT | Mod: 95,,, | Performed by: INTERNAL MEDICINE

## 2020-05-06 PROCEDURE — 1101F PT FALLS ASSESS-DOCD LE1/YR: CPT | Mod: CPTII,95,, | Performed by: INTERNAL MEDICINE

## 2020-05-07 ENCOUNTER — TELEPHONE (OUTPATIENT)
Dept: PHARMACY | Facility: CLINIC | Age: 70
End: 2020-05-07

## 2020-05-07 PROBLEM — E87.5 HYPERKALEMIA: Status: ACTIVE | Noted: 2020-05-07

## 2020-05-07 RX ORDER — PATIROMER 16.8 G/1
16.8 POWDER, FOR SUSPENSION ORAL DAILY
Qty: 30 PACKET | Refills: 6 | Status: SHIPPED | OUTPATIENT
Start: 2020-05-07 | End: 2021-05-12 | Stop reason: SDUPTHER

## 2020-05-07 NOTE — TELEPHONE ENCOUNTER
LVM for callback to inform patient that [facility] received prescription for Veltassa and benefits investigation is required.  [facility] will be back in touch once insurance determination is received.

## 2020-05-07 NOTE — TELEPHONE ENCOUNTER
Called patient regarding financial assistance for Veltassa due to high copay. I explained to the patient that all of the grants are currently closed but their is  assistance. I explained to the patient the $600 OOP requirement and the patient stated he is NO WHERE NEAR meeting that requirement,. I explained that if the medication be deemed too expensive we can reach to his provider about possibly changing his therapy at least until a liborio opens and the patient agreed. I obtained HH# 2 (pt and wife) and yearly gross income of $36,000 that way if a liborio were to open we can apply the patient for it immediately. Opening an intervention for possible alternative therapy.. @3:13PM -JUANA

## 2020-05-07 NOTE — PROGRESS NOTES
Subjective:   Patient ID: Juan Manuel Koehler is a 70 y.o. White male who presents for new evaluation of Chronic Kidney Disease  and hyperkalemia.    HPI   was evaluated for CKD and hyperkalemia. He was referred by his oncologist, Dr. Calero. Since this was his first visit with me I reviewed prior pertinent chart in detail.    He has a longstanding h/o bladder cancer for which he did not follow up for a number of years after initial treatment in 1990's. He later developed gross hematuria and was found to have a large bladder tumor causing bilateral ureteral obstruction causing ELDER. He underwent tumor resection, attempted JJ stent in April 2017, eventually had bilateral nephrostomy tubes placed which helped improve renal function. His kidney function however did not improve adequately so he could not have certain chemo regimens which were cisplatin based.he was diagnosed to have urothelial carcinoma.     In august 2017 he developed CVA. He developed bleeding complication from stoma in 9/2017. He required multiple PRBC, eventually underwent embolization of a arterial vessel to the stoma.    He denies any NSAID use. He does not have a recent exposure to IV contrast. He does not check BP at home routinely.    He has an ostomy. He denies decreased urine output. He denies recent nausea/ vomiting/ poor intake orally/ cloudy urine/ lethargy/ muscle weakness.    He has had previous several episodes of severe to profound hyperkalemia. He has been chronically taking Patiromer. pt reported he was getting this medicine per financial funding provided through [employer]. But apparently these funds are expiring and he is worried that after a few days to weeks he will no longer have liborio for this medicine. Now he is requesting Rx from [facility] and financial assistance if possible. He stated he has been following low K diet as best as he can.       Has not followed with urologist in 2 years. He has an ileal conduit. He  does not have a recent vit D/PTH levels. He has been on sleeping pill either trazodone 100 or temazepam 15     Renal Function:  Lab Results   Component Value Date     02/17/2020     08/16/2019     02/17/2020     08/16/2019    K 5.2 (H) 02/17/2020    K 4.9 08/16/2019     (H) 02/17/2020     (H) 08/16/2019    CO2 28 02/17/2020    CO2 27 08/16/2019    BUN 27 (H) 02/17/2020    BUN 32 (H) 08/16/2019    CALCIUM 9.8 02/17/2020    CALCIUM 10.1 08/16/2019    CREATININE 2.0 (H) 02/17/2020    CREATININE 2.4 (H) 08/16/2019    ALBUMIN 4.2 02/17/2020    ALBUMIN 4.1 08/16/2019    PHOS 3.8 12/15/2017    PHOS 3.7 11/03/2017    ESTGFRAFRICA 38.0 (A) 02/17/2020    ESTGFRAFRICA 30.5 (A) 08/16/2019    EGFRNONAA 32.8 (A) 02/17/2020    EGFRNONAA 26.3 (A) 08/16/2019       Urinalysis:  Lab Results   Component Value Date    APPEARANCEUA Cloudy (A) 08/31/2018    PHUR >8.0 (A) 08/31/2018    SPECGRAV 1.015 08/31/2018    PROTEINUA 2+ (A) 08/31/2018    GLUCUA Negative 08/31/2018    OCCULTUA Negative 08/31/2018    NITRITE Positive (A) 08/31/2018    LEUKOCYTESUR 3+ (A) 08/31/2018       Protein/Creatinine Ratio:  Lab Results   Component Value Date    PROTEINURINE 49 (H) 12/15/2017    CREATRANDUR 57.2 12/15/2017    UTPCR 0.86 (H) 12/15/2017       CBC:  Lab Results   Component Value Date    WBC 6.43 02/17/2020    HGB 11.5 (L) 02/17/2020    HCT 37.1 (L) 02/17/2020       PTH:  Lab Results   Component Value Date    PTH 63.3 12/15/2017       Review of Systems   Constitutional: Positive for fatigue. Negative for appetite change, chills and fever.   HENT: Negative for facial swelling and sore throat.    Eyes: Negative for pain and discharge.   Respiratory: Negative for cough, shortness of breath and wheezing.    Cardiovascular: Negative for chest pain and leg swelling.   Gastrointestinal: Negative for abdominal pain, diarrhea, nausea and vomiting.   Endocrine: Negative for polydipsia and polyuria.   Genitourinary:  Negative for decreased urine volume, flank pain and hematuria.   Musculoskeletal: Negative for back pain and myalgias.   Skin: Negative for pallor and rash.   Allergic/Immunologic: Negative for environmental allergies.   Neurological: Negative for dizziness, light-headedness and headaches.   Psychiatric/Behavioral: The patient is nervous/anxious.        Objective:   Physical Exam    Telephone visit  Pt did not sound like in any distress or discomfort    Assessment:     1. Stage 3 chronic kidney disease    2. Anemia of chronic disease    3. Urothelial carcinoma of bladder    4. Hyperkalemia        Plan:       Problem List Items Addressed This Visit        Renal/    Stage 3 chronic kidney disease - Primary    Relevant Orders    PTH, intact    Renal function panel    Vitamin D    Uric acid    Urinalysis    Protein / creatinine ratio, urine    Hyperkalemia    Relevant Orders    PTH, intact    Renal function panel    Vitamin D    Uric acid    Urinalysis    Protein / creatinine ratio, urine       Oncology    Anemia of chronic disease    Relevant Orders    PTH, intact    Renal function panel    Vitamin D    Uric acid    Urinalysis    Protein / creatinine ratio, urine    Urothelial carcinoma of bladder    Relevant Orders    PTH, intact    Renal function panel    Vitamin D    Uric acid    Urinalysis    Protein / creatinine ratio, urine        Mr. 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 Prior episodes of ELDER, severe hyperkalemia, ELDER was suspected from prolonged volume depletion causing ATN/ possible contrast induced ELDER.     I had a detailed discussion with patient about his CKD diagnosis, CKD staging, interpretation of serial labs pertaining to his kidneys, potential risk of progression of CKD. Possible etiology of his CKD, need for improved BP control, strict low salt diet, avoiding any NSAID, having periodic monitoring of renal labs to assess and treat any  electrolyte disturbance, acid base disorder, to follow progress of CKD with creatinine and eGFR. I stressed to have BP monitoring at home and to bring readings for review with his future visits with MD. Also d/w him need for changed to his medications as CKD progresses and as detailed below.     - periodically monitor renal panel for electrolytes, acid base status, eGFR  - CKD staging, diagnosis, onset of CKD, potential risk of CKD progression, potential risk of ELDER due to volume depletion/ CAROLA/ ATN due to hemodynamic changes d/w patient  - stress to follow low salt diet, keep well hydrated, follow low K diet in case of any dyskalemia, nutritional changes d/w patient   - trend PTH levels, vit D, phos, corrected Ca and treat as necessary   - trend urine studies for proteinuria  - avoid NSAID/ bactrim/ IV contrast/ gadolinium/ aminoglycoside/ fleet enema/ PPI where possible  - avoid ACE-I/ ARB/ K sparing diuretic use given his persistent and severe hyperkalemia  - pt advised to keep a record of BP at home and bring it for review with next MD visit with PCP  - management of chronic and multifactorial anemia to oncologist  - pt advised to reestablish care with his urologist     Plan, labs, recommendations were discussed with patient, his questions were answered to his satisfaction.     RTC 2 months    Addendum    Case was referred to Clinical pharmacy for assistance with patiromer.   Case discussed multiple times with clinical pharmacist to see if patient can be eligible for any grants/ financial assistance for his much needed medication, patiromer. I was informed that currently it was not available. However upon referring this his oncology department I was informed that patient will receive financial assistance through [facility]. Pt was informed of this update.     Established Patient - Audio Only Telehealth Visit     The patient location is: home   The chief complaint leading to consultation is: CKD,  hyperkalemia   Visit type: Virtual visit with audio only (telephone)  Total time spent with patient: 30 minutes        The reason for the audio only service rather than synchronous audio and video virtual visit was related to technical difficulties or patient preference/necessity.     Each patient to whom I provide medical services by telemedicine is:  (1) informed of the relationship between the physician and patient and the respective role of any other health care provider with respect to management of the patient; and (2) notified that they may decline to receive medical services by telemedicine and may withdraw from such care at any time. Patient verbally consented to receive this service via voice-only telephone call.           This service was not originating from a related E/M service provided within the previous 7 days nor will  to an E/M service or procedure within the next 24 hours or my soonest available appointment.  Prevailing standard of care was able to be met in this audio-only visit.

## 2020-05-08 ENCOUNTER — DOCUMENTATION ONLY (OUTPATIENT)
Dept: HEMATOLOGY/ONCOLOGY | Facility: CLINIC | Age: 70
End: 2020-05-08

## 2020-05-08 NOTE — PROGRESS NOTES
Received request from the speciality pharmacy to assist the patient with a copay for his medication. The copay is $264.11 which he stated he could not pay. Sent cost transfer slip to the pharmacy to assist him with this medication.

## 2020-05-11 ENCOUNTER — TELEPHONE (OUTPATIENT)
Dept: PHARMACY | Facility: CLINIC | Age: 70
End: 2020-05-11

## 2020-05-11 NOTE — TELEPHONE ENCOUNTER
Veltassa refill confirmed and reassessment complete (1st fill at [facility]). We will ship Veltassa refill on  via fedex to arrive on 5/15. $264.12 copay- 004 ([employer] approved AR). Confirmed 2 patient identifiers - name and . Therapy appropriate.     Patient has 10 doses of Veltassa remaining and takes it around lunchtime daily. Mr. Koehler declined consultation due to being on Veltassa for an extended period through outside pharmacy with [facility], which is currently closed. [facility] obtained funding for Veltassa through [facility] and will f/u monthly. Pt reports they are not having any side effects so far. No missed doses, no new medications, no new allergies or health conditions reported at this time. Allergies reviewed and medication reconciliation complete (reviewed and documented in [facility]).  Disease education reviewed. Patient counseled on importance of maintaining adherence and keeping lab appointments which were scheduled. All questions answered and addressed to patients satisfaction. Advised to call [facility] and provider if any issues arise.  Aware that [facility] called from medication refills. Pt verbalized understanding.

## 2020-06-08 ENCOUNTER — TELEPHONE (OUTPATIENT)
Dept: PHARMACY | Facility: CLINIC | Age: 70
End: 2020-06-08

## 2020-06-15 ENCOUNTER — TELEPHONE (OUTPATIENT)
Dept: PHARMACY | Facility: CLINIC | Age: 70
End: 2020-06-15

## 2020-06-19 NOTE — TELEPHONE ENCOUNTER
Refill call regarding veltassa at [facility]. Copay $235.92 [employer] [place] pending (email sent). Patient is in need of a refill, will ship  to arrive  with patient consent. Patient has not started any new medications including OTC drugs. Patient has not had any medication/ dose or instruction changes. No new allergies or side effects reported with this shipment. Medication is being taken as prescribed by physician and properly stored. Two patient identifiers:  and Address verified. Patient has no questions or concerns for [facility]. Patient has ~7 days left on hand.

## 2020-06-22 ENCOUNTER — DOCUMENTATION ONLY (OUTPATIENT)
Dept: HEMATOLOGY/ONCOLOGY | Facility: CLINIC | Age: 70
End: 2020-06-22

## 2020-06-22 ENCOUNTER — TELEPHONE (OUTPATIENT)
Dept: PHARMACY | Facility: CLINIC | Age: 70
End: 2020-06-22

## 2020-06-22 NOTE — PROGRESS NOTES
Received request from Le in [facility] for assistance for the $235.92 co-pay for patient's Veltassa prescription as he cannot afford it. She will be submitting a pharmaceutical  program application for him. Completed a cost transfer form and scanned and emailed it to Le. Including this amount patient has used $500.03 from his $1000 max amount through the [employer] Patient Assistance Fund. Called patient at his cell phone number [phone] and discussed with him. He had questions about whether DAMON had any more funds available and the timeframe that he will know about the approval from the  program. Explained to him that Le is helping with these and that I will let her know and ask her to call him. Patient expressed understanding and agreement. Sent message to Le via email and she replied back that she will be contacting patient again later today. No other needs indicated at this time.

## 2020-07-02 NOTE — TELEPHONE ENCOUNTER
Called patient for a status check on application that was sent him via mail. Patient reached and stated he has received the application but is working on getting his tax papers and papers from the pharmacy. Patient stated it would take 1 week to 1.5 weeks to get all paperwork together and would then send to us. Moving out accordingly. @3:36PM -STANISLAVCATHERINE

## 2020-07-14 ENCOUNTER — TELEPHONE (OUTPATIENT)
Dept: NEPHROLOGY | Facility: CLINIC | Age: 70
End: 2020-07-14

## 2020-07-14 NOTE — TELEPHONE ENCOUNTER
----- Message from James Farr sent at 7/14/2020 10:33 AM CDT -----  Contact: pt  Calling to schedule appt    Call back: [phone]

## 2020-07-21 ENCOUNTER — TELEPHONE (OUTPATIENT)
Dept: PHARMACY | Facility: CLINIC | Age: 70
End: 2020-07-21

## 2020-07-21 NOTE — TELEPHONE ENCOUNTER
We will be assisting the patient in applying to the  patient assistance program for Veltassa. Sending a staff message to Dr. Blake regarding assistance application and faxing application prescription for her review and signature.

## 2020-08-03 DIAGNOSIS — C67.9 UROTHELIAL CARCINOMA OF BLADDER: Primary | ICD-10-CM

## 2020-08-04 ENCOUNTER — TELEPHONE (OUTPATIENT)
Dept: HEMATOLOGY/ONCOLOGY | Facility: CLINIC | Age: 70
End: 2020-08-04

## 2020-08-04 ENCOUNTER — TELEPHONE (OUTPATIENT)
Dept: PHARMACY | Facility: CLINIC | Age: 70
End: 2020-08-04

## 2020-08-04 ENCOUNTER — DOCUMENTATION ONLY (OUTPATIENT)
Dept: HEMATOLOGY/ONCOLOGY | Facility: CLINIC | Age: 70
End: 2020-08-04

## 2020-08-04 NOTE — PROGRESS NOTES
Received request from Le in [facility] for assistance for the $235.92 co-pay for patient's Veltassa prescription. She informed that after this fill he will meet the manufacturers prescription expenditure requirement and will be approved for the free drug plan. Completed a cost transfer form and scanned and emailed it to Le. Including this amount patient has used $735.95 from his $1000 max amount through the [email or web address] Patient Assistance Fund. Called patient at his cell phone number [phone] and discussed with him, and answered his questions. Patient also asked to speak with Le re: the shipment of his medication and other questions; sent email message to Le. No other needs indicated at this time.

## 2020-08-04 NOTE — TELEPHONE ENCOUNTER
----- Message from Socorro Oquendo sent at 8/4/2020  9:18 AM CDT -----  Contact: Patient  Returning a call     Caller name:: Pt    Who Left Message for Patient:: Jessa Penny    Nature of call: --    Communication preference:: [phone] (H)    Additional info::

## 2020-08-04 NOTE — TELEPHONE ENCOUNTER
Spoke to patient, moved appointments to earlier in the day. New times confirmed. Will mail appointment slip.

## 2020-08-14 ENCOUNTER — DOCUMENTATION ONLY (OUTPATIENT)
Dept: HEMATOLOGY/ONCOLOGY | Facility: CLINIC | Age: 70
End: 2020-08-14

## 2020-08-14 NOTE — TELEPHONE ENCOUNTER
FOR DOCUMENTATION ONLY:  Financial Assistance for Veltassa is approved from 8- to 12-  Source Blaine Portillo  [phone]

## 2020-08-14 NOTE — PROGRESS NOTES
Received emails from Le Lee (Patricia) and Elizabeth Russell with [facility] indicating that they will not need to utilize the cost transfer form ($235.92 from the [facility] Patient Assistance Fund for Veltassa co-[facility]), as he was approved for the  program assistance.

## 2020-08-17 ENCOUNTER — TELEPHONE (OUTPATIENT)
Dept: NEPHROLOGY | Facility: CLINIC | Age: 70
End: 2020-08-17

## 2020-08-17 ENCOUNTER — HOSPITAL ENCOUNTER (OUTPATIENT)
Dept: RADIOLOGY | Facility: HOSPITAL | Age: 70
Discharge: HOME | End: 2020-08-17
Attending: INTERNAL MEDICINE
Payer: MEDICARE

## 2020-08-17 DIAGNOSIS — C67.9 UROTHELIAL CARCINOMA OF BLADDER: ICD-10-CM

## 2020-08-17 PROCEDURE — 74176 CT CHEST ABDOMEN PELVIS WITHOUT CONTRAST(XPD): ICD-10-PCS | Mod: 26,,, | Performed by: RADIOLOGY

## 2020-08-17 PROCEDURE — 71250 CT CHEST ABDOMEN PELVIS WITHOUT CONTRAST(XPD): ICD-10-PCS | Mod: 26,,, | Performed by: RADIOLOGY

## 2020-08-17 PROCEDURE — 74176 CT ABD & PELVIS W/O CONTRAST: CPT | Mod: TC

## 2020-08-17 PROCEDURE — 71250 CT THORAX DX C-: CPT | Mod: 26,,, | Performed by: RADIOLOGY

## 2020-08-17 PROCEDURE — 74176 CT ABD & PELVIS W/O CONTRAST: CPT | Mod: 26,,, | Performed by: RADIOLOGY

## 2020-08-17 PROCEDURE — 71250 CT THORAX DX C-: CPT | Mod: TC

## 2020-08-17 PROCEDURE — A9698 NON-RAD CONTRAST MATERIALNOC: HCPCS | Performed by: INTERNAL MEDICINE

## 2020-08-17 PROCEDURE — 25500020 PHARM REV CODE 255: Performed by: INTERNAL MEDICINE

## 2020-08-17 RX ADMIN — IOHEXOL 1000 ML: 9 SOLUTION ORAL at 08:08

## 2020-08-17 NOTE — TELEPHONE ENCOUNTER
PER DR CODY                Kidney function is relatively at baseline. Potassium level at upper level of normal. Continue current medicines and low potassium diet.     Urine test shows bacteria. If he has any symptoms like cloudy urine/ flank pain/ burning while urinating/ fever/ nausea then please send urine culture.                         ----- Message from Darlene Padilla sent at 8/17/2020  3:22 PM CDT -----  Regarding: advice  Type:  Needs Medical Advice    Who Called: Patient  Reason for call: has questions for the doctor and is requesting a call before upcoming appointment on 8/25  Would the patient rather a call back or a response via [facility]? callback  Best Call Back Number: [phone]  Additional Information:

## 2020-08-18 DIAGNOSIS — N39.0 UTI (URINARY TRACT INFECTION), UNCOMPLICATED: Primary | ICD-10-CM

## 2020-08-20 NOTE — PROGRESS NOTES
"Subjective:       Patient ID: Juan Manuel Koehler is a 70 y.o. male.    Chief Complaint: Bladder Cancer    HPI     70 y.o.White male  who presents today for follow-up and to review CT scans after surgery for invasive bladder cancer performed on 6/21/17.     He denies any mouth sores, nausea, vomiting, diarrhea, constipation, chest pain, shortness of breath, leg swelling, fatigue, headache, dizziness, or mood changes.    His ECOG PS is 1 and he is accompanied by his wife to clinic.    Oncologic History:  Patient has previously been seen by Dr. 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 He presented to Dr. Boucher with pain and hematuria and was found to have a large bladder tumor on his trigone. The tumor was also causing bilateral ureteral obstruction with resulting ELDER.The patient underwent tumor resection and attempted JJ stent placement on 4/5/17 which demonstrated cT2 urothelial carcinoma (nested variant). Bilateral nephrostomy tubes were placed with improvement in the patient's renal function. Patient discharged today from [facility] for ELDER from 5/5-5/9. During hospitalization, right nephrostomy tube was placed and the left was exchanged.      5/5/17 CT CAP reveals " Bilateral double-J ureteral stents and left nephrostomy tube are present.  There has been interval resolution of left-sided hydronephrosis and significant improvement in right-sided hydronephrosis which now appears mild. Bladder is nondistended and not well evaluated.  There is no CT evidence of distant metastatic disease referable to provide history of bladder neoplasm."    6/21/17-Dr. Robles performed an open Radical cystoprostatectomy, Bilateral pelvic lymph node dissection, Creation of ileal conduit urinary diversion and Bilateral ureterotomy for open ureteral J stent placement. "   7/6/17- patient had bilateral nephrostomy tubes removed     Review of Systems   Constitutional: Negative for activity change, appetite change and unexpected weight change.   HENT: Negative for mouth sores, nosebleeds and trouble swallowing.    Eyes: Negative for discharge and visual disturbance.   Respiratory: Negative for shortness of breath and wheezing.    Cardiovascular: Negative for leg swelling.   Gastrointestinal: Negative for abdominal distention and blood in stool.   Genitourinary: Negative for decreased urine volume, frequency and hematuria.   Skin: Negative for color change.   Hematological: Negative for adenopathy.   Psychiatric/Behavioral: Positive for sleep disturbance. The patient is not nervous/anxious.    All other systems reviewed and are negative.      Allergies:  Review of patient's allergies indicates:  No Known Allergies    Medications:  Current Outpatient Medications   Medication Sig Dispense Refill    apremilast (OTEZLA) 30 mg Tab Take by mouth.      MELATONIN ORAL Take by mouth.      predniSONE (DELTASONE) 20 MG tablet Take 20 mg by mouth every Mon, Wed, Fri, Sun.      traMADol (ULTRAM) 50 mg tablet       traZODone (DESYREL) 100 MG tablet TAKE 1 TABLET (100 MG TOTAL) BY MOUTH NIGHTLY AS NEEDED FOR INSOMNIA. 30 tablet 1    VELTASSA 16.8 gram PwPk Take 1 packet (16.8 g total) by mouth once daily. 30 packet 6     No current facility-administered medications for this visit.        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    HTN (hypertension)     Hydronephrosis      Hyperkalemia     Insomnia     Malignant neoplasm of bladder     Malnutrition     Urinary tract infection     Vitamin D deficiency        PSH:  Past Surgical History:   Procedure Laterality Date    BLADDER SURGERY      CYSTOSCOPY      HERNIA REPAIR      Ileal Conduit      pelvic lymph node resection      Radical cystoprostatectomy      THROAT SURGERY      urostomy tube placement         FamHx:  Family History   Problem Relation Age of Onset    No Known Problems Father     Kidney disease Mother     Prostate cancer Neg Hx        SocHx:  Social History     Socioeconomic History    Marital status:      Spouse name: Not on file    Number of children: Not on file    Years of education: Not on file    Highest education level: Not on file   Occupational History    Not on file   Social Needs    Financial resource strain: Not on file    Food insecurity     Worry: Not on file     Inability: Not on file    Transportation needs     Medical: Not on file     Non-medical: Not on file   Tobacco Use    Smoking status: Never Smoker    Smokeless tobacco: Never Used   Substance and Sexual Activity    Alcohol use: No     Alcohol/week: 0.0 standard drinks    Drug use: No    Sexual activity: Yes     Partners: Female     Birth control/protection: None     Comment:  lives with spouse   Lifestyle    Physical activity     Days per week: Not on file     Minutes per session: Not on file    Stress: Not on file   Relationships    Social connections     Talks on phone: Not on file     Gets together: Not on file     Attends Latter-day service: Not on file     Active member of club or organization: Not on file     Attends meetings of clubs or organizations: Not on file     Relationship status: Not on file   Other Topics Concern    Not on file   Social History Narrative    Not on file       Objective:      Physical Exam  Vitals signs and nursing note reviewed.   Constitutional:       Appearance: He is  well-developed.      Comments: Appears fatigued and thin   HENT:      Head: Normocephalic and atraumatic.      Right Ear: External ear normal.      Left Ear: External ear normal.   Eyes:      General: No scleral icterus.        Right eye: No discharge.         Left eye: No discharge.      Conjunctiva/sclera: Conjunctivae normal.      Pupils: Pupils are equal, round, and reactive to light.   Neck:      Musculoskeletal: Normal range of motion and neck supple.   Pulmonary:      Effort: Pulmonary effort is normal.   Musculoskeletal: Normal range of motion.      Comments: Bilateral nephrostomy tubes draining clear, yellow urine.   Skin:     General: Skin is warm and dry.      Findings: No erythema.   Neurological:      Mental Status: He is alert and oriented to person, place, and time.         LABS:  WBC   Date Value Ref Range Status   08/17/2020 7.41 3.90 - 12.70 K/uL Final     Hemoglobin   Date Value Ref Range Status   08/17/2020 11.6 (L) 14.0 - 18.0 g/dL Final     POC Hematocrit   Date Value Ref Range Status   06/21/2017 17 (LL) 36 - 54 %PCV Final     Hematocrit   Date Value Ref Range Status   08/17/2020 35.8 (L) 40.0 - 54.0 % Final     Platelets   Date Value Ref Range Status   08/17/2020 231 150 - 350 K/uL Final       Chemistry        Component Value Date/Time     08/17/2020 1033     08/17/2020 1033    K 5.1 08/17/2020 1033    K 5.1 08/17/2020 1033     08/17/2020 1033     08/17/2020 1033    CO2 24 08/17/2020 1033    CO2 24 08/17/2020 1033    BUN 28 (H) 08/17/2020 1033    BUN 28 (H) 08/17/2020 1033    CREATININE 1.9 (H) 08/17/2020 1033    CREATININE 1.9 (H) 08/17/2020 1033     08/17/2020 1033     08/17/2020 1033        Component Value Date/Time    CALCIUM 9.6 08/17/2020 1033    CALCIUM 9.6 08/17/2020 1033    ALKPHOS 83 08/17/2020 1033    AST 14 08/17/2020 1033    ALT 15 08/17/2020 1033    BILITOT 1.0 08/17/2020 1033          Assessment:       1. History of bladder cancer    2. S/P  ileal conduit    3. Stage 3 chronic kidney disease          Plan:       1,2. Bladder Cancer:  Juan Manuel Koehler is a 70 y.o.White male, referred by Dr. Robles, who presents today for follow-up surgery for  invasive bladder cancer.     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 He presented to Dr. Boucher with pain and hematuria and was found to have a large bladder tumor on his trigone. Staging CTCAP on 5/5/17 shows no evidence of distant metastasis.    Given his continued increased creatinine, it was unclear if he would be considered a candidate for neoadjuvant chemotherapy This has improved and the patient wanted to move forward with chemo, he understood the risks, particularly to his kidneys. We split the dose of cisplatin between D1+D8 and gave IVFs.  Yet still his creatinine increased significantly.  We had a lengthy conversation about the risk of recurrence without chemo and real risk of permanent and irreversible kidney damage.   Also discussed with Dr. Robles.  At this point, we will forgo further chemotherapy in favor of moving forward with surgery.      S/p surgery with negative margins and negative lymph node involvement. Favorable pathology report reviewed with patient and his accompanying wife. Data is controversial in the benefit of adjuvant chemotherapy. Decision made by Dr. Calero and Dr. Robles to defer resuming chemotherapy at this time.  Currently RENETTA.    RTC 6 months with CT CAP, labs (CBC,CMP), and to see Dr. Calero.    3. CKD: Improved, continue to follow with nephrology.    Patient is in agreement with the proposed treatment plan. All questions were answered to the patient's satisfaction. Pt knows to call clinic if anything is needed before the next clinic visit.    Jessa Penny, MSN, APRN, FNP-C  Hematology and Medical Oncology  Nurse Practitioner to Dr. Mingo Calreo  Nurse Practitioner, [facility]

## 2020-08-21 ENCOUNTER — OFFICE VISIT (OUTPATIENT)
Dept: HEMATOLOGY/ONCOLOGY | Facility: CLINIC | Age: 70
End: 2020-08-21
Payer: MEDICARE

## 2020-08-21 VITALS
WEIGHT: 139.31 LBS | BODY MASS INDEX: 17.88 KG/M2 | SYSTOLIC BLOOD PRESSURE: 129 MMHG | HEART RATE: 65 BPM | RESPIRATION RATE: 18 BRPM | HEIGHT: 74 IN | DIASTOLIC BLOOD PRESSURE: 63 MMHG | TEMPERATURE: 98 F | OXYGEN SATURATION: 99 %

## 2020-08-21 DIAGNOSIS — N18.30 STAGE 3 CHRONIC KIDNEY DISEASE: ICD-10-CM

## 2020-08-21 DIAGNOSIS — Z85.51 HISTORY OF BLADDER CANCER: Primary | ICD-10-CM

## 2020-08-21 DIAGNOSIS — Z93.6 S/P ILEAL CONDUIT: ICD-10-CM

## 2020-08-21 PROCEDURE — 99999 PR PBB SHADOW E&M-EST. PATIENT-LVL III: ICD-10-PCS | Mod: PBBFAC,,, | Performed by: NURSE PRACTITIONER

## 2020-08-21 PROCEDURE — 3008F BODY MASS INDEX DOCD: CPT | Mod: CPTII,S$GLB,, | Performed by: NURSE PRACTITIONER

## 2020-08-21 PROCEDURE — 1159F PR MEDICATION LIST DOCUMENTED IN MEDICAL RECORD: ICD-10-PCS | Mod: S$GLB,,, | Performed by: NURSE PRACTITIONER

## 2020-08-21 PROCEDURE — 3074F SYST BP LT 130 MM HG: CPT | Mod: CPTII,S$GLB,, | Performed by: NURSE PRACTITIONER

## 2020-08-21 PROCEDURE — 3288F FALL RISK ASSESSMENT DOCD: CPT | Mod: CPTII,S$GLB,, | Performed by: NURSE PRACTITIONER

## 2020-08-21 PROCEDURE — 99214 OFFICE O/P EST MOD 30 MIN: CPT | Mod: S$GLB,,, | Performed by: NURSE PRACTITIONER

## 2020-08-21 PROCEDURE — 99214 PR OFFICE/OUTPT VISIT, EST, LEVL IV, 30-39 MIN: ICD-10-PCS | Mod: S$GLB,,, | Performed by: NURSE PRACTITIONER

## 2020-08-21 PROCEDURE — 3078F PR MOST RECENT DIASTOLIC BLOOD PRESSURE < 80 MM HG: ICD-10-PCS | Mod: CPTII,S$GLB,, | Performed by: NURSE PRACTITIONER

## 2020-08-21 PROCEDURE — 3074F PR MOST RECENT SYSTOLIC BLOOD PRESSURE < 130 MM HG: ICD-10-PCS | Mod: CPTII,S$GLB,, | Performed by: NURSE PRACTITIONER

## 2020-08-21 PROCEDURE — 3078F DIAST BP <80 MM HG: CPT | Mod: CPTII,S$GLB,, | Performed by: NURSE PRACTITIONER

## 2020-08-21 PROCEDURE — 1126F AMNT PAIN NOTED NONE PRSNT: CPT | Mod: S$GLB,,, | Performed by: NURSE PRACTITIONER

## 2020-08-21 PROCEDURE — 1100F PR PT FALLS ASSESS DOC 2+ FALLS/FALL W/INJURY/YR: ICD-10-PCS | Mod: CPTII,S$GLB,, | Performed by: NURSE PRACTITIONER

## 2020-08-21 PROCEDURE — 1126F PR PAIN SEVERITY QUANTIFIED, NO PAIN PRESENT: ICD-10-PCS | Mod: S$GLB,,, | Performed by: NURSE PRACTITIONER

## 2020-08-21 PROCEDURE — 3008F PR BODY MASS INDEX (BMI) DOCUMENTED: ICD-10-PCS | Mod: CPTII,S$GLB,, | Performed by: NURSE PRACTITIONER

## 2020-08-21 PROCEDURE — 99999 PR PBB SHADOW E&M-EST. PATIENT-LVL III: CPT | Mod: PBBFAC,,, | Performed by: NURSE PRACTITIONER

## 2020-08-21 PROCEDURE — 1159F MED LIST DOCD IN RCRD: CPT | Mod: S$GLB,,, | Performed by: NURSE PRACTITIONER

## 2020-08-21 PROCEDURE — 1100F PTFALLS ASSESS-DOCD GE2>/YR: CPT | Mod: CPTII,S$GLB,, | Performed by: NURSE PRACTITIONER

## 2020-08-21 PROCEDURE — 3288F PR FALLS RISK ASSESSMENT DOCUMENTED: ICD-10-PCS | Mod: CPTII,S$GLB,, | Performed by: NURSE PRACTITIONER

## 2020-08-21 RX ORDER — CIPROFLOXACIN 500 MG/1
500 TABLET ORAL 2 TIMES DAILY
Qty: 14 TABLET | Refills: 0 | Status: SHIPPED | OUTPATIENT
Start: 2020-08-21 | End: 2020-10-07 | Stop reason: ALTCHOICE

## 2020-08-21 NOTE — Clinical Note
Michael Gurrola,     I saw Mr. Koehler today and he had a concern about his stoma. I told him that you were probably on vacation but I would send a message to you since you hadn't responded to him.    Jessa Chisholm, MSN, APRN, FNP-C  Hematology and Medical Oncology  Nurse Practitioner to Dr. Mingo Calero  Nurse Practitioner, [facility]

## 2020-08-24 ENCOUNTER — TELEPHONE (OUTPATIENT)
Dept: NEPHROLOGY | Facility: CLINIC | Age: 70
End: 2020-08-24

## 2020-08-24 NOTE — TELEPHONE ENCOUNTER
PER DR CODY    -Please inform patient   Urine culture growing bacteria. So sending prescription for antibiotic to his preferred pharmacy

## 2020-09-29 ENCOUNTER — TELEPHONE (OUTPATIENT)
Dept: WOUND CARE | Facility: CLINIC | Age: 70
End: 2020-09-29

## 2020-09-29 NOTE — TELEPHONE ENCOUNTER
Revisited his issues with hyperplasia again and still has bumps and , I have again suggested rigid convexity and see if bumps can be reduced by pressure of convexity and belt,  If not better, please come see me   I will have samples sent from coloplast

## 2020-09-29 NOTE — TELEPHONE ENCOUNTER
----- Message from Yeimi Browne sent at 9/29/2020 11:53 AM CDT -----  Contact: pt  Pt would like to receive a call from Izabela         Please contact pt [phone]

## 2020-10-07 PROBLEM — G47.00 INSOMNIA: Status: ACTIVE | Noted: 2018-03-19

## 2020-10-07 PROBLEM — A41.81 SEPSIS DUE TO ENTEROCOCCUS: Status: RESOLVED | Noted: 2017-08-04 | Resolved: 2020-10-07

## 2020-10-07 PROBLEM — R11.0 NAUSEA: Status: RESOLVED | Noted: 2017-05-06 | Resolved: 2020-10-07

## 2020-10-07 PROBLEM — Z74.09 IMPAIRED MOBILITY AND ADLS: Status: RESOLVED | Noted: 2017-08-15 | Resolved: 2020-10-07

## 2020-10-07 PROBLEM — D63.1 ANEMIA IN CHRONIC KIDNEY DISEASE: Status: ACTIVE | Noted: 2017-12-18

## 2020-10-07 PROBLEM — N18.9 ANEMIA IN CHRONIC KIDNEY DISEASE: Status: ACTIVE | Noted: 2017-12-18

## 2020-10-07 PROBLEM — R79.89 ELEVATED TROPONIN: Status: RESOLVED | Noted: 2017-08-03 | Resolved: 2020-10-07

## 2020-10-07 PROBLEM — Z93.6 OTHER ARTIFICIAL OPENINGS OF URINARY TRACT STATUS: Status: ACTIVE | Noted: 2018-07-30

## 2020-10-07 PROBLEM — G93.5 COMPRESSION OF BRAINSTEM: Status: RESOLVED | Noted: 2017-08-05 | Resolved: 2020-10-07

## 2020-10-07 PROBLEM — E46 MALNUTRITION: Status: ACTIVE | Noted: 2017-12-18

## 2020-10-07 PROBLEM — B95.2 BACTEREMIA DUE TO ENTEROCOCCUS: Status: RESOLVED | Noted: 2017-08-04 | Resolved: 2020-10-07

## 2020-10-07 PROBLEM — N25.81 HYPERPARATHYROIDISM DUE TO RENAL INSUFFICIENCY: Status: ACTIVE | Noted: 2020-10-07

## 2020-10-07 PROBLEM — R78.81 BACTEREMIA DUE TO ENTEROCOCCUS: Status: RESOLVED | Noted: 2017-08-04 | Resolved: 2020-10-07

## 2020-10-07 PROBLEM — E43 SEVERE MALNUTRITION: Status: RESOLVED | Noted: 2017-08-04 | Resolved: 2020-10-07

## 2020-10-07 PROBLEM — I10 ESSENTIAL HYPERTENSION: Chronic | Status: ACTIVE | Noted: 2017-08-06

## 2020-10-07 PROBLEM — Z78.9 IMPAIRED MOBILITY AND ADLS: Status: RESOLVED | Noted: 2017-08-15 | Resolved: 2020-10-07

## 2020-10-07 PROBLEM — N17.0 ACUTE RENAL FAILURE WITH ACUTE TUBULAR NECROSIS SUPERIMPOSED ON STAGE 3 CHRONIC KIDNEY DISEASE: Status: RESOLVED | Noted: 2017-09-10 | Resolved: 2020-10-07

## 2020-10-07 PROBLEM — D69.2 OTHER NONTHROMBOCYTOPENIC PURPURA: Status: ACTIVE | Noted: 2020-10-07

## 2020-10-07 PROBLEM — C32.9 LARYNGEAL CANCER: Status: ACTIVE | Noted: 2017-03-20

## 2020-10-07 PROBLEM — I33.0 ACUTE BACTERIAL ENDOCARDITIS: Status: RESOLVED | Noted: 2017-08-03 | Resolved: 2020-10-07

## 2020-10-07 PROBLEM — C67.9 BLADDER CANCER: Status: ACTIVE | Noted: 2017-03-20

## 2020-10-07 PROBLEM — R11.2 INTRACTABLE NAUSEA AND VOMITING: Status: RESOLVED | Noted: 2017-09-09 | Resolved: 2020-10-07

## 2020-10-07 PROBLEM — G93.6 CYTOTOXIC CEREBRAL EDEMA: Status: RESOLVED | Noted: 2017-08-05 | Resolved: 2020-10-07

## 2020-10-07 PROBLEM — N18.30 STAGE 3 CHRONIC KIDNEY DISEASE: Chronic | Status: ACTIVE | Noted: 2017-04-05

## 2020-10-07 PROBLEM — N18.4 STAGE 4 CHRONIC KIDNEY DISEASE: Status: ACTIVE | Noted: 2017-09-18

## 2020-10-07 PROBLEM — R55 POSTURAL DIZZINESS WITH PRESYNCOPE: Status: RESOLVED | Noted: 2017-08-02 | Resolved: 2020-10-07

## 2020-10-07 PROBLEM — R42 POSTURAL DIZZINESS WITH PRESYNCOPE: Status: RESOLVED | Noted: 2017-08-02 | Resolved: 2020-10-07

## 2020-10-07 PROBLEM — D62 ACUTE BLOOD LOSS ANEMIA: Status: RESOLVED | Noted: 2017-09-10 | Resolved: 2020-10-07

## 2020-10-07 PROBLEM — N18.30 ACUTE RENAL FAILURE WITH ACUTE TUBULAR NECROSIS SUPERIMPOSED ON STAGE 3 CHRONIC KIDNEY DISEASE: Status: RESOLVED | Noted: 2017-09-10 | Resolved: 2020-10-07

## 2020-10-07 PROBLEM — N99.528: Status: RESOLVED | Noted: 2017-09-07 | Resolved: 2020-10-07

## 2020-10-07 PROBLEM — N18.4 STAGE 4 CHRONIC KIDNEY DISEASE: Status: RESOLVED | Noted: 2017-09-18 | Resolved: 2020-10-07

## 2020-10-07 PROBLEM — E87.5 HYPERKALEMIA: Status: RESOLVED | Noted: 2020-05-07 | Resolved: 2020-10-07

## 2020-10-07 PROBLEM — R31.9 HEMATURIA: Status: RESOLVED | Noted: 2017-09-10 | Resolved: 2020-10-07

## 2020-10-07 PROBLEM — N25.81 HYPERPARATHYROIDISM DUE TO RENAL INSUFFICIENCY: Chronic | Status: ACTIVE | Noted: 2020-10-07

## 2020-10-21 ENCOUNTER — OFFICE VISIT (OUTPATIENT)
Dept: WOUND CARE | Facility: CLINIC | Age: 70
End: 2020-10-21
Payer: MEDICARE

## 2020-10-21 DIAGNOSIS — Z43.6 ATTENTION TO UROSTOMY: Primary | ICD-10-CM

## 2020-10-21 DIAGNOSIS — L85.9 EPITHELIAL HYPERPLASIA OF SKIN: ICD-10-CM

## 2020-10-21 PROCEDURE — 99215 OFFICE O/P EST HI 40 MIN: CPT | Mod: S$GLB,,, | Performed by: CLINICAL NURSE SPECIALIST

## 2020-10-21 PROCEDURE — 99215 PR OFFICE/OUTPT VISIT, EST, LEVL V, 40-54 MIN: ICD-10-PCS | Mod: S$GLB,,, | Performed by: CLINICAL NURSE SPECIALIST

## 2020-10-21 PROCEDURE — 1100F PTFALLS ASSESS-DOCD GE2>/YR: CPT | Mod: CPTII,S$GLB,, | Performed by: CLINICAL NURSE SPECIALIST

## 2020-10-21 PROCEDURE — 1100F PR PT FALLS ASSESS DOC 2+ FALLS/FALL W/INJURY/YR: ICD-10-PCS | Mod: CPTII,S$GLB,, | Performed by: CLINICAL NURSE SPECIALIST

## 2020-10-21 PROCEDURE — 1159F PR MEDICATION LIST DOCUMENTED IN MEDICAL RECORD: ICD-10-PCS | Mod: S$GLB,,, | Performed by: CLINICAL NURSE SPECIALIST

## 2020-10-21 PROCEDURE — 99999 PR PBB SHADOW E&M-EST. PATIENT-LVL II: CPT | Mod: PBBFAC,,, | Performed by: CLINICAL NURSE SPECIALIST

## 2020-10-21 PROCEDURE — 3288F FALL RISK ASSESSMENT DOCD: CPT | Mod: CPTII,S$GLB,, | Performed by: CLINICAL NURSE SPECIALIST

## 2020-10-21 PROCEDURE — 99999 PR PBB SHADOW E&M-EST. PATIENT-LVL II: ICD-10-PCS | Mod: PBBFAC,,, | Performed by: CLINICAL NURSE SPECIALIST

## 2020-10-21 PROCEDURE — 3288F PR FALLS RISK ASSESSMENT DOCUMENTED: ICD-10-PCS | Mod: CPTII,S$GLB,, | Performed by: CLINICAL NURSE SPECIALIST

## 2020-10-21 PROCEDURE — 1159F MED LIST DOCD IN RCRD: CPT | Mod: S$GLB,,, | Performed by: CLINICAL NURSE SPECIALIST

## 2020-10-21 NOTE — PROGRESS NOTES
Subjective:       Patient ID: Juan Manuel Koehler is a 70 y.o. male.    Chief Complaint: Ileostomy and Skin Problem    This is visit for fu to skin issue related to urostomy comes with wife today, he has a continuing concern with his skin and continued smell of urine, he does not yet do his own care, wife still puts pouch on and I have encouraged him to start practicing himself AGAIN      Review of Systems   Constitutional: Negative for activity change and appetite change.   HENT: Negative.    Respiratory: Negative.    Cardiovascular: Negative for chest pain and leg swelling.   Gastrointestinal: Negative for abdominal pain.        Reports nervous stomach    Genitourinary: Negative for hematuria.        Urostomy , clear urine slight mucous   Skin: Negative for rash.   Neurological: Negative for weakness.       Objective:      Physical Exam  Constitutional:       Appearance: He is well-developed.   Pulmonary:      Effort: Pulmonary effort is normal.   Abdominal:      General: Bowel sounds are normal.      Palpations: Abdomen is soft.   Skin:     General: Skin is warm.         First visit before cautery 2017    6/18/18     5/13/19     March 2020        10/21    10/21  Issues is about the same but he says the bump at base is painful still  , I am calling it a wet callous skin is hyperplasic from?? Leakage or constant urine exposure,  Suggest we try another type of pouch , placed COLOPLAST ASSURA deep 99973  cut to 28 mm and belted, see if this helps , ADDED CAVILON TOO   ALSO told to change every 2-3 days for now.  Will have samples sent to him too.  PATIENT ENCOURAGED TO CHANGE THIS HIMSELF OR AT LEAST TRY .    3/3/20 can see more epithelialization of stoma mucosa.  No issues with os or stenosis, he is here about the bump at 5 oclock which may be bigger and is sensitive along with other skin along edge.  Discussed options and for now will try a more rigid convex to help flatten skin better.   Stoma is 30 mm   Switch  from soft convex to coloplast assura convex or graham light but today gave 4 assura to try first,    [facility] . PHN    Assessment:       1. Attention to urostomy    2. Epithelial hyperplasia of skin        Plan:       reeval stoma fit and equipment   Will have samples of precut assura sent from [facility] to try   Call me for any problems   I have reviewed the plan of care with the patient and/ or caregiver and they express understanding. I spent over 50% of this 40 minute visit in face to face counseling.

## 2020-11-18 ENCOUNTER — TELEPHONE (OUTPATIENT)
Dept: PHARMACY | Facility: CLINIC | Age: 70
End: 2020-11-18

## 2020-11-18 NOTE — TELEPHONE ENCOUNTER
FOR DOCUMENTATION ONLY:  Financial Assistance for Veltamaameaide is approved from 10-1-2020 to 9-  Source [employer]  BIN: [id number]  VITALIY: DAYA  Id: [id number]  GRP: [id number]  1600.00 liborio

## 2021-01-05 ENCOUNTER — TELEPHONE (OUTPATIENT)
Dept: HEMATOLOGY/ONCOLOGY | Facility: CLINIC | Age: 71
End: 2021-01-05

## 2021-01-11 ENCOUNTER — SPECIALTY PHARMACY (OUTPATIENT)
Dept: PHARMACY | Facility: CLINIC | Age: 71
End: 2021-01-11

## 2021-01-26 NOTE — ASSESSMENT & PLAN NOTE
-Will follow H/H   -Iron panel drawn outside hospital, unsure if before PRBC transfused  -HDS at present  -On PPI   Render In Strict Bullet Format?: No Initiate Treatment: Azithromycin(take one pill PO QD X 2 weeks) Detail Level: Zone No

## 2021-02-05 ENCOUNTER — IMMUNIZATION (OUTPATIENT)
Dept: PHARMACY | Facility: CLINIC | Age: 71
End: 2021-02-05
Payer: MEDICARE

## 2021-02-05 DIAGNOSIS — Z23 NEED FOR VACCINATION: Primary | ICD-10-CM

## 2021-02-09 ENCOUNTER — SPECIALTY PHARMACY (OUTPATIENT)
Dept: PHARMACY | Facility: CLINIC | Age: 71
End: 2021-02-09

## 2021-02-10 ENCOUNTER — SPECIALTY PHARMACY (OUTPATIENT)
Dept: PHARMACY | Facility: CLINIC | Age: 71
End: 2021-02-10

## 2021-02-22 ENCOUNTER — HOSPITAL ENCOUNTER (OUTPATIENT)
Dept: RADIOLOGY | Facility: HOSPITAL | Age: 71
Discharge: HOME | End: 2021-02-22
Attending: NURSE PRACTITIONER
Payer: MEDICARE

## 2021-02-22 DIAGNOSIS — Z85.51 HISTORY OF BLADDER CANCER: ICD-10-CM

## 2021-02-22 DIAGNOSIS — N18.30 STAGE 3 CHRONIC KIDNEY DISEASE: ICD-10-CM

## 2021-02-22 DIAGNOSIS — Z93.6 S/P ILEAL CONDUIT: ICD-10-CM

## 2021-02-22 PROCEDURE — 74176 CT ABD & PELVIS W/O CONTRAST: CPT | Mod: 26,,, | Performed by: RADIOLOGY

## 2021-02-22 PROCEDURE — 71250 CT THORAX DX C-: CPT | Mod: TC

## 2021-02-22 PROCEDURE — 74176 CT ABD & PELVIS W/O CONTRAST: CPT | Mod: TC

## 2021-02-22 PROCEDURE — 71250 CT THORAX DX C-: CPT | Mod: 26,,, | Performed by: RADIOLOGY

## 2021-02-22 PROCEDURE — 71250 CT CHEST ABDOMEN PELVIS WITHOUT CONTRAST(XPD): ICD-10-PCS | Mod: 26,,, | Performed by: RADIOLOGY

## 2021-02-22 PROCEDURE — 74176 CT CHEST ABDOMEN PELVIS WITHOUT CONTRAST(XPD): ICD-10-PCS | Mod: 26,,, | Performed by: RADIOLOGY

## 2021-02-24 ENCOUNTER — OFFICE VISIT (OUTPATIENT)
Dept: HEMATOLOGY/ONCOLOGY | Facility: CLINIC | Age: 71
End: 2021-02-24
Payer: MEDICARE

## 2021-02-24 VITALS
TEMPERATURE: 98 F | BODY MASS INDEX: 18.9 KG/M2 | SYSTOLIC BLOOD PRESSURE: 127 MMHG | DIASTOLIC BLOOD PRESSURE: 73 MMHG | WEIGHT: 147.25 LBS | HEIGHT: 74 IN | HEART RATE: 81 BPM

## 2021-02-24 DIAGNOSIS — Z85.51 HISTORY OF BLADDER CANCER: Primary | ICD-10-CM

## 2021-02-24 DIAGNOSIS — Z93.6 S/P ILEAL CONDUIT: ICD-10-CM

## 2021-02-24 DIAGNOSIS — G47.00 INSOMNIA, UNSPECIFIED: ICD-10-CM

## 2021-02-24 DIAGNOSIS — N18.32 STAGE 3B CHRONIC KIDNEY DISEASE: ICD-10-CM

## 2021-02-24 PROCEDURE — 1159F PR MEDICATION LIST DOCUMENTED IN MEDICAL RECORD: ICD-10-PCS | Mod: S$GLB,,, | Performed by: INTERNAL MEDICINE

## 2021-02-24 PROCEDURE — 99215 PR OFFICE/OUTPT VISIT, EST, LEVL V, 40-54 MIN: ICD-10-PCS | Mod: S$GLB,,, | Performed by: INTERNAL MEDICINE

## 2021-02-24 PROCEDURE — 99215 OFFICE O/P EST HI 40 MIN: CPT | Mod: S$GLB,,, | Performed by: INTERNAL MEDICINE

## 2021-02-24 PROCEDURE — 3008F PR BODY MASS INDEX (BMI) DOCUMENTED: ICD-10-PCS | Mod: CPTII,S$GLB,, | Performed by: INTERNAL MEDICINE

## 2021-02-24 PROCEDURE — 1101F PR PT FALLS ASSESS DOC 0-1 FALLS W/OUT INJ PAST YR: ICD-10-PCS | Mod: CPTII,S$GLB,, | Performed by: INTERNAL MEDICINE

## 2021-02-24 PROCEDURE — 3074F PR MOST RECENT SYSTOLIC BLOOD PRESSURE < 130 MM HG: ICD-10-PCS | Mod: CPTII,S$GLB,, | Performed by: INTERNAL MEDICINE

## 2021-02-24 PROCEDURE — 3074F SYST BP LT 130 MM HG: CPT | Mod: CPTII,S$GLB,, | Performed by: INTERNAL MEDICINE

## 2021-02-24 PROCEDURE — 1159F MED LIST DOCD IN RCRD: CPT | Mod: S$GLB,,, | Performed by: INTERNAL MEDICINE

## 2021-02-24 PROCEDURE — 99999 PR PBB SHADOW E&M-EST. PATIENT-LVL III: ICD-10-PCS | Mod: PBBFAC,,, | Performed by: INTERNAL MEDICINE

## 2021-02-24 PROCEDURE — 3008F BODY MASS INDEX DOCD: CPT | Mod: CPTII,S$GLB,, | Performed by: INTERNAL MEDICINE

## 2021-02-24 PROCEDURE — 1125F PR PAIN SEVERITY QUANTIFIED, PAIN PRESENT: ICD-10-PCS | Mod: S$GLB,,, | Performed by: INTERNAL MEDICINE

## 2021-02-24 PROCEDURE — 1101F PT FALLS ASSESS-DOCD LE1/YR: CPT | Mod: CPTII,S$GLB,, | Performed by: INTERNAL MEDICINE

## 2021-02-24 PROCEDURE — 3078F DIAST BP <80 MM HG: CPT | Mod: CPTII,S$GLB,, | Performed by: INTERNAL MEDICINE

## 2021-02-24 PROCEDURE — 3288F FALL RISK ASSESSMENT DOCD: CPT | Mod: CPTII,S$GLB,, | Performed by: INTERNAL MEDICINE

## 2021-02-24 PROCEDURE — 3288F PR FALLS RISK ASSESSMENT DOCUMENTED: ICD-10-PCS | Mod: CPTII,S$GLB,, | Performed by: INTERNAL MEDICINE

## 2021-02-24 PROCEDURE — 1125F AMNT PAIN NOTED PAIN PRSNT: CPT | Mod: S$GLB,,, | Performed by: INTERNAL MEDICINE

## 2021-02-24 PROCEDURE — 3078F PR MOST RECENT DIASTOLIC BLOOD PRESSURE < 80 MM HG: ICD-10-PCS | Mod: CPTII,S$GLB,, | Performed by: INTERNAL MEDICINE

## 2021-02-24 PROCEDURE — 99999 PR PBB SHADOW E&M-EST. PATIENT-LVL III: CPT | Mod: PBBFAC,,, | Performed by: INTERNAL MEDICINE

## 2021-02-24 RX ORDER — TEMAZEPAM 15 MG/1
15 CAPSULE ORAL NIGHTLY PRN
Qty: 30 CAPSULE | Refills: 5 | Status: SHIPPED | OUTPATIENT
Start: 2021-02-24 | End: 2021-09-17 | Stop reason: SDUPTHER

## 2021-03-05 ENCOUNTER — IMMUNIZATION (OUTPATIENT)
Dept: PHARMACY | Facility: CLINIC | Age: 71
End: 2021-03-05
Payer: MEDICARE

## 2021-03-05 DIAGNOSIS — Z23 NEED FOR VACCINATION: Primary | ICD-10-CM

## 2021-03-10 ENCOUNTER — SPECIALTY PHARMACY (OUTPATIENT)
Dept: PHARMACY | Facility: CLINIC | Age: 71
End: 2021-03-10

## 2021-03-22 ENCOUNTER — SPECIALTY PHARMACY (OUTPATIENT)
Dept: PHARMACY | Facility: CLINIC | Age: 71
End: 2021-03-22

## 2021-04-08 PROBLEM — D64.9 ANEMIA: Status: RESOLVED | Noted: 2017-08-25 | Resolved: 2021-04-08

## 2021-04-08 PROBLEM — C67.9 BLADDER CANCER: Status: RESOLVED | Noted: 2017-03-20 | Resolved: 2021-04-08

## 2021-04-08 PROBLEM — R62.7 FAILURE TO THRIVE IN ADULT: Status: RESOLVED | Noted: 2017-08-02 | Resolved: 2021-04-08

## 2021-04-08 PROBLEM — T88.4XXA DIFFICULT INTUBATION: Status: RESOLVED | Noted: 2017-04-06 | Resolved: 2021-04-08

## 2021-04-19 ENCOUNTER — SPECIALTY PHARMACY (OUTPATIENT)
Dept: PHARMACY | Facility: CLINIC | Age: 71
End: 2021-04-19

## 2021-04-23 ENCOUNTER — TELEPHONE (OUTPATIENT)
Dept: NEPHROLOGY | Facility: CLINIC | Age: 71
End: 2021-04-23

## 2021-05-12 ENCOUNTER — SPECIALTY PHARMACY (OUTPATIENT)
Dept: PHARMACY | Facility: CLINIC | Age: 71
End: 2021-05-12

## 2021-05-12 RX ORDER — PATIROMER 16.8 G/1
16.8 POWDER, FOR SUSPENSION ORAL DAILY
Qty: 30 PACKET | Refills: 2 | Status: SHIPPED | OUTPATIENT
Start: 2021-05-12 | End: 2021-09-20 | Stop reason: SDUPTHER

## 2021-05-14 ENCOUNTER — SPECIALTY PHARMACY (OUTPATIENT)
Dept: PHARMACY | Facility: CLINIC | Age: 71
End: 2021-05-14

## 2021-09-16 ENCOUNTER — TELEPHONE (OUTPATIENT)
Dept: PHARMACY | Facility: CLINIC | Age: 71
End: 2021-09-16

## 2021-09-16 ENCOUNTER — HOSPITAL ENCOUNTER (OUTPATIENT)
Dept: RADIOLOGY | Facility: HOSPITAL | Age: 71
Discharge: HOME | End: 2021-09-16
Attending: INTERNAL MEDICINE
Payer: MEDICARE

## 2021-09-16 DIAGNOSIS — N18.32 STAGE 3B CHRONIC KIDNEY DISEASE: ICD-10-CM

## 2021-09-16 DIAGNOSIS — Z93.6 S/P ILEAL CONDUIT: ICD-10-CM

## 2021-09-16 DIAGNOSIS — Z85.51 HISTORY OF BLADDER CANCER: ICD-10-CM

## 2021-09-16 DIAGNOSIS — G47.00 INSOMNIA, UNSPECIFIED: ICD-10-CM

## 2021-09-16 PROCEDURE — 74176 CT ABD & PELVIS W/O CONTRAST: CPT | Mod: 26,,, | Performed by: RADIOLOGY

## 2021-09-16 PROCEDURE — 25500020 PHARM REV CODE 255: Performed by: INTERNAL MEDICINE

## 2021-09-16 PROCEDURE — A9698 NON-RAD CONTRAST MATERIALNOC: HCPCS | Performed by: INTERNAL MEDICINE

## 2021-09-16 PROCEDURE — 71250 CT CHEST ABDOMEN PELVIS WITHOUT CONTRAST(XPD): ICD-10-PCS | Mod: 26,,, | Performed by: RADIOLOGY

## 2021-09-16 PROCEDURE — 74176 CT CHEST ABDOMEN PELVIS WITHOUT CONTRAST(XPD): ICD-10-PCS | Mod: 26,,, | Performed by: RADIOLOGY

## 2021-09-16 PROCEDURE — 74176 CT ABD & PELVIS W/O CONTRAST: CPT | Mod: TC

## 2021-09-16 PROCEDURE — 71250 CT THORAX DX C-: CPT | Mod: 26,,, | Performed by: RADIOLOGY

## 2021-09-16 PROCEDURE — 71250 CT THORAX DX C-: CPT | Mod: TC

## 2021-09-16 RX ADMIN — IOHEXOL 1000 ML: 9 SOLUTION ORAL at 09:09

## 2021-09-17 ENCOUNTER — PATIENT MESSAGE (OUTPATIENT)
Dept: HEMATOLOGY/ONCOLOGY | Facility: CLINIC | Age: 71
End: 2021-09-17

## 2021-09-17 DIAGNOSIS — G47.00 INSOMNIA, UNSPECIFIED: ICD-10-CM

## 2021-09-17 RX ORDER — TEMAZEPAM 15 MG/1
15 CAPSULE ORAL NIGHTLY PRN
Qty: 30 CAPSULE | Refills: 5 | Status: SHIPPED | OUTPATIENT
Start: 2021-09-17 | End: 2022-03-22

## 2021-09-20 DIAGNOSIS — E87.5 HYPERKALEMIA: Primary | ICD-10-CM

## 2021-09-20 DIAGNOSIS — N18.32 STAGE 3B CHRONIC KIDNEY DISEASE: ICD-10-CM

## 2021-09-20 RX ORDER — PATIROMER 16.8 G/1
16.8 POWDER, FOR SUSPENSION ORAL DAILY
Qty: 30 PACKET | Refills: 1 | Status: SHIPPED | OUTPATIENT
Start: 2021-09-20 | End: 2021-12-20 | Stop reason: SDUPTHER

## 2021-09-22 ENCOUNTER — OFFICE VISIT (OUTPATIENT)
Dept: HEMATOLOGY/ONCOLOGY | Facility: CLINIC | Age: 71
End: 2021-09-22
Payer: MEDICARE

## 2021-09-22 VITALS
HEIGHT: 74 IN | RESPIRATION RATE: 18 BRPM | TEMPERATURE: 98 F | SYSTOLIC BLOOD PRESSURE: 144 MMHG | DIASTOLIC BLOOD PRESSURE: 69 MMHG | BODY MASS INDEX: 18.11 KG/M2 | HEART RATE: 80 BPM | WEIGHT: 141.13 LBS | OXYGEN SATURATION: 97 %

## 2021-09-22 DIAGNOSIS — Z85.51 HISTORY OF BLADDER CANCER: Primary | ICD-10-CM

## 2021-09-22 LAB
BACTERIA #/AREA URNS AUTO: ABNORMAL /HPF
BILIRUB UR QL STRIP: NEGATIVE
CLARITY UR REFRACT.AUTO: ABNORMAL
COLOR UR AUTO: YELLOW
GLUCOSE UR QL STRIP: NEGATIVE
HGB UR QL STRIP: NEGATIVE
HYALINE CASTS UR QL AUTO: 0 /LPF
KETONES UR QL STRIP: NEGATIVE
LEUKOCYTE ESTERASE UR QL STRIP: ABNORMAL
MICROSCOPIC COMMENT: ABNORMAL
NITRITE UR QL STRIP: POSITIVE
PH UR STRIP: >8 [PH] (ref 5–8)
PROT UR QL STRIP: ABNORMAL
RBC #/AREA URNS AUTO: 5 /HPF (ref 0–4)
SP GR UR STRIP: 1.01 (ref 1–1.03)
SQUAMOUS #/AREA URNS AUTO: 1 /HPF
TRI-PHOS CRY UR QL COMP ASSIST: ABNORMAL
URN SPEC COLLECT METH UR: ABNORMAL
WBC #/AREA URNS AUTO: 12 /HPF (ref 0–5)

## 2021-09-22 PROCEDURE — 3288F FALL RISK ASSESSMENT DOCD: CPT | Mod: CPTII,S$GLB,, | Performed by: INTERNAL MEDICINE

## 2021-09-22 PROCEDURE — 3008F BODY MASS INDEX DOCD: CPT | Mod: CPTII,S$GLB,, | Performed by: INTERNAL MEDICINE

## 2021-09-22 PROCEDURE — 1101F PR PT FALLS ASSESS DOC 0-1 FALLS W/OUT INJ PAST YR: ICD-10-PCS | Mod: CPTII,S$GLB,, | Performed by: INTERNAL MEDICINE

## 2021-09-22 PROCEDURE — 3077F SYST BP >= 140 MM HG: CPT | Mod: CPTII,S$GLB,, | Performed by: INTERNAL MEDICINE

## 2021-09-22 PROCEDURE — 1101F PT FALLS ASSESS-DOCD LE1/YR: CPT | Mod: CPTII,S$GLB,, | Performed by: INTERNAL MEDICINE

## 2021-09-22 PROCEDURE — 99999 PR PBB SHADOW E&M-EST. PATIENT-LVL III: ICD-10-PCS | Mod: PBBFAC,,, | Performed by: INTERNAL MEDICINE

## 2021-09-22 PROCEDURE — 3288F PR FALLS RISK ASSESSMENT DOCUMENTED: ICD-10-PCS | Mod: CPTII,S$GLB,, | Performed by: INTERNAL MEDICINE

## 2021-09-22 PROCEDURE — 81001 URINALYSIS AUTO W/SCOPE: CPT | Performed by: INTERNAL MEDICINE

## 2021-09-22 PROCEDURE — 3078F DIAST BP <80 MM HG: CPT | Mod: CPTII,S$GLB,, | Performed by: INTERNAL MEDICINE

## 2021-09-22 PROCEDURE — 3078F PR MOST RECENT DIASTOLIC BLOOD PRESSURE < 80 MM HG: ICD-10-PCS | Mod: CPTII,S$GLB,, | Performed by: INTERNAL MEDICINE

## 2021-09-22 PROCEDURE — 99215 PR OFFICE/OUTPT VISIT, EST, LEVL V, 40-54 MIN: ICD-10-PCS | Mod: S$GLB,,, | Performed by: INTERNAL MEDICINE

## 2021-09-22 PROCEDURE — 3077F PR MOST RECENT SYSTOLIC BLOOD PRESSURE >= 140 MM HG: ICD-10-PCS | Mod: CPTII,S$GLB,, | Performed by: INTERNAL MEDICINE

## 2021-09-22 PROCEDURE — 1126F AMNT PAIN NOTED NONE PRSNT: CPT | Mod: CPTII,S$GLB,, | Performed by: INTERNAL MEDICINE

## 2021-09-22 PROCEDURE — 1126F PR PAIN SEVERITY QUANTIFIED, NO PAIN PRESENT: ICD-10-PCS | Mod: CPTII,S$GLB,, | Performed by: INTERNAL MEDICINE

## 2021-09-22 PROCEDURE — 3008F PR BODY MASS INDEX (BMI) DOCUMENTED: ICD-10-PCS | Mod: CPTII,S$GLB,, | Performed by: INTERNAL MEDICINE

## 2021-09-22 PROCEDURE — 1159F PR MEDICATION LIST DOCUMENTED IN MEDICAL RECORD: ICD-10-PCS | Mod: CPTII,S$GLB,, | Performed by: INTERNAL MEDICINE

## 2021-09-22 PROCEDURE — 1159F MED LIST DOCD IN RCRD: CPT | Mod: CPTII,S$GLB,, | Performed by: INTERNAL MEDICINE

## 2021-09-22 PROCEDURE — 99215 OFFICE O/P EST HI 40 MIN: CPT | Mod: S$GLB,,, | Performed by: INTERNAL MEDICINE

## 2021-09-22 PROCEDURE — 99999 PR PBB SHADOW E&M-EST. PATIENT-LVL III: CPT | Mod: PBBFAC,,, | Performed by: INTERNAL MEDICINE

## 2021-09-23 ENCOUNTER — TELEPHONE (OUTPATIENT)
Dept: UROLOGY | Facility: CLINIC | Age: 71
End: 2021-09-23

## 2021-10-01 ENCOUNTER — TELEPHONE (OUTPATIENT)
Dept: UROLOGY | Facility: CLINIC | Age: 71
End: 2021-10-01

## 2021-10-01 ENCOUNTER — OFFICE VISIT (OUTPATIENT)
Dept: UROLOGY | Facility: CLINIC | Age: 71
End: 2021-10-01
Payer: MEDICARE

## 2021-10-01 VITALS
BODY MASS INDEX: 18.1 KG/M2 | DIASTOLIC BLOOD PRESSURE: 66 MMHG | HEART RATE: 57 BPM | HEIGHT: 74 IN | SYSTOLIC BLOOD PRESSURE: 132 MMHG | WEIGHT: 141 LBS

## 2021-10-01 DIAGNOSIS — R31.9 HEMATURIA OF UNKNOWN CAUSE: ICD-10-CM

## 2021-10-01 DIAGNOSIS — C67.9 UROTHELIAL CARCINOMA OF BLADDER: Primary | ICD-10-CM

## 2021-10-01 DIAGNOSIS — R31.0 GROSS HEMATURIA: Primary | ICD-10-CM

## 2021-10-01 PROCEDURE — 99999 PR PBB SHADOW E&M-EST. PATIENT-LVL III: ICD-10-PCS | Mod: PBBFAC,,,

## 2021-10-01 PROCEDURE — 99214 OFFICE O/P EST MOD 30 MIN: CPT | Mod: S$GLB,,, | Performed by: UROLOGY

## 2021-10-01 PROCEDURE — 99999 PR PBB SHADOW E&M-EST. PATIENT-LVL III: CPT | Mod: PBBFAC,,,

## 2021-10-01 PROCEDURE — 99214 PR OFFICE/OUTPT VISIT, EST, LEVL IV, 30-39 MIN: ICD-10-PCS | Mod: S$GLB,,, | Performed by: UROLOGY

## 2021-10-01 RX ORDER — APREMILAST 30 MG/1
TABLET, FILM COATED ORAL
COMMUNITY

## 2021-10-12 ENCOUNTER — TELEPHONE (OUTPATIENT)
Dept: UROLOGY | Facility: CLINIC | Age: 71
End: 2021-10-12

## 2021-10-18 ENCOUNTER — OFFICE VISIT (OUTPATIENT)
Dept: OTOLARYNGOLOGY | Facility: CLINIC | Age: 71
End: 2021-10-18
Payer: MEDICARE

## 2021-10-18 VITALS
BODY MASS INDEX: 18.06 KG/M2 | HEART RATE: 79 BPM | SYSTOLIC BLOOD PRESSURE: 151 MMHG | DIASTOLIC BLOOD PRESSURE: 71 MMHG | WEIGHT: 140.63 LBS

## 2021-10-18 DIAGNOSIS — R59.0 LYMPHADENOPATHY OF RIGHT CERVICAL REGION: Primary | ICD-10-CM

## 2021-10-18 DIAGNOSIS — C32.9 LARYNGEAL CANCER: ICD-10-CM

## 2021-10-18 PROCEDURE — 99999 PR PBB SHADOW E&M-EST. PATIENT-LVL III: CPT | Mod: PBBFAC,,, | Performed by: NURSE PRACTITIONER

## 2021-10-18 PROCEDURE — 1126F PR PAIN SEVERITY QUANTIFIED, NO PAIN PRESENT: ICD-10-PCS | Mod: CPTII,S$GLB,, | Performed by: NURSE PRACTITIONER

## 2021-10-18 PROCEDURE — 1159F PR MEDICATION LIST DOCUMENTED IN MEDICAL RECORD: ICD-10-PCS | Mod: CPTII,S$GLB,, | Performed by: NURSE PRACTITIONER

## 2021-10-18 PROCEDURE — 1126F AMNT PAIN NOTED NONE PRSNT: CPT | Mod: CPTII,S$GLB,, | Performed by: NURSE PRACTITIONER

## 2021-10-18 PROCEDURE — 3077F PR MOST RECENT SYSTOLIC BLOOD PRESSURE >= 140 MM HG: ICD-10-PCS | Mod: CPTII,S$GLB,, | Performed by: NURSE PRACTITIONER

## 2021-10-18 PROCEDURE — 3288F FALL RISK ASSESSMENT DOCD: CPT | Mod: CPTII,S$GLB,, | Performed by: NURSE PRACTITIONER

## 2021-10-18 PROCEDURE — 31575 DIAGNOSTIC LARYNGOSCOPY: CPT | Mod: S$GLB,,, | Performed by: NURSE PRACTITIONER

## 2021-10-18 PROCEDURE — 99999 PR PBB SHADOW E&M-EST. PATIENT-LVL III: ICD-10-PCS | Mod: PBBFAC,,, | Performed by: NURSE PRACTITIONER

## 2021-10-18 PROCEDURE — 3077F SYST BP >= 140 MM HG: CPT | Mod: CPTII,S$GLB,, | Performed by: NURSE PRACTITIONER

## 2021-10-18 PROCEDURE — 3078F PR MOST RECENT DIASTOLIC BLOOD PRESSURE < 80 MM HG: ICD-10-PCS | Mod: CPTII,S$GLB,, | Performed by: NURSE PRACTITIONER

## 2021-10-18 PROCEDURE — 1101F PT FALLS ASSESS-DOCD LE1/YR: CPT | Mod: CPTII,S$GLB,, | Performed by: NURSE PRACTITIONER

## 2021-10-18 PROCEDURE — 3008F PR BODY MASS INDEX (BMI) DOCUMENTED: ICD-10-PCS | Mod: CPTII,S$GLB,, | Performed by: NURSE PRACTITIONER

## 2021-10-18 PROCEDURE — 3008F BODY MASS INDEX DOCD: CPT | Mod: CPTII,S$GLB,, | Performed by: NURSE PRACTITIONER

## 2021-10-18 PROCEDURE — 1159F MED LIST DOCD IN RCRD: CPT | Mod: CPTII,S$GLB,, | Performed by: NURSE PRACTITIONER

## 2021-10-18 PROCEDURE — 31575 PR LARYNGOSCOPY, FLEXIBLE; DIAGNOSTIC: ICD-10-PCS | Mod: S$GLB,,, | Performed by: NURSE PRACTITIONER

## 2021-10-18 PROCEDURE — 99203 PR OFFICE/OUTPT VISIT, NEW, LEVL III, 30-44 MIN: ICD-10-PCS | Mod: 25,S$GLB,, | Performed by: NURSE PRACTITIONER

## 2021-10-18 PROCEDURE — 3288F PR FALLS RISK ASSESSMENT DOCUMENTED: ICD-10-PCS | Mod: CPTII,S$GLB,, | Performed by: NURSE PRACTITIONER

## 2021-10-18 PROCEDURE — 99203 OFFICE O/P NEW LOW 30 MIN: CPT | Mod: 25,S$GLB,, | Performed by: NURSE PRACTITIONER

## 2021-10-18 PROCEDURE — 1101F PR PT FALLS ASSESS DOC 0-1 FALLS W/OUT INJ PAST YR: ICD-10-PCS | Mod: CPTII,S$GLB,, | Performed by: NURSE PRACTITIONER

## 2021-10-18 PROCEDURE — 3078F DIAST BP <80 MM HG: CPT | Mod: CPTII,S$GLB,, | Performed by: NURSE PRACTITIONER

## 2021-10-18 PROCEDURE — 1160F RVW MEDS BY RX/DR IN RCRD: CPT | Mod: CPTII,S$GLB,, | Performed by: NURSE PRACTITIONER

## 2021-10-18 PROCEDURE — 1160F PR REVIEW ALL MEDS BY PRESCRIBER/CLIN PHARMACIST DOCUMENTED: ICD-10-PCS | Mod: CPTII,S$GLB,, | Performed by: NURSE PRACTITIONER

## 2021-10-18 RX ORDER — DOXYCYCLINE 100 MG/1
100 CAPSULE ORAL DAILY
COMMUNITY
Start: 2021-10-16 | End: 2022-05-27 | Stop reason: ALTCHOICE

## 2021-10-20 ENCOUNTER — HOSPITAL ENCOUNTER (OUTPATIENT)
Dept: RADIOLOGY | Facility: HOSPITAL | Age: 71
Discharge: HOME | End: 2021-10-20
Attending: STUDENT IN AN ORGANIZED HEALTH CARE EDUCATION/TRAINING PROGRAM
Payer: MEDICARE

## 2021-10-20 ENCOUNTER — HOSPITAL ENCOUNTER (OUTPATIENT)
Dept: RADIOLOGY | Facility: HOSPITAL | Age: 71
Discharge: HOME | End: 2021-10-20
Attending: NURSE PRACTITIONER
Payer: MEDICARE

## 2021-10-20 DIAGNOSIS — R59.0 LYMPHADENOPATHY OF RIGHT CERVICAL REGION: ICD-10-CM

## 2021-10-20 DIAGNOSIS — C67.9 UROTHELIAL CARCINOMA OF BLADDER: ICD-10-CM

## 2021-10-20 LAB
CREAT SERPL-MCNC: 2 MG/DL (ref 0.5–1.4)
SAMPLE: ABNORMAL

## 2021-10-20 PROCEDURE — 72197 MRI PELVIS W/O & W/DYE: CPT | Mod: 26,,, | Performed by: INTERNAL MEDICINE

## 2021-10-20 PROCEDURE — 76536 US EXAM OF HEAD AND NECK: CPT | Mod: 26,,, | Performed by: RADIOLOGY

## 2021-10-20 PROCEDURE — A9585 GADOBUTROL INJECTION: HCPCS | Performed by: STUDENT IN AN ORGANIZED HEALTH CARE EDUCATION/TRAINING PROGRAM

## 2021-10-20 PROCEDURE — 72197 MRI PELVIS W/O & W/DYE: CPT | Mod: TC

## 2021-10-20 PROCEDURE — 76536 US EXAM OF HEAD AND NECK: CPT | Mod: TC

## 2021-10-20 PROCEDURE — 74183 MRI UROGRAPHY W W/O CONTRAST: ICD-10-PCS | Mod: 26,,, | Performed by: INTERNAL MEDICINE

## 2021-10-20 PROCEDURE — 74183 MRI ABD W/O CNTR FLWD CNTR: CPT | Mod: 26,,, | Performed by: INTERNAL MEDICINE

## 2021-10-20 PROCEDURE — 25500020 PHARM REV CODE 255: Performed by: STUDENT IN AN ORGANIZED HEALTH CARE EDUCATION/TRAINING PROGRAM

## 2021-10-20 PROCEDURE — 72197 MRI UROGRAPHY W W/O CONTRAST: ICD-10-PCS | Mod: 26,,, | Performed by: INTERNAL MEDICINE

## 2021-10-20 PROCEDURE — 76536 US SOFT TISSUE HEAD NECK THYROID: ICD-10-PCS | Mod: 26,,, | Performed by: RADIOLOGY

## 2021-10-20 RX ORDER — GADOBUTROL 604.72 MG/ML
6 INJECTION INTRAVENOUS
Status: COMPLETED | OUTPATIENT
Start: 2021-10-20 | End: 2021-10-20

## 2021-10-20 RX ADMIN — GADOBUTROL 6 ML: 604.72 INJECTION INTRAVENOUS at 11:10

## 2021-10-21 ENCOUNTER — PATIENT MESSAGE (OUTPATIENT)
Dept: OTOLARYNGOLOGY | Facility: CLINIC | Age: 71
End: 2021-10-21
Payer: MEDICARE

## 2021-10-22 RX ORDER — AMOXICILLIN AND CLAVULANATE POTASSIUM 875; 125 MG/1; MG/1
1 TABLET, FILM COATED ORAL 2 TIMES DAILY
Qty: 20 TABLET | Refills: 0 | Status: SHIPPED | OUTPATIENT
Start: 2021-10-22 | End: 2021-11-01

## 2021-11-03 ENCOUNTER — TELEPHONE (OUTPATIENT)
Dept: NEPHROLOGY | Facility: CLINIC | Age: 71
End: 2021-11-03
Payer: MEDICARE

## 2021-11-08 ENCOUNTER — TELEPHONE (OUTPATIENT)
Dept: NEPHROLOGY | Facility: CLINIC | Age: 71
End: 2021-11-08
Payer: MEDICARE

## 2021-11-17 ENCOUNTER — TELEPHONE (OUTPATIENT)
Dept: NEPHROLOGY | Facility: CLINIC | Age: 71
End: 2021-11-17
Payer: MEDICARE

## 2021-11-17 ENCOUNTER — SPECIALTY PHARMACY (OUTPATIENT)
Dept: PHARMACY | Facility: CLINIC | Age: 71
End: 2021-11-17
Payer: MEDICARE

## 2021-11-17 DIAGNOSIS — E87.5 HYPERKALEMIA: ICD-10-CM

## 2021-11-17 DIAGNOSIS — N18.32 STAGE 3B CHRONIC KIDNEY DISEASE: ICD-10-CM

## 2021-11-17 RX ORDER — PATIROMER 16.8 G/1
16.8 POWDER, FOR SUSPENSION ORAL DAILY
Qty: 30 PACKET | Refills: 1 | Status: CANCELLED | OUTPATIENT
Start: 2021-11-17

## 2021-12-20 DIAGNOSIS — E87.5 HYPERKALEMIA: ICD-10-CM

## 2021-12-20 DIAGNOSIS — N18.32 STAGE 3B CHRONIC KIDNEY DISEASE: ICD-10-CM

## 2021-12-20 RX ORDER — PATIROMER 16.8 G/1
16.8 POWDER, FOR SUSPENSION ORAL DAILY
Qty: 30 PACKET | Refills: 1 | OUTPATIENT
Start: 2021-12-20 | End: 2022-01-18 | Stop reason: SDUPTHER

## 2021-12-21 ENCOUNTER — SPECIALTY PHARMACY (OUTPATIENT)
Dept: PHARMACY | Facility: CLINIC | Age: 71
End: 2021-12-21
Payer: MEDICARE

## 2021-12-21 NOTE — TELEPHONE ENCOUNTER
Je Acosta  [birth year]  [id number]    CC: T2DM    Referring Provider: Dr Efren Lyle  PCP: Dr Efren Lyle     HPI/To Review  44year old male presents for evaluation of DM    Diabetes History:  Diagnosed in [id number] after mother had noticed patient had weight loss and and polyuria, mother checked his BS and it was 500    Started insulin 2-3 years back    Mother with T2DM    Previous DM medications:  -metformin and glipizide (stopped by PCP when started on insulin)  -states he thinks metformin causes his weight to gain, diarrhea     Current DM medications:  -lantus pen 20 units qhs (was not taking before 10/2019)    Diet:  Stopped 7'11 double gulp  PBJ sandwiches or nothing coffee with flavored creamer   Left overs pork chops rice chicken pasta  Used to drink a lot of soda but has cut back in the last month     Weight: walking at work     HbA1c: 12.6% 10/2019, 12.1% 05/2019  POC glucose 361    Saw DE in 11/2019    SMBG only checks when he feels off   Fasting  Prelunch  Predinner 180-300s   HS    Polyuria denies   Polydipsia denies   Blurry vision denies   Paresthesias denies     Hypoglycemia awareness: yes     DM microvascular complications:   -Neuropathy: yes in the feet  -Retinopathy: unknown , last seen 02/2018 but did not have dilated eye exam  -Nephropathy: yes, last MALB/Cr microalbumnaria     DM macrovascular complications:  -HTN: yes  -HLD: yes with hyperTG, started simvastatin 10/2019  -CAD: no  -CVA/TIA: no  -PAD/PVD: no    Hospitalizations related to DM: at diagnosis     History of thyroid disease: normal 2019    Complains of erectile dysfunction. Has taken OTC blue chew.  Has taken viagra and did not have any improvement     PMH/PSH  T2DM  HTN  HLD  Panic attacks   Erectile dysfunction     FH  DM   No pancreatic cancer   CAD     Social Hx  Drinks 6pack of beer 1-2 times per month    Negative for illicits  Smokes 1-2 cigars socially   Works for [employer]        ALLERGIES:   Allergen Reactions   â¢ Returned call to patient in regards to questions concerning copay assistance through [facility]. Discovered that patient was able to receive OCI assistance for Velt[place] copays in 2020, use a liborio for copays at [facility] in the beginning of 2021, and has been using [employer] PAP since April 2021 up until this point. Patient is due for [employer] PAP assistance renewal for 2022 and will need to spend a minimum of $600 OOP to qualify for assistance. Patient confirmed this with PAP  and is requesting that [facility] assist with copays for the beginning of 2022 until he meets program OOP criteria.     [facility] up to speed and is in contact with provider. Message sent to Shannan Herrera to check on balance of patient's OCI per lifetime. Will follow up.    "Amoxicillin RASH   â¢ Penicillins HIVES         ROS  A 12 Point ROS was negative except that listed in the HPI    Meds  Current Outpatient Medications   Medication Sig   â¢ simvastatin (ZOCOR) 40 MG tablet Take 1 tablet by mouth daily. â¢ insulin glargine (LANTUS SOLOSTAR, BASAGLAR) 100 UNIT/ML pen-injector Inject 20 Units into the skin nightly. â¢ lisinopril (ZESTRIL) 10 MG tablet Take 1 tablet by mouth daily. â¢ albuterol (PROAIR HFA) 108 (90 Base) MCG/ACT inhaler Inhale 2 puffs into the lungs every 4 hours as needed for Shortness of Breath or Wheezing. â¢ metformin (GLUCOPHAGE) 1000 MG tablet Take 1 tablet by mouth 2 times daily (with meals). â¢ glipiZIDE (GLUCOTROL) 10 MG tablet Take 1 tablet by mouth 2 times daily. â¢ benzonatate (TESSALON PERLES) 100 MG capsule Take 1 capsule by mouth 3 times daily as needed for Cough. No current facility-administered medications for this visit.         Exam  Visit Vitals  /78   Pulse 110   Ht 5' 6"" (1.676 m)   Wt 115.8 kg (255 lb 2.9 oz)   BMI 41.19 kg/mÂ²     Vitals Reviewed  General NAD, NCAT, obese   HEENT EOMI no thyromegaly, no thyroid nodules palpable no exophthalmos   CV tachycardia   Resp CTA bilaterally, no wheezes rales rhonchi  Abd soft nontender nondistended no lipohypertrophy  Ext no edema   MSK 5/5 strength in the UE and LE bilaterally   Skin no rashes, no ecchymoses, male pattern baldness   Neuro normal monofilament bilaterally, alert and oriented x 3   Psych cooperative noncombative    Labs/Diagnostics   -Reviewed in Epic/Care Connection    Assessment/Plan  T2DM  -c/b microalbuminaria   Hemoglobin A1C (%)   Date Value   10/21/2019 12.6 (H)   -goal HbA1c is 7% without frequent lows  -only started taking therapy consistently in 10/2019  -abnormal lipid profile as well  -increase lantus to 30 units daily  -restart glipizide 10mg bid with meals   -restart metformin 1g bid with meals   -monitor for diarrhea   -recheck fasting lipid profile, if TG " improved, start trulicity, no history of pancreatitis, no history of pancreatic cancer, no history of MEN syndromes   -reviewed appropriate med administration technique  -to do SMBG 2x/day, goal BS is   -to call if BS is <70 or >300  -bring log and meter to each visit   -discussed diet modifications and to receive 150 min of exercise if possible per week of moderate intensity  -discussed treatment for hypoglycemia with glucose tabs and/or 4oz of juice and wait 15 min to recheck BS and to repeat until BS>80  -counseled patient on long term disease complications including but not limited to blindness, neuropathy, PVD/PAD, CAD and heart disease, MI/Death, renal insufficiency and/or renal failure  -relevant labs reviewed in [employer]    DM HM:   -ophtho: needs to do DM eye exam, follow up at [facility] [place]   -renal: up to date   -HTN: at goal on ACEi  -HLD: continue on statin, consider switching to higher potency statin after he does repeat fasting lipid, decrease etoh intake   -feet: stable     Erectile Dysfunction   -discussed this is likely to uncontrolled DM  -do not recommend treatment at this time   -would hold off on measuring testosterone level due to obesity and uncontrolled DM, likely will be low anyways  -needs control of DM first     RTC in 6 weeks     Thank you, Dr. Mustapha Friend, for allowing me to participate in the care of this patient!

## 2021-12-21 NOTE — TELEPHONE ENCOUNTER
Confirmed with Shannan Herrera that patient has used $971.87 of OCI assistance and has a current balance of $28.13. Will follow up with patient.

## 2021-12-23 DIAGNOSIS — N18.32 STAGE 3B CHRONIC KIDNEY DISEASE: Primary | ICD-10-CM

## 2021-12-24 NOTE — TELEPHONE ENCOUNTER
Blaine SHORT submitted for 2022 via CMM to [employer] ([employer]). (KEY [id number])    No PA required.  Medicare Advantage Plan; No LIS. Co-pay $332.57

## 2021-12-27 NOTE — TELEPHONE ENCOUNTER
Inbound, patient returning call and verbalizes understanding of OCI balance and will wait for me to confirm with [facility] PAP program if OOP spending are still required for assistance in 2022. Will follow up.

## 2022-01-07 NOTE — PROGRESS NOTES
"Physical Therapy   Treatment    Juan Manuel Koehler   MRN: [id number]    08/19/17 1510   PT Time Calculation   PT Start Time 1510   PT Stop Time 1555   PT Total Time (min) 45 min   Treatment   Treatment Type Treatment   PT/PTA PTA   PTA Visit Number 1   General   PT Received On 08/19/17   Family/Caregiver Present Yes  (wife)   Patient Found (position) Supine in bed   Precautions   General Precautions aspiration;fall   Visual/Auditory Vision impaired   Subjective   Patient states Pt c/o nausea but agreeable to PT as tolerated.   Pain/Comfort   Pain Rating 1 no pain   Pain Rating Post-Intervention 1 no pain   Bed Mobility   Supine to Sit Supervision  (hospital bed)   Sit to Stand   Sit <> Stand Assistance Contact Guard Assistance;Stand By Assistance   Sit <> Stand Assistive Device Rolling Walker   Trials/Comments multiple trials   Stand to Sit   Assistance Contact Guard Assistance;Stand By Assistance   Assistive Device Rolling Walker   Trials/Comments multiple trials   Bed to Chair   Bed <> Chair Technique Stand Pivot   Bed <> Chair Assistance Contact Guard Assistance;Stand By Assistance   Bed <> Chair Assistive Device No Assistive Device   Gait 1   Surface 1 Level tile   Gait Assistive Device Rolling walker   Assistance 1 Contact Guard Assistance;Stand by Assistance   Gait Distance 206ft X2 with mild instability, cues to take time   Gait Pattern swing-through gait   Gait Deviation(s) decreased stride length  (slight forward flexed posture, cues for forward gaze)   Impairments Contributing to Gait Deviations impaired balance;impaired coordination;impaired motor control   Stairs/Curb Step   Rails 1 Bilateral   Device 1 No device   Assistance 1 Contact guard;Stand by Assistance   Comment/# Steps 1 ascend/descend twelve 6" steps with a reciprocal pattern   Stairs/ Curb Step  2   Device 2 Rolling walker   Assistance 2 Contact Guard Assistance;Stand by Assistance   Comment/# Steps 2 ascend/descend 4" curb step X1 trial, cues " for technique   High Level Ambulation   Side Stepping Hand held assist   Side Stepping Comments X20ft R&L min-CGA   Backwards Walking Hand held assist   Backwards Walking Comments X25ft min-CGA   Tandem Walking Forward Parallel bars   Tandem Walking Forward Comments length of parallel bars X3 trials with intermittent use of UEs   Tandem Walking Backwards Parallel bars   Tandem Walking Backwards Comments length of parallel bars X3 trials with intermittent use of UEs   Comments Single leg stance X2min alternating LEs only able to maintain for 5sec at most with min-CGA in parallel bars   Seated   Seated-Exercises Lower extremity   Seated-Exercise Comments isometric hip add against ball, hip abd against green TB, hip flx against green TB [id number] each   Activity Tolerance   Activity Tolerance Patient tolerated treatment well  (limited by nausea)   After Treatment   Patient Position After Treatment Seated in wheelchair   Patient after treatment left call button in reach  (wife present)   Assessment   Prognosis Good   Problem List Decreased endurance;Impaired balance;Decreased mobility;Decreased coordination;Decreased safety awareness;Decreased strength   Session Assessment progressing toward goals   Assessment Pt demonstrates good participation despite c/o nausea. He tolerated multiple standing balance tasks with min-CGA to complete. He also tolerated ambulation up full flight of stairs. Pt would benefit from continued therapy to further progress towards goals.   Level of Motivation/Participation good   Plan   Planned Therapy Intervention Continue with current plan   Physical Therapy Follow-up   PT Follow-up? Yes   Treatment/Billable Minutes   Gait training 30   Therapeutic Activity 5   Therapeutic Exercise 10   Total Time 45   ROMA MAGANA, PTA  8/19/2017           Picato Counseling:  I discussed with the patient the risks of Picato including but not limited to erythema, scaling, itching, weeping, crusting, and pain.

## 2022-01-13 ENCOUNTER — LAB VISIT (OUTPATIENT)
Dept: LAB | Facility: HOSPITAL | Age: 72
End: 2022-01-13
Attending: INTERNAL MEDICINE
Payer: MEDICARE

## 2022-01-13 DIAGNOSIS — N18.32 STAGE 3B CHRONIC KIDNEY DISEASE: ICD-10-CM

## 2022-01-13 LAB
ALBUMIN SERPL BCP-MCNC: 4.1 G/DL (ref 3.5–5.2)
ALBUMIN SERPL BCP-MCNC: 4.1 G/DL (ref 3.5–5.2)
ALP SERPL-CCNC: 94 U/L (ref 55–135)
ALT SERPL W/O P-5'-P-CCNC: 15 U/L (ref 10–44)
ANION GAP SERPL CALC-SCNC: 6 MMOL/L (ref 8–16)
ANION GAP SERPL CALC-SCNC: 6 MMOL/L (ref 8–16)
AST SERPL-CCNC: 18 U/L (ref 10–40)
BILIRUB SERPL-MCNC: 1 MG/DL (ref 0.1–1)
BUN SERPL-MCNC: 30 MG/DL (ref 8–23)
BUN SERPL-MCNC: 30 MG/DL (ref 8–23)
CALCIUM SERPL-MCNC: 9.6 MG/DL (ref 8.7–10.5)
CALCIUM SERPL-MCNC: 9.6 MG/DL (ref 8.7–10.5)
CHLORIDE SERPL-SCNC: 109 MMOL/L (ref 95–110)
CHLORIDE SERPL-SCNC: 109 MMOL/L (ref 95–110)
CO2 SERPL-SCNC: 26 MMOL/L (ref 23–29)
CO2 SERPL-SCNC: 26 MMOL/L (ref 23–29)
CREAT SERPL-MCNC: 1.9 MG/DL (ref 0.5–1.4)
CREAT SERPL-MCNC: 1.9 MG/DL (ref 0.5–1.4)
EST. GFR  (AFRICAN AMERICAN): 40 ML/MIN/1.73 M^2
EST. GFR  (AFRICAN AMERICAN): 40 ML/MIN/1.73 M^2
EST. GFR  (NON AFRICAN AMERICAN): 34 ML/MIN/1.73 M^2
EST. GFR  (NON AFRICAN AMERICAN): 34 ML/MIN/1.73 M^2
GLUCOSE SERPL-MCNC: 99 MG/DL (ref 70–110)
GLUCOSE SERPL-MCNC: 99 MG/DL (ref 70–110)
PHOSPHATE SERPL-MCNC: 4.3 MG/DL (ref 2.7–4.5)
POTASSIUM SERPL-SCNC: 4.1 MMOL/L (ref 3.5–5.1)
POTASSIUM SERPL-SCNC: 4.1 MMOL/L (ref 3.5–5.1)
PROT SERPL-MCNC: 7.5 G/DL (ref 6–8.4)
PTH-INTACT SERPL-MCNC: 96.8 PG/ML (ref 9–77)
SODIUM SERPL-SCNC: 141 MMOL/L (ref 136–145)
SODIUM SERPL-SCNC: 141 MMOL/L (ref 136–145)
URATE SERPL-MCNC: 6.4 MG/DL (ref 3.4–7)

## 2022-01-13 PROCEDURE — 84100 ASSAY OF PHOSPHORUS: CPT | Performed by: INTERNAL MEDICINE

## 2022-01-13 PROCEDURE — 83970 ASSAY OF PARATHORMONE: CPT | Performed by: INTERNAL MEDICINE

## 2022-01-13 PROCEDURE — 36415 COLL VENOUS BLD VENIPUNCTURE: CPT | Performed by: INTERNAL MEDICINE

## 2022-01-13 PROCEDURE — 80053 COMPREHEN METABOLIC PANEL: CPT | Performed by: INTERNAL MEDICINE

## 2022-01-13 PROCEDURE — 84550 ASSAY OF BLOOD/URIC ACID: CPT | Performed by: INTERNAL MEDICINE

## 2022-01-18 ENCOUNTER — OFFICE VISIT (OUTPATIENT)
Dept: NEPHROLOGY | Facility: CLINIC | Age: 72
End: 2022-01-18
Payer: MEDICARE

## 2022-01-18 VITALS
OXYGEN SATURATION: 99 % | HEART RATE: 72 BPM | SYSTOLIC BLOOD PRESSURE: 120 MMHG | BODY MASS INDEX: 18.42 KG/M2 | DIASTOLIC BLOOD PRESSURE: 58 MMHG | HEIGHT: 74 IN | WEIGHT: 143.5 LBS

## 2022-01-18 DIAGNOSIS — N25.81 HYPERPARATHYROIDISM DUE TO RENAL INSUFFICIENCY: Chronic | ICD-10-CM

## 2022-01-18 DIAGNOSIS — E87.5 HYPERKALEMIA: ICD-10-CM

## 2022-01-18 DIAGNOSIS — Z98.890 HISTORY OF UROSTOMY: ICD-10-CM

## 2022-01-18 DIAGNOSIS — R80.9 PROTEINURIA, UNSPECIFIED TYPE: ICD-10-CM

## 2022-01-18 DIAGNOSIS — R82.81 PYURIA: ICD-10-CM

## 2022-01-18 DIAGNOSIS — Z93.6 OTHER ARTIFICIAL OPENINGS OF URINARY TRACT STATUS: ICD-10-CM

## 2022-01-18 DIAGNOSIS — Z93.6 S/P ILEAL CONDUIT: ICD-10-CM

## 2022-01-18 DIAGNOSIS — N18.32 ANEMIA IN STAGE 3B CHRONIC KIDNEY DISEASE: ICD-10-CM

## 2022-01-18 DIAGNOSIS — I63.9 RIGHT-SIDED CEREBROVASCULAR ACCIDENT (CVA): Chronic | ICD-10-CM

## 2022-01-18 DIAGNOSIS — D63.1 ANEMIA IN STAGE 3B CHRONIC KIDNEY DISEASE: ICD-10-CM

## 2022-01-18 DIAGNOSIS — N18.32 STAGE 3B CHRONIC KIDNEY DISEASE: Primary | Chronic | ICD-10-CM

## 2022-01-18 PROCEDURE — 99999 PR PBB SHADOW E&M-EST. PATIENT-LVL III: CPT | Mod: PBBFAC,,, | Performed by: INTERNAL MEDICINE

## 2022-01-18 PROCEDURE — 1126F AMNT PAIN NOTED NONE PRSNT: CPT | Mod: CPTII,S$GLB,, | Performed by: INTERNAL MEDICINE

## 2022-01-18 PROCEDURE — 99214 PR OFFICE/OUTPT VISIT, EST, LEVL IV, 30-39 MIN: ICD-10-PCS | Mod: S$GLB,,, | Performed by: INTERNAL MEDICINE

## 2022-01-18 PROCEDURE — 1159F PR MEDICATION LIST DOCUMENTED IN MEDICAL RECORD: ICD-10-PCS | Mod: CPTII,S$GLB,, | Performed by: INTERNAL MEDICINE

## 2022-01-18 PROCEDURE — 99999 PR PBB SHADOW E&M-EST. PATIENT-LVL III: ICD-10-PCS | Mod: PBBFAC,,, | Performed by: INTERNAL MEDICINE

## 2022-01-18 PROCEDURE — 3288F PR FALLS RISK ASSESSMENT DOCUMENTED: ICD-10-PCS | Mod: CPTII,S$GLB,, | Performed by: INTERNAL MEDICINE

## 2022-01-18 PROCEDURE — 1101F PR PT FALLS ASSESS DOC 0-1 FALLS W/OUT INJ PAST YR: ICD-10-PCS | Mod: CPTII,S$GLB,, | Performed by: INTERNAL MEDICINE

## 2022-01-18 PROCEDURE — 1160F RVW MEDS BY RX/DR IN RCRD: CPT | Mod: CPTII,S$GLB,, | Performed by: INTERNAL MEDICINE

## 2022-01-18 PROCEDURE — 3066F PR DOCUMENTATION OF TREATMENT FOR NEPHROPATHY: ICD-10-PCS | Mod: CPTII,S$GLB,, | Performed by: INTERNAL MEDICINE

## 2022-01-18 PROCEDURE — 3008F BODY MASS INDEX DOCD: CPT | Mod: CPTII,S$GLB,, | Performed by: INTERNAL MEDICINE

## 2022-01-18 PROCEDURE — 3288F FALL RISK ASSESSMENT DOCD: CPT | Mod: CPTII,S$GLB,, | Performed by: INTERNAL MEDICINE

## 2022-01-18 PROCEDURE — 3078F PR MOST RECENT DIASTOLIC BLOOD PRESSURE < 80 MM HG: ICD-10-PCS | Mod: CPTII,S$GLB,, | Performed by: INTERNAL MEDICINE

## 2022-01-18 PROCEDURE — 3074F SYST BP LT 130 MM HG: CPT | Mod: CPTII,S$GLB,, | Performed by: INTERNAL MEDICINE

## 2022-01-18 PROCEDURE — 99214 OFFICE O/P EST MOD 30 MIN: CPT | Mod: S$GLB,,, | Performed by: INTERNAL MEDICINE

## 2022-01-18 PROCEDURE — 1101F PT FALLS ASSESS-DOCD LE1/YR: CPT | Mod: CPTII,S$GLB,, | Performed by: INTERNAL MEDICINE

## 2022-01-18 PROCEDURE — 3078F DIAST BP <80 MM HG: CPT | Mod: CPTII,S$GLB,, | Performed by: INTERNAL MEDICINE

## 2022-01-18 PROCEDURE — 3066F NEPHROPATHY DOC TX: CPT | Mod: CPTII,S$GLB,, | Performed by: INTERNAL MEDICINE

## 2022-01-18 PROCEDURE — 1126F PR PAIN SEVERITY QUANTIFIED, NO PAIN PRESENT: ICD-10-PCS | Mod: CPTII,S$GLB,, | Performed by: INTERNAL MEDICINE

## 2022-01-18 PROCEDURE — 3008F PR BODY MASS INDEX (BMI) DOCUMENTED: ICD-10-PCS | Mod: CPTII,S$GLB,, | Performed by: INTERNAL MEDICINE

## 2022-01-18 PROCEDURE — 1159F MED LIST DOCD IN RCRD: CPT | Mod: CPTII,S$GLB,, | Performed by: INTERNAL MEDICINE

## 2022-01-18 PROCEDURE — 1160F PR REVIEW ALL MEDS BY PRESCRIBER/CLIN PHARMACIST DOCUMENTED: ICD-10-PCS | Mod: CPTII,S$GLB,, | Performed by: INTERNAL MEDICINE

## 2022-01-18 PROCEDURE — 3074F PR MOST RECENT SYSTOLIC BLOOD PRESSURE < 130 MM HG: ICD-10-PCS | Mod: CPTII,S$GLB,, | Performed by: INTERNAL MEDICINE

## 2022-01-18 RX ORDER — PATIROMER 16.8 G/1
16.8 POWDER, FOR SUSPENSION ORAL DAILY
Qty: 30 PACKET | Refills: 6 | Status: SHIPPED | OUTPATIENT
Start: 2022-01-18 | End: 2022-09-21 | Stop reason: SDUPTHER

## 2022-01-18 NOTE — TELEPHONE ENCOUNTER
Incoming call from Cora at [facility] inquiring about Veltassa status update as pt only has a few doses left on-hand. Forwarded to Fe HILL to discuss any pertinent updates

## 2022-01-18 NOTE — PROGRESS NOTES
Subjective:   Patient ID: Juan Manuel Koehler is a 71 y.o. White male who presents for follow up evaluation of Chronic Kidney Disease and Hyperkalemia      HPI   was evaluated for CKD and hyperkalemia. He was referred by his oncologist, Dr. Calero. He was seen through virtual visit on 5/6/2020.    I reviewed interim pertinent medical chart.    He states he is almost about to run out of Veltassa supply and is in need of urgent refill. Brought to his attention that I had renewed the Rx in mid December 2021 with 30 days supple and a refill. Staff was instructed to contact [facility] as new refill was sent and to check on the status. Our office was informed by [facility] that they can provide refill after he can pay the copay. This information was communicated to patient after this visit.    On further questioning, he reports noticing cloudy urine in the ostomy bag. He does not have any systemic symptoms. Pre clinic urinalysis consistent with UTI. Plan to send urine culture. Pt currently changing bag every 4 days, suggest to change every 2 days and reestablish care with urology.    He has had exposure to IV contrast in 2017 around the time of diagnosis of his cancer. He had urologic surgery which was complicated. He developed ELDER perioperatively. He has had multiple episodes of ELDER since then, context not known.    He denies any muscle weakness. He denies any nausea/ vomiting/ diarrhea/ decreased urine output/ fever. He does not have any orthostatic symptoms.    He has a Rx for Doxy, apparently has been on it for a long time, reason unclear. He remains on Otezla.    He has a longstanding h/o bladder cancer for which he did not follow up for a number of years after initial treatment in 1990's. He later developed gross hematuria and was found to have a large bladder tumor causing bilateral ureteral obstruction causing ELDER. He underwent tumor resection, attempted JJ stent in April  2017, eventually had bilateral nephrostomy tubes placed which helped improve renal function. His kidney function however did not improve adequately so he could not have certain chemo regimens which were cisplatin based.he was diagnosed to have urothelial carcinoma.     In august 2017 he developed CVA. He developed bleeding complication from stoma in 9/2017. He required multiple PRBC, eventually underwent embolization of a arterial vessel to the stoma.    He denies any NSAID use. He does not have a recent exposure to IV contrast. He does not check BP at home routinely.    He has an ostomy. He denies decreased urine output. He denies recent nausea/ vomiting/ poor intake orally/ cloudy urine/ lethargy/ muscle weakness.    Per previous clinic notes:  He has had previous several episodes of severe to profound hyperkalemia. He has been chronically taking Patiromer. pt reported he was getting this medicine per financial funding provided through [employer]. But apparently these funds are expiring and he is worried that after a few days to weeks he will no longer have liborio for this medicine. Now [facility] and financial assistance if possible. He stated he has been following low K diet as best as he can.       Has not followed with urologist in 2 years. He has an ileal conduit. He has been on sleeping pill either trazodone 100 or temazepam 15 mg.    Renal Function:  Lab Results   Component Value Date    GLU 99 01/13/2022    GLU 99 01/13/2022     01/13/2022     01/13/2022    K 4.1 01/13/2022    K 4.1 01/13/2022     01/13/2022     01/13/2022    CO2 26 01/13/2022    CO2 26 01/13/2022    BUN 30 (H) 01/13/2022    BUN 30 (H) 01/13/2022    CALCIUM 9.6 01/13/2022    CALCIUM 9.6 01/13/2022    CREATININE 1.9 (H) 01/13/2022    CREATININE 1.9 (H) 01/13/2022    ALBUMIN 4.1 01/13/2022    ALBUMIN 4.1 01/13/2022    PHOS 4.3 01/13/2022    PHOS 3.7 08/17/2020    ESTGFRAFRICA 40 (A) 01/13/2022     ESTGFRAFRICA 40 (A) 01/13/2022    EGFRNONAA 34 (A) 01/13/2022    EGFRNONAA 34 (A) 01/13/2022       Urinalysis:  Lab Results   Component Value Date    APPEARANCEUA Hazy (A) 01/13/2022    PHUR 8.0 01/13/2022    SPECGRAV 1.015 01/13/2022    PROTEINUA 1+ (A) 01/13/2022    GLUCUA Negative 01/13/2022    OCCULTUA Negative 01/13/2022    NITRITE Positive (A) 01/13/2022    LEUKOCYTESUR 3+ (A) 01/13/2022       Protein/Creatinine Ratio:  Lab Results   Component Value Date    PROTEINURINE 29 (H) 01/13/2022    CREATRANDUR 78.3 01/13/2022    UTPCR 0.37 (H) 01/13/2022       CBC:  Lab Results   Component Value Date    WBC 6.40 09/16/2021    HGB 12.4 (L) 09/16/2021    HCT 38.0 (L) 09/16/2021       PTH:  Lab Results   Component Value Date    PTH 96.8 (H) 01/13/2022     Vit D 39    Review of Systems   Constitutional: Positive for fatigue. Negative for appetite change, chills and fever.   HENT: Negative for facial swelling and sore throat.    Eyes: Negative for pain and discharge.   Respiratory: Negative for cough, shortness of breath and wheezing.    Cardiovascular: Negative for chest pain and leg swelling.   Gastrointestinal: Negative for abdominal pain, diarrhea, nausea and vomiting.   Endocrine: Negative for polydipsia and polyuria.   Genitourinary: Negative for decreased urine volume, flank pain and hematuria.   Musculoskeletal: Negative for back pain and myalgias.   Skin: Negative for pallor and rash.   Allergic/Immunologic: Negative for environmental allergies.   Neurological: Negative for dizziness, light-headedness and headaches.   Psychiatric/Behavioral: use of sleep medicine.        Objective:   Physical Exam    Gen NAD  Alert and oriented x 3  Respiration unlabored  Lungs no crackles/ no wheezing  CV S1S2+  abd soft, ostomy bag with cloudy urine  extr no edema    Assessment:     1. Stage 3b chronic kidney disease    2. Nephrostomy    3. Anemia in stage 3b chronic kidney disease    4. History of urostomy    5.  Hyperparathyroidism due to renal insufficiency    6. S/P ileal conduit    7. Right-sided cerebrovascular accident (CVA)    8. Hyperkalemia    9. Pyuria    10. Proteinuria, unspecified type        Plan:       Problem List Items Addressed This Visit        Neuro    Right-sided cerebrovascular accident (CVA) (Chronic)    Relevant Orders    CBC Auto Differential    PTH, intact    Renal Function Panel    Urinalysis    Protein / creatinine ratio, urine    Urine culture       Renal/    Stage 3 chronic kidney disease - Primary (Chronic)    Relevant Medications    VELTASSA 16.8 gram PwPk    Other Relevant Orders    CBC Auto Differential    PTH, intact    Renal Function Panel    Urinalysis    Protein / creatinine ratio, urine    Urine culture    S/P ileal conduit    Relevant Orders    CBC Auto Differential    PTH, intact    Renal Function Panel    Urinalysis    Protein / creatinine ratio, urine    Urine culture    History of urostomy    Relevant Orders    CBC Auto Differential    PTH, intact    Renal Function Panel    Urinalysis    Protein / creatinine ratio, urine    Urine culture    Nephrostomy    Relevant Orders    CBC Auto Differential    PTH, intact    Renal Function Panel    Urinalysis    Protein / creatinine ratio, urine    Urine culture    Hyperkalemia    Relevant Medications    VELTASSA 16.8 gram PwPk    Other Relevant Orders    CBC Auto Differential    PTH, intact    Renal Function Panel    Urinalysis    Protein / creatinine ratio, urine    Urine culture    Pyuria    Relevant Orders    CBC Auto Differential    PTH, intact    Renal Function Panel    Urinalysis    Protein / creatinine ratio, urine    Urine culture    Proteinuria    Relevant Orders    CBC Auto Differential    PTH, intact    Renal Function Panel    Urinalysis    Protein / creatinine ratio, urine    Urine culture       Oncology    Anemia in chronic kidney disease    Relevant Orders    CBC Auto Differential    PTH, intact    Renal Function Panel     Urinalysis    Protein / creatinine ratio, urine    Urine culture       Endocrine    Hyperparathyroidism due to renal insufficiency (Chronic)    Relevant Orders    CBC Auto Differential    PTH, intact    Renal Function Panel    Urinalysis    Protein / creatinine ratio, urine    Urine culture        Mr. 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 Prior episodes of ELDER, severe hyperkalemia, ELDER was suspected from prolonged volume depletion causing ATN/ possible contrast induced ELDER.     I had a detailed discussion with patient about his CKD diagnosis, CKD staging, interpretation of serial labs pertaining to his kidneys, potential risk of progression of CKD. Possible etiology of his CKD, need for improved BP control, strict low salt diet, avoiding any NSAID, having periodic monitoring of renal labs to assess and treat any electrolyte disturbance, acid base disorder, to follow progress of CKD with creatinine and eGFR. I stressed to have BP monitoring at home and to bring readings for review with his future visits with MD. Also d/w him need for changed to his medications as CKD progresses and as detailed below.     - periodically monitor renal panel for electrolytes, acid base status, eGFR  - CKD staging, diagnosis, onset of CKD, potential risk of CKD progression, potential risk of ELDER due to volume depletion/ CAROLA/ ATN due to hemodynamic changes d/w patient  - stress to follow low salt diet, keep well hydrated, follow low K diet in case of any dyskalemia, nutritional changes d/w patient   - trend PTH levels, vit D, phos, corrected Ca and treat as necessary   - trend urine studies for proteinuria  - avoid NSAID/ bactrim/ IV contrast/ gadolinium/ aminoglycoside/ fleet enema/ PPI where possible  - avoid ACE-I/ ARB/ K sparing diuretic use given his persistent and severe hyperkalemia  - pt advised to keep a record of BP at home and bring it for review with next MD visit with  PCP  - management of chronic and multifactorial anemia to oncologist  - pt advised to reestablish care with his urologist     Renally dose Otezla   Defer to PCP about doxy use?      Cloudy urine  Management of ileal conduit/ ostomy  Pt to reestablish care with urology  Needs more frequent changes of ostomy bag  Send urine culture    Addendum  Urine culture report followed, growing proteus  Rx sent for Cipro     Plan, labs, recommendations were discussed with patient, his questions were answered to his satisfaction.     RTC 3 months    Addendum    Case was referred to Clinical pharmacy for assistance with patiromer.   Case discussed multiple times with clinical pharmacist to see if patient can be eligible for any grants/ financial assistance for his much needed medication, patiromer. Details of communication received from the clinical specialty pharmacy were communicated to patient. He has made decision per most recent communication from him to our office staff that he will take the cost of copay and continue to received medication from specialty pharmacy.

## 2022-01-18 NOTE — TELEPHONE ENCOUNTER
Incoming call from patient regarding Veltassa prescription. Pt only has a few doses remaining on hand. Pt states that he is unable to pay the $332.46 copay. Re-iterated to patient that in order to qualify for PAP he must spend $600 out of pocket first and no other patient assistance is available.

## 2022-01-19 ENCOUNTER — TELEPHONE (OUTPATIENT)
Dept: NEPHROLOGY | Facility: CLINIC | Age: 72
End: 2022-01-19
Payer: MEDICARE

## 2022-01-19 NOTE — TELEPHONE ENCOUNTER
Inbound, patient calling to discuss copay for Veltassa. (double checked and patient is over-qualified for LIS option) Patient will call his Medicare Part D helpdesk to discuss price and call us back.

## 2022-01-19 NOTE — TELEPHONE ENCOUNTER
Susie Sandoval Staff  Caller: pt (Today,  1:49 PM)  Pt requesting a call back in regards to having issues getting prescription refilled.       Pt @[phone]       Returned patient phone call, no answer left voicemail to call [facility]

## 2022-01-21 ENCOUNTER — TELEPHONE (OUTPATIENT)
Dept: NEPHROLOGY | Facility: CLINIC | Age: 72
End: 2022-01-21
Payer: MEDICARE

## 2022-01-21 NOTE — TELEPHONE ENCOUNTER
Shira Sandoval Staff  Caller: Unspecified (Today,  8:40 AM)  Pt advised he called on Wednesday and haven't received a call back yet, pt advised he is having trouble getting his medication. Requesting a call back.         [phone]       Returned patient phone call, no answer, unable to leave voicemail

## 2022-01-24 ENCOUNTER — TELEPHONE (OUTPATIENT)
Dept: NEPHROLOGY | Facility: CLINIC | Age: 72
End: 2022-01-24
Payer: MEDICARE

## 2022-01-24 NOTE — TELEPHONE ENCOUNTER
"Butch Sandoval Staff  Caller: patient (Today,  8:34 AM)  Pt. Called in regards to being prescribed an alternate medication that he can afford.   Pt. Requesting a call back from "Estrella".               Pt.@[phone]       Returned phone call, no answer, unable to leave voicemail as it is says not accepting messages  "

## 2022-01-25 ENCOUNTER — TELEPHONE (OUTPATIENT)
Dept: NEPHROLOGY | Facility: CLINIC | Age: 72
End: 2022-01-25
Payer: MEDICARE

## 2022-01-25 ENCOUNTER — SPECIALTY PHARMACY (OUTPATIENT)
Dept: PHARMACY | Facility: CLINIC | Age: 72
End: 2022-01-25
Payer: MEDICARE

## 2022-01-25 NOTE — TELEPHONE ENCOUNTER
Michael Sandoval Staff  Caller: Daniella (Today,  8:14 AM)  Daniella with [facility] calling in regards to a new script for RX: VELTASSA 16.8 gram PwPk. Requesting call back       Daniella@ [phone]     FAX: [phone]       *Also stated that patient is completely out of meds.       Returned phone call, spoke with Pharmacist @ [facility], informed that patient has a Prescription for Medication but he is having problems with the co-pay. Pharmacist states not sure why call was placed to our office.

## 2022-01-25 NOTE — TELEPHONE ENCOUNTER
Specialty Pharmacy - Refill Coordination    Specialty Medication Orders Linked to Encounter    Flowsheet Row Most Recent Value   Medication #1 VELTASSA 16.8 gram PwPk (Order#[id number], Rx#[id number])        Patient agreed to pay copay since he does not meet $600 OOP requirement for VeltassaKonnect Program. Hyperkalemia grants are also closed. HH size and income obtained in case grants open. Referral remains open.     Refill Questions - Documented Responses    Flowsheet Row Most Recent Value   Patient Availability and HIPAA Verification    Does patient want to proceed with activity? Yes   HIPAA/medical authority confirmed? Yes   Relationship to patient of person spoken to? Self   Refill Screening Questions    Changes to allergies? No   Changes to medications? No   New conditions since last clinic visit? No   Unplanned office visit, urgent care, ED, or hospital admission in the last 4 weeks? No   How does patient/caregiver feel medication is working? Very good   Financial problems or insurance changes? No   How many doses of your specialty medications were missed in the last 4 weeks? 0   Would patient like to speak to a pharmacist? No   When does the patient need to receive the medication? 01/29/22   Refill Delivery Questions    How will the patient receive the medication? Delivery Jessa   When does the patient need to receive the medication? 01/29/22   Shipping Address Home   Address in [facility] confirmed and updated if neccessary? Yes   Expected Copay ($) 332.46   Is the patient able to afford the medication copay? Yes   Payment Method new CC added to file   Days supply of Refill 30   Supplies needed? No supplies needed   Refill activity completed? Yes   Refill activity plan Refill scheduled   Shipment/Pickup Date: 01/27/22          Current Outpatient Medications   Medication Sig    apremilast (OTEZLA) 30 mg Tab Take by mouth.    doxycycline (VIBRAMYCIN) 100 MG Cap Take 100 mg by mouth once daily.     temazepam (RESTORIL) 15 mg Cap Take 1 capsule (15 mg total) by mouth nightly as needed (insomnia).    traZODone (DESYREL) 100 MG tablet Take 100 mg by mouth every evening.    VELTASSA 16.8 gram PwPk Take 1 packet (16.8 g total) by mouth once daily.   Last reviewed on 1/18/2022  1:39 PM by Lori Blake MD    Review of patient's allergies indicates:  No Known Allergies Last reviewed on  1/18/2022 1:39 PM by Lori Blake      Tasks added this encounter   No tasks added.   Tasks due within next 3 months   12/21/2021 - Referral Authorization     Daniella Lofton, PharmD  [facility]  [address] [place] [id number]  Phone: [phone]  Fax: [phone]

## 2022-01-25 NOTE — TELEPHONE ENCOUNTER
Patient agreed to pay copay since he does not meet $600 OOP requirement for VeltassaKonnect Program. Hyperkalemia grants are also closed. HH size and income obtained in case grants open. Referral remains open.

## 2022-01-25 NOTE — TELEPHONE ENCOUNTER
Incoming call from patient regarding Veltassa assistance. States that he has been trying to reach [facility] to discuss alternatives, but he has not been able to get in touch with anyone. Several call attempts in chart from [facility] to patient, unable to LVM. Patient uses MyChart, but states that he does not have time to message them to clarify communication, and would like [facility] to message on his behalf. Staff message sent to [facility] with alternative contact info [[phone]].

## 2022-01-26 ENCOUNTER — TELEPHONE (OUTPATIENT)
Dept: NEPHROLOGY | Facility: CLINIC | Age: 72
End: 2022-01-26
Payer: MEDICARE

## 2022-01-26 RX ORDER — CIPROFLOXACIN 500 MG/1
500 TABLET ORAL 2 TIMES DAILY
Qty: 10 TABLET | Refills: 0 | Status: SHIPPED | OUTPATIENT
Start: 2022-01-26 | End: 2022-05-27 | Stop reason: ALTCHOICE

## 2022-01-26 NOTE — TELEPHONE ENCOUNTER
MD Love Jimenez, RN  Michelle,     Urine culture shows UTI.   Cipro prescription sent to his local pharmacy.   Please have him change urine bag more frequently as he informed me last week he does not change it for 4 days sometimes.     Does he still need alternative for Veltassa?     Thanks,   Lori       Phoned patient and informed of medication sent to Pharmacy for UTI, patient will pick and stated will change urine bags more frequently than every 3 days.

## 2022-02-21 ENCOUNTER — SPECIALTY PHARMACY (OUTPATIENT)
Dept: PHARMACY | Facility: CLINIC | Age: 72
End: 2022-02-21
Payer: MEDICARE

## 2022-02-26 NOTE — TELEPHONE ENCOUNTER
Specialty Pharmacy - Refill Coordination    Specialty Medication Orders Linked to Encounter    Flowsheet Row Most Recent Value   Medication #1 VELTASSA 16.8 gram PwPk (Order#[id number], Rx#[id number])          Refill Questions - Documented Responses    Flowsheet Row Most Recent Value   Patient Availability and HIPAA Verification    Does patient want to proceed with activity? Yes   HIPAA/medical authority confirmed? Yes   Relationship to patient of person spoken to? Self   Refill Screening Questions    Changes to allergies? No   Changes to medications? No   New conditions since last clinic visit? No   Unplanned office visit, urgent care, ED, or hospital admission in the last 4 weeks? No   How does patient/caregiver feel medication is working? Very good   Financial problems or insurance changes? No   How many doses of your specialty medications were missed in the last 4 weeks? 0   Would patient like to speak to a pharmacist? No   When does the patient need to receive the medication? 03/02/22   Refill Delivery Questions    How will the patient receive the medication? Delivery Jessa   When does the patient need to receive the medication? 03/02/22   Shipping Address Home   Address in [facility] confirmed and updated if neccessary? Yes   Expected Copay ($) 332.46   Is the patient able to afford the medication copay? Yes   Payment Method CC on file   Days supply of Refill 30   Supplies needed? No supplies needed   Refill activity completed? Yes   Refill activity plan Refill scheduled   Shipment/Pickup Date: 03/02/22          Current Outpatient Medications   Medication Sig    apremilast (OTEZLA) 30 mg Tab Take by mouth.    ciprofloxacin HCl (CIPRO) 500 MG tablet Take 1 tablet (500 mg total) by mouth 2 (two) times daily.    doxycycline (VIBRAMYCIN) 100 MG Cap Take 100 mg by mouth once daily.    temazepam (RESTORIL) 15 mg Cap Take 1 capsule (15 mg total) by mouth nightly as needed (insomnia).    traZODone (DESYREL)  100 MG tablet Take 100 mg by mouth every evening.    VELTASSA 16.8 gram PwPk Take 1 packet (16.8 g total) by mouth once daily.   Last reviewed on 1/18/2022  1:39 PM by Lori Blake MD    Review of patient's allergies indicates:  No Known Allergies Last reviewed on  1/26/2022 10:36 AM by Lori Blake      Tasks added this encounter   3/25/2022 - Refill Call (Auto Added)   Tasks due within next 3 months   12/21/2021 - Referral Authorization     Sona Jose [facility]  [address] [place] [id number]  Phone: [phone]  Fax: [phone]

## 2022-03-10 NOTE — TELEPHONE ENCOUNTER
Reach out to Rep Cortes at [facility]- Inform patient has reach $600 [place] and program will need proof of income along with [employer] document. Patient will need to call program to confirm info.    Reach patient to email back his income to [employer] in following days for submission.

## 2022-03-11 NOTE — TELEPHONE ENCOUNTER
Received patient social security benefits letter and submitted to Blaine Portillo with [employer] to [phone]

## 2022-03-14 NOTE — TELEPHONE ENCOUNTER
Outgoing Blaine Baez- Need additional income document such as spousal income and 1040 if patient file taxes.Emailed patient for additional info.

## 2022-03-16 NOTE — TELEPHONE ENCOUNTER
Incoming call from patient regarding Veltassa. Patient stated he spoke with a [employer] rep this morning and they stated they still have not received the spousal income information. Patient expressed concern since he emailed the spousal income info to [facility] days ago. Informed patient the FA team will look into it further and follow up with him. Patient verbalized understanding.

## 2022-03-16 NOTE — TELEPHONE ENCOUNTER
Outgoing call - Patient will email his spousal social security letter to [facility] for Veltassa submission today.

## 2022-03-21 NOTE — TELEPHONE ENCOUNTER
Return patient call- patient has 1 week onhand but state PAP still pending review. Will pend out to call PAP Wednesday if no determination.

## 2022-03-23 NOTE — TELEPHONE ENCOUNTER
Received approval letter from [employer] until 12/31/22. Patient inform and will reach out to them if no contact within a couple of days.

## 2022-03-25 ENCOUNTER — SPECIALTY PHARMACY (OUTPATIENT)
Dept: PHARMACY | Facility: CLINIC | Age: 72
End: 2022-03-25
Payer: MEDICARE

## 2022-03-25 NOTE — TELEPHONE ENCOUNTER
Patient reported that he will be receiving his Veltassa through [employer] for the rest of the year. He requested that [facility] follow up with him in December 2022. He confirmed that he received his first shipment from [employer]. Routing to [facility].

## 2022-04-18 ENCOUNTER — PATIENT MESSAGE (OUTPATIENT)
Dept: ADMINISTRATIVE | Facility: OTHER | Age: 72
End: 2022-04-18
Payer: MEDICARE

## 2022-05-17 ENCOUNTER — LAB VISIT (OUTPATIENT)
Dept: LAB | Facility: HOSPITAL | Age: 72
End: 2022-05-17
Attending: INTERNAL MEDICINE
Payer: MEDICARE

## 2022-05-17 DIAGNOSIS — Z93.6 OTHER ARTIFICIAL OPENINGS OF URINARY TRACT STATUS: ICD-10-CM

## 2022-05-17 DIAGNOSIS — R80.9 PROTEINURIA, UNSPECIFIED TYPE: ICD-10-CM

## 2022-05-17 DIAGNOSIS — E87.5 HYPERKALEMIA: ICD-10-CM

## 2022-05-17 DIAGNOSIS — N18.32 ANEMIA IN STAGE 3B CHRONIC KIDNEY DISEASE: ICD-10-CM

## 2022-05-17 DIAGNOSIS — D63.1 ANEMIA IN STAGE 3B CHRONIC KIDNEY DISEASE: ICD-10-CM

## 2022-05-17 DIAGNOSIS — Z93.6 S/P ILEAL CONDUIT: ICD-10-CM

## 2022-05-17 DIAGNOSIS — I63.9 RIGHT-SIDED CEREBROVASCULAR ACCIDENT (CVA): Chronic | ICD-10-CM

## 2022-05-17 DIAGNOSIS — Z98.890 HISTORY OF UROSTOMY: ICD-10-CM

## 2022-05-17 DIAGNOSIS — N18.32 STAGE 3B CHRONIC KIDNEY DISEASE: Chronic | ICD-10-CM

## 2022-05-17 DIAGNOSIS — R82.81 PYURIA: ICD-10-CM

## 2022-05-17 DIAGNOSIS — N25.81 HYPERPARATHYROIDISM DUE TO RENAL INSUFFICIENCY: Chronic | ICD-10-CM

## 2022-05-17 LAB
CREAT UR-MCNC: 53.7 MG/DL (ref 23–375)
PROT UR-MCNC: 18 MG/DL (ref 0–15)
PROT/CREAT UR: 0.34 MG/G{CREAT} (ref 0–0.2)

## 2022-05-17 PROCEDURE — 82570 ASSAY OF URINE CREATININE: CPT | Performed by: INTERNAL MEDICINE

## 2022-05-24 ENCOUNTER — TELEPHONE (OUTPATIENT)
Dept: NEPHROLOGY | Facility: CLINIC | Age: 72
End: 2022-05-24
Payer: MEDICARE

## 2022-05-24 NOTE — TELEPHONE ENCOUNTER
[facility] Staff  Caller: Unspecified (Today,  1:07 PM)  Type:  Needs Medical Advice     Who Called: PT   Would the patient rather a call back or a response via [facility]? Callback   Best Call Back Number: [phone]   Additional Information: Pt requesting callback from office to discuss prescription filling issues           Returned phone call, no answer. Left voicemail message to call [facility]

## 2022-05-27 PROBLEM — I10 ESSENTIAL HYPERTENSION: Chronic | Status: RESOLVED | Noted: 2017-08-06 | Resolved: 2022-05-27

## 2022-06-09 NOTE — TELEPHONE ENCOUNTER
spoke with pt on today in regards to upcoming appointment on 05/13/19, pt is aware and has confirm.  
family member

## 2022-07-10 NOTE — PLAN OF CARE
Problem: Patient Care Overview  Goal: Plan of Care Review  Outcome: Ongoing (interventions implemented as appropriate)  Patient AAOx4, Patient VS stable. Patient had some nausea and vomiting liquid today. Instructed patient to use call light for assistance and before getting up. Bed alarm set, will continue to monitor patient.       None

## 2022-07-19 ENCOUNTER — PATIENT MESSAGE (OUTPATIENT)
Dept: RESEARCH | Facility: CLINIC | Age: 72
End: 2022-07-19
Payer: MEDICARE

## 2022-08-02 DIAGNOSIS — C67.9 UROTHELIAL CARCINOMA OF BLADDER: Primary | ICD-10-CM

## 2022-08-02 DIAGNOSIS — N25.81 HYPERPARATHYROIDISM DUE TO RENAL INSUFFICIENCY: ICD-10-CM

## 2022-09-14 ENCOUNTER — HOSPITAL ENCOUNTER (OUTPATIENT)
Dept: RADIOLOGY | Facility: HOSPITAL | Age: 72
Discharge: HOME | End: 2022-09-14
Attending: INTERNAL MEDICINE
Payer: MEDICARE

## 2022-09-14 DIAGNOSIS — N25.81 HYPERPARATHYROIDISM DUE TO RENAL INSUFFICIENCY: ICD-10-CM

## 2022-09-14 DIAGNOSIS — C67.9 UROTHELIAL CARCINOMA OF BLADDER: ICD-10-CM

## 2022-09-14 LAB
CREAT SERPL-MCNC: 1.9 MG/DL (ref 0.5–1.4)
SAMPLE: ABNORMAL

## 2022-09-14 PROCEDURE — 71260 CT THORAX DX C+: CPT | Mod: 26,,, | Performed by: RADIOLOGY

## 2022-09-14 PROCEDURE — 71260 CT CHEST ABDOMEN PELVIS WITH CONTRAST (XPD): ICD-10-PCS | Mod: 26,,, | Performed by: RADIOLOGY

## 2022-09-14 PROCEDURE — 71260 CT THORAX DX C+: CPT | Mod: TC

## 2022-09-14 PROCEDURE — 74177 CT ABD & PELVIS W/CONTRAST: CPT | Mod: 26,,, | Performed by: RADIOLOGY

## 2022-09-14 PROCEDURE — 74177 CT CHEST ABDOMEN PELVIS WITH CONTRAST (XPD): ICD-10-PCS | Mod: 26,,, | Performed by: RADIOLOGY

## 2022-09-14 PROCEDURE — 25500020 PHARM REV CODE 255: Performed by: INTERNAL MEDICINE

## 2022-09-14 PROCEDURE — 74177 CT ABD & PELVIS W/CONTRAST: CPT | Mod: TC

## 2022-09-14 RX ADMIN — IOHEXOL 75 ML: 350 INJECTION, SOLUTION INTRAVENOUS at 09:09

## 2022-09-19 ENCOUNTER — OFFICE VISIT (OUTPATIENT)
Dept: HEMATOLOGY/ONCOLOGY | Facility: CLINIC | Age: 72
End: 2022-09-19
Payer: MEDICARE

## 2022-09-19 ENCOUNTER — LAB VISIT (OUTPATIENT)
Dept: LAB | Facility: HOSPITAL | Age: 72
End: 2022-09-19
Attending: INTERNAL MEDICINE
Payer: MEDICARE

## 2022-09-19 VITALS
RESPIRATION RATE: 20 BRPM | SYSTOLIC BLOOD PRESSURE: 116 MMHG | BODY MASS INDEX: 16.86 KG/M2 | DIASTOLIC BLOOD PRESSURE: 61 MMHG | TEMPERATURE: 98 F | HEART RATE: 58 BPM | OXYGEN SATURATION: 97 % | WEIGHT: 131.38 LBS | HEIGHT: 74 IN

## 2022-09-19 DIAGNOSIS — C67.9 UROTHELIAL CARCINOMA OF BLADDER: Primary | ICD-10-CM

## 2022-09-19 DIAGNOSIS — C67.0 MALIGNANT NEOPLASM OF TRIGONE OF URINARY BLADDER: ICD-10-CM

## 2022-09-19 LAB
ALBUMIN SERPL BCP-MCNC: 4.2 G/DL (ref 3.5–5.2)
ALP SERPL-CCNC: 62 U/L (ref 55–135)
ALT SERPL W/O P-5'-P-CCNC: 17 U/L (ref 10–44)
ANION GAP SERPL CALC-SCNC: 6 MMOL/L (ref 8–16)
AST SERPL-CCNC: 16 U/L (ref 10–40)
BASOPHILS # BLD AUTO: 0.04 K/UL (ref 0–0.2)
BASOPHILS NFR BLD: 0.6 % (ref 0–1.9)
BILIRUB SERPL-MCNC: 1.5 MG/DL (ref 0.1–1)
BUN SERPL-MCNC: 23 MG/DL (ref 8–23)
CALCIUM SERPL-MCNC: 9.6 MG/DL (ref 8.7–10.5)
CHLORIDE SERPL-SCNC: 107 MMOL/L (ref 95–110)
CO2 SERPL-SCNC: 24 MMOL/L (ref 23–29)
CREAT SERPL-MCNC: 1.8 MG/DL (ref 0.5–1.4)
DIFFERENTIAL METHOD: ABNORMAL
EOSINOPHIL # BLD AUTO: 0.3 K/UL (ref 0–0.5)
EOSINOPHIL NFR BLD: 4.4 % (ref 0–8)
ERYTHROCYTE [DISTWIDTH] IN BLOOD BY AUTOMATED COUNT: 13.8 % (ref 11.5–14.5)
EST. GFR  (NO RACE VARIABLE): 39.3 ML/MIN/1.73 M^2
GLUCOSE SERPL-MCNC: 102 MG/DL (ref 70–110)
HCT VFR BLD AUTO: 34.7 % (ref 40–54)
HGB BLD-MCNC: 11.8 G/DL (ref 14–18)
IMM GRANULOCYTES # BLD AUTO: 0.02 K/UL (ref 0–0.04)
IMM GRANULOCYTES NFR BLD AUTO: 0.3 % (ref 0–0.5)
LYMPHOCYTES # BLD AUTO: 1 K/UL (ref 1–4.8)
LYMPHOCYTES NFR BLD: 15.7 % (ref 18–48)
MCH RBC QN AUTO: 30.6 PG (ref 27–31)
MCHC RBC AUTO-ENTMCNC: 34 G/DL (ref 32–36)
MCV RBC AUTO: 90 FL (ref 82–98)
MONOCYTES # BLD AUTO: 0.4 K/UL (ref 0.3–1)
MONOCYTES NFR BLD: 6.9 % (ref 4–15)
NEUTROPHILS # BLD AUTO: 4.6 K/UL (ref 1.8–7.7)
NEUTROPHILS NFR BLD: 72.1 % (ref 38–73)
NRBC BLD-RTO: 0 /100 WBC
PLATELET # BLD AUTO: 205 K/UL (ref 150–450)
PMV BLD AUTO: 9.7 FL (ref 9.2–12.9)
POTASSIUM SERPL-SCNC: 4.2 MMOL/L (ref 3.5–5.1)
PROT SERPL-MCNC: 6.8 G/DL (ref 6–8.4)
RBC # BLD AUTO: 3.86 M/UL (ref 4.6–6.2)
SODIUM SERPL-SCNC: 137 MMOL/L (ref 136–145)
WBC # BLD AUTO: 6.35 K/UL (ref 3.9–12.7)

## 2022-09-19 PROCEDURE — 1101F PT FALLS ASSESS-DOCD LE1/YR: CPT | Mod: CPTII,S$GLB,, | Performed by: INTERNAL MEDICINE

## 2022-09-19 PROCEDURE — 3288F FALL RISK ASSESSMENT DOCD: CPT | Mod: CPTII,S$GLB,, | Performed by: INTERNAL MEDICINE

## 2022-09-19 PROCEDURE — 3066F PR DOCUMENTATION OF TREATMENT FOR NEPHROPATHY: ICD-10-PCS | Mod: CPTII,S$GLB,, | Performed by: INTERNAL MEDICINE

## 2022-09-19 PROCEDURE — 99999 PR PBB SHADOW E&M-EST. PATIENT-LVL III: CPT | Mod: PBBFAC,,, | Performed by: INTERNAL MEDICINE

## 2022-09-19 PROCEDURE — 3074F SYST BP LT 130 MM HG: CPT | Mod: CPTII,S$GLB,, | Performed by: INTERNAL MEDICINE

## 2022-09-19 PROCEDURE — 99999 PR PBB SHADOW E&M-EST. PATIENT-LVL III: ICD-10-PCS | Mod: PBBFAC,,, | Performed by: INTERNAL MEDICINE

## 2022-09-19 PROCEDURE — 3074F PR MOST RECENT SYSTOLIC BLOOD PRESSURE < 130 MM HG: ICD-10-PCS | Mod: CPTII,S$GLB,, | Performed by: INTERNAL MEDICINE

## 2022-09-19 PROCEDURE — 36415 COLL VENOUS BLD VENIPUNCTURE: CPT | Performed by: INTERNAL MEDICINE

## 2022-09-19 PROCEDURE — 1126F PR PAIN SEVERITY QUANTIFIED, NO PAIN PRESENT: ICD-10-PCS | Mod: CPTII,S$GLB,, | Performed by: INTERNAL MEDICINE

## 2022-09-19 PROCEDURE — 80053 COMPREHEN METABOLIC PANEL: CPT | Performed by: INTERNAL MEDICINE

## 2022-09-19 PROCEDURE — 99215 PR OFFICE/OUTPT VISIT, EST, LEVL V, 40-54 MIN: ICD-10-PCS | Mod: S$GLB,,, | Performed by: INTERNAL MEDICINE

## 2022-09-19 PROCEDURE — 3008F PR BODY MASS INDEX (BMI) DOCUMENTED: ICD-10-PCS | Mod: CPTII,S$GLB,, | Performed by: INTERNAL MEDICINE

## 2022-09-19 PROCEDURE — 3078F PR MOST RECENT DIASTOLIC BLOOD PRESSURE < 80 MM HG: ICD-10-PCS | Mod: CPTII,S$GLB,, | Performed by: INTERNAL MEDICINE

## 2022-09-19 PROCEDURE — 1101F PR PT FALLS ASSESS DOC 0-1 FALLS W/OUT INJ PAST YR: ICD-10-PCS | Mod: CPTII,S$GLB,, | Performed by: INTERNAL MEDICINE

## 2022-09-19 PROCEDURE — 3078F DIAST BP <80 MM HG: CPT | Mod: CPTII,S$GLB,, | Performed by: INTERNAL MEDICINE

## 2022-09-19 PROCEDURE — 3008F BODY MASS INDEX DOCD: CPT | Mod: CPTII,S$GLB,, | Performed by: INTERNAL MEDICINE

## 2022-09-19 PROCEDURE — 3066F NEPHROPATHY DOC TX: CPT | Mod: CPTII,S$GLB,, | Performed by: INTERNAL MEDICINE

## 2022-09-19 PROCEDURE — 99215 OFFICE O/P EST HI 40 MIN: CPT | Mod: S$GLB,,, | Performed by: INTERNAL MEDICINE

## 2022-09-19 PROCEDURE — 3288F PR FALLS RISK ASSESSMENT DOCUMENTED: ICD-10-PCS | Mod: CPTII,S$GLB,, | Performed by: INTERNAL MEDICINE

## 2022-09-19 PROCEDURE — 85025 COMPLETE CBC W/AUTO DIFF WBC: CPT | Performed by: INTERNAL MEDICINE

## 2022-09-19 PROCEDURE — 1126F AMNT PAIN NOTED NONE PRSNT: CPT | Mod: CPTII,S$GLB,, | Performed by: INTERNAL MEDICINE

## 2022-09-19 NOTE — PROGRESS NOTES
"Subjective:       Patient ID: Juan Manuel Koehler is a 72 y.o. male.    Chief Complaint: Bladder Cancer    HPI -     72 y.o.White male  who presents today for follow-up and to review CT scans after surgery for invasive bladder cancer performed on 6/21/17.     He denies any mouth sores, nausea, vomiting, diarrhea, constipation, chest pain, shortness of breath, leg swelling, fatigue, headache, dizziness, or mood changes.    Notes after some yard work yesterday he noticed a slight pink twinge to his urine in his urostomy bag, this has never happened before.      His ECOG PS is 1 and he is accompanied alone to clinic.  Wife had recent major back surgery and has had some falls.      Oncologic History:  Patient has previously been seen by Dr. 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 He presented to Dr. Boucher with pain and hematuria and was found to have a large bladder tumor on his trigone. The tumor was also causing bilateral ureteral obstruction with resulting ELDER.The patient underwent tumor resection and attempted JJ stent placement on 4/5/17 which demonstrated cT2 urothelial carcinoma (nested variant). Bilateral nephrostomy tubes were placed with improvement in the patient's renal function. Patient discharged today from [facility] for ELDER from 5/5-5/9. During hospitalization, right nephrostomy tube was placed and the left was exchanged.      5/5/17 CT CAP reveals " Bilateral double-J ureteral stents and left nephrostomy tube are present.  There has been interval resolution of left-sided hydronephrosis and significant improvement in right-sided hydronephrosis which now appears mild. Bladder is nondistended and not well evaluated.  There is no CT evidence of distant metastatic disease referable to provide history of bladder neoplasm."    6/21/17- " Margaret performed an open Radical cystoprostatectomy, Bilateral pelvic lymph node dissection, Creation of ileal conduit urinary diversion and Bilateral ureterotomy for open ureteral J stent placement.   7/6/17- patient had bilateral nephrostomy tubes removed     Review of Systems   Constitutional:  Negative for activity change, appetite change and unexpected weight change.   HENT:  Negative for mouth sores, nosebleeds and trouble swallowing.    Eyes:  Negative for discharge and visual disturbance.   Respiratory:  Negative for shortness of breath and wheezing.    Cardiovascular:  Negative for leg swelling.   Gastrointestinal:  Negative for abdominal distention and blood in stool.   Genitourinary:  Negative for decreased urine volume, frequency and hematuria.   Skin:  Negative for color change.   Hematological:  Negative for adenopathy.   Psychiatric/Behavioral:  Positive for sleep disturbance. The patient is not nervous/anxious.    All other systems reviewed and are negative.    Allergies:  Review of patient's allergies indicates:  No Known Allergies    Medications:  Current Outpatient Medications   Medication Sig Dispense Refill    apremilast (OTEZLA) 30 mg Tab Take by mouth.      temazepam (RESTORIL) 15 mg Cap Take 1 capsule (15 mg total) by mouth nightly as needed (insomnia). 30 capsule 4    VELTASSA 16.8 gram PwPk Take 1 packet (16.8 g total) by mouth once daily. 30 packet 6     No current facility-administered medications for this visit.       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    HTN (hypertension)     Hydronephrosis        PSH:  Past Surgical History:   Procedure Laterality Date    BLADDER SURGERY      CYSTOSCOPY      HERNIA REPAIR      Ileal Conduit      pelvic lymph node resection      Radical cystoprostatectomy      THROAT SURGERY      urostomy tube placement         FamHx:  Family History  "  Problem Relation Age of Onset    No Known Problems Father     Kidney disease Mother     Prostate cancer Neg Hx        SocHx:  Social History     Socioeconomic History    Marital status:    Tobacco Use    Smoking status: Never    Smokeless tobacco: Never   Substance and Sexual Activity    Alcohol use: No     Alcohol/week: 0.0 standard drinks    Drug use: No    Sexual activity: Yes     Partners: Female     Birth control/protection: None     Comment:  lives with spouse       Objective:       /61 (BP Location: Right arm, Patient Position: Sitting, BP Method: Medium (Automatic))   Pulse (!) 58   Temp 97.9 °F (36.6 °C) (Oral)   Resp 20   Ht 6' 2" (1.88 m)   Wt 59.6 kg (131 lb 6.3 oz)   SpO2 97%   BMI 16.87 kg/m²     Physical Exam  Vitals and nursing note reviewed.   Constitutional:       Appearance: He is well-developed.      Comments: Appears fatigued and thin   HENT:      Head: Normocephalic and atraumatic.      Right Ear: External ear normal.      Left Ear: External ear normal.   Eyes:      General: No scleral icterus.        Right eye: No discharge.         Left eye: No discharge.      Conjunctiva/sclera: Conjunctivae normal.      Pupils: Pupils are equal, round, and reactive to light.   Pulmonary:      Effort: Pulmonary effort is normal.   Musculoskeletal:         General: Normal range of motion.      Cervical back: Normal range of motion and neck supple.      Comments: Bilateral nephrostomy tubes draining clear, yellow urine.   Skin:     General: Skin is warm and dry.      Findings: No erythema.   Neurological:      Mental Status: He is alert and oriented to person, place, and time.       LABS:  WBC   Date Value Ref Range Status   09/19/2022 6.35 3.90 - 12.70 K/uL Final     Hemoglobin   Date Value Ref Range Status   09/19/2022 11.8 (L) 14.0 - 18.0 g/dL Final     POC Hematocrit   Date Value Ref Range Status   06/21/2017 17 (LL) 36 - 54 %PCV Final     Hematocrit   Date Value Ref Range " Status   09/19/2022 34.7 (L) 40.0 - 54.0 % Final     Platelets   Date Value Ref Range Status   09/19/2022 205 150 - 450 K/uL Final       Chemistry        Component Value Date/Time     09/19/2022 0736    K 4.2 09/19/2022 0736     09/19/2022 0736    CO2 24 09/19/2022 0736    BUN 23 09/19/2022 0736    CREATININE 1.8 (H) 09/19/2022 0736     09/19/2022 0736        Component Value Date/Time    CALCIUM 9.6 09/19/2022 0736    ALKPHOS 62 09/19/2022 0736    AST 16 09/19/2022 0736    ALT 17 09/19/2022 0736    BILITOT 1.5 (H) 09/19/2022 0736          Assessment:       1. Urothelial carcinoma of bladder          Plan:       1. Bladder Cancer:  Juan Manuel Koehler is a 72 y.o.White male, referred by Dr. Robles, who presents today for follow-up surgery for  invasive bladder cancer.     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 He presented to Dr. Boucher with pain and hematuria and was found to have a large bladder tumor on his trigone. Staging CTCAP on 5/5/17 shows no evidence of distant metastasis.    Given his continued increased creatinine, it was unclear if he would be considered a candidate for neoadjuvant chemotherapy This has improved and the patient wanted to move forward with chemo, he understood the risks, particularly to his kidneys. We split the dose of cisplatin between D1+D8 and gave IVFs.  Yet still his creatinine increased significantly.  We had a lengthy conversation about the risk of recurrence without chemo and real risk of permanent and irreversible kidney damage.   Also discussed with Dr. Robles.  At this point, we will forgo further chemotherapy in favor of moving forward with surgery.      S/p surgery with negative margins and negative lymph node involvement. Favorable pathology report reviewed with patient and his accompanying wife. Data is controversial in the benefit of adjuvant chemotherapy. Decision made by  Dr. Calero and Dr. Robles to defer resuming chemotherapy.  Currently RENETTA.    Current scans RENETTA.  Now more than 5 years out from original surgery.     RTC 1 year with CT CAP, labs (CBC,CMP), and to see Dr. Calero.      2. CKD: Stable, continue to follow with nephrology.    The patient agrees with the plan, and all questions have been answered to their satisfaction.      More than 40 mins total time were spent during this encounter.    Mingo Calero M.D., M.S., F.A.C.P.  Hematology and Oncology Attending  [facility]                Route Chart for Scheduling    Med Onc Chart Routing      Follow up with physician 1 year. RTC 1 year with CT CAP, labs (CBC,CMP), and to see Dr. Calero.   Follow up with [facility]    Infusion scheduling note    Injection scheduling note    Labs CBC and CMP   Lab interval:     Imaging CT chest abdomen pelvis      Pharmacy appointment    Other referrals

## 2022-09-21 DIAGNOSIS — E87.5 HYPERKALEMIA: ICD-10-CM

## 2022-09-21 DIAGNOSIS — N18.32 STAGE 3B CHRONIC KIDNEY DISEASE: Chronic | ICD-10-CM

## 2022-09-21 RX ORDER — PATIROMER 16.8 G/1
16.8 POWDER, FOR SUSPENSION ORAL DAILY
Qty: 30 PACKET | Refills: 6 | Status: SHIPPED | OUTPATIENT
Start: 2022-09-21 | End: 2023-02-20 | Stop reason: ALTCHOICE

## 2022-11-28 DIAGNOSIS — N18.32 STAGE 3B CHRONIC KIDNEY DISEASE: Primary | ICD-10-CM

## 2022-12-09 ENCOUNTER — LAB VISIT (OUTPATIENT)
Dept: LAB | Facility: HOSPITAL | Age: 72
End: 2022-12-09
Attending: FAMILY MEDICINE
Payer: MEDICARE

## 2022-12-09 DIAGNOSIS — N18.32 STAGE 3B CHRONIC KIDNEY DISEASE: ICD-10-CM

## 2022-12-09 LAB
BACTERIA #/AREA URNS HPF: ABNORMAL /HPF
BILIRUB UR QL STRIP: NEGATIVE
CLARITY UR: ABNORMAL
COLOR UR: YELLOW
CREAT UR-MCNC: 50.2 MG/DL (ref 23–375)
GLUCOSE UR QL STRIP: NEGATIVE
HGB UR QL STRIP: NEGATIVE
KETONES UR QL STRIP: NEGATIVE
LEUKOCYTE ESTERASE UR QL STRIP: ABNORMAL
MICROSCOPIC COMMENT: ABNORMAL
NITRITE UR QL STRIP: POSITIVE
NON-SQ EPI CELLS #/AREA URNS HPF: 2 /HPF
PH UR STRIP: 6 [PH] (ref 5–8)
PROT UR QL STRIP: NEGATIVE
PROT UR-MCNC: 15 MG/DL (ref 0–15)
PROT/CREAT UR: 0.3 MG/G{CREAT} (ref 0–0.2)
RBC #/AREA URNS HPF: 7 /HPF (ref 0–4)
SP GR UR STRIP: 1.01 (ref 1–1.03)
URN SPEC COLLECT METH UR: ABNORMAL
UROBILINOGEN UR STRIP-ACNC: NEGATIVE EU/DL
WBC #/AREA URNS HPF: 35 /HPF (ref 0–5)

## 2022-12-09 PROCEDURE — 81000 URINALYSIS NONAUTO W/SCOPE: CPT | Performed by: NURSE PRACTITIONER

## 2022-12-09 PROCEDURE — 84156 ASSAY OF PROTEIN URINE: CPT | Performed by: NURSE PRACTITIONER

## 2022-12-14 ENCOUNTER — OFFICE VISIT (OUTPATIENT)
Dept: NEPHROLOGY | Facility: CLINIC | Age: 72
End: 2022-12-14
Payer: MEDICARE

## 2022-12-14 VITALS — SYSTOLIC BLOOD PRESSURE: 118 MMHG | HEART RATE: 63 BPM | OXYGEN SATURATION: 98 % | DIASTOLIC BLOOD PRESSURE: 64 MMHG

## 2022-12-14 DIAGNOSIS — N18.32 STAGE 3B CHRONIC KIDNEY DISEASE: Primary | Chronic | ICD-10-CM

## 2022-12-14 DIAGNOSIS — N18.32 STAGE 3B CHRONIC KIDNEY DISEASE: Primary | ICD-10-CM

## 2022-12-14 PROCEDURE — 1101F PR PT FALLS ASSESS DOC 0-1 FALLS W/OUT INJ PAST YR: ICD-10-PCS | Mod: CPTII,S$GLB,, | Performed by: NURSE PRACTITIONER

## 2022-12-14 PROCEDURE — 3066F NEPHROPATHY DOC TX: CPT | Mod: CPTII,S$GLB,, | Performed by: NURSE PRACTITIONER

## 2022-12-14 PROCEDURE — 3066F PR DOCUMENTATION OF TREATMENT FOR NEPHROPATHY: ICD-10-PCS | Mod: CPTII,S$GLB,, | Performed by: NURSE PRACTITIONER

## 2022-12-14 PROCEDURE — 99999 PR PBB SHADOW E&M-EST. PATIENT-LVL III: CPT | Mod: PBBFAC,,, | Performed by: NURSE PRACTITIONER

## 2022-12-14 PROCEDURE — 1125F AMNT PAIN NOTED PAIN PRSNT: CPT | Mod: CPTII,S$GLB,, | Performed by: NURSE PRACTITIONER

## 2022-12-14 PROCEDURE — 99214 OFFICE O/P EST MOD 30 MIN: CPT | Mod: S$GLB,,, | Performed by: NURSE PRACTITIONER

## 2022-12-14 PROCEDURE — 1125F PR PAIN SEVERITY QUANTIFIED, PAIN PRESENT: ICD-10-PCS | Mod: CPTII,S$GLB,, | Performed by: NURSE PRACTITIONER

## 2022-12-14 PROCEDURE — 3078F PR MOST RECENT DIASTOLIC BLOOD PRESSURE < 80 MM HG: ICD-10-PCS | Mod: CPTII,S$GLB,, | Performed by: NURSE PRACTITIONER

## 2022-12-14 PROCEDURE — 3288F PR FALLS RISK ASSESSMENT DOCUMENTED: ICD-10-PCS | Mod: CPTII,S$GLB,, | Performed by: NURSE PRACTITIONER

## 2022-12-14 PROCEDURE — 3074F SYST BP LT 130 MM HG: CPT | Mod: CPTII,S$GLB,, | Performed by: NURSE PRACTITIONER

## 2022-12-14 PROCEDURE — 99214 PR OFFICE/OUTPT VISIT, EST, LEVL IV, 30-39 MIN: ICD-10-PCS | Mod: S$GLB,,, | Performed by: NURSE PRACTITIONER

## 2022-12-14 PROCEDURE — 1159F MED LIST DOCD IN RCRD: CPT | Mod: CPTII,S$GLB,, | Performed by: NURSE PRACTITIONER

## 2022-12-14 PROCEDURE — 3288F FALL RISK ASSESSMENT DOCD: CPT | Mod: CPTII,S$GLB,, | Performed by: NURSE PRACTITIONER

## 2022-12-14 PROCEDURE — 3074F PR MOST RECENT SYSTOLIC BLOOD PRESSURE < 130 MM HG: ICD-10-PCS | Mod: CPTII,S$GLB,, | Performed by: NURSE PRACTITIONER

## 2022-12-14 PROCEDURE — 3078F DIAST BP <80 MM HG: CPT | Mod: CPTII,S$GLB,, | Performed by: NURSE PRACTITIONER

## 2022-12-14 PROCEDURE — 1159F PR MEDICATION LIST DOCUMENTED IN MEDICAL RECORD: ICD-10-PCS | Mod: CPTII,S$GLB,, | Performed by: NURSE PRACTITIONER

## 2022-12-14 PROCEDURE — 1101F PT FALLS ASSESS-DOCD LE1/YR: CPT | Mod: CPTII,S$GLB,, | Performed by: NURSE PRACTITIONER

## 2022-12-14 PROCEDURE — 99999 PR PBB SHADOW E&M-EST. PATIENT-LVL III: ICD-10-PCS | Mod: PBBFAC,,, | Performed by: NURSE PRACTITIONER

## 2022-12-14 RX ORDER — AMOXICILLIN AND CLAVULANATE POTASSIUM 500; 125 MG/1; MG/1
1 TABLET, FILM COATED ORAL 2 TIMES DAILY
Status: SHIPPED | OUTPATIENT
Start: 2022-12-14 | End: 2022-12-21

## 2022-12-14 NOTE — PROGRESS NOTES
"Subjective:       Patient ID: Juan Manuel Koehler is a 72 y.o. male who presents for follow-up evaluation of CKD.      HPI     Patient is new to me. Last seen by Dr. Blake in 1/18/22.  Prior pertinent chart reviewed since this is patient's first appointment with me.    Patient presents for follow-up evaluation of CKD.  Baseline creatinine of 1.8-2.0. Significant other medical problems include h/o urothelial carcinoma of bladder s/p ileal conduit, ELDER, CVA, NSTEMI, proteinuria, hyperkalemia. Per Dr. Blake's last visit, "Mr. 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 Prior episodes of ELDER, severe hyperkalemia, ELDER was suspected from prolonged volume depletion causing ATN/ possible contrast induced ELDER." The patient denies taking NSAIDs, herbal supplements, or new antibiotics, recreational drugs, recent episode of dehydration, diarrhea, nausea or vomiting, acute illness, hospitalization or exposure to IV radiocontrast. He has been maintained on veltassa 16.8g qd for 5 years. He adheres to low K diet. He reports adequate hydration.     Significant family hx includes: Mother - kidney disease    CT abdomen: 9/14/22 , reviewed.    Review of Systems   Constitutional:  Negative for fever.   Respiratory:  Negative for shortness of breath.    Cardiovascular:  Negative for chest pain and leg swelling.   Gastrointestinal:  Negative for diarrhea, nausea and vomiting.   Genitourinary:  Negative for hematuria.        +urostomy    Neurological:  Negative for syncope.   All other systems reviewed and are negative.    Objective:       Blood pressure 118/64, pulse 63, SpO2 98 %.  Physical Exam  Vitals reviewed.   Constitutional:       General: He is not in acute distress.     Comments: Thin, elderly male   HENT:      Head: Normocephalic and atraumatic.   Eyes:      General: No scleral icterus.  Cardiovascular:      Rate and Rhythm: Normal rate and regular rhythm.   Pulmonary:      " "Effort: Pulmonary effort is normal. No respiratory distress.      Breath sounds: No wheezing or rales.   Musculoskeletal:      Right lower leg: No edema.      Left lower leg: No edema (1+ ankle).   Skin:     General: Skin is warm and dry.   Neurological:      Mental Status: He is alert and oriented to person, place, and time.   Psychiatric:         Mood and Affect: Mood normal.         Behavior: Behavior normal.         Lab Results   Component Value Date    CREATININE 2.0 (H) 12/09/2022    URICACID 6.4 01/13/2022     Prot/Creat Ratio, Urine   Date Value Ref Range Status   12/09/2022 0.30 (H) 0.00 - 0.20 Final   05/17/2022 0.34 (H) 0.00 - 0.20 Final   01/13/2022 0.37 (H) 0.00 - 0.20 Final     Lab Results   Component Value Date     12/09/2022    K 4.2 12/09/2022    CO2 22 (L) 12/09/2022     12/09/2022     Lab Results   Component Value Date    PTH 96.3 (H) 05/17/2022    CALCIUM 9.4 12/09/2022    PHOS 3.4 12/09/2022     Lab Results   Component Value Date    HGB 12.0 (L) 12/09/2022    WBC 6.35 12/09/2022    HCT 37.4 (L) 12/09/2022      Lab Results   Component Value Date    HGBA1C 5.4 08/04/2017     12/09/2022    BUN 30 (H) 12/09/2022     Lab Results   Component Value Date    LDLCALC 76.4 08/04/2017     CT chest abdomen 9/14/22:  "FINDINGS:  Chest:     Heart and mediastinal vasculature: Heart normal in size.     Ann-Marie/Mediastinum: Calcified subcarinal lymph node and calcified right hilar lymph nodes compatible with previous granulomatous disease.  No pathologic adenopathy.     Lungs: Left basilar pleuroparenchymal scarring, minimal paraseptal emphysema in the right middle lobe and by apical pleural thickening..  Calcified in the right base.  No suspicious pulmonary nodules .     Abdomen and pelvis:     Liver: Normal in size and attenuation, with no focal hepatic lesions.     Gallbladder: No calcified gallstones.     Bile Ducts: No evidence of dilated ducts.     Pancreas: No mass or peripancreatic fat " "stranding.     Spleen: Stable 15 mm hypodensity along the anterior tip spleen, possibly cyst or pseudocyst.     Adrenals: Unremarkable.     Kidneys/ Ureters: Bilateral renal cortical cysts are present and there is interval improvement in renal pelvic and intrarenal collecting system dilatation since the prior study.  There is bilateral urothelial wall thickening and hyperenhancement.  No solid mass or nephrolithiasis.  No collectingd systems not well evaluated due to exam protocol.  There is a right lower quadrant loop urostomy.     GI Tract/Mesentery: No evidence of bowel obstruction or inflammation.     Peritoneal Space: No ascites. No free air.     Retroperitoneum: No significant adenopathy. Bilateral pelvic lymphadenectomy surgical clips are present.     Vasculature: No significant atherosclerosis or aneurysm.     Bladder: Surgically absent     Reproductive organs: Surgically absent     Bones: No suspicious lytic or blastic lesions.  Age related degenerative change.     Impression:     Status post cystectomy and loop urostomy, with improved renal collecting system dilatation, noting urothelial wall thickening which may reflect acute and or chronic inflammation and or infection.     No convincing evidence of metastatic disease     Evidence of previous granulomatous disease     Stable splenic hypodensity, of doubtful clinical significance."    Assessment:       No diagnosis found.    Plan:   CKD stage 3b c eGFR 35 mL/min -  - baseline Cr 1.8-2.0, within baseline   - CKD clinically related to obstructive uropathy in malignancy, chemo nephrotoxicity and history of AKIs  - Continue to avoid NSAIDs/ nephrotoxic agents, continue adequate hydration    UPCR 300mg/g; on no RAASi   Acid-base Bicarb 22   Renal osteodystrophy Cca and phos controlled; PTH 96.3, monitor Vit D   Anemia At CKD goal   Lipid Management No statin   ESRD planning Provided education about the stages of CKD, usual progression, and need to monitor for " and treat CKD-related disease in an effort to delay progression of CKD.        Hyperkalemia  - Will reduce Veltassa to MWF  - repeat BMP 3 weeks  - Continue low K diet    UTI  - UA with signs of infection  - Rx Augmentin today  - Monitor UA    All questions patient had were answered.  Asked if further questions. None. F/u in clinic 2 months  with labs and urine prior to next visit or sooner if needed.  ER for emergency concerns.    Summary of Plan:  - Rx Augmentin, monitor UA   - reduce Veltassa to MWF, repeat BMP 3 weeks  - 2 mo follow-up with RFP, CBC, UA, UPCR, Vit D, PTH

## 2023-01-04 ENCOUNTER — LAB VISIT (OUTPATIENT)
Dept: LAB | Facility: HOSPITAL | Age: 73
End: 2023-01-04
Attending: FAMILY MEDICINE
Payer: MEDICARE

## 2023-01-04 DIAGNOSIS — N18.32 STAGE 3B CHRONIC KIDNEY DISEASE: Chronic | ICD-10-CM

## 2023-01-04 LAB
ANION GAP SERPL CALC-SCNC: 8 MMOL/L (ref 8–16)
BUN SERPL-MCNC: 23 MG/DL (ref 8–23)
CALCIUM SERPL-MCNC: 9.6 MG/DL (ref 8.7–10.5)
CHLORIDE SERPL-SCNC: 108 MMOL/L (ref 95–110)
CO2 SERPL-SCNC: 24 MMOL/L (ref 23–29)
CREAT SERPL-MCNC: 1.8 MG/DL (ref 0.5–1.4)
EST. GFR  (NO RACE VARIABLE): 39 ML/MIN/1.73 M^2
GLUCOSE SERPL-MCNC: 97 MG/DL (ref 70–110)
POTASSIUM SERPL-SCNC: 4.5 MMOL/L (ref 3.5–5.1)
SODIUM SERPL-SCNC: 140 MMOL/L (ref 136–145)

## 2023-01-04 PROCEDURE — 80048 BASIC METABOLIC PNL TOTAL CA: CPT | Performed by: NURSE PRACTITIONER

## 2023-01-04 PROCEDURE — 36415 COLL VENOUS BLD VENIPUNCTURE: CPT | Performed by: NURSE PRACTITIONER

## 2023-01-05 ENCOUNTER — PATIENT MESSAGE (OUTPATIENT)
Dept: NEPHROLOGY | Facility: CLINIC | Age: 73
End: 2023-01-05
Payer: MEDICARE

## 2023-01-06 DIAGNOSIS — N18.32 STAGE 3B CHRONIC KIDNEY DISEASE: Primary | Chronic | ICD-10-CM

## 2023-01-23 ENCOUNTER — OFFICE VISIT (OUTPATIENT)
Dept: OTOLARYNGOLOGY | Facility: CLINIC | Age: 73
End: 2023-01-23
Payer: MEDICARE

## 2023-01-23 VITALS
HEIGHT: 74 IN | WEIGHT: 138.88 LBS | HEART RATE: 72 BPM | BODY MASS INDEX: 17.82 KG/M2 | SYSTOLIC BLOOD PRESSURE: 113 MMHG | DIASTOLIC BLOOD PRESSURE: 72 MMHG

## 2023-01-23 DIAGNOSIS — C32.9 LARYNGEAL CANCER: Primary | ICD-10-CM

## 2023-01-23 PROCEDURE — 99213 PR OFFICE/OUTPT VISIT, EST, LEVL III, 20-29 MIN: ICD-10-PCS | Mod: 25,S$GLB,, | Performed by: OTOLARYNGOLOGY

## 2023-01-23 PROCEDURE — 1126F PR PAIN SEVERITY QUANTIFIED, NO PAIN PRESENT: ICD-10-PCS | Mod: CPTII,S$GLB,, | Performed by: OTOLARYNGOLOGY

## 2023-01-23 PROCEDURE — 31575 PR LARYNGOSCOPY, FLEXIBLE; DIAGNOSTIC: ICD-10-PCS | Mod: S$GLB,,, | Performed by: OTOLARYNGOLOGY

## 2023-01-23 PROCEDURE — 99999 PR PBB SHADOW E&M-EST. PATIENT-LVL III: ICD-10-PCS | Mod: PBBFAC,,, | Performed by: OTOLARYNGOLOGY

## 2023-01-23 PROCEDURE — 1160F PR REVIEW ALL MEDS BY PRESCRIBER/CLIN PHARMACIST DOCUMENTED: ICD-10-PCS | Mod: CPTII,S$GLB,, | Performed by: OTOLARYNGOLOGY

## 2023-01-23 PROCEDURE — 99213 OFFICE O/P EST LOW 20 MIN: CPT | Mod: 25,S$GLB,, | Performed by: OTOLARYNGOLOGY

## 2023-01-23 PROCEDURE — 1126F AMNT PAIN NOTED NONE PRSNT: CPT | Mod: CPTII,S$GLB,, | Performed by: OTOLARYNGOLOGY

## 2023-01-23 PROCEDURE — 1159F PR MEDICATION LIST DOCUMENTED IN MEDICAL RECORD: ICD-10-PCS | Mod: CPTII,S$GLB,, | Performed by: OTOLARYNGOLOGY

## 2023-01-23 PROCEDURE — 3008F BODY MASS INDEX DOCD: CPT | Mod: CPTII,S$GLB,, | Performed by: OTOLARYNGOLOGY

## 2023-01-23 PROCEDURE — 3074F PR MOST RECENT SYSTOLIC BLOOD PRESSURE < 130 MM HG: ICD-10-PCS | Mod: CPTII,S$GLB,, | Performed by: OTOLARYNGOLOGY

## 2023-01-23 PROCEDURE — 3008F PR BODY MASS INDEX (BMI) DOCUMENTED: ICD-10-PCS | Mod: CPTII,S$GLB,, | Performed by: OTOLARYNGOLOGY

## 2023-01-23 PROCEDURE — 1159F MED LIST DOCD IN RCRD: CPT | Mod: CPTII,S$GLB,, | Performed by: OTOLARYNGOLOGY

## 2023-01-23 PROCEDURE — 3078F PR MOST RECENT DIASTOLIC BLOOD PRESSURE < 80 MM HG: ICD-10-PCS | Mod: CPTII,S$GLB,, | Performed by: OTOLARYNGOLOGY

## 2023-01-23 PROCEDURE — 3078F DIAST BP <80 MM HG: CPT | Mod: CPTII,S$GLB,, | Performed by: OTOLARYNGOLOGY

## 2023-01-23 PROCEDURE — 3074F SYST BP LT 130 MM HG: CPT | Mod: CPTII,S$GLB,, | Performed by: OTOLARYNGOLOGY

## 2023-01-23 PROCEDURE — 3288F PR FALLS RISK ASSESSMENT DOCUMENTED: ICD-10-PCS | Mod: CPTII,S$GLB,, | Performed by: OTOLARYNGOLOGY

## 2023-01-23 PROCEDURE — 3288F FALL RISK ASSESSMENT DOCD: CPT | Mod: CPTII,S$GLB,, | Performed by: OTOLARYNGOLOGY

## 2023-01-23 PROCEDURE — 1101F PR PT FALLS ASSESS DOC 0-1 FALLS W/OUT INJ PAST YR: ICD-10-PCS | Mod: CPTII,S$GLB,, | Performed by: OTOLARYNGOLOGY

## 2023-01-23 PROCEDURE — 99999 PR PBB SHADOW E&M-EST. PATIENT-LVL III: CPT | Mod: PBBFAC,,, | Performed by: OTOLARYNGOLOGY

## 2023-01-23 PROCEDURE — 31575 DIAGNOSTIC LARYNGOSCOPY: CPT | Mod: S$GLB,,, | Performed by: OTOLARYNGOLOGY

## 2023-01-23 PROCEDURE — 1160F RVW MEDS BY RX/DR IN RCRD: CPT | Mod: CPTII,S$GLB,, | Performed by: OTOLARYNGOLOGY

## 2023-01-23 PROCEDURE — 1101F PT FALLS ASSESS-DOCD LE1/YR: CPT | Mod: CPTII,S$GLB,, | Performed by: OTOLARYNGOLOGY

## 2023-01-23 NOTE — PROGRESS NOTES
CC:  Larynx cancer surveillance      HPI   72 y.o. 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  No complaints.    Review of Systems   Constitutional: Negative for fatigue and unexpected weight change.   HENT: Per HPI.  Eyes: Negative for visual disturbance.   Respiratory: Negative for shortness of breath, hemoptysis   Cardiovascular: Negative for chest pain and palpitations.   Musculoskeletal: Negative for decreased ROM, back pain.   Skin: Negative for rash, sunburn, itching.   Neurological: Negative for dizziness and seizures.   Hematological: Negative for adenopathy. Does not bruise/bleed easily.   Endocrine: Negative for rapid weight loss/weight gain, heat/cold intolerance.     Past Medical History   Patient Active Problem List   Diagnosis    Stage 3 chronic kidney disease    Anemia of chronic disease    Urothelial carcinoma of bladder    NSTEMI (non-ST elevated myocardial infarction)    Embolic stroke involving right cerebellar artery    Right-sided cerebrovascular accident (CVA)    S/P ileal conduit    History of urostomy    Adjustment disorder with anxiety    BMI less than 19,adult    Insomnia    Laryngeal cancer    Malnutrition    Nephrostomy    Psoriasis    Hyperparathyroidism due to renal insufficiency    Anemia in chronic kidney disease    Other nonthrombocytopenic purpura    Hyperkalemia    Pyuria    Proteinuria           Past Surgical History   Past Surgical History:   Procedure Laterality Date    BLADDER SURGERY      CYSTOSCOPY      HERNIA REPAIR      Ileal Conduit      pelvic lymph node resection      Radical cystoprostatectomy      THROAT SURGERY      urostomy tube placement           Family History   Family History   Problem Relation Age of Onset    No Known Problems Father     Kidney disease Mother     Prostate cancer Neg Hx            Social History   .  Social History     Socioeconomic History    Marital status:     Tobacco Use    Smoking status: Never    Smokeless tobacco: Never   Substance and Sexual Activity    Alcohol use: No     Alcohol/week: 0.0 standard drinks    Drug use: No    Sexual activity: Yes     Partners: Female     Birth control/protection: None     Comment:  lives with spouse         Allergies   Review of patient's allergies indicates:  No Known Allergies        Physical Exam     Vitals:    01/23/23 0904   BP: 113/72   Pulse: 72         Body mass index is 17.83 kg/m².      General: AOx3, NAD   Respiratory:  Symmetric chest rise, normal effort  Nose: No gross nasal septal deviation. Inferior Turbinates WNL bilaterally. No septal perforation. No masses/lesions.   Oral Cavity:  Oral Tongue mobile, no lesions noted. Hard Palate WNL. No buccal or FOM lesions.  Oropharynx:  No masses/lesions of the posterior pharyngeal wall. Tonsillar fossa without lesions. Soft palate without masses. Midline uvula.   Neck: No scars.  No cervical lymphadenopathy, thyromegaly or thyroid nodules.  Normal range of motion.    Face: House Brackmann I bilaterally.     Flex Naso Marie Hypo Procedures #2    Procedure:  Diagnostic flexible nasopharyngoscopy, laryngoscopy and hypopharyngoscopy:    Routine preparation with local atomizer with 1% neosynephrine/pontocaine with customary flexible endoscope.    Nasopharynx:  No lesions.   Mucosa:  No lesions.   Adenoids:  Present.  Posterior Choanae:  Patent.  Eustachian Tubes:  Patent.  Posterior pharynx:  No lesions.  Larynx/hypopharynx:   Epiglottis:  No lesions, without edema.   AE Folds:  No lesions.   Vocal cords:  No polyps, nodules, ulcers or lesions.   Mobility:  Equal and normal bilateral.   Hypopharynx:  No lesions.   Piriform sinus:  No pooling, no lesions.   Post Cricoid:  No erythema, no edema.      Assessment/Plan  Problem List Items Addressed This Visit          Oncology    Laryngeal cancer - Primary     RENETTA.  RTC 1 year.

## 2023-01-31 PROBLEM — D84.821 DRUG-INDUCED IMMUNODEFICIENCY: Status: ACTIVE | Noted: 2023-01-31

## 2023-01-31 PROBLEM — Z79.899 DRUG-INDUCED IMMUNODEFICIENCY: Status: ACTIVE | Noted: 2023-01-31

## 2023-02-06 ENCOUNTER — LAB VISIT (OUTPATIENT)
Dept: LAB | Facility: HOSPITAL | Age: 73
End: 2023-02-06
Attending: NURSE PRACTITIONER
Payer: MEDICARE

## 2023-02-06 DIAGNOSIS — R25.2 MUSCLE CRAMPS: ICD-10-CM

## 2023-02-06 DIAGNOSIS — N18.32 STAGE 3B CHRONIC KIDNEY DISEASE: ICD-10-CM

## 2023-02-06 DIAGNOSIS — D69.2 OTHER NONTHROMBOCYTOPENIC PURPURA: ICD-10-CM

## 2023-02-06 DIAGNOSIS — N25.81 HYPERPARATHYROIDISM DUE TO RENAL INSUFFICIENCY: Chronic | ICD-10-CM

## 2023-02-06 LAB
25(OH)D3+25(OH)D2 SERPL-MCNC: 44 NG/ML (ref 30–96)
25(OH)D3+25(OH)D2 SERPL-MCNC: 44 NG/ML (ref 30–96)
ALBUMIN SERPL BCP-MCNC: 4.3 G/DL (ref 3.5–5.2)
ALP SERPL-CCNC: 71 U/L (ref 55–135)
ALT SERPL W/O P-5'-P-CCNC: 21 U/L (ref 10–44)
ANION GAP SERPL CALC-SCNC: 10 MMOL/L (ref 8–16)
ANION GAP SERPL CALC-SCNC: 9 MMOL/L (ref 8–16)
AST SERPL-CCNC: 15 U/L (ref 10–40)
BACTERIA #/AREA URNS HPF: ABNORMAL /HPF
BASOPHILS # BLD AUTO: 0.05 K/UL (ref 0–0.2)
BASOPHILS NFR BLD: 0.8 % (ref 0–1.9)
BILIRUB SERPL-MCNC: 1.3 MG/DL (ref 0.1–1)
BILIRUB UR QL STRIP: NEGATIVE
BUN SERPL-MCNC: 31 MG/DL (ref 8–23)
CALCIUM SERPL-MCNC: 10 MG/DL (ref 8.7–10.5)
CHLORIDE SERPL-SCNC: 108 MMOL/L (ref 95–110)
CHOLEST SERPL-MCNC: 172 MG/DL (ref 120–199)
CHOLEST/HDLC SERPL: 2.8 {RATIO} (ref 2–5)
CK SERPL-CCNC: 96 U/L (ref 20–200)
CLARITY UR: ABNORMAL
CO2 SERPL-SCNC: 24 MMOL/L (ref 23–29)
CO2 SERPL-SCNC: 25 MMOL/L (ref 23–29)
COLOR UR: YELLOW
CREAT SERPL-MCNC: 1.9 MG/DL (ref 0.5–1.4)
CREAT UR-MCNC: 83.2 MG/DL (ref 23–375)
DIFFERENTIAL METHOD: ABNORMAL
EOSINOPHIL # BLD AUTO: 0.2 K/UL (ref 0–0.5)
EOSINOPHIL NFR BLD: 3.9 % (ref 0–8)
ERYTHROCYTE [DISTWIDTH] IN BLOOD BY AUTOMATED COUNT: 14.3 % (ref 11.5–14.5)
ERYTHROCYTE [DISTWIDTH] IN BLOOD BY AUTOMATED COUNT: 14.3 % (ref 11.5–14.5)
EST. GFR  (NO RACE VARIABLE): 37 ML/MIN/1.73 M^2
ESTIMATED AVG GLUCOSE: 108 MG/DL (ref 68–131)
GLUCOSE SERPL-MCNC: 105 MG/DL (ref 70–110)
GLUCOSE SERPL-MCNC: 106 MG/DL (ref 70–110)
GLUCOSE UR QL STRIP: NEGATIVE
HBA1C MFR BLD: 5.4 % (ref 4–5.6)
HCT VFR BLD AUTO: 37.9 % (ref 40–54)
HCT VFR BLD AUTO: 38 % (ref 40–54)
HDLC SERPL-MCNC: 62 MG/DL (ref 40–75)
HDLC SERPL: 36 % (ref 20–50)
HGB BLD-MCNC: 12.4 G/DL (ref 14–18)
HGB BLD-MCNC: 12.5 G/DL (ref 14–18)
HGB UR QL STRIP: ABNORMAL
IMM GRANULOCYTES # BLD AUTO: 0.01 K/UL (ref 0–0.04)
IMM GRANULOCYTES NFR BLD AUTO: 0.2 % (ref 0–0.5)
KETONES UR QL STRIP: NEGATIVE
LDLC SERPL CALC-MCNC: 96.2 MG/DL (ref 63–159)
LEUKOCYTE ESTERASE UR QL STRIP: ABNORMAL
LYMPHOCYTES # BLD AUTO: 1.3 K/UL (ref 1–4.8)
LYMPHOCYTES NFR BLD: 21.7 % (ref 18–48)
MAGNESIUM SERPL-MCNC: 2.2 MG/DL (ref 1.6–2.6)
MCH RBC QN AUTO: 30.2 PG (ref 27–31)
MCH RBC QN AUTO: 30.4 PG (ref 27–31)
MCHC RBC AUTO-ENTMCNC: 32.6 G/DL (ref 32–36)
MCHC RBC AUTO-ENTMCNC: 33 G/DL (ref 32–36)
MCV RBC AUTO: 92 FL (ref 82–98)
MCV RBC AUTO: 93 FL (ref 82–98)
MICROSCOPIC COMMENT: ABNORMAL
MONOCYTES # BLD AUTO: 0.4 K/UL (ref 0.3–1)
MONOCYTES NFR BLD: 6.9 % (ref 4–15)
NEUTROPHILS # BLD AUTO: 4.1 K/UL (ref 1.8–7.7)
NEUTROPHILS NFR BLD: 66.5 % (ref 38–73)
NITRITE UR QL STRIP: POSITIVE
NON-SQ EPI CELLS #/AREA URNS HPF: 1 /HPF
NONHDLC SERPL-MCNC: 110 MG/DL
NRBC BLD-RTO: 0 /100 WBC
PH UR STRIP: 6 [PH] (ref 5–8)
PHOSPHATE SERPL-MCNC: 3.8 MG/DL (ref 2.7–4.5)
PLATELET # BLD AUTO: 221 K/UL (ref 150–450)
PLATELET # BLD AUTO: 224 K/UL (ref 150–450)
PMV BLD AUTO: 9.8 FL (ref 9.2–12.9)
PMV BLD AUTO: 9.9 FL (ref 9.2–12.9)
POTASSIUM SERPL-SCNC: 5.3 MMOL/L (ref 3.5–5.1)
PROT SERPL-MCNC: 7.5 G/DL (ref 6–8.4)
PROT UR QL STRIP: ABNORMAL
PROT UR-MCNC: 30 MG/DL (ref 0–15)
PROT/CREAT UR: 0.36 MG/G{CREAT} (ref 0–0.2)
PTH-INTACT SERPL-MCNC: 94.1 PG/ML (ref 9–77)
RBC # BLD AUTO: 4.1 M/UL (ref 4.6–6.2)
RBC # BLD AUTO: 4.11 M/UL (ref 4.6–6.2)
RBC #/AREA URNS HPF: 11 /HPF (ref 0–4)
SODIUM SERPL-SCNC: 142 MMOL/L (ref 136–145)
SP GR UR STRIP: 1.01 (ref 1–1.03)
SQUAMOUS #/AREA URNS HPF: 1 /HPF
TRIGL SERPL-MCNC: 69 MG/DL (ref 30–150)
TSH SERPL DL<=0.005 MIU/L-ACNC: 1.67 UIU/ML (ref 0.4–4)
URN SPEC COLLECT METH UR: ABNORMAL
UROBILINOGEN UR STRIP-ACNC: NEGATIVE EU/DL
WBC # BLD AUTO: 6.11 K/UL (ref 3.9–12.7)
WBC # BLD AUTO: 6.13 K/UL (ref 3.9–12.7)
WBC #/AREA URNS HPF: 90 /HPF (ref 0–5)

## 2023-02-06 PROCEDURE — 85027 COMPLETE CBC AUTOMATED: CPT | Mod: 91 | Performed by: NURSE PRACTITIONER

## 2023-02-06 PROCEDURE — 81000 URINALYSIS NONAUTO W/SCOPE: CPT | Performed by: NURSE PRACTITIONER

## 2023-02-06 PROCEDURE — 83970 ASSAY OF PARATHORMONE: CPT | Performed by: NURSE PRACTITIONER

## 2023-02-06 PROCEDURE — 80061 LIPID PANEL: CPT | Performed by: FAMILY MEDICINE

## 2023-02-06 PROCEDURE — 80053 COMPREHEN METABOLIC PANEL: CPT | Performed by: FAMILY MEDICINE

## 2023-02-06 PROCEDURE — 85025 COMPLETE CBC W/AUTO DIFF WBC: CPT | Performed by: FAMILY MEDICINE

## 2023-02-06 PROCEDURE — 84100 ASSAY OF PHOSPHORUS: CPT | Performed by: NURSE PRACTITIONER

## 2023-02-06 PROCEDURE — 82570 ASSAY OF URINE CREATININE: CPT | Performed by: NURSE PRACTITIONER

## 2023-02-06 PROCEDURE — 82550 ASSAY OF CK (CPK): CPT | Performed by: FAMILY MEDICINE

## 2023-02-06 PROCEDURE — 84443 ASSAY THYROID STIM HORMONE: CPT | Performed by: FAMILY MEDICINE

## 2023-02-06 PROCEDURE — 82306 VITAMIN D 25 HYDROXY: CPT | Performed by: NURSE PRACTITIONER

## 2023-02-06 PROCEDURE — 83036 HEMOGLOBIN GLYCOSYLATED A1C: CPT | Performed by: FAMILY MEDICINE

## 2023-02-06 PROCEDURE — 36415 COLL VENOUS BLD VENIPUNCTURE: CPT | Performed by: NURSE PRACTITIONER

## 2023-02-06 PROCEDURE — 83735 ASSAY OF MAGNESIUM: CPT | Performed by: FAMILY MEDICINE

## 2023-02-07 DIAGNOSIS — E87.5 HYPERKALEMIA: Primary | ICD-10-CM

## 2023-02-07 NOTE — PLAN OF CARE
Problem: Patient Care Overview  Goal: Plan of Care Review  Outcome: Ongoing (interventions implemented as appropriate)  POC reviewed with pt and family at 1400. Pt verbalized understanding. Questions and concerns addressed. Started on a regular diet. Routine CT scheduled. No acute events today. Pt progressing toward goals. Will continue to monitor. See flowsheets for full assessment and VS info.            What Type Of Note Output Would You Prefer (Optional)?: Standard Output How Severe Are Your Spot(S)?: mild Have Your Spot(S) Been Treated In The Past?: has not been treated Hpi Title: Evaluation of Skin Lesions

## 2023-02-10 RX ORDER — CIPROFLOXACIN 500 MG/1
500 TABLET ORAL 2 TIMES DAILY
Qty: 10 TABLET | Refills: 0 | Status: SHIPPED | OUTPATIENT
Start: 2023-02-10 | End: 2023-02-15

## 2023-02-20 ENCOUNTER — LAB VISIT (OUTPATIENT)
Dept: LAB | Facility: HOSPITAL | Age: 73
End: 2023-02-20
Payer: MEDICARE

## 2023-02-20 ENCOUNTER — OFFICE VISIT (OUTPATIENT)
Dept: NEPHROLOGY | Facility: CLINIC | Age: 73
End: 2023-02-20
Payer: MEDICARE

## 2023-02-20 ENCOUNTER — LAB VISIT (OUTPATIENT)
Dept: LAB | Facility: HOSPITAL | Age: 73
End: 2023-02-20
Attending: FAMILY MEDICINE
Payer: MEDICARE

## 2023-02-20 VITALS
BODY MASS INDEX: 17.46 KG/M2 | WEIGHT: 136 LBS | SYSTOLIC BLOOD PRESSURE: 119 MMHG | HEART RATE: 71 BPM | DIASTOLIC BLOOD PRESSURE: 60 MMHG | OXYGEN SATURATION: 98 %

## 2023-02-20 DIAGNOSIS — N39.0 UTI (URINARY TRACT INFECTION), UNCOMPLICATED: ICD-10-CM

## 2023-02-20 DIAGNOSIS — E87.5 HYPERKALEMIA: ICD-10-CM

## 2023-02-20 DIAGNOSIS — N18.32 STAGE 3B CHRONIC KIDNEY DISEASE: Primary | ICD-10-CM

## 2023-02-20 DIAGNOSIS — R80.9 PROTEINURIA, UNSPECIFIED TYPE: ICD-10-CM

## 2023-02-20 LAB
ANION GAP SERPL CALC-SCNC: 7 MMOL/L (ref 8–16)
BUN SERPL-MCNC: 30 MG/DL (ref 8–23)
CALCIUM SERPL-MCNC: 9.9 MG/DL (ref 8.7–10.5)
CHLORIDE SERPL-SCNC: 108 MMOL/L (ref 95–110)
CO2 SERPL-SCNC: 26 MMOL/L (ref 23–29)
CREAT SERPL-MCNC: 1.7 MG/DL (ref 0.5–1.4)
EST. GFR  (NO RACE VARIABLE): 42 ML/MIN/1.73 M^2
GLUCOSE SERPL-MCNC: 100 MG/DL (ref 70–110)
POTASSIUM SERPL-SCNC: 4.9 MMOL/L (ref 3.5–5.1)
SODIUM SERPL-SCNC: 141 MMOL/L (ref 136–145)

## 2023-02-20 PROCEDURE — 3078F DIAST BP <80 MM HG: CPT | Mod: CPTII,S$GLB,, | Performed by: NURSE PRACTITIONER

## 2023-02-20 PROCEDURE — 1126F PR PAIN SEVERITY QUANTIFIED, NO PAIN PRESENT: ICD-10-PCS | Mod: CPTII,S$GLB,, | Performed by: NURSE PRACTITIONER

## 2023-02-20 PROCEDURE — 1101F PR PT FALLS ASSESS DOC 0-1 FALLS W/OUT INJ PAST YR: ICD-10-PCS | Mod: CPTII,S$GLB,, | Performed by: NURSE PRACTITIONER

## 2023-02-20 PROCEDURE — 3074F SYST BP LT 130 MM HG: CPT | Mod: CPTII,S$GLB,, | Performed by: NURSE PRACTITIONER

## 2023-02-20 PROCEDURE — 87186 SC STD MICRODIL/AGAR DIL: CPT | Performed by: NURSE PRACTITIONER

## 2023-02-20 PROCEDURE — 36415 COLL VENOUS BLD VENIPUNCTURE: CPT | Performed by: NURSE PRACTITIONER

## 2023-02-20 PROCEDURE — 3066F PR DOCUMENTATION OF TREATMENT FOR NEPHROPATHY: ICD-10-PCS | Mod: CPTII,S$GLB,, | Performed by: NURSE PRACTITIONER

## 2023-02-20 PROCEDURE — 1159F MED LIST DOCD IN RCRD: CPT | Mod: CPTII,S$GLB,, | Performed by: NURSE PRACTITIONER

## 2023-02-20 PROCEDURE — 99214 OFFICE O/P EST MOD 30 MIN: CPT | Mod: S$GLB,,, | Performed by: NURSE PRACTITIONER

## 2023-02-20 PROCEDURE — 87088 URINE BACTERIA CULTURE: CPT | Performed by: NURSE PRACTITIONER

## 2023-02-20 PROCEDURE — 3288F FALL RISK ASSESSMENT DOCD: CPT | Mod: CPTII,S$GLB,, | Performed by: NURSE PRACTITIONER

## 2023-02-20 PROCEDURE — 1159F PR MEDICATION LIST DOCUMENTED IN MEDICAL RECORD: ICD-10-PCS | Mod: CPTII,S$GLB,, | Performed by: NURSE PRACTITIONER

## 2023-02-20 PROCEDURE — 1101F PT FALLS ASSESS-DOCD LE1/YR: CPT | Mod: CPTII,S$GLB,, | Performed by: NURSE PRACTITIONER

## 2023-02-20 PROCEDURE — 99999 PR PBB SHADOW E&M-EST. PATIENT-LVL III: CPT | Mod: PBBFAC,,, | Performed by: NURSE PRACTITIONER

## 2023-02-20 PROCEDURE — 3044F PR MOST RECENT HEMOGLOBIN A1C LEVEL <7.0%: ICD-10-PCS | Mod: CPTII,S$GLB,, | Performed by: NURSE PRACTITIONER

## 2023-02-20 PROCEDURE — 87086 URINE CULTURE/COLONY COUNT: CPT | Performed by: NURSE PRACTITIONER

## 2023-02-20 PROCEDURE — 3044F HG A1C LEVEL LT 7.0%: CPT | Mod: CPTII,S$GLB,, | Performed by: NURSE PRACTITIONER

## 2023-02-20 PROCEDURE — 3066F NEPHROPATHY DOC TX: CPT | Mod: CPTII,S$GLB,, | Performed by: NURSE PRACTITIONER

## 2023-02-20 PROCEDURE — 87077 CULTURE AEROBIC IDENTIFY: CPT | Performed by: NURSE PRACTITIONER

## 2023-02-20 PROCEDURE — 3008F BODY MASS INDEX DOCD: CPT | Mod: CPTII,S$GLB,, | Performed by: NURSE PRACTITIONER

## 2023-02-20 PROCEDURE — 3078F PR MOST RECENT DIASTOLIC BLOOD PRESSURE < 80 MM HG: ICD-10-PCS | Mod: CPTII,S$GLB,, | Performed by: NURSE PRACTITIONER

## 2023-02-20 PROCEDURE — 1126F AMNT PAIN NOTED NONE PRSNT: CPT | Mod: CPTII,S$GLB,, | Performed by: NURSE PRACTITIONER

## 2023-02-20 PROCEDURE — 80048 BASIC METABOLIC PNL TOTAL CA: CPT | Performed by: NURSE PRACTITIONER

## 2023-02-20 PROCEDURE — 3288F PR FALLS RISK ASSESSMENT DOCUMENTED: ICD-10-PCS | Mod: CPTII,S$GLB,, | Performed by: NURSE PRACTITIONER

## 2023-02-20 PROCEDURE — 99999 PR PBB SHADOW E&M-EST. PATIENT-LVL III: ICD-10-PCS | Mod: PBBFAC,,, | Performed by: NURSE PRACTITIONER

## 2023-02-20 PROCEDURE — 3008F PR BODY MASS INDEX (BMI) DOCUMENTED: ICD-10-PCS | Mod: CPTII,S$GLB,, | Performed by: NURSE PRACTITIONER

## 2023-02-20 PROCEDURE — 99214 PR OFFICE/OUTPT VISIT, EST, LEVL IV, 30-39 MIN: ICD-10-PCS | Mod: S$GLB,,, | Performed by: NURSE PRACTITIONER

## 2023-02-20 PROCEDURE — 3074F PR MOST RECENT SYSTOLIC BLOOD PRESSURE < 130 MM HG: ICD-10-PCS | Mod: CPTII,S$GLB,, | Performed by: NURSE PRACTITIONER

## 2023-02-20 NOTE — PROGRESS NOTES
"Subjective:       Patient ID: Juan Manuel Koehler is a 72 y.o. male who presents for follow-up evaluation of CKD and hyperkalemia.      HPI     Followed by Dr. Blake in 1/18/22.  Prior pertinent chart reviewed since this is patient's first appointment with me.    Patient presents for follow-up evaluation of CKD.  Baseline creatinine of 1.8-2.0. Significant other medical problems include h/o urothelial carcinoma of bladder s/p ileal conduit, ELDER, CVA, NSTEMI, proteinuria, hyperkalemia. Per Dr. Blake's last visit, "Mr. 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 Prior episodes of ELDER, severe hyperkalemia, ELDER was suspected from prolonged volume depletion causing ATN/ possible contrast induced ELDER." The patient denies taking NSAIDs, herbal supplements, or new antibiotics, recreational drugs, recent episode of dehydration, diarrhea, nausea or vomiting, acute illness, hospitalization or exposure to IV radiocontrast. He has been maintained on veltassa 16.8g qd for 5 years. He adheres to low K diet. He reports adequate hydration.     Update 2/20/23:  - Presents for follow-up of CKD and hyperkalemia  - Taking Veltassa MWF, notes intermittent noncompliance with low K diet  - No other symptoms or new complaints   - Completed treatment with cipro for UTI    Significant family hx includes: Mother - kidney disease    CT abdomen: 9/14/22 , reviewed.    Review of Systems   Constitutional:  Negative for fever.   Respiratory:  Negative for shortness of breath.    Cardiovascular:  Negative for chest pain and leg swelling.   Gastrointestinal:  Negative for diarrhea, nausea and vomiting.   Genitourinary:  Negative for hematuria.        +urostomy    Neurological:  Negative for syncope.   All other systems reviewed and are negative.    Objective:       Blood pressure 119/60, pulse 71, weight 61.7 kg (136 lb), SpO2 98 %.  Physical Exam  Vitals reviewed.   Constitutional:       General: " "He is not in acute distress.     Comments: Thin, elderly male   HENT:      Head: Normocephalic and atraumatic.   Eyes:      General: No scleral icterus.  Cardiovascular:      Rate and Rhythm: Normal rate and regular rhythm.   Pulmonary:      Effort: Pulmonary effort is normal. No respiratory distress.      Breath sounds: No wheezing or rales.   Musculoskeletal:      Right lower leg: No edema.      Left lower leg: No edema.   Skin:     General: Skin is warm and dry.   Neurological:      Mental Status: He is alert and oriented to person, place, and time.   Psychiatric:         Mood and Affect: Mood normal.         Behavior: Behavior normal.         Lab Results   Component Value Date    CREATININE 1.7 (H) 02/20/2023    URICACID 6.4 01/13/2022     Prot/Creat Ratio, Urine   Date Value Ref Range Status   02/06/2023 0.36 (H) 0.00 - 0.20 Final   12/09/2022 0.30 (H) 0.00 - 0.20 Final   05/17/2022 0.34 (H) 0.00 - 0.20 Final     Lab Results   Component Value Date     02/20/2023    K 4.9 02/20/2023    CO2 26 02/20/2023     02/20/2023     Lab Results   Component Value Date    PTH 94.1 (H) 02/06/2023    CALCIUM 9.9 02/20/2023    PHOS 3.8 02/06/2023    PHOS 3.8 02/06/2023    PHOS 3.8 02/06/2023     Lab Results   Component Value Date    HGB 12.5 (L) 02/06/2023    HGB 12.4 (L) 02/06/2023    WBC 6.11 02/06/2023    WBC 6.13 02/06/2023    HCT 37.9 (L) 02/06/2023    HCT 38.0 (L) 02/06/2023      Lab Results   Component Value Date    HGBA1C 5.4 02/06/2023     02/06/2023     02/06/2023    BUN 30 (H) 02/20/2023     Lab Results   Component Value Date    LDLCALC 96.2 02/06/2023     CT chest abdomen 9/14/22:  "FINDINGS:  Chest:     Heart and mediastinal vasculature: Heart normal in size.     Ann-Marie/Mediastinum: Calcified subcarinal lymph node and calcified right hilar lymph nodes compatible with previous granulomatous disease.  No pathologic adenopathy.     Lungs: Left basilar pleuroparenchymal scarring, minimal " "paraseptal emphysema in the right middle lobe and by apical pleural thickening..  Calcified in the right base.  No suspicious pulmonary nodules .     Abdomen and pelvis:     Liver: Normal in size and attenuation, with no focal hepatic lesions.     Gallbladder: No calcified gallstones.     Bile Ducts: No evidence of dilated ducts.     Pancreas: No mass or peripancreatic fat stranding.     Spleen: Stable 15 mm hypodensity along the anterior tip spleen, possibly cyst or pseudocyst.     Adrenals: Unremarkable.     Kidneys/ Ureters: Bilateral renal cortical cysts are present and there is interval improvement in renal pelvic and intrarenal collecting system dilatation since the prior study.  There is bilateral urothelial wall thickening and hyperenhancement.  No solid mass or nephrolithiasis.  No collectingd systems not well evaluated due to exam protocol.  There is a right lower quadrant loop urostomy.     GI Tract/Mesentery: No evidence of bowel obstruction or inflammation.     Peritoneal Space: No ascites. No free air.     Retroperitoneum: No significant adenopathy. Bilateral pelvic lymphadenectomy surgical clips are present.     Vasculature: No significant atherosclerosis or aneurysm.     Bladder: Surgically absent     Reproductive organs: Surgically absent     Bones: No suspicious lytic or blastic lesions.  Age related degenerative change.     Impression:     Status post cystectomy and loop urostomy, with improved renal collecting system dilatation, noting urothelial wall thickening which may reflect acute and or chronic inflammation and or infection.     No convincing evidence of metastatic disease     Evidence of previous granulomatous disease     Stable splenic hypodensity, of doubtful clinical significance."    Assessment:       1. Stage 3b chronic kidney disease    2. Proteinuria, unspecified type    3. Hyperkalemia    4. UTI (urinary tract infection), uncomplicated        Plan:   CKD stage 3b c eGFR 35 mL/min " -  - baseline Cr 1.8-2.0, within baseline   - CKD clinically related to obstructive uropathy in malignancy, chemo nephrotoxicity and history of AKIs  - Continue to avoid NSAIDs/ nephrotoxic agents, continue adequate hydration    UPCR 360mg/g; on no RAASi   Acid-base Bicarb 26   Renal osteodystrophy Cca and phos controlled; PTH 96.3, monitor Vit D   Anemia At CKD goal   Lipid Management No statin   ESRD planning Provided education about the stages of CKD, usual progression, and need to monitor for and treat CKD-related disease in an effort to delay progression of CKD.        Hyperkalemia  - Switch to Lokelma MWF given high cost of Veltassa  - Continue low K diet  - Monitor BMP    UTI  - S/p cipro course  - Check repeat urine culture    All questions patient had were answered.  Asked if further questions. None. F/u in clinic 3 months  with labs and urine prior to next visit or sooner if needed.  ER for emergency concerns.    Summary of Plan:  - Urine culture today  - Rx Lokelma 10g MWF  - Repeat BMP 1 month  - 3 month follow up with standard labs

## 2023-02-23 LAB — BACTERIA UR CULT: ABNORMAL

## 2023-02-23 RX ORDER — AMOXICILLIN AND CLAVULANATE POTASSIUM 500; 125 MG/1; MG/1
1 TABLET, FILM COATED ORAL 2 TIMES DAILY
Qty: 14 TABLET | Refills: 0 | Status: SHIPPED | OUTPATIENT
Start: 2023-02-23 | End: 2023-03-02

## 2023-03-20 ENCOUNTER — LAB VISIT (OUTPATIENT)
Dept: LAB | Facility: HOSPITAL | Age: 73
End: 2023-03-20
Payer: MEDICARE

## 2023-03-20 DIAGNOSIS — E87.5 HYPERKALEMIA: ICD-10-CM

## 2023-03-20 LAB
ANION GAP SERPL CALC-SCNC: 9 MMOL/L (ref 8–16)
BUN SERPL-MCNC: 28 MG/DL (ref 8–23)
CALCIUM SERPL-MCNC: 9.8 MG/DL (ref 8.7–10.5)
CHLORIDE SERPL-SCNC: 109 MMOL/L (ref 95–110)
CO2 SERPL-SCNC: 24 MMOL/L (ref 23–29)
CREAT SERPL-MCNC: 1.8 MG/DL (ref 0.5–1.4)
EST. GFR  (NO RACE VARIABLE): 39 ML/MIN/1.73 M^2
GLUCOSE SERPL-MCNC: 106 MG/DL (ref 70–110)
POTASSIUM SERPL-SCNC: 5.1 MMOL/L (ref 3.5–5.1)
SODIUM SERPL-SCNC: 142 MMOL/L (ref 136–145)

## 2023-03-20 PROCEDURE — 36415 COLL VENOUS BLD VENIPUNCTURE: CPT | Performed by: NURSE PRACTITIONER

## 2023-03-20 PROCEDURE — 80048 BASIC METABOLIC PNL TOTAL CA: CPT | Performed by: NURSE PRACTITIONER

## 2023-03-23 ENCOUNTER — PATIENT MESSAGE (OUTPATIENT)
Dept: NEPHROLOGY | Facility: CLINIC | Age: 73
End: 2023-03-23
Payer: MEDICARE

## 2023-06-07 ENCOUNTER — OFFICE VISIT (OUTPATIENT)
Dept: NEPHROLOGY | Facility: CLINIC | Age: 73
End: 2023-06-07
Payer: MEDICARE

## 2023-06-07 VITALS
BODY MASS INDEX: 17.46 KG/M2 | DIASTOLIC BLOOD PRESSURE: 67 MMHG | WEIGHT: 136 LBS | OXYGEN SATURATION: 97 % | HEART RATE: 88 BPM | SYSTOLIC BLOOD PRESSURE: 113 MMHG

## 2023-06-07 DIAGNOSIS — E87.5 HYPERKALEMIA: ICD-10-CM

## 2023-06-07 DIAGNOSIS — N25.81 HYPERPARATHYROIDISM DUE TO RENAL INSUFFICIENCY: ICD-10-CM

## 2023-06-07 DIAGNOSIS — N18.32 ANEMIA IN STAGE 3B CHRONIC KIDNEY DISEASE: ICD-10-CM

## 2023-06-07 DIAGNOSIS — D63.1 ANEMIA IN STAGE 3B CHRONIC KIDNEY DISEASE: ICD-10-CM

## 2023-06-07 DIAGNOSIS — R80.9 PROTEINURIA, UNSPECIFIED TYPE: ICD-10-CM

## 2023-06-07 DIAGNOSIS — N18.32 STAGE 3B CHRONIC KIDNEY DISEASE: Primary | ICD-10-CM

## 2023-06-07 PROCEDURE — 1159F MED LIST DOCD IN RCRD: CPT | Mod: CPTII,S$GLB,, | Performed by: NURSE PRACTITIONER

## 2023-06-07 PROCEDURE — 3066F PR DOCUMENTATION OF TREATMENT FOR NEPHROPATHY: ICD-10-PCS | Mod: CPTII,S$GLB,, | Performed by: NURSE PRACTITIONER

## 2023-06-07 PROCEDURE — 3288F PR FALLS RISK ASSESSMENT DOCUMENTED: ICD-10-PCS | Mod: CPTII,S$GLB,, | Performed by: NURSE PRACTITIONER

## 2023-06-07 PROCEDURE — 99214 OFFICE O/P EST MOD 30 MIN: CPT | Mod: S$GLB,,, | Performed by: NURSE PRACTITIONER

## 2023-06-07 PROCEDURE — 3044F PR MOST RECENT HEMOGLOBIN A1C LEVEL <7.0%: ICD-10-PCS | Mod: CPTII,S$GLB,, | Performed by: NURSE PRACTITIONER

## 2023-06-07 PROCEDURE — 99214 PR OFFICE/OUTPT VISIT, EST, LEVL IV, 30-39 MIN: ICD-10-PCS | Mod: S$GLB,,, | Performed by: NURSE PRACTITIONER

## 2023-06-07 PROCEDURE — 3044F HG A1C LEVEL LT 7.0%: CPT | Mod: CPTII,S$GLB,, | Performed by: NURSE PRACTITIONER

## 2023-06-07 PROCEDURE — 3074F PR MOST RECENT SYSTOLIC BLOOD PRESSURE < 130 MM HG: ICD-10-PCS | Mod: CPTII,S$GLB,, | Performed by: NURSE PRACTITIONER

## 2023-06-07 PROCEDURE — 3008F BODY MASS INDEX DOCD: CPT | Mod: CPTII,S$GLB,, | Performed by: NURSE PRACTITIONER

## 2023-06-07 PROCEDURE — 1159F PR MEDICATION LIST DOCUMENTED IN MEDICAL RECORD: ICD-10-PCS | Mod: CPTII,S$GLB,, | Performed by: NURSE PRACTITIONER

## 2023-06-07 PROCEDURE — 3078F DIAST BP <80 MM HG: CPT | Mod: CPTII,S$GLB,, | Performed by: NURSE PRACTITIONER

## 2023-06-07 PROCEDURE — 3066F NEPHROPATHY DOC TX: CPT | Mod: CPTII,S$GLB,, | Performed by: NURSE PRACTITIONER

## 2023-06-07 PROCEDURE — 1101F PT FALLS ASSESS-DOCD LE1/YR: CPT | Mod: CPTII,S$GLB,, | Performed by: NURSE PRACTITIONER

## 2023-06-07 PROCEDURE — 3074F SYST BP LT 130 MM HG: CPT | Mod: CPTII,S$GLB,, | Performed by: NURSE PRACTITIONER

## 2023-06-07 PROCEDURE — 3288F FALL RISK ASSESSMENT DOCD: CPT | Mod: CPTII,S$GLB,, | Performed by: NURSE PRACTITIONER

## 2023-06-07 PROCEDURE — 99999 PR PBB SHADOW E&M-EST. PATIENT-LVL II: CPT | Mod: PBBFAC,,, | Performed by: NURSE PRACTITIONER

## 2023-06-07 PROCEDURE — 1101F PR PT FALLS ASSESS DOC 0-1 FALLS W/OUT INJ PAST YR: ICD-10-PCS | Mod: CPTII,S$GLB,, | Performed by: NURSE PRACTITIONER

## 2023-06-07 PROCEDURE — 99999 PR PBB SHADOW E&M-EST. PATIENT-LVL II: ICD-10-PCS | Mod: PBBFAC,,, | Performed by: NURSE PRACTITIONER

## 2023-06-07 PROCEDURE — 1126F PR PAIN SEVERITY QUANTIFIED, NO PAIN PRESENT: ICD-10-PCS | Mod: CPTII,S$GLB,, | Performed by: NURSE PRACTITIONER

## 2023-06-07 PROCEDURE — 1126F AMNT PAIN NOTED NONE PRSNT: CPT | Mod: CPTII,S$GLB,, | Performed by: NURSE PRACTITIONER

## 2023-06-07 PROCEDURE — 3008F PR BODY MASS INDEX (BMI) DOCUMENTED: ICD-10-PCS | Mod: CPTII,S$GLB,, | Performed by: NURSE PRACTITIONER

## 2023-06-07 PROCEDURE — 3078F PR MOST RECENT DIASTOLIC BLOOD PRESSURE < 80 MM HG: ICD-10-PCS | Mod: CPTII,S$GLB,, | Performed by: NURSE PRACTITIONER

## 2023-06-07 NOTE — PROGRESS NOTES
"Subjective:       Patient ID: Juan Manuel Koehler is a 73 y.o. male who presents for follow-up evaluation of CKD and hyperkalemia.      HPI     Followed by Dr. Blake in 1/18/22.  Prior pertinent chart reviewed since this is patient's first appointment with me.    Patient presents for follow-up evaluation of CKD.  Baseline creatinine of 1.8-2.0. Significant other medical problems include h/o urothelial carcinoma of bladder s/p ileal conduit, ELDER, CVA, NSTEMI, proteinuria, hyperkalemia. Per Dr. Blake's last visit, "Mr. 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 Prior episodes of ELDER, severe hyperkalemia, ELDER was suspected from prolonged volume depletion causing ATN/ possible contrast induced ELDER." The patient denies taking NSAIDs, herbal supplements, or new antibiotics, recreational drugs, recent episode of dehydration, diarrhea, nausea or vomiting, acute illness, hospitalization or exposure to IV radiocontrast. He has been maintained on veltassa 16.8g qd for 5 years. He adheres to low K diet. He reports adequate hydration.     Update 2/20/23:  - Presents for follow-up of CKD and hyperkalemia  - Taking Veltassa MWF, notes intermittent noncompliance with low K diet  - No other symptoms or new complaints   - Completed treatment with cipro for UTI    Update 6/7/23:  - Follow-up of CKD and hyperkalemia  - Maintained on lokelma TIW, needs refill however medication is costly  - No new symptoms or complaints today    Significant family hx includes: Mother - kidney disease    CT abdomen: 9/14/22 , reviewed.    Review of Systems   Constitutional:  Negative for fever.   Respiratory:  Negative for shortness of breath.    Cardiovascular:  Negative for chest pain and leg swelling.   Gastrointestinal:  Negative for diarrhea, nausea and vomiting.   Genitourinary:  Negative for hematuria.        +urostomy    Neurological:  Negative for syncope.   All other systems reviewed and are " "negative.    Objective:       Blood pressure 113/67, pulse 88, weight 61.7 kg (136 lb), SpO2 97 %.  Physical Exam  Vitals reviewed.   Constitutional:       General: He is not in acute distress.     Comments: Thin, elderly male   HENT:      Head: Normocephalic and atraumatic.   Eyes:      General: No scleral icterus.  Cardiovascular:      Rate and Rhythm: Normal rate and regular rhythm.   Pulmonary:      Effort: Pulmonary effort is normal. No respiratory distress.      Breath sounds: No wheezing or rales.   Musculoskeletal:      Right lower leg: No edema.      Left lower leg: No edema.   Skin:     General: Skin is warm and dry.   Neurological:      Mental Status: He is alert and oriented to person, place, and time.   Psychiatric:         Mood and Affect: Mood normal.         Behavior: Behavior normal.         Lab Results   Component Value Date    CREATININE 1.8 (H) 03/20/2023    URICACID 6.4 01/13/2022     Prot/Creat Ratio, Urine   Date Value Ref Range Status   02/06/2023 0.36 (H) 0.00 - 0.20 Final   12/09/2022 0.30 (H) 0.00 - 0.20 Final   05/17/2022 0.34 (H) 0.00 - 0.20 Final     Lab Results   Component Value Date     03/20/2023    K 5.1 03/20/2023    CO2 24 03/20/2023     03/20/2023     Lab Results   Component Value Date    PTH 94.1 (H) 02/06/2023    CALCIUM 9.8 03/20/2023    PHOS 3.8 02/06/2023    PHOS 3.8 02/06/2023    PHOS 3.8 02/06/2023     Lab Results   Component Value Date    HGB 12.5 (L) 02/06/2023    HGB 12.4 (L) 02/06/2023    WBC 6.11 02/06/2023    WBC 6.13 02/06/2023    HCT 37.9 (L) 02/06/2023    HCT 38.0 (L) 02/06/2023      Lab Results   Component Value Date    HGBA1C 5.4 02/06/2023     02/06/2023     02/06/2023    BUN 28 (H) 03/20/2023     Lab Results   Component Value Date    LDLCALC 96.2 02/06/2023     CT chest abdomen 9/14/22:  "FINDINGS:  Chest:     Heart and mediastinal vasculature: Heart normal in size.     Ann-Marie/Mediastinum: Calcified subcarinal lymph node and calcified " "right hilar lymph nodes compatible with previous granulomatous disease.  No pathologic adenopathy.     Lungs: Left basilar pleuroparenchymal scarring, minimal paraseptal emphysema in the right middle lobe and by apical pleural thickening..  Calcified in the right base.  No suspicious pulmonary nodules .     Abdomen and pelvis:     Liver: Normal in size and attenuation, with no focal hepatic lesions.     Gallbladder: No calcified gallstones.     Bile Ducts: No evidence of dilated ducts.     Pancreas: No mass or peripancreatic fat stranding.     Spleen: Stable 15 mm hypodensity along the anterior tip spleen, possibly cyst or pseudocyst.     Adrenals: Unremarkable.     Kidneys/ Ureters: Bilateral renal cortical cysts are present and there is interval improvement in renal pelvic and intrarenal collecting system dilatation since the prior study.  There is bilateral urothelial wall thickening and hyperenhancement.  No solid mass or nephrolithiasis.  No collectingd systems not well evaluated due to exam protocol.  There is a right lower quadrant loop urostomy.     GI Tract/Mesentery: No evidence of bowel obstruction or inflammation.     Peritoneal Space: No ascites. No free air.     Retroperitoneum: No significant adenopathy. Bilateral pelvic lymphadenectomy surgical clips are present.     Vasculature: No significant atherosclerosis or aneurysm.     Bladder: Surgically absent     Reproductive organs: Surgically absent     Bones: No suspicious lytic or blastic lesions.  Age related degenerative change.     Impression:     Status post cystectomy and loop urostomy, with improved renal collecting system dilatation, noting urothelial wall thickening which may reflect acute and or chronic inflammation and or infection.     No convincing evidence of metastatic disease     Evidence of previous granulomatous disease     Stable splenic hypodensity, of doubtful clinical significance."    Assessment:       1. Stage 3b chronic kidney " disease    2. Proteinuria, unspecified type    3. Hyperparathyroidism due to renal insufficiency    4. Anemia in stage 3b chronic kidney disease    5. Hyperkalemia        Plan:   CKD stage 3b c eGFR 35 mL/min -  - baseline Cr 1.8-2.0, within baseline   - CKD clinically related to obstructive uropathy in malignancy, chemo nephrotoxicity and history of AKIs  - Continue to avoid NSAIDs/ nephrotoxic agents, continue adequate hydration    UPCR 360mg/g; on no RAASi   Acid-base Bicarb WNL   Renal osteodystrophy Cca and phos controlled; PTH 94.1, monitor    Anemia At CKD goal   Lipid Management No statin   ESRD planning Provided education about the stages of CKD, usual progression, and need to monitor for and treat CKD-related disease in an effort to delay progression of CKD.     No need for Advanced CKD planning given stable CKD 3b     Hyperkalemia  - Maintained on Lokelma MWF   - Overall finding Lokelma and Veltassa to be costly. Previously received Veltassa assistance. Will look into options.   - Continue low K diet  -Repeat RFP      All questions patient had were answered.  Asked if further questions. None. F/u in clinic based on repeat labs  with labs and urine prior to next visit or sooner if needed.  ER for emergency concerns.    Summary of Plan:  - RFP, CBC, UA, UPCR Monday 6/12  - Low K diet  - Look into Veltassa/ Lokelma assistance options

## 2023-06-09 ENCOUNTER — TELEPHONE (OUTPATIENT)
Dept: NEPHROLOGY | Facility: CLINIC | Age: 73
End: 2023-06-09
Payer: MEDICARE

## 2023-06-09 ENCOUNTER — PATIENT MESSAGE (OUTPATIENT)
Dept: NEPHROLOGY | Facility: CLINIC | Age: 73
End: 2023-06-09
Payer: MEDICARE

## 2023-06-09 NOTE — TELEPHONE ENCOUNTER
----- Message from Darcy Chang NP sent at 6/9/2023 10:29 AM CDT -----  Regarding: RE: pt advice  Contact: [phone]  Can you please let him know that I have spoken to our  about financial assistance and currently looking into options. I will give him a call when a plan is in place. Thank you.   ----- Message -----  From: Janine Long MA  Sent: 6/9/2023   8:32 AM CDT  To: Darcy Chang NP  Subject: FW: pt advice                                    Is this what the lab work on 6/12 is for?  ----- Message -----  From: Libby Richard  Sent: 6/9/2023   8:28 AM CDT  To: [facility] Staff  Subject: pt advice                                        Pt calling in regards to be done with medication and needing to speak to the assistant to know what's next. Pls call

## 2023-06-09 NOTE — TELEPHONE ENCOUNTER
----- Message from Trevor Freeman sent at 6/9/2023 12:18 PM CDT -----  Regarding: adv  Contact: [phone]  Pt returning call to nahun in regards to medication he states ... pls call and [phone]

## 2023-06-12 ENCOUNTER — LAB VISIT (OUTPATIENT)
Dept: LAB | Facility: HOSPITAL | Age: 73
End: 2023-06-12
Payer: MEDICARE

## 2023-06-12 ENCOUNTER — TELEPHONE (OUTPATIENT)
Dept: PHARMACY | Facility: CLINIC | Age: 73
End: 2023-06-12
Payer: MEDICARE

## 2023-06-12 DIAGNOSIS — E87.5 HYPERKALEMIA: ICD-10-CM

## 2023-06-12 DIAGNOSIS — N18.32 STAGE 3B CHRONIC KIDNEY DISEASE: ICD-10-CM

## 2023-06-12 LAB
BACTERIA #/AREA URNS HPF: ABNORMAL /HPF
BILIRUB UR QL STRIP: NEGATIVE
CLARITY UR: ABNORMAL
COLOR UR: YELLOW
CREAT UR-MCNC: 84.3 MG/DL (ref 23–375)
GLUCOSE UR QL STRIP: NEGATIVE
HGB UR QL STRIP: ABNORMAL
KETONES UR QL STRIP: NEGATIVE
LEUKOCYTE ESTERASE UR QL STRIP: ABNORMAL
MICROSCOPIC COMMENT: ABNORMAL
NITRITE UR QL STRIP: POSITIVE
NON-SQ EPI CELLS #/AREA URNS HPF: 1 /HPF
PH UR STRIP: 6 [PH] (ref 5–8)
PROT UR QL STRIP: ABNORMAL
PROT UR-MCNC: 30 MG/DL (ref 0–15)
PROT/CREAT UR: 0.36 MG/G{CREAT} (ref 0–0.2)
RBC #/AREA URNS HPF: 16 /HPF (ref 0–4)
SP GR UR STRIP: 1.01 (ref 1–1.03)
URN SPEC COLLECT METH UR: ABNORMAL
UROBILINOGEN UR STRIP-ACNC: NEGATIVE EU/DL
WBC #/AREA URNS HPF: >100 /HPF (ref 0–5)
WBC CLUMPS URNS QL MICRO: ABNORMAL

## 2023-06-12 PROCEDURE — 81000 URINALYSIS NONAUTO W/SCOPE: CPT | Performed by: NURSE PRACTITIONER

## 2023-06-12 PROCEDURE — 82570 ASSAY OF URINE CREATININE: CPT | Performed by: NURSE PRACTITIONER

## 2023-06-12 NOTE — TELEPHONE ENCOUNTER
Unfortunately, The Pharmacy Patient Assistance Team is unable to assist Mr. Koehler at this time due to the following reasons      There is no additional assistance Patient Assistance Team is able to offer than what is being offered to the patient at the current time          Pharmacy Patient Assistance Team

## 2023-06-14 DIAGNOSIS — N39.0 UTI (URINARY TRACT INFECTION), UNCOMPLICATED: Primary | ICD-10-CM

## 2023-06-16 DIAGNOSIS — E87.5 HYPERKALEMIA: Primary | ICD-10-CM

## 2023-07-05 ENCOUNTER — LAB VISIT (OUTPATIENT)
Dept: LAB | Facility: HOSPITAL | Age: 73
End: 2023-07-05
Attending: NURSE PRACTITIONER
Payer: MEDICARE

## 2023-07-05 DIAGNOSIS — E87.5 HYPERKALEMIA: ICD-10-CM

## 2023-07-05 LAB — POTASSIUM SERPL-SCNC: 4.6 MMOL/L (ref 3.5–5.1)

## 2023-07-05 PROCEDURE — 36415 COLL VENOUS BLD VENIPUNCTURE: CPT | Performed by: NURSE PRACTITIONER

## 2023-07-05 PROCEDURE — 84132 ASSAY OF SERUM POTASSIUM: CPT | Performed by: NURSE PRACTITIONER

## 2023-07-12 NOTE — NURSING
Nurse notified by lab of critical K+ level of 6.7.   Nurse notified Dr. Boucher and he will contact Nephrology team.    Spironolactone Pregnancy And Lactation Text: This medication can cause feminization of the male fetus and should be avoided during pregnancy. The active metabolite is also found in breast milk.

## 2023-08-09 ENCOUNTER — OFFICE VISIT (OUTPATIENT)
Dept: UROLOGY | Facility: CLINIC | Age: 73
End: 2023-08-09
Payer: MEDICARE

## 2023-08-09 VITALS
WEIGHT: 136.88 LBS | SYSTOLIC BLOOD PRESSURE: 116 MMHG | BODY MASS INDEX: 17.57 KG/M2 | HEIGHT: 74 IN | DIASTOLIC BLOOD PRESSURE: 66 MMHG | HEART RATE: 65 BPM

## 2023-08-09 DIAGNOSIS — Z93.6 S/P ILEAL CONDUIT: Primary | ICD-10-CM

## 2023-08-09 DIAGNOSIS — Z85.51 PERSONAL HISTORY OF BLADDER CANCER: ICD-10-CM

## 2023-08-09 PROCEDURE — 1159F PR MEDICATION LIST DOCUMENTED IN MEDICAL RECORD: ICD-10-PCS | Mod: CPTII,S$GLB,, | Performed by: UROLOGY

## 2023-08-09 PROCEDURE — 1101F PR PT FALLS ASSESS DOC 0-1 FALLS W/OUT INJ PAST YR: ICD-10-PCS | Mod: CPTII,S$GLB,, | Performed by: UROLOGY

## 2023-08-09 PROCEDURE — 1126F PR PAIN SEVERITY QUANTIFIED, NO PAIN PRESENT: ICD-10-PCS | Mod: CPTII,S$GLB,, | Performed by: UROLOGY

## 2023-08-09 PROCEDURE — 3066F NEPHROPATHY DOC TX: CPT | Mod: CPTII,S$GLB,, | Performed by: UROLOGY

## 2023-08-09 PROCEDURE — 99999 PR PBB SHADOW E&M-EST. PATIENT-LVL III: ICD-10-PCS | Mod: PBBFAC,,, | Performed by: UROLOGY

## 2023-08-09 PROCEDURE — 99214 OFFICE O/P EST MOD 30 MIN: CPT | Mod: S$GLB,,, | Performed by: UROLOGY

## 2023-08-09 PROCEDURE — 1126F AMNT PAIN NOTED NONE PRSNT: CPT | Mod: CPTII,S$GLB,, | Performed by: UROLOGY

## 2023-08-09 PROCEDURE — 99999 PR PBB SHADOW E&M-EST. PATIENT-LVL III: CPT | Mod: PBBFAC,,, | Performed by: UROLOGY

## 2023-08-09 PROCEDURE — 1101F PT FALLS ASSESS-DOCD LE1/YR: CPT | Mod: CPTII,S$GLB,, | Performed by: UROLOGY

## 2023-08-09 PROCEDURE — 3044F PR MOST RECENT HEMOGLOBIN A1C LEVEL <7.0%: ICD-10-PCS | Mod: CPTII,S$GLB,, | Performed by: UROLOGY

## 2023-08-09 PROCEDURE — 3288F PR FALLS RISK ASSESSMENT DOCUMENTED: ICD-10-PCS | Mod: CPTII,S$GLB,, | Performed by: UROLOGY

## 2023-08-09 PROCEDURE — 99214 PR OFFICE/OUTPT VISIT, EST, LEVL IV, 30-39 MIN: ICD-10-PCS | Mod: S$GLB,,, | Performed by: UROLOGY

## 2023-08-09 PROCEDURE — 3074F SYST BP LT 130 MM HG: CPT | Mod: CPTII,S$GLB,, | Performed by: UROLOGY

## 2023-08-09 PROCEDURE — 3008F PR BODY MASS INDEX (BMI) DOCUMENTED: ICD-10-PCS | Mod: CPTII,S$GLB,, | Performed by: UROLOGY

## 2023-08-09 PROCEDURE — 1159F MED LIST DOCD IN RCRD: CPT | Mod: CPTII,S$GLB,, | Performed by: UROLOGY

## 2023-08-09 PROCEDURE — 3288F FALL RISK ASSESSMENT DOCD: CPT | Mod: CPTII,S$GLB,, | Performed by: UROLOGY

## 2023-08-09 PROCEDURE — 3066F PR DOCUMENTATION OF TREATMENT FOR NEPHROPATHY: ICD-10-PCS | Mod: CPTII,S$GLB,, | Performed by: UROLOGY

## 2023-08-09 PROCEDURE — 3074F PR MOST RECENT SYSTOLIC BLOOD PRESSURE < 130 MM HG: ICD-10-PCS | Mod: CPTII,S$GLB,, | Performed by: UROLOGY

## 2023-08-09 PROCEDURE — 3008F BODY MASS INDEX DOCD: CPT | Mod: CPTII,S$GLB,, | Performed by: UROLOGY

## 2023-08-09 PROCEDURE — 3078F PR MOST RECENT DIASTOLIC BLOOD PRESSURE < 80 MM HG: ICD-10-PCS | Mod: CPTII,S$GLB,, | Performed by: UROLOGY

## 2023-08-09 PROCEDURE — 3078F DIAST BP <80 MM HG: CPT | Mod: CPTII,S$GLB,, | Performed by: UROLOGY

## 2023-08-09 PROCEDURE — 3044F HG A1C LEVEL LT 7.0%: CPT | Mod: CPTII,S$GLB,, | Performed by: UROLOGY

## 2023-08-09 NOTE — PROGRESS NOTES
"CHIEF COMPLAINT:    Mr. Koehler is a 73 y.o. male presenting for a history of "UTI" in a patient with ileal conduit.  Referred by Darcy Chang NP.      PRESENTING ILLNESS:    Juan Manuel Koehler is a 73 y.o. male who is presents with a history of bladder cancer, status post radical cystoprostatectomy and ileal conduit placement by Dr. Robles on  9/11/2017.  The patient is followed by nephrology for his renal function and by Dr. Calero for the cancer.  He was sent for "UTI".  He denies ever having symptoms (no fevers, flank tenderness, tenderness over the conduit, or having malaise)  the urine was not collected from the conduit but from the bag.  He has bilateral mild hydronephrosis. Had a workup for gross hematuria which was negative, by Dr. Boucher in 2021.  There was a notable improvement of the hydronephrosis on the right side on his cross sectional imaging last year.     He changes the ostomy bag twice a week.  No erosion of the skin. There is temporary redness when he removes the phlange, but it resolves by the time he places the new one.     REVIEW OF SYSTEMS:    Review of Systems   Constitutional: Negative.    HENT: Negative.     Eyes: Negative.    Respiratory: Negative.     Cardiovascular: Negative.    Gastrointestinal: Negative.    Musculoskeletal: Negative.    Skin: Negative.    Neurological: Negative.    Endo/Heme/Allergies: Negative.    Psychiatric/Behavioral: Negative.         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    HTN (hypertension)     Hydronephrosis        Past Surgical History:   Procedure Laterality Date    BLADDER SURGERY      CYSTOSCOPY      HERNIA REPAIR      Ileal Conduit      pelvic lymph node resection      Radical cystoprostatectomy      THROAT SURGERY      urostomy tube placement         Family History   Problem Relation Age of Onset    No Known Problems Father  "    Kidney disease Mother     Prostate cancer Neg Hx        Social History     Socioeconomic History    Marital status:    Tobacco Use    Smoking status: Never    Smokeless tobacco: Never   Substance and Sexual Activity    Alcohol use: No     Alcohol/week: 0.0 standard drinks of alcohol    Drug use: No    Sexual activity: Yes     Partners: Female     Birth control/protection: None     Comment:  lives with spouse       Allergies:  Patient has no known allergies.    Medications:  Outpatient Encounter Medications as of 2023   Medication Sig Dispense Refill    apremilast (OTEZLA) 30 mg Tab Take by mouth.      temazepam (RESTORIL) 15 mg Cap TAKE 1 CAPSULE (15 MG TOTAL) BY MOUTH NIGHTLY AS NEEDED (INSOMNIA). 30 capsule 5    [] molnupiravir (LAGEVRIO, EUA,) 200 mg capsule (EUA) Take 4 capsules (800 mg total) by mouth every 12 (twelve) hours. for 5 days 40 capsule 0    [] sodium zirconium cyclosilicate (LOKELMA) 5 gram packet Take 1 packet (5 g total) by mouth once daily. Mix entire contents of packet(s) into drinking glass containing 3 tablespoons of water; stir well and drink immediately. Add water and repeat until no powder remains to receive entire dose. 30 packet 0     No facility-administered encounter medications on file as of 2023.         PHYSICAL EXAMINATION:    The patient generally appears in good health, is appropriately interactive, and is in no apparent distress.    Skin: No lesions.    Mental: Cooperative with normal affect.    Neuro: Grossly intact.    HEENT: Normal. No evidence of lymphadenopathy.    Chest:  normal inspiratory effort.    Abdomen: Soft, non-tender. No masses or organomegaly. Bladder is not palpable. No evidence of flank discomfort. No evidence of inguinal hernia.  Stoma is pink, well placed and there does not appear to be any pouching issues    Extremities: No clubbing, cyanosis, or edema      LABS:    Lab Results   Component Value Date    BUN 25 (H)  06/12/2023    CREATININE 1.9 (H) 06/12/2023       Lab Results   Component Value Date    PSADIAG 12.2 (H) 04/11/2017    PSADIAG 6.1 (H) 03/30/2017     Path on the prostate was benign in 2017    IMPRESSION:    Personal history of bladder cancer  Status post Ileal conduit    PLAN:    1. The cultures have been carried out in an improperly collected urine specimen.  He has no symptoms, so there is no workup that is indicated  2.  He can continue to follow up with Dr. Calero.  If there are any issues seen on this years imaging, he can refer back to urology.  3.  Informed the patient that the proper way to collect a urine specimen if there is concern for infection would be to pass a catheter into the conduit itself and obtain the urine directly.      I spent 30 minutes with the patient of which more than half was spent in direct consultation with the patient in regards to our treatment and plan.    Copy to:  Darcy Chang NP

## 2023-08-16 ENCOUNTER — TELEPHONE (OUTPATIENT)
Dept: NEPHROLOGY | Facility: CLINIC | Age: 73
End: 2023-08-16
Payer: MEDICARE

## 2023-08-16 NOTE — TELEPHONE ENCOUNTER
Try calling the pt he didn't answer I also left a voiceamil stated ing that the pt should call me back.

## 2023-09-08 RX ORDER — SODIUM ZIRCONIUM CYCLOSILICATE 5 G/5G
POWDER, FOR SUSPENSION ORAL
Qty: 12 PACKET | Refills: 11 | Status: SHIPPED | OUTPATIENT
Start: 2023-09-08 | End: 2024-02-21 | Stop reason: SDUPTHER

## 2023-09-18 ENCOUNTER — TELEPHONE (OUTPATIENT)
Dept: HEMATOLOGY/ONCOLOGY | Facility: CLINIC | Age: 73
End: 2023-09-18
Payer: MEDICARE

## 2023-09-18 DIAGNOSIS — C67.9 UROTHELIAL CARCINOMA OF BLADDER: Primary | ICD-10-CM

## 2023-09-18 NOTE — TELEPHONE ENCOUNTER
----- Message from Freida Abdullahi sent at 9/18/2023  8:28 AM CDT -----  Regarding: appt access  Name of Who is Calling: ASIA ROTHMAN [[id number]]        What is the request in detail: pt would like a call back regarding a cat scan and labs,please advise.        Can the clinic reply by [facility]: no           What Number to Call Back if not in [facility]: [phone] or [phone]

## 2023-09-18 NOTE — TELEPHONE ENCOUNTER
Attempted to call patient, no answer. Left message on voicemail that I would scheduled CT, labs, appointment with Dr. Calero and he could check the portal for the schedule. Left call back number if he has any questions.

## 2023-10-02 ENCOUNTER — HOSPITAL ENCOUNTER (OUTPATIENT)
Dept: RADIOLOGY | Facility: HOSPITAL | Age: 73
Discharge: HOME | End: 2023-10-02
Attending: INTERNAL MEDICINE
Payer: MEDICARE

## 2023-10-02 DIAGNOSIS — C67.9 UROTHELIAL CARCINOMA OF BLADDER: ICD-10-CM

## 2023-10-02 LAB
CREAT SERPL-MCNC: 1.9 MG/DL (ref 0.5–1.4)
SAMPLE: ABNORMAL

## 2023-10-02 PROCEDURE — 71260 CT CHEST ABDOMEN PELVIS WITH CONTRAST (XPD): ICD-10-PCS | Mod: 26,,, | Performed by: RADIOLOGY

## 2023-10-02 PROCEDURE — 71260 CT THORAX DX C+: CPT | Mod: 26,,, | Performed by: RADIOLOGY

## 2023-10-02 PROCEDURE — 71260 CT THORAX DX C+: CPT | Mod: TC

## 2023-10-02 PROCEDURE — 74177 CT ABD & PELVIS W/CONTRAST: CPT | Mod: 26,,, | Performed by: RADIOLOGY

## 2023-10-02 PROCEDURE — 74177 CT CHEST ABDOMEN PELVIS WITH CONTRAST (XPD): ICD-10-PCS | Mod: 26,,, | Performed by: RADIOLOGY

## 2023-10-02 PROCEDURE — 25500020 PHARM REV CODE 255: Performed by: INTERNAL MEDICINE

## 2023-10-02 PROCEDURE — 74177 CT ABD & PELVIS W/CONTRAST: CPT | Mod: TC

## 2023-10-02 RX ADMIN — IOHEXOL 75 ML: 350 INJECTION, SOLUTION INTRAVENOUS at 08:10

## 2023-10-03 NOTE — PROGRESS NOTES
"Subjective:       Patient ID: Juan Manuel Koehler is a 73 y.o. male.    Chief Complaint: Bladder Cancer    HPI -     73 y.o.White male  who presents today for follow-up and to review CT scans after surgery for invasive bladder cancer performed on 6/21/17.     He denies any mouth sores, nausea, vomiting, diarrhea, constipation, chest pain, shortness of breath, leg swelling, fatigue, headache, dizziness, or mood changes.    His ECOG PS is 1 and he is accompanied by wife to clinic.      He is still working part time.         Oncologic History:  Patient has previously been seen by Dr. 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 He presented to Dr. Boucher with pain and hematuria and was found to have a large bladder tumor on his trigone. The tumor was also causing bilateral ureteral obstruction with resulting ELDER.The patient underwent tumor resection and attempted JJ stent placement on 4/5/17 which demonstrated cT2 urothelial carcinoma (nested variant). Bilateral nephrostomy tubes were placed with improvement in the patient's renal function. Patient discharged today from [facility] for ELDER from 5/5-5/9. During hospitalization, right nephrostomy tube was placed and the left was exchanged.      5/5/17 CT CAP reveals " Bilateral double-J ureteral stents and left nephrostomy tube are present.  There has been interval resolution of left-sided hydronephrosis and significant improvement in right-sided hydronephrosis which now appears mild. Bladder is nondistended and not well evaluated.  There is no CT evidence of distant metastatic disease referable to provide history of bladder neoplasm."    6/21/17-Dr. Robles performed an open Radical cystoprostatectomy, Bilateral pelvic lymph node dissection, Creation of ileal conduit urinary diversion and Bilateral ureterotomy " for open ureteral J stent placement.   7/6/17- patient had bilateral nephrostomy tubes removed     Review of Systems   Constitutional:  Negative for activity change, appetite change and unexpected weight change.   HENT:  Negative for mouth sores, nosebleeds and trouble swallowing.    Eyes:  Negative for discharge and visual disturbance.   Respiratory:  Negative for shortness of breath and wheezing.    Cardiovascular:  Negative for leg swelling.   Gastrointestinal:  Negative for abdominal distention and blood in stool.   Genitourinary:  Negative for decreased urine volume, frequency and hematuria.   Skin:  Negative for color change.   Hematological:  Negative for adenopathy.   Psychiatric/Behavioral:  Positive for sleep disturbance. The patient is not nervous/anxious.    All other systems reviewed and are negative.      Allergies:  Review of patient's allergies indicates:  No Known Allergies    Medications:  Current Outpatient Medications   Medication Sig Dispense Refill    apremilast (OTEZLA) 30 mg Tab Take by mouth.      sodium zirconium cyclosilicate (LOKELMA) 5 gram packet TAKE 1 PACKET (5 G TOTAL) BY MOUTH EVERY MON, WED, FRI. MIX ENTIRE CONTENTS OF PACKET(S) INTO DRINKING GLASS CONTAINING 3 TABLESPOONS OF WATER STIR WELL AND DRINK IMMEDIATELY. ADD WATER AND REPEAT UNTIL NO POWDER REMAINS TO RECEIVE ENTIRE DOSE. FOR 14 DAYS 12 packet 11    temazepam (RESTORIL) 15 mg Cap TAKE 1 CAPSULE (15 MG TOTAL) BY MOUTH AT NIGHT AS NEEDED FOR INSOMNIA 30 capsule 5     No current facility-administered medications for this visit.       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    HTN (hypertension)     Hydronephrosis        PSH:  Past Surgical History:   Procedure Laterality Date    BLADDER SURGERY      CYSTOSCOPY      HERNIA REPAIR      Ileal Conduit      pelvic lymph node resection      Radical cystoprostatectomy    "   THROAT SURGERY      urostomy tube placement         FamHx:  Family History   Problem Relation Age of Onset    No Known Problems Father     Kidney disease Mother     Prostate cancer Neg Hx        SocHx:  Social History     Socioeconomic History    Marital status:    Tobacco Use    Smoking status: Never    Smokeless tobacco: Never   Substance and Sexual Activity    Alcohol use: No     Alcohol/week: 0.0 standard drinks of alcohol    Drug use: No    Sexual activity: Yes     Partners: Female     Birth control/protection: None     Comment:  lives with spouse       Objective:       BP (!) 112/56 (BP Location: Right arm, Patient Position: Sitting, BP Method: Medium (Automatic))   Pulse 64   Temp 98.1 °F (36.7 °C)   Ht 6' 2" (1.88 m)   SpO2 100%   BMI 17.58 kg/m²     Physical Exam  Vitals and nursing note reviewed.   Constitutional:       Appearance: He is well-developed.      Comments: Appears fatigued and thin   HENT:      Head: Normocephalic and atraumatic.      Right Ear: External ear normal.      Left Ear: External ear normal.   Eyes:      General: No scleral icterus.        Right eye: No discharge.         Left eye: No discharge.      Conjunctiva/sclera: Conjunctivae normal.      Pupils: Pupils are equal, round, and reactive to light.   Pulmonary:      Effort: Pulmonary effort is normal.   Musculoskeletal:         General: Normal range of motion.      Cervical back: Normal range of motion and neck supple.      Comments: Bilateral nephrostomy tubes draining clear, yellow urine.   Skin:     General: Skin is warm and dry.      Findings: No erythema.   Neurological:      Mental Status: He is alert and oriented to person, place, and time.         LABS:  WBC   Date Value Ref Range Status   10/02/2023 6.22 3.90 - 12.70 K/uL Final     Hemoglobin   Date Value Ref Range Status   10/02/2023 11.6 (L) 14.0 - 18.0 g/dL Final     POC Hematocrit   Date Value Ref Range Status   06/21/2017 17 (LL) 36 - 54 %PCV " Final     Hematocrit   Date Value Ref Range Status   10/02/2023 34.2 (L) 40.0 - 54.0 % Final     Platelets   Date Value Ref Range Status   10/02/2023 208 150 - 450 K/uL Final       Chemistry        Component Value Date/Time     10/02/2023 0907    K 4.1 10/02/2023 0907     10/02/2023 0907    CO2 25 10/02/2023 0907    BUN 30 (H) 10/02/2023 0907    CREATININE 1.8 (H) 10/02/2023 0907    GLU 94 10/02/2023 0907        Component Value Date/Time    CALCIUM 9.0 10/02/2023 0907    ALKPHOS 55 10/02/2023 0907    AST 12 10/02/2023 0907    ALT 13 10/02/2023 0907    BILITOT 0.7 10/02/2023 0907          Assessment:       1. Urothelial carcinoma of bladder            Plan:       1. Bladder Cancer:  Juan Manuel Koehler is a 73 y.o.White male, originally referred by Dr. Robles, who presents today for follow-up, s/p surgery for  invasive bladder cancer.     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 He presented to Dr. Boucher with pain and hematuria and was found to have a large bladder tumor on his trigone. Staging CTCAP on 5/5/17 shows no evidence of distant metastasis.    Given his continued increased creatinine, it was unclear if he would be considered a candidate for neoadjuvant chemotherapy This has improved and the patient wanted to move forward with chemo, he understood the risks, particularly to his kidneys. We split the dose of cisplatin between D1+D8 and gave IVFs.  Yet still his creatinine increased significantly.  We had a lengthy conversation about the risk of recurrence without chemo and real risk of permanent and irreversible kidney damage.   Also discussed with Dr. Robles.  At this point, we will forgo further chemotherapy in favor of moving forward with surgery.      S/p surgery with negative margins and negative lymph node involvement. Favorable pathology report reviewed with patient and his accompanying wife. Data is  controversial in the benefit of adjuvant chemotherapy. Decision made by Dr. Calero and Dr. Robles to defer resuming chemotherapy.  Currently RENETTA.    Current scans RENETTA, two subcm hypodensities in the pancreas noted, will repeat scans in 6 mos to monitor.  Now more than 5 years out from original bladder surgery.     RTC 6 mos with CT CAP, labs (CBC,CMP), and to see Dr. Calero.      2. CKD: Stable, continue to follow with nephrology.    The patient agrees with the plan, and all questions have been answered to their satisfaction.      More than 40 mins total time were spent during this encounter.    Mingo Calero M.D., M.S., F.A.C.P.  Hematology and Oncology Attending  [facility]                Route Chart for Scheduling    Med Onc Chart Routing      Follow up with physician 6 months. RTC 6 mos with CT CAP, labs (CBC,CMP), and to see Dr. Calero.   Follow up with [facility]    Infusion scheduling note    Injection scheduling note    Labs CBC and CMP   Scheduling:  Preferred lab:  Lab interval:     Imaging CT chest abdomen pelvis      Pharmacy appointment    Other referrals

## 2023-10-04 ENCOUNTER — OFFICE VISIT (OUTPATIENT)
Dept: HEMATOLOGY/ONCOLOGY | Facility: CLINIC | Age: 73
End: 2023-10-04
Payer: MEDICARE

## 2023-10-04 VITALS
SYSTOLIC BLOOD PRESSURE: 112 MMHG | TEMPERATURE: 98 F | OXYGEN SATURATION: 100 % | HEART RATE: 64 BPM | BODY MASS INDEX: 17.58 KG/M2 | DIASTOLIC BLOOD PRESSURE: 56 MMHG | HEIGHT: 74 IN

## 2023-10-04 DIAGNOSIS — C67.9 UROTHELIAL CARCINOMA OF BLADDER: Primary | ICD-10-CM

## 2023-10-04 PROCEDURE — 1126F PR PAIN SEVERITY QUANTIFIED, NO PAIN PRESENT: ICD-10-PCS | Mod: CPTII,S$GLB,, | Performed by: INTERNAL MEDICINE

## 2023-10-04 PROCEDURE — 1159F PR MEDICATION LIST DOCUMENTED IN MEDICAL RECORD: ICD-10-PCS | Mod: CPTII,S$GLB,, | Performed by: INTERNAL MEDICINE

## 2023-10-04 PROCEDURE — 3078F PR MOST RECENT DIASTOLIC BLOOD PRESSURE < 80 MM HG: ICD-10-PCS | Mod: CPTII,S$GLB,, | Performed by: INTERNAL MEDICINE

## 2023-10-04 PROCEDURE — 3078F DIAST BP <80 MM HG: CPT | Mod: CPTII,S$GLB,, | Performed by: INTERNAL MEDICINE

## 2023-10-04 PROCEDURE — 3288F PR FALLS RISK ASSESSMENT DOCUMENTED: ICD-10-PCS | Mod: CPTII,S$GLB,, | Performed by: INTERNAL MEDICINE

## 2023-10-04 PROCEDURE — 99215 PR OFFICE/OUTPT VISIT, EST, LEVL V, 40-54 MIN: ICD-10-PCS | Mod: S$GLB,,, | Performed by: INTERNAL MEDICINE

## 2023-10-04 PROCEDURE — 3008F BODY MASS INDEX DOCD: CPT | Mod: CPTII,S$GLB,, | Performed by: INTERNAL MEDICINE

## 2023-10-04 PROCEDURE — 3044F HG A1C LEVEL LT 7.0%: CPT | Mod: CPTII,S$GLB,, | Performed by: INTERNAL MEDICINE

## 2023-10-04 PROCEDURE — 1159F MED LIST DOCD IN RCRD: CPT | Mod: CPTII,S$GLB,, | Performed by: INTERNAL MEDICINE

## 2023-10-04 PROCEDURE — 1101F PR PT FALLS ASSESS DOC 0-1 FALLS W/OUT INJ PAST YR: ICD-10-PCS | Mod: CPTII,S$GLB,, | Performed by: INTERNAL MEDICINE

## 2023-10-04 PROCEDURE — 3044F PR MOST RECENT HEMOGLOBIN A1C LEVEL <7.0%: ICD-10-PCS | Mod: CPTII,S$GLB,, | Performed by: INTERNAL MEDICINE

## 2023-10-04 PROCEDURE — 1101F PT FALLS ASSESS-DOCD LE1/YR: CPT | Mod: CPTII,S$GLB,, | Performed by: INTERNAL MEDICINE

## 2023-10-04 PROCEDURE — 99215 OFFICE O/P EST HI 40 MIN: CPT | Mod: S$GLB,,, | Performed by: INTERNAL MEDICINE

## 2023-10-04 PROCEDURE — 3008F PR BODY MASS INDEX (BMI) DOCUMENTED: ICD-10-PCS | Mod: CPTII,S$GLB,, | Performed by: INTERNAL MEDICINE

## 2023-10-04 PROCEDURE — 1126F AMNT PAIN NOTED NONE PRSNT: CPT | Mod: CPTII,S$GLB,, | Performed by: INTERNAL MEDICINE

## 2023-10-04 PROCEDURE — 3288F FALL RISK ASSESSMENT DOCD: CPT | Mod: CPTII,S$GLB,, | Performed by: INTERNAL MEDICINE

## 2023-10-04 PROCEDURE — 99999 PR PBB SHADOW E&M-EST. PATIENT-LVL III: CPT | Mod: PBBFAC,,, | Performed by: INTERNAL MEDICINE

## 2023-10-04 PROCEDURE — 3066F PR DOCUMENTATION OF TREATMENT FOR NEPHROPATHY: ICD-10-PCS | Mod: CPTII,S$GLB,, | Performed by: INTERNAL MEDICINE

## 2023-10-04 PROCEDURE — 3066F NEPHROPATHY DOC TX: CPT | Mod: CPTII,S$GLB,, | Performed by: INTERNAL MEDICINE

## 2023-10-04 PROCEDURE — 3074F PR MOST RECENT SYSTOLIC BLOOD PRESSURE < 130 MM HG: ICD-10-PCS | Mod: CPTII,S$GLB,, | Performed by: INTERNAL MEDICINE

## 2023-10-04 PROCEDURE — 3074F SYST BP LT 130 MM HG: CPT | Mod: CPTII,S$GLB,, | Performed by: INTERNAL MEDICINE

## 2023-10-04 PROCEDURE — 99999 PR PBB SHADOW E&M-EST. PATIENT-LVL III: ICD-10-PCS | Mod: PBBFAC,,, | Performed by: INTERNAL MEDICINE

## 2023-10-09 ENCOUNTER — OFFICE VISIT (OUTPATIENT)
Dept: NEPHROLOGY | Facility: CLINIC | Age: 73
End: 2023-10-09
Payer: MEDICARE

## 2023-10-09 VITALS
DIASTOLIC BLOOD PRESSURE: 69 MMHG | WEIGHT: 143 LBS | SYSTOLIC BLOOD PRESSURE: 119 MMHG | OXYGEN SATURATION: 97 % | BODY MASS INDEX: 18.36 KG/M2 | HEART RATE: 70 BPM

## 2023-10-09 DIAGNOSIS — N18.32 STAGE 3B CHRONIC KIDNEY DISEASE: Primary | ICD-10-CM

## 2023-10-09 DIAGNOSIS — D63.1 ANEMIA IN STAGE 3B CHRONIC KIDNEY DISEASE: ICD-10-CM

## 2023-10-09 DIAGNOSIS — E87.5 HYPERKALEMIA: ICD-10-CM

## 2023-10-09 DIAGNOSIS — N18.32 ANEMIA IN STAGE 3B CHRONIC KIDNEY DISEASE: ICD-10-CM

## 2023-10-09 DIAGNOSIS — R80.9 PROTEINURIA, UNSPECIFIED TYPE: ICD-10-CM

## 2023-10-09 DIAGNOSIS — N25.81 HYPERPARATHYROIDISM DUE TO RENAL INSUFFICIENCY: ICD-10-CM

## 2023-10-09 PROCEDURE — 3288F PR FALLS RISK ASSESSMENT DOCUMENTED: ICD-10-PCS | Mod: CPTII,S$GLB,, | Performed by: NURSE PRACTITIONER

## 2023-10-09 PROCEDURE — 3078F DIAST BP <80 MM HG: CPT | Mod: CPTII,S$GLB,, | Performed by: NURSE PRACTITIONER

## 2023-10-09 PROCEDURE — 1159F PR MEDICATION LIST DOCUMENTED IN MEDICAL RECORD: ICD-10-PCS | Mod: CPTII,S$GLB,, | Performed by: NURSE PRACTITIONER

## 2023-10-09 PROCEDURE — 3078F PR MOST RECENT DIASTOLIC BLOOD PRESSURE < 80 MM HG: ICD-10-PCS | Mod: CPTII,S$GLB,, | Performed by: NURSE PRACTITIONER

## 2023-10-09 PROCEDURE — 3074F PR MOST RECENT SYSTOLIC BLOOD PRESSURE < 130 MM HG: ICD-10-PCS | Mod: CPTII,S$GLB,, | Performed by: NURSE PRACTITIONER

## 2023-10-09 PROCEDURE — 1126F AMNT PAIN NOTED NONE PRSNT: CPT | Mod: CPTII,S$GLB,, | Performed by: NURSE PRACTITIONER

## 2023-10-09 PROCEDURE — 3008F PR BODY MASS INDEX (BMI) DOCUMENTED: ICD-10-PCS | Mod: CPTII,S$GLB,, | Performed by: NURSE PRACTITIONER

## 2023-10-09 PROCEDURE — 99214 OFFICE O/P EST MOD 30 MIN: CPT | Mod: S$GLB,,, | Performed by: NURSE PRACTITIONER

## 2023-10-09 PROCEDURE — 1159F MED LIST DOCD IN RCRD: CPT | Mod: CPTII,S$GLB,, | Performed by: NURSE PRACTITIONER

## 2023-10-09 PROCEDURE — 3008F BODY MASS INDEX DOCD: CPT | Mod: CPTII,S$GLB,, | Performed by: NURSE PRACTITIONER

## 2023-10-09 PROCEDURE — 3044F PR MOST RECENT HEMOGLOBIN A1C LEVEL <7.0%: ICD-10-PCS | Mod: CPTII,S$GLB,, | Performed by: NURSE PRACTITIONER

## 2023-10-09 PROCEDURE — 3066F NEPHROPATHY DOC TX: CPT | Mod: CPTII,S$GLB,, | Performed by: NURSE PRACTITIONER

## 2023-10-09 PROCEDURE — 3044F HG A1C LEVEL LT 7.0%: CPT | Mod: CPTII,S$GLB,, | Performed by: NURSE PRACTITIONER

## 2023-10-09 PROCEDURE — 99999 PR PBB SHADOW E&M-EST. PATIENT-LVL II: ICD-10-PCS | Mod: PBBFAC,,, | Performed by: NURSE PRACTITIONER

## 2023-10-09 PROCEDURE — 99214 PR OFFICE/OUTPT VISIT, EST, LEVL IV, 30-39 MIN: ICD-10-PCS | Mod: S$GLB,,, | Performed by: NURSE PRACTITIONER

## 2023-10-09 PROCEDURE — 3066F PR DOCUMENTATION OF TREATMENT FOR NEPHROPATHY: ICD-10-PCS | Mod: CPTII,S$GLB,, | Performed by: NURSE PRACTITIONER

## 2023-10-09 PROCEDURE — 3074F SYST BP LT 130 MM HG: CPT | Mod: CPTII,S$GLB,, | Performed by: NURSE PRACTITIONER

## 2023-10-09 PROCEDURE — 1126F PR PAIN SEVERITY QUANTIFIED, NO PAIN PRESENT: ICD-10-PCS | Mod: CPTII,S$GLB,, | Performed by: NURSE PRACTITIONER

## 2023-10-09 PROCEDURE — 3288F FALL RISK ASSESSMENT DOCD: CPT | Mod: CPTII,S$GLB,, | Performed by: NURSE PRACTITIONER

## 2023-10-09 PROCEDURE — 1101F PR PT FALLS ASSESS DOC 0-1 FALLS W/OUT INJ PAST YR: ICD-10-PCS | Mod: CPTII,S$GLB,, | Performed by: NURSE PRACTITIONER

## 2023-10-09 PROCEDURE — 99999 PR PBB SHADOW E&M-EST. PATIENT-LVL II: CPT | Mod: PBBFAC,,, | Performed by: NURSE PRACTITIONER

## 2023-10-09 PROCEDURE — 1101F PT FALLS ASSESS-DOCD LE1/YR: CPT | Mod: CPTII,S$GLB,, | Performed by: NURSE PRACTITIONER

## 2023-10-09 NOTE — PROGRESS NOTES
"Subjective:       Patient ID: Juan Manuel Koehler is a 73 y.o. male who presents for follow-up evaluation of CKD and hyperkalemia.      HPI     Followed by Dr. Blake in 1/18/22.  Prior pertinent chart reviewed since this is patient's first appointment with me.    Patient presents for follow-up evaluation of CKD.  Baseline creatinine of 1.8-2.0. Significant other medical problems include h/o urothelial carcinoma of bladder s/p ileal conduit, ELDER, CVA, NSTEMI, proteinuria, hyperkalemia. Per Dr. Blake's last visit, "Mr. 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 Prior episodes of ELDER, severe hyperkalemia, ELDER was suspected from prolonged volume depletion causing ATN/ possible contrast induced ELDER." The patient denies taking NSAIDs, herbal supplements, or new antibiotics, recreational drugs, recent episode of dehydration, diarrhea, nausea or vomiting, acute illness, hospitalization or exposure to IV radiocontrast. He has been maintained on veltassa 16.8g qd for 5 years. He adheres to low K diet. He reports adequate hydration.     Update 2/20/23:  - Presents for follow-up of CKD and hyperkalemia  - Taking Veltassa MWF, notes intermittent noncompliance with low K diet  - No other symptoms or new complaints   - Completed treatment with cipro for UTI    Update 6/7/23:  - Follow-up of CKD and hyperkalemia  - Maintained on lokelma TIW, needs refill however medication is costly  - No new symptoms or complaints today    Update 10/9/23:  - Presents for follow-up of CKD and hyperkalemia  - sCr stable at 1.8. K 4.1. Remains on Lokelma MWF.   - Feeling well today. No complaints.     Significant family hx includes: Mother - kidney disease    CT abdomen: 9/14/22 , reviewed.    Review of Systems   Constitutional:         Feeling well. Denies complaints.    Genitourinary:         +urostomy    All other systems reviewed and are negative.      Objective:       Blood pressure 119/69, " "pulse 70, weight 64.9 kg (143 lb), SpO2 97 %.  Physical Exam  Vitals reviewed.   Constitutional:       General: He is not in acute distress.     Comments: Thin, elderly male   HENT:      Head: Normocephalic and atraumatic.   Eyes:      General: No scleral icterus.  Cardiovascular:      Rate and Rhythm: Normal rate.   Pulmonary:      Effort: Pulmonary effort is normal. No respiratory distress.   Musculoskeletal:      Right lower leg: No edema.      Left lower leg: No edema.   Skin:     General: Skin is warm and dry.   Neurological:      Mental Status: He is alert and oriented to person, place, and time.   Psychiatric:         Mood and Affect: Mood normal.         Behavior: Behavior normal.           Lab Results   Component Value Date    CREATININE 1.8 (H) 10/02/2023    URICACID 6.4 01/13/2022     Prot/Creat Ratio, Urine   Date Value Ref Range Status   06/12/2023 0.36 (H) 0.00 - 0.20 Final   02/06/2023 0.36 (H) 0.00 - 0.20 Final   12/09/2022 0.30 (H) 0.00 - 0.20 Final     Lab Results   Component Value Date     10/02/2023    K 4.1 10/02/2023    CO2 25 10/02/2023     10/02/2023     Lab Results   Component Value Date    PTH 94.1 (H) 02/06/2023    CALCIUM 9.0 10/02/2023    PHOS 3.4 06/12/2023     Lab Results   Component Value Date    HGB 11.6 (L) 10/02/2023    WBC 6.22 10/02/2023    HCT 34.2 (L) 10/02/2023      Lab Results   Component Value Date    HGBA1C 5.4 02/06/2023     10/02/2023    BUN 30 (H) 10/02/2023     Lab Results   Component Value Date    LDLCALC 96.2 02/06/2023     CT chest abdomen 9/14/22:  "FINDINGS:  Chest:     Heart and mediastinal vasculature: Heart normal in size.     Ann-Marie/Mediastinum: Calcified subcarinal lymph node and calcified right hilar lymph nodes compatible with previous granulomatous disease.  No pathologic adenopathy.     Lungs: Left basilar pleuroparenchymal scarring, minimal paraseptal emphysema in the right middle lobe and by apical pleural thickening..  Calcified in the " "right base.  No suspicious pulmonary nodules .     Abdomen and pelvis:     Liver: Normal in size and attenuation, with no focal hepatic lesions.     Gallbladder: No calcified gallstones.     Bile Ducts: No evidence of dilated ducts.     Pancreas: No mass or peripancreatic fat stranding.     Spleen: Stable 15 mm hypodensity along the anterior tip spleen, possibly cyst or pseudocyst.     Adrenals: Unremarkable.     Kidneys/ Ureters: Bilateral renal cortical cysts are present and there is interval improvement in renal pelvic and intrarenal collecting system dilatation since the prior study.  There is bilateral urothelial wall thickening and hyperenhancement.  No solid mass or nephrolithiasis.  No collectingd systems not well evaluated due to exam protocol.  There is a right lower quadrant loop urostomy.     GI Tract/Mesentery: No evidence of bowel obstruction or inflammation.     Peritoneal Space: No ascites. No free air.     Retroperitoneum: No significant adenopathy. Bilateral pelvic lymphadenectomy surgical clips are present.     Vasculature: No significant atherosclerosis or aneurysm.     Bladder: Surgically absent     Reproductive organs: Surgically absent     Bones: No suspicious lytic or blastic lesions.  Age related degenerative change.     Impression:     Status post cystectomy and loop urostomy, with improved renal collecting system dilatation, noting urothelial wall thickening which may reflect acute and or chronic inflammation and or infection.     No convincing evidence of metastatic disease     Evidence of previous granulomatous disease     Stable splenic hypodensity, of doubtful clinical significance."    Assessment:       No diagnosis found.      Plan:   CKD stage 3b c eGFR 39 mL/min -  - baseline Cr 1.8-2.0, within baseline   - CKD clinically related to obstructive uropathy in malignancy, chemo nephrotoxicity and history of AKIs  - Continue to avoid NSAIDs/ nephrotoxic agents, continue adequate " hydration    UPCR 360mg/g; At baseline; on no RAASi   Acid-base Bicarb WNL   Renal osteodystrophy Cca and phos controlled; PTH 94.1, monitor    Anemia At CKD goal   Lipid Management No statin   ESRD planning Provided education about the stages of CKD, usual progression, and need to monitor for and treat CKD-related disease in an effort to delay progression of CKD.     No need for Advanced CKD planning given stable CKD 3b     Hyperkalemia  - Maintained on Lokelma MWF which he is doing well with  - Continue low K diet    All questions patient had were answered.  Asked if further questions. None. F/u in clinic 6 months  with labs and urine prior to next visit or sooner if needed.  ER for emergency concerns.    Summary of Plan:  - Continue Lokelma MWF  - RTC 6 months with labs and urine

## 2023-12-18 NOTE — TELEPHONE ENCOUNTER
Hello,       A Patient Assistance Application for Juan Manuel Koehler - MRN  [id number] for [facility] was faxed to your office @ [phone]. Please have Darcy Chang NP, review the application to ensure the prescription is correct. If correct, sign and fax the application back to the Pharmacy Patient Assistance Team @[phone].      If changes need to be made to this application, please let me know so corrections can be made, and the application will be faxed back to you for approval and signature.

## 2024-01-02 ENCOUNTER — PATIENT MESSAGE (OUTPATIENT)
Dept: WOUND CARE | Facility: CLINIC | Age: 74
End: 2024-01-02
Payer: MEDICARE

## 2024-01-02 ENCOUNTER — TELEPHONE (OUTPATIENT)
Dept: WOUND CARE | Facility: CLINIC | Age: 74
End: 2024-01-02
Payer: MEDICARE

## 2024-01-02 NOTE — TELEPHONE ENCOUNTER
----- Message from Shannan Oquendo sent at 1/2/2024  9:46 AM CST -----  Contact: [phone]  Pt called in regards to speaking with someone in regards to his care  .....Please call and adv @ [phone]  or [phone]

## 2024-01-02 NOTE — TELEPHONE ENCOUNTER
Spoke with pt and his wife. He is concerned that is stoma is protruding more than it did, but not having any pouching problems except for sporadically. I have suggested he send me a picture to view . His wife will take one when they change tomorrow.

## 2024-01-16 ENCOUNTER — TELEPHONE (OUTPATIENT)
Dept: NEPHROLOGY | Facility: CLINIC | Age: 74
End: 2024-01-16
Payer: MEDICARE

## 2024-01-16 DIAGNOSIS — E87.5 HYPERKALEMIA: Primary | ICD-10-CM

## 2024-01-16 NOTE — TELEPHONE ENCOUNTER
We regret to inform you Juan Manuel Koehler has been denied enrollment by [employer] Patient Assistance Program.    Denial Date: 01/06/24  Denial Reason:   Based on the income level and/or family size, the patient does not qualify for the Pharmacy Patient Assistance Program     Patient has been notified of this decision via EMAIL

## 2024-01-22 ENCOUNTER — TELEPHONE (OUTPATIENT)
Dept: NEPHROLOGY | Facility: CLINIC | Age: 74
End: 2024-01-22
Payer: MEDICARE

## 2024-01-22 NOTE — TELEPHONE ENCOUNTER
"Spoke to pt. Concerning meds    ----- Message from Melanie Potts sent at 1/22/2024  4:33 PM CST -----  Consult/Advisory:      Name Of Caller: Self    Contact Preference:  [phone]        What is the nature of the call?:     Pt  stated that Rx: sodium zirconium cyclosilicate (LOKELMA) 5 gram packet cost $100 that he cannot afford the medication, therefore would like to know if provider can prescribe another alternative more cheaper. Please advised             Additional Notes:        "Thank you for all that you do for our patients"          "

## 2024-01-29 ENCOUNTER — PATIENT MESSAGE (OUTPATIENT)
Dept: NEPHROLOGY | Facility: CLINIC | Age: 74
End: 2024-01-29
Payer: MEDICARE

## 2024-02-16 ENCOUNTER — TELEPHONE (OUTPATIENT)
Dept: HEMATOLOGY/ONCOLOGY | Facility: CLINIC | Age: 74
End: 2024-02-16
Payer: MEDICARE

## 2024-02-16 NOTE — TELEPHONE ENCOUNTER
Attempted to call patient, no answer. Message left that I would change the lab appt to 3/26 at [facility]. Left our call back number if they need anything else.

## 2024-02-16 NOTE — TELEPHONE ENCOUNTER
"----- Message from Melanie Potts sent at 2/16/2024  8:34 AM CST -----  Consult/Advisory:      Name Of Caller: Self    Contact Preference:  [phone]  or [phone]       What is the nature of the call? Pt requesting if he can get labs done if possible on 3/26 , pt has Neph apt that also needs labs prior to apt. Please call       Additional Notes:  "Thank you for all that you do for our patients"          "

## 2024-02-20 ENCOUNTER — LAB VISIT (OUTPATIENT)
Dept: LAB | Facility: HOSPITAL | Age: 74
End: 2024-02-20
Attending: INTERNAL MEDICINE
Payer: MEDICARE

## 2024-02-20 DIAGNOSIS — C67.9 UROTHELIAL CARCINOMA OF BLADDER: ICD-10-CM

## 2024-02-20 DIAGNOSIS — N18.32 STAGE 3B CHRONIC KIDNEY DISEASE: ICD-10-CM

## 2024-02-20 LAB
ALBUMIN SERPL BCP-MCNC: 4 G/DL (ref 3.5–5.2)
ALBUMIN SERPL BCP-MCNC: 4.1 G/DL (ref 3.5–5.2)
ALP SERPL-CCNC: 78 U/L (ref 55–135)
ALT SERPL W/O P-5'-P-CCNC: 15 U/L (ref 10–44)
ANION GAP SERPL CALC-SCNC: 12 MMOL/L (ref 8–16)
ANION GAP SERPL CALC-SCNC: 14 MMOL/L (ref 8–16)
AST SERPL-CCNC: 17 U/L (ref 10–40)
BACTERIA #/AREA URNS HPF: ABNORMAL /HPF
BASOPHILS # BLD AUTO: 0.06 K/UL (ref 0–0.2)
BASOPHILS NFR BLD: 0.7 % (ref 0–1.9)
BILIRUB SERPL-MCNC: 1.2 MG/DL (ref 0.1–1)
BILIRUB UR QL STRIP: NEGATIVE
BUN SERPL-MCNC: 30 MG/DL (ref 8–23)
BUN SERPL-MCNC: 30 MG/DL (ref 8–23)
CALCIUM SERPL-MCNC: 9.5 MG/DL (ref 8.7–10.5)
CALCIUM SERPL-MCNC: 9.6 MG/DL (ref 8.7–10.5)
CHLORIDE SERPL-SCNC: 109 MMOL/L (ref 95–110)
CHLORIDE SERPL-SCNC: 110 MMOL/L (ref 95–110)
CLARITY UR: ABNORMAL
CO2 SERPL-SCNC: 20 MMOL/L (ref 23–29)
CO2 SERPL-SCNC: 22 MMOL/L (ref 23–29)
COLOR UR: YELLOW
CREAT SERPL-MCNC: 1.8 MG/DL (ref 0.5–1.4)
CREAT SERPL-MCNC: 1.8 MG/DL (ref 0.5–1.4)
CREAT UR-MCNC: 81.3 MG/DL (ref 23–375)
DIFFERENTIAL METHOD BLD: ABNORMAL
EOSINOPHIL # BLD AUTO: 0.4 K/UL (ref 0–0.5)
EOSINOPHIL NFR BLD: 4.9 % (ref 0–8)
ERYTHROCYTE [DISTWIDTH] IN BLOOD BY AUTOMATED COUNT: 14.2 % (ref 11.5–14.5)
ERYTHROCYTE [DISTWIDTH] IN BLOOD BY AUTOMATED COUNT: 14.2 % (ref 11.5–14.5)
EST. GFR  (NO RACE VARIABLE): 39 ML/MIN/1.73 M^2
EST. GFR  (NO RACE VARIABLE): 39 ML/MIN/1.73 M^2
GLUCOSE SERPL-MCNC: 125 MG/DL (ref 70–110)
GLUCOSE SERPL-MCNC: 128 MG/DL (ref 70–110)
GLUCOSE UR QL STRIP: NEGATIVE
HCT VFR BLD AUTO: 37.4 % (ref 40–54)
HCT VFR BLD AUTO: 37.4 % (ref 40–54)
HGB BLD-MCNC: 12.5 G/DL (ref 14–18)
HGB BLD-MCNC: 12.5 G/DL (ref 14–18)
HGB UR QL STRIP: ABNORMAL
IMM GRANULOCYTES # BLD AUTO: 0.03 K/UL (ref 0–0.04)
IMM GRANULOCYTES NFR BLD AUTO: 0.3 % (ref 0–0.5)
KETONES UR QL STRIP: NEGATIVE
LEUKOCYTE ESTERASE UR QL STRIP: ABNORMAL
LYMPHOCYTES # BLD AUTO: 1.7 K/UL (ref 1–4.8)
LYMPHOCYTES NFR BLD: 19.1 % (ref 18–48)
MCH RBC QN AUTO: 30.3 PG (ref 27–31)
MCH RBC QN AUTO: 30.3 PG (ref 27–31)
MCHC RBC AUTO-ENTMCNC: 33.4 G/DL (ref 32–36)
MCHC RBC AUTO-ENTMCNC: 33.4 G/DL (ref 32–36)
MCV RBC AUTO: 91 FL (ref 82–98)
MCV RBC AUTO: 91 FL (ref 82–98)
MICROSCOPIC COMMENT: ABNORMAL
MONOCYTES # BLD AUTO: 0.5 K/UL (ref 0.3–1)
MONOCYTES NFR BLD: 5.8 % (ref 4–15)
NEUTROPHILS # BLD AUTO: 6 K/UL (ref 1.8–7.7)
NEUTROPHILS NFR BLD: 69.2 % (ref 38–73)
NITRITE UR QL STRIP: POSITIVE
NRBC BLD-RTO: 0 /100 WBC
PH UR STRIP: 7 [PH] (ref 5–8)
PHOSPHATE SERPL-MCNC: 3.4 MG/DL (ref 2.7–4.5)
PLATELET # BLD AUTO: 217 K/UL (ref 150–450)
PLATELET # BLD AUTO: 217 K/UL (ref 150–450)
PMV BLD AUTO: 9.6 FL (ref 9.2–12.9)
PMV BLD AUTO: 9.6 FL (ref 9.2–12.9)
POTASSIUM SERPL-SCNC: 4.8 MMOL/L (ref 3.5–5.1)
POTASSIUM SERPL-SCNC: 4.9 MMOL/L (ref 3.5–5.1)
PROT SERPL-MCNC: 7.2 G/DL (ref 6–8.4)
PROT UR QL STRIP: ABNORMAL
PROT UR-MCNC: 34 MG/DL (ref 0–15)
PROT/CREAT UR: 0.42 MG/G{CREAT} (ref 0–0.2)
PTH-INTACT SERPL-MCNC: 139.4 PG/ML (ref 9–77)
RBC # BLD AUTO: 4.12 M/UL (ref 4.6–6.2)
RBC # BLD AUTO: 4.12 M/UL (ref 4.6–6.2)
RBC #/AREA URNS HPF: 10 /HPF (ref 0–4)
SODIUM SERPL-SCNC: 143 MMOL/L (ref 136–145)
SODIUM SERPL-SCNC: 144 MMOL/L (ref 136–145)
SP GR UR STRIP: 1.01 (ref 1–1.03)
SQUAMOUS #/AREA URNS HPF: 0 /HPF
UNIDENT CRYS URNS QL MICRO: 2
URN SPEC COLLECT METH UR: ABNORMAL
UROBILINOGEN UR STRIP-ACNC: NEGATIVE EU/DL
WBC # BLD AUTO: 8.73 K/UL (ref 3.9–12.7)
WBC # BLD AUTO: 8.73 K/UL (ref 3.9–12.7)
WBC #/AREA URNS HPF: >100 /HPF (ref 0–5)

## 2024-02-20 PROCEDURE — 83970 ASSAY OF PARATHORMONE: CPT | Performed by: NURSE PRACTITIONER

## 2024-02-20 PROCEDURE — 84156 ASSAY OF PROTEIN URINE: CPT | Performed by: NURSE PRACTITIONER

## 2024-02-20 PROCEDURE — 81000 URINALYSIS NONAUTO W/SCOPE: CPT | Performed by: NURSE PRACTITIONER

## 2024-02-20 PROCEDURE — 80069 RENAL FUNCTION PANEL: CPT | Performed by: NURSE PRACTITIONER

## 2024-02-20 PROCEDURE — 85025 COMPLETE CBC W/AUTO DIFF WBC: CPT | Performed by: INTERNAL MEDICINE

## 2024-02-20 PROCEDURE — 80053 COMPREHEN METABOLIC PANEL: CPT | Performed by: INTERNAL MEDICINE

## 2024-02-20 PROCEDURE — 36415 COLL VENOUS BLD VENIPUNCTURE: CPT | Performed by: NURSE PRACTITIONER

## 2024-02-20 PROCEDURE — 82610 CYSTATIN C: CPT | Performed by: NURSE PRACTITIONER

## 2024-02-21 ENCOUNTER — OFFICE VISIT (OUTPATIENT)
Dept: NEPHROLOGY | Facility: CLINIC | Age: 74
End: 2024-02-21
Payer: MEDICARE

## 2024-02-21 VITALS
OXYGEN SATURATION: 99 % | SYSTOLIC BLOOD PRESSURE: 126 MMHG | BODY MASS INDEX: 18.62 KG/M2 | HEART RATE: 66 BPM | DIASTOLIC BLOOD PRESSURE: 66 MMHG | WEIGHT: 145 LBS

## 2024-02-21 DIAGNOSIS — N18.32 ANEMIA IN STAGE 3B CHRONIC KIDNEY DISEASE: ICD-10-CM

## 2024-02-21 DIAGNOSIS — Z98.890 HISTORY OF UROSTOMY: ICD-10-CM

## 2024-02-21 DIAGNOSIS — N25.81 HYPERPARATHYROIDISM DUE TO RENAL INSUFFICIENCY: ICD-10-CM

## 2024-02-21 DIAGNOSIS — E87.5 HYPERKALEMIA: ICD-10-CM

## 2024-02-21 DIAGNOSIS — N18.32 STAGE 3B CHRONIC KIDNEY DISEASE: Primary | ICD-10-CM

## 2024-02-21 DIAGNOSIS — R80.9 PROTEINURIA, UNSPECIFIED TYPE: ICD-10-CM

## 2024-02-21 DIAGNOSIS — D63.1 ANEMIA IN STAGE 3B CHRONIC KIDNEY DISEASE: ICD-10-CM

## 2024-02-21 PROCEDURE — 99214 OFFICE O/P EST MOD 30 MIN: CPT | Mod: S$GLB,,, | Performed by: NURSE PRACTITIONER

## 2024-02-21 PROCEDURE — 99999 PR PBB SHADOW E&M-EST. PATIENT-LVL II: CPT | Mod: PBBFAC,,, | Performed by: NURSE PRACTITIONER

## 2024-02-21 NOTE — PROGRESS NOTES
"Subjective:       Patient ID: Juan Manuel Koehler is a 73 y.o. male who presents for follow-up evaluation of CKD and hyperkalemia.      HPI     Followed by Dr. Blake in 1/18/22.  Prior pertinent chart reviewed since this is patient's first appointment with me.    Patient presents for follow-up evaluation of CKD.  Baseline creatinine of 1.8-2.0. Significant other medical problems include h/o urothelial carcinoma of bladder s/p ileal conduit, ELDER, CVA, NSTEMI, proteinuria, hyperkalemia. Per Dr. Blake's last visit, "Mr. 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 Prior episodes of ELDER, severe hyperkalemia, ELDER was suspected from prolonged volume depletion causing ATN/ possible contrast induced ELDER." The patient denies taking NSAIDs, herbal supplements, or new antibiotics, recreational drugs, recent episode of dehydration, diarrhea, nausea or vomiting, acute illness, hospitalization or exposure to IV radiocontrast. He has been maintained on veltassa 16.8g qd for 5 years. He adheres to low K diet. He reports adequate hydration.     Update 2/20/23:  - Presents for follow-up of CKD and hyperkalemia  - Taking Veltassa MWF, notes intermittent noncompliance with low K diet  - No other symptoms or new complaints   - Completed treatment with cipro for UTI    Update 6/7/23:  - Follow-up of CKD and hyperkalemia  - Maintained on lokelma TIW, needs refill however medication is costly  - No new symptoms or complaints today    Update 10/9/23:  - Presents for follow-up of CKD and hyperkalemia  - sCr stable at 1.8. K 4.1. Remains on Lokelma MWF.   - Feeling well today. No complaints.     Update 2/21/24:  - Presents for follow-up of CKD and hyperkalemia. sCr 1.8.  - Has tried several assistance programs for Lokelma and Veltassa. No available assistance. Remains costly for him.   - Meeting with hem/onc in April      Significant family hx includes: Mother - kidney disease    CT abdomen: " 9/14/22 , reviewed.    Review of Systems   Constitutional:  Negative for fever.        Feeling well. Denies complaints.    Genitourinary:  Negative for hematuria.        +urostomy    All other systems reviewed and are negative.      Objective:       Blood pressure 126/66, pulse 66, weight 65.8 kg (145 lb), SpO2 99 %.  Physical Exam  Vitals reviewed.   Constitutional:       General: He is not in acute distress.     Comments: Thin, elderly male   HENT:      Head: Normocephalic and atraumatic.   Eyes:      General: No scleral icterus.  Cardiovascular:      Rate and Rhythm: Normal rate.   Pulmonary:      Effort: Pulmonary effort is normal. No respiratory distress.   Musculoskeletal:      Right lower leg: No edema.      Left lower leg: No edema.   Skin:     General: Skin is warm and dry.   Neurological:      Mental Status: He is alert and oriented to person, place, and time.   Psychiatric:         Mood and Affect: Mood normal.         Behavior: Behavior normal.         Lab Results   Component Value Date    CREATININE 1.8 (H) 02/20/2024    CREATININE 1.8 (H) 02/20/2024    URICACID 6.4 01/13/2022     Prot/Creat Ratio, Urine   Date Value Ref Range Status   02/20/2024 0.42 (H) 0.00 - 0.20 Final   06/12/2023 0.36 (H) 0.00 - 0.20 Final   02/06/2023 0.36 (H) 0.00 - 0.20 Final     Lab Results   Component Value Date     02/20/2024     02/20/2024    K 4.9 02/20/2024    K 4.8 02/20/2024    CO2 22 (L) 02/20/2024    CO2 20 (L) 02/20/2024     02/20/2024     02/20/2024     Lab Results   Component Value Date    .4 (H) 02/20/2024    CALCIUM 9.6 02/20/2024    CALCIUM 9.5 02/20/2024    PHOS 3.4 02/20/2024     Lab Results   Component Value Date    HGB 12.5 (L) 02/20/2024    HGB 12.5 (L) 02/20/2024    WBC 8.73 02/20/2024    WBC 8.73 02/20/2024    HCT 37.4 (L) 02/20/2024    HCT 37.4 (L) 02/20/2024      Lab Results   Component Value Date    HGBA1C 5.4 02/06/2023     02/20/2024     02/20/2024    BUN  "30 (H) 02/20/2024    BUN 30 (H) 02/20/2024     Lab Results   Component Value Date    LDLCALC 96.2 02/06/2023     CT chest abdomen 9/14/22:  "FINDINGS:  Chest:     Heart and mediastinal vasculature: Heart normal in size.     Ann-Marie/Mediastinum: Calcified subcarinal lymph node and calcified right hilar lymph nodes compatible with previous granulomatous disease.  No pathologic adenopathy.     Lungs: Left basilar pleuroparenchymal scarring, minimal paraseptal emphysema in the right middle lobe and by apical pleural thickening..  Calcified in the right base.  No suspicious pulmonary nodules .     Abdomen and pelvis:     Liver: Normal in size and attenuation, with no focal hepatic lesions.     Gallbladder: No calcified gallstones.     Bile Ducts: No evidence of dilated ducts.     Pancreas: No mass or peripancreatic fat stranding.     Spleen: Stable 15 mm hypodensity along the anterior tip spleen, possibly cyst or pseudocyst.     Adrenals: Unremarkable.     Kidneys/ Ureters: Bilateral renal cortical cysts are present and there is interval improvement in renal pelvic and intrarenal collecting system dilatation since the prior study.  There is bilateral urothelial wall thickening and hyperenhancement.  No solid mass or nephrolithiasis.  No collectingd systems not well evaluated due to exam protocol.  There is a right lower quadrant loop urostomy.     GI Tract/Mesentery: No evidence of bowel obstruction or inflammation.     Peritoneal Space: No ascites. No free air.     Retroperitoneum: No significant adenopathy. Bilateral pelvic lymphadenectomy surgical clips are present.     Vasculature: No significant atherosclerosis or aneurysm.     Bladder: Surgically absent     Reproductive organs: Surgically absent     Bones: No suspicious lytic or blastic lesions.  Age related degenerative change.     Impression:     Status post cystectomy and loop urostomy, with improved renal collecting system dilatation, noting urothelial wall " "thickening which may reflect acute and or chronic inflammation and or infection.     No convincing evidence of metastatic disease     Evidence of previous granulomatous disease     Stable splenic hypodensity, of doubtful clinical significance."    Assessment:       1. Stage 3b chronic kidney disease    2. Proteinuria, unspecified type    3. Hyperkalemia    4. Hyperparathyroidism due to renal insufficiency    5. Anemia in stage 3b chronic kidney disease    6. History of urostomy          Plan:   CKD stage 3b c eGFR 39 mL/min -  - baseline Cr 1.8-2.0, within baseline   - CKD clinically related to obstructive uropathy in malignancy, chemo nephrotoxicity and history of AKIs  - Continue to avoid NSAIDs/ nephrotoxic agents, continue adequate hydration    UPCR Subnephrotic; At baseline; on no RAASi due to hyperkalemia   Acid-base Stable   Renal osteodystrophy PTH trending up. Ca and phos okay. Vit D 44  Okay for OTC Vit D   Anemia At CKD goal   Lipid Management No statin   ESRD planning Provided education about the stages of CKD, usual progression, and need to monitor for and treat CKD-related disease in an effort to delay progression of CKD.     No need for Advanced CKD planning given stable CKD 3b     Hyperkalemia  - Maintained on Lokelma which he is doing well with although has been costly for him being on a fixed income and he has been unable to receive assistance. Discussed alternatives. He would like to continue with Lokelma.   - Refill today as able.   - Continue low K diet    All questions patient had were answered.  Asked if further questions. None. F/u in clinic 6 months with labs and urine prior to next visit or sooner if needed.  ER for emergency concerns.    Summary of Plan:  - Continue Lokelma   - RTC 6 months with labs and urine            "

## 2024-02-22 ENCOUNTER — PATIENT MESSAGE (OUTPATIENT)
Dept: NEPHROLOGY | Facility: CLINIC | Age: 74
End: 2024-02-22
Payer: MEDICARE

## 2024-02-22 LAB
CYSTATIN C SERPL-MCNC: 1.52 MG/L (ref 0.67–1.21)
GFR/BSA.PRED SERPLBLD CYS-BASED-ARV: 42 ML/MIN/BSA

## 2024-04-02 ENCOUNTER — HOSPITAL ENCOUNTER (OUTPATIENT)
Dept: RADIOLOGY | Facility: HOSPITAL | Age: 74
Discharge: HOME | End: 2024-04-02
Attending: INTERNAL MEDICINE
Payer: MEDICARE

## 2024-04-02 DIAGNOSIS — N25.81 HYPERPARATHYROIDISM DUE TO RENAL INSUFFICIENCY: ICD-10-CM

## 2024-04-02 DIAGNOSIS — C67.9 UROTHELIAL CARCINOMA OF BLADDER: ICD-10-CM

## 2024-04-02 DIAGNOSIS — C67.9 UROTHELIAL CARCINOMA OF BLADDER: Primary | ICD-10-CM

## 2024-04-02 LAB
CREAT SERPL-MCNC: 2 MG/DL (ref 0.5–1.4)
SAMPLE: ABNORMAL

## 2024-04-02 PROCEDURE — 74177 CT ABD & PELVIS W/CONTRAST: CPT | Mod: 26,,, | Performed by: RADIOLOGY

## 2024-04-02 PROCEDURE — 25500020 PHARM REV CODE 255: Performed by: INTERNAL MEDICINE

## 2024-04-02 PROCEDURE — 71260 CT THORAX DX C+: CPT | Mod: 26,,, | Performed by: RADIOLOGY

## 2024-04-02 PROCEDURE — 74177 CT ABD & PELVIS W/CONTRAST: CPT | Mod: TC

## 2024-04-02 RX ADMIN — IOHEXOL 100 ML: 350 INJECTION, SOLUTION INTRAVENOUS at 12:04

## 2024-04-02 NOTE — PROGRESS NOTES
"Subjective:       Patient ID: Juan Manuel Koehler is a 74 y.o. male.    Chief Complaint: Bladder Cancer    HPI -     74 y.o.White male  who presents today for follow-up and to review CT scans after surgery for invasive bladder cancer performed on 6/21/17.     He denies any mouth sores, nausea, vomiting, diarrhea, constipation, chest pain, shortness of breath, leg swelling, fatigue, headache, dizziness, or mood changes.    His ECOG PS is 1 and he is accompanied alone to clinic.  Wife recently had shoulder surgery.             Oncologic History:  Patient has previously been seen by Dr. 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 He presented to Dr. Boucher with pain and hematuria and was found to have a large bladder tumor on his trigone. The tumor was also causing bilateral ureteral obstruction with resulting ELDER.The patient underwent tumor resection and attempted JJ stent placement on 4/5/17 which demonstrated cT2 urothelial carcinoma (nested variant). Bilateral nephrostomy tubes were placed with improvement in the patient's renal function. Patient discharged today from [facility] for ELDER from 5/5-5/9. During hospitalization, right nephrostomy tube was placed and the left was exchanged.      5/5/17 CT CAP reveals " Bilateral double-J ureteral stents and left nephrostomy tube are present.  There has been interval resolution of left-sided hydronephrosis and significant improvement in right-sided hydronephrosis which now appears mild. Bladder is nondistended and not well evaluated.  There is no CT evidence of distant metastatic disease referable to provide history of bladder neoplasm."    6/21/17-Dr. Robles performed an open Radical cystoprostatectomy, Bilateral pelvic lymph node dissection, Creation of ileal conduit urinary diversion and Bilateral " ok   ureterotomy for open ureteral J stent placement.   7/6/17- patient had bilateral nephrostomy tubes removed     Review of Systems   Constitutional:  Negative for activity change, appetite change and unexpected weight change.   HENT:  Negative for mouth sores, nosebleeds and trouble swallowing.    Eyes:  Negative for discharge and visual disturbance.   Respiratory:  Negative for shortness of breath and wheezing.    Cardiovascular:  Negative for leg swelling.   Gastrointestinal:  Negative for abdominal distention and blood in stool.   Genitourinary:  Negative for decreased urine volume, frequency and hematuria.   Skin:  Negative for color change.   Hematological:  Negative for adenopathy.   Psychiatric/Behavioral:  Positive for sleep disturbance. The patient is not nervous/anxious.    All other systems reviewed and are negative.      Allergies:  Review of patient's allergies indicates:  No Known Allergies    Medications:  Current Outpatient Medications   Medication Sig Dispense Refill    apremilast (OTEZLA) 30 mg Tab Take by mouth.      sodium zirconium cyclosilicate (LOKELMA) 5 gram packet Take 1 packet (5 g total) by mouth once daily. Mix entire contents of packet(s) into drinking glass containing 3 tablespoons of water; stir well and drink immediately. Add water and repeat until no powder remains to receive entire dose. 30 packet 11    temazepam (RESTORIL) 15 mg Cap TAKE 1 CAPSULE (15 MG TOTAL) BY MOUTH AT NIGHT AS NEEDED FOR INSOMNIA 30 capsule 1     No current facility-administered medications for this visit.       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    HTN (hypertension)     Hydronephrosis        PSH:  Past Surgical History:   Procedure Laterality Date    BLADDER SURGERY      CYSTOSCOPY      HERNIA REPAIR      Ileal Conduit      pelvic lymph node resection      Radical cystoprostatectomy      THROAT  "SURGERY      urostomy tube placement         FamHx:  Family History   Problem Relation Age of Onset    No Known Problems Father     Kidney disease Mother     Prostate cancer Neg Hx        SocHx:  Social History     Socioeconomic History    Marital status:    Tobacco Use    Smoking status: Never    Smokeless tobacco: Never   Substance and Sexual Activity    Alcohol use: No     Alcohol/week: 0.0 standard drinks of alcohol    Drug use: No    Sexual activity: Yes     Partners: Female     Birth control/protection: None     Comment:  lives with spouse       Objective:       /73 (BP Location: Left arm, Patient Position: Sitting, BP Method: Medium (Automatic))   Pulse 74   Temp 98.2 °F (36.8 °C) (Oral)   Resp 16   Ht 6' 2" (1.88 m)   SpO2 98%   BMI 18.62 kg/m²     Physical Exam  Vitals and nursing note reviewed.   Constitutional:       Appearance: He is well-developed.      Comments: Appears fatigued and thin   HENT:      Head: Normocephalic and atraumatic.      Right Ear: External ear normal.      Left Ear: External ear normal.   Eyes:      General: No scleral icterus.        Right eye: No discharge.         Left eye: No discharge.      Conjunctiva/sclera: Conjunctivae normal.      Pupils: Pupils are equal, round, and reactive to light.   Pulmonary:      Effort: Pulmonary effort is normal.   Musculoskeletal:         General: Normal range of motion.      Cervical back: Normal range of motion and neck supple.      Comments: Bilateral nephrostomy tubes draining clear, yellow urine.   Skin:     General: Skin is warm and dry.      Findings: No erythema.   Neurological:      Mental Status: He is alert and oriented to person, place, and time.         LABS:  WBC   Date Value Ref Range Status   04/02/2024 7.25 3.90 - 12.70 K/uL Final   04/02/2024 7.04 3.90 - 12.70 K/uL Final     Hemoglobin   Date Value Ref Range Status   04/02/2024 12.6 (L) 14.0 - 18.0 g/dL Final   04/02/2024 12.6 (L) 14.0 - 18.0 g/dL " Final     POC Hematocrit   Date Value Ref Range Status   06/21/2017 17 (LL) 36 - 54 %PCV Final     Hematocrit   Date Value Ref Range Status   04/02/2024 38.2 (L) 40.0 - 54.0 % Final   04/02/2024 38.1 (L) 40.0 - 54.0 % Final     Platelets   Date Value Ref Range Status   04/02/2024 215 150 - 450 K/uL Final   04/02/2024 228 150 - 450 K/uL Final       Chemistry        Component Value Date/Time     04/02/2024 1100     04/02/2024 1100    K 5.0 04/02/2024 1100    K 5.3 (H) 04/02/2024 1100     04/02/2024 1100     04/02/2024 1100    CO2 24 04/02/2024 1100    CO2 25 04/02/2024 1100    BUN 26 (H) 04/02/2024 1100    BUN 28 (H) 04/02/2024 1100    CREATININE 1.8 (H) 04/02/2024 1100    CREATININE 1.7 (H) 04/02/2024 1100    GLU 88 04/02/2024 1100    GLU 89 04/02/2024 1100        Component Value Date/Time    CALCIUM 9.6 04/02/2024 1100    CALCIUM 9.6 04/02/2024 1100    ALKPHOS 72 04/02/2024 1100    AST 19 04/02/2024 1100    ALT 18 04/02/2024 1100    BILITOT 1.6 (H) 04/02/2024 1100          Assessment:       1. Urothelial carcinoma of bladder            Plan:       1. Bladder Cancer:  Juan Manuel Koehler is a 74 y.o.White male, originally referred by Dr. Robles, who presents today for follow-up, s/p surgery for  invasive bladder cancer.     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 He presented to Dr. Boucher with pain and hematuria and was found to have a large bladder tumor on his trigone. Staging CTCAP on 5/5/17 shows no evidence of distant metastasis.    Given his continued increased creatinine, it was unclear if he would be considered a candidate for neoadjuvant chemotherapy This has improved and the patient wanted to move forward with chemo, he understood the risks, particularly to his kidneys. We split the dose of cisplatin between D1+D8 and gave IVFs.  Yet still his creatinine increased significantly.  We had a lengthy  conversation about the risk of recurrence without chemo and real risk of permanent and irreversible kidney damage.   Also discussed with Dr. Robles.  At this point, we will forgo further chemotherapy in favor of moving forward with surgery.      S/p surgery with negative margins and negative lymph node involvement. Favorable pathology report reviewed with patient and his accompanying wife. Data is controversial in the benefit of adjuvant chemotherapy. Decision made by Dr. Calero and Dr. Robles to defer resuming chemotherapy.  Currently RENETTA.    Current scans RENETTA, subcm hypodensities in the pancreas stable, will repeat scans in 1 year to monitor.  Now more than 5 years out from original bladder surgery.     RTC 1 year with Non IV contrast CT CAP, labs (CBC,CMP), and to see Dr. Calero.        2. CKD: Stable, continue to follow with nephrology.      The patient agrees with the plan, and all questions have been answered to their satisfaction.      More than 40 mins total time were spent during this encounter.    Mingo Calero M.D., M.S., F.A.C.P.  Hematology and Oncology Attending  [facility]                Route Chart for Scheduling    Med Onc Chart Routing      Follow up with physician 1 year. RTC 1 year with Non IV contrast CT CAP, labs (CBC,CMP), and to see Dr. Calero.   Follow up with [facility]    Infusion scheduling note    Injection scheduling note    Labs CBC and CMP   Scheduling:  Preferred lab:  Lab interval:     Imaging CT chest abdomen pelvis      Pharmacy appointment    Other referrals

## 2024-04-03 ENCOUNTER — OFFICE VISIT (OUTPATIENT)
Dept: HEMATOLOGY/ONCOLOGY | Facility: CLINIC | Age: 74
End: 2024-04-03
Payer: MEDICARE

## 2024-04-03 VITALS
RESPIRATION RATE: 16 BRPM | HEIGHT: 74 IN | SYSTOLIC BLOOD PRESSURE: 122 MMHG | HEART RATE: 74 BPM | TEMPERATURE: 98 F | BODY MASS INDEX: 18.62 KG/M2 | DIASTOLIC BLOOD PRESSURE: 73 MMHG | OXYGEN SATURATION: 98 %

## 2024-04-03 DIAGNOSIS — C67.9 UROTHELIAL CARCINOMA OF BLADDER: Primary | ICD-10-CM

## 2024-04-03 PROCEDURE — 1101F PT FALLS ASSESS-DOCD LE1/YR: CPT | Mod: CPTII,S$GLB,, | Performed by: INTERNAL MEDICINE

## 2024-04-03 PROCEDURE — 99999 PR PBB SHADOW E&M-EST. PATIENT-LVL III: CPT | Mod: PBBFAC,,, | Performed by: INTERNAL MEDICINE

## 2024-04-03 PROCEDURE — 3078F DIAST BP <80 MM HG: CPT | Mod: CPTII,S$GLB,, | Performed by: INTERNAL MEDICINE

## 2024-04-03 PROCEDURE — 1126F AMNT PAIN NOTED NONE PRSNT: CPT | Mod: CPTII,S$GLB,, | Performed by: INTERNAL MEDICINE

## 2024-04-03 PROCEDURE — 3288F FALL RISK ASSESSMENT DOCD: CPT | Mod: CPTII,S$GLB,, | Performed by: INTERNAL MEDICINE

## 2024-04-03 PROCEDURE — 3066F NEPHROPATHY DOC TX: CPT | Mod: CPTII,S$GLB,, | Performed by: INTERNAL MEDICINE

## 2024-04-03 PROCEDURE — 1159F MED LIST DOCD IN RCRD: CPT | Mod: CPTII,S$GLB,, | Performed by: INTERNAL MEDICINE

## 2024-04-03 PROCEDURE — 3074F SYST BP LT 130 MM HG: CPT | Mod: CPTII,S$GLB,, | Performed by: INTERNAL MEDICINE

## 2024-04-03 PROCEDURE — 99215 OFFICE O/P EST HI 40 MIN: CPT | Mod: S$GLB,,, | Performed by: INTERNAL MEDICINE

## 2024-08-21 ENCOUNTER — OFFICE VISIT (OUTPATIENT)
Dept: WOUND CARE | Facility: CLINIC | Age: 74
End: 2024-08-21
Payer: MEDICARE

## 2024-08-21 DIAGNOSIS — K43.5 PARASTOMAL HERNIA WITHOUT OBSTRUCTION OR GANGRENE: ICD-10-CM

## 2024-08-21 DIAGNOSIS — Z98.890 HISTORY OF UROSTOMY: Primary | ICD-10-CM

## 2024-08-21 PROCEDURE — 3066F NEPHROPATHY DOC TX: CPT | Mod: CPTII,S$GLB,, | Performed by: CLINICAL NURSE SPECIALIST

## 2024-08-21 PROCEDURE — 99999 PR PBB SHADOW E&M-EST. PATIENT-LVL II: CPT | Mod: PBBFAC,,, | Performed by: CLINICAL NURSE SPECIALIST

## 2024-08-21 PROCEDURE — 1160F RVW MEDS BY RX/DR IN RCRD: CPT | Mod: CPTII,S$GLB,, | Performed by: CLINICAL NURSE SPECIALIST

## 2024-08-21 PROCEDURE — 1159F MED LIST DOCD IN RCRD: CPT | Mod: CPTII,S$GLB,, | Performed by: CLINICAL NURSE SPECIALIST

## 2024-08-21 PROCEDURE — 99215 OFFICE O/P EST HI 40 MIN: CPT | Mod: S$GLB,,, | Performed by: CLINICAL NURSE SPECIALIST

## 2024-08-21 NOTE — PROGRESS NOTES
"Subjective:       Patient ID: Juan Manuel Koehler is a 74 y.o. male.    Chief Complaint: Urostomy    This is visit for fu to skin issue related to urostomy comes with wife today, he has a new concern of stoma size and if this is okay . He comes with wife who still does his ostomy care.       Review of Systems   Constitutional: Negative.    HENT: Negative.     Respiratory: Negative.     Cardiovascular:  Negative for chest pain and leg swelling.   Gastrointestinal:         Rt sided urostomy   Genitourinary:  Negative for hematuria.        Urostomy , clear urine slight mucous   Skin:  Negative for rash.       Objective:      Physical Exam  Constitutional:       Appearance: He is well-developed.   Pulmonary:      Effort: Pulmonary effort is normal.   Abdominal:      General: Bowel sounds are normal.      Palpations: Abdomen is soft.      Hernia: A hernia is present.   Skin:     General: Skin is warm.           8/21/24  stoma is 35 mm at smallest and 40 mm when protruding due to hernia most likely    Has parastomal hernia , he does lift 35-40 lb and does not wear any support.   I have advised he get a Security binder and measured him for MEDIUM 8" tan  Wears coloplast graham light convex with ring and EBS         First visit before cautery 2017 6/18/18 5/13/19 March 2020      [facility] . PHN    Assessment:       1. History of urostomy        Plan:       reeval stoma fit and equipment   Measured for hernia binder as added support is needed.  NEW SCRIPT TO HCW for binder  Call me for any problems   I spent a total of 40 minutes on the day of the visit.  This includes face to face time and non-face to face time preparing to see the patient (eg, review of tests), obtaining and/or reviewing separately obtained history, documenting clinical information in the electronic or other health record, independently interpreting results and communicating results to the patient/family/caregiver, or care " coordinator.

## 2024-09-10 DIAGNOSIS — N18.32 STAGE 3B CHRONIC KIDNEY DISEASE: Primary | ICD-10-CM

## 2024-09-20 ENCOUNTER — LAB VISIT (OUTPATIENT)
Dept: LAB | Facility: HOSPITAL | Age: 74
End: 2024-09-20
Attending: NURSE PRACTITIONER
Payer: MEDICARE

## 2024-09-20 DIAGNOSIS — N18.32 STAGE 3B CHRONIC KIDNEY DISEASE: Primary | ICD-10-CM

## 2024-09-20 DIAGNOSIS — N18.32 STAGE 3B CHRONIC KIDNEY DISEASE: ICD-10-CM

## 2024-09-20 LAB
ALBUMIN SERPL BCP-MCNC: 4 G/DL (ref 3.5–5.2)
ANION GAP SERPL CALC-SCNC: 9 MMOL/L (ref 8–16)
BASOPHILS # BLD AUTO: 0.08 K/UL (ref 0–0.2)
BASOPHILS NFR BLD: 1 % (ref 0–1.9)
BUN SERPL-MCNC: 27 MG/DL (ref 8–23)
CALCIUM SERPL-MCNC: 10 MG/DL (ref 8.7–10.5)
CHLORIDE SERPL-SCNC: 110 MMOL/L (ref 95–110)
CO2 SERPL-SCNC: 24 MMOL/L (ref 23–29)
CREAT SERPL-MCNC: 2.2 MG/DL (ref 0.5–1.4)
DIFFERENTIAL METHOD BLD: ABNORMAL
EOSINOPHIL # BLD AUTO: 0.5 K/UL (ref 0–0.5)
EOSINOPHIL NFR BLD: 5.9 % (ref 0–8)
ERYTHROCYTE [DISTWIDTH] IN BLOOD BY AUTOMATED COUNT: 14.4 % (ref 11.5–14.5)
EST. GFR  (NO RACE VARIABLE): 30 ML/MIN/1.73 M^2
GLUCOSE SERPL-MCNC: 90 MG/DL (ref 70–110)
HCT VFR BLD AUTO: 38.7 % (ref 40–54)
HGB BLD-MCNC: 13 G/DL (ref 14–18)
IMM GRANULOCYTES # BLD AUTO: 0.04 K/UL (ref 0–0.04)
IMM GRANULOCYTES NFR BLD AUTO: 0.5 % (ref 0–0.5)
LYMPHOCYTES # BLD AUTO: 1.4 K/UL (ref 1–4.8)
LYMPHOCYTES NFR BLD: 18.1 % (ref 18–48)
MCH RBC QN AUTO: 30.7 PG (ref 27–31)
MCHC RBC AUTO-ENTMCNC: 33.6 G/DL (ref 32–36)
MCV RBC AUTO: 92 FL (ref 82–98)
MONOCYTES # BLD AUTO: 0.6 K/UL (ref 0.3–1)
MONOCYTES NFR BLD: 8 % (ref 4–15)
NEUTROPHILS # BLD AUTO: 5.3 K/UL (ref 1.8–7.7)
NEUTROPHILS NFR BLD: 66.5 % (ref 38–73)
NRBC BLD-RTO: 0 /100 WBC
PHOSPHATE SERPL-MCNC: 3.4 MG/DL (ref 2.7–4.5)
PLATELET # BLD AUTO: 239 K/UL (ref 150–450)
PMV BLD AUTO: 9.8 FL (ref 9.2–12.9)
POTASSIUM SERPL-SCNC: 4.8 MMOL/L (ref 3.5–5.1)
RBC # BLD AUTO: 4.23 M/UL (ref 4.6–6.2)
SODIUM SERPL-SCNC: 143 MMOL/L (ref 136–145)
WBC # BLD AUTO: 7.91 K/UL (ref 3.9–12.7)

## 2024-09-20 PROCEDURE — 85025 COMPLETE CBC W/AUTO DIFF WBC: CPT | Performed by: NURSE PRACTITIONER

## 2024-09-20 PROCEDURE — 80069 RENAL FUNCTION PANEL: CPT | Performed by: NURSE PRACTITIONER

## 2024-09-20 PROCEDURE — 36415 COLL VENOUS BLD VENIPUNCTURE: CPT | Performed by: NURSE PRACTITIONER

## 2024-09-23 ENCOUNTER — PATIENT MESSAGE (OUTPATIENT)
Dept: NEPHROLOGY | Facility: CLINIC | Age: 74
End: 2024-09-23
Payer: MEDICARE

## 2024-10-02 DIAGNOSIS — N18.32 STAGE 3B CHRONIC KIDNEY DISEASE: Primary | ICD-10-CM

## 2024-10-03 ENCOUNTER — LAB VISIT (OUTPATIENT)
Dept: LAB | Facility: HOSPITAL | Age: 74
End: 2024-10-03
Payer: MEDICARE

## 2024-10-03 DIAGNOSIS — N18.32 STAGE 3B CHRONIC KIDNEY DISEASE: ICD-10-CM

## 2024-10-03 LAB
BACTERIA #/AREA URNS HPF: ABNORMAL /HPF
BILIRUB UR QL STRIP: NEGATIVE
CLARITY UR: CLEAR
COLOR UR: COLORLESS
CREAT UR-MCNC: 19.7 MG/DL (ref 23–375)
GLUCOSE UR QL STRIP: NEGATIVE
HGB UR QL STRIP: NEGATIVE
KETONES UR QL STRIP: NEGATIVE
LEUKOCYTE ESTERASE UR QL STRIP: ABNORMAL
MICROSCOPIC COMMENT: ABNORMAL
NITRITE UR QL STRIP: POSITIVE
PH UR STRIP: 7 [PH] (ref 5–8)
PROT UR QL STRIP: NEGATIVE
PROT UR-MCNC: 11 MG/DL (ref 0–15)
PROT/CREAT UR: 0.56 MG/G{CREAT} (ref 0–0.2)
RBC #/AREA URNS HPF: 2 /HPF (ref 0–4)
SP GR UR STRIP: <1.005 (ref 1–1.03)
SQUAMOUS #/AREA URNS HPF: 0 /HPF
URN SPEC COLLECT METH UR: ABNORMAL
UROBILINOGEN UR STRIP-ACNC: NEGATIVE EU/DL
WBC #/AREA URNS HPF: 57 /HPF (ref 0–5)

## 2024-10-03 PROCEDURE — 81000 URINALYSIS NONAUTO W/SCOPE: CPT | Performed by: NURSE PRACTITIONER

## 2024-10-03 PROCEDURE — 84156 ASSAY OF PROTEIN URINE: CPT | Performed by: NURSE PRACTITIONER

## 2024-10-08 ENCOUNTER — OFFICE VISIT (OUTPATIENT)
Dept: NEPHROLOGY | Facility: CLINIC | Age: 74
End: 2024-10-08
Payer: MEDICARE

## 2024-10-08 VITALS
HEART RATE: 79 BPM | BODY MASS INDEX: 19.12 KG/M2 | OXYGEN SATURATION: 98 % | SYSTOLIC BLOOD PRESSURE: 138 MMHG | WEIGHT: 149 LBS | HEIGHT: 74 IN | DIASTOLIC BLOOD PRESSURE: 79 MMHG

## 2024-10-08 DIAGNOSIS — D63.1 ANEMIA IN STAGE 3B CHRONIC KIDNEY DISEASE: ICD-10-CM

## 2024-10-08 DIAGNOSIS — Z98.890 HISTORY OF UROSTOMY: ICD-10-CM

## 2024-10-08 DIAGNOSIS — N25.81 HYPERPARATHYROIDISM DUE TO RENAL INSUFFICIENCY: ICD-10-CM

## 2024-10-08 DIAGNOSIS — N18.32 STAGE 3B CHRONIC KIDNEY DISEASE: Primary | ICD-10-CM

## 2024-10-08 DIAGNOSIS — R80.9 PROTEINURIA, UNSPECIFIED TYPE: ICD-10-CM

## 2024-10-08 DIAGNOSIS — E87.5 HYPERKALEMIA: ICD-10-CM

## 2024-10-08 DIAGNOSIS — N18.32 ANEMIA IN STAGE 3B CHRONIC KIDNEY DISEASE: ICD-10-CM

## 2024-10-08 PROCEDURE — 1159F MED LIST DOCD IN RCRD: CPT | Mod: CPTII,S$GLB,, | Performed by: NURSE PRACTITIONER

## 2024-10-08 PROCEDURE — 1126F AMNT PAIN NOTED NONE PRSNT: CPT | Mod: CPTII,S$GLB,, | Performed by: NURSE PRACTITIONER

## 2024-10-08 PROCEDURE — 99999 PR PBB SHADOW E&M-EST. PATIENT-LVL III: CPT | Mod: PBBFAC,,, | Performed by: NURSE PRACTITIONER

## 2024-10-08 PROCEDURE — 3078F DIAST BP <80 MM HG: CPT | Mod: CPTII,S$GLB,, | Performed by: NURSE PRACTITIONER

## 2024-10-08 PROCEDURE — 99214 OFFICE O/P EST MOD 30 MIN: CPT | Mod: S$GLB,,, | Performed by: NURSE PRACTITIONER

## 2024-10-08 PROCEDURE — 1101F PT FALLS ASSESS-DOCD LE1/YR: CPT | Mod: CPTII,S$GLB,, | Performed by: NURSE PRACTITIONER

## 2024-10-08 PROCEDURE — 3075F SYST BP GE 130 - 139MM HG: CPT | Mod: CPTII,S$GLB,, | Performed by: NURSE PRACTITIONER

## 2024-10-08 PROCEDURE — 3288F FALL RISK ASSESSMENT DOCD: CPT | Mod: CPTII,S$GLB,, | Performed by: NURSE PRACTITIONER

## 2024-10-08 PROCEDURE — 3066F NEPHROPATHY DOC TX: CPT | Mod: CPTII,S$GLB,, | Performed by: NURSE PRACTITIONER

## 2024-10-08 NOTE — PROGRESS NOTES
"Subjective:       Patient ID: Juan Manuel Koehler is a 74 y.o. male who presents for follow-up evaluation of CKD and hyperkalemia.      HPI     Followed by Dr. Blake in 1/18/22.  Prior pertinent chart reviewed since this is patient's first appointment with me.    Patient presents for follow-up evaluation of CKD.  Baseline creatinine of 1.8-2.0. Significant other medical problems include h/o urothelial carcinoma of bladder s/p ileal conduit, ELDER, CVA, NSTEMI, proteinuria, hyperkalemia. Per Dr. Blake's last visit, "Mr. 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 Prior episodes of ELDER, severe hyperkalemia, ELDER was suspected from prolonged volume depletion causing ATN/ possible contrast induced ELDER." The patient denies taking NSAIDs, herbal supplements, or new antibiotics, recreational drugs, recent episode of dehydration, diarrhea, nausea or vomiting, acute illness, hospitalization or exposure to IV radiocontrast. He has been maintained on veltassa 16.8g qd for 5 years. He adheres to low K diet. He reports adequate hydration.     Update 2/20/23:  - Presents for follow-up of CKD and hyperkalemia  - Taking Veltassa MWF, notes intermittent noncompliance with low K diet  - No other symptoms or new complaints   - Completed treatment with cipro for UTI    Update 6/7/23:  - Follow-up of CKD and hyperkalemia  - Maintained on lokelma TIW, needs refill however medication is costly  - No new symptoms or complaints today    Update 10/9/23:  - Presents for follow-up of CKD and hyperkalemia  - sCr stable at 1.8. K 4.1. Remains on Lokelma MWF.   - Feeling well today. No complaints.     Update 2/21/24:  - Presents for follow-up of CKD and hyperkalemia. sCr 1.8.  - Has tried several assistance programs for Lokelma and Veltassa. No available assistance. Remains costly for him.   - Meeting with hem/onc in April    Update 10/8/24:  - 1/2 gallon per day typically, had decreased water " "intake prior to last labs  - Compliant with Lokelma  - No changes to urine or new symptoms    Significant family hx includes: Mother - kidney disease    CT abdomen: 9/14/22 , reviewed.    Review of Systems   Constitutional:  Negative for fever.        Feeling well. Denies complaints.    Genitourinary:  Negative for hematuria.        +urostomy    All other systems reviewed and are negative.      Objective:       Blood pressure 138/79, pulse 79, height 6' 2" (1.88 m), weight 67.6 kg (149 lb), SpO2 98%.  Physical Exam  Vitals reviewed.   Constitutional:       General: He is not in acute distress.     Comments: Thin, elderly male   HENT:      Head: Normocephalic and atraumatic.   Eyes:      General: No scleral icterus.  Cardiovascular:      Rate and Rhythm: Normal rate.   Pulmonary:      Effort: Pulmonary effort is normal. No respiratory distress.   Musculoskeletal:      Right lower leg: No edema.      Left lower leg: No edema.   Skin:     General: Skin is warm and dry.   Neurological:      Mental Status: He is alert and oriented to person, place, and time.   Psychiatric:         Mood and Affect: Mood normal.         Behavior: Behavior normal.         Lab Results   Component Value Date    CREATININE 2.2 (H) 09/20/2024    URICACID 6.4 01/13/2022     Prot/Creat Ratio, Urine   Date Value Ref Range Status   10/03/2024 0.56 (H) 0.00 - 0.20 Final   09/20/2024 0.26 (H) 0.00 - 0.20 Final   02/20/2024 0.42 (H) 0.00 - 0.20 Final     Lab Results   Component Value Date     09/20/2024    K 4.8 09/20/2024    CO2 24 09/20/2024     09/20/2024     Lab Results   Component Value Date    PTH 83.7 (H) 04/02/2024    CALCIUM 10.0 09/20/2024    PHOS 3.4 09/20/2024     Lab Results   Component Value Date    HGB 13.0 (L) 09/20/2024    WBC 7.91 09/20/2024    HCT 38.7 (L) 09/20/2024      Lab Results   Component Value Date    HGBA1C 5.4 02/06/2023     09/20/2024    BUN 27 (H) 09/20/2024     Lab Results   Component Value Date    " "LDLCALC 96.2 02/06/2023     CT chest abdomen 9/14/22:  "FINDINGS:  Chest:     Heart and mediastinal vasculature: Heart normal in size.     Ann-Marie/Mediastinum: Calcified subcarinal lymph node and calcified right hilar lymph nodes compatible with previous granulomatous disease.  No pathologic adenopathy.     Lungs: Left basilar pleuroparenchymal scarring, minimal paraseptal emphysema in the right middle lobe and by apical pleural thickening..  Calcified in the right base.  No suspicious pulmonary nodules .     Abdomen and pelvis:     Liver: Normal in size and attenuation, with no focal hepatic lesions.     Gallbladder: No calcified gallstones.     Bile Ducts: No evidence of dilated ducts.     Pancreas: No mass or peripancreatic fat stranding.     Spleen: Stable 15 mm hypodensity along the anterior tip spleen, possibly cyst or pseudocyst.     Adrenals: Unremarkable.     Kidneys/ Ureters: Bilateral renal cortical cysts are present and there is interval improvement in renal pelvic and intrarenal collecting system dilatation since the prior study.  There is bilateral urothelial wall thickening and hyperenhancement.  No solid mass or nephrolithiasis.  No collectingd systems not well evaluated due to exam protocol.  There is a right lower quadrant loop urostomy.     GI Tract/Mesentery: No evidence of bowel obstruction or inflammation.     Peritoneal Space: No ascites. No free air.     Retroperitoneum: No significant adenopathy. Bilateral pelvic lymphadenectomy surgical clips are present.     Vasculature: No significant atherosclerosis or aneurysm.     Bladder: Surgically absent     Reproductive organs: Surgically absent     Bones: No suspicious lytic or blastic lesions.  Age related degenerative change.     Impression:     Status post cystectomy and loop urostomy, with improved renal collecting system dilatation, noting urothelial wall thickening which may reflect acute and or chronic inflammation and or infection.     No " "convincing evidence of metastatic disease     Evidence of previous granulomatous disease     Stable splenic hypodensity, of doubtful clinical significance."    Assessment:       1. Stage 3b chronic kidney disease    2. Proteinuria, unspecified type    3. Hyperkalemia    4. Hyperparathyroidism due to renal insufficiency    5. Anemia in stage 3b chronic kidney disease    6. History of urostomy            Plan:   CKD stage 3b c eGFR 39--> 30 mL/min -  - baseline Cr 1.8-2.0, within baseline   - CKD clinically related to obstructive uropathy in malignancy, chemo nephrotoxicity and history of AKIs  - Continue to avoid NSAIDs/ nephrotoxic agents, continue adequate hydration    UPCR Subnephrotic; mildly worse; on no RAASi due to hyperkalemia-- discussed starting low dose losartan pending labs this week   Acid-base Stable   Renal osteodystrophy PTH elevated, stable in CKD. Ca and phos okay. Vit D 44  Okay for OTC Vit D   Anemia Hgb stable   Lipid Management No statin   ESRD planning Provided education about the stages of CKD, usual progression, and need to monitor for and treat CKD-related disease in an effort to delay progression of CKD.     No need for Advanced CKD planning given stable CKD 3b     Hyperkalemia  - Maintained on Lokelma which he is doing well with although has been costly for him being on a fixed income and he has been unable to receive assistance. Discussed alternatives. He would like to continue with [facility].   - Discussed starting low dose losartan pending labs. May need increase in Lokelma dosing if so.   - Continue low K diet    All questions patient had were answered.  Asked if further questions. None. F/u in clinic 3 months with labs and urine prior to next visit or sooner if needed.  ER for emergency concerns.    Summary of Plan:  - Repeat RFP, UA, UPCR on Friday  - Plan to initiate low dose losartan if labs this week remain stable --> repeat BMP 2 weeks after starting               "

## 2024-10-11 ENCOUNTER — LAB VISIT (OUTPATIENT)
Dept: LAB | Facility: HOSPITAL | Age: 74
End: 2024-10-11
Attending: NURSE PRACTITIONER
Payer: MEDICARE

## 2024-10-11 DIAGNOSIS — N18.32 STAGE 3B CHRONIC KIDNEY DISEASE: ICD-10-CM

## 2024-10-11 DIAGNOSIS — R80.9 PROTEINURIA, UNSPECIFIED TYPE: ICD-10-CM

## 2024-10-11 LAB
BACTERIA #/AREA URNS HPF: NORMAL /HPF
BILIRUB UR QL STRIP: NEGATIVE
CLARITY UR: ABNORMAL
COLOR UR: YELLOW
CREAT UR-MCNC: 21.7 MG/DL (ref 23–375)
GLUCOSE UR QL STRIP: NEGATIVE
HGB UR QL STRIP: NEGATIVE
KETONES UR QL STRIP: NEGATIVE
LEUKOCYTE ESTERASE UR QL STRIP: ABNORMAL
MICROSCOPIC COMMENT: NORMAL
NITRITE UR QL STRIP: POSITIVE
PH UR STRIP: 7 [PH] (ref 5–8)
PROT UR QL STRIP: NEGATIVE
PROT UR-MCNC: 10 MG/DL (ref 0–15)
PROT/CREAT UR: 0.46 MG/G{CREAT} (ref 0–0.2)
RBC #/AREA URNS HPF: 2 /HPF (ref 0–4)
SP GR UR STRIP: 1 (ref 1–1.03)
URN SPEC COLLECT METH UR: ABNORMAL
UROBILINOGEN UR STRIP-ACNC: NEGATIVE EU/DL
WBC #/AREA URNS HPF: 5 /HPF (ref 0–5)

## 2024-10-11 PROCEDURE — 81000 URINALYSIS NONAUTO W/SCOPE: CPT | Performed by: NURSE PRACTITIONER

## 2024-10-11 PROCEDURE — 84156 ASSAY OF PROTEIN URINE: CPT | Performed by: NURSE PRACTITIONER

## 2024-11-26 DIAGNOSIS — N18.32 STAGE 3B CHRONIC KIDNEY DISEASE: Primary | ICD-10-CM

## 2024-11-27 ENCOUNTER — LAB VISIT (OUTPATIENT)
Dept: LAB | Facility: HOSPITAL | Age: 74
End: 2024-11-27
Attending: NURSE PRACTITIONER
Payer: MEDICARE

## 2024-11-27 DIAGNOSIS — N18.32 STAGE 3B CHRONIC KIDNEY DISEASE: ICD-10-CM

## 2024-11-27 LAB
BACTERIA #/AREA URNS HPF: ABNORMAL /HPF
BILIRUB UR QL STRIP: NEGATIVE
CLARITY UR: ABNORMAL
COLOR UR: COLORLESS
CREAT UR-MCNC: 24.7 MG/DL (ref 23–375)
GLUCOSE UR QL STRIP: NEGATIVE
HGB UR QL STRIP: NEGATIVE
HYALINE CASTS #/AREA URNS LPF: 1 /LPF
KETONES UR QL STRIP: NEGATIVE
LEUKOCYTE ESTERASE UR QL STRIP: ABNORMAL
MICROSCOPIC COMMENT: ABNORMAL
NITRITE UR QL STRIP: POSITIVE
PH UR STRIP: 7 [PH] (ref 5–8)
PROT UR QL STRIP: NEGATIVE
PROT UR-MCNC: 13 MG/DL (ref 0–15)
PROT/CREAT UR: 0.53 MG/G{CREAT} (ref 0–0.2)
RBC #/AREA URNS HPF: 2 /HPF (ref 0–4)
SP GR UR STRIP: 1 (ref 1–1.03)
SQUAMOUS #/AREA URNS HPF: 0 /HPF
URN SPEC COLLECT METH UR: ABNORMAL
UROBILINOGEN UR STRIP-ACNC: NEGATIVE EU/DL
WBC #/AREA URNS HPF: 45 /HPF (ref 0–5)
WBC CLUMPS URNS QL MICRO: ABNORMAL

## 2024-11-27 PROCEDURE — 81000 URINALYSIS NONAUTO W/SCOPE: CPT | Performed by: NURSE PRACTITIONER

## 2024-11-27 PROCEDURE — 84156 ASSAY OF PROTEIN URINE: CPT | Performed by: NURSE PRACTITIONER

## 2024-12-03 ENCOUNTER — OFFICE VISIT (OUTPATIENT)
Dept: NEPHROLOGY | Facility: CLINIC | Age: 74
End: 2024-12-03
Payer: MEDICARE

## 2024-12-03 VITALS
HEART RATE: 78 BPM | BODY MASS INDEX: 19 KG/M2 | WEIGHT: 148 LBS | OXYGEN SATURATION: 97 % | DIASTOLIC BLOOD PRESSURE: 73 MMHG | SYSTOLIC BLOOD PRESSURE: 119 MMHG

## 2024-12-03 DIAGNOSIS — E87.5 HYPERKALEMIA: ICD-10-CM

## 2024-12-03 DIAGNOSIS — D63.1 ANEMIA IN STAGE 3B CHRONIC KIDNEY DISEASE: ICD-10-CM

## 2024-12-03 DIAGNOSIS — Z98.890 HISTORY OF UROSTOMY: ICD-10-CM

## 2024-12-03 DIAGNOSIS — N18.32 ANEMIA IN STAGE 3B CHRONIC KIDNEY DISEASE: ICD-10-CM

## 2024-12-03 DIAGNOSIS — N18.32 STAGE 3B CHRONIC KIDNEY DISEASE: Primary | ICD-10-CM

## 2024-12-03 DIAGNOSIS — R80.9 PROTEINURIA, UNSPECIFIED TYPE: ICD-10-CM

## 2024-12-03 DIAGNOSIS — N25.81 HYPERPARATHYROIDISM DUE TO RENAL INSUFFICIENCY: ICD-10-CM

## 2024-12-03 PROCEDURE — 1159F MED LIST DOCD IN RCRD: CPT | Mod: CPTII,S$GLB,, | Performed by: NURSE PRACTITIONER

## 2024-12-03 PROCEDURE — 99999 PR PBB SHADOW E&M-EST. PATIENT-LVL II: CPT | Mod: PBBFAC,,, | Performed by: NURSE PRACTITIONER

## 2024-12-03 PROCEDURE — 99214 OFFICE O/P EST MOD 30 MIN: CPT | Mod: S$GLB,,, | Performed by: NURSE PRACTITIONER

## 2024-12-03 PROCEDURE — 3078F DIAST BP <80 MM HG: CPT | Mod: CPTII,S$GLB,, | Performed by: NURSE PRACTITIONER

## 2024-12-03 PROCEDURE — 1101F PT FALLS ASSESS-DOCD LE1/YR: CPT | Mod: CPTII,S$GLB,, | Performed by: NURSE PRACTITIONER

## 2024-12-03 PROCEDURE — 1126F AMNT PAIN NOTED NONE PRSNT: CPT | Mod: CPTII,S$GLB,, | Performed by: NURSE PRACTITIONER

## 2024-12-03 PROCEDURE — 3074F SYST BP LT 130 MM HG: CPT | Mod: CPTII,S$GLB,, | Performed by: NURSE PRACTITIONER

## 2024-12-03 PROCEDURE — 3066F NEPHROPATHY DOC TX: CPT | Mod: CPTII,S$GLB,, | Performed by: NURSE PRACTITIONER

## 2024-12-03 PROCEDURE — 3288F FALL RISK ASSESSMENT DOCD: CPT | Mod: CPTII,S$GLB,, | Performed by: NURSE PRACTITIONER

## 2024-12-03 NOTE — PROGRESS NOTES
"Subjective:       Patient ID: Juan Manuel Koehler is a 74 y.o. male who presents for follow-up evaluation of CKD and hyperkalemia.      HPI     Followed by Dr. Blaek in 1/18/22.  Prior pertinent chart reviewed since this is patient's first appointment with me.    Patient presents for follow-up evaluation of CKD.  Baseline creatinine of 1.8-2.0. Significant other medical problems include h/o urothelial carcinoma of bladder s/p ileal conduit, ELDER, CVA, NSTEMI, proteinuria, hyperkalemia. Per Dr. Blake's last visit, "Mr. 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 Prior episodes of ELDER, severe hyperkalemia, ELDER was suspected from prolonged volume depletion causing ATN/ possible contrast induced ELDER." The patient denies taking NSAIDs, herbal supplements, or new antibiotics, recreational drugs, recent episode of dehydration, diarrhea, nausea or vomiting, acute illness, hospitalization or exposure to IV radiocontrast. He has been maintained on veltassa 16.8g qd for 5 years. He adheres to low K diet. He reports adequate hydration.     Update 2/20/23:  - Presents for follow-up of CKD and hyperkalemia  - Taking Veltassa MWF, notes intermittent noncompliance with low K diet  - No other symptoms or new complaints   - Completed treatment with cipro for UTI    Update 6/7/23:  - Follow-up of CKD and hyperkalemia  - Maintained on lokelma TIW, needs refill however medication is costly  - No new symptoms or complaints today    Update 10/9/23:  - Presents for follow-up of CKD and hyperkalemia  - sCr stable at 1.8. K 4.1. Remains on Lokelma MWF.   - Feeling well today. No complaints.     Update 2/21/24:  - Presents for follow-up of CKD and hyperkalemia. sCr 1.8.  - Has tried several assistance programs for Lokelma and Veltassa. No available assistance. Remains costly for him.   - Meeting with hem/onc in April    Update 10/8/24:  - 1/2 gallon per day typically, had decreased water " intake prior to last labs  - Compliant with Lokelma  - No changes to urine or new symptoms    Update 12/3/24:  - Presents for follow-up of CKD 3b and hyperkalemia  - Taking Lokelma 5 TIW  - reports going off of low potassium diet for the holidays - potatoes and tomatoes specifically  - No complaints today    Significant family hx includes: Mother - kidney disease    CT abdomen: 9/14/22 , reviewed.    Review of Systems   Constitutional:         Feeling well. Denies complaints.    Genitourinary:  Negative for hematuria.        +urostomy      All other systems reviewed and are negative.      Objective:       Blood pressure 119/73, pulse 78, weight 67.1 kg (148 lb), SpO2 97%.  Physical Exam  Vitals reviewed.   Constitutional:       General: He is not in acute distress.     Comments: Thin, elderly male   HENT:      Head: Normocephalic and atraumatic.   Eyes:      General: No scleral icterus.  Cardiovascular:      Rate and Rhythm: Normal rate.   Pulmonary:      Effort: Pulmonary effort is normal. No respiratory distress.   Skin:     General: Skin is warm and dry.   Neurological:      Mental Status: He is alert and oriented to person, place, and time.   Psychiatric:         Mood and Affect: Mood normal.         Behavior: Behavior normal.           Lab Results   Component Value Date    CREATININE 1.8 (H) 11/27/2024    URICACID 6.4 01/13/2022     Prot/Creat Ratio, Urine   Date Value Ref Range Status   11/27/2024 0.53 (H) 0.00 - 0.20 Final   10/11/2024 0.46 (H) 0.00 - 0.20 Final   10/03/2024 0.56 (H) 0.00 - 0.20 Final     Lab Results   Component Value Date     11/27/2024    K 5.4 (H) 11/27/2024    CO2 26 11/27/2024     11/27/2024     Lab Results   Component Value Date    PTH 83.7 (H) 04/02/2024    CALCIUM 9.6 11/27/2024    PHOS 3.3 11/27/2024     Lab Results   Component Value Date    HGB 13.6 (L) 11/27/2024    WBC 7.46 11/27/2024    HCT 40.7 11/27/2024      Lab Results   Component Value Date    HGBA1C 5.4  "02/06/2023     11/27/2024    BUN 33 (H) 11/27/2024     Lab Results   Component Value Date    LDLCALC 96.2 02/06/2023     CT chest abdomen 9/14/22:  "FINDINGS:  Chest:     Heart and mediastinal vasculature: Heart normal in size.     Ann-Marie/Mediastinum: Calcified subcarinal lymph node and calcified right hilar lymph nodes compatible with previous granulomatous disease.  No pathologic adenopathy.     Lungs: Left basilar pleuroparenchymal scarring, minimal paraseptal emphysema in the right middle lobe and by apical pleural thickening..  Calcified in the right base.  No suspicious pulmonary nodules .     Abdomen and pelvis:     Liver: Normal in size and attenuation, with no focal hepatic lesions.     Gallbladder: No calcified gallstones.     Bile Ducts: No evidence of dilated ducts.     Pancreas: No mass or peripancreatic fat stranding.     Spleen: Stable 15 mm hypodensity along the anterior tip spleen, possibly cyst or pseudocyst.     Adrenals: Unremarkable.     Kidneys/ Ureters: Bilateral renal cortical cysts are present and there is interval improvement in renal pelvic and intrarenal collecting system dilatation since the prior study.  There is bilateral urothelial wall thickening and hyperenhancement.  No solid mass or nephrolithiasis.  No collectingd systems not well evaluated due to exam protocol.  There is a right lower quadrant loop urostomy.     GI Tract/Mesentery: No evidence of bowel obstruction or inflammation.     Peritoneal Space: No ascites. No free air.     Retroperitoneum: No significant adenopathy. Bilateral pelvic lymphadenectomy surgical clips are present.     Vasculature: No significant atherosclerosis or aneurysm.     Bladder: Surgically absent     Reproductive organs: Surgically absent     Bones: No suspicious lytic or blastic lesions.  Age related degenerative change.     Impression:     Status post cystectomy and loop urostomy, with improved renal collecting system dilatation, noting " "urothelial wall thickening which may reflect acute and or chronic inflammation and or infection.     No convincing evidence of metastatic disease     Evidence of previous granulomatous disease     Stable splenic hypodensity, of doubtful clinical significance."    Assessment:       1. Stage 3b chronic kidney disease    2. Hyperkalemia    3. Proteinuria, unspecified type    4. Hyperparathyroidism due to renal insufficiency    5. Anemia in stage 3b chronic kidney disease    6. History of urostomy              Plan:   CKD stage 3b -  - baseline Cr 1.8-2.0, within baseline   - CKD clinically related to obstructive uropathy in malignancy, chemo nephrotoxicity and history of AKIs  - Continue to avoid NSAIDs/ nephrotoxic agents, continue adequate hydration    UPCR Subnephrotic; on no RAASi due to hyperkalemia   Acid-base Stable   Renal osteodystrophy PTH elevated, stable in CKD. Ca and phos okay. Vit D 44  Okay for OTC Vit D   Anemia Hgb stable   Lipid Management No statin   ESRD planning Provided education about the stages of CKD, usual progression, and need to monitor for and treat CKD-related disease in an effort to delay progression of CKD.     No need for Advanced CKD planning given stable CKD 3b     Hyperkalemia  - Maintained on Lokelma which he is doing well with although has been costly for him being on a fixed income and he has been unable to receive assistance. Discussed alternatives. He would like to continue with Lokelma MW.   - Potassium trending up. Has eaten some higher potassium foods recently. He would like to work on his diet and repeat potassium level before increasing Lokelma dose.   - repeat potassium in 1 month  - Continue low K diet    All questions patient had were answered.  Asked if further questions. None. F/u in clinic 3-4 months with labs and urine prior to next visit or sooner if needed.  ER for emergency concerns.    Summary of Plan:  - Low K diet. Repeat K in 1 month.   - RTC 3-4 months "

## 2024-12-26 ENCOUNTER — LAB VISIT (OUTPATIENT)
Dept: LAB | Facility: HOSPITAL | Age: 74
End: 2024-12-26
Attending: FAMILY MEDICINE
Payer: MEDICARE

## 2024-12-26 DIAGNOSIS — N18.32 STAGE 3B CHRONIC KIDNEY DISEASE: Chronic | ICD-10-CM

## 2024-12-26 DIAGNOSIS — D63.1 ANEMIA IN STAGE 3B CHRONIC KIDNEY DISEASE: ICD-10-CM

## 2024-12-26 DIAGNOSIS — E87.5 HYPERKALEMIA: ICD-10-CM

## 2024-12-26 DIAGNOSIS — Z13.1 SCREENING FOR DIABETES MELLITUS: ICD-10-CM

## 2024-12-26 DIAGNOSIS — N18.32 ANEMIA IN STAGE 3B CHRONIC KIDNEY DISEASE: ICD-10-CM

## 2024-12-26 DIAGNOSIS — D69.2 OTHER NONTHROMBOCYTOPENIC PURPURA: ICD-10-CM

## 2024-12-26 LAB
ALBUMIN SERPL BCP-MCNC: 4 G/DL (ref 3.5–5.2)
ALP SERPL-CCNC: 87 U/L (ref 40–150)
ALT SERPL W/O P-5'-P-CCNC: 18 U/L (ref 10–44)
ANION GAP SERPL CALC-SCNC: 9 MMOL/L (ref 8–16)
AST SERPL-CCNC: 19 U/L (ref 10–40)
BASOPHILS # BLD AUTO: 0.07 K/UL (ref 0–0.2)
BASOPHILS NFR BLD: 0.9 % (ref 0–1.9)
BILIRUB SERPL-MCNC: 1.2 MG/DL (ref 0.1–1)
BUN SERPL-MCNC: 32 MG/DL (ref 8–23)
CALCIUM SERPL-MCNC: 9.7 MG/DL (ref 8.7–10.5)
CHLORIDE SERPL-SCNC: 108 MMOL/L (ref 95–110)
CO2 SERPL-SCNC: 24 MMOL/L (ref 23–29)
CREAT SERPL-MCNC: 1.9 MG/DL (ref 0.5–1.4)
DIFFERENTIAL METHOD BLD: ABNORMAL
EOSINOPHIL # BLD AUTO: 0.6 K/UL (ref 0–0.5)
EOSINOPHIL NFR BLD: 7.4 % (ref 0–8)
ERYTHROCYTE [DISTWIDTH] IN BLOOD BY AUTOMATED COUNT: 13.7 % (ref 11.5–14.5)
EST. GFR  (NO RACE VARIABLE): 36 ML/MIN/1.73 M^2
ESTIMATED AVG GLUCOSE: 111 MG/DL (ref 68–131)
GLUCOSE SERPL-MCNC: 98 MG/DL (ref 70–110)
HBA1C MFR BLD: 5.5 % (ref 4–5.6)
HCT VFR BLD AUTO: 40.8 % (ref 40–54)
HGB BLD-MCNC: 13.7 G/DL (ref 14–18)
IMM GRANULOCYTES # BLD AUTO: 0.03 K/UL (ref 0–0.04)
IMM GRANULOCYTES NFR BLD AUTO: 0.4 % (ref 0–0.5)
LYMPHOCYTES # BLD AUTO: 1.7 K/UL (ref 1–4.8)
LYMPHOCYTES NFR BLD: 22.5 % (ref 18–48)
MCH RBC QN AUTO: 30.7 PG (ref 27–31)
MCHC RBC AUTO-ENTMCNC: 33.6 G/DL (ref 32–36)
MCV RBC AUTO: 92 FL (ref 82–98)
MONOCYTES # BLD AUTO: 0.6 K/UL (ref 0.3–1)
MONOCYTES NFR BLD: 7.4 % (ref 4–15)
NEUTROPHILS # BLD AUTO: 4.6 K/UL (ref 1.8–7.7)
NEUTROPHILS NFR BLD: 61.4 % (ref 38–73)
NRBC BLD-RTO: 0 /100 WBC
PLATELET # BLD AUTO: 210 K/UL (ref 150–450)
PMV BLD AUTO: 9.5 FL (ref 9.2–12.9)
POTASSIUM SERPL-SCNC: 4.9 MMOL/L (ref 3.5–5.1)
POTASSIUM SERPL-SCNC: 4.9 MMOL/L (ref 3.5–5.1)
PROT SERPL-MCNC: 7.5 G/DL (ref 6–8.4)
RBC # BLD AUTO: 4.46 M/UL (ref 4.6–6.2)
SODIUM SERPL-SCNC: 141 MMOL/L (ref 136–145)
WBC # BLD AUTO: 7.52 K/UL (ref 3.9–12.7)

## 2024-12-26 PROCEDURE — 80053 COMPREHEN METABOLIC PANEL: CPT | Performed by: FAMILY MEDICINE

## 2024-12-26 PROCEDURE — 83036 HEMOGLOBIN GLYCOSYLATED A1C: CPT | Performed by: FAMILY MEDICINE

## 2024-12-26 PROCEDURE — 36415 COLL VENOUS BLD VENIPUNCTURE: CPT | Performed by: FAMILY MEDICINE

## 2024-12-26 PROCEDURE — 85025 COMPLETE CBC W/AUTO DIFF WBC: CPT | Performed by: FAMILY MEDICINE

## 2024-12-27 ENCOUNTER — TELEPHONE (OUTPATIENT)
Dept: HEMATOLOGY/ONCOLOGY | Facility: CLINIC | Age: 74
End: 2024-12-27
Payer: MEDICARE

## 2024-12-27 NOTE — TELEPHONE ENCOUNTER
NA LVM w/ f/u appt times and told pt to call back or message if they needed to reschedule any appts.

## 2025-02-21 DIAGNOSIS — N18.32 STAGE 3B CHRONIC KIDNEY DISEASE: Primary | ICD-10-CM

## 2025-02-21 NOTE — DISCHARGE SUMMARY
Caller: AURELIANO WAN    Relationship to patient: Mother    Best call back number: [phone]     Patient is needing: CALLER WOULD LIKE A CALLBACK TODAY TO DISCUSS RESULTS FROM LABS ON PATIENT.    CONTACT CALLER TO ADVISE.         [facility]  Discharge Note  Short Stay    Admit Date: 4/5/2017    Discharge Date and Time: 4/8/17  1:13pm    Attending Physician: James Boucher MD     Discharge Provider: James Boucher    Diagnoses:  Active Hospital Problems    Diagnosis  POA    *Bladder tumor [D49.4]  Yes    ELDER (acute kidney injury) [N17.9]  Yes    Hyperkalemia [E87.5]  Yes    Difficult intubation [T88.4XXA]  Yes     2 anesthists had difficulty visualizing glottis; possibly epiglottis not midline; very deep (ETT taped at 27cm); optical laryngoscope was not availablel      CKD (chronic kidney disease) [N18.9]  Yes      Resolved Hospital Problems    Diagnosis Date Resolved POA   No resolved problems to display.   ELDER  Hyperkalemia    Discharged Condition: good    Hospital Course: Patient was admitted for an outpatient procedure and tolerated the procedure well with no complications.  However, patient with ELDER post-op and hyperkalemia.  Nephrology consulted and did well with medical management of ELDER.  IR consulted for bilateral nephrostomy tubes on pod#1.  Patient's renal function improved.  hgb was stable.  Ok for d/c to home on POD#3.     Final Diagnoses: Same as principal problem.    Disposition: Home or Self Care    Follow up/Patient Instructions:    F/u next Tuesday for bolanos removal  Percocet and oxybutynin sent to [facility] pharmacy  F/u in 2 weeks for pathology review  Diet as tolerated  No heavy lifting >20 lbs or strenuous exercise for 2 weeks.   Empty nephrostomy tubes and bolanos as needed.     Medications:  Reconciled Home Medications:   Current Discharge Medication List      START taking these medications    Details   docusate sodium (COLACE) 100 MG capsule Take 1 capsule (100 mg total) by mouth 2 (two) times daily.  Refills: 0      ergocalciferol (ERGOCALCIFEROL) 50,000 unit Cap Take 1 capsule (50,000 Units total) by mouth every 7 days.  Qty: 7 capsule, Refills: 0      !! oxybutynin (DITROPAN) 5 MG Tab Take 1  tablet (5 mg total) by mouth 3 (three) times daily.  Qty: 90 tablet, Refills: 0      !! oxybutynin (DITROPAN) 5 MG Tab Take 1 tablet (5 mg total) by mouth after meals as needed (bladder spasm).  Qty: 90 tablet, Refills: 1      !! oxycodone-acetaminophen (PERCOCET) 5-325 mg per tablet Take 1 tablet by mouth every 4 (four) hours as needed for Pain.  Qty: 30 tablet, Refills: 0      !! oxycodone-acetaminophen (PERCOCET) 5-325 mg per tablet Take 1 tablet by mouth every 4 (four) hours as needed.  Qty: 40 tablet, Refills: 0       !! - Potential duplicate medications found. Please discuss with provider.      CONTINUE these medications which have NOT CHANGED    Details   pantoprazole (PROTONIX) 40 MG tablet TAKE 1 BY MOUTH ONCE A DAY FOR 60 DAYS  Refills: 1      ketoconazole (NIZORAL) 2 % shampoo USE TO WASH AFFECTED AREA ONCE DAILY  Refills: 3         STOP taking these medications       sulfamethoxazole-trimethoprim 800-160mg (BACTRIM DS) 800-160 mg Tab Comments:   Reason for Stopping:               Discharge Procedure Orders  Diet general     Diet general     Activity as tolerated     Call MD for:  temperature >100.4     Call MD for:  persistent nausea and vomiting     Call MD for:  severe uncontrolled pain     No dressing needed     Lifting restrictions   Scheduling Instructions: No lifting >20lbs, no strenuous activyt     Call MD for:  temperature >100.4     Call MD for:  persistent nausea and vomiting or diarrhea     Call MD for:  severe uncontrolled pain     Other restrictions (specify):   Order Comments: Bilateral nephrostomy tubes to gravity and bolanos to gravity.     Change dressing (specify)   Order Comments: Dressing change--change nephrostomy tube dressings PRN.  Ok to shower. Don't soak in a tub.       Follow-up Information     Follow up with James Boucher MD On 4/11/2017.    Specialty:  Urology    Why:  bolanos removal    Contact information:    [address]  [address]  [place] [address] [phone]             Discharge Procedure Orders (must include Diet, Follow-up, Activity):    Discharge Procedure Orders (must include Diet, Follow-up, Activity)  Diet general     Diet general     Activity as tolerated     Call MD for:  temperature >100.4     Call MD for:  persistent nausea and vomiting     Call MD for:  severe uncontrolled pain     No dressing needed     Lifting restrictions   Scheduling Instructions: No lifting >20lbs, no strenuous activyt     Call MD for:  temperature >100.4     Call MD for:  persistent nausea and vomiting or diarrhea     Call MD for:  severe uncontrolled pain     Other restrictions (specify):   Order Comments: Bilateral nephrostomy tubes to gravity and bolanos to gravity.     Change dressing (specify)   Order Comments: Dressing change--change nephrostomy tube dressings PRN.  Ok to shower. Don't soak in a tub.

## 2025-02-28 ENCOUNTER — LAB VISIT (OUTPATIENT)
Dept: LAB | Facility: HOSPITAL | Age: 75
End: 2025-02-28
Attending: NURSE PRACTITIONER
Payer: MEDICARE

## 2025-02-28 DIAGNOSIS — N18.32 STAGE 3B CHRONIC KIDNEY DISEASE: ICD-10-CM

## 2025-02-28 LAB
ALBUMIN SERPL BCP-MCNC: 4 G/DL (ref 3.5–5.2)
ANION GAP SERPL CALC-SCNC: 14 MMOL/L (ref 8–16)
BASOPHILS # BLD AUTO: 0.07 K/UL (ref 0–0.2)
BASOPHILS NFR BLD: 1 % (ref 0–1.9)
BUN SERPL-MCNC: 36 MG/DL (ref 8–23)
CALCIUM SERPL-MCNC: 9.9 MG/DL (ref 8.7–10.5)
CHLORIDE SERPL-SCNC: 106 MMOL/L (ref 95–110)
CO2 SERPL-SCNC: 21 MMOL/L (ref 23–29)
CREAT SERPL-MCNC: 1.8 MG/DL (ref 0.5–1.4)
DIFFERENTIAL METHOD BLD: ABNORMAL
EOSINOPHIL # BLD AUTO: 0.4 K/UL (ref 0–0.5)
EOSINOPHIL NFR BLD: 6 % (ref 0–8)
ERYTHROCYTE [DISTWIDTH] IN BLOOD BY AUTOMATED COUNT: 13.8 % (ref 11.5–14.5)
EST. GFR  (NO RACE VARIABLE): 39 ML/MIN/1.73 M^2
GLUCOSE SERPL-MCNC: 90 MG/DL (ref 70–110)
HCT VFR BLD AUTO: 39.1 % (ref 40–54)
HGB BLD-MCNC: 13 G/DL (ref 14–18)
IMM GRANULOCYTES # BLD AUTO: 0.02 K/UL (ref 0–0.04)
IMM GRANULOCYTES NFR BLD AUTO: 0.3 % (ref 0–0.5)
LYMPHOCYTES # BLD AUTO: 1.5 K/UL (ref 1–4.8)
LYMPHOCYTES NFR BLD: 20.4 % (ref 18–48)
MCH RBC QN AUTO: 29.9 PG (ref 27–31)
MCHC RBC AUTO-ENTMCNC: 33.2 G/DL (ref 32–36)
MCV RBC AUTO: 90 FL (ref 82–98)
MONOCYTES # BLD AUTO: 0.6 K/UL (ref 0.3–1)
MONOCYTES NFR BLD: 8.3 % (ref 4–15)
NEUTROPHILS # BLD AUTO: 4.7 K/UL (ref 1.8–7.7)
NEUTROPHILS NFR BLD: 64 % (ref 38–73)
NRBC BLD-RTO: 0 /100 WBC
PHOSPHATE SERPL-MCNC: 3.4 MG/DL (ref 2.7–4.5)
PLATELET # BLD AUTO: 221 K/UL (ref 150–450)
PMV BLD AUTO: 9.5 FL (ref 9.2–12.9)
POTASSIUM SERPL-SCNC: 4.9 MMOL/L (ref 3.5–5.1)
RBC # BLD AUTO: 4.35 M/UL (ref 4.6–6.2)
SODIUM SERPL-SCNC: 141 MMOL/L (ref 136–145)
WBC # BLD AUTO: 7.35 K/UL (ref 3.9–12.7)

## 2025-02-28 PROCEDURE — 85025 COMPLETE CBC W/AUTO DIFF WBC: CPT | Performed by: NURSE PRACTITIONER

## 2025-02-28 PROCEDURE — 36415 COLL VENOUS BLD VENIPUNCTURE: CPT | Performed by: NURSE PRACTITIONER

## 2025-02-28 PROCEDURE — 80069 RENAL FUNCTION PANEL: CPT | Performed by: NURSE PRACTITIONER

## 2025-03-03 ENCOUNTER — OFFICE VISIT (OUTPATIENT)
Dept: NEPHROLOGY | Facility: CLINIC | Age: 75
End: 2025-03-03
Payer: MEDICARE

## 2025-03-03 VITALS
SYSTOLIC BLOOD PRESSURE: 133 MMHG | OXYGEN SATURATION: 95 % | WEIGHT: 158 LBS | HEART RATE: 80 BPM | DIASTOLIC BLOOD PRESSURE: 78 MMHG | BODY MASS INDEX: 20.29 KG/M2

## 2025-03-03 DIAGNOSIS — Z98.890 HISTORY OF UROSTOMY: ICD-10-CM

## 2025-03-03 DIAGNOSIS — N25.81 HYPERPARATHYROIDISM DUE TO RENAL INSUFFICIENCY: ICD-10-CM

## 2025-03-03 DIAGNOSIS — N18.32 ANEMIA IN STAGE 3B CHRONIC KIDNEY DISEASE: ICD-10-CM

## 2025-03-03 DIAGNOSIS — R80.9 PROTEINURIA, UNSPECIFIED TYPE: ICD-10-CM

## 2025-03-03 DIAGNOSIS — N18.32 STAGE 3B CHRONIC KIDNEY DISEASE: Primary | ICD-10-CM

## 2025-03-03 DIAGNOSIS — E87.5 HYPERKALEMIA: ICD-10-CM

## 2025-03-03 DIAGNOSIS — D63.1 ANEMIA IN STAGE 3B CHRONIC KIDNEY DISEASE: ICD-10-CM

## 2025-03-03 PROCEDURE — 3288F FALL RISK ASSESSMENT DOCD: CPT | Mod: CPTII,S$GLB,, | Performed by: NURSE PRACTITIONER

## 2025-03-03 PROCEDURE — 3075F SYST BP GE 130 - 139MM HG: CPT | Mod: CPTII,S$GLB,, | Performed by: NURSE PRACTITIONER

## 2025-03-03 PROCEDURE — 99214 OFFICE O/P EST MOD 30 MIN: CPT | Mod: S$GLB,,, | Performed by: NURSE PRACTITIONER

## 2025-03-03 PROCEDURE — 1159F MED LIST DOCD IN RCRD: CPT | Mod: CPTII,S$GLB,, | Performed by: NURSE PRACTITIONER

## 2025-03-03 PROCEDURE — 1101F PT FALLS ASSESS-DOCD LE1/YR: CPT | Mod: CPTII,S$GLB,, | Performed by: NURSE PRACTITIONER

## 2025-03-03 PROCEDURE — 1126F AMNT PAIN NOTED NONE PRSNT: CPT | Mod: CPTII,S$GLB,, | Performed by: NURSE PRACTITIONER

## 2025-03-03 PROCEDURE — 3078F DIAST BP <80 MM HG: CPT | Mod: CPTII,S$GLB,, | Performed by: NURSE PRACTITIONER

## 2025-03-03 PROCEDURE — 99999 PR PBB SHADOW E&M-EST. PATIENT-LVL II: CPT | Mod: PBBFAC,,, | Performed by: NURSE PRACTITIONER

## 2025-03-03 NOTE — PROGRESS NOTES
"Subjective:       Patient ID: Juan Manuel Koehler is a 74 y.o. male who presents for follow-up evaluation of CKD and hyperkalemia.      HPI     Followed by Dr. Blake in 1/18/22.  Prior pertinent chart reviewed since this is patient's first appointment with me.    Patient presents for follow-up evaluation of CKD.  Baseline creatinine of 1.8-2.0. Significant other medical problems include h/o urothelial carcinoma of bladder s/p ileal conduit, ELDER, CVA, NSTEMI, proteinuria, hyperkalemia. Per Dr. Blake's last visit, "Mr. 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 Prior episodes of ELDER, severe hyperkalemia, ELDER was suspected from prolonged volume depletion causing ATN/ possible contrast induced ELDER." The patient denies taking NSAIDs, herbal supplements, or new antibiotics, recreational drugs, recent episode of dehydration, diarrhea, nausea or vomiting, acute illness, hospitalization or exposure to IV radiocontrast. He has been maintained on veltassa 16.8g qd for 5 years. He adheres to low K diet. He reports adequate hydration.     Update 2/20/23:  - Presents for follow-up of CKD and hyperkalemia  - Taking Veltassa MWF, notes intermittent noncompliance with low K diet  - No other symptoms or new complaints   - Completed treatment with cipro for UTI    Update 6/7/23:  - Follow-up of CKD and hyperkalemia  - Maintained on lokelma TIW, needs refill however medication is costly  - No new symptoms or complaints today    Update 10/9/23:  - Presents for follow-up of CKD and hyperkalemia  - sCr stable at 1.8. K 4.1. Remains on Lokelma MWF.   - Feeling well today. No complaints.     Update 2/21/24:  - Presents for follow-up of CKD and hyperkalemia. sCr 1.8.  - Has tried several assistance programs for Lokelma and Veltassa. No available assistance. Remains costly for him.   - Meeting with hem/onc in April    Update 10/8/24:  - 1/2 gallon per day typically, had decreased water " intake prior to last labs  - Compliant with Lokelma  - No changes to urine or new symptoms    Update 12/3/24:  - Presents for follow-up of CKD 3b and hyperkalemia  - Taking Lokelma 5 TIW  - reports going off of low potassium diet for the holidays - potatoes and tomatoes specifically  - No complaints today    Update 3/3/25:  - Presents for CKD and hyperkalemia  - Doing well overall. No new complaints.     Significant family hx includes: Mother - kidney disease    CT abdomen: 9/14/22 , reviewed.    Review of Systems   Constitutional:         Feeling well. Denies complaints.    Genitourinary:  Negative for hematuria.        +urostomy      All other systems reviewed and are negative.      Objective:       Blood pressure 133/78, pulse 80, weight 71.7 kg (158 lb), SpO2 95%.  Physical Exam  Vitals reviewed.   Constitutional:       General: He is not in acute distress.     Comments: Thin, elderly male   HENT:      Head: Normocephalic and atraumatic.   Eyes:      General: No scleral icterus.  Cardiovascular:      Rate and Rhythm: Normal rate.   Pulmonary:      Effort: Pulmonary effort is normal. No respiratory distress.   Skin:     General: Skin is warm and dry.   Neurological:      Mental Status: He is alert and oriented to person, place, and time.   Psychiatric:         Mood and Affect: Mood normal.         Behavior: Behavior normal.           Lab Results   Component Value Date    CREATININE 1.8 (H) 02/28/2025    URICACID 6.4 01/13/2022     Prot/Creat Ratio, Urine   Date Value Ref Range Status   02/28/2025 1.75 (H) 0.00 - 0.20 Final   11/27/2024 0.53 (H) 0.00 - 0.20 Final   10/11/2024 0.46 (H) 0.00 - 0.20 Final     Lab Results   Component Value Date     02/28/2025    K 4.9 02/28/2025    CO2 21 (L) 02/28/2025     02/28/2025     Lab Results   Component Value Date    PTH 83.7 (H) 04/02/2024    CALCIUM 9.9 02/28/2025    PHOS 3.4 02/28/2025     Lab Results   Component Value Date    HGB 13.0 (L) 02/28/2025    WBC  "7.35 02/28/2025    HCT 39.1 (L) 02/28/2025      Lab Results   Component Value Date    HGBA1C 5.5 12/26/2024     02/28/2025    BUN 36 (H) 02/28/2025     Lab Results   Component Value Date    LDLCALC 96.2 02/06/2023     CT chest abdomen 9/14/22:  "FINDINGS:  Chest:     Heart and mediastinal vasculature: Heart normal in size.     Ann-Marie/Mediastinum: Calcified subcarinal lymph node and calcified right hilar lymph nodes compatible with previous granulomatous disease.  No pathologic adenopathy.     Lungs: Left basilar pleuroparenchymal scarring, minimal paraseptal emphysema in the right middle lobe and by apical pleural thickening..  Calcified in the right base.  No suspicious pulmonary nodules .     Abdomen and pelvis:     Liver: Normal in size and attenuation, with no focal hepatic lesions.     Gallbladder: No calcified gallstones.     Bile Ducts: No evidence of dilated ducts.     Pancreas: No mass or peripancreatic fat stranding.     Spleen: Stable 15 mm hypodensity along the anterior tip spleen, possibly cyst or pseudocyst.     Adrenals: Unremarkable.     Kidneys/ Ureters: Bilateral renal cortical cysts are present and there is interval improvement in renal pelvic and intrarenal collecting system dilatation since the prior study.  There is bilateral urothelial wall thickening and hyperenhancement.  No solid mass or nephrolithiasis.  No collectingd systems not well evaluated due to exam protocol.  There is a right lower quadrant loop urostomy.     GI Tract/Mesentery: No evidence of bowel obstruction or inflammation.     Peritoneal Space: No ascites. No free air.     Retroperitoneum: No significant adenopathy. Bilateral pelvic lymphadenectomy surgical clips are present.     Vasculature: No significant atherosclerosis or aneurysm.     Bladder: Surgically absent     Reproductive organs: Surgically absent     Bones: No suspicious lytic or blastic lesions.  Age related degenerative change.     Impression:     Status " "post cystectomy and loop urostomy, with improved renal collecting system dilatation, noting urothelial wall thickening which may reflect acute and or chronic inflammation and or infection.     No convincing evidence of metastatic disease     Evidence of previous granulomatous disease     Stable splenic hypodensity, of doubtful clinical significance."    Assessment:       1. Stage 3b chronic kidney disease    2. Hyperkalemia    3. Proteinuria, unspecified type    4. Hyperparathyroidism due to renal insufficiency    5. Anemia in stage 3b chronic kidney disease    6. History of urostomy                Plan:   CKD stage 3b -  - baseline Cr 1.8-2.0, within baseline   - CKD clinically related to obstructive uropathy in malignancy, chemo nephrotoxicity and history of AKIs  - Continue to avoid NSAIDs/ nephrotoxic agents, continue adequate hydration    UPCR Subnephrotic, worsened on last UPCR; on no RAASi due to hyperkalemia. Consideration to start low dose ARB if proteinuria continues to be above baseline. Will increase Lokelma if so at next visit.    Acid-base Stable   Renal osteodystrophy PTH elevated, stable in CKD. Ca and phos okay. Vit D 44  Okay for OTC Vit D   Anemia Hgb stable   Lipid Management No statin   ESRD planning Provided education about the stages of CKD, usual progression, and need to monitor for and treat CKD-related disease in an effort to delay progression of CKD.     No need for Advanced CKD planning given stable CKD 3b     Hyperkalemia  - Maintained on Lokelma MWF. Doing well with it. Consideration to increase to daily if requires ARB at next visit.   - Continue low K diet    All questions patient had were answered.  Asked if further questions. None. F/u in clinic 3-4 months with labs and urine prior to next visit or sooner if needed.  ER for emergency concerns.    Summary of Plan:  - RTC 3-4 months with labs. Continue Lokelma MWF.                   "

## 2025-03-24 ENCOUNTER — TELEPHONE (OUTPATIENT)
Dept: HEMATOLOGY/ONCOLOGY | Facility: CLINIC | Age: 75
End: 2025-03-24
Payer: MEDICARE

## 2025-03-24 NOTE — TELEPHONE ENCOUNTER
----- Message from Hemant sent at 3/24/2025  8:07 AM CDT -----  Regarding: Consult/Advisory  Contact: [phone]  Consult/Advisory Name Of Caller: Juan Manuel  Contact Preference:  [phone] or  [phone] Nature of call: Pt is calling to see if his CT scan is a contrast scan. Please call back to further assist.   PCP: Teri Valdes MD    LAST OFFICE VISIT: 12/19/2024    LAST REFILL PER CHART:  Medication:metoprolol succinate (TOPROL XL) 50 MG extended release tablet   Ordered On:03/13/2024  Instructions: TAKE 1 TABLET DAILY   Dispense:90 tablets  Refills:3      Future Appointments   Date Time Provider Department Center   1/10/2025  8:00 AM Kirk Shrestha MD [facility]   4/15/2025  8:15 AM [facility] LAB VISIT [facility] DEP   4/24/2025  8:30 AM Teri Valdes MD [facility] DEP   4/30/2025  9:40 AM Mendy Arellano PA-C [facility]   8/18/2025  8:00 AM [facility] RM 3 3D [facility]

## 2025-04-03 ENCOUNTER — HOSPITAL ENCOUNTER (OUTPATIENT)
Dept: RADIOLOGY | Facility: HOSPITAL | Age: 75
Discharge: HOME | End: 2025-04-03
Attending: INTERNAL MEDICINE
Payer: MEDICARE

## 2025-04-03 DIAGNOSIS — C67.9 UROTHELIAL CARCINOMA OF BLADDER: ICD-10-CM

## 2025-04-03 PROCEDURE — 71250 CT THORAX DX C-: CPT | Mod: TC

## 2025-04-03 PROCEDURE — 25500020 PHARM REV CODE 255: Performed by: INTERNAL MEDICINE

## 2025-04-03 PROCEDURE — 74176 CT ABD & PELVIS W/O CONTRAST: CPT | Mod: 26,,, | Performed by: RADIOLOGY

## 2025-04-03 PROCEDURE — 71250 CT THORAX DX C-: CPT | Mod: 26,,, | Performed by: RADIOLOGY

## 2025-04-03 RX ADMIN — IOHEXOL 30 ML: 350 INJECTION, SOLUTION INTRAVENOUS at 08:04

## 2025-04-07 ENCOUNTER — OFFICE VISIT (OUTPATIENT)
Dept: HEMATOLOGY/ONCOLOGY | Facility: CLINIC | Age: 75
End: 2025-04-07
Payer: MEDICARE

## 2025-04-07 VITALS
TEMPERATURE: 98 F | SYSTOLIC BLOOD PRESSURE: 124 MMHG | WEIGHT: 158 LBS | HEART RATE: 66 BPM | DIASTOLIC BLOOD PRESSURE: 75 MMHG | HEIGHT: 74 IN | RESPIRATION RATE: 14 BRPM | BODY MASS INDEX: 20.28 KG/M2 | OXYGEN SATURATION: 95 %

## 2025-04-07 DIAGNOSIS — Z93.6 S/P ILEAL CONDUIT: ICD-10-CM

## 2025-04-07 DIAGNOSIS — C32.9 LARYNGEAL CANCER: ICD-10-CM

## 2025-04-07 PROCEDURE — 1101F PT FALLS ASSESS-DOCD LE1/YR: CPT | Mod: CPTII,S$GLB,, | Performed by: INTERNAL MEDICINE

## 2025-04-07 PROCEDURE — 99215 OFFICE O/P EST HI 40 MIN: CPT | Mod: S$GLB,,, | Performed by: INTERNAL MEDICINE

## 2025-04-07 PROCEDURE — 99999 PR PBB SHADOW E&M-EST. PATIENT-LVL III: CPT | Mod: PBBFAC,,, | Performed by: INTERNAL MEDICINE

## 2025-04-07 PROCEDURE — 3078F DIAST BP <80 MM HG: CPT | Mod: CPTII,S$GLB,, | Performed by: INTERNAL MEDICINE

## 2025-04-07 PROCEDURE — 3074F SYST BP LT 130 MM HG: CPT | Mod: CPTII,S$GLB,, | Performed by: INTERNAL MEDICINE

## 2025-04-07 PROCEDURE — 1159F MED LIST DOCD IN RCRD: CPT | Mod: CPTII,S$GLB,, | Performed by: INTERNAL MEDICINE

## 2025-04-07 PROCEDURE — G2211 COMPLEX E/M VISIT ADD ON: HCPCS | Mod: S$GLB,,, | Performed by: INTERNAL MEDICINE

## 2025-04-07 PROCEDURE — 1126F AMNT PAIN NOTED NONE PRSNT: CPT | Mod: CPTII,S$GLB,, | Performed by: INTERNAL MEDICINE

## 2025-04-07 PROCEDURE — 3288F FALL RISK ASSESSMENT DOCD: CPT | Mod: CPTII,S$GLB,, | Performed by: INTERNAL MEDICINE

## 2025-04-07 NOTE — PROGRESS NOTES
"Subjective:       Patient ID: Juan Manuel Koehler is a 75 y.o. male.    Chief Complaint: Bladder Cancer    HPI -     75 y.o.White male  who presents today for follow-up and to review CT scans after surgery for invasive bladder cancer performed on 6/21/17.     He denies any mouth sores, nausea, vomiting, diarrhea, constipation, chest pain, shortness of breath, leg swelling, fatigue, headache, dizziness, or mood changes.    His ECOG PS is 1 and he is accompanied alone to clinic.  His wife has had multiple recent ortho surgeries.             Oncologic History:  Patient has previously been seen by Dr. 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 He presented to Dr. Boucher with pain and hematuria and was found to have a large bladder tumor on his trigone. The tumor was also causing bilateral ureteral obstruction with resulting ELDER.The patient underwent tumor resection and attempted JJ stent placement on 4/5/17 which demonstrated cT2 urothelial carcinoma (nested variant). Bilateral nephrostomy tubes were placed with improvement in the patient's renal function. Patient discharged today from [facility] for ELDER from 5/5-5/9. During hospitalization, right nephrostomy tube was placed and the left was exchanged.      5/5/17 CT CAP reveals " Bilateral double-J ureteral stents and left nephrostomy tube are present.  There has been interval resolution of left-sided hydronephrosis and significant improvement in right-sided hydronephrosis which now appears mild. Bladder is nondistended and not well evaluated.  There is no CT evidence of distant metastatic disease referable to provide history of bladder neoplasm."    6/21/17-Dr. Robles performed an open Radical cystoprostatectomy, Bilateral pelvic lymph node dissection, Creation of ileal conduit urinary diversion and " Bilateral ureterotomy for open ureteral J stent placement.   7/6/17- patient had bilateral nephrostomy tubes removed     Review of Systems   Constitutional:  Negative for activity change, appetite change and unexpected weight change.   HENT:  Negative for mouth sores, nosebleeds and trouble swallowing.    Eyes:  Negative for discharge and visual disturbance.   Respiratory:  Negative for shortness of breath and wheezing.    Cardiovascular:  Negative for leg swelling.   Gastrointestinal:  Negative for abdominal distention and blood in stool.   Genitourinary:  Negative for decreased urine volume, frequency and hematuria.   Skin:  Negative for color change.   Hematological:  Negative for adenopathy.   Psychiatric/Behavioral:  Positive for sleep disturbance. The patient is not nervous/anxious.    All other systems reviewed and are negative.      Allergies:  Review of patient's allergies indicates:  No Known Allergies    Medications:  Current Outpatient Medications   Medication Sig Dispense Refill    apremilast (OTEZLA) 30 mg Tab Take by mouth.      sodium zirconium cyclosilicate (LOKELMA) 5 gram packet Take 1 packet (5 g total) by mouth once daily. Mix entire contents of packet(s) into drinking glass containing 3 tablespoons of water; stir well and drink immediately. Add water and repeat until no powder remains to receive entire dose. 30 packet 11    temazepam (RESTORIL) 15 mg Cap TAKE 1 CAPSULE (15 MG TOTAL) BY MOUTH NIGHTLY AS NEEDED (SLEEP). 30 capsule 5     No current facility-administered medications for this visit.       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    HTN (hypertension)     Hydronephrosis        PSH:  Past Surgical History:   Procedure Laterality Date    BLADDER SURGERY      CYSTOSCOPY      HERNIA REPAIR      Ileal Conduit      pelvic lymph node resection      Radical cystoprostatectomy       "THROAT SURGERY      urostomy tube placement         FamHx:  Family History   Problem Relation Name Age of Onset    No Known Problems Father      Kidney disease Mother      Prostate cancer Neg Hx         SocHx:  Social History     Socioeconomic History    Marital status:    Tobacco Use    Smoking status: Never    Smokeless tobacco: Never   Substance and Sexual Activity    Alcohol use: No     Alcohol/week: 0.0 standard drinks of alcohol    Drug use: No    Sexual activity: Yes     Partners: Female     Birth control/protection: None     Comment:  lives with spouse     Social Drivers of Health     Financial Resource Strain: High Risk (11/17/2024)    Received from [facility] SDOH Screening     In the past year have you or any family members you live with been unable to get any of the following when it was really needed?  check all that apply: Medicine or any health care (medical dental mental health or vision)       Objective:       /75 (BP Location: Left arm, Patient Position: Sitting)   Pulse 66   Temp 97.7 °F (36.5 °C) (Oral)   Resp 14   Ht 6' 2" (1.88 m)   Wt 71.7 kg (158 lb) Comment: Pt reported  SpO2 95%   BMI 20.29 kg/m²     Physical Exam  Vitals and nursing note reviewed.   Constitutional:       Appearance: He is well-developed.      Comments: Appears fatigued and thin   HENT:      Head: Normocephalic and atraumatic.      Right Ear: External ear normal.      Left Ear: External ear normal.   Eyes:      General: No scleral icterus.        Right eye: No discharge.         Left eye: No discharge.      Conjunctiva/sclera: Conjunctivae normal.      Pupils: Pupils are equal, round, and reactive to light.   Pulmonary:      Effort: Pulmonary effort is normal.   Musculoskeletal:         General: Normal range of motion.      Cervical back: Normal range of motion and neck supple.      Comments: Bilateral nephrostomy tubes draining clear, yellow urine.   Skin:     General: Skin is warm " and dry.      Findings: No erythema.   Neurological:      Mental Status: He is alert and oriented to person, place, and time.         LABS:  WBC   Date Value Ref Range Status   04/03/2025 8.28 3.90 - 12.70 K/uL Final     Hemoglobin   Date Value Ref Range Status   02/28/2025 13.0 (L) 14.0 - 18.0 g/dL Final     HGB   Date Value Ref Range Status   04/03/2025 13.1 (L) 14.0 - 18.0 gm/dL Final     POC Hematocrit   Date Value Ref Range Status   06/21/2017 17 (LL) 36 - 54 %PCV Final     Hematocrit   Date Value Ref Range Status   02/28/2025 39.1 (L) 40.0 - 54.0 % Final     HCT   Date Value Ref Range Status   04/03/2025 39.2 (L) 40.0 - 54.0 % Final     Platelet Count   Date Value Ref Range Status   04/03/2025 243 150 - 450 K/uL Final     Platelets   Date Value Ref Range Status   02/28/2025 221 150 - 450 K/uL Final       Chemistry        Component Value Date/Time     04/03/2025 0715     02/28/2025 0817    K 4.6 04/03/2025 0715    K 4.9 02/28/2025 0817     04/03/2025 0715     02/28/2025 0817    CO2 19 (L) 04/03/2025 0715    CO2 21 (L) 02/28/2025 0817    BUN 31 (H) 04/03/2025 0715    CREATININE 1.8 (H) 04/03/2025 0715    GLU 90 02/28/2025 0817        Component Value Date/Time    CALCIUM 9.5 04/03/2025 0715    CALCIUM 9.9 02/28/2025 0817    ALKPHOS 81 04/03/2025 0715    ALKPHOS 87 12/26/2024 0802    AST 18 04/03/2025 0715    AST 19 12/26/2024 0802    ALT 16 04/03/2025 0715    ALT 18 12/26/2024 0802    BILITOT 1.2 (H) 04/03/2025 0715    BILITOT 1.2 (H) 12/26/2024 0802          Assessment:       1. S/P ileal conduit    2. Laryngeal cancer            Plan:       1. Bladder Cancer:  Juan Manuel Koehler is a 75 y.o.White male, originally referred by Dr. Robles, who presents today for follow-up, s/p surgery for  invasive bladder cancer.     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 He presented to Dr. Boucher with pain and hematuria and  was found to have a large bladder tumor on his trigone. Staging CTCAP on 5/5/17 shows no evidence of distant metastasis.    Given his continued increased creatinine, it was unclear if he would be considered a candidate for neoadjuvant chemotherapy This has improved and the patient wanted to move forward with chemo, he understood the risks, particularly to his kidneys. We split the dose of cisplatin between D1+D8 and gave IVFs.  Yet still his creatinine increased significantly.  We had a lengthy conversation about the risk of recurrence without chemo and real risk of permanent and irreversible kidney damage.   Also discussed with Dr. Robles.  At this point, we will forgo further chemotherapy in favor of moving forward with surgery.      S/p surgery with negative margins and negative lymph node involvement. Favorable pathology report reviewed with patient and his accompanying wife. Data is controversial in the benefit of adjuvant chemotherapy. Decision made by Dr. Calero and Dr. Robles to defer resuming chemotherapy.  Currently RENETTA.    Current scans RENETTA, stable, will repeat scans in 1 year to monitor.  Now more than 5 years out from original bladder surgery.     RTC 1 year with Non IV contrast CT CAP, labs (CBC,CMP), and to see Dr. Calero.        2. CKD: Stable, continue to follow with nephrology.      The patient agrees with the plan, and all questions have been answered to their satisfaction.      More than 40 mins total time were spent during this encounter.    Mingo Calero M.D., M.S., F.A.C.P.  Hematology and Oncology Attending  [address]ylvia[address] jimenez [facility]                Route Chart for Scheduling    Med Onc Chart Routing      Follow up with physician . RTC 1 year with Non IV contrast CT CAP, labs (CBC,CMP), and to see Dr. Calero.   Follow up with [facility]    Infusion scheduling note    Injection scheduling note    Labs    Imaging    Pharmacy appointment    Other referrals                        Visit today included increased complexity associated with the care of the episodic problems addressed and managing the longitudinal care of the patient due to the serious and/or complex managed problem(s) discussed above.

## 2025-04-16 ENCOUNTER — OFFICE VISIT (OUTPATIENT)
Dept: WOUND CARE | Facility: CLINIC | Age: 75
End: 2025-04-16
Payer: MEDICARE

## 2025-04-16 DIAGNOSIS — K43.5 PARASTOMAL HERNIA WITHOUT OBSTRUCTION OR GANGRENE: ICD-10-CM

## 2025-04-16 DIAGNOSIS — Z98.890 HISTORY OF UROSTOMY: Primary | ICD-10-CM

## 2025-04-16 PROCEDURE — 99999 PR PBB SHADOW E&M-EST. PATIENT-LVL II: CPT | Mod: PBBFAC,,, | Performed by: CLINICAL NURSE SPECIALIST

## 2025-04-23 ENCOUNTER — PATIENT MESSAGE (OUTPATIENT)
Dept: WOUND CARE | Facility: CLINIC | Age: 75
End: 2025-04-23
Payer: MEDICARE

## 2025-06-16 ENCOUNTER — LAB VISIT (OUTPATIENT)
Dept: LAB | Facility: HOSPITAL | Age: 75
End: 2025-06-16
Attending: FAMILY MEDICINE
Payer: MEDICARE

## 2025-06-16 DIAGNOSIS — N18.32 STAGE 3B CHRONIC KIDNEY DISEASE: ICD-10-CM

## 2025-06-16 LAB
25(OH)D3+25(OH)D2 SERPL-MCNC: 79 NG/ML (ref 30–96)
ALBUMIN SERPL BCP-MCNC: 3.8 G/DL (ref 3.5–5.2)
ANION GAP (OHS): 6 MMOL/L (ref 8–16)
BACTERIA #/AREA URNS AUTO: ABNORMAL /HPF
BILIRUB UR QL STRIP.AUTO: NEGATIVE
BUN SERPL-MCNC: 31 MG/DL (ref 8–23)
CALCIUM SERPL-MCNC: 9.6 MG/DL (ref 8.7–10.5)
CHLORIDE SERPL-SCNC: 108 MMOL/L (ref 95–110)
CLARITY UR: ABNORMAL
CO2 SERPL-SCNC: 23 MMOL/L (ref 23–29)
COLOR UR AUTO: YELLOW
CREAT SERPL-MCNC: 1.9 MG/DL (ref 0.5–1.4)
CREAT UR-MCNC: 36.7 MG/DL (ref 23–375)
ERYTHROCYTE [DISTWIDTH] IN BLOOD BY AUTOMATED COUNT: 13.8 % (ref 11.5–14.5)
GFR SERPLBLD CREATININE-BSD FMLA CKD-EPI: 36 ML/MIN/1.73/M2
GLUCOSE SERPL-MCNC: 104 MG/DL (ref 70–110)
GLUCOSE UR QL STRIP: NEGATIVE
HCT VFR BLD AUTO: 38.9 % (ref 40–54)
HGB BLD-MCNC: 13.1 GM/DL (ref 14–18)
HGB UR QL STRIP: ABNORMAL
KETONES UR QL STRIP: NEGATIVE
LEUKOCYTE ESTERASE UR QL STRIP: ABNORMAL
MCH RBC QN AUTO: 30.1 PG (ref 27–31)
MCHC RBC AUTO-ENTMCNC: 33.7 G/DL (ref 32–36)
MCV RBC AUTO: 89 FL (ref 82–98)
MICROSCOPIC COMMENT: ABNORMAL
NITRITE UR QL STRIP: POSITIVE
PH UR STRIP: 6 [PH]
PHOSPHATE SERPL-MCNC: 3.5 MG/DL (ref 2.7–4.5)
PLATELET # BLD AUTO: 215 K/UL (ref 150–450)
PMV BLD AUTO: 9.6 FL (ref 9.2–12.9)
POTASSIUM SERPL-SCNC: 5 MMOL/L (ref 3.5–5.1)
PROT UR QL STRIP: NEGATIVE
PROT UR-MCNC: 21 MG/DL
PROT/CREAT UR: 0.57 MG/G{CREAT}
PTH-INTACT SERPL-MCNC: 74.7 PG/ML (ref 9–77)
RBC # BLD AUTO: 4.35 M/UL (ref 4.6–6.2)
RBC #/AREA URNS AUTO: 3 /HPF (ref 0–4)
SODIUM SERPL-SCNC: 137 MMOL/L (ref 136–145)
SP GR UR STRIP: 1.01
SQUAMOUS #/AREA URNS AUTO: 1 /HPF
UROBILINOGEN UR STRIP-ACNC: NEGATIVE EU/DL
WBC # BLD AUTO: 7.71 K/UL (ref 3.9–12.7)
WBC #/AREA URNS AUTO: 98 /HPF (ref 0–5)
WBC CLUMPS UR QL AUTO: ABNORMAL

## 2025-06-16 PROCEDURE — 36415 COLL VENOUS BLD VENIPUNCTURE: CPT

## 2025-06-16 PROCEDURE — 81001 URINALYSIS AUTO W/SCOPE: CPT

## 2025-06-16 PROCEDURE — 82570 ASSAY OF URINE CREATININE: CPT

## 2025-06-16 PROCEDURE — 85027 COMPLETE CBC AUTOMATED: CPT

## 2025-06-16 PROCEDURE — 83970 ASSAY OF PARATHORMONE: CPT

## 2025-06-16 PROCEDURE — 82310 ASSAY OF CALCIUM: CPT

## 2025-06-16 PROCEDURE — 82306 VITAMIN D 25 HYDROXY: CPT

## 2025-06-23 ENCOUNTER — OFFICE VISIT (OUTPATIENT)
Dept: NEPHROLOGY | Facility: CLINIC | Age: 75
End: 2025-06-23
Payer: MEDICARE

## 2025-06-23 VITALS
OXYGEN SATURATION: 98 % | SYSTOLIC BLOOD PRESSURE: 109 MMHG | HEART RATE: 71 BPM | BODY MASS INDEX: 19.77 KG/M2 | WEIGHT: 154 LBS | DIASTOLIC BLOOD PRESSURE: 64 MMHG

## 2025-06-23 DIAGNOSIS — N18.32 ANEMIA IN STAGE 3B CHRONIC KIDNEY DISEASE: ICD-10-CM

## 2025-06-23 DIAGNOSIS — R80.9 PROTEINURIA, UNSPECIFIED TYPE: ICD-10-CM

## 2025-06-23 DIAGNOSIS — D63.1 ANEMIA IN STAGE 3B CHRONIC KIDNEY DISEASE: ICD-10-CM

## 2025-06-23 DIAGNOSIS — N18.32 STAGE 3B CHRONIC KIDNEY DISEASE: Primary | ICD-10-CM

## 2025-06-23 DIAGNOSIS — N25.81 HYPERPARATHYROIDISM DUE TO RENAL INSUFFICIENCY: ICD-10-CM

## 2025-06-23 DIAGNOSIS — E87.5 HYPERKALEMIA: ICD-10-CM

## 2025-06-23 DIAGNOSIS — Z98.890 HISTORY OF UROSTOMY: ICD-10-CM

## 2025-06-23 PROCEDURE — 3078F DIAST BP <80 MM HG: CPT | Mod: CPTII,S$GLB,, | Performed by: NURSE PRACTITIONER

## 2025-06-23 PROCEDURE — 1159F MED LIST DOCD IN RCRD: CPT | Mod: CPTII,S$GLB,, | Performed by: NURSE PRACTITIONER

## 2025-06-23 PROCEDURE — 1101F PT FALLS ASSESS-DOCD LE1/YR: CPT | Mod: CPTII,S$GLB,, | Performed by: NURSE PRACTITIONER

## 2025-06-23 PROCEDURE — 99214 OFFICE O/P EST MOD 30 MIN: CPT | Mod: S$GLB,,, | Performed by: NURSE PRACTITIONER

## 2025-06-23 PROCEDURE — 1125F AMNT PAIN NOTED PAIN PRSNT: CPT | Mod: CPTII,S$GLB,, | Performed by: NURSE PRACTITIONER

## 2025-06-23 PROCEDURE — G2211 COMPLEX E/M VISIT ADD ON: HCPCS | Mod: S$GLB,,, | Performed by: NURSE PRACTITIONER

## 2025-06-23 PROCEDURE — 3074F SYST BP LT 130 MM HG: CPT | Mod: CPTII,S$GLB,, | Performed by: NURSE PRACTITIONER

## 2025-06-23 PROCEDURE — 3288F FALL RISK ASSESSMENT DOCD: CPT | Mod: CPTII,S$GLB,, | Performed by: NURSE PRACTITIONER

## 2025-06-23 PROCEDURE — 99999 PR PBB SHADOW E&M-EST. PATIENT-LVL II: CPT | Mod: PBBFAC,,, | Performed by: NURSE PRACTITIONER

## 2025-06-23 NOTE — PROGRESS NOTES
"Subjective:       Patient ID: Juan Manuel Koehler is a 75 y.o. male who presents for follow-up evaluation of CKD and hyperkalemia.      HPI     Followed by Dr. Blake in 1/18/22.  Prior pertinent chart reviewed since this is patient's first appointment with me.    Patient presents for follow-up evaluation of CKD.  Baseline creatinine of 1.8-2.0. Significant other medical problems include h/o urothelial carcinoma of bladder s/p ileal conduit, ELDER, CVA, NSTEMI, proteinuria, hyperkalemia. Per Dr. Blake's last visit, "Mr. 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 Prior episodes of ELDER, severe hyperkalemia, ELDER was suspected from prolonged volume depletion causing ATN/ possible contrast induced ELDER." The patient denies taking NSAIDs, herbal supplements, or new antibiotics, recreational drugs, recent episode of dehydration, diarrhea, nausea or vomiting, acute illness, hospitalization or exposure to IV radiocontrast. He has been maintained on veltassa 16.8g qd for 5 years. He adheres to low K diet. He reports adequate hydration.     Update 2/20/23:  - Presents for follow-up of CKD and hyperkalemia  - Taking Veltassa MWF, notes intermittent noncompliance with low K diet  - No other symptoms or new complaints   - Completed treatment with cipro for UTI    Update 6/7/23:  - Follow-up of CKD and hyperkalemia  - Maintained on lokelma TIW, needs refill however medication is costly  - No new symptoms or complaints today    Update 10/9/23:  - Presents for follow-up of CKD and hyperkalemia  - sCr stable at 1.8. K 4.1. Remains on Lokelma MWF.   - Feeling well today. No complaints.     Update 2/21/24:  - Presents for follow-up of CKD and hyperkalemia. sCr 1.8.  - Has tried several assistance programs for Lokelma and Veltassa. No available assistance. Remains costly for him.   - Meeting with hem/onc in April    Update 10/8/24:  - 1/2 gallon per day typically, had decreased water " intake prior to last labs  - Compliant with Lokelma  - No changes to urine or new symptoms    Update 12/3/24:  - Presents for follow-up of CKD 3b and hyperkalemia  - Taking Lokelma 5 TIW  - reports going off of low potassium diet for the holidays - potatoes and tomatoes specifically  - No complaints today    Update 3/3/25:  - Presents for CKD and hyperkalemia  - Doing well overall. No new complaints.     Update 6/23/25:  - Presents for follow-up of CKD, hyperkalemia  - Doing well on Lokelma MWF. Proteinuria improved.     Significant family hx includes: Mother - kidney disease    CT abdomen: 9/14/22 , reviewed.    Review of Systems   Constitutional:         Feeling well. Denies complaints.    Genitourinary:  Negative for hematuria.        +urostomy      All other systems reviewed and are negative.      Objective:       Blood pressure 109/64, pulse 71, weight 69.9 kg (154 lb), SpO2 98%.  Physical Exam  Vitals reviewed.   Constitutional:       General: He is not in acute distress.     Comments: Thin, elderly male   HENT:      Head: Normocephalic and atraumatic.   Eyes:      General: No scleral icterus.  Cardiovascular:      Rate and Rhythm: Normal rate.   Pulmonary:      Effort: Pulmonary effort is normal. No respiratory distress.   Skin:     General: Skin is warm and dry.   Neurological:      Mental Status: He is alert and oriented to person, place, and time.   Psychiatric:         Mood and Affect: Mood normal.         Behavior: Behavior normal.           Lab Results   Component Value Date    CREATININE 1.9 (H) 06/16/2025    URICACID 6.4 01/13/2022     Urine Protein/Creatinine Ratio   Date Value Ref Range Status   06/16/2025 0.57 (H) <=0.20 Final     Prot/Creat Ratio, Urine   Date Value Ref Range Status   02/28/2025 1.75 (H) 0.00 - 0.20 Final   11/27/2024 0.53 (H) 0.00 - 0.20 Final   10/11/2024 0.46 (H) 0.00 - 0.20 Final     Lab Results   Component Value Date     06/16/2025    K 5.0 06/16/2025    CO2 23  "06/16/2025     06/16/2025     Lab Results   Component Value Date    PTH 74.7 06/16/2025    CALCIUM 9.6 06/16/2025    PHOS 3.5 06/16/2025     Lab Results   Component Value Date    HGB 13.1 (L) 06/16/2025    WBC 7.71 06/16/2025    HCT 38.9 (L) 06/16/2025      Lab Results   Component Value Date    HGBA1C 5.5 12/26/2024     06/16/2025    BUN 31 (H) 06/16/2025     Lab Results   Component Value Date    LDLCALC 96.2 02/06/2023     CT chest abdomen 9/14/22:  "FINDINGS:  Chest:     Heart and mediastinal vasculature: Heart normal in size.     Ann-Marie/Mediastinum: Calcified subcarinal lymph node and calcified right hilar lymph nodes compatible with previous granulomatous disease.  No pathologic adenopathy.     Lungs: Left basilar pleuroparenchymal scarring, minimal paraseptal emphysema in the right middle lobe and by apical pleural thickening..  Calcified in the right base.  No suspicious pulmonary nodules .     Abdomen and pelvis:     Liver: Normal in size and attenuation, with no focal hepatic lesions.     Gallbladder: No calcified gallstones.     Bile Ducts: No evidence of dilated ducts.     Pancreas: No mass or peripancreatic fat stranding.     Spleen: Stable 15 mm hypodensity along the anterior tip spleen, possibly cyst or pseudocyst.     Adrenals: Unremarkable.     Kidneys/ Ureters: Bilateral renal cortical cysts are present and there is interval improvement in renal pelvic and intrarenal collecting system dilatation since the prior study.  There is bilateral urothelial wall thickening and hyperenhancement.  No solid mass or nephrolithiasis.  No collectingd systems not well evaluated due to exam protocol.  There is a right lower quadrant loop urostomy.     GI Tract/Mesentery: No evidence of bowel obstruction or inflammation.     Peritoneal Space: No ascites. No free air.     Retroperitoneum: No significant adenopathy. Bilateral pelvic lymphadenectomy surgical clips are present.     Vasculature: No significant " "atherosclerosis or aneurysm.     Bladder: Surgically absent     Reproductive organs: Surgically absent     Bones: No suspicious lytic or blastic lesions.  Age related degenerative change.     Impression:     Status post cystectomy and loop urostomy, with improved renal collecting system dilatation, noting urothelial wall thickening which may reflect acute and or chronic inflammation and or infection.     No convincing evidence of metastatic disease     Evidence of previous granulomatous disease     Stable splenic hypodensity, of doubtful clinical significance."    Assessment:       1. Stage 3b chronic kidney disease    2. Hyperkalemia    3. Proteinuria, unspecified type    4. Hyperparathyroidism due to renal insufficiency    5. Anemia in stage 3b chronic kidney disease    6. History of urostomy            Plan:   CKD stage 3b -  - baseline Cr 1.8-2.0, within baseline   - CKD clinically related to obstructive uropathy in malignancy, chemo nephrotoxicity and history of AKIs  - Continue to avoid NSAIDs/ nephrotoxic agents, continue adequate hydration    UPCR Subnephrotic, improved, on no RAASi due to hyperkalemia. Consideration to start low dose ARB if proteinuria worsens.    Acid-base Stable   Renal osteodystrophy PTH elevated, stable in CKD. Ca and phos okay. Vit D in range.    Anemia Hgb stable   Lipid Management No statin   ESRD planning Provided education about the stages of CKD, usual progression, and need to monitor for and treat CKD-related disease in an effort to delay progression of CKD.     No need for Advanced CKD planning given stable CKD 3b     Hyperkalemia  - Maintained on Lokelma MWF. Doing well with it.   - Continue low K diet    All questions patient had were answered.  Asked if further questions. None. F/u in clinic 4 months with labs and urine prior to next visit or sooner if needed.  ER for emergency concerns.    Summary of Plan:  - RTC 4 months with labs. Continue Lokelma MWF.       Visit today " included increased complexity associated with the care of the patient due to the serious and/or complex managed problem(s) CKD 3b, proteinuria, hyperkalemia, hyperparathyroidism, anemia.
